# Patient Record
Sex: FEMALE | Race: WHITE | NOT HISPANIC OR LATINO | Employment: OTHER | URBAN - METROPOLITAN AREA
[De-identification: names, ages, dates, MRNs, and addresses within clinical notes are randomized per-mention and may not be internally consistent; named-entity substitution may affect disease eponyms.]

---

## 2017-01-03 ENCOUNTER — OFFICE VISIT (OUTPATIENT)
Dept: SURGERY | Facility: CLINIC | Age: 52
End: 2017-01-03
Payer: MEDICARE

## 2017-01-03 ENCOUNTER — TELEPHONE (OUTPATIENT)
Dept: SURGERY | Facility: CLINIC | Age: 52
End: 2017-01-03

## 2017-01-03 ENCOUNTER — HOSPITAL ENCOUNTER (OUTPATIENT)
Dept: RADIOLOGY | Facility: HOSPITAL | Age: 52
Discharge: HOME | End: 2017-01-03
Attending: COLON & RECTAL SURGERY
Payer: MEDICARE

## 2017-01-03 VITALS
HEART RATE: 91 BPM | SYSTOLIC BLOOD PRESSURE: 124 MMHG | BODY MASS INDEX: 22.05 KG/M2 | DIASTOLIC BLOOD PRESSURE: 77 MMHG | WEIGHT: 145.5 LBS | HEIGHT: 68 IN

## 2017-01-03 DIAGNOSIS — K50.918 CROHN'S DISEASE WITH OTHER COMPLICATION: ICD-10-CM

## 2017-01-03 DIAGNOSIS — K50.918 CROHN'S DISEASE WITH OTHER COMPLICATION: Primary | ICD-10-CM

## 2017-01-03 DIAGNOSIS — Z93.3 COLOSTOMY STATUS: ICD-10-CM

## 2017-01-03 PROCEDURE — 99214 OFFICE O/P EST MOD 30 MIN: CPT | Mod: S$PBB,,, | Performed by: COLON & RECTAL SURGERY

## 2017-01-03 PROCEDURE — 74177 CT ABD & PELVIS W/CONTRAST: CPT | Mod: TC

## 2017-01-03 PROCEDURE — 25500020 PHARM REV CODE 255: Performed by: COLON & RECTAL SURGERY

## 2017-01-03 PROCEDURE — 74177 CT ABD & PELVIS W/CONTRAST: CPT | Mod: 26,GC,, | Performed by: RADIOLOGY

## 2017-01-03 PROCEDURE — 99999 PR PBB SHADOW E&M-EST. PATIENT-LVL III: CPT | Mod: PBBFAC,,, | Performed by: COLON & RECTAL SURGERY

## 2017-01-03 RX ADMIN — IOHEXOL 75 ML: 350 INJECTION, SOLUTION INTRAVENOUS at 05:01

## 2017-01-03 RX ADMIN — IOHEXOL 15 ML: 350 INJECTION, SOLUTION INTRAVENOUS at 02:01

## 2017-01-03 RX ADMIN — IOHEXOL 15 ML: 350 INJECTION, SOLUTION INTRAVENOUS at 03:01

## 2017-01-03 NOTE — TELEPHONE ENCOUNTER
Pt having abdominal pain. This has been going on for 2 weeks but much worse now. Appt made for her to see Dr Uribe today.

## 2017-01-03 NOTE — TELEPHONE ENCOUNTER
----- Message from Karolina Harmon MA sent at 1/3/2017  9:26 AM CST -----  Contact: self  [phone]  States she is calling for a appointment today due to abdomen pain and cramping.  States she is a Crohn's patient and her gastro provider is not available.  States she is calling to see if he can see her today since he did her colostomy surgery.

## 2017-01-03 NOTE — PROGRESS NOTES
Subjective:       Patient ID: Carline Chang is a 51 y.o. female.    Chief Complaint: Abdominal Pain    HPI   51 F who presents to clinic with complaints of continued abdominal pain for the past 3 weeks. +nauseau, denies vomiting, fever, chills. She states the pain is worse at night and it feels similar to acute crohn's attacks she has had in the past. She has seen her GI MD for this and has been treated with bactrim, cipro and flagyl. She finished her course of bactrim but only 7/10days of the cipro and flagyl due to not feeling any better. He regular GI doc (on the [facility]) is out of town so she decided to come here.   Review of Systems   Constitutional: Positive for fatigue and unexpected weight change. Negative for chills and fever.   Respiratory: Negative for shortness of breath.    Cardiovascular: Negative for chest pain.   Gastrointestinal: Positive for abdominal pain and nausea. Negative for abdominal distention, blood in stool, rectal pain and vomiting.       Objective:      Physical Exam   Constitutional: She is oriented to person, place, and time. She appears well-developed and well-nourished.   Pulmonary/Chest: Effort normal. No respiratory distress.   Abdominal: Soft. She exhibits no distension. There is tenderness.   Musculoskeletal: Normal range of motion.   Neurological: She is alert and oriented to person, place, and time.   Skin: Skin is warm and dry.   Psychiatric: She has a normal mood and affect. Her behavior is normal.       Assessment:       1. Crohn's disease with other complication    2. Colostomy status        Plan:       CT Abdomen Pelvis with contrast today  F/U with GI   RTC PRN  Have seen and examined the patient with the fellow and agree with their plan.  ESPERANZA MADSEN

## 2017-01-15 ENCOUNTER — HOSPITAL ENCOUNTER (INPATIENT)
Facility: HOSPITAL | Age: 52
LOS: 5 days | Discharge: HOME | DRG: 386 | End: 2017-01-20
Attending: COLON & RECTAL SURGERY | Admitting: COLON & RECTAL SURGERY
Payer: MEDICARE

## 2017-01-15 DIAGNOSIS — Z12.11 SPECIAL SCREENING FOR MALIGNANT NEOPLASMS, COLON: ICD-10-CM

## 2017-01-15 DIAGNOSIS — K50.90 CROHN DISEASE: ICD-10-CM

## 2017-01-15 DIAGNOSIS — Z93.3 COLOSTOMY STATUS: Primary | ICD-10-CM

## 2017-01-15 DIAGNOSIS — K50.919 CROHN'S DISEASE WITH COMPLICATION, UNSPECIFIED GASTROINTESTINAL TRACT LOCATION: ICD-10-CM

## 2017-01-15 LAB
ALBUMIN SERPL BCP-MCNC: 3.3 G/DL
ALP SERPL-CCNC: 139 U/L
ALT SERPL W/O P-5'-P-CCNC: 10 U/L
ANION GAP SERPL CALC-SCNC: 7 MMOL/L
AST SERPL-CCNC: 16 U/L
BASOPHILS # BLD AUTO: 0.02 K/UL
BASOPHILS NFR BLD: 0.2 %
BILIRUB SERPL-MCNC: 0.2 MG/DL
BUN SERPL-MCNC: 9 MG/DL
CALCIUM SERPL-MCNC: 8.6 MG/DL
CHLORIDE SERPL-SCNC: 102 MMOL/L
CO2 SERPL-SCNC: 29 MMOL/L
CREAT SERPL-MCNC: 0.7 MG/DL
CRP SERPL-MCNC: 3.65 MG/L
DIFFERENTIAL METHOD: ABNORMAL
EOSINOPHIL # BLD AUTO: 0 K/UL
EOSINOPHIL NFR BLD: 0.1 %
ERYTHROCYTE [DISTWIDTH] IN BLOOD BY AUTOMATED COUNT: 13.4 %
EST. GFR  (AFRICAN AMERICAN): >60 ML/MIN/1.73 M^2
EST. GFR  (NON AFRICAN AMERICAN): >60 ML/MIN/1.73 M^2
GLUCOSE SERPL-MCNC: 79 MG/DL
HCT VFR BLD AUTO: 36.5 %
HGB BLD-MCNC: 12.1 G/DL
LYMPHOCYTES # BLD AUTO: 1.2 K/UL
LYMPHOCYTES NFR BLD: 12.1 %
MCH RBC QN AUTO: 28.6 PG
MCHC RBC AUTO-ENTMCNC: 33.2 %
MCV RBC AUTO: 86 FL
MONOCYTES # BLD AUTO: 0.5 K/UL
MONOCYTES NFR BLD: 5.1 %
NEUTROPHILS # BLD AUTO: 8.2 K/UL
NEUTROPHILS NFR BLD: 81 %
PLATELET # BLD AUTO: 223 K/UL
PMV BLD AUTO: 9.5 FL
POTASSIUM SERPL-SCNC: 4.2 MMOL/L
PREALB SERPL-MCNC: 23 MG/DL
PROT SERPL-MCNC: 7.3 G/DL
RBC # BLD AUTO: 4.23 M/UL
SODIUM SERPL-SCNC: 138 MMOL/L
WBC # BLD AUTO: 10.11 K/UL

## 2017-01-15 PROCEDURE — 84134 ASSAY OF PREALBUMIN: CPT

## 2017-01-15 PROCEDURE — 99223 1ST HOSP IP/OBS HIGH 75: CPT | Mod: AI,,, | Performed by: COLON & RECTAL SURGERY

## 2017-01-15 PROCEDURE — 85025 COMPLETE CBC W/AUTO DIFF WBC: CPT

## 2017-01-15 PROCEDURE — 80053 COMPREHEN METABOLIC PANEL: CPT

## 2017-01-15 PROCEDURE — 63600175 PHARM REV CODE 636 W HCPCS: Performed by: ANESTHESIOLOGY

## 2017-01-15 PROCEDURE — 86141 C-REACTIVE PROTEIN HS: CPT

## 2017-01-15 PROCEDURE — 20600001 HC STEP DOWN PRIVATE ROOM

## 2017-01-15 PROCEDURE — 25000003 PHARM REV CODE 250: Performed by: ANESTHESIOLOGY

## 2017-01-15 RX ORDER — DIPHENHYDRAMINE HYDROCHLORIDE 50 MG/ML
25 INJECTION INTRAMUSCULAR; INTRAVENOUS EVERY 4 HOURS PRN
Status: DISCONTINUED | OUTPATIENT
Start: 2017-01-15 | End: 2017-01-20 | Stop reason: HOSPADM

## 2017-01-15 RX ORDER — ACETAMINOPHEN 325 MG/1
650 TABLET ORAL EVERY 4 HOURS PRN
Status: DISCONTINUED | OUTPATIENT
Start: 2017-01-15 | End: 2017-01-20 | Stop reason: HOSPADM

## 2017-01-15 RX ORDER — SODIUM CHLORIDE 0.9 % (FLUSH) 0.9 %
3 SYRINGE (ML) INJECTION EVERY 8 HOURS
Status: DISCONTINUED | OUTPATIENT
Start: 2017-01-15 | End: 2017-01-20 | Stop reason: HOSPADM

## 2017-01-15 RX ORDER — ACETAMINOPHEN 325 MG/1
650 TABLET ORAL EVERY 8 HOURS PRN
Status: DISCONTINUED | OUTPATIENT
Start: 2017-01-15 | End: 2017-01-20 | Stop reason: HOSPADM

## 2017-01-15 RX ORDER — HYDROCODONE BITARTRATE AND ACETAMINOPHEN 10; 325 MG/1; MG/1
1 TABLET ORAL EVERY 4 HOURS PRN
Status: DISCONTINUED | OUTPATIENT
Start: 2017-01-15 | End: 2017-01-20 | Stop reason: HOSPADM

## 2017-01-15 RX ORDER — METRONIDAZOLE 500 MG/100ML
500 INJECTION, SOLUTION INTRAVENOUS
Status: DISCONTINUED | OUTPATIENT
Start: 2017-01-15 | End: 2017-01-16

## 2017-01-15 RX ORDER — ONDANSETRON 8 MG/1
8 TABLET, ORALLY DISINTEGRATING ORAL EVERY 8 HOURS PRN
Status: DISCONTINUED | OUTPATIENT
Start: 2017-01-15 | End: 2017-01-19

## 2017-01-15 RX ORDER — ENOXAPARIN SODIUM 100 MG/ML
60 INJECTION SUBCUTANEOUS
Status: DISCONTINUED | OUTPATIENT
Start: 2017-01-15 | End: 2017-01-20 | Stop reason: HOSPADM

## 2017-01-15 RX ORDER — RAMELTEON 8 MG/1
8 TABLET ORAL NIGHTLY PRN
Status: DISCONTINUED | OUTPATIENT
Start: 2017-01-15 | End: 2017-01-20 | Stop reason: HOSPADM

## 2017-01-15 RX ORDER — HYDROCODONE BITARTRATE AND ACETAMINOPHEN 5; 325 MG/1; MG/1
1 TABLET ORAL EVERY 4 HOURS PRN
Status: DISCONTINUED | OUTPATIENT
Start: 2017-01-15 | End: 2017-01-20 | Stop reason: HOSPADM

## 2017-01-15 RX ADMIN — METRONIDAZOLE 500 MG: 500 INJECTION, SOLUTION INTRAVENOUS at 10:01

## 2017-01-15 RX ADMIN — HYDROCODONE BITARTRATE AND ACETAMINOPHEN 1 TABLET: 10; 325 TABLET ORAL at 10:01

## 2017-01-15 RX ADMIN — RAMELTEON 8 MG: 8 TABLET, FILM COATED ORAL at 10:01

## 2017-01-15 RX ADMIN — METHYLPREDNISOLONE SODIUM SUCCINATE 40 MG: 40 INJECTION, POWDER, FOR SOLUTION INTRAMUSCULAR; INTRAVENOUS at 10:01

## 2017-01-15 NOTE — IP AVS SNAPSHOT
[facility]  [address]  [place] [address]  Phone: [phone]           Patient Discharge Instructions     Our goal is to set you up for success. This packet includes information on your condition, medications, and your home care. It will help you to care for yourself so you don't get sicker and need to go back to the hospital.     Please ask your nurse if you have any questions.        There are many details to remember when preparing to leave the hospital. Here is what you will need to do:    1. Take your medicine. If you are prescribed medications, review your Medication List in the following pages. You may have new medications to  at the pharmacy and others that you'll need to stop taking. Review the instructions for how and when to take your medications. Talk with your doctor or nurses if you are unsure of what to do.     2. Go to your follow-up appointments. Specific follow-up information is listed in the following pages. Your may be contacted by a transition nurse or clinical provider about future appointments. Be sure we have all of the phone numbers to reach you, if needed. Please contact your provider's office if you are unable to make an appointment.     3. Watch for warning signs. Your doctor or nurse will give you detailed warning signs to watch for and when to call for assistance. These instructions may also include educational information about your condition. If you experience any of warning signs to your health, call your doctor.               ** Verify the list of medication(s) below is accurate and up to date. Carry this with you in case of emergency. If your medications have changed, please notify your healthcare provider.             Medication List      START taking these medications        Additional Info                      ciprofloxacin HCl 500 MG tablet   Commonly known as:  CIPRO   Quantity:  10 tablet   Refills:  0   Dose:  500 mg    Indications:  UTI    Last time this was given:  500 mg on 1/20/2017  8:00 AM   Instructions:  Take 1 tablet (500 mg total) by mouth every 12 (twelve) hours.     Begin Date    AM    Noon    PM    Bedtime       lorazepam 0.5 MG tablet   Commonly known as:  ATIVAN   Quantity:  60 tablet   Refills:  0   Dose:  0.5 mg    Instructions:  Take 1 tablet (0.5 mg total) by mouth every 6 (six) hours as needed for Anxiety.     Begin Date    AM    Noon    PM    Bedtime       oxycodone-acetaminophen 5-325 mg per tablet   Commonly known as:  PERCOCET   Quantity:  20 tablet   Refills:  0   Dose:  1 tablet    Instructions:  Take 1 tablet by mouth every 4 (four) hours as needed for Pain.     Begin Date    AM    Noon    PM    Bedtime       predniSONE 10 MG tablet   Commonly known as:  DELTASONE   Quantity:  60 tablet   Refills:  0   Dose:  40 mg    Last time this was given:  40 mg on 1/20/2017  8:00 AM   Instructions:  Take 4 tablets (40 mg total) by mouth once daily. Take 4 pills daily for one week 40 mgm Take 3 pills daily for one week  30 mgm Take 2 pilss daily for one week  20 mgm Take one pill daily for one week  10 mgm  then stop     Begin Date    AM    Noon    PM    Bedtime         CHANGE how you take these medications        Additional Info                      gabapentin 300 MG capsule   Commonly known as:  NEURONTIN   Quantity:  30 capsule   Refills:  0   Dose:  300 mg   What changed:    - how much to take  - how to take this  - when to take this    Instructions:  Take 1 capsule (300 mg total) by mouth every evening.     Begin Date    AM    Noon    PM    Bedtime       hydrocodone-acetaminophen 10-325mg  mg Tab   Commonly known as:  NORCO   Quantity:  91 tablet   Refills:  0   Dose:  1 tablet   What changed:    - when to take this  - reasons to take this    Last time this was given:  1 tablet on 1/20/2017 12:30 PM   Instructions:  Take 1 tablet by mouth every 4 (four) hours as needed for Pain.     Begin Date    AM     Noon    PM    Bedtime         CONTINUE taking these medications        Additional Info                      adalimumab 10 mg/0.2 mL Sykt   Refills:  0    Instructions:  Inject into the skin.     Begin Date    AM    Noon    PM    Bedtime       calcium citrate-vitamin D3 200 mg calcium -250 unit Tab   Refills:  0   Dose:  1 tablet    Instructions:  Take 1 tablet by mouth.     Begin Date    AM    Noon    PM    Bedtime       escitalopram oxalate 10 MG tablet   Commonly known as:  LEXAPRO   Refills:  0   Dose:  10 mg    Instructions:  Take 10 mg by mouth once daily.     Begin Date    AM    Noon    PM    Bedtime       promethazine 12.5 MG Tab   Commonly known as:  PHENERGAN   Refills:  0   Dose:  12.5 mg    Instructions:  Take 12.5 mg by mouth 4 (four) times daily.     Begin Date    AM    Noon    PM    Bedtime       zolpidem 12.5 MG CR tablet   Commonly known as:  AMBIEN CR   Refills:  0   Dose:  12.5 mg    Instructions:  Take 12.5 mg by mouth nightly as needed for Insomnia.     Begin Date    AM    Noon    PM    Bedtime            Where to Get Your Medications      You can get these medications from any pharmacy     Bring a paper prescription for each of these medications     ciprofloxacin HCl 500 MG tablet    gabapentin 300 MG capsule    hydrocodone-acetaminophen 10-325mg  mg Tab    lorazepam 0.5 MG tablet    oxycodone-acetaminophen 5-325 mg per tablet    predniSONE 10 MG tablet                  Please bring to all follow up appointments:    1. A copy of your discharge instructions.  2. All medicines you are currently taking in their original bottles.  3. Identification and insurance card.    Please arrive 15 minutes ahead of scheduled appointment time.    Please call 24 hours in advance if you must reschedule your appointment and/or time.        Your Scheduled Appointments     Jan 24, 2017  9:00 AM [facility]   GASTROENTEROLOGY ESTABLISHED PATIENT with MD Girish Whitley - Gastroenterology ([address] )    [address]  [facility] [address]   [phone]            Feb 14, 2017 10:30 AM CST   New Patient with Brianda Cano MD   [facility] - Urology 4th Floor ([address] )    [address]  [facility] [address]   [phone]              Follow-up Information     Follow up with Dany Stock MD. Go on 1/24/2017.    Specialty:  Gastroenterology    Why:  GI follow-up  , 1/24/17 - Dr Stock    Contact information:    [address]  [facility] [address]  [phone]          Follow up with Dany Stock MD In 2 weeks.    Specialty:  Gastroenterology    Contact information:    [address]  [facility] [address]  [phone]          Follow up with Brianda Cano MD In 2 weeks.    Specialty:  Urology    Why:  right ureteral stone    Contact information:    [address]  [facility] [address]  [phone]          Discharge Instructions     Future Orders    Activity as tolerated     Call MD for:  persistent nausea and vomiting or diarrhea     Call MD for:  severe uncontrolled pain     Call MD for:  temperature >100.4     Diet general     Questions:    Total calories:      Fat restriction, if any:      Protein restriction, if any:      Na restriction, if any:      Fluid restriction:      Additional restrictions:      Diet general     Questions:    Total calories:      Fat restriction, if any:      Protein restriction, if any:      Na restriction, if any:      Fluid restriction:      Additional restrictions:          Discharge Instructions         Colonoscopy     A camera attached to a flexible tube with a viewing lens is used to take video pictures.     Colonoscopy is used to view the inside of your lower digestive tract (colon and rectum). It can help screen for colon cancer and can help find the source of abdominal pain, bleeding, and changes in bowel habits. The test is usually done in the hospital on an outpatient basis. During the exam, the doctor can remove a small tissue  sample ( a biopsy) for testing. Small growths, such as polyps, may also be removed during colonoscopy.  Getting ready   · Be sure to tell your doctor about any medications you take. Also tell your doctor about any health conditions you may have.  · Discuss the risks of the test with your doctor. These include bleeding and bowel puncture.  · Your rectum and colon must be empty for the test. So be sure to follow the diet and bowel prep instructions exactly. If you dont, the test may need to be rescheduled.  · Ask your doctor whether you need to have a friend or family member prepared to drive you home after the test.  During the test   · You are given sedating (relaxing) medication through an IV line. You may be drowsy or completely asleep.  · The procedure takes 30 minutes or longer.  · The doctor performs a digital rectal exam to check for anal and rectal problems. The rectum is lubricated and the scope inserted.  · If you are awake, you may have a feeling similar to needing to have a bowel movement. You may also feel pressure as air is pumped into the colon. Its OK to pass gas during the procedure.  After the test   ·      Colonoscopy provides an inside view of the entire colon.     You may discuss the results with your doctor right away or at a future visit.  · Try to pass all the gas right after the test to help prevent bloating and cramping.  · After the test, you can go back to your normal eating and other activities.  Risks and possible complications include:  · Bleeding · A puncture or tear in the colon · Risks of anesthesia       © 2312-6157 [employer]. [address] [place] [address], [place] [address]. All rights reserved. This information is not intended as a substitute for professional medical care. Always follow your healthcare professional's instructions.        Upper GI Endoscopy     During endoscopy, a long, flexible tube is used to view the inside of your upper GI tract.      Upper GI  endoscopy allows your healthcare provider to look directly into the beginning of your gastrointestinal (GI) tract. The esophagus, stomach, and duodenum (the first part of the small intestine) make up the upper GI tract.   Before the exam  Follow these and any other instructions you are given before your endoscopy. If you dont follow the healthcare providers instructions carefully, the test may need to be canceled or done over:  · Don't eat or drink anything after midnight the night before your exam. If your exam is in the afternoon, drink only clear liquids in the morning. Don't eat or drink anything for 8 hours before the exam. In some cases, you may be able to take medicines with sips of water until 2 hours before the procedure. Speak with your healthcare provider about this.   · Bring your X-rays and any other test results you have.  · Because you will be sedated, arrange for an adult to drive you home after the exam.  · Tell your healthcare provider before the exam if you are taking any medicines or have any medical problems.  The procedure  Here is what to expect:  · You will lie on the endoscopy table. Usually patients lie on the left side.  · You will be monitored and given oxygen.  · Your throat may be numbed with a spray or gargle. You are given medicine through an intravenous (IV) line that will help you relax and remain comfortable. You may be awake or asleep during the procedure.  · The healthcare provider will put the endoscope in your mouth and down your esophagus. It is thinner than most pieces of food that you swallow. It will not affect your breathing. The medicine helps keep you from gagging.  · Air is put into your GI tract to expand it. It can make you burp.  · During the procedure, the healthcare provider can take biopsies (tissue samples), remove abnormalities, such as polyps, or treat abnormalities through a variety of devices placed through the endoscope. You will not feel this.   · The  "endoscope carries images of your upper GI tract to a video screen. If you are awake, you may be able to look at the images.  · After the procedure is done, you will rest for a time. An adult must drive you home.  When to call your healthcare provider  Contact your healthcare provider if you have:  · Black or tarry stools, or blood in your stool  · Fever  · Pain in your belly that does not go away  · Nausea and vomiting, or vomiting blood   © 9721-9325 [employer]. [address] [place] [address]. All rights reserved. This information is not intended as a substitute for professional medical care. Always follow your healthcare professional's instructions.            Primary Diagnosis     Your primary diagnosis was:  Inflammatory Bowel Disease (Crohn's Disease)      Admission Information     Date & Time Provider Department CSN    1/15/2017  7:30 PM Andre Uribe MD [facility] [id number]      Care Providers     Provider Role Specialty Primary office phone    Andre Uribe MD Attending Provider Colon and Rectal Surgery [phone]    Marcus Scott MD Consulting Physician  Gastroenterology [phone]    Dany Stock MD Surgeon  Gastroenterology [phone]      Your Vitals Were     BP Pulse Temp Resp Height Weight    120/58 (BP Location: Left arm, Patient Position: Lying, BP Method: Automatic) 72 97 °F (36.1 °C) (Oral) 18 5' 8" (1.727 m) 63.5 kg (140 lb)    SpO2 BMI             93% 21.29 kg/m2         Recent Lab Values     No lab values to display.      Pending Labs     Order Current Status    Thiopurine Methyltrans, RBC In process    Urine culture In process      Allergies as of 1/20/2017        Reactions    Morphine     Nubain [Nalbuphine] Anxiety      [facility] Nurse Care Line - 24/7 Assistance  Unless otherwise directed by your provider, please contact [facility], our nurse care line that is available for 24/7 assistance. "     Registered nurses in the [facility] provide clinical advisement, health education, appointment booking, and other advisory services.  Call for this free service at [phone].        Advance Directives     An advance directive is a document which, in the event you are no longer able to make decisions for yourself, tells your healthcare team what kind of treatment you do or do not want to receive, or who you would like to make those decisions for you.  If you do not currently have an advance directive, [facility] encourages you to create one.  For more information call:  [phone]), [phone] ([phone]),  or log on to [email or web address].        Language Assistance Services     ATTENTION: Language assistance services are available, free of charge. Please call [phone].      ATENCIÓN: Si habla español, tiene a [place] disposición servicios gratuitos de asistencia lingüística. Llame al [phone].     CHÚ Ý: N?u b?n nói Ti?ng Vi?t, có các d?ch v? h? tr? ngôn ng? mi?n phí dành cho b?n. G?i s? [phone].         [facility] complies with applicable Federal civil rights laws and does not discriminate on the basis of race, color, national origin, age, disability, or sex.

## 2017-01-15 NOTE — IP AVS SNAPSHOT
[address]  [place] [address]  Phone: [phone]           I have received a copy of my After Visit Summary and discharge instructions from [facility].    INSTRUCTIONS RECEIVED AND UNDERSTOOD BY:                     Patient/Patient Representative: ________________________________________________________________     Date/Time: ________________________________________________________________                     Instructions Given By: ________________________________________________________________     Date/Time: ________________________________________________________________

## 2017-01-16 ENCOUNTER — ANESTHESIA EVENT (OUTPATIENT)
Dept: ENDOSCOPY | Facility: HOSPITAL | Age: 52
DRG: 386 | End: 2017-01-16
Payer: MEDICARE

## 2017-01-16 PROCEDURE — 36569 INSJ PICC 5 YR+ W/O IMAGING: CPT

## 2017-01-16 PROCEDURE — 76937 US GUIDE VASCULAR ACCESS: CPT

## 2017-01-16 PROCEDURE — 25000003 PHARM REV CODE 250: Performed by: ANESTHESIOLOGY

## 2017-01-16 PROCEDURE — 25000003 PHARM REV CODE 250: Performed by: COLON & RECTAL SURGERY

## 2017-01-16 PROCEDURE — 25000003 PHARM REV CODE 250: Performed by: INTERNAL MEDICINE

## 2017-01-16 PROCEDURE — C1751 CATH, INF, PER/CENT/MIDLINE: HCPCS

## 2017-01-16 PROCEDURE — 20600001 HC STEP DOWN PRIVATE ROOM

## 2017-01-16 PROCEDURE — 63600175 PHARM REV CODE 636 W HCPCS: Performed by: NURSE PRACTITIONER

## 2017-01-16 PROCEDURE — 0DBF8ZX EXCISION OF RIGHT LARGE INTESTINE, VIA NATURAL OR ARTIFICIAL OPENING ENDOSCOPIC, DIAGNOSTIC: ICD-10-PCS | Performed by: COLON & RECTAL SURGERY

## 2017-01-16 PROCEDURE — 63600175 PHARM REV CODE 636 W HCPCS: Performed by: ANESTHESIOLOGY

## 2017-01-16 RX ORDER — POLYETHYLENE GLYCOL 3350, SODIUM SULFATE ANHYDROUS, SODIUM BICARBONATE, SODIUM CHLORIDE, POTASSIUM CHLORIDE 236; 22.74; 6.74; 5.86; 2.97 G/4L; G/4L; G/4L; G/4L; G/4L
2000 POWDER, FOR SOLUTION ORAL 2 TIMES DAILY
Status: COMPLETED | OUTPATIENT
Start: 2017-01-16 | End: 2017-01-17

## 2017-01-16 RX ORDER — HYDROMORPHONE HYDROCHLORIDE 1 MG/ML
0.5 INJECTION, SOLUTION INTRAMUSCULAR; INTRAVENOUS; SUBCUTANEOUS
Status: DISCONTINUED | OUTPATIENT
Start: 2017-01-16 | End: 2017-01-18

## 2017-01-16 RX ORDER — LORAZEPAM 2 MG/ML
0.5 INJECTION INTRAMUSCULAR EVERY 6 HOURS PRN
Status: DISCONTINUED | OUTPATIENT
Start: 2017-01-16 | End: 2017-01-18

## 2017-01-16 RX ORDER — SODIUM CHLORIDE 0.9 % (FLUSH) 0.9 %
10 SYRINGE (ML) INJECTION
Status: DISCONTINUED | OUTPATIENT
Start: 2017-01-16 | End: 2017-01-19 | Stop reason: ALTCHOICE

## 2017-01-16 RX ORDER — SODIUM CHLORIDE 0.9 % (FLUSH) 0.9 %
10 SYRINGE (ML) INJECTION EVERY 6 HOURS
Status: DISCONTINUED | OUTPATIENT
Start: 2017-01-16 | End: 2017-01-19 | Stop reason: ALTCHOICE

## 2017-01-16 RX ADMIN — Medication 3 ML: at 06:01

## 2017-01-16 RX ADMIN — DIPHENHYDRAMINE HYDROCHLORIDE 25 MG: 50 INJECTION, SOLUTION INTRAMUSCULAR; INTRAVENOUS at 02:01

## 2017-01-16 RX ADMIN — Medication 10 ML: at 05:01

## 2017-01-16 RX ADMIN — HYDROCODONE BITARTRATE AND ACETAMINOPHEN 1 TABLET: 10; 325 TABLET ORAL at 06:01

## 2017-01-16 RX ADMIN — LORAZEPAM 0.5 MG: 2 INJECTION INTRAMUSCULAR; INTRAVENOUS at 02:01

## 2017-01-16 RX ADMIN — METHYLPREDNISOLONE SODIUM SUCCINATE 40 MG: 40 INJECTION, POWDER, FOR SOLUTION INTRAMUSCULAR; INTRAVENOUS at 01:01

## 2017-01-16 RX ADMIN — ENOXAPARIN SODIUM 60 MG: 100 INJECTION SUBCUTANEOUS at 09:01

## 2017-01-16 RX ADMIN — METHYLPREDNISOLONE SODIUM SUCCINATE 40 MG: 40 INJECTION, POWDER, FOR SOLUTION INTRAMUSCULAR; INTRAVENOUS at 06:01

## 2017-01-16 RX ADMIN — ONDANSETRON 8 MG: 8 TABLET, ORALLY DISINTEGRATING ORAL at 08:01

## 2017-01-16 RX ADMIN — Medication 3 ML: at 10:01

## 2017-01-16 RX ADMIN — METRONIDAZOLE 500 MG: 500 INJECTION, SOLUTION INTRAVENOUS at 01:01

## 2017-01-16 RX ADMIN — HYDROCODONE BITARTRATE AND ACETAMINOPHEN 1 TABLET: 10; 325 TABLET ORAL at 05:01

## 2017-01-16 RX ADMIN — PROMETHAZINE HYDROCHLORIDE 6.25 MG: 25 INJECTION INTRAMUSCULAR; INTRAVENOUS at 06:01

## 2017-01-16 RX ADMIN — POLYETHYLENE GLYCOL 3350, SODIUM SULFATE ANHYDROUS, SODIUM BICARBONATE, SODIUM CHLORIDE, POTASSIUM CHLORIDE 2000 ML: 236; 22.74; 6.74; 5.86; 2.97 POWDER, FOR SOLUTION ORAL at 05:01

## 2017-01-16 RX ADMIN — METRONIDAZOLE 500 MG: 500 INJECTION, SOLUTION INTRAVENOUS at 06:01

## 2017-01-16 RX ADMIN — Medication 3 ML: at 02:01

## 2017-01-16 RX ADMIN — HYDROCODONE BITARTRATE AND ACETAMINOPHEN 1 TABLET: 10; 325 TABLET ORAL at 02:01

## 2017-01-16 RX ADMIN — LORAZEPAM 0.5 MG: 2 INJECTION INTRAMUSCULAR; INTRAVENOUS at 09:01

## 2017-01-16 RX ADMIN — HYDROCODONE BITARTRATE AND ACETAMINOPHEN 1 TABLET: 10; 325 TABLET ORAL at 10:01

## 2017-01-16 RX ADMIN — PROMETHAZINE HYDROCHLORIDE 6.25 MG: 25 INJECTION INTRAMUSCULAR; INTRAVENOUS at 12:01

## 2017-01-16 NOTE — PLAN OF CARE
Sw following for d/c needs. Pt has family support and had ostomy prior to admit.     Paula Barros, [facility] [id number]

## 2017-01-16 NOTE — ANESTHESIA PREPROCEDURE EVALUATION
2017  Carline Chang is a 51 y.o., female with hx of fistulizing Crohn's disease diagnosed  (age 21) complicated by two SB resections (95 and 09), rectovaginal fistula , sigmoid loop colostomy  who presents as transfer from [place] to Dr. Uribe's service for suspected worsening Crohns disease. Plan now for scopes to assess disease activity.    Pre-operative evaluation for ESOPHAGOGASTRODUODENOSCOPY (EGD) (N/A), COLONOSCOPY (N/A)    LDA:  L brachial PICC    Previous Airway:  Placement Date: 06/18/15; Placement Time: 1242; Method of Intubation: Direct laryngoscopy; Inserted by: Other (SRNA); Airway Device: Endotracheal Tube; Mask Ventilation: Easy; Intubated: Postinduction; Blade: Little #2; Airway Device Size: 7.0; Style: Cuffed; Cuff Inflation: Minimal occlusive pressure; Inflation Amount: 7; Placement Verified By: Auscultation, Capnometry; Grade: Grade I; Complicating Factors: None; Intubation Findings: Positive EtCO2, Bilateral breath sounds, Atraumatic/Condition of teeth unchanged;  Depth of Insertion: 20; Securment: Lips; Complications: None; Breath Sounds: Equal Bilateral; Insertion Attempts: 1; Removal Date: 06/18/15;  Removal Time: 1502    Past Surgical History   Procedure Laterality Date    Bowel resection       x2    Cholecystectomy      Tubal ligation      Rectal abscess drain       section       x2    Colonoscopy N/A 2016     Procedure: COLONOSCOPY;  Surgeon: Andre Uribe MD;  Location: [address];  Service: Endoscopy;  Laterality: N/A;         Vital Signs Range (Last 24H):  Temp:  [36.6 °C (97.9 °F)-36.8 °C (98.3 °F)]   Pulse:  [60-74]   Resp:  [14-16]   BP: (120-130)/(60-82)   SpO2:  [95 %-97 %]       CBC:     Recent Labs  Lab 01/15/17  2059   WBC 10.11   RBC 4.23   HGB 12.1   HCT 36.5*      MCV 86   MCH 28.6   MCHC 33.2        CMP:   Recent Labs  Lab 01/15/17  2059      K 4.2      CO2 29   BUN 9   CREATININE 0.7   GLU 79   CALCIUM 8.6*   ALBUMIN 3.3*   PROT 7.3   ALKPHOS 139*   ALT 10   AST 16   BILITOT 0.2       INR:  No results for input(s): INR, PROTIME, APTT in the last 720 hours.    Invalid input(s): PT    [facility] Anesthesia Evaluation    I have reviewed the Patient Summary Reports.     I have reviewed the Medications.     Review of Systems  Anesthesia Hx:  No problems with previous Anesthesia  History of prior surgery of interest to airway management or planning: Denies Family Hx of Anesthesia complications.   Denies Personal Hx of Anesthesia complications.   EENT/Dental:EENT/Dental Normal   Cardiovascular:  Cardiovascular Normal     Pulmonary:  Pulmonary Normal    Renal/:  Renal/ Normal     Hepatic/GI:  Hepatic/GI Normal    Neurological:  Neurology Normal    Endocrine:  Endocrine Normal    Psych:  Psychiatric Normal           Physical Exam  General:  Well nourished    Airway/Jaw/Neck:  Airway Findings: Mouth Opening: Normal Tongue: Normal  Mallampati: II  Improves to I with phonation.  TM Distance: Normal, at least 6 cm  Jaw/Neck Findings:  Neck ROM: Normal ROM     Eyes/Ears/Nose:  EYES/EARS/NOSE FINDINGS: Normal   Dental:  Dental Findings: In tact   Chest/Lungs:  Chest/Lungs Findings: Clear to auscultation, Normal Respiratory Rate     Heart/Vascular:  Heart Findings: Rate: Normal  Rhythm: Regular Rhythm  Heart murmur: negative       Mental Status:  Mental Status Findings: Normal        Anesthesia Plan  Type of Anesthesia, risks & benefits discussed:  Anesthesia Type:  general, MAC  Patient's Preference:   Intra-op Monitoring Plan: standard ASA monitors  Intra-op Monitoring Plan Comments:   Post Op Pain Control Plan:   Post Op Pain Control Plan Comments:   Induction:   IV  Beta Blocker:  Patient is not currently on a Beta-Blocker (No further documentation required).       Informed Consent: Patient understands  risks and agrees with Anesthesia plan.  Questions answered. Anesthesia consent signed with patient.  ASA Score: 3     Day of Surgery Review of History & Physical:    H&P update referred to the surgeon.         Ready For Surgery From Anesthesia Perspective.

## 2017-01-16 NOTE — MEDICAL/APP STUDENT
51 year old female patient with Crohn's disease (diagnosed 30 years ago, two small bowel resections in 1995 and 2009, rectovaginal fistula in 2014, sigmoid loop colostomy in 2015, currently on humira) presenting with cramping abdominal pain, malaise and reflux. CT findings consistent with small bowel Crohn's flare. GI consulted for medical management of flare.     HPI  Patient began feeling unwell mid December with symptoms of malaise; nausea; appetite change; decreased PO intake; weight loss (16 pounds over past month); cramping bilateral, lower abdominal pain that radiated to the epigastrium and increased reflux symptoms. She also noticed mucous in her stoma bag but no blood.     According to patient report, when her symptoms started in mid December, she was suspected to have a UTI and was treated with bactrim and then ceftriaxone. As her symptoms progressively worsened, she saw Dr. Garg who ordered a CT and recommended GI follow up. CT abdo/pelvis (with IV and PO contrast) demonstrated small bowel inflammatory changes consistent with a Crohn's flare. The patient presented to [facility] where her regular gastroenterologist, Dr Medina is based. She comes to [facility] where she has been admitted for 5 days, directly after being released from a 6 day admission at the [facility]. Per the patient's report, the reason for transfer was to get a second GI opinion. As per patient report, she has been treated with prednisone, metronidazole and ciprofloxacin. C. Diff toxin assays have been negative at other hospitals. Her right midline catheter clotted at [facility]. As such, she is on enoxaparin and TEDS and currently has a central line in situ.     Medical history  Crohn's  - Diagnosed 1986 at age 21. First symptoms at age 17 (two consecutive nights of severe cramping abdominal pain). Diagnosed at age 21 by GI with symptoms of cramping abdominal pain, diarrhoea and weight loss.  "    - regular GI physician is Dr. Medina    - Surgeries: small bowel resection (1 surgery, 2 resected pieces) in 1995, small bowel resection (~15 inches per patient report) in 2009, sigmoid loop colostomy in June 2015. Patient reports colostomy is very high output (usually 4-5 empties per day but takes immodium).     - Disease progression: initially confined to small bowel but then progressed to rectum in 2011 with rectal stricturing, abscess and rectovaginal fistula (2014). Was on metronidazole but developed peripheral neuropathy in feet. Patient denies aphthous ulcers.    - Remission: symptom free only during first pregnancy in 1999, otherwise ongoing abdominal pain and multiple bowel motions per day    - Surveillance:   * last EGD 2000  * Colonoscopy 5/27/16 to review efficacy of humira:  Colonoscopy findings:       Inflammation characterized by congestion (edema), erythema and scarring was found. The anus and the rectum were spared. This was mild in severity, and when compared to previous examinations, the findings are improved. The colon (entire examined portion) appeared normal.    - Extraintestinal manifestations:   * Eyes: nil problems, has regular eye checks  * Joints: patient reports three previous episodes of severe "bone" pain in the lower extremities (?arthralgia)  * Skin: reports previous dry, red, targetoid rash (nil recent)    - Medication history   * Asacol   * Pentasa (ineffective)  * Azulfidine  * Azathioprine (18 years, on and off, no significant side effects)  * Remicade - 3 infusions 2009  * Currently on humira (1 year, fortnightly infusions, due for her next infusion now)  * Prednisone 40mg - on and off    ROS  Constitutional: no fevers or chills; weight loss of 16lb  Optho: no viual changes  Derm: no rash  MSK: no arthritis  : no pneumaturia  Neuro: peripheral neuropathy (long standing associated with metronidazole) - worst over the past month as she has been treated on and off with " metronidazole  Psych: night time anxiety symptoms    Other medical history   Anxiety  GORD    Other surgical history  Cholecystectomy  Tubal ligation  Caesarean x2    Current medications  Immodium  Phenergan   Hydrocodone BD   Humira  Lexapro  Zolpidem      Family history  Nil colorectal cancer    Social history  Non-smoker, non-drinker  Lives with two teenage sons (currently at their fathers)  Works as a  in mental health    Allergies  Morphine and nalbuphine - extreme abdominal pain     Examination  T 97.9, WI 60, RR 14, /60, SpO2 95% RA  General appearance: Patient appears comfortable and in no acute distress  Skin: Central line site is clean with no erythema, no rashes  Eyes: No signs of uveitis  ENT: Moist mucous membranes, no apthous ulcers  Heart: regular rate and rhythm, HSDNM  Abdomen: soft, non-tender, reduced bowel sounds, ostomy in LLQ with very little output (yellow-green liquid, no blood, no mucous)  Extremities: calves SNT, no peripheral oedema    Investigations  - CT abdo/pelvis with IV and PO contrast 1/3/17  - Bloods grossly WNL  - No C. Diff growth in cultures at previous hospitals by patient report    Assessment  51F with longstanding Crohn's disease with acute flare in small bowel poorly controlled on current medical regimen.    Problem list  - Medication optimisation  - Review benefits and harms of metronidazole given peripheral neuropathy  - PPI for reflux symptoms

## 2017-01-16 NOTE — PLAN OF CARE
Pt AA&Ox4, resting in bed,  obtained assessment information from patient.   Pt resides in [place], [place] with her two teenage children.  She has her mother for support/ assistance.      Pt came from [facility] and prior to that reports being at [facility] in [place].  Pt has an ostomy and reports some skin irritation, CM will consult WOCN.      Plan for PICC line placment, CRS has consulted GI team.    PCP - Pablo Medina MD     Pharmacy [id number] - [place], [place] - [address]  [address]  [place] [address]  Phone: [phone] Fax: [phone]      Payor - Payor: MEDICARE / Plan: MEDICARE PART A & B / Product Type: [facility] /         01/16/17 0731   Discharge Assessment   Assessment Type Discharge Planning Assessment   Confirmed/corrected address and phone number on facesheet? Yes   Assessment information obtained from? Patient;Medical Record   Prior to hospitilization cognitive status: Alert/Oriented   Prior to hospitalization functional status: Independent   Current cognitive status: Alert/Oriented   Current Functional Status: Independent   Arrived From acute care hospital  ([facility] )   Lives With child(thelma), dependent   Able to Return to Prior Arrangements yes   Is patient able to care for self after discharge? Yes   Who are your caregiver(s) and their phone number(s)? Mother,  Tanesha Chang   [phone]      Patient's perception of discharge disposition home or selfcare   Readmission Within The Last 30 Days other (see comments)  (Pt was tx from [facility], prior to that she was in [facility] )   Does the patient currently use HME? Yes   Equipment Currently Used at Home colostomy/ostomy supplies   Do you have any problems affording any of your prescribed medications? No   Is the patient taking medications as prescribed? yes   Do you have any financial concerns preventing you from receiving the healthcare you  need? No   Does the patient have transportation to healthcare appointments? Yes   Transportation Available family or friend will provide   On Dialysis? No   Does the patient receive services at the Coumadin Clinic? No   Discharge Plan A Home with family   Discharge Plan B Home Health   Patient/Family In Agreement With Plan yes

## 2017-01-16 NOTE — CONSULTS
Right MidLine sight Dc'd.  Patient now has RIJ venous access that is patent and working appropriately.  Please reconsult if needed.

## 2017-01-16 NOTE — H&P
History & Physical    SUBJECTIVE:     Chief Complaint: abdominal cramping    History of Present Illness:  Patient is a 51 y.o. female who presents with Crohn Disease, rectovaginal fistula, and GERD. Over the past month her Crohn Disease has not been well controled with Humira as it previously had been. She experiences cramping abdominal pain with food intake and the last time she was able to tolerate PO food normally was 1 month ago. She comes to [facility] from [facility] where she has been admitted for 5 days, directly after being released from a 6 day admission at the [facility]. Notably, her right midline clotted at [facility] and she has had one dose of lovenox (today at 5PM).     She experiences cramping abdominal pain with food intake, though she was able to tolerated mashed potatoes well earlier today. She rates her pain for earlier today as a 4 on average, but is currently not experiencing any pain. She also notes some baseline nausea, which she normally controls with Phenergan. She has no rectovaginal complaints at this time.    This patient is known to Dr. Uribe. He last saw her in early 2016. At that time, management was not surgical. In the past she has had surgical bowel resection as management of her Crohn Disease.     No fevers, chills, BPR, vomiting, dizziness, fatigue.    Review of patient's allergies indicates:   Allergen Reactions    Morphine     Nubain [nalbuphine] Anxiety       Past Medical History   Diagnosis Date    Chronic pain     Crohn's disease      Past Surgical History   Procedure Laterality Date    Bowel resection       x2    Cholecystectomy      Tubal ligation      Rectal abscess drain       section       x2    Colonoscopy N/A 2016     Procedure: COLONOSCOPY;  Surgeon: Andre Uribe MD;  Location: [address];  Service: Endoscopy;  Laterality: N/A;     History reviewed. No pertinent family history.  Social History   Substance  Use Topics    Smoking status: Never Smoker    Smokeless tobacco: None    Alcohol use No        Review of Systems: as per HPI    OBJECTIVE:     Vital Signs (Most Recent)  Temp 98.3  HR 74  /82  O2sats 96%  RR 15    Physical Exam:  General: NAD sitting in her hospital bed comfortably  Neuro: aaox4 maex4 perrl  Head and Neck: right IJ with erythema at site of insertion  Respiratory: resps even unlabored  Cardiac: RRR, dual heart sounds, no added  Abdomen: J pouch stoma present in LLQ, low midline pale, extremely well-healed scar from prior bowel resection, ND, NTTP, BS+  Extremities: Warm dry and intact, no edema  Anorectal: deferred    Laboratory    Diagnostic Results:      ASSESSMENT/PLAN:     Patient is a 51 y.o. female who presents with Crohn Disease, rectovaginal fistula, and GERD who is currently experiencing a Crohn Disease flare.    Plan:  - Home meds  - Prior midline clot: lovenox 60mg BID, PICC consult for placement of midline on left   - GI consult: would appreciate recs  - IV Flagyl  - IV Solu-medrol  - Pain and nausea control as needed  - Diet: CLD  - Prophylaxis: TEDs, SCDs    Will discuss with the CRS chief resident.    Abi Garcia MD  PGY-1  Surgery   Have seen and examined the patient with the fellow and agree with their plan.  ESPERANZA MADSEN

## 2017-01-16 NOTE — CONSULTS
[facility]           Inflammatory Bowel Disease Consultation Note    Reason for Consult:  The encounter diagnosis was Crohn disease.    Primary GI: Pablo Medina       Inflammatory bowel disease history:  This is a 51 y.o. female with hx of fistulizing Crohn's disease diagnosed 1986 (age 21) complicated by two SB resections (95 and 09), rectovaginal fistula 2014, sigmoid loop colostomy 2015 who presents as transfer from [place] to Dr. Uribe's service for suspected worsening Crohns disease.  Pt initially diagnosed with symptoms of nausea, wt loss, diarrhea and crampy abd pain. Was started on multiple oral medications including asacol, pentasa, azulfidine, prednisone, and imuran monotherapy. She did not achieve remission with any of these agents to her knowledge. The prednisone was intermittently the most effective. She did not have complication with the imuran (she belives 50mg). In 1995, she had complication and had small bowel resection, she believes 10 inches total. She felt better after surgery and was not on meds. She then developed abscess in 2009 which required drainage and subsequent small bowel resection, she believes only 'a few inches'. She was started on remicade after this in mid 2009 and after surgery with remicade her symptoms improved, but was only on remicade for 3 doses then lost insurance. She did well x 2 years(mostly off medications). She had bone pains with remicade. Then 6/2011, she had worsening sypmtoms that progressively worsened despite trials of steroids, pentasa, imuran and flagyl, and she developed rectovaginal fistula in 2014. Underwent loop sigmoid colostomy.   Was started on humira 12/2015 and was doing well until this past December, 2016. She admits that a few of her humira doses may not have completely been injected correctly, but she believes the majority were. She was taking humira 40mg q 2 weeks. Her last dose was 1/12/17.       Interval history:  Of  recent, pt symptoms began worsening in December with symptoms of malaise, poor appetite and decrease PO intake, wt lss (16 lb wt loss), b/l cramping abd pain, worsening reflux. Has noted mucus in stoma, no blood. She was intiially treated for UTI with bactrim and then ceftriaxone. She was admitted earlier this month to [facility] where CT abd pelvis with IV and PO contrast showed small bowel inflammatory changes c/w Crohns flare. She was treated with prednisone, metronidazole, and cipro. She was admitted to [facility] x 5 days. Started on steroids and seen by Dr. Medina and he felt a second opinoin was needed and thus she was transferred here to [facility].    Bowel movements/day - approx 5 bags / day  Abdominal pain -  Yes lower  Extraintestinal manifestations - no eye symptoms ; no current joint pains or rashes ; no oral ulcers  Dietary modifications/notes - avoids dairy / lactose      Pertinent GI studies:  * last EGD 2000  * Colonoscopy 5/27/16 ([facility]):  Colonoscopy findings:       Inflammation characterized by congestion (edema), erythema and scarring was found. The anus and the rectum were spared. This was mild in severity, and when compared to previous examinations, the findings are improved. The colon (entire examined portion) appeared normal.     Prior GI surgeries:   small bowel resection in 1995 (approx 10 inches)  small bowel resection (~15 inches per patient report) in 2009,   sigmoid loop colostomy - June 2015.        Current medications -   Humira 40mg q 2 weeks    Failed/Previous medications -  * Asacol - ineffective  * Pentasa (ineffective)  * Azulfidine - ineffective  * Azathioprine (18 years, on and off, no significant side effects)  * Remicade - 3 infusions 2009 - gave bone pains then lost insurance  * Currently on humira 12/2015 effective then lost effect - last dose 1/12/17  * Prednisone 40mg - on and off    Smoking - no  NSAIDs -  Rare mobic  Narcotics -  hydrocodone      ROS:  Constitutional: No fevers, chills ; + wt loss and fatigue  ENT: No allergies  CV: No chest pain  Pulm: No cough, No shortness of breath  Ophtho: No vision changes  GI: see HPI  Derm: No rash  Heme: No lymphadenopathy, No bruising  MSK: No arthritis  : No dysuria, No hematuria  Endo: No hot or cold intolerance  Neuro: No syncope, No seizure  Psych: No anxiety, No depression    Medical History:  has a past medical history of Chronic pain and Crohn's disease.    Surgical History:  has a past surgical history that includes Bowel resection; Cholecystectomy; Tubal ligation; rectal abscess drain;  section; and Colonoscopy (N/A, 2016).    Family History: family history is not on file..     Social History:  reports that she has never smoked. She does not have any smokeless tobacco history on file. She reports that she does not drink alcohol or use illicit drugs.    Review of patient's allergies indicates:   Allergen Reactions    Morphine     Nubain [nalbuphine] Anxiety       Current Facility-Administered Medications   Medication Dose Route Frequency Provider Last Rate Last Dose    acetaminophen tablet 650 mg  650 mg Oral Q8H PRN Abi Garcia MD        acetaminophen tablet 650 mg  650 mg Oral Q4H PRN Abi Garcia MD        diphenhydrAMINE injection 25 mg  25 mg Intravenous Q4H PRN Abi Garcia MD   25 mg at 17 0208    enoxaparin injection 60 mg  60 mg Subcutaneous Q12H Abi Garcia MD   60 mg at 17 0954    hydrocodone-acetaminophen 10-325mg per tablet 1 tablet  1 tablet Oral Q4H PRN Abi Garcia MD   1 tablet at 17 0648    hydrocodone-acetaminophen 5-325mg per tablet 1 tablet  1 tablet Oral Q4H PRN Abi Garcia MD        lorazepam injection 0.5 mg  0.5 mg Intravenous Q6H PRN Carolyn Funes NP        methylPREDNISolone sodium succinate injection 40 mg  40 mg Intravenous Q8H Abi Garcia MD   40  "mg at 01/16/17 0648    metronidazole IVPB 500 mg  500 mg Intravenous Q8H Abi Garcia  mL/hr at 01/16/17 0648 500 mg at 01/16/17 0648    ondansetron disintegrating tablet 8 mg  8 mg Oral Q8H PRN Abi Garcia MD        promethazine (PHENERGAN) 6.25 mg in dextrose 5 % 50 mL IVPB  6.25 mg Intravenous Q6H PRN Abi Garcia  mL/hr at 01/16/17 1206 6.25 mg at 01/16/17 1206    ramelteon tablet 8 mg  8 mg Oral Nightly PRN Abi Garcia MD   8 mg at 01/15/17 2235    sodium chloride 0.9% flush 3 mL  3 mL Intravenous Q8H Abi Garcia MD   3 mL at 01/16/17 0600         Objective Findings:    Vital Signs:  Visit Vitals    /62 (BP Location: Left arm, Patient Position: Lying, BP Method: Automatic)    Pulse 62    Temp 98.3 °F (36.8 °C) (Oral)    Resp 16    Ht 5' 8" (1.727 m)    Wt 63.5 kg (140 lb)    SpO2 96%    Breastfeeding No    BMI 21.29 kg/m2     Body mass index is 21.29 kg/(m^2).    Physical Exam:  General Appearance: Well appearing in no acute distress  Head:   Normocephalic, without obvious abnormality  Eyes:    No scleral icterus, EOMI  ENT: Neck supple, No aphthous ulceration seen  Lungs: CTA bilaterally in anterior and posterior fields, no wheezes, no crackles.  Heart:  Regular rate and rhythm, S1, S2 normal, no murmurs heard  Abdomen: soft with midline scarring ; there is ostomy with semisolid stool over LLQ ; mild TTP throughout LQs without rebound or guarding   Extremities: 2+ pulses, no clubbing, cyanosis or edema  Skin: No rash  Neurologic: CN II-XII intact      Labs:  Lab Results   Component Value Date    WBC 10.11 01/15/2017    HGB 12.1 01/15/2017    HCT 36.5 (L) 01/15/2017     01/15/2017    ALT 10 01/15/2017    AST 16 01/15/2017     01/15/2017    K 4.2 01/15/2017     01/15/2017    CREATININE 0.7 01/15/2017    BUN 9 01/15/2017    CO2 29 01/15/2017       PPD/TB: pending  Hep B sAg: pending  TPMT: pending      Imaging:  CT " 1/3:  1. In this patient with history of Crohn's disease, there is a several centimeter segment within the mid small bowel demonstrating inflammatory changes including wall thickening, surrounding fat stranding, and prominent vascularity with associated free fluid.  These findings are consistent with Crohn's flare, which appears worse than on prior CT 6/4/2016 without evidence of obstruction, perforation, or abscess.  2.  Rectal  fistulas .  3.  Other findings include cholecystectomy, descending colostomy, and a nonobstructing right renal calculus.      Assessment:    1. Crohn disease      This is a 50 y/o lady with complex, longstanding hx of fistulizing Crohns disease currently on humira previously with control of symptoms (with endoscopic evidence of improvement of disease on this therapy but without endoscopic remission) who presents to CRS service for 2nd opinion of worsening Crohns disease and Crohns flare in the setting of current biologic therapy.   Suspect antibody formation to current biologic therapy. Discussed alternative therapies and the potential risks of these therapies but also risks of having active Crohns disease.    Going forward, we would like to assess her disease activity endoscopically.  We are considering combination therapy with cimzia + imuran vs entyvio.       Recommendations:  1. Will plan EGD + colonoscopy tomorrow to assess disease activity   - place on clear liquids today   - keep NPO past midnight   - i will order full bowel prep   - please hold anticoagulation  2. Decrease steroids to solumedrol 40mg IV daily  3. Stop IV flagyl  4. Check CRP, ESR, quantiferon gold, TPMT, Hep B S Ag/ Ab and core Ab total  5. Advance diet slowly as tolerated, avoid lactose      Thank you for the consult. Seen and discussed with attending, with addendum to follow.   Please call with any additional concerns/ questions.    Davin Bueno MD  Gastroenterology Fellow (PGY-IV)  Pager: [phone]

## 2017-01-16 NOTE — PROCEDURES
"Carline Chang is a 51 y.o. female patient.    Temp: 98.3 °F (36.8 °C) (01/16/17 1130)  Pulse: 62 (01/16/17 1130)  Resp: 16 (01/16/17 1130)  BP: 130/62 (01/16/17 1130)  SpO2: 96 % (01/16/17 1130)  Weight: 63.5 kg (140 lb) (01/15/17 1936)  Height: 5' 8" (172.7 cm) (01/15/17 1936)    PICC  Date/Time: 1/16/2017 2:30 PM  Performed by: YADI CASAREZ  Consent Done: Yes  Time out: Immediately prior to procedure a time out was called to verify the correct patient, procedure, equipment, support staff and site/side marked as required  Indications: vascular access  Local anesthetic: lidocaine 1% without epinephrine    Preparation: skin prepped with ChloraPrep  Skin prep agent dried: skin prep agent completely dried prior to procedure  Sterile barriers: all five maximum sterile barriers used - cap, mask, sterile gown, sterile gloves, and large sterile sheet  Hand hygiene: hand hygiene performed prior to central venous catheter insertion  Location details: left brachial  Catheter type: double lumen  Catheter size: 5 Fr  Catheter Length: 32cm    Ultrasound guidance: yes  Vessel Caliber: medium and patent, compressibility normal  Needle advanced into vessel with real time Ultrasound guidance.  Guidewire confirmed in vessel.  Image recorded and saved.  Sterile sheath used.  Number of attempts: 1  Post-procedure: blood return through all ports, chlorhexidine patch and sterile dressing applied  Technical procedures used: ECG    Assessment: placement verified by x-ray  Complications: none        Orquidea Mathis  1/16/2017    "

## 2017-01-16 NOTE — CONSULTS
Double lumen PICC placed in Left Brachial vein,  32 cm in length, 0 cm exposed with an arm circumference of 30 cm.

## 2017-01-16 NOTE — PLAN OF CARE
Problem: Patient Care Overview  Goal: Plan of Care Review  Outcome: Ongoing (interventions implemented as appropriate)  Plan of care reviewed with patient, verbalizes understanding. AAOx4, VSS. Ostomy intact with adequate stool output, patient independent with ostomy care. Patient tolerating clear liquid diet, reports no N/V. Patient up ad-kenneth, remains free of any falls/trauma. Patient reports no pain during shift. Patient states no other needs at this time, call light in reach, WCTM.

## 2017-01-16 NOTE — PROGRESS NOTES
Patient seen for colostomy care. Patient self-care for ostomy at home. [facility] a [facility] dermat[facility] per Ana Rosa HUBER. Patient received new appliances but left at home. Patient currently has coloplast vero pouch on, pouch removed. Patient stoma beefy red, flush with skin, patient reports that stoma protrudes at times and become much larger. Dry flaky skin noted to appliance edges, appears that skin is healing. Peristomal skin cleansed with water, cavilon no sting barrier film applied to peristomal skin, coloplast sensura pouch applied with nakita ring. Patient tolerated well. Extra supplies provided to patient. Patient denied any further questions or needs at this time. Nursing to continue care.         01/16/17 1207       Colostomy LLQ   No Placement Date or Time found.   Present Prior to Hospital Arrival?: Yes  Location: LLQ   Stomal Appliance 1 piece;Changed   Stoma Appearance round;rosebud appearance;flush w/ skin   Stoma Size (in) (approx 30mm)   Peristomal Assessment Clean;Intact  (healing skin noted to appliance edges)   Accessories/Skin Care cleansed w/ water;skin sealant;skin barrier ring   Stoma Function mushy;brown   Treatment Bag change;Site care   Tolerance no signs/symptoms of discomfort

## 2017-01-17 ENCOUNTER — SURGERY (OUTPATIENT)
Age: 52
End: 2017-01-17

## 2017-01-17 ENCOUNTER — ANESTHESIA (OUTPATIENT)
Dept: ENDOSCOPY | Facility: HOSPITAL | Age: 52
DRG: 386 | End: 2017-01-17
Payer: MEDICARE

## 2017-01-17 DIAGNOSIS — K50.013 CROHN'S DISEASE OF SMALL INTESTINE WITH FISTULA: Primary | ICD-10-CM

## 2017-01-17 LAB
25(OH)D3+25(OH)D2 SERPL-MCNC: 17 NG/ML
ALBUMIN SERPL BCP-MCNC: 3.4 G/DL
ALP SERPL-CCNC: 165 U/L
ALT SERPL W/O P-5'-P-CCNC: 51 U/L
ANION GAP SERPL CALC-SCNC: 9 MMOL/L
AST SERPL-CCNC: 126 U/L
BACTERIA #/AREA URNS AUTO: ABNORMAL /HPF
BASOPHILS # BLD AUTO: 0.02 K/UL
BASOPHILS NFR BLD: 0.2 %
BILIRUB SERPL-MCNC: 0.6 MG/DL
BILIRUB UR QL STRIP: NEGATIVE
BUN SERPL-MCNC: 12 MG/DL
CALCIUM SERPL-MCNC: 8.8 MG/DL
CHLORIDE SERPL-SCNC: 101 MMOL/L
CLARITY UR REFRACT.AUTO: ABNORMAL
CO2 SERPL-SCNC: 33 MMOL/L
COLOR UR AUTO: ABNORMAL
CREAT SERPL-MCNC: 0.8 MG/DL
CRP SERPL-MCNC: 2.9 MG/L
DIFFERENTIAL METHOD: ABNORMAL
EOSINOPHIL # BLD AUTO: 0 K/UL
EOSINOPHIL NFR BLD: 0.3 %
ERYTHROCYTE [DISTWIDTH] IN BLOOD BY AUTOMATED COUNT: 13.2 %
ERYTHROCYTE [SEDIMENTATION RATE] IN BLOOD BY WESTERGREN METHOD: 55 MM/HR
EST. GFR  (AFRICAN AMERICAN): >60 ML/MIN/1.73 M^2
EST. GFR  (NON AFRICAN AMERICAN): >60 ML/MIN/1.73 M^2
FOLATE SERPL-MCNC: 7.6 NG/ML
GLUCOSE SERPL-MCNC: 79 MG/DL
GLUCOSE UR QL STRIP: NEGATIVE
HBV CORE AB SERPL QL IA: NEGATIVE
HBV SURFACE AB SER-ACNC: NEGATIVE M[IU]/ML
HBV SURFACE AG SERPL QL IA: NEGATIVE
HCT VFR BLD AUTO: 37.3 %
HGB BLD-MCNC: 12.6 G/DL
HGB UR QL STRIP: ABNORMAL
HYALINE CASTS UR QL AUTO: 10 /LPF
INR PPP: 1
KETONES UR QL STRIP: ABNORMAL
LEUKOCYTE ESTERASE UR QL STRIP: ABNORMAL
LYMPHOCYTES # BLD AUTO: 2.3 K/UL
LYMPHOCYTES NFR BLD: 19.8 %
MCH RBC QN AUTO: 29.1 PG
MCHC RBC AUTO-ENTMCNC: 33.8 %
MCV RBC AUTO: 86 FL
MICROSCOPIC COMMENT: ABNORMAL
MONOCYTES # BLD AUTO: 0.8 K/UL
MONOCYTES NFR BLD: 7 %
NEUTROPHILS # BLD AUTO: 8.5 K/UL
NEUTROPHILS NFR BLD: 72.2 %
NITRITE UR QL STRIP: NEGATIVE
PH UR STRIP: 6 [PH] (ref 5–8)
PLATELET # BLD AUTO: 264 K/UL
PMV BLD AUTO: 9.5 FL
POTASSIUM SERPL-SCNC: 3.3 MMOL/L
PROT SERPL-MCNC: 7.3 G/DL
PROT UR QL STRIP: ABNORMAL
PROTHROMBIN TIME: 10.9 SEC
RBC # BLD AUTO: 4.33 M/UL
RBC #/AREA URNS AUTO: >100 /HPF (ref 0–4)
SODIUM SERPL-SCNC: 143 MMOL/L
SP GR UR STRIP: 1.02 (ref 1–1.03)
SQUAMOUS #/AREA URNS AUTO: 6 /HPF
URN SPEC COLLECT METH UR: ABNORMAL
UROBILINOGEN UR STRIP-ACNC: NEGATIVE EU/DL
VIT B12 SERPL-MCNC: 535 PG/ML
WBC # BLD AUTO: 11.81 K/UL
WBC #/AREA URNS AUTO: 33 /HPF (ref 0–5)

## 2017-01-17 PROCEDURE — 63600175 PHARM REV CODE 636 W HCPCS: Performed by: ANESTHESIOLOGY

## 2017-01-17 PROCEDURE — 82306 VITAMIN D 25 HYDROXY: CPT

## 2017-01-17 PROCEDURE — 37000009 HC ANESTHESIA EA ADD 15 MINS: Performed by: INTERNAL MEDICINE

## 2017-01-17 PROCEDURE — 82746 ASSAY OF FOLIC ACID SERUM: CPT

## 2017-01-17 PROCEDURE — 43239 EGD BIOPSY SINGLE/MULTIPLE: CPT | Performed by: INTERNAL MEDICINE

## 2017-01-17 PROCEDURE — 25000003 PHARM REV CODE 250: Performed by: NURSE ANESTHETIST, CERTIFIED REGISTERED

## 2017-01-17 PROCEDURE — 25000003 PHARM REV CODE 250: Performed by: COLON & RECTAL SURGERY

## 2017-01-17 PROCEDURE — 86480 TB TEST CELL IMMUN MEASURE: CPT

## 2017-01-17 PROCEDURE — 25000003 PHARM REV CODE 250: Performed by: STUDENT IN AN ORGANIZED HEALTH CARE EDUCATION/TRAINING PROGRAM

## 2017-01-17 PROCEDURE — 63600175 PHARM REV CODE 636 W HCPCS: Performed by: NURSE PRACTITIONER

## 2017-01-17 PROCEDURE — 44389 COLONOSCOPY WITH BIOPSY: CPT | Mod: PT,,, | Performed by: INTERNAL MEDICINE

## 2017-01-17 PROCEDURE — 25000003 PHARM REV CODE 250: Performed by: NURSE PRACTITIONER

## 2017-01-17 PROCEDURE — D9220A PRA ANESTHESIA: Mod: PT,ANES,, | Performed by: ANESTHESIOLOGY

## 2017-01-17 PROCEDURE — D9220A PRA ANESTHESIA: Mod: PT,CRNA,, | Performed by: NURSE ANESTHETIST, CERTIFIED REGISTERED

## 2017-01-17 PROCEDURE — 88305 TISSUE EXAM BY PATHOLOGIST: CPT | Mod: 26,,, | Performed by: PATHOLOGY

## 2017-01-17 PROCEDURE — 44389 COLONOSCOPY WITH BIOPSY: CPT | Performed by: INTERNAL MEDICINE

## 2017-01-17 PROCEDURE — 27201012 HC FORCEPS, HOT/COLD, DISP: Performed by: INTERNAL MEDICINE

## 2017-01-17 PROCEDURE — 63600175 PHARM REV CODE 636 W HCPCS: Performed by: NURSE ANESTHETIST, CERTIFIED REGISTERED

## 2017-01-17 PROCEDURE — 20600001 HC STEP DOWN PRIVATE ROOM

## 2017-01-17 PROCEDURE — 86704 HEP B CORE ANTIBODY TOTAL: CPT

## 2017-01-17 PROCEDURE — 88305 TISSUE EXAM BY PATHOLOGIST: CPT | Performed by: PATHOLOGY

## 2017-01-17 PROCEDURE — 85651 RBC SED RATE NONAUTOMATED: CPT

## 2017-01-17 PROCEDURE — 43235 EGD DIAGNOSTIC BRUSH WASH: CPT | Mod: 51,,, | Performed by: INTERNAL MEDICINE

## 2017-01-17 PROCEDURE — 99233 SBSQ HOSP IP/OBS HIGH 50: CPT | Mod: ,,, | Performed by: COLON & RECTAL SURGERY

## 2017-01-17 PROCEDURE — 86140 C-REACTIVE PROTEIN: CPT

## 2017-01-17 PROCEDURE — 36415 COLL VENOUS BLD VENIPUNCTURE: CPT

## 2017-01-17 PROCEDURE — 86706 HEP B SURFACE ANTIBODY: CPT

## 2017-01-17 PROCEDURE — 87340 HEPATITIS B SURFACE AG IA: CPT

## 2017-01-17 PROCEDURE — 81001 URINALYSIS AUTO W/SCOPE: CPT

## 2017-01-17 PROCEDURE — 82657 ENZYME CELL ACTIVITY: CPT

## 2017-01-17 PROCEDURE — 85025 COMPLETE CBC W/AUTO DIFF WBC: CPT

## 2017-01-17 PROCEDURE — 25000003 PHARM REV CODE 250: Performed by: INTERNAL MEDICINE

## 2017-01-17 PROCEDURE — 82607 VITAMIN B-12: CPT

## 2017-01-17 PROCEDURE — 25000003 PHARM REV CODE 250: Performed by: ANESTHESIOLOGY

## 2017-01-17 PROCEDURE — 80053 COMPREHEN METABOLIC PANEL: CPT

## 2017-01-17 PROCEDURE — 85610 PROTHROMBIN TIME: CPT

## 2017-01-17 PROCEDURE — 37000008 HC ANESTHESIA 1ST 15 MINUTES: Performed by: INTERNAL MEDICINE

## 2017-01-17 PROCEDURE — 02HV33Z INSERTION OF INFUSION DEVICE INTO SUPERIOR VENA CAVA, PERCUTANEOUS APPROACH: ICD-10-PCS | Performed by: INTERNAL MEDICINE

## 2017-01-17 PROCEDURE — 0DJ08ZZ INSPECTION OF UPPER INTESTINAL TRACT, VIA NATURAL OR ARTIFICIAL OPENING ENDOSCOPIC: ICD-10-PCS | Performed by: INTERNAL MEDICINE

## 2017-01-17 PROCEDURE — 63600175 PHARM REV CODE 636 W HCPCS: Performed by: INTERNAL MEDICINE

## 2017-01-17 RX ORDER — LIDOCAINE HCL/PF 100 MG/5ML
SYRINGE (ML) INTRAVENOUS
Status: DISCONTINUED | OUTPATIENT
Start: 2017-01-17 | End: 2017-01-17

## 2017-01-17 RX ORDER — IPRATROPIUM BROMIDE AND ALBUTEROL SULFATE 2.5; .5 MG/3ML; MG/3ML
3 SOLUTION RESPIRATORY (INHALATION)
Status: CANCELLED | OUTPATIENT
Start: 2017-01-24

## 2017-01-17 RX ORDER — HEPARIN 100 UNIT/ML
500 SYRINGE INTRAVENOUS
Status: CANCELLED | OUTPATIENT
Start: 2017-01-24

## 2017-01-17 RX ORDER — SODIUM CHLORIDE 9 MG/ML
INJECTION, SOLUTION INTRAVENOUS CONTINUOUS PRN
Status: DISCONTINUED | OUTPATIENT
Start: 2017-01-17 | End: 2017-01-17

## 2017-01-17 RX ORDER — PROPOFOL 10 MG/ML
VIAL (ML) INTRAVENOUS
Status: DISCONTINUED | OUTPATIENT
Start: 2017-01-17 | End: 2017-01-17

## 2017-01-17 RX ORDER — ACETAMINOPHEN 325 MG/1
650 TABLET ORAL
Status: CANCELLED | OUTPATIENT
Start: 2017-01-24

## 2017-01-17 RX ORDER — EPINEPHRINE 1 MG/ML
0.3 INJECTION, SOLUTION, CONCENTRATE INTRAVENOUS
Status: CANCELLED | OUTPATIENT
Start: 2017-01-24

## 2017-01-17 RX ORDER — PROPOFOL 10 MG/ML
VIAL (ML) INTRAVENOUS CONTINUOUS PRN
Status: DISCONTINUED | OUTPATIENT
Start: 2017-01-17 | End: 2017-01-17

## 2017-01-17 RX ORDER — CIPROFLOXACIN 500 MG/1
500 TABLET ORAL EVERY 12 HOURS
Status: DISCONTINUED | OUTPATIENT
Start: 2017-01-17 | End: 2017-01-20 | Stop reason: HOSPADM

## 2017-01-17 RX ORDER — PREDNISONE 20 MG/1
40 TABLET ORAL DAILY
Status: DISCONTINUED | OUTPATIENT
Start: 2017-01-18 | End: 2017-01-20 | Stop reason: HOSPADM

## 2017-01-17 RX ORDER — SODIUM CHLORIDE 0.9 % (FLUSH) 0.9 %
10 SYRINGE (ML) INJECTION
Status: CANCELLED | OUTPATIENT
Start: 2017-01-24

## 2017-01-17 RX ORDER — DIPHENHYDRAMINE HYDROCHLORIDE 50 MG/ML
25 INJECTION INTRAMUSCULAR; INTRAVENOUS
Status: CANCELLED | OUTPATIENT
Start: 2017-01-24

## 2017-01-17 RX ADMIN — Medication 3 ML: at 03:01

## 2017-01-17 RX ADMIN — PROPOFOL 100 MG: 10 INJECTION, EMULSION INTRAVENOUS at 01:01

## 2017-01-17 RX ADMIN — Medication 3 ML: at 06:01

## 2017-01-17 RX ADMIN — Medication 10 ML: at 12:01

## 2017-01-17 RX ADMIN — HYDROMORPHONE HYDROCHLORIDE 0.5 MG: 1 INJECTION, SOLUTION INTRAMUSCULAR; INTRAVENOUS; SUBCUTANEOUS at 04:01

## 2017-01-17 RX ADMIN — SODIUM CHLORIDE: 0.9 INJECTION, SOLUTION INTRAVENOUS at 01:01

## 2017-01-17 RX ADMIN — HYDROMORPHONE HYDROCHLORIDE 0.5 MG: 1 INJECTION, SOLUTION INTRAMUSCULAR; INTRAVENOUS; SUBCUTANEOUS at 12:01

## 2017-01-17 RX ADMIN — HYDROMORPHONE HYDROCHLORIDE 0.5 MG: 1 INJECTION, SOLUTION INTRAMUSCULAR; INTRAVENOUS; SUBCUTANEOUS at 08:01

## 2017-01-17 RX ADMIN — ENOXAPARIN SODIUM 60 MG: 100 INJECTION SUBCUTANEOUS at 10:01

## 2017-01-17 RX ADMIN — Medication 3 ML: at 10:01

## 2017-01-17 RX ADMIN — RAMELTEON 8 MG: 8 TABLET, FILM COATED ORAL at 11:01

## 2017-01-17 RX ADMIN — Medication 10 ML: at 06:01

## 2017-01-17 RX ADMIN — LIDOCAINE HYDROCHLORIDE 100 MG: 20 INJECTION, SOLUTION INTRAVENOUS at 01:01

## 2017-01-17 RX ADMIN — POLYETHYLENE GLYCOL 3350, SODIUM SULFATE ANHYDROUS, SODIUM BICARBONATE, SODIUM CHLORIDE, POTASSIUM CHLORIDE 2000 ML: 236; 22.74; 6.74; 5.86; 2.97 POWDER, FOR SOLUTION ORAL at 04:01

## 2017-01-17 RX ADMIN — Medication 10 ML: at 11:01

## 2017-01-17 RX ADMIN — LORAZEPAM 0.5 MG: 2 INJECTION INTRAMUSCULAR; INTRAVENOUS at 10:01

## 2017-01-17 RX ADMIN — PROPOFOL 150 MCG/KG/MIN: 10 INJECTION, EMULSION INTRAVENOUS at 01:01

## 2017-01-17 RX ADMIN — METHYLPREDNISOLONE SODIUM SUCCINATE 40 MG: 40 INJECTION, POWDER, FOR SOLUTION INTRAMUSCULAR; INTRAVENOUS at 08:01

## 2017-01-17 RX ADMIN — HYDROMORPHONE HYDROCHLORIDE 0.5 MG: 1 INJECTION, SOLUTION INTRAMUSCULAR; INTRAVENOUS; SUBCUTANEOUS at 11:01

## 2017-01-17 RX ADMIN — PROMETHAZINE HYDROCHLORIDE 6.25 MG: 25 INJECTION INTRAMUSCULAR; INTRAVENOUS at 05:01

## 2017-01-17 RX ADMIN — CIPROFLOXACIN HYDROCHLORIDE 500 MG: 500 TABLET, FILM COATED ORAL at 08:01

## 2017-01-17 RX ADMIN — LORAZEPAM 0.5 MG: 2 INJECTION INTRAMUSCULAR; INTRAVENOUS at 08:01

## 2017-01-17 RX ADMIN — RAMELTEON 8 MG: 8 TABLET, FILM COATED ORAL at 12:01

## 2017-01-17 RX ADMIN — Medication 10 ML: at 05:01

## 2017-01-17 NOTE — ANESTHESIA RELEASE NOTE
"Anesthesia Release from PACU Note    Patient: Carline Chang    Procedure(s) Performed: Procedure(s) (LRB):  ESOPHAGOGASTRODUODENOSCOPY (EGD) (N/A)  COLONOSCOPY (N/A)    Anesthesia type: general    Post pain: Adequate analgesia    Post assessment: no apparent anesthetic complications, tolerated procedure well and no evidence of recall    Last Vitals:   Visit Vitals    BP (!) 91/52 (BP Location: Right leg, Patient Position: Lying, BP Method: Automatic)    Pulse 69    Temp 36.6 °C (97.9 °F) (Oral)    Resp 18    Ht 5' 8" (1.727 m)    Wt 63.5 kg (140 lb)    SpO2 95%    Breastfeeding No    BMI 21.29 kg/m2       Post vital signs: stable    Level of consciousness: awake, alert  and oriented    Nausea/Vomiting: no nausea/no vomiting    Complications: none    Airway Patency: patent    Respiratory: unassisted    Cardiovascular: stable and blood pressure at baseline    Hydration: euvolemic  "

## 2017-01-17 NOTE — PROGRESS NOTES
GI Follow-up Note    EGD / colon done today.    See full procedure notes for further details    Plan going forward:  Advance diet as tolerated  Transition to prednisone 40mg daily PO tomorrow AM   - would discharge with prednisone taper by 10mg every 7 days (prednisone 40mg x 7 days, 30mg x 7 days, 20mg x 7 days, 10mg x 7 days then off.  Will start enytvio infusion as outpatient and will set up GI follow up.  Pt mentioned hematuria, communicated this to primary team who will further investigate      Please call with any additional concerns /questions    Davin Bueno MD  Gastroenterology Fellow (PGY-IV)  Pager: [phone]

## 2017-01-17 NOTE — H&P (VIEW-ONLY)
[facility]           Inflammatory Bowel Disease Consultation Note    Reason for Consult:  The encounter diagnosis was Crohn disease.    Primary GI: Pablo Medina       Inflammatory bowel disease history:  This is a 51 y.o. female with hx of fistulizing Crohn's disease diagnosed 1986 (age 21) complicated by two SB resections (95 and 09), rectovaginal fistula 2014, sigmoid loop colostomy 2015 who presents as transfer from [place] to Dr. Uribe's service for suspected worsening Crohns disease.  Pt initially diagnosed with symptoms of nausea, wt loss, diarrhea and crampy abd pain. Was started on multiple oral medications including asacol, pentasa, azulfidine, prednisone, and imuran monotherapy. She did not achieve remission with any of these agents to her knowledge. The prednisone was intermittently the most effective. She did not have complication with the imuran (she belives 50mg). In 1995, she had complication and had small bowel resection, she believes 10 inches total. She felt better after surgery and was not on meds. She then developed abscess in 2009 which required drainage and subsequent small bowel resection, she believes only 'a few inches'. She was started on remicade after this in mid 2009 and after surgery with remicade her symptoms improved, but was only on remicade for 3 doses then lost insurance. She did well x 2 years(mostly off medications). She had bone pains with remicade. Then 6/2011, she had worsening sypmtoms that progressively worsened despite trials of steroids, pentasa, imuran and flagyl, and she developed rectovaginal fistula in 2014. Underwent loop sigmoid colostomy.   Was started on humira 12/2015 and was doing well until this past December, 2016. She admits that a few of her humira doses may not have completely been injected correctly, but she believes the majority were. She was taking humira 40mg q 2 weeks. Her last dose was 1/12/17.       Interval history:  Of  recent, pt symptoms began worsening in December with symptoms of malaise, poor appetite and decrease PO intake, wt lss (16 lb wt loss), b/l cramping abd pain, worsening reflux. Has noted mucus in stoma, no blood. She was intiially treated for UTI with bactrim and then ceftriaxone. She was admitted earlier this month to [facility] where CT abd pelvis with IV and PO contrast showed small bowel inflammatory changes c/w Crohns flare. She was treated with prednisone, metronidazole, and cipro. She was admitted to [facility] x 5 days. Started on steroids and seen by Dr. Medina and he felt a second opinoin was needed and thus she was transferred here to [facility].    Bowel movements/day - approx 5 bags / day  Abdominal pain -  Yes lower  Extraintestinal manifestations - no eye symptoms ; no current joint pains or rashes ; no oral ulcers  Dietary modifications/notes - avoids dairy / lactose      Pertinent GI studies:  * last EGD 2000  * Colonoscopy 5/27/16 (oksana):  Colonoscopy findings:       Inflammation characterized by congestion (edema), erythema and scarring was found. The anus and the rectum were spared. This was mild in severity, and when compared to previous examinations, the findings are improved. The colon (entire examined portion) appeared normal.     Prior GI surgeries:   small bowel resection in 1995 (approx 10 inches)  small bowel resection (~15 inches per patient report) in 2009,   sigmoid loop colostomy - June 2015.        Current medications -   Humira 40mg q 2 weeks    Failed/Previous medications -  * Asacol - ineffective  * Pentasa (ineffective)  * Azulfidine - ineffective  * Azathioprine (18 years, on and off, no significant side effects)  * Remicade - 3 infusions 2009 - gave bone pains then lost insurance  * Currently on humira 12/2015 effective then lost effect - last dose 1/12/17  * Prednisone 40mg - on and off    Smoking - no  NSAIDs -  Rare mobic  Narcotics -  hydrocodone      ROS:  Constitutional: No fevers, chills ; + wt loss and fatigue  ENT: No allergies  CV: No chest pain  Pulm: No cough, No shortness of breath  Ophtho: No vision changes  GI: see HPI  Derm: No rash  Heme: No lymphadenopathy, No bruising  MSK: No arthritis  : No dysuria, No hematuria  Endo: No hot or cold intolerance  Neuro: No syncope, No seizure  Psych: No anxiety, No depression    Medical History:  has a past medical history of Chronic pain and Crohn's disease.    Surgical History:  has a past surgical history that includes Bowel resection; Cholecystectomy; Tubal ligation; rectal abscess drain;  section; and Colonoscopy (N/A, 2016).    Family History: family history is not on file..     Social History:  reports that she has never smoked. She does not have any smokeless tobacco history on file. She reports that she does not drink alcohol or use illicit drugs.    Review of patient's allergies indicates:   Allergen Reactions    Morphine     Nubain [nalbuphine] Anxiety       Current Facility-Administered Medications   Medication Dose Route Frequency Provider Last Rate Last Dose    acetaminophen tablet 650 mg  650 mg Oral Q8H PRN Abi Garcia MD        acetaminophen tablet 650 mg  650 mg Oral Q4H PRN Abi Garcia MD        diphenhydrAMINE injection 25 mg  25 mg Intravenous Q4H PRN Abi Garcia MD   25 mg at 17 0208    enoxaparin injection 60 mg  60 mg Subcutaneous Q12H Abi Garcia MD   60 mg at 17 0954    hydrocodone-acetaminophen 10-325mg per tablet 1 tablet  1 tablet Oral Q4H PRN Abi Garcia MD   1 tablet at 17 0648    hydrocodone-acetaminophen 5-325mg per tablet 1 tablet  1 tablet Oral Q4H PRN Abi Garcia MD        lorazepam injection 0.5 mg  0.5 mg Intravenous Q6H PRN Carolyn Funes NP        methylPREDNISolone sodium succinate injection 40 mg  40 mg Intravenous Q8H Abi Garcia MD   40  "mg at 01/16/17 0648    metronidazole IVPB 500 mg  500 mg Intravenous Q8H Abi Garcia  mL/hr at 01/16/17 0648 500 mg at 01/16/17 0648    ondansetron disintegrating tablet 8 mg  8 mg Oral Q8H PRN Abi Garcia MD        promethazine (PHENERGAN) 6.25 mg in dextrose 5 % 50 mL IVPB  6.25 mg Intravenous Q6H PRN Abi Garcia  mL/hr at 01/16/17 1206 6.25 mg at 01/16/17 1206    ramelteon tablet 8 mg  8 mg Oral Nightly PRN Abi Garcia MD   8 mg at 01/15/17 2235    sodium chloride 0.9% flush 3 mL  3 mL Intravenous Q8H Abi Garcia MD   3 mL at 01/16/17 0600         Objective Findings:    Vital Signs:  Visit Vitals    /62 (BP Location: Left arm, Patient Position: Lying, BP Method: Automatic)    Pulse 62    Temp 98.3 °F (36.8 °C) (Oral)    Resp 16    Ht 5' 8" (1.727 m)    Wt 63.5 kg (140 lb)    SpO2 96%    Breastfeeding No    BMI 21.29 kg/m2     Body mass index is 21.29 kg/(m^2).    Physical Exam:  General Appearance: Well appearing in no acute distress  Head:   Normocephalic, without obvious abnormality  Eyes:    No scleral icterus, EOMI  ENT: Neck supple, No aphthous ulceration seen  Lungs: CTA bilaterally in anterior and posterior fields, no wheezes, no crackles.  Heart:  Regular rate and rhythm, S1, S2 normal, no murmurs heard  Abdomen: soft with midline scarring ; there is ostomy with semisolid stool over LLQ ; mild TTP throughout LQs without rebound or guarding   Extremities: 2+ pulses, no clubbing, cyanosis or edema  Skin: No rash  Neurologic: CN II-XII intact      Labs:  Lab Results   Component Value Date    WBC 10.11 01/15/2017    HGB 12.1 01/15/2017    HCT 36.5 (L) 01/15/2017     01/15/2017    ALT 10 01/15/2017    AST 16 01/15/2017     01/15/2017    K 4.2 01/15/2017     01/15/2017    CREATININE 0.7 01/15/2017    BUN 9 01/15/2017    CO2 29 01/15/2017       PPD/TB: pending  Hep B sAg: pending  TPMT: pending      Imaging:  CT " 1/3:  1. In this patient with history of Crohn's disease, there is a several centimeter segment within the mid small bowel demonstrating inflammatory changes including wall thickening, surrounding fat stranding, and prominent vascularity with associated free fluid.  These findings are consistent with Crohn's flare, which appears worse than on prior CT 6/4/2016 without evidence of obstruction, perforation, or abscess.  2.  Rectal  fistulas .  3.  Other findings include cholecystectomy, descending colostomy, and a nonobstructing right renal calculus.      Assessment:    1. Crohn disease      This is a 52 y/o lady with complex, longstanding hx of fistulizing Crohns disease currently on humira previously with control of symptoms (with endoscopic evidence of improvement of disease on this therapy but without endoscopic remission) who presents to CRS service for 2nd opinion of worsening Crohns disease and Crohns flare in the setting of current biologic therapy.   Suspect antibody formation to current biologic therapy. Discussed alternative therapies and the potential risks of these therapies but also risks of having active Crohns disease.    Going forward, we would like to assess her disease activity endoscopically.  We are considering combination therapy with cimzia + imuran vs entyvio.       Recommendations:  1. Will plan EGD + colonoscopy tomorrow to assess disease activity   - place on clear liquids today   - keep NPO past midnight   - i will order full bowel prep   - please hold anticoagulation  2. Decrease steroids to solumedrol 40mg IV daily  3. Stop IV flagyl  4. Check CRP, ESR, quantiferon gold, TPMT, Hep B S Ag/ Ab and core Ab total  5. Advance diet slowly as tolerated, avoid lactose      Thank you for the consult. Seen and discussed with attending, with addendum to follow.   Please call with any additional concerns/ questions.    Davin Bueno MD  Gastroenterology Fellow (PGY-IV)  Pager: [phone]

## 2017-01-17 NOTE — DISCHARGE INSTRUCTIONS
Colonoscopy     A camera attached to a flexible tube with a viewing lens is used to take video pictures.     Colonoscopy is used to view the inside of your lower digestive tract (colon and rectum). It can help screen for colon cancer and can help find the source of abdominal pain, bleeding, and changes in bowel habits. The test is usually done in the hospital on an outpatient basis. During the exam, the doctor can remove a small tissue sample ( a biopsy) for testing. Small growths, such as polyps, may also be removed during colonoscopy.  Getting ready   · Be sure to tell your doctor about any medications you take. Also tell your doctor about any health conditions you may have.  · Discuss the risks of the test with your doctor. These include bleeding and bowel puncture.  · Your rectum and colon must be empty for the test. So be sure to follow the diet and bowel prep instructions exactly. If you dont, the test may need to be rescheduled.  · Ask your doctor whether you need to have a friend or family member prepared to drive you home after the test.  During the test   · You are given sedating (relaxing) medication through an IV line. You may be drowsy or completely asleep.  · The procedure takes 30 minutes or longer.  · The doctor performs a digital rectal exam to check for anal and rectal problems. The rectum is lubricated and the scope inserted.  · If you are awake, you may have a feeling similar to needing to have a bowel movement. You may also feel pressure as air is pumped into the colon. Its OK to pass gas during the procedure.  After the test   ·      Colonoscopy provides an inside view of the entire colon.     You may discuss the results with your doctor right away or at a future visit.  · Try to pass all the gas right after the test to help prevent bloating and cramping.  · After the test, you can go back to your normal eating and other activities.  Risks and possible complications include:  · Bleeding · A  puncture or tear in the colon · Risks of anesthesia       © 6597-4232 [employer]. [address] [place] [address], [place] [address]. All rights reserved. This information is not intended as a substitute for professional medical care. Always follow your healthcare professional's instructions.        Upper GI Endoscopy     During endoscopy, a long, flexible tube is used to view the inside of your upper GI tract.      Upper GI endoscopy allows your healthcare provider to look directly into the beginning of your gastrointestinal (GI) tract. The esophagus, stomach, and duodenum (the first part of the small intestine) make up the upper GI tract.   Before the exam  Follow these and any other instructions you are given before your endoscopy. If you dont follow the healthcare providers instructions carefully, the test may need to be canceled or done over:  · Don't eat or drink anything after midnight the night before your exam. If your exam is in the afternoon, drink only clear liquids in the morning. Don't eat or drink anything for 8 hours before the exam. In some cases, you may be able to take medicines with sips of water until 2 hours before the procedure. Speak with your healthcare provider about this.   · Bring your X-rays and any other test results you have.  · Because you will be sedated, arrange for an adult to drive you home after the exam.  · Tell your healthcare provider before the exam if you are taking any medicines or have any medical problems.  The procedure  Here is what to expect:  · You will lie on the endoscopy table. Usually patients lie on the left side.  · You will be monitored and given oxygen.  · Your throat may be numbed with a spray or gargle. You are given medicine through an intravenous (IV) line that will help you relax and remain comfortable. You may be awake or asleep during the procedure.  · The healthcare provider will put the endoscope in your mouth and down your esophagus. It is  thinner than most pieces of food that you swallow. It will not affect your breathing. The medicine helps keep you from gagging.  · Air is put into your GI tract to expand it. It can make you burp.  · During the procedure, the healthcare provider can take biopsies (tissue samples), remove abnormalities, such as polyps, or treat abnormalities through a variety of devices placed through the endoscope. You will not feel this.   · The endoscope carries images of your upper GI tract to a video screen. If you are awake, you may be able to look at the images.  · After the procedure is done, you will rest for a time. An adult must drive you home.  When to call your healthcare provider  Contact your healthcare provider if you have:  · Black or tarry stools, or blood in your stool  · Fever  · Pain in your belly that does not go away  · Nausea and vomiting, or vomiting blood   © 3587-5796 [employer]. [address] [place] [address], [place] [address]. All rights reserved. This information is not intended as a substitute for professional medical care. Always follow your healthcare professional's instructions.

## 2017-01-17 NOTE — INTERVAL H&P NOTE
The patient has been examined and the H&P has been reviewed:    I concur with the findings and no changes have occurred since H&P was written.     History and Exam unchanged from visit.    Procedure - EGD/Colonoscopy  Neck - supple  Plan of anesthesia - General  ASA - per anesthesia  Mallampati - per anesthesia  Anesthesia problems - no  Family history of anesthesia problems - no      Anesthesia/Surgery risks, benefits and alternative options discussed and understood by patient/family.          Active Hospital Problems    Diagnosis  POA    *Crohn disease [K50.90]  Yes      Resolved Hospital Problems    Diagnosis Date Resolved POA   No resolved problems to display.

## 2017-01-17 NOTE — Clinical Note
One last thing, can we try to setup her infusions closer to her home, possibly in [place]. Thanks catie

## 2017-01-17 NOTE — TRANSFER OF CARE
"Anesthesia Transfer of Care Note    Patient: Carline Chang    Procedure(s) Performed: Procedure(s) (LRB):  ESOPHAGOGASTRODUODENOSCOPY (EGD) (N/A)  COLONOSCOPY (N/A)    Patient location: [facility]    Anesthesia Type: general    Transport from OR: Transported from OR on room air with adequate spontaneous ventilation    Post pain: adequate analgesia    Post assessment: no apparent anesthetic complications    Post vital signs: stable    Level of consciousness: awake    Nausea/Vomiting: no nausea/vomiting    Complications: none          Last vitals:   Visit Vitals    BP (!) 91/52 (BP Location: Right leg, Patient Position: Lying, BP Method: Automatic)    Pulse 69    Temp 36.6 °C (97.9 °F) (Oral)    Resp 18    Ht 5' 8" (1.727 m)    Wt 63.5 kg (140 lb)    SpO2 95%    Breastfeeding No    BMI 21.29 kg/m2     "

## 2017-01-17 NOTE — PLAN OF CARE
Sw following for d/c needs. Pt has family support and had ostomy prior to admit.      Paula Barros, [facility] [phone]

## 2017-01-17 NOTE — ANESTHESIA POSTPROCEDURE EVALUATION
"Anesthesia Post Evaluation    Patient: Carline Chang    Procedure(s) Performed: Procedure(s) (LRB):  ESOPHAGOGASTRODUODENOSCOPY (EGD) (N/A)  COLONOSCOPY (N/A)    Final Anesthesia Type: general  Patient location during evaluation: [facility]  Patient participation: Yes- Able to Participate  Level of consciousness: awake and alert  Post-procedure vital signs: reviewed and stable  Pain management: adequate  Airway patency: patent  PONV status at discharge: No PONV  Anesthetic complications: no      Cardiovascular status: blood pressure returned to baseline  Respiratory status: unassisted  Hydration status: euvolemic  Follow-up not needed.        Visit Vitals    BP (!) 91/52 (BP Location: Right leg, Patient Position: Lying, BP Method: Automatic)    Pulse 69    Temp 36.6 °C (97.9 °F) (Oral)    Resp 18    Ht 5' 8" (1.727 m)    Wt 63.5 kg (140 lb)    SpO2 95%    Breastfeeding No    BMI 21.29 kg/m2       Pain/Estiven Score: Pain Assessment Performed: Yes (1/17/2017  1:50 PM)  Presence of Pain: denies (1/17/2017  1:50 PM)  Pain Rating Prior to Med Admin: 4 (1/17/2017  4:19 AM)  Estiven Score: 9 (1/17/2017  1:50 PM)      "

## 2017-01-17 NOTE — NURSING TRANSFER
Nursing Transfer Note      1/17/2017     Transfer To: [address]    Transfer via stretcher    Transfer with iv pump    Transported by pct    Medicines sent: none    Chart send with patient: Yes    Notified: RN on floor.     Patient reassessed at: now and arrival to floor.     Upon arrival to floor: patient oriented to room, call bell in reach and bed in lowest position

## 2017-01-17 NOTE — PROGRESS NOTES
COLON RECTAL SURGERY  PROGRESS NOTE    LENGTH OF STAY: 2  POST OPERATIVE DAY: Day of Surgery for Procedure(s) (LRB):  ESOPHAGOGASTRODUODENOSCOPY (EGD) (N/A)  COLONOSCOPY (N/A)     Subjective     Daily HPI: No acute events overnight, pain controlled, GI with recs in place.      Objective     Vitals:    01/17/17 0755   BP: 125/65   Pulse: 65   Resp: 17   Temp: 97.4 °F (36.3 °C)         Intake/Output Summary (Last 24 hours) at 01/17/17 0808  Last data filed at 01/17/17 0625   Gross per 24 hour   Intake              880 ml   Output             4200 ml   Net            -3320 ml       General:  female in nad  Neuro: AAO x4 MAEx4 PERRL  Chest: resps even unlabored, cap refill <2 sec  Abd: soft, nondistended, tenderness mild in the entire abdomen, without guarding and without rebound  Wound (if present): n/a  Stoma (if present): pijnk and productive, 2.5L     Recent Results (from the past 24 hour(s))   C-reactive protein    Collection Time: 01/17/17  3:11 AM   Result Value Ref Range    CRP 2.9 0.0 - 8.2 mg/L   Sedimentation rate, manual    Collection Time: 01/17/17  3:11 AM   Result Value Ref Range    Sed Rate 55 (H) 0 - 20 mm/Hr   Comprehensive metabolic panel    Collection Time: 01/17/17  3:11 AM   Result Value Ref Range    Sodium 143 136 - 145 mmol/L    Potassium 3.3 (L) 3.5 - 5.1 mmol/L    Chloride 101 95 - 110 mmol/L    CO2 33 (H) 23 - 29 mmol/L    Glucose 79 70 - 110 mg/dL    BUN, Bld 12 6 - 20 mg/dL    Creatinine 0.8 0.5 - 1.4 mg/dL    Calcium 8.8 8.7 - 10.5 mg/dL    Total Protein 7.3 6.0 - 8.4 g/dL    Albumin 3.4 (L) 3.5 - 5.2 g/dL    Total Bilirubin 0.6 0.1 - 1.0 mg/dL    Alkaline Phosphatase 165 (H) 55 - 135 U/L     (H) 10 - 40 U/L    ALT 51 (H) 10 - 44 U/L    Anion Gap 9 8 - 16 mmol/L    eGFR if African American >60.0 >60 mL/min/1.73 m^2    eGFR if non African American >60.0 >60 mL/min/1.73 m^2   CBC auto differential    Collection Time: 01/17/17  3:11 AM   Result Value Ref Range    WBC 11.81 3.90  - 12.70 K/uL    RBC 4.33 4.00 - 5.40 M/uL    Hemoglobin 12.6 12.0 - 16.0 g/dL    Hematocrit 37.3 37.0 - 48.5 %    MCV 86 82 - 98 fL    MCH 29.1 27.0 - 31.0 pg    MCHC 33.8 32.0 - 36.0 %    RDW 13.2 11.5 - 14.5 %    Platelets 264 150 - 350 K/uL    MPV 9.5 9.2 - 12.9 fL    Gran # 8.5 (H) 1.8 - 7.7 K/uL    Lymph # 2.3 1.0 - 4.8 K/uL    Mono # 0.8 0.3 - 1.0 K/uL    Eos # 0.0 0.0 - 0.5 K/uL    Baso # 0.02 0.00 - 0.20 K/uL    Gran% 72.2 38.0 - 73.0 %    Lymph% 19.8 18.0 - 48.0 %    Mono% 7.0 4.0 - 15.0 %    Eosinophil% 0.3 0.0 - 8.0 %    Basophil% 0.2 0.0 - 1.9 %    Differential Method Automated    Protime-INR    Collection Time: 01/17/17  3:11 AM   Result Value Ref Range    Prothrombin Time 10.9 9.0 - 12.5 sec    INR 1.0 0.8 - 1.2   Vitamin D    Collection Time: 01/17/17  3:11 AM   Result Value Ref Range    Vit D, 25-Hydroxy 17 (L) 30 - 96 ng/mL   Vitamin B12    Collection Time: 01/17/17  3:11 AM   Result Value Ref Range    Vitamin B-12 535 210 - 950 pg/mL   Folate    Collection Time: 01/17/17  3:11 AM   Result Value Ref Range    Folate 7.6 4.0 - 24.0 ng/mL   Urinalysis    Collection Time: 01/17/17  5:02 AM   Result Value Ref Range    Specimen UA Urine, Clean Catch     Color, UA Brown (A) Yellow, Straw, [place]    Appearance, UA Cloudy (A) Clear    pH, UA 6.0 5.0 - 8.0    Specific Gravity, UA 1.020 1.005 - 1.030    Protein, UA 1+ (A) Negative    Glucose, UA Negative Negative    Ketones, UA Trace (A) Negative    Bilirubin (UA) Negative Negative    Occult Blood UA 3+ (A) Negative    Nitrite, UA Negative Negative    Urobilinogen, UA Negative <2.0 EU/dL    Leukocytes, UA 2+ (A) Negative   Urinalysis Microscopic    Collection Time: 01/17/17  5:02 AM   Result Value Ref Range    RBC, UA >100 (H) 0 - 4 /hpf    WBC, UA 33 (H) 0 - 5 /hpf    Bacteria, UA Moderate (A) None-Occ /hpf    Squam Epithel, UA 6 /hpf    Hyaline Casts, UA 10 (A) 0-1/lpf /lpf    Microscopic Comment SEE COMMENT        Assessment and Plan       51 y.o. female who  presents with Crohn Disease, rectovaginal fistula, and GERD who is currently experiencing a Crohn Disease flare.    Plan per GI at this time, see their note  recs implemented  No surgical intervention    Orlin Gutierrez MD  Colon and Rectal Surgery Fellow  [phone]  Have seen and examined the patient with the fellow and agree with their plan.  ESPERANZA MADSEN

## 2017-01-17 NOTE — PROGRESS NOTES
Entyvio infusion plan ordered.  Will set up first infusion in coming weeks.  Will try to get set up near her home in [place] if possible  Will schedule for follow up in GI in coming weeks.

## 2017-01-17 NOTE — PLAN OF CARE
Problem: Patient Care Overview  Goal: Plan of Care Review  Outcome: Ongoing (interventions implemented as appropriate)  POC reviewed with pt and pt's mother, both verbalized understanding.  Pt is AAOx4. VSS.  Pt advanced to a regular diet this shift.  Ostomy intact with good output this shift.  Pt up ad kenneth in room and hallway, remains free of falls/injury.  Will cont to monitor and support as needed.

## 2017-01-18 ENCOUNTER — TELEPHONE (OUTPATIENT)
Dept: GASTROENTEROLOGY | Facility: CLINIC | Age: 52
End: 2017-01-18

## 2017-01-18 LAB
ALBUMIN SERPL BCP-MCNC: 3.4 G/DL
ALP SERPL-CCNC: 145 U/L
ALT SERPL W/O P-5'-P-CCNC: 33 U/L
ANION GAP SERPL CALC-SCNC: 10 MMOL/L
AST SERPL-CCNC: 22 U/L
BASOPHILS # BLD AUTO: 0.01 K/UL
BASOPHILS NFR BLD: 0.1 %
BILIRUB SERPL-MCNC: 0.3 MG/DL
BUN SERPL-MCNC: 13 MG/DL
CALCIUM SERPL-MCNC: 8.5 MG/DL
CHLORIDE SERPL-SCNC: 105 MMOL/L
CO2 SERPL-SCNC: 25 MMOL/L
CREAT SERPL-MCNC: 0.9 MG/DL
CRP SERPL-MCNC: 4.2 MG/L
DIFFERENTIAL METHOD: ABNORMAL
EOSINOPHIL # BLD AUTO: 0 K/UL
EOSINOPHIL NFR BLD: 0 %
ERYTHROCYTE [DISTWIDTH] IN BLOOD BY AUTOMATED COUNT: 13.5 %
ERYTHROCYTE [SEDIMENTATION RATE] IN BLOOD BY WESTERGREN METHOD: 25 MM/HR
EST. GFR  (AFRICAN AMERICAN): >60 ML/MIN/1.73 M^2
EST. GFR  (NON AFRICAN AMERICAN): >60 ML/MIN/1.73 M^2
GLUCOSE SERPL-MCNC: 149 MG/DL
HCT VFR BLD AUTO: 37.8 %
HGB BLD-MCNC: 12.9 G/DL
LYMPHOCYTES # BLD AUTO: 0.6 K/UL
LYMPHOCYTES NFR BLD: 7.3 %
MCH RBC QN AUTO: 28.6 PG
MCHC RBC AUTO-ENTMCNC: 34.1 %
MCV RBC AUTO: 84 FL
MONOCYTES # BLD AUTO: 0.1 K/UL
MONOCYTES NFR BLD: 1.7 %
NEUTROPHILS # BLD AUTO: 7.7 K/UL
NEUTROPHILS NFR BLD: 90.2 %
PLATELET # BLD AUTO: 235 K/UL
PMV BLD AUTO: 9.6 FL
POTASSIUM SERPL-SCNC: 3.5 MMOL/L
PROT SERPL-MCNC: 7.2 G/DL
RBC # BLD AUTO: 4.51 M/UL
SODIUM SERPL-SCNC: 140 MMOL/L
WBC # BLD AUTO: 8.48 K/UL

## 2017-01-18 PROCEDURE — 36415 COLL VENOUS BLD VENIPUNCTURE: CPT

## 2017-01-18 PROCEDURE — 85025 COMPLETE CBC W/AUTO DIFF WBC: CPT

## 2017-01-18 PROCEDURE — 85651 RBC SED RATE NONAUTOMATED: CPT

## 2017-01-18 PROCEDURE — 99233 SBSQ HOSP IP/OBS HIGH 50: CPT | Mod: ,,, | Performed by: COLON & RECTAL SURGERY

## 2017-01-18 PROCEDURE — 25000003 PHARM REV CODE 250: Performed by: ANESTHESIOLOGY

## 2017-01-18 PROCEDURE — 20600001 HC STEP DOWN PRIVATE ROOM

## 2017-01-18 PROCEDURE — 25000003 PHARM REV CODE 250: Performed by: COLON & RECTAL SURGERY

## 2017-01-18 PROCEDURE — 86140 C-REACTIVE PROTEIN: CPT

## 2017-01-18 PROCEDURE — 80053 COMPREHEN METABOLIC PANEL: CPT

## 2017-01-18 PROCEDURE — 63600175 PHARM REV CODE 636 W HCPCS: Performed by: NURSE PRACTITIONER

## 2017-01-18 PROCEDURE — 63600175 PHARM REV CODE 636 W HCPCS: Performed by: ANESTHESIOLOGY

## 2017-01-18 PROCEDURE — 25000003 PHARM REV CODE 250: Performed by: STUDENT IN AN ORGANIZED HEALTH CARE EDUCATION/TRAINING PROGRAM

## 2017-01-18 PROCEDURE — 25000003 PHARM REV CODE 250: Performed by: NURSE PRACTITIONER

## 2017-01-18 RX ORDER — SODIUM CHLORIDE 9 MG/ML
INJECTION, SOLUTION INTRAVENOUS CONTINUOUS
Status: DISCONTINUED | OUTPATIENT
Start: 2017-01-18 | End: 2017-01-20 | Stop reason: HOSPADM

## 2017-01-18 RX ORDER — CIPROFLOXACIN 500 MG/1
500 TABLET ORAL EVERY 12 HOURS
Qty: 10 TABLET | Refills: 0 | Status: SHIPPED | OUTPATIENT
Start: 2017-01-18 | End: 2017-01-18

## 2017-01-18 RX ORDER — PREDNISONE 10 MG/1
40 TABLET ORAL DAILY
Qty: 60 TABLET | Refills: 0 | Status: SHIPPED | OUTPATIENT
Start: 2017-01-18 | End: 2017-01-18

## 2017-01-18 RX ORDER — GABAPENTIN 300 MG/1
300 CAPSULE ORAL NIGHTLY
Qty: 30 CAPSULE | Refills: 0 | Status: SHIPPED | OUTPATIENT
Start: 2017-01-18 | End: 2017-01-18

## 2017-01-18 RX ORDER — HYDROCODONE BITARTRATE AND ACETAMINOPHEN 10; 325 MG/1; MG/1
1 TABLET ORAL EVERY 4 HOURS PRN
Qty: 91 TABLET | Refills: 0 | Status: SHIPPED | OUTPATIENT
Start: 2017-01-18 | End: 2017-12-14 | Stop reason: SDUPTHER

## 2017-01-18 RX ORDER — HYDROMORPHONE HYDROCHLORIDE 1 MG/ML
0.5 INJECTION, SOLUTION INTRAMUSCULAR; INTRAVENOUS; SUBCUTANEOUS EVERY 4 HOURS PRN
Status: DISCONTINUED | OUTPATIENT
Start: 2017-01-18 | End: 2017-01-20

## 2017-01-18 RX ORDER — PREDNISONE 10 MG/1
40 TABLET ORAL DAILY
Qty: 60 TABLET | Refills: 0 | Status: SHIPPED | OUTPATIENT
Start: 2017-01-18 | End: 2017-01-28

## 2017-01-18 RX ORDER — LORAZEPAM 2 MG/ML
0.5 INJECTION INTRAMUSCULAR EVERY 6 HOURS PRN
Status: DISCONTINUED | OUTPATIENT
Start: 2017-01-18 | End: 2017-01-20 | Stop reason: HOSPADM

## 2017-01-18 RX ORDER — HYDROCODONE BITARTRATE AND ACETAMINOPHEN 10; 325 MG/1; MG/1
1 TABLET ORAL EVERY 4 HOURS PRN
Qty: 91 TABLET | Refills: 0 | Status: SHIPPED | OUTPATIENT
Start: 2017-01-18 | End: 2017-01-18

## 2017-01-18 RX ORDER — LORAZEPAM 2 MG/ML
1 INJECTION INTRAMUSCULAR ONCE
Status: COMPLETED | OUTPATIENT
Start: 2017-01-18 | End: 2017-01-18

## 2017-01-18 RX ORDER — CIPROFLOXACIN 500 MG/1
500 TABLET ORAL EVERY 12 HOURS
Qty: 10 TABLET | Refills: 0 | Status: ON HOLD | OUTPATIENT
Start: 2017-01-18 | End: 2017-01-24 | Stop reason: HOSPADM

## 2017-01-18 RX ORDER — LORAZEPAM 0.5 MG/1
0.5 TABLET ORAL EVERY 6 HOURS PRN
Qty: 60 TABLET | Refills: 0 | Status: SHIPPED | OUTPATIENT
Start: 2017-01-18 | End: 2017-03-07

## 2017-01-18 RX ORDER — GABAPENTIN 300 MG/1
300 CAPSULE ORAL NIGHTLY
Qty: 30 CAPSULE | Refills: 0 | Status: SHIPPED | OUTPATIENT
Start: 2017-01-18 | End: 2017-11-14

## 2017-01-18 RX ORDER — HYDROMORPHONE HYDROCHLORIDE 1 MG/ML
0.5 INJECTION, SOLUTION INTRAMUSCULAR; INTRAVENOUS; SUBCUTANEOUS ONCE
Status: COMPLETED | OUTPATIENT
Start: 2017-01-18 | End: 2017-01-18

## 2017-01-18 RX ORDER — LORAZEPAM 0.5 MG/1
0.5 TABLET ORAL EVERY 6 HOURS PRN
Qty: 60 TABLET | Refills: 0 | Status: SHIPPED | OUTPATIENT
Start: 2017-01-18 | End: 2017-01-18

## 2017-01-18 RX ADMIN — Medication 3 ML: at 06:01

## 2017-01-18 RX ADMIN — HYDROMORPHONE HYDROCHLORIDE 0.5 MG: 1 INJECTION, SOLUTION INTRAMUSCULAR; INTRAVENOUS; SUBCUTANEOUS at 05:01

## 2017-01-18 RX ADMIN — HYDROCODONE BITARTRATE AND ACETAMINOPHEN 1 TABLET: 10; 325 TABLET ORAL at 02:01

## 2017-01-18 RX ADMIN — CIPROFLOXACIN HYDROCHLORIDE 500 MG: 500 TABLET, FILM COATED ORAL at 08:01

## 2017-01-18 RX ADMIN — PROMETHAZINE HYDROCHLORIDE 6.25 MG: 25 INJECTION INTRAMUSCULAR; INTRAVENOUS at 05:01

## 2017-01-18 RX ADMIN — HYDROMORPHONE HYDROCHLORIDE 0.5 MG: 1 INJECTION, SOLUTION INTRAMUSCULAR; INTRAVENOUS; SUBCUTANEOUS at 10:01

## 2017-01-18 RX ADMIN — PREDNISONE 40 MG: 20 TABLET ORAL at 08:01

## 2017-01-18 RX ADMIN — HYDROMORPHONE HYDROCHLORIDE 0.5 MG: 1 INJECTION, SOLUTION INTRAMUSCULAR; INTRAVENOUS; SUBCUTANEOUS at 06:01

## 2017-01-18 RX ADMIN — HYDROMORPHONE HYDROCHLORIDE 0.5 MG: 1 INJECTION, SOLUTION INTRAMUSCULAR; INTRAVENOUS; SUBCUTANEOUS at 08:01

## 2017-01-18 RX ADMIN — Medication 10 ML: at 12:01

## 2017-01-18 RX ADMIN — HYDROMORPHONE HYDROCHLORIDE 0.5 MG: 1 INJECTION, SOLUTION INTRAMUSCULAR; INTRAVENOUS; SUBCUTANEOUS at 02:01

## 2017-01-18 RX ADMIN — HYDROCODONE BITARTRATE AND ACETAMINOPHEN 1 TABLET: 10; 325 TABLET ORAL at 12:01

## 2017-01-18 RX ADMIN — Medication 10 ML: at 06:01

## 2017-01-18 RX ADMIN — SODIUM CHLORIDE: 0.9 INJECTION, SOLUTION INTRAVENOUS at 04:01

## 2017-01-18 RX ADMIN — ONDANSETRON 8 MG: 8 TABLET, ORALLY DISINTEGRATING ORAL at 07:01

## 2017-01-18 RX ADMIN — LORAZEPAM 1 MG: 2 INJECTION INTRAMUSCULAR; INTRAVENOUS at 03:01

## 2017-01-18 RX ADMIN — ONDANSETRON 8 MG: 8 TABLET, ORALLY DISINTEGRATING ORAL at 06:01

## 2017-01-18 RX ADMIN — PROMETHAZINE HYDROCHLORIDE 6.25 MG: 25 INJECTION INTRAMUSCULAR; INTRAVENOUS at 11:01

## 2017-01-18 RX ADMIN — Medication 3 ML: at 10:01

## 2017-01-18 RX ADMIN — ENOXAPARIN SODIUM 60 MG: 100 INJECTION SUBCUTANEOUS at 10:01

## 2017-01-18 RX ADMIN — Medication 3 ML: at 02:01

## 2017-01-18 RX ADMIN — HYDROCODONE BITARTRATE AND ACETAMINOPHEN 1 TABLET: 10; 325 TABLET ORAL at 07:01

## 2017-01-18 RX ADMIN — LORAZEPAM 0.5 MG: 2 INJECTION INTRAMUSCULAR; INTRAVENOUS at 08:01

## 2017-01-18 RX ADMIN — HYDROMORPHONE HYDROCHLORIDE 0.5 MG: 1 INJECTION, SOLUTION INTRAMUSCULAR; INTRAVENOUS; SUBCUTANEOUS at 04:01

## 2017-01-18 NOTE — PROGRESS NOTES
"GI Follow-up Note    Pt seen / examined today  Primary team called stating patient was setup to go home and then had acute onset abd pain.  Team states this morning patient was doing well and in good spirits, and then had very acute onset abd pain with vomiting.  Spoke with nursing, and they informed me that patient was demanding IV dilaudid.    On interview, pt actively throwing up and stated it has been 2 hours and i cant get any pain meds.   I informed her that i dont believe IV pain medications are a good solution until we can figure out whats going on, and she stated "well i think they are"    Vitals:    01/18/17 1230   BP: 128/78   Pulse: 80   Resp: 16   Temp: 97 °F (36.1 °C)       Gen: writhing in bed ; actively having light brown emesis  Abd: soft without distention ; hypoactive BS ; stoma over LLQ with semi solid stool      Unclear the etiology of her acute onset abd pain. Only change today is transition to PO steroids from IV and she had a heavy lunch.  Discussed this with primary team CRS fellow.    Plan:  Will obtain KUB  Will obtain basic labs including CMP, CBC, ESR, CRP   - Would not switch back to IV steroids unless labs grossly changed from prior  Would keep NPO then start clear liquids  Would AVOID IV pain medications     Discussed with primary team and GI staff  Please call with any additional concerns /questions    Davin Bueno MD  Gastroenterology Fellow (PGY-IV)  Pager: [phone]      "

## 2017-01-18 NOTE — PLAN OF CARE
CM attempted to schedule GI follow-up for patient,  sending message to nurse with request, will call pt back with appt date/time.      1040am Addendum-   CM received call from JOSH Funes NP, stating she anticipates pt will need home Lovenox.  LUIS communicated with pt and informed her of above.  She stated her 18yo son could administer the injections to her. Pt does not have a PCP, she primarily sees her GI dr.   She approved having prescription sent downstairs to [facility] to check out of pocket cost for patient.  CM informed NP of above and asked SW to follow-up on cost with pharmacy.      LUIS called pt's nurse, Fadumo, and requested she start home Lovenox education.      1130am Addendum-  Per JOSH Funes, NP patient will NOT need home Lovenox and she will inform patient.  SW aware.          01/18/17 1016   Final Note   Assessment Type Final Discharge Note   Discharge Disposition Home   Discharge planning education complete? Yes   Hospital Follow Up  Appt(s) scheduled? Yes   Discharge plans and expectations educations in teach back method with documentation complete? Yes   [facility] n/a   Discharge/Hospital Encounter Summary to (non-[facility]) PCP n/a   Referral to Outpatient Case Management complete? n/a   Referral to / orders for Home Health Complete? n/a   30 day supply of medicines given at discharge, if documented non-compliance / non-adherence? n/a   Any social issues identified prior to discharge? No   Did you assess the readiness or willingness of the family or caregiver to support self management of care? Yes

## 2017-01-18 NOTE — PROGRESS NOTES
DC instructions given and reviewed with pt.  Pt verbalized understanding. PICC removed.  Pt received prescriptions from NPt.  Pt's mother will transfer her home.  Pt requested to ambulate off unit.

## 2017-01-18 NOTE — PROGRESS NOTES
COLON RECTAL SURGERY  PROGRESS NOTE    LENGTH OF STAY: 3  POST OPERATIVE DAY: 1 Day Post-Op for Procedure(s) (LRB):  ESOPHAGOGASTRODUODENOSCOPY (EGD) (N/A)  COLONOSCOPY (N/A)     Subjective     Daily HPI: Patient was to be discharged this afternoon, however shortly after seeing patient, she reported severe abdominal pain and began vomiting.  Her discharge was cancelled, KUB performed, and labs drawn.  No concerning signs on KUB.      Objective     Vitals:    01/18/17 1230   BP: 128/78   Pulse: 80   Resp: 16   Temp: 97 °F (36.1 °C)         Intake/Output Summary (Last 24 hours) at 01/18/17 1732  Last data filed at 01/18/17 1710   Gross per 24 hour   Intake              570 ml   Output             2150 ml   Net            -1580 ml       General:  female in nad  Neuro: AAO x4 MAEx4 PERRL  Chest: resps even unlabored, cap refill <2 sec  Abd: soft, nondistended, tenderness moderate in the RLQ, without guarding and without rebound  Wound (if present): n/a  Stoma (if present): pink and productive    No results found for this or any previous visit (from the past 24 hour(s)).    Assessment and Plan     51 y.o. female who presents with Crohn Disease, rectovaginal fistula, and GERD who is currently experiencing a Crohn Disease flare.     Will keep patient overnight, f/u labs and imaging studies  Pain control as needed  GI to see patient   No surgical intervention    Orlin Gutierrez MD  Colon and Rectal Surgery Fellow  [phone]  Have seen and examined the patient with the fellow and agree with their plan.  ESPERANZA MADSEN

## 2017-01-18 NOTE — PLAN OF CARE
Problem: Patient Care Overview  Goal: Plan of Care Review  Outcome: Ongoing (interventions implemented as appropriate)  Plan of care reviewed with pt. Pt verbalized understanding. AAOx4. VSS on room air. Ambulated to bathroom. Pain managed with prn pain medication. Denied n/v. Low urine output. Ostomy bag changed 2x. Remained free from falls or injuries. Call light within reach. Will call for assistance. No distress noted. Will continue to monitor.

## 2017-01-18 NOTE — CONSULTS
Patient seen for colostomy care. Patient stoma prolapsed. Stoma measured approx 45mm. Patient reports stoma at times gets larger than this. Previous pouch appeared to be too tight around stoma, pouch removed. Peristomal skin intact. Coloplast sensura pouch with barrier ring applied. Pouch cut to 55mm to allow room for stoma to expand. cavilon no sting barrier film applied to skin prior to pouch application. Patient verbalized understanding. Extra supplies left at bedside. Nursing to continue care.

## 2017-01-18 NOTE — TELEPHONE ENCOUNTER
----- Message from Lucrecia Chang sent at 1/18/2017 10:12 AM CST -----  Contact: Jeanette-Rafael Nofo- [phone]   Jeanette called to schedule a hospital discharge f/u appt for the pt- pt has chron's and ibd- please call pt back at [phone]

## 2017-01-18 NOTE — PROGRESS NOTES
Pt still waiting on ride.  Pt complaining of pain.  Norco not due until 1630.  Carolyn, NP authorized for an additional dose.

## 2017-01-18 NOTE — PLAN OF CARE
Sw informed by NP that Pt no longer is in need of Lovenox. No other Sw needs identified at this time.      Paula Barros, TRACY [phone]

## 2017-01-18 NOTE — PLAN OF CARE
Sw following for d/c needs. Pt has support of mother and children. No d/c needs identified at this time. Sw will continue to follow.      Paula Barros, TRACY [phone]

## 2017-01-18 NOTE — PROGRESS NOTES
"Pt refusing norco stating she may throw it up and she is in "some new different pain I've never felt before." Carolyn, NP notified.  "

## 2017-01-19 PROBLEM — N20.9 UROLITHIASIS: Status: ACTIVE | Noted: 2017-01-19

## 2017-01-19 LAB
BILIRUB UR QL STRIP: NEGATIVE
CLARITY UR REFRACT.AUTO: CLEAR
COLOR UR AUTO: YELLOW
GLUCOSE UR QL STRIP: NEGATIVE
HGB UR QL STRIP: ABNORMAL
KETONES UR QL STRIP: NEGATIVE
LEUKOCYTE ESTERASE UR QL STRIP: NEGATIVE
MICROSCOPIC COMMENT: ABNORMAL
NITRITE UR QL STRIP: NEGATIVE
PH UR STRIP: 7 [PH] (ref 5–8)
PROT UR QL STRIP: ABNORMAL
RBC #/AREA URNS AUTO: 41 /HPF (ref 0–4)
SP GR UR STRIP: 1.02 (ref 1–1.03)
SQUAMOUS #/AREA URNS AUTO: 1 /HPF
URN SPEC COLLECT METH UR: ABNORMAL
UROBILINOGEN UR STRIP-ACNC: NEGATIVE EU/DL
WBC #/AREA URNS AUTO: 6 /HPF (ref 0–5)

## 2017-01-19 PROCEDURE — 25000003 PHARM REV CODE 250: Performed by: STUDENT IN AN ORGANIZED HEALTH CARE EDUCATION/TRAINING PROGRAM

## 2017-01-19 PROCEDURE — 36415 COLL VENOUS BLD VENIPUNCTURE: CPT

## 2017-01-19 PROCEDURE — 99233 SBSQ HOSP IP/OBS HIGH 50: CPT | Mod: ,,, | Performed by: COLON & RECTAL SURGERY

## 2017-01-19 PROCEDURE — 81001 URINALYSIS AUTO W/SCOPE: CPT

## 2017-01-19 PROCEDURE — 63600175 PHARM REV CODE 636 W HCPCS: Performed by: NURSE PRACTITIONER

## 2017-01-19 PROCEDURE — 25000003 PHARM REV CODE 250: Performed by: NURSE PRACTITIONER

## 2017-01-19 PROCEDURE — 86480 TB TEST CELL IMMUN MEASURE: CPT

## 2017-01-19 PROCEDURE — 63600175 PHARM REV CODE 636 W HCPCS: Performed by: ANESTHESIOLOGY

## 2017-01-19 PROCEDURE — 20600001 HC STEP DOWN PRIVATE ROOM

## 2017-01-19 PROCEDURE — 25000003 PHARM REV CODE 250: Performed by: ANESTHESIOLOGY

## 2017-01-19 PROCEDURE — 25000003 PHARM REV CODE 250: Performed by: COLON & RECTAL SURGERY

## 2017-01-19 RX ORDER — CALCIUM CARBONATE 200(500)MG
500 TABLET,CHEWABLE ORAL DAILY PRN
Status: DISCONTINUED | OUTPATIENT
Start: 2017-01-19 | End: 2017-01-20 | Stop reason: HOSPADM

## 2017-01-19 RX ORDER — ONDANSETRON 2 MG/ML
4 INJECTION INTRAMUSCULAR; INTRAVENOUS EVERY 8 HOURS PRN
Status: DISCONTINUED | OUTPATIENT
Start: 2017-01-19 | End: 2017-01-20 | Stop reason: HOSPADM

## 2017-01-19 RX ORDER — PANTOPRAZOLE SODIUM 40 MG/1
40 TABLET, DELAYED RELEASE ORAL DAILY
Status: DISCONTINUED | OUTPATIENT
Start: 2017-01-19 | End: 2017-01-20 | Stop reason: HOSPADM

## 2017-01-19 RX ADMIN — CIPROFLOXACIN HYDROCHLORIDE 500 MG: 500 TABLET, FILM COATED ORAL at 08:01

## 2017-01-19 RX ADMIN — HYDROCODONE BITARTRATE AND ACETAMINOPHEN 1 TABLET: 10; 325 TABLET ORAL at 08:01

## 2017-01-19 RX ADMIN — ONDANSETRON 4 MG: 2 INJECTION INTRAMUSCULAR; INTRAVENOUS at 08:01

## 2017-01-19 RX ADMIN — HYDROMORPHONE HYDROCHLORIDE 0.5 MG: 1 INJECTION, SOLUTION INTRAMUSCULAR; INTRAVENOUS; SUBCUTANEOUS at 05:01

## 2017-01-19 RX ADMIN — HYDROCODONE BITARTRATE AND ACETAMINOPHEN 1 TABLET: 10; 325 TABLET ORAL at 04:01

## 2017-01-19 RX ADMIN — SODIUM CHLORIDE: 0.9 INJECTION, SOLUTION INTRAVENOUS at 08:01

## 2017-01-19 RX ADMIN — PREDNISONE 40 MG: 20 TABLET ORAL at 08:01

## 2017-01-19 RX ADMIN — LORAZEPAM 0.5 MG: 2 INJECTION INTRAMUSCULAR; INTRAVENOUS at 07:01

## 2017-01-19 RX ADMIN — PROMETHAZINE HYDROCHLORIDE 6.25 MG: 25 INJECTION INTRAMUSCULAR; INTRAVENOUS at 05:01

## 2017-01-19 RX ADMIN — Medication 3 ML: at 10:01

## 2017-01-19 RX ADMIN — PROMETHAZINE HYDROCHLORIDE 6.25 MG: 25 INJECTION INTRAMUSCULAR; INTRAVENOUS at 01:01

## 2017-01-19 RX ADMIN — HYDROCODONE BITARTRATE AND ACETAMINOPHEN 1 TABLET: 10; 325 TABLET ORAL at 12:01

## 2017-01-19 RX ADMIN — LORAZEPAM 0.5 MG: 2 INJECTION INTRAMUSCULAR; INTRAVENOUS at 02:01

## 2017-01-19 RX ADMIN — ENOXAPARIN SODIUM 60 MG: 100 INJECTION SUBCUTANEOUS at 10:01

## 2017-01-19 RX ADMIN — HYDROMORPHONE HYDROCHLORIDE 0.5 MG: 1 INJECTION, SOLUTION INTRAMUSCULAR; INTRAVENOUS; SUBCUTANEOUS at 02:01

## 2017-01-19 RX ADMIN — PANTOPRAZOLE SODIUM 40 MG: 40 TABLET, DELAYED RELEASE ORAL at 10:01

## 2017-01-19 RX ADMIN — SODIUM CHLORIDE: 0.9 INJECTION, SOLUTION INTRAVENOUS at 05:01

## 2017-01-19 RX ADMIN — CIPROFLOXACIN HYDROCHLORIDE 500 MG: 500 TABLET, FILM COATED ORAL at 10:01

## 2017-01-19 RX ADMIN — ONDANSETRON 4 MG: 2 INJECTION INTRAMUSCULAR; INTRAVENOUS at 10:01

## 2017-01-19 RX ADMIN — Medication 10 ML: at 12:01

## 2017-01-19 RX ADMIN — HYDROCODONE BITARTRATE AND ACETAMINOPHEN 1 TABLET: 5; 325 TABLET ORAL at 10:01

## 2017-01-19 RX ADMIN — CALCIUM CARBONATE (ANTACID) CHEW TAB 500 MG 500 MG: 500 CHEW TAB at 10:01

## 2017-01-19 RX ADMIN — LORAZEPAM 0.5 MG: 2 INJECTION INTRAMUSCULAR; INTRAVENOUS at 10:01

## 2017-01-19 RX ADMIN — HYDROMORPHONE HYDROCHLORIDE 0.5 MG: 1 INJECTION, SOLUTION INTRAMUSCULAR; INTRAVENOUS; SUBCUTANEOUS at 09:01

## 2017-01-19 RX ADMIN — HYDROMORPHONE HYDROCHLORIDE 0.5 MG: 1 INJECTION, SOLUTION INTRAMUSCULAR; INTRAVENOUS; SUBCUTANEOUS at 01:01

## 2017-01-19 NOTE — PROGRESS NOTES
COLON RECTAL SURGERY  PROGRESS NOTE    LENGTH OF STAY: 4  POST OPERATIVE DAY: 2 Days Post-Op for Procedure(s) (LRB):  ESOPHAGOGASTRODUODENOSCOPY (EGD) (N/A)  COLONOSCOPY (N/A)     Subjective     Daily HPI: RLQ flank pain overnight, nausea and vomiting as well, pain coming in waves per patient, no fevers.      Objective     Vitals:    01/19/17 0746   BP: 138/69   Pulse: 67   Resp: 18   Temp: 97.5 °F (36.4 °C)         Intake/Output Summary (Last 24 hours) at 01/19/17 1122  Last data filed at 01/19/17 0500   Gross per 24 hour   Intake          1538.75 ml   Output             1100 ml   Net           438.75 ml       General:  female in nad  Neuro: AAO x4 MAEx4 PERRL  Chest: resps even unlabored, cap refill <2 sec  Abd: soft, non-tender, without masses or organomegaly  Wound (if present): n/a  Stoma (if present): pink and productive    Recent Results (from the past 24 hour(s))   Sedimentation rate, manual    Collection Time: 01/18/17  5:27 PM   Result Value Ref Range    Sed Rate 25 (H) 0 - 20 mm/Hr   C-reactive protein    Collection Time: 01/18/17  5:27 PM   Result Value Ref Range    CRP 4.2 0.0 - 8.2 mg/L   Comprehensive metabolic panel    Collection Time: 01/18/17  5:27 PM   Result Value Ref Range    Sodium 140 136 - 145 mmol/L    Potassium 3.5 3.5 - 5.1 mmol/L    Chloride 105 95 - 110 mmol/L    CO2 25 23 - 29 mmol/L    Glucose 149 (H) 70 - 110 mg/dL    BUN, Bld 13 6 - 20 mg/dL    Creatinine 0.9 0.5 - 1.4 mg/dL    Calcium 8.5 (L) 8.7 - 10.5 mg/dL    Total Protein 7.2 6.0 - 8.4 g/dL    Albumin 3.4 (L) 3.5 - 5.2 g/dL    Total Bilirubin 0.3 0.1 - 1.0 mg/dL    Alkaline Phosphatase 145 (H) 55 - 135 U/L    AST 22 10 - 40 U/L    ALT 33 10 - 44 U/L    Anion Gap 10 8 - 16 mmol/L    eGFR if African American >60.0 >60 mL/min/1.73 m^2    eGFR if non African American >60.0 >60 mL/min/1.73 m^2   CBC auto differential    Collection Time: 01/18/17  5:27 PM   Result Value Ref Range    WBC 8.48 3.90 - 12.70 K/uL    RBC 4.51 4.00  - 5.40 M/uL    Hemoglobin 12.9 12.0 - 16.0 g/dL    Hematocrit 37.8 37.0 - 48.5 %    MCV 84 82 - 98 fL    MCH 28.6 27.0 - 31.0 pg    MCHC 34.1 32.0 - 36.0 %    RDW 13.5 11.5 - 14.5 %    Platelets 235 150 - 350 K/uL    MPV 9.6 9.2 - 12.9 fL    Gran # 7.7 1.8 - 7.7 K/uL    Lymph # 0.6 (L) 1.0 - 4.8 K/uL    Mono # 0.1 (L) 0.3 - 1.0 K/uL    Eos # 0.0 0.0 - 0.5 K/uL    Baso # 0.01 0.00 - 0.20 K/uL    Gran% 90.2 (H) 38.0 - 73.0 %    Lymph% 7.3 (L) 18.0 - 48.0 %    Mono% 1.7 (L) 4.0 - 15.0 %    Eosinophil% 0.0 0.0 - 8.0 %    Basophil% 0.1 0.0 - 1.9 %    Differential Method Automated        Assessment and Plan     51 y.o. female who presents with Crohn Disease, rectovaginal fistula, and GERD who is currently experiencing a Crohn Disease flare.      Possible kidney stone causing pain, UA pending  CT KUB thereafter  Pain control as needed  GI to see patient   No surgical intervention     Orlin Gutierrez MD  Colon and Rectal Surgery Fellow  [phone]  Have seen and examined the patient with the fellow and agree with their plan.  ESPERANZA MADSEN

## 2017-01-19 NOTE — PLAN OF CARE
Problem: Patient Care Overview  Goal: Plan of Care Review  Outcome: Ongoing (interventions implemented as appropriate)  Plan of care review with patient; patient verbalize understanding. Patient will ambulate three times today, report pain and verbalized results of interventions provided. Pt co pain mildly relieved with prescribed meds. Pt denies nausea during shift. Incision clean, dry, and edges approximated during shift. Pt tolerating NPO diet.  CT and UA completed today. Pt up in room independently, no distress noted. Pt remains free of falls, injuries, and trauma. No distress noted will continue to monitor.

## 2017-01-19 NOTE — PLAN OF CARE
Pt's discharged was cancelled yesterday, pt had onset of pain and nausea.  CM met with pt & her mother this am.  Pt NPO, requiring IVP Dilaudid for pain,  receiving IVF's.  MD has ordered UA and CT-renal stone study.  CM will continue to follow.           01/19/17 1035   Right Care Assessment   How often would a person be available to care for the patient? Often   Describe the patient's ability to walk at the present time. No restrictions   How does the patient rate their overall health at the present time? Fair   Number of comorbid conditions (as recorded on the chart) Other  (Crohn's)   During the past month, has the patient often been bothered by feeling down, depressed or hopeless? No   During the past month, has the patient often been bothered by little interest or pleasure in doing things? No

## 2017-01-19 NOTE — PLAN OF CARE
Problem: Patient Care Overview  Goal: Plan of Care Review  Outcome: Ongoing (interventions implemented as appropriate)  Plan of care reviewed with pt and mother. Pt and mother verbalized understanding. AAOx4. VSS on room air. Ambulated to bathroom. Pain managed with prn pain medication. N/v managed with prn antiemetic. Low urine output. Ostomy bag intact. Remained free from falls or injuries. Call light within reach. Will call for assistance. No distress noted. Will continue to monitor.

## 2017-01-19 NOTE — PLAN OF CARE
Sw following for d/c needs. Pt has support of mother and children. No d/c needs identified at this time. Sw will continue to follow.      Paula Barros, TRACY [id number]

## 2017-01-20 VITALS
TEMPERATURE: 99 F | OXYGEN SATURATION: 93 % | HEART RATE: 84 BPM | HEIGHT: 68 IN | BODY MASS INDEX: 21.22 KG/M2 | DIASTOLIC BLOOD PRESSURE: 60 MMHG | SYSTOLIC BLOOD PRESSURE: 107 MMHG | WEIGHT: 140 LBS | RESPIRATION RATE: 18 BRPM

## 2017-01-20 LAB
ANION GAP SERPL CALC-SCNC: 9 MMOL/L
BASOPHILS # BLD AUTO: 0.01 K/UL
BASOPHILS NFR BLD: 0.1 %
BUN SERPL-MCNC: 10 MG/DL
CALCIUM SERPL-MCNC: 7.8 MG/DL
CHLORIDE SERPL-SCNC: 105 MMOL/L
CO2 SERPL-SCNC: 23 MMOL/L
CREAT SERPL-MCNC: 1.1 MG/DL
DIFFERENTIAL METHOD: ABNORMAL
EOSINOPHIL # BLD AUTO: 0.1 K/UL
EOSINOPHIL NFR BLD: 0.6 %
ERYTHROCYTE [DISTWIDTH] IN BLOOD BY AUTOMATED COUNT: 13.4 %
EST. GFR  (AFRICAN AMERICAN): >60 ML/MIN/1.73 M^2
EST. GFR  (NON AFRICAN AMERICAN): 59.1 ML/MIN/1.73 M^2
GLUCOSE SERPL-MCNC: 79 MG/DL
HCT VFR BLD AUTO: 34.5 %
HGB BLD-MCNC: 11.4 G/DL
LYMPHOCYTES # BLD AUTO: 1.5 K/UL
LYMPHOCYTES NFR BLD: 11.7 %
MCH RBC QN AUTO: 28.7 PG
MCHC RBC AUTO-ENTMCNC: 33 %
MCV RBC AUTO: 87 FL
MITOGEN NIL: 1.38 IU/ML
MONOCYTES # BLD AUTO: 1 K/UL
MONOCYTES NFR BLD: 7.5 %
NEUTROPHILS # BLD AUTO: 10.2 K/UL
NEUTROPHILS NFR BLD: 79.7 %
NIL: 0.04 IU/ML
PLATELET # BLD AUTO: 184 K/UL
PMV BLD AUTO: 9.9 FL
POTASSIUM SERPL-SCNC: 3.2 MMOL/L
RBC # BLD AUTO: 3.97 M/UL
SODIUM SERPL-SCNC: 137 MMOL/L
TB ANTIGEN NIL: 0 IU/ML
TB ANTIGEN: 0.05 IU/ML
TB GOLD: NEGATIVE
WBC # BLD AUTO: 12.78 K/UL

## 2017-01-20 PROCEDURE — 87077 CULTURE AEROBIC IDENTIFY: CPT

## 2017-01-20 PROCEDURE — 80048 BASIC METABOLIC PNL TOTAL CA: CPT

## 2017-01-20 PROCEDURE — 63600175 PHARM REV CODE 636 W HCPCS: Performed by: ANESTHESIOLOGY

## 2017-01-20 PROCEDURE — 25000003 PHARM REV CODE 250: Performed by: STUDENT IN AN ORGANIZED HEALTH CARE EDUCATION/TRAINING PROGRAM

## 2017-01-20 PROCEDURE — 25000003 PHARM REV CODE 250: Performed by: NURSE PRACTITIONER

## 2017-01-20 PROCEDURE — 25000003 PHARM REV CODE 250: Performed by: ANESTHESIOLOGY

## 2017-01-20 PROCEDURE — 87088 URINE BACTERIA CULTURE: CPT

## 2017-01-20 PROCEDURE — 36415 COLL VENOUS BLD VENIPUNCTURE: CPT

## 2017-01-20 PROCEDURE — 87186 SC STD MICRODIL/AGAR DIL: CPT

## 2017-01-20 PROCEDURE — 99239 HOSP IP/OBS DSCHRG MGMT >30: CPT | Mod: GC,,, | Performed by: COLON & RECTAL SURGERY

## 2017-01-20 PROCEDURE — 63600175 PHARM REV CODE 636 W HCPCS: Performed by: SURGERY

## 2017-01-20 PROCEDURE — 85025 COMPLETE CBC W/AUTO DIFF WBC: CPT

## 2017-01-20 PROCEDURE — 63600175 PHARM REV CODE 636 W HCPCS: Performed by: NURSE PRACTITIONER

## 2017-01-20 PROCEDURE — 87086 URINE CULTURE/COLONY COUNT: CPT

## 2017-01-20 RX ORDER — OXYCODONE AND ACETAMINOPHEN 5; 325 MG/1; MG/1
1 TABLET ORAL EVERY 4 HOURS PRN
Qty: 20 TABLET | Refills: 0 | Status: SHIPPED | OUTPATIENT
Start: 2017-01-20 | End: 2017-02-06

## 2017-01-20 RX ORDER — HYDROMORPHONE HYDROCHLORIDE 1 MG/ML
1 INJECTION, SOLUTION INTRAMUSCULAR; INTRAVENOUS; SUBCUTANEOUS
Status: DISCONTINUED | OUTPATIENT
Start: 2017-01-20 | End: 2017-01-20

## 2017-01-20 RX ORDER — TAMSULOSIN HYDROCHLORIDE 0.4 MG/1
0.4 CAPSULE ORAL NIGHTLY
Status: DISCONTINUED | OUTPATIENT
Start: 2017-01-20 | End: 2017-01-20 | Stop reason: HOSPADM

## 2017-01-20 RX ORDER — HYDROMORPHONE HYDROCHLORIDE 2 MG/1
2 TABLET ORAL EVERY 4 HOURS PRN
Qty: 30 TABLET | Refills: 0 | Status: SHIPPED | OUTPATIENT
Start: 2017-01-20 | End: 2017-03-07

## 2017-01-20 RX ADMIN — PREDNISONE 40 MG: 20 TABLET ORAL at 08:01

## 2017-01-20 RX ADMIN — HYDROMORPHONE HYDROCHLORIDE 0.5 MG: 1 INJECTION, SOLUTION INTRAMUSCULAR; INTRAVENOUS; SUBCUTANEOUS at 05:01

## 2017-01-20 RX ADMIN — LORAZEPAM 0.5 MG: 2 INJECTION INTRAMUSCULAR; INTRAVENOUS at 06:01

## 2017-01-20 RX ADMIN — LORAZEPAM 0.5 MG: 2 INJECTION INTRAMUSCULAR; INTRAVENOUS at 12:01

## 2017-01-20 RX ADMIN — Medication 3 ML: at 05:01

## 2017-01-20 RX ADMIN — PANTOPRAZOLE SODIUM 40 MG: 40 TABLET, DELAYED RELEASE ORAL at 08:01

## 2017-01-20 RX ADMIN — SODIUM CHLORIDE: 0.9 INJECTION, SOLUTION INTRAVENOUS at 05:01

## 2017-01-20 RX ADMIN — HYDROCODONE BITARTRATE AND ACETAMINOPHEN 1 TABLET: 10; 325 TABLET ORAL at 12:01

## 2017-01-20 RX ADMIN — ENOXAPARIN SODIUM 60 MG: 100 INJECTION SUBCUTANEOUS at 10:01

## 2017-01-20 RX ADMIN — HYDROCODONE BITARTRATE AND ACETAMINOPHEN 1 TABLET: 5; 325 TABLET ORAL at 04:01

## 2017-01-20 RX ADMIN — HYDROMORPHONE HYDROCHLORIDE 1 MG: 1 INJECTION, SOLUTION INTRAMUSCULAR; INTRAVENOUS; SUBCUTANEOUS at 08:01

## 2017-01-20 RX ADMIN — HYDROCODONE BITARTRATE AND ACETAMINOPHEN 1 TABLET: 10; 325 TABLET ORAL at 08:01

## 2017-01-20 RX ADMIN — TAMSULOSIN HYDROCHLORIDE 0.4 MG: 0.4 CAPSULE ORAL at 10:01

## 2017-01-20 RX ADMIN — CIPROFLOXACIN HYDROCHLORIDE 500 MG: 500 TABLET, FILM COATED ORAL at 08:01

## 2017-01-20 NOTE — SUBJECTIVE & OBJECTIVE
Interval History:   - patient set to discharge home today  - complaining of right flank pain and RLQ not well controlled on current regimen  - denies fever, chills  - mild nausea, no emesis since one episode yesterday, controlled with phenergan    Review of Systems  Objective:     Temp:  [97.5 °F (36.4 °C)-98.5 °F (36.9 °C)] 97.5 °F (36.4 °C)  Pulse:  [47-77] 66  Resp:  [12-18] 18  SpO2:  [93 %-99 %] 99 %  BP: ()/(51-68) 142/68     Body mass index is 21.29 kg/(m^2).      Date 01/20/17 0700 - 01/21/17 0659   Shift 2976-5935 9235-6145 4845-9083 24 Hour Total   I  N  T  A  K  E   Shift Total  (mL/kg)       O  U  T  P  U  T   Stool 350   350    Shift Total  (mL/kg) 350  (5.5)   350  (5.5)   Weight (kg) 63.5 63.5 63.5 63.5          Drains     Drain                 Colostomy LLQ -- days                Physical Exam   Constitutional: She is oriented to person, place, and time. She appears well-developed and well-nourished. No distress.   HENT:   Head: Normocephalic and atraumatic.   Eyes: No scleral icterus.   Neck: No JVD present.   Cardiovascular: Normal rate and regular rhythm.    Pulmonary/Chest: Effort normal. No respiratory distress.   Abdominal: Soft. There is tenderness in the right lower quadrant. There is CVA tenderness (right). There is no rigidity and no guarding.   LLQ colostomy present, stoma p/p.   Neurological: She is alert and oriented to person, place, and time.   Skin: She is not diaphoretic. No pallor.     Psychiatric: She has a normal mood and affect. Her behavior is normal. Thought content normal.       Significant Labs:    BMP:    Recent Labs  Lab 01/15/17  2059 01/17/17  0311 01/18/17  1727    143 140   K 4.2 3.3* 3.5    101 105   CO2 29 33* 25   BUN 9 12 13   CREATININE 0.7 0.8 0.9   CALCIUM 8.6* 8.8 8.5*       CBC:     Recent Labs  Lab 01/15/17  2059 01/17/17  0311 01/18/17  1727   WBC 10.11 11.81 8.48   HGB 12.1 12.6 12.9   HCT 36.5* 37.3 37.8    264 235       Urine  Culture: No results for input(s): LABURIN in the last 168 hours.  Urine Studies:   Recent Labs  Lab 01/17/17  0502 01/19/17  1127   COLORU Brown* Yellow   APPEARANCEUA Cloudy* Clear   PHUR 6.0 7.0   SPECGRAV 1.020 1.020   PROTEINUA 1+* Trace*   GLUCUA Negative Negative   KETONESU Trace* Negative   BILIRUBINUA Negative Negative   OCCULTUA 3+* 2+*   NITRITE Negative Negative   UROBILINOGEN Negative Negative   LEUKOCYTESUR 2+* Negative   RBCUA >100* 41*   WBCUA 33* 6*   BACTERIA Moderate*  --    SQUAMEPITHEL 6 1   HYALINECASTS 10*  --        Significant Imaging:  All pertinent imaging results/findings from the past 24 hours have been reviewed.

## 2017-01-20 NOTE — PLAN OF CARE
Problem: Patient Care Overview  Goal: Plan of Care Review  Outcome: Ongoing (interventions implemented as appropriate)  Plan of care reviewed with patient who verbalized understanding. Pt describes pain as well controlled with infrequent use of pain medications. Pt ambulates independently, frequently and with out incident. Pt uses the incentive spirometer.

## 2017-01-20 NOTE — PROGRESS NOTES
Pt's complaints if heartburn reviewed with Dr. Way. Orders obtained for daily Protonix, first dose now, and PRN Tums. Will continue to monitor.

## 2017-01-20 NOTE — ASSESSMENT & PLAN NOTE
51 YOF with crohn disease admitted for flare, resolved, with right mid ureteral stone     - follow up labs from today; if normal, then okay for discharge with outpatient follow up with urology   - recommend flomax for renal colic   - Recommend aggressive hydration  - Strain all urine  - recommend PO dilaudid for pain control, phenergan and possibly scopolamine patch for nausea control   - patient to report to ED or call for fever >101 F, intractable nausea and vomiting, inability to tolerate any PO intake   - f/u urine culture

## 2017-01-20 NOTE — PROGRESS NOTES
[facility]  Urology  Progress Note    Patient Name: Carline Chang  MRN: [id number]  Admission Date: 1/15/2017  Hospital Length of Stay: 5 days  Code Status: Full Code   Attending Provider: Vicente Young MD   Primary Care Physician: Pablo Medina MD    Subjective:     HPI:  Carline Chang is a 51 y.o. female admitted on 1/15 for Crohn's flare up. Hx of rectovaginal fistula and GERD.  Yesterday was set for discharge however had acute onset of RLQ and flank pain, nausea and vomiting. CT scan ordered showed 5 mm right mid ureteral stone with mild hydro. Had episode of gross hematuria on Tuesday, however this is resolving. Also experiencing some hesitancy and frequency. Denies dysuria.   No prior history of kidney stones.   Is on antibiotic therapy currently.       Interval History:   - patient set to discharge home today  - complaining of right flank pain and RLQ not well controlled on current regimen  - denies fever, chills  - mild nausea, no emesis since one episode yesterday, controlled with phenergan    Review of Systems  Objective:     Temp:  [97.5 °F (36.4 °C)-98.5 °F (36.9 °C)] 97.5 °F (36.4 °C)  Pulse:  [47-77] 66  Resp:  [12-18] 18  SpO2:  [93 %-99 %] 99 %  BP: ()/(51-68) 142/68     Body mass index is 21.29 kg/(m^2).      Date 01/20/17 0700 - 01/21/17 0659   Shift 6175-4097 9461-5998 7442-5461 24 Hour Total   I  N  T  A  K  E   Shift Total  (mL/kg)       O  U  T  P  U  T   Stool 350   350    Shift Total  (mL/kg) 350  (5.5)   350  (5.5)   Weight (kg) 63.5 63.5 63.5 63.5          Drains     Drain                 Colostomy LLQ -- days                Physical Exam   Constitutional: She is oriented to person, place, and time. She appears well-developed and well-nourished. No distress.   HENT:   Head: Normocephalic and atraumatic.   Eyes: No scleral icterus.   Neck: No JVD present.   Cardiovascular: Normal rate and regular rhythm.    Pulmonary/Chest: Effort normal. No  respiratory distress.   Abdominal: Soft. There is tenderness in the right lower quadrant. There is CVA tenderness (right). There is no rigidity and no guarding.   LLQ colostomy present, stoma p/p.   Neurological: She is alert and oriented to person, place, and time.   Skin: She is not diaphoretic. No pallor.     Psychiatric: She has a normal mood and affect. Her behavior is normal. Thought content normal.       Significant Labs:    BMP:    Recent Labs  Lab 01/15/17  2059 01/17/17  0311 01/18/17  1727    143 140   K 4.2 3.3* 3.5    101 105   CO2 29 33* 25   BUN 9 12 13   CREATININE 0.7 0.8 0.9   CALCIUM 8.6* 8.8 8.5*       CBC:     Recent Labs  Lab 01/15/17  2059 01/17/17  0311 01/18/17  1727   WBC 10.11 11.81 8.48   HGB 12.1 12.6 12.9   HCT 36.5* 37.3 37.8    264 235       Urine Culture: No results for input(s): LABURIN in the last 168 hours.  Urine Studies:   Recent Labs  Lab 01/17/17  0502 01/19/17  1127   COLORU Brown* Yellow   APPEARANCEUA Cloudy* Clear   PHUR 6.0 7.0   SPECGRAV 1.020 1.020   PROTEINUA 1+* Trace*   GLUCUA Negative Negative   KETONESU Trace* Negative   BILIRUBINUA Negative Negative   OCCULTUA 3+* 2+*   NITRITE Negative Negative   UROBILINOGEN Negative Negative   LEUKOCYTESUR 2+* Negative   RBCUA >100* 41*   WBCUA 33* 6*   BACTERIA Moderate*  --    SQUAMEPITHEL 6 1   HYALINECASTS 10*  --        Significant Imaging:  All pertinent imaging results/findings from the past 24 hours have been reviewed.                  Assessment/Plan:     Urolithiasis  51 YOF with crohn disease admitted for flare, resolved, with right mid ureteral stone     - follow up labs from today; if normal, then okay for discharge with outpatient follow up with urology   - recommend flomax for renal colic   - Recommend aggressive hydration  - Strain all urine  - recommend PO dilaudid for pain control, phenergan and possibly scopolamine patch for nausea control   - patient to report to ED or call for fever >101  F, intractable nausea and vomiting, inability to tolerate any PO intake   - f/u urine culture       VTE Risk Mitigation         Ordered     Place SPENCER hose  Until discontinued      01/15/17 2205     Medium Risk of VTE  Once      01/15/17 2205     Place sequential compression device  Until discontinued      01/15/17 2205          Esteban Marie MD  Urology  [facility]

## 2017-01-20 NOTE — CONSULTS
[facility]  Urology  Consult Note    Patient Name: Carline Chang  MRN: [id number]  Admission Date: 1/15/2017  Hospital Length of Stay: 4   Code Status: Full Code   Attending Provider: Vicente Young MD  Consulting Provider: Magdalena Correa MD  Primary Care Physician: Pablo Medina MD  Principal Problem:Crohn disease    Inpatient consult to Urology  Consult performed by: MAGDALENA CORREA  Consult ordered by: ANDIE MOELLER        Subjective:     HPI:  Carline Chang is a 51 y.o. female admitted on 1/15 for Chron's flare up. Hx of rectovaginal fistula and GERD.  Yesterday was set for discharge however had acute onset of RLQ and flank pain, nausea and vomiting. CT scan ordered shows 5 mm right mid ureteral stone with mild hydro. Had episode of gross hematuria on Tuesday, however this is resolving. Also experiencing some hesitancy and frequency. Denies dysuria. Pain and nausea have resolved today.   No prior history of kidney stones.   Is on antibiotic therapy currently.       Past Medical History   Diagnosis Date    Chronic pain     Crohn's disease        Past Surgical History   Procedure Laterality Date    Bowel resection       x2    Cholecystectomy      Tubal ligation      Rectal abscess drain       section       x2    Colonoscopy N/A 2016     Procedure: COLONOSCOPY;  Surgeon: Andre Uribe MD;  Location: [facility] (4TH FLR);  Service: Endoscopy;  Laterality: N/A;    Colonoscopy N/A 2017     Procedure: COLONOSCOPY;  Surgeon: Dany Stock MD;  Location: [facility] ENDO (2ND FLR);  Service: Endoscopy;  Laterality: N/A;       Review of patient's allergies indicates:   Allergen Reactions    Morphine     Nubain [nalbuphine] Anxiety       Family History     None          Social History Main Topics    Smoking status: Never Smoker    Smokeless tobacco: Not on file    Alcohol use No    Drug use: No    Sexual activity: Not on file       Review of  Systems   Constitutional: Negative for chills and fever.   HENT: Negative for trouble swallowing.    Respiratory: Negative for cough and shortness of breath.    Cardiovascular: Negative for chest pain and palpitations.   Gastrointestinal: Positive for abdominal pain, nausea and vomiting.   Genitourinary: Positive for flank pain, frequency and hematuria. Negative for dysuria, nocturia and urgency.   Neurological: Negative for light-headedness and headaches.       Objective:     Temp:  [97.5 °F (36.4 °C)-98 °F (36.7 °C)] 97.9 °F (36.6 °C)  Pulse:  [56-77] 56  Resp:  [16-18] 18  SpO2:  [93 %-99 %] 93 %  BP: (109-187)/(64-81) 109/64     Body mass index is 21.29 kg/(m^2).      Date 01/19/17 0700 - 01/20/17 0659   Shift 8799-2004 2584-6996 5774-2316 24 Hour Total   I  N  T  A  K  E   I.V.  (mL/kg) 601.3  (9.5)   601.3  (9.5)    IV Piggyback 50   50    Shift Total  (mL/kg) 651.3  (10.3)   651.3  (10.3)   O  U  T  P  U  T   Urine  (mL/kg/hr) 200  (0.4)   200    Stool 150   150    Shift Total  (mL/kg) 350  (5.5)   350  (5.5)   Weight (kg) 63.5 63.5 63.5 63.5          Drains     Drain                 Colostomy LLQ -- days                Physical Exam   Constitutional: She is oriented to person, place, and time. She appears well-developed and well-nourished. No distress.   HENT:   Head: Normocephalic and atraumatic.   Eyes: No scleral icterus.   Neck: No JVD present.   Cardiovascular: Normal rate and regular rhythm.    Pulmonary/Chest: Effort normal. No respiratory distress.   Abdominal: Soft. There is no tenderness. There is no rigidity, no guarding and no CVA tenderness.   LLQ colostomy present, stoma p/p.   Neurological: She is alert and oriented to person, place, and time.   Skin: She is not diaphoretic. No pallor.     Psychiatric: She has a normal mood and affect. Her behavior is normal. Thought content normal.       Significant Labs:    BMP:    Recent Labs  Lab 01/15/17  2059 01/17/17  0311 01/18/17  1727    143  140   K 4.2 3.3* 3.5    101 105   CO2 29 33* 25   BUN 9 12 13   CREATININE 0.7 0.8 0.9   CALCIUM 8.6* 8.8 8.5*       CBC:    Recent Labs  Lab 01/15/17  2059 01/17/17  0311 01/18/17  1727   WBC 10.11 11.81 8.48   HGB 12.1 12.6 12.9   HCT 36.5* 37.3 37.8    264 235       Urine Studies:   Recent Labs  Lab 01/17/17  0502 01/19/17  1127   COLORU Brown* Yellow   APPEARANCEUA Cloudy* Clear   PHUR 6.0 7.0   SPECGRAV 1.020 1.020   PROTEINUA 1+* Trace*   GLUCUA Negative Negative   KETONESU Trace* Negative   BILIRUBINUA Negative Negative   OCCULTUA 3+* 2+*   NITRITE Negative Negative   UROBILINOGEN Negative Negative   LEUKOCYTESUR 2+* Negative   RBCUA >100* 41*   WBCUA 33* 6*   BACTERIA Moderate*  --    SQUAMEPITHEL 6 1   HYALINECASTS 10*  --        Significant Imaging:  CTRSS 1/19/17: 5 mm mid right ureteral stone with mild hydro    KUB 1/18/17: right ureteral stone visible just distal to sacral bone      Assessment and Plan:     Urolithiasis  - No need for acute intervention at this time as patient's vitals are stable, Cr and WBC WNL  - Will attempt trial of passage  - Recommend aggressive hydration  - Strain all urine  - Pain and nausea control  - If patient becomes febrile or pain and nausea return and can not be managed with medication will consider stent at that time  - Will have her follow up with Dr. Cano in 2 weeks, bring stone if has passed or will evaluate for other treatment options  - Urine culture placed      VTE Risk Mitigation         Ordered     Place SPENCER hose  Until discontinued      01/15/17 2205     Medium Risk of VTE  Once      01/15/17 2205     Place sequential compression device  Until discontinued      01/15/17 2205          Thank you for your consult. I will sign off. Please contact us if you have any additional questions.    Jana Correa MD  Urology  [facility]

## 2017-01-20 NOTE — ASSESSMENT & PLAN NOTE
- No need for acute intervention at this time as patient's vitals are stable, Cr and WBC WNL  - Will attempt trial of passage  - Recommend aggressive hydration  - Strain all urine  - Pain and nausea control  - If patient becomes febrile or pain and nausea return and can not be managed with medication will consider stent at that time  - Will have her follow up with Dr. Cano in 2 weeks, bring stone if has passed or will evaluate for other treatment options  - Urine culture placed

## 2017-01-20 NOTE — MEDICAL/APP STUDENT
Progress note    Patient seen and examined this morning.    Ms. Chang is a 51 year old female with Crohn's disease, rectovaginal fistula and a loop sigmoid colostomy. She recently suffered a Crohn's flare in her small bowel, confirmed on CT. An EGD and colonoscopy was performed and neither revealed signs of active Crohn's disease. Her Crohn's symptoms have been settling throughout her admission while on steroids and her ESR has been trending downward, however no bloods since 1/18.    Unfortunately, Ms. Chang has developed a kidney stone (5mm in left ureter) yesterday. When I saw her today she was uncomfortable with left lower quadrant pain that is colicky and radiates to the flank. She had gross haematuria yesterday and was in severe pain and vomited. She has not had any problems with urine flow nor haematuria today. She has been receiving dilaudid but she reports that this hasn't been effective in fully resolving her pain. Urology plans to discharge her on oxycontin and will review her next week in outpatients.    Ms. Chang thought she may have felt some 'Crohn's discomfort' yesterday but had no complaints in this regard today. She experienced some heartburn last night and we discussed the EGD findings and ways to reduce symptoms.     On examination  T 97.5 HR 66 RR 18 /68 SpO2 99%  Patient comfortable in bed  Stoma contains semi-liquid light brown to green stool  Abdominal tenderness in LLQ

## 2017-01-20 NOTE — PLAN OF CARE
Sw following for d/c needs. Pt has support of mother and children. No d/c needs identified at this time. Sw will continue to follow.       Paula Barros, TRACY [id number]

## 2017-01-20 NOTE — SUBJECTIVE & OBJECTIVE
Past Medical History   Diagnosis Date    Chronic pain     Crohn's disease        Past Surgical History   Procedure Laterality Date    Bowel resection       x2    Cholecystectomy      Tubal ligation      Rectal abscess drain       section       x2    Colonoscopy N/A 2016     Procedure: COLONOSCOPY;  Surgeon: Andre Uribe MD;  Location: [facility] (4TH FLR);  Service: Endoscopy;  Laterality: N/A;    Colonoscopy N/A 2017     Procedure: COLONOSCOPY;  Surgeon: Dany Stock MD;  Location: [facility] ENDO (2ND FLR);  Service: Endoscopy;  Laterality: N/A;       Review of patient's allergies indicates:   Allergen Reactions    Morphine     Nubain [nalbuphine] Anxiety       Family History     None          Social History Main Topics    Smoking status: Never Smoker    Smokeless tobacco: Not on file    Alcohol use No    Drug use: No    Sexual activity: Not on file       Review of Systems   Constitutional: Negative for chills and fever.   HENT: Negative for trouble swallowing.    Respiratory: Negative for cough and shortness of breath.    Cardiovascular: Negative for chest pain and palpitations.   Gastrointestinal: Positive for abdominal pain, nausea and vomiting.   Genitourinary: Positive for flank pain, frequency and hematuria. Negative for dysuria, nocturia and urgency.   Neurological: Negative for light-headedness and headaches.       Objective:     Temp:  [97.5 °F (36.4 °C)-98 °F (36.7 °C)] 97.9 °F (36.6 °C)  Pulse:  [56-77] 56  Resp:  [16-18] 18  SpO2:  [93 %-99 %] 93 %  BP: (109-187)/(64-81) 109/64     Body mass index is 21.29 kg/(m^2).      Date 17 07 - 17 0659   Shift 6210-8464 0038-2781 7824-0909 24 Hour Total   I  N  T  A  K  E   I.V.  (mL/kg) 601.3  (9.5)   601.3  (9.5)    IV Piggyback 50   50    Shift Total  (mL/kg) 651.3  (10.3)   651.3  (10.3)   O  U  T  P  U  T   Urine  (mL/kg/hr) 200  (0.4)   200    Stool 150   150    Shift Total  (mL/kg) 350  (5.5)   350  (5.5)    Weight (kg) 63.5 63.5 63.5 63.5          Drains     Drain                 Colostomy LLQ -- days                Physical Exam   Constitutional: She is oriented to person, place, and time. She appears well-developed and well-nourished. No distress.   HENT:   Head: Normocephalic and atraumatic.   Eyes: No scleral icterus.   Neck: No JVD present.   Cardiovascular: Normal rate and regular rhythm.    Pulmonary/Chest: Effort normal. No respiratory distress.   Abdominal: Soft. There is no tenderness. There is no rigidity, no guarding and no CVA tenderness.   LLQ colostomy present, stoma p/p.   Neurological: She is alert and oriented to person, place, and time.   Skin: She is not diaphoretic. No pallor.     Psychiatric: She has a normal mood and affect. Her behavior is normal. Thought content normal.       Significant Labs:    BMP:    Recent Labs  Lab 01/15/17  2059 01/17/17  0311 01/18/17  1727    143 140   K 4.2 3.3* 3.5    101 105   CO2 29 33* 25   BUN 9 12 13   CREATININE 0.7 0.8 0.9   CALCIUM 8.6* 8.8 8.5*       CBC:    Recent Labs  Lab 01/15/17  2059 01/17/17  0311 01/18/17  1727   WBC 10.11 11.81 8.48   HGB 12.1 12.6 12.9   HCT 36.5* 37.3 37.8    264 235       Urine Studies:   Recent Labs  Lab 01/17/17  0502 01/19/17  1127   COLORU Brown* Yellow   APPEARANCEUA Cloudy* Clear   PHUR 6.0 7.0   SPECGRAV 1.020 1.020   PROTEINUA 1+* Trace*   GLUCUA Negative Negative   KETONESU Trace* Negative   BILIRUBINUA Negative Negative   OCCULTUA 3+* 2+*   NITRITE Negative Negative   UROBILINOGEN Negative Negative   LEUKOCYTESUR 2+* Negative   RBCUA >100* 41*   WBCUA 33* 6*   BACTERIA Moderate*  --    SQUAMEPITHEL 6 1   HYALINECASTS 10*  --        Significant Imaging:  CTRSS 1/19/17: 5 mm mid right ureteral stone with mild hydro    KUB 1/18/17: right ureteral stone visible just distal to sacral bone

## 2017-01-20 NOTE — PROGRESS NOTES
GI Follow-up Note    Pt seen / examined today  abd pain much improved.  Labs and CT done, showing moderate hydroureteronephrosis.        Vitals:    01/19/17 1558   BP: 109/64   Pulse: (!) 56   Resp: 18   Temp: 97.9 °F (36.6 °C)       Gen: pleasant and well appearing  Abd: soft without distention ; hypoactive BS ; stoma over LLQ with semi solid stool      Plan:  Advance diet as tolerated.  Inflammatory markers downtrending and pt tolerating oral steroids  Appreciate urology assistance  OK for discharge from GI / IBD perspective, once kidney issues resolving.      Please call with any additional concerns /questions    Davin Bueno MD  Gastroenterology Fellow ([id number])  Pager: [phone]

## 2017-01-23 ENCOUNTER — ANESTHESIA (OUTPATIENT)
Dept: SURGERY | Facility: HOSPITAL | Age: 52
End: 2017-01-23
Payer: MEDICARE

## 2017-01-23 ENCOUNTER — TELEPHONE (OUTPATIENT)
Dept: UROLOGY | Facility: CLINIC | Age: 52
End: 2017-01-23

## 2017-01-23 ENCOUNTER — HOSPITAL ENCOUNTER (OUTPATIENT)
Facility: HOSPITAL | Age: 52
Discharge: HOME | End: 2017-01-24
Attending: EMERGENCY MEDICINE | Admitting: UROLOGY
Payer: MEDICARE

## 2017-01-23 ENCOUNTER — TELEPHONE (OUTPATIENT)
Dept: UROLOGY | Facility: HOSPITAL | Age: 52
End: 2017-01-23

## 2017-01-23 ENCOUNTER — ANESTHESIA EVENT (OUTPATIENT)
Dept: SURGERY | Facility: HOSPITAL | Age: 52
End: 2017-01-23
Payer: MEDICARE

## 2017-01-23 DIAGNOSIS — N20.0 KIDNEY STONES: Primary | ICD-10-CM

## 2017-01-23 DIAGNOSIS — N13.30 HYDRONEPHROSIS, UNSPECIFIED HYDRONEPHROSIS TYPE: ICD-10-CM

## 2017-01-23 DIAGNOSIS — N20.1 CALCULUS OF URETER: Primary | ICD-10-CM

## 2017-01-23 DIAGNOSIS — K50.90 CROHN DISEASE: ICD-10-CM

## 2017-01-23 PROBLEM — N20.9 UROLITHIASIS: Status: ACTIVE | Noted: 2017-01-23

## 2017-01-23 LAB
ALBUMIN SERPL BCP-MCNC: 3.8 G/DL
ALP SERPL-CCNC: 150 U/L
ALT SERPL W/O P-5'-P-CCNC: 30 U/L
ANION GAP SERPL CALC-SCNC: 15 MMOL/L
AST SERPL-CCNC: 21 U/L
BACTERIA #/AREA URNS AUTO: ABNORMAL /HPF
BACTERIA UR CULT: NORMAL
BASOPHILS # BLD AUTO: 0.01 K/UL
BASOPHILS NFR BLD: 0.1 %
BILIRUB SERPL-MCNC: 0.4 MG/DL
BILIRUB UR QL STRIP: NEGATIVE
BUN SERPL-MCNC: 11 MG/DL
CALCIUM SERPL-MCNC: 9.5 MG/DL
CHLORIDE SERPL-SCNC: 105 MMOL/L
CLARITY UR REFRACT.AUTO: ABNORMAL
CO2 SERPL-SCNC: 19 MMOL/L
COLOR UR AUTO: YELLOW
CREAT SERPL-MCNC: 1.1 MG/DL
DIFFERENTIAL METHOD: ABNORMAL
EOSINOPHIL # BLD AUTO: 0 K/UL
EOSINOPHIL NFR BLD: 0 %
ERYTHROCYTE [DISTWIDTH] IN BLOOD BY AUTOMATED COUNT: 14.1 %
EST. GFR  (AFRICAN AMERICAN): >60 ML/MIN/1.73 M^2
EST. GFR  (NON AFRICAN AMERICAN): 59.1 ML/MIN/1.73 M^2
GLUCOSE SERPL-MCNC: 110 MG/DL
GLUCOSE UR QL STRIP: NEGATIVE
HCT VFR BLD AUTO: 40.1 %
HGB BLD-MCNC: 14.1 G/DL
HGB UR QL STRIP: ABNORMAL
HYALINE CASTS UR QL AUTO: 5 /LPF
KETONES UR QL STRIP: NEGATIVE
LEUKOCYTE ESTERASE UR QL STRIP: ABNORMAL
LYMPHOCYTES # BLD AUTO: 1.1 K/UL
LYMPHOCYTES NFR BLD: 5.7 %
MCH RBC QN AUTO: 29.1 PG
MCHC RBC AUTO-ENTMCNC: 35.2 %
MCV RBC AUTO: 83 FL
MICROSCOPIC COMMENT: ABNORMAL
MONOCYTES # BLD AUTO: 0.3 K/UL
MONOCYTES NFR BLD: 1.4 %
NEUTROPHILS # BLD AUTO: 17.9 K/UL
NEUTROPHILS NFR BLD: 92.3 %
NITRITE UR QL STRIP: NEGATIVE
PH UR STRIP: 6 [PH] (ref 5–8)
PLATELET # BLD AUTO: 237 K/UL
PMV BLD AUTO: 9.3 FL
POTASSIUM SERPL-SCNC: 3.2 MMOL/L
PROT SERPL-MCNC: 8.8 G/DL
PROT UR QL STRIP: ABNORMAL
RBC # BLD AUTO: 4.85 M/UL
RBC #/AREA URNS AUTO: 22 /HPF (ref 0–4)
SODIUM SERPL-SCNC: 139 MMOL/L
SP GR UR STRIP: 1.01 (ref 1–1.03)
SQUAMOUS #/AREA URNS AUTO: 4 /HPF
URN SPEC COLLECT METH UR: ABNORMAL
UROBILINOGEN UR STRIP-ACNC: NEGATIVE EU/DL
WBC # BLD AUTO: 19.34 K/UL
WBC #/AREA URNS AUTO: 13 /HPF (ref 0–5)

## 2017-01-23 PROCEDURE — 99285 EMERGENCY DEPT VISIT HI MDM: CPT | Mod: ,,, | Performed by: EMERGENCY MEDICINE

## 2017-01-23 PROCEDURE — 63600175 PHARM REV CODE 636 W HCPCS: Performed by: UROLOGY

## 2017-01-23 PROCEDURE — 52332 CYSTOSCOPY AND TREATMENT: CPT | Mod: RT,GC,, | Performed by: UROLOGY

## 2017-01-23 PROCEDURE — D9220A PRA ANESTHESIA: Mod: ANES,,, | Performed by: ANESTHESIOLOGY

## 2017-01-23 PROCEDURE — 27200993: Performed by: UROLOGY

## 2017-01-23 PROCEDURE — G0378 HOSPITAL OBSERVATION PER HR: HCPCS

## 2017-01-23 PROCEDURE — 88305 TISSUE EXAM BY PATHOLOGIST: CPT | Performed by: PATHOLOGY

## 2017-01-23 PROCEDURE — 81001 URINALYSIS AUTO W/SCOPE: CPT

## 2017-01-23 PROCEDURE — 87077 CULTURE AEROBIC IDENTIFY: CPT

## 2017-01-23 PROCEDURE — 36000707: Performed by: UROLOGY

## 2017-01-23 PROCEDURE — 99285 EMERGENCY DEPT VISIT HI MDM: CPT | Mod: 25

## 2017-01-23 PROCEDURE — 36000706: Performed by: UROLOGY

## 2017-01-23 PROCEDURE — 80053 COMPREHEN METABOLIC PANEL: CPT

## 2017-01-23 PROCEDURE — C2617 STENT, NON-COR, TEM W/O DEL: HCPCS | Performed by: UROLOGY

## 2017-01-23 PROCEDURE — 25000003 PHARM REV CODE 250: Performed by: UROLOGY

## 2017-01-23 PROCEDURE — 37000008 HC ANESTHESIA 1ST 15 MINUTES: Performed by: UROLOGY

## 2017-01-23 PROCEDURE — 25000003 PHARM REV CODE 250: Performed by: NURSE ANESTHETIST, CERTIFIED REGISTERED

## 2017-01-23 PROCEDURE — 63600175 PHARM REV CODE 636 W HCPCS: Performed by: NURSE ANESTHETIST, CERTIFIED REGISTERED

## 2017-01-23 PROCEDURE — 27201466: Performed by: UROLOGY

## 2017-01-23 PROCEDURE — D9220A PRA ANESTHESIA: Mod: CRNA,,, | Performed by: NURSE ANESTHETIST, CERTIFIED REGISTERED

## 2017-01-23 PROCEDURE — 71000015 HC POSTOP RECOV 1ST HR: Performed by: UROLOGY

## 2017-01-23 PROCEDURE — 85025 COMPLETE CBC W/AUTO DIFF WBC: CPT

## 2017-01-23 PROCEDURE — 88305 TISSUE EXAM BY PATHOLOGIST: CPT | Mod: 26,,, | Performed by: PATHOLOGY

## 2017-01-23 PROCEDURE — C1769 GUIDE WIRE: HCPCS | Performed by: UROLOGY

## 2017-01-23 PROCEDURE — 87088 URINE BACTERIA CULTURE: CPT

## 2017-01-23 PROCEDURE — 37000009 HC ANESTHESIA EA ADD 15 MINS: Performed by: UROLOGY

## 2017-01-23 PROCEDURE — 52204 CYSTOSCOPY W/BIOPSY(S): CPT | Mod: 59,GC,, | Performed by: UROLOGY

## 2017-01-23 PROCEDURE — 51701 INSERT BLADDER CATHETER: CPT

## 2017-01-23 PROCEDURE — 87086 URINE CULTURE/COLONY COUNT: CPT

## 2017-01-23 PROCEDURE — 27200921 HC BAG, CYSTO DRAINAGE: Performed by: UROLOGY

## 2017-01-23 PROCEDURE — 63600175 PHARM REV CODE 636 W HCPCS: Performed by: STUDENT IN AN ORGANIZED HEALTH CARE EDUCATION/TRAINING PROGRAM

## 2017-01-23 PROCEDURE — 99203 OFFICE O/P NEW LOW 30 MIN: CPT | Mod: 25,GC,, | Performed by: UROLOGY

## 2017-01-23 PROCEDURE — 71000044 HC DOSC ROUTINE RECOVERY FIRST HOUR: Performed by: UROLOGY

## 2017-01-23 PROCEDURE — 87186 SC STD MICRODIL/AGAR DIL: CPT

## 2017-01-23 PROCEDURE — 27200971 HC CYSTO SUPPLY II (SCOPE PRCDR.): Performed by: UROLOGY

## 2017-01-23 PROCEDURE — 76000 FLUOROSCOPY <1 HR PHYS/QHP: CPT | Mod: 26,59,, | Performed by: UROLOGY

## 2017-01-23 RX ORDER — KETAMINE HCL IN 0.9 % NACL 50 MG/5 ML
SYRINGE (ML) INTRAVENOUS
Status: DISCONTINUED | OUTPATIENT
Start: 2017-01-23 | End: 2017-01-23

## 2017-01-23 RX ORDER — ONDANSETRON HYDROCHLORIDE 2 MG/ML
INJECTION, SOLUTION INTRAMUSCULAR; INTRAVENOUS
Status: DISCONTINUED | OUTPATIENT
Start: 2017-01-23 | End: 2017-01-23

## 2017-01-23 RX ORDER — TAMSULOSIN HYDROCHLORIDE 0.4 MG/1
0.4 CAPSULE ORAL NIGHTLY
Status: DISCONTINUED | OUTPATIENT
Start: 2017-01-23 | End: 2017-01-24 | Stop reason: HOSPADM

## 2017-01-23 RX ORDER — OXYBUTYNIN CHLORIDE 5 MG/5ML
5 SYRUP ORAL 3 TIMES DAILY
Status: DISCONTINUED | OUTPATIENT
Start: 2017-01-23 | End: 2017-01-24 | Stop reason: HOSPADM

## 2017-01-23 RX ORDER — CIPROFLOXACIN 2 MG/ML
400 INJECTION, SOLUTION INTRAVENOUS
Status: DISCONTINUED | OUTPATIENT
Start: 2017-01-23 | End: 2017-01-24 | Stop reason: HOSPADM

## 2017-01-23 RX ORDER — GABAPENTIN 300 MG/1
300 CAPSULE ORAL NIGHTLY
Status: DISCONTINUED | OUTPATIENT
Start: 2017-01-23 | End: 2017-01-24 | Stop reason: HOSPADM

## 2017-01-23 RX ORDER — SODIUM CHLORIDE 9 MG/ML
INJECTION, SOLUTION INTRAVENOUS CONTINUOUS
Status: DISCONTINUED | OUTPATIENT
Start: 2017-01-23 | End: 2017-01-24

## 2017-01-23 RX ORDER — SODIUM CHLORIDE 9 MG/ML
INJECTION, SOLUTION INTRAVENOUS CONTINUOUS PRN
Status: DISCONTINUED | OUTPATIENT
Start: 2017-01-23 | End: 2017-01-23

## 2017-01-23 RX ORDER — ACETAMINOPHEN 10 MG/ML
1000 INJECTION, SOLUTION INTRAVENOUS EVERY 8 HOURS
Status: DISCONTINUED | OUTPATIENT
Start: 2017-01-23 | End: 2017-01-24 | Stop reason: HOSPADM

## 2017-01-23 RX ORDER — SODIUM CHLORIDE 0.9 % (FLUSH) 0.9 %
3 SYRINGE (ML) INJECTION EVERY 8 HOURS
Status: DISCONTINUED | OUTPATIENT
Start: 2017-01-23 | End: 2017-01-24 | Stop reason: HOSPADM

## 2017-01-23 RX ORDER — ESCITALOPRAM OXALATE 10 MG/1
10 TABLET ORAL DAILY
Status: DISCONTINUED | OUTPATIENT
Start: 2017-01-24 | End: 2017-01-24 | Stop reason: HOSPADM

## 2017-01-23 RX ORDER — OXYCODONE HYDROCHLORIDE 5 MG/1
5 TABLET ORAL EVERY 4 HOURS PRN
Status: DISCONTINUED | OUTPATIENT
Start: 2017-01-23 | End: 2017-01-24 | Stop reason: HOSPADM

## 2017-01-23 RX ORDER — PROPOFOL 10 MG/ML
VIAL (ML) INTRAVENOUS
Status: DISCONTINUED | OUTPATIENT
Start: 2017-01-23 | End: 2017-01-23

## 2017-01-23 RX ORDER — LIDOCAINE HCL/PF 100 MG/5ML
SYRINGE (ML) INTRAVENOUS
Status: DISCONTINUED | OUTPATIENT
Start: 2017-01-23 | End: 2017-01-23

## 2017-01-23 RX ORDER — ONDANSETRON 2 MG/ML
4 INJECTION INTRAMUSCULAR; INTRAVENOUS EVERY 8 HOURS PRN
Status: DISCONTINUED | OUTPATIENT
Start: 2017-01-23 | End: 2017-01-24 | Stop reason: HOSPADM

## 2017-01-23 RX ORDER — PHENAZOPYRIDINE HYDROCHLORIDE 200 MG/1
200 TABLET, FILM COATED ORAL 3 TIMES DAILY PRN
Status: DISCONTINUED | OUTPATIENT
Start: 2017-01-23 | End: 2017-01-24 | Stop reason: HOSPADM

## 2017-01-23 RX ORDER — PROMETHAZINE HYDROCHLORIDE 12.5 MG/1
12.5 TABLET ORAL 4 TIMES DAILY
Status: DISCONTINUED | OUTPATIENT
Start: 2017-01-24 | End: 2017-01-24 | Stop reason: HOSPADM

## 2017-01-23 RX ORDER — FENTANYL CITRATE 50 UG/ML
INJECTION, SOLUTION INTRAMUSCULAR; INTRAVENOUS
Status: DISCONTINUED | OUTPATIENT
Start: 2017-01-23 | End: 2017-01-23

## 2017-01-23 RX ORDER — HYDROMORPHONE HYDROCHLORIDE 1 MG/ML
1 INJECTION, SOLUTION INTRAMUSCULAR; INTRAVENOUS; SUBCUTANEOUS
Status: DISCONTINUED | OUTPATIENT
Start: 2017-01-23 | End: 2017-01-24 | Stop reason: HOSPADM

## 2017-01-23 RX ORDER — LORAZEPAM 0.5 MG/1
0.5 TABLET ORAL EVERY 6 HOURS PRN
Status: DISCONTINUED | OUTPATIENT
Start: 2017-01-23 | End: 2017-01-24 | Stop reason: HOSPADM

## 2017-01-23 RX ORDER — DEXAMETHASONE SODIUM PHOSPHATE 4 MG/ML
INJECTION, SOLUTION INTRA-ARTICULAR; INTRALESIONAL; INTRAMUSCULAR; INTRAVENOUS; SOFT TISSUE
Status: DISCONTINUED | OUTPATIENT
Start: 2017-01-23 | End: 2017-01-23

## 2017-01-23 RX ORDER — MIDAZOLAM HYDROCHLORIDE 1 MG/ML
INJECTION INTRAMUSCULAR; INTRAVENOUS
Status: DISCONTINUED | OUTPATIENT
Start: 2017-01-23 | End: 2017-01-23

## 2017-01-23 RX ORDER — ONDANSETRON 2 MG/ML
4 INJECTION INTRAMUSCULAR; INTRAVENOUS
Status: DISCONTINUED | OUTPATIENT
Start: 2017-01-23 | End: 2017-01-23

## 2017-01-23 RX ORDER — ZOLPIDEM TARTRATE 10 MG/1
10 TABLET ORAL NIGHTLY PRN
Status: DISCONTINUED | OUTPATIENT
Start: 2017-01-23 | End: 2017-01-24 | Stop reason: HOSPADM

## 2017-01-23 RX ORDER — CIPROFLOXACIN 2 MG/ML
400 INJECTION, SOLUTION INTRAVENOUS
Status: DISCONTINUED | OUTPATIENT
Start: 2017-01-24 | End: 2017-01-24

## 2017-01-23 RX ORDER — OXYCODONE HYDROCHLORIDE 5 MG/1
10 TABLET ORAL EVERY 4 HOURS PRN
Status: DISCONTINUED | OUTPATIENT
Start: 2017-01-23 | End: 2017-01-24 | Stop reason: HOSPADM

## 2017-01-23 RX ORDER — PREDNISONE 20 MG/1
40 TABLET ORAL DAILY
Status: DISCONTINUED | OUTPATIENT
Start: 2017-01-24 | End: 2017-01-24 | Stop reason: HOSPADM

## 2017-01-23 RX ADMIN — FENTANYL CITRATE 50 MCG: 50 INJECTION, SOLUTION INTRAMUSCULAR; INTRAVENOUS at 05:01

## 2017-01-23 RX ADMIN — OXYBUTYNIN CHLORIDE 5 MG: 5 SOLUTION ORAL at 10:01

## 2017-01-23 RX ADMIN — PROPOFOL 50 MG: 10 INJECTION, EMULSION INTRAVENOUS at 05:01

## 2017-01-23 RX ADMIN — LIDOCAINE HYDROCHLORIDE 50 MG: 20 INJECTION, SOLUTION INTRAVENOUS at 05:01

## 2017-01-23 RX ADMIN — SODIUM CHLORIDE: 0.9 INJECTION, SOLUTION INTRAVENOUS at 10:01

## 2017-01-23 RX ADMIN — PROPOFOL 50 MG: 10 INJECTION, EMULSION INTRAVENOUS at 06:01

## 2017-01-23 RX ADMIN — CIPROFLOXACIN 400 MG: 2 INJECTION, SOLUTION INTRAVENOUS at 05:01

## 2017-01-23 RX ADMIN — FENTANYL CITRATE 50 MCG: 50 INJECTION, SOLUTION INTRAMUSCULAR; INTRAVENOUS at 06:01

## 2017-01-23 RX ADMIN — MIDAZOLAM HYDROCHLORIDE 2 MG: 1 INJECTION, SOLUTION INTRAMUSCULAR; INTRAVENOUS at 05:01

## 2017-01-23 RX ADMIN — GABAPENTIN 300 MG: 300 CAPSULE ORAL at 10:01

## 2017-01-23 RX ADMIN — Medication 3 ML: at 10:01

## 2017-01-23 RX ADMIN — ONDANSETRON 4 MG: 2 INJECTION, SOLUTION INTRAMUSCULAR; INTRAVENOUS at 06:01

## 2017-01-23 RX ADMIN — DEXAMETHASONE SODIUM PHOSPHATE 4 MG: 4 INJECTION, SOLUTION INTRAMUSCULAR; INTRAVENOUS at 06:01

## 2017-01-23 RX ADMIN — TAMSULOSIN HYDROCHLORIDE 0.4 MG: 0.4 CAPSULE ORAL at 10:01

## 2017-01-23 RX ADMIN — Medication 25 MG: at 06:01

## 2017-01-23 RX ADMIN — ACETAMINOPHEN 1000 MG: 10 INJECTION, SOLUTION INTRAVENOUS at 10:01

## 2017-01-23 RX ADMIN — SODIUM CHLORIDE: 0.9 INJECTION, SOLUTION INTRAVENOUS at 05:01

## 2017-01-23 RX ADMIN — OXYCODONE HYDROCHLORIDE 10 MG: 5 TABLET ORAL at 10:01

## 2017-01-23 NOTE — H&P
[facility]  Urology  History & Physical    Patient Name: Carline hCang  MRN: [id number]  Admission Date: 2017  Code Status: Prior   Attending Provider: Vicente Young MD  Primary Care Physician: Pablo Medina MD  Principal Problem:<principal problem not specified>    Subjective:     HPI:  Carline Chang is a 51 y.o. female with hx of Chron's disease who was recently admitted with a flare. On CT she was found to have a mid right ureteral stone. She was discharged home with trial of passage. Today her urine culture resulted positive for Enterococcus and she was asked to return to the ED.   She states that her pain has improved. She has been afebrile. Denies nausea, vomiting.   She has been straining her urine and has not noticed that she passed any stone.       Past Medical History   Diagnosis Date    Chronic pain     Crohn's disease        Past Surgical History   Procedure Laterality Date    Bowel resection       x2    Cholecystectomy      Tubal ligation      Rectal abscess drain       section       x2    Colonoscopy N/A 2016     Procedure: COLONOSCOPY;  Surgeon: Andre Uribe MD;  Location: [facility] (4TH FLR);  Service: Endoscopy;  Laterality: N/A;    Colonoscopy N/A 2017     Procedure: COLONOSCOPY;  Surgeon: Dany Stock MD;  Location: [facility] (2ND FLR);  Service: Endoscopy;  Laterality: N/A;       Review of patient's allergies indicates:   Allergen Reactions    Morphine     Nubain [nalbuphine] Anxiety       Family History     None          Social History Main Topics    Smoking status: Never Smoker    Smokeless tobacco: Not on file    Alcohol use No    Drug use: No    Sexual activity: Not on file       Review of Systems   Constitutional: Negative for chills and fever.   HENT: Negative for trouble swallowing.    Respiratory: Negative for cough and shortness of breath.    Cardiovascular: Negative for chest pain and palpitations.    Gastrointestinal: Negative for abdominal pain, nausea and vomiting.   Genitourinary: Positive for difficulty urinating, frequency and urgency. Negative for dysuria.   Neurological: Negative for headaches.       Objective:     Temp:  [97.8 °F (36.6 °C)] 97.8 °F (36.6 °C)  Pulse:  [83] 83  Resp:  [18] 18  SpO2:  [98 %] 98 %  BP: (116)/(78) 116/78     Body mass index is 20.53 kg/(m^2).            Drains     Drain                 Colostomy LLQ -- days                Physical Exam   Constitutional: She is oriented to person, place, and time. She appears well-developed and well-nourished. No distress.   HENT:   Head: Normocephalic and atraumatic.   Eyes: No scleral icterus.   Neck: No JVD present.   Pulmonary/Chest: No respiratory distress.   Abdominal: Soft. She exhibits no distension. There is no tenderness. There is CVA tenderness (right).   Neurological: She is alert and oriented to person, place, and time.   Skin: She is not diaphoretic.     Psychiatric: She has a normal mood and affect. Her behavior is normal.       Significant Labs:    BMP:    Recent Labs  Lab 01/17/17  0311 01/18/17  1727 01/20/17  0937    140 137   K 3.3* 3.5 3.2*    105 105   CO2 33* 25 23   BUN 12 13 10   CREATININE 0.8 0.9 1.1   CALCIUM 8.8 8.5* 7.8*       CBC:    Recent Labs  Lab 01/17/17 0311 01/18/17  1727 01/20/17  0936   WBC 11.81 8.48 12.78*   HGB 12.6 12.9 11.4*   HCT 37.3 37.8 34.5*    235 184       All pertinent labs results from the past 24 hours have been reviewed.    Significant Imaging:  All pertinent imaging results/findings from the past 24 hours have been reviewed.      Assessment and Plan:     Urolithiasis  - NPO  - Place in outpatient  - Cipro on call to OR  - Will take for right cystoscopy and RGP  - Probable DC following procedure        VTE Risk Mitigation         Ordered     Place SPENCER hose  Until discontinued      01/23/17 1331     Place sequential compression device  Until discontinued      01/23/17  6401          Jana Correa MD  Urology  [facility]

## 2017-01-23 NOTE — ED NOTES
Talked to Dr Valerio, charge nurse Britney, nurse in OR and anesthesia resident.  All are aware that patient has no IV access and that the PICC team is delayed.  Awaiting further instructions

## 2017-01-23 NOTE — SUBJECTIVE & OBJECTIVE
Past Medical History   Diagnosis Date    Chronic pain     Crohn's disease        Past Surgical History   Procedure Laterality Date    Bowel resection       x2    Cholecystectomy      Tubal ligation      Rectal abscess drain       section       x2    Colonoscopy N/A 2016     Procedure: COLONOSCOPY;  Surgeon: Andre Uribe MD;  Location: [facility] (4TH FLR);  Service: Endoscopy;  Laterality: N/A;    Colonoscopy N/A 2017     Procedure: COLONOSCOPY;  Surgeon: Dany Stock MD;  Location: [facility] ENDO (2ND FLR);  Service: Endoscopy;  Laterality: N/A;       Review of patient's allergies indicates:   Allergen Reactions    Morphine     Nubain [nalbuphine] Anxiety       Family History     None          Social History Main Topics    Smoking status: Never Smoker    Smokeless tobacco: Not on file    Alcohol use No    Drug use: No    Sexual activity: Not on file       Review of Systems   Constitutional: Negative for chills and fever.   HENT: Negative for trouble swallowing.    Respiratory: Negative for cough and shortness of breath.    Cardiovascular: Negative for chest pain and palpitations.   Gastrointestinal: Negative for abdominal pain, nausea and vomiting.   Genitourinary: Positive for difficulty urinating, frequency and urgency. Negative for dysuria.   Neurological: Negative for headaches.       Objective:     Temp:  [97.8 °F (36.6 °C)] 97.8 °F (36.6 °C)  Pulse:  [83] 83  Resp:  [18] 18  SpO2:  [98 %] 98 %  BP: (116)/(78) 116/78     Body mass index is 20.53 kg/(m^2).            Drains     Drain                 Colostomy LLQ -- days                Physical Exam   Constitutional: She is oriented to person, place, and time. She appears well-developed and well-nourished. No distress.   HENT:   Head: Normocephalic and atraumatic.   Eyes: No scleral icterus.   Neck: No JVD present.   Pulmonary/Chest: No respiratory distress.   Abdominal: Soft. She exhibits no distension. There is no tenderness.  There is CVA tenderness (right).   Neurological: She is alert and oriented to person, place, and time.   Skin: She is not diaphoretic.     Psychiatric: She has a normal mood and affect. Her behavior is normal.       Significant Labs:    BMP:    Recent Labs  Lab 01/17/17 0311 01/18/17 1727 01/20/17  0937    140 137   K 3.3* 3.5 3.2*    105 105   CO2 33* 25 23   BUN 12 13 10   CREATININE 0.8 0.9 1.1   CALCIUM 8.8 8.5* 7.8*       CBC:    Recent Labs  Lab 01/17/17 0311 01/18/17 1727 01/20/17  0936   WBC 11.81 8.48 12.78*   HGB 12.6 12.9 11.4*   HCT 37.3 37.8 34.5*    235 184       All pertinent labs results from the past 24 hours have been reviewed.    Significant Imaging:  All pertinent imaging results/findings from the past 24 hours have been reviewed.

## 2017-01-23 NOTE — ED NOTES
Attempted to call report to post op.  Was told that they are not aware of this patient.  Called and left message for PICC team.

## 2017-01-23 NOTE — ED NOTES
Patient lying on stretcher in no acute distress, respirations  Even and unlabored, AA&Ox3, awaiting PICC team

## 2017-01-23 NOTE — DISCHARGE SUMMARY
[facility]  Discharge Summary  Colorectal Surgery      Admit Date: 1/15/2017    Discharge Date and Time: 1/20/2017  5:07 PM     Attending Physician: Dr. Uribe    Discharge Provider: Rosina Mcdonough    Reason for Admission: Crohn's Disease     Procedures Performed: Procedure(s) (LRB):  ESOPHAGOGASTRODUODENOSCOPY (EGD) (N/A)  COLONOSCOPY (N/A)    Hospital Course (synopsis of major diagnoses, care, treatment, and services provided during the course of the hospital stay): Pt was admitted 1/15 for an acute Crohn's flare. An EGD and colonoscopy were performed while inpatient which were both WNL. The patient was started on a steroid taper per GI recs and was initially scheduled to be discharged 1/18 when she developed acute right flank pain. CT showed a 5mm right mid ureteral stone. Urology was consulted at that time and requested outpatient follow up. By discharge day, pain was controlled with oral pain medication, patient was tolerating a regular diet, VSS, afebrile, and physical activity at baseline.     Consults: GI, Urology      Final Diagnoses:    Principal Problem: Crohn disease  Discharged Condition: good    Disposition: Home or Self Care    Follow Up/Patient Instructions: Follow up with GI and Urology     Medications:  Reconciled Home Medications:   Discharge Medication List as of 1/20/2017  3:45 PM      START taking these medications    Details   oxycodone-acetaminophen (PERCOCET) 5-325 mg per tablet Take 1 tablet by mouth every 4 (four) hours as needed for Pain., Starting 1/20/2017, Until Discontinued, Print         CONTINUE these medications which have CHANGED    Details   ciprofloxacin HCl (CIPRO) 500 MG tablet Take 1 tablet (500 mg total) by mouth every 12 (twelve) hours., Starting 1/18/2017, Until Mon 1/23/17, Print      gabapentin (NEURONTIN) 300 MG capsule Take 1 capsule (300 mg total) by mouth every evening., Starting 1/18/2017, Until Thu 1/18/18, Print       hydrocodone-acetaminophen 10-325mg (NORCO)  mg Tab Take 1 tablet by mouth every 4 (four) hours as needed for Pain., Starting 1/18/2017, Until Discontinued, Print      lorazepam (ATIVAN) 0.5 MG tablet Take 1 tablet (0.5 mg total) by mouth every 6 (six) hours as needed for Anxiety., Starting 1/18/2017, Until Discontinued, Print      predniSONE (DELTASONE) 10 MG tablet Take 4 tablets (40 mg total) by mouth once daily. Take 4 pills daily for one week 40 mgm  Take 3 pills daily for one week  30 mgm  Take 2 pilss daily for one week  20 mgm  Take one pill daily for one week  10 mgm  then stop, Starting 1/18/2017, Until Sat  1/28/17, Print         CONTINUE these medications which have NOT CHANGED    Details   adalimumab 10 mg/0.2 mL SyKt Inject into the skin., Until Discontinued, Historical Med      calcium citrate-vitamin D3 200 mg calcium -250 unit Tab Take 1 tablet by mouth., Until Discontinued, Historical Med      escitalopram oxalate (LEXAPRO) 10 MG tablet Take 10 mg by mouth once daily., Until Discontinued, Historical Med      promethazine (PHENERGAN) 12.5 MG Tab Take 12.5 mg by mouth 4 (four) times daily., Until Discontinued, Historical Med      zolpidem (AMBIEN CR) 12.5 MG CR tablet Take 12.5 mg by mouth nightly as needed for Insomnia., Until Discontinued, Historical Med             Discharge Procedure Orders  Diet general     Diet general     Activity as tolerated     Call MD for:  temperature >100.4     Call MD for:  persistent nausea and vomiting or diarrhea     Call MD for:  severe uncontrolled pain       Follow-up Information     Follow up with Dany Stock MD. Go on 1/24/2017.    Specialty:  Gastroenterology    Why:  GI follow-up  , 1/24/17 - Dr Stock    Contact information:    [address]  [facility] [id number] [phone]          Follow up with Dany Stock MD In 2 weeks.    Specialty:  Gastroenterology    Contact information:    [address]  [facility] [address] [phone]           Follow up with Brianda Cano MD In 2 weeks.    Specialty:  Urology    Why:  right ureteral stone    Contact information:    [address] [place] [id number]  [phone]

## 2017-01-23 NOTE — ED NOTES
Call received from cysto/urology for report on patient to come to them for procedure.  Informed them that patient has no IV access, awaiting PICC team.

## 2017-01-23 NOTE — ED NOTES
"Called PICC, talked to Margarette, she stated that they will not be able to get to it.  "We have 18 on the board"  "

## 2017-01-23 NOTE — ED NOTES
Patient here for evaluation and treatment of positive urine culture.  Pt states that urologist called her and told her to come to ER because, during her recent hospital stay, she produced the positive culture.  She is in no acute distress, respirations even and unlabored, AA&Ox3.  Pt denies pain at this time.

## 2017-01-23 NOTE — ED PROVIDER NOTES
Encounter Date: 2017    SCRIBE #1 NOTE: I, Fadumo Russo, am scribing for, and in the presence of,  Dr. Valerio. I have scribed the entire note.       History     Chief Complaint   Patient presents with    Abnormal Lab     dr chandni daniel + blood cultures, infected urine with stone     Review of patient's allergies indicates:   Allergen Reactions    Morphine     Nubain [nalbuphine] Anxiety     HPI Comments: Time seen by provider: 12:24 PM    This is a 51 y.o. female with history of Crohn's disease and chronic pain who presents with complaint of acute abnormal urine analysis that grew enterococcus bacteria and CT abd/pelvis that showed a ureteral stone on the right. Pt states she is currently on ciprofloxacin. Pt denies any current pain but reports frequency urinating with decreased urine. Pt also notes sediment in her urine. Pt endorses nausea. Pt denies vomiting.     The history is provided by the patient and medical records.     Past Medical History   Diagnosis Date    Chronic pain     Crohn's disease      No past medical history pertinent negatives.  Past Surgical History   Procedure Laterality Date    Bowel resection       x2    Cholecystectomy      Tubal ligation      Rectal abscess drain       section       x2    Colonoscopy N/A 2016     Procedure: COLONOSCOPY;  Surgeon: Andre Uribe MD;  Location: [facility] (4TH FLR);  Service: Endoscopy;  Laterality: N/A;    Colonoscopy N/A 2017     Procedure: COLONOSCOPY;  Surgeon: Dany Stock MD;  Location: [facility] (2ND FLR);  Service: Endoscopy;  Laterality: N/A;     History reviewed. No pertinent family history.  Social History   Substance Use Topics    Smoking status: Never Smoker    Smokeless tobacco: None    Alcohol use No     Review of Systems   Constitutional: Negative for fever.   HENT: Negative for nosebleeds.    Eyes: Negative for visual disturbance.   Respiratory: Negative for shortness of breath.    Cardiovascular:  Negative for chest pain.   Gastrointestinal: Positive for nausea. Negative for abdominal pain and vomiting.   Genitourinary: Positive for decreased urine volume and frequency.   Musculoskeletal: Negative for back pain.   Skin: Negative for rash.   Neurological: Negative for syncope.       Physical Exam   Initial Vitals   BP Pulse Resp Temp SpO2   01/23/17 1143 01/23/17 1143 01/23/17 1143 01/23/17 1143 01/23/17 1143   116/78 83 18 97.8 °F (36.6 °C) 98 %     Physical Exam    Nursing note and vitals reviewed.  Constitutional: She appears well-developed and well-nourished. She is not diaphoretic. No distress.   HENT:   Head: Normocephalic and atraumatic.   Eyes: EOM are normal.   Neck: Normal range of motion. Neck supple.   Cardiovascular: Normal rate, regular rhythm, normal heart sounds and intact distal pulses.   Pulmonary/Chest: Breath sounds normal. No stridor. No respiratory distress. She has no wheezes. She has no rhonchi. She has no rales.   Abdominal: Soft. There is no tenderness. There is no rebound and no guarding.   No CVA tenderness.    Musculoskeletal: Normal range of motion.   Neurological: She is alert and oriented to person, place, and time.   Skin: Skin is warm and dry.   Psychiatric: She has a normal mood and affect. Her behavior is normal.         ED Course   Procedures  Labs Reviewed   CBC W/ AUTO DIFFERENTIAL - Abnormal; Notable for the following:        Result Value    WBC 19.34 (*)     Gran # 17.9 (*)     Gran% 92.3 (*)     Lymph% 5.7 (*)     Mono% 1.4 (*)     All other components within normal limits   COMPREHENSIVE METABOLIC PANEL - Abnormal; Notable for the following:     Potassium 3.2 (*)     CO2 19 (*)     Total Protein 8.8 (*)     Alkaline Phosphatase 150 (*)     eGFR if non  59.1 (*)     All other components within normal limits   URINALYSIS - Abnormal; Notable for the following:     Appearance, UA Hazy (*)     Protein, UA 1+ (*)     Occult Blood UA 2+ (*)     Leukocytes, UA  2+ (*)     All other components within normal limits    Narrative:     1 cup of urine   URINALYSIS MICROSCOPIC - Abnormal; Notable for the following:     RBC, UA 22 (*)     WBC, UA 13 (*)     Hyaline Casts, UA 5 (*)     All other components within normal limits    Narrative:     1 cup of urine             Medical Decision Making:   History:   Old Medical Records: I decided to obtain old medical records.  Old Records Summarized: other records.       <> Summary of Records: Was discharged on the 20th from the hospital after admission for acute Crohn's exacerbation. Was admitted to colo-rectal surgery service. During this admission she had a CT abd/pelvis that showed a ureteral stone on the right with moderate hyper-nephrosis. She was called back as her urine culture grew enterococcus.   Initial Assessment:   51 y.o. female presents for reevaluation after urine culture returned positive in the setting of known ureteral stone. Clinically I suspect her stone has likely passed as she has no flank pain. She denies fever. It appears that she is on appropriate antibiotic therapy however she requires repeat evaluation with labs and repeat UA and culture. I discussed with urology who does not feel she requires repeat CT at this time. Will reassess.   Clinical Tests:   Lab Tests: Reviewed and Ordered  Radiological Study: Reviewed and Ordered  Other:   I have discussed this case with another health care provider.       <> Summary of the Discussion: Urology            Scribe Attestation:   Scribe #1: I performed the above scribed service and the documentation accurately describes the services I performed. I attest to the accuracy of the note.    Attending Attestation:           Physician Attestation for Scribe:  Physician Attestation Statement for Scribe #1: I, Dr. Valerio, reviewed documentation, as scribed by Fadumo Russo in my presence, and it is both accurate and complete.         Attending ED Notes:   2:55 PM  Pt's UA shows  no obvious change but this may represent persistent infection. Her white blood cell count is markedly elevated than that of time of discharge. Given these findings urology will admit and I suspect she will undergo cystoscopy with stent placement.           ED Course     Clinical Impression:   The primary encounter diagnosis was Calculus of ureter. A diagnosis of Hydronephrosis, unspecified hydronephrosis type was also pertinent to this visit.    Disposition:   Disposition: Admitted  Urology       Mike Valerio MD  01/25/17 6049

## 2017-01-23 NOTE — IP AVS SNAPSHOT
[facility]  [address]  [facility] [address]  Phone: [phone]           Patient Discharge Instructions     Our goal is to set you up for success. This packet includes information on your condition, medications, and your home care. It will help you to care for yourself so you don't get sicker and need to go back to the hospital.     Please ask your nurse if you have any questions.        There are many details to remember when preparing to leave the hospital. Here is what you will need to do:    1. Take your medicine. If you are prescribed medications, review your Medication List in the following pages. You may have new medications to  at the pharmacy and others that you'll need to stop taking. Review the instructions for how and when to take your medications. Talk with your doctor or nurses if you are unsure of what to do.     2. Go to your follow-up appointments. Specific follow-up information is listed in the following pages. Your may be contacted by a transition nurse or clinical provider about future appointments. Be sure we have all of the phone numbers to reach you, if needed. Please contact your provider's office if you are unable to make an appointment.     3. Watch for warning signs. Your doctor or nurse will give you detailed warning signs to watch for and when to call for assistance. These instructions may also include educational information about your condition. If you experience any of warning signs to your health, call your doctor.               [facility]  Unless otherwise directed by your provider, please contact [facility], our nurse care line that is available for 24/7 assistance.     [phone] (toll-free)    Registered nurses in the [facility] provide clinical advisement, health education, appointment booking, and other advisory services.                    ** Verify the list of medication(s) below is accurate and up  to date. Carry this with you in case of emergency. If your medications have changed, please notify your healthcare provider.             Medication List      START taking these medications        Additional Info                      tamsulosin 0.4 mg Cp24   Commonly known as:  FLOMAX   Quantity:  30 capsule   Refills:  0   Dose:  0.4 mg    Last time this was given:  0.4 mg on 1/23/2017 10:19 PM   Instructions:  Take 1 capsule (0.4 mg total) by mouth once daily.     Begin Date    AM    Noon    PM    Bedtime         CHANGE how you take these medications        Additional Info                      * oxycodone-acetaminophen 5-325 mg per tablet   Commonly known as:  PERCOCET   Quantity:  20 tablet   Refills:  0   Dose:  1 tablet   What changed:  Another medication with the same name was added. Make sure you understand how and when to take each.    Instructions:  Take 1 tablet by mouth every 4 (four) hours as needed for Pain.     Begin Date    AM    Noon    PM    Bedtime       * oxycodone-acetaminophen 5-325 mg per tablet   Commonly known as:  PERCOCET   Quantity:  15 tablet   Refills:  0   Dose:  1 tablet   What changed:  You were already taking a medication with the same name, and this prescription was added. Make sure you understand how and when to take each.    Instructions:  Take 1 tablet by mouth every 4 (four) hours as needed for Pain.     Begin Date    AM    Noon    PM    Bedtime       * Notice:  This list has 2 medication(s) that are the same as other medications prescribed for you. Read the directions carefully, and ask your doctor or other care provider to review them with you.      CONTINUE taking these medications        Additional Info                      adalimumab 10 mg/0.2 mL Sykt   Refills:  0    Instructions:  Inject into the skin.     Begin Date    AM    Noon    PM    Bedtime       calcium citrate-vitamin D3 200 mg calcium -250 unit Tab   Refills:  0   Dose:  1 tablet    Instructions:  Take 1 tablet  by mouth.     Begin Date    AM    Noon    PM    Bedtime       ciprofloxacin HCl 500 MG tablet   Commonly known as:  CIPRO   Quantity:  6 tablet   Refills:  0   Dose:  500 mg    Instructions:  Take 1 tablet (500 mg total) by mouth every 12 (twelve) hours.     Begin Date    AM    Noon    PM    Bedtime       escitalopram oxalate 10 MG tablet   Commonly known as:  LEXAPRO   Refills:  0   Dose:  10 mg    Last time this was given:  10 mg on 1/24/2017  8:58 AM   Instructions:  Take 10 mg by mouth once daily.     Begin Date    AM    Noon    PM    Bedtime       gabapentin 300 MG capsule   Commonly known as:  NEURONTIN   Quantity:  30 capsule   Refills:  0   Dose:  300 mg    Last time this was given:  300 mg on 1/23/2017 10:20 PM   Instructions:  Take 1 capsule (300 mg total) by mouth every evening.     Begin Date    AM    Noon    PM    Bedtime       hydrocodone-acetaminophen 10-325mg  mg Tab   Commonly known as:  NORCO   Quantity:  91 tablet   Refills:  0   Dose:  1 tablet    Instructions:  Take 1 tablet by mouth every 4 (four) hours as needed for Pain.     Begin Date    AM    Noon    PM    Bedtime       HYDROmorphone 2 MG tablet   Commonly known as:  DILAUDID   Quantity:  30 tablet   Refills:  0   Dose:  2 mg    Instructions:  Take 1 tablet (2 mg total) by mouth every 4 (four) hours as needed for Pain.     Begin Date    AM    Noon    PM    Bedtime       lorazepam 0.5 MG tablet   Commonly known as:  ATIVAN   Quantity:  60 tablet   Refills:  0   Dose:  0.5 mg    Last time this was given:  0.5 mg on 1/24/2017  9:04 AM   Instructions:  Take 1 tablet (0.5 mg total) by mouth every 6 (six) hours as needed for Anxiety.     Begin Date    AM    Noon    PM    Bedtime       predniSONE 10 MG tablet   Commonly known as:  DELTASONE   Quantity:  60 tablet   Refills:  0   Dose:  40 mg    Last time this was given:  40 mg on 1/24/2017  8:58 AM   Instructions:  Take 4 tablets (40 mg total) by mouth once daily. Take 4 pills daily for one  week 40 mgm Take 3 pills daily for one week  30 mgm Take 2 pilss daily for one week  20 mgm Take one pill daily for one week  10 mgm  then stop     Begin Date    AM    Noon    PM    Bedtime       promethazine 12.5 MG Tab   Commonly known as:  PHENERGAN   Refills:  0   Dose:  12.5 mg    Last time this was given:  12.5 mg on 1/24/2017  5:32 AM   Instructions:  Take 12.5 mg by mouth 4 (four) times daily.     Begin Date    AM    Noon    PM    Bedtime       zolpidem 12.5 MG CR tablet   Commonly known as:  AMBIEN CR   Refills:  0   Dose:  12.5 mg    Instructions:  Take 12.5 mg by mouth nightly as needed for Insomnia.     Begin Date    AM    Noon    PM    Bedtime            Where to Get Your Medications      You can get these medications from any pharmacy     Bring a paper prescription for each of these medications     ciprofloxacin HCl 500 MG tablet    oxycodone-acetaminophen 5-325 mg per tablet    tamsulosin 0.4 mg Cp24                  Please bring to all follow up appointments:    1. A copy of your discharge instructions.  2. All medicines you are currently taking in their original bottles.  3. Identification and insurance card.    Please arrive 15 minutes ahead of scheduled appointment time.    Please call 24 hours in advance if you must reschedule your appointment and/or time.        Your Scheduled Appointments     Feb 14, 2017 10:00 AM CST   Diagnostic Xray with [facility] [id number] LB LIMIT   [facility] ([facility] )    [address] [facility] [address]   [phone]            Feb 14, 2017 10:30 AM CST   New Patient with MD Girish Irwin jonnathan - Urology 4th Floor ([facility] )    [address]  [place] [address]   [phone]              Follow-up Information     Follow up with Brianda Cano MD On 2/14/2017.    Specialty:  Urology    Why:  stone management, stent in place    Contact information:    [address] [facility]  "[id number]  [phone]          Discharge Instructions     Future Orders    Activity as tolerated     Call MD for:  difficulty breathing or increased cough     Call MD for:  increased confusion or weakness     Call MD for:  persistent dizziness, light-headedness, or visual disturbances     Call MD for:  persistent nausea and vomiting or diarrhea     Call MD for:  redness, tenderness, or signs of infection (pain, swelling, redness, odor or green/yellow discharge around incision site)     Call MD for:  severe persistent headache     Call MD for:  severe uncontrolled pain     Call MD for:  temperature >100.4     Call MD for:  worsening rash     Diet general     Questions:    Total calories:      Fat restriction, if any:      Protein restriction, if any:      Na restriction, if any:      Fluid restriction:      Additional restrictions:      No dressing needed         Primary Diagnosis     Your primary diagnosis was:  Urinary Tract Stone      Admission Information     Date & Time Provider Department CSN    1/23/2017 12:07 PM Vicente Young MD [facility] [id number]      Care Providers     Provider Role Specialty Primary office phone    Vicente Young MD Attending Provider Urology [phone]    Vicente Yonug MD Surgeon  Urology [phone]      Your Vitals Were     BP Pulse Temp Resp Height Weight    107/56 (BP Location: Right arm, Patient Position: Lying, BP Method: Automatic) 58 97.5 °F (36.4 °C) (Oral) 16 5' 8" (1.727 m) 61.2 kg (135 lb)    SpO2 BMI             97% 20.53 kg/m2         Recent Lab Values     No lab values to display.      Pending Labs     Order Current Status    Specimen to Pathology - Surgery Collected (01/23/17 0767)    Urine culture In process      Allergies as of 1/24/2017        Reactions    Morphine     Nubain [Nalbuphine] Anxiety      Advance Directives     An advance directive is a document which, in the event you are no longer able to make decisions for yourself, tells " your healthcare team what kind of treatment you do or do not want to receive, or who you would like to make those decisions for you.  If you do not currently have an advance directive, [facility] encourages you to create one.  For more information call:  [phone]), [phone] ([phone]),  or log on to [email or web address].        Language Assistance Services     ATTENTION: Language assistance services are available, free of charge. Please call [phone].      ATENCIÓN: Si habla español, tiene a [place] disposición servicios gratuitos de asistencia lingüística. Llame al [phone].     CHÚ Ý: N?u b?n nói Ti?ng Vi?t, có các d?ch v? h? tr? ngôn ng? mi?n phí dành cho b?n. G?i s? [phone].         [facility] complies with applicable Federal civil rights laws and does not discriminate on the basis of race, color, national origin, age, disability, or sex.

## 2017-01-23 NOTE — TELEPHONE ENCOUNTER
----- Message from Jana Correa MD sent at 1/19/2017  6:27 PM CST -----  Charleen Hewitt,  Please have this patient follow up with Dr. Cano in the coming weeks.   She is currently admitted to the hospital with a Chron's flare and was found to have a 5 mm mid right ureteral stone. She is going home with a trial of passage.     Jana       Follow up with karl

## 2017-01-23 NOTE — ANESTHESIA PREPROCEDURE EVALUATION
01/23/2017  Pre-operative evaluation for Procedure(s) (LRB):  CYSTOSCOPY WITH STENT PLACEMENT (Right)    Carline Chang is a 51 y.o. female with PMHx of Chron's disease who was recently admitted with a flare (s/p colostomy). On CT she was found to have a mid right ureteral stone. She was discharged home with trial of passage. Today her urine culture resulted positive for Enterococcus and she was asked to return to the ED.   She states that her pain has improved. She has been afebrile. Denies nausea, vomiting.   She is scheduled for above procedure.    LDA:        Colostomy LLQ   Colostomy Properties No Placement Date or Time found.   Present Prior to Hospital Arrival?: Yes  Location: LLQ         Rash 01/15/17 2047 Right lower abdomen   Rash - Properties Group Date of First Assessment/Time of First Assessment: 01/15/17 2047   Present Prior to Hospital Arrival?: Yes  Side: Right  Orientation: lower  Location: abdomen  Additional Comments: Dry skin from stomal appliance          Prev airway: Placement Date: 06/18/15; Placement Time: 1242; Method of Intubation: Direct laryngoscopy; Inserted by: Other (SRNA); Airway Device: Endotracheal Tube; Mask Ventilation: Easy; Intubated: Postinduction; Blade: Little #2; Airway Device Size: 7.0; Style: Cuffed; Cuff Inflation: Minimal occlusive pressure; Inflation Amount: 7; Placement Verified By: Auscultation, Capnometry; Grade: Grade I; Complicating Factors: None; Intubation Findings: Positive EtCO2, Bilateral breath sounds, Atraumatic/Condition of teeth unchanged;  Depth of Insertion: 20; Securment: Lips; Complications: None; Breath Sounds: Equal Bilateral; Insertion Attempts: 1; Removal Date: 06/18/15;  Removal Time: 1502    Patient Active Problem List   Diagnosis    Crohn's disease    Colostomy status    Special screening for malignant neoplasms, colon    Crohn  disease    Urolithiasis    Calculus of ureter       Review of patient's allergies indicates:   Allergen Reactions    Morphine     Nubain [nalbuphine] Anxiety        No current facility-administered medications on file prior to encounter.      Current Outpatient Prescriptions on File Prior to Encounter   Medication Sig Dispense Refill    ciprofloxacin HCl (CIPRO) 500 MG tablet Take 1 tablet (500 mg total) by mouth every 12 (twelve) hours. 10 tablet 0    escitalopram oxalate (LEXAPRO) 10 MG tablet Take 10 mg by mouth once daily.      gabapentin (NEURONTIN) 300 MG capsule Take 1 capsule (300 mg total) by mouth every evening. 30 capsule 0    hydrocodone-acetaminophen 10-325mg (NORCO)  mg Tab Take 1 tablet by mouth every 4 (four) hours as needed for Pain. 91 tablet 0    HYDROmorphone (DILAUDID) 2 MG tablet Take 1 tablet (2 mg total) by mouth every 4 (four) hours as needed for Pain. 30 tablet 0    lorazepam (ATIVAN) 0.5 MG tablet Take 1 tablet (0.5 mg total) by mouth every 6 (six) hours as needed for Anxiety. 60 tablet 0    predniSONE (DELTASONE) 10 MG tablet Take 4 tablets (40 mg total) by mouth once daily. Take 4 pills daily for one week 40 mgm  Take 3 pills daily for one week  30 mgm  Take 2 pilss daily for one week  20 mgm  Take one pill daily for one week  10 mgm  then stop 60 tablet 0    promethazine (PHENERGAN) 12.5 MG Tab Take 12.5 mg by mouth 4 (four) times daily.      zolpidem (AMBIEN CR) 12.5 MG CR tablet Take 12.5 mg by mouth nightly as needed for Insomnia.      adalimumab 10 mg/0.2 mL SyKt Inject into the skin.      calcium citrate-vitamin D3 200 mg calcium -250 unit Tab Take 1 tablet by mouth.      oxycodone-acetaminophen (PERCOCET) 5-325 mg per tablet Take 1 tablet by mouth every 4 (four) hours as needed for Pain. 20 tablet 0       Past Surgical History   Procedure Laterality Date    Bowel resection       x2    Cholecystectomy      Tubal ligation      Rectal abscess drain        section       x2    Colonoscopy N/A 2016     Procedure: COLONOSCOPY;  Surgeon: Andre Uribe MD;  Location: [facility] ENDO (4TH FLR);  Service: Endoscopy;  Laterality: N/A;    Colonoscopy N/A 2017     Procedure: COLONOSCOPY;  Surgeon: Dany Stock MD;  Location: [facility] ENDO (2ND FLR);  Service: Endoscopy;  Laterality: N/A;       Social History     Social History    Marital status:      Spouse name: N/A    Number of children: N/A    Years of education: N/A     Occupational History    Not on file.     Social History Main Topics    Smoking status: Never Smoker    Smokeless tobacco: Not on file    Alcohol use No    Drug use: No    Sexual activity: Not on file     Other Topics Concern    Not on file     Social History Narrative         Vital Signs Range (Last 24H):  Temp:  [36.6 °C (97.8 °F)]   Pulse:  [83]   Resp:  [18]   BP: (116)/(78)   SpO2:  [98 %]       CBC:   Recent Labs      17   1313   WBC  19.34*   RBC  4.85   HGB  14.1   HCT  40.1   PLT  237   MCV  83   MCH  29.1   MCHC  35.2       CMP: No results for input(s): NA, K, CL, CO2, BUN, CREATININE, GLU, MG, PHOS, CALCIUM, ALBUMIN, PROT, ALKPHOS, ALT, AST, BILITOT in the last 72 hours.    INR  No results for input(s): INR, PROTIME, APTT in the last 72 hours.    Invalid input(s): PT        Diagnostic Studies:  CT Renal Stone (17)  Interval development of RIGHT sided hydroureteronephrosis, moderate in degree, secondary to approximately 3 mm calcification/calculus at the level of the distal RIGHT ureter.        [facility] Anesthesia Evaluation    I have reviewed the Patient Summary Reports.     I have reviewed the Medications.     Review of Systems  Anesthesia Hx:  No problems with previous Anesthesia Denies Hx of Anesthetic complications  History of prior surgery of interest to airway management or planning: Denies Family Hx of Anesthesia complications.   Denies Personal Hx of Anesthesia complications.   Social:  Non-Smoker, No  Alcohol Use    Hematology/Oncology:  Hematology Normal   Oncology Normal     EENT/Dental:EENT/Dental Normal   Cardiovascular:  Cardiovascular Normal  Denies Hypertension.  Denies MI.  Denies CAD.       Pulmonary:  Pulmonary Normal    Renal/:   renal calculi Urolithiasis   Hepatic/GI:   H/o Crohn's dx   Neurological:  Neurology Normal    Endocrine:  Endocrine Normal    Psych:  Psychiatric Normal           Physical Exam  General:  Well nourished    Airway/Jaw/Neck:  Airway Findings: Mouth Opening: Normal Tongue: Normal  General Airway Assessment: Adult  Mallampati: II  Improves to I with phonation.  TM Distance: Normal, at least 6 cm     Eyes/Ears/Nose:  EYES/EARS/NOSE FINDINGS: Normal   Dental:  Dental Findings: In tact   Chest/Lungs:  Chest/Lungs Findings: Clear to auscultation, Normal Respiratory Rate     Heart/Vascular:  Heart Findings: Rate: Normal  Rhythm: Regular Rhythm        Mental Status:  Mental Status Findings:  Cooperative, Alert and Oriented         Anesthesia Plan  Type of Anesthesia, risks & benefits discussed:  Anesthesia Type:  general, MAC  Patient's Preference:   Intra-op Monitoring Plan:   Intra-op Monitoring Plan Comments:   Post Op Pain Control Plan:   Post Op Pain Control Plan Comments:   Induction:   IV  Beta Blocker:  Patient is not currently on a Beta-Blocker (No further documentation required).       Informed Consent: Patient understands risks and agrees with Anesthesia plan.  Questions answered. Anesthesia consent signed with patient.  ASA Score: 3     Day of Surgery Review of History & Physical: I have interviewed and examined the patient. I have reviewed the patient's H&P dated:    H&P update referred to the surgeon.         Ready For Surgery From Anesthesia Perspective.

## 2017-01-23 NOTE — ED NOTES
Unable to gain peripheral IV access after 3 attempts.  Pt states that she usually gets EJ access or PICC.  Dr Valerio notified

## 2017-01-23 NOTE — TELEPHONE ENCOUNTER
Called patient in regard to positive urine culture.  States that her pain has improved since Saturday and she has not had fever or chills.   In light of ureteral stone and immunosuppression advised her to come to the ED today to be evaluated for stent placement.   She expressed understanding.      Jana Correa MD  Urology, PGY-2  Pager# [phone]

## 2017-01-23 NOTE — ASSESSMENT & PLAN NOTE
- NPO  - Place in outpatient  - Cipro on call to OR  - Will take for right cystoscopy and RGP  - Probable DC following procedure

## 2017-01-24 VITALS
WEIGHT: 135 LBS | BODY MASS INDEX: 20.46 KG/M2 | TEMPERATURE: 98 F | HEIGHT: 68 IN | HEART RATE: 59 BPM | RESPIRATION RATE: 16 BRPM | SYSTOLIC BLOOD PRESSURE: 107 MMHG | DIASTOLIC BLOOD PRESSURE: 56 MMHG | OXYGEN SATURATION: 97 %

## 2017-01-24 LAB
ANION GAP SERPL CALC-SCNC: 9 MMOL/L
BASOPHILS # BLD AUTO: 0.01 K/UL
BASOPHILS NFR BLD: 0.1 %
BUN SERPL-MCNC: 12 MG/DL
CALCIUM SERPL-MCNC: 8 MG/DL
CHLORIDE SERPL-SCNC: 107 MMOL/L
CO2 SERPL-SCNC: 24 MMOL/L
CREAT SERPL-MCNC: 0.9 MG/DL
DIFFERENTIAL METHOD: ABNORMAL
EOSINOPHIL # BLD AUTO: 0 K/UL
EOSINOPHIL NFR BLD: 0.2 %
ERYTHROCYTE [DISTWIDTH] IN BLOOD BY AUTOMATED COUNT: 13.7 %
EST. GFR  (AFRICAN AMERICAN): >60 ML/MIN/1.73 M^2
EST. GFR  (NON AFRICAN AMERICAN): >60 ML/MIN/1.73 M^2
GLUCOSE SERPL-MCNC: 92 MG/DL
HCT VFR BLD AUTO: 34.2 %
HGB BLD-MCNC: 11.6 G/DL
LYMPHOCYTES # BLD AUTO: 2 K/UL
LYMPHOCYTES NFR BLD: 15.7 %
MCH RBC QN AUTO: 28.9 PG
MCHC RBC AUTO-ENTMCNC: 33.9 %
MCV RBC AUTO: 85 FL
MONOCYTES # BLD AUTO: 0.7 K/UL
MONOCYTES NFR BLD: 5.4 %
NEUTROPHILS # BLD AUTO: 10 K/UL
NEUTROPHILS NFR BLD: 78.1 %
PLATELET # BLD AUTO: 221 K/UL
PMV BLD AUTO: 9.4 FL
POTASSIUM SERPL-SCNC: 2.8 MMOL/L
RBC # BLD AUTO: 4.01 M/UL
SODIUM SERPL-SCNC: 140 MMOL/L
TPMT BLD-CCNC: 19.9 U/ML (ref 24–44)
WBC # BLD AUTO: 12.84 K/UL

## 2017-01-24 PROCEDURE — 80048 BASIC METABOLIC PNL TOTAL CA: CPT

## 2017-01-24 PROCEDURE — 36415 COLL VENOUS BLD VENIPUNCTURE: CPT

## 2017-01-24 PROCEDURE — 25000003 PHARM REV CODE 250: Performed by: STUDENT IN AN ORGANIZED HEALTH CARE EDUCATION/TRAINING PROGRAM

## 2017-01-24 PROCEDURE — 99024 POSTOP FOLLOW-UP VISIT: CPT | Mod: GC,,, | Performed by: UROLOGY

## 2017-01-24 PROCEDURE — 25000003 PHARM REV CODE 250: Performed by: UROLOGY

## 2017-01-24 PROCEDURE — 85025 COMPLETE CBC W/AUTO DIFF WBC: CPT

## 2017-01-24 PROCEDURE — 63600175 PHARM REV CODE 636 W HCPCS: Performed by: UROLOGY

## 2017-01-24 PROCEDURE — 63600175 PHARM REV CODE 636 W HCPCS: Performed by: STUDENT IN AN ORGANIZED HEALTH CARE EDUCATION/TRAINING PROGRAM

## 2017-01-24 RX ORDER — CIPROFLOXACIN 500 MG/1
500 TABLET ORAL EVERY 12 HOURS
Status: DISCONTINUED | OUTPATIENT
Start: 2017-01-24 | End: 2017-01-24 | Stop reason: HOSPADM

## 2017-01-24 RX ORDER — OXYCODONE AND ACETAMINOPHEN 5; 325 MG/1; MG/1
1 TABLET ORAL EVERY 4 HOURS PRN
Qty: 15 TABLET | Refills: 0 | Status: SHIPPED | OUTPATIENT
Start: 2017-01-24 | End: 2017-11-14

## 2017-01-24 RX ORDER — POTASSIUM CHLORIDE 750 MG/1
80 CAPSULE, EXTENDED RELEASE ORAL ONCE
Status: COMPLETED | OUTPATIENT
Start: 2017-01-24 | End: 2017-01-24

## 2017-01-24 RX ORDER — TAMSULOSIN HYDROCHLORIDE 0.4 MG/1
0.4 CAPSULE ORAL DAILY
Qty: 30 CAPSULE | Refills: 0 | Status: SHIPPED | OUTPATIENT
Start: 2017-01-24 | End: 2017-03-07

## 2017-01-24 RX ORDER — CIPROFLOXACIN 500 MG/1
500 TABLET ORAL EVERY 12 HOURS
Qty: 6 TABLET | Refills: 0 | Status: SHIPPED | OUTPATIENT
Start: 2017-01-24 | End: 2017-01-31

## 2017-01-24 RX ORDER — LANOLIN ALCOHOL/MO/W.PET/CERES
400 CREAM (GRAM) TOPICAL ONCE
Status: COMPLETED | OUTPATIENT
Start: 2017-01-24 | End: 2017-01-24

## 2017-01-24 RX ADMIN — ACETAMINOPHEN 1000 MG: 10 INJECTION, SOLUTION INTRAVENOUS at 05:01

## 2017-01-24 RX ADMIN — MAGNESIUM OXIDE TAB 400 MG (241.3 MG ELEMENTAL MG) 400 MG: 400 (241.3 MG) TAB at 10:01

## 2017-01-24 RX ADMIN — PROMETHAZINE HYDROCHLORIDE 12.5 MG: 12.5 TABLET ORAL at 05:01

## 2017-01-24 RX ADMIN — OXYBUTYNIN CHLORIDE 5 MG: 5 SOLUTION ORAL at 05:01

## 2017-01-24 RX ADMIN — Medication 3 ML: at 05:01

## 2017-01-24 RX ADMIN — HYDROMORPHONE HYDROCHLORIDE 1 MG: 1 INJECTION, SOLUTION INTRAMUSCULAR; INTRAVENOUS; SUBCUTANEOUS at 12:01

## 2017-01-24 RX ADMIN — PROMETHAZINE HYDROCHLORIDE 12.5 MG: 12.5 TABLET ORAL at 12:01

## 2017-01-24 RX ADMIN — OXYCODONE HYDROCHLORIDE 10 MG: 5 TABLET ORAL at 07:01

## 2017-01-24 RX ADMIN — LORAZEPAM 0.5 MG: 0.5 TABLET ORAL at 12:01

## 2017-01-24 RX ADMIN — ESCITALOPRAM OXALATE 10 MG: 10 TABLET ORAL at 08:01

## 2017-01-24 RX ADMIN — CIPROFLOXACIN 400 MG: 2 INJECTION, SOLUTION INTRAVENOUS at 05:01

## 2017-01-24 RX ADMIN — POTASSIUM CHLORIDE 80 MEQ: 750 CAPSULE, EXTENDED RELEASE ORAL at 09:01

## 2017-01-24 RX ADMIN — PREDNISONE 40 MG: 20 TABLET ORAL at 08:01

## 2017-01-24 RX ADMIN — LORAZEPAM 0.5 MG: 0.5 TABLET ORAL at 09:01

## 2017-01-24 RX ADMIN — ZOLPIDEM TARTRATE 10 MG: 10 TABLET, FILM COATED ORAL at 12:01

## 2017-01-24 NOTE — PROGRESS NOTES
Received patient back to [facility] because patient refused room once she arrived onto floor because room was not private.  Will place call to bed board to find out what the plan is.

## 2017-01-24 NOTE — PROGRESS NOTES
[facility]  Urology  Progress Note    Patient Name: Carline Chang  MRN: [id number]  Admission Date: 1/23/2017  Hospital Length of Stay: 0 days  Code Status: Full Code   Attending Provider: Vicente Young MD   Primary Care Physician: Pablo Medina MD    Subjective:     HPI:  Carline Chang is a 51 y.o. female with hx of Chron's disease who was recently admitted with a flare. On CT she was found to have a mid right ureteral stone. She was discharged home with trial of passage. Today her urine culture resulted positive for Enterococcus and she was asked to return to the ED.   She states that her pain has improved. She has been afebrile. Denies nausea, vomiting.       Interval History:     - SPENCER  - AF  - pain controlled  - tolerating diet  - ambulating    Review of Systems  Objective:     Temp:  [97.1 °F (36.2 °C)-98.6 °F (37 °C)] 97.1 °F (36.2 °C)  Pulse:  [55-83] 56  Resp:  [16-18] 16  SpO2:  [98 %-100 %] 99 %  BP: (109-121)/(59-78) 109/59     Body mass index is 20.53 kg/(m^2).            Drains     Drain                 Colostomy LLQ -- days         Ureteral Drain/Stent 01/23/17 1801 Right ureter 6.24 Fr. less than 1 day         Urethral Catheter 01/23/17 1830 less than 1 day                Physical Exam   Constitutional: She is oriented to person, place, and time. She appears well-developed and well-nourished. No distress.   HENT:   Head: Normocephalic and atraumatic.   Eyes: No scleral icterus.   Neck: No JVD present.   Pulmonary/Chest: No respiratory distress.   Abdominal: Soft. She exhibits no distension. There is no tenderness. There is no CVA tenderness.   Genitourinary:   Genitourinary Comments: Odonnell draining clear yellow   Neurological: She is alert and oriented to person, place, and time.   Skin: She is not diaphoretic.     Psychiatric: She has a normal mood and affect. Her behavior is normal.       Significant Labs:    BMP:    Recent Labs  Lab 01/18/17  2899  01/20/17  0937 01/23/17  1313    137 139   K 3.5 3.2* 3.2*    105 105   CO2 25 23 19*   BUN 13 10 11   CREATININE 0.9 1.1 1.1   CALCIUM 8.5* 7.8* 9.5       CBC:     Recent Labs  Lab 01/20/17  0936 01/23/17  1313 01/24/17  0529   WBC 12.78* 19.34* 12.84*   HGB 11.4* 14.1 11.6*   HCT 34.5* 40.1 34.2*    237 221       Urine Culture:   Recent Labs  Lab 01/20/17  0356   LABURIN ENTEROCOCCUS CHDCRNIP02,000 - 49,999 cfu/ml     Urine Studies:   Recent Labs  Lab 01/19/17  1127 01/23/17  1333   COLORU Yellow Yellow   APPEARANCEUA Clear Hazy*   PHUR 7.0 6.0   SPECGRAV 1.020 1.015   PROTEINUA Trace* 1+*   GLUCUA Negative Negative   KETONESU Negative Negative   BILIRUBINUA Negative Negative   OCCULTUA 2+* 2+*   NITRITE Negative Negative   UROBILINOGEN Negative Negative   LEUKOCYTESUR Negative 2+*   RBCUA 41* 22*   WBCUA 6* 13*   BACTERIA  --  Occasional   SQUAMEPITHEL 1 4   HYALINECASTS  --  5*       Significant Imaging:  All pertinent imaging results/findings from the past 24 hours have been reviewed.                  Assessment/Plan:     Urolithiasis  POD 1 cysto R stent     - regular diet   - ambulate  - dc hudson  - voiding trial   - pain control  - TEDs/SCDs      Dispo: home today on PO cipro        VTE Risk Mitigation         Ordered     Medium Risk of VTE  Once      01/23/17 2130     Place sequential compression device  Until discontinued      01/23/17 2130     Place SPENCER hose  Until discontinued      01/23/17 2130     Place SPENCER hose  Until discontinued      01/23/17 1331     Place sequential compression device  Until discontinued      01/23/17 1331          Esteban Marie MD  Urology  [facility]

## 2017-01-24 NOTE — NURSING TRANSFER
Nursing Transfer Note      1/23/2017     Transfer To: [facility] from [facility]    Transfer via stretcher    Transported by patient escort    Chart send with patient: Yes    Notified: mother at bedside; report called to Anna

## 2017-01-24 NOTE — ANESTHESIA POSTPROCEDURE EVALUATION
"Anesthesia Post Evaluation    Patient: Carline Chang    Procedure(s) Performed: Procedure(s) (LRB):  CYSTOSCOPY WITH STENT PLACEMENT (Right)  BIOPSY-BLADDER (N/A)  FULGURATION-BLADDER (N/A)    Final Anesthesia Type: general  Patient location during evaluation: PACU  Patient participation: Yes- Able to Participate  Level of consciousness: awake and alert  Post-procedure vital signs: reviewed and stable  Pain management: adequate  Airway patency: patent  PONV status at discharge: No PONV  Anesthetic complications: no      Cardiovascular status: blood pressure returned to baseline  Respiratory status: unassisted  Hydration status: euvolemic  Follow-up not needed.        Visit Vitals    /63 (BP Location: Right arm, Patient Position: Lying, BP Method: Automatic)    Pulse 62    Temp 36.6 °C (97.8 °F) (Oral)    Resp 16    Ht 5' 8" (1.727 m)    Wt 61.2 kg (135 lb)    LMP     SpO2 100%    Breastfeeding No    BMI 20.53 kg/m2       Pain/Estiven Score: Pain Assessment Performed: Yes (1/23/2017  6:30 PM)  Presence of Pain: complains of pain/discomfort ("tolerable") (1/23/2017  6:30 PM)  Estiven Score: 10 (1/23/2017  6:30 PM)      "

## 2017-01-24 NOTE — PLAN OF CARE
Problem: Infection, Risk/Actual (Adult)  Goal: Identify Related Risk Factors and Signs and Symptoms  Related risk factors and signs and symptoms are identified upon initiation of Human Response Clinical Practice Guideline (CPG)   Outcome: Ongoing (interventions implemented as appropriate)  Patient verbalized understanding of POC.  Pt. VSS, pain has been controlled with current pain regimen.  Pt. Is anxious about what to expect from this point on.  Will continue to monitor

## 2017-01-24 NOTE — ASSESSMENT & PLAN NOTE
- regular diet  - hudson removed, voiding trial  - PO cipro  - DC home today after voiding trial  - Follow up with Dr. Cano for stone management

## 2017-01-24 NOTE — PROGRESS NOTES
[facility]  Urology  Progress Note    Patient Name: Carline Chang  MRN: [id number]  Admission Date: 1/23/2017  Hospital Length of Stay: 0 days  Code Status: Full Code   Attending Provider: Vicente Young MD   Primary Care Physician: Pablo Medina MD    Subjective:     HPI:  Carline Chang is a 51 y.o. female with hx of Chron's disease who was recently admitted with a flare. On CT she was found to have a mid right ureteral stone. She was discharged home with trial of passage. Today her urine culture resulted positive for Enterococcus and she was asked to return to the ED.   She states that her pain has improved. She has been afebrile. Denies nausea, vomiting.       Interval History:   No acute events, afebrile  Pain controlled  No N/V    Review of Systems  Objective:     Temp:  [97.1 °F (36.2 °C)-98.6 °F (37 °C)] 97.1 °F (36.2 °C)  Pulse:  [55-83] 56  Resp:  [16-18] 16  SpO2:  [98 %-100 %] 99 %  BP: (109-121)/(59-78) 109/59     Body mass index is 20.53 kg/(m^2).            Drains     Drain                 Colostomy LLQ -- days         Ureteral Drain/Stent 01/23/17 1801 Right ureter 6.24 Fr. less than 1 day         Urethral Catheter 01/23/17 1830 less than 1 day                Physical Exam   Constitutional: She is oriented to person, place, and time. She appears well-developed and well-nourished. No distress.   HENT:   Head: Normocephalic and atraumatic.   Eyes: No scleral icterus.   Neck: No JVD present.   Pulmonary/Chest: No respiratory distress.   Abdominal: Soft. She exhibits no distension. There is no tenderness. There is no CVA tenderness.   Genitourinary:   Genitourinary Comments: Odonnell draining clear yellow   Neurological: She is alert and oriented to person, place, and time.   Skin: She is not diaphoretic.     Psychiatric: She has a normal mood and affect. Her behavior is normal.       Significant Labs:    BMP:    Recent Labs  Lab 01/18/17  1727 01/20/17  0937  01/23/17  1313    137 139   K 3.5 3.2* 3.2*    105 105   CO2 25 23 19*   BUN 13 10 11   CREATININE 0.9 1.1 1.1   CALCIUM 8.5* 7.8* 9.5       CBC:     Recent Labs  Lab 01/20/17  0936 01/23/17  1313 01/24/17  0529   WBC 12.78* 19.34* 12.84*   HGB 11.4* 14.1 11.6*   HCT 34.5* 40.1 34.2*    237 221       All pertinent labs results from the past 24 hours have been reviewed.    Significant Imaging:  All pertinent imaging results/findings from the past 24 hours have been reviewed.      Assessment/Plan:     * Calculus of ureter  - regular diet  - hudson removed, voiding trial  - PO cipro  - DC home today after voiding trial  - Follow up with Dr. Cano for stone management       VTE Risk Mitigation         Ordered     Medium Risk of VTE  Once      01/23/17 2130     Place sequential compression device  Until discontinued      01/23/17 2130     Place SPENCER hose  Until discontinued      01/23/17 2130     Place SPENCER hose  Until discontinued      01/23/17 1331     Place sequential compression device  Until discontinued      01/23/17 1331          Jana Correa MD  Urology  [facility]

## 2017-01-24 NOTE — ANESTHESIA RELEASE NOTE
"Anesthesia Release from PACU Note    Patient: Carline Chang    Procedure(s) Performed: Procedure(s) (LRB):  CYSTOSCOPY WITH STENT PLACEMENT (Right)  BIOPSY-BLADDER (N/A)  FULGURATION-BLADDER (N/A)    Anesthesia type: general    Post pain: Adequate analgesia    Post assessment: no apparent anesthetic complications    Last Vitals:   Visit Vitals    /63 (BP Location: Right arm, Patient Position: Lying, BP Method: Automatic)    Pulse 62    Temp 36.6 °C (97.8 °F) (Oral)    Resp 16    Ht 5' 8" (1.727 m)    Wt 61.2 kg (135 lb)    LMP     SpO2 100%    Breastfeeding No    BMI 20.53 kg/m2       Post vital signs: stable    Level of consciousness: awake, alert  and oriented    Nausea/Vomiting: no nausea/no vomiting    Complications: none    Airway Patency: patent    Respiratory: unassisted    Cardiovascular: stable and blood pressure at baseline    Hydration: euvolemic  "

## 2017-01-24 NOTE — SUBJECTIVE & OBJECTIVE
Interval History:     - SPENCER  - AF  - pain controlled  - tolerating diet  - ambulating    Review of Systems  Objective:     Temp:  [97.1 °F (36.2 °C)-98.6 °F (37 °C)] 97.1 °F (36.2 °C)  Pulse:  [55-83] 56  Resp:  [16-18] 16  SpO2:  [98 %-100 %] 99 %  BP: (109-121)/(59-78) 109/59     Body mass index is 20.53 kg/(m^2).            Drains     Drain                 Colostomy LLQ -- days         Ureteral Drain/Stent 01/23/17 1801 Right ureter 6.24 Fr. less than 1 day         Urethral Catheter 01/23/17 1830 less than 1 day                Physical Exam   Constitutional: She is oriented to person, place, and time. She appears well-developed and well-nourished. No distress.   HENT:   Head: Normocephalic and atraumatic.   Eyes: No scleral icterus.   Neck: No JVD present.   Pulmonary/Chest: No respiratory distress.   Abdominal: Soft. She exhibits no distension. There is no tenderness. There is no CVA tenderness.   Genitourinary:   Genitourinary Comments: Odonnell draining clear yellow   Neurological: She is alert and oriented to person, place, and time.   Skin: She is not diaphoretic.     Psychiatric: She has a normal mood and affect. Her behavior is normal.       Significant Labs:    BMP:    Recent Labs  Lab 01/18/17  1727 01/20/17  0937 01/23/17  1313    137 139   K 3.5 3.2* 3.2*    105 105   CO2 25 23 19*   BUN 13 10 11   CREATININE 0.9 1.1 1.1   CALCIUM 8.5* 7.8* 9.5       CBC:     Recent Labs  Lab 01/20/17  0936 01/23/17  1313 01/24/17  0529   WBC 12.78* 19.34* 12.84*   HGB 11.4* 14.1 11.6*   HCT 34.5* 40.1 34.2*    237 221       Urine Culture:   Recent Labs  Lab 01/20/17  0356   LABURIN ENTEROCOCCUS ONZGRMZA35,000 - 49,999 cfu/ml     Urine Studies:   Recent Labs  Lab 01/19/17  1127 01/23/17  1333   COLORU Yellow Yellow   APPEARANCEUA Clear Hazy*   PHUR 7.0 6.0   SPECGRAV 1.020 1.015   PROTEINUA Trace* 1+*   GLUCUA Negative Negative   KETONESU Negative Negative   BILIRUBINUA Negative Negative   OCCULTUA 2+*  2+*   NITRITE Negative Negative   UROBILINOGEN Negative Negative   LEUKOCYTESUR Negative 2+*   RBCUA 41* 22*   WBCUA 6* 13*   BACTERIA  --  Occasional   SQUAMEPITHEL 1 4   HYALINECASTS  --  5*       Significant Imaging:  All pertinent imaging results/findings from the past 24 hours have been reviewed.

## 2017-01-24 NOTE — PROGRESS NOTES
Patient voided 200 mls of clear, pink urine. Dr. Marie notified. Ok to d/c patient.     Also, informed Dr. Marie of patient's Potassium 2.8. PO potassium ordered. Pt now requesting Mag being ordered. Will paged Urology.

## 2017-01-24 NOTE — DISCHARGE SUMMARY
[facility]  Urology  Discharge Summary      Patient Name: Carline Chang  MRN: [id number]  Admission Date: 1/23/2017  Hospital Length of Stay: 0 days  Discharge Date and Time: 1/24/2017 6:58 AM  Attending Physician: Vicente Young MD   Discharging Provider: Esteban Marie MD  Primary Care Physician: Pablo Medina MD    HPI: 51 YOF with Crohn disease recently admitted for flair and found to have a right ureteral stone during that admission.  She was discharged home without intervention/ trial of passage for the ureteral stone, as there was no urgent indication. She was discharged on PO cipro.  However, even though her UA drawn during initial admission was not very concerning for infection, urine culture drawn while inpatient resulted positive for Enterococcus, so patient was asked to return to the hospital for stent placement.      Procedure(s) (LRB):  CYSTOSCOPY WITH STENT PLACEMENT (Right)  BIOPSY-BLADDER (N/A)  FULGURATION-BLADDER (N/A)     Indwelling Lines/Drains at time of discharge:   Lines/Drains/Airways     Drain                 Colostomy LLQ -- days         Ureteral Drain/Stent 01/23/17 1801 Right ureter 6.24 Fr. less than 1 day         Urethral Catheter 01/23/17 1830 less than 1 day                Hospital Course (synopsis of major diagnoses, care, treatment, and services provided during the course of the hospital stay): Patient was admitted 1/23 and taken to the OR for stent placement that day.  She was observed in the hospital overnight and had no complication.  She was discharged home with pain well controlled POD 1 in good condition after tolerating regular diet, ambulating, and voiding spontaneously.      Consults:     Significant Diagnostic Studies:     Pending Diagnostic Studies:     Procedure Component Value Units Date/Time    Basic metabolic panel [[id number]] Collected:  01/24/17 0529    Order Status:  Sent Lab Status:  In process Updated:  01/24/17 0691     Specimen:  Blood from Blood     X-Ray Chest 1 View [[id number]]     Order Status:  Sent Lab Status:  No result           Final Active Diagnoses:    Diagnosis Date Noted POA    PRINCIPAL PROBLEM:  Calculus of ureter [N20.1] 01/23/2017 Yes    Crohn disease [K50.90] 01/15/2017 Yes      Problems Resolved During this Admission:    Diagnosis Date Noted Date Resolved POA       Discharged Condition: good    Disposition: Home or Self Care    Follow Up:  Follow-up Information     Follow up with Brianda Cano MD On 2/14/2017.    Specialty:  Urology    Why:  stone management, stent in place    Contact information:    [address]  [facility] [address]  [phone]          Patient Instructions:     Diet general     Activity as tolerated     Call MD for:  temperature >100.4     Call MD for:  persistent nausea and vomiting or diarrhea     Call MD for:  severe uncontrolled pain     Call MD for:  redness, tenderness, or signs of infection (pain, swelling, redness, odor or green/yellow discharge around incision site)     Call MD for:  difficulty breathing or increased cough     Call MD for:  severe persistent headache     Call MD for:  worsening rash     Call MD for:  persistent dizziness, light-headedness, or visual disturbances     Call MD for:  increased confusion or weakness     No dressing needed       Medications:  Reconciled Home Medications:   Current Discharge Medication List      START taking these medications    Details   !! oxycodone-acetaminophen (PERCOCET) 5-325 mg per tablet Take 1 tablet by mouth every 4 (four) hours as needed for Pain.  Qty: 15 tablet, Refills: 0      tamsulosin (FLOMAX) 0.4 mg Cp24 Take 1 capsule (0.4 mg total) by mouth once daily.  Qty: 30 capsule, Refills: 0       !! - Potential duplicate medications found. Please discuss with provider.      CONTINUE these medications which have CHANGED    Details   ciprofloxacin HCl (CIPRO) 500 MG tablet Take 1 tablet (500 mg total) by mouth  every 12 (twelve) hours.  Qty: 6 tablet, Refills: 0         CONTINUE these medications which have NOT CHANGED    Details   escitalopram oxalate (LEXAPRO) 10 MG tablet Take 10 mg by mouth once daily.      gabapentin (NEURONTIN) 300 MG capsule Take 1 capsule (300 mg total) by mouth every evening.  Qty: 30 capsule, Refills: 0      hydrocodone-acetaminophen 10-325mg (NORCO)  mg Tab Take 1 tablet by mouth every 4 (four) hours as needed for Pain.  Qty: 91 tablet, Refills: 0      HYDROmorphone (DILAUDID) 2 MG tablet Take 1 tablet (2 mg total) by mouth every 4 (four) hours as needed for Pain.  Qty: 30 tablet, Refills: 0      lorazepam (ATIVAN) 0.5 MG tablet Take 1 tablet (0.5 mg total) by mouth every 6 (six) hours as needed for Anxiety.  Qty: 60 tablet, Refills: 0      predniSONE (DELTASONE) 10 MG tablet Take 4 tablets (40 mg total) by mouth once daily. Take 4 pills daily for one week 40 mgm  Take 3 pills daily for one week  30 mgm  Take 2 pilss daily for one week  20 mgm  Take one pill daily for one week  10 mgm  then stop  Qty: 60 tablet, Refills: 0      promethazine (PHENERGAN) 12.5 MG Tab Take 12.5 mg by mouth 4 (four) times daily.      zolpidem (AMBIEN CR) 12.5 MG CR tablet Take 12.5 mg by mouth nightly as needed for Insomnia.      adalimumab 10 mg/0.2 mL SyKt Inject into the skin.      calcium citrate-vitamin D3 200 mg calcium -250 unit Tab Take 1 tablet by mouth.      !! oxycodone-acetaminophen (PERCOCET) 5-325 mg per tablet Take 1 tablet by mouth every 4 (four) hours as needed for Pain.  Qty: 20 tablet, Refills: 0       !! - Potential duplicate medications found. Please discuss with provider.          Time spent on the discharge of patient: 30 minutes    Esteban Marie MD  Urology  [facility]

## 2017-01-24 NOTE — SUBJECTIVE & OBJECTIVE
Interval History:   No acute events, afebrile  Pain controlled  No N/V    Review of Systems  Objective:     Temp:  [97.1 °F (36.2 °C)-98.6 °F (37 °C)] 97.1 °F (36.2 °C)  Pulse:  [55-83] 56  Resp:  [16-18] 16  SpO2:  [98 %-100 %] 99 %  BP: (109-121)/(59-78) 109/59     Body mass index is 20.53 kg/(m^2).            Drains     Drain                 Colostomy LLQ -- days         Ureteral Drain/Stent 01/23/17 1801 Right ureter 6.24 Fr. less than 1 day         Urethral Catheter 01/23/17 1830 less than 1 day                Physical Exam   Constitutional: She is oriented to person, place, and time. She appears well-developed and well-nourished. No distress.   HENT:   Head: Normocephalic and atraumatic.   Eyes: No scleral icterus.   Neck: No JVD present.   Pulmonary/Chest: No respiratory distress.   Abdominal: Soft. She exhibits no distension. There is no tenderness. There is no CVA tenderness.   Genitourinary:   Genitourinary Comments: Odonnell draining clear yellow   Neurological: She is alert and oriented to person, place, and time.   Skin: She is not diaphoretic.     Psychiatric: She has a normal mood and affect. Her behavior is normal.       Significant Labs:    BMP:    Recent Labs  Lab 01/18/17  1727 01/20/17  0937 01/23/17  1313    137 139   K 3.5 3.2* 3.2*    105 105   CO2 25 23 19*   BUN 13 10 11   CREATININE 0.9 1.1 1.1   CALCIUM 8.5* 7.8* 9.5       CBC:     Recent Labs  Lab 01/20/17  0936 01/23/17  1313 01/24/17  0529   WBC 12.78* 19.34* 12.84*   HGB 11.4* 14.1 11.6*   HCT 34.5* 40.1 34.2*    237 221       All pertinent labs results from the past 24 hours have been reviewed.    Significant Imaging:  All pertinent imaging results/findings from the past 24 hours have been reviewed.

## 2017-01-24 NOTE — TRANSFER OF CARE
"Anesthesia Transfer of Care Note    Patient: Carline Chang    Procedure(s) Performed: Procedure(s) (LRB):  CYSTOSCOPY WITH STENT PLACEMENT (Right)  BIOPSY-BLADDER (N/A)  FULGURATION-BLADDER (N/A)    Patient location: [facility]    Anesthesia Type: general    Transport from OR: Transported from OR on room air with adequate spontaneous ventilation    Post pain: adequate analgesia    Post assessment: no apparent anesthetic complications and tolerated procedure well    Post vital signs: stable    Level of consciousness: awake, alert and oriented    Nausea/Vomiting: no nausea/vomiting    Complications: none          Last vitals:   Visit Vitals    /68 (BP Location: Right arm, Patient Position: Lying, BP Method: Automatic)    Pulse 80    Temp 36.6 °C (97.8 °F) (Oral)    Resp 16    Ht 5' 8" (1.727 m)    Wt 61.2 kg (135 lb)    LMP     SpO2 98%    Breastfeeding No    BMI 20.53 kg/m2     "

## 2017-01-24 NOTE — NURSING
Pt discharged in stable condition, discharge instructions and prescriptions given, pt verbalized understanding. Surgical site intact.pt waiting on medication administration. Tish Singleton RN

## 2017-01-24 NOTE — OP NOTE
[facility] Note    Date: 01/23/2017    Pre-Op Diagnosis:   1. Right ureteral stone  2. Urinary tract infection due to an Enterococcus organism  3. Right hydronephrosis  4. Crohn's disease    Post-Op Diagnosis:   1. Same  2. Right pyonephrosis  3. Bladder lesion    Procedure(s) Performed:    1. Cystoscopy with right ureteral JJ stent placement  2. Cystoscopy with bladder biopsy and fulguration  3. Fluoroscopy < 1 h    Specimen(s):   1. Bladder biopsy - trigone    Staff Surgeon: Vicente Young MD    Assistant Surgeon: Naseem Wang MD, Jana Correa MD    Anesthesia: Monitored Local Anesthesia with Sedation    Indications: Carline Chang is a 51 y.o. female with right nephrolithasis and positive urine culture, she presents today for stent placement.    Findings:    1. Normal urethra, no strictures or masses  2. Abnormal mucosa at the tirgone, this area was biopsied and fulgurated, no other mucosal abnormalities, no trabeculations, diverticula or bladder stones  3. Radio-opaque stone in the distal right ureter.  A 24cm x 6Fr JJ ureteral stent was placed, the stone remained in the distal ureter.  Upon placing the wire past the stone there was efflux of purulent urine.  Clear efflux visualized on the left.   4. 18Fr hudson catheter placed    Estimated Blood Loss: min    Drains:  24cm x 6Fr right JJ ureteral stent without strings    Procedure in Detail:  After risks, benefits and possible complications of the procedure were explained, the patient elected to undergo the procedure and informed consent was obtained. All questions were answered in the luis-operative area. The patient was transferred to the cystoscopy suite and placed on the fluoroscopy table in the supine position.  SCDs were applied and working. Time out was performed, luis-procedural antibiotics were given. Anesthesia was administered.  After adequate anesthesia the patient was placed in dorsal lithotomy position and prepped and  draped in the usual sterile fashion.     A rigid cystoscope in a 22 Fr sheath was introduced into the patients bladder per urethra. This passed easily.  The entire urethra was visualized and revealed no strictures or masses.  Cystoscopy was performed which showed the right and left ureteral orifices in the normal anatomic position.  There were no bladder tumors, no  trabeculations, and no stones.  There was a mucosal abnormality at the trigone medial to the left ureteral orifice      Our attention was turned to the patient's right ureteral orifice.  A guide wire was advanced up the right ureteral orifice to the level of the expected renal pelvis.  This was confirmed using fluoroscopy.  The stone was visualized in the distal ureter and the wire was past the stone.    We then passed a 6 Fr x 24 cm JJ ureteral stent without strings over the wire to the level of the renal pelvis under direct vision as well as flouroscopy. The guide wire was removed.  A 180 degree coil was observed in the renal pelvis as well as the bladder using fluoroscopy.  A 180 degree coil was also seen using direct visualization in the bladder.     The cold cup biopsy forceps were then used to biopsy the bladder lesion at the trigone.  The biopsy site was fulgurated with the Bugbee electrocautery device and hemostasis was achieved.  The scope was removed.  An 18Fr urethral catheter was placed with 10cc in the balloon.     The patient tolerated the procedure well and was transferred to the recovery room in stable condition.      Disposition: The patient will be admitted to urology for overnight monitoring.      Naseem Wang MD    As the attending surgeon, Dr. Young was present for the entire procedure and performed all key aspects of the operation.

## 2017-01-24 NOTE — ASSESSMENT & PLAN NOTE
POD 1 cysto R stent     - regular diet   - ambulate  - dc hudson  - voiding trial   - pain control  - TEDs/SCDs      Dispo: home today on PO cipro

## 2017-01-24 NOTE — NURSING TRANSFER
Nursing Transfer Note      1/23/2017     Transfer To: 510 from [facility]    Transfer via stretcher    Transported by patient escort    Chart send with patient: Yes    Notified: family at bedside

## 2017-01-24 NOTE — PROGRESS NOTES
Spoke to bed board. Was told patient will be going to 510, which is a private room. Was told a stat clean has been put on this room.

## 2017-01-25 LAB — BACTERIA UR CULT: NORMAL

## 2017-01-27 ENCOUNTER — TELEPHONE (OUTPATIENT)
Dept: UROLOGY | Facility: CLINIC | Age: 52
End: 2017-01-27

## 2017-01-27 DIAGNOSIS — N32.89 BLADDER SPASMS: Primary | ICD-10-CM

## 2017-01-27 RX ORDER — OXYBUTYNIN CHLORIDE 5 MG/1
5 TABLET ORAL 3 TIMES DAILY PRN
Qty: 30 TABLET | Refills: 0 | Status: SHIPPED | OUTPATIENT
Start: 2017-01-27 | End: 2017-03-07 | Stop reason: ALTCHOICE

## 2017-01-27 NOTE — TELEPHONE ENCOUNTER
----- Message from Marcelina Cardoso LPN sent at 1/27/2017  4:51 PM CST -----  Contact: self  [phone]      ----- Message -----     From: Karolina Harmon MA     Sent: 1/27/2017   4:36 PM       To: Jerome BALDWIN Staff    States she had a stent placed on Monday, 1-23 and discharged Tuesday, 1-24 and states is with spasms and gross hematuria.

## 2017-01-27 NOTE — TELEPHONE ENCOUNTER
Nurse explained expectations with JJ in place.  oxybutynin 5mg PRN spasms with stent.  Benefits,risks, se discussed.

## 2017-01-31 ENCOUNTER — TELEPHONE (OUTPATIENT)
Dept: GASTROENTEROLOGY | Facility: CLINIC | Age: 52
End: 2017-01-31

## 2017-01-31 ENCOUNTER — TELEPHONE (OUTPATIENT)
Dept: SURGERY | Facility: CLINIC | Age: 52
End: 2017-01-31

## 2017-01-31 NOTE — TELEPHONE ENCOUNTER
----- Message from Iliana Gaston sent at 1/31/2017  8:37 AM CST -----  Contact: pt#[phone]  Pt wants to speak with you about the referral for Dr Stock. Please call

## 2017-01-31 NOTE — TELEPHONE ENCOUNTER
----- Message from Lucrecia Chang sent at 1/31/2017  2:29 PM CST -----  Contact: Self- [phone]  Sherita pt is returning a missed call to Agnes regarding her appt- please call pt back at [phone]

## 2017-01-31 NOTE — TELEPHONE ENCOUNTER
----- Message from Lucrecia Chang sent at 1/27/2017 11:47 AM CST -----  Contact: Self- [phone]  Montrell- pt called to reschedule her appt with Dr. Stock she had to originally cancel on 1/24/17 due to her being back in the hospital- pt doesn't want her appt with Dr. Bueno in March says he isn't her doctor and that appt is too far out- please call pt back at [phone]

## 2017-02-03 ENCOUNTER — TELEPHONE (OUTPATIENT)
Dept: UROLOGY | Facility: CLINIC | Age: 52
End: 2017-02-03

## 2017-02-03 NOTE — TELEPHONE ENCOUNTER
----- Message from Matilde Chisholm sent at 2/3/2017 11:46 AM CST -----  Dr Medina is calling regarding a appt today for patient that has kidney stones please call [phone]

## 2017-02-03 NOTE — TELEPHONE ENCOUNTER
Spoke with Dr Roa office states patient is in clinic and very very ill and sick and needs to be seen today. Informed that we currently did not have any providers in clinic but if this is an urgent matter patient can report to the ER.

## 2017-02-03 NOTE — TELEPHONE ENCOUNTER
----- Message from Ninfa SAMSON Coronado sent at 2/3/2017 12:48 PM CST -----  Contact: Dr Medina office at [phone]  Dr Medina office at [phone] cell, calling for Dr Mcelroy, Patient is in office now. Call to POD. Please advise. Thanks.

## 2017-02-06 DIAGNOSIS — N20.1 URETERAL STONE: Primary | ICD-10-CM

## 2017-02-06 NOTE — PROGRESS NOTES
Pt was seen at [facility] this weekend. She would like to have her ureteroscopy done at [facility] or at [facility] this week if she is still in the hospital. i have contacted dr. Cano to let her know.

## 2017-02-13 ENCOUNTER — TELEPHONE (OUTPATIENT)
Dept: UROLOGY | Facility: CLINIC | Age: 52
End: 2017-02-13

## 2017-02-13 NOTE — TELEPHONE ENCOUNTER
----- Message from Tamanna Guaman sent at 2/13/2017 10:27 AM CST -----  Contact: pATIENT  Patient states she was supposed to remove the stint on string. Patient is afraid to remove the stint because since she came home from the hospital, there has been more and more blood every time she urinates. Please call patient [phone] to advise.

## 2017-02-13 NOTE — TELEPHONE ENCOUNTER
Spoke with patient explained to her how to remove stent. Pt verbally voiced understanding and will give the office a call back if she needs to

## 2017-03-07 ENCOUNTER — OFFICE VISIT (OUTPATIENT)
Dept: UROLOGY | Facility: CLINIC | Age: 52
End: 2017-03-07
Payer: MEDICARE

## 2017-03-07 VITALS — BODY MASS INDEX: 22.32 KG/M2 | WEIGHT: 147.25 LBS | TEMPERATURE: 99 F | HEIGHT: 68 IN

## 2017-03-07 DIAGNOSIS — R82.998 OTHER CELLS AND CASTS IN URINE: ICD-10-CM

## 2017-03-07 DIAGNOSIS — N20.0 NEPHROLITHIASIS: Primary | ICD-10-CM

## 2017-03-07 LAB
BILIRUB SERPL-MCNC: ABNORMAL MG/DL
BLOOD URINE, POC: 250
COLOR, POC UA: ABNORMAL
GLUCOSE UR QL STRIP: ABNORMAL
KETONES UR QL STRIP: ABNORMAL
LEUKOCYTE ESTERASE URINE, POC: ABNORMAL
NITRITE, POC UA: ABNORMAL
PH, POC UA: 5
PROTEIN, POC: ABNORMAL
SPECIFIC GRAVITY, POC UA: 1.02
UROBILINOGEN, POC UA: ABNORMAL

## 2017-03-07 PROCEDURE — 87086 URINE CULTURE/COLONY COUNT: CPT

## 2017-03-07 PROCEDURE — 99212 OFFICE O/P EST SF 10 MIN: CPT | Mod: PBBFAC,PO | Performed by: UROLOGY

## 2017-03-07 PROCEDURE — 99999 PR PBB SHADOW E&M-EST. PATIENT-LVL II: CPT | Mod: PBBFAC,,, | Performed by: UROLOGY

## 2017-03-07 PROCEDURE — 99214 OFFICE O/P EST MOD 30 MIN: CPT | Mod: S$PBB,,, | Performed by: UROLOGY

## 2017-03-07 PROCEDURE — 81002 URINALYSIS NONAUTO W/O SCOPE: CPT | Mod: PBBFAC,PO | Performed by: UROLOGY

## 2017-03-07 RX ORDER — CIPROFLOXACIN 250 MG/1
TABLET, FILM COATED ORAL
COMMUNITY
Start: 2017-01-09 | End: 2017-03-07 | Stop reason: ALTCHOICE

## 2017-03-07 RX ORDER — ZOLPIDEM TARTRATE 10 MG/1
TABLET ORAL
COMMUNITY
Start: 2017-01-09 | End: 2017-12-14

## 2017-03-07 RX ORDER — POTASSIUM CHLORIDE 1500 MG/1
TABLET, EXTENDED RELEASE ORAL
COMMUNITY
Start: 2017-02-13 | End: 2017-12-14

## 2017-03-07 RX ORDER — PREDNISONE 10 MG/1
TABLET ORAL
COMMUNITY
Start: 2017-02-11 | End: 2017-11-14

## 2017-03-07 RX ORDER — PROMETHAZINE HYDROCHLORIDE 25 MG/1
TABLET ORAL
COMMUNITY
Start: 2017-02-13 | End: 2018-02-19

## 2017-03-07 RX ORDER — METRONIDAZOLE 250 MG/1
TABLET ORAL
COMMUNITY
Start: 2016-12-20 | End: 2017-11-14

## 2017-03-07 RX ORDER — METRONIDAZOLE 500 MG/1
TABLET ORAL
COMMUNITY
Start: 2017-01-09 | End: 2017-11-14

## 2017-03-07 NOTE — PROGRESS NOTES
[facility] - [place]  Staff: MD Yovany    Referring provider and please cc: [facility]  [facility]    Chief Complaint: right ureteral stone    Subjective:        HPI: Carline Chang is a 51 y.o. female presents with     Pt presented initially in January for abdominal pain. Had been at [facility] then trasferred to [facility] then beba transferred to [facility] to see , her Chron's MD. During that stay she underwent a ct which showed a mid right ureteral stone. She was discharged but found to have a + Enetrococcus UTI and was called back for stent. She had a stent place don right 17 by . Also had a biopsy of a bladder lesion which was b9. She then presented to [facility] 2-3 weeks later with gross hematuria. I then saw her in consult and she again was found to have UTI and chron's flare. i discussed her case with  and we decided to proceed with right ureteroscopy at [facility] which was 3 weeks ago. I placed a stent and she removed the string.     Since then she's just been having chronic cramping. She was unsure if this related to her chron's or a uti. She tried cipro and ditropan and neither helped her sx. She's going to see  today. She saw  yesterday and was concerned she still may have stones.     All her pain is lower pelvic. Sometimes she's having some urgency. occ vaginal irritation but no dysuria. Overall does not feel well. Possible low grade fever Saturday.     ECOG Status: 0      G2, P 2,   History of UTI: yes, but not recurrent  Sexually active?: No    REVIEW OF SYSTEMS:  Just got off humira and had w/d sx- knee pain  General ROS: no fevers, no chills  Psychological ROS: no depression  Endocrine ROS: no heat or cold  Respiratory ROS: no SOB  Cardiovascular ROS: no CP  Gastrointestinal ROS: no abdominal pain, +colostomy  Musculoskeletal ROS: no muscle pain  Neurological ROS: no  headaches  Dermatological ROS: no rashes  HEENT: reading glasses, no sinus   ROS: per HPI     PMHx:  Past Medical History:   Diagnosis Date    Chronic pain     Crohn's disease      Kidney stones: Yes     PSHx:  Past Surgical History:   Procedure Laterality Date    anal fistula      BOWEL RESECTION      x2     SECTION      x2    CHOLECYSTECTOMY      COLONOSCOPY N/A 2016    Procedure: COLONOSCOPY;  Surgeon: Andre Uribe MD;  Location: [facility] (4TH FLR);  Service: Endoscopy;  Laterality: N/A;    COLONOSCOPY N/A 2017    Procedure: COLONOSCOPY;  Surgeon: Dany Stock MD;  Location: [facility] ENDO (2ND FLR);  Service: Endoscopy;  Laterality: N/A;    COLOSTOMY      rectal abscess drain      TUBAL LIGATION     right ureteroscopy  Urologic or Gynecologic Surgery: as above    Stents/Valves/Foreign Bodies: No  Cardiac Evaluation: No    Screening Studies  Colonoscopy: multiple    Fam Hx:   malignancies: No , gyn malignancies: No .   kidney stones: Yes      Soc Hx:  No tobacco.    No alcohol  Lives in [place]  :  Children: 2  Occupation:disabled, retired SW    Allergies:  Morphine and Nubain [nalbuphine]    Medications: reviewed   Anticoagulation: No    Objective:     Vitals:    17 1121   Temp: 99 °F (37.2 °C)       General:WDWN in NAD  Eyes: PERRLA, normal conjunctiva  Respiratory: no increased work on breathing, clear to auscultation  Cardiovascular: regular rate and rhythm. No obvious extremity edema.  GI: no palpation of masses. No tenderness. No hepatosplenomegaly to palpation.  Musculoskeletal: normal range of motion of bilateral upper extremities. Normal muscle strength and tone.  Skin: no obvious rashes or lesions. No tightening of skin noted.  Neurologic: CN grossly normal. Normal sensation.   Psychiatric: awake, alert and oriented x 3. Mood and affect normal. Cooperative.    Pelvic exam  deferred    LABS REVIEW:  UA today: 1.020+leukocyte1+protein/250 blood  - send urine for culture  UCx:   2/4/17 multiple organisms including >100k GPC  1/23/17 E.faecalis  1/20/17 E.faecalis  1/11/17 multiple organisms including >100k GPC  11/29/16 GBS and C.freundii    Cr:   Lab Results   Component Value Date    CREATININE 0.9 01/24/2017     Ca 9.5 1/23/17    PATHOLOGY REVIEW:  FINAL PATHOLOGIC DIAGNOSIS  Bladder, biopsy:  - Benign urothelium with squamous metaplasia and acute inflammation.  - Negative for malignancy.    RADIOGRAPHIC REVIEW:  ctrss 11/29/16  Right hydro to mid ureteral 6mm stone   No stones in kidney    ctap 1/15/17 [facility]  No other stones      Assessment:       1. Nephrolithiasis    2. Other cells and casts in urine          Plan:     Send urine for culture and will treat is pt is sx  If pt still having overall feeling of unwellness and cramping return in 1 week for urine sample unless started on abx by dr. block bc today's urine culture may be falsely negative or low bc of recent cipro she took at home    She has no other stones in her kidneys based on ct from 1/2017  This was her first stone  Increased risk bc of chron's   Will plan to do a 24 hour urine to see if this can help us  Stone analysis from [facility]  pth and uric acid    i gave her uribel to see if this helps with her cramping atlhough it may not be a uti. Coupon given    F/u after 24 hour urine    Paula Pedroza MD

## 2017-03-07 NOTE — MR AVS SNAPSHOT
[facility]  [address]  [place] [address]  Phone: [phone]                  Carline Chang   3/7/2017 11:00 AM   Office Visit    Description:  Female : 1965   Provider:  Paula Pedroza MD   Department:  [facility]           Reason for Visit     Follow-up           Diagnoses this Visit        Comments    Nephrolithiasis    -  Primary     Other cells and casts in urine                To Do List           Future Appointments        Provider Department Dept Phone    3/14/2017 10:00 AM UROLOGY, NURSE [facility] [phone]    3/14/2017 10:10 AM LAB, MOB [address] [facility] [phone]    3/23/2017 4:00 PM Davin Bueno MD [facility] Gastroenterology [phone]      Goals (5 Years of Data)     None      [facility]     [facility] -  Assistance  Registered nurses in the [facility] provide clinical advisement, health education, appointment booking, and other advisory services.  Call for this free service at [phone].             Medications           Message regarding Medications     Verify the changes and/or additions to your medication regime listed below are the same as discussed with your clinician today.  If any of these changes or additions are incorrect, please notify your healthcare provider.        STOP taking these medications     tamsulosin (FLOMAX) 0.4 mg Cp24 Take 1 capsule (0.4 mg total) by mouth once daily.    lorazepam (ATIVAN) 0.5 MG tablet Take 1 tablet (0.5 mg total) by mouth every 6 (six) hours as needed for Anxiety.    HYDROmorphone (DILAUDID) 2 MG tablet Take 1 tablet (2 mg total) by mouth every 4 (four) hours as needed for Pain.    zolpidem (AMBIEN CR) 12.5 MG CR tablet Take 12.5 mg by mouth nightly as needed for Insomnia.           Verify that the below list of medications is an accurate representation of the  "medications you are currently taking.  If none reported, the list may be blank. If incorrect, please contact your healthcare provider. Carry this list with you in case of emergency.           Current Medications     adalimumab 10 mg/0.2 mL SyKt Inject into the skin.    calcium citrate-vitamin D3 200 mg calcium -250 unit Tab Take 1 tablet by mouth.    ciprofloxacin HCl (CIPRO) 250 MG tablet     escitalopram oxalate (LEXAPRO) 10 MG tablet Take 10 mg by mouth once daily.    gabapentin (NEURONTIN) 300 MG capsule Take 1 capsule (300 mg total) by mouth every evening.    hydrocodone-acetaminophen 10-325mg (NORCO)  mg Tab Take 1 tablet by mouth every 4 (four) hours as needed for Pain.    KLOR-CON M20 20 mEq tablet     metronidazole (FLAGYL) 250 MG tablet     metronidazole (FLAGYL) 500 MG tablet     oxybutynin (DITROPAN) 5 MG Tab Take 1 tablet (5 mg total) by mouth 3 (three) times daily as needed.    oxycodone-acetaminophen (PERCOCET) 5-325 mg per tablet Take 1 tablet by mouth every 4 (four) hours as needed for Pain.    predniSONE (DELTASONE) 10 MG tablet     promethazine (PHENERGAN) 12.5 MG Tab Take 12.5 mg by mouth 4 (four) times daily.    promethazine (PHENERGAN) 25 MG tablet     zolpidem (AMBIEN) 10 mg Tab            Clinical Reference Information           Your Vitals Were     Temp Height Weight BMI       99 °F (37.2 °C) 5' 8" (1.727 m) 66.8 kg (147 lb 4.3 oz) 22.39 kg/m2       Allergies as of 3/7/2017     Morphine    Nubain [Nalbuphine]      Immunizations Administered on Date of Encounter - 3/7/2017     None      Orders Placed During Today's Visit      Normal Orders This Visit    POCT URINE DIPSTICK WITHOUT MICROSCOPE     Future Labs/Procedures Expected by Expires    PTH, intact  3/7/2017 3/7/2018    Uric acid  3/7/2017 3/7/2018      Language Assistance Services     ATTENTION: Language assistance services are available, free of charge. Please call [phone].      ATENCIÓN: velma Mayo " disposición servicios gratuitos de asistencia lingüística. Toño al [phone].     EUSEBIO Ý: N?u b?n nói Ti?ng Vi?t, có các d?ch v? h? tr? ngôn ng? mi?n phí dành cho b?n. G?i s? [phone].         [facility] complies with applicable Federal civil rights laws and does not discriminate on the basis of race, color, national origin, age, disability, or sex.

## 2017-03-09 LAB — BACTERIA UR CULT: NORMAL

## 2017-03-30 NOTE — PLAN OF CARE
Problem: Patient Care Overview  Goal: Plan of Care Review  Outcome: Ongoing (interventions implemented as appropriate)  Plan of care review with patient; patient verbalize understanding. Patient will ambulate three times today, report pain and verbalized results of interventions provided. Pt co pain mildly relieved with prescribed meds. Explained to patient the use of IV pain management only when oral medications do not work or when pain increases to an unmanageable number. Pt denies nausea during shift. Incision clean, dry, and edges approximated during shift. Pt tolerating clear liquid diet. Pt up in room independently, no distress noted. Pt remains free of falls, injuries, and trauma. No distress noted will continue to monitor.                     children/daughter

## 2017-11-14 ENCOUNTER — TELEPHONE (OUTPATIENT)
Dept: ENDOSCOPY | Facility: HOSPITAL | Age: 52
End: 2017-11-14

## 2017-11-14 ENCOUNTER — OFFICE VISIT (OUTPATIENT)
Dept: SURGERY | Facility: CLINIC | Age: 52
End: 2017-11-14
Payer: MEDICARE

## 2017-11-14 VITALS
BODY MASS INDEX: 22.38 KG/M2 | DIASTOLIC BLOOD PRESSURE: 57 MMHG | WEIGHT: 147.69 LBS | HEIGHT: 68 IN | HEART RATE: 93 BPM | SYSTOLIC BLOOD PRESSURE: 114 MMHG

## 2017-11-14 DIAGNOSIS — K50.113 CROHN'S DISEASE OF COLON WITH FISTULA: Primary | ICD-10-CM

## 2017-11-14 DIAGNOSIS — Z12.11 SPECIAL SCREENING FOR MALIGNANT NEOPLASMS, COLON: Primary | ICD-10-CM

## 2017-11-14 PROCEDURE — 99213 OFFICE O/P EST LOW 20 MIN: CPT | Mod: PBBFAC | Performed by: COLON & RECTAL SURGERY

## 2017-11-14 PROCEDURE — 99214 OFFICE O/P EST MOD 30 MIN: CPT | Mod: S$PBB,,, | Performed by: COLON & RECTAL SURGERY

## 2017-11-14 PROCEDURE — 99999 PR PBB SHADOW E&M-EST. PATIENT-LVL III: CPT | Mod: PBBFAC,,, | Performed by: COLON & RECTAL SURGERY

## 2017-11-14 RX ORDER — VENLAFAXINE 37.5 MG/1
TABLET ORAL
COMMUNITY
Start: 2017-10-12 | End: 2018-01-30

## 2017-11-14 RX ORDER — POLYETHYLENE GLYCOL 3350, SODIUM SULFATE ANHYDROUS, SODIUM BICARBONATE, SODIUM CHLORIDE, POTASSIUM CHLORIDE 236; 22.74; 6.74; 5.86; 2.97 G/4L; G/4L; G/4L; G/4L; G/4L
4 POWDER, FOR SOLUTION ORAL ONCE
Qty: 4000 ML | Refills: 0 | Status: SHIPPED | OUTPATIENT
Start: 2017-11-14 | End: 2017-11-14

## 2017-11-14 NOTE — LETTER
November 14, 2017      Pablo Medina MD  [address]  [place] [address]           [facility]  [address]  [facility] [address]  Phone: [phone]          Patient: Carline Chang   MR Number: [id number]   YOB: 1965   Date of Visit: 11/14/2017       Dear Dr. Pablo Medina:    Thank you for referring Carline Chang to me for evaluation. Attached you will find relevant portions of my assessment and plan of care.    If you have questions, please do not hesitate to call me. I look forward to following Carline Chang along with you.    Sincerely,    Andre Uribe MD    Enclosure  CC:  No Recipients    If you would like to receive this communication electronically, please contact [email or web address] or [phone] to request more information on [employer] access.    For providers and/or their staff who would like to refer a patient to [facility], please contact us through our one-stop-shop provider referral line, [facility], at [phone].    If you feel you have received this communication in error or would no longer like to receive these types of communications, please e-mail [email or web address]

## 2017-11-22 ENCOUNTER — HOSPITAL ENCOUNTER (OUTPATIENT)
Dept: RADIOLOGY | Facility: HOSPITAL | Age: 52
Discharge: HOME | End: 2017-11-22
Attending: COLON & RECTAL SURGERY
Payer: MEDICARE

## 2017-11-22 DIAGNOSIS — K50.113 CROHN'S DISEASE OF COLON WITH FISTULA: ICD-10-CM

## 2017-11-22 PROCEDURE — 72197 MRI PELVIS W/O & W/DYE: CPT | Mod: 26,,, | Performed by: RADIOLOGY

## 2017-11-22 PROCEDURE — 25500020 PHARM REV CODE 255: Performed by: COLON & RECTAL SURGERY

## 2017-11-22 PROCEDURE — A9585 GADOBUTROL INJECTION: HCPCS | Performed by: COLON & RECTAL SURGERY

## 2017-11-22 PROCEDURE — 72197 MRI PELVIS W/O & W/DYE: CPT | Mod: TC

## 2017-11-22 RX ORDER — GADOBUTROL 604.72 MG/ML
7 INJECTION INTRAVENOUS
Status: COMPLETED | OUTPATIENT
Start: 2017-11-22 | End: 2017-11-22

## 2017-11-22 RX ADMIN — GADOBUTROL 7 ML: 604.72 INJECTION INTRAVENOUS at 06:11

## 2017-12-04 ENCOUNTER — ANESTHESIA (OUTPATIENT)
Dept: ENDOSCOPY | Facility: HOSPITAL | Age: 52
End: 2017-12-04
Payer: MEDICARE

## 2017-12-04 ENCOUNTER — ANESTHESIA EVENT (OUTPATIENT)
Dept: ENDOSCOPY | Facility: HOSPITAL | Age: 52
End: 2017-12-04
Payer: MEDICARE

## 2017-12-04 ENCOUNTER — HOSPITAL ENCOUNTER (OUTPATIENT)
Facility: HOSPITAL | Age: 52
Discharge: HOME | End: 2017-12-04
Attending: COLON & RECTAL SURGERY | Admitting: COLON & RECTAL SURGERY
Payer: MEDICARE

## 2017-12-04 ENCOUNTER — SURGERY (OUTPATIENT)
Age: 52
End: 2017-12-04

## 2017-12-04 VITALS
DIASTOLIC BLOOD PRESSURE: 50 MMHG | TEMPERATURE: 98 F | WEIGHT: 145 LBS | RESPIRATION RATE: 18 BRPM | HEART RATE: 87 BPM | BODY MASS INDEX: 21.98 KG/M2 | SYSTOLIC BLOOD PRESSURE: 110 MMHG | OXYGEN SATURATION: 98 % | HEIGHT: 68 IN

## 2017-12-04 DIAGNOSIS — Z12.11 SPECIAL SCREENING FOR MALIGNANT NEOPLASMS, COLON: ICD-10-CM

## 2017-12-04 PROCEDURE — 63600175 PHARM REV CODE 636 W HCPCS: Performed by: NURSE ANESTHETIST, CERTIFIED REGISTERED

## 2017-12-04 PROCEDURE — 45330 DIAGNOSTIC SIGMOIDOSCOPY: CPT | Mod: ,,, | Performed by: COLON & RECTAL SURGERY

## 2017-12-04 PROCEDURE — S5010 5% DEXTROSE AND 0.45% SALINE: HCPCS | Performed by: COLON & RECTAL SURGERY

## 2017-12-04 PROCEDURE — 37000009 HC ANESTHESIA EA ADD 15 MINS: Performed by: COLON & RECTAL SURGERY

## 2017-12-04 PROCEDURE — D9220A PRA ANESTHESIA: Mod: CRNA,,, | Performed by: NURSE ANESTHETIST, CERTIFIED REGISTERED

## 2017-12-04 PROCEDURE — 37000008 HC ANESTHESIA 1ST 15 MINUTES: Performed by: COLON & RECTAL SURGERY

## 2017-12-04 PROCEDURE — D9220A PRA ANESTHESIA: Mod: ANES,,, | Performed by: ANESTHESIOLOGY

## 2017-12-04 PROCEDURE — 45330 DIAGNOSTIC SIGMOIDOSCOPY: CPT | Performed by: COLON & RECTAL SURGERY

## 2017-12-04 PROCEDURE — 25000003 PHARM REV CODE 250: Performed by: COLON & RECTAL SURGERY

## 2017-12-04 RX ORDER — PROPOFOL 10 MG/ML
VIAL (ML) INTRAVENOUS CONTINUOUS PRN
Status: DISCONTINUED | OUTPATIENT
Start: 2017-12-04 | End: 2017-12-04

## 2017-12-04 RX ORDER — PROPOFOL 10 MG/ML
VIAL (ML) INTRAVENOUS
Status: DISCONTINUED | OUTPATIENT
Start: 2017-12-04 | End: 2017-12-04

## 2017-12-04 RX ORDER — AZATHIOPRINE 50 MG/1
50 TABLET ORAL DAILY
COMMUNITY
End: 2018-04-26 | Stop reason: SDUPTHER

## 2017-12-04 RX ORDER — LIDOCAINE HCL/PF 100 MG/5ML
SYRINGE (ML) INTRAVENOUS
Status: DISCONTINUED | OUTPATIENT
Start: 2017-12-04 | End: 2017-12-04

## 2017-12-04 RX ORDER — DEXTROSE MONOHYDRATE AND SODIUM CHLORIDE 5; .45 G/100ML; G/100ML
INJECTION, SOLUTION INTRAVENOUS CONTINUOUS
Status: DISCONTINUED | OUTPATIENT
Start: 2017-12-04 | End: 2017-12-04 | Stop reason: HOSPADM

## 2017-12-04 RX ADMIN — LIDOCAINE HYDROCHLORIDE 40 MG: 20 INJECTION, SOLUTION INTRAVENOUS at 12:12

## 2017-12-04 RX ADMIN — PROPOFOL 150 MCG/KG/MIN: 10 INJECTION, EMULSION INTRAVENOUS at 12:12

## 2017-12-04 RX ADMIN — PROPOFOL 80 MG: 10 INJECTION, EMULSION INTRAVENOUS at 12:12

## 2017-12-04 RX ADMIN — DEXTROSE MONOHYDRATE AND SODIUM CHLORIDE: 5; .45 INJECTION, SOLUTION INTRAVENOUS at 12:12

## 2017-12-04 NOTE — H&P
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 Crohns appears in remission, here for evaluation for potential reversal.      Past Medical History:   Diagnosis Date    Chronic pain     Crohn's disease              Review of Systems -ROS:  GENERAL: No fever, chills, fatigability or weight loss.  CHEST: Denies CASTORENA, cyanosis, wheezing, cough and sputum production.  CARDIOVASCULAR: Denies chest pain, PND, orthopnea or reduced exercise tolerance.   Musculoskeletal ROS: negative for - gait disturbance or joint pain  Neurological ROS: negative for - confusion or memory loss        Physical Exam:  General: well developed, well nourished, no distress  Head: normocephalic  Neck: supple, symmetrical, trachea midline  Lungs:  clear to auscultation bilaterally and normal respiratory effort  Heart: regular rate and rhythm, S1, S2 normal, no murmur, rub or gallop and regular rate and rhythm  Abdomen: soft, non-tender non-distented; bowel sounds normal; no masses,  no organomegaly  Extremities: no cyanosis or edema, or clubbing       Moderate Sedation (choice): Mallampati Score 1    ASA : II    IMP: As above, history of crohns disease    Plan: Colonoscopy with Moderate sedation.  I have explained the procedure including indications, alternatives, expected outcomes and potential complications. The patient appears to understand and gives informed consent. The patient is medically ready for surgery.    Have seen and examined the patient with the fellow and agree with their plan.  ESPERANZA MADSEN

## 2017-12-04 NOTE — TRANSFER OF CARE
"Anesthesia Transfer of Care Note    Patient: Carline Chang    Procedure(s) Performed: Procedure(s) (LRB):  COLONOSCOPY (N/A)    Patient location: PACU    Anesthesia Type: general    Transport from OR: Transported from OR on room air with adequate spontaneous ventilation    Post pain: adequate analgesia    Post assessment: no apparent anesthetic complications    Post vital signs: stable    Level of consciousness: awake    Nausea/Vomiting: no nausea/vomiting    Complications: none    Transfer of care protocol was followed      Last vitals:   Visit Vitals  BP (!) 122/56 (BP Location: Left arm, Patient Position: Sitting)   Pulse 85   Temp 36.6 °C (97.9 °F) (Temporal)   Resp 16   Ht 5' 8" (1.727 m)   Wt 65.8 kg (145 lb)   LMP 05/30/2009 (Approximate)   SpO2 96%   Breastfeeding? No   BMI 22.05 kg/m²     "

## 2017-12-04 NOTE — DISCHARGE INSTRUCTIONS
Colonoscopy     A camera attached to a flexible tube with a viewing lens is used to take video pictures.     Colonoscopy is a test to view the inside of your lower digestive tract (colon and rectum). Sometimes it can show the last part of the small intestine (ileum). During the test, small pieces of tissue may be removed for testing. This is called a biopsy. Small growths, such as polyps, may also be removed.   Why is colonoscopy done?  The test is done to help look for colon cancer. And it can help find the source of abdominal pain, bleeding, and changes in bowel habits. It may be needed once a year, depending on factors such as your:  · Age  · Health history  · Family health history  · Symptoms  · Results from any prior colonoscopy  Risks and possible complications  These include:  · Bleeding               · A puncture or tear in the colon   · Risks of anesthesia  · A cancer lesion not being seen  Getting ready   To prepare for the test:  · Talk with your healthcare provider about the risks of the test (see below). Also ask your healthcare provider about alternatives to the test.  · Tell your healthcare provider about any medicines you take. Also tell him or her about any health conditions you may have.  · Make sure your rectum and colon are empty for the test. Follow the diet and bowel prep instructions exactly. If you dont, the test may need to be rescheduled.  · Plan for a friend or family member to drive you home after the test.     Colonoscopy provides an inside view of the entire colon.     You may discuss the results with your doctor right away or at a future visit.  During the test   The test is usually done in the hospital on an outpatient basis. This means you go home the same day. The procedure takes about 30 minutes. During that time:  · You are given relaxing (sedating) medicine through an IV line. You may be drowsy, or fully asleep.  · The healthcare provider will first give you a physical exam to  check for anal and rectal problems.  · Then the anus is lubricated and the scope inserted.  · If you are awake, you may have a feeling similar to needing to have a bowel movement. You may also feel pressure as air is pumped into the colon. Its OK to pass gas during the procedure.  · Biopsy, polyp removal, or other treatments may be done during the test.  After the test   You may have gas right after the test. It can help to try to pass it to help prevent later bloating. Your healthcare provider may discuss the results with you right away. Or you may need to schedule a follow-up visit to talk about the results. After the test, you can go back to your normal eating and other activities. You may be tired from the sedation and need to rest for a few hours.  Date Last Reviewed: 11/1/2016 © 2000-2017 [employer], "[employer]. [address] [place] [address], [place] [address]. All rights reserved. This information is not intended as a substitute for professional medical care. Always follow your healthcare professional's instructions.

## 2017-12-04 NOTE — PATIENT INSTRUCTIONS
Discharge Summary/Instructions after an Endoscopic Procedure  Patient Name: Carline Chang  Patient MRN: [id number]  Patient YOB: 1965 Monday, December 04, 2017  Andre Uribe MD  RESTRICTIONS:  During your procedure today, you received medications for sedation.  These   medications may affect your judgment, balance and coordination.  Therefore,   for 24 hours, you have the following restrictions:   - DO NOT drive a car, operate machinery, make legal/financial decisions,   sign important papers or drink alcohol.    ACTIVITY:  The following day: return to full activity including work, except no heavy   lifting, straining or running for 3 days if polyps were removed.  DIET:  Eat and drink normally unless instructed otherwise.     TREATMENT FOR COMMON SIDE EFFECTS:  - Mild abdominal pain, belching, bloating or excessive gas: rest, eat   lightly and use a heating pad.  - Sore Throat: treat with throat lozenges and/or gargle with warm salt   water.  SYMPTOMS TO WATCH FOR AND REPORT TO YOUR PHYSICIAN:  1. Abdominal pain or bloating, other than gas cramps.  2. Chest pain.  3. Back pain.  4. Chills or fever occurring within 24 hours after the procedure.  5. Rectal bleeding, which would show as bright red, maroon, or black stools.   (A tablespoon of blood from the rectum is not serious, especially if   hemorrhoids are present.)  6. Vomiting.  7. Weakness or dizziness.  8. Because air was used during the procedure, expelling large amounts of air   from your rectum or belching is normal.  9. If a bowel prep was taken, you may not have a bowel movement for 1-3   days.  This is normal.  GO DIRECTLY TO THE NEAREST EMERGENCY ROOM IF YOU HAVE ANY OF THE FOLLOWING:      Difficulty breathing  Chills and/or fever over 101 F   Persistent vomiting and/or vomiting blood   Severe abdominal pain   Severe chest pain   Black, tarry stools   Bleeding- more than one tablespoon   Any other symptom or condition that you may  feel needs urgent attention  Your doctor recommends these additional instructions:  If any biopsies were taken, your doctor s clinic will contact you in 1 to 2   weeks with any results.  You are being discharged to home.   Return to my office in one week.  For questions, problems or results please call your physician - Andre Uribe MD at Work:  [phone].  [facility], EMERGENCY ROOM PHONE NUMBER: [phone]  IF A COMPLICATION OR EMERGENCY SITUATION ARISES AND YOU ARE UNABLE TO REACH   YOUR PHYSICIAN - GO DIRECTLY TO THE EMERGENCY ROOM.  Andre Uribe MD  12/4/2017 12:51:24 PM  This report has been verified and signed electronically.

## 2017-12-04 NOTE — ANESTHESIA PREPROCEDURE EVALUATION
12/04/2017      Carline Chang is a 51 y.o. female with PMHx of Chron's disease   L        Prev airway: Placement Date: 06/18/15; Placement Time: 1242; Method of Intubation: Direct laryngoscopy; Inserted by: Other (CRISTOFER); Airway Device: Endotracheal Tube; Mask Ventilation: Easy; Intubated: Postinduction; Blade: Little #2; Airway Device Size: 7.0; Style: Cuffed; Cuff Inflation: Minimal occlusive pressure; Inflation Amount: 7; Placement Verified By: Auscultation, Capnometry; Grade: Grade I; Complicating Factors: None; Intubation Findings: Positive EtCO2, Bilateral breath sounds, Atraumatic/Condition of teeth unchanged;  Depth of Insertion: 20; Securment: Lips; Complications: None; Breath Sounds: Equal Bilateral; Insertion Attempts: 1; Removal Date: 06/18/15;  Removal Time: 1502    Patient Active Problem List   Diagnosis    Crohn's disease    Colostomy status    Special screening for malignant neoplasms, colon    Crohn disease    Urolithiasis    Calculus of ureter       Review of patient's allergies indicates:   Allergen Reactions    Morphine     Nubain [nalbuphine] Anxiety        Current Facility-Administered Medications on File Prior to Visit   Medication Dose Route Frequency Provider Last Rate Last Dose    dextrose 5 % and 0.45 % NaCl infusion   Intravenous Continuous Andre Uribe MD        dextrose 5 % and 0.45 % NaCl infusion   Intravenous Continuous Andre Uribe MD         Current Outpatient Prescriptions on File Prior to Visit   Medication Sig Dispense Refill    azaTHIOprine (IMURAN) 50 mg Tab Take 50 mg by mouth once daily.      hydrocodone-acetaminophen 10-325mg (NORCO)  mg Tab Take 1 tablet by mouth every 4 (four) hours as needed for Pain. 91 tablet 0    KLOR-CON M20 20 mEq tablet       methen-m.blue-s.phos-phsal-hyo (URIBEL) 118-10-40.8-36 mg Cap Take 1  capsule by mouth 4 (four) times daily as needed. 20 capsule prn    promethazine (PHENERGAN) 25 MG tablet       venlafaxine (EFFEXOR) 37.5 MG Tab       zolpidem (AMBIEN) 10 mg Tab          Past Surgical History:   Procedure Laterality Date    anal fistula      BOWEL RESECTION      x2     SECTION      x2    CHOLECYSTECTOMY      COLONOSCOPY N/A 2016    Procedure: COLONOSCOPY;  Surgeon: Andre Uribe MD;  Location: [facility] ENDO (4TH FLR);  Service: Endoscopy;  Laterality: N/A;    COLONOSCOPY N/A 2017    Procedure: COLONOSCOPY;  Surgeon: Dany Stock MD;  Location: [facility] ENDO (2ND FLR);  Service: Endoscopy;  Laterality: N/A;    COLOSTOMY      rectal abscess drain      TUBAL LIGATION         Social History     Social History    Marital status:      Spouse name: N/A    Number of children: N/A    Years of education: N/A     Occupational History    Not on file.     Social History Main Topics    Smoking status: Never Smoker    Smokeless tobacco: Never Used    Alcohol use No    Drug use: No    Sexual activity: Not on file     Other Topics Concern    Not on file     Social History Narrative    No narrative on file         Vital Signs Range (Last 24H):  Temp:  [36.6 °C (97.9 °F)]   Pulse:  [85]   Resp:  [16]   BP: (122)/(56)   SpO2:  [96 %]       CBC:   No results for input(s): WBC, RBC, HGB, HCT, PLT, MCV, MCH, MCHC in the last 72 hours.    CMP: No results for input(s): NA, K, CL, CO2, BUN, CREATININE, GLU, MG, PHOS, CALCIUM, ALBUMIN, PROT, ALKPHOS, ALT, AST, BILITOT in the last 72 hours.    INR  No results for input(s): INR, PROTIME, APTT in the last 72 hours.    Invalid input(s): PT        Pre-op Assessment    I have reviewed the Patient Summary Reports.      I have reviewed the Medications.     Review of Systems  Anesthesia Hx:  No problems with previous Anesthesia Denies Hx of Anesthetic complications  History of prior surgery of interest to airway management or planning:  Denies Family Hx of Anesthesia complications.   Denies Personal Hx of Anesthesia complications.   Social:  Non-Smoker, No Alcohol Use    Hematology/Oncology:  Hematology Normal   Oncology Normal     EENT/Dental:EENT/Dental Normal   Cardiovascular:  Cardiovascular Normal  Denies Hypertension.  Denies MI.  Denies CAD.       Pulmonary:  Pulmonary Normal    Renal/:   renal calculi Urolithiasis   Hepatic/GI:   H/o Crohn's dx   Neurological:  Neurology Normal    Endocrine:  Endocrine Normal    Psych:  Psychiatric Normal           Physical Exam  General:  Well nourished    Airway/Jaw/Neck:  Airway Findings: Mouth Opening: Normal Tongue: Normal  General Airway Assessment: Adult  Mallampati: II  Improves to I with phonation.  TM Distance: Normal, at least 6 cm     Eyes/Ears/Nose:  EYES/EARS/NOSE FINDINGS: Normal   Dental:  Dental Findings: In tact   Chest/Lungs:  Chest/Lungs Findings: Clear to auscultation, Normal Respiratory Rate     Heart/Vascular:  Heart Findings: Rate: Normal  Rhythm: Regular Rhythm        Mental Status:  Mental Status Findings:  Cooperative, Alert and Oriented         Anesthesia Plan  Type of Anesthesia, risks & benefits discussed:  Anesthesia Type:  general  Patient's Preference:   Intra-op Monitoring Plan:   Intra-op Monitoring Plan Comments:   Post Op Pain Control Plan:   Post Op Pain Control Plan Comments:   Induction:   IV  Beta Blocker:  Patient is not currently on a Beta-Blocker (No further documentation required).       Informed Consent: Patient understands risks and agrees with Anesthesia plan.  Questions answered. Anesthesia consent signed with patient.  ASA Score: 3     Day of Surgery Review of History & Physical: I have interviewed and examined the patient. I have reviewed the patient's H&P dated:    H&P update referred to the surgeon.         Ready For Surgery From Anesthesia Perspective.

## 2017-12-05 NOTE — ANESTHESIA POSTPROCEDURE EVALUATION
"Anesthesia Post Evaluation    Patient: Carline Chang    Procedure(s) Performed: Procedure(s) (LRB):  COLONOSCOPY (N/A)    Final Anesthesia Type: general  Patient location during evaluation: PACU  Patient participation: Yes- Able to Participate  Level of consciousness: awake and alert  Post-procedure vital signs: reviewed and stable  Pain management: adequate  Airway patency: patent  PONV status at discharge: No PONV  Anesthetic complications: no      Cardiovascular status: blood pressure returned to baseline and hemodynamically stable  Respiratory status: unassisted, spontaneous ventilation and room air  Hydration status: euvolemic  Follow-up not needed.        Visit Vitals  BP (!) 110/50   Pulse 87   Temp 36.7 °C (98.1 °F) (Oral)   Resp 18   Ht 5' 8" (1.727 m)   Wt 65.8 kg (145 lb)   LMP 05/30/2009 (Approximate)   SpO2 98%   Breastfeeding? No   BMI 22.05 kg/m²       Pain/Estiven Score: Pain Assessment Performed: Yes (12/4/2017  1:19 PM)  Presence of Pain: denies (12/4/2017  1:19 PM)  Estiven Score: 9 (12/4/2017 12:58 PM)      "

## 2017-12-08 ENCOUNTER — TELEPHONE (OUTPATIENT)
Dept: SURGERY | Facility: CLINIC | Age: 52
End: 2017-12-08

## 2017-12-08 NOTE — TELEPHONE ENCOUNTER
----- Message from Noemí Powell MA sent at 12/8/2017  1:14 PM CST -----  Contact: [phone]  Pt states that someone was suppose to be calling  her back with an appt for next week with Dr Uribe as a follow up from her surgery on 12/4  [phone]

## 2017-12-11 ENCOUNTER — TELEPHONE (OUTPATIENT)
Dept: ENDOSCOPY | Facility: HOSPITAL | Age: 52
End: 2017-12-11

## 2017-12-14 ENCOUNTER — OFFICE VISIT (OUTPATIENT)
Dept: SURGERY | Facility: CLINIC | Age: 52
End: 2017-12-14
Payer: MEDICARE

## 2017-12-14 VITALS
BODY MASS INDEX: 22.22 KG/M2 | DIASTOLIC BLOOD PRESSURE: 65 MMHG | HEIGHT: 68 IN | WEIGHT: 146.63 LBS | HEART RATE: 88 BPM | SYSTOLIC BLOOD PRESSURE: 123 MMHG

## 2017-12-14 DIAGNOSIS — K50.113 CROHN'S DISEASE OF COLON WITH FISTULA: Primary | ICD-10-CM

## 2017-12-14 PROCEDURE — 99214 OFFICE O/P EST MOD 30 MIN: CPT | Mod: S$PBB,,, | Performed by: COLON & RECTAL SURGERY

## 2017-12-14 PROCEDURE — 99999 PR PBB SHADOW E&M-EST. PATIENT-LVL IV: CPT | Mod: PBBFAC,,, | Performed by: COLON & RECTAL SURGERY

## 2017-12-14 PROCEDURE — 99214 OFFICE O/P EST MOD 30 MIN: CPT | Mod: PBBFAC | Performed by: COLON & RECTAL SURGERY

## 2017-12-14 RX ORDER — HYDROCODONE BITARTRATE AND ACETAMINOPHEN 10; 325 MG/1; MG/1
1 TABLET ORAL EVERY 4 HOURS PRN
Qty: 91 TABLET | Refills: 0 | Status: ON HOLD | OUTPATIENT
Start: 2017-12-14 | End: 2018-01-12 | Stop reason: HOSPADM

## 2017-12-14 RX ORDER — NEOMYCIN SULFATE 500 MG/1
500 TABLET ORAL SEE ADMIN INSTRUCTIONS
Qty: 6 TABLET | Refills: 0 | Status: ON HOLD | OUTPATIENT
Start: 2017-12-14 | End: 2018-01-12 | Stop reason: HOSPADM

## 2017-12-14 RX ORDER — METRONIDAZOLE 500 MG/1
500 TABLET ORAL ONCE
Qty: 3 TABLET | Refills: 0 | Status: SHIPPED | OUTPATIENT
Start: 2017-12-14 | End: 2017-12-14

## 2017-12-14 RX ORDER — POLYETHYLENE GLYCOL-3350 AND ELECTROLYTES 236; 6.74; 5.86; 2.97; 22.74 G/274.31G; G/274.31G; G/274.31G; G/274.31G; G/274.31G
POWDER, FOR SOLUTION ORAL
COMMUNITY
Start: 2017-11-15 | End: 2017-12-14 | Stop reason: ALTCHOICE

## 2017-12-14 NOTE — PRE ADMISSION SCREENING
"Anesthesia Assessment: Preoperative EQUATION    Planned Procedure: Procedure(s) (LRB):  REPAIR-FISTULA-RECTOVAGINAL (N/A)  Requested Anesthesia Type:General  Surgeon: Andre Uribe MD  Service: Colon and Rectal  Known or anticipated Date of Surgery:1/3/2018    Surgeon notes: reviewed and Crohn's disease of colon with fistula    Previous anesthesia records:12/4/17-Colonoscopy-General-no apparent anesthetic complications  Anesthesia Hx:  No problems with previous Anesthesia Denies Hx of Anesthetic complications  History of prior surgery of interest to airway management or planning: Denies Family Hx of Anesthesia complications.   Denies Personal Hx of Anesthesia complications.     Last PCP note: No PCP at present-Only [facility] M.D.--FRANCOIS Cancino  Subspecialty notes: Urology/Wound Care-Enterostomal     Tests already available:  CXR-1 view-1/16/17      Plan:    Testing:  Hematology Profile, CMP and T&S     Patient  has previously scheduled Medical Appointment:none    Navigation: Tests Scheduled. Lab-Hem Prof/CMP/T&S  on 12/22/17 @ 9:30a             Consults scheduled.POC on 12/22/17 @ 10a & Appt. on 12/19/17 @ [facility]/PCP Dr. Medina for "Clearance"-PENDING             Results will be tracked by Preop Clinic.                 Mona Solo RN  12/15/17  "

## 2017-12-14 NOTE — PROGRESS NOTES
Subjective:       Patient ID: Carline Chang is a 51 y.o. female.    Chief Complaint: Crohn's colitis with rectal stricture and rectovaginal fistula HPI   51 F who presents to clinic with with a history of colostomy for rectal Crohn's disease as well as a rectovaginal fistula.  She is actually doing very well.  Per her gastroenterologist shE no longer has active Crohn's disease and has taken her off biologic therapy and she is on Imuran only.  She still has a fistula recent colonoscopy showed a stricture but no evidence of active disease  Objective:      Physical Exam   Constitutional: She is oriented to person, place, and time. She appears well-developed and well-nourished.   Pulmonary/Chest: Effort normal. No respiratory distress.   Abdominal: Soft. She exhibits no distension. There is tenderness.  she has prolapse of her colostomy   Musculoskeletal: Normal range of motion.   Neurological: She is alert and oriented to person, place, and time.   Skin: Skin is warm and dry.   Psychiatric: She has a normal mood and affect. Her behavior is normal.       Assessment:       1. Crohn's disease with other complication    2. Colostomy status        Plan:         Rectal Crohn's disease we'll schedule for repair of rectovaginal fistula while she is diverted as well as repair of stomal prolapse  I have explained the procedure including indications, alternatives, expected outcomes and potential complications. The patient appears to understand and gives informed consent. The patient will be evaluated by the preop center

## 2017-12-15 ENCOUNTER — ANESTHESIA EVENT (OUTPATIENT)
Dept: SURGERY | Facility: HOSPITAL | Age: 52
DRG: 330 | End: 2017-12-15
Payer: MEDICARE

## 2017-12-15 DIAGNOSIS — Z01.818 PREOP TESTING: Primary | ICD-10-CM

## 2017-12-15 DIAGNOSIS — K50.113: ICD-10-CM

## 2017-12-15 NOTE — HPI
51 F who presents to clinic with with a history of colostomy for rectal Crohn's disease as well as a rectovaginal fistula.  She is actually doing very well.  Per her gastroenterologist shE no longer has active Crohn's disease and has taken her off biologic therapy and she is on Imuran only.  She still has a fistula recent colonoscopy showed a stricture but no evidence of active disease, she is here to discuss surgical intervention, also complaining that her colostomy is prolapsing with out obstruction

## 2017-12-15 NOTE — ANESTHESIA PREPROCEDURE EVALUATION
" Anesthesia Assessment: Preoperative EQUATION    Planned Procedure: Procedure(s) (LRB):  REPAIR-FISTULA-RECTOVAGINAL (N/A)  Requested Anesthesia Type:General  Surgeon: Andre Uribe MD  Service: Colon and Rectal  Known or anticipated Date of Surgery:1/3/2018    Surgeon notes: reviewed and Crohn's disease of colon with fistula    Previous anesthesia records:12/4/17-Colonoscopy-General-no apparent anesthetic complications  Anesthesia Hx:  No problems with previous Anesthesia Denies Hx of Anesthetic complications  History of prior surgery of interest to airway management or planning: Denies Family Hx of Anesthesia complications.   Denies Personal Hx of Anesthesia complications.     Last PCP note: No PCP at present-Only [facility] M[facility].--FRANCOIS Cancino-patient stated her Gastro doctor follows her as her PCP  Subspecialty notes: Urology/Wound Care-Enterostomal     Tests already available:  CXR-1 view-1/16/17      Plan:    Testing:  Hematology Profile, CMP and T&S     Patient  has previously scheduled Medical Appointment:none    Navigation: Tests Scheduled. Lab-Hem Prof/CMP/T&S  on 12/22/17 @ 9:30a             Consults scheduled.POC on 12/22/17 @ 10a & Appt. on 12/19/17 @ [facility]/PCP Dr. Medina for "Clearance"-PENDING-Plan reviewed with Dr. Guzman.             Results will be tracked by Preop Clinic.                 Mona Solo RN  12/15/17                                                                                                              12/15/2017  Carline Chang is a 52 y.o., female.    Anesthesia Evaluation      I have reviewed the Medications.   Steroids Taken In Past Year: Prednisone    Review of Systems  Anesthesia Hx:  No problems with previous Anesthesia Pt. is an "extremely hardstick" and requires anesthesia for IV. Pt. states she usually gets a central line when hospitalized. History of prior surgery of interest to airway management or planning: Previous anesthesia: " General 12/4/17: Colonoscpy with general anesthesia. Procedure performed at an [facility]. Denies Family Hx of Anesthesia complications.   Denies Personal Hx of Anesthesia complications.   Social:  Denies Tobacco Use. Denies Alcohol Use.   Hematology/Oncology:         -- Anemia:   EENT/Dental:EENT/Dental Normal   Cardiovascular:  Cardiovascular Normal   Functional Capacity can climb two flight of stairs: Denies CP/SOB    Pulmonary:  Pulmonary Normal    Renal/:   renal calculi    Hepatic/GI:   Rectovaginal fistula Hepatic/GI Symptoms: (TUMS prn) heartburn.  Bowel Conditions:  Inflammatory Bowel Disease, Crohns    Musculoskeletal:  Musculoskeletal Normal    Neurological:   Peripheral Neuropathy    Endocrine:  Endocrine Normal        Physical Exam  General:  Well nourished    Airway/Jaw/Neck:  Airway Findings: Mouth Opening: Normal Tongue: Normal  General Airway Assessment: Adult  Mallampati: II  TM Distance: Normal, at least 6 cm  Jaw/Neck Findings:     Neck ROM: Normal ROM      Dental:  Dental Findings: In tact   Chest/Lungs:  Chest/Lungs Findings: Clear to auscultation, Normal Respiratory Rate     Heart/Vascular:  Heart Findings: Rate: Normal  Rhythm: Regular Rhythm  Sounds: Normal        Mental Status:  Mental Status Findings:  Cooperative, Alert and Oriented         Anesthesia Plan  Type of Anesthesia, risks & benefits discussed:  Anesthesia Type:  general  Patient's Preference:   Intra-op Monitoring Plan: standard ASA monitors  Intra-op Monitoring Plan Comments:   Post Op Pain Control Plan: per primary service following discharge from PACU, IV/PO Opioids PRN and multimodal analgesia  Post Op Pain Control Plan Comments:   Induction:   IV  Beta Blocker:  Patient is not currently on a Beta-Blocker (No further documentation required).       Informed Consent: Patient understands risks and agrees with Anesthesia plan.  Questions answered. Anesthesia consent signed with patient.  ASA Score: 2     Day of Surgery  Review of History & Physical:            Ready For Surgery From Anesthesia Perspective.   Cleared by Dr. Medina 1/9/18-- scanned in media 1/9 @ 1448    EKG scanned in media 12 /22/17        Tamara Crews RN

## 2017-12-15 NOTE — H&P
[facility]  Colorectal Surgery  History & Physical    Patient Name: Carline Chang  MRN: [id number]  Admission Date: (Not on file)  Attending Physician: Andre Uribe MD   Primary Care Provider: Pablo Medina MD    Subjective:     Chief Complaint/Reason for Admission: surgery    History of Present Illness:  51 F who presents to clinic with with a history of colostomy for rectal Crohn's disease as well as a rectovaginal fistula.  She is actually doing very well.  Per her gastroenterologist shE no longer has active Crohn's disease and has taken her off biologic therapy and she is on Imuran only.  She still has a fistula recent colonoscopy showed a stricture but no evidence of active disease, she is here to discuss surgical intervention, also complaining that her colostomy is prolapsing with out obstruction    No prescriptions prior to admission.       Review of patient's allergies indicates:   Allergen Reactions    Morphine     Nubain [nalbuphine] Anxiety       Past Medical History:   Diagnosis Date    Chronic pain     Crohn's disease      Past Surgical History:   Procedure Laterality Date    anal fistula      BOWEL RESECTION      x2     SECTION      x2    CHOLECYSTECTOMY      COLONOSCOPY N/A 2016    Procedure: COLONOSCOPY;  Surgeon: Andre Uribe MD;  Location: [facility] ENDO (4TH FLR);  Service: Endoscopy;  Laterality: N/A;    COLONOSCOPY N/A 2017    Procedure: COLONOSCOPY;  Surgeon: Dany Stock MD;  Location: [facility] ENDO (2ND FLR);  Service: Endoscopy;  Laterality: N/A;    COLONOSCOPY N/A 2017    Procedure: COLONOSCOPY;  Surgeon: Andre Uribe MD;  Location: [facility] ENDO (4TH FLR);  Service: Endoscopy;  Laterality: N/A;    COLOSTOMY      rectal abscess drain      TUBAL LIGATION       Family History     Problem Relation (Age of Onset)    Cancer Maternal Aunt (66)        Social History Main Topics    Smoking status: Never Smoker    Smokeless  tobacco: Never Used    Alcohol use No    Drug use: No    Sexual activity: Not on file     Review of Systems   Constitutional: Negative for appetite change, chills, fatigue, fever and unexpected weight change.   Respiratory: Negative for cough, chest tightness, shortness of breath and wheezing.    Cardiovascular: Negative for chest pain and leg swelling.   Gastrointestinal: Negative for abdominal distention, abdominal pain, anal bleeding, blood in stool, constipation, diarrhea, nausea, rectal pain and vomiting.   Genitourinary: Negative for difficulty urinating, dysuria, flank pain, frequency and hematuria.   Musculoskeletal: Negative for back pain, gait problem and joint swelling.   Neurological: Negative for syncope, weakness and numbness.   Hematological: Does not bruise/bleed easily.   Psychiatric/Behavioral: Negative for agitation, confusion, decreased concentration and hallucinations. The patient is not nervous/anxious and is not hyperactive.      Objective:     Vital Signs (Most Recent):    Vital Signs (24h Range):        Weight: 66.5 kg (146 lb 9 oz)  Body mass index is 22.28 kg/m².    Physical Exam   Constitutional: She is oriented to person, place, and time. She appears well-developed and well-nourished.   HENT:   Head: Normocephalic.   Eyes: Pupils are equal, round, and reactive to light.   Cardiovascular: Normal rate, regular rhythm and normal heart sounds.    Pulmonary/Chest: Effort normal and breath sounds normal. No respiratory distress. She has no wheezes. She has no rales.   Abdominal: Soft. She exhibits no mass. There is no rebound and no guarding.   Functional colostomy with prolapse   Neurological: She is alert and oriented to person, place, and time.   Skin: Skin is warm and dry.   Psychiatric: She has a normal mood and affect. Her behavior is normal. Judgment and thought content normal.       BMP (Last 3 Results): No results for input(s): GLU, NA, K, CL, CO2, BUN, CREATININE, CALCIUM, MG in  the last 168 hours.  CBC (Last 3 Results): No results for input(s): WBC, RBC, HGB, HCT, PLT, MCV, MCH, MCHC in the last 168 hours.    Significant Diagnostics:  None    Assessment/Plan:     Pt with Crohn's rectal dx with colostomy, here to discuss repair of rectal vaginal fistula'    Admission paperwork was accomplished including operative consent and consent for blood and blood product administration.    The procedure was explained to the patient including indications, risks and alternatives. The patient verbalized understanding and has given informed consent.    Plan:  Proceed with operative procedure as planned.    Preop teaching done to include:    Mechanical bowel prep instruction sheet provided which includes instructions for prep and pre-op abx schedule, NPO after MN, and Hibiclens baths prior to surgery.     Details of planned surgery reviewed:  · Review of procedure  · Expected LOS.   · Preop process- application of SPENCER hose and SCD's, IV and IVF  · Different methods of post op pain control (IV and oral)   · Use of and importance of IS and other prophylaxis  · Expected early ambulation  · Slow advancement of diet  · Ostomy and ostomy care if indicated   · Pathology report generally takes 5 or more working days    Goals that need to be met before discharge:  · No fever  · Functional status at baseline   · Tolerating low fiber diet   · Positive bowel function  · Adequate pain control with oral meds  · Vital signs and labs stable    Home instructions begun during this preop visit:   · May shower (no tub bath),   · No heavy lifting, nothing greater then 5 pounds,  · Small frequent meals, low residue,   · Ostomy care if needed,   · Call for fever above 101, nausea/vomiting, no bm and any increase in pain     All questions answered to pt and family satisfaction.     Follow Up:  After hospitalization.       Carolyn Funes NP  Colorectal Surgery  [facility]

## 2017-12-15 NOTE — SUBJECTIVE & OBJECTIVE
No prescriptions prior to admission.       Review of patient's allergies indicates:   Allergen Reactions    Morphine     Nubain [nalbuphine] Anxiety       Past Medical History:   Diagnosis Date    Chronic pain     Crohn's disease      Past Surgical History:   Procedure Laterality Date    anal fistula      BOWEL RESECTION      x2     SECTION      x2    CHOLECYSTECTOMY      COLONOSCOPY N/A 2016    Procedure: COLONOSCOPY;  Surgeon: Andre Uribe MD;  Location: [facility] ENDO (4TH FLR);  Service: Endoscopy;  Laterality: N/A;    COLONOSCOPY N/A 2017    Procedure: COLONOSCOPY;  Surgeon: Dany Stock MD;  Location: [facility] ENDO (2ND FLR);  Service: Endoscopy;  Laterality: N/A;    COLONOSCOPY N/A 2017    Procedure: COLONOSCOPY;  Surgeon: Andre Uribe MD;  Location: [facility] ENDO (4TH FLR);  Service: Endoscopy;  Laterality: N/A;    COLOSTOMY      rectal abscess drain      TUBAL LIGATION       Family History     Problem Relation (Age of Onset)    Cancer Maternal Aunt (66)        Social History Main Topics    Smoking status: Never Smoker    Smokeless tobacco: Never Used    Alcohol use No    Drug use: No    Sexual activity: Not on file     Review of Systems   Constitutional: Negative for appetite change, chills, fatigue, fever and unexpected weight change.   Respiratory: Negative for cough, chest tightness, shortness of breath and wheezing.    Cardiovascular: Negative for chest pain and leg swelling.   Gastrointestinal: Negative for abdominal distention, abdominal pain, anal bleeding, blood in stool, constipation, diarrhea, nausea, rectal pain and vomiting.   Genitourinary: Negative for difficulty urinating, dysuria, flank pain, frequency and hematuria.   Musculoskeletal: Negative for back pain, gait problem and joint swelling.   Neurological: Negative for syncope, weakness and numbness.   Hematological: Does not bruise/bleed easily.   Psychiatric/Behavioral: Negative for agitation,  confusion, decreased concentration and hallucinations. The patient is not nervous/anxious and is not hyperactive.      Objective:     Vital Signs (Most Recent):    Vital Signs (24h Range):        Weight: 66.5 kg (146 lb 9 oz)  Body mass index is 22.28 kg/m².    Physical Exam   Constitutional: She is oriented to person, place, and time. She appears well-developed and well-nourished.   HENT:   Head: Normocephalic.   Eyes: Pupils are equal, round, and reactive to light.   Cardiovascular: Normal rate, regular rhythm and normal heart sounds.    Pulmonary/Chest: Effort normal and breath sounds normal. No respiratory distress. She has no wheezes. She has no rales.   Abdominal: Soft. She exhibits no mass. There is no rebound and no guarding.   Functional colostomy with prolapse   Neurological: She is alert and oriented to person, place, and time.   Skin: Skin is warm and dry.   Psychiatric: She has a normal mood and affect. Her behavior is normal. Judgment and thought content normal.       BMP (Last 3 Results): No results for input(s): GLU, NA, K, CL, CO2, BUN, CREATININE, CALCIUM, MG in the last 168 hours.  CBC (Last 3 Results): No results for input(s): WBC, RBC, HGB, HCT, PLT, MCV, MCH, MCHC in the last 168 hours.    Significant Diagnostics:  None

## 2017-12-19 ENCOUNTER — OFFICE VISIT (OUTPATIENT)
Dept: GASTROENTEROLOGY | Facility: CLINIC | Age: 52
End: 2017-12-19
Payer: MEDICARE

## 2017-12-19 VITALS
BODY MASS INDEX: 22.4 KG/M2 | DIASTOLIC BLOOD PRESSURE: 82 MMHG | TEMPERATURE: 97 F | HEIGHT: 68 IN | SYSTOLIC BLOOD PRESSURE: 118 MMHG | HEART RATE: 98 BPM | WEIGHT: 147.81 LBS

## 2017-12-19 DIAGNOSIS — K50.113 CROHN'S DISEASE OF LARGE INTESTINE WITH FISTULA: ICD-10-CM

## 2017-12-19 DIAGNOSIS — Z01.818 PRE-OPERATIVE CLEARANCE: Primary | ICD-10-CM

## 2017-12-19 DIAGNOSIS — K94.09: ICD-10-CM

## 2017-12-19 DIAGNOSIS — Z12.31 SCREENING MAMMOGRAM, ENCOUNTER FOR: ICD-10-CM

## 2017-12-19 PROCEDURE — 99215 OFFICE O/P EST HI 40 MIN: CPT | Mod: ,,, | Performed by: INTERNAL MEDICINE

## 2017-12-19 RX ORDER — POTASSIUM CHLORIDE 20 MEQ/1
20 TABLET, EXTENDED RELEASE ORAL DAILY
Qty: 30 TABLET | Refills: 0 | Status: SHIPPED | OUTPATIENT
Start: 2017-12-19 | End: 2018-04-09 | Stop reason: ALTCHOICE

## 2017-12-19 NOTE — PROGRESS NOTES
[facility] Established Patient Visit    Subjective:       patient will be having surgery with Dr. Uribe for fistula repair and prolapsed stoma scheduled for . Would like Dr. Medina to clear her for surgery.  Will be having General Anesthesia.    PCP: Pablo Medina    Referring Provider: No ref. provider found    Chief Complaint: GI Problem (follow up for [facility]. [facility] needs clearance for surgery with [facility] for fistula repair and prolapsed stoma.)       HPI:  Carline Chang is a 52 y.o. female here for   HPI      ROS: ***  Constitutional: No fevers, chills, weight loss  ENT: No allergies, sore throat, rhinorrhea  CV: No chest pain, palpitations, edema  Pulm: No cough, shortness of breath, wheezing  Ophtho: No vision changes  GI: No blood in stools, change in bowel habits, nausea/vomiting  Denies alternating diarrhea/constipation. ***  Derm: No rash  Heme: No easy bruising or lymphadenopathy  MSK: No arthralgias or myalgias  : No dysuria, hematuria, frequency, polyuria, or flank tenderness  Endo: No hot or cold intolerance  Neuro: No syncope or seizure, or dizziness  Psych: No hallucination, depression or suicidal ideation      Medical History:  has a past medical history of Chronic pain and Crohn's disease.      Surgical History:  has a past surgical history that includes Bowel resection; Cholecystectomy; Tubal ligation; rectal abscess drain;  section; Colonoscopy (N/A, 2016); Colonoscopy (N/A, 2017); Colostomy; anal fistula; and Colonoscopy (N/A, 2017).    Family History:   Family History   Problem Relation Age of Onset    Cancer Maternal Aunt 66     colon ca    Anesthesia problems Neg Hx        Social History:   Social History   Substance Use Topics    Smoking status: Never Smoker    Smokeless tobacco: Never Used    Alcohol use No       The patient's social and family histories were reviewed by me and updated in the appropriate  "section of the electronic medical record.    Allergies:   Review of patient's allergies indicates:   Allergen Reactions    Morphine     Nubain [nalbuphine] Anxiety         Medications:   Current Outpatient Prescriptions   Medication Sig Dispense Refill    azaTHIOprine (IMURAN) 50 mg Tab Take 50 mg by mouth once daily.      hydrocodone-acetaminophen 10-325mg (NORCO)  mg Tab Take 1 tablet by mouth every 4 (four) hours as needed for Pain. 91 tablet 0    neomycin (MYCIFRADIN) 500 mg Tab Take 1 tablet (500 mg total) by mouth As instructed. Take 2 pills at 1pm, 2 pills at 2pm, and 2 pills at 11pm 6 tablet 0    promethazine (PHENERGAN) 25 MG tablet       venlafaxine (EFFEXOR) 37.5 MG Tab        No current facility-administered medications for this visit.      All medications and past history have been reviewed.        Objective:        Vital Signs:  Blood pressure 118/82, pulse 98, temperature 97.3 °F (36.3 °C), temperature source Skin, height 5' 8" (1.727 m), weight 67 kg (147 lb 12.8 oz), last menstrual period 05/30/2009. Body mass index is 22.47 kg/m².    Physical Exam: ***  General Appearance: Well appearing in no acute distress, well developed, well                 nourished  Head: Normocephalic, without obvious abnormality  Eyes:  Pupils equal, round and reactive to light  Throat: Lips, mucosa, and tongue normal; teeth and gums normal  Lungs: CTA bilaterally in anterior and posterior fields, no wheezes, no crackles  Heart:  Regular rate and rhythm, no murmurs heard  Abdomen: Soft, non tender, non distended with positive bowel sounds in all four quadrants. No hepatosplenomegaly, ascites, or mass. Negative for succusion splash  : female ***  Extremities: No cyanosis, edema or deformity  Skin: No rash  Neurologic: Normal exam    Labs:  Lab Results   Component Value Date    WBC 12.84 (H) 01/24/2017    HGB 11.6 (L) 01/24/2017    HCT 34.2 (L) 01/24/2017     01/24/2017    ALT 30 01/23/2017    AST 21 " 01/23/2017     01/24/2017    K 2.8 (L) 01/24/2017     01/24/2017    CREATININE 0.9 01/24/2017    BUN 12 01/24/2017    CO2 24 01/24/2017    INR 1.0 01/17/2017       Imaging: ***    All lab results and imaging results have been reviewed and discussed with the patie    Endoscopy: ***    Assessment/Plan:    Assessment:       No diagnosis found.    Plan:       There are no diagnoses linked to this encounter.  ***    No Follow-up on file.    The plan was discussed with the patient and all questions/concerns have been answered to the patient's satisfaction.        Electronically signed by Madison Charles MD    This note was dictated using voice recognition software and may contain grammatical errors.

## 2017-12-19 NOTE — PROGRESS NOTES
[facility] Established Patient Visit    Subjective:           PCP: Pablo Medina    Referring Provider: No ref. provider found    Chief Complaint: GI Problem (follow up for [facility]. [facility] needs clearance for surgery with [facility] for fistula repair and prolapsed stoma.)       HPI:  Carline Chang is a 52 y.o. female here for   GI Problem   The primary symptoms include weight loss, fatigue, abdominal pain, nausea and diarrhea. Primary symptoms do not include vomiting, melena, hematemesis, jaundice, hematochezia, dysuria, myalgias, arthralgias or rash. Episode onset: Patient has Crohn's disease for more than 15 years. The onset was gradual. The problem has been gradually improving.   Significant associated medical issues include inflammatory bowel disease.         ROS:   Constitutional: No fevers, chills, weight loss  ENT: No allergies, sore throat, rhinorrhea  CV: No chest pain, palpitations, edema  Pulm: No cough, shortness of breath, wheezing  Ophtho: No vision changes  GI: No blood in stools, change in bowel habits, nausea/vomitingHas Crohn's disease with fistula  Denies alternating diarrhea/constipation.   Derm: No rash  Heme: No easy bruising or lymphadenopathy  MSK: No arthralgias or myalgias  : No dysuria, hematuria, frequency, polyuria, or flank tenderness  Endo: No hot or cold intolerance  Neuro: No syncope or seizure, or dizziness  Psych: No hallucination, depression or suicidal ideation      Medical History:  has a past medical history of Chronic pain and Crohn's disease.      Surgical History:  has a past surgical history that includes Bowel resection; Cholecystectomy; Tubal ligation; rectal abscess drain;  section; Colonoscopy (N/A, 2016); Colonoscopy (N/A, 2017); Colostomy; anal fistula; and Colonoscopy (N/A, 2017).    Family History:   Family History   Problem Relation Age of Onset    Cancer Maternal Aunt 66     colon ca    Anesthesia problems  "Neg Hx        Social History:   Social History   Substance Use Topics    Smoking status: Never Smoker    Smokeless tobacco: Never Used    Alcohol use No       The patient's social and family histories were reviewed by me and updated in the appropriate section of the electronic medical record.    Allergies:   Review of patient's allergies indicates:   Allergen Reactions    Morphine     Nubain [nalbuphine] Anxiety         Medications:   Current Outpatient Prescriptions   Medication Sig Dispense Refill    azaTHIOprine (IMURAN) 50 mg Tab Take 50 mg by mouth once daily.      hydrocodone-acetaminophen 10-325mg (NORCO)  mg Tab Take 1 tablet by mouth every 4 (four) hours as needed for Pain. 91 tablet 0    neomycin (MYCIFRADIN) 500 mg Tab Take 1 tablet (500 mg total) by mouth As instructed. Take 2 pills at 1pm, 2 pills at 2pm, and 2 pills at 11pm 6 tablet 0    promethazine (PHENERGAN) 25 MG tablet       venlafaxine (EFFEXOR) 37.5 MG Tab       potassium chloride SA (K-DUR,KLOR-CON) 20 MEQ tablet Take 1 tablet (20 mEq total) by mouth once daily. 30 tablet 0     No current facility-administered medications for this visit.      All medications and past history have been reviewed.        Objective:        Vital Signs:  Blood pressure 118/82, pulse 98, temperature 97.3 °F (36.3 °C), temperature source Skin, height 5' 8" (1.727 m), weight 67 kg (147 lb 12.8 oz), last menstrual period 05/30/2009. Body mass index is 22.47 kg/m².    Physical Exam:   General Appearance: Well appearing in no acute distress, well developed, well                 nourished  Head: Normocephalic, without obvious abnormality  Eyes:  Pupils equal, round and reactive to light  Throat: Lips, mucosa, and tongue normal; teeth and gums normal  Lungs: CTA bilaterally in anterior and posterior fields, no wheezes, no crackles  Heart:  Regular rate and rhythm, no murmurs heard  Abdomen: Soft, non tender, non distended with positive bowel sounds in all " four quadrants. No hepatosplenomegaly, ascites, or mass. 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  Extremities: No cyanosis, edema or deformity  Skin: No rash  Neurologic: Normal exam    Labs:  Lab Results   Component Value Date    WBC 12.84 (H) 01/24/2017    HGB 11.6 (L) 01/24/2017    HCT 34.2 (L) 01/24/2017     01/24/2017    ALT 30 01/23/2017    AST 21 01/23/2017     01/24/2017    K 2.8 (L) 01/24/2017     01/24/2017    CREATININE 0.9 01/24/2017    BUN 12 01/24/2017    CO2 24 01/24/2017    INR 1.0 01/17/2017       Imaging: Old imaging reviewed    All lab results and imaging results have been reviewed and discussed with the patie    Endoscopy: Endoscopy done at [facility] reviewed no biopsies done:  inflammatory bowel disease in remission    Assessment/Plan:    Assessment:       1. Pre-operative clearance    2. Fistula of colostomy    3. Screening mammogram, encounter for    4. Crohn's disease of large intestine with fistula        Plan:       Pre-operative clearance  -     EKG 12-lead; Future; Expected date: 12/19/2017    Fistula of colostomy    Screening mammogram, encounter for  -     Mammo Digital Screening Bilat with CAD    Crohn's disease of large intestine with fistula    Other orders  -     potassium chloride SA (K-DUR,KLOR-CON) 20 MEQ tablet; Take 1 tablet (20 mEq total) by mouth once daily.  Dispense: 30 tablet; Refill: 0      Patient came for preop clearance had EKG done with cardiologist. EKG is normal. She is going to [facility] for further workup. Patient to have surgery for the fistula and parastomal hernia    Return for after surgery.    The plan was discussed with the patient and all questions/concerns have been answered to the patient's satisfaction.        Electronically signed by Pablo Medina MD    This note was dictated using voice recognition software and may contain grammatical errors.

## 2017-12-20 ENCOUNTER — TELEPHONE (OUTPATIENT)
Dept: INTERNAL MEDICINE | Facility: CLINIC | Age: 52
End: 2017-12-20

## 2017-12-20 NOTE — TELEPHONE ENCOUNTER
"----- Message from Andreea Escudero MA sent at 12/20/2017  9:40 AM CST -----  Regarding: Surgery Clearance   Patient was seen yesterday for pre-op clearance from Dr Medina. Anesthesia is requesting a "clearance statement" to be added to the note if pt is cleared. Thank you. Sincerely, Andreea Escudero MA Pre-op/Anesthesia  Ext [phone]  "

## 2017-12-20 NOTE — TELEPHONE ENCOUNTER
"----- Message from Andreea Escudero MA sent at 12/20/2017  9:40 AM CST -----  Regarding: Surgery Clearance   Patient was seen yesterday for pre-op clearance from Dr Medina. Anesthesia is requesting a "clearance statement" to be added to the note if pt is cleared. Thank you. Sincerely, Andreea Escudero MA Pre-op/Anesthesia  Ext [phone]  "

## 2017-12-21 DIAGNOSIS — K50.113 CROHN'S DISEASE OF COLON, WITH FISTULA: Primary | ICD-10-CM

## 2017-12-21 RX ORDER — MAGNESIUM 250 MG
TABLET ORAL ONCE
COMMUNITY
End: 2018-01-30

## 2017-12-21 NOTE — PRE-PROCEDURE INSTRUCTIONS
PreOp anesthetic phone evaluation done.  Instructions given:  - Medications ( What to take and what to hold)  - NPO directions  - Use an antibacterial soap ( Dial or Hibiclens- as instructed by surgeon) the night before surgery and AM of surgery  - Wear comfortable clothes  - No perfume/cologne/ powder or lotions  - Do not wear jewelry or bring any valuables  - Directions given on where to report on AM of surgery; time to be given the day before surgery by the surgeon's office    Pt. verbalized understanding.

## 2017-12-22 ENCOUNTER — HOSPITAL ENCOUNTER (OUTPATIENT)
Dept: PREADMISSION TESTING | Facility: HOSPITAL | Age: 52
Discharge: HOME | End: 2017-12-22

## 2017-12-29 ENCOUNTER — LAB VISIT (OUTPATIENT)
Dept: LAB | Facility: HOSPITAL | Age: 52
End: 2017-12-29
Attending: ANESTHESIOLOGY
Payer: MEDICARE

## 2017-12-29 DIAGNOSIS — K50.113: ICD-10-CM

## 2017-12-29 LAB
ALBUMIN SERPL BCP-MCNC: 3.1 G/DL
ALP SERPL-CCNC: 184 U/L
ALT SERPL W/O P-5'-P-CCNC: 10 U/L
ANION GAP SERPL CALC-SCNC: 13 MMOL/L
AST SERPL-CCNC: 13 U/L
BASOPHILS # BLD AUTO: 0 K/UL
BASOPHILS NFR BLD: 0.2 %
BILIRUB SERPL-MCNC: 0.5 MG/DL
BUN SERPL-MCNC: 7 MG/DL
CALCIUM SERPL-MCNC: 8.5 MG/DL
CHLORIDE SERPL-SCNC: 102 MMOL/L
CO2 SERPL-SCNC: 24 MMOL/L
CREAT SERPL-MCNC: 0.9 MG/DL
DIFFERENTIAL METHOD: ABNORMAL
EOSINOPHIL # BLD AUTO: 0.1 K/UL
EOSINOPHIL NFR BLD: 0.7 %
ERYTHROCYTE [DISTWIDTH] IN BLOOD BY AUTOMATED COUNT: 15 %
EST. GFR  (AFRICAN AMERICAN): >60 ML/MIN/1.73 M^2
EST. GFR  (NON AFRICAN AMERICAN): >60 ML/MIN/1.73 M^2
GLUCOSE SERPL-MCNC: 94 MG/DL
HCT VFR BLD AUTO: 42.4 %
HGB BLD-MCNC: 14.2 G/DL
LYMPHOCYTES # BLD AUTO: 0.7 K/UL
LYMPHOCYTES NFR BLD: 6 %
MCH RBC QN AUTO: 28.3 PG
MCHC RBC AUTO-ENTMCNC: 33.5 G/DL
MCV RBC AUTO: 84 FL
MONOCYTES # BLD AUTO: 0.8 K/UL
MONOCYTES NFR BLD: 7.5 %
NEUTROPHILS # BLD AUTO: 9.6 K/UL
NEUTROPHILS NFR BLD: 85.6 %
PLATELET # BLD AUTO: 420 K/UL
PMV BLD AUTO: 6.4 FL
POTASSIUM SERPL-SCNC: 2.6 MMOL/L
PROT SERPL-MCNC: 7.7 G/DL
RBC # BLD AUTO: 5.02 M/UL
SODIUM SERPL-SCNC: 139 MMOL/L
WBC # BLD AUTO: 11.2 K/UL

## 2017-12-29 PROCEDURE — 80053 COMPREHEN METABOLIC PANEL: CPT

## 2017-12-29 PROCEDURE — 85025 COMPLETE CBC W/AUTO DIFF WBC: CPT

## 2017-12-29 PROCEDURE — 36415 COLL VENOUS BLD VENIPUNCTURE: CPT

## 2018-01-02 ENCOUNTER — LAB VISIT (OUTPATIENT)
Dept: LAB | Facility: HOSPITAL | Age: 53
End: 2018-01-02
Attending: COLON & RECTAL SURGERY
Payer: MEDICARE

## 2018-01-02 DIAGNOSIS — E87.6 HYPOKALEMIA: ICD-10-CM

## 2018-01-02 DIAGNOSIS — N82.3 RECTOVAGINAL FISTULA: ICD-10-CM

## 2018-01-02 DIAGNOSIS — K50.113 CC (CROHN'S COLITIS), WITH FISTULA: Primary | ICD-10-CM

## 2018-01-02 DIAGNOSIS — N82.3 RECTOVAGINAL FISTULA: Primary | ICD-10-CM

## 2018-01-02 DIAGNOSIS — K50.113 CROHN'S DISEASE OF LARGE INTESTINE WITH FISTULA: Primary | ICD-10-CM

## 2018-01-02 DIAGNOSIS — E87.6 HYPOKALEMIA: Primary | ICD-10-CM

## 2018-01-02 LAB
ALBUMIN SERPL BCP-MCNC: 3.1 G/DL
ALP SERPL-CCNC: 200 U/L
ALT SERPL W/O P-5'-P-CCNC: 8 U/L
ANION GAP SERPL CALC-SCNC: 10 MMOL/L
AST SERPL-CCNC: 14 U/L
BILIRUB SERPL-MCNC: 0.5 MG/DL
BUN SERPL-MCNC: 6 MG/DL
CALCIUM SERPL-MCNC: 9.1 MG/DL
CHLORIDE SERPL-SCNC: 102 MMOL/L
CO2 SERPL-SCNC: 24 MMOL/L
CREAT SERPL-MCNC: 0.8 MG/DL
EST. GFR  (AFRICAN AMERICAN): >60 ML/MIN/1.73 M^2
EST. GFR  (NON AFRICAN AMERICAN): >60 ML/MIN/1.73 M^2
GLUCOSE SERPL-MCNC: 87 MG/DL
POTASSIUM SERPL-SCNC: 3.5 MMOL/L
PROT SERPL-MCNC: 7.9 G/DL
SODIUM SERPL-SCNC: 136 MMOL/L

## 2018-01-02 PROCEDURE — 80053 COMPREHEN METABOLIC PANEL: CPT | Mod: 91

## 2018-01-03 ENCOUNTER — ANESTHESIA (OUTPATIENT)
Dept: SURGERY | Facility: HOSPITAL | Age: 53
DRG: 330 | End: 2018-01-03
Payer: MEDICARE

## 2018-01-03 DIAGNOSIS — K50.113 CROHN'S DISEASE OF LARGE INTESTINE WITH FISTULA: Primary | ICD-10-CM

## 2018-01-03 DIAGNOSIS — E83.42 LOW SERUM MAGNESIUM LEVEL: ICD-10-CM

## 2018-01-03 RX ORDER — HYDROCODONE BITARTRATE AND ACETAMINOPHEN 10; 325 MG/1; MG/1
1 TABLET ORAL EVERY 4 HOURS PRN
Qty: 30 TABLET | Refills: 0 | Status: ON HOLD | OUTPATIENT
Start: 2018-01-03 | End: 2018-01-12 | Stop reason: HOSPADM

## 2018-01-03 RX ORDER — MAGNESIUM SULFATE IN 0.9% NACL 2 G/50 ML
2 INTRAVENOUS SOLUTION, PIGGYBACK (ML) INTRAVENOUS ONCE
Qty: 200 ML | Refills: 0 | Status: SHIPPED | OUTPATIENT
Start: 2018-01-03 | End: 2018-01-03

## 2018-01-09 ENCOUNTER — TELEPHONE (OUTPATIENT)
Dept: SURGERY | Facility: CLINIC | Age: 53
End: 2018-01-09

## 2018-01-10 ENCOUNTER — SURGERY (OUTPATIENT)
Age: 53
End: 2018-01-10

## 2018-01-10 ENCOUNTER — HOSPITAL ENCOUNTER (INPATIENT)
Facility: HOSPITAL | Age: 53
LOS: 2 days | Discharge: HOME | DRG: 330 | End: 2018-01-12
Attending: COLON & RECTAL SURGERY | Admitting: COLON & RECTAL SURGERY
Payer: MEDICARE

## 2018-01-10 DIAGNOSIS — K50.113 CROHN'S DISEASE OF COLON, WITH FISTULA: ICD-10-CM

## 2018-01-10 DIAGNOSIS — K50.113 CROHN'S COLITIS, WITH FISTULA: ICD-10-CM

## 2018-01-10 LAB
ABO + RH BLD: NORMAL
BLD GP AB SCN CELLS X3 SERPL QL: NORMAL

## 2018-01-10 PROCEDURE — 94799 UNLISTED PULMONARY SVC/PX: CPT

## 2018-01-10 PROCEDURE — 27000221 HC OXYGEN, UP TO 24 HOURS

## 2018-01-10 PROCEDURE — 25000003 PHARM REV CODE 250: Performed by: NURSE ANESTHETIST, CERTIFIED REGISTERED

## 2018-01-10 PROCEDURE — 0UQG7ZZ REPAIR VAGINA, VIA NATURAL OR ARTIFICIAL OPENING: ICD-10-PCS | Performed by: COLON & RECTAL SURGERY

## 2018-01-10 PROCEDURE — 37000008 HC ANESTHESIA 1ST 15 MINUTES: Performed by: COLON & RECTAL SURGERY

## 2018-01-10 PROCEDURE — 25000003 PHARM REV CODE 250: Performed by: NURSE PRACTITIONER

## 2018-01-10 PROCEDURE — D9220A PRA ANESTHESIA: Mod: CRNA,,, | Performed by: NURSE ANESTHETIST, CERTIFIED REGISTERED

## 2018-01-10 PROCEDURE — 37000009 HC ANESTHESIA EA ADD 15 MINS: Performed by: COLON & RECTAL SURGERY

## 2018-01-10 PROCEDURE — 44143 PARTIAL REMOVAL OF COLON: CPT | Mod: GC,,, | Performed by: COLON & RECTAL SURGERY

## 2018-01-10 PROCEDURE — 63600175 PHARM REV CODE 636 W HCPCS: Performed by: COLON & RECTAL SURGERY

## 2018-01-10 PROCEDURE — 88307 TISSUE EXAM BY PATHOLOGIST: CPT | Performed by: PATHOLOGY

## 2018-01-10 PROCEDURE — 57300 REPAIR RECTUM-VAGINA FISTULA: CPT | Mod: 51,GC,, | Performed by: COLON & RECTAL SURGERY

## 2018-01-10 PROCEDURE — S0030 INJECTION, METRONIDAZOLE: HCPCS | Performed by: NURSE PRACTITIONER

## 2018-01-10 PROCEDURE — 25000003 PHARM REV CODE 250: Performed by: COLON & RECTAL SURGERY

## 2018-01-10 PROCEDURE — 63600175 PHARM REV CODE 636 W HCPCS: Performed by: NURSE ANESTHETIST, CERTIFIED REGISTERED

## 2018-01-10 PROCEDURE — V2790 AMNIOTIC MEMBRANE: HCPCS | Performed by: COLON & RECTAL SURGERY

## 2018-01-10 PROCEDURE — 36000708 HC OR TIME LEV III 1ST 15 MIN: Performed by: COLON & RECTAL SURGERY

## 2018-01-10 PROCEDURE — 63600175 PHARM REV CODE 636 W HCPCS: Performed by: ANESTHESIOLOGY

## 2018-01-10 PROCEDURE — 25000003 PHARM REV CODE 250: Performed by: SURGERY

## 2018-01-10 PROCEDURE — 94761 N-INVAS EAR/PLS OXIMETRY MLT: CPT

## 2018-01-10 PROCEDURE — 63600175 PHARM REV CODE 636 W HCPCS: Performed by: SURGERY

## 2018-01-10 PROCEDURE — 27201423 OPTIME MED/SURG SUP & DEVICES STERILE SUPPLY: Performed by: COLON & RECTAL SURGERY

## 2018-01-10 PROCEDURE — 71000033 HC RECOVERY, INTIAL HOUR: Performed by: COLON & RECTAL SURGERY

## 2018-01-10 PROCEDURE — D9220A PRA ANESTHESIA: Mod: ANES,,, | Performed by: ANESTHESIOLOGY

## 2018-01-10 PROCEDURE — C9290 INJ, BUPIVACAINE LIPOSOME: HCPCS | Performed by: COLON & RECTAL SURGERY

## 2018-01-10 PROCEDURE — 36000709 HC OR TIME LEV III EA ADD 15 MIN: Performed by: COLON & RECTAL SURGERY

## 2018-01-10 PROCEDURE — 88307 TISSUE EXAM BY PATHOLOGIST: CPT | Mod: 26,,, | Performed by: PATHOLOGY

## 2018-01-10 PROCEDURE — S0028 INJECTION, FAMOTIDINE, 20 MG: HCPCS | Performed by: NURSE ANESTHETIST, CERTIFIED REGISTERED

## 2018-01-10 PROCEDURE — 63600175 PHARM REV CODE 636 W HCPCS: Performed by: STUDENT IN AN ORGANIZED HEALTH CARE EDUCATION/TRAINING PROGRAM

## 2018-01-10 PROCEDURE — 86850 RBC ANTIBODY SCREEN: CPT

## 2018-01-10 PROCEDURE — 71000039 HC RECOVERY, EACH ADD'L HOUR: Performed by: COLON & RECTAL SURGERY

## 2018-01-10 PROCEDURE — 0D1M0Z4 BYPASS DESCENDING COLON TO CUTANEOUS, OPEN APPROACH: ICD-10-PCS | Performed by: COLON & RECTAL SURGERY

## 2018-01-10 PROCEDURE — 63600175 PHARM REV CODE 636 W HCPCS

## 2018-01-10 PROCEDURE — 20600001 HC STEP DOWN PRIVATE ROOM

## 2018-01-10 PROCEDURE — 63600175 PHARM REV CODE 636 W HCPCS: Performed by: NURSE PRACTITIONER

## 2018-01-10 DEVICE — MEMBRANE AMNIOFIX 2X12CM: Type: IMPLANTABLE DEVICE | Site: VAGINA | Status: FUNCTIONAL

## 2018-01-10 RX ORDER — METRONIDAZOLE 500 MG/100ML
500 INJECTION, SOLUTION INTRAVENOUS
Status: COMPLETED | OUTPATIENT
Start: 2018-01-10 | End: 2018-01-10

## 2018-01-10 RX ORDER — AZATHIOPRINE 50 MG/1
50 TABLET ORAL DAILY
Status: DISCONTINUED | OUTPATIENT
Start: 2018-01-10 | End: 2018-01-12 | Stop reason: HOSPADM

## 2018-01-10 RX ORDER — METOCLOPRAMIDE HYDROCHLORIDE 5 MG/ML
5 INJECTION INTRAMUSCULAR; INTRAVENOUS EVERY 6 HOURS PRN
Status: DISCONTINUED | OUTPATIENT
Start: 2018-01-10 | End: 2018-01-12 | Stop reason: HOSPADM

## 2018-01-10 RX ORDER — ONDANSETRON 2 MG/ML
4 INJECTION INTRAMUSCULAR; INTRAVENOUS EVERY 6 HOURS PRN
Status: DISCONTINUED | OUTPATIENT
Start: 2018-01-10 | End: 2018-01-12 | Stop reason: HOSPADM

## 2018-01-10 RX ORDER — SODIUM CHLORIDE, SODIUM LACTATE, POTASSIUM CHLORIDE, CALCIUM CHLORIDE 600; 310; 30; 20 MG/100ML; MG/100ML; MG/100ML; MG/100ML
INJECTION, SOLUTION INTRAVENOUS CONTINUOUS
Status: DISCONTINUED | OUTPATIENT
Start: 2018-01-10 | End: 2018-01-11

## 2018-01-10 RX ORDER — LIDOCAINE HYDROCHLORIDE 10 MG/ML
1 INJECTION, SOLUTION EPIDURAL; INFILTRATION; INTRACAUDAL; PERINEURAL ONCE
Status: DISCONTINUED | OUTPATIENT
Start: 2018-01-10 | End: 2018-01-12 | Stop reason: HOSPADM

## 2018-01-10 RX ORDER — FAMOTIDINE 10 MG/ML
INJECTION INTRAVENOUS
Status: DISCONTINUED | OUTPATIENT
Start: 2018-01-10 | End: 2018-01-10

## 2018-01-10 RX ORDER — ONDANSETRON 2 MG/ML
4 INJECTION INTRAMUSCULAR; INTRAVENOUS EVERY 12 HOURS PRN
Status: DISCONTINUED | OUTPATIENT
Start: 2018-01-10 | End: 2018-01-10

## 2018-01-10 RX ORDER — LIDOCAINE HYDROCHLORIDE AND EPINEPHRINE 10; 10 MG/ML; UG/ML
INJECTION, SOLUTION INFILTRATION; PERINEURAL
Status: DISCONTINUED | OUTPATIENT
Start: 2018-01-10 | End: 2018-01-10 | Stop reason: HOSPADM

## 2018-01-10 RX ORDER — HEPARIN SODIUM 5000 [USP'U]/ML
5000 INJECTION, SOLUTION INTRAVENOUS; SUBCUTANEOUS
Status: DISCONTINUED | OUTPATIENT
Start: 2018-01-10 | End: 2018-01-10

## 2018-01-10 RX ORDER — CALCIUM CARBONATE 200(500)MG
1000 TABLET,CHEWABLE ORAL EVERY 8 HOURS PRN
Status: DISCONTINUED | OUTPATIENT
Start: 2018-01-10 | End: 2018-01-12 | Stop reason: HOSPADM

## 2018-01-10 RX ORDER — SODIUM CHLORIDE 0.9 % (FLUSH) 0.9 %
3 SYRINGE (ML) INJECTION
Status: DISCONTINUED | OUTPATIENT
Start: 2018-01-10 | End: 2018-01-12 | Stop reason: HOSPADM

## 2018-01-10 RX ORDER — MUPIROCIN 20 MG/G
OINTMENT TOPICAL
Status: DISCONTINUED | OUTPATIENT
Start: 2018-01-10 | End: 2018-01-10

## 2018-01-10 RX ORDER — ACETAMINOPHEN 10 MG/ML
1000 INJECTION, SOLUTION INTRAVENOUS EVERY 8 HOURS
Status: COMPLETED | OUTPATIENT
Start: 2018-01-10 | End: 2018-01-11

## 2018-01-10 RX ORDER — SODIUM CHLORIDE 9 MG/ML
INJECTION, SOLUTION INTRAVENOUS CONTINUOUS
Status: DISCONTINUED | OUTPATIENT
Start: 2018-01-10 | End: 2018-01-10

## 2018-01-10 RX ORDER — FENTANYL CITRATE 50 UG/ML
25 INJECTION, SOLUTION INTRAMUSCULAR; INTRAVENOUS EVERY 5 MIN PRN
Status: COMPLETED | OUTPATIENT
Start: 2018-01-10 | End: 2018-01-10

## 2018-01-10 RX ORDER — FENTANYL CITRATE 50 UG/ML
INJECTION, SOLUTION INTRAMUSCULAR; INTRAVENOUS
Status: DISCONTINUED | OUTPATIENT
Start: 2018-01-10 | End: 2018-01-10

## 2018-01-10 RX ORDER — PROPOFOL 10 MG/ML
VIAL (ML) INTRAVENOUS
Status: DISCONTINUED | OUTPATIENT
Start: 2018-01-10 | End: 2018-01-10

## 2018-01-10 RX ORDER — MUPIROCIN 20 MG/G
1 OINTMENT TOPICAL 2 TIMES DAILY
Status: DISCONTINUED | OUTPATIENT
Start: 2018-01-10 | End: 2018-01-12 | Stop reason: HOSPADM

## 2018-01-10 RX ORDER — ENOXAPARIN SODIUM 100 MG/ML
40 INJECTION SUBCUTANEOUS
Status: DISCONTINUED | OUTPATIENT
Start: 2018-01-11 | End: 2018-01-12 | Stop reason: HOSPADM

## 2018-01-10 RX ORDER — DIPHENHYDRAMINE HYDROCHLORIDE 50 MG/ML
12.5 INJECTION INTRAMUSCULAR; INTRAVENOUS EVERY 4 HOURS PRN
Status: DISCONTINUED | OUTPATIENT
Start: 2018-01-10 | End: 2018-01-12 | Stop reason: HOSPADM

## 2018-01-10 RX ORDER — ROCURONIUM BROMIDE 10 MG/ML
INJECTION, SOLUTION INTRAVENOUS
Status: DISCONTINUED | OUTPATIENT
Start: 2018-01-10 | End: 2018-01-10

## 2018-01-10 RX ORDER — HYDROMORPHONE HYDROCHLORIDE 2 MG/ML
INJECTION, SOLUTION INTRAMUSCULAR; INTRAVENOUS; SUBCUTANEOUS
Status: DISCONTINUED | OUTPATIENT
Start: 2018-01-10 | End: 2018-01-10

## 2018-01-10 RX ORDER — GLYCOPYRROLATE 0.2 MG/ML
INJECTION INTRAMUSCULAR; INTRAVENOUS
Status: DISCONTINUED | OUTPATIENT
Start: 2018-01-10 | End: 2018-01-10

## 2018-01-10 RX ORDER — DEXAMETHASONE SODIUM PHOSPHATE 4 MG/ML
INJECTION, SOLUTION INTRA-ARTICULAR; INTRALESIONAL; INTRAMUSCULAR; INTRAVENOUS; SOFT TISSUE
Status: DISCONTINUED | OUTPATIENT
Start: 2018-01-10 | End: 2018-01-10

## 2018-01-10 RX ORDER — OXYCODONE HYDROCHLORIDE 5 MG/1
5 TABLET ORAL EVERY 4 HOURS PRN
Status: DISCONTINUED | OUTPATIENT
Start: 2018-01-10 | End: 2018-01-12

## 2018-01-10 RX ORDER — ONDANSETRON 2 MG/ML
INJECTION INTRAMUSCULAR; INTRAVENOUS
Status: DISCONTINUED | OUTPATIENT
Start: 2018-01-10 | End: 2018-01-10

## 2018-01-10 RX ORDER — NALOXONE HCL 0.4 MG/ML
0.02 VIAL (ML) INJECTION
Status: DISCONTINUED | OUTPATIENT
Start: 2018-01-10 | End: 2018-01-12 | Stop reason: HOSPADM

## 2018-01-10 RX ORDER — HYDROMORPHONE HYDROCHLORIDE 2 MG/ML
0.2 INJECTION, SOLUTION INTRAMUSCULAR; INTRAVENOUS; SUBCUTANEOUS EVERY 5 MIN PRN
Status: COMPLETED | OUTPATIENT
Start: 2018-01-10 | End: 2018-01-10

## 2018-01-10 RX ORDER — ONDANSETRON 2 MG/ML
4 INJECTION INTRAMUSCULAR; INTRAVENOUS ONCE
Status: DISCONTINUED | OUTPATIENT
Start: 2018-01-10 | End: 2018-01-12 | Stop reason: HOSPADM

## 2018-01-10 RX ORDER — HYDROMORPHONE HYDROCHLORIDE 2 MG/ML
1 INJECTION, SOLUTION INTRAMUSCULAR; INTRAVENOUS; SUBCUTANEOUS EVERY 4 HOURS PRN
Status: DISCONTINUED | OUTPATIENT
Start: 2018-01-10 | End: 2018-01-12 | Stop reason: HOSPADM

## 2018-01-10 RX ORDER — POTASSIUM CHLORIDE 20 MEQ/1
20 TABLET, EXTENDED RELEASE ORAL DAILY
Status: DISCONTINUED | OUTPATIENT
Start: 2018-01-10 | End: 2018-01-12 | Stop reason: HOSPADM

## 2018-01-10 RX ORDER — FENTANYL CITRATE 50 UG/ML
INJECTION, SOLUTION INTRAMUSCULAR; INTRAVENOUS
Status: COMPLETED
Start: 2018-01-10 | End: 2018-01-10

## 2018-01-10 RX ORDER — MIDAZOLAM HYDROCHLORIDE 1 MG/ML
INJECTION, SOLUTION INTRAMUSCULAR; INTRAVENOUS
Status: DISCONTINUED | OUTPATIENT
Start: 2018-01-10 | End: 2018-01-10

## 2018-01-10 RX ORDER — RAMELTEON 8 MG/1
8 TABLET ORAL NIGHTLY PRN
Status: DISCONTINUED | OUTPATIENT
Start: 2018-01-10 | End: 2018-01-12 | Stop reason: HOSPADM

## 2018-01-10 RX ORDER — ACETAMINOPHEN 10 MG/ML
1000 INJECTION, SOLUTION INTRAVENOUS
Status: COMPLETED | OUTPATIENT
Start: 2018-01-10 | End: 2018-01-10

## 2018-01-10 RX ORDER — NEOSTIGMINE METHYLSULFATE 1 MG/ML
INJECTION, SOLUTION INTRAVENOUS
Status: DISCONTINUED | OUTPATIENT
Start: 2018-01-10 | End: 2018-01-10

## 2018-01-10 RX ORDER — LIDOCAINE HYDROCHLORIDE 10 MG/ML
1 INJECTION, SOLUTION EPIDURAL; INFILTRATION; INTRACAUDAL; PERINEURAL ONCE
Status: DISCONTINUED | OUTPATIENT
Start: 2018-01-10 | End: 2018-01-10

## 2018-01-10 RX ORDER — VENLAFAXINE 37.5 MG/1
37.5 TABLET ORAL DAILY
Status: DISCONTINUED | OUTPATIENT
Start: 2018-01-10 | End: 2018-01-12 | Stop reason: HOSPADM

## 2018-01-10 RX ADMIN — HYDROMORPHONE HYDROCHLORIDE 0.2 MG: 2 INJECTION INTRAMUSCULAR; INTRAVENOUS; SUBCUTANEOUS at 12:01

## 2018-01-10 RX ADMIN — METOCLOPRAMIDE 5 MG: 5 INJECTION, SOLUTION INTRAMUSCULAR; INTRAVENOUS at 06:01

## 2018-01-10 RX ADMIN — DEXAMETHASONE SODIUM PHOSPHATE 4 MG: 4 INJECTION, SOLUTION INTRAMUSCULAR; INTRAVENOUS at 08:01

## 2018-01-10 RX ADMIN — FAMOTIDINE 20 MG: 10 INJECTION, SOLUTION INTRAVENOUS at 08:01

## 2018-01-10 RX ADMIN — IBUPROFEN 400 MG: 800 INJECTION INTRAVENOUS at 11:01

## 2018-01-10 RX ADMIN — HYDROMORPHONE HYDROCHLORIDE 0.5 MG: 2 INJECTION INTRAMUSCULAR; INTRAVENOUS; SUBCUTANEOUS at 10:01

## 2018-01-10 RX ADMIN — FENTANYL CITRATE 25 MCG: 50 INJECTION, SOLUTION INTRAMUSCULAR; INTRAVENOUS at 02:01

## 2018-01-10 RX ADMIN — VENLAFAXINE 37.5 MG: 37.5 TABLET ORAL at 12:01

## 2018-01-10 RX ADMIN — OXYCODONE HYDROCHLORIDE 5 MG: 5 TABLET ORAL at 11:01

## 2018-01-10 RX ADMIN — HYDROMORPHONE HYDROCHLORIDE 1 MG: 2 INJECTION INTRAMUSCULAR; INTRAVENOUS; SUBCUTANEOUS at 06:01

## 2018-01-10 RX ADMIN — GLYCOPYRROLATE 0.4 MG: 0.2 INJECTION, SOLUTION INTRAMUSCULAR; INTRAVENOUS at 10:01

## 2018-01-10 RX ADMIN — ONDANSETRON 4 MG: 2 INJECTION INTRAMUSCULAR; INTRAVENOUS at 08:01

## 2018-01-10 RX ADMIN — POTASSIUM CHLORIDE 20 MEQ: 1500 TABLET, EXTENDED RELEASE ORAL at 11:01

## 2018-01-10 RX ADMIN — FENTANYL CITRATE 100 MCG: 50 INJECTION, SOLUTION INTRAMUSCULAR; INTRAVENOUS at 08:01

## 2018-01-10 RX ADMIN — SODIUM CHLORIDE, SODIUM LACTATE, POTASSIUM CHLORIDE, AND CALCIUM CHLORIDE: 600; 310; 30; 20 INJECTION, SOLUTION INTRAVENOUS at 11:01

## 2018-01-10 RX ADMIN — MUPIROCIN 1 G: 20 OINTMENT TOPICAL at 09:01

## 2018-01-10 RX ADMIN — CEFTRIAXONE 2 G: 2 INJECTION, SOLUTION INTRAVENOUS at 08:01

## 2018-01-10 RX ADMIN — ONDANSETRON 4 MG: 2 INJECTION INTRAMUSCULAR; INTRAVENOUS at 10:01

## 2018-01-10 RX ADMIN — FENTANYL CITRATE 25 MCG: 50 INJECTION, SOLUTION INTRAMUSCULAR; INTRAVENOUS at 01:01

## 2018-01-10 RX ADMIN — METRONIDAZOLE 500 MG: 500 SOLUTION INTRAVENOUS at 08:01

## 2018-01-10 RX ADMIN — Medication 800 MG: at 08:01

## 2018-01-10 RX ADMIN — SODIUM CHLORIDE, SODIUM GLUCONATE, SODIUM ACETATE, POTASSIUM CHLORIDE, MAGNESIUM CHLORIDE, SODIUM PHOSPHATE, DIBASIC, AND POTASSIUM PHOSPHATE: .53; .5; .37; .037; .03; .012; .00082 INJECTION, SOLUTION INTRAVENOUS at 09:01

## 2018-01-10 RX ADMIN — AZATHIOPRINE 50 MG: 50 TABLET ORAL at 12:01

## 2018-01-10 RX ADMIN — MIDAZOLAM HYDROCHLORIDE 2 MG: 1 INJECTION, SOLUTION INTRAMUSCULAR; INTRAVENOUS at 08:01

## 2018-01-10 RX ADMIN — PROPOFOL 150 MG: 10 INJECTION, EMULSION INTRAVENOUS at 08:01

## 2018-01-10 RX ADMIN — MUPIROCIN: 20 OINTMENT TOPICAL at 08:01

## 2018-01-10 RX ADMIN — SODIUM CHLORIDE: 0.9 INJECTION, SOLUTION INTRAVENOUS at 08:01

## 2018-01-10 RX ADMIN — ROCURONIUM BROMIDE 40 MG: 10 INJECTION, SOLUTION INTRAVENOUS at 08:01

## 2018-01-10 RX ADMIN — NEOSTIGMINE METHYLSULFATE 4 MG: 1 INJECTION INTRAVENOUS at 10:01

## 2018-01-10 RX ADMIN — LIDOCAINE HYDROCHLORIDE AND EPINEPHRINE 7 ML: 10; 10 INJECTION, SOLUTION INFILTRATION; PERINEURAL at 10:01

## 2018-01-10 RX ADMIN — HYDROMORPHONE HYDROCHLORIDE 0.2 MG: 2 INJECTION INTRAMUSCULAR; INTRAVENOUS; SUBCUTANEOUS at 01:01

## 2018-01-10 RX ADMIN — METOCLOPRAMIDE 5 MG: 5 INJECTION, SOLUTION INTRAMUSCULAR; INTRAVENOUS at 02:01

## 2018-01-10 RX ADMIN — HYDROMORPHONE HYDROCHLORIDE 1 MG: 2 INJECTION INTRAMUSCULAR; INTRAVENOUS; SUBCUTANEOUS at 10:01

## 2018-01-10 RX ADMIN — OXYCODONE HYDROCHLORIDE 5 MG: 5 TABLET ORAL at 03:01

## 2018-01-10 RX ADMIN — HYDROMORPHONE HYDROCHLORIDE 0.5 MG: 2 INJECTION INTRAMUSCULAR; INTRAVENOUS; SUBCUTANEOUS at 09:01

## 2018-01-10 RX ADMIN — BUPIVACAINE 70 ML: 13.3 INJECTION, SUSPENSION, LIPOSOMAL INFILTRATION at 10:01

## 2018-01-10 RX ADMIN — ACETAMINOPHEN 1000 MG: 10 INJECTION, SOLUTION INTRAVENOUS at 08:01

## 2018-01-10 RX ADMIN — OXYCODONE HYDROCHLORIDE 5 MG: 5 TABLET ORAL at 07:01

## 2018-01-10 RX ADMIN — ACETAMINOPHEN 1000 MG: 10 INJECTION, SOLUTION INTRAVENOUS at 04:01

## 2018-01-10 RX ADMIN — ONDANSETRON 4 MG: 2 INJECTION INTRAMUSCULAR; INTRAVENOUS at 03:01

## 2018-01-10 RX ADMIN — PROPOFOL 50 MG: 10 INJECTION, EMULSION INTRAVENOUS at 08:01

## 2018-01-10 NOTE — PLAN OF CARE
Pt to OR now. Unable to obtain IV access. Plan is to get line in OR. Dr Adam made 2 attempts and BRAD Marroquin LPN made 2 attempts.

## 2018-01-10 NOTE — TRANSFER OF CARE
"Anesthesia Transfer of Care Note    Patient: Carline Chang    Procedure(s) Performed: Procedure(s) (LRB):  REPAIR-FISTULA-RECTOVAGINAL (N/A)    Patient location: PACU    Anesthesia Type: general    Transport from OR: Transported from OR on 6-10 L/min O2 by face mask with adequate spontaneous ventilation    Post pain: adequate analgesia    Post assessment: no apparent anesthetic complications    Post vital signs: stable    Level of consciousness: responds to stimulation and sedated    Nausea/Vomiting: no nausea/vomiting    Complications: none    Transfer of care protocol was followed      Last vitals:   Visit Vitals  BP (!) 107/58   Pulse 82   Temp 36.1 °C (97 °F) (Axillary)   Resp 18   Ht 5' 8" (1.727 m)   Wt 65.8 kg (145 lb)   LMP 05/30/2009 (Approximate)   SpO2 99%   Breastfeeding? No   BMI 22.05 kg/m²     "

## 2018-01-10 NOTE — BRIEF OP NOTE
[facility]  Colon and Rectal Surgery  Operative Note    SUMMARY     Date of Procedure: 1/10/2018     Procedure: Procedure(s) (LRB):  REPAIR-FISTULA-RECTOVAGINAL (N/A)   Repair stoma proplase    Surgeon(s) and Role:     * Andre Uribe MD - Primary    Assisting Surgeon: None    Pre-Operative Diagnosis: Crohn's disease of colon with fistula [K50.113]    Post-Operative Diagnosis: Post-Op Diagnosis Codes:     * Crohn's disease of colon with fistula [K50.113]    Anesthesia: General    Technical Procedures Used: trans vaginal repair of RV fistula  repair of stomal prolapse with resection            Estimated Blood Loss (EBL):50         Implants:   Implant Name Type Inv. Item Serial No.  Lot No. LRB No. Used   MEMBRANE AMNIOFIX 2X12CM - [id number]   MEMBRANE AMNIOFIX 2X12CM   [employer] [id number] N/A 1       Specimens:   Specimen (12h ago through future)    None           Condition: Stable    Disposition: PACU - hemodynamically stable.    Attestation: I was present and scrubbed for the entire procedure.

## 2018-01-10 NOTE — OP NOTE
DATE OF PROCEDURE:  01/10/2018.    PREOPERATIVE DIAGNOSES:  Rectovaginal fistula, stoma prolapse.  Crohn's disease.    POSTOPERATIVE DIAGNOSES:  Rectovaginal fistula, stoma prolapse.  Crohn's   disease.    PROCEDURES PERFORMED:  1.  Transvaginal repair of rectovaginal fistula with amnion mesh.  2.  Repair of stomal prolapse with conversion of loop colostomy into end   colostomy with colon resection.    SURGEON:  Andre Uribe M.D.    RESIDENT:  Maya Calixto M.D.    ANESTHESIA:  General endotracheal.    INDICATIONS FOR PROCEDURE:  Ms. Chang is a 52-year-old female with   Crohn's disease and known rectovaginal fistula and a loop colostomy, which   prolapses.  Based on this, surgery was indicated.    PROCEDURE IN DETAIL:  The patient was taken to the Operating Room, placed on the   operating table in a modified lithotomy position, candy cane stirrups were   used.  The perineum was prepped and draped in a sterile manner.  Transvaginally,   the fistula was easily identified.  A probe was passed and communicated easily   with the rectum.  Lidocaine with epinephrine was instilled in the vaginal mucosa   distal to the fistula opening extending up above.  A broad-based mucosal flap   was fashioned from the vagina using Bovie electrocautery.  It was scored and   then the vaginal mucosa and submucosa were elevated up approximately 3 cm above   the fistula.  Once this was done, the fistula edges were trimmed.  The fistula   defect was then closed with 3-0 Vicryl figure-of-eight sutures.  These were then   covered with a series of 3-0 Vicryl Lembert sutures.  Amniotic membrane was   then placed and the flap was mobilized and the corners, distal, were sutured.    Once this was done, hemostasis was assured.  The flap was then secured   circumferentially with a series of 3-0 Vicryl sutures.  Once the lateral aspects   were taken care of, Rene was placed between the graft and the flap and the   distal end of the flap  was secured.  The vagina was irrigated.  Xeroform was   placed in the vagina.  Sponge and needle count was correct.  The patient was   taken out of lithotomy.  At this point, the patient was then placed supine.    Ostomy appliance was removed.  The abdomen was prepped and draped in a sterile   manner.  The colostomy, loop colostomy in the left lower quadrant was prolapsed   out.  The proximal limb prolapsed approximately 8 or 9 cm.  At this point, the   mucocutaneous junction of the stoma was incised and dissection was then carried   down to the level of the fascia.  The stoma was then able to be delivered into   the wound.  At this point, the mesentery of the loop was mobilized and the   distal end of the stoma was divided with a fire of the CHEO stapler.  The   mesentery and hernia sac were then dissected up off the proximal limb.  The   colon was then mobilized adequately.  At this point, hemostasis was assured.    The colon was transected.  A stoma was refashioned as an end colostomy.  The   colon was then fashioned in a Kiesha fashion circumferentially.  Exparel was   placed.  Of note, at this point, that the fascial defect was actually extremely   large.  This defect was closed with a series of 0-Vicryl figure-of-eight   sutures.  Hemostasis was assured.  The stoma was then matured circumferentially   in a Kiesha fashion.  Sponge and needle count was correct.  The patient   tolerated the procedure and was transported to the Recovery Room in stable   condition.      DAM/HN  dd: 01/10/2018 10:50:04 (CST)  td: 01/10/2018 11:53:20 (CST)  Doc ID   #[id number]  Job ID #[id number]    CC:

## 2018-01-10 NOTE — NURSING TRANSFER
Nursing Transfer Note      1/10/2018     Transfer To: 640    Transfer via stretcher    Transfer with 2L NC to O2    Transported by pct     Medicines sent: none    Chart send with patient: Yes    Notified: mother    Patient reassessed at: 1/10/17 1437

## 2018-01-10 NOTE — INTERVAL H&P NOTE
No change in H+P I have again explained the procedure including indications, alternatives, expected outcomes and potential complications. All questions were answered

## 2018-01-11 LAB
ANION GAP SERPL CALC-SCNC: 8 MMOL/L
BASOPHILS # BLD AUTO: 0.03 K/UL
BASOPHILS NFR BLD: 0.3 %
BUN SERPL-MCNC: 5 MG/DL
CALCIUM SERPL-MCNC: 8.3 MG/DL
CHLORIDE SERPL-SCNC: 106 MMOL/L
CO2 SERPL-SCNC: 24 MMOL/L
CREAT SERPL-MCNC: 0.9 MG/DL
DIFFERENTIAL METHOD: ABNORMAL
EOSINOPHIL # BLD AUTO: 0 K/UL
EOSINOPHIL NFR BLD: 0.3 %
ERYTHROCYTE [DISTWIDTH] IN BLOOD BY AUTOMATED COUNT: 13.6 %
EST. GFR  (AFRICAN AMERICAN): >60 ML/MIN/1.73 M^2
EST. GFR  (NON AFRICAN AMERICAN): >60 ML/MIN/1.73 M^2
GLUCOSE SERPL-MCNC: 92 MG/DL
HCT VFR BLD AUTO: 33.2 %
HGB BLD-MCNC: 10.8 G/DL
IMM GRANULOCYTES # BLD AUTO: 0.06 K/UL
IMM GRANULOCYTES NFR BLD AUTO: 0.5 %
LYMPHOCYTES # BLD AUTO: 0.7 K/UL
LYMPHOCYTES NFR BLD: 5.9 %
MAGNESIUM SERPL-MCNC: 1.1 MG/DL
MCH RBC QN AUTO: 27.5 PG
MCHC RBC AUTO-ENTMCNC: 32.5 G/DL
MCV RBC AUTO: 85 FL
MONOCYTES # BLD AUTO: 0.5 K/UL
MONOCYTES NFR BLD: 4.5 %
NEUTROPHILS # BLD AUTO: 10.4 K/UL
NEUTROPHILS NFR BLD: 88.5 %
NRBC BLD-RTO: 0 /100 WBC
PHOSPHATE SERPL-MCNC: 3.5 MG/DL
PLATELET # BLD AUTO: 293 K/UL
PMV BLD AUTO: 8.6 FL
POTASSIUM SERPL-SCNC: 4.2 MMOL/L
RBC # BLD AUTO: 3.93 M/UL
SODIUM SERPL-SCNC: 138 MMOL/L
WBC # BLD AUTO: 11.76 K/UL

## 2018-01-11 PROCEDURE — 85025 COMPLETE CBC W/AUTO DIFF WBC: CPT

## 2018-01-11 PROCEDURE — 63600175 PHARM REV CODE 636 W HCPCS: Performed by: STUDENT IN AN ORGANIZED HEALTH CARE EDUCATION/TRAINING PROGRAM

## 2018-01-11 PROCEDURE — 63600175 PHARM REV CODE 636 W HCPCS: Performed by: SURGERY

## 2018-01-11 PROCEDURE — 20600001 HC STEP DOWN PRIVATE ROOM

## 2018-01-11 PROCEDURE — 80048 BASIC METABOLIC PNL TOTAL CA: CPT

## 2018-01-11 PROCEDURE — 25000003 PHARM REV CODE 250: Performed by: STUDENT IN AN ORGANIZED HEALTH CARE EDUCATION/TRAINING PROGRAM

## 2018-01-11 PROCEDURE — 25000003 PHARM REV CODE 250: Performed by: SURGERY

## 2018-01-11 PROCEDURE — 84100 ASSAY OF PHOSPHORUS: CPT

## 2018-01-11 PROCEDURE — 83735 ASSAY OF MAGNESIUM: CPT

## 2018-01-11 PROCEDURE — 36415 COLL VENOUS BLD VENIPUNCTURE: CPT

## 2018-01-11 RX ORDER — OXYCODONE HYDROCHLORIDE 5 MG/1
10 TABLET ORAL ONCE
Status: COMPLETED | OUTPATIENT
Start: 2018-01-11 | End: 2018-01-11

## 2018-01-11 RX ADMIN — ACETAMINOPHEN 1000 MG: 10 INJECTION, SOLUTION INTRAVENOUS at 10:01

## 2018-01-11 RX ADMIN — ONDANSETRON 4 MG: 2 INJECTION INTRAMUSCULAR; INTRAVENOUS at 11:01

## 2018-01-11 RX ADMIN — IBUPROFEN 400 MG: 800 INJECTION INTRAVENOUS at 05:01

## 2018-01-11 RX ADMIN — VENLAFAXINE 37.5 MG: 37.5 TABLET ORAL at 10:01

## 2018-01-11 RX ADMIN — RAMELTEON 8 MG: 8 TABLET, FILM COATED ORAL at 12:01

## 2018-01-11 RX ADMIN — OXYCODONE HYDROCHLORIDE 5 MG: 5 TABLET ORAL at 07:01

## 2018-01-11 RX ADMIN — POTASSIUM CHLORIDE 20 MEQ: 1500 TABLET, EXTENDED RELEASE ORAL at 10:01

## 2018-01-11 RX ADMIN — IBUPROFEN 400 MG: 800 INJECTION INTRAVENOUS at 11:01

## 2018-01-11 RX ADMIN — METOCLOPRAMIDE 5 MG: 5 INJECTION, SOLUTION INTRAMUSCULAR; INTRAVENOUS at 12:01

## 2018-01-11 RX ADMIN — ACETAMINOPHEN 1000 MG: 10 INJECTION, SOLUTION INTRAVENOUS at 12:01

## 2018-01-11 RX ADMIN — HYDROMORPHONE HYDROCHLORIDE 1 MG: 2 INJECTION INTRAMUSCULAR; INTRAVENOUS; SUBCUTANEOUS at 12:01

## 2018-01-11 RX ADMIN — OXYCODONE HYDROCHLORIDE 5 MG: 5 TABLET ORAL at 03:01

## 2018-01-11 RX ADMIN — IBUPROFEN 400 MG: 800 INJECTION INTRAVENOUS at 12:01

## 2018-01-11 RX ADMIN — IBUPROFEN 400 MG: 800 INJECTION INTRAVENOUS at 06:01

## 2018-01-11 RX ADMIN — RAMELTEON 8 MG: 8 TABLET, FILM COATED ORAL at 11:01

## 2018-01-11 RX ADMIN — HYDROMORPHONE HYDROCHLORIDE 1 MG: 2 INJECTION INTRAMUSCULAR; INTRAVENOUS; SUBCUTANEOUS at 07:01

## 2018-01-11 RX ADMIN — HYDROMORPHONE HYDROCHLORIDE 1 MG: 2 INJECTION INTRAMUSCULAR; INTRAVENOUS; SUBCUTANEOUS at 03:01

## 2018-01-11 RX ADMIN — OXYCODONE HYDROCHLORIDE 5 MG: 5 TABLET ORAL at 10:01

## 2018-01-11 RX ADMIN — HYDROMORPHONE HYDROCHLORIDE 1 MG: 2 INJECTION INTRAMUSCULAR; INTRAVENOUS; SUBCUTANEOUS at 05:01

## 2018-01-11 RX ADMIN — DIPHENHYDRAMINE HYDROCHLORIDE 12.5 MG: 50 INJECTION, SOLUTION INTRAMUSCULAR; INTRAVENOUS at 05:01

## 2018-01-11 RX ADMIN — OXYCODONE HYDROCHLORIDE 10 MG: 5 TABLET ORAL at 12:01

## 2018-01-11 RX ADMIN — AZATHIOPRINE 50 MG: 50 TABLET ORAL at 10:01

## 2018-01-11 RX ADMIN — ONDANSETRON 4 MG: 2 INJECTION INTRAMUSCULAR; INTRAVENOUS at 07:01

## 2018-01-11 NOTE — PROGRESS NOTES
Pt complaining of vaginal pain and abdominal pain. Called on call for surgery. Orders to administer Oxy 10 to see if it will help with pt pain. Blood pressure has been low. Pt HR within normal limits. Will continue to monitor pt pain.

## 2018-01-11 NOTE — PROGRESS NOTES
[facility]  Colorectal Surgery  Progress Note    Patient Name: Carline Chang  MRN: [id number]  Admission Date: 1/10/2018  Hospital Length of Stay: 1 days  Attending Physician: Andre Uribe MD    Subjective:     Interval History: Some pain overnight. Both around ostomy site and in vaginal region. Pain meds increased and pain improved. Packing removed this morning. Tolerated clears.     Post-Op Info:  Procedure(s) (LRB):  REPAIR-FISTULA-RECTOVAGINAL (N/A)   1 Day Post-Op      Medications:  Continuous Infusions:  Scheduled Meds:   acetaminophen  1,000 mg Intravenous Q8H    azaTHIOprine  50 mg Oral Daily    enoxparin  40 mg Subcutaneous Q24H    ibuprofen  400 mg Intravenous Q6H    lidocaine (PF) 10 mg/ml (1%)  1 mL Intradermal Once    mupirocin  1 g Nasal BID    ondansetron  4 mg Intravenous Once    potassium chloride SA  20 mEq Oral Daily    venlafaxine  37.5 mg Oral Daily     PRN Meds:   calcium carbonate    diphenhydrAMINE    HYDROmorphone    metoclopramide HCl    naloxone    ondansetron    oxyCODONE    ramelteon    sodium chloride 0.9%        Objective:     Vital Signs (Most Recent):  Temp: 96.8 °F (36 °C) (01/11/18 0831)  Pulse: 88 (01/11/18 0831)  Resp: 18 (01/11/18 0831)  BP: (!) 101/55 (01/11/18 0831)  SpO2: 98 % (01/11/18 0831) Vital Signs (24h Range):  Temp:  [96.8 °F (36 °C)-98.5 °F (36.9 °C)] 96.8 °F (36 °C)  Pulse:  [67-95] 88  Resp:  [11-20] 18  SpO2:  [91 %-100 %] 98 %  BP: ()/(50-62) 101/55     Intake/Output - Last 3 Shifts       01/09 0700 - 01/10 0659 01/10 0700 - 01/11 0659 01/11 0700 - 01/12 0659    I.V. (mL/kg)  1798.3 (27.3)     Total Intake(mL/kg)  1798.3 (27.3)     Urine (mL/kg/hr)  1325 (0.8)     Stool  0 (0)     Total Output   1325      Net   +473.3                   Physical Exam   Constitutional: She is oriented to person, place, and time. She appears well-developed and well-nourished.   HENT:   Head: Normocephalic and atraumatic.    Eyes: EOM are normal.   Cardiovascular: Normal rate.    Pulmonary/Chest: Effort normal and breath sounds normal.   Abdominal: Soft. She exhibits no distension and no mass. There is tenderness (luis-stomal). There is no rebound and no guarding. No hernia.   Ostomy pink - no output yet in bag    Genitourinary:   Genitourinary Comments: Packing in vagina removed. Minimal bleeding   Musculoskeletal: Normal range of motion.   Neurological: She is alert and oriented to person, place, and time.   Skin: Skin is warm. Capillary refill takes less than 2 seconds.   Psychiatric: She has a normal mood and affect. Her behavior is normal.   Nursing note and vitals reviewed.    Significant Labs:  BMP (Last 3 Results):   Recent Labs  Lab 01/11/18  0627   GLU 92      K 4.2      CO2 24   BUN 5*   CREATININE 0.9   CALCIUM 8.3*   MG 1.1*     CBC (Last 3 Results):   Recent Labs  Lab 01/11/18  0627   WBC 11.76   RBC 3.93*   HGB 10.8*   HCT 33.2*      MCV 85   MCH 27.5   MCHC 32.5       Significant Diagnostics:  None    Assessment/Plan:     * Crohn's colitis, with fistula    1 Day Post-Op s/p transvaginal rectovaginal fistula repair with amniofix mesh and repair of stomal prolapse    - Low residue diet  - Continue PO pain meds  - Ambulate TID    Dispo: possible d/c home later today if pain controlled              Maya Calixto MD  Colorectal Surgery  [facility]    Have seen and examined the patient with the fellow and agree with their plan.  ESPERANZA MADSEN

## 2018-01-11 NOTE — ASSESSMENT & PLAN NOTE
1 Day Post-Op s/p transvaginal rectovaginal fistula repair with amniofix mesh and repair of stomal prolapse    - Low residue diet  - Continue PO pain meds  - Ambulate TID    Dispo: possible d/c home later today if pain controlled

## 2018-01-11 NOTE — ANESTHESIA POSTPROCEDURE EVALUATION
"Anesthesia Post Evaluation    Patient: Carline Chang    Procedure(s) Performed: Procedure(s) (LRB):  REPAIR-FISTULA-RECTOVAGINAL (N/A)    Final Anesthesia Type: general  Patient location during evaluation: PACU  Patient participation: Yes- Able to Participate  Level of consciousness: awake and alert  Post-procedure vital signs: reviewed and stable  Pain management: adequate  Airway patency: patent  PONV status at discharge: No PONV  Anesthetic complications: no      Cardiovascular status: hemodynamically stable and blood pressure returned to baseline  Respiratory status: unassisted and spontaneous ventilation  Hydration status: euvolemic  Follow-up not needed.        Visit Vitals  BP (!) 91/50 (BP Location: Right arm, Patient Position: Lying)   Pulse 67   Temp 36.5 °C (97.7 °F) (Oral)   Resp 18   Ht 5' 8" (1.727 m)   Wt 65.8 kg (145 lb)   LMP 05/30/2009 (Approximate)   SpO2 98%   Breastfeeding? No   BMI 22.05 kg/m²       Pain/Estiven Score: Pain Assessment Performed: Yes (1/11/2018  6:14 AM)  Presence of Pain: denies (1/11/2018  6:14 AM)  Pain Rating Prior to Med Admin: 0 (1/11/2018  6:14 AM)  Pain Rating Post Med Admin: 3 (1/11/2018  6:15 AM)  Estiven Score: 10 (1/10/2018  2:30 PM)      "

## 2018-01-11 NOTE — PLAN OF CARE
PCP- [facility]    PT HAS A RIDE HOME AND FAMILY SUPPORT WITH MOTHER    PHARMACY-  [address], [place] [address]       01/11/18 1001   Discharge Assessment   Assessment Type Discharge Planning Assessment   Confirmed/corrected address and phone number on facesheet? Yes   Assessment information obtained from? Patient   Expected Length of Stay (days) 5   Communicated expected length of stay with patient/caregiver yes   Prior to hospitilization cognitive status: Alert/Oriented   Prior to hospitalization functional status: Independent   Current cognitive status: Alert/Oriented   Current Functional Status: Independent   Lives With parent(s)   Able to Return to Prior Arrangements yes   Is patient able to care for self after discharge? Yes   Patient's perception of discharge disposition home or selfcare   Readmission Within The Last 30 Days no previous admission in last 30 days   Patient currently being followed by outpatient case management? No   Patient currently receives any other outside agency services? No   Equipment Currently Used at Home colostomy/ostomy supplies   Do you have any problems affording any of your prescribed medications? No   Is the patient taking medications as prescribed? yes   Does the patient have transportation home? Yes   Transportation Available car;family or friend will provide   Does the patient receive services at the [facility]? No   Discharge Plan A Home with family   Discharge Plan B Home Health;Home with family   Patient/Family In Agreement With Plan yes

## 2018-01-11 NOTE — PROGRESS NOTES
Pt awake, alert, oriented X4. Blood pressure 88/56 then 90/50. HR 75. Pt not complaining of any pain. Pt has been getting pain medications around the clock bc of her surgery she had. Contacted MD on call about pt low BP. No new orders at this time, will continue to monitor pt.

## 2018-01-11 NOTE — SUBJECTIVE & OBJECTIVE
Subjective:     Interval History: Some pain overnight. Both around ostomy site and in vaginal region. Pain meds increased and pain improved. Packing removed this morning. Tolerated clears.     Post-Op Info:  Procedure(s) (LRB):  REPAIR-FISTULA-RECTOVAGINAL (N/A)   1 Day Post-Op      Medications:  Continuous Infusions:  Scheduled Meds:   acetaminophen  1,000 mg Intravenous Q8H    azaTHIOprine  50 mg Oral Daily    enoxparin  40 mg Subcutaneous Q24H    ibuprofen  400 mg Intravenous Q6H    lidocaine (PF) 10 mg/ml (1%)  1 mL Intradermal Once    mupirocin  1 g Nasal BID    ondansetron  4 mg Intravenous Once    potassium chloride SA  20 mEq Oral Daily    venlafaxine  37.5 mg Oral Daily     PRN Meds:   calcium carbonate    diphenhydrAMINE    HYDROmorphone    metoclopramide HCl    naloxone    ondansetron    oxyCODONE    ramelteon    sodium chloride 0.9%        Objective:     Vital Signs (Most Recent):  Temp: 96.8 °F (36 °C) (01/11/18 0831)  Pulse: 88 (01/11/18 0831)  Resp: 18 (01/11/18 0831)  BP: (!) 101/55 (01/11/18 0831)  SpO2: 98 % (01/11/18 0831) Vital Signs (24h Range):  Temp:  [96.8 °F (36 °C)-98.5 °F (36.9 °C)] 96.8 °F (36 °C)  Pulse:  [67-95] 88  Resp:  [11-20] 18  SpO2:  [91 %-100 %] 98 %  BP: ()/(50-62) 101/55     Intake/Output - Last 3 Shifts       01/09 0700 - 01/10 0659 01/10 0700 - 01/11 0659 01/11 0700 - 01/12 0659    I.V. (mL/kg)  1798.3 (27.3)     Total Intake(mL/kg)  1798.3 (27.3)     Urine (mL/kg/hr)  1325 (0.8)     Stool  0 (0)     Total Output   1325      Net   +473.3                   Physical Exam   Constitutional: She is oriented to person, place, and time. She appears well-developed and well-nourished.   HENT:   Head: Normocephalic and atraumatic.   Eyes: EOM are normal.   Cardiovascular: Normal rate.    Pulmonary/Chest: Effort normal and breath sounds normal.   Abdominal: Soft. She exhibits no distension and no mass. There is tenderness (luis-stomal). There is no rebound  and no guarding. No hernia.   Ostomy pink - no output yet in bag    Genitourinary:   Genitourinary Comments: Packing in vagina removed. Minimal bleeding   Musculoskeletal: Normal range of motion.   Neurological: She is alert and oriented to person, place, and time.   Skin: Skin is warm. Capillary refill takes less than 2 seconds.   Psychiatric: She has a normal mood and affect. Her behavior is normal.   Nursing note and vitals reviewed.    Significant Labs:  BMP (Last 3 Results):   Recent Labs  Lab 01/11/18  0627   GLU 92      K 4.2      CO2 24   BUN 5*   CREATININE 0.9   CALCIUM 8.3*   MG 1.1*     CBC (Last 3 Results):   Recent Labs  Lab 01/11/18  0627   WBC 11.76   RBC 3.93*   HGB 10.8*   HCT 33.2*      MCV 85   MCH 27.5   MCHC 32.5       Significant Diagnostics:  None

## 2018-01-12 VITALS
TEMPERATURE: 99 F | OXYGEN SATURATION: 94 % | WEIGHT: 145 LBS | BODY MASS INDEX: 21.98 KG/M2 | DIASTOLIC BLOOD PRESSURE: 71 MMHG | SYSTOLIC BLOOD PRESSURE: 113 MMHG | HEIGHT: 68 IN | HEART RATE: 92 BPM | RESPIRATION RATE: 18 BRPM

## 2018-01-12 LAB
ANION GAP SERPL CALC-SCNC: 7 MMOL/L
BASOPHILS # BLD AUTO: 0.02 K/UL
BASOPHILS NFR BLD: 0.2 %
BUN SERPL-MCNC: 6 MG/DL
CALCIUM SERPL-MCNC: 8.2 MG/DL
CHLORIDE SERPL-SCNC: 106 MMOL/L
CO2 SERPL-SCNC: 25 MMOL/L
CREAT SERPL-MCNC: 0.8 MG/DL
DIFFERENTIAL METHOD: ABNORMAL
EOSINOPHIL # BLD AUTO: 0.2 K/UL
EOSINOPHIL NFR BLD: 1.9 %
ERYTHROCYTE [DISTWIDTH] IN BLOOD BY AUTOMATED COUNT: 13.6 %
EST. GFR  (AFRICAN AMERICAN): >60 ML/MIN/1.73 M^2
EST. GFR  (NON AFRICAN AMERICAN): >60 ML/MIN/1.73 M^2
GLUCOSE SERPL-MCNC: 85 MG/DL
HCT VFR BLD AUTO: 32.3 %
HGB BLD-MCNC: 10.8 G/DL
IMM GRANULOCYTES # BLD AUTO: 0.05 K/UL
IMM GRANULOCYTES NFR BLD AUTO: 0.6 %
LYMPHOCYTES # BLD AUTO: 0.5 K/UL
LYMPHOCYTES NFR BLD: 5.7 %
MAGNESIUM SERPL-MCNC: 1.1 MG/DL
MCH RBC QN AUTO: 28.5 PG
MCHC RBC AUTO-ENTMCNC: 33.4 G/DL
MCV RBC AUTO: 85 FL
MONOCYTES # BLD AUTO: 0.5 K/UL
MONOCYTES NFR BLD: 5.7 %
NEUTROPHILS # BLD AUTO: 7.6 K/UL
NEUTROPHILS NFR BLD: 85.9 %
NRBC BLD-RTO: 0 /100 WBC
PHOSPHATE SERPL-MCNC: 3 MG/DL
PLATELET # BLD AUTO: 262 K/UL
PMV BLD AUTO: 8.6 FL
POTASSIUM SERPL-SCNC: 3.4 MMOL/L
RBC # BLD AUTO: 3.79 M/UL
SODIUM SERPL-SCNC: 138 MMOL/L
WBC # BLD AUTO: 8.82 K/UL

## 2018-01-12 PROCEDURE — 84100 ASSAY OF PHOSPHORUS: CPT

## 2018-01-12 PROCEDURE — 25000003 PHARM REV CODE 250: Performed by: STUDENT IN AN ORGANIZED HEALTH CARE EDUCATION/TRAINING PROGRAM

## 2018-01-12 PROCEDURE — 25000003 PHARM REV CODE 250: Performed by: SURGERY

## 2018-01-12 PROCEDURE — 63600175 PHARM REV CODE 636 W HCPCS: Performed by: STUDENT IN AN ORGANIZED HEALTH CARE EDUCATION/TRAINING PROGRAM

## 2018-01-12 PROCEDURE — 83735 ASSAY OF MAGNESIUM: CPT

## 2018-01-12 PROCEDURE — 85025 COMPLETE CBC W/AUTO DIFF WBC: CPT

## 2018-01-12 PROCEDURE — 80048 BASIC METABOLIC PNL TOTAL CA: CPT

## 2018-01-12 PROCEDURE — 36415 COLL VENOUS BLD VENIPUNCTURE: CPT

## 2018-01-12 PROCEDURE — 63600175 PHARM REV CODE 636 W HCPCS: Performed by: SURGERY

## 2018-01-12 PROCEDURE — 25000003 PHARM REV CODE 250: Performed by: NURSE PRACTITIONER

## 2018-01-12 RX ORDER — POTASSIUM CHLORIDE 20 MEQ/1
40 TABLET, EXTENDED RELEASE ORAL ONCE
Status: COMPLETED | OUTPATIENT
Start: 2018-01-12 | End: 2018-01-12

## 2018-01-12 RX ORDER — PROMETHAZINE HYDROCHLORIDE 12.5 MG/1
12.5 TABLET ORAL ONCE
Status: COMPLETED | OUTPATIENT
Start: 2018-01-12 | End: 2018-01-12

## 2018-01-12 RX ORDER — LANOLIN ALCOHOL/MO/W.PET/CERES
400 CREAM (GRAM) TOPICAL ONCE
Status: COMPLETED | OUTPATIENT
Start: 2018-01-12 | End: 2018-01-12

## 2018-01-12 RX ORDER — OXYCODONE HYDROCHLORIDE 5 MG/1
10 TABLET ORAL EVERY 4 HOURS PRN
Status: DISCONTINUED | OUTPATIENT
Start: 2018-01-12 | End: 2018-01-12 | Stop reason: HOSPADM

## 2018-01-12 RX ORDER — OXYCODONE AND ACETAMINOPHEN 5; 325 MG/1; MG/1
1 TABLET ORAL EVERY 4 HOURS PRN
Qty: 30 TABLET | Refills: 0 | Status: SHIPPED | OUTPATIENT
Start: 2018-01-12 | End: 2018-01-30

## 2018-01-12 RX ORDER — OXYCODONE HYDROCHLORIDE 5 MG/1
10 TABLET ORAL EVERY 6 HOURS PRN
Status: DISCONTINUED | OUTPATIENT
Start: 2018-01-12 | End: 2018-01-12

## 2018-01-12 RX ADMIN — OXYCODONE HYDROCHLORIDE 10 MG: 5 TABLET ORAL at 02:01

## 2018-01-12 RX ADMIN — PROMETHAZINE HYDROCHLORIDE 12.5 MG: 12.5 TABLET ORAL at 10:01

## 2018-01-12 RX ADMIN — VENLAFAXINE 37.5 MG: 37.5 TABLET ORAL at 08:01

## 2018-01-12 RX ADMIN — IBUPROFEN 400 MG: 800 INJECTION INTRAVENOUS at 05:01

## 2018-01-12 RX ADMIN — ONDANSETRON 4 MG: 2 INJECTION INTRAMUSCULAR; INTRAVENOUS at 07:01

## 2018-01-12 RX ADMIN — OXYCODONE HYDROCHLORIDE 10 MG: 5 TABLET ORAL at 08:01

## 2018-01-12 RX ADMIN — MAGNESIUM OXIDE TAB 400 MG (241.3 MG ELEMENTAL MG) 400 MG: 400 (241.3 MG) TAB at 08:01

## 2018-01-12 RX ADMIN — METOCLOPRAMIDE 5 MG: 5 INJECTION, SOLUTION INTRAMUSCULAR; INTRAVENOUS at 04:01

## 2018-01-12 RX ADMIN — AZATHIOPRINE 50 MG: 50 TABLET ORAL at 08:01

## 2018-01-12 RX ADMIN — POTASSIUM CHLORIDE 20 MEQ: 1500 TABLET, EXTENDED RELEASE ORAL at 08:01

## 2018-01-12 RX ADMIN — POTASSIUM CHLORIDE 40 MEQ: 1500 TABLET, EXTENDED RELEASE ORAL at 08:01

## 2018-01-12 RX ADMIN — IBUPROFEN 400 MG: 800 INJECTION INTRAVENOUS at 01:01

## 2018-01-12 RX ADMIN — OXYCODONE HYDROCHLORIDE 10 MG: 5 TABLET ORAL at 01:01

## 2018-01-12 NOTE — PROGRESS NOTES
Plan of care reviewed with patient; patient verbalized an understanding of the plan of care.  Patient is AAOx4; VSS.  Patient on RA.  Patient IV's saline locked.  Patient spontaneously voiding; patient's ostomy output marked.  Patient's complaint of nausea managed with PRN medications.  Patient's complaints of pain not well managed.  Patient consistently complained of left lower quadrant pain and vaginal pain. Pain medication increased overnight, patient also receiving IV Ibuprofen.  Patient restless all night. Patient ambulating and transferring with minimal assistance.  Pt remains free of falls/injury. [facility].

## 2018-01-12 NOTE — PLAN OF CARE
Problem: Patient Care Overview  Goal: Plan of Care Review  Outcome: Ongoing (interventions implemented as appropriate)  Patient assessed, VSS, AAOx4. Patient in bed upright,mother at bedside.  POC reviewed with patient who verbalized understanding. Patient ambulating and transferring with assist. Patient advanced to low fiber low residue diet today, tolerating well, minimal nausea reported without vomiting, resolved with medication. Ostomy site, clean and in tact, output to bag. Pt reports pain controlled with current regimen. Pt remains free of falls/injury. WCTM.

## 2018-01-12 NOTE — DISCHARGE SUMMARY
[facility]  Colorectal Surgery  Discharge Summary      Patient Name: Carline Chang  MRN: [id number]  Admission Date: 1/10/2018  Hospital Length of Stay: 2 days  Discharge Date and Time: 1/12/18  Attending Physician: Andre Uribe MD   Discharging Provider: Rosina Mcdonough NP  Primary Care Provider: Pablo Medina MD     HPI:  51 F who presents to clinic with with a history of colostomy for rectal Crohn's disease as well as a rectovaginal fistula.  She is actually doing very well.  Per her gastroenterologist shE no longer has active Crohn's disease and has taken her off biologic therapy and she is on Imuran only.  She still has a fistula recent colonoscopy showed a stricture but no evidence of active disease, she is here to discuss surgical intervention, also complaining that her colostomy is prolapsing with out obstruction     Procedure(s) (LRB):  REPAIR-FISTULA-RECTOVAGINAL (N/A)     Hospital Course:  No notes on file  The patient underwent the above operation. See op note for further details. She had an expected post operative course. By discharge day, voiding, +bowel function, ambulatory, pain controlled with oral pain medication, VSS/afebrile. She will follow up with Dr Uribe in two weeks.         Significant Diagnostic Studies: Labs:   BMP:   Recent Labs  Lab 01/11/18 0627 01/12/18  0408   GLU 92 85    138   K 4.2 3.4*    106   CO2 24 25   BUN 5* 6   CREATININE 0.9 0.8   CALCIUM 8.3* 8.2*   MG 1.1* 1.1*   , CMP   Recent Labs  Lab 01/11/18 0627 01/12/18  0408    138   K 4.2 3.4*    106   CO2 24 25   GLU 92 85   BUN 5* 6   CREATININE 0.9 0.8   CALCIUM 8.3* 8.2*   ANIONGAP 8 7*   ESTGFRAFRICA >60.0 >60.0   EGFRNONAA >60.0 >60.0    and CBC   Recent Labs  Lab 01/11/18 0627 01/12/18  0408   WBC 11.76 8.82   HGB 10.8* 10.8*   HCT 33.2* 32.3*    262       Pending Diagnostic Studies:     None        Final Active Diagnoses:    Diagnosis Date  Noted POA    PRINCIPAL PROBLEM:  Crohn's colitis, with fistula [K50.113] 01/10/2018 Yes      Problems Resolved During this Admission:    Diagnosis Date Noted Date Resolved POA      Discharged Condition: good    Disposition: Home or Self Care    Follow Up:  Follow-up Information     Andre Uribe MD In 2 weeks.    Specialty:  Colon and Rectal Surgery  Contact information:  [address]  [facility] [address]  [phone]                 Patient Instructions:     Diet Adult Regular   Order Comments: Low fiber     Lifting restrictions   Order Comments: No lifting over 10lbs     Notify your health care provider if you experience any of the following:  temperature >100.4     Notify your health care provider if you experience any of the following:  persistent nausea and vomiting or diarrhea     Notify your health care provider if you experience any of the following:  severe uncontrolled pain     Notify your health care provider if you experience any of the following:  redness, tenderness, or signs of infection (pain, swelling, redness, odor or green/yellow discharge around incision site)     Notify your health care provider if you experience any of the following:  difficulty breathing or increased cough       Medications:  Reconciled Home Medications:   Current Discharge Medication List      START taking these medications    Details   oxyCODONE-acetaminophen (PERCOCET) 5-325 mg per tablet Take 1 tablet by mouth every 4 (four) hours as needed for Pain.  Qty: 30 tablet, Refills: 0         CONTINUE these medications which have NOT CHANGED    Details   azaTHIOprine (IMURAN) 50 mg Tab Take 50 mg by mouth once daily.      magnesium 250 mg Tab Take by mouth once.      potassium chloride SA (K-DUR,KLOR-CON) 20 MEQ tablet Take 1 tablet (20 mEq total) by mouth once daily.  Qty: 30 tablet, Refills: 0      promethazine (PHENERGAN) 25 MG tablet       venlafaxine (EFFEXOR) 37.5 MG Tab          STOP taking these medications        hydrocodone-acetaminophen 10-325mg (NORCO)  mg Tab Comments:   Reason for Stopping:         neomycin (MYCIFRADIN) 500 mg Tab Comments:   Reason for Stopping:         hydrocodone-acetaminophen 10-325mg (NORCO)  mg Tab Comments:   Reason for Stopping:               Rosina Mcdonough NP  Colorectal Surgery  [facility]  Have seen and examined the patient with the fellow and agree with their plan.  ESPERANZA MADSEN

## 2018-01-12 NOTE — PLAN OF CARE
Scheduled an appt on 1/30/2018 @ 330p       01/12/18 1353   Final Note   Assessment Type Final Discharge Note   Discharge Disposition Home

## 2018-01-12 NOTE — NURSING
Patient d/c to home today accompanied by brother. D/C teaching performed with patient and wife. Follow up appointments confirmed with patient  IV's d/c prior to discharge, no bleeding, redness, swelling noted, site dressings CDI. Medication teaching performed, med list confirmed with and hard copy Rx to patient. Instructed on activity restrictions in d/c plan such as no lifting.  Instructed on s/s which warrant calling MD such as temp greater than 100.4, headache, new onset and/or severe pain, swelling/ redness at incision sites. [facility] on[facility] number highlighted in d/c instructions. Family questions answered. Patient verbalized understanding.

## 2018-01-16 DIAGNOSIS — K50.113 CROHN'S COLITIS, WITH FISTULA: Primary | ICD-10-CM

## 2018-01-16 RX ORDER — OXYCODONE HYDROCHLORIDE 10 MG/1
10 TABLET ORAL EVERY 6 HOURS PRN
Qty: 60 TABLET | Refills: 0 | Status: SHIPPED | OUTPATIENT
Start: 2018-01-16 | End: 2018-01-30

## 2018-01-16 RX ORDER — PROMETHAZINE HYDROCHLORIDE 25 MG/1
25 TABLET ORAL EVERY 12 HOURS PRN
Qty: 60 TABLET | Refills: 0 | Status: SHIPPED | OUTPATIENT
Start: 2018-01-16 | End: 2018-03-05

## 2018-01-30 ENCOUNTER — TELEPHONE (OUTPATIENT)
Dept: GASTROENTEROLOGY | Facility: CLINIC | Age: 53
End: 2018-01-30

## 2018-01-30 ENCOUNTER — OFFICE VISIT (OUTPATIENT)
Dept: SURGERY | Facility: CLINIC | Age: 53
End: 2018-01-30
Payer: MEDICARE

## 2018-01-30 VITALS
BODY MASS INDEX: 20.68 KG/M2 | SYSTOLIC BLOOD PRESSURE: 129 MMHG | HEART RATE: 101 BPM | DIASTOLIC BLOOD PRESSURE: 77 MMHG | WEIGHT: 136.44 LBS | HEIGHT: 68 IN

## 2018-01-30 DIAGNOSIS — N82.3 RVF (RECTOVAGINAL FISTULA): ICD-10-CM

## 2018-01-30 DIAGNOSIS — Z98.890 POSTOPERATIVE STATE: Primary | ICD-10-CM

## 2018-01-30 DIAGNOSIS — K50.113 CROHN'S COLITIS, WITH FISTULA: Primary | ICD-10-CM

## 2018-01-30 PROCEDURE — 99024 POSTOP FOLLOW-UP VISIT: CPT | Mod: POP,,, | Performed by: COLON & RECTAL SURGERY

## 2018-01-30 PROCEDURE — 99999 PR PBB SHADOW E&M-EST. PATIENT-LVL III: CPT | Mod: PBBFAC,,, | Performed by: COLON & RECTAL SURGERY

## 2018-01-30 PROCEDURE — 99213 OFFICE O/P EST LOW 20 MIN: CPT | Mod: PBBFAC | Performed by: COLON & RECTAL SURGERY

## 2018-01-30 RX ORDER — HYDROCODONE BITARTRATE AND ACETAMINOPHEN 10; 325 MG/1; MG/1
1 TABLET ORAL EVERY 4 HOURS PRN
Qty: 90 TABLET | Refills: 0 | Status: SHIPPED | OUTPATIENT
Start: 2018-01-30 | End: 2018-02-26 | Stop reason: SDUPTHER

## 2018-01-30 RX ORDER — HYDROCODONE BITARTRATE AND ACETAMINOPHEN 10; 325 MG/1; MG/1
TABLET ORAL
COMMUNITY
End: 2018-01-30

## 2018-01-30 RX ORDER — VENLAFAXINE 37.5 MG/1
37.5 TABLET ORAL 2 TIMES DAILY
Qty: 60 TABLET | Refills: 1 | Status: SHIPPED | OUTPATIENT
Start: 2018-01-30 | End: 2018-04-26 | Stop reason: SDUPTHER

## 2018-01-30 RX ORDER — HYDROCODONE BITARTRATE AND ACETAMINOPHEN 10; 325 MG/1; MG/1
1 TABLET ORAL EVERY 4 HOURS PRN
Qty: 120 TABLET | Refills: 0 | Status: SHIPPED | OUTPATIENT
Start: 2018-01-30 | End: 2018-01-30 | Stop reason: SDUPTHER

## 2018-01-30 NOTE — TELEPHONE ENCOUNTER
----- Message from Deidre Mcclure sent at 1/29/2018  2:47 PM CST -----  Contact: self  Pt was due for refill of hydrocodone last Thursday.

## 2018-01-30 NOTE — PROGRESS NOTES
Subjective:       Patient ID: Carline Chang is a 51 y.o. female.    Chief Complaint: Crohn's colitis with rectal stricture and rectovaginal fistula HPI   51 F who presents to clinic with with a history of colostomy for rectal Crohn's disease as well as a rectovaginal fistula.  He is status post repair of stomal prolapse and her rectovaginal fistula she is doing very well  Objective:      Physical Exam   Constitutional: She is oriented to person, place, and time. She appears well-developed and well-nourished.   Pulmonary/Chest: Effort normal. No respiratory distress.   Abdominal: Soft. She exhibits no distension. There is no tenderness.  she has no prolapse of her colostomy   Musculoskeletal: Normal range of motion.   Neurological: She is alert and oriented to person, place, and time.   Skin: Skin is warm and dry.   Psychiatric: She has a normal mood and affect. Her behavior is normal.       Assessment:       1. Crohn's disease with other complication    2. Colostomy status        Plan:       RTC 6 weeks at which time we'll schedule for a Gastrografin adenomatous assess rectovaginal fistula prior to possible colostomy closure

## 2018-02-03 ENCOUNTER — HOSPITAL ENCOUNTER (INPATIENT)
Facility: HOSPITAL | Age: 53
LOS: 6 days | Discharge: HOME | DRG: 387 | End: 2018-02-09
Attending: EMERGENCY MEDICINE | Admitting: EMERGENCY MEDICINE
Payer: MEDICARE

## 2018-02-03 DIAGNOSIS — Z93.3 COLOSTOMY STATUS: ICD-10-CM

## 2018-02-03 DIAGNOSIS — K50.012 CROHN'S DISEASE OF SMALL INTESTINE WITH INTESTINAL OBSTRUCTION: Primary | ICD-10-CM

## 2018-02-03 DIAGNOSIS — K91.89 POSTOPERATIVE SURGICAL COMPLICATION INVOLVING DIGESTIVE SYSTEM ASSOCIATED WITH DIGESTIVE SYSTEM PROCEDURE, UNSPECIFIED COMPLICATION: ICD-10-CM

## 2018-02-03 DIAGNOSIS — K50.013 CROHN'S DISEASE OF SMALL INTESTINE WITH FISTULA: ICD-10-CM

## 2018-02-03 LAB
ABO + RH BLD: NORMAL
ALBUMIN SERPL BCP-MCNC: 3.1 G/DL
ALP SERPL-CCNC: 277 U/L
ALT SERPL W/O P-5'-P-CCNC: 16 U/L
ANION GAP SERPL CALC-SCNC: 16 MMOL/L
AST SERPL-CCNC: 23 U/L
BASOPHILS # BLD AUTO: 0.04 K/UL
BASOPHILS NFR BLD: 0.4 %
BILIRUB SERPL-MCNC: 1 MG/DL
BLD GP AB SCN CELLS X3 SERPL QL: NORMAL
BUN SERPL-MCNC: 12 MG/DL
CALCIUM SERPL-MCNC: 9.1 MG/DL
CHLORIDE SERPL-SCNC: 94 MMOL/L
CO2 SERPL-SCNC: 25 MMOL/L
CREAT SERPL-MCNC: 1 MG/DL
CRP SERPL-MCNC: 256.5 MG/L
DIFFERENTIAL METHOD: ABNORMAL
EOSINOPHIL # BLD AUTO: 0.1 K/UL
EOSINOPHIL NFR BLD: 1.3 %
ERYTHROCYTE [DISTWIDTH] IN BLOOD BY AUTOMATED COUNT: 13.9 %
ERYTHROCYTE [SEDIMENTATION RATE] IN BLOOD BY WESTERGREN METHOD: 52 MM/HR
EST. GFR  (AFRICAN AMERICAN): >60 ML/MIN/1.73 M^2
EST. GFR  (NON AFRICAN AMERICAN): >60 ML/MIN/1.73 M^2
GLUCOSE SERPL-MCNC: 94 MG/DL
HCT VFR BLD AUTO: 42.1 %
HGB BLD-MCNC: 14.3 G/DL
IMM GRANULOCYTES # BLD AUTO: 0.04 K/UL
IMM GRANULOCYTES NFR BLD AUTO: 0.4 %
INR PPP: 1.2
LIPASE SERPL-CCNC: 43 U/L
LYMPHOCYTES # BLD AUTO: 0.9 K/UL
LYMPHOCYTES NFR BLD: 9.3 %
MAGNESIUM SERPL-MCNC: 1.4 MG/DL
MCH RBC QN AUTO: 28.3 PG
MCHC RBC AUTO-ENTMCNC: 34 G/DL
MCV RBC AUTO: 83 FL
MONOCYTES # BLD AUTO: 1 K/UL
MONOCYTES NFR BLD: 10.5 %
NEUTROPHILS # BLD AUTO: 7.4 K/UL
NEUTROPHILS NFR BLD: 78.1 %
NRBC BLD-RTO: 0 /100 WBC
PLATELET # BLD AUTO: 436 K/UL
PMV BLD AUTO: 8.8 FL
POTASSIUM SERPL-SCNC: 4.2 MMOL/L
PROT SERPL-MCNC: 8.3 G/DL
PROTHROMBIN TIME: 11.9 SEC
RBC # BLD AUTO: 5.06 M/UL
SODIUM SERPL-SCNC: 135 MMOL/L
WBC # BLD AUTO: 9.52 K/UL

## 2018-02-03 PROCEDURE — 25000003 PHARM REV CODE 250: Performed by: STUDENT IN AN ORGANIZED HEALTH CARE EDUCATION/TRAINING PROGRAM

## 2018-02-03 PROCEDURE — 85651 RBC SED RATE NONAUTOMATED: CPT

## 2018-02-03 PROCEDURE — 20600001 HC STEP DOWN PRIVATE ROOM

## 2018-02-03 PROCEDURE — 80053 COMPREHEN METABOLIC PANEL: CPT

## 2018-02-03 PROCEDURE — 25000003 PHARM REV CODE 250: Performed by: PHYSICIAN ASSISTANT

## 2018-02-03 PROCEDURE — 85610 PROTHROMBIN TIME: CPT

## 2018-02-03 PROCEDURE — 63600175 PHARM REV CODE 636 W HCPCS

## 2018-02-03 PROCEDURE — 99024 POSTOP FOLLOW-UP VISIT: CPT | Mod: ,,, | Performed by: COLON & RECTAL SURGERY

## 2018-02-03 PROCEDURE — 99285 EMERGENCY DEPT VISIT HI MDM: CPT | Mod: 25

## 2018-02-03 PROCEDURE — A4216 STERILE WATER/SALINE, 10 ML: HCPCS | Performed by: STUDENT IN AN ORGANIZED HEALTH CARE EDUCATION/TRAINING PROGRAM

## 2018-02-03 PROCEDURE — 99285 EMERGENCY DEPT VISIT HI MDM: CPT | Mod: ,,, | Performed by: EMERGENCY MEDICINE

## 2018-02-03 PROCEDURE — 96375 TX/PRO/DX INJ NEW DRUG ADDON: CPT

## 2018-02-03 PROCEDURE — 25500020 PHARM REV CODE 255: Performed by: EMERGENCY MEDICINE

## 2018-02-03 PROCEDURE — 83735 ASSAY OF MAGNESIUM: CPT

## 2018-02-03 PROCEDURE — 85025 COMPLETE CBC W/AUTO DIFF WBC: CPT

## 2018-02-03 PROCEDURE — 96361 HYDRATE IV INFUSION ADD-ON: CPT

## 2018-02-03 PROCEDURE — 96374 THER/PROPH/DIAG INJ IV PUSH: CPT

## 2018-02-03 PROCEDURE — 83690 ASSAY OF LIPASE: CPT

## 2018-02-03 PROCEDURE — 86850 RBC ANTIBODY SCREEN: CPT

## 2018-02-03 PROCEDURE — 63600175 PHARM REV CODE 636 W HCPCS: Performed by: PHYSICIAN ASSISTANT

## 2018-02-03 PROCEDURE — 86140 C-REACTIVE PROTEIN: CPT

## 2018-02-03 PROCEDURE — 25000003 PHARM REV CODE 250

## 2018-02-03 PROCEDURE — 63600175 PHARM REV CODE 636 W HCPCS: Performed by: STUDENT IN AN ORGANIZED HEALTH CARE EDUCATION/TRAINING PROGRAM

## 2018-02-03 RX ORDER — FENTANYL CITRATE 50 UG/ML
25 INJECTION, SOLUTION INTRAMUSCULAR; INTRAVENOUS
Status: COMPLETED | OUTPATIENT
Start: 2018-02-03 | End: 2018-02-03

## 2018-02-03 RX ORDER — ENOXAPARIN SODIUM 100 MG/ML
40 INJECTION SUBCUTANEOUS
Status: DISCONTINUED | OUTPATIENT
Start: 2018-02-04 | End: 2018-02-09 | Stop reason: HOSPADM

## 2018-02-03 RX ORDER — SCOLOPAMINE TRANSDERMAL SYSTEM 1 MG/1
1 PATCH, EXTENDED RELEASE TRANSDERMAL
Status: DISCONTINUED | OUTPATIENT
Start: 2018-02-03 | End: 2018-02-09 | Stop reason: HOSPADM

## 2018-02-03 RX ORDER — ONDANSETRON 2 MG/ML
8 INJECTION INTRAMUSCULAR; INTRAVENOUS EVERY 6 HOURS PRN
Status: DISCONTINUED | OUTPATIENT
Start: 2018-02-03 | End: 2018-02-09 | Stop reason: HOSPADM

## 2018-02-03 RX ORDER — OXYCODONE HYDROCHLORIDE 5 MG/1
5 TABLET ORAL EVERY 4 HOURS PRN
Status: DISCONTINUED | OUTPATIENT
Start: 2018-02-03 | End: 2018-02-09 | Stop reason: HOSPADM

## 2018-02-03 RX ORDER — VENLAFAXINE 37.5 MG/1
37.5 TABLET ORAL 2 TIMES DAILY
Status: DISCONTINUED | OUTPATIENT
Start: 2018-02-03 | End: 2018-02-09 | Stop reason: HOSPADM

## 2018-02-03 RX ORDER — ONDANSETRON 2 MG/ML
4 INJECTION INTRAMUSCULAR; INTRAVENOUS
Status: COMPLETED | OUTPATIENT
Start: 2018-02-03 | End: 2018-02-03

## 2018-02-03 RX ORDER — NALOXONE HCL 0.4 MG/ML
0.02 VIAL (ML) INJECTION
Status: DISCONTINUED | OUTPATIENT
Start: 2018-02-03 | End: 2018-02-09 | Stop reason: HOSPADM

## 2018-02-03 RX ORDER — ONDANSETRON 2 MG/ML
4 INJECTION INTRAMUSCULAR; INTRAVENOUS EVERY 6 HOURS PRN
Status: DISCONTINUED | OUTPATIENT
Start: 2018-02-03 | End: 2018-02-03

## 2018-02-03 RX ORDER — AZATHIOPRINE 50 MG/1
50 TABLET ORAL DAILY
Status: DISCONTINUED | OUTPATIENT
Start: 2018-02-04 | End: 2018-02-09 | Stop reason: HOSPADM

## 2018-02-03 RX ORDER — ACETAMINOPHEN 325 MG/1
650 TABLET ORAL EVERY 4 HOURS PRN
Status: DISCONTINUED | OUTPATIENT
Start: 2018-02-03 | End: 2018-02-09 | Stop reason: HOSPADM

## 2018-02-03 RX ORDER — SODIUM CHLORIDE 0.9 % (FLUSH) 0.9 %
3 SYRINGE (ML) INJECTION EVERY 8 HOURS
Status: DISCONTINUED | OUTPATIENT
Start: 2018-02-03 | End: 2018-02-09 | Stop reason: HOSPADM

## 2018-02-03 RX ORDER — HYDROCODONE BITARTRATE AND ACETAMINOPHEN 10; 325 MG/1; MG/1
1 TABLET ORAL EVERY 6 HOURS PRN
Status: DISCONTINUED | OUTPATIENT
Start: 2018-02-03 | End: 2018-02-03

## 2018-02-03 RX ORDER — OXYCODONE HYDROCHLORIDE 5 MG/1
10 TABLET ORAL EVERY 4 HOURS PRN
Status: DISCONTINUED | OUTPATIENT
Start: 2018-02-03 | End: 2018-02-09 | Stop reason: HOSPADM

## 2018-02-03 RX ORDER — DEXTROSE MONOHYDRATE AND SODIUM CHLORIDE 5; .9 G/100ML; G/100ML
INJECTION, SOLUTION INTRAVENOUS CONTINUOUS
Status: DISCONTINUED | OUTPATIENT
Start: 2018-02-03 | End: 2018-02-04

## 2018-02-03 RX ORDER — PROMETHAZINE HYDROCHLORIDE 12.5 MG/1
25 TABLET ORAL EVERY 6 HOURS PRN
Status: DISCONTINUED | OUTPATIENT
Start: 2018-02-03 | End: 2018-02-09 | Stop reason: HOSPADM

## 2018-02-03 RX ADMIN — ONDANSETRON 4 MG: 2 INJECTION INTRAMUSCULAR; INTRAVENOUS at 09:02

## 2018-02-03 RX ADMIN — DEXTROSE AND SODIUM CHLORIDE: 5; .9 INJECTION, SOLUTION INTRAVENOUS at 09:02

## 2018-02-03 RX ADMIN — SODIUM CHLORIDE 1000 ML: 0.9 INJECTION, SOLUTION INTRAVENOUS at 03:02

## 2018-02-03 RX ADMIN — Medication 3 ML: at 10:02

## 2018-02-03 RX ADMIN — VENLAFAXINE 37.5 MG: 37.5 TABLET ORAL at 09:02

## 2018-02-03 RX ADMIN — OXYCODONE HYDROCHLORIDE 10 MG: 5 TABLET ORAL at 07:02

## 2018-02-03 RX ADMIN — PROMETHAZINE HYDROCHLORIDE 25 MG: 12.5 TABLET ORAL at 07:02

## 2018-02-03 RX ADMIN — IOHEXOL 75 ML: 350 INJECTION, SOLUTION INTRAVENOUS at 04:02

## 2018-02-03 RX ADMIN — ONDANSETRON 4 MG: 2 INJECTION INTRAMUSCULAR; INTRAVENOUS at 03:02

## 2018-02-03 RX ADMIN — HYDROCODONE BITARTRATE AND ACETAMINOPHEN 1 TABLET: 10; 325 TABLET ORAL at 04:02

## 2018-02-03 RX ADMIN — SCOLOPAMINE TRANSDERMAL SYSTEM 1 PATCH: 1 PATCH, EXTENDED RELEASE TRANSDERMAL at 11:02

## 2018-02-03 RX ADMIN — FENTANYL CITRATE 25 MCG: 50 INJECTION, SOLUTION INTRAMUSCULAR; INTRAVENOUS at 05:02

## 2018-02-03 NOTE — PROVIDER PROGRESS NOTES - EMERGENCY DEPT.
Encounter Date: 2/3/2018    ED Physician Progress Notes             Pt is a 52 year old female with hx of Crohn's and colostmy and recent fistula (bt vagina and rectum) repair on 1/10/18 who presents to the ED c/o  Nausea, vomiting, abdominal cramping, decreased appetite worse since Thursday, 2 days ago. Pt reports taking phenergan with no relief. Pt reports decreased colostomy output.    On exam, afebril. Tachy at 109. NAD and nontoxic appearing. She has TTP in RLQ. Brown, liquidly stool in ostomy bag.     Patient with recent surgery now with abd, n/v, and dec ostomy output. She is only able to tolerate fluids at times. Given recent surgical history, may require extensive work up. Will order blood work, give IVF and zofran, and defer imaging studies with possible consult for main ED side.           I initially evaluated this patient and ordered workup while in intake.  The patient will receive a full evaluation in an ED pod when space is available.  All results from tests ordered in intake will not be followed by the intake team, including myself. All results will be followed by the ED Pod team.

## 2018-02-03 NOTE — ED NOTES
Pt placed on telebox [id number]. War room made aware. Spoke with war room regarding pulse ox, none available at this time. Dr. Suarez made aware.

## 2018-02-03 NOTE — ED PROVIDER NOTES
"Encounter Date: 2/3/2018       History     Chief Complaint   Patient presents with    Emesis     fistula repair 1/10; pt states thinks she has an obstruction; hasn't eaten in two days and having bad cramping      Ms. Chang is a 51 yo woman with PMHx of Crohn Disease who presents to the ED with the CC of emesis. She states that for the past 2-3 weeks, she has had intermittent, painful abdominal cramping. 2 days before presentation, patient states that her cramping increased in intensity, and she had an episode of emesis when she "threw up everything I ate." Associated symptoms include 2 days of decreased stoma output, heartburn, decreased appetite and RLQ abdominal pain. Denies abdominal distension. Patient is 3 weeks s/p a rectovaginal fistula repair, stoma revision, and rectal stricture dilation with Dr. Uribe.           Review of patient's allergies indicates:   Allergen Reactions    Morphine Other (See Comments)     Abdominal pain    Nubain [nalbuphine] Anxiety     Past Medical History:   Diagnosis Date    Chronic pain     Crohn's disease      Past Surgical History:   Procedure Laterality Date    anal fistula      BOWEL RESECTION      x2     SECTION      x2    CHOLECYSTECTOMY      COLONOSCOPY N/A 2016    Procedure: COLONOSCOPY;  Surgeon: Andre Uribe MD;  Location: [facility] ENDO (4TH FLR);  Service: Endoscopy;  Laterality: N/A;    COLONOSCOPY N/A 2017    Procedure: COLONOSCOPY;  Surgeon: Dany Stock MD;  Location: [facility] ENDO (2ND FLR);  Service: Endoscopy;  Laterality: N/A;    COLONOSCOPY N/A 2017    Procedure: COLONOSCOPY;  Surgeon: Andre Uribe MD;  Location: [facility] ENDO (4TH FLR);  Service: Endoscopy;  Laterality: N/A;    COLOSTOMY      rectal abscess drain      TUBAL LIGATION       Family History   Problem Relation Age of Onset    Cancer Maternal Aunt 66     colon ca    Anesthesia problems Neg Hx      Social History   Substance Use Topics    Smoking " status: Never Smoker    Smokeless tobacco: Never Used    Alcohol use No     Review of Systems   Constitutional: Positive for appetite change. Negative for chills and fever.   HENT: Negative for sore throat and trouble swallowing.    Respiratory: Negative for cough and shortness of breath.    Cardiovascular: Negative for chest pain.   Gastrointestinal: Positive for abdominal pain, constipation, nausea and vomiting. Negative for abdominal distention, blood in stool and diarrhea.   Genitourinary: Negative for dysuria.   Musculoskeletal: Negative for back pain.   Skin: Negative for rash.   Neurological: Negative for weakness.   Hematological: Does not bruise/bleed easily.       Physical Exam     Initial Vitals [02/03/18 1339]   BP Pulse Resp Temp SpO2   (!) 140/72 108 18 98.4 °F (36.9 °C) 100 %      MAP       94.67         Physical Exam    Nursing note and vitals reviewed.  Constitutional: She appears well-developed and well-nourished. No distress.   HENT:   Head: Normocephalic and atraumatic.   Eyes: Conjunctivae and EOM are normal. Pupils are equal, round, and reactive to light. No scleral icterus.   Neck: Normal range of motion. Neck supple.   Cardiovascular: Normal rate, regular rhythm, normal heart sounds and intact distal pulses.   Pulmonary/Chest: Breath sounds normal. No respiratory distress.   Abdominal: Soft. Bowel sounds are normal. She exhibits no distension. There is tenderness (TTP RLQ). There is guarding. There is no rebound.   Ostomy with small amount of brown stool   Neurological: She is alert and oriented to person, place, and time.   Skin: Skin is warm and dry.   Psychiatric: She has a normal mood and affect. Thought content normal.         ED Course   Procedures  Labs Reviewed   CBC W/ AUTO DIFFERENTIAL - Abnormal; Notable for the following:        Result Value    Platelets 436 (*)     MPV 8.8 (*)     Lymph # 0.9 (*)     Gran% 78.1 (*)     Lymph% 9.3 (*)     All other components within normal  "limits   COMPREHENSIVE METABOLIC PANEL - Abnormal; Notable for the following:     Sodium 135 (*)     Chloride 94 (*)     Albumin 3.1 (*)     Alkaline Phosphatase 277 (*)     All other components within normal limits   SEDIMENTATION RATE, MANUAL - Abnormal; Notable for the following:     Sed Rate 52 (*)     All other components within normal limits    Narrative:     ADD ON MG [id number] PER DR. SERNA 02/03/2018  15:18    C-REACTIVE PROTEIN - Abnormal; Notable for the following:     .5 (*)     All other components within normal limits    Narrative:     ADD ON PT [id number] PER DR. SERNA 02/03/2018  15:10    MAGNESIUM - Abnormal; Notable for the following:     Magnesium 1.4 (*)     All other components within normal limits    Narrative:     ADD ON MG [id number] PER DR. SERNA 02/03/2018  15:18    LIPASE   PROTIME-INR   MAGNESIUM   PROTIME-INR    Narrative:     ADD ON MG [id number] PER DR. SERNA 02/03/2018  15:18    TYPE & SCREEN   ISTAT CHEM8             Medical Decision Making:   ED Management:  CBC WNL. CMP remarkable for elevated alk phos, mildly hypomagnesemic. CRP elevated, consistent with patient's chronic inflammatory condition.    Patient given Fentanyl 25 mcg and ondansetron 4 mg for control of pain and nausea, on re-eval patient states both are well controlled.    CT abd/pelvis with contrast showed "Interval development of free air adjacent to the proximal colonic anastomosis within the right lower quadrant. Findings concerning for potential anastomotic leak."    CT Surgery consulted, patient admitted to their service for further evaluation of possible anastomotic leak.              Attending Attestation:   Physician Attestation Statement for Resident:  As the supervising MD   Physician Attestation Statement: I have personally seen and examined this patient.   I agree with the above history. -: 51 yo f, h/o crohn's disease, recent surgical repair of rectovaginal fistula by CRS, now here with " decreased ostomy output (though w full bag this am), n/v and abd cramping.  Well-appearing, mild diffuse abd tenderness.  On my exam, ostomy bag empty.  Will get labs, CT abd/pelvis.  Likely involvement of CRS pending CT results   As the supervising MD I agree with the above PE.    As the supervising MD I agree with the above treatment, course, plan, and disposition.  I have reviewed and agree with the residents interpretation of the following: lab data and CT scans.  I have reviewed the following: old records at this facility.                    ED Course      Clinical Impression:   The encounter diagnosis was Postoperative surgical complication involving digestive system associated with digestive system procedure, unspecified complication.                           Abi Winters MD  02/04/18 [id number]

## 2018-02-03 NOTE — ED TRIAGE NOTES
Pt presents to the ED c/o emesis since Thursday with abdominal cramping. Hx crohn's. +decreased appetite. Surgical repair of fistula b/w rectum/vagina, dilated a stricture, and fixed a prolapsed stoma for pt's colostomy x 3 weeks. Pt reports taking phenergan with no relief. Pt reports decreased colostomy output.

## 2018-02-04 LAB
ANION GAP SERPL CALC-SCNC: 12 MMOL/L
BACTERIA #/AREA URNS AUTO: ABNORMAL /HPF
BASOPHILS # BLD AUTO: 0.04 K/UL
BASOPHILS NFR BLD: 0.9 %
BILIRUB UR QL STRIP: NEGATIVE
BUN SERPL-MCNC: 10 MG/DL
C DIFF GDH STL QL: NEGATIVE
C DIFF TOX A+B STL QL IA: NEGATIVE
CALCIUM SERPL-MCNC: 8 MG/DL
CHLORIDE SERPL-SCNC: 102 MMOL/L
CLARITY UR REFRACT.AUTO: CLEAR
CO2 SERPL-SCNC: 24 MMOL/L
COLOR UR AUTO: YELLOW
CREAT SERPL-MCNC: 0.8 MG/DL
DIFFERENTIAL METHOD: ABNORMAL
EOSINOPHIL # BLD AUTO: 0.5 K/UL
EOSINOPHIL NFR BLD: 9.9 %
ERYTHROCYTE [DISTWIDTH] IN BLOOD BY AUTOMATED COUNT: 13.6 %
EST. GFR  (AFRICAN AMERICAN): >60 ML/MIN/1.73 M^2
EST. GFR  (NON AFRICAN AMERICAN): >60 ML/MIN/1.73 M^2
GLUCOSE SERPL-MCNC: 76 MG/DL
GLUCOSE UR QL STRIP: NEGATIVE
HCT VFR BLD AUTO: 32.6 %
HGB BLD-MCNC: 10.9 G/DL
HGB UR QL STRIP: ABNORMAL
IMM GRANULOCYTES # BLD AUTO: 0.01 K/UL
IMM GRANULOCYTES NFR BLD AUTO: 0.2 %
KETONES UR QL STRIP: NEGATIVE
LEUKOCYTE ESTERASE UR QL STRIP: ABNORMAL
LYMPHOCYTES # BLD AUTO: 0.9 K/UL
LYMPHOCYTES NFR BLD: 18.7 %
MCH RBC QN AUTO: 28.2 PG
MCHC RBC AUTO-ENTMCNC: 33.4 G/DL
MCV RBC AUTO: 84 FL
MICROSCOPIC COMMENT: ABNORMAL
MONOCYTES # BLD AUTO: 0.8 K/UL
MONOCYTES NFR BLD: 16.1 %
NEUTROPHILS # BLD AUTO: 2.5 K/UL
NEUTROPHILS NFR BLD: 54.2 %
NITRITE UR QL STRIP: NEGATIVE
NRBC BLD-RTO: 0 /100 WBC
PH UR STRIP: 6 [PH] (ref 5–8)
PLATELET # BLD AUTO: 288 K/UL
PMV BLD AUTO: 8.9 FL
POTASSIUM SERPL-SCNC: 3.1 MMOL/L
PROT UR QL STRIP: NEGATIVE
RBC # BLD AUTO: 3.87 M/UL
RBC #/AREA URNS AUTO: 6 /HPF (ref 0–4)
SODIUM SERPL-SCNC: 138 MMOL/L
SP GR UR STRIP: 1.01 (ref 1–1.03)
SQUAMOUS #/AREA URNS AUTO: 1 /HPF
URN SPEC COLLECT METH UR: ABNORMAL
UROBILINOGEN UR STRIP-ACNC: NEGATIVE EU/DL
WBC # BLD AUTO: 4.65 K/UL
WBC #/AREA URNS AUTO: 8 /HPF (ref 0–5)

## 2018-02-04 PROCEDURE — 81001 URINALYSIS AUTO W/SCOPE: CPT

## 2018-02-04 PROCEDURE — A9585 GADOBUTROL INJECTION: HCPCS | Performed by: COLON & RECTAL SURGERY

## 2018-02-04 PROCEDURE — 25000003 PHARM REV CODE 250: Performed by: STUDENT IN AN ORGANIZED HEALTH CARE EDUCATION/TRAINING PROGRAM

## 2018-02-04 PROCEDURE — 87046 STOOL CULTR AEROBIC BACT EA: CPT | Mod: 59

## 2018-02-04 PROCEDURE — 87045 FECES CULTURE AEROBIC BACT: CPT

## 2018-02-04 PROCEDURE — 63600175 PHARM REV CODE 636 W HCPCS: Performed by: STUDENT IN AN ORGANIZED HEALTH CARE EDUCATION/TRAINING PROGRAM

## 2018-02-04 PROCEDURE — A4216 STERILE WATER/SALINE, 10 ML: HCPCS | Performed by: STUDENT IN AN ORGANIZED HEALTH CARE EDUCATION/TRAINING PROGRAM

## 2018-02-04 PROCEDURE — 85025 COMPLETE CBC W/AUTO DIFF WBC: CPT

## 2018-02-04 PROCEDURE — 87427 SHIGA-LIKE TOXIN AG IA: CPT

## 2018-02-04 PROCEDURE — 87449 NOS EACH ORGANISM AG IA: CPT

## 2018-02-04 PROCEDURE — 80048 BASIC METABOLIC PNL TOTAL CA: CPT

## 2018-02-04 PROCEDURE — 25000003 PHARM REV CODE 250: Performed by: COLON & RECTAL SURGERY

## 2018-02-04 PROCEDURE — 25500020 PHARM REV CODE 255: Performed by: COLON & RECTAL SURGERY

## 2018-02-04 PROCEDURE — 87040 BLOOD CULTURE FOR BACTERIA: CPT

## 2018-02-04 PROCEDURE — 36415 COLL VENOUS BLD VENIPUNCTURE: CPT

## 2018-02-04 PROCEDURE — 20600001 HC STEP DOWN PRIVATE ROOM

## 2018-02-04 RX ORDER — POTASSIUM CHLORIDE 20 MEQ/1
40 TABLET, EXTENDED RELEASE ORAL ONCE
Status: COMPLETED | OUTPATIENT
Start: 2018-02-04 | End: 2018-02-04

## 2018-02-04 RX ORDER — FENTANYL CITRATE 50 UG/ML
25 INJECTION, SOLUTION INTRAMUSCULAR; INTRAVENOUS EVERY 4 HOURS PRN
Status: DISCONTINUED | OUTPATIENT
Start: 2018-02-04 | End: 2018-02-05

## 2018-02-04 RX ORDER — CALCIUM CARBONATE 200(500)MG
500 TABLET,CHEWABLE ORAL DAILY PRN
Status: DISCONTINUED | OUTPATIENT
Start: 2018-02-04 | End: 2018-02-09 | Stop reason: HOSPADM

## 2018-02-04 RX ORDER — DEXTROSE MONOHYDRATE, SODIUM CHLORIDE, AND POTASSIUM CHLORIDE 50; 1.49; 4.5 G/1000ML; G/1000ML; G/1000ML
INJECTION, SOLUTION INTRAVENOUS CONTINUOUS
Status: DISCONTINUED | OUTPATIENT
Start: 2018-02-04 | End: 2018-02-09 | Stop reason: HOSPADM

## 2018-02-04 RX ORDER — GADOBUTROL 604.72 MG/ML
10 INJECTION INTRAVENOUS
Status: COMPLETED | OUTPATIENT
Start: 2018-02-04 | End: 2018-02-04

## 2018-02-04 RX ORDER — CALCIUM CARBONATE 500(1250)
500 TABLET ORAL ONCE
Status: DISCONTINUED | OUTPATIENT
Start: 2018-02-04 | End: 2018-02-04

## 2018-02-04 RX ADMIN — AZATHIOPRINE 50 MG: 50 TABLET ORAL at 08:02

## 2018-02-04 RX ADMIN — PROMETHAZINE HYDROCHLORIDE 25 MG: 12.5 TABLET ORAL at 08:02

## 2018-02-04 RX ADMIN — VENLAFAXINE 37.5 MG: 37.5 TABLET ORAL at 09:02

## 2018-02-04 RX ADMIN — DEXTROSE MONOHYDRATE, SODIUM CHLORIDE, AND POTASSIUM CHLORIDE: 50; 4.5; 1.49 INJECTION, SOLUTION INTRAVENOUS at 09:02

## 2018-02-04 RX ADMIN — GADOBUTROL 10 ML: 604.72 INJECTION INTRAVENOUS at 05:02

## 2018-02-04 RX ADMIN — POTASSIUM CHLORIDE 40 MEQ: 1500 TABLET, EXTENDED RELEASE ORAL at 12:02

## 2018-02-04 RX ADMIN — Medication 3 ML: at 10:02

## 2018-02-04 RX ADMIN — FENTANYL CITRATE 25 MCG: 50 INJECTION, SOLUTION INTRAMUSCULAR; INTRAVENOUS at 11:02

## 2018-02-04 RX ADMIN — ONDANSETRON 8 MG: 2 INJECTION INTRAMUSCULAR; INTRAVENOUS at 12:02

## 2018-02-04 RX ADMIN — FENTANYL CITRATE 25 MCG: 50 INJECTION, SOLUTION INTRAMUSCULAR; INTRAVENOUS at 06:02

## 2018-02-04 RX ADMIN — OXYCODONE HYDROCHLORIDE 10 MG: 5 TABLET ORAL at 08:02

## 2018-02-04 RX ADMIN — PROMETHAZINE HYDROCHLORIDE 25 MG: 12.5 TABLET ORAL at 05:02

## 2018-02-04 RX ADMIN — Medication 3 ML: at 02:02

## 2018-02-04 RX ADMIN — DEXTROSE MONOHYDRATE, SODIUM CHLORIDE, AND POTASSIUM CHLORIDE: 50; 4.5; 1.49 INJECTION, SOLUTION INTRAVENOUS at 12:02

## 2018-02-04 RX ADMIN — OXYCODONE HYDROCHLORIDE 10 MG: 5 TABLET ORAL at 09:02

## 2018-02-04 RX ADMIN — CALCIUM CARBONATE (ANTACID) CHEW TAB 500 MG 500 MG: 500 CHEW TAB at 11:02

## 2018-02-04 RX ADMIN — OXYCODONE HYDROCHLORIDE 10 MG: 5 TABLET ORAL at 04:02

## 2018-02-04 RX ADMIN — OXYCODONE HYDROCHLORIDE 10 MG: 5 TABLET ORAL at 12:02

## 2018-02-04 RX ADMIN — ONDANSETRON 8 MG: 2 INJECTION INTRAMUSCULAR; INTRAVENOUS at 10:02

## 2018-02-04 RX ADMIN — VENLAFAXINE 37.5 MG: 37.5 TABLET ORAL at 08:02

## 2018-02-04 NOTE — PLAN OF CARE
Problem: Patient Care Overview  Goal: Plan of Care Review  Outcome: Ongoing (interventions implemented as appropriate)  During shift patient A and O x 4, appropriate and cooperative with care.  PRN pain med x 1 during shift with good effect. Patient with c/o intermittent nausea. PRNs and Scopolamine patch administered with good effect, will continue to monitor. Ostomy appliance c/d/i throughout shift, continue with no output. Free from fall/injury during shift.

## 2018-02-04 NOTE — HPI
"Carline Chang is a 52 y.o. female with long standing crohns history now s/p RV fistula repair (transvaginal approach) and colostomy revision. She presents with 2-3 days of crescendo decrescendo abdominal cramping, worsening nausea, decreased stoma output, and several episodes of vomiting. Last emesis was 7pm Friday and "light brown with chunks." She continues to have liquid stool and gas from her stoma bag but this has decreased in quantity. Current crohns treatment is imuran only.  "

## 2018-02-04 NOTE — TREATMENT PLAN
Treatment Plan  02/04/2018  12:55 PM    Consult acknowledge and case reviewed with attending.    In light of significantly elevated CRP would recommend the following:  -Low threshold hold for antibiotics if signs of SIRS/Sepsis  -Blood cultures  -CXR (AP and lateral)  -UA with reflex to culture if positive  -C diff and stool culture  -MRE abdomen and pelvis  -Obtain: TPMT RBC, Quantiferon TB gold, VZV IgG, Hep A IgG, Hep B surface antigen, hep B surface antibody, hep B core antibody total  -Full consult note to follow    Kimo Chun M.D.  Gastroenterology Fellow, PGY-IV  Pager: [phone]  [facility]

## 2018-02-04 NOTE — H&P
"[facility]  Colorectal Surgery  History & Physical    Patient Name: Carline Chang  MRN: [id number]  Admission Date: 2/3/2018  Attending Physician: Abi Winters MD   Primary Care Provider: Pablo Medina MD    Subjective:     Chief Complaint/Reason for Admission: abdominal pain, nausea/vomiting    History of Present Illness:  Carline Chang is a 52 y.o. female with long standing crohns history now s/p RV fistula repair (transvaginal approach) and colostomy revision. She presents with 2-3 days of crescendo decrescendo abdominal cramping, worsening nausea, decreased stoma output, and several episodes of vomiting. Last emesis was 7pm Friday and "light brown with chunks." She continues to have liquid stool and gas from her stoma bag but this has decreased in quantity. Current crohns treatment is imuran only.      (Not in a hospital admission)    Review of patient's allergies indicates:   Allergen Reactions    Morphine Other (See Comments)     Abdominal pain    Nubain [nalbuphine] Anxiety       Past Medical History:   Diagnosis Date    Chronic pain     Crohn's disease      Past Surgical History:   Procedure Laterality Date    anal fistula      BOWEL RESECTION      x2     SECTION      x2    CHOLECYSTECTOMY      COLONOSCOPY N/A 2016    Procedure: COLONOSCOPY;  Surgeon: Andre Uribe MD;  Location: [facility] ENDO (4TH FLR);  Service: Endoscopy;  Laterality: N/A;    COLONOSCOPY N/A 2017    Procedure: COLONOSCOPY;  Surgeon: Dany Stock MD;  Location: [facility] ENDO (2ND FLR);  Service: Endoscopy;  Laterality: N/A;    COLONOSCOPY N/A 2017    Procedure: COLONOSCOPY;  Surgeon: Andre Uribe MD;  Location: [facility] ENDO (4TH FLR);  Service: Endoscopy;  Laterality: N/A;    COLOSTOMY      rectal abscess drain      TUBAL LIGATION       Family History     Problem Relation (Age of Onset)    Cancer Maternal Aunt (66)        Social History Main Topics    " Smoking status: Never Smoker    Smokeless tobacco: Never Used    Alcohol use No    Drug use: No    Sexual activity: Not on file     Review of Systems   Constitutional: Positive for appetite change. Negative for activity change, chills and fever.   HENT: Negative.    Eyes: Negative.    Respiratory: Negative.    Cardiovascular: Negative.    Gastrointestinal: Positive for abdominal pain, constipation (decreased stoma output), nausea and vomiting. Negative for abdominal distention, anal bleeding, blood in stool, diarrhea and rectal pain.   Endocrine: Negative.    Genitourinary: Negative.    Neurological: Negative.    Psychiatric/Behavioral: Negative.    All other systems reviewed and are negative.    Objective:     Vital Signs (Most Recent):  Temp: 98.2 °F (36.8 °C) (02/03/18 1758)  Pulse: 85 (02/03/18 1758)  Resp: 16 (02/03/18 1832)  BP: (!) 120/59 (02/03/18 1758)  SpO2: 97 % (02/03/18 1758) Vital Signs (24h Range):  Temp:  [98.2 °F (36.8 °C)-98.7 °F (37.1 °C)] 98.2 °F (36.8 °C)  Pulse:  [] 85  Resp:  [16-18] 16  SpO2:  [97 %-100 %] 97 %  BP: ()/(52-72) 120/59     Weight: 61.2 kg (135 lb)  Body mass index is 20.53 kg/m².    Physical Exam   Constitutional: She appears well-developed and well-nourished.   HENT:   Head: Normocephalic and atraumatic.   Eyes: EOM are normal. Pupils are equal, round, and reactive to light.   Neck: Normal range of motion.   Cardiovascular: Normal rate and regular rhythm.    Pulmonary/Chest: Effort normal. No respiratory distress.   Abdominal: Soft. She exhibits no distension and no mass. There is tenderness (mild, mostly RLQ). There is no rebound and no guarding.   Skin: Skin is warm and dry.   Psychiatric: She has a normal mood and affect. Her behavior is normal.     Significant Labs:  BMP (Last 3 Results):   Recent Labs  Lab 02/03/18  1410   GLU 94   *   K 4.2   CL 94*   CO2 25   BUN 12   CREATININE 1.0   CALCIUM 9.1   MG 1.4*     CBC (Last 3 Results):   Recent  Labs  Lab 02/03/18  1410   WBC 9.52   RBC 5.06   HGB 14.3   HCT 42.1   *   MCV 83   MCH 28.3   MCHC 34.0     CRP (Last 3 Results):   Recent Labs  Lab 02/03/18  1410   .5*       Significant Diagnostics:  CT: I have reviewed all pertinent results/findings within the past 24 hours:  Mild distention of the distal ileum with luis-ileal inflammation and edema of the adjacent mesentery.      Assessment/Plan:     Crohn's disease    Carline Chang is a 52 y.o. female s/p transvaginal RV fistula and colostomy revision with abdominal cramping, nausea/vomiting, decreased stoma output - suspect colonic ileus vs crohns flare. CT with contained stripe of pneumoperitoneum without fluid around anastomosis from 2009 or earlier. Patient does not not examine like a patient with anastomotic leak and labs do not suggest this. Furthermore there is no free fluid to suggest a leak. Suspect this is retained air from last surgery; reviewed with radiology      Admit to CRS  Pain control and nausea control  Home meds  NPO/IVF  Follow labs, CRP elevated but otherwise unimpressive on initial labs'              Royal Silva MD  Colorectal Surgery  [facility]  Have seen and examined the patient with the fellow and agree with their plan.  ESPERANZA MADSEN

## 2018-02-04 NOTE — SUBJECTIVE & OBJECTIVE
Subjective:     Interval History:     NAEON. Still with episodic abdominal pain but improved. Some nausea, no vomiting.    Post-Op Info:  * No surgery found *          Medications:  Continuous Infusions:   dextrose 5 % and 0.45 % NaCl with KCl 20 mEq       Scheduled Meds:   azaTHIOprine  50 mg Oral Daily    enoxparin  40 mg Subcutaneous Q24H    potassium chloride  40 mEq Oral Once    scopolamine  1 patch Transdermal Q3 Days    sodium chloride 0.9%  3 mL Intravenous Q8H    venlafaxine  37.5 mg Oral BID     PRN Meds:   acetaminophen    calcium carbonate    fentaNYL    naloxone    ondansetron    oxyCODONE    oxyCODONE    promethazine        Objective:     Vital Signs (Most Recent):  Temp: 97.5 °F (36.4 °C) (02/04/18 0736)  Pulse: 80 (02/04/18 1100)  Resp: 16 (02/04/18 0736)  BP: (!) 100/59 (02/04/18 0736)  SpO2: 98 % (02/04/18 0736) Vital Signs (24h Range):  Temp:  [97.5 °F (36.4 °C)-98.7 °F (37.1 °C)] 97.5 °F (36.4 °C)  Pulse:  [] 80  Resp:  [16-20] 16  SpO2:  [96 %-100 %] 98 %  BP: ()/(52-72) 100/59     Intake/Output - Last 3 Shifts       02/02 0700 - 02/03 0659 02/03 0700 - 02/04 0659 02/04 0700 - 02/05 0659    I.V. (mL/kg)  1780 (29.1)     Total Intake(mL/kg)  1780 (29.1)     Urine (mL/kg/hr)   200 (0.7)    Stool  0 100 (0.3)    Total Output   0 300    Net   +1780 -300                 Physical Exam   Constitutional: She appears well-developed and well-nourished.   HENT:   Head: Normocephalic and atraumatic.   Eyes: EOM are normal. Pupils are equal, round, and reactive to light.   Neck: Normal range of motion.   Cardiovascular: Normal rate and regular rhythm.    Pulmonary/Chest: Effort normal. No respiratory distress.   Abdominal: Soft. She exhibits no distension and no mass. There is tenderness (mild, mostly RLQ). There is no rebound and no guarding.   Skin: Skin is warm and dry.   Psychiatric: She has a normal mood and affect. Her behavior is normal.     Significant Labs:  BMP (Last 3  Results):   Recent Labs  Lab 02/03/18  1410 02/04/18  0416   GLU 94 76   * 138   K 4.2 3.1*   CL 94* 102   CO2 25 24   BUN 12 10   CREATININE 1.0 0.8   CALCIUM 9.1 8.0*   MG 1.4*  --      CBC (Last 3 Results):   Recent Labs  Lab 02/03/18  1410 02/04/18  0416   WBC 9.52 4.65   RBC 5.06 3.87*   HGB 14.3 10.9*   HCT 42.1 32.6*   * 288   MCV 83 84   MCH 28.3 28.2   MCHC 34.0 33.4     CRP (Last 3 Results):   Recent Labs  Lab 02/03/18  1410   .5*       Significant Diagnostics:    CT abd/pelvis 2/3    1.  Postoperative changes from ileocolonic resection and descending colostomy for repair of a rectovaginal fistula.      2.  Interval development of free air adjacent to the proximal colonic anastomosis within the right lower quadrant. Findings concerning for potential anastomotic leak.      3.  Mild distention of the distal ileum with luis-ileal inflammation and edema of the adjacent mesentery.

## 2018-02-04 NOTE — ASSESSMENT & PLAN NOTE
Carline Karissa Charlene is a 52 y.o. 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  -Pain/nausea meds as needed.  -MIVF. Replete K.  -Ambulate.

## 2018-02-04 NOTE — ASSESSMENT & PLAN NOTE
Carline Nguyene Charlene is a 52 y.o. female s/p transvaginal RV fistula and colostomy revision with abdominal cramping, nausea/vomiting, decreased stoma output - suspect colonic ileus vs crohns flare    Admit to CRS  Pain control and nausea control  Home meds  NPO/IVF  Follow labs, CRP elevated but otherwise unimpressive on initial labs

## 2018-02-04 NOTE — SUBJECTIVE & OBJECTIVE
(Not in a hospital admission)    Review of patient's allergies indicates:   Allergen Reactions    Morphine Other (See Comments)     Abdominal pain    Nubain [nalbuphine] Anxiety       Past Medical History:   Diagnosis Date    Chronic pain     Crohn's disease      Past Surgical History:   Procedure Laterality Date    anal fistula      BOWEL RESECTION      x2     SECTION      x2    CHOLECYSTECTOMY      COLONOSCOPY N/A 2016    Procedure: COLONOSCOPY;  Surgeon: Andre Uribe MD;  Location: [facility] ENDO (4TH FLR);  Service: Endoscopy;  Laterality: N/A;    COLONOSCOPY N/A 2017    Procedure: COLONOSCOPY;  Surgeon: Dany Stock MD;  Location: [facility] ENDO (2ND FLR);  Service: Endoscopy;  Laterality: N/A;    COLONOSCOPY N/A 2017    Procedure: COLONOSCOPY;  Surgeon: Andre Uribe MD;  Location: [facility] ENDO (4TH FLR);  Service: Endoscopy;  Laterality: N/A;    COLOSTOMY      rectal abscess drain      TUBAL LIGATION       Family History     Problem Relation (Age of Onset)    Cancer Maternal Aunt (66)        Social History Main Topics    Smoking status: Never Smoker    Smokeless tobacco: Never Used    Alcohol use No    Drug use: No    Sexual activity: Not on file     Review of Systems   Constitutional: Positive for appetite change. Negative for activity change, chills and fever.   HENT: Negative.    Eyes: Negative.    Respiratory: Negative.    Cardiovascular: Negative.    Gastrointestinal: Positive for abdominal pain, constipation (decreased stoma output), nausea and vomiting. Negative for abdominal distention, anal bleeding, blood in stool, diarrhea and rectal pain.   Endocrine: Negative.    Genitourinary: Negative.    Neurological: Negative.    Psychiatric/Behavioral: Negative.    All other systems reviewed and are negative.    Objective:     Vital Signs (Most Recent):  Temp: 98.2 °F (36.8 °C) (18)  Pulse: 85 (18)  Resp: 16 (18 183)  BP: (!) 120/59  (02/03/18 1758)  SpO2: 97 % (02/03/18 1758) Vital Signs (24h Range):  Temp:  [98.2 °F (36.8 °C)-98.7 °F (37.1 °C)] 98.2 °F (36.8 °C)  Pulse:  [] 85  Resp:  [16-18] 16  SpO2:  [97 %-100 %] 97 %  BP: ()/(52-72) 120/59     Weight: 61.2 kg (135 lb)  Body mass index is 20.53 kg/m².    Physical Exam   Constitutional: She appears well-developed and well-nourished.   HENT:   Head: Normocephalic and atraumatic.   Eyes: EOM are normal. Pupils are equal, round, and reactive to light.   Neck: Normal range of motion.   Cardiovascular: Normal rate and regular rhythm.    Pulmonary/Chest: Effort normal. No respiratory distress.   Abdominal: Soft. She exhibits no distension and no mass. There is tenderness (mild, mostly RLQ). There is no rebound and no guarding.   Skin: Skin is warm and dry.   Psychiatric: She has a normal mood and affect. Her behavior is normal.     Significant Labs:  BMP (Last 3 Results):   Recent Labs  Lab 02/03/18  1410   GLU 94   *   K 4.2   CL 94*   CO2 25   BUN 12   CREATININE 1.0   CALCIUM 9.1   MG 1.4*     CBC (Last 3 Results):   Recent Labs  Lab 02/03/18  1410   WBC 9.52   RBC 5.06   HGB 14.3   HCT 42.1   *   MCV 83   MCH 28.3   MCHC 34.0     CRP (Last 3 Results):   Recent Labs  Lab 02/03/18  1410   .5*       Significant Diagnostics:  CT: I have reviewed all pertinent results/findings within the past 24 hours:  Mild distention of the distal ileum with luis-ileal inflammation and edema of the adjacent mesentery.

## 2018-02-04 NOTE — PROGRESS NOTES
[facility]  Colorectal Surgery  Progress Note    Patient Name: Carline Chang  MRN: [id number]  Admission Date: 2/3/2018  Hospital Length of Stay: 1 days  Attending Physician: Andre Uribe MD    Subjective:     Interval History:     NAEON. Still with episodic abdominal pain but improved. Some nausea, no vomiting.    Post-Op Info:  * No surgery found *          Medications:  Continuous Infusions:   dextrose 5 % and 0.45 % NaCl with KCl 20 mEq       Scheduled Meds:   azaTHIOprine  50 mg Oral Daily    enoxparin  40 mg Subcutaneous Q24H    potassium chloride  40 mEq Oral Once    scopolamine  1 patch Transdermal Q3 Days    sodium chloride 0.9%  3 mL Intravenous Q8H    venlafaxine  37.5 mg Oral BID     PRN Meds:   acetaminophen    calcium carbonate    fentaNYL    naloxone    ondansetron    oxyCODONE    oxyCODONE    promethazine        Objective:     Vital Signs (Most Recent):  Temp: 97.5 °F (36.4 °C) (02/04/18 0736)  Pulse: 80 (02/04/18 1100)  Resp: 16 (02/04/18 0736)  BP: (!) 100/59 (02/04/18 0736)  SpO2: 98 % (02/04/18 0736) Vital Signs (24h Range):  Temp:  [97.5 °F (36.4 °C)-98.7 °F (37.1 °C)] 97.5 °F (36.4 °C)  Pulse:  [] 80  Resp:  [16-20] 16  SpO2:  [96 %-100 %] 98 %  BP: ()/(52-72) 100/59     Intake/Output - Last 3 Shifts       02/02 0700 - 02/03 0659 02/03 0700 - 02/04 0659 02/04 0700 - 02/05 0659    I.V. (mL/kg)  1780 (29.1)     Total Intake(mL/kg)  1780 (29.1)     Urine (mL/kg/hr)   200 (0.7)    Stool  0 100 (0.3)    Total Output   0 300    Net   +1780 -300                 Physical Exam   Constitutional: She appears well-developed and well-nourished.   HENT:   Head: Normocephalic and atraumatic.   Eyes: EOM are normal. Pupils are equal, round, and reactive to light.   Neck: Normal range of motion.   Cardiovascular: Normal rate and regular rhythm.    Pulmonary/Chest: Effort normal. No respiratory distress.   Abdominal: Soft. She exhibits no distension  and no mass. There is tenderness (mild, mostly RLQ). There is no rebound and no guarding.   Skin: Skin is warm and dry.   Psychiatric: She has a normal mood and affect. Her behavior is normal.     Significant Labs:  BMP (Last 3 Results):   Recent Labs  Lab 02/03/18  1410 02/04/18  0416   GLU 94 76   * 138   K 4.2 3.1*   CL 94* 102   CO2 25 24   BUN 12 10   CREATININE 1.0 0.8   CALCIUM 9.1 8.0*   MG 1.4*  --      CBC (Last 3 Results):   Recent Labs  Lab 02/03/18  1410 02/04/18  0416   WBC 9.52 4.65   RBC 5.06 3.87*   HGB 14.3 10.9*   HCT 42.1 32.6*   * 288   MCV 83 84   MCH 28.3 28.2   MCHC 34.0 33.4     CRP (Last 3 Results):   Recent Labs  Lab 02/03/18  1410   .5*       Significant Diagnostics:    CT abd/pelvis 2/3    1.  Postoperative changes from ileocolonic resection and descending colostomy for repair of a rectovaginal fistula.      2.  Interval development of free air adjacent to the proximal colonic anastomosis within the right lower quadrant. Findings concerning for potential anastomotic leak.      3.  Mild distention of the distal ileum with luis-ileal inflammation and edema of the adjacent mesentery.      Assessment/Plan:     Crohn's disease    Carline Chang is a 52 y.o. 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  -Pain/nausea meds as needed.  -MIVF. Replete K.  -Ambulate.              Kevin Young MD  Colorectal Surgery  [facility] [address]    Have seen and examined the patient with the fellow and agree with their plan.  ESPERANZA MADSEN

## 2018-02-05 LAB
ANION GAP SERPL CALC-SCNC: 10 MMOL/L
ANISOCYTOSIS BLD QL SMEAR: SLIGHT
BASOPHILS # BLD AUTO: 0.04 K/UL
BASOPHILS NFR BLD: 0.8 %
BUN SERPL-MCNC: 15 MG/DL (ref 6–30)
BUN SERPL-MCNC: 3 MG/DL
CALCIUM SERPL-MCNC: 8.3 MG/DL
CHLORIDE SERPL-SCNC: 108 MMOL/L
CHLORIDE SERPL-SCNC: ABNORMAL MMOL/L
CO2 SERPL-SCNC: 18 MMOL/L
CREAT SERPL-MCNC: 0.7 MG/DL
CREAT SERPL-MCNC: 0.9 MG/DL (ref 0.5–1.4)
CRP SERPL-MCNC: 67 MG/L
DIFFERENTIAL METHOD: ABNORMAL
E COLI SXT1 STL QL IA: NEGATIVE
E COLI SXT2 STL QL IA: NEGATIVE
EOSINOPHIL # BLD AUTO: 0.3 K/UL
EOSINOPHIL NFR BLD: 6.6 %
ERYTHROCYTE [DISTWIDTH] IN BLOOD BY AUTOMATED COUNT: 13.8 %
EST. GFR  (AFRICAN AMERICAN): >60 ML/MIN/1.73 M^2
EST. GFR  (NON AFRICAN AMERICAN): >60 ML/MIN/1.73 M^2
GLUCOSE SERPL-MCNC: 68 MG/DL
GLUCOSE SERPL-MCNC: 97 MG/DL (ref 70–110)
HBV CORE AB SERPL QL IA: NEGATIVE
HBV SURFACE AB SER-ACNC: NEGATIVE M[IU]/ML
HBV SURFACE AG SERPL QL IA: NEGATIVE
HCT VFR BLD AUTO: 31.8 %
HCT VFR BLD CALC: 45 %PCV (ref 36–54)
HCV AB SERPL QL IA: NEGATIVE
HEPATITIS A ANTIBODY, IGG: NEGATIVE
HGB BLD-MCNC: 10.1 G/DL
IMM GRANULOCYTES # BLD AUTO: 0.03 K/UL
IMM GRANULOCYTES NFR BLD AUTO: 0.6 %
LYMPHOCYTES # BLD AUTO: 0.5 K/UL
LYMPHOCYTES NFR BLD: 10.1 %
MCH RBC QN AUTO: 28.1 PG
MCHC RBC AUTO-ENTMCNC: 31.8 G/DL
MCV RBC AUTO: 88 FL
MONOCYTES # BLD AUTO: 0.4 K/UL
MONOCYTES NFR BLD: 8.7 %
NEUTROPHILS # BLD AUTO: 3.6 K/UL
NEUTROPHILS NFR BLD: 73.2 %
NRBC BLD-RTO: 0 /100 WBC
PLATELET # BLD AUTO: 205 K/UL
PLATELET BLD QL SMEAR: ABNORMAL
PMV BLD AUTO: 9.9 FL
POC IONIZED CALCIUM: 0.9 MMOL/L (ref 1.06–1.42)
POC TCO2 (MEASURED): 29 MMOL/L (ref 23–29)
POTASSIUM BLD-SCNC: 3.8 MMOL/L (ref 3.5–5.1)
POTASSIUM SERPL-SCNC: 4.2 MMOL/L
RBC # BLD AUTO: 3.6 M/UL
SAMPLE: ABNORMAL
SODIUM BLD-SCNC: 133 MMOL/L (ref 136–145)
SODIUM SERPL-SCNC: 136 MMOL/L
WBC # BLD AUTO: 4.85 K/UL

## 2018-02-05 PROCEDURE — 80048 BASIC METABOLIC PNL TOTAL CA: CPT

## 2018-02-05 PROCEDURE — 63600175 PHARM REV CODE 636 W HCPCS: Performed by: STUDENT IN AN ORGANIZED HEALTH CARE EDUCATION/TRAINING PROGRAM

## 2018-02-05 PROCEDURE — 87340 HEPATITIS B SURFACE AG IA: CPT

## 2018-02-05 PROCEDURE — 85025 COMPLETE CBC W/AUTO DIFF WBC: CPT

## 2018-02-05 PROCEDURE — 86790 VIRUS ANTIBODY NOS: CPT

## 2018-02-05 PROCEDURE — 86787 VARICELLA-ZOSTER ANTIBODY: CPT

## 2018-02-05 PROCEDURE — 86140 C-REACTIVE PROTEIN: CPT

## 2018-02-05 PROCEDURE — C1751 CATH, INF, PER/CENT/MIDLINE: HCPCS

## 2018-02-05 PROCEDURE — 76937 US GUIDE VASCULAR ACCESS: CPT

## 2018-02-05 PROCEDURE — 86704 HEP B CORE ANTIBODY TOTAL: CPT

## 2018-02-05 PROCEDURE — 86706 HEP B SURFACE ANTIBODY: CPT

## 2018-02-05 PROCEDURE — 82657 ENZYME CELL ACTIVITY: CPT

## 2018-02-05 PROCEDURE — 25000003 PHARM REV CODE 250: Performed by: STUDENT IN AN ORGANIZED HEALTH CARE EDUCATION/TRAINING PROGRAM

## 2018-02-05 PROCEDURE — 86803 HEPATITIS C AB TEST: CPT

## 2018-02-05 PROCEDURE — A4216 STERILE WATER/SALINE, 10 ML: HCPCS | Performed by: STUDENT IN AN ORGANIZED HEALTH CARE EDUCATION/TRAINING PROGRAM

## 2018-02-05 PROCEDURE — 86480 TB TEST CELL IMMUN MEASURE: CPT

## 2018-02-05 PROCEDURE — 36569 INSJ PICC 5 YR+ W/O IMAGING: CPT

## 2018-02-05 PROCEDURE — 99223 1ST HOSP IP/OBS HIGH 75: CPT | Mod: GC,,, | Performed by: INTERNAL MEDICINE

## 2018-02-05 PROCEDURE — 20600001 HC STEP DOWN PRIVATE ROOM

## 2018-02-05 RX ORDER — MEPERIDINE HYDROCHLORIDE 50 MG/ML
50 INJECTION INTRAMUSCULAR; INTRAVENOUS; SUBCUTANEOUS EVERY 4 HOURS PRN
Status: DISCONTINUED | OUTPATIENT
Start: 2018-02-05 | End: 2018-02-09 | Stop reason: HOSPADM

## 2018-02-05 RX ADMIN — OXYCODONE HYDROCHLORIDE 10 MG: 5 TABLET ORAL at 09:02

## 2018-02-05 RX ADMIN — ENOXAPARIN SODIUM 40 MG: 100 INJECTION SUBCUTANEOUS at 05:02

## 2018-02-05 RX ADMIN — VENLAFAXINE 37.5 MG: 37.5 TABLET ORAL at 09:02

## 2018-02-05 RX ADMIN — DEXTROSE MONOHYDRATE, SODIUM CHLORIDE, AND POTASSIUM CHLORIDE: 50; 4.5; 1.49 INJECTION, SOLUTION INTRAVENOUS at 11:02

## 2018-02-05 RX ADMIN — ONDANSETRON 8 MG: 2 INJECTION INTRAMUSCULAR; INTRAVENOUS at 11:02

## 2018-02-05 RX ADMIN — OXYCODONE HYDROCHLORIDE 10 MG: 5 TABLET ORAL at 04:02

## 2018-02-05 RX ADMIN — VENLAFAXINE 37.5 MG: 37.5 TABLET ORAL at 08:02

## 2018-02-05 RX ADMIN — OXYCODONE HYDROCHLORIDE 10 MG: 5 TABLET ORAL at 07:02

## 2018-02-05 RX ADMIN — FENTANYL CITRATE 25 MCG: 50 INJECTION, SOLUTION INTRAMUSCULAR; INTRAVENOUS at 08:02

## 2018-02-05 RX ADMIN — FENTANYL CITRATE 25 MCG: 50 INJECTION, SOLUTION INTRAMUSCULAR; INTRAVENOUS at 02:02

## 2018-02-05 RX ADMIN — Medication 3 ML: at 10:02

## 2018-02-05 RX ADMIN — AZATHIOPRINE 50 MG: 50 TABLET ORAL at 08:02

## 2018-02-05 RX ADMIN — Medication 3 ML: at 01:02

## 2018-02-05 RX ADMIN — MEPERIDINE HYDROCHLORIDE 50 MG: 50 INJECTION INTRAMUSCULAR; INTRAVENOUS; SUBCUTANEOUS at 11:02

## 2018-02-05 RX ADMIN — OXYCODONE HYDROCHLORIDE 10 MG: 5 TABLET ORAL at 12:02

## 2018-02-05 RX ADMIN — ONDANSETRON 8 MG: 2 INJECTION INTRAMUSCULAR; INTRAVENOUS at 06:02

## 2018-02-05 RX ADMIN — FENTANYL CITRATE 25 MCG: 50 INJECTION, SOLUTION INTRAMUSCULAR; INTRAVENOUS at 06:02

## 2018-02-05 RX ADMIN — DEXTROSE MONOHYDRATE, SODIUM CHLORIDE, AND POTASSIUM CHLORIDE: 50; 4.5; 1.49 INJECTION, SOLUTION INTRAVENOUS at 06:02

## 2018-02-05 RX ADMIN — ONDANSETRON 8 MG: 2 INJECTION INTRAMUSCULAR; INTRAVENOUS at 07:02

## 2018-02-05 RX ADMIN — MEPERIDINE HYDROCHLORIDE 50 MG: 50 INJECTION INTRAMUSCULAR; INTRAVENOUS; SUBCUTANEOUS at 07:02

## 2018-02-05 RX ADMIN — Medication 3 ML: at 06:02

## 2018-02-05 NOTE — PROGRESS NOTES
[facility]  Colorectal Surgery  Progress Note    Patient Name: Carline Chang  MRN: [id number]  Admission Date: 2/3/2018  Hospital Length of Stay: 2 days  Attending Physician: Andre Uribe MD    Subjective:     Interval History:     NAEON. GI following, MRE yesterday. Still with episodic abdominal pain but improved. Some nausea, no vomiting.    Post-Op Info:  * No surgery found *          Medications:  Continuous Infusions:   dextrose 5 % and 0.45 % NaCl with KCl 20 mEq 100 mL/hr at 02/05/18 0655     Scheduled Meds:   azaTHIOprine  50 mg Oral Daily    enoxparin  40 mg Subcutaneous Q24H    scopolamine  1 patch Transdermal Q3 Days    sodium chloride 0.9%  3 mL Intravenous Q8H    venlafaxine  37.5 mg Oral BID     PRN Meds:   acetaminophen    calcium carbonate    fentaNYL    naloxone    ondansetron    oxyCODONE    oxyCODONE    promethazine        Objective:     Vital Signs (Most Recent):  Temp: 96.8 °F (36 °C) (02/05/18 0423)  Pulse: 81 (02/05/18 0423)  Resp: 16 (02/05/18 0423)  BP: (!) 104/53 (02/05/18 0423)  SpO2: 98 % (02/05/18 0423) Vital Signs (24h Range):  Temp:  [96.2 °F (35.7 °C)-98.2 °F (36.8 °C)] 96.8 °F (36 °C)  Pulse:  [65-89] 81  Resp:  [16-20] 16  SpO2:  [97 %-99 %] 98 %  BP: ()/(52-61) 104/53     Intake/Output - Last 3 Shifts       02/03 0700 - 02/04 0659 02/04 0700 - 02/05 0659 02/05 0700 - 02/06 0659    P.O.  650     I.V. (mL/kg) 1780 (29.1) 2260 (36.9)     Total Intake(mL/kg) 1780 (29.1) 2910 (47.5)     Urine (mL/kg/hr)  1450 (1)     Emesis/NG output  0 (0)     Other  0 (0)     Stool 0 600 (0.4)     Total Output 0 2050      Net +1780 +860             Emesis Occurrence  0 x           Physical Exam   Constitutional: She appears well-developed and well-nourished.   HENT:   Head: Normocephalic and atraumatic.   Eyes: EOM are normal. Pupils are equal, round, and reactive to light.   Neck: Normal range of motion.   Cardiovascular: Normal rate and regular  rhythm.    Pulmonary/Chest: Effort normal. No respiratory distress.   Abdominal: Soft. She exhibits no distension and no mass. There is tenderness (mild, mostly RLQ). There is no rebound and no guarding.   Skin: Skin is warm and dry.   Psychiatric: She has a normal mood and affect. Her behavior is normal.     Significant Labs:  BMP (Last 3 Results):     Recent Labs  Lab 02/03/18  1410 02/04/18  0416   GLU 94 76   * 138   K 4.2 3.1*   CL 94* 102   CO2 25 24   BUN 12 10   CREATININE 1.0 0.8   CALCIUM 9.1 8.0*   MG 1.4*  --      CBC (Last 3 Results):     Recent Labs  Lab 02/03/18  1410 02/04/18  0416   WBC 9.52 4.65   RBC 5.06 3.87*   HGB 14.3 10.9*   HCT 42.1 32.6*   * 288   MCV 83 84   MCH 28.3 28.2   MCHC 34.0 33.4     CRP (Last 3 Results):     Recent Labs  Lab 02/03/18  1410   .5*       Significant Diagnostics:    CT abd/pelvis 2/3    1.  Postoperative changes from ileocolonic resection and descending colostomy for repair of a rectovaginal fistula.      2.  Interval development of free air adjacent to the proximal colonic anastomosis within the right lower quadrant. Findings concerning for potential anastomotic leak.      3.  Mild distention of the distal ileum with luis-ileal inflammation and edema of the adjacent mesentery.      Assessment/Plan:     Crohn's disease    Carline Chang is a 52 y.o. female s/p transvaginal RV fistula and colostomy revision with abdominal cramping, nausea/vomiting, decreased stoma output - suspect colonic ileus vs crohns flare    -GI consulted to eval for possible crohns flare, following  -MRI enterography obtained yesterday plan pending MRI read, will coordinate w/ GI  -Pain/nausea meds as needed.  -MIVF. Replmichael KBilly  -Ambulate.              Mal Palacios MD  Colorectal Surgery  [facility]  Have seen and examined the patient with the fellow and agree with their plan.  ESPERANZA MADSEN

## 2018-02-05 NOTE — SUBJECTIVE & OBJECTIVE
Past Medical History:   Diagnosis Date    Chronic pain     Crohn's disease        Past Surgical History:   Procedure Laterality Date    anal fistula      BOWEL RESECTION      x2     SECTION      x2    CHOLECYSTECTOMY      COLONOSCOPY N/A 2016    Procedure: COLONOSCOPY;  Surgeon: Andre Uribe MD;  Location: [facility] ENDO (4TH FLR);  Service: Endoscopy;  Laterality: N/A;    COLONOSCOPY N/A 2017    Procedure: COLONOSCOPY;  Surgeon: Dany Stock MD;  Location: [facility] ENDO (2ND FLR);  Service: Endoscopy;  Laterality: N/A;    COLONOSCOPY N/A 2017    Procedure: COLONOSCOPY;  Surgeon: Andre Uribe MD;  Location: [facility] ENDO (4TH FLR);  Service: Endoscopy;  Laterality: N/A;    COLOSTOMY      rectal abscess drain      TUBAL LIGATION         Review of patient's allergies indicates:   Allergen Reactions    Morphine Other (See Comments)     Abdominal pain    Nubain [nalbuphine] Anxiety     Family History     Problem Relation (Age of Onset)    Cancer Maternal Aunt (66)        Social History Main Topics    Smoking status: Never Smoker    Smokeless tobacco: Never Used    Alcohol use No    Drug use: No    Sexual activity: Not on file     Review of Systems   Constitutional: Positive for appetite change. Negative for activity change, chills and fever.   HENT: Negative.    Eyes: Negative.    Respiratory: Negative.    Cardiovascular: Negative.    Gastrointestinal: Positive for abdominal pain, constipation (decreased stoma output), nausea and vomiting. Negative for abdominal distention, anal bleeding, blood in stool, diarrhea and rectal pain.   Endocrine: Negative.    Genitourinary: Negative.    Neurological: Negative.    Psychiatric/Behavioral: Negative.    All other systems reviewed and are negative.    Objective:     Vital Signs (Most Recent):  Temp: 98.2 °F (36.8 °C) (18)  Pulse: 72 (18)  Resp: 17 (18)  BP: (!) 103/55 (18)  SpO2: 95 % (18  0744) Vital Signs (24h Range):  Temp:  [96.2 °F (35.7 °C)-98.2 °F (36.8 °C)] 98.2 °F (36.8 °C)  Pulse:  [65-89] 72  Resp:  [16-20] 17  SpO2:  [95 %-99 %] 95 %  BP: ()/(52-61) 103/55     Weight: 61.2 kg (135 lb) (02/03/18 2118)  Body mass index is 20.53 kg/m².      Intake/Output Summary (Last 24 hours) at 02/05/18 0834  Last data filed at 02/05/18 0553   Gross per 24 hour   Intake             2910 ml   Output             2050 ml   Net              860 ml       Lines/Drains/Airways     Drain                 Colostomy 01/15/18 0000 LLQ 21 days          Peripheral Intravenous Line                 Peripheral IV - Single Lumen 02/03/18 1411 Left Antecubital 1 day                Physical Exam   Constitutional: She appears well-developed and well-nourished.   HENT:   Head: Normocephalic and atraumatic.   Eyes: EOM are normal. Pupils are equal, round, and reactive to light.   Neck: Normal range of motion.   Cardiovascular: Normal rate and regular rhythm.    Pulmonary/Chest: Effort normal. No respiratory distress.   Abdominal: Soft. She exhibits no distension and no mass. There is tenderness (mild, mostly RLQ). There is no rebound and no guarding.   Skin: Skin is warm and dry.   Psychiatric: She has a normal mood and affect. Her behavior is normal.       Significant Labs:  CBC:   Recent Labs  Lab 02/03/18  1410 02/04/18  0416   WBC 9.52 4.65   HGB 14.3 10.9*   HCT 42.1 32.6*   * 288     CRP:   Recent Labs  Lab 02/03/18  1410 02/05/18  0600   .5* 67.0*       Significant Imaging:  Imaging results within the past 24 hours have been reviewed.

## 2018-02-05 NOTE — PLAN OF CARE
PCP- [facility]    PT HAS A RIDE HOME AND FAMILY SUPPORT WITH MOTHER    PHARMACY- [facility] [address], [place] [address]       02/05/18 1512   Discharge Assessment   Assessment Type Discharge Planning Assessment   Confirmed/corrected address and phone number on facesheet? Yes   Assessment information obtained from? Patient   Expected Length of Stay (days) 3   Communicated expected length of stay with patient/caregiver yes   Prior to hospitilization cognitive status: Alert/Oriented   Prior to hospitalization functional status: Independent   Current cognitive status: Alert/Oriented   Current Functional Status: Independent   Lives With child(thelma), dependent;parent(s)   Able to Return to Prior Arrangements yes   Is patient able to care for self after discharge? Yes   Patient's perception of discharge disposition home health   Readmission Within The Last 30 Days no previous admission in last 30 days   Patient currently being followed by outpatient case management? No   Patient currently receives any other outside agency services? No   Equipment Currently Used at Home colostomy/ostomy supplies   Do you have any problems affording any of your prescribed medications? No   Is the patient taking medications as prescribed? yes   Does the patient have transportation home? Yes   Transportation Available car;family or friend will provide   Does the patient receive services at the [facility]? No   Discharge Plan A Home with family   Discharge Plan B Home with family;Home Health   Patient/Family In Agreement With Plan yes

## 2018-02-05 NOTE — ASSESSMENT & PLAN NOTE
Carline Chang is a 52 year old female with past medical history of Crohn's disease who presents with abdominal pain/cramping and nausea/vomiting for the past week. Diagnosed >15 years ago. Currently on Imuran only. Gastroenterology consulted for evaluation of Crohn's flare.    -- Low threshold hold for antibiotics if signs of SIRS/Sepsis  -- Blood cultures NGTD  -- UA with reflex to culture if positive  -- C diff negative and stool culture pending.  -- MRE abdomen and pelvis Circumferential wall thickening involving the distal small bowel, not significantly changed from prior. Study suboptimal in defining free air.  -- Obtain: TPMT RBC, Quantiferon TB gold, VZV IgG, Hep A IgG, Hep B surface antigen, hep B surface antibody, hep B core antibody total   -- Will need to review CT abdomen with CRS to evaluate the free air seen on imaging.

## 2018-02-05 NOTE — SUBJECTIVE & OBJECTIVE
Subjective:     Interval History:     NAEON. GI following, MRE yesterday. Still with episodic abdominal pain but improved. Some nausea, no vomiting.    Post-Op Info:  * No surgery found *          Medications:  Continuous Infusions:   dextrose 5 % and 0.45 % NaCl with KCl 20 mEq 100 mL/hr at 02/05/18 0655     Scheduled Meds:   azaTHIOprine  50 mg Oral Daily    enoxparin  40 mg Subcutaneous Q24H    scopolamine  1 patch Transdermal Q3 Days    sodium chloride 0.9%  3 mL Intravenous Q8H    venlafaxine  37.5 mg Oral BID     PRN Meds:   acetaminophen    calcium carbonate    fentaNYL    naloxone    ondansetron    oxyCODONE    oxyCODONE    promethazine        Objective:     Vital Signs (Most Recent):  Temp: 96.8 °F (36 °C) (02/05/18 0423)  Pulse: 81 (02/05/18 0423)  Resp: 16 (02/05/18 0423)  BP: (!) 104/53 (02/05/18 0423)  SpO2: 98 % (02/05/18 0423) Vital Signs (24h Range):  Temp:  [96.2 °F (35.7 °C)-98.2 °F (36.8 °C)] 96.8 °F (36 °C)  Pulse:  [65-89] 81  Resp:  [16-20] 16  SpO2:  [97 %-99 %] 98 %  BP: ()/(52-61) 104/53     Intake/Output - Last 3 Shifts       02/03 0700 - 02/04 0659 02/04 0700 - 02/05 0659 02/05 0700 - 02/06 0659    P.O.  650     I.V. (mL/kg) 1780 (29.1) 2260 (36.9)     Total Intake(mL/kg) 1780 (29.1) 2910 (47.5)     Urine (mL/kg/hr)  1450 (1)     Emesis/NG output  0 (0)     Other  0 (0)     Stool 0 600 (0.4)     Total Output 0 2050      Net +1780 +860             Emesis Occurrence  0 x           Physical Exam   Constitutional: She appears well-developed and well-nourished.   HENT:   Head: Normocephalic and atraumatic.   Eyes: EOM are normal. Pupils are equal, round, and reactive to light.   Neck: Normal range of motion.   Cardiovascular: Normal rate and regular rhythm.    Pulmonary/Chest: Effort normal. No respiratory distress.   Abdominal: Soft. She exhibits no distension and no mass. There is tenderness (mild, mostly RLQ). There is no rebound and no guarding.   Skin: Skin is warm  and dry.   Psychiatric: She has a normal mood and affect. Her behavior is normal.     Significant Labs:  BMP (Last 3 Results):     Recent Labs  Lab 02/03/18  1410 02/04/18  0416   GLU 94 76   * 138   K 4.2 3.1*   CL 94* 102   CO2 25 24   BUN 12 10   CREATININE 1.0 0.8   CALCIUM 9.1 8.0*   MG 1.4*  --      CBC (Last 3 Results):     Recent Labs  Lab 02/03/18  1410 02/04/18  0416   WBC 9.52 4.65   RBC 5.06 3.87*   HGB 14.3 10.9*   HCT 42.1 32.6*   * 288   MCV 83 84   MCH 28.3 28.2   MCHC 34.0 33.4     CRP (Last 3 Results):     Recent Labs  Lab 02/03/18  1410   .5*       Significant Diagnostics:    CT abd/pelvis 2/3    1.  Postoperative changes from ileocolonic resection and descending colostomy for repair of a rectovaginal fistula.      2.  Interval development of free air adjacent to the proximal colonic anastomosis within the right lower quadrant. Findings concerning for potential anastomotic leak.      3.  Mild distention of the distal ileum with luis-ileal inflammation and edema of the adjacent mesentery.

## 2018-02-05 NOTE — CONSULTS
Single lumen 18G x 10 CM  midline placed right brachial vein. Max dwell date 3/6/18, Lot#[id number] .  Needle advanced into the vessel under real time ultrasound guidance.  Image recorded and saved.

## 2018-02-05 NOTE — NURSING
Pt questioning rationale for MRI. Dr.T. Neves on unit and notified of pt request for explanation of MRI.  spoke with pt regarding explanation.

## 2018-02-05 NOTE — HPI
Inflammatory Bowel Disease History per Dr. Bueno consult note 1/16/2017:  This is a 51 y.o. female with hx of fistulizing Crohn's disease diagnosed 1986 (age 21) complicated by two SB resections (95 and 09), rectovaginal fistula 2014, sigmoid loop colostomy 2015, RV repair 2018 who presents to Dr. Uribe's service with N/V, abdominal cramping.  Pt initially diagnosed with symptoms of nausea, wt loss, diarrhea and crampy abd pain in 1986. To patient's knowledge there was no tissue diagnosis. Was started on multiple oral medications including asacol, pentasa, azulfidine, prednisone, and imuran monotherapy. She did not achieve remission with any of these agents to her knowledge. The prednisone was intermittently the most effective. She did not have complication with the imuran (she belives 50mg). In 1995, she had complication and had small bowel resection, she believes 10 inches total. She felt better after surgery and was not on meds. She then developed abscess in 2009 which required drainage and subsequent small bowel resection, she believes only 'a few inches'. She was started on remicade after this in mid 2009 and after surgery with remicade her symptoms improved, but was only on remicade for 3 doses then lost insurance. She did well x 2 years (mostly off medications). Then 6/2011, she had worsening sypmtoms that progressively worsened despite trials of steroids, pentasa, imuran and flagyl, and she developed rectovaginal fistula in 2014. Underwent loop sigmoid colostomy.   Was started on humira 12/2015 and was doing well until flare in 1/2017. She admits that a few of her humira doses may not have completely been injected correctly, but she believes the majority were. She was taking humira 40mg q 2 weeks. Her last dose was 1/12/17.     Interval History:  Carline Chang is a 52 year old female with past medical history of Crohn's disease (now s/p rectovaginal fistula repair 3 weeks ago). She presents with a  week of abdominal cramping, worsening nausea, decreased stoma output, and several episodes of vomiting. Patient states she was diagnosed with Chron's disease in 1986. She has 1 flare per year and states last flare was in January of last year. States these symptoms are similar to her usual flare except the abdominal cramping at this time come in waves whereas before they're usually constant. Current treatment is Imuran only. Had been on Humira until her last flare and was switched to Imuran by her GI doctor.  On admission to the hospital, patient with significantly elevated CRP of 256. CT abdomen with significant inflammation however patient has been afebrile with WBC wnl. Gastroenterology consulted for evaluation of Crohn's flare.    Pertinent GI studies:  * Colonoscopy 5/27/16 (oksana):  Colonoscopy findings:       Inflammation characterized by congestion (edema), erythema and scarring was found. The anus and the rectum were spared. This was mild in severity, and when compared to previous examinations, the findings are improved. The colon (entire examined portion) appeared normal.   *EGD 1/17/2017: The esophagus was normal. The stomach was normal. The cardia and gastric fundus were normal on retroflexion. The examined duodenum was normal. Normal esophagus. Normal stomach. Normal examined duodenum.   * Colonoscopy 1/17/2017: .A small fistula opening was found in the rectum. The rectal remnant appeared normal. No stricture noted. The entire examined colon is normal. Colonic fistula in rectum, appears closed. Biopsies were taken with a cold forceps from the entire colon for evaluation of microscopic colitis.      Prior GI surgeries:   small bowel resection in 1995 (approx 10 inches)  small bowel resection (~15 inches per patient report) in 2009,   sigmoid loop colostomy - June 2015.   Rectovaginal repair - 12/14/2017     Current medications -   Azathioprine 50 mg daily.     Failed/Previous medications -  * Asacol -  ineffective  * Pentasa (ineffective)  * Azulfidine - ineffective  * Azathioprine (18 years, on and off, no significant side effects)  * Remicade - 3 infusions 2009 - gave bone pains then lost insurance  * Currently on humira 12/2015 effective then lost effect - last dose 1/12/17  * Prednisone 40mg - on and off   * Humira 40 mg q 2 weeks

## 2018-02-05 NOTE — PLAN OF CARE
Problem: Patient Care Overview  Goal: Plan of Care Review  Outcome: Ongoing (interventions implemented as appropriate)  Pt awake, alert, oriented X4. Left lower abdominal colostomy- good output during the night. IV fluids continued. Pt up to toilet independently. Tolerating diet. Denies nausea/ vomiting. Vitals stable on RA. Tele- NSR. Pt complained of abdominal pain, treated with PRN medications. Pt remained free of falls. No acute events. Plan of care reviewed with pt, who verbalizes understanding. No distress noted at this time, will continue to monitor pt.

## 2018-02-05 NOTE — PLAN OF CARE
Problem: Patient Care Overview  Goal: Plan of Care Review  Outcome: Ongoing (interventions implemented as appropriate)  Plan of care reviewed with patient, verbalizes understanding. AAOx4, VSS. Colostomy with adequate liquid stool as output. Patient tolerating clear liquid diet, reports minor nausea with no emesis. Patient up ad-kenneth, ambulated in hallway X1, remains free of any falls/trauma. Patient reports pain as well controlled with PRN meds. Patient states no other needs at this time, call light in reach, WCTM.

## 2018-02-05 NOTE — ASSESSMENT & PLAN NOTE
Carline Karissa Donaldsarhaineslakisha is a 52 y.o. female s/p transvaginal RV fistula and colostomy revision with abdominal cramping, nausea/vomiting, decreased stoma output - suspect colonic ileus vs crohns flare    -GI consulted to eval for possible crohns flare, following  -MRI enterography obtained yesterday plan pending MRI read, will coordinate w/ GI  -Pain/nausea meds as needed.  -MIVF. Replete K.  -Ambulate.

## 2018-02-05 NOTE — CONSULTS
[facility]  Gastroenterology  Consult Note    Patient Name: Carline Chang  MRN: [id number]  Admission Date: 2/3/2018  Hospital Length of Stay: 2 days  Code Status: Full Code   Attending Provider: Andre Uribe MD   Consulting Provider: Narayan Blandon MD  Primary Care Physician: Pablo Medina MD  Principal Problem:<principal problem not specified>    Inpatient consult to Gastroenterology  Consult performed by: NARAYAN BLANDON  Consult ordered by: CHARLIE SMITH        Subjective:     HPI:  Inflammatory Bowel Disease History per Dr. Bueno consult note 1/16/2017 w updates:  This is a 51 y.o. female with hx of fistulizing Crohn's disease diagnosed 1986 (age 21) complicated by two SB resections (95 and 09), rectovaginal fistula 2014, sigmoid loop colostomy 2015, RV repair 2018 who presents to Dr. Uribe's service with N/V, abdominal cramping.  Pt initially diagnosed with symptoms of nausea, wt loss, diarrhea and crampy abd pain in 1986. To patient's knowledge there was no tissue diagnosis. Was started on multiple oral medications including asacol, pentasa, azulfidine, prednisone, and imuran monotherapy. She did not achieve remission with any of these agents to her knowledge. The prednisone was intermittently the most effective. She did not have complication with the imuran (she belives 50mg). In 1995, she had complication and had small bowel resection, she believes 10 inches total. She felt better after surgery and was not on meds. She then developed abscess in 2009 which required drainage and subsequent small bowel resection, she believes only 'a few inches'. She was started on remicade after this in mid 2009 and after surgery with remicade her symptoms improved, but was only on remicade for 3 doses then lost insurance. She did well x 2 years (mostly off medications). Then 6/2011, she had worsening sypmtoms that progressively worsened despite trials of steroids, pentasa,  imuran and flagyl, and she developed rectovaginal fistula in 2014. Underwent loop sigmoid colostomy.   Was started on humira 12/2015 and was doing well until flare in 1/2017. She admits that a few of her humira doses may not have completely been injected correctly, but she believes the majority were. She was taking humira 40mg q 2 weeks. Her last dose was 1/12/17.     Interval History:  Carline Chang is a 52 year old female with past medical history of Crohn's disease (now s/p rectovaginal fistula repair 3 weeks ago). She presents with a week of abdominal cramping, worsening nausea, decreased stoma output, and several episodes of vomiting. Patient states she was diagnosed with Chron's disease in 1986. She has 1 flare per year and states last flare was in January of last year. States these symptoms are similar to her usual flare except the abdominal cramping at this time come in waves whereas before they're usually constant. Current treatment is Imuran only. Had been on Humira until her last flare and was switched to Imuran by her GI doctor.  On admission to the hospital, patient with significantly elevated CRP of 256. CT abdomen with significant inflammation however patient has been afebrile with WBC wnl. Gastroenterology consulted for evaluation of Crohn's flare.    Pertinent GI studies:  * Colonoscopy 5/27/16 (oksana):  Colonoscopy findings:       Inflammation characterized by congestion (edema), erythema and scarring was found. The anus and the rectum were spared. This was mild in severity, and when compared to previous examinations, the findings are improved. The colon (entire examined portion) appeared normal.   *EGD 1/17/2017: The esophagus was normal. The stomach was normal. The cardia and gastric fundus were normal on retroflexion. The examined duodenum was normal. Normal esophagus. Normal stomach. Normal examined duodenum.   * Colonoscopy 1/17/2017: .A small fistula opening was found in the rectum.  The rectal remnant appeared normal. No stricture noted. The entire examined colon is normal. Colonic fistula in rectum, appears closed. Biopsies were taken with a cold forceps from the entire colon for evaluation of microscopic colitis.      Prior GI surgeries:   small bowel resection in  (approx 10 inches)  small bowel resection (~15 inches per patient report) in ,   sigmoid loop colostomy - 2015.   Rectovaginal repair - 2017     Current medications -    Azathioprine 50 mg daily.     Failed/Previous medications -  * Asacol - ineffective  * Pentasa (ineffective)  * Azulfidine - ineffective  * Azathioprine (18 years, on and off, no significant side effects)  * Remicade - 3 infusions  - gave bone pains then lost insurance  * Currently on humira 2015 effective then lost effect - last dose 17  * Prednisone 40mg - on and off   * Humira 40 mg q 2 weeks    Past Medical History:   Diagnosis Date    Chronic pain     Crohn's disease        Past Surgical History:   Procedure Laterality Date    anal fistula      BOWEL RESECTION      x2     SECTION      x2    CHOLECYSTECTOMY      COLONOSCOPY N/A 2016    Procedure: COLONOSCOPY;  Surgeon: Andre Uribe MD;  Location: [facility] ENDO (4TH FLR);  Service: Endoscopy;  Laterality: N/A;    COLONOSCOPY N/A 2017    Procedure: COLONOSCOPY;  Surgeon: Dany Stock MD;  Location: [facility] ENDO (2ND FLR);  Service: Endoscopy;  Laterality: N/A;    COLONOSCOPY N/A 2017    Procedure: COLONOSCOPY;  Surgeon: Andre Uribe MD;  Location: [facility] ENDO (4TH FLR);  Service: Endoscopy;  Laterality: N/A;    COLOSTOMY      rectal abscess drain      TUBAL LIGATION         Review of patient's allergies indicates:   Allergen Reactions    Morphine Other (See Comments)     Abdominal pain    Nubain [nalbuphine] Anxiety     Family History     Problem Relation (Age of Onset)    Cancer Maternal Aunt (66)        Social History Main Topics    Smoking  status: Never Smoker    Smokeless tobacco: Never Used    Alcohol use No    Drug use: No    Sexual activity: Not on file     Review of Systems   Constitutional: Positive for appetite change. Negative for activity change, chills and fever.   HENT: Negative.    Eyes: Negative.    Respiratory: Negative.    Cardiovascular: Negative.    Gastrointestinal: Positive for abdominal pain, constipation (decreased stoma output), nausea and vomiting. Negative for abdominal distention, anal bleeding, blood in stool, diarrhea and rectal pain.   Endocrine: Negative.    Genitourinary: Negative.    Neurological: Negative.    Psychiatric/Behavioral: Negative.    All other systems reviewed and are negative.    Objective:     Vital Signs (Most Recent):  Temp: 98.2 °F (36.8 °C) (02/05/18 0744)  Pulse: 72 (02/05/18 0744)  Resp: 17 (02/05/18 0744)  BP: (!) 103/55 (02/05/18 0744)  SpO2: 95 % (02/05/18 0744) Vital Signs (24h Range):  Temp:  [96.2 °F (35.7 °C)-98.2 °F (36.8 °C)] 98.2 °F (36.8 °C)  Pulse:  [65-89] 72  Resp:  [16-20] 17  SpO2:  [95 %-99 %] 95 %  BP: ()/(52-61) 103/55     Weight: 61.2 kg (135 lb) (02/03/18 2118)  Body mass index is 20.53 kg/m².      Intake/Output Summary (Last 24 hours) at 02/05/18 0834  Last data filed at 02/05/18 0553   Gross per 24 hour   Intake             2910 ml   Output             2050 ml   Net              860 ml       Lines/Drains/Airways     Drain                 Colostomy 01/15/18 0000 LLQ 21 days          Peripheral Intravenous Line                 Peripheral IV - Single Lumen 02/03/18 1411 Left Antecubital 1 day                Physical Exam   Constitutional: She appears well-developed and well-nourished.   HENT:   Head: Normocephalic and atraumatic.   Eyes: EOM are normal. Pupils are equal, round, and reactive to light.   Neck: Normal range of motion.   Cardiovascular: Normal rate and regular rhythm.    Pulmonary/Chest: Effort normal. No respiratory distress.   Abdominal: Soft. She exhibits  no distension and no mass. There is tenderness (mild, mostly RLQ). There is no rebound and no guarding.   Skin: Skin is warm and dry.   Psychiatric: She has a normal mood and affect. Her behavior is normal.       Significant Labs:  CBC:   Recent Labs  Lab 02/03/18  1410 02/04/18  0416   WBC 9.52 4.65   HGB 14.3 10.9*   HCT 42.1 32.6*   * 288     CRP:   Recent Labs  Lab 02/03/18  1410 02/05/18  0600   .5* 67.0*       Significant Imaging:  Imaging results within the past 24 hours have been reviewed.      1.  Postoperative changes from ileocolonic resection and descending colostomy for repair of a rectovaginal fistula.      2.  Interval development of free air adjacent to the proximal colonic anastomosis within the right lower quadrant. Findings concerning for potential anastomotic leak.      3.  Mild distention of the distal ileum with luis-ileal inflammation and edema of the adjacent mesentery.        Report discussed with Dr. Eagle by Dr. Foto at 16:46:32 on Saturday, February 03, 2018.    Assessment/Plan:     Crohn's disease    Carline Chang is a 52 year old female with past medical history of Crohn's disease who presents with abdominal pain/cramping and nausea/vomiting for the past week. Diagnosed >15 years ago. Currently on Imuran only. Gastroenterology consulted for evaluation of Crohn's flare.    -- Low threshold hold for antibiotics if signs of SIRS/Sepsis  -- Blood cultures NGTD  -- UA with reflex to culture if positive  -- C diff negative and stool culture pending.  -- MRE abdomen and pelvis Circumferential wall thickening involving the distal small bowel, not significantly changed from prior. Study suboptimal in defining free air.  -- Obtain: TPMT RBC, Quantiferon TB gold, VZV IgG, Hep A IgG, Hep B surface antigen, hep B surface antibody, hep B core antibody total   -- Will need to review CT abdomen with CRS to evaluate the free air seen on imaging.  Feel she would benefit from  consultation with our IBD staff.            Thank you for your consult. I will follow-up with patient. Please contact us if you have any additional questions.    Narayan Blandon MD  Gastroenterology  [facility]    I was present with the resident and Dr. Davin Bueno GI fellow during the above evaluation, including history and exam.  I discussed the case with the fellow and agree with the findings and plan as documented in the resident's note.

## 2018-02-06 LAB
6-METHYLMERCAPTOPURINE RIBOSIDE: NORMAL
6-METHYLMERCAPTOPURINE: NORMAL
6-METHYLTHIOGUANINE RIBOSIDE: NORMAL
ANION GAP SERPL CALC-SCNC: 8 MMOL/L
BASOPHILS # BLD AUTO: 0.03 K/UL
BASOPHILS NFR BLD: 0.7 %
BUN SERPL-MCNC: 2 MG/DL
CALCIUM SERPL-MCNC: 8.1 MG/DL
CHLORIDE SERPL-SCNC: 107 MMOL/L
CO2 SERPL-SCNC: 26 MMOL/L
CREAT SERPL-MCNC: 0.8 MG/DL
CRP SERPL-MCNC: 46.4 MG/L
DIFFERENTIAL METHOD: ABNORMAL
EOSINOPHIL # BLD AUTO: 0.3 K/UL
EOSINOPHIL NFR BLD: 6.4 %
ERYTHROCYTE [DISTWIDTH] IN BLOOD BY AUTOMATED COUNT: 13.7 %
EST. GFR  (AFRICAN AMERICAN): >60 ML/MIN/1.73 M^2
EST. GFR  (NON AFRICAN AMERICAN): >60 ML/MIN/1.73 M^2
GLUCOSE SERPL-MCNC: 81 MG/DL
HCT VFR BLD AUTO: 32.7 %
HGB BLD-MCNC: 10.5 G/DL
IMM GRANULOCYTES # BLD AUTO: 0.01 K/UL
IMM GRANULOCYTES NFR BLD AUTO: 0.2 %
LYMPHOCYTES # BLD AUTO: 0.4 K/UL
LYMPHOCYTES NFR BLD: 10.6 %
MAGNESIUM SERPL-MCNC: 1 MG/DL
MCH RBC QN AUTO: 27.7 PG
MCHC RBC AUTO-ENTMCNC: 32.1 G/DL
MCV RBC AUTO: 86 FL
MONOCYTES # BLD AUTO: 0.4 K/UL
MONOCYTES NFR BLD: 9.9 %
NEUTROPHILS # BLD AUTO: 2.9 K/UL
NEUTROPHILS NFR BLD: 72.2 %
NRBC BLD-RTO: 0 /100 WBC
PHOSPHATE SERPL-MCNC: 3.6 MG/DL
PLATELET # BLD AUTO: 264 K/UL
PMV BLD AUTO: 8.9 FL
POTASSIUM SERPL-SCNC: 3.9 MMOL/L
RBC # BLD AUTO: 3.79 M/UL
SODIUM SERPL-SCNC: 141 MMOL/L
TPMT INTERPRETATION: NORMAL
TPMT REVIEWED BY: NORMAL
WBC # BLD AUTO: 4.04 K/UL

## 2018-02-06 PROCEDURE — 25000003 PHARM REV CODE 250: Performed by: STUDENT IN AN ORGANIZED HEALTH CARE EDUCATION/TRAINING PROGRAM

## 2018-02-06 PROCEDURE — 86140 C-REACTIVE PROTEIN: CPT

## 2018-02-06 PROCEDURE — 20600001 HC STEP DOWN PRIVATE ROOM

## 2018-02-06 PROCEDURE — 63600175 PHARM REV CODE 636 W HCPCS: Performed by: STUDENT IN AN ORGANIZED HEALTH CARE EDUCATION/TRAINING PROGRAM

## 2018-02-06 PROCEDURE — 83735 ASSAY OF MAGNESIUM: CPT

## 2018-02-06 PROCEDURE — 80048 BASIC METABOLIC PNL TOTAL CA: CPT

## 2018-02-06 PROCEDURE — A4216 STERILE WATER/SALINE, 10 ML: HCPCS | Performed by: STUDENT IN AN ORGANIZED HEALTH CARE EDUCATION/TRAINING PROGRAM

## 2018-02-06 PROCEDURE — 85025 COMPLETE CBC W/AUTO DIFF WBC: CPT

## 2018-02-06 PROCEDURE — 36415 COLL VENOUS BLD VENIPUNCTURE: CPT

## 2018-02-06 PROCEDURE — S0030 INJECTION, METRONIDAZOLE: HCPCS | Performed by: STUDENT IN AN ORGANIZED HEALTH CARE EDUCATION/TRAINING PROGRAM

## 2018-02-06 PROCEDURE — 25000003 PHARM REV CODE 250: Performed by: COLON & RECTAL SURGERY

## 2018-02-06 PROCEDURE — 84100 ASSAY OF PHOSPHORUS: CPT

## 2018-02-06 RX ORDER — METRONIDAZOLE 500 MG/100ML
500 INJECTION, SOLUTION INTRAVENOUS
Status: DISCONTINUED | OUTPATIENT
Start: 2018-02-06 | End: 2018-02-09 | Stop reason: HOSPADM

## 2018-02-06 RX ORDER — CIPROFLOXACIN 2 MG/ML
400 INJECTION, SOLUTION INTRAVENOUS
Status: DISCONTINUED | OUTPATIENT
Start: 2018-02-06 | End: 2018-02-09 | Stop reason: HOSPADM

## 2018-02-06 RX ORDER — POTASSIUM CHLORIDE 20 MEQ/1
40 TABLET, EXTENDED RELEASE ORAL ONCE
Status: COMPLETED | OUTPATIENT
Start: 2018-02-06 | End: 2018-02-06

## 2018-02-06 RX ADMIN — AZATHIOPRINE 50 MG: 50 TABLET ORAL at 09:02

## 2018-02-06 RX ADMIN — OXYCODONE HYDROCHLORIDE 10 MG: 5 TABLET ORAL at 09:02

## 2018-02-06 RX ADMIN — SCOLOPAMINE TRANSDERMAL SYSTEM 1 PATCH: 1 PATCH, EXTENDED RELEASE TRANSDERMAL at 11:02

## 2018-02-06 RX ADMIN — OXYCODONE HYDROCHLORIDE 10 MG: 5 TABLET ORAL at 10:02

## 2018-02-06 RX ADMIN — MEPERIDINE HYDROCHLORIDE 50 MG: 50 INJECTION INTRAMUSCULAR; INTRAVENOUS; SUBCUTANEOUS at 03:02

## 2018-02-06 RX ADMIN — MEPERIDINE HYDROCHLORIDE 50 MG: 50 INJECTION INTRAMUSCULAR; INTRAVENOUS; SUBCUTANEOUS at 10:02

## 2018-02-06 RX ADMIN — VENLAFAXINE 37.5 MG: 37.5 TABLET ORAL at 08:02

## 2018-02-06 RX ADMIN — Medication 3 ML: at 06:02

## 2018-02-06 RX ADMIN — ONDANSETRON 8 MG: 2 INJECTION INTRAMUSCULAR; INTRAVENOUS at 08:02

## 2018-02-06 RX ADMIN — MAGNESIUM SULFATE HEPTAHYDRATE 3 G: 500 INJECTION, SOLUTION INTRAMUSCULAR; INTRAVENOUS at 10:02

## 2018-02-06 RX ADMIN — CIPROFLOXACIN 400 MG: 2 INJECTION, SOLUTION INTRAVENOUS at 05:02

## 2018-02-06 RX ADMIN — VENLAFAXINE 37.5 MG: 37.5 TABLET ORAL at 09:02

## 2018-02-06 RX ADMIN — MEPERIDINE HYDROCHLORIDE 50 MG: 50 INJECTION INTRAMUSCULAR; INTRAVENOUS; SUBCUTANEOUS at 11:02

## 2018-02-06 RX ADMIN — DEXTROSE MONOHYDRATE, SODIUM CHLORIDE, AND POTASSIUM CHLORIDE: 50; 4.5; 1.49 INJECTION, SOLUTION INTRAVENOUS at 09:02

## 2018-02-06 RX ADMIN — MEPERIDINE HYDROCHLORIDE 50 MG: 50 INJECTION INTRAMUSCULAR; INTRAVENOUS; SUBCUTANEOUS at 07:02

## 2018-02-06 RX ADMIN — OXYCODONE HYDROCHLORIDE 10 MG: 5 TABLET ORAL at 01:02

## 2018-02-06 RX ADMIN — ONDANSETRON 8 MG: 2 INJECTION INTRAMUSCULAR; INTRAVENOUS at 10:02

## 2018-02-06 RX ADMIN — ENOXAPARIN SODIUM 40 MG: 100 INJECTION SUBCUTANEOUS at 06:02

## 2018-02-06 RX ADMIN — METRONIDAZOLE 500 MG: 500 INJECTION, SOLUTION INTRAVENOUS at 05:02

## 2018-02-06 RX ADMIN — OXYCODONE HYDROCHLORIDE 10 MG: 5 TABLET ORAL at 05:02

## 2018-02-06 RX ADMIN — CALCIUM CARBONATE (ANTACID) CHEW TAB 500 MG 500 MG: 500 CHEW TAB at 10:02

## 2018-02-06 RX ADMIN — PROMETHAZINE HYDROCHLORIDE 25 MG: 12.5 TABLET ORAL at 03:02

## 2018-02-06 RX ADMIN — Medication 3 ML: at 10:02

## 2018-02-06 RX ADMIN — Medication 3 ML: at 02:02

## 2018-02-06 RX ADMIN — POTASSIUM CHLORIDE 40 MEQ: 1500 TABLET, EXTENDED RELEASE ORAL at 09:02

## 2018-02-06 RX ADMIN — MEPERIDINE HYDROCHLORIDE 50 MG: 50 INJECTION INTRAMUSCULAR; INTRAVENOUS; SUBCUTANEOUS at 06:02

## 2018-02-06 RX ADMIN — OXYCODONE HYDROCHLORIDE 10 MG: 5 TABLET ORAL at 03:02

## 2018-02-06 NOTE — SUBJECTIVE & OBJECTIVE
Subjective:     Interval History: NAEO. States she is feeling better. Abdominal pain/cramps improving. Will continue to monitor.    Review of Systems   Constitutional: Positive for appetite change. Negative for activity change, chills and fever.   HENT: Negative.    Eyes: Negative.    Respiratory: Negative.    Cardiovascular: Negative.    Gastrointestinal: Positive for abdominal pain, constipation (decreased stoma output), nausea and vomiting. Negative for abdominal distention, anal bleeding, blood in stool, diarrhea and rectal pain.   Endocrine: Negative.    Genitourinary: Negative.    Neurological: Negative.    Psychiatric/Behavioral: Negative.    All other systems reviewed and are negative.    Objective:     Vital Signs (Most Recent):  Temp: 97.8 °F (36.6 °C) (02/06/18 1244)  Pulse: 75 (02/06/18 1244)  Resp: 18 (02/06/18 1244)  BP: 109/63 (02/06/18 1244)  SpO2: 97 % (02/06/18 1244) Vital Signs (24h Range):  Temp:  [97 °F (36.1 °C)-98.2 °F (36.8 °C)] 97.8 °F (36.6 °C)  Pulse:  [] 75  Resp:  [16-19] 18  SpO2:  [95 %-98 %] 97 %  BP: ()/(51-72) 109/63     Weight: 61.2 kg (135 lb) (02/03/18 2118)  Body mass index is 20.53 kg/m².      Intake/Output Summary (Last 24 hours) at 02/06/18 1350  Last data filed at 02/06/18 1200   Gross per 24 hour   Intake          2388.33 ml   Output             1330 ml   Net          1058.33 ml       Lines/Drains/Airways     Drain                 Colostomy 01/15/18 0000 LLQ 22 days          Peripheral Intravenous Line                 Midline Catheter Insertion/Assessment  - Single Lumen 02/05/18 1233 Right brachial vein 18g x 10cm 1 day                Physical Exam   Constitutional: She appears well-developed and well-nourished.   HENT:   Head: Normocephalic and atraumatic.   Eyes: EOM are normal. Pupils are equal, round, and reactive to light.   Neck: Normal range of motion.   Cardiovascular: Normal rate and regular rhythm.    Pulmonary/Chest: Effort normal. No respiratory  distress.   Abdominal: Soft. She exhibits no distension and no mass. There is tenderness (mild, mostly RLQ). There is no rebound and no guarding.   Skin: Skin is warm and dry.   Psychiatric: She has a normal mood and affect. Her behavior is normal.       Significant Labs:  CBC:   Recent Labs  Lab 02/05/18  0559 02/06/18  0614   WBC 4.85 4.04   HGB 10.1* 10.5*   HCT 31.8* 32.7*    264         Significant Imaging:  Imaging results within the past 24 hours have been reviewed.

## 2018-02-06 NOTE — ASSESSMENT & PLAN NOTE
Carline Chang is a 52 year old female with past medical history of Crohn's disease who presents with abdominal pain/cramping and nausea/vomiting for the past week. Diagnosed >15 years ago. Currently on Imuran only. Gastroenterology consulted for evaluation of Crohn's flare.    -- Low threshold hold for antibiotics if signs of SIRS/Sepsis  -- Blood cultures NGTD  -- UA with reflex to culture if positive  -- C diff negative and stool culture pending.  -- MRE abdomen and pelvis Circumferential wall thickening involving the distal small bowel, not significantly changed from prior. Study suboptimal in defining free air.  -- Obtain: TPMT RBC, Quantiferon TB gold, VZV IgG, Hep A IgG, Hep B surface antigen, hep B surface antibody, hep B core antibody total all pending.  -- Spoke with CRS. Dr. Uribe to communicate with Dr. Serrato over IBD treatment and outpatient visit.

## 2018-02-06 NOTE — PROGRESS NOTES
[facility]  Colorectal Surgery  Progress Note    Patient Name: Carline Chang  MRN: [id number]  Admission Date: 2/3/2018  Hospital Length of Stay: 3 days  Attending Physician: Andre Uribe MD    Subjective:     Interval History:     No acute events overnight, patient having some bloating and abdominal pain with feeds. Otherwise has some ostomy output but less than usual. No nausea or emesis.     Post-Op Info:  * No surgery found *          Medications:  Continuous Infusions:   dextrose 5 % and 0.45 % NaCl with KCl 20 mEq 100 mL/hr at 02/05/18 2340     Scheduled Meds:   azaTHIOprine  50 mg Oral Daily    enoxparin  40 mg Subcutaneous Q24H    scopolamine  1 patch Transdermal Q3 Days    sodium chloride 0.9%  3 mL Intravenous Q8H    venlafaxine  37.5 mg Oral BID     PRN Meds:   acetaminophen    calcium carbonate    meperidine    naloxone    ondansetron    oxyCODONE    oxyCODONE    promethazine        Objective:     Vital Signs (Most Recent):  Temp: 98.1 °F (36.7 °C) (02/06/18 0750)  Pulse: 70 (02/06/18 0750)  Resp: 17 (02/06/18 0750)  BP: (!) 102/57 (02/06/18 0750)  SpO2: 95 % (02/06/18 0750) Vital Signs (24h Range):  Temp:  [97 °F (36.1 °C)-98.2 °F (36.8 °C)] 98.1 °F (36.7 °C)  Pulse:  [] 70  Resp:  [16-19] 17  SpO2:  [95 %-98 %] 95 %  BP: ()/(51-72) 102/57     Intake/Output - Last 3 Shifts       02/04 0700 - 02/05 0659 02/05 0700 - 02/06 0659 02/06 0700 - 02/07 0659    P.O. 650 120     I.V. (mL/kg) 2260 (36.9) 1778.3 (29.1)     Total Intake(mL/kg) 2910 (47.5) 1898.3 (31)     Urine (mL/kg/hr) 1450 (1) 380 (0.3)     Emesis/NG output 0 (0)      Other 0 (0)      Stool 600 (0.4) 350 (0.2)     Total Output 2050 730      Net +860 +1168.3             Emesis Occurrence 0 x            Physical Exam    General: In no acute distress, well appearance.   CV: RRR, S1, S2 no murmur or gallops   Pulm: non labored breathing, b/l clear breath sounds.   Abd: soft, mildly  distended,RLQ tenderness. NO rebound or guarding. Ostomy with some liquid stool.   : No hudson in place.   Ext: wwp      Significant Labs:  BMP (Last 3 Results):   Recent Labs  Lab 02/03/18  1410 02/04/18  0416 02/05/18  0600 02/06/18  0614   GLU 94 76 68* 81   * 138 136 141   K 4.2 3.1* 4.2 3.9   CL 94* 102 108 107   CO2 25 24 18* 26   BUN 12 10 3* 2*   CREATININE 1.0 0.8 0.7 0.8   CALCIUM 9.1 8.0* 8.3* 8.1*   MG 1.4*  --   --  1.0*     CBC (Last 3 Results):   Recent Labs  Lab 02/04/18  0416 02/05/18  0559 02/06/18  0614   WBC 4.65 4.85 4.04   RBC 3.87* 3.60* 3.79*   HGB 10.9* 10.1* 10.5*   HCT 32.6* 31.8* 32.7*    205 264   MCV 84 88 86   MCH 28.2 28.1 27.7   MCHC 33.4 31.8* 32.1           Assessment/Plan:     Crohn's disease    Carline Chang is a 52 y.o. female s/p transvaginal RV fistula and colostomy revision with abdominal cramping, nausea/vomiting, decreased stoma output - suspect colonic ileus vs crohns flare    - Appreciate GI recs, waiting for IBD staff for further recs.   - Continue with Clear liquid diet for now.  -Pain/nausea meds as needed.  -MIVF. Replete K.  -Ambulate.    Gisela Nazario MD  General Surgery PGY IV  Beeper: [phone]                  Have seen and examined the patient with the fellow and agree with their plan.  ESPERANZA MADSEN

## 2018-02-06 NOTE — PLAN OF CARE
Problem: Patient Care Overview  Goal: Plan of Care Review  Outcome: Ongoing (interventions implemented as appropriate)  Plan of care reviewed with patient, verbalizes understanding. AAOx4, VSS. Colostomy with adequate liquid stool output recorded. Patient tolerating clear liquid diet, reports no N/V. Patient up ad-kenneth, remains free of any falls/trauma. Patient states pain as well controlled with PRN meds. Patient states no other needs at this time, call light in reach,WCTM.

## 2018-02-06 NOTE — PLAN OF CARE
Problem: Patient Care Overview  Goal: Plan of Care Review  Outcome: Ongoing (interventions implemented as appropriate)  Plan of care reviewed, patient verbalizes understanding. AAOx4. VSS at this time. Pain managed with PRN meds throughout shift. Patient complained of nausea but no vomiting, PRN meds controlled nausea. Colostomy intact. Patient on telemetry NSR. Patient up ad kenneth and remains free of falls/injuries. No acute distress at this time. Will continue to monitor.

## 2018-02-06 NOTE — PROGRESS NOTES
[facility]  Gastroenterology  Progress Note    Patient Name: Carline Chang  MRN: [id number]  Admission Date: 2/3/2018  Hospital Length of Stay: 3 days  Code Status: Full Code   Attending Provider: Andre Uribe MD  Consulting Provider: Narayan Blandon MD  Primary Care Physician: Pablo Medina MD  Principal Problem: <principal problem not specified>      Subjective:     Interval History: NAEO. States she is feeling better. Abdominal pain/cramps improving. Will continue to monitor.    Review of Systems   Constitutional: Positive for appetite change. Negative for activity change, chills and fever.   HENT: Negative.    Eyes: Negative.    Respiratory: Negative.    Cardiovascular: Negative.    Gastrointestinal: Positive for abdominal pain, constipation (decreased stoma output), nausea and vomiting. Negative for abdominal distention, anal bleeding, blood in stool, diarrhea and rectal pain.   Endocrine: Negative.    Genitourinary: Negative.    Neurological: Negative.    Psychiatric/Behavioral: Negative.    All other systems reviewed and are negative.    Objective:     Vital Signs (Most Recent):  Temp: 97.8 °F (36.6 °C) (02/06/18 1244)  Pulse: 75 (02/06/18 1244)  Resp: 18 (02/06/18 1244)  BP: 109/63 (02/06/18 1244)  SpO2: 97 % (02/06/18 1244) Vital Signs (24h Range):  Temp:  [97 °F (36.1 °C)-98.2 °F (36.8 °C)] 97.8 °F (36.6 °C)  Pulse:  [] 75  Resp:  [16-19] 18  SpO2:  [95 %-98 %] 97 %  BP: ()/(51-72) 109/63     Weight: 61.2 kg (135 lb) (02/03/18 2118)  Body mass index is 20.53 kg/m².      Intake/Output Summary (Last 24 hours) at 02/06/18 1350  Last data filed at 02/06/18 1200   Gross per 24 hour   Intake          2388.33 ml   Output             1330 ml   Net          1058.33 ml       Lines/Drains/Airways     Drain                 Colostomy 01/15/18 0000 LLQ 22 days          Peripheral Intravenous Line                 Midline Catheter Insertion/Assessment  - Single Lumen  02/05/18 1233 Right brachial vein 18g x 10cm 1 day                Physical Exam   Constitutional: She appears well-developed and well-nourished.   HENT:   Head: Normocephalic and atraumatic.   Eyes: EOM are normal. Pupils are equal, round, and reactive to light.   Neck: Normal range of motion.   Cardiovascular: Normal rate and regular rhythm.    Pulmonary/Chest: Effort normal. No respiratory distress.   Abdominal: Soft. She exhibits no distension and no mass. There is tenderness (mild, mostly RLQ). There is no rebound and no guarding.   Skin: Skin is warm and dry.   Psychiatric: She has a normal mood and affect. Her behavior is normal.       Significant Labs:  CBC:   Recent Labs  Lab 02/05/18  0559 02/06/18  0614   WBC 4.85 4.04   HGB 10.1* 10.5*   HCT 31.8* 32.7*    264         Significant Imaging:  Imaging results within the past 24 hours have been reviewed.    Assessment/Plan:     Crohn's disease    Carline Chang is a 52 year old female with past medical history of Crohn's disease who presents with abdominal pain/cramping and nausea/vomiting for the past week. Diagnosed >15 years ago. Currently on Imuran only. Gastroenterology consulted for evaluation of Crohn's flare.    -- Low threshold hold for antibiotics if signs of SIRS/Sepsis  -- Blood cultures NGTD  -- UA with reflex to culture if positive  -- C diff negative and stool culture pending.  -- MRE abdomen and pelvis Circumferential wall thickening involving the distal small bowel, not significantly changed from prior. Study suboptimal in defining free air.  -- Obtain: TPMT RBC, Quantiferon TB gold, VZV IgG, Hep A IgG, Hep B surface antigen, hep B surface antibody, hep B core antibody total all pending.  -- Spoke with CRS. Dr. Uribe to communicate with Dr. Serrato over IBD treatment and outpatient visit.            Thank you for your consult. We will sign off at this time. Please contact us with any questions or concerns.    Narayan Blandon,  MD  Gastroenterology  [facility]

## 2018-02-06 NOTE — SUBJECTIVE & OBJECTIVE
Subjective:     Interval History:     No acute events overnight, patient having some bloating and abdominal pain with feeds. Otherwise has some ostomy output but less than usual. No nausea or emesis.     Post-Op Info:  * No surgery found *          Medications:  Continuous Infusions:   dextrose 5 % and 0.45 % NaCl with KCl 20 mEq 100 mL/hr at 02/05/18 2340     Scheduled Meds:   azaTHIOprine  50 mg Oral Daily    enoxparin  40 mg Subcutaneous Q24H    scopolamine  1 patch Transdermal Q3 Days    sodium chloride 0.9%  3 mL Intravenous Q8H    venlafaxine  37.5 mg Oral BID     PRN Meds:   acetaminophen    calcium carbonate    meperidine    naloxone    ondansetron    oxyCODONE    oxyCODONE    promethazine        Objective:     Vital Signs (Most Recent):  Temp: 98.1 °F (36.7 °C) (02/06/18 0750)  Pulse: 70 (02/06/18 0750)  Resp: 17 (02/06/18 0750)  BP: (!) 102/57 (02/06/18 0750)  SpO2: 95 % (02/06/18 0750) Vital Signs (24h Range):  Temp:  [97 °F (36.1 °C)-98.2 °F (36.8 °C)] 98.1 °F (36.7 °C)  Pulse:  [] 70  Resp:  [16-19] 17  SpO2:  [95 %-98 %] 95 %  BP: ()/(51-72) 102/57     Intake/Output - Last 3 Shifts       02/04 0700 - 02/05 0659 02/05 0700 - 02/06 0659 02/06 0700 - 02/07 0659    P.O. 650 120     I.V. (mL/kg) 2260 (36.9) 1778.3 (29.1)     Total Intake(mL/kg) 2910 (47.5) 1898.3 (31)     Urine (mL/kg/hr) 1450 (1) 380 (0.3)     Emesis/NG output 0 (0)      Other 0 (0)      Stool 600 (0.4) 350 (0.2)     Total Output 2050 730      Net +860 +1168.3             Emesis Occurrence 0 x            Physical Exam    General: In no acute distress, well appearance.   CV: RRR, S1, S2 no murmur or gallops   Pulm: non labored breathing, b/l clear breath sounds.   Abd: soft, mildly distended,RLQ tenderness. NO rebound or guarding. Ostomy with some liquid stool.   : No hudson in place.   Ext: wwp      Significant Labs:  BMP (Last 3 Results):   Recent Labs  Lab 02/03/18  1410 02/04/18  0416 02/05/18  0600  02/06/18  0614   GLU 94 76 68* 81   * 138 136 141   K 4.2 3.1* 4.2 3.9   CL 94* 102 108 107   CO2 25 24 18* 26   BUN 12 10 3* 2*   CREATININE 1.0 0.8 0.7 0.8   CALCIUM 9.1 8.0* 8.3* 8.1*   MG 1.4*  --   --  1.0*     CBC (Last 3 Results):   Recent Labs  Lab 02/04/18  0416 02/05/18  0559 02/06/18  0614   WBC 4.65 4.85 4.04   RBC 3.87* 3.60* 3.79*   HGB 10.9* 10.1* 10.5*   HCT 32.6* 31.8* 32.7*    205 264   MCV 84 88 86   MCH 28.2 28.1 27.7   MCHC 33.4 31.8* 32.1

## 2018-02-06 NOTE — ASSESSMENT & PLAN NOTE
Carline Chang is a 52 y.o. female s/p transvaginal RV fistula and colostomy revision with abdominal cramping, nausea/vomiting, decreased stoma output - suspect colonic ileus vs crohns flare    - Appreciate GI recs, waiting for IBD staff for further recs.   - Continue with Clear liquid diet for now.  -Pain/nausea meds as needed.  -MIVF. Replete K.  -Ambulate.    Gisela Nazario MD  General Surgery PGY IV  Beeper: [phone]

## 2018-02-07 LAB
ANION GAP SERPL CALC-SCNC: 6 MMOL/L
BACTERIA STL CULT: NORMAL
BASOPHILS # BLD AUTO: 0.03 K/UL
BASOPHILS NFR BLD: 0.6 %
BUN SERPL-MCNC: 2 MG/DL
CALCIUM SERPL-MCNC: 8.6 MG/DL
CHLORIDE SERPL-SCNC: 104 MMOL/L
CO2 SERPL-SCNC: 28 MMOL/L
CREAT SERPL-MCNC: 0.8 MG/DL
CRP SERPL-MCNC: 42 MG/L
DIFFERENTIAL METHOD: ABNORMAL
EOSINOPHIL # BLD AUTO: 0.3 K/UL
EOSINOPHIL NFR BLD: 5.7 %
ERYTHROCYTE [DISTWIDTH] IN BLOOD BY AUTOMATED COUNT: 13.8 %
EST. GFR  (AFRICAN AMERICAN): >60 ML/MIN/1.73 M^2
EST. GFR  (NON AFRICAN AMERICAN): >60 ML/MIN/1.73 M^2
GLUCOSE SERPL-MCNC: 77 MG/DL
HCT VFR BLD AUTO: 33.4 %
HGB BLD-MCNC: 10.8 G/DL
IMM GRANULOCYTES # BLD AUTO: 0.02 K/UL
IMM GRANULOCYTES NFR BLD AUTO: 0.4 %
LYMPHOCYTES # BLD AUTO: 0.3 K/UL
LYMPHOCYTES NFR BLD: 6.1 %
MAGNESIUM SERPL-MCNC: 1.8 MG/DL
MCH RBC QN AUTO: 28.1 PG
MCHC RBC AUTO-ENTMCNC: 32.3 G/DL
MCV RBC AUTO: 87 FL
MITOGEN NIL: 2.7 IU/ML
MONOCYTES # BLD AUTO: 0.5 K/UL
MONOCYTES NFR BLD: 8.6 %
NEUTROPHILS # BLD AUTO: 4.1 K/UL
NEUTROPHILS NFR BLD: 78.6 %
NIL: 0.08 IU/ML
NRBC BLD-RTO: 0 /100 WBC
PHOSPHATE SERPL-MCNC: 3.6 MG/DL
PLATELET # BLD AUTO: 251 K/UL
PMV BLD AUTO: 8.8 FL
POTASSIUM SERPL-SCNC: 4.4 MMOL/L
RBC # BLD AUTO: 3.84 M/UL
SODIUM SERPL-SCNC: 138 MMOL/L
TB ANTIGEN NIL: -0 IU/ML
TB ANTIGEN: 0.08 IU/ML
TB GOLD: NEGATIVE
VARICELLA INTERPRETATION: POSITIVE
VARICELLA ZOSTER IGG: 2.5 ISR
WBC # BLD AUTO: 5.24 K/UL

## 2018-02-07 PROCEDURE — 36415 COLL VENOUS BLD VENIPUNCTURE: CPT

## 2018-02-07 PROCEDURE — A4216 STERILE WATER/SALINE, 10 ML: HCPCS | Performed by: STUDENT IN AN ORGANIZED HEALTH CARE EDUCATION/TRAINING PROGRAM

## 2018-02-07 PROCEDURE — 63600175 PHARM REV CODE 636 W HCPCS: Performed by: STUDENT IN AN ORGANIZED HEALTH CARE EDUCATION/TRAINING PROGRAM

## 2018-02-07 PROCEDURE — 25000003 PHARM REV CODE 250: Performed by: STUDENT IN AN ORGANIZED HEALTH CARE EDUCATION/TRAINING PROGRAM

## 2018-02-07 PROCEDURE — 86140 C-REACTIVE PROTEIN: CPT

## 2018-02-07 PROCEDURE — S0030 INJECTION, METRONIDAZOLE: HCPCS | Performed by: STUDENT IN AN ORGANIZED HEALTH CARE EDUCATION/TRAINING PROGRAM

## 2018-02-07 PROCEDURE — 83735 ASSAY OF MAGNESIUM: CPT

## 2018-02-07 PROCEDURE — 80048 BASIC METABOLIC PNL TOTAL CA: CPT

## 2018-02-07 PROCEDURE — 85025 COMPLETE CBC W/AUTO DIFF WBC: CPT

## 2018-02-07 PROCEDURE — 84100 ASSAY OF PHOSPHORUS: CPT

## 2018-02-07 PROCEDURE — 20600001 HC STEP DOWN PRIVATE ROOM

## 2018-02-07 RX ADMIN — METRONIDAZOLE 500 MG: 500 INJECTION, SOLUTION INTRAVENOUS at 09:02

## 2018-02-07 RX ADMIN — VENLAFAXINE 37.5 MG: 37.5 TABLET ORAL at 08:02

## 2018-02-07 RX ADMIN — ONDANSETRON 8 MG: 2 INJECTION INTRAMUSCULAR; INTRAVENOUS at 12:02

## 2018-02-07 RX ADMIN — DEXTROSE MONOHYDRATE, SODIUM CHLORIDE, AND POTASSIUM CHLORIDE: 50; 4.5; 1.49 INJECTION, SOLUTION INTRAVENOUS at 06:02

## 2018-02-07 RX ADMIN — CIPROFLOXACIN 400 MG: 2 INJECTION, SOLUTION INTRAVENOUS at 05:02

## 2018-02-07 RX ADMIN — ONDANSETRON 8 MG: 2 INJECTION INTRAMUSCULAR; INTRAVENOUS at 03:02

## 2018-02-07 RX ADMIN — VENLAFAXINE 37.5 MG: 37.5 TABLET ORAL at 09:02

## 2018-02-07 RX ADMIN — Medication 3 ML: at 02:02

## 2018-02-07 RX ADMIN — MEPERIDINE HYDROCHLORIDE 50 MG: 50 INJECTION INTRAMUSCULAR; INTRAVENOUS; SUBCUTANEOUS at 09:02

## 2018-02-07 RX ADMIN — OXYCODONE HYDROCHLORIDE 10 MG: 5 TABLET ORAL at 03:02

## 2018-02-07 RX ADMIN — OXYCODONE HYDROCHLORIDE 10 MG: 5 TABLET ORAL at 08:02

## 2018-02-07 RX ADMIN — PROMETHAZINE HYDROCHLORIDE 25 MG: 12.5 TABLET ORAL at 08:02

## 2018-02-07 RX ADMIN — MEPERIDINE HYDROCHLORIDE 50 MG: 50 INJECTION INTRAMUSCULAR; INTRAVENOUS; SUBCUTANEOUS at 12:02

## 2018-02-07 RX ADMIN — AZATHIOPRINE 50 MG: 50 TABLET ORAL at 08:02

## 2018-02-07 RX ADMIN — ONDANSETRON 8 MG: 2 INJECTION INTRAMUSCULAR; INTRAVENOUS at 09:02

## 2018-02-07 RX ADMIN — Medication 3 ML: at 05:02

## 2018-02-07 RX ADMIN — METRONIDAZOLE 500 MG: 500 INJECTION, SOLUTION INTRAVENOUS at 06:02

## 2018-02-07 RX ADMIN — CIPROFLOXACIN 400 MG: 2 INJECTION, SOLUTION INTRAVENOUS at 06:02

## 2018-02-07 RX ADMIN — DEXTROSE MONOHYDRATE, SODIUM CHLORIDE, AND POTASSIUM CHLORIDE: 50; 4.5; 1.49 INJECTION, SOLUTION INTRAVENOUS at 02:02

## 2018-02-07 RX ADMIN — ENOXAPARIN SODIUM 40 MG: 100 INJECTION SUBCUTANEOUS at 05:02

## 2018-02-07 RX ADMIN — METRONIDAZOLE 500 MG: 500 INJECTION, SOLUTION INTRAVENOUS at 03:02

## 2018-02-07 RX ADMIN — MEPERIDINE HYDROCHLORIDE 50 MG: 50 INJECTION INTRAMUSCULAR; INTRAVENOUS; SUBCUTANEOUS at 05:02

## 2018-02-07 NOTE — ASSESSMENT & PLAN NOTE
Carline Karissa Charlene is a 52 y.o. 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    - Appreciate GI recs, IBD staff to see as outpatient no recs at this time  - Continue with Clear liquid diet for now, advance pending bowel function   -Pain/nausea meds as needed.  - MIVF. Replete electrolytes PRN  -Ambulate.

## 2018-02-07 NOTE — SUBJECTIVE & OBJECTIVE
Subjective:     Interval History:     No acute events overnight, patient having some bloating and abdominal pain with clears, improved from yesterday. Some increased ostomy output. No nausea or emesis.     Post-Op Info:  * No surgery found *          Medications:  Continuous Infusions:   dextrose 5 % and 0.45 % NaCl with KCl 20 mEq 100 mL/hr at 02/07/18 0601     Scheduled Meds:   azaTHIOprine  50 mg Oral Daily    ciprofloxacin  400 mg Intravenous Q12H    enoxparin  40 mg Subcutaneous Q24H    metronidazole  500 mg Intravenous Q8H    scopolamine  1 patch Transdermal Q3 Days    sodium chloride 0.9%  3 mL Intravenous Q8H    venlafaxine  37.5 mg Oral BID     PRN Meds:   acetaminophen    calcium carbonate    meperidine    naloxone    ondansetron    oxyCODONE    oxyCODONE    promethazine        Objective:     Vital Signs (Most Recent):  Temp: 98.4 °F (36.9 °C) (02/07/18 0702)  Pulse: 80 (02/07/18 0702)  Resp: 18 (02/07/18 0702)  BP: (!) 103/54 (02/07/18 0800)  SpO2: 97 % (02/07/18 0702) Vital Signs (24h Range):  Temp:  [97.5 °F (36.4 °C)-98.4 °F (36.9 °C)] 98.4 °F (36.9 °C)  Pulse:  [68-82] 80  Resp:  [16-18] 18  SpO2:  [93 %-97 %] 97 %  BP: ()/(54-63) 103/54     Intake/Output - Last 3 Shifts       02/05 0700 - 02/06 0659 02/06 0700 - 02/07 0659 02/07 0700 - 02/08 0659    P.O. 120 800     I.V. (mL/kg) 1778.3 (29.1) 998.3 (16.3)     IV Piggyback  400     Total Intake(mL/kg) 1898.3 (31) 2198.3 (35.9)     Urine (mL/kg/hr) 380 (0.3) 1800 (1.2) 600 (4.1)    Emesis/NG output       Other       Stool 350 (0.2) 1250 (0.9)     Total Output 730 3050 600    Net +1168.3 -851.7 -600                 Physical Exam      General: In no acute distress, well appearance.   CV: RRR, S1, S2 no murmur or gallops   Pulm: non labored breathing, b/l clear breath sounds.   Abd: soft, mildly distended,RLQ tenderness. NO rebound or guarding. Ostomy with some liquid stool and gas in bag  : No hudson in place.   Ext:  wwp      Significant Labs:  BMP (Last 3 Results):   Recent Labs  Lab 02/03/18  1410  02/05/18  0600 02/06/18  0614 02/07/18  0542   GLU 94  < > 68* 81 77   *  < > 136 141 138   K 4.2  < > 4.2 3.9 4.4   CL 94*  < > 108 107 104   CO2 25  < > 18* 26 28   BUN 12  < > 3* 2* 2*   CREATININE 1.0  < > 0.7 0.8 0.8   CALCIUM 9.1  < > 8.3* 8.1* 8.6*   MG 1.4*  --   --  1.0* 1.8   < > = values in this interval not displayed.  CBC (Last 3 Results):     Recent Labs  Lab 02/05/18  0559 02/06/18  0614 02/07/18  0542   WBC 4.85 4.04 5.24   RBC 3.60* 3.79* 3.84*   HGB 10.1* 10.5* 10.8*   HCT 31.8* 32.7* 33.4*    264 251   MCV 88 86 87   MCH 28.1 27.7 28.1   MCHC 31.8* 32.1 32.3

## 2018-02-07 NOTE — PROGRESS NOTES
[facility]  Colorectal Surgery  Progress Note    Patient Name: Carline Chang  MRN: [id number]  Admission Date: 2/3/2018  Hospital Length of Stay: 4 days  Attending Physician: Andre Uribe MD    Subjective:     Interval History:     No acute events overnight, patient having some bloating and abdominal pain with clears, improved from yesterday. Some increased ostomy output. No nausea or emesis.     Post-Op Info:  * No surgery found *          Medications:  Continuous Infusions:   dextrose 5 % and 0.45 % NaCl with KCl 20 mEq 100 mL/hr at 02/07/18 0601     Scheduled Meds:   azaTHIOprine  50 mg Oral Daily    ciprofloxacin  400 mg Intravenous Q12H    enoxparin  40 mg Subcutaneous Q24H    metronidazole  500 mg Intravenous Q8H    scopolamine  1 patch Transdermal Q3 Days    sodium chloride 0.9%  3 mL Intravenous Q8H    venlafaxine  37.5 mg Oral BID     PRN Meds:   acetaminophen    calcium carbonate    meperidine    naloxone    ondansetron    oxyCODONE    oxyCODONE    promethazine        Objective:     Vital Signs (Most Recent):  Temp: 98.4 °F (36.9 °C) (02/07/18 0702)  Pulse: 80 (02/07/18 0702)  Resp: 18 (02/07/18 0702)  BP: (!) 103/54 (02/07/18 0800)  SpO2: 97 % (02/07/18 0702) Vital Signs (24h Range):  Temp:  [97.5 °F (36.4 °C)-98.4 °F (36.9 °C)] 98.4 °F (36.9 °C)  Pulse:  [68-82] 80  Resp:  [16-18] 18  SpO2:  [93 %-97 %] 97 %  BP: ()/(54-63) 103/54     Intake/Output - Last 3 Shifts       02/05 0700 - 02/06 0659 02/06 0700 - 02/07 0659 02/07 0700 - 02/08 0659    P.O. 120 800     I.V. (mL/kg) 1778.3 (29.1) 998.3 (16.3)     IV Piggyback  400     Total Intake(mL/kg) 1898.3 (31) 2198.3 (35.9)     Urine (mL/kg/hr) 380 (0.3) 1800 (1.2) 600 (4.1)    Emesis/NG output       Other       Stool 350 (0.2) 1250 (0.9)     Total Output 730 3050 600    Net +1168.3 -851.7 -600                 Physical Exam      General: In no acute distress, well appearance.   CV: RRR, S1, S2 no  murmur or gallops   Pulm: non labored breathing, b/l clear breath sounds.   Abd: soft, mildly distended,RLQ tenderness. NO rebound or guarding. Ostomy with some liquid stool and gas in bag  : No hudson in place.   Ext: wwp      Significant Labs:  BMP (Last 3 Results):   Recent Labs  Lab 02/03/18  1410  02/05/18  0600 02/06/18  0614 02/07/18  0542   GLU 94  < > 68* 81 77   *  < > 136 141 138   K 4.2  < > 4.2 3.9 4.4   CL 94*  < > 108 107 104   CO2 25  < > 18* 26 28   BUN 12  < > 3* 2* 2*   CREATININE 1.0  < > 0.7 0.8 0.8   CALCIUM 9.1  < > 8.3* 8.1* 8.6*   MG 1.4*  --   --  1.0* 1.8   < > = values in this interval not displayed.  CBC (Last 3 Results):     Recent Labs  Lab 02/05/18  0559 02/06/18  0614 02/07/18  0542   WBC 4.85 4.04 5.24   RBC 3.60* 3.79* 3.84*   HGB 10.1* 10.5* 10.8*   HCT 31.8* 32.7* 33.4*    264 251   MCV 88 86 87   MCH 28.1 27.7 28.1   MCHC 31.8* 32.1 32.3           Assessment/Plan:     Crohn's disease    Carline Chang is a 52 y.o. 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    - Appreciate GI recs, IBD staff to see as outpatient no recs at this time  - Continue with Clear liquid diet for now, advance pending bowel function   -Pain/nausea meds as needed.  - MIVF. Replete electrolytes PRN  -Ambulate.                      Mal Palacios MD  Colorectal Surgery  [facility]  Have seen and examined the patient with the fellow and agree with their plan.  ESPERANZA MADSEN

## 2018-02-07 NOTE — PHYSICIAN QUERY
PT Name: Carline Chang  MR #: [id number]     Physician Query Form - Documentation Clarification      CDS/: Amanda Patton               Contact information: [email or web address]    This form is a permanent document in the medical record.     Query Date: February 7, 2018    By submitting this query, we are merely seeking further clarification of documentation. Please utilize your independent clinical judgment when addressing the question(s) below.    The Medical record reflects the following:    Supporting Clinical Findings Location in Medical Record   Potassium= 3.1     Potassium chloride SA CR 40 meq given on 2/4 and 2/6      Replete K.   Labs 02/04    MAR      CRS PN 02/04   Magnesium= 1.0    Magnesium sulfate 3 gm IVPB given    Labs 02/06    MAR 02/06                                                                            Doctor, Please specify diagnosis or diagnoses associated with above clinical findings.    Provider Use Only      [x] Hypokalemia    [x] Hypomagnesemia    [  ] Other diagnosis/diagnoses, please specify:  __________________                                                                                                             [  ] Clinically undetermined

## 2018-02-07 NOTE — PLAN OF CARE
Problem: Patient Care Overview  Goal: Plan of Care Review  Outcome: Ongoing (interventions implemented as appropriate)  Patient A/Ox4. Care plan reviewed with patient-verbalizes understanding. VSS. Tolerating clear liquid diet well. Pain well managed with medication, reported nausea . Ostomy intact and patent-passing mostly flatus and bowel sweat.  Patient ambulates independently- up to bathroom throughout shift. Patient remains free of falls. Patient states no other needs at this time. WCTM.

## 2018-02-08 ENCOUNTER — ANESTHESIA EVENT (OUTPATIENT)
Dept: ENDOSCOPY | Facility: HOSPITAL | Age: 53
DRG: 387 | End: 2018-02-08
Payer: MEDICARE

## 2018-02-08 ENCOUNTER — ANESTHESIA (OUTPATIENT)
Dept: ENDOSCOPY | Facility: HOSPITAL | Age: 53
DRG: 387 | End: 2018-02-08
Payer: MEDICARE

## 2018-02-08 LAB
ANION GAP SERPL CALC-SCNC: 6 MMOL/L
BASOPHILS # BLD AUTO: 0.04 K/UL
BASOPHILS NFR BLD: 0.5 %
BUN SERPL-MCNC: 3 MG/DL
CALCIUM SERPL-MCNC: 9.3 MG/DL
CHLORIDE SERPL-SCNC: 102 MMOL/L
CO2 SERPL-SCNC: 30 MMOL/L
CREAT SERPL-MCNC: 0.9 MG/DL
CRP SERPL-MCNC: 38.9 MG/L
DIFFERENTIAL METHOD: ABNORMAL
EOSINOPHIL # BLD AUTO: 0.4 K/UL
EOSINOPHIL NFR BLD: 5.8 %
ERYTHROCYTE [DISTWIDTH] IN BLOOD BY AUTOMATED COUNT: 13.5 %
EST. GFR  (AFRICAN AMERICAN): >60 ML/MIN/1.73 M^2
EST. GFR  (NON AFRICAN AMERICAN): >60 ML/MIN/1.73 M^2
GLUCOSE SERPL-MCNC: 95 MG/DL
HCT VFR BLD AUTO: 36.4 %
HGB BLD-MCNC: 11.7 G/DL
IMM GRANULOCYTES # BLD AUTO: 0.04 K/UL
IMM GRANULOCYTES NFR BLD AUTO: 0.5 %
LYMPHOCYTES # BLD AUTO: 0.6 K/UL
LYMPHOCYTES NFR BLD: 8.3 %
MAGNESIUM SERPL-MCNC: 1.4 MG/DL
MCH RBC QN AUTO: 28.1 PG
MCHC RBC AUTO-ENTMCNC: 32.1 G/DL
MCV RBC AUTO: 88 FL
MONOCYTES # BLD AUTO: 0.5 K/UL
MONOCYTES NFR BLD: 7.1 %
NEUTROPHILS # BLD AUTO: 5.9 K/UL
NEUTROPHILS NFR BLD: 77.8 %
NRBC BLD-RTO: 0 /100 WBC
PHOSPHATE SERPL-MCNC: 4.1 MG/DL
PLATELET # BLD AUTO: 291 K/UL
PMV BLD AUTO: 8.8 FL
POTASSIUM SERPL-SCNC: 4.4 MMOL/L
RBC # BLD AUTO: 4.16 M/UL
SODIUM SERPL-SCNC: 138 MMOL/L
WBC # BLD AUTO: 7.59 K/UL

## 2018-02-08 PROCEDURE — 0DBB8ZX EXCISION OF ILEUM, VIA NATURAL OR ARTIFICIAL OPENING ENDOSCOPIC, DIAGNOSTIC: ICD-10-PCS | Performed by: COLON & RECTAL SURGERY

## 2018-02-08 PROCEDURE — 63600175 PHARM REV CODE 636 W HCPCS: Performed by: STUDENT IN AN ORGANIZED HEALTH CARE EDUCATION/TRAINING PROGRAM

## 2018-02-08 PROCEDURE — 25000003 PHARM REV CODE 250: Performed by: NURSE ANESTHETIST, CERTIFIED REGISTERED

## 2018-02-08 PROCEDURE — 25000003 PHARM REV CODE 250: Performed by: STUDENT IN AN ORGANIZED HEALTH CARE EDUCATION/TRAINING PROGRAM

## 2018-02-08 PROCEDURE — 37000008 HC ANESTHESIA 1ST 15 MINUTES: Performed by: COLON & RECTAL SURGERY

## 2018-02-08 PROCEDURE — 37000009 HC ANESTHESIA EA ADD 15 MINS: Performed by: COLON & RECTAL SURGERY

## 2018-02-08 PROCEDURE — 80048 BASIC METABOLIC PNL TOTAL CA: CPT

## 2018-02-08 PROCEDURE — 88305 TISSUE EXAM BY PATHOLOGIST: CPT | Performed by: PATHOLOGY

## 2018-02-08 PROCEDURE — 83735 ASSAY OF MAGNESIUM: CPT

## 2018-02-08 PROCEDURE — 25000003 PHARM REV CODE 250: Performed by: COLON & RECTAL SURGERY

## 2018-02-08 PROCEDURE — 20600001 HC STEP DOWN PRIVATE ROOM

## 2018-02-08 PROCEDURE — 88305 TISSUE EXAM BY PATHOLOGIST: CPT | Mod: 26,,, | Performed by: PATHOLOGY

## 2018-02-08 PROCEDURE — 85025 COMPLETE CBC W/AUTO DIFF WBC: CPT

## 2018-02-08 PROCEDURE — 36415 COLL VENOUS BLD VENIPUNCTURE: CPT

## 2018-02-08 PROCEDURE — D9220A PRA ANESTHESIA: Mod: ANES,,, | Performed by: ANESTHESIOLOGY

## 2018-02-08 PROCEDURE — S0030 INJECTION, METRONIDAZOLE: HCPCS | Performed by: STUDENT IN AN ORGANIZED HEALTH CARE EDUCATION/TRAINING PROGRAM

## 2018-02-08 PROCEDURE — 84100 ASSAY OF PHOSPHORUS: CPT

## 2018-02-08 PROCEDURE — D9220A PRA ANESTHESIA: Mod: CRNA,,, | Performed by: NURSE ANESTHETIST, CERTIFIED REGISTERED

## 2018-02-08 PROCEDURE — 44389 COLONOSCOPY WITH BIOPSY: CPT | Mod: ,,, | Performed by: COLON & RECTAL SURGERY

## 2018-02-08 PROCEDURE — 44389 COLONOSCOPY WITH BIOPSY: CPT | Performed by: COLON & RECTAL SURGERY

## 2018-02-08 PROCEDURE — 86140 C-REACTIVE PROTEIN: CPT

## 2018-02-08 PROCEDURE — 27201012 HC FORCEPS, HOT/COLD, DISP: Performed by: COLON & RECTAL SURGERY

## 2018-02-08 PROCEDURE — 63600175 PHARM REV CODE 636 W HCPCS: Performed by: NURSE ANESTHETIST, CERTIFIED REGISTERED

## 2018-02-08 PROCEDURE — A4216 STERILE WATER/SALINE, 10 ML: HCPCS | Performed by: STUDENT IN AN ORGANIZED HEALTH CARE EDUCATION/TRAINING PROGRAM

## 2018-02-08 RX ORDER — LIDOCAINE HCL/PF 100 MG/5ML
SYRINGE (ML) INTRAVENOUS
Status: DISCONTINUED | OUTPATIENT
Start: 2018-02-08 | End: 2018-02-08

## 2018-02-08 RX ORDER — PROPOFOL 10 MG/ML
INJECTION, EMULSION INTRAVENOUS CONTINUOUS PRN
Status: DISCONTINUED | OUTPATIENT
Start: 2018-02-08 | End: 2018-02-08

## 2018-02-08 RX ORDER — MAGNESIUM SULFATE HEPTAHYDRATE 40 MG/ML
2 INJECTION, SOLUTION INTRAVENOUS ONCE
Status: COMPLETED | OUTPATIENT
Start: 2018-02-08 | End: 2018-02-08

## 2018-02-08 RX ORDER — SODIUM CHLORIDE 9 MG/ML
INJECTION, SOLUTION INTRAVENOUS CONTINUOUS PRN
Status: DISCONTINUED | OUTPATIENT
Start: 2018-02-08 | End: 2018-02-08

## 2018-02-08 RX ORDER — SIMETHICONE 80 MG
1 TABLET,CHEWABLE ORAL 3 TIMES DAILY PRN
Status: DISCONTINUED | OUTPATIENT
Start: 2018-02-08 | End: 2018-02-09 | Stop reason: HOSPADM

## 2018-02-08 RX ORDER — PROPOFOL 10 MG/ML
INJECTION, EMULSION INTRAVENOUS
Status: DISCONTINUED | OUTPATIENT
Start: 2018-02-08 | End: 2018-02-08

## 2018-02-08 RX ADMIN — MEPERIDINE HYDROCHLORIDE 50 MG: 50 INJECTION INTRAMUSCULAR; INTRAVENOUS; SUBCUTANEOUS at 06:02

## 2018-02-08 RX ADMIN — AZATHIOPRINE 50 MG: 50 TABLET ORAL at 08:02

## 2018-02-08 RX ADMIN — ONDANSETRON 8 MG: 2 INJECTION INTRAMUSCULAR; INTRAVENOUS at 06:02

## 2018-02-08 RX ADMIN — METRONIDAZOLE 500 MG: 500 INJECTION, SOLUTION INTRAVENOUS at 02:02

## 2018-02-08 RX ADMIN — METRONIDAZOLE 500 MG: 500 INJECTION, SOLUTION INTRAVENOUS at 10:02

## 2018-02-08 RX ADMIN — OXYCODONE HYDROCHLORIDE 10 MG: 5 TABLET ORAL at 12:02

## 2018-02-08 RX ADMIN — LIDOCAINE HYDROCHLORIDE 50 MG: 20 INJECTION, SOLUTION INTRAVENOUS at 12:02

## 2018-02-08 RX ADMIN — PROMETHAZINE HYDROCHLORIDE 25 MG: 12.5 TABLET ORAL at 10:02

## 2018-02-08 RX ADMIN — CALCIUM CARBONATE (ANTACID) CHEW TAB 500 MG 500 MG: 500 CHEW TAB at 06:02

## 2018-02-08 RX ADMIN — ONDANSETRON 8 MG: 2 INJECTION INTRAMUSCULAR; INTRAVENOUS at 08:02

## 2018-02-08 RX ADMIN — ENOXAPARIN SODIUM 40 MG: 100 INJECTION SUBCUTANEOUS at 06:02

## 2018-02-08 RX ADMIN — CIPROFLOXACIN 400 MG: 2 INJECTION, SOLUTION INTRAVENOUS at 05:02

## 2018-02-08 RX ADMIN — METRONIDAZOLE 500 MG: 500 INJECTION, SOLUTION INTRAVENOUS at 05:02

## 2018-02-08 RX ADMIN — VENLAFAXINE 37.5 MG: 37.5 TABLET ORAL at 08:02

## 2018-02-08 RX ADMIN — CALCIUM CARBONATE (ANTACID) CHEW TAB 500 MG 500 MG: 500 CHEW TAB at 12:02

## 2018-02-08 RX ADMIN — CIPROFLOXACIN 400 MG: 2 INJECTION, SOLUTION INTRAVENOUS at 06:02

## 2018-02-08 RX ADMIN — OXYCODONE HYDROCHLORIDE 10 MG: 5 TABLET ORAL at 10:02

## 2018-02-08 RX ADMIN — SIMETHICONE CHEW TAB 80 MG 80 MG: 80 TABLET ORAL at 02:02

## 2018-02-08 RX ADMIN — MEPERIDINE HYDROCHLORIDE 50 MG: 50 INJECTION INTRAMUSCULAR; INTRAVENOUS; SUBCUTANEOUS at 08:02

## 2018-02-08 RX ADMIN — Medication 3 ML: at 02:02

## 2018-02-08 RX ADMIN — PROMETHAZINE HYDROCHLORIDE 25 MG: 12.5 TABLET ORAL at 03:02

## 2018-02-08 RX ADMIN — Medication 3 ML: at 06:02

## 2018-02-08 RX ADMIN — PROPOFOL 50 MG: 10 INJECTION, EMULSION INTRAVENOUS at 12:02

## 2018-02-08 RX ADMIN — OXYCODONE HYDROCHLORIDE 10 MG: 5 TABLET ORAL at 03:02

## 2018-02-08 RX ADMIN — PROPOFOL 200 MCG/KG/MIN: 10 INJECTION, EMULSION INTRAVENOUS at 12:02

## 2018-02-08 RX ADMIN — MAGNESIUM SULFATE IN WATER 2 G: 40 INJECTION, SOLUTION INTRAVENOUS at 03:02

## 2018-02-08 RX ADMIN — DEXTROSE MONOHYDRATE, SODIUM CHLORIDE, AND POTASSIUM CHLORIDE: 50; 4.5; 1.49 INJECTION, SOLUTION INTRAVENOUS at 10:02

## 2018-02-08 RX ADMIN — OXYCODONE HYDROCHLORIDE 10 MG: 5 TABLET ORAL at 06:02

## 2018-02-08 RX ADMIN — SODIUM CHLORIDE: 0.9 INJECTION, SOLUTION INTRAVENOUS at 12:02

## 2018-02-08 RX ADMIN — DEXTROSE MONOHYDRATE, SODIUM CHLORIDE, AND POTASSIUM CHLORIDE: 50; 4.5; 1.49 INJECTION, SOLUTION INTRAVENOUS at 03:02

## 2018-02-08 RX ADMIN — MEPERIDINE HYDROCHLORIDE 50 MG: 50 INJECTION INTRAMUSCULAR; INTRAVENOUS; SUBCUTANEOUS at 02:02

## 2018-02-08 RX ADMIN — VENLAFAXINE 37.5 MG: 37.5 TABLET ORAL at 10:02

## 2018-02-08 NOTE — PLAN OF CARE
"Problem: Patient Care Overview  Goal: Plan of Care Review  Outcome: Ongoing (interventions implemented as appropriate)  Some issues with pain control and bloating overnight. Pain management plan reviewed with patient, she is in agreement. PRN tums and simethicone given for gas/bloating with relief obtained. Encouraged ambulating to help with gas discomfort as well. Intermittent nausea controlled with PRN antiemetics. Somewhat tolerating diet, states she needs to "take it easy" with solid foods. Colostomy +gas +stool. Urinating adequate amounts in toilet, up ad kenneth to toilet. Vitals signs stable and within baseline since admission. IVF and IV abx infusing. Instructed to call for assistance as needed, call light in reach. Frequent rounds made for safety. See flowsheets for detailed assessment. Continuing to monitor.       "

## 2018-02-08 NOTE — NURSING TRANSFER
Nursing Transfer Note      2/8/2018     Transfer To: Endo    Transfer via wheelchair    Transfer with Endo Nurse    Transported by Endo Nurse    Medicines sent: none    Chart send with patient: Yes    Notified: none    Patient reassessed at: 02/08/18 1100    Upon arrival to floor:

## 2018-02-08 NOTE — ASSESSMENT & PLAN NOTE
Carline Chang is a 52 y.o. female s/p transvaginal RV fistula and colostomy revision with abdominal cramping, nausea/vomiting, decreased stoma output - suspect colonic ileus vs crohns flare, MRE consistent w/ crohns flare, still not tolerating a diet.      - Appreciate GI recs, IBD staff to see as outpatient  - Will scope today, keep patient NPO for now.   - Consent for scope needs to be done.   - If stricture found patient will need resection.   - Pain/nausea meds as needed.  - MIVF. Replete electrolytes PRN  - Ambulate.    Gisela Nazario MD  General Surgery PGY IV  Beeper: [phone]

## 2018-02-08 NOTE — ANESTHESIA PREPROCEDURE EVALUATION
"                                                                                                              02/08/2018  Carline Chang is a 52 y.o., female.    Pre-op Assessment      I have reviewed the Medications.   Steroids Taken In Past Year: Prednisone    Review of Systems  Anesthesia Hx:  No problems with previous Anesthesia Pt. is an "extremely hardstick" and requires anesthesia for IV. Pt. states she usually gets a central line when hospitalized. History of prior surgery of interest to airway management or planning: Previous anesthesia: General 12/4/17: Colonoscpy with general anesthesia.  Procedure performed at an [facility]. Denies Family Hx of Anesthesia complications.   Denies Personal Hx of Anesthesia complications.   Social:  Denies Tobacco Use. Denies Alcohol Use.   Hematology/Oncology:         -- Anemia:   EENT/Dental:EENT/Dental Normal   Cardiovascular:  Cardiovascular Normal   Functional Capacity can climb two flight of stairs: Denies CP/SOB    Pulmonary:  Pulmonary Normal    Renal/:   renal calculi    Hepatic/GI:   Rectovaginal fistula Hepatic/GI Symptoms: (TUMS prn) heartburn.  Bowel Conditions:  Inflammatory Bowel Disease, Crohns    Musculoskeletal:  Musculoskeletal Normal    Neurological:   Peripheral Neuropathy    Endocrine:  Endocrine Normal        Physical Exam  General:  Well nourished    Airway/Jaw/Neck:  Airway Findings: Mouth Opening: Normal Tongue: Normal  General Airway Assessment: Adult  Mallampati: II  TM Distance: Normal, at least 6 cm  Jaw/Neck Findings:     Neck ROM: Normal ROM      Dental:  Dental Findings: In tact   Chest/Lungs:  Chest/Lungs Findings: Clear to auscultation, Normal Respiratory Rate     Heart/Vascular:  Heart Findings: Rate: Normal  Rhythm: Regular Rhythm  Sounds: Normal        Mental Status:  Mental Status Findings:  Cooperative, Alert and Oriented         Anesthesia Plan  Type of Anesthesia, risks & benefits discussed:  Anesthesia Type:  " general, MAC  Patient's Preference:   Intra-op Monitoring Plan: standard ASA monitors  Intra-op Monitoring Plan Comments:   Post Op Pain Control Plan:   Post Op Pain Control Plan Comments:   Induction:   IV  Beta Blocker:  Patient is not currently on a Beta-Blocker (No further documentation required).       Informed Consent: Patient understands risks and agrees with Anesthesia plan.  Questions answered. Anesthesia consent signed with patient.  ASA Score: 2     Day of Surgery Review of History & Physical:            Ready For Surgery From Anesthesia Perspective.   Cleared by Dr. Medina 1/9/18-- scanned in media 1/9 @ 1447    EKG scanned in media 12 /22/17        Mike Ferrari MD

## 2018-02-08 NOTE — PLAN OF CARE
Problem: Patient Care Overview  Goal: Plan of Care Review  Outcome: Ongoing (interventions implemented as appropriate)  Pt A & O x 4, adequate urine output via commode, vital signs stable on room air, pt independent. Nausea and pain controlled with prn meds. Pt went to endo for scope through stoma, no blockages or strictures found, crones exasperation. Pt has no complaints or requests at this time.

## 2018-02-08 NOTE — PROVATION PATIENT INSTRUCTIONS
Discharge Summary/Instructions after an Endoscopic Procedure  Patient Name: Carline Chang  Patient MRN: [id number]  Patient YOB: 1965 Thursday, February 08, 2018  Andre Uribe MD  RESTRICTIONS:  During your procedure today, you received medications for sedation.  These   medications may affect your judgment, balance and coordination.  Therefore,   for 24 hours, you have the following restrictions:   - DO NOT drive a car, operate machinery, make legal/financial decisions,   sign important papers or drink alcohol.    ACTIVITY:  The following day: return to full activity including work, except no heavy   lifting, straining or running for 3 days if polyps were removed.  DIET:  Eat and drink normally unless instructed otherwise.     TREATMENT FOR COMMON SIDE EFFECTS:  - Mild abdominal pain, belching, bloating or excessive gas: rest, eat   lightly and use a heating pad.  - Sore Throat: treat with throat lozenges and/or gargle with warm salt   water.  SYMPTOMS TO WATCH FOR AND REPORT TO YOUR PHYSICIAN:  1. Abdominal pain or bloating, other than gas cramps.  2. Chest pain.  3. Back pain.  4. Chills or fever occurring within 24 hours after the procedure.  5. Rectal bleeding, which would show as bright red, maroon, or black stools.   (A tablespoon of blood from the rectum is not serious, especially if   hemorrhoids are present.)  6. Vomiting.  7. Weakness or dizziness.  8. Because air was used during the procedure, expelling large amounts of air   from your rectum or belching is normal.  9. If a bowel prep was taken, you may not have a bowel movement for 1-3   days.  This is normal.  GO DIRECTLY TO THE NEAREST EMERGENCY ROOM IF YOU HAVE ANY OF THE FOLLOWING:      Difficulty breathing  Chills and/or fever over 101 F   Persistent vomiting and/or vomiting blood   Severe abdominal pain   Severe chest pain   Black, tarry stools   Bleeding- more than one tablespoon   Any other symptom or condition that you may  feel needs urgent attention  Your doctor recommends these additional instructions:  If any biopsies were taken, your doctor s clinic will contact you in 1 to 2   weeks with any results.  Your physician has recommended a repeat colonoscopy (date not yet   determined) to assess disease activity.  For questions, problems or results please call your physician - Andre Uribe MD at Work:  [phone].  [facility], EMERGENCY ROOM PHONE NUMBER: [phone]  IF A COMPLICATION OR EMERGENCY SITUATION ARISES AND YOU ARE UNABLE TO REACH   YOUR PHYSICIAN - GO DIRECTLY TO THE EMERGENCY ROOM.  Andre Uribe MD  2/8/2018 12:40:27 PM  This report has been verified and signed electronically.

## 2018-02-08 NOTE — DISCHARGE INSTRUCTIONS
Colonoscopy     A camera attached to a flexible tube with a viewing lens is used to take video pictures.     Colonoscopy is a test to view the inside of your lower digestive tract (colon and rectum). Sometimes it can show the last part of the small intestine (ileum). During the test, small pieces of tissue may be removed for testing. This is called a biopsy. Small growths, such as polyps, may also be removed.   Why is colonoscopy done?  The test is done to help look for colon cancer. And it can help find the source of abdominal pain, bleeding, and changes in bowel habits. It may be needed once a year, depending on factors such as your:  · Age  · Health history  · Family health history  · Symptoms  · Results from any prior colonoscopy  Risks and possible complications  These include:  · Bleeding               · A puncture or tear in the colon   · Risks of anesthesia  · A cancer lesion not being seen  Getting ready   To prepare for the test:  · Talk with your healthcare provider about the risks of the test (see below). Also ask your healthcare provider about alternatives to the test.  · Tell your healthcare provider about any medicines you take. Also tell him or her about any health conditions you may have.  · Make sure your rectum and colon are empty for the test. Follow the diet and bowel prep instructions exactly. If you dont, the test may need to be rescheduled.  · Plan for a friend or family member to drive you home after the test.     Colonoscopy provides an inside view of the entire colon.     You may discuss the results with your doctor right away or at a future visit.  During the test   The test is usually done in the hospital on an outpatient basis. This means you go home the same day. The procedure takes about 30 minutes. During that time:  · You are given relaxing (sedating) medicine through an IV line. You may be drowsy, or fully asleep.  · The healthcare provider will first give you a physical exam to  check for anal and rectal problems.  · Then the anus is lubricated and the scope inserted.  · If you are awake, you may have a feeling similar to needing to have a bowel movement. You may also feel pressure as air is pumped into the colon. Its OK to pass gas during the procedure.  · Biopsy, polyp removal, or other treatments may be done during the test.  After the test   You may have gas right after the test. It can help to try to pass it to help prevent later bloating. Your healthcare provider may discuss the results with you right away. Or you may need to schedule a follow-up visit to talk about the results. After the test, you can go back to your normal eating and other activities. You may be tired from the sedation and need to rest for a few hours.  Date Last Reviewed: 11/1/2016 © 2000-2017 [employer]. [address] [place] [address], [place] [address]. All rights reserved. This information is not intended as a substitute for professional medical care. Always follow your healthcare professional's instructions.

## 2018-02-08 NOTE — PROGRESS NOTES
[facility]  Colorectal Surgery  Progress Note    Patient Name: Carline Chang  MRN: [id number]  Admission Date: 2/3/2018  Hospital Length of Stay: 5 days  Attending Physician: Andre Uribe MD    Subjective:     Interval History:     No acute events overnight, patient started on a low residue diet and did not tolerate. Had severe abdominal pain. Still having some output from the ostomy.     Post-Op Info:  Procedure(s) (LRB):  COLONOSCOPY (N/A)          Medications:  Continuous Infusions:   dextrose 5 % and 0.45 % NaCl with KCl 20 mEq 100 mL/hr at 02/08/18 0341     Scheduled Meds:   azaTHIOprine  50 mg Oral Daily    ciprofloxacin  400 mg Intravenous Q12H    enoxparin  40 mg Subcutaneous Q24H    metronidazole  500 mg Intravenous Q8H    scopolamine  1 patch Transdermal Q3 Days    sodium chloride 0.9%  3 mL Intravenous Q8H    venlafaxine  37.5 mg Oral BID     PRN Meds:   acetaminophen    calcium carbonate    meperidine    naloxone    ondansetron    oxyCODONE    oxyCODONE    promethazine    simethicone        Objective:     Vital Signs (Most Recent):  Temp: 97 °F (36.1 °C) (02/08/18 0806)  Pulse: 72 (02/08/18 0806)  Resp: 18 (02/08/18 0806)  BP: (!) 109/59 (02/08/18 0806)  SpO2: 98 % (02/08/18 0806) Vital Signs (24h Range):  Temp:  [97 °F (36.1 °C)-98.7 °F (37.1 °C)] 97 °F (36.1 °C)  Pulse:  [72-86] 72  Resp:  [17-18] 18  SpO2:  [96 %-98 %] 98 %  BP: ()/(53-68) 109/59     Intake/Output - Last 3 Shifts       02/06 0700 - 02/07 0659 02/07 0700 - 02/08 0659 02/08 0700 - 02/09 0659    P.O. 800 650     I.V. (mL/kg) 998.3 (16.3) 2320 (37.9)     IV Piggyback 400 900     Total Intake(mL/kg) 2198.3 (35.9) 3870 (63.2)     Urine (mL/kg/hr) 1800 (1.2) 3400 (2.3)     Stool 1250 (0.9) 1000 (0.7)     Total Output 3050 4400      Net -851.7 -530                   Physical Exam    General: In no acute distress, well appearance.   CV: RRR, S1, S2 no murmur or gallops   Pulm: non  labored breathing, b/l clear breath sounds.   Abd: soft, mildly distended, RLQ tenderness. OStomy with some liquid stool.   : No hudson in place.   Ext: wwp      Significant Labs:  BMP (Last 3 Results):   Recent Labs  Lab 02/06/18 0614 02/07/18  0542 02/08/18  0620   GLU 81 77 95    138 138   K 3.9 4.4 4.4    104 102   CO2 26 28 30*   BUN 2* 2* 3*   CREATININE 0.8 0.8 0.9   CALCIUM 8.1* 8.6* 9.3   MG 1.0* 1.8 1.4*     CBC (Last 3 Results):   Recent Labs  Lab 02/06/18 0614 02/07/18  0542 02/08/18  0620   WBC 4.04 5.24 7.59   RBC 3.79* 3.84* 4.16   HGB 10.5* 10.8* 11.7*   HCT 32.7* 33.4* 36.4*    251 291   MCV 86 87 88   MCH 27.7 28.1 28.1   MCHC 32.1 32.3 32.1         Assessment/Plan:     Crohn's disease    Carline Chang is a 52 y.o. female s/p transvaginal RV fistula and colostomy revision with abdominal cramping, nausea/vomiting, decreased stoma output - suspect colonic ileus vs crohns flare, MRE consistent w/ crohns flare, still not tolerating a diet.      - Appreciate GI recs, IBD staff to see as outpatient  - Will scope today, keep patient NPO for now.   - Consent for scope needs to be done.   - If stricture found patient will need resection.   - Pain/nausea meds as needed.  - MIVF. Replete electrolytes PRN  - Ambulate.    Gisela Nazario MD  General Surgery PGY IV  Beeper: [phone]

## 2018-02-08 NOTE — SUBJECTIVE & OBJECTIVE
Subjective:     Interval History:     No acute events overnight, patient started on a low residue diet and did not tolerate. Had severe abdominal pain. Still having some output from the ostomy.     Post-Op Info:  Procedure(s) (LRB):  COLONOSCOPY (N/A)          Medications:  Continuous Infusions:   dextrose 5 % and 0.45 % NaCl with KCl 20 mEq 100 mL/hr at 02/08/18 0341     Scheduled Meds:   azaTHIOprine  50 mg Oral Daily    ciprofloxacin  400 mg Intravenous Q12H    enoxparin  40 mg Subcutaneous Q24H    metronidazole  500 mg Intravenous Q8H    scopolamine  1 patch Transdermal Q3 Days    sodium chloride 0.9%  3 mL Intravenous Q8H    venlafaxine  37.5 mg Oral BID     PRN Meds:   acetaminophen    calcium carbonate    meperidine    naloxone    ondansetron    oxyCODONE    oxyCODONE    promethazine    simethicone        Objective:     Vital Signs (Most Recent):  Temp: 97 °F (36.1 °C) (02/08/18 0806)  Pulse: 72 (02/08/18 0806)  Resp: 18 (02/08/18 0806)  BP: (!) 109/59 (02/08/18 0806)  SpO2: 98 % (02/08/18 0806) Vital Signs (24h Range):  Temp:  [97 °F (36.1 °C)-98.7 °F (37.1 °C)] 97 °F (36.1 °C)  Pulse:  [72-86] 72  Resp:  [17-18] 18  SpO2:  [96 %-98 %] 98 %  BP: ()/(53-68) 109/59     Intake/Output - Last 3 Shifts       02/06 0700 - 02/07 0659 02/07 0700 - 02/08 0659 02/08 0700 - 02/09 0659    P.O. 800 650     I.V. (mL/kg) 998.3 (16.3) 2320 (37.9)     IV Piggyback 400 900     Total Intake(mL/kg) 2198.3 (35.9) 3870 (63.2)     Urine (mL/kg/hr) 1800 (1.2) 3400 (2.3)     Stool 1250 (0.9) 1000 (0.7)     Total Output 3050 4400      Net -851.7 -530                   Physical Exam    General: In no acute distress, well appearance.   CV: RRR, S1, S2 no murmur or gallops   Pulm: non labored breathing, b/l clear breath sounds.   Abd: soft, mildly distended, RLQ tenderness. OStomy with some liquid stool.   : No hudson in place.   Ext: wwp      Significant Labs:  BMP (Last 3 Results):   Recent Labs  Lab  02/06/18  0614 02/07/18  0542 02/08/18  0620   GLU 81 77 95    138 138   K 3.9 4.4 4.4    104 102   CO2 26 28 30*   BUN 2* 2* 3*   CREATININE 0.8 0.8 0.9   CALCIUM 8.1* 8.6* 9.3   MG 1.0* 1.8 1.4*     CBC (Last 3 Results):   Recent Labs  Lab 02/06/18  0614 02/07/18  0542 02/08/18  0620   WBC 4.04 5.24 7.59   RBC 3.79* 3.84* 4.16   HGB 10.5* 10.8* 11.7*   HCT 32.7* 33.4* 36.4*    251 291   MCV 86 87 88   MCH 27.7 28.1 28.1   MCHC 32.1 32.3 32.1

## 2018-02-08 NOTE — TRANSFER OF CARE
"Anesthesia Transfer of Care Note    Patient: Carline Chang    Procedure(s) Performed: Procedure(s) (LRB):  COLONOSCOPY (N/A)    Patient location: PACU    Anesthesia Type: general    Transport from OR: Transported from OR on room air with adequate spontaneous ventilation    Post pain: adequate analgesia    Post assessment: no apparent anesthetic complications and tolerated procedure well    Post vital signs: stable    Level of consciousness: awake    Nausea/Vomiting: no nausea/vomiting    Complications: none    Transfer of care protocol was followed      Last vitals:   Visit Vitals  BP (!) 116/56 (BP Location: Left arm, Patient Position: Lying)   Pulse 72   Temp 36.5 °C (97.7 °F) (Temporal)   Resp 17   Ht 5' 8" (1.727 m)   Wt 61.2 kg (135 lb)   LMP 05/30/2009 (Approximate)   SpO2 97%   Breastfeeding? No   BMI 20.53 kg/m²     "

## 2018-02-09 VITALS
RESPIRATION RATE: 20 BRPM | BODY MASS INDEX: 20.46 KG/M2 | DIASTOLIC BLOOD PRESSURE: 58 MMHG | TEMPERATURE: 98 F | HEART RATE: 80 BPM | WEIGHT: 135 LBS | OXYGEN SATURATION: 93 % | SYSTOLIC BLOOD PRESSURE: 108 MMHG | HEIGHT: 68 IN

## 2018-02-09 LAB
ANION GAP SERPL CALC-SCNC: 7 MMOL/L
BACTERIA BLD CULT: NORMAL
BASOPHILS # BLD AUTO: 0.03 K/UL
BASOPHILS NFR BLD: 0.5 %
BUN SERPL-MCNC: 4 MG/DL
CALCIUM SERPL-MCNC: 8.7 MG/DL
CHLORIDE SERPL-SCNC: 104 MMOL/L
CO2 SERPL-SCNC: 26 MMOL/L
CREAT SERPL-MCNC: 0.9 MG/DL
CRP SERPL-MCNC: 21.8 MG/L
DIFFERENTIAL METHOD: ABNORMAL
EOSINOPHIL # BLD AUTO: 0.3 K/UL
EOSINOPHIL NFR BLD: 5.1 %
ERYTHROCYTE [DISTWIDTH] IN BLOOD BY AUTOMATED COUNT: 13.7 %
EST. GFR  (AFRICAN AMERICAN): >60 ML/MIN/1.73 M^2
EST. GFR  (NON AFRICAN AMERICAN): >60 ML/MIN/1.73 M^2
GLUCOSE SERPL-MCNC: 107 MG/DL
HCT VFR BLD AUTO: 35.7 %
HGB BLD-MCNC: 11.4 G/DL
IMM GRANULOCYTES # BLD AUTO: 0.03 K/UL
IMM GRANULOCYTES NFR BLD AUTO: 0.5 %
LYMPHOCYTES # BLD AUTO: 0.5 K/UL
LYMPHOCYTES NFR BLD: 7.9 %
MAGNESIUM SERPL-MCNC: 1.6 MG/DL
MCH RBC QN AUTO: 28 PG
MCHC RBC AUTO-ENTMCNC: 31.9 G/DL
MCV RBC AUTO: 88 FL
MONOCYTES # BLD AUTO: 0.4 K/UL
MONOCYTES NFR BLD: 6.6 %
NEUTROPHILS # BLD AUTO: 5.1 K/UL
NEUTROPHILS NFR BLD: 79.4 %
NRBC BLD-RTO: 0 /100 WBC
PHOSPHATE SERPL-MCNC: 3.5 MG/DL
PLATELET # BLD AUTO: 261 K/UL
PMV BLD AUTO: 8.6 FL
POTASSIUM SERPL-SCNC: 4.5 MMOL/L
RBC # BLD AUTO: 4.07 M/UL
SODIUM SERPL-SCNC: 137 MMOL/L
WBC # BLD AUTO: 6.47 K/UL

## 2018-02-09 PROCEDURE — 63600175 PHARM REV CODE 636 W HCPCS: Performed by: STUDENT IN AN ORGANIZED HEALTH CARE EDUCATION/TRAINING PROGRAM

## 2018-02-09 PROCEDURE — 25000003 PHARM REV CODE 250: Performed by: STUDENT IN AN ORGANIZED HEALTH CARE EDUCATION/TRAINING PROGRAM

## 2018-02-09 PROCEDURE — 86140 C-REACTIVE PROTEIN: CPT

## 2018-02-09 PROCEDURE — 85025 COMPLETE CBC W/AUTO DIFF WBC: CPT

## 2018-02-09 PROCEDURE — 80048 BASIC METABOLIC PNL TOTAL CA: CPT

## 2018-02-09 PROCEDURE — 84100 ASSAY OF PHOSPHORUS: CPT

## 2018-02-09 PROCEDURE — 83735 ASSAY OF MAGNESIUM: CPT

## 2018-02-09 PROCEDURE — S0030 INJECTION, METRONIDAZOLE: HCPCS | Performed by: STUDENT IN AN ORGANIZED HEALTH CARE EDUCATION/TRAINING PROGRAM

## 2018-02-09 PROCEDURE — 36415 COLL VENOUS BLD VENIPUNCTURE: CPT

## 2018-02-09 RX ORDER — OXYCODONE HYDROCHLORIDE 10 MG/1
10 TABLET ORAL EVERY 4 HOURS PRN
Qty: 33 TABLET | Refills: 0 | Status: SHIPPED | OUTPATIENT
Start: 2018-02-09 | End: 2018-03-05

## 2018-02-09 RX ADMIN — MEPERIDINE HYDROCHLORIDE 50 MG: 50 INJECTION INTRAMUSCULAR; INTRAVENOUS; SUBCUTANEOUS at 01:02

## 2018-02-09 RX ADMIN — ONDANSETRON 8 MG: 2 INJECTION INTRAMUSCULAR; INTRAVENOUS at 01:02

## 2018-02-09 RX ADMIN — PROMETHAZINE HYDROCHLORIDE 25 MG: 12.5 TABLET ORAL at 11:02

## 2018-02-09 RX ADMIN — DEXTROSE MONOHYDRATE, SODIUM CHLORIDE, AND POTASSIUM CHLORIDE: 50; 4.5; 1.49 INJECTION, SOLUTION INTRAVENOUS at 08:02

## 2018-02-09 RX ADMIN — OXYCODONE HYDROCHLORIDE 10 MG: 5 TABLET ORAL at 02:02

## 2018-02-09 RX ADMIN — ENOXAPARIN SODIUM 40 MG: 100 INJECTION SUBCUTANEOUS at 06:02

## 2018-02-09 RX ADMIN — OXYCODONE HYDROCHLORIDE 10 MG: 5 TABLET ORAL at 04:02

## 2018-02-09 RX ADMIN — METRONIDAZOLE 500 MG: 500 INJECTION, SOLUTION INTRAVENOUS at 03:02

## 2018-02-09 RX ADMIN — MEPERIDINE HYDROCHLORIDE 50 MG: 50 INJECTION INTRAMUSCULAR; INTRAVENOUS; SUBCUTANEOUS at 06:02

## 2018-02-09 RX ADMIN — AZATHIOPRINE 50 MG: 50 TABLET ORAL at 08:02

## 2018-02-09 RX ADMIN — CIPROFLOXACIN 400 MG: 2 INJECTION, SOLUTION INTRAVENOUS at 06:02

## 2018-02-09 RX ADMIN — MEPERIDINE HYDROCHLORIDE 50 MG: 50 INJECTION INTRAMUSCULAR; INTRAVENOUS; SUBCUTANEOUS at 11:02

## 2018-02-09 RX ADMIN — OXYCODONE HYDROCHLORIDE 10 MG: 5 TABLET ORAL at 08:02

## 2018-02-09 RX ADMIN — VENLAFAXINE 37.5 MG: 37.5 TABLET ORAL at 08:02

## 2018-02-09 RX ADMIN — ONDANSETRON 8 MG: 2 INJECTION INTRAMUSCULAR; INTRAVENOUS at 06:02

## 2018-02-09 RX ADMIN — METRONIDAZOLE 500 MG: 500 INJECTION, SOLUTION INTRAVENOUS at 10:02

## 2018-02-09 NOTE — NURSING
Education given to pt. Patient verbalized understanding. All questions answered. Peripheral IV removed with catheter intact. RX given to pt. Awaiting transport for discharge.  Will continue to monitor. Rosina Hutchison RN

## 2018-02-09 NOTE — PLAN OF CARE
"Problem: Patient Care Overview  Goal: Plan of Care Review  Outcome: Ongoing (interventions implemented as appropriate)  Better night compared to last night, slept well. Pain and nausea well controlled with PRN meds. Encouraged ambulating to help with gas discomfort/cramping. Somewhat tolerating diet, states she needs to "take it easy" with solid foods. Colostomy +gas +stool. Urinating adequate amounts in toilet, up ad kenneth to toilet. Vitals signs stable and within baseline since admission. IVF and IV abx infusing. Instructed to call for assistance as needed, call light in reach. Frequent rounds made for safety. See flowsheets for detailed assessment. Continuing to monitor.       "

## 2018-02-10 NOTE — DISCHARGE SUMMARY
"[facility]  Colorectal Surgery  Discharge Summary      Patient Name: Carline Chang  MRN: [id number]  Admission Date: 2/3/2018  Hospital Length of Stay: 6 days  Discharge Date and Time: 2/9/2018  2:16 PM  Attending Physician: No att. providers found   Discharging Provider: Mal Palacios MD  Primary Care Provider: Pablo Medina MD     HPI: Carline Chang is a 52 y.o. female with long standing crohns history now s/p RV fistula repair (transvaginal approach) and colostomy revision. She presents with 2-3 days of crescendo decrescendo abdominal cramping, worsening nausea, decreased stoma output, and several episodes of vomiting. Last emesis was 7pm Friday and "light brown with chunks." She continues to have liquid stool and gas from her stoma bag but this has decreased in quantity. Current crohns treatment is imuran only.       Procedure(s) (LRB):  COLONOSCOPY (N/A)     Hospital Course:   2/4/18: admitted to hospital, episodic abdominal pain, GI consulted, MRE performed   2/5/18: Less ostomy output than usual, some bloating no emesis   2/6/18: Tolerating clears, GI service does not think she has active crohns flare   2/7/18: difficulty tolerating low res diet,   2/8/18: Underwent ileoscopy, which showed no stricture, biopsies taken  2/9/18: Patient stable for discharge     Consults         Status Ordering Provider     Inpatient consult to Gastroenterology  Once     Provider:  (Not yet assigned)    Completed CHARLIE SMITH     Inpatient consult to Midline team  Once     Provider:  (Not yet assigned)    Completed ESPERANZA MADSEN          Significant Diagnostic Studies: Labs:   BMP:   Recent Labs  Lab 02/09/18  0932         K 4.5      CO2 26   BUN 4*   CREATININE 0.9   CALCIUM 8.7   MG 1.6    and CBC   Recent Labs  Lab 02/09/18  0932   WBC 6.47   HGB 11.4*   HCT 35.7*        Imaging Results          X-Ray Chest PA And Lateral (Final result)  Result " time 02/04/18 19:24:48    Final result by Joselin Logan MD (02/04/18 19:24:48)                 Impression:        No detrimental change or radiographic acute intrathoracic process seen. Specifically, no focal consolidation.      Electronically signed by: JOSELIN LOGAN MD, MD  Date:     02/04/18  Time:    19:24              Narrative:    COMPARISON: Chest radiograph 1/16/17    FINDINGS: PA and lateral views of the chest. Monitoring leads overlie the chest. Previous right IJ CVC and left sided PICC line has been removed. No detrimental change.   Pulmonary vasculature and hilar regions are within normal limits.  The bilateral lungs are well expanded and clear.  No pleural effusion or pneumothorax.  The heart and mediastinal contours are within normal limits for age.  Included osseous structures appear intact. Cholecystectomy clips noted.                             MRI ENTEROGRAPHY (Final result)  Result time 02/05/18 15:15:25    Final result by Cyndy Cody MD (02/05/18 15:15:25)                 Impression:        Active inflammatory CD with stricture as detailed above.    Trace amount of ascites.    Please note that MRI is suboptimal for evaluation of intraperitoneal free air noted on most recent CT abdomen and pelvis at 02/03/2018.        ______________________________________     Electronically signed by resident: RON BENITEZ MD  Date:     02/05/18  Time:    12:10            As the supervising and teaching physician, I personally reviewed the images and resident's interpretation and I agree with the findings.          Electronically signed by: CYNDY CODY MD  Date:     02/05/18  Time:    15:15              Narrative:    MR ENTEROGRAPHY     Indication: Crohn's disease    Technique: DWI, pre- and post contrast T1 and T2 axial and coronal images of the abdomen and pelvis were acquired with the use of oral contrast and 10 cc Gadavist IV contrast material using MR enterography protocol.    Comparison:  02/03/2018    Findings:    Postoperative changes compatible with ileocolonic resection and descending colostomy. Overall small bowel length is markedly shorted.    BOWEL WALL:    Bowel wall thickening location: Distal ilium involving the nina terminal ileum.    Wall signal characteristics: There is associated abnormal wall enhancement with restricted diffusion.  Bowel wall thickness: 1.2 cm  Length of bowel involved: Approximately 13 cm  stricture: distal ileumluminal width measures approximately 4 mmwith proximal small bowel dilatation.    MESENTERIC:  Engorged vaso recta (comb sign): positive   Fibrofatty proliferation (creeping fat): positive  Perienteric edema: mild and trace amounts of ascites.  Adenopathy: mild mesenteric adenopathy.  Inflammatory mass or abscess: none  Mesenteric vein thrombus or occlusion: none.  Ulcer: none  Sinus tract or fistula: Negative    ANCILLARY:  No perianal fistula, sacroiliitis or hip AVN.  Left lower quadrant colostomy noted.    Please note that MRI is suboptimal for evaluation of intraperitoneal free air noted on most recent CT abdomen and pelvis at 02/03/2018.                             CT Abdomen Pelvis With Contrast (Final result)  Result time 02/03/18 17:01:56    Final result by Alyce Mitchell MD (02/03/18 17:01:56)                 Impression:         1.  Postoperative changes from ileocolonic resection and descending colostomy for repair of a rectovaginal fistula.      2.  Interval development of free air adjacent to the proximal colonic anastomosis within the right lower quadrant. Findings concerning for potential anastomotic leak.      3.  Mild distention of the distal ileum with luis-ileal inflammation and edema of the adjacent mesentery.        Report discussed with Dr. Eagle by Dr. Foto at 16:46:32 on Saturday, February 03, 2018.        ______________________________________     Electronically signed by resident: DEMARCO COTTON  Date:      02/03/18  Time:    16:59            As the supervising and teaching physician, I personally reviewed the images and resident's interpretation and I agree with the findings.          Electronically signed by: JERRI COHEN MD  Date:     02/03/18  Time:    17:01              Narrative:    CT ABDOMEN, and, PELVIS  with IV contrast    Protocol:  Axial CT images of the abdomen and pelvis were acquired before and after the use of 75 cc Hyju439 IV contrast.  Coronal and sagittal reconstructions were acquired.    HISTORY:  52 year old F with Bowel obstruction, low-grade     COMPARISON: 01/19/2017     FINDINGS:  Heart: Normal in size. No pericardial effusion.     Lung Bases: Well aerated, without consolidation or pleural fluid.     Liver: Normal in size and attenuation, with no focal hepatic lesions.       Gallbladder: Status post cholecystectomy     Bile Ducts: No evidence of dilated ducts.     Pancreas: No mass or peripancreatic fat stranding.      Spleen: Unremarkable.     Adrenals: Unremarkable.     Kidneys/ Ureters: Normal in size and location. Normal concentration and excretion of contrast. No hydronephrosis or nephrolithiasis. No ureteral dilatation.     Bladder: No evidence of wall thickening.     Reproductive organs: Unremarkable.     GI Tract/Mesentery: Postoperative changes from ileocolonic resection and descending colostomy for repair of a rectovaginal fistula.  Interval development of free air adjacent to the proximal colonic anastomosis within the right lower quadrant.  There is no free fluid within this area.  Mild distention of the distal ileum with luis-ileal inflammation and edema of the adjacent mesentery.  No pneumatosis or free fluid.      Peritoneal Space: No ascites.      Retroperitoneum:  No significant adenopathy.      Abdominal wall:  Descending loop colostomy within the left lower quadrant.     Vasculature: No significant atherosclerosis or aneurysm.     Bones: No acute fracture.  Age-appropriate degenerative changes.                                Pending Diagnostic Studies:     Procedure Component Value Units Date/Time    Basic Metabolic Panel [[id number]] Collected:  02/09/18 0411    Order Status:  Sent Lab Status:  In process Updated:  02/09/18 0411    Specimen:  Blood from Blood     C-reactive protein [[id number]] Collected:  02/09/18 0411    Order Status:  Sent Lab Status:  In process Updated:  02/09/18 0411    Specimen:  Blood from Blood     CBC with Auto Differential [[id number]] Collected:  02/09/18 0411    Order Status:  Sent Lab Status:  In process Updated:  02/09/18 0411    Specimen:  Blood from Blood     Magnesium [[id number]] Collected:  02/09/18 0411    Order Status:  Sent Lab Status:  In process Updated:  02/09/18 0411    Specimen:  Blood from Blood     Phosphorus [[id number]] Collected:  02/09/18 0411    Order Status:  Sent Lab Status:  In process Updated:  02/09/18 0411    Specimen:  Blood from Blood     Prealbumin [[id number]] Collected:  02/09/18 0411    Order Status:  Sent Lab Status:  In process Updated:  02/09/18 0411    Specimen:  Blood from Blood         Final Active Diagnoses:    Diagnosis Date Noted POA    PRINCIPAL PROBLEM:  Crohn's disease [K50.90] 06/18/2015 Yes    Postoperative surgical complication involving digestive system associated with digestive system procedure [K91.89] 02/03/2018 Yes      Problems Resolved During this Admission:    Diagnosis Date Noted Date Resolved POA      Discharged Condition: good    Disposition: Home or Self Care    Follow Up:  Follow-up Information     Andre Uribe MD In 2 weeks.    Specialty:  Colon and Rectal Surgery  Contact information:  [address] [place] [address] [id number] [phone]                 Patient Instructions:     Diet Adult Regular     Activity as tolerated     Notify your health care provider if you experience any of the following:  temperature >100.4     Notify your health care provider if you experience  any of the following:  persistent nausea and vomiting or diarrhea     Notify your health care provider if you experience any of the following:  severe uncontrolled pain     Notify your health care provider if you experience any of the following:  redness, tenderness, or signs of infection (pain, swelling, redness, odor or green/yellow discharge around incision site)     Notify your health care provider if you experience any of the following:  difficulty breathing or increased cough     Notify your health care provider if you experience any of the following:  severe persistent headache     Notify your health care provider if you experience any of the following:  worsening rash     Notify your health care provider if you experience any of the following:  persistent dizziness, light-headedness, or visual disturbances     Notify your health care provider if you experience any of the following:   Order Comments: Ostomy output greater than 1L in a 24 hour period  please call in to the office     Notify your health care provider if you experience any of the following:  increased confusion or weakness       Medications:  Reconciled Home Medications:   Discharge Medication List as of 2/9/2018  1:50 PM      START taking these medications    Details   oxyCODONE (ROXICODONE) 10 mg Tab immediate release tablet Take 1 tablet (10 mg total) by mouth every 4 (four) hours as needed for Pain., Starting Fri 2/9/2018, Print         CONTINUE these medications which have NOT CHANGED    Details   azaTHIOprine (IMURAN) 50 mg Tab Take 50 mg by mouth once daily., Historical Med      hydrocodone-acetaminophen 10-325mg (NORCO)  mg Tab Take 1 tablet by mouth every 4 (four) hours as needed for Pain., Starting Tue 1/30/2018, Until Wed 1/30/2019, Print      potassium chloride SA (K-DUR,KLOR-CON) 20 MEQ tablet Take 1 tablet (20 mEq total) by mouth once daily., Starting Tue 12/19/2017, Normal      !! promethazine (PHENERGAN) 25 MG tablet Take 1  tablet (25 mg total) by mouth every 12 (twelve) hours as needed for Nausea., Starting Tue 1/16/2018, Print      venlafaxine (EFFEXOR) 37.5 MG Tab Take 1 tablet (37.5 mg total) by mouth 2 (two) times daily., Starting Tue 1/30/2018, Until Wed 1/30/2019, Print      !! promethazine (PHENERGAN) 25 MG tablet Starting 2/13/2017, Until Discontinued, Historical Med       !! - Potential duplicate medications found. Please discuss with provider.          Mal Palacios MD  Colorectal Surgery  [facility]

## 2018-02-14 NOTE — PHYSICIAN QUERY
PT Name: Carline Chang  MR #: [id number]     Physician Query Form - Etiology of Condition Clarification      CDS/: Amanda Patton               Contact information: [email or web address]  This form is a permanent document in the medical record.     Query Date: February 14, 2018    By submitting this query, we are merely seeking further clarification of documentation.  Please utilize your independent clinical judgment when addressing the question(s) below.     The medical record contains the following:    Findings Supporting Clinical Information Location in Medical Record   S/p transvaginal RV fistula and colostomy revision with abdominal cramping, nausea/vomiting, decreased stoma output - suspect colonic ileus vs crohns flare                   MRE consistent w/ crohns flare      2/6/18: GI service does not think she has active crohns flare   Final Active Diagnoses:  Principle Problem: Crohn's disease      TERMINAL ILEUM (BIOPSY):  -Negative for active inflammation  -One biopsy piece with pyloric gland metaplasia, possible area of prior mucosal injury, nothing active currently  ANASTOMOSIS (BIOPSY):  -Small intestine and colonic mucosa with mild reactive changes  -Negative for active inflammation  -No features of active Crohn's disease      The colonic anastomosis is normal. Biopsied.  Friable (with contact bleeding) and granular mucosa in the distal ileum. Biopsied. CRS PN 02/04                  CRS PN 02/08      Discharge Summary          Pathology Report                      Colonoscopy Report     Please document your best medical opinion regarding the etiology of __Abdominal cramping, Nausea/Vomiting, Decreased stoma output__ for which the primary focus of treatment is/was directed.           [  ] Colonic Ileus    [x Crohn's Flare    [  ] Other diagnosis, please specify:  ________________              [    ]  Clinically Undetermined    Please document in your progress notes daily for the  duration of treatment, until resolved, and include in your discharge summary.

## 2018-02-14 NOTE — ANESTHESIA POSTPROCEDURE EVALUATION
"Anesthesia Post Evaluation    Patient: Carline Chang    Procedure(s) Performed: Procedure(s) (LRB):  COLONOSCOPY (N/A)    Final Anesthesia Type: general  Patient location during evaluation: PACU  Patient participation: Yes- Able to Participate  Level of consciousness: awake and alert and oriented  Post-procedure vital signs: reviewed and stable  Pain management: adequate  Airway patency: patent  PONV status at discharge: No PONV  Anesthetic complications: no      Cardiovascular status: stable  Respiratory status: unassisted  Hydration status: euvolemic  Follow-up not needed.        Visit Vitals  BP (!) 108/58 (BP Location: Left arm, Patient Position: Lying)   Pulse 80   Temp 36.4 °C (97.5 °F) (Oral)   Resp 20   Ht 5' 8" (1.727 m)   Wt 61.2 kg (135 lb)   LMP 05/30/2009 (Approximate)   SpO2 (!) 93%   Breastfeeding? No   BMI 20.53 kg/m²       Pain/Estiven Score: No Data Recorded      "

## 2018-02-15 ENCOUNTER — OFFICE VISIT (OUTPATIENT)
Dept: WOUND CARE | Facility: CLINIC | Age: 53
End: 2018-02-15
Payer: MEDICARE

## 2018-02-15 DIAGNOSIS — Z93.3 COLOSTOMY STATUS: Primary | ICD-10-CM

## 2018-02-15 DIAGNOSIS — R11.2 NAUSEA AND VOMITING, INTRACTABILITY OF VOMITING NOT SPECIFIED, UNSPECIFIED VOMITING TYPE: Primary | ICD-10-CM

## 2018-02-15 PROCEDURE — 99024 POSTOP FOLLOW-UP VISIT: CPT | Mod: POP,,, | Performed by: CLINICAL NURSE SPECIALIST

## 2018-02-15 PROCEDURE — 99999 PR PBB SHADOW E&M-EST. PATIENT-LVL I: CPT | Mod: PBBFAC,,, | Performed by: CLINICAL NURSE SPECIALIST

## 2018-02-15 PROCEDURE — 99211 OFF/OP EST MAY X REQ PHY/QHP: CPT | Mod: PBBFAC,PO | Performed by: CLINICAL NURSE SPECIALIST

## 2018-02-15 RX ORDER — ONDANSETRON HYDROCHLORIDE 8 MG/1
8 TABLET, FILM COATED ORAL EVERY 8 HOURS PRN
Qty: 50 TABLET | Refills: 1 | Status: SHIPPED | OUTPATIENT
Start: 2018-02-15 | End: 2018-04-09 | Stop reason: ALTCHOICE

## 2018-02-15 NOTE — PROGRESS NOTES
"Subjective:       Patient ID: Carline Chang is a 52 y.o. female.    Chief Complaint: Colostomy    Mariluz is well known to me and has had colostomy for 2 year now. She had a revision  2 months back and so here for eval of pouching system and her skin       Review of Systems   Constitutional: Negative for activity change, appetite change and fever.   HENT: Negative for congestion and rhinorrhea.    Respiratory: Negative for cough and shortness of breath.    Cardiovascular: Negative for chest pain and leg swelling.   Gastrointestinal: Positive for diarrhea.   Genitourinary: Negative.    Musculoskeletal: Negative.    Skin: Positive for rash.   Neurological: Negative.    Psychiatric/Behavioral: Negative.        Objective:      Physical Exam   Constitutional: She is oriented to person, place, and time. She appears well-developed and well-nourished.   Pulmonary/Chest: Effort normal. No respiratory distress.   Abdominal: Soft. Bowel sounds are normal. She exhibits no distension.   Low budded newly revised colostomy , wears convatec 2pc  Moldable flat wafer   Musculoskeletal: Normal range of motion. She exhibits no edema.   Neurological: She is alert and oriented to person, place, and time.   Skin: Skin is warm and dry. Rash noted.   Psychiatric: She has a normal mood and affect.      New stoma is 11/4"   Skin is denuded at base as her wafer is presized and now too large, added thin ring and flat new image  Gave her Holister to try and see if this feels more comfortable. Uses [facility] for supplier .    Assessment:       1. Colostomy status        Plan:         Will have Quincy samples of other pouches sent to her   Return as needed  I have reviewed the plan of care with the patient and she express understanding, although she is unwilling to take suggestions.  I spent over 50% of this 30 minute visit in face to face counseling.       "

## 2018-02-19 ENCOUNTER — OFFICE VISIT (OUTPATIENT)
Dept: GASTROENTEROLOGY | Facility: CLINIC | Age: 53
End: 2018-02-19
Payer: MEDICARE

## 2018-02-19 VITALS
HEART RATE: 96 BPM | SYSTOLIC BLOOD PRESSURE: 124 MMHG | RESPIRATION RATE: 16 BRPM | DIASTOLIC BLOOD PRESSURE: 66 MMHG | TEMPERATURE: 98 F | WEIGHT: 132 LBS | HEIGHT: 68 IN | BODY MASS INDEX: 20 KG/M2

## 2018-02-19 DIAGNOSIS — R10.9 ABDOMINAL PAIN, UNSPECIFIED ABDOMINAL LOCATION: ICD-10-CM

## 2018-02-19 DIAGNOSIS — K21.9 GASTROESOPHAGEAL REFLUX DISEASE, ESOPHAGITIS PRESENCE NOT SPECIFIED: ICD-10-CM

## 2018-02-19 DIAGNOSIS — E87.6 HYPOKALEMIA: ICD-10-CM

## 2018-02-19 DIAGNOSIS — E83.42 LOW SERUM MAGNESIUM LEVEL: ICD-10-CM

## 2018-02-19 DIAGNOSIS — E86.0 DEHYDRATION: ICD-10-CM

## 2018-02-19 DIAGNOSIS — K50.813 CROHN'S DISEASE OF BOTH SMALL AND LARGE INTESTINE WITH FISTULA: Primary | ICD-10-CM

## 2018-02-19 DIAGNOSIS — K94.09 COLOSTOMY HERNIA: ICD-10-CM

## 2018-02-19 PROCEDURE — 1111F DSCHRG MED/CURRENT MED MERGE: CPT | Mod: ,,, | Performed by: INTERNAL MEDICINE

## 2018-02-19 PROCEDURE — 99215 OFFICE O/P EST HI 40 MIN: CPT | Mod: ,,, | Performed by: INTERNAL MEDICINE

## 2018-02-20 NOTE — PROGRESS NOTES
[facility] Established Patient Visit    Subjective:           PCP: Pablo Medina    Referring Provider: No ref. provider found    Chief Complaint: Hospital Follow Up       HPI:  HPI  Carline Chang is a 52 y.o. female here for   Follow-up following a recent hospitalization at [facility]. She was seen by Dr. Andre Urieb and was in the hospital for 6 to 7 days. Patient underwent Maran and CT scans no definite recurrence for crit of Crohn's was found she had some a MRI and it is felt that he had been sealed off. She did not have any clinical evidence of perforation by way of high white count or any other features 8 patient was also seen by gastroenterology service. Patient presently complains of nausea and vomiting and has ADHD long discussion with Dr. Andre Uribe and would consider doing an upper GI workup to see if a cause can be found. Patient is quite weak at this time and is not having a regular diet and so given her some outpatient IV fluids and see if she can make her feel better blood work will also be repeated. Orders have been given for blood work and IV fluids    ROS:   Constitutional: No fevers, chills, weight loss  ENT: No allergies, sore throat, rhinorrhea  CV: No chest pain, palpitations, edema  Pulm: No cough, shortness of breath, wheezing  Ophtho: No vision changes  GI: No blood in stools, change in bowel habits, nausea/vomiting  Denies alternating diarrhea/constipation.   Derm: No rash  Heme: No easy bruising or lymphadenopathy  MSK: No arthralgias or myalgias  : No dysuria, hematuria, frequency, polyuria, or flank tenderness  Endo: No hot or cold intolerance  Neuro: No syncope or seizure, or dizziness  Psych: No hallucination, depression or suicidal ideation      Medical History:  has a past medical history of Chronic pain and Crohn's disease.      Surgical History:  has a past surgical history that includes Bowel resection; Cholecystectomy; Tubal  ligation; rectal abscess drain;  section; Colonoscopy (N/A, 2016); Colonoscopy (N/A, 2017); Colostomy; anal fistula; Colonoscopy (N/A, 2017); and Colonoscopy (N/A, 2018).    Family History:   Family History   Problem Relation Age of Onset    Cancer Maternal Aunt 66     colon ca    Anesthesia problems Neg Hx        Social History:   Social History   Substance Use Topics    Smoking status: Never Smoker    Smokeless tobacco: Never Used    Alcohol use No       The patient's social and family histories were reviewed by me and updated in the appropriate section of the electronic medical record.    Allergies:   Review of patient's allergies indicates:   Allergen Reactions    Morphine Other (See Comments)     Abdominal pain    Nubain [nalbuphine] Anxiety         Medications:   Current Outpatient Prescriptions   Medication Sig Dispense Refill    azaTHIOprine (IMURAN) 50 mg Tab Take 50 mg by mouth once daily.      hydrocodone-acetaminophen 10-325mg (NORCO)  mg Tab Take 1 tablet by mouth every 4 (four) hours as needed for Pain. 90 tablet 0    MAGNESIUM CHLORIDE (SLOW-MAG ORAL) Take by mouth once daily.      ondansetron (ZOFRAN) 8 MG tablet Take 1 tablet (8 mg total) by mouth every 8 (eight) hours as needed for Nausea. 50 tablet 1    potassium chloride SA (K-DUR,KLOR-CON) 20 MEQ tablet Take 1 tablet (20 mEq total) by mouth once daily. 30 tablet 0    promethazine (PHENERGAN) 25 MG tablet Take 1 tablet (25 mg total) by mouth every 12 (twelve) hours as needed for Nausea. 60 tablet 0    venlafaxine (EFFEXOR) 37.5 MG Tab Take 1 tablet (37.5 mg total) by mouth 2 (two) times daily. 60 tablet 1    oxyCODONE (ROXICODONE) 10 mg Tab immediate release tablet Take 1 tablet (10 mg total) by mouth every 4 (four) hours as needed for Pain. 33 tablet 0     No current facility-administered medications for this visit.      All medications and past history have been reviewed.        Objective:     "    Vital Signs:  Blood pressure 124/66, pulse 96, temperature 97.7 °F (36.5 °C), resp. rate 16, height 5' 8" (1.727 m), weight 59.9 kg (132 lb), last menstrual period 05/30/2009. Body mass index is 20.07 kg/m².    Physical Exam:   General Appearance: Well appearing in no acute distress, well developed, well                 nourished  Head: Normocephalic, without obvious abnormality  Eyes:  Pupils equal, round and reactive to light  Throat: Lips, mucosa, and tongue normal; teeth and gums normal  Lungs: CTA bilaterally in anterior and posterior fields, no wheezes, no crackles  Heart:  Regular rate and rhythm, no murmurs heard  Abdomen: Soft, non tender, non distended with positive bowel sounds in all four quadrants. No hepatosplenomegaly, ascites, or mass. Negative for succusion splash  : female   Extremities: No cyanosis, edema or deformity  Skin: No rash  Neurologic: Normal exam    Labs:  Lab Results   Component Value Date    WBC 6.47 02/09/2018    HGB 11.4 (L) 02/09/2018    HCT 35.7 (L) 02/09/2018     02/09/2018    ALT 16 02/03/2018    AST 23 02/03/2018     02/09/2018    K 4.5 02/09/2018     02/09/2018    CREATININE 0.9 02/09/2018    BUN 4 (L) 02/09/2018    CO2 26 02/09/2018    INR 1.2 02/03/2018       Imaging: All imaging have been reviewed    All lab results and imaging results have been reviewed and discussed with the patient    Endoscopy: All endoscopy done have been reviewed    Assessment/Plan:    Assessment:       No diagnosis found.    Plan:       There are no diagnoses linked to this encounter.      No Follow-up on file.    The plan was discussed with the patient and all questions/concerns have been answered to the patient's satisfaction.        Electronically signed by Pablo Medina MD    This note was dictated using voice recognition software and may contain grammatical errors.      "

## 2018-02-21 LAB
ALBUMIN SERPL-MCNC: 3.5 G/DL (ref 3.1–4.7)
ALP SERPL-CCNC: 315 IU/L (ref 40–104)
ALT (SGPT): 26 IU/L (ref 3–33)
AST SERPL-CCNC: 29 IU/L (ref 10–40)
BASOPHILS NFR BLD: 0.1 K/UL (ref 0–0.2)
BASOPHILS NFR BLD: 1.2 %
BILIRUB SERPL-MCNC: 0.4 MG/DL (ref 0.3–1)
BUN SERPL-MCNC: 6 MG/DL (ref 8–20)
CALCIUM SERPL-MCNC: 8.6 MG/DL (ref 7.7–10.4)
CHLORIDE: 99 MMOL/L (ref 98–110)
CO2 SERPL-SCNC: 28.4 MMOL/L (ref 22.8–31.6)
CREATININE: 0.74 MG/DL (ref 0.6–1.4)
CRP SERPL-MCNC: 5.46 MG/DL (ref 0–1.4)
EOSINOPHIL NFR BLD: 0.3 K/UL (ref 0–0.7)
EOSINOPHIL NFR BLD: 3.6 %
ERYTHROCYTE [DISTWIDTH] IN BLOOD BY AUTOMATED COUNT: 13.7 % (ref 11.7–14.9)
FERRITIN SERPL-MCNC: 74 NG/ML (ref 24–162)
GLUCOSE: 95 MG/DL (ref 70–99)
GRAN #: 5.4 K/UL (ref 1.4–6.5)
GRAN%: 77.1 %
HCT VFR BLD AUTO: 38 % (ref 36–48)
HGB BLD-MCNC: 12.5 G/DL (ref 12–15)
IMMATURE GRANS (ABS): 0 K/UL (ref 0–1)
IMMATURE GRANULOCYTES: 0.3 %
LYMPH #: 0.6 K/UL (ref 1.2–3.4)
LYMPH%: 8.6 %
MAGNESIUM SERPL-MCNC: 1.1 MG/DL (ref 1.5–2.6)
MCH RBC QN AUTO: 28.2 PG (ref 25–35)
MCHC RBC AUTO-ENTMCNC: 32.9 G/DL (ref 31–36)
MCV RBC AUTO: 85.6 FL (ref 79–98)
MONO #: 0.6 K/UL (ref 0.1–0.6)
MONO%: 9.2 %
NUCLEATED RBCS: 0 %
PHOSPHATE FLD-MCNC: 3.4 MG/DL (ref 2.5–4.9)
PLATELET # BLD AUTO: 321 K/UL (ref 140–440)
PMV BLD AUTO: 8.8 FL (ref 8.8–12.7)
POTASSIUM SERPL-SCNC: 3.6 MMOL/L (ref 3.5–5)
PROT SERPL-MCNC: 7.2 G/DL (ref 6–8.2)
RBC # BLD AUTO: 4.44 M/UL (ref 3.5–5.5)
SODIUM: 137 MMOL/L (ref 134–144)
WBC # BLD AUTO: 6.9 K/UL (ref 5–10)

## 2018-02-26 DIAGNOSIS — K50.113 CROHN'S COLITIS, WITH FISTULA: ICD-10-CM

## 2018-02-26 LAB
BUN SERPL-MCNC: 11 MG/DL (ref 8–20)
CALCIUM SERPL-MCNC: 8.7 MG/DL (ref 7.7–10.4)
CHLORIDE: 95 MMOL/L (ref 98–110)
CO2 SERPL-SCNC: 31 MMOL/L (ref 22.8–31.6)
CREATININE: 0.87 MG/DL (ref 0.6–1.4)
GLUCOSE: 82 MG/DL (ref 70–99)
HCT VFR BLD AUTO: 36.3 % (ref 36–48)
HGB BLD-MCNC: 12 G/DL (ref 12–15)
MAGNESIUM SERPL-MCNC: 1.2 MG/DL (ref 1.5–2.6)
MCH RBC QN AUTO: 28.1 PG (ref 25–35)
MCHC RBC AUTO-ENTMCNC: 33.1 G/DL (ref 31–36)
MCV RBC AUTO: 85 FL (ref 79–98)
NUCLEATED RBCS: 0 %
PLATELET # BLD AUTO: 284 K/UL (ref 140–440)
POTASSIUM SERPL-SCNC: 3.1 MMOL/L (ref 3.5–5)
RBC # BLD AUTO: 4.27 M/UL (ref 3.5–5.5)
SODIUM: 139 MMOL/L (ref 134–144)
WBC # BLD AUTO: 8.9 K/UL (ref 5–10)

## 2018-02-26 RX ORDER — HYDROCODONE BITARTRATE AND ACETAMINOPHEN 10; 325 MG/1; MG/1
1 TABLET ORAL EVERY 4 HOURS PRN
Qty: 90 TABLET | Refills: 0 | Status: ON HOLD | OUTPATIENT
Start: 2018-02-26 | End: 2018-03-21 | Stop reason: HOSPADM

## 2018-03-05 ENCOUNTER — OFFICE VISIT (OUTPATIENT)
Dept: GASTROENTEROLOGY | Facility: CLINIC | Age: 53
End: 2018-03-05
Payer: MEDICARE

## 2018-03-05 VITALS
DIASTOLIC BLOOD PRESSURE: 62 MMHG | HEIGHT: 68 IN | RESPIRATION RATE: 16 BRPM | TEMPERATURE: 97 F | SYSTOLIC BLOOD PRESSURE: 110 MMHG | BODY MASS INDEX: 19.43 KG/M2 | WEIGHT: 128.19 LBS | HEART RATE: 112 BPM

## 2018-03-05 DIAGNOSIS — E83.42 HYPOMAGNESEMIA: ICD-10-CM

## 2018-03-05 DIAGNOSIS — K94.03 COLOSTOMY AND ENTEROSTOMY MALFUNCTION: ICD-10-CM

## 2018-03-05 DIAGNOSIS — K21.9 GASTROESOPHAGEAL REFLUX DISEASE, ESOPHAGITIS PRESENCE NOT SPECIFIED: ICD-10-CM

## 2018-03-05 DIAGNOSIS — K50.813 CROHN'S DISEASE OF BOTH SMALL AND LARGE INTESTINE WITH FISTULA: ICD-10-CM

## 2018-03-05 DIAGNOSIS — R63.4 WEIGHT LOSS: ICD-10-CM

## 2018-03-05 DIAGNOSIS — K94.13 COLOSTOMY AND ENTEROSTOMY MALFUNCTION: ICD-10-CM

## 2018-03-05 DIAGNOSIS — R19.7 DIARRHEA, UNSPECIFIED TYPE: Primary | ICD-10-CM

## 2018-03-05 DIAGNOSIS — E87.6 HYPOKALEMIA: ICD-10-CM

## 2018-03-05 PROCEDURE — 99215 OFFICE O/P EST HI 40 MIN: CPT | Mod: ,,, | Performed by: INTERNAL MEDICINE

## 2018-03-05 RX ORDER — LOPERAMIDE HCL 1MG/7.5ML
0.1 LIQUID (ML) ORAL EVERY 12 HOURS PRN
COMMUNITY
End: 2021-10-05

## 2018-03-06 ENCOUNTER — HOSPITAL ENCOUNTER (OUTPATIENT)
Dept: RADIOLOGY | Facility: HOSPITAL | Age: 53
Discharge: HOME | End: 2018-03-06
Attending: COLON & RECTAL SURGERY
Payer: MEDICARE

## 2018-03-06 ENCOUNTER — TELEPHONE (OUTPATIENT)
Dept: SURGERY | Facility: CLINIC | Age: 53
End: 2018-03-06

## 2018-03-06 ENCOUNTER — OFFICE VISIT (OUTPATIENT)
Dept: SURGERY | Facility: CLINIC | Age: 53
End: 2018-03-06
Payer: MEDICARE

## 2018-03-06 VITALS
BODY MASS INDEX: 19.48 KG/M2 | HEIGHT: 68 IN | DIASTOLIC BLOOD PRESSURE: 75 MMHG | SYSTOLIC BLOOD PRESSURE: 116 MMHG | WEIGHT: 128.5 LBS | HEART RATE: 98 BPM

## 2018-03-06 DIAGNOSIS — K50.119 CROHN'S DISEASE OF COLON WITH COMPLICATION: Primary | ICD-10-CM

## 2018-03-06 DIAGNOSIS — K50.113 CROHN'S DISEASE OF LARGE INTESTINE WITH FISTULA: Primary | ICD-10-CM

## 2018-03-06 DIAGNOSIS — K50.113 CROHN'S DISEASE OF LARGE INTESTINE WITH FISTULA: ICD-10-CM

## 2018-03-06 PROCEDURE — 72197 MRI PELVIS W/O & W/DYE: CPT | Mod: 26,GC,, | Performed by: RADIOLOGY

## 2018-03-06 PROCEDURE — 99213 OFFICE O/P EST LOW 20 MIN: CPT | Mod: PBBFAC,25 | Performed by: COLON & RECTAL SURGERY

## 2018-03-06 PROCEDURE — 74183 MRI ABD W/O CNTR FLWD CNTR: CPT | Mod: 26,GC,, | Performed by: RADIOLOGY

## 2018-03-06 PROCEDURE — 99024 POSTOP FOLLOW-UP VISIT: CPT | Mod: S$PBB,,, | Performed by: COLON & RECTAL SURGERY

## 2018-03-06 PROCEDURE — 99999 PR PBB SHADOW E&M-EST. PATIENT-LVL III: CPT | Mod: PBBFAC,,, | Performed by: COLON & RECTAL SURGERY

## 2018-03-06 PROCEDURE — 72197 MRI PELVIS W/O & W/DYE: CPT | Mod: TC

## 2018-03-06 RX ORDER — METRONIDAZOLE 500 MG/1
TABLET ORAL
Status: ON HOLD | COMMUNITY
Start: 2018-01-08 | End: 2018-03-21 | Stop reason: HOSPADM

## 2018-03-06 NOTE — PROGRESS NOTES
[facility] Established Patient Visit    Subjective:           PCP: Pablo Medina    Chief Complaint: Follow-up (labs and x ray) and Abdominal Cramping       HPI:  Carline Chang is a 52 y.o. female here for cramping abd pain and diarrhea via the colostomy. There is no blood or mucus. Patient had recent surgery done on fistula in rectum. There was free air in the right paracolic gutter of uncertain clinical significance. Patient has lost weight since surgery and has persistent nausea. Patient was given IV fluids due to dehydration. I spoke with Dr. Andre Uribe, who is going to reevaluate the patient for possible recurrence of idiopathic inflammatory bowel disease. Reviewed X rays with  radiologist and reviewed labs. Spent more than one hour with the patient.       ROS:   Constitutional: No fevers, chills, weight loss  ENT: No allergies, sore throat, rhinorrhea  CV: No chest pain, palpitations, edema  Pulm: No cough, shortness of breath, wheezing  Ophtho: No vision changes  GI: No blood in stools, change in bowel habits, nausea/vomiting  Denies alternating diarrhea/constipation.   Derm: No rash  Heme: No easy bruising or lymphadenopathy  MSK: No arthralgias or myalgias  : No dysuria, hematuria, frequency, polyuria, or flank tenderness  Endo: No hot or cold intolerance  Neuro: No syncope or seizure, or dizziness  Psych: No hallucination, depression or suicidal ideation      Medical History:  has a past medical history of Chronic pain and Crohn's disease.      Surgical History:  has a past surgical history that includes Bowel resection; Cholecystectomy; Tubal ligation; rectal abscess drain;  section; Colonoscopy (N/A, 2016); Colonoscopy (N/A, 2017); Colostomy; anal fistula; Colonoscopy (N/A, 2017); and Colonoscopy (N/A, 2018).    Family History:   Family History   Problem Relation Age of Onset    Cancer Maternal Aunt 66     colon ca    Anesthesia problems  "Neg Hx        Social History:   Social History   Substance Use Topics    Smoking status: Never Smoker    Smokeless tobacco: Never Used    Alcohol use No       The patient's social and family histories were reviewed by me and updated in the appropriate section of the electronic medical record.    Allergies:   Review of patient's allergies indicates:   Allergen Reactions    Morphine Other (See Comments)     Abdominal pain    Nubain [nalbuphine] Anxiety         Medications:   Current Outpatient Prescriptions   Medication Sig Dispense Refill    azaTHIOprine (IMURAN) 50 mg Tab Take 50 mg by mouth once daily.      hydrocodone-acetaminophen 10-325mg (NORCO)  mg Tab Take 1 tablet by mouth every 4 (four) hours as needed for Pain. 90 tablet 0    loperamide (IMODIUM A-D) 1 mg/7.5 mL Liqd Take 0.1 mg/kg by mouth every 12 (twelve) hours.      MAGNESIUM CHLORIDE (SLOW-MAG ORAL) Take by mouth once daily.      ondansetron (ZOFRAN) 8 MG tablet Take 1 tablet (8 mg total) by mouth every 8 (eight) hours as needed for Nausea. 50 tablet 1    potassium chloride SA (K-DUR,KLOR-CON) 20 MEQ tablet Take 1 tablet (20 mEq total) by mouth once daily. 30 tablet 0    venlafaxine (EFFEXOR) 37.5 MG Tab Take 1 tablet (37.5 mg total) by mouth 2 (two) times daily. 60 tablet 1     No current facility-administered medications for this visit.      All medications and past history have been reviewed.        Objective:        Vital Signs:  Blood pressure 110/62, pulse (!) 112, temperature 97.3 °F (36.3 °C), resp. rate 16, height 5' 8" (1.727 m), weight 58.2 kg (128 lb 3.2 oz), last menstrual period 05/30/2009. Body mass index is 19.49 kg/m².    Physical Exam:   General Appearance: Well appearing in no acute distress, well developed, well                nourished  Head: Normocephalic, without obvious abnormality  Eyes:  Pupils equal, round and reactive to light  Throat: Lips, mucosa, and tongue normal; teeth and gums normal  Lungs: CTA " bilaterally in anterior and posterior fields, no wheezes, no crackles  Heart:  Regular rate and rhythm, no murmurs heard  Abdomen: Soft, non tender, non distended with positive bowel sounds in all four quadrants. No hepatosplenomegaly, ascites, or mass. Negative for succusion splash  : female   Extremities: No cyanosis, edema or deformity  Skin: No rash  Neurologic: Normal exam    Labs:  Lab Results   Component Value Date    WBC 8.9 02/26/2018    HGB 12.0 02/26/2018    HCT 36.3 02/26/2018     02/26/2018    ALT 16 02/03/2018    AST 29 02/21/2018     02/26/2018    K 3.1 (L) 02/26/2018    CL 95 (L) 02/26/2018    CREATININE 0.87 02/26/2018    BUN 11 02/26/2018    CO2 31.0 02/26/2018    INR 1.2 02/03/2018       Imaging:     All lab results and imaging results have been reviewed and discussed with the patient      Assessment:       No diagnosis found.    Plan:       There are no diagnoses linked to this encounter.    See HPI    No Follow-up on file.    The plan was discussed with the patient and all questions/concerns have been answered to the patient's satisfaction.        Electronically signed by Pbalo Medina MD    This note was dictated using voice recognition software and may contain grammatical errors.

## 2018-03-06 NOTE — TELEPHONE ENCOUNTER
----- Message from Esme Patel sent at 3/6/2018  3:53 PM CST -----  Contact: self - [phone]  oksana - had enterography - is in mri building for another - does she really need another one - please call patient at

## 2018-03-07 ENCOUNTER — TELEPHONE (OUTPATIENT)
Dept: SURGERY | Facility: CLINIC | Age: 53
End: 2018-03-07

## 2018-03-12 ENCOUNTER — TELEPHONE (OUTPATIENT)
Dept: SURGERY | Facility: HOSPITAL | Age: 53
End: 2018-03-12

## 2018-03-12 ENCOUNTER — TELEPHONE (OUTPATIENT)
Dept: SURGERY | Facility: CLINIC | Age: 53
End: 2018-03-12

## 2018-03-12 ENCOUNTER — TELEPHONE (OUTPATIENT)
Dept: RADIOLOGY | Facility: HOSPITAL | Age: 53
End: 2018-03-12

## 2018-03-12 NOTE — TELEPHONE ENCOUNTER
----- Message from Js Bansal sent at 3/12/2018  1:55 PM CDT -----  Contact: Pt   Pt would like the nurse to give her a call back in regards to if she need to have test done that is schedule for tomorrow .. Contact number [phone]..

## 2018-03-13 ENCOUNTER — HOSPITAL ENCOUNTER (OUTPATIENT)
Dept: RADIOLOGY | Facility: HOSPITAL | Age: 53
Discharge: HOME | End: 2018-03-13
Attending: COLON & RECTAL SURGERY
Payer: MEDICARE

## 2018-03-13 DIAGNOSIS — N82.3 RVF (RECTOVAGINAL FISTULA): ICD-10-CM

## 2018-03-13 PROCEDURE — 74270 X-RAY XM COLON 1CNTRST STD: CPT | Mod: 26,,, | Performed by: RADIOLOGY

## 2018-03-13 PROCEDURE — 25500020 PHARM REV CODE 255: Performed by: COLON & RECTAL SURGERY

## 2018-03-13 PROCEDURE — 25000003 PHARM REV CODE 250: Performed by: COLON & RECTAL SURGERY

## 2018-03-13 PROCEDURE — 74270 X-RAY XM COLON 1CNTRST STD: CPT | Mod: TC

## 2018-03-13 RX ORDER — PROMETHAZINE HYDROCHLORIDE 25 MG/1
25 TABLET ORAL EVERY 12 HOURS
Qty: 60 TABLET | Refills: 0 | Status: SHIPPED | OUTPATIENT
Start: 2018-03-13 | End: 2018-04-09 | Stop reason: ALTCHOICE

## 2018-03-13 RX ORDER — LIDOCAINE HYDROCHLORIDE 20 MG/ML
JELLY TOPICAL ONCE
Status: COMPLETED | OUTPATIENT
Start: 2018-03-13 | End: 2018-03-13

## 2018-03-13 RX ADMIN — LIDOCAINE HYDROCHLORIDE: 20 JELLY TOPICAL at 11:03

## 2018-03-13 RX ADMIN — DIATRIZOATE MEGLUMINE AND DIATRIZOATE SODIUM 240 ML: 600; 100 SOLUTION ORAL; RECTAL at 11:03

## 2018-03-13 NOTE — PROGRESS NOTES
Subjective:       Patient ID: Carline Chang is a 51 y.o. female.    Chief Complaint: Crohn's colitis with rectal stricture and rectovaginal fistula HPI   51 F who presents to clinic with with a history of colostomy for rectal Crohn's disease as well as a rectovaginal fistula.  He has recently been discharged from the hospital with abdominal pain.  She did undergo colonoscopy at that point which showed 8 and anastomoses some small aphthous ulcerations but no other evidence of active Crohn's disease in her previous ileocolic resections.  Of note there was some free air in the lateral aspect but she did not have peritonitis at that time.  Subsequently she's been doing better she is having abdominal soreness of notice she's been having worsening diarrhea.  Her stool output in her colostomy has been increasing is mostly liquid and she is having some difficulty keeping up with hydration status  Review of Systems      Constitutional: No fever, chills, or change in activity or appetite.      HENT: No hearing loss, swelling, neck pain or stiffness.       Eyes: No  Itching, discharge, and visual disturbance.      Respiratory: No cough, choking or shortness of breath.       Cardiovascular: No leg swelling or chest pain      Gastrointestinal: No abdominal distention or change in bowel habbits     Genitourinary: No dysuria, frequency or flank pain.      Musculoskeletal: No trouble walking or arthralgias.      Neurological: No weakness, dizziness, or seizures .      Hematological: No adenopathy.      Psychiatric/Behavioral: No hallucinations or changes in behavior.  Remainder ROS  Negative except per HPI    Objective:      Physical Exam   Constitutional: She is oriented to person, place, and time. She appears well-developed and well-nourished.   Pulmonary/Chest: Effort normal. No respiratory distress.   Abdominal: Soft. She exhibits no distension. There is tenderness.  she has no prolapse of her colostomy    Musculoskeletal: Normal range of motion.   Neurological: She is alert and oriented to person, place, and time.   Skin: Skin is warm and dry.   Psychiatric: She has a normal mood and affect. Her behavior is normal.       Assessment:       1. Crohn's disease with other complication    2. Colostomy status        Plan:       Will schedule her for a CT enterography, after discussion with Dr. Sprague who is concern for activation of her Crohn's disease  45 min was spent with the patient of which greater than 50% was spent in counciling

## 2018-03-17 ENCOUNTER — NURSE TRIAGE (OUTPATIENT)
Dept: ADMINISTRATIVE | Facility: CLINIC | Age: 53
End: 2018-03-17

## 2018-03-17 ENCOUNTER — HOSPITAL ENCOUNTER (INPATIENT)
Facility: HOSPITAL | Age: 53
LOS: 7 days | Discharge: HOME | DRG: 387 | End: 2018-03-24
Attending: COLON & RECTAL SURGERY | Admitting: INTERNAL MEDICINE
Payer: MEDICARE

## 2018-03-17 DIAGNOSIS — K50.90 CROHN'S DISEASE: Primary | ICD-10-CM

## 2018-03-17 PROCEDURE — 63600175 PHARM REV CODE 636 W HCPCS: Performed by: STUDENT IN AN ORGANIZED HEALTH CARE EDUCATION/TRAINING PROGRAM

## 2018-03-17 PROCEDURE — 25000003 PHARM REV CODE 250: Performed by: STUDENT IN AN ORGANIZED HEALTH CARE EDUCATION/TRAINING PROGRAM

## 2018-03-17 PROCEDURE — 20600001 HC STEP DOWN PRIVATE ROOM

## 2018-03-17 RX ORDER — MEPERIDINE HYDROCHLORIDE 50 MG/ML
12.5 INJECTION INTRAMUSCULAR; INTRAVENOUS; SUBCUTANEOUS EVERY 4 HOURS PRN
Status: DISCONTINUED | OUTPATIENT
Start: 2018-03-17 | End: 2018-03-18

## 2018-03-17 RX ORDER — ACETAMINOPHEN 325 MG/1
650 TABLET ORAL EVERY 4 HOURS PRN
Status: DISCONTINUED | OUTPATIENT
Start: 2018-03-17 | End: 2018-03-24 | Stop reason: HOSPADM

## 2018-03-17 RX ORDER — DIPHENHYDRAMINE HYDROCHLORIDE 50 MG/ML
12.5 INJECTION INTRAMUSCULAR; INTRAVENOUS EVERY 4 HOURS PRN
Status: DISCONTINUED | OUTPATIENT
Start: 2018-03-17 | End: 2018-03-24 | Stop reason: HOSPADM

## 2018-03-17 RX ORDER — HYDROCODONE BITARTRATE AND ACETAMINOPHEN 10; 325 MG/1; MG/1
1 TABLET ORAL EVERY 4 HOURS PRN
Status: DISCONTINUED | OUTPATIENT
Start: 2018-03-17 | End: 2018-03-24 | Stop reason: HOSPADM

## 2018-03-17 RX ORDER — CIPROFLOXACIN 2 MG/ML
400 INJECTION, SOLUTION INTRAVENOUS
Status: DISCONTINUED | OUTPATIENT
Start: 2018-03-17 | End: 2018-03-21

## 2018-03-17 RX ORDER — METRONIDAZOLE 500 MG/100ML
500 INJECTION, SOLUTION INTRAVENOUS
Status: DISCONTINUED | OUTPATIENT
Start: 2018-03-17 | End: 2018-03-21

## 2018-03-17 RX ORDER — ONDANSETRON 2 MG/ML
4 INJECTION INTRAMUSCULAR; INTRAVENOUS EVERY 8 HOURS PRN
Status: DISCONTINUED | OUTPATIENT
Start: 2018-03-17 | End: 2018-03-18

## 2018-03-17 RX ORDER — VENLAFAXINE 37.5 MG/1
37.5 TABLET ORAL 2 TIMES DAILY
Status: DISCONTINUED | OUTPATIENT
Start: 2018-03-17 | End: 2018-03-24 | Stop reason: HOSPADM

## 2018-03-17 RX ORDER — PANTOPRAZOLE SODIUM 40 MG/10ML
40 INJECTION, POWDER, LYOPHILIZED, FOR SOLUTION INTRAVENOUS DAILY
Status: DISCONTINUED | OUTPATIENT
Start: 2018-03-18 | End: 2018-03-18 | Stop reason: ALTCHOICE

## 2018-03-17 RX ORDER — AZATHIOPRINE 50 MG/1
50 TABLET ORAL DAILY
Status: DISCONTINUED | OUTPATIENT
Start: 2018-03-18 | End: 2018-03-24 | Stop reason: HOSPADM

## 2018-03-17 RX ORDER — ENOXAPARIN SODIUM 100 MG/ML
40 INJECTION SUBCUTANEOUS EVERY 24 HOURS
Status: DISCONTINUED | OUTPATIENT
Start: 2018-03-18 | End: 2018-03-24 | Stop reason: HOSPADM

## 2018-03-17 RX ORDER — SODIUM CHLORIDE, SODIUM LACTATE, POTASSIUM CHLORIDE, CALCIUM CHLORIDE 600; 310; 30; 20 MG/100ML; MG/100ML; MG/100ML; MG/100ML
INJECTION, SOLUTION INTRAVENOUS CONTINUOUS
Status: DISCONTINUED | OUTPATIENT
Start: 2018-03-17 | End: 2018-03-19

## 2018-03-17 RX ORDER — NALOXONE HCL 0.4 MG/ML
0.02 VIAL (ML) INJECTION
Status: DISCONTINUED | OUTPATIENT
Start: 2018-03-17 | End: 2018-03-24 | Stop reason: HOSPADM

## 2018-03-17 RX ORDER — SODIUM CHLORIDE 0.9 % (FLUSH) 0.9 %
3 SYRINGE (ML) INJECTION EVERY 8 HOURS
Status: DISCONTINUED | OUTPATIENT
Start: 2018-03-18 | End: 2018-03-24 | Stop reason: HOSPADM

## 2018-03-17 RX ADMIN — SODIUM CHLORIDE, POTASSIUM CHLORIDE, SODIUM LACTATE AND CALCIUM CHLORIDE: 600; 310; 30; 20 INJECTION, SOLUTION INTRAVENOUS at 11:03

## 2018-03-17 RX ADMIN — VENLAFAXINE 37.5 MG: 37.5 TABLET ORAL at 11:03

## 2018-03-17 RX ADMIN — MEPERIDINE HYDROCHLORIDE 12.5 MG: 50 INJECTION, SOLUTION INTRAMUSCULAR; INTRAVENOUS; SUBCUTANEOUS at 11:03

## 2018-03-17 RX ADMIN — CIPROFLOXACIN 400 MG: 2 INJECTION, SOLUTION INTRAVENOUS at 11:03

## 2018-03-17 RX ADMIN — ONDANSETRON HYDROCHLORIDE 4 MG: 2 INJECTION, SOLUTION INTRAMUSCULAR; INTRAVENOUS at 11:03

## 2018-03-17 NOTE — TELEPHONE ENCOUNTER
"    Reason for Disposition   [1] SEVERE pain (e.g., excruciating) AND [2] present > 1 hour    Additional Information   Commented on: Followed an abdomen (stomach) injury     Patient states that pain is due to having Crohn's disease    Protocols used:  ABDOMINAL PAIN - FEMALE-A-AH    Patient states that she is having a lot of pain, and has been constantly vomiting and nauseated. Patient states   that pain is due to Crohn's disease.  States that she is vomiting "A lot" and is vomiting water. Difficulty in asking questions due to patient's concern for her condition at present.    "

## 2018-03-18 LAB
ALBUMIN SERPL BCP-MCNC: 2.4 G/DL
ALP SERPL-CCNC: 194 U/L
ALT SERPL W/O P-5'-P-CCNC: 9 U/L
ANION GAP SERPL CALC-SCNC: 11 MMOL/L
ANION GAP SERPL CALC-SCNC: 11 MMOL/L
APTT BLDCRRT: 27.7 SEC
AST SERPL-CCNC: 14 U/L
BASOPHILS # BLD AUTO: 0.03 K/UL
BASOPHILS # BLD AUTO: 0.04 K/UL
BASOPHILS NFR BLD: 0.5 %
BASOPHILS NFR BLD: 0.7 %
BILIRUB SERPL-MCNC: 0.6 MG/DL
BUN SERPL-MCNC: 5 MG/DL
BUN SERPL-MCNC: 6 MG/DL
CALCIUM SERPL-MCNC: 7.9 MG/DL
CALCIUM SERPL-MCNC: 8.5 MG/DL
CHLORIDE SERPL-SCNC: 98 MMOL/L
CHLORIDE SERPL-SCNC: 98 MMOL/L
CO2 SERPL-SCNC: 25 MMOL/L
CO2 SERPL-SCNC: 25 MMOL/L
CREAT SERPL-MCNC: 0.7 MG/DL
CREAT SERPL-MCNC: 0.7 MG/DL
CRP SERPL-MCNC: 179.36 MG/L
CRP SERPL-MCNC: 191.76 MG/L
DIFFERENTIAL METHOD: ABNORMAL
DIFFERENTIAL METHOD: ABNORMAL
EOSINOPHIL # BLD AUTO: 0.1 K/UL
EOSINOPHIL # BLD AUTO: 0.1 K/UL
EOSINOPHIL NFR BLD: 0.9 %
EOSINOPHIL NFR BLD: 1.2 %
ERYTHROCYTE [DISTWIDTH] IN BLOOD BY AUTOMATED COUNT: 13.9 %
ERYTHROCYTE [DISTWIDTH] IN BLOOD BY AUTOMATED COUNT: 14 %
EST. GFR  (AFRICAN AMERICAN): >60 ML/MIN/1.73 M^2
EST. GFR  (AFRICAN AMERICAN): >60 ML/MIN/1.73 M^2
EST. GFR  (NON AFRICAN AMERICAN): >60 ML/MIN/1.73 M^2
EST. GFR  (NON AFRICAN AMERICAN): >60 ML/MIN/1.73 M^2
GLUCOSE SERPL-MCNC: 105 MG/DL
GLUCOSE SERPL-MCNC: 93 MG/DL
HCT VFR BLD AUTO: 31.8 %
HCT VFR BLD AUTO: 35.3 %
HGB BLD-MCNC: 10.9 G/DL
HGB BLD-MCNC: 12 G/DL
IMM GRANULOCYTES # BLD AUTO: 0.02 K/UL
IMM GRANULOCYTES # BLD AUTO: 0.02 K/UL
IMM GRANULOCYTES NFR BLD AUTO: 0.3 %
IMM GRANULOCYTES NFR BLD AUTO: 0.3 %
INR PPP: 1.2
IRON SERPL-MCNC: 46 UG/DL
LYMPHOCYTES # BLD AUTO: 0.4 K/UL
LYMPHOCYTES # BLD AUTO: 0.6 K/UL
LYMPHOCYTES NFR BLD: 6.2 %
LYMPHOCYTES NFR BLD: 8.3 %
MCH RBC QN AUTO: 28 PG
MCH RBC QN AUTO: 28 PG
MCHC RBC AUTO-ENTMCNC: 34 G/DL
MCHC RBC AUTO-ENTMCNC: 34.3 G/DL
MCV RBC AUTO: 82 FL
MCV RBC AUTO: 83 FL
MONOCYTES # BLD AUTO: 0.9 K/UL
MONOCYTES # BLD AUTO: 0.9 K/UL
MONOCYTES NFR BLD: 13 %
MONOCYTES NFR BLD: 14.7 %
NEUTROPHILS # BLD AUTO: 4.6 K/UL
NEUTROPHILS # BLD AUTO: 5.1 K/UL
NEUTROPHILS NFR BLD: 76.9 %
NEUTROPHILS NFR BLD: 77 %
NRBC BLD-RTO: 0 /100 WBC
NRBC BLD-RTO: 0 /100 WBC
PLATELET # BLD AUTO: 270 K/UL
PLATELET # BLD AUTO: 275 K/UL
PMV BLD AUTO: 8.4 FL
PMV BLD AUTO: 8.5 FL
POTASSIUM SERPL-SCNC: 3.1 MMOL/L
POTASSIUM SERPL-SCNC: 3.2 MMOL/L
PREALB SERPL-MCNC: 11 MG/DL
PROT SERPL-MCNC: 6.7 G/DL
PROTHROMBIN TIME: 12.2 SEC
RBC # BLD AUTO: 3.89 M/UL
RBC # BLD AUTO: 4.28 M/UL
SATURATED IRON: 21 %
SODIUM SERPL-SCNC: 134 MMOL/L
SODIUM SERPL-SCNC: 134 MMOL/L
TOTAL IRON BINDING CAPACITY: 223 UG/DL
TRANSFERRIN SERPL-MCNC: 151 MG/DL
WBC # BLD AUTO: 5.99 K/UL
WBC # BLD AUTO: 6.64 K/UL

## 2018-03-18 PROCEDURE — 25000003 PHARM REV CODE 250: Performed by: STUDENT IN AN ORGANIZED HEALTH CARE EDUCATION/TRAINING PROGRAM

## 2018-03-18 PROCEDURE — 80053 COMPREHEN METABOLIC PANEL: CPT

## 2018-03-18 PROCEDURE — 85025 COMPLETE CBC W/AUTO DIFF WBC: CPT

## 2018-03-18 PROCEDURE — 63600175 PHARM REV CODE 636 W HCPCS: Performed by: COLON & RECTAL SURGERY

## 2018-03-18 PROCEDURE — 87209 SMEAR COMPLEX STAIN: CPT

## 2018-03-18 PROCEDURE — 63600175 PHARM REV CODE 636 W HCPCS: Performed by: STUDENT IN AN ORGANIZED HEALTH CARE EDUCATION/TRAINING PROGRAM

## 2018-03-18 PROCEDURE — 87046 STOOL CULTR AEROBIC BACT EA: CPT

## 2018-03-18 PROCEDURE — 83540 ASSAY OF IRON: CPT

## 2018-03-18 PROCEDURE — A4216 STERILE WATER/SALINE, 10 ML: HCPCS | Performed by: STUDENT IN AN ORGANIZED HEALTH CARE EDUCATION/TRAINING PROGRAM

## 2018-03-18 PROCEDURE — 86141 C-REACTIVE PROTEIN HS: CPT

## 2018-03-18 PROCEDURE — 87427 SHIGA-LIKE TOXIN AG IA: CPT

## 2018-03-18 PROCEDURE — 25000003 PHARM REV CODE 250: Performed by: COLON & RECTAL SURGERY

## 2018-03-18 PROCEDURE — C9113 INJ PANTOPRAZOLE SODIUM, VIA: HCPCS | Performed by: COLON & RECTAL SURGERY

## 2018-03-18 PROCEDURE — 85610 PROTHROMBIN TIME: CPT

## 2018-03-18 PROCEDURE — S0030 INJECTION, METRONIDAZOLE: HCPCS | Performed by: STUDENT IN AN ORGANIZED HEALTH CARE EDUCATION/TRAINING PROGRAM

## 2018-03-18 PROCEDURE — 84134 ASSAY OF PREALBUMIN: CPT

## 2018-03-18 PROCEDURE — 80048 BASIC METABOLIC PNL TOTAL CA: CPT

## 2018-03-18 PROCEDURE — 94761 N-INVAS EAR/PLS OXIMETRY MLT: CPT

## 2018-03-18 PROCEDURE — 86141 C-REACTIVE PROTEIN HS: CPT | Mod: 91

## 2018-03-18 PROCEDURE — 87045 FECES CULTURE AEROBIC BACT: CPT

## 2018-03-18 PROCEDURE — 82657 ENZYME CELL ACTIVITY: CPT

## 2018-03-18 PROCEDURE — 36415 COLL VENOUS BLD VENIPUNCTURE: CPT

## 2018-03-18 PROCEDURE — 20600001 HC STEP DOWN PRIVATE ROOM

## 2018-03-18 PROCEDURE — 85730 THROMBOPLASTIN TIME PARTIAL: CPT

## 2018-03-18 RX ORDER — POTASSIUM CHLORIDE 20 MEQ/1
60 TABLET, EXTENDED RELEASE ORAL ONCE
Status: COMPLETED | OUTPATIENT
Start: 2018-03-18 | End: 2018-03-18

## 2018-03-18 RX ORDER — MEPERIDINE HYDROCHLORIDE 50 MG/ML
25 INJECTION INTRAMUSCULAR; INTRAVENOUS; SUBCUTANEOUS EVERY 4 HOURS PRN
Status: DISCONTINUED | OUTPATIENT
Start: 2018-03-18 | End: 2018-03-23

## 2018-03-18 RX ORDER — ONDANSETRON 2 MG/ML
4 INJECTION INTRAMUSCULAR; INTRAVENOUS EVERY 6 HOURS PRN
Status: DISCONTINUED | OUTPATIENT
Start: 2018-03-18 | End: 2018-03-22

## 2018-03-18 RX ADMIN — MEPERIDINE HYDROCHLORIDE 25 MG: 50 INJECTION INTRAMUSCULAR; INTRAVENOUS; SUBCUTANEOUS at 02:03

## 2018-03-18 RX ADMIN — ONDANSETRON HYDROCHLORIDE 4 MG: 2 INJECTION, SOLUTION INTRAMUSCULAR; INTRAVENOUS at 10:03

## 2018-03-18 RX ADMIN — METRONIDAZOLE 500 MG: 500 INJECTION, SOLUTION INTRAVENOUS at 12:03

## 2018-03-18 RX ADMIN — HYDROCODONE BITARTRATE AND ACETAMINOPHEN 1 TABLET: 10; 325 TABLET ORAL at 11:03

## 2018-03-18 RX ADMIN — VENLAFAXINE 37.5 MG: 37.5 TABLET ORAL at 08:03

## 2018-03-18 RX ADMIN — Medication 3 ML: at 06:03

## 2018-03-18 RX ADMIN — METRONIDAZOLE 500 MG: 500 INJECTION, SOLUTION INTRAVENOUS at 05:03

## 2018-03-18 RX ADMIN — HYDROCODONE BITARTRATE AND ACETAMINOPHEN 1 TABLET: 10; 325 TABLET ORAL at 04:03

## 2018-03-18 RX ADMIN — AZATHIOPRINE 50 MG: 50 TABLET ORAL at 08:03

## 2018-03-18 RX ADMIN — POTASSIUM CHLORIDE 60 MEQ: 1500 TABLET, EXTENDED RELEASE ORAL at 08:03

## 2018-03-18 RX ADMIN — METRONIDAZOLE 500 MG: 500 INJECTION, SOLUTION INTRAVENOUS at 08:03

## 2018-03-18 RX ADMIN — PROMETHAZINE HYDROCHLORIDE 6.25 MG: 25 INJECTION INTRAMUSCULAR; INTRAVENOUS at 12:03

## 2018-03-18 RX ADMIN — PANTOPRAZOLE SODIUM 40 MG: 40 INJECTION, POWDER, FOR SOLUTION INTRAVENOUS at 11:03

## 2018-03-18 RX ADMIN — SODIUM CHLORIDE, POTASSIUM CHLORIDE, SODIUM LACTATE AND CALCIUM CHLORIDE: 600; 310; 30; 20 INJECTION, SOLUTION INTRAVENOUS at 11:03

## 2018-03-18 RX ADMIN — PROMETHAZINE HYDROCHLORIDE 6.25 MG: 25 INJECTION INTRAMUSCULAR; INTRAVENOUS at 08:03

## 2018-03-18 RX ADMIN — METRONIDAZOLE 500 MG: 500 INJECTION, SOLUTION INTRAVENOUS at 11:03

## 2018-03-18 RX ADMIN — MEPERIDINE HYDROCHLORIDE 12.5 MG: 50 INJECTION, SOLUTION INTRAMUSCULAR; INTRAVENOUS; SUBCUTANEOUS at 08:03

## 2018-03-18 RX ADMIN — Medication 3 ML: at 11:03

## 2018-03-18 RX ADMIN — MEPERIDINE HYDROCHLORIDE 25 MG: 50 INJECTION INTRAMUSCULAR; INTRAVENOUS; SUBCUTANEOUS at 10:03

## 2018-03-18 RX ADMIN — ONDANSETRON HYDROCHLORIDE 4 MG: 2 INJECTION, SOLUTION INTRAMUSCULAR; INTRAVENOUS at 02:03

## 2018-03-18 RX ADMIN — CIPROFLOXACIN 400 MG: 2 INJECTION, SOLUTION INTRAVENOUS at 11:03

## 2018-03-18 RX ADMIN — Medication 3 ML: at 02:03

## 2018-03-18 NOTE — SUBJECTIVE & OBJECTIVE
PTA Medications   Medication Sig    azaTHIOprine (IMURAN) 50 mg Tab Take 50 mg by mouth once daily.    hydrocodone-acetaminophen 10-325mg (NORCO)  mg Tab Take 1 tablet by mouth every 4 (four) hours as needed for Pain.    loperamide (IMODIUM A-D) 1 mg/7.5 mL Liqd Take 0.1 mg/kg by mouth every 12 (twelve) hours.    MAGNESIUM CHLORIDE (SLOW-MAG ORAL) Take by mouth once daily.    metroNIDAZOLE (FLAGYL) 500 MG tablet     ondansetron (ZOFRAN) 8 MG tablet Take 1 tablet (8 mg total) by mouth every 8 (eight) hours as needed for Nausea.    potassium chloride SA (K-DUR,KLOR-CON) 20 MEQ tablet Take 1 tablet (20 mEq total) by mouth once daily.    promethazine (PHENERGAN) 25 MG tablet Take 1 tablet (25 mg total) by mouth every 12 (twelve) hours.    venlafaxine (EFFEXOR) 37.5 MG Tab Take 1 tablet (37.5 mg total) by mouth 2 (two) times daily.       Review of patient's allergies indicates:   Allergen Reactions    Morphine Other (See Comments)     Abdominal pain    Nubain [nalbuphine] Anxiety       Past Medical History:   Diagnosis Date    Chronic pain     Crohn's disease      Past Surgical History:   Procedure Laterality Date    anal fistula      BOWEL RESECTION      x2     SECTION      x2    CHOLECYSTECTOMY      COLONOSCOPY N/A 2016    Procedure: COLONOSCOPY;  Surgeon: Andre Uribe MD;  Location: [facility] (4TH FLR);  Service: Endoscopy;  Laterality: N/A;    COLONOSCOPY N/A 2017    Procedure: COLONOSCOPY;  Surgeon: Dany Stock MD;  Location: [facility] (2ND FLR);  Service: Endoscopy;  Laterality: N/A;    COLONOSCOPY N/A 2017    Procedure: COLONOSCOPY;  Surgeon: Andre Uribe MD;  Location: [facility] (4TH FLR);  Service: Endoscopy;  Laterality: N/A;    COLONOSCOPY N/A 2018    Procedure: COLONOSCOPY;  Surgeon: Andre Uribe MD;  Location: [facility] (4TH FLR);  Service: Endoscopy;  Laterality: N/A;    COLOSTOMY      rectal abscess drain      TUBAL LIGATION        Family History     Problem Relation (Age of Onset)    Cancer Maternal Aunt (66)        Social History Main Topics    Smoking status: Never Smoker    Smokeless tobacco: Never Used    Alcohol use No    Drug use: No    Sexual activity: Not on file     Review of Systems   Constitutional: Positive for activity change and fatigue. Negative for fever.   HENT: Negative for ear pain and sore throat.    Respiratory: Negative for chest tightness and shortness of breath.    Cardiovascular: Negative for chest pain, palpitations and leg swelling.   Gastrointestinal: Positive for abdominal distention, abdominal pain, constipation, nausea and vomiting. Negative for blood in stool and rectal pain.   Genitourinary: Negative for difficulty urinating and dysuria.   Musculoskeletal: Negative for arthralgias.   Neurological: Negative for dizziness and headaches.   Psychiatric/Behavioral: Negative for agitation and behavioral problems.     Objective:     Vital Signs (Most Recent):  Temp: 100.2 °F (37.9 °C) (03/17/18 2125)  Pulse: 106 (03/17/18 2125)  Resp: 18 (03/17/18 2125)  BP: (!) 95/53 (03/17/18 2125)  SpO2: 97 % (03/17/18 2125) Vital Signs (24h Range):  Temp:  [100.2 °F (37.9 °C)] 100.2 °F (37.9 °C)  Pulse:  [106] 106  Resp:  [18] 18  SpO2:  [97 %] 97 %  BP: (95)/(53) 95/53        There is no height or weight on file to calculate BMI.    Physical Exam   Constitutional: She is oriented to person, place, and time. She appears well-developed and well-nourished. No distress.   HENT:   Head: Normocephalic and atraumatic.   Eyes: Conjunctivae are normal. Right eye exhibits no discharge. Left eye exhibits no discharge.   Neck: Normal range of motion. Neck supple. No tracheal deviation present. No thyromegaly present.   Cardiovascular: Normal rate and intact distal pulses.    Pulmonary/Chest: Effort normal. No respiratory distress.   Abdominal: She exhibits distension. She exhibits no mass. There is tenderness. There is no rebound  and no guarding.   Colostomy pink,liquid stool and gas in bag   Point tenderness just lateral to ostomy   Musculoskeletal: Normal range of motion. She exhibits no edema or deformity.   Neurological: She is alert and oriented to person, place, and time. No cranial nerve deficit.   Skin: Skin is warm and dry. Capillary refill takes less than 2 seconds. She is not diaphoretic.   Psychiatric: She has a normal mood and affect. Her behavior is normal. Judgment and thought content normal.   Nursing note and vitals reviewed.        Significant Labs:  BMP (Last 3 Results): No results for input(s): GLU, NA, K, CL, CO2, BUN, CREATININE, CALCIUM, MG in the last 168 hours.  CBC (Last 3 Results): No results for input(s): WBC, RBC, HGB, HCT, PLT, MCV, MCH, MCHC in the last 168 hours.    Significant Diagnostics:  I have reviewed all pertinent imaging results/findings within the past 24 hours.

## 2018-03-18 NOTE — SUBJECTIVE & OBJECTIVE
Subjective:     Interval History: NAEON.  Pain and nausea controlled.  Some gas in bag but no stool.  VSS AF    Post-Op Info:  * No surgery found *          Medications:  Continuous Infusions:   lactated ringers 125 mL/hr at 03/17/18 2322     Scheduled Meds:   azaTHIOprine  50 mg Oral Daily    ciprofloxacin (CIPRO)400mg/200ml D5W IVPB  400 mg Intravenous Q12H    enoxaparin  40 mg Subcutaneous Daily    metronidazole  500 mg Intravenous Q8H    pantoprozole (PROTONIX) 40 mg/100 mL D5W IVPB  40 mg Intravenous Daily    sodium chloride 0.9%  3 mL Intravenous Q8H    venlafaxine  37.5 mg Oral BID     PRN Meds:   acetaminophen    diphenhydrAMINE    hydrocodone-acetaminophen 10-325mg    meperidine    naloxone    ondansetron    promethazine (PHENERGAN) IVPB        Objective:     Vital Signs (Most Recent):  Temp: 99.1 °F (37.3 °C) (03/18/18 0802)  Pulse: 85 (03/18/18 0802)  Resp: 16 (03/18/18 0802)  BP: 118/65 (03/18/18 0802)  SpO2: 97 % (03/18/18 0802) Vital Signs (24h Range):  Temp:  [99.1 °F (37.3 °C)-100.2 °F (37.9 °C)] 99.1 °F (37.3 °C)  Pulse:  [] 85  Resp:  [16-18] 16  SpO2:  [96 %-97 %] 97 %  BP: ()/(53-65) 118/65     Intake/Output - Last 3 Shifts       03/16 0700 - 03/17 0659 03/17 0700 - 03/18 0659 03/18 0700 - 03/19 0659           Stool Occurrence  0 x           Physical Exam   Constitutional: She is oriented to person, place, and time. She appears well-developed and well-nourished.   Cardiovascular: Normal rate and intact distal pulses.    Pulmonary/Chest: Effort normal. No respiratory distress.   Abdominal: Soft. She exhibits no distension. There is no tenderness.   LLQ ostomy with air in bag   Neurological: She is alert and oriented to person, place, and time.       Significant Labs:  CBC (Last 3 Results):   Recent Labs  Lab 03/18/18  0033 03/18/18  0505   WBC 6.64 5.99   RBC 4.28 3.89*   HGB 12.0 10.9*   HCT 35.3* 31.8*    270   MCV 83 82   MCH 28.0 28.0   MCHC 34.0 34.3      CMP (Last 3 Results):   Recent Labs  Lab 03/18/18  0033 03/18/18  0505    93   CALCIUM 8.5* 7.9*   ALBUMIN 2.4*  --    PROT 6.7  --    * 134*   K 3.2* 3.1*   CO2 25 25   CL 98 98   BUN 6 5*   CREATININE 0.7 0.7   ALKPHOS 194*  --    ALT 9*  --    AST 14  --    BILITOT 0.6  --        Significant Diagnostics:  Reviewed

## 2018-03-18 NOTE — CONSULTS
[facility]  Gastroenterology  Consult Note    Patient Name: Carline Chang  MRN: [id number]  Admission Date: 3/17/2018  Hospital Length of Stay: 1 days  Code Status: Full Code   Attending Provider: LLUVIA Post MD   Consulting Provider: Anjana Cardoso MD  Primary Care Physician: Pablo Medina MD  Principal Problem:<principal problem not specified>    Inpatient consult to Gastroenterology  Consult performed by: ANJANA CARDOSO  Consult ordered by: JHOANA ALICIA  Reason for consult: CD    Inpatient consult to Gastroenterology  Consult performed by: ANJANA CARDOSO  Consult ordered by: COY LOPEZ  Reason for consult: Cd        Subjective:     HPI:  Carline Chang is a 52 y.o. female with This is a 51 y.o. female with hx of fistulizing Crohn's disease diagnosed 1986 (age 21) complicated by two SB resections (95 and 09), rectovaginal fistula 2014, sigmoid loop colostomy 2015, RV repair 2018 who presented to Dr. Uribe's service with N/V, abdominal cramping on 3/17/2018. She had 2 similar admissions over the last 3-months.   Pt initially diagnosed with symptoms of nausea, wt loss, diarrhea and crampy abd pain in 1986. To patient's knowledge there was no tissue diagnosis. Was started on multiple oral medications including asacol, pentasa, azulfidine, prednisone, and imuran monotherapy. She did not achieve remission with any of these agents to her knowledge. The prednisone was intermittently the most effective. She did not have complication with the imuran (she belives 50mg). In 1995, she had complication and had small bowel resection, she believes 10 inches total. She felt better after surgery and was not on meds. She then developed abscess in 2009 which required drainage and subsequent small bowel resection, she believes only 'a few inches'. She was started on remicade after this in mid 2009 and after surgery with remicade her symptoms  improved, but was only on remicade for 3 doses then lost insurance. She did well x 2 years (mostly off medications). Then 6/2011, she had worsening sypmtoms that progressively worsened despite trials of steroids, pentasa, imuran and flagyl, and she developed rectovaginal fistula in 2014. Underwent loop sigmoid colostomy.   Was started on humira 12/2015 and was doing well until flare in 1/2017. She admits that a few of her humira doses may not have completely been injected correctly, but she believes the majority were. She was taking humira 40mg q 2 weeks. Her last dose was 1/12/17 and told that it is not working for her. She had rectovaginal fistula repair Jan 2018 by Dr. Uribe. Since then, she continued to have sx and underwent colonoscopy 2/2018: minimal inflammation. Biopsies normal.   MRE x 2/4/2018: Small bowel and colonic disease. MRE x 3/6/2018: Small bowel inflammation and upstream dilation, some small bowel stenosis.     Now, she returned to hospital with similar complaints. Nausea, vomiting and crampy abdominal pain. Output from stoma is same. No diarrhea. Of note, CT at outside ED showed pneumatosis in ascending colon, bowel wall thickening and mesenteric fat stranding over portion of nonterminal ileum, she was transferred here for higher level of care. (record is not available)    Pertinent GI studies:  Colonoscopy 5/27/16 (oksana): Colonoscopy findings:Inflammation characterized by congestion (edema), erythema and scarring was found. The anus and the rectum were spared. This was mild in severity, and when compared to previous examinations, the findings are improved. The colon (entire examined portion) appeared normal.   EGD 1/17/2017: The esophagus was normal. The stomach was normal. The cardia and gastric fundus were normal on retroflexion. The examined duodenum was normal. Normal esophagus. Normal stomach. Normal examined duodenum.   Colonoscopy 1/17/2017: .A small fistula opening was found in  the rectum. The rectal remnant appeared normal. No stricture noted. The entire examined colon is normal. Colonic fistula in rectum, appears closed. Biopsies were taken with a cold forceps from the entire colon for evaluation of microscopic colitis.   Colonoscopy 2018: minimal inflammation. Biopsies normal.   MRE x 2018: Small bowel and colonic disease  MRE x 3/6/2018: Small bowel inflammation and upstream dilation, some small bowel stenosis.     Prior GI surgeries:   small bowel resection in  (approx 10 inches)  small bowel resection (~15 inches per patient report) in ,   sigmoid loop colostomy - 2015.   Rectovaginal repair - 2017  RV fistula repair 2018     Current medications -    Azathioprine 50 mg daily.     Failed/Previous medications -  * Asacol - ineffective  * Pentasa (ineffective)  * Azulfidine - ineffective  * Azathioprine (18 years, on and off, no significant side effects)  * Remicade - 3 infusions  - gave bone pains then lost insurance  * Currently on humira 2015 effective then lost effect - last dose 17  * Prednisone 40mg - on and off   * Humira 40 mg q 2 weeks    Past Medical History:   Diagnosis Date    Chronic pain     Crohn's disease        Past Surgical History:   Procedure Laterality Date    ABDOMINAL SURGERY      anal fistula      BOWEL RESECTION      x2     SECTION      x2    CHOLECYSTECTOMY      COLON SURGERY      COLONOSCOPY N/A 2016    Procedure: COLONOSCOPY;  Surgeon: Andre Uribe MD;  [facility]  Service: Endoscopy;  Laterality: N/A;    COLONOSCOPY N/A 2017    Procedure: COLONOSCOPY;  Surgeon: Dany Stock MD;  Location: [facility] ENDO (2ND FLR);  Service: Endoscopy;  Laterality: N/A;    COLONOSCOPY N/A 2017    Procedure: COLONOSCOPY;  Surgeon: Andre Uribe MD;  Location: [facility] ENDO (4TH FLR);  Service: Endoscopy;  Laterality: N/A;    COLONOSCOPY N/A 2018    Procedure: COLONOSCOPY;   Surgeon: Andre Uribe MD;  Location: [facility] ([address] [place] [address]);  Service: Endoscopy;  Laterality: N/A;    COLOSTOMY      rectal abscess drain      TUBAL LIGATION         Review of patient's allergies indicates:   Allergen Reactions    Morphine Other (See Comments)     Abdominal pain    Nubain [nalbuphine] Anxiety     Family History     Problem Relation (Age of Onset)    Cancer Maternal Aunt (66)    Heart disease Father        Social History Main Topics    Smoking status: Never Smoker    Smokeless tobacco: Never Used    Alcohol use No    Drug use: No    Sexual activity: Not on file     Review of Systems   Constitutional: Positive for activity change and fatigue. Negative for fever.   HENT: Negative for ear pain and sore throat.    Respiratory: Negative for chest tightness and shortness of breath.    Cardiovascular: Negative for chest pain, palpitations and leg swelling.   Gastrointestinal: Positive for abdominal distention, abdominal pain, constipation, nausea and vomiting. Negative for blood in stool and rectal pain.   Genitourinary: Negative for difficulty urinating and dysuria.   Musculoskeletal: Negative for arthralgias.   Neurological: Negative for dizziness and headaches.   Psychiatric/Behavioral: Negative for agitation and behavioral problems.     Objective:     Vital Signs (Most Recent):  Temp: 99.1 °F (37.3 °C) (03/18/18 0802)  Pulse: 85 (03/18/18 0802)  Resp: 16 (03/18/18 0802)  BP: 118/65 (03/18/18 0802)  SpO2: 97 % (03/18/18 0802) Vital Signs (24h Range):  Temp:  [99.1 °F (37.3 °C)-100.2 °F (37.9 °C)] 99.1 °F (37.3 °C)  Pulse:  [] 85  Resp:  [16-18] 16  SpO2:  [96 %-97 %] 97 %  BP: ()/(53-65) 118/65     Weight: 58.4 kg (128 lb 12 oz) (03/17/18 2125)  Body mass index is 19.58 kg/m².      Intake/Output Summary (Last 24 hours) at 03/18/18 1140  Last data filed at 03/18/18 0884   Gross per 24 hour   Intake                0 ml   Output                1 ml   Net               -1 ml        Lines/Drains/Airways     Drain                 Colostomy 01/15/18 0000 LLQ 62 days          Peripheral Intravenous Line                 Peripheral IV - Single Lumen 03/17/18 1300 Lateral;Left Forearm less than 1 day                Physical Exam   Constitutional: She is oriented to person, place, and time. She appears well-developed and well-nourished. No distress.   HENT:   Head: Normocephalic and atraumatic.   Eyes: Conjunctivae are normal. Right eye exhibits no discharge. Left eye exhibits no discharge.   Neck: Normal range of motion. Neck supple. No tracheal deviation present. No thyromegaly present.   Cardiovascular: Normal rate and intact distal pulses.    Pulmonary/Chest: Effort normal. No respiratory distress.   Abdominal: She exhibits distension. She exhibits no mass. There is tenderness. There is no rebound and no guarding.   Colostomy pink,liquid stool and gas in bag   Point tenderness just lateral to ostomy   Musculoskeletal: Normal range of motion. She exhibits no edema or deformity.   Neurological: She is alert and oriented to person, place, and time. No cranial nerve deficit.   Skin: Skin is warm and dry. Capillary refill takes less than 2 seconds. She is not diaphoretic.   Psychiatric: She has a normal mood and affect. Her behavior is normal.   Nursing note and vitals reviewed.      Lab Results   Component Value Date    WBC 5.99 03/18/2018    HGB 10.9 (L) 03/18/2018    HCT 31.8 (L) 03/18/2018    MCV 82 03/18/2018     03/18/2018     Lab Results   Component Value Date    INR 1.2 03/18/2018     Lab Results   Component Value Date     (L) 03/18/2018    K 3.1 (L) 03/18/2018    CREATININE 0.7 03/18/2018     Lab Results   Component Value Date    ALBUMIN 2.4 (L) 03/18/2018    ALT 9 (L) 03/18/2018    AST 14 03/18/2018    ALKPHOS 194 (H) 03/18/2018    BILITOT 0.6 03/18/2018     No results found for: AFP  Lab Results   Component Value Date    LIPASE 43 02/03/2018     No results found for:  TACROLIMUS    Imaging:  Reviewed and as noted in HPI.         Assessment/Plan:     Crohn's disease    Carline Chang is a 52 y.o. female with longstanding fistulizing Crohn's disease, who presents with abdominal pain/cramping and nausea/vomiting worsening over the couple days but indeed ongoing sx over the last 3-months.     Recommend:  Obtain stool studies  Obtain CRP, iron studies, albumin, prealbumin.  Obtain TPMT metabolites - which not done during last admission  Continue home meds - Imuran 50mg  Continue abx now.   We will discuss the case with our IBD specialist for outpatient treatment options.              Thank you for your consult. I will follow-up with patient. Please contact us if you have any additional questions.    Hollie Fontana MD  Gastroenterology  [facility]

## 2018-03-18 NOTE — PLAN OF CARE
Problem: Patient Care Overview  Goal: Plan of Care Review  Pt is AOx4 and was injury free during night shift. Pt ambulated x2 in hallway.  Pain controlled with PRN PO and IV meds. Demerol was increased from 12.5 to 25mg.   Breathing is regular equal and unlabored bilaterally on room air. IV maint. Fluids infusing.  The pt does have a colostomy and pt manages it. Colostomy has liquid stool and stool was sent to lab. Nausea controlled with phenergan and zofran.

## 2018-03-18 NOTE — PROGRESS NOTES
[facility]  Colorectal Surgery  Progress Note    Patient Name: Carline Chang  MRN: [id number]  Admission Date: 3/17/2018  Hospital Length of Stay: 1 days  Attending Physician: LLUVIA Post MD    Subjective:     Interval History: NAEON.  Pain and nausea controlled.  Some gas in bag but no stool.  VSS AF    Post-Op Info:  * No surgery found *          Medications:  Continuous Infusions:   lactated ringers 125 mL/hr at 03/17/18 2322     Scheduled Meds:   azaTHIOprine  50 mg Oral Daily    ciprofloxacin (CIPRO)400mg/200ml D5W IVPB  400 mg Intravenous Q12H    enoxaparin  40 mg Subcutaneous Daily    metronidazole  500 mg Intravenous Q8H    pantoprozole (PROTONIX) 40 mg/100 mL D5W IVPB  40 mg Intravenous Daily    sodium chloride 0.9%  3 mL Intravenous Q8H    venlafaxine  37.5 mg Oral BID     PRN Meds:   acetaminophen    diphenhydrAMINE    hydrocodone-acetaminophen 10-325mg    meperidine    naloxone    ondansetron    promethazine (PHENERGAN) IVPB        Objective:     Vital Signs (Most Recent):  Temp: 99.1 °F (37.3 °C) (03/18/18 0802)  Pulse: 85 (03/18/18 0802)  Resp: 16 (03/18/18 0802)  BP: 118/65 (03/18/18 0802)  SpO2: 97 % (03/18/18 0802) Vital Signs (24h Range):  Temp:  [99.1 °F (37.3 °C)-100.2 °F (37.9 °C)] 99.1 °F (37.3 °C)  Pulse:  [] 85  Resp:  [16-18] 16  SpO2:  [96 %-97 %] 97 %  BP: ()/(53-65) 118/65     Intake/Output - Last 3 Shifts       03/16 0700 - 03/17 0659 03/17 0700 - 03/18 0659 03/18 0700 - 03/19 0659           Stool Occurrence  0 x           Physical Exam   Constitutional: She is oriented to person, place, and time. She appears well-developed and well-nourished.   Cardiovascular: Normal rate and intact distal pulses.    Pulmonary/Chest: Effort normal. No respiratory distress.   Abdominal: Soft. She exhibits no distension. There is no tenderness.   LLQ ostomy with air in bag   Neurological: She is alert and oriented to person, place, and time.        Significant Labs:  CBC (Last 3 Results):   Recent Labs  Lab 03/18/18  0033 03/18/18  0505   WBC 6.64 5.99   RBC 4.28 3.89*   HGB 12.0 10.9*   HCT 35.3* 31.8*    270   MCV 83 82   MCH 28.0 28.0   MCHC 34.0 34.3     CMP (Last 3 Results):   Recent Labs  Lab 03/18/18  0033 03/18/18  0505    93   CALCIUM 8.5* 7.9*   ALBUMIN 2.4*  --    PROT 6.7  --    * 134*   K 3.2* 3.1*   CO2 25 25   CL 98 98   BUN 6 5*   CREATININE 0.7 0.7   ALKPHOS 194*  --    ALT 9*  --    AST 14  --    BILITOT 0.6  --        Significant Diagnostics:  Reviewed    Assessment/Plan:     Crohn's disease    51 yo F hx of crohns multiple abdominal surgeries admitted with possible crohns flare.    -IVF   -NPO  -Cipro/Flagy  -GI consult  -Home meds  -Pain/ nausea PRN  -GI/DVT ppx              Geovany Lui MD  Colorectal Surgery  [facility]

## 2018-03-18 NOTE — ASSESSMENT & PLAN NOTE
Carline Chang is a 52 y.o. female with longstanding fistulizing Crohn's disease, who presents with abdominal pain/cramping and nausea/vomiting worsening over the couple days but indeed ongoing sx over the last 3-months.     Recommend:  Obtain stool studies  Obtain CRP, iron studies, albumin, prealbumin.  Obtain TPMT metabolites - which not done during last admission  Continue home meds - Imuran 50mg  Continue abx now.   We will discuss the case with our IBD specialist for outpatient treatment options.

## 2018-03-18 NOTE — HPI
52 y.o. 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    Patient initially diagnosed with symptoms of nausea, wt loss, diarrhea and crampy abd pain in 1986 by Dr. Berrios. To patient's knowledge there was no tissue diagnosis. Was started on multiple oral medications including asacol, pentasa, azulfidine, prednisone, and imuran monotherapy. She did not achieve remission with any of these agents to her knowledge. The prednisone was intermittently the most effective. She did not have complication with the imuran (she belives 50mg). In 1995, she had complication and had small bowel resection, she believes 10 inches total at [facility]. She felt better after surgery and was not on meds. During this time she reports a normal colonoscopy in 2013.    She then developed abscess in 2009 which required drainage and subsequent small bowel resection, she believes only 'a few inches' at [facility]. She was started on remicade after this but was only on remicade for 3 doses then lost insurance. She did well for 2 years of medications she thinks.     Then 6/2011, she had worsening sypmtoms that progressively worsened despite trials of steroids, pentasa, imuran and flagyl, and she developed rectovaginal fistula in 2014. Underwent loop sigmoid colostomy in 2015.     She was started on humira 12/2015 with follow up colonoscopy in 05/2016 which showed inflammation (unsure as to what area). She did well on Humira until flare in 1/2017. She admits that a few of her humira doses may not have completely been injected correctly, but she believes the majority were. She was taking humira 40mg q 2 weeks. Her last dose was 1/12/17 and told that it is not working for her (not sure why, possibly due to continued  flares as she was admitted on 1/15/17).     Admitted to [facility] from 1/15/17 for an acute Crohn's flare. An EGD (1/17/17) and colonoscopy (1/17/17) were performed while inpatient which were both WNL. The patient was started on a steroid taper per GI recs (start Entyvio as outpatient) and was initially scheduled to be discharged 1/18 when she developed acute right flank pain. CT showed a 5mm right mid ureteral stone. Urology was consulted at that time and requested outpatient follow up. By discharge day, pain was controlled with oral pain medication, patient was tolerating a regular diet, VSS, afebrile, and physical activity at baseline.    After this admission was never started on Entyvio but was kept on daily Imuran.    She had rectovaginal fistula repair 01/2018. Since then, she continued to have symptoms (consisting of nausea, vomiting,abdominal pain) which led to an admission from 2/3-2/9 and current admission. During the 2/3-2/9 admission she had a colonoscopy and MRE which showed small bowel and colonic disease. During this admission GI didn't feel she had active CD.    During this admission she presented on 3/17 and has improved with bowel rest/antibiotics. She did not require NGT.    Pertinent GI Procedures/Imaging:  Colonoscopy 5/27/16: Colonoscopy findings:Inflammation characterized by congestion (edema), erythema and scarring was found. The anus and the rectum were spared. This was mild in severity, and when compared to previous examinations, the findings are improved. The colon (entire examined portion) appeared normal.   EGD 1/17/2017: The esophagus was normal. The stomach was normal. The cardia and gastric fundus were normal on retroflexion. The examined duodenum was normal. Normal esophagus. Normal stomach. Normal examined duodenum.   Colonoscopy 1/17/2017: .A small fistula opening was found in the rectum. The rectal remnant appeared normal. No stricture noted. The entire examined colon is normal. Colonic  fistula in rectum, appears closed. Biopsies were taken with a cold forceps from the entire colon for evaluation of microscopic colitis.   Colonoscopy 2/2018: minimal inflammation. Biopsies normal.   MRE x 2/4/2018: Small bowel and colonic disease  MRE x 3/6/2018: Small bowel inflammation and upstream dilation, some small bowel stenosis.     Prior GI surgeries:  Small bowel resection in 1995 (approx 10 inches) at [facility]  Small bowel resection (~15 inches per patient report) in 2009 at [facility]  Sigmoid loop [place]ostomy in June [place] [facility]  RV fistula repair January 2018     Current Medications:   Azathioprine 50 mg daily.     Failed/Previous Medications:  -Asacol - ineffective  -Pentasa- ineffective  -Azulfidine - ineffective  -Azathioprine-on and off, no significant side effects  - Remicade - 3 infusions 2009 hen lost insurance  - Humira 40 mg q 2 weeks in [id number]- ineffective

## 2018-03-18 NOTE — H&P
[facility]  Colorectal Surgery  History & Physical    Patient Name: Carline Chang  MRN: [id number]  Admission Date: 3/17/2018  Attending Physician: LLUVIA Post MD   Primary Care Provider: Pablo Medina MD    Subjective:     Chief Complaint/Reason for Admission: abdominal pain    History of Present Illness:  Carline Chang is a 52 y.o. female with long standing crohns history now s/p RV fistula repair (transvaginal approach) and colostomy revision from loop to end colostomy on 1/11/18. She was last admitted 2/4/18 with several days of abdominal cramping, nausea/emesis, and decreased stoma output, at that hospitalization she underwent ileoscopy 2/8/18 w/ some small aphthous ulcerations and terminal ileum biopsies showing no signs of active crohns and was discharged with improving symptoms on 2/9/18 . She was recently seen in clinic 3/13/18 by Dr. Uribe and underwent MRE 3/6/18 demonstrating some wall thickening and luminal narrowing. A CT enterography was planned and she was being followed by Dr. Medina who had concern for return of crohns (on imuran).  but she has had similar symptoms to previous admission including emesis/ increased nausea for 3 days, decreased liquid ostomy output and worsened abdominal cramping. She denies fevers or other symptoms. CT at outside ED showed pneumatosis in ascending colon, bowel wall thickening and mesenteric fat stranding over portion of nonterminal ileum, she was transferred here for higher level of care    PTA Medications   Medication Sig    azaTHIOprine (IMURAN) 50 mg Tab Take 50 mg by mouth once daily.    hydrocodone-acetaminophen 10-325mg (NORCO)  mg Tab Take 1 tablet by mouth every 4 (four) hours as needed for Pain.    loperamide (IMODIUM A-D) 1 mg/7.5 mL Liqd Take 0.1 mg/kg by mouth every 12 (twelve) hours.    MAGNESIUM CHLORIDE (SLOW-MAG ORAL) Take by mouth once daily.    metroNIDAZOLE (FLAGYL) 500 MG tablet      ondansetron (ZOFRAN) 8 MG tablet Take 1 tablet (8 mg total) by mouth every 8 (eight) hours as needed for Nausea.    potassium chloride SA (K-DUR,KLOR-CON) 20 MEQ tablet Take 1 tablet (20 mEq total) by mouth once daily.    promethazine (PHENERGAN) 25 MG tablet Take 1 tablet (25 mg total) by mouth every 12 (twelve) hours.    venlafaxine (EFFEXOR) 37.5 MG Tab Take 1 tablet (37.5 mg total) by mouth 2 (two) times daily.       Review of patient's allergies indicates:   Allergen Reactions    Morphine Other (See Comments)     Abdominal pain    Nubain [nalbuphine] Anxiety       Past Medical History:   Diagnosis Date    Chronic pain     Crohn's disease      Past Surgical History:   Procedure Laterality Date    anal fistula      BOWEL RESECTION      x2     SECTION      x2    CHOLECYSTECTOMY      COLONOSCOPY N/A 2016    Procedure: COLONOSCOPY;  Surgeon: Andre Uribe MD;  Location: [facility] ENDO (4TH FLR);  Service: Endoscopy;  Laterality: N/A;    COLONOSCOPY N/A 2017    Procedure: COLONOSCOPY;  Surgeon: Dany Stock MD;  Location: [facility] ENDO (2ND FLR);  Service: Endoscopy;  Laterality: N/A;    COLONOSCOPY N/A 2017    Procedure: COLONOSCOPY;  Surgeon: Andre Uribe MD;  Location: [facility] ENDO (4TH FLR);  Service: Endoscopy;  Laterality: N/A;    COLONOSCOPY N/A 2018    Procedure: COLONOSCOPY;  Surgeon: Andre Uribe MD;  Location: [facility] ENDO (4TH FLR);  Service: Endoscopy;  Laterality: N/A;    COLOSTOMY      rectal abscess drain      TUBAL LIGATION       Family History     Problem Relation (Age of Onset)    Cancer Maternal Aunt (66)        Social History Main Topics    Smoking status: Never Smoker    Smokeless tobacco: Never Used    Alcohol use No    Drug use: No    Sexual activity: Not on file     Review of Systems   Constitutional: Positive for activity change and fatigue. Negative for fever.   HENT: Negative for ear pain and sore throat.    Respiratory: Negative for  chest tightness and shortness of breath.    Cardiovascular: Negative for chest pain, palpitations and leg swelling.   Gastrointestinal: Positive for abdominal distention, abdominal pain, constipation, nausea and vomiting. Negative for blood in stool and rectal pain.   Genitourinary: Negative for difficulty urinating and dysuria.   Musculoskeletal: Negative for arthralgias.   Neurological: Negative for dizziness and headaches.   Psychiatric/Behavioral: Negative for agitation and behavioral problems.     Objective:     Vital Signs (Most Recent):  Temp: 100.2 °F (37.9 °C) (03/17/18 2125)  Pulse: 106 (03/17/18 2125)  Resp: 18 (03/17/18 2125)  BP: (!) 95/53 (03/17/18 2125)  SpO2: 97 % (03/17/18 2125) Vital Signs (24h Range):  Temp:  [100.2 °F (37.9 °C)] 100.2 °F (37.9 °C)  Pulse:  [106] 106  Resp:  [18] 18  SpO2:  [97 %] 97 %  BP: (95)/(53) 95/53        There is no height or weight on file to calculate BMI.    Physical Exam   Constitutional: She is oriented to person, place, and time. She appears well-developed and well-nourished. No distress.   HENT:   Head: Normocephalic and atraumatic.   Eyes: Conjunctivae are normal. Right eye exhibits no discharge. Left eye exhibits no discharge.   Neck: Normal range of motion. Neck supple. No tracheal deviation present. No thyromegaly present.   Cardiovascular: Normal rate and intact distal pulses.    Pulmonary/Chest: Effort normal. No respiratory distress.   Abdominal: She exhibits distension. She exhibits no mass. There is tenderness. There is no rebound and no guarding.   Colostomy pink,liquid stool and gas in bag   Point tenderness just lateral to ostomy   Musculoskeletal: Normal range of motion. She exhibits no edema or deformity.   Neurological: She is alert and oriented to person, place, and time. No cranial nerve deficit.   Skin: Skin is warm and dry. Capillary refill takes less than 2 seconds. She is not diaphoretic.   Psychiatric: She has a normal mood and affect. Her  behavior is normal. Judgment and thought content normal.   Nursing note and vitals reviewed.        Significant Labs:  BMP (Last 3 Results): No results for input(s): GLU, NA, K, CL, CO2, BUN, CREATININE, CALCIUM, MG in the last 168 hours.  CBC (Last 3 Results): No results for input(s): WBC, RBC, HGB, HCT, PLT, MCV, MCH, MCHC in the last 168 hours.    Significant Diagnostics:  I have reviewed all pertinent imaging results/findings within the past 24 hours.    Assessment/Plan:     Crohn's disease    51 yo F hx of crohns multiple abdominal surgeries admitted with possible crohns flare.    -IVF   -NPO  -Cipro/Flagy  -GI consult  -Home meds  -Pain/ nausea PRN  -GI/DVT ppx              Mal Palacios MD  Colorectal Surgery  [facility]

## 2018-03-18 NOTE — HPI
Carline Chang is a 52 y.o. female with long standing crohns history now s/p RV fistula repair (transvaginal approach) and colostomy revision from loop to end colostomy on 1/11/18. She was last admitted 2/4/18 with several days of abdominal cramping, nausea/emesis, and decreased stoma output, at that hospitalization she underwent ileoscopy 2/8/18 w/ some small aphthous ulcerations and terminal ileum biopsies showing no signs of active crohns and was discharged with improving symptoms on 2/9/18 . She was recently seen in clinic 3/13/18 by Dr. Uribe and underwent MRE 3/6/18 demonstrating some wall thickening and luminal narrowing. A CT enterography was planned and she was being followed by Dr. Medina who had concern for return of crohns (on imuran).  but she has had similar symptoms to previous admission including emesis/ increased nausea for 3 days, decreased liquid ostomy output and worsened abdominal cramping. She denies fevers or other symptoms. CT at outside ED showed pneumatosis in ascending colon, bowel wall thickening and mesenteric fat stranding over portion of nonterminal ileum, she was transferred here for higher level of care

## 2018-03-18 NOTE — SUBJECTIVE & OBJECTIVE
Past Medical History:   Diagnosis Date    Chronic pain     Crohn's disease        Past Surgical History:   Procedure Laterality Date    ABDOMINAL SURGERY      anal fistula      BOWEL RESECTION      x2     SECTION      x2    CHOLECYSTECTOMY      COLON SURGERY      COLONOSCOPY N/A 2016    Procedure: COLONOSCOPY;  Surgeon: Andre Uribe MD;  Location: [facility] ENDO (4TH FLR);  Service: Endoscopy;  Laterality: N/A;    COLONOSCOPY N/A 2017    Procedure: COLONOSCOPY;  Surgeon: Dany Stock MD;  Location: [facility] ENDO (2ND FLR);  Service: Endoscopy;  Laterality: N/A;    COLONOSCOPY N/A 2017    Procedure: COLONOSCOPY;  Surgeon: Andre Uribe MD;  Location: [facility] ENDO (4TH FLR);  Service: Endoscopy;  Laterality: N/A;    COLONOSCOPY N/A 2018    Procedure: COLONOSCOPY;  Surgeon: Andre Uribe MD;  Location: [facility] ENDO (4TH FLR);  Service: Endoscopy;  Laterality: N/A;    COLOSTOMY      rectal abscess drain      TUBAL LIGATION         Review of patient's allergies indicates:   Allergen Reactions    Morphine Other (See Comments)     Abdominal pain    Nubain [nalbuphine] Anxiety     Family History     Problem Relation (Age of Onset)    Cancer Maternal Aunt (66)    Heart disease Father        Social History Main Topics    Smoking status: Never Smoker    Smokeless tobacco: Never Used    Alcohol use No    Drug use: No    Sexual activity: Not on file     Review of Systems   Constitutional: Positive for activity change and fatigue. Negative for fever.   HENT: Negative for ear pain and sore throat.    Respiratory: Negative for chest tightness and shortness of breath.    Cardiovascular: Negative for chest pain, palpitations and leg swelling.   Gastrointestinal: Positive for abdominal distention, abdominal pain, constipation, nausea and vomiting. Negative for blood in stool and rectal pain.   Genitourinary: Negative for difficulty urinating and dysuria.   Musculoskeletal: Negative for  arthralgias.   Neurological: Negative for dizziness and headaches.   Psychiatric/Behavioral: Negative for agitation and behavioral problems.     Objective:     Vital Signs (Most Recent):  Temp: 99.1 °F (37.3 °C) (03/18/18 0802)  Pulse: 85 (03/18/18 0802)  Resp: 16 (03/18/18 0802)  BP: 118/65 (03/18/18 0802)  SpO2: 97 % (03/18/18 0802) Vital Signs (24h Range):  Temp:  [99.1 °F (37.3 °C)-100.2 °F (37.9 °C)] 99.1 °F (37.3 °C)  Pulse:  [] 85  Resp:  [16-18] 16  SpO2:  [96 %-97 %] 97 %  BP: ()/(53-65) 118/65     Weight: 58.4 kg (128 lb 12 oz) (03/17/18 2125)  Body mass index is 19.58 kg/m².      Intake/Output Summary (Last 24 hours) at 03/18/18 1140  Last data filed at 03/18/18 0827   Gross per 24 hour   Intake                0 ml   Output                1 ml   Net               -1 ml       Lines/Drains/Airways     Drain                 Colostomy 01/15/18 0000 LLQ 62 days          Peripheral Intravenous Line                 Peripheral IV - Single Lumen 03/17/18 1300 Lateral;Left Forearm less than 1 day                Physical Exam   Constitutional: She is oriented to person, place, and time. She appears well-developed and well-nourished. No distress.   HENT:   Head: Normocephalic and atraumatic.   Eyes: Conjunctivae are normal. Right eye exhibits no discharge. Left eye exhibits no discharge.   Neck: Normal range of motion. Neck supple. No tracheal deviation present. No thyromegaly present.   Cardiovascular: Normal rate and intact distal pulses.    Pulmonary/Chest: Effort normal. No respiratory distress.   Abdominal: She exhibits distension. She exhibits no mass. There is tenderness. There is no rebound and no guarding.   Colostomy pink,liquid stool and gas in bag   Point tenderness just lateral to ostomy   Musculoskeletal: Normal range of motion. She exhibits no edema or deformity.   Neurological: She is alert and oriented to person, place, and time. No cranial nerve deficit.   Skin: Skin is warm and dry.  Capillary refill takes less than 2 seconds. She is not diaphoretic.   Psychiatric: She has a normal mood and affect. Her behavior is normal.   Nursing note and vitals reviewed.      Lab Results   Component Value Date    WBC 5.99 03/18/2018    HGB 10.9 (L) 03/18/2018    HCT 31.8 (L) 03/18/2018    MCV 82 03/18/2018     03/18/2018     Lab Results   Component Value Date    INR 1.2 03/18/2018     Lab Results   Component Value Date     (L) 03/18/2018    K 3.1 (L) 03/18/2018    CREATININE 0.7 03/18/2018     Lab Results   Component Value Date    ALBUMIN 2.4 (L) 03/18/2018    ALT 9 (L) 03/18/2018    AST 14 03/18/2018    ALKPHOS 194 (H) 03/18/2018    BILITOT 0.6 03/18/2018     No results found for: AFP  Lab Results   Component Value Date    LIPASE 43 02/03/2018     No results found for: TACROLIMUS    Imaging:  Reviewed and as noted in HPI.

## 2018-03-18 NOTE — ASSESSMENT & PLAN NOTE
53 yo F hx of crohns multiple abdominal surgeries admitted with possible crohns flare.    -IVF   -NPO  -Cipro/Flagy  -GI consult  -Home meds  -Pain/ nausea PRN  -GI/DVT ppx

## 2018-03-18 NOTE — PLAN OF CARE
Problem: Patient Care Overview  Goal: Plan of Care Review  Outcome: Ongoing (interventions implemented as appropriate)  Pt is AOx4 and was injury free during night shift. Pt arrived from [facility] via ambulance at 2115 on 3/17/18.  The pt pain was covered and nausea was addressed when the pt arrive at the hospital.  Breathing is regular equal and unlabored bilaterally on room air. IV maint. Fluids infusing.  The pt does have a colostomy and it was intact and functioning when the pt arrived on the 6 th floor [facility].

## 2018-03-19 ENCOUNTER — TELEPHONE (OUTPATIENT)
Dept: GASTROENTEROLOGY | Facility: CLINIC | Age: 53
End: 2018-03-19

## 2018-03-19 LAB
ANION GAP SERPL CALC-SCNC: 11 MMOL/L
BASOPHILS # BLD AUTO: 0.04 K/UL
BASOPHILS NFR BLD: 1 %
BUN SERPL-MCNC: 3 MG/DL
CALCIUM SERPL-MCNC: 8.2 MG/DL
CHLORIDE SERPL-SCNC: 101 MMOL/L
CO2 SERPL-SCNC: 23 MMOL/L
CREAT SERPL-MCNC: 0.7 MG/DL
DIFFERENTIAL METHOD: ABNORMAL
EOSINOPHIL # BLD AUTO: 0.5 K/UL
EOSINOPHIL NFR BLD: 11.6 %
ERYTHROCYTE [DISTWIDTH] IN BLOOD BY AUTOMATED COUNT: 13.6 %
EST. GFR  (AFRICAN AMERICAN): >60 ML/MIN/1.73 M^2
EST. GFR  (NON AFRICAN AMERICAN): >60 ML/MIN/1.73 M^2
GLUCOSE SERPL-MCNC: 67 MG/DL
HCT VFR BLD AUTO: 30.8 %
HGB BLD-MCNC: 10.1 G/DL
IMM GRANULOCYTES # BLD AUTO: 0.02 K/UL
IMM GRANULOCYTES NFR BLD AUTO: 0.5 %
LYMPHOCYTES # BLD AUTO: 0.4 K/UL
LYMPHOCYTES NFR BLD: 8.7 %
MCH RBC QN AUTO: 28 PG
MCHC RBC AUTO-ENTMCNC: 32.8 G/DL
MCV RBC AUTO: 85 FL
MONOCYTES # BLD AUTO: 0.5 K/UL
MONOCYTES NFR BLD: 11.6 %
NEUTROPHILS # BLD AUTO: 2.8 K/UL
NEUTROPHILS NFR BLD: 66.6 %
NRBC BLD-RTO: 0 /100 WBC
O+P STL TRI STN: NORMAL
PLATELET # BLD AUTO: 256 K/UL
PMV BLD AUTO: 8.6 FL
POTASSIUM SERPL-SCNC: 3.8 MMOL/L
RBC # BLD AUTO: 3.61 M/UL
SODIUM SERPL-SCNC: 135 MMOL/L
WBC # BLD AUTO: 4.14 K/UL

## 2018-03-19 PROCEDURE — 63600175 PHARM REV CODE 636 W HCPCS: Performed by: STUDENT IN AN ORGANIZED HEALTH CARE EDUCATION/TRAINING PROGRAM

## 2018-03-19 PROCEDURE — 80048 BASIC METABOLIC PNL TOTAL CA: CPT

## 2018-03-19 PROCEDURE — A4216 STERILE WATER/SALINE, 10 ML: HCPCS | Performed by: STUDENT IN AN ORGANIZED HEALTH CARE EDUCATION/TRAINING PROGRAM

## 2018-03-19 PROCEDURE — 85025 COMPLETE CBC W/AUTO DIFF WBC: CPT

## 2018-03-19 PROCEDURE — C9113 INJ PANTOPRAZOLE SODIUM, VIA: HCPCS | Performed by: COLON & RECTAL SURGERY

## 2018-03-19 PROCEDURE — 25000003 PHARM REV CODE 250: Performed by: STUDENT IN AN ORGANIZED HEALTH CARE EDUCATION/TRAINING PROGRAM

## 2018-03-19 PROCEDURE — S0030 INJECTION, METRONIDAZOLE: HCPCS | Performed by: STUDENT IN AN ORGANIZED HEALTH CARE EDUCATION/TRAINING PROGRAM

## 2018-03-19 PROCEDURE — 36415 COLL VENOUS BLD VENIPUNCTURE: CPT

## 2018-03-19 PROCEDURE — 25000003 PHARM REV CODE 250: Performed by: COLON & RECTAL SURGERY

## 2018-03-19 PROCEDURE — 63600175 PHARM REV CODE 636 W HCPCS: Performed by: COLON & RECTAL SURGERY

## 2018-03-19 PROCEDURE — 20600001 HC STEP DOWN PRIVATE ROOM

## 2018-03-19 RX ORDER — DEXTROSE MONOHYDRATE, SODIUM CHLORIDE, AND POTASSIUM CHLORIDE 50; 1.49; 4.5 G/1000ML; G/1000ML; G/1000ML
INJECTION, SOLUTION INTRAVENOUS CONTINUOUS
Status: DISCONTINUED | OUTPATIENT
Start: 2018-03-19 | End: 2018-03-21

## 2018-03-19 RX ADMIN — CIPROFLOXACIN 400 MG: 2 INJECTION, SOLUTION INTRAVENOUS at 12:03

## 2018-03-19 RX ADMIN — ONDANSETRON HYDROCHLORIDE 4 MG: 2 INJECTION, SOLUTION INTRAMUSCULAR; INTRAVENOUS at 05:03

## 2018-03-19 RX ADMIN — MEPERIDINE HYDROCHLORIDE 25 MG: 50 INJECTION INTRAMUSCULAR; INTRAVENOUS; SUBCUTANEOUS at 10:03

## 2018-03-19 RX ADMIN — ONDANSETRON HYDROCHLORIDE 4 MG: 2 INJECTION, SOLUTION INTRAMUSCULAR; INTRAVENOUS at 04:03

## 2018-03-19 RX ADMIN — METRONIDAZOLE 500 MG: 500 INJECTION, SOLUTION INTRAVENOUS at 08:03

## 2018-03-19 RX ADMIN — MEPERIDINE HYDROCHLORIDE 25 MG: 50 INJECTION INTRAMUSCULAR; INTRAVENOUS; SUBCUTANEOUS at 05:03

## 2018-03-19 RX ADMIN — MEPERIDINE HYDROCHLORIDE 25 MG: 50 INJECTION INTRAMUSCULAR; INTRAVENOUS; SUBCUTANEOUS at 09:03

## 2018-03-19 RX ADMIN — PROMETHAZINE HYDROCHLORIDE 6.25 MG: 25 INJECTION INTRAMUSCULAR; INTRAVENOUS at 09:03

## 2018-03-19 RX ADMIN — Medication 3 ML: at 10:03

## 2018-03-19 RX ADMIN — VENLAFAXINE 37.5 MG: 37.5 TABLET ORAL at 09:03

## 2018-03-19 RX ADMIN — PROMETHAZINE HYDROCHLORIDE 6.25 MG: 25 INJECTION INTRAMUSCULAR; INTRAVENOUS at 11:03

## 2018-03-19 RX ADMIN — METRONIDAZOLE 500 MG: 500 INJECTION, SOLUTION INTRAVENOUS at 05:03

## 2018-03-19 RX ADMIN — AZATHIOPRINE 50 MG: 50 TABLET ORAL at 08:03

## 2018-03-19 RX ADMIN — HYDROCODONE BITARTRATE AND ACETAMINOPHEN 1 TABLET: 10; 325 TABLET ORAL at 04:03

## 2018-03-19 RX ADMIN — DEXTROSE MONOHYDRATE, SODIUM CHLORIDE, AND POTASSIUM CHLORIDE: 50; 4.5; 1.49 INJECTION, SOLUTION INTRAVENOUS at 05:03

## 2018-03-19 RX ADMIN — VENLAFAXINE 37.5 MG: 37.5 TABLET ORAL at 08:03

## 2018-03-19 NOTE — PROGRESS NOTES
[facility]  Colorectal Surgery  Progress Note    Patient Name: Carline Chang  MRN: [id number]  Admission Date: 3/17/2018  Hospital Length of Stay: 2 days  Attending Physician: LLUVIA Post MD    Subjective:     Interval History: NAEON.  Pain and nausea controlled.  Some gas in bag but no stool.  VSS AF.  Feels better.    Post-Op Info:  * No surgery found *          Medications:  Continuous Infusions:   lactated ringers 125 mL/hr at 03/18/18 2302     Scheduled Meds:   azaTHIOprine  50 mg Oral Daily    ciprofloxacin (CIPRO)400mg/200ml D5W IVPB  400 mg Intravenous Q12H    enoxaparin  40 mg Subcutaneous Daily    metronidazole  500 mg Intravenous Q8H    pantoprozole (PROTONIX) 40 mg/100 mL D5W IVPB  40 mg Intravenous Daily    sodium chloride 0.9%  3 mL Intravenous Q8H    venlafaxine  37.5 mg Oral BID     PRN Meds:   acetaminophen    diphenhydrAMINE    hydrocodone-acetaminophen 10-325mg    meperidine    naloxone    ondansetron    promethazine (PHENERGAN) IVPB        Objective:     Vital Signs (Most Recent):  Temp: 98 °F (36.7 °C) (03/19/18 0737)  Pulse: 82 (03/19/18 0737)  Resp: 16 (03/19/18 0737)  BP: (!) 101/59 (03/19/18 0737)  SpO2: 98 % (03/19/18 0737) Vital Signs (24h Range):  Temp:  [96.8 °F (36 °C)-98 °F (36.7 °C)] 98 °F (36.7 °C)  Pulse:  [82-89] 82  Resp:  [16-18] 16  SpO2:  [96 %-100 %] 98 %  BP: ()/(50-60) 101/59     Intake/Output - Last 3 Shifts       03/17 0700 - 03/18 0659 03/18 0700 - 03/19 0659 03/19 0700 - 03/20 0659    P.O.   240    I.V. (mL/kg)  3760.4 (64.4) 477.1 (8.2)    IV Piggyback 200 900 100    Total Intake(mL/kg) 200 (3.4) 4660.4 (79.8) 817.1 (14)    Urine (mL/kg/hr)  1 (0)     Stool  475 (0.3) 350 (2.7)    Total Output   476 350    Net +200 +4184.4 +467.1           Stool Occurrence 0 x 1 x           Physical Exam   Constitutional: She is oriented to person, place, and time. She appears well-developed and well-nourished.   Cardiovascular:  Normal rate and intact distal pulses.    Pulmonary/Chest: Effort normal. No respiratory distress.   Abdominal: Soft. She exhibits no distension. There is no tenderness.   LLQ ostomy with air in bag   Neurological: She is alert and oriented to person, place, and time.       Significant Labs:  CBC (Last 3 Results):     Recent Labs  Lab 03/18/18 0033 03/18/18  0505 03/19/18  0427   WBC 6.64 5.99 4.14   RBC 4.28 3.89* 3.61*   HGB 12.0 10.9* 10.1*   HCT 35.3* 31.8* 30.8*    270 256   MCV 83 82 85   MCH 28.0 28.0 28.0   MCHC 34.0 34.3 32.8     CMP (Last 3 Results):     Recent Labs  Lab 03/18/18 0033 03/18/18  0505 03/19/18  0427    93 67*   CALCIUM 8.5* 7.9* 8.2*   ALBUMIN 2.4*  --   --    PROT 6.7  --   --    * 134* 135*   K 3.2* 3.1* 3.8   CO2 25 25 23   CL 98 98 101   BUN 6 5* 3*   CREATININE 0.7 0.7 0.7   ALKPHOS 194*  --   --    ALT 9*  --   --    AST 14  --   --    BILITOT 0.6  --   --        Significant Diagnostics:  Reviewed    Assessment/Plan:     Crohn's disease    53 yo F hx of crohns multiple abdominal surgeries admitted with possible crohns flare.    -IVF   -NPO  -Cipro/Flagy  -GI consult, appreviate recs  -F/u GI labs  -Home meds  -Pain/ nausea PRN  -GI/DVT ppx              Cristiano Lopez MD  Colorectal Surgery  [facility]

## 2018-03-19 NOTE — PLAN OF CARE
Problem: Patient Care Overview  Goal: Plan of Care Review  Outcome: Ongoing (interventions implemented as appropriate)  Pt is AOx4 and was injury free during night shift.  The pt is ab kenneth to the bathroom and is able to empty the colostomy bag her self.  The pt controlled her pain and nausea with medication twice during night shift 6 hrs apart.  Breathing was regular equal and unlabored bilaterally on room air.

## 2018-03-19 NOTE — TELEPHONE ENCOUNTER
----- Message from Deidre Mcclure sent at 3/19/2018 12:04 PM CDT -----  Contact: self  Left message on Saturday, 3/17/18, that she is having a chrohns flare up.  Was unable to keep anything down, severe nausea.

## 2018-03-19 NOTE — SUBJECTIVE & OBJECTIVE
Subjective:     Interval History: NAEON.  Pain and nausea controlled.  Some gas in bag but no stool.  VSS AF.  Feels better.    Post-Op Info:  * No surgery found *          Medications:  Continuous Infusions:   lactated ringers 125 mL/hr at 03/18/18 2302     Scheduled Meds:   azaTHIOprine  50 mg Oral Daily    ciprofloxacin (CIPRO)400mg/200ml D5W IVPB  400 mg Intravenous Q12H    enoxaparin  40 mg Subcutaneous Daily    metronidazole  500 mg Intravenous Q8H    pantoprozole (PROTONIX) 40 mg/100 mL D5W IVPB  40 mg Intravenous Daily    sodium chloride 0.9%  3 mL Intravenous Q8H    venlafaxine  37.5 mg Oral BID     PRN Meds:   acetaminophen    diphenhydrAMINE    hydrocodone-acetaminophen 10-325mg    meperidine    naloxone    ondansetron    promethazine (PHENERGAN) IVPB        Objective:     Vital Signs (Most Recent):  Temp: 98 °F (36.7 °C) (03/19/18 0737)  Pulse: 82 (03/19/18 0737)  Resp: 16 (03/19/18 0737)  BP: (!) 101/59 (03/19/18 0737)  SpO2: 98 % (03/19/18 0737) Vital Signs (24h Range):  Temp:  [96.8 °F (36 °C)-98 °F (36.7 °C)] 98 °F (36.7 °C)  Pulse:  [82-89] 82  Resp:  [16-18] 16  SpO2:  [96 %-100 %] 98 %  BP: ()/(50-60) 101/59     Intake/Output - Last 3 Shifts       03/17 0700 - 03/18 0659 03/18 0700 - 03/19 0659 03/19 0700 - 03/20 0659    P.O.   240    I.V. (mL/kg)  3760.4 (64.4) 477.1 (8.2)    IV Piggyback 200 900 100    Total Intake(mL/kg) 200 (3.4) 4660.4 (79.8) 817.1 (14)    Urine (mL/kg/hr)  1 (0)     Stool  475 (0.3) 350 (2.7)    Total Output   476 350    Net +200 +4184.4 +467.1           Stool Occurrence 0 x 1 x           Physical Exam   Constitutional: She is oriented to person, place, and time. She appears well-developed and well-nourished.   Cardiovascular: Normal rate and intact distal pulses.    Pulmonary/Chest: Effort normal. No respiratory distress.   Abdominal: Soft. She exhibits no distension. There is no tenderness.   LLQ ostomy with air in bag   Neurological: She is alert  and oriented to person, place, and time.       Significant Labs:  CBC (Last 3 Results):     Recent Labs  Lab 03/18/18  0033 03/18/18  0505 03/19/18 0427   WBC 6.64 5.99 4.14   RBC 4.28 3.89* 3.61*   HGB 12.0 10.9* 10.1*   HCT 35.3* 31.8* 30.8*    270 256   MCV 83 82 85   MCH 28.0 28.0 28.0   MCHC 34.0 34.3 32.8     CMP (Last 3 Results):     Recent Labs  Lab 03/18/18  0033 03/18/18  0505 03/19/18 0427    93 67*   CALCIUM 8.5* 7.9* 8.2*   ALBUMIN 2.4*  --   --    PROT 6.7  --   --    * 134* 135*   K 3.2* 3.1* 3.8   CO2 25 25 23   CL 98 98 101   BUN 6 5* 3*   CREATININE 0.7 0.7 0.7   ALKPHOS 194*  --   --    ALT 9*  --   --    AST 14  --   --    BILITOT 0.6  --   --        Significant Diagnostics:  Reviewed

## 2018-03-19 NOTE — PLAN OF CARE
CM completed discharge assessment and planning with patient. Patient verbalized understanding. All questions and concerns addressed. Pt currently lives with her mother Tanesha ( [phone]). Pt has good family support. Pt reports her mother will provide transportation home. Pt receives ostomy supplies from [facility] and has received services from [employer]. CM and SW will continue to follow for any additional needs.    PCP: Pablo Medina MD    Pharmacy:   [facility] [address] [place] [address], [place] - [address]  [address]  [place] [address]  Phone: [phone] Fax: [phone]      Payor: MEDICARE / Plan: MEDICARE PART A & B / Product Type: [facility] /      03/19/18 0845   Discharge Assessment   Assessment Type Discharge Planning Assessment   Confirmed/corrected address and phone number on facesheet? Yes   Assessment information obtained from? Patient   Prior to hospitilization cognitive status: Alert/Oriented;No Deficits   Prior to hospitalization functional status: Independent   Current cognitive status: Alert/Oriented;No Deficits   Lives With child(thelma), dependent;parent(s)   Able to Return to Prior Arrangements yes   Is patient able to care for self after discharge? Yes   Patient's perception of discharge disposition home or selfcare   Readmission Within The Last 30 Days no previous admission in last 30 days   Patient currently being followed by outpatient case management? No   Patient currently receives any other outside agency services? No   Equipment Currently Used at Home colostomy/ostomy supplies  ([facility] for [facility] supplies)   Do you have any problems affording any of your prescribed medications? TBD   Is the patient taking medications as prescribed? yes   Does the patient have transportation home? Yes   Transportation Available family or friend will provide  (mother, Tanesha Chang [phone])   Dialysis Name and Scheduled days N/A   Does the patient receive services at the  [facility]? No   Discharge Plan A Home;Home with family   Discharge Plan B Home;Home Health;Home with family   Patient/Family In Agreement With Plan yes

## 2018-03-19 NOTE — PLAN OF CARE
Problem: Patient Care Overview  Goal: Plan of Care Review  Outcome: Ongoing (interventions implemented as appropriate)  POC reviewed with patient who verbalized understanding. AAOX4. Pt O2 sats stable on RA. Left colostomy intact and patent. Right FA IV intact and patent. Pt has adequate U/O. Pt tolerating diet well. Pain being controlled with PRN pain medication. Pt remained free from falls throughout the shift. VSS. Will continue to monitor.

## 2018-03-19 NOTE — ASSESSMENT & PLAN NOTE
51 yo F hx of crohns multiple abdominal surgeries admitted with possible crohns flare.    -IVF   -NPO  -Cipro/Flagy  -GI consult, appreviate recs  -F/u GI labs  -Home meds  -Pain/ nausea PRN  -GI/DVT ppx

## 2018-03-20 LAB
ANION GAP SERPL CALC-SCNC: 10 MMOL/L
BASOPHILS # BLD AUTO: 0.03 K/UL
BASOPHILS NFR BLD: 0.5 %
BUN SERPL-MCNC: <2 MG/DL
CALCIUM SERPL-MCNC: 8.2 MG/DL
CHLORIDE SERPL-SCNC: 105 MMOL/L
CO2 SERPL-SCNC: 22 MMOL/L
CREAT SERPL-MCNC: 0.7 MG/DL
DIFFERENTIAL METHOD: ABNORMAL
E COLI SXT1 STL QL IA: NEGATIVE
E COLI SXT2 STL QL IA: NEGATIVE
EOSINOPHIL # BLD AUTO: 0.4 K/UL
EOSINOPHIL NFR BLD: 5.8 %
ERYTHROCYTE [DISTWIDTH] IN BLOOD BY AUTOMATED COUNT: 13.8 %
EST. GFR  (AFRICAN AMERICAN): >60 ML/MIN/1.73 M^2
EST. GFR  (NON AFRICAN AMERICAN): >60 ML/MIN/1.73 M^2
GLUCOSE SERPL-MCNC: 94 MG/DL
HCT VFR BLD AUTO: 32.5 %
HGB BLD-MCNC: 10.6 G/DL
IMM GRANULOCYTES # BLD AUTO: 0.03 K/UL
IMM GRANULOCYTES NFR BLD AUTO: 0.5 %
LYMPHOCYTES # BLD AUTO: 0.4 K/UL
LYMPHOCYTES NFR BLD: 6.6 %
MCH RBC QN AUTO: 27.5 PG
MCHC RBC AUTO-ENTMCNC: 32.6 G/DL
MCV RBC AUTO: 84 FL
MONOCYTES # BLD AUTO: 0.5 K/UL
MONOCYTES NFR BLD: 8 %
NEUTROPHILS # BLD AUTO: 5 K/UL
NEUTROPHILS NFR BLD: 78.6 %
NRBC BLD-RTO: 0 /100 WBC
PLATELET # BLD AUTO: 261 K/UL
PMV BLD AUTO: 8.8 FL
POTASSIUM SERPL-SCNC: 4 MMOL/L
RBC # BLD AUTO: 3.85 M/UL
SODIUM SERPL-SCNC: 137 MMOL/L
WBC # BLD AUTO: 6.37 K/UL

## 2018-03-20 PROCEDURE — 63600175 PHARM REV CODE 636 W HCPCS: Performed by: STUDENT IN AN ORGANIZED HEALTH CARE EDUCATION/TRAINING PROGRAM

## 2018-03-20 PROCEDURE — 25000003 PHARM REV CODE 250: Performed by: COLON & RECTAL SURGERY

## 2018-03-20 PROCEDURE — 63600175 PHARM REV CODE 636 W HCPCS: Performed by: COLON & RECTAL SURGERY

## 2018-03-20 PROCEDURE — 99024 POSTOP FOLLOW-UP VISIT: CPT | Mod: ,,, | Performed by: COLON & RECTAL SURGERY

## 2018-03-20 PROCEDURE — 80048 BASIC METABOLIC PNL TOTAL CA: CPT

## 2018-03-20 PROCEDURE — A4216 STERILE WATER/SALINE, 10 ML: HCPCS | Performed by: STUDENT IN AN ORGANIZED HEALTH CARE EDUCATION/TRAINING PROGRAM

## 2018-03-20 PROCEDURE — 20600001 HC STEP DOWN PRIVATE ROOM

## 2018-03-20 PROCEDURE — S0030 INJECTION, METRONIDAZOLE: HCPCS | Performed by: STUDENT IN AN ORGANIZED HEALTH CARE EDUCATION/TRAINING PROGRAM

## 2018-03-20 PROCEDURE — 25000003 PHARM REV CODE 250: Performed by: STUDENT IN AN ORGANIZED HEALTH CARE EDUCATION/TRAINING PROGRAM

## 2018-03-20 PROCEDURE — C9113 INJ PANTOPRAZOLE SODIUM, VIA: HCPCS | Performed by: COLON & RECTAL SURGERY

## 2018-03-20 PROCEDURE — 85025 COMPLETE CBC W/AUTO DIFF WBC: CPT

## 2018-03-20 PROCEDURE — 36415 COLL VENOUS BLD VENIPUNCTURE: CPT

## 2018-03-20 RX ADMIN — CIPROFLOXACIN 400 MG: 2 INJECTION, SOLUTION INTRAVENOUS at 11:03

## 2018-03-20 RX ADMIN — METRONIDAZOLE 500 MG: 500 INJECTION, SOLUTION INTRAVENOUS at 12:03

## 2018-03-20 RX ADMIN — ONDANSETRON HYDROCHLORIDE 4 MG: 2 INJECTION, SOLUTION INTRAMUSCULAR; INTRAVENOUS at 05:03

## 2018-03-20 RX ADMIN — AZATHIOPRINE 50 MG: 50 TABLET ORAL at 08:03

## 2018-03-20 RX ADMIN — MEPERIDINE HYDROCHLORIDE 25 MG: 50 INJECTION INTRAMUSCULAR; INTRAVENOUS; SUBCUTANEOUS at 10:03

## 2018-03-20 RX ADMIN — CIPROFLOXACIN 400 MG: 2 INJECTION, SOLUTION INTRAVENOUS at 12:03

## 2018-03-20 RX ADMIN — METRONIDAZOLE 500 MG: 500 INJECTION, SOLUTION INTRAVENOUS at 07:03

## 2018-03-20 RX ADMIN — MEPERIDINE HYDROCHLORIDE 25 MG: 50 INJECTION INTRAMUSCULAR; INTRAVENOUS; SUBCUTANEOUS at 03:03

## 2018-03-20 RX ADMIN — Medication 3 ML: at 10:03

## 2018-03-20 RX ADMIN — Medication 3 ML: at 06:03

## 2018-03-20 RX ADMIN — VENLAFAXINE 37.5 MG: 37.5 TABLET ORAL at 09:03

## 2018-03-20 RX ADMIN — PANTOPRAZOLE SODIUM 40 MG: 40 INJECTION, POWDER, FOR SOLUTION INTRAVENOUS at 08:03

## 2018-03-20 RX ADMIN — VENLAFAXINE 37.5 MG: 37.5 TABLET ORAL at 08:03

## 2018-03-20 RX ADMIN — ONDANSETRON HYDROCHLORIDE 4 MG: 2 INJECTION, SOLUTION INTRAMUSCULAR; INTRAVENOUS at 08:03

## 2018-03-20 RX ADMIN — MEPERIDINE HYDROCHLORIDE 25 MG: 50 INJECTION INTRAMUSCULAR; INTRAVENOUS; SUBCUTANEOUS at 06:03

## 2018-03-20 RX ADMIN — DEXTROSE MONOHYDRATE, SODIUM CHLORIDE, AND POTASSIUM CHLORIDE: 50; 4.5; 1.49 INJECTION, SOLUTION INTRAVENOUS at 05:03

## 2018-03-20 RX ADMIN — MEPERIDINE HYDROCHLORIDE 25 MG: 50 INJECTION INTRAMUSCULAR; INTRAVENOUS; SUBCUTANEOUS at 11:03

## 2018-03-20 RX ADMIN — METRONIDAZOLE 500 MG: 500 INJECTION, SOLUTION INTRAVENOUS at 03:03

## 2018-03-20 RX ADMIN — PROMETHAZINE HYDROCHLORIDE 6.25 MG: 25 INJECTION INTRAMUSCULAR; INTRAVENOUS at 10:03

## 2018-03-20 RX ADMIN — METRONIDAZOLE 500 MG: 500 INJECTION, SOLUTION INTRAVENOUS at 11:03

## 2018-03-20 RX ADMIN — Medication 3 ML: at 02:03

## 2018-03-20 NOTE — ASSESSMENT & PLAN NOTE
51 yo F hx of crohns multiple abdominal surgeries admitted with possible crohns flare.    -Allow FLD. Continue MIVF, replete lytes prn.  -Cipro/Flagy  -F/U GI recs. Stool studies normal thus far.  -Home meds  -Pain/ nausea PRN  -GI/DVT ppx

## 2018-03-20 NOTE — PROGRESS NOTES
"Asked to see for ostomy site assessment  Patient relates she has been seeing Ana Rosa Ramirez RN, CNS, WOCN in the [facility] clinic for a peristomal blister .  Questioned if blister occurred from aggressive pouch removal . Patient is unsure , but states she just pulls it off. Utilized [employer] adhesive remover to easily remove appliance without injury to the skin. Has resolved the blister and peristomal skin is clear. Used barrier ring as she states her output is thin and liquid.   Pt liked [employer] adhesive remover and will see if her DME can obtain some for her. Left additional supplies in her room as she has used what she brought to the hospital . Prefers a 2pc appliance but is happy with the one pc samples.     03/20/18 1200   Pain/Comfort Assessments   Pain Assessment Performed Yes       Number Scale   Presence of Pain complains of pain/discomfort   Pain Rating: Rest 6       Colostomy 01/15/18 0000 LLQ   Placement Date/Time: 01/15/18 0000   Present Prior to Hospital Arrival?: Yes  Location: LLQ   Stomal Appliance 1 piece;Changed;No Leakage   Stoma Appearance flush w/ skin;red;oval   Stoma Size (in) 1 1/4 "   Site Assessment Clean;Intact;Red;Moist;Protruding above skin level   Peristomal Assessment Clean;Intact  (blister has resolved)   Accessories/Skin Care cleansed w/ sterile normal saline;skin barrier ring   Stoma Function no flatus;no stool   Treatment Bag change   Tolerance did not assist with appliance change   Safety Interventions   Patient Rounds bed in low position;bed wheels locked;call light in reach;ID band on;toileting offered;visualized patient   Safety Promotion/Fall Prevention assistive device/personal item within reach;Fall Risk reviewed with patient/family;lighting adjusted;medications reviewed   Safety Precautions emergency equipment at bedside   Daily Care   Activity Type activity clustered for rest period;activity adjusted per tolerance   Activity Assistance Provided independent   Positioning   Body " Position positioned/repositioned independently   Head of Bed (HOB) HOB at 30-45 degrees     Danika Brown RN CWON  [id number]

## 2018-03-20 NOTE — SUBJECTIVE & OBJECTIVE
Subjective:     Interval History:     NAEON. Tolerated clears. Denies nausea or vomiting. No issues with stoma. Abdominal pain controlled. Ambulating, voiding.    Post-Op Info:  * No surgery found *          Medications:  Continuous Infusions:   dextrose 5 % and 0.45 % NaCl with KCl 20 mEq 75 mL/hr at 03/20/18 0513     Scheduled Meds:   azaTHIOprine  50 mg Oral Daily    ciprofloxacin (CIPRO)400mg/200ml D5W IVPB  400 mg Intravenous Q12H    enoxaparin  40 mg Subcutaneous Daily    metronidazole  500 mg Intravenous Q8H    pantoprozole (PROTONIX) 40 mg/100 mL D5W IVPB  40 mg Intravenous Daily    sodium chloride 0.9%  3 mL Intravenous Q8H    venlafaxine  37.5 mg Oral BID     PRN Meds:   acetaminophen    diphenhydrAMINE    hydrocodone-acetaminophen 10-325mg    meperidine    naloxone    ondansetron    promethazine (PHENERGAN) IVPB        Objective:     Vital Signs (Most Recent):  Temp: 98.1 °F (36.7 °C) (03/20/18 0720)  Pulse: 89 (03/20/18 0720)  Resp: 16 (03/20/18 0720)  BP: (!) 104/51 (03/20/18 0720)  SpO2: 98 % (03/20/18 0720) Vital Signs (24h Range):  Temp:  [97 °F (36.1 °C)-98.4 °F (36.9 °C)] 98.1 °F (36.7 °C)  Pulse:  [84-92] 89  Resp:  [16-18] 16  SpO2:  [95 %-100 %] 98 %  BP: ()/(51-59) 104/51     Intake/Output - Last 3 Shifts       03/18 0700 - 03/19 0659 03/19 0700 - 03/20 0659 03/20 0700 - 03/21 0659    P.O.  840     I.V. (mL/kg) 3760.4 (64.4) 1558.8 (26.7)     IV Piggyback 900 600     Total Intake(mL/kg) 4660.4 (79.8) 2998.8 (51.3)     Urine (mL/kg/hr) 1 (0) 0 (0)     Stool 475 (0.3) 1400 (1)     Total Output 476 1400      Net +4184.4 +1598.8             Urine Occurrence  7 x     Stool Occurrence 1 x            Physical Exam   Constitutional: She is oriented to person, place, and time. She appears well-developed and well-nourished.   HENT:   Head: Normocephalic and atraumatic.   Eyes: EOM are normal.   Neck: Normal range of motion.   Cardiovascular: Normal rate and intact distal pulses.     Pulmonary/Chest: Effort normal. No respiratory distress.   Abdominal: Soft. She exhibits no distension. There is no tenderness.   LLQ ostomy with air in bag   Musculoskeletal: Normal range of motion.   Neurological: She is alert and oriented to person, place, and time.   Psychiatric: She has a normal mood and affect.     Significant Labs:  BMP (Last 3 Results):   Recent Labs  Lab 03/18/18  0505 03/19/18  0427 03/20/18  0501   GLU 93 67* 94   * 135* 137   K 3.1* 3.8 4.0   CL 98 101 105   CO2 25 23 22*   BUN 5* 3* <2*   CREATININE 0.7 0.7 0.7   CALCIUM 7.9* 8.2* 8.2*     CBC (Last 3 Results):   Recent Labs  Lab 03/18/18  0505 03/19/18  0427 03/20/18  0501   WBC 5.99 4.14 6.37   RBC 3.89* 3.61* 3.85*   HGB 10.9* 10.1* 10.6*   HCT 31.8* 30.8* 32.5*    256 261   MCV 82 85 84   MCH 28.0 28.0 27.5   MCHC 34.3 32.8 32.6

## 2018-03-20 NOTE — PLAN OF CARE
Problem: Patient Care Overview  Goal: Plan of Care Review  Outcome: Ongoing (interventions implemented as appropriate)  Plan of care reviewed, patient verbalizes understanding. AAOX4. VSS at this time. Pain managed with PRN meds throughout shift. On clear liquid diet, nausea managed with PRN meds. Patient is up ad kenneth and remains free of falls/injuries. No acute distress at this time. Will continue to monitor.

## 2018-03-20 NOTE — PROGRESS NOTES
[facility]  Colorectal Surgery  Progress Note    Patient Name: Carline Chang  MRN: [id number]  Admission Date: 3/17/2018  Hospital Length of Stay: 3 days  Attending Physician: Andre Uribe MD    Subjective:     Interval History:     NAEON. Tolerated clears. Denies nausea or vomiting. No issues with stoma. Abdominal pain controlled. Ambulating, voiding.    Post-Op Info:  * No surgery found *          Medications:  Continuous Infusions:   dextrose 5 % and 0.45 % NaCl with KCl 20 mEq 75 mL/hr at 03/20/18 0513     Scheduled Meds:   azaTHIOprine  50 mg Oral Daily    ciprofloxacin (CIPRO)400mg/200ml D5W IVPB  400 mg Intravenous Q12H    enoxaparin  40 mg Subcutaneous Daily    metronidazole  500 mg Intravenous Q8H    pantoprozole (PROTONIX) 40 mg/100 mL D5W IVPB  40 mg Intravenous Daily    sodium chloride 0.9%  3 mL Intravenous Q8H    venlafaxine  37.5 mg Oral BID     PRN Meds:   acetaminophen    diphenhydrAMINE    hydrocodone-acetaminophen 10-325mg    meperidine    naloxone    ondansetron    promethazine (PHENERGAN) IVPB        Objective:     Vital Signs (Most Recent):  Temp: 98.1 °F (36.7 °C) (03/20/18 0720)  Pulse: 89 (03/20/18 0720)  Resp: 16 (03/20/18 0720)  BP: (!) 104/51 (03/20/18 0720)  SpO2: 98 % (03/20/18 0720) Vital Signs (24h Range):  Temp:  [97 °F (36.1 °C)-98.4 °F (36.9 °C)] 98.1 °F (36.7 °C)  Pulse:  [84-92] 89  Resp:  [16-18] 16  SpO2:  [95 %-100 %] 98 %  BP: ()/(51-59) 104/51     Intake/Output - Last 3 Shifts       03/18 0700 - 03/19 0659 03/19 0700 - 03/20 0659 03/20 0700 - 03/21 0659    P.O.  840     I.V. (mL/kg) 3760.4 (64.4) 1558.8 (26.7)     IV Piggyback 900 600     Total Intake(mL/kg) 4660.4 (79.8) 2998.8 (51.3)     Urine (mL/kg/hr) 1 (0) 0 (0)     Stool 475 (0.3) 1400 (1)     Total Output 476 1400      Net +4184.4 +1598.8             Urine Occurrence  7 x     Stool Occurrence 1 x            Physical Exam   Constitutional: She is oriented to  person, place, and time. She appears well-developed and well-nourished.   HENT:   Head: Normocephalic and atraumatic.   Eyes: EOM are normal.   Neck: Normal range of motion.   Cardiovascular: Normal rate and intact distal pulses.    Pulmonary/Chest: Effort normal. No respiratory distress.   Abdominal: Soft. She exhibits no distension. There is no tenderness.   LLQ ostomy with air in bag   Musculoskeletal: Normal range of motion.   Neurological: She is alert and oriented to person, place, and time.   Psychiatric: She has a normal mood and affect.     Significant Labs:  BMP (Last 3 Results):   Recent Labs  Lab 03/18/18  0505 03/19/18  0427 03/20/18  0501   GLU 93 67* 94   * 135* 137   K 3.1* 3.8 4.0   CL 98 101 105   CO2 25 23 22*   BUN 5* 3* <2*   CREATININE 0.7 0.7 0.7   CALCIUM 7.9* 8.2* 8.2*     CBC (Last 3 Results):   Recent Labs  Lab 03/18/18  0505 03/19/18  0427 03/20/18  0501   WBC 5.99 4.14 6.37   RBC 3.89* 3.61* 3.85*   HGB 10.9* 10.1* 10.6*   HCT 31.8* 30.8* 32.5*    256 261   MCV 82 85 84   MCH 28.0 28.0 27.5   MCHC 34.3 32.8 32.6     Assessment/Plan:     * Crohn's disease    53 yo F hx of crohns multiple abdominal surgeries admitted with possible crohns flare.    -Allow FLD. Continue MIVF, replete lytes prn.  -Cipro/Flagy  -F/U GI recs. Stool studies normal thus far.  -Home meds  -Pain/ nausea PRN  -GI/DVT ppx              Kevin Young MD  Colorectal Surgery  [facility]    Have seen and examined the patient with the fellow and agree with their plan.  ESPERANZA MADSEN

## 2018-03-21 LAB
ANION GAP SERPL CALC-SCNC: 11 MMOL/L
BACTERIA STL CULT: NORMAL
BASOPHILS # BLD AUTO: 0.04 K/UL
BASOPHILS NFR BLD: 0.6 %
BUN SERPL-MCNC: <2 MG/DL
CALCIUM SERPL-MCNC: 8.5 MG/DL
CHLORIDE SERPL-SCNC: 107 MMOL/L
CO2 SERPL-SCNC: 21 MMOL/L
CREAT SERPL-MCNC: 0.8 MG/DL
CRP SERPL-MCNC: 31.6 MG/L
DIFFERENTIAL METHOD: ABNORMAL
EOSINOPHIL # BLD AUTO: 0.2 K/UL
EOSINOPHIL NFR BLD: 3 %
ERYTHROCYTE [DISTWIDTH] IN BLOOD BY AUTOMATED COUNT: 14.1 %
EST. GFR  (AFRICAN AMERICAN): >60 ML/MIN/1.73 M^2
EST. GFR  (NON AFRICAN AMERICAN): >60 ML/MIN/1.73 M^2
GLUCOSE SERPL-MCNC: 96 MG/DL
HCT VFR BLD AUTO: 35.9 %
HGB BLD-MCNC: 11.5 G/DL
IMM GRANULOCYTES # BLD AUTO: 0.06 K/UL
IMM GRANULOCYTES NFR BLD AUTO: 0.9 %
LYMPHOCYTES # BLD AUTO: 0.6 K/UL
LYMPHOCYTES NFR BLD: 8.1 %
MCH RBC QN AUTO: 28 PG
MCHC RBC AUTO-ENTMCNC: 32 G/DL
MCV RBC AUTO: 88 FL
MONOCYTES # BLD AUTO: 0.5 K/UL
MONOCYTES NFR BLD: 7 %
NEUTROPHILS # BLD AUTO: 5.4 K/UL
NEUTROPHILS NFR BLD: 80.4 %
NRBC BLD-RTO: 0 /100 WBC
PLATELET # BLD AUTO: 341 K/UL
PMV BLD AUTO: 8.9 FL
POTASSIUM SERPL-SCNC: 3.6 MMOL/L
RBC # BLD AUTO: 4.1 M/UL
SODIUM SERPL-SCNC: 139 MMOL/L
WBC # BLD AUTO: 6.76 K/UL

## 2018-03-21 PROCEDURE — 63600175 PHARM REV CODE 636 W HCPCS: Performed by: COLON & RECTAL SURGERY

## 2018-03-21 PROCEDURE — 63600175 PHARM REV CODE 636 W HCPCS: Performed by: STUDENT IN AN ORGANIZED HEALTH CARE EDUCATION/TRAINING PROGRAM

## 2018-03-21 PROCEDURE — C9113 INJ PANTOPRAZOLE SODIUM, VIA: HCPCS | Performed by: COLON & RECTAL SURGERY

## 2018-03-21 PROCEDURE — 25000003 PHARM REV CODE 250: Performed by: STUDENT IN AN ORGANIZED HEALTH CARE EDUCATION/TRAINING PROGRAM

## 2018-03-21 PROCEDURE — 99024 POSTOP FOLLOW-UP VISIT: CPT | Mod: ,,, | Performed by: COLON & RECTAL SURGERY

## 2018-03-21 PROCEDURE — 25000003 PHARM REV CODE 250: Performed by: COLON & RECTAL SURGERY

## 2018-03-21 PROCEDURE — 85025 COMPLETE CBC W/AUTO DIFF WBC: CPT

## 2018-03-21 PROCEDURE — 20600001 HC STEP DOWN PRIVATE ROOM

## 2018-03-21 PROCEDURE — 36415 COLL VENOUS BLD VENIPUNCTURE: CPT

## 2018-03-21 PROCEDURE — 86140 C-REACTIVE PROTEIN: CPT

## 2018-03-21 PROCEDURE — A4216 STERILE WATER/SALINE, 10 ML: HCPCS | Performed by: STUDENT IN AN ORGANIZED HEALTH CARE EDUCATION/TRAINING PROGRAM

## 2018-03-21 PROCEDURE — S0030 INJECTION, METRONIDAZOLE: HCPCS | Performed by: STUDENT IN AN ORGANIZED HEALTH CARE EDUCATION/TRAINING PROGRAM

## 2018-03-21 PROCEDURE — 94761 N-INVAS EAR/PLS OXIMETRY MLT: CPT

## 2018-03-21 PROCEDURE — 80048 BASIC METABOLIC PNL TOTAL CA: CPT

## 2018-03-21 RX ORDER — HYDROCODONE BITARTRATE AND ACETAMINOPHEN 10; 325 MG/1; MG/1
1 TABLET ORAL EVERY 4 HOURS PRN
Qty: 30 TABLET | Refills: 0 | Status: SHIPPED | OUTPATIENT
Start: 2018-03-21 | End: 2018-03-29 | Stop reason: SDUPTHER

## 2018-03-21 RX ORDER — METRONIDAZOLE 500 MG/1
500 TABLET ORAL EVERY 8 HOURS
Status: DISCONTINUED | OUTPATIENT
Start: 2018-03-21 | End: 2018-03-22

## 2018-03-21 RX ORDER — CIPROFLOXACIN 500 MG/1
500 TABLET ORAL EVERY 12 HOURS
Status: DISCONTINUED | OUTPATIENT
Start: 2018-03-21 | End: 2018-03-22

## 2018-03-21 RX ADMIN — MEPERIDINE HYDROCHLORIDE 25 MG: 50 INJECTION INTRAMUSCULAR; INTRAVENOUS; SUBCUTANEOUS at 06:03

## 2018-03-21 RX ADMIN — ONDANSETRON HYDROCHLORIDE 4 MG: 2 INJECTION, SOLUTION INTRAMUSCULAR; INTRAVENOUS at 03:03

## 2018-03-21 RX ADMIN — CIPROFLOXACIN HYDROCHLORIDE 500 MG: 500 TABLET, FILM COATED ORAL at 11:03

## 2018-03-21 RX ADMIN — PROMETHAZINE HYDROCHLORIDE 6.25 MG: 25 INJECTION INTRAMUSCULAR; INTRAVENOUS at 06:03

## 2018-03-21 RX ADMIN — ONDANSETRON HYDROCHLORIDE 4 MG: 2 INJECTION, SOLUTION INTRAMUSCULAR; INTRAVENOUS at 02:03

## 2018-03-21 RX ADMIN — PANTOPRAZOLE SODIUM 40 MG: 40 INJECTION, POWDER, FOR SOLUTION INTRAVENOUS at 11:03

## 2018-03-21 RX ADMIN — VENLAFAXINE 37.5 MG: 37.5 TABLET ORAL at 09:03

## 2018-03-21 RX ADMIN — METRONIDAZOLE 500 MG: 500 TABLET ORAL at 11:03

## 2018-03-21 RX ADMIN — METRONIDAZOLE 500 MG: 500 INJECTION, SOLUTION INTRAVENOUS at 09:03

## 2018-03-21 RX ADMIN — HYDROCODONE BITARTRATE AND ACETAMINOPHEN 1 TABLET: 10; 325 TABLET ORAL at 03:03

## 2018-03-21 RX ADMIN — CIPROFLOXACIN 400 MG: 2 INJECTION, SOLUTION INTRAVENOUS at 11:03

## 2018-03-21 RX ADMIN — HYDROCODONE BITARTRATE AND ACETAMINOPHEN 1 TABLET: 10; 325 TABLET ORAL at 09:03

## 2018-03-21 RX ADMIN — DEXTROSE MONOHYDRATE, SODIUM CHLORIDE, AND POTASSIUM CHLORIDE: 50; 4.5; 1.49 INJECTION, SOLUTION INTRAVENOUS at 03:03

## 2018-03-21 RX ADMIN — Medication 3 ML: at 01:03

## 2018-03-21 RX ADMIN — HYDROCODONE BITARTRATE AND ACETAMINOPHEN 1 TABLET: 10; 325 TABLET ORAL at 01:03

## 2018-03-21 RX ADMIN — AZATHIOPRINE 50 MG: 50 TABLET ORAL at 09:03

## 2018-03-21 RX ADMIN — Medication 3 ML: at 10:03

## 2018-03-21 RX ADMIN — MEPERIDINE HYDROCHLORIDE 25 MG: 50 INJECTION INTRAMUSCULAR; INTRAVENOUS; SUBCUTANEOUS at 03:03

## 2018-03-21 RX ADMIN — Medication 3 ML: at 06:03

## 2018-03-21 NOTE — PROGRESS NOTES
[facility]  Colorectal Surgery  Progress Note    Patient Name: Carlien Chang  MRN: [id number]  Admission Date: 3/17/2018  Hospital Length of Stay: 4 days  Attending Physician: Andre Uribe MD    Subjective:     Interval History:     NAEON. Tolerating FLD. Denies nausea or vomiting. No issues with stoma. Ambulating and voiding.    Post-Op Info:  * No surgery found *          Medications:  Continuous Infusions:   dextrose 5 % and 0.45 % NaCl with KCl 20 mEq 75 mL/hr at 03/21/18 0347     Scheduled Meds:   azaTHIOprine  50 mg Oral Daily    ciprofloxacin (CIPRO)400mg/200ml D5W IVPB  400 mg Intravenous Q12H    enoxaparin  40 mg Subcutaneous Daily    metronidazole  500 mg Intravenous Q8H    pantoprozole (PROTONIX) 40 mg/100 mL D5W IVPB  40 mg Intravenous Daily    sodium chloride 0.9%  3 mL Intravenous Q8H    venlafaxine  37.5 mg Oral BID     PRN Meds:   acetaminophen    diphenhydrAMINE    hydrocodone-acetaminophen 10-325mg    meperidine    naloxone    ondansetron    promethazine (PHENERGAN) IVPB        Objective:     Vital Signs (Most Recent):  Temp: 97.1 °F (36.2 °C) (03/21/18 0407)  Pulse: 81 (03/21/18 0407)  Resp: 18 (03/21/18 0407)  BP: 116/67 (03/21/18 0407)  SpO2: 98 % (03/21/18 0407) Vital Signs (24h Range):  Temp:  [96.4 °F (35.8 °C)-98.7 °F (37.1 °C)] 97.1 °F (36.2 °C)  Pulse:  [78-90] 81  Resp:  [16-18] 18  SpO2:  [93 %-98 %] 98 %  BP: (104-119)/(51-70) 116/67     Intake/Output - Last 3 Shifts       03/19 0700 - 03/20 0659 03/20 0700 - 03/21 0659    P.O. 840 1440    I.V. (mL/kg) 1558.8 (26.7) 9488.3 (162.5)    IV Piggyback 600 700    Total Intake(mL/kg) 2998.8 (51.3) 84482.3 (199.1)    Urine (mL/kg/hr) 0 (0) 0 (0)    Stool 1400 (1) 1100 (0.8)    Total Output 1400 1100    Net +1598.8 +94060.3          Urine Occurrence 7 x 8 x          Physical Exam   Constitutional: She is oriented to person, place, and time. She appears well-developed and well-nourished.   HENT:    Head: Normocephalic and atraumatic.   Eyes: EOM are normal.   Neck: Normal range of motion.   Cardiovascular: Normal rate and intact distal pulses.    Pulmonary/Chest: Effort normal. No respiratory distress.   Abdominal: Soft. She exhibits no distension. There is no tenderness.   LLQ ostomy with air and stool in bag   Musculoskeletal: Normal range of motion.   Neurological: She is alert and oriented to person, place, and time.   Psychiatric: She has a normal mood and affect.     Significant Labs:  BMP (Last 3 Results):     Recent Labs  Lab 03/18/18  0505 03/19/18  0427 03/20/18  0501   GLU 93 67* 94   * 135* 137   K 3.1* 3.8 4.0   CL 98 101 105   CO2 25 23 22*   BUN 5* 3* <2*   CREATININE 0.7 0.7 0.7   CALCIUM 7.9* 8.2* 8.2*     CBC (Last 3 Results):     Recent Labs  Lab 03/18/18  0505 03/19/18  0427 03/20/18  0501   WBC 5.99 4.14 6.37   RBC 3.89* 3.61* 3.85*   HGB 10.9* 10.1* 10.6*   HCT 31.8* 30.8* 32.5*    256 261   MCV 82 85 84   MCH 28.0 28.0 27.5   MCHC 34.3 32.8 32.6     Assessment/Plan:     * Crohn's disease    53 yo F hx of crohns multiple abdominal surgeries admitted with possible crohns flare.    -Allow low residue diet  -Cipro/Flagyl  -F/U GI final recs. Stool studies normal thus far.  -Home meds  -Pain/ nausea PRN  -Anticipate d/c later today              Randy Ortega, DO  Colorectal Surgery  [facility]    Have seen and examined the patient with the fellow and agree with their plan.  ESPERANZA MADSEN

## 2018-03-21 NOTE — PLAN OF CARE
Problem: Patient Care Overview  Goal: Plan of Care Review  Outcome: Ongoing (interventions implemented as appropriate)  Plan of care reviewed, patient verbalizes understanding. AAOX4. VSS at this time. Pain managed with PRN meds throughout shift. nausea managed with PRN meds. Patient is up ad kenneth and remains free of falls/injuries. No acute distress at this time. Will continue to monitor

## 2018-03-21 NOTE — ASSESSMENT & PLAN NOTE
51 yo F hx of crohns multiple abdominal surgeries admitted with possible crohns flare.    -Allow low residue diet  -Cipro/Flagyl  -F/U GI final recs. Stool studies normal thus far.  -Home meds  -Pain/ nausea PRN  -Anticipate d/c later today

## 2018-03-21 NOTE — SUBJECTIVE & OBJECTIVE
Subjective:     Interval History:     NAEON. Tolerating FLD. Denies nausea or vomiting. No issues with stoma. Ambulating and voiding.    Post-Op Info:  * No surgery found *          Medications:  Continuous Infusions:   dextrose 5 % and 0.45 % NaCl with KCl 20 mEq 75 mL/hr at 03/21/18 0347     Scheduled Meds:   azaTHIOprine  50 mg Oral Daily    ciprofloxacin (CIPRO)400mg/200ml D5W IVPB  400 mg Intravenous Q12H    enoxaparin  40 mg Subcutaneous Daily    metronidazole  500 mg Intravenous Q8H    pantoprozole (PROTONIX) 40 mg/100 mL D5W IVPB  40 mg Intravenous Daily    sodium chloride 0.9%  3 mL Intravenous Q8H    venlafaxine  37.5 mg Oral BID     PRN Meds:   acetaminophen    diphenhydrAMINE    hydrocodone-acetaminophen 10-325mg    meperidine    naloxone    ondansetron    promethazine (PHENERGAN) IVPB        Objective:     Vital Signs (Most Recent):  Temp: 97.1 °F (36.2 °C) (03/21/18 0407)  Pulse: 81 (03/21/18 0407)  Resp: 18 (03/21/18 0407)  BP: 116/67 (03/21/18 0407)  SpO2: 98 % (03/21/18 0407) Vital Signs (24h Range):  Temp:  [96.4 °F (35.8 °C)-98.7 °F (37.1 °C)] 97.1 °F (36.2 °C)  Pulse:  [78-90] 81  Resp:  [16-18] 18  SpO2:  [93 %-98 %] 98 %  BP: (104-119)/(51-70) 116/67     Intake/Output - Last 3 Shifts       03/19 0700 - 03/20 0659 03/20 0700 - 03/21 0659    P.O. 840 1440    I.V. (mL/kg) 1558.8 (26.7) 9488.3 (162.5)    IV Piggyback 600 700    Total Intake(mL/kg) 2998.8 (51.3) 64145.3 (199.1)    Urine (mL/kg/hr) 0 (0) 0 (0)    Stool 1400 (1) 1100 (0.8)    Total Output 1400 1100    Net +1598.8 +05525.3          Urine Occurrence 7 x 8 x          Physical Exam   Constitutional: She is oriented to person, place, and time. She appears well-developed and well-nourished.   HENT:   Head: Normocephalic and atraumatic.   Eyes: EOM are normal.   Neck: Normal range of motion.   Cardiovascular: Normal rate and intact distal pulses.    Pulmonary/Chest: Effort normal. No respiratory distress.   Abdominal: Soft.  She exhibits no distension. There is no tenderness.   LLQ ostomy with air and stool in bag   Musculoskeletal: Normal range of motion.   Neurological: She is alert and oriented to person, place, and time.   Psychiatric: She has a normal mood and affect.     Significant Labs:  BMP (Last 3 Results):     Recent Labs  Lab 03/18/18  0505 03/19/18  0427 03/20/18  0501   GLU 93 67* 94   * 135* 137   K 3.1* 3.8 4.0   CL 98 101 105   CO2 25 23 22*   BUN 5* 3* <2*   CREATININE 0.7 0.7 0.7   CALCIUM 7.9* 8.2* 8.2*     CBC (Last 3 Results):     Recent Labs  Lab 03/18/18  0505 03/19/18  0427 03/20/18  0501   WBC 5.99 4.14 6.37   RBC 3.89* 3.61* 3.85*   HGB 10.9* 10.1* 10.6*   HCT 31.8* 30.8* 32.5*    256 261   MCV 82 85 84   MCH 28.0 28.0 27.5   MCHC 34.3 32.8 32.6

## 2018-03-22 PROBLEM — D64.9 NORMOCYTIC ANEMIA: Status: ACTIVE | Noted: 2018-03-22

## 2018-03-22 LAB
6-METHYLMERCAPTOPURINE RIBOSIDE: 4.28 NMOL/ML/H (ref 5.04–9.57)
6-METHYLMERCAPTOPURINE: 2.35 NMOL/ML/H (ref 3–6.66)
6-METHYLTHIOGUANINE RIBOSIDE: 4.37 NMOL/ML/H (ref 2.7–5.84)
ALBUMIN SERPL BCP-MCNC: 2.5 G/DL
ALP SERPL-CCNC: 153 U/L
ALT SERPL W/O P-5'-P-CCNC: 5 U/L
ANION GAP SERPL CALC-SCNC: 11 MMOL/L
AST SERPL-CCNC: 9 U/L
BASOPHILS # BLD AUTO: 0.05 K/UL
BASOPHILS NFR BLD: 0.4 %
BILIRUB SERPL-MCNC: 0.2 MG/DL
BUN SERPL-MCNC: 2 MG/DL
CALCIUM SERPL-MCNC: 7.9 MG/DL
CHLORIDE SERPL-SCNC: 105 MMOL/L
CO2 SERPL-SCNC: 20 MMOL/L
CREAT SERPL-MCNC: 0.8 MG/DL
DIFFERENTIAL METHOD: ABNORMAL
EOSINOPHIL # BLD AUTO: 0.2 K/UL
EOSINOPHIL NFR BLD: 1.5 %
ERYTHROCYTE [DISTWIDTH] IN BLOOD BY AUTOMATED COUNT: 13.9 %
EST. GFR  (AFRICAN AMERICAN): >60 ML/MIN/1.73 M^2
EST. GFR  (NON AFRICAN AMERICAN): >60 ML/MIN/1.73 M^2
GLUCOSE SERPL-MCNC: 84 MG/DL
HCT VFR BLD AUTO: 34 %
HGB BLD-MCNC: 11.2 G/DL
IMM GRANULOCYTES # BLD AUTO: 0.06 K/UL
IMM GRANULOCYTES NFR BLD AUTO: 0.5 %
LYMPHOCYTES # BLD AUTO: 0.6 K/UL
LYMPHOCYTES NFR BLD: 5 %
MCH RBC QN AUTO: 28.3 PG
MCHC RBC AUTO-ENTMCNC: 32.9 G/DL
MCV RBC AUTO: 86 FL
MONOCYTES # BLD AUTO: 0.9 K/UL
MONOCYTES NFR BLD: 7.5 %
NEUTROPHILS # BLD AUTO: 10.2 K/UL
NEUTROPHILS NFR BLD: 85.1 %
NRBC BLD-RTO: 0 /100 WBC
PLATELET # BLD AUTO: 338 K/UL
PMV BLD AUTO: 8.5 FL
POTASSIUM SERPL-SCNC: 3.2 MMOL/L
PREALB SERPL-MCNC: 12 MG/DL
PROT SERPL-MCNC: 6.1 G/DL
RBC # BLD AUTO: 3.96 M/UL
SODIUM SERPL-SCNC: 136 MMOL/L
TPMT INTERPRETATION: ABNORMAL
TPMT REVIEWED BY: ABNORMAL
WBC # BLD AUTO: 11.94 K/UL

## 2018-03-22 PROCEDURE — 25000003 PHARM REV CODE 250: Performed by: STUDENT IN AN ORGANIZED HEALTH CARE EDUCATION/TRAINING PROGRAM

## 2018-03-22 PROCEDURE — 25000003 PHARM REV CODE 250: Performed by: COLON & RECTAL SURGERY

## 2018-03-22 PROCEDURE — 36569 INSJ PICC 5 YR+ W/O IMAGING: CPT

## 2018-03-22 PROCEDURE — 84134 ASSAY OF PREALBUMIN: CPT

## 2018-03-22 PROCEDURE — 20600001 HC STEP DOWN PRIVATE ROOM

## 2018-03-22 PROCEDURE — 63600175 PHARM REV CODE 636 W HCPCS: Performed by: STUDENT IN AN ORGANIZED HEALTH CARE EDUCATION/TRAINING PROGRAM

## 2018-03-22 PROCEDURE — A4216 STERILE WATER/SALINE, 10 ML: HCPCS | Performed by: STUDENT IN AN ORGANIZED HEALTH CARE EDUCATION/TRAINING PROGRAM

## 2018-03-22 PROCEDURE — C1751 CATH, INF, PER/CENT/MIDLINE: HCPCS

## 2018-03-22 PROCEDURE — 63600175 PHARM REV CODE 636 W HCPCS: Performed by: NURSE PRACTITIONER

## 2018-03-22 PROCEDURE — A9585 GADOBUTROL INJECTION: HCPCS | Performed by: COLON & RECTAL SURGERY

## 2018-03-22 PROCEDURE — 80053 COMPREHEN METABOLIC PANEL: CPT

## 2018-03-22 PROCEDURE — 99233 SBSQ HOSP IP/OBS HIGH 50: CPT | Mod: ,,, | Performed by: INTERNAL MEDICINE

## 2018-03-22 PROCEDURE — C9113 INJ PANTOPRAZOLE SODIUM, VIA: HCPCS | Performed by: COLON & RECTAL SURGERY

## 2018-03-22 PROCEDURE — 76937 US GUIDE VASCULAR ACCESS: CPT

## 2018-03-22 PROCEDURE — 63600175 PHARM REV CODE 636 W HCPCS: Performed by: COLON & RECTAL SURGERY

## 2018-03-22 PROCEDURE — 99024 POSTOP FOLLOW-UP VISIT: CPT | Mod: ,,, | Performed by: COLON & RECTAL SURGERY

## 2018-03-22 PROCEDURE — 25500020 PHARM REV CODE 255: Performed by: COLON & RECTAL SURGERY

## 2018-03-22 PROCEDURE — 36415 COLL VENOUS BLD VENIPUNCTURE: CPT

## 2018-03-22 PROCEDURE — 85025 COMPLETE CBC W/AUTO DIFF WBC: CPT

## 2018-03-22 RX ORDER — ONDANSETRON 2 MG/ML
8 INJECTION INTRAMUSCULAR; INTRAVENOUS EVERY 6 HOURS PRN
Status: DISCONTINUED | OUTPATIENT
Start: 2018-03-22 | End: 2018-03-24 | Stop reason: HOSPADM

## 2018-03-22 RX ORDER — PROMETHAZINE HYDROCHLORIDE 12.5 MG/1
12.5 TABLET ORAL EVERY 6 HOURS PRN
Status: DISCONTINUED | OUTPATIENT
Start: 2018-03-22 | End: 2018-03-22

## 2018-03-22 RX ORDER — GADOBUTROL 604.72 MG/ML
10 INJECTION INTRAVENOUS
Status: COMPLETED | OUTPATIENT
Start: 2018-03-22 | End: 2018-03-22

## 2018-03-22 RX ORDER — POTASSIUM CHLORIDE 20 MEQ/1
40 TABLET, EXTENDED RELEASE ORAL ONCE
Status: COMPLETED | OUTPATIENT
Start: 2018-03-22 | End: 2018-03-22

## 2018-03-22 RX ADMIN — VENLAFAXINE 37.5 MG: 37.5 TABLET ORAL at 08:03

## 2018-03-22 RX ADMIN — MEPERIDINE HYDROCHLORIDE 25 MG: 50 INJECTION INTRAMUSCULAR; INTRAVENOUS; SUBCUTANEOUS at 03:03

## 2018-03-22 RX ADMIN — HYDROCODONE BITARTRATE AND ACETAMINOPHEN 1 TABLET: 10; 325 TABLET ORAL at 01:03

## 2018-03-22 RX ADMIN — HYDROCODONE BITARTRATE AND ACETAMINOPHEN 1 TABLET: 10; 325 TABLET ORAL at 11:03

## 2018-03-22 RX ADMIN — HYDROCODONE BITARTRATE AND ACETAMINOPHEN 1 TABLET: 10; 325 TABLET ORAL at 05:03

## 2018-03-22 RX ADMIN — GADOBUTROL 10 ML: 604.72 INJECTION INTRAVENOUS at 06:03

## 2018-03-22 RX ADMIN — HYDROCODONE BITARTRATE AND ACETAMINOPHEN 1 TABLET: 10; 325 TABLET ORAL at 12:03

## 2018-03-22 RX ADMIN — PROMETHAZINE HYDROCHLORIDE 6.25 MG: 25 INJECTION INTRAMUSCULAR; INTRAVENOUS at 08:03

## 2018-03-22 RX ADMIN — MEPERIDINE HYDROCHLORIDE 25 MG: 50 INJECTION INTRAMUSCULAR; INTRAVENOUS; SUBCUTANEOUS at 08:03

## 2018-03-22 RX ADMIN — Medication 3 ML: at 01:03

## 2018-03-22 RX ADMIN — Medication 3 ML: at 10:03

## 2018-03-22 RX ADMIN — METRONIDAZOLE 500 MG: 500 TABLET ORAL at 05:03

## 2018-03-22 RX ADMIN — ONDANSETRON 8 MG: 2 INJECTION, SOLUTION INTRAMUSCULAR; INTRAVENOUS at 10:03

## 2018-03-22 RX ADMIN — PANTOPRAZOLE SODIUM 40 MG: 40 INJECTION, POWDER, FOR SOLUTION INTRAVENOUS at 10:03

## 2018-03-22 RX ADMIN — AZATHIOPRINE 50 MG: 50 TABLET ORAL at 08:03

## 2018-03-22 RX ADMIN — POTASSIUM CHLORIDE 40 MEQ: 1500 TABLET, EXTENDED RELEASE ORAL at 08:03

## 2018-03-22 RX ADMIN — CIPROFLOXACIN HYDROCHLORIDE 500 MG: 500 TABLET, FILM COATED ORAL at 08:03

## 2018-03-22 RX ADMIN — MEPERIDINE HYDROCHLORIDE 25 MG: 50 INJECTION INTRAMUSCULAR; INTRAVENOUS; SUBCUTANEOUS at 09:03

## 2018-03-22 RX ADMIN — PROMETHAZINE HYDROCHLORIDE 12.5 MG: 12.5 TABLET ORAL at 06:03

## 2018-03-22 NOTE — PLAN OF CARE
03/22/18 1403   Discharge Reassessment   Assessment Type Discharge Planning Reassessment   Provided patient/caregiver education on the expected discharge date and the discharge plan Yes   Do you have any problems affording any of your prescribed medications? TBD   Discharge Plan A Home with family   Discharge Plan B Home Health;Home with family

## 2018-03-22 NOTE — ASSESSMENT & PLAN NOTE
51 yo F hx of crohns multiple abdominal surgeries admitted with possible crohns flare.    -Cont low residue diet  -Off abx  -F/u MRI enterography  -Home meds  -Pain/nausea PRN

## 2018-03-22 NOTE — SIGNIFICANT EVENT
Patient lost IV access. Nurse, charge nurse, and line team unable to place access. Now patient refusing further sticks.   She has regular diet ordered and tolerating PO pills. Will switch IV abx  to PO to ensure patient does not miss dose.  MARILYN Miramontes MD   General Surgery- PGY1   [phone]

## 2018-03-22 NOTE — PROGRESS NOTES
[facility]  Colorectal Surgery  Progress Note    Patient Name: Carline Chang  MRN: [id number]  Admission Date: 3/17/2018  Hospital Length of Stay: 5 days  Attending Physician: Andre Uribe MD    Subjective:     Interval History:   NAEON. Tolerating regular diet some. No N/V. No issues with stoma. Ambulating and voiding.    Post-Op Info:  * No surgery found *          Medications:  Continuous Infusions:    Scheduled Meds:   azaTHIOprine  50 mg Oral Daily    enoxaparin  40 mg Subcutaneous Daily    pantoprozole (PROTONIX) 40 mg/100 mL D5W IVPB  40 mg Intravenous Daily    sodium chloride 0.9%  3 mL Intravenous Q8H    venlafaxine  37.5 mg Oral BID     PRN Meds:   acetaminophen    diphenhydrAMINE    hydrocodone-acetaminophen 10-325mg    meperidine    naloxone    ondansetron    promethazine (PHENERGAN) IVPB        Objective:     Vital Signs (Most Recent):  Temp: 98.3 °F (36.8 °C) (03/22/18 0846)  Pulse: 97 (03/22/18 0846)  Resp: 18 (03/22/18 0846)  BP: 125/70 (03/22/18 0846)  SpO2: 96 % (03/22/18 0846) Vital Signs (24h Range):  Temp:  [97.8 °F (36.6 °C)-98.9 °F (37.2 °C)] 98.3 °F (36.8 °C)  Pulse:  [86-97] 97  Resp:  [16-18] 18  SpO2:  [93 %-97 %] 96 %  BP: (104-125)/(56-79) 125/70     Intake/Output - Last 3 Shifts       03/20 0700 - 03/21 0659 03/21 0700 - 03/22 0659 03/22 0700 - 03/23 0659    P.O. 1440      I.V. (mL/kg) 9488.3 (162.5)      IV Piggyback 700      Total Intake(mL/kg) 86035.3 (199.1)      Urine (mL/kg/hr) 0 (0)      Stool 1100 (0.8) 100 (0.1)     Total Output 1100 100      Net +89261.3 -100             Urine Occurrence 8 x            Physical Exam   Constitutional: She is oriented to person, place, and time. She appears well-developed and well-nourished.   HENT:   Head: Normocephalic and atraumatic.   Eyes: EOM are normal.   Neck: Normal range of motion.   Cardiovascular: Normal rate and intact distal pulses.    Pulmonary/Chest: Effort normal. No respiratory  distress.   Abdominal: Soft. She exhibits no distension. There is no tenderness.   LLQ ostomy with liquid stool in bag   Musculoskeletal: Normal range of motion.   Neurological: She is alert and oriented to person, place, and time.   Psychiatric: She has a normal mood and affect.     Significant Labs:  BMP (Last 3 Results):     Recent Labs  Lab 03/20/18  0501 03/21/18  0557 03/22/18  0431   GLU 94 96 84    139 136   K 4.0 3.6 3.2*    107 105   CO2 22* 21* 20*   BUN <2* <2* 2*   CREATININE 0.7 0.8 0.8   CALCIUM 8.2* 8.5* 7.9*     CBC (Last 3 Results):     Recent Labs  Lab 03/20/18  0501 03/21/18  0557 03/22/18  0431   WBC 6.37 6.76 11.94   RBC 3.85* 4.10 3.96*   HGB 10.6* 11.5* 11.2*   HCT 32.5* 35.9* 34.0*    341 338   MCV 84 88 86   MCH 27.5 28.0 28.3   MCHC 32.6 32.0 32.9     Assessment/Plan:     * Crohn's disease    53 yo F hx of crohns multiple abdominal surgeries admitted with possible crohns flare.    -Cont low residue diet  -Off abx  -F/u MRI enterography  -Home meds  -Pain/nausea PRN              Randy Ortega, DO  Colorectal Surgery  [facility]    NPO for MRE, resume diet afterwards.    Kevin Young MD  Have seen and examined the patient with the fellow and agree with their plan.  ESPERANZA MADSEN

## 2018-03-22 NOTE — TREATMENT PLAN
Treatment Plan  03/22/2018  8:18 AM    Patient seen this morning and reporting some slight worsening over night in regards to nausea and abdominal pain.    Recommendations:  --Low residue diet instead of a regular diet, patient advised to chew food well  --Minimize narcotics, would recommend Tramadol for abdominal pain   --D/C antibiotics  --Continue Imuran  --Continue Lovenox  --MRE as inpatient  --Holding off on Remicade/Entocort/Colonoscopy at this time  --Please call with any additional questions    Kimo Chun M.D.  Gastroenterology Fellow, PGY-IV  Pager: [phone]  [facility]

## 2018-03-22 NOTE — SUBJECTIVE & OBJECTIVE
"  Subjective:     Interval History:   Patient feels that she is improving as she has noticed improvement in her nausea/emesis. Currently only reports some "quiziness". Also reports improvement in her abdominal pain as it is more of cramping at this point.    Review of Systems   Constitutional: Negative for chills, diaphoresis, fatigue and fever.   HENT: Negative for trouble swallowing.    Respiratory: Negative.    Cardiovascular: Negative.    Gastrointestinal: Positive for abdominal pain. Negative for nausea and vomiting.   Genitourinary: Negative.    Neurological: Negative.      Objective:     Vital Signs (Most Recent):  Temp: 98.9 °F (37.2 °C) (03/21/18 2343)  Pulse: 88 (03/21/18 2343)  Resp: 18 (03/21/18 2343)  BP: 119/79 (03/21/18 2343)  SpO2: 97 % (03/21/18 2343) Vital Signs (24h Range):  Temp:  [97 °F (36.1 °C)-98.9 °F (37.2 °C)] 98.9 °F (37.2 °C)  Pulse:  [75-95] 88  Resp:  [16-18] 18  SpO2:  [93 %-98 %] 97 %  BP: (104-125)/(56-79) 119/79     Weight: 58.4 kg (128 lb 12 oz) (03/17/18 2125)  Body mass index is 19.58 kg/m².      Intake/Output Summary (Last 24 hours) at 03/22/18 0035  Last data filed at 03/21/18 1046   Gross per 24 hour   Intake          1828.75 ml   Output              100 ml   Net          1728.75 ml       Lines/Drains/Airways     Drain                 Colostomy 01/15/18 0000 LLQ 65 days          Peripheral Intravenous Line                 Peripheral IV - Single Lumen 03/19/18 1606 Right Forearm 2 days                Physical Exam   Constitutional: She is oriented to person, place, and time. No distress.   HENT:   Head: Normocephalic and atraumatic.   Eyes: No scleral icterus.   Neck: Normal range of motion.   Cardiovascular: Normal rate and regular rhythm.    Pulmonary/Chest: Effort normal and breath sounds normal.   Abdominal: Soft. Bowel sounds are normal. She exhibits no distension. There is no tenderness.   Left sided ostomy bag   Musculoskeletal: She exhibits no edema.   Neurological: She " is alert and oriented to person, place, and time.   Skin: She is not diaphoretic.       Significant Labs:  CBC:   Recent Labs  Lab 03/20/18  0501 03/21/18  0557   WBC 6.37 6.76   HGB 10.6* 11.5*   HCT 32.5* 35.9*    341     CMP:   Recent Labs  Lab 03/21/18  0557   GLU 96   CALCIUM 8.5*      K 3.6   CO2 21*      BUN <2*   CREATININE 0.8     Coagulation: No results for input(s): PT, INR, APTT in the last 48 hours.      Significant Imaging:  Imaging results within the past 24 hours have been reviewed.

## 2018-03-22 NOTE — SUBJECTIVE & OBJECTIVE
Subjective:     Interval History:   NAEON. Tolerating regular diet some. No N/V. No issues with stoma. Ambulating and voiding.    Post-Op Info:  * No surgery found *          Medications:  Continuous Infusions:    Scheduled Meds:   azaTHIOprine  50 mg Oral Daily    enoxaparin  40 mg Subcutaneous Daily    pantoprozole (PROTONIX) 40 mg/100 mL D5W IVPB  40 mg Intravenous Daily    sodium chloride 0.9%  3 mL Intravenous Q8H    venlafaxine  37.5 mg Oral BID     PRN Meds:   acetaminophen    diphenhydrAMINE    hydrocodone-acetaminophen 10-325mg    meperidine    naloxone    ondansetron    promethazine (PHENERGAN) IVPB        Objective:     Vital Signs (Most Recent):  Temp: 98.3 °F (36.8 °C) (03/22/18 0846)  Pulse: 97 (03/22/18 0846)  Resp: 18 (03/22/18 0846)  BP: 125/70 (03/22/18 0846)  SpO2: 96 % (03/22/18 0846) Vital Signs (24h Range):  Temp:  [97.8 °F (36.6 °C)-98.9 °F (37.2 °C)] 98.3 °F (36.8 °C)  Pulse:  [86-97] 97  Resp:  [16-18] 18  SpO2:  [93 %-97 %] 96 %  BP: (104-125)/(56-79) 125/70     Intake/Output - Last 3 Shifts       03/20 0700 - 03/21 0659 03/21 0700 - 03/22 0659 03/22 0700 - 03/23 0659    P.O. 1440      I.V. (mL/kg) 9488.3 (162.5)      IV Piggyback 700      Total Intake(mL/kg) 51040.3 (199.1)      Urine (mL/kg/hr) 0 (0)      Stool 1100 (0.8) 100 (0.1)     Total Output 1100 100      Net +33910.3 -100             Urine Occurrence 8 x            Physical Exam   Constitutional: She is oriented to person, place, and time. She appears well-developed and well-nourished.   HENT:   Head: Normocephalic and atraumatic.   Eyes: EOM are normal.   Neck: Normal range of motion.   Cardiovascular: Normal rate and intact distal pulses.    Pulmonary/Chest: Effort normal. No respiratory distress.   Abdominal: Soft. She exhibits no distension. There is no tenderness.   LLQ ostomy with liquid stool in bag   Musculoskeletal: Normal range of motion.   Neurological: She is alert and oriented to person, place, and time.    Psychiatric: She has a normal mood and affect.     Significant Labs:  BMP (Last 3 Results):     Recent Labs  Lab 03/20/18  0501 03/21/18  0557 03/22/18  0431   GLU 94 96 84    139 136   K 4.0 3.6 3.2*    107 105   CO2 22* 21* 20*   BUN <2* <2* 2*   CREATININE 0.7 0.8 0.8   CALCIUM 8.2* 8.5* 7.9*     CBC (Last 3 Results):     Recent Labs  Lab 03/20/18  0501 03/21/18  0557 03/22/18  0431   WBC 6.37 6.76 11.94   RBC 3.85* 4.10 3.96*   HGB 10.6* 11.5* 11.2*   HCT 32.5* 35.9* 34.0*    341 338   MCV 84 88 86   MCH 27.5 28.0 28.3   MCHC 32.6 32.0 32.9

## 2018-03-22 NOTE — ASSESSMENT & PLAN NOTE
Normocytic anemia in the setting of CD with no bloody BM therefore could be secondary to AOCD    Recommendations:  --Monitor daily CBC

## 2018-03-22 NOTE — PLAN OF CARE
Problem: Patient Care Overview  Goal: Plan of Care Review  Outcome: Ongoing (interventions implemented as appropriate)  POC reviewed with patient who verbalized understanding. AAOX4. Pt O2 sats stable on RA. Left colostomy intact and patent. Pt has adequate U/O. Pt tolerating diet well. Pain being controlled with PRN pain medication. Pt remained free from falls throughout the shift. VSS. Will continue to monitor.

## 2018-03-22 NOTE — CONSULTS
Single lumen 18g x 8 cm midline placed left basilic vein. Max dwell date 4/20/18, Lot#[id number] .  Needle advanced into the vessel under real time ultrasound guidance.  Image recorded and saved.

## 2018-03-22 NOTE — ASSESSMENT & PLAN NOTE
52 y.o. 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    Patient has a long-standing history of Cd. Most recently after repair of her rectovaginal fistula she has had recurrent nausea, emesis, and abdominal pain which is unclear if from anastomosis or active disease. Symptoms have led to 2 admission including the current one. At this point she is improving but would recommend a repeat MRE. AS her last MRE was before she became symptomatic again. Then moving forward would most likely benefit from a repeat scope as well as a biologic +/- Entocort. We have added labs to the morning labs therefore plan to review those as well as see patient in the morning to give a definitive plan on colonoscopy and medication regimen.    Recommendations:  --Obtain daily CMP and pre-albumin X 1 (orders placed)  --F/U TPMT which is in process  --Repeat MRE  --?D/C antibiotics in AM  --Continue DVT prophyalxis  --Continue Imuran for now but are consider a ?Biologic in conjunction with Entecort for example  --?Repeat scope with IBD attending as outpatient

## 2018-03-22 NOTE — PROGRESS NOTES
"[facility]  Gastroenterology  Progress Note    Patient Name: Carline Chang  MRN: [id number]  Admission Date: 3/17/2018  Hospital Length of Stay: 5 days  Code Status: Full Code   Attending Provider: Andre Uribe MD  Consulting Provider: Kimo Chun MD  Primary Care Physician: Pablo Medina MD  Principal Problem: Crohn's disease      Subjective:   HPI:  52 y.o. 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    Patient recalls having symptoms of partial SBO attributed to a virus in 1982.  In 1985 she had ongoing symptoms and was told she had a "nervous stomach"  Then in 1986 around the age of 20 yo she had UGI showing multiple stomach ulcers and given zantac and also had nausea/vomitindg and diarrhea along with abdominal pain. She had UGI with SBFT which showed Crohn's disease. To patient's knowledge there was no tissue diagnosis. She tried various medical therapies including asacol, pentasa, azulfidine, imuran and prednisone all of which were ineffective.  She did not achieve remission with any of these agents to her knowledge. The prednisone was intermittently the most effective. Per patient in 1995 she had obstructive symptoms and had strictures as well as SB fistula requiring surgery at which time she recalls about 10 inches of intestine being resected. She was under the care of Dr. Hurd from 1995 to 2006 and recalls getting a colonoscopy in 2003 which was normal.  She then was seeing Dr. Boland and recalls having CT/SBFT showing some Crohn's disease. Again she was on no medications from 1995 to 2009.  She then developed abscess in 2009 which required drainage and subsequent small bowel resection, she believes only 'a few inches' at [facility]. She was started on remicade after this but was only on remicade for 3 doses then lost insurance. She did well for 2 years of medications she thinks.     Then 6/2011, she had worsening sypmtoms that progressively worsened despite trials of steroids, pentasa, imuran and flagyl, and she developed rectovaginal fistula in 2014. Underwent loop sigmoid colostomy in 2015.     She was started on humira 12/2015 with follow up colonoscopy in 05/2016 which showed inflammation (unsure as to what area). She did well on Humira until flare in 1/2017. She admits that a few of her humira doses may not have completely been injected correctly, but she believes the majority were. She was taking humira 40mg q 2 weeks. Her last dose was 1/12/17 and told that it is not working for her (not sure why, possibly due to continued flares as she was admitted on 1/15/17). Humira levels/abs were done at this time to see why she was failing the Humira.     [facility] from 1/15/17 for an acute Crohn's flare. An EGD (1/17/17) and colonoscopy (1/17/17) were performed while inpatient which were both WNL though the neoterminal ileum was not intubated. The patient was started on a steroid taper per GI recs (plans to start Entyvio as outpatient but never did) and was initially scheduled to be discharged 1/18 when she developed acute right flank pain. CT showed a 5mm right mid ureteral stone. Urology was consulted at that time and requested outpatient follow up. By discharge day, pain was controlled with oral pain medication, patient was tolerating a regular diet, VSS, afebrile, and physical activity at baseline.    After this admission was never started on Entyvio but was kept on daily Imuran. She had rectovaginal fistula repair 01/2018. Since then, she continued to have symptoms (consisting of nausea, vomiting,abdominal pain) which led to an admission from 2/3-2/9 and current admission. During the 2/3-2/9 admission she had a  colonoscopy and MRE which showed small bowel and colonic disease. During this admission GI didn't feel she had active CD. During this admission she presented on 3/17 and has improved with bowel rest/antibiotics. She did not require NGT.    Pertinent surgeries:  Small bowel resection in 1995 (approx 10 inches) at [facility]  Small bowel resection (~15 inches per patient report) in 2009 at [facility]  Sigmoid loop colostomy in June 2015 at [facility]  RV fistula repair January 2018    Pertinent GI Procedures/Imaging:  Scope prior to 2016 not available  Colonoscopy 5/27/16: Colonoscopy findings:Inflammation characterized by congestion (edema), erythema and scarring was found. The anus and the rectum were spared. This was mild in severity, and when compared to previous examinations, the findings are improved. The colon (entire examined portion) appeared normal.   EGD 1/17/2017: The esophagus was normal. The stomach was normal. The cardia and gastric fundus were normal on retroflexion. The examined duodenum was normal. Normal esophagus. Normal stomach. Normal examined duodenum.   Colonoscopy 1/17/2017: .A small fistula opening was found in the rectum. The rectal remnant appeared normal. No stricture noted. The entire examined colon is normal. Colonic fistula in rectum, appears closed. Biopsies were taken with a cold forceps from the entire colon for evaluation of microscopic colitis.   Colonoscopy 2/2018: minimal inflammation. Biopsies normal.   MRE x 2/4/2018: Small bowel and colonic disease  MRE x 3/6/2018: Small bowel inflammation and upstream dilation, some small bowel stenosis.     Current Medications:   Azathioprine 50 mg daily (started 1/2017)     Failed/Previous Medications:  -Asacol - ineffective  -Pentasa- ineffective  -Azulfidine - ineffective  -Azathioprine-on and off, no significant side effects  - Remicade - 3 infusions 2009 then lost insurance  - Humira 40 mg q 2 weeks in [id number]- ineffective    Interval History:  "  Patient feels that she is improving as she has noticed improvement in her nausea/emesis. Currently only reports some "quiziness". Also reports improvement in her abdominal pain as it is more of cramping at this point.    Review of Systems   Constitutional: Negative for chills, diaphoresis, fatigue and fever.   HENT: Negative for trouble swallowing.    Respiratory: Negative.    Cardiovascular: Negative.    Gastrointestinal: Positive for abdominal pain. Negative for nausea and vomiting.   Genitourinary: Negative.    Neurological: Negative.      Objective:     Vital Signs (Most Recent):  Temp: 98.9 °F (37.2 °C) (03/21/18 2343)  Pulse: 88 (03/21/18 2343)  Resp: 18 (03/21/18 2343)  BP: 119/79 (03/21/18 2343)  SpO2: 97 % (03/21/18 2343) Vital Signs (24h Range):  Temp:  [97 °F (36.1 °C)-98.9 °F (37.2 °C)] 98.9 °F (37.2 °C)  Pulse:  [75-95] 88  Resp:  [16-18] 18  SpO2:  [93 %-98 %] 97 %  BP: (104-125)/(56-79) 119/79     Weight: 58.4 kg (128 lb 12 oz) (03/17/18 2125)  Body mass index is 19.58 kg/m².      Intake/Output Summary (Last 24 hours) at 03/22/18 0035  Last data filed at 03/21/18 1046   Gross per 24 hour   Intake          1828.75 ml   Output              100 ml   Net          1728.75 ml       Lines/Drains/Airways     Drain                 Colostomy 01/15/18 0000 LLQ 65 days          Peripheral Intravenous Line                 Peripheral IV - Single Lumen 03/19/18 1606 Right Forearm 2 days                Physical Exam   Constitutional: She is oriented to person, place, and time. No distress.   HENT:   Head: Normocephalic and atraumatic.   Eyes: No scleral icterus.   Neck: Normal range of motion.   Cardiovascular: Normal rate and regular rhythm.    Pulmonary/Chest: Effort normal and breath sounds normal.   Abdominal: Soft. Bowel sounds are normal. She exhibits no distension. There is no tenderness.   Left sided ostomy bag   Musculoskeletal: She exhibits no edema.   Neurological: She is alert and oriented to person, " place, and time.   Skin: She is not diaphoretic.       Significant Labs:  CBC:   Recent Labs  Lab 03/20/18  0501 03/21/18  0557   WBC 6.37 6.76   HGB 10.6* 11.5*   HCT 32.5* 35.9*    341     CMP:   Recent Labs  Lab 03/21/18  0557   GLU 96   CALCIUM 8.5*      K 3.6   CO2 21*      BUN <2*   CREATININE 0.8     Coagulation: No results for input(s): PT, INR, APTT in the last 48 hours.      Significant Imaging:  Imaging results within the past 24 hours have been reviewed.    Assessment/Plan:     * Crohn's disease    52 y.o. 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    Patient has a long-standing history of Crohns. Most recently after repair of her rectovaginal fistula she has had recurrent nausea, emesis, and abdominal pain which is unclear if from anastomosis or active disease. Symptoms have led to 2 admission including the current one. At this point she is improving but would recommend a repeat MRE. AS her last MRE was before she became symptomatic again. Then moving forward would most likely benefit from a repeat scope as well as a biologic +/- Entocort. We have added labs to the morning labs therefore plan to review those as well as see patient in the morning to give a definitive plan on colonoscopy and medication regimen.    The last colonoscopy was normal but based on MRE it is possible there is a proximal SB segment that was not reached by scope that has disease activity. CRP is elevated and will be monitored but not sure if it is also higher due to stress for acute bowel obstruction    Recommendations:  --Obtain daily CMP and pre-albumin X 1 (orders placed)  --F/U TPMT which is in process  --Repeat MRE inpatient  - Outpatient colonoscopy by Dr. Ama Macedo  residue diet though okay to advance diet after MRE  - will hold off on treatment change until colonoscopy done so it does not influence colonoscopy results- pt needs to be on a biologic likely stelara will be our plan  --D/C antibiotics   --Continue DVT prophyalxis  --Continue Imuran for now           Normocytic anemia    Normocytic anemia in the setting of CD with no bloody BM therefore could be secondary to AOCD    Recommendations:  --Monitor daily CBC            Thank you for your consult. I will follow-up with patient. Please contact us if you have any additional questions.    Kimo Chun M.D.  Gastroenterology Fellow, PGY-IV  Pager: [phone]  [facility]    I have reviewed and concur with the fellow's history, physical, assessment, and plan.  I have personally interviewed and examined the patient at bedside.    Elmer Serrato MD   Gastroenterology attending   Department of Gastroenterology

## 2018-03-23 ENCOUNTER — ANESTHESIA EVENT (OUTPATIENT)
Dept: ENDOSCOPY | Facility: HOSPITAL | Age: 53
DRG: 387 | End: 2018-03-23
Payer: MEDICARE

## 2018-03-23 DIAGNOSIS — K50.812 CROHN'S DISEASE OF BOTH SMALL AND LARGE INTESTINE WITH INTESTINAL OBSTRUCTION: Primary | ICD-10-CM

## 2018-03-23 PROBLEM — K50.113 CROHN'S COLITIS, WITH FISTULA: Status: RESOLVED | Noted: 2018-01-10 | Resolved: 2018-03-23

## 2018-03-23 LAB
ALBUMIN SERPL BCP-MCNC: 2.3 G/DL
ALP SERPL-CCNC: 147 U/L
ALT SERPL W/O P-5'-P-CCNC: <5 U/L
ANION GAP SERPL CALC-SCNC: 9 MMOL/L
AST SERPL-CCNC: 11 U/L
BASOPHILS # BLD AUTO: 0.07 K/UL
BASOPHILS NFR BLD: 0.8 %
BILIRUB SERPL-MCNC: 0.2 MG/DL
BUN SERPL-MCNC: 4 MG/DL
CALCIUM SERPL-MCNC: 8.1 MG/DL
CHLORIDE SERPL-SCNC: 109 MMOL/L
CO2 SERPL-SCNC: 18 MMOL/L
CREAT SERPL-MCNC: 0.7 MG/DL
DIFFERENTIAL METHOD: ABNORMAL
EOSINOPHIL # BLD AUTO: 0.3 K/UL
EOSINOPHIL NFR BLD: 3.1 %
ERYTHROCYTE [DISTWIDTH] IN BLOOD BY AUTOMATED COUNT: 14.6 %
EST. GFR  (AFRICAN AMERICAN): >60 ML/MIN/1.73 M^2
EST. GFR  (NON AFRICAN AMERICAN): >60 ML/MIN/1.73 M^2
GLUCOSE SERPL-MCNC: 77 MG/DL
HCT VFR BLD AUTO: 40.5 %
HGB BLD-MCNC: 12 G/DL
IMM GRANULOCYTES # BLD AUTO: 0.08 K/UL
IMM GRANULOCYTES NFR BLD AUTO: 0.9 %
LYMPHOCYTES # BLD AUTO: 0.8 K/UL
LYMPHOCYTES NFR BLD: 9.9 %
MCH RBC QN AUTO: 27.8 PG
MCHC RBC AUTO-ENTMCNC: 29.6 G/DL
MCV RBC AUTO: 94 FL
MONOCYTES # BLD AUTO: 1 K/UL
MONOCYTES NFR BLD: 11.2 %
NEUTROPHILS # BLD AUTO: 6.3 K/UL
NEUTROPHILS NFR BLD: 74.1 %
NRBC BLD-RTO: 0 /100 WBC
PLATELET # BLD AUTO: 355 K/UL
PMV BLD AUTO: 8.6 FL
POTASSIUM SERPL-SCNC: 4.2 MMOL/L
PROT SERPL-MCNC: 6 G/DL
RBC # BLD AUTO: 4.32 M/UL
SODIUM SERPL-SCNC: 136 MMOL/L
WBC # BLD AUTO: 8.45 K/UL

## 2018-03-23 PROCEDURE — 63600175 PHARM REV CODE 636 W HCPCS: Performed by: STUDENT IN AN ORGANIZED HEALTH CARE EDUCATION/TRAINING PROGRAM

## 2018-03-23 PROCEDURE — 99024 POSTOP FOLLOW-UP VISIT: CPT | Mod: ,,, | Performed by: COLON & RECTAL SURGERY

## 2018-03-23 PROCEDURE — 25000003 PHARM REV CODE 250: Performed by: STUDENT IN AN ORGANIZED HEALTH CARE EDUCATION/TRAINING PROGRAM

## 2018-03-23 PROCEDURE — 80053 COMPREHEN METABOLIC PANEL: CPT

## 2018-03-23 PROCEDURE — A4216 STERILE WATER/SALINE, 10 ML: HCPCS | Performed by: STUDENT IN AN ORGANIZED HEALTH CARE EDUCATION/TRAINING PROGRAM

## 2018-03-23 PROCEDURE — 63600175 PHARM REV CODE 636 W HCPCS: Performed by: COLON & RECTAL SURGERY

## 2018-03-23 PROCEDURE — 85025 COMPLETE CBC W/AUTO DIFF WBC: CPT

## 2018-03-23 PROCEDURE — 94761 N-INVAS EAR/PLS OXIMETRY MLT: CPT

## 2018-03-23 PROCEDURE — 63600175 PHARM REV CODE 636 W HCPCS: Performed by: NURSE PRACTITIONER

## 2018-03-23 PROCEDURE — 36415 COLL VENOUS BLD VENIPUNCTURE: CPT

## 2018-03-23 PROCEDURE — 20600001 HC STEP DOWN PRIVATE ROOM

## 2018-03-23 RX ORDER — POLYETHYLENE GLYCOL 3350, SODIUM SULFATE ANHYDROUS, SODIUM BICARBONATE, SODIUM CHLORIDE, POTASSIUM CHLORIDE 236; 22.74; 6.74; 5.86; 2.97 G/4L; G/4L; G/4L; G/4L; G/4L
4000 POWDER, FOR SOLUTION ORAL ONCE
Status: COMPLETED | OUTPATIENT
Start: 2018-03-23 | End: 2018-03-23

## 2018-03-23 RX ORDER — MEPERIDINE HYDROCHLORIDE 50 MG/ML
50 INJECTION INTRAMUSCULAR; INTRAVENOUS; SUBCUTANEOUS EVERY 4 HOURS PRN
Status: DISCONTINUED | OUTPATIENT
Start: 2018-03-23 | End: 2018-03-24 | Stop reason: HOSPADM

## 2018-03-23 RX ORDER — BUDESONIDE 3 MG/1
9 CAPSULE, COATED PELLETS ORAL DAILY
Qty: 90 CAPSULE | Refills: 2 | Status: SHIPPED | OUTPATIENT
Start: 2018-03-23 | End: 2018-03-24 | Stop reason: HOSPADM

## 2018-03-23 RX ADMIN — MEPERIDINE HYDROCHLORIDE 25 MG: 50 INJECTION INTRAMUSCULAR; INTRAVENOUS; SUBCUTANEOUS at 11:03

## 2018-03-23 RX ADMIN — Medication 3 ML: at 06:03

## 2018-03-23 RX ADMIN — ONDANSETRON 8 MG: 2 INJECTION, SOLUTION INTRAMUSCULAR; INTRAVENOUS at 04:03

## 2018-03-23 RX ADMIN — MEPERIDINE HYDROCHLORIDE 25 MG: 50 INJECTION INTRAMUSCULAR; INTRAVENOUS; SUBCUTANEOUS at 06:03

## 2018-03-23 RX ADMIN — POLYETHYLENE GLYCOL 3350, SODIUM SULFATE ANHYDROUS, SODIUM BICARBONATE, SODIUM CHLORIDE, POTASSIUM CHLORIDE 4000 ML: 236; 22.74; 6.74; 5.86; 2.97 POWDER, FOR SOLUTION ORAL at 05:03

## 2018-03-23 RX ADMIN — ONDANSETRON 8 MG: 2 INJECTION, SOLUTION INTRAMUSCULAR; INTRAVENOUS at 01:03

## 2018-03-23 RX ADMIN — PROMETHAZINE HYDROCHLORIDE 6.25 MG: 25 INJECTION INTRAMUSCULAR; INTRAVENOUS at 09:03

## 2018-03-23 RX ADMIN — HYDROCODONE BITARTRATE AND ACETAMINOPHEN 1 TABLET: 10; 325 TABLET ORAL at 08:03

## 2018-03-23 RX ADMIN — VENLAFAXINE 37.5 MG: 37.5 TABLET ORAL at 09:03

## 2018-03-23 RX ADMIN — MEPERIDINE HYDROCHLORIDE 50 MG: 50 INJECTION INTRAMUSCULAR; INTRAVENOUS; SUBCUTANEOUS at 04:03

## 2018-03-23 RX ADMIN — HYDROCODONE BITARTRATE AND ACETAMINOPHEN 1 TABLET: 10; 325 TABLET ORAL at 07:03

## 2018-03-23 RX ADMIN — Medication 3 ML: at 09:03

## 2018-03-23 RX ADMIN — MEPERIDINE HYDROCHLORIDE 50 MG: 50 INJECTION INTRAMUSCULAR; INTRAVENOUS; SUBCUTANEOUS at 10:03

## 2018-03-23 RX ADMIN — MEPERIDINE HYDROCHLORIDE 25 MG: 50 INJECTION INTRAMUSCULAR; INTRAVENOUS; SUBCUTANEOUS at 01:03

## 2018-03-23 RX ADMIN — AZATHIOPRINE 50 MG: 50 TABLET ORAL at 09:03

## 2018-03-23 NOTE — ANESTHESIA PREPROCEDURE EVALUATION
2018  Carline Chang is a 52 y.o., female with crohn's disease s/p many abdominal surgeries, admitted with emesis/ increased nausea, decreased liquid ostomy output and worsened abdominal cramping. Patient is now scheduled for the procedure below.    Pre-operative evaluation for COLONOSCOPY (N/A)      LDA:  Midline L arm    Previous Airway:   Present Prior to Hospital Arrival?: No; Placement Date: 01/10/18; Placement Time: 0832; Method of Intubation: Direct laryngoscopy; Inserted by: Anesthesia MD; Airway Device: Endotracheal Tube; Mask Ventilation: Easy; Intubated: Postinduction; Blade: Little #2; Airway Device Size: 7.5; Style: Cuffed; Cuff Inflation: Minimal occlusive pressure; Inflation Amount: 6; Placement Verified By: Auscultation, Capnometry; Grade: Grade I; Complicating Factors: None; Intubation Findings: Positive EtCO2, Bilateral breath sounds, Atraumatic/Condition of teeth unchanged;  Depth of Insertion: 22; Securment: Lips; Complications: None; Breath Sounds: Equal Bilateral; Insertion Attempts: 1; Removal Date: 01/10/18;  Removal Time: 1044    Drips:      Past Surgical History:   Procedure Laterality Date    ABDOMINAL SURGERY      anal fistula      BOWEL RESECTION      x2     SECTION      x2    CHOLECYSTECTOMY      COLON SURGERY      COLONOSCOPY N/A 2016    Procedure: COLONOSCOPY;  Surgeon: Andre Uribe MD;  Location: [address]);  Service: Endoscopy;  Laterality: N/A;    COLONOSCOPY N/A 2017    Procedure: COLONOSCOPY;  Surgeon: Dany Stock MD;  Location: [address]);  Service: Endoscopy;  Laterality: N/A;    COLONOSCOPY N/A 2017    Procedure: COLONOSCOPY;  Surgeon: Andre Uribe MD;  Location: [address]);  Service: Endoscopy;  Laterality: N/A;    COLONOSCOPY N/A 2018    Procedure: COLONOSCOPY;  Surgeon: Andre  ARCADIO Uribe MD;  [facility]);  Service: Endoscopy;  Laterality: N/A;    COLOSTOMY      rectal abscess drain      TUBAL LIGATION           Vital Signs Range (Last 24H):  Temp:  [36.2 °C (97.1 °F)-37.1 °C (98.7 °F)]   Pulse:  [83-98]   Resp:  [16-18]   BP: ()/(53-64)   SpO2:  [97 %-99 %]       CBC:     Recent Labs  Lab 02/26/18  0630 03/18/18  0033 03/18/18  0505 03/19/18  0427 03/20/18  0501 03/21/18  0557 03/22/18  0431 03/23/18  0554   WBC 8.9 6.64 5.99 4.14 6.37 6.76 11.94 8.45   RBC 4.27 4.28 3.89* 3.61* 3.85* 4.10 3.96* 4.32   HGB 12.0 12.0 10.9* 10.1* 10.6* 11.5* 11.2* 12.0   HCT 36.3 35.3* 31.8* 30.8* 32.5* 35.9* 34.0* 40.5    275 270 256 261 341 338 355*   MCV 85.0 83 82 85 84 88 86 94   MCH 28.1 28.0 28.0 28.0 27.5 28.0 28.3 27.8   MCHC 33.1 34.0 34.3 32.8 32.6 32.0 32.9 29.6*       CMP:   Recent Labs  Lab 02/26/18  0630 03/06/18  1518 03/18/18  0033 03/18/18  0505 03/19/18  0427 03/20/18  0501 03/21/18  0557 03/22/18  0431 03/23/18  0554    137 134* 134* 135* 137 139 136 136   K 3.1* 3.6 3.2* 3.1* 3.8 4.0 3.6 3.2* 4.2   CL 95* 100 98 98 101 105 107 105 109   CO2 31.0 21* 25 25 23 22* 21* 20* 18*   BUN 11 13 6 5* 3* <2* <2* 2* 4*   CREATININE 0.87 0.9 0.7 0.7 0.7 0.7 0.8 0.8 0.7   GLU 82 76 105 93 67* 94 96 84 77   MG 1.2* 1.0*  --   --   --   --   --   --   --    CALCIUM 8.7 9.1 8.5* 7.9* 8.2* 8.2* 8.5* 7.9* 8.1*   ALBUMIN  --   --  2.4*  --   --   --   --  2.5* 2.3*   PROT  --   --  6.7  --   --   --   --  6.1 6.0   ALKPHOS  --   --  194*  --   --   --   --  153* 147*   ALT  --   --  9*  --   --   --   --  5* <5*   AST  --   --  14  --   --   --   --  9* 11   BILITOT  --   --  0.6  --   --   --   --  0.2 0.2       INR:    Recent Labs  Lab 03/18/18  0033   INR 1.2   APTT 27.7         Diagnostic Studies:      EKG: none on file      2D Echo: none on file        Anesthesia Evaluation      I have reviewed the Medications.   Steroids Taken In Past Year:  "Prednisone    Review of Systems  Anesthesia Hx:  No problems with previous Anesthesia Pt. is an "extremely hardstick" and requires anesthesia for IV. Pt. states she usually gets a central line when hospitalized. History of prior surgery of interest to airway management or planning: Previous anesthesia: General 12/4/17: Colonoscpy with general anesthesia. Procedure performed at an [facility]. Denies Family Hx of Anesthesia complications.   Denies Personal Hx of Anesthesia complications.   Social:  Denies Tobacco Use. Denies Alcohol Use.   Hematology/Oncology:         -- Anemia:   EENT/Dental:EENT/Dental Normal   Cardiovascular:  Cardiovascular Normal   Functional Capacity can climb two flight of stairs: Denies CP/SOB    Pulmonary:  Pulmonary Normal    Renal/:   renal calculi    Hepatic/GI:   Rectovaginal fistula Hepatic/GI Symptoms: (TUMS prn) heartburn.  Bowel Conditions:  Inflammatory Bowel Disease, Crohns    Musculoskeletal:  Musculoskeletal Normal    Neurological:   Peripheral Neuropathy    Endocrine:  Endocrine Normal        Physical Exam  General:  Well nourished    Airway/Jaw/Neck:  Airway Findings: Mouth Opening: Normal Tongue: Normal  General Airway Assessment: Adult  Mallampati: II  TM Distance: Normal, at least 6 cm  Jaw/Neck Findings:     Neck ROM: Normal ROM      Dental:  Dental Findings: In tact   Chest/Lungs:  Chest/Lungs Findings: Clear to auscultation, Normal Respiratory Rate     Heart/Vascular:  Heart Findings: Rate: Normal  Rhythm: Regular Rhythm  Sounds: Normal        Mental Status:  Mental Status Findings:  Cooperative, Alert and Oriented         Anesthesia Plan  Type of Anesthesia, risks & benefits discussed:  Anesthesia Type:  MAC, general  Patient's Preference:   Intra-op Monitoring Plan: standard ASA monitors  Intra-op Monitoring Plan Comments:   Post Op Pain Control Plan: per primary service following discharge from PACU  Post Op Pain Control Plan Comments:   Induction:   IV  Beta " Blocker:         Informed Consent: Patient understands risks and agrees with Anesthesia plan.  Questions answered. Anesthesia consent signed with patient.  ASA Score: 3     Day of Surgery Review of History & Physical:    H&P update referred to the provider.         Ready For Surgery From Anesthesia Perspective.

## 2018-03-23 NOTE — SUBJECTIVE & OBJECTIVE
Subjective:     Interval History:     NAEON. States oxycodone not working well for her crampy abdominal pain, wants to try hydrocodone. No issues with stoma. Tolerating diet without nausea or vomiting. Ambulating, voiding.    Post-Op Info:  * No surgery found *          Medications:  Continuous Infusions:  Scheduled Meds:   azaTHIOprine  50 mg Oral Daily    enoxaparin  40 mg Subcutaneous Daily    sodium chloride 0.9%  3 mL Intravenous Q8H    venlafaxine  37.5 mg Oral BID     PRN Meds:   acetaminophen    barium    diphenhydrAMINE    hydrocodone-acetaminophen 10-325mg    meperidine    naloxone    ondansetron    promethazine (PHENERGAN) IVPB        Objective:     Vital Signs (Most Recent):  Temp: 97.9 °F (36.6 °C) (03/23/18 1144)  Pulse: 89 (03/23/18 1144)  Resp: 18 (03/23/18 1144)  BP: (!) 99/53 (03/23/18 1144)  SpO2: 98 % (03/23/18 1144) Vital Signs (24h Range):  Temp:  [97.2 °F (36.2 °C)-98.7 °F (37.1 °C)] 97.9 °F (36.6 °C)  Pulse:  [] 89  Resp:  [16-18] 18  SpO2:  [97 %-99 %] 98 %  BP: ()/(53-69) 99/53     Intake/Output - Last 3 Shifts       03/21 0700 - 03/22 0659 03/22 0700 - 03/23 0659 03/23 0700 - 03/24 0659    P.O.       I.V. (mL/kg)       IV Piggyback  50     Total Intake(mL/kg)  50 (0.9)     Urine (mL/kg/hr)  0 (0)     Stool 100 (0.1) 200 (0.1)     Total Output 100 200      Net -100 -150             Urine Occurrence  1 x           Physical Exam   Constitutional: She is oriented to person, place, and time. She appears well-developed and well-nourished.   HENT:   Head: Normocephalic and atraumatic.   Eyes: EOM are normal.   Neck: Normal range of motion.   Cardiovascular: Normal rate and intact distal pulses.    Pulmonary/Chest: Effort normal. No respiratory distress.   Abdominal: Soft. She exhibits no distension. There is no tenderness.   LLQ ostomy with liquid stool in bag   Musculoskeletal: Normal range of motion.   Neurological: She is alert and oriented to person, place, and time.    Psychiatric: She has a normal mood and affect.     Significant Labs:  BMP (Last 3 Results):   Recent Labs  Lab 03/21/18  0557 03/22/18  0431 03/23/18  0554   GLU 96 84 77    136 136   K 3.6 3.2* 4.2    105 109   CO2 21* 20* 18*   BUN <2* 2* 4*   CREATININE 0.8 0.8 0.7   CALCIUM 8.5* 7.9* 8.1*     CBC (Last 3 Results):   Recent Labs  Lab 03/21/18  0557 03/22/18  0431 03/23/18  0554   WBC 6.76 11.94 8.45   RBC 4.10 3.96* 4.32   HGB 11.5* 11.2* 12.0   HCT 35.9* 34.0* 40.5    338 355*   MCV 88 86 94   MCH 28.0 28.3 27.8   MCHC 32.0 32.9 29.6*

## 2018-03-23 NOTE — PLAN OF CARE
Problem: Patient Care Overview  Goal: Plan of Care Review  Outcome: Ongoing (interventions implemented as appropriate)  Pt in bed resting. C/o pain relieved with PRN pain med. In no acute distress. Activity promoted. Up ad kenneth. Ambulated in childress. No falls noted. Will continue to monitor. Call bell intact and in reach.

## 2018-03-23 NOTE — PROGRESS NOTES
[facility]  Colorectal Surgery  Progress Note    Patient Name: Carline Chang  MRN: [id number]  Admission Date: 3/17/2018  Hospital Length of Stay: 6 days  Attending Physician: Andre Uribe MD    Subjective:     Interval History:     NAEON. States oxycodone not working well for her crampy abdominal pain, wants to try hydrocodone. No issues with stoma. Tolerating diet without nausea or vomiting. Ambulating, voiding.    Post-Op Info:  * No surgery found *          Medications:  Continuous Infusions:  Scheduled Meds:   azaTHIOprine  50 mg Oral Daily    enoxaparin  40 mg Subcutaneous Daily    sodium chloride 0.9%  3 mL Intravenous Q8H    venlafaxine  37.5 mg Oral BID     PRN Meds:   acetaminophen    barium    diphenhydrAMINE    hydrocodone-acetaminophen 10-325mg    meperidine    naloxone    ondansetron    promethazine (PHENERGAN) IVPB        Objective:     Vital Signs (Most Recent):  Temp: 97.9 °F (36.6 °C) (03/23/18 1144)  Pulse: 89 (03/23/18 1144)  Resp: 18 (03/23/18 1144)  BP: (!) 99/53 (03/23/18 1144)  SpO2: 98 % (03/23/18 1144) Vital Signs (24h Range):  Temp:  [97.2 °F (36.2 °C)-98.7 °F (37.1 °C)] 97.9 °F (36.6 °C)  Pulse:  [] 89  Resp:  [16-18] 18  SpO2:  [97 %-99 %] 98 %  BP: ()/(53-69) 99/53     Intake/Output - Last 3 Shifts       03/21 0700 - 03/22 0659 03/22 0700 - 03/23 0659 03/23 0700 - 03/24 0659    P.O.       I.V. (mL/kg)       IV Piggyback  50     Total Intake(mL/kg)  50 (0.9)     Urine (mL/kg/hr)  0 (0)     Stool 100 (0.1) 200 (0.1)     Total Output 100 200      Net -100 -150             Urine Occurrence  1 x           Physical Exam   Constitutional: She is oriented to person, place, and time. She appears well-developed and well-nourished.   HENT:   Head: Normocephalic and atraumatic.   Eyes: EOM are normal.   Neck: Normal range of motion.   Cardiovascular: Normal rate and intact distal pulses.    Pulmonary/Chest: Effort normal. No respiratory  distress.   Abdominal: Soft. She exhibits no distension. There is no tenderness.   LLQ ostomy with liquid stool in bag   Musculoskeletal: Normal range of motion.   Neurological: She is alert and oriented to person, place, and time.   Psychiatric: She has a normal mood and affect.     Significant Labs:  BMP (Last 3 Results):   Recent Labs  Lab 03/21/18  0557 03/22/18  0431 03/23/18  0554   GLU 96 84 77    136 136   K 3.6 3.2* 4.2    105 109   CO2 21* 20* 18*   BUN <2* 2* 4*   CREATININE 0.8 0.8 0.7   CALCIUM 8.5* 7.9* 8.1*     CBC (Last 3 Results):   Recent Labs  Lab 03/21/18  0557 03/22/18  0431 03/23/18  0554   WBC 6.76 11.94 8.45   RBC 4.10 3.96* 4.32   HGB 11.5* 11.2* 12.0   HCT 35.9* 34.0* 40.5    338 355*   MCV 88 86 94   MCH 28.0 28.3 27.8   MCHC 32.0 32.9 29.6*     Assessment/Plan:     * Crohn's disease    53 yo F hx of crohns multiple abdominal surgeries admitted with possible crohns flare.    -MRE done yesterday with active disease at terminal ileum with upstream bowel dilation.  -Spoke with GI fellow, plan for scope tomorrow.  -Continue medications per GI recommendations.              Kevin Young MD  Colorectal Surgery  [facility]  Have seen and examined the patient with the fellow and agree with their plan.  ESPERANZA MADSEN

## 2018-03-23 NOTE — ASSESSMENT & PLAN NOTE
53 yo F hx of crohns multiple abdominal surgeries admitted with possible crohns flare.    -MRE done yesterday with active disease at terminal ileum with upstream bowel dilation.  -Spoke with GI fellow, plan for scope tomorrow.  -Continue medications per GI recommendations.

## 2018-03-24 ENCOUNTER — ANESTHESIA (OUTPATIENT)
Dept: ENDOSCOPY | Facility: HOSPITAL | Age: 53
DRG: 387 | End: 2018-03-24
Payer: MEDICARE

## 2018-03-24 ENCOUNTER — SURGERY (OUTPATIENT)
Age: 53
End: 2018-03-24

## 2018-03-24 VITALS
DIASTOLIC BLOOD PRESSURE: 65 MMHG | RESPIRATION RATE: 14 BRPM | BODY MASS INDEX: 19.51 KG/M2 | WEIGHT: 128.75 LBS | OXYGEN SATURATION: 96 % | SYSTOLIC BLOOD PRESSURE: 104 MMHG | TEMPERATURE: 98 F | HEIGHT: 68 IN | HEART RATE: 72 BPM

## 2018-03-24 LAB
ALBUMIN SERPL BCP-MCNC: 2.4 G/DL
ALP SERPL-CCNC: 140 U/L
ALT SERPL W/O P-5'-P-CCNC: <5 U/L
ANION GAP SERPL CALC-SCNC: 8 MMOL/L
AST SERPL-CCNC: 7 U/L
BASOPHILS # BLD AUTO: 0.04 K/UL
BASOPHILS NFR BLD: 0.4 %
BILIRUB SERPL-MCNC: 0.2 MG/DL
BUN SERPL-MCNC: 7 MG/DL
CALCIUM SERPL-MCNC: 8.1 MG/DL
CHLORIDE SERPL-SCNC: 105 MMOL/L
CO2 SERPL-SCNC: 25 MMOL/L
CREAT SERPL-MCNC: 0.8 MG/DL
CRP SERPL-MCNC: 31.7 MG/L
DIFFERENTIAL METHOD: ABNORMAL
EOSINOPHIL # BLD AUTO: 0.2 K/UL
EOSINOPHIL NFR BLD: 2.5 %
ERYTHROCYTE [DISTWIDTH] IN BLOOD BY AUTOMATED COUNT: 14.3 %
EST. GFR  (AFRICAN AMERICAN): >60 ML/MIN/1.73 M^2
EST. GFR  (NON AFRICAN AMERICAN): >60 ML/MIN/1.73 M^2
GLUCOSE SERPL-MCNC: 96 MG/DL
HCT VFR BLD AUTO: 33.5 %
HGB BLD-MCNC: 11.1 G/DL
IMM GRANULOCYTES # BLD AUTO: 0.06 K/UL
IMM GRANULOCYTES NFR BLD AUTO: 0.6 %
LYMPHOCYTES # BLD AUTO: 0.8 K/UL
LYMPHOCYTES NFR BLD: 8.2 %
MCH RBC QN AUTO: 28 PG
MCHC RBC AUTO-ENTMCNC: 33.1 G/DL
MCV RBC AUTO: 84 FL
MONOCYTES # BLD AUTO: 1 K/UL
MONOCYTES NFR BLD: 10.5 %
NEUTROPHILS # BLD AUTO: 7.5 K/UL
NEUTROPHILS NFR BLD: 77.8 %
NRBC BLD-RTO: 0 /100 WBC
PLATELET # BLD AUTO: 337 K/UL
PMV BLD AUTO: 8.1 FL
POTASSIUM SERPL-SCNC: 3.7 MMOL/L
PROT SERPL-MCNC: 5.9 G/DL
RBC # BLD AUTO: 3.97 M/UL
SODIUM SERPL-SCNC: 138 MMOL/L
WBC # BLD AUTO: 9.61 K/UL

## 2018-03-24 PROCEDURE — 63600175 PHARM REV CODE 636 W HCPCS: Performed by: STUDENT IN AN ORGANIZED HEALTH CARE EDUCATION/TRAINING PROGRAM

## 2018-03-24 PROCEDURE — 63600175 PHARM REV CODE 636 W HCPCS: Performed by: COLON & RECTAL SURGERY

## 2018-03-24 PROCEDURE — 63600175 PHARM REV CODE 636 W HCPCS: Performed by: NURSE PRACTITIONER

## 2018-03-24 PROCEDURE — 63600175 PHARM REV CODE 636 W HCPCS: Performed by: NURSE ANESTHETIST, CERTIFIED REGISTERED

## 2018-03-24 PROCEDURE — D9220A PRA ANESTHESIA: Mod: ANES,,, | Performed by: ANESTHESIOLOGY

## 2018-03-24 PROCEDURE — D9220A PRA ANESTHESIA: Mod: CRNA,,, | Performed by: NURSE ANESTHETIST, CERTIFIED REGISTERED

## 2018-03-24 PROCEDURE — 85025 COMPLETE CBC W/AUTO DIFF WBC: CPT

## 2018-03-24 PROCEDURE — 37000009 HC ANESTHESIA EA ADD 15 MINS: Performed by: INTERNAL MEDICINE

## 2018-03-24 PROCEDURE — 37000008 HC ANESTHESIA 1ST 15 MINUTES: Performed by: INTERNAL MEDICINE

## 2018-03-24 PROCEDURE — 86140 C-REACTIVE PROTEIN: CPT

## 2018-03-24 PROCEDURE — 88305 TISSUE EXAM BY PATHOLOGIST: CPT | Performed by: PATHOLOGY

## 2018-03-24 PROCEDURE — 36415 COLL VENOUS BLD VENIPUNCTURE: CPT

## 2018-03-24 PROCEDURE — 99239 HOSP IP/OBS DSCHRG MGMT >30: CPT | Mod: ,,, | Performed by: COLON & RECTAL SURGERY

## 2018-03-24 PROCEDURE — 80053 COMPREHEN METABOLIC PANEL: CPT

## 2018-03-24 PROCEDURE — 27201012 HC FORCEPS, HOT/COLD, DISP: Performed by: INTERNAL MEDICINE

## 2018-03-24 PROCEDURE — A4216 STERILE WATER/SALINE, 10 ML: HCPCS | Performed by: STUDENT IN AN ORGANIZED HEALTH CARE EDUCATION/TRAINING PROGRAM

## 2018-03-24 PROCEDURE — 44389 COLONOSCOPY WITH BIOPSY: CPT | Mod: ,,, | Performed by: INTERNAL MEDICINE

## 2018-03-24 PROCEDURE — 25000003 PHARM REV CODE 250: Performed by: NURSE ANESTHETIST, CERTIFIED REGISTERED

## 2018-03-24 PROCEDURE — 0DBB8ZX EXCISION OF ILEUM, VIA NATURAL OR ARTIFICIAL OPENING ENDOSCOPIC, DIAGNOSTIC: ICD-10-PCS | Performed by: INTERNAL MEDICINE

## 2018-03-24 PROCEDURE — 44389 COLONOSCOPY WITH BIOPSY: CPT | Performed by: INTERNAL MEDICINE

## 2018-03-24 PROCEDURE — 25000003 PHARM REV CODE 250: Performed by: STUDENT IN AN ORGANIZED HEALTH CARE EDUCATION/TRAINING PROGRAM

## 2018-03-24 RX ORDER — PROPOFOL 10 MG/ML
VIAL (ML) INTRAVENOUS CONTINUOUS PRN
Status: DISCONTINUED | OUTPATIENT
Start: 2018-03-24 | End: 2018-03-24

## 2018-03-24 RX ORDER — SODIUM CHLORIDE 9 MG/ML
INJECTION, SOLUTION INTRAVENOUS CONTINUOUS PRN
Status: DISCONTINUED | OUTPATIENT
Start: 2018-03-24 | End: 2018-03-24

## 2018-03-24 RX ORDER — LIDOCAINE HCL/PF 100 MG/5ML
SYRINGE (ML) INTRAVENOUS
Status: DISCONTINUED | OUTPATIENT
Start: 2018-03-24 | End: 2018-03-24

## 2018-03-24 RX ORDER — PROPOFOL 10 MG/ML
VIAL (ML) INTRAVENOUS
Status: DISCONTINUED | OUTPATIENT
Start: 2018-03-24 | End: 2018-03-24

## 2018-03-24 RX ORDER — PREDNISONE 10 MG/1
40 TABLET ORAL DAILY
Qty: 120 TABLET | Refills: 0 | Status: SHIPPED | OUTPATIENT
Start: 2018-03-24 | End: 2018-04-23

## 2018-03-24 RX ADMIN — AZATHIOPRINE 50 MG: 50 TABLET ORAL at 08:03

## 2018-03-24 RX ADMIN — PROPOFOL 60 MG: 10 INJECTION, EMULSION INTRAVENOUS at 12:03

## 2018-03-24 RX ADMIN — PROPOFOL 200 MCG/KG/MIN: 10 INJECTION, EMULSION INTRAVENOUS at 12:03

## 2018-03-24 RX ADMIN — VENLAFAXINE 37.5 MG: 37.5 TABLET ORAL at 08:03

## 2018-03-24 RX ADMIN — PROMETHAZINE HYDROCHLORIDE 6.25 MG: 25 INJECTION INTRAMUSCULAR; INTRAVENOUS at 10:03

## 2018-03-24 RX ADMIN — ONDANSETRON 8 MG: 2 INJECTION, SOLUTION INTRAMUSCULAR; INTRAVENOUS at 08:03

## 2018-03-24 RX ADMIN — MEPERIDINE HYDROCHLORIDE 50 MG: 50 INJECTION INTRAMUSCULAR; INTRAVENOUS; SUBCUTANEOUS at 10:03

## 2018-03-24 RX ADMIN — Medication 3 ML: at 06:03

## 2018-03-24 RX ADMIN — SODIUM CHLORIDE: 0.9 INJECTION, SOLUTION INTRAVENOUS at 12:03

## 2018-03-24 RX ADMIN — MEPERIDINE HYDROCHLORIDE 50 MG: 50 INJECTION INTRAMUSCULAR; INTRAVENOUS; SUBCUTANEOUS at 03:03

## 2018-03-24 RX ADMIN — LIDOCAINE HYDROCHLORIDE 40 MG: 20 INJECTION, SOLUTION INTRAVENOUS at 12:03

## 2018-03-24 RX ADMIN — ONDANSETRON 8 MG: 2 INJECTION, SOLUTION INTRAMUSCULAR; INTRAVENOUS at 01:03

## 2018-03-24 RX ADMIN — PROMETHAZINE HYDROCHLORIDE 6.25 MG: 25 INJECTION INTRAMUSCULAR; INTRAVENOUS at 03:03

## 2018-03-24 RX ADMIN — HYDROCODONE BITARTRATE AND ACETAMINOPHEN 1 TABLET: 10; 325 TABLET ORAL at 08:03

## 2018-03-24 RX ADMIN — HYDROCODONE BITARTRATE AND ACETAMINOPHEN 1 TABLET: 10; 325 TABLET ORAL at 02:03

## 2018-03-24 NOTE — ASSESSMENT & PLAN NOTE
51 yo F hx of crohns multiple abdominal surgeries admitted with possible crohns flare.    -MRE done yesterday with active disease at terminal ileum with upstream bowel dilation.  -Continue medications per GI recommendations.  -For colonoscopy with GI today, further plans to follow procedure

## 2018-03-24 NOTE — TRANSFER OF CARE
"Anesthesia Transfer of Care Note    Patient: Carline Chang    Procedure(s) Performed: Procedure(s) (LRB):  COLONOSCOPY (N/A)    Patient location: PACU    Anesthesia Type: general    Transport from OR: Transported from OR on room air with adequate spontaneous ventilation    Post pain: adequate analgesia    Post assessment: no apparent anesthetic complications    Post vital signs: stable    Level of consciousness: awake, alert and oriented    Nausea/Vomiting: no nausea/vomiting    Complications: none    Transfer of care protocol was followed      Last vitals:   Visit Vitals  /67   Pulse 81   Temp 36.3 °C (97.3 °F) (Oral)   Resp 16   Ht 5' 8" (1.727 m)   Wt 58.4 kg (128 lb 12 oz)   LMP 05/30/2009   SpO2 98%   Breastfeeding? No   BMI 19.58 kg/m²     "

## 2018-03-24 NOTE — SUBJECTIVE & OBJECTIVE
Subjective:     Interval History: no acute events. Prepping for colonoscopy with GI today.    Post-Op Info:  Procedure(s) (LRB):  COLONOSCOPY (N/A)   Day of Surgery      Medications:  Continuous Infusions:  Scheduled Meds:   azaTHIOprine  50 mg Oral Daily    enoxaparin  40 mg Subcutaneous Daily    sodium chloride 0.9%  3 mL Intravenous Q8H    venlafaxine  37.5 mg Oral BID     PRN Meds:   acetaminophen    barium    diphenhydrAMINE    hydrocodone-acetaminophen 10-325mg    meperidine    naloxone    ondansetron    promethazine (PHENERGAN) IVPB        Objective:     Vital Signs (Most Recent):  Temp: 98.5 °F (36.9 °C) (03/24/18 0811)  Pulse: 79 (03/24/18 0811)  Resp: 16 (03/24/18 0811)  BP: (!) 104/59 (03/24/18 0811)  SpO2: 98 % (03/24/18 0811) Vital Signs (24h Range):  Temp:  [96.8 °F (36 °C)-98.8 °F (37.1 °C)] 98.5 °F (36.9 °C)  Pulse:  [79-98] 79  Resp:  [12-20] 16  SpO2:  [95 %-100 %] 98 %  BP: ()/(53-78) 104/59     Intake/Output - Last 3 Shifts       03/22 0700 - 03/23 0659 03/23 0700 - 03/24 0659 03/24 0700 - 03/25 0659    P.O.  1002     IV Piggyback 50 100     Total Intake(mL/kg) 50 (0.9) 1102 (18.9)     Urine (mL/kg/hr) 0 (0) 450 (0.3) 75 (0.3)    Stool 200 (0.1) 2300 (1.6) 1000 (3.7)    Total Output 200 2750 1075    Net -150 -1648 -1075           Urine Occurrence 1 x 2 x           Physical Exam   Constitutional: She is oriented to person, place, and time. She appears well-developed and well-nourished.   HENT:   Head: Normocephalic and atraumatic.   Eyes: EOM are normal.   Neck: Normal range of motion.   Cardiovascular: Normal rate and intact distal pulses.    Pulmonary/Chest: Effort normal. No respiratory distress.   Abdominal: Soft. She exhibits no distension. There is no tenderness.   LLQ ostomy with liquid stool in bag   Musculoskeletal: Normal range of motion.   Neurological: She is alert and oriented to person, place, and time.   Psychiatric: She has a normal mood and affect.      Significant Labs:  BMP (Last 3 Results):   Recent Labs  Lab 03/22/18  0431 03/23/18  0554 03/24/18  0428   GLU 84 77 96    136 138   K 3.2* 4.2 3.7    109 105   CO2 20* 18* 25   BUN 2* 4* 7   CREATININE 0.8 0.7 0.8   CALCIUM 7.9* 8.1* 8.1*     CBC (Last 3 Results):   Recent Labs  Lab 03/22/18  0431 03/23/18  0554 03/24/18  0428   WBC 11.94 8.45 9.61   RBC 3.96* 4.32 3.97*   HGB 11.2* 12.0 11.1*   HCT 34.0* 40.5 33.5*    355* 337   MCV 86 94 84   MCH 28.3 27.8 28.0   MCHC 32.9 29.6* 33.1       Significant Diagnostics:  None

## 2018-03-24 NOTE — TREATMENT PLAN
Treatment Plan  03/24/2018  2:51 PM    Colonoscopy completed today with impression and recs below.    Impression:             - Preparation of the colon was poor.  - Stool in the entire examined colon.  - Scattered ulcers in the neoterminal ileum. Biopsied.       Recommendation:         - Discharge patient to home.  - Patient has a contact number available for emergencies. The signs and symptoms of potential delayed complications were discussed with the patient. Return to normal activities tomorrow. Written discharge instructions were provided to the patient.  - Resume previous diet.  - Await pathology results.  - Use prednisone 40 mg PO once a day until visit  with Dr. Serrato in 2 weeks and then will plan further tapering.    Kimo Chun M.D.  Gastroenterology Fellow, PGY-IV  Pager: [phone]  [facility]

## 2018-03-24 NOTE — TREATMENT PLAN
"Treatment Plan  03/23/2018  8:54 PM    Patient doing "OK" although she reports some queasiness and abdominal cramping.    Focused Physical Exam:  BP (!) 113/58 (BP Location: Right arm, Patient Position: Lying)   Pulse 92   Temp 98.1 °F (36.7 °C) (Oral)   Resp 16   Ht 5' 8" (1.727 m)   Wt 58.4 kg (128 lb 12 oz)   LMP 05/30/2009   SpO2 100%   Breastfeeding? No   BMI 19.58 kg/m²   Abdomen: Abdomen is soft and not tender with left sided ostomy bag intact    Labs:   3/23/2018 05:54   Sodium 136   Potassium 4.2   Chloride 109   CO2 18 (L)   Anion Gap 9   BUN, Bld 4 (L)   Creatinine 0.7   eGFR if non African American >60.0   eGFR if African American >60.0   Glucose 77   Calcium 8.1 (L)   Alkaline Phosphatase 147 (H)   Total Protein 6.0   Albumin 2.3 (L)   Total Bilirubin 0.2   AST 11   ALT <5 (L)      3/23/2018 05:54   WBC 8.45   RBC 4.32   Hemoglobin 12.0   Hematocrit 40.5   MCV 94   MCH 27.8   MCHC 29.6 (L)   RDW 14.6 (H)   Platelets 355 (H)     Assessment and Recommendations:  52 y.o. female with hx of fistulizing ileocolonic Crohn's disease diagnosed 1986 (age 21) complicated by two SB resections ('95 and '09), abscess '09, rectovaginal fistula '14, sigmoid loop colostomy '15, and rectal vaginal fistula repair '18. Although patient seen to be doing Ok repeat MRE still showing an area of stricture with upstream dilation and will therefore like to proceed with a colonoscopy to further assess area.  --NPO for colonoscopy tomorrow  --Continue Imuran  --Continue Lovenox  --Additional recs after scope      Kimo Chun M.D.  Gastroenterology Fellow, PGY-IV  Pager: [phone]  [facility]    "

## 2018-03-24 NOTE — NURSING
Discharge instructions reviewed with patient.  Pt. Verbalizes understanding. IV discontinued. Pt.tolorated well.  Activated transport.

## 2018-03-24 NOTE — PROGRESS NOTES
[facility]  Colorectal Surgery  Progress Note    Patient Name: Carline Chang  MRN: [id number]  Admission Date: 3/17/2018  Hospital Length of Stay: 7 days  Attending Physician: Andre Uribe MD    Subjective:     Interval History: no acute events. Prepping for colonoscopy with GI today.    Post-Op Info:  Procedure(s) (LRB):  COLONOSCOPY (N/A)   Day of Surgery      Medications:  Continuous Infusions:  Scheduled Meds:   azaTHIOprine  50 mg Oral Daily    enoxaparin  40 mg Subcutaneous Daily    sodium chloride 0.9%  3 mL Intravenous Q8H    venlafaxine  37.5 mg Oral BID     PRN Meds:   acetaminophen    barium    diphenhydrAMINE    hydrocodone-acetaminophen 10-325mg    meperidine    naloxone    ondansetron    promethazine (PHENERGAN) IVPB        Objective:     Vital Signs (Most Recent):  Temp: 98.5 °F (36.9 °C) (03/24/18 0811)  Pulse: 79 (03/24/18 0811)  Resp: 16 (03/24/18 0811)  BP: (!) 104/59 (03/24/18 0811)  SpO2: 98 % (03/24/18 0811) Vital Signs (24h Range):  Temp:  [96.8 °F (36 °C)-98.8 °F (37.1 °C)] 98.5 °F (36.9 °C)  Pulse:  [79-98] 79  Resp:  [12-20] 16  SpO2:  [95 %-100 %] 98 %  BP: ()/(53-78) 104/59     Intake/Output - Last 3 Shifts       03/22 0700 - 03/23 0659 03/23 0700 - 03/24 0659 03/24 0700 - 03/25 0659    P.O.  1002     IV Piggyback 50 100     Total Intake(mL/kg) 50 (0.9) 1102 (18.9)     Urine (mL/kg/hr) 0 (0) 450 (0.3) 75 (0.3)    Stool 200 (0.1) 2300 (1.6) 1000 (3.7)    Total Output 200 2750 1075    Net -150 -1648 -1075           Urine Occurrence 1 x 2 x           Physical Exam   Constitutional: She is oriented to person, place, and time. She appears well-developed and well-nourished.   HENT:   Head: Normocephalic and atraumatic.   Eyes: EOM are normal.   Neck: Normal range of motion.   Cardiovascular: Normal rate and intact distal pulses.    Pulmonary/Chest: Effort normal. No respiratory distress.   Abdominal: Soft. She exhibits no distension. There is  no tenderness.   LLQ ostomy with liquid stool in bag   Musculoskeletal: Normal range of motion.   Neurological: She is alert and oriented to person, place, and time.   Psychiatric: She has a normal mood and affect.     Significant Labs:  BMP (Last 3 Results):   Recent Labs  Lab 03/22/18  0431 03/23/18  0554 03/24/18  0428   GLU 84 77 96    136 138   K 3.2* 4.2 3.7    109 105   CO2 20* 18* 25   BUN 2* 4* 7   CREATININE 0.8 0.7 0.8   CALCIUM 7.9* 8.1* 8.1*     CBC (Last 3 Results):   Recent Labs  Lab 03/22/18  0431 03/23/18  0554 03/24/18  0428   WBC 11.94 8.45 9.61   RBC 3.96* 4.32 3.97*   HGB 11.2* 12.0 11.1*   HCT 34.0* 40.5 33.5*    355* 337   MCV 86 94 84   MCH 28.3 27.8 28.0   MCHC 32.9 29.6* 33.1       Significant Diagnostics:  None    Assessment/Plan:     * Crohn's disease    51 yo F hx of crohns multiple abdominal surgeries admitted with possible crohns flare.    -MRE done yesterday with active disease at terminal ileum with upstream bowel dilation.  -Continue medications per GI recommendations.  -For colonoscopy with GI today, further plans to follow procedure              Royal Silva MD  Colorectal Surgery  [facility]

## 2018-03-24 NOTE — H&P
Endoscopy History and Physical    PCP - Pablo Medina MD    Procedure - Colonoscopy  ASA - per anesthesia  Mallampati - per anesthesia  History of Anesthesia problems - no  Family history Anesthesia problems - no   Plan of anesthesia - General    HPI:  52 y.o. female with hx of fistulizing ileocolonic Crohn's disease diagnosed  (age 21) complicated by two SB resections ( and ), abscess , rectovaginal fistula , sigmoid loop colostomy '15, and rectal vaginal fistula repair  who we are scoping today to assess disease and make treatment recommendations.    ROS:  Constitutional: No fevers, chills, No weight loss  CV: No chest pain  Pulm: No cough, No shortness of breath  GI: see HPI  Derm: No rash    Medical History:  has a past medical history of Chronic pain and Crohn's disease.    Surgical History:  has a past surgical history that includes Bowel resection; Cholecystectomy; Tubal ligation; rectal abscess drain;  section; Colonoscopy (N/A, 2016); Colonoscopy (N/A, 2017); Colostomy; anal fistula; Colonoscopy (N/A, 2017); Colonoscopy (N/A, 2018); Abdominal surgery; and Colon surgery.    Family History: family history includes Cancer (age of onset: 66) in her maternal aunt; Heart disease in her father.. Otherwise no colon cancer, inflammatory bowel disease, or GI malignancies.    Social History:  reports that she has never smoked. She has never used smokeless tobacco. She reports that she does not drink alcohol or use drugs.    Review of patient's allergies indicates:   Allergen Reactions    Morphine Other (See Comments)     Abdominal pain    Nubain [nalbuphine] Anxiety       Medications:   Prescriptions Prior to Admission   Medication Sig Dispense Refill Last Dose    azaTHIOprine (IMURAN) 50 mg Tab Take 50 mg by mouth once daily.   3/16/2018 at Unknown time    loperamide (IMODIUM A-D) 1 mg/7.5 mL Liqd Take 0.1 mg/kg by mouth every 12 (twelve) hours.   Past Week at  Unknown time    MAGNESIUM CHLORIDE (SLOW-MAG ORAL) Take by mouth once daily.   Past Week at Unknown time    ondansetron (ZOFRAN) 8 MG tablet Take 1 tablet (8 mg total) by mouth every 8 (eight) hours as needed for Nausea. 50 tablet 1 Past Month at Unknown time    promethazine (PHENERGAN) 25 MG tablet Take 1 tablet (25 mg total) by mouth every 12 (twelve) hours. 60 tablet 0 3/16/2018 at Unknown time    venlafaxine (EFFEXOR) 37.5 MG Tab Take 1 tablet (37.5 mg total) by mouth 2 (two) times daily. 60 tablet 1 3/17/2018 at Unknown time    [DISCONTINUED] hydrocodone-acetaminophen 10-325mg (NORCO)  mg Tab Take 1 tablet by mouth every 4 (four) hours as needed for Pain. 90 tablet 0 3/16/2018 at Unknown time    [DISCONTINUED] metroNIDAZOLE (FLAGYL) 500 MG tablet    3/17/2018 at Unknown time    potassium chloride SA (K-DUR,KLOR-CON) 20 MEQ tablet Take 1 tablet (20 mEq total) by mouth once daily. 30 tablet 0 Unknown at Unknown time         Physical Exam:    Vital Signs:   Vitals:    03/24/18 0811   BP: (!) 104/59   Pulse: 79   Resp: 16   Temp: 98.5 °F (36.9 °C)       General Appearance: Well appearing in no acute distress  Eyes:    No scleral icterus  ENT: Neck supple, Lips, mucosa, and tongue normal; teeth and gums normal  Lungs: CTA bilaterally  Heart:  S1, S2 normal, no murmurs heard  Abdomen: Soft, non tender, non distended with left sided ostomy bag in place  Extremities: 2+ pulses, no clubbing, cyanosis or edema  Skin: No rash      Labs:  Lab Results   Component Value Date    WBC 9.61 03/24/2018    HGB 11.1 (L) 03/24/2018    HCT 33.5 (L) 03/24/2018     03/24/2018    ALT <5 (L) 03/24/2018    AST 7 (L) 03/24/2018     03/24/2018    K 3.7 03/24/2018     03/24/2018    CREATININE 0.8 03/24/2018    BUN 7 03/24/2018    CO2 25 03/24/2018    INR 1.2 03/18/2018       I have explained the risks and benefits of endoscopy procedures to the patient including but not limited to bleeding, perforation,  infection, and death.    Kimo Chun M.D.  Gastroenterology Fellow, PGY-IV  Pager: [phone]  [facility]

## 2018-03-24 NOTE — PROVATION PATIENT INSTRUCTIONS
Discharge Summary/Instructions after an Endoscopic Procedure  Patient Name: Carline Chang  Patient MRN: [id number]  Patient YOB: 1965 Saturday, March 24, 2018  Elmer Serrato MD  RESTRICTIONS:  During your procedure today, you received medications for sedation.  These   medications may affect your judgment, balance and coordination.  Therefore,   for 24 hours, you have the following restrictions:   - DO NOT drive a car, operate machinery, make legal/financial decisions,   sign important papers or drink alcohol.    ACTIVITY:  The following day: return to full activity including work, except no heavy   lifting, straining or running for 3 days if polyps were removed.  DIET:  Eat and drink normally unless instructed otherwise.     TREATMENT FOR COMMON SIDE EFFECTS:  - Mild abdominal pain, nausea, belching, bloating or excessive gas:  rest,   eat lightly and use a heating pad.  - Sore Throat: treat with throat lozenges and/or gargle with warm salt   water.  - Because air was used during the procedure, expelling large amounts of air   from your rectum or belching is normal.  - If a bowel prep was taken, you may not have a bowel movement for 1-3 days.    This is normal.  SYMPTOMS TO WATCH FOR AND REPORT TO YOUR PHYSICIAN:  1. Abdominal pain or bloating, other than gas cramps.  2. Chest pain.  3. Back pain.  4. Signs of infection such as: chills or fever occurring within 24 hours   after the procedure.  5. Rectal bleeding, which would show as bright red, maroon, or black stools.   (A tablespoon of blood from the rectum is not serious, especially if   hemorrhoids are present.)  6. Vomiting.  7. Weakness or dizziness.  GO DIRECTLY TO THE NEAREST EMERGENCY ROOM IF YOU HAVE ANY OF THE FOLLOWING:      Difficulty breathing              Chills and/or fever over 101 F   Persistent vomiting and/or vomiting blood   Severe abdominal pain   Severe chest pain   Black, tarry stools   Bleeding- more than one  tablespoon   Any other symptom or condition that you feel may need urgent attention  Your doctor recommends these additional instructions:  If any biopsies were taken, your doctors clinic will contact you in 1 to 2   weeks with any results.  None  For questions, problems or results please call your physician - Elmer Serrato MD at Work:  [phone].  [facility], EMERGENCY ROOM PHONE NUMBER: [phone]  IF A COMPLICATION OR EMERGENCY SITUATION ARISES AND YOU ARE UNABLE TO REACH   YOUR PHYSICIAN - GO DIRECTLY TO THE EMERGENCY ROOM.  Elmer Serrato MD  3/24/2018 1:35:10 PM  This report has been verified and signed electronically.

## 2018-03-25 NOTE — DISCHARGE SUMMARY
[facility]  Colorectal Surgery  Discharge Summary      Patient Name: Carline Chang  MRN: [id number]  Admission Date: 3/17/2018  Hospital Length of Stay: 7 days  Discharge Date and Time: 3/24/2018  4:48 PM  Attending Physician: No att. providers found   Discharging Provider: Mal Palacios MD  Primary Care Provider: Pablo Medina MD     HPI: Carline Chang is a 52 y.o. female with long standing crohns history now s/p RV fistula repair (transvaginal approach) and colostomy revision from loop to end colostomy on 1/11/18. She was last admitted 2/4/18 with several days of abdominal cramping, nausea/emesis, and decreased stoma output, at that hospitalization she underwent ileoscopy 2/8/18 w/ some small aphthous ulcerations and terminal ileum biopsies showing no signs of active crohns and was discharged with improving symptoms on 2/9/18 . She was recently seen in clinic 3/13/18 by Dr. Uribe and underwent MRE 3/6/18 demonstrating some wall thickening and luminal narrowing. A CT enterography was planned and she was being followed by Dr. Medina who had concern for return of crohns (on imuran).  but she has had similar symptoms to previous admission including emesis/ increased nausea for 3 days, decreased liquid ostomy output and worsened abdominal cramping. She denies fevers or other symptoms. CT at outside ED showed pneumatosis in ascending colon, bowel wall thickening and mesenteric fat stranding over portion of nonterminal ileum, she was transferred here for higher level of care    Procedure(s) (LRB):  COLONOSCOPY (N/A)     Hospital Course:     3/17/18: admitted with possible crohns flare, on cipro/ flagyl  3/18/18: pain and nausea controlled, ostomy output  3/19/18: tolerating clears  3/20/18: tolerating full liquids  3/21/18: tolerating low res diet/ GI consult  3/22/18: MRI enterography showing Active inflammatory Crohn's disease with stricture and upstream dilatation    3/23/18: Bowel prep for colonoscopy started,  3/24/18: Underwent colonoscopy by GI, Scattered ulcers in the neoterminal ileum. Biopsied. Stable for D/C with plan for follow up w/ Dr. Serrato in 2 weeks, home on prednisone 40mg daily and imuran    Consults         Status Ordering Provider     Inpatient consult to Gastroenterology  Once     Provider:  (Not yet assigned)    Completed COY LOPEZ     Inpatient consult to Gastroenterology  Once     Provider:  (Not yet assigned)    Completed JHOANA ALICIA     Inpatient consult to Midline team  Once     Provider:  (Not yet assigned)    Completed LLUVIA STINSON     Inpatient consult to Midline team  Once     Provider:  (Not yet assigned)    Completed ESPERANZA MADSEN          Significant Diagnostic Studies: Labs:   BMP:   Recent Labs  Lab 03/24/18  0428   GLU 96      K 3.7      CO2 25   BUN 7   CREATININE 0.8   CALCIUM 8.1*    and CMP   Recent Labs  Lab 03/24/18  0428      K 3.7      CO2 25   GLU 96   BUN 7   CREATININE 0.8   CALCIUM 8.1*   PROT 5.9*   ALBUMIN 2.4*   BILITOT 0.2   ALKPHOS 140*   AST 7*   ALT <5*   ANIONGAP 8   ESTGFRAFRICA >60.0   EGFRNONAA >60.0       Pending Diagnostic Studies:     None        Final Active Diagnoses:    Diagnosis Date Noted POA    PRINCIPAL PROBLEM:  Crohn's disease [K50.90] 06/18/2015 Yes    Normocytic anemia [D64.9] 03/22/2018 Unknown    Colostomy status [Z93.3] 08/04/2015 Not Applicable      Problems Resolved During this Admission:    Diagnosis Date Noted Date Resolved POA      Discharged Condition: fair    Disposition: Home or Self Care    Follow Up:  Follow-up Information     Esperanza Madsen MD.    Specialty:  Colon and Rectal Surgery  Why:  As needed, no follow up required  Contact information:  [address]  [facility] [address]  [phone]             YORDY Hernandez In 2 weeks.    Specialties:  Colon and Rectal Surgery, Wound Care  Contact information:  [address]  [facility] [address]  [phone]                 Patient Instructions:     Diet Adult Regular     Activity as tolerated     Shower on day dressing removed (No bath)   Order Comments: Ok to shower.     Notify your health care provider if you experience any of the following:  temperature >100.4     Notify your health care provider if you experience any of the following:  persistent nausea and vomiting or diarrhea     Notify your health care provider if you experience any of the following:  severe uncontrolled pain     No dressing needed   Order Comments: Routine stoma care       Medications:  Reconciled Home Medications:   Discharge Medication List as of 3/24/2018  2:51 PM      START taking these medications    Details   predniSONE (DELTASONE) 10 MG tablet Take 4 tablets (40 mg total) by mouth once daily., Starting Sat 3/24/2018, Until Mon 4/23/2018, Print         CONTINUE these medications which have CHANGED    Details   hydrocodone-acetaminophen 10-325mg (NORCO)  mg Tab Take 1 tablet by mouth every 4 (four) hours as needed., Starting Wed 3/21/2018, Print         CONTINUE these medications which have NOT CHANGED    Details   azaTHIOprine (IMURAN) 50 mg Tab Take 50 mg by mouth once daily., Historical Med      loperamide (IMODIUM A-D) 1 mg/7.5 mL Liqd Take 0.1 mg/kg by mouth every 12 (twelve) hours., Historical Med      MAGNESIUM CHLORIDE (SLOW-MAG ORAL) Take by mouth once daily., Historical Med      ondansetron (ZOFRAN) 8 MG tablet Take 1 tablet (8 mg total) by mouth every 8 (eight) hours as needed for Nausea., Starting Thu 2/15/2018, Until Fri 2/15/2019, Normal      promethazine (PHENERGAN) 25 MG tablet Take 1 tablet (25 mg total) by mouth every 12 (twelve) hours., Starting Tue 3/13/2018, Until Thu 4/12/2018, Print      venlafaxine (EFFEXOR) 37.5 MG Tab Take 1 tablet (37.5 mg total) by mouth 2 (two) times daily., Starting Tue 1/30/2018, Until Wed 1/30/2019, Print      potassium chloride SA (K-DUR,KLOR-CON) 20 MEQ  tablet Take 1 tablet (20 mEq total) by mouth once daily., Starting Tue 12/19/2017, Normal         STOP taking these medications       metroNIDAZOLE (FLAGYL) 500 MG tablet Comments:   Reason for Stopping:         budesonide (ENTOCORT EC) 3 mg capsule Comments:   Reason for Stopping:               Mal Palacios MD  Colorectal Surgery  [facility]  Have seen and examined the patient with the fellow and agree with their plan.  ESPERANZA MADSEN

## 2018-03-25 NOTE — ANESTHESIA POSTPROCEDURE EVALUATION
"Anesthesia Post Evaluation    Patient: Carline Chang    Procedure(s) Performed: Procedure(s) (LRB):  COLONOSCOPY (N/A)    Final Anesthesia Type: general  Patient location during evaluation: PACU  Patient participation: Yes- Able to Participate  Level of consciousness: awake and alert and oriented  Post-procedure vital signs: reviewed and stable  Pain management: adequate  Airway patency: patent  PONV status at discharge: No PONV  Anesthetic complications: no      Cardiovascular status: blood pressure returned to baseline and hemodynamically stable  Respiratory status: unassisted, spontaneous ventilation and room air  Hydration status: euvolemic  Follow-up not needed.        Visit Vitals  /65   Pulse 72   Temp 36.4 °C (97.5 °F) (Oral)   Resp 14   Ht 5' 8" (1.727 m)   Wt 58.4 kg (128 lb 12 oz)   LMP 05/30/2009   SpO2 96%   Breastfeeding? No   BMI 19.58 kg/m²       Pain/Estiven Score: Pain Assessment Performed: Yes (3/24/2018  2:15 PM)  Presence of Pain: denies (3/24/2018  2:15 PM)  Pain Rating Prior to Med Admin: 4 (3/24/2018 10:07 AM)  Pain Rating Post Med Admin: 2 (3/24/2018  6:41 AM)  Estiven Score: 10 (3/24/2018  2:15 PM)      "

## 2018-03-26 ENCOUNTER — PATIENT MESSAGE (OUTPATIENT)
Dept: SURGERY | Facility: CLINIC | Age: 53
End: 2018-03-26

## 2018-03-26 DIAGNOSIS — G89.18 POST-OP PAIN: Primary | ICD-10-CM

## 2018-03-26 NOTE — TELEPHONE ENCOUNTER
----- Message from Deidre Mcclure sent at 3/26/2018  1:17 PM CDT -----  Contact: self  Pt is due for refill of hydrocodone.  Wants dr block to know she is out of the hospital and when does he want to see her?  Also, her bag is definitely going to be reversed at some point.

## 2018-03-26 NOTE — TELEPHONE ENCOUNTER
I called [facility] and they stated the patient picked up a Rx for hydrocodone on yesterday

## 2018-03-28 PROBLEM — D64.9 NORMOCYTIC ANEMIA: Status: ACTIVE | Noted: 2018-03-28

## 2018-03-29 ENCOUNTER — TELEPHONE (OUTPATIENT)
Dept: GASTROENTEROLOGY | Facility: CLINIC | Age: 53
End: 2018-03-29

## 2018-03-29 ENCOUNTER — TELEPHONE (OUTPATIENT)
Dept: SURGERY | Facility: CLINIC | Age: 53
End: 2018-03-29

## 2018-03-29 RX ORDER — HYDROCODONE BITARTRATE AND ACETAMINOPHEN 10; 325 MG/1; MG/1
1 TABLET ORAL EVERY 4 HOURS PRN
Qty: 90 TABLET | Refills: 0 | Status: SHIPPED | OUTPATIENT
Start: 2018-03-29 | End: 2018-04-17 | Stop reason: SDUPTHER

## 2018-03-29 NOTE — TELEPHONE ENCOUNTER
Spoke with patient regarding her appointment with Dr. Uribe.  I informed her that Dr. Uribe doesn't need to see her on 4/5.  I rescheduled Ana Rosa to see her on the same day(4/9) as Dr. Serrato.  She was grateful for the same day appointments due to the distance that she travels.

## 2018-03-29 NOTE — TELEPHONE ENCOUNTER
Pt is scheduled for a clinic appointment with Dr. BASHIR Serrato on 4/9/2018. E-mail sent to pt with appointment reminder, campus map as well as a new patient welcome letter.

## 2018-04-08 NOTE — PROGRESS NOTES
"                    [facility]          Inflammatory Bowel Disease New Patient Consultation Note         TODAY'S VISIT DATE:  4/8/2018    Reason for Consult:    Chief Complaint   Patient presents with    Crohn's Disease     PCP: Pablo Medina      Referring MD:   Dr. Andre Uribe    History of Present Illness:    Dear. Dr. Andre Uribe    Thank you for requesting a consultation on your patient Carline Chang who is a 52 y.o. female seen today at the [facility] on 04/08/2018 for fistulizing (rectovaginal fistula repaired) ileocolonic Crohn's disease diagnosed 1986 (age 21) with history of multiple surgeries including SB resections (1995), ileocolonic resection (2/2009-partial colectomy-3 cm, SB resection-11 cm), sigmoid loop colostomy (6/2015), and rectal vaginal fistula repair (1/2018).  Pertinent past medical history includes cholecystectomy, history of RUE right brachial vein thrombus (RUE US 1/2017), kidney stones (CT 1/2017).     As you know, Carline Chang was doing well until 1982 when she developed symptoms of partial SBO which was attributed to a virus.  In 1985 she had ongoing symptoms and was told she had a "nervous stomach"  Then in 1986 around the age of 20 y/o she had UGI showing multiple stomach ulcers and given zantac and also had nausea/vomiting and diarrhea along with abdominal pain. She had UGI with SBFT which showed Crohn's disease. To patient's knowledge there was no tissue diagnosis. She tried various medical therapies including asacol, pentasa, azulfidine, imuran and prednisone all of which were ineffective.  She did not achieve remission with any of these agents to her knowledge. The prednisone was intermittently the most effective. Per patient in 1995 she had obstructive symptoms and had strictures as well as SB fistula requiring surgery at which time she recalls about 10 inches of intestine being resected. She " was under the care of Dr. Hurd from 1995 to 2006 and recalls getting a colonoscopy in 2003 which was normal.  She then was seeing Dr. Boland and recalls having CT/SBFT showing some Crohn's disease. Again she was on no medications from 1995 to 2009.  In 2/2009 she had surgery which included drainage of abdominal wall abscess, partial colectomy (3 cm in length) with small bowel resection (11 cm in length), lysis of adhesions at [facility]. Surgical pathology showed ileocecal mucosal ulcer, adjacent moderate ileocecal inflammation, serosal inflammatory adhesions, incidental lipomatous hypertrophy of ileocecal valve.  She was started on remicade after this but was only on remicade for 3 doses then lost insurance. She did well for 2 years of medications she thinks.  Then 6/2011, she had worsening sypmtoms that progressively worsened despite trials of steroids, pentasa, imuran and flagyl, and she developed rectovaginal fistula in 2014.  In 9/2014 patient had KUB showing SBO and CT in 9/2014 showing diffuse distal ileal inflammation. 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  There was a CT 11/2014 that showed perianal fistula that had increased in size with several small abscesses in the right gluteal fat adjacent to the gluteal crease. In 2/2015 colonoscopy showed no recurrent Crohn's disease at the anastomotic site though some inflammation on biopsies and a hyperplastic polyp in left colon with ongoing proctitis and evidence of intestinal fistula formation.  MRI pelvis showed perianal fistula with possible RV fistula in 4/2015 with mild SBO on KUB again a week after the MRI of the pelvis. On 6/18/15 she had a sigmoid loop colostomy done.  After surgery she had significant high ostomy output and worked with ostomy RN with plan to eventually have colostomy takedown.  She had some peristomal skin irritation for which she was placed on topical antifungal and calamine  along with flagyl.  Eventually flagyl was discontinued due to possible neuropathy. In 6/2015 patient had CT showing again ileal inflammation and rectal fistula.  She had colonoscopy 5/2016 showing ongoing inflammation and congestion and scarring though mild in severity and findings improved with sparing of the anus/rectum but failure of intubation of the ileum.     She was started on humira 12/2015 with follow up colonoscopy in 05/2016 which showed inflammation (unsure as to what area). She did well on Humira until flare in 1/2017. She admits that a few of her humira doses may not have completely been injected correctly, but she believes the majority were. She was taking humira 40 mg q 2 weeks. Her last dose was 1/12/17 and told that it is not working for her (not sure why, possibly due to continued flares as she was admitted on 1/15/17). Humira levels/abs were done at this time to see why she was failing the Humira. In June 2016 she reported increased flatulence and diarrhea so probiotics along with norco and zantac were given.  She had continued active inflammation with plan to continue Humira for a little longer in hopes to close the RV fistula and help Crohn's disease though if failed plans were for surgery.  She continued with peristomal skin irritation and was prescribed topical steroids.  There was a peristomal ulcer which eventually improved. She continued with abdominal cramping with increased ostomy output through all of 2016. Patient continued with abdominal pain and nausea and was hospitalized in 1/2017. She had several tests in Jan 2017. CT abd/pelvis during this time showed mid SB inflammation, rectal fistulas, kidney stones.  There was RUE US showing brachial vein thrombus.Urology was consulted with plan for outpatient follow up.  Her pain was controlled with oral pain meds. Though entyvio was considered she was never placed on this but rather placed on imuran 50 mg daily.   EGD was normal though no  path and colonoscopy showed small fistula opening was found in the rectum, normal colon and rectal remnant with previous fistula to the rectum closed and biopsies taken through the colon though ileum not intubated. She continued to have scattered air fluid levels consistent with SBO in Jan-Feb 2017.  In March 2017 she reported that her abdominal pain was not relieved with narcotics and she has ongoing diarrhea leading to dehydration.  In May 2017 she presented to ER for minor peristomal bleeding and was placed on prednisone. She continued on imuran 50 mg daily. She continued to get phenergan for nausea, narcotics and was also prescribed creon.  The last note from her GI doctor in 11/2017 mentioned that she was doing well on monotherapy with imuran and has no active disease.  On 11/22/17 patient had MRI of the pelvis which showed fistulous tract connecting the right posterolateral aspect of the vagina and right lateral wall of the anorectal junction extending to the supra sphincteric area.  Compared to MRI in 4/2015 the fistulous track was suggestion fibrosis. There was circumferential wall thickening of the distal SB. In 12/2017 flex sig showed anal stricture with normal appearing rectum.  In Jan 2018 patient underwent rectovaginal fistula repair.  Since then, she continued to have symptoms (consisting of nausea, vomiting,abdominal pain) which led to an hospitalization from 2/3-2/9/18 and current admission. During the 2/3-2/9/18 hospitalization she had a colonoscopy and MRE.   On 2/3/18 CT abd/pelvis showed free air adjacent to the proximal colonic anastomosis in the RLQ and mild distension of the distal ileum with luis-ileal inflammation and edema of the adjacent mesentery. MRE 2/4/18 showed SB and colonic disease.  Free air on CT resolved so a colonoscopy was done which showed minimal inflammation of the anastomosis and nina-terminal ileum.  Her symptoms improved with bowel rest and antibiotics.  KUB 2/26/18 showed  stable linear collection of air along the lateral margin of the cecum suspicious for extraluminal air.      Patient was hospitalized at [facility] from 3/17-3/24/18 during which time I initially met her.  During this time a MRE on 3/71/8 showed SB inflammation with upstream dilation, gastrograffin enemas showed no evidence of rectovaginal fistula and repeat MRE on 3/23/18 showed ongoing active inflammation of the SB with stricture and upstream dilation.  She had high CRP with ongoing abdominal pain.  Of note I was unaware during her admit of her chronic narcotic use and pain contract. She had a colonoscopy through stoma by me which showed poor bowel prep with significant stool interfering with visualization. I was able to advance scope into the neoterminal ileum but due to poor air insufflation and bowel prep had difficulty seeing well.  Overall I did see ulcers in the neoterminal ileum and my plan was to start IV steroids due to high CRP and likely proximal SB disease that I was not getting to but apparent inflammatory stricture with upstream dilation on MRE 3/23/18. After discharge on 3/29/18 she had abdominal pain not better with pain medications, nausea with no vomiting.  She was able to eat that day and did not have abdominal distension. Since starting prednisone her pain medicines are working better and she is able to eat better. She has gained about 3 pounds which is encouraging. She has no abdominal pain since taking her pain med this morning.  She has 6-8 loose brown bags of stool/day.  She takes imodium 2-3 tsp daily.  She takes magnesium 1-2 times/week but this likely contributes to her increased output. Patient has no other gastrointestinal/constitutional complaints including no fevers or chills, weight loss, nausea/vomiting (including no hematemesis), dysphagia. Also patient does not have any extraintestinal manifestations of their inflammatory bowel disease including no eye pain/redness, skin lesions/rashes,  joint problems, oral ulcers.    Prior Pertinent Surgeries:   : Small bowel resection (approx 10 inches) at [facility]  2009: Drainage of abdominal wall abscess, partial colectomy (3 cm in length) with Small bowel resection (11 cm in length), Lysis of adhesions at [facility] (path: ileocecal mucosal ulcer, adjacent moderate ileocecal inflammation, serosal inflammatory adhesions, incidental lipomatous hypertrophy of ileocecal valve, no malignancy)  2015: Sigmoid loop colostomy and mobilization of splenic flexure at [facility]  1/10/2018: RV fistula repair (path: reactive changes and focal granulation tissue formation, associated transmural defect and serosal adhesions, background colonic mucosa with no significant histopathologic changes)    Pertinent Endoscopy/Imagin2014 KUB: mild gaseous distention of the small bowel loops within the left mid abdomen with a couple of scattered small bowel air-fluid levels  2014 CT abd/pelvis: diffuse wall thickening of the distal ileum with engorgement of the vasculature and stranding within the adjacent fat; no obstruction or abscess; colon is fluid filled without wall thickening; prior cholecystectomy  10/22/2014 flex sig: fistula in the anal canal; 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 (path: focal surface denudation, focal tubular loss and superficial recent mucosal hemorrhage)  2014 CT abd/pelvis: perianal fistula significantly increased in size with several small abscesses in the right gluteal fat adjacent to the gluteal crease; small amount of air in the vagina near the region of the above fistula along the right lateral lateral margin of the anorectal verge, with abnormal soft tissue density obscuring the normal fat plane between the rectum and the vagina; pathologic bowel wall thickening involving the distal ileum with surrounding edema  2015 colonoscopy: obvious evidence of Crohns in the small bowel; Crohns recurrence at the anastomotic site  (path: moderate active chronic enterocolitis with villous/crypt architectural distortion); hyperplastic polyp in the left colon (path: focal mucosal crypt hyperplasia); proctitis with evidence of intestinal fistula formation (path: normal)  4/21/2015 MRI Pelvis: perianal fistula originating at the right lateral spect of the anorectal junction, extending through the right levator ani and terminating in the right gluteal cleft; possible rectovaginal fistula  4/28/2015 KUB: mildly dilated loops of small bowel with probable irregularity of the wall of a loop of small in the upper mid pelvic region  4/28/2015 CXR: normal  6/12/2015 CT abd/pelvis: acute on chronic inflammatory changes of the distal ileum without evidence for focal obstruction or perforation; rectal fistula  5/27/2016 Colonoscopy: Inflammation characterized by congestion (edema), erythema and scarring was found. The anus and the rectum were spared. This was mild in severity, and when compared to previous examinations, the findings are improved. The colon (entire examined portion) appeared normal (no path)  1/4/2017 CT abd/pelvis: several centimeter segment within the mid small bowel demonstrating inflammatory changes including wall thickening, surrounding fat stranding, and prominent vascularity with associated free fluid without evidence of obstruction, perforation, or abscess; rectal fistulas; cholecystectomy; descending colostomy; nonobstructing right renal calculus  1/15/2017 RUE US: noncompressibel occlusive thrombus throughout the right brachial vein  1/15/2017 CXR: optimal placement of the central line with no pneumothorax  1/15/2017 CT abd/pelvis: thickened loop of terminal ileum with prominence of the mesenteric blood vessels; no perforation, abscess formation, obstruction; no fistula identified  1/16/2017 CXR: interval placement of a left PICC with catheter tip in SVC  1/17/2017 EGD: The esophagus was normal. The stomach was normal. The cardia  and gastric fundus were normal on retroflexion. The examined duodenum was normal; Normal esophagus; Normal stomach; Normal examined duodenum (no path)   1/17/2017 Colonoscopy: A small fistula opening was found in the rectum. The rectal remnant appeared normal. No stricture noted. The entire examined colon is normal. Colonic fistula in rectum appears closed. Biopsies were taken with a cold forceps from the entire colon for evaluation of microscopic colitis   1/18/2017 KUB: nonspecific prominence of the intestinal bowel loops; post-op changes in the RUQ and RLQ  2/5/2017 KUB: Nonspecific scattered air-fluid levels without dilated bowel loops  2/5/2017 Pelvic US: normal appearance of the uterus and right ovary  4/12/2017 CXR: normal  4/12/2017 KUB: unchanged SBO with no evidence of free air  4/14/2017KUB: no evidence of dilated loops of bowel; slight increase in number of air fluid levels may suggest ileus  11/22/2017 MRI pelvis: Fistulous tract connecting the right posterolateral aspect of the vagina and right lateral wall of the anorectal junction (9:00) that extends through the levator ani muscle just above the level of the puborectalis muscle/internal sphincter and courses through the medial aspect of the right ischiorectal/ischioanal fossa to terminate at the medial gluteal fold (suspected supra-sphincteric fistula).  When compared to the MRI dated 04/20/2015, fistulous track appears mostly hypointense suggesting fibrosis without definite imaging findings to indicate patency. No focal drainable fluid collections. Circumferential wall thickening involving the distal small bowel, not significantly changed when compared to CT dated 01/19/2017 and likely reflecting sequela of the patient's known inflammatory bowel disease.  12/4/2017 Flex sig: anal stricture, normal mucosa in the rectum (no path)  2/2018 Colonoscopy: minimal inflammation. Biopsies normal   2/3/2018 CT abd/pelvis: Postoperative changes from ileocolonic  resection and descending colostomy for repair of a rectovaginal fistula; Interval development of free air adjacent to the proximal colonic anastomosis within the right lower quadrant. Findings concerning for potential anastomotic leak; mild distention of the distal ileum with luis-ileal inflammation and edema of the adjacent mesentery    2/4/2018 CXR: no focal consolidation  2/4/2018 MRE: Small bowel and colonic disease  2/21/2018 KUB: Linear collection of gas along the lateral margin of the cecum/ascending colon  suspicious for small collection of extraluminal air. This has apparently been  demonstrated on outside imaging which is not available for direct comparison.  Clinical/imaging follow-up are recommended. Retained gas and fecal material within the colon with scattered colonic air-fluid levels; postoperative changes  2/26/2018 KUB: stable linear collection of air along the lateral margin of the cecum suspicious extraluminal air; unremarkable bowel gas pattern  3/7/2018 MRE: Small bowel inflammation and upstream dilation, some small bowel stenosis  3/13/2018 Gastrograffin Enema: no evidence of rectovaginal fistula  3/23/2018 MRE: Active inflammatory Crohn's disease with stricture and upstream dilatation    Pertinent Labs:  Lab Results   Component Value Date    SEDRATE 52 (H) 02/03/2018    CRP 31.7 (H) 03/24/2018     No results found for: TTGIGA, IGA  No results found for: TSH, FREET4  Lab Results   Component Value Date    LAFLUUBF43HW 17 (L) 01/17/2017    BWFZQLIE94 535 01/17/2017     Lab Results   Component Value Date    HEPBSAG Negative 02/05/2018    HEPBCAB Negative 02/05/2018    HEPCAB Negative 02/05/2018     No results found for: FEW37SGTN  Lab Results   Component Value Date    NIL 0.079 02/05/2018    TBAG 0.077 02/05/2018    TBAGNIL -0.002 02/05/2018    MITOGENNIL 2.704 02/05/2018    TBGOLD Negative 02/05/2018     Lab Results   Component Value Date    TPTMINTERP HETERTZYGOTE=INTERMEDIATE ACTIVITY  03/18/2018    TPMTRESULT 19.9 (L) 01/17/2017     Lab Results   Component Value Date    STOOLCULTURE  03/18/2018     No Salmonella,Shigella,Vibrio,Campylobacter,Yersinia isolated.    KJKWKBSFNB0N Negative 03/18/2018    TCCSBTVIUV3D Negative 03/18/2018    CDIFFICILEAN Negative 02/04/2018    CDIFFTOX Negative 02/04/2018     No results found for: CALPROTECTIN    Therapeutic Drug Monitoring Labs:  NONE    Prior IBD Therapies:  Asacol - ineffective  Pentasa- ineffective  Azulfidine - ineffective  Azathioprine-on and off, no significant side effects  Remicade - 3 infusions 2009 then lost insurance  Humira 40 mg q 2 weeks in [id number]- ineffective    Current IBD Therapies:  Imuran 50 mg PO daily--currently   Prednisone 40 mg PO daily    Vaccinations:  Influenza (inactive):  2017  Pneumococcal PCV 13: never had  Pneumococcal PCV 23: never had  Tetanus (TdaP): more than 10 years, due now  HPV (males and females ages 19-27 yo):      NA  Meningococcal (risk factors- complement component deficiency, spleen damage or splenectomy, HIV, traveling to endemic areas, college student residing in residence childress,  recruits):  NA  Hepatitis B:  Recommended   Lab Results   Component Value Date    HEPBSAB Negative 02/05/2018     Hepatitis A (risk factors- traveling to high endemic areas, chronic liver disease, clotting factor disorders, MSM, illicit drug users):   Recommended   Lab Results   Component Value Date    HEPAIGG Negative 02/05/2018   MMR (live vaccine):    NA  Chickenpox status/Varicella (live vaccine): immune  Lab Results   Component Value Date    VARICELLAZOS 2.50 (H) 02/05/2018    VARICELLAINT Positive (A) 02/05/2018   Zoster (age >51 yo, live vaccine):  NA    NSAID use/indication:  No    Narcotic use:  Yes, hydrocodone 10/325 mg (Dr. Medina)    Alternative/Complementary Meds for IBD:  No    Review of Systems   Constitutional: Positive for weight loss. Negative for chills and fever.   Eyes: Negative for blurred  vision, pain and redness.   Respiratory: Negative for cough and shortness of breath.    Cardiovascular: Negative for chest pain.   Gastrointestinal: Positive for abdominal pain, heartburn and nausea. Negative for vomiting.   Genitourinary: Negative for dysuria and hematuria.   Musculoskeletal: Negative for back pain.   Skin: Negative for rash.   Psychiatric/Behavioral: Negative for depression. The patient is nervous/anxious.      Medical/Surgical History:    has a past medical history of Acute deep vein thrombosis (DVT) of brachial vein of right upper extremity (2017); Chronic pain; Crohn's disease of both small and large intestine with intestinal obstruction (); GERD (gastroesophageal reflux disease) (); Inflammatory bowel disease (); and Nephrolithiasis.      has a past surgical history that includes Tubal ligation;  section; Abscess drainage (); Anal fistulotomy (); Small intestine surgery (); Small intestine surgery (2009); Cholecystectomy (); Colon surgery (2015); and rectovaginal fistula repair (2018).    Family History:   family history includes Cancer (age of onset: 66) in her maternal aunt; Heart disease in her father.    Social History:    reports that she has never smoked. She has never used smokeless tobacco. She reports that she does not drink alcohol or use drugs.    Review of patient's allergies indicates:   Allergen Reactions    Flagyl [metronidazole] Other (See Comments)     Neuropathy    Morphine Other (See Comments)     Abdominal pain    Nubain [nalbuphine] Anxiety     Current Medications:   Outpatient Prescriptions Marked as Taking for the 18 encounter (Office Visit) with Elmer Serrato MD   Medication Sig Dispense Refill    azaTHIOprine (IMURAN) 50 mg Tab Take 50 mg by mouth once daily.      hydrocodone-acetaminophen 10-325mg (NORCO)  mg Tab Take 1 tablet by mouth every 4 (four) hours as needed. 90 tablet 0    loperamide (IMODIUM  "A-D) 1 mg/7.5 mL Liqd Take 0.1 mg/kg by mouth every 12 (twelve) hours.      MAGNESIUM CHLORIDE (SLOW-MAG ORAL) Take by mouth once daily.      predniSONE (DELTASONE) 10 MG tablet Take 4 tablets (40 mg total) by mouth once daily. 120 tablet 0    venlafaxine (EFFEXOR) 37.5 MG Tab Take 1 tablet (37.5 mg total) by mouth 2 (two) times daily. 60 tablet 1     Vital Signs:  BP (!) 141/89 (BP Location: Left arm, Patient Position: Sitting)   Pulse 77 Comment: O2:98%  Ht 5' 8" (1.727 m)   Wt 59.5 kg (131 lb 2.8 oz)   LMP 05/30/2009   BMI 19.94 kg/m²     Physical Exam   Constitutional: She is oriented to person, place, and time. She appears well-developed.   HENT:   Mouth/Throat: Oropharynx is clear and moist. No oral lesions.   No oral ulcers or thrush   Eyes: Conjunctivae are normal. Pupils are equal, round, and reactive to light.   Cardiovascular: Normal rate and regular rhythm.    Pulmonary/Chest: Effort normal and breath sounds normal.   Abdominal: Soft. She exhibits no distension. There is tenderness (RLQ). There is guarding. There is no rebound.   Ostomy bag in lower abdomen, brown stool, multiple abdominal scars   Musculoskeletal:        Right lower leg: She exhibits no swelling.        Left lower leg: She exhibits no swelling.   Neurological: She is alert and oriented to person, place, and time.   Skin: No rash noted.   Psychiatric: She has a normal mood and affect.   Nursing note and vitals reviewed.    Labs: reviewed and pertinent noted above    Assessment/Plan:  Carline Chang is a 52 y.o. female with fistulizing (rectovaginal fistula repaired) ileocolonic Crohn's disease with SB stricture:  diagnosed 1986 (age 21) with history of multiple surgeries including SB resections (1995), ileocolonic resection (2/2009-partial colectomy-3 cm, SB resection-11 cm), sigmoid loop colostomy (6/2015), and rectal vaginal fistula repair (1/2018).  She has a past medical history of cholecystectomy, history of RUE " right brachial vein thrombus (RUE US 1/2017), and kidney stones (CT 1/2017).  She began with symptoms of a partial SBO that was attributed to a virus in 1982.  She continued with symptoms and in 1986 she had an UGI with SBFT with showed Crohn's disease.  She tried various medical therapies asacol, pentasa, azulfidine, imuran and prednisone all of which were ineffective and did not achieve clinic remission though prednisone helped her symptoms the most.  In 1995 she had obstructive symptoms with stricture and fistula requiring surgery at which time she recalls 10 inched of intestine being resected.  She was under the care of Dr. Hurd from 3318-5834 and recall a normal colonoscopy in 2003.  She was on no medications from 4678-2623 and began seeing Dr. Boland in 2006.  In 2/2009 she had another surgery which included drainage of abdominal wall abscess, partial colectomy (3 cm in length) with small bowel resection (11 cm in length), and lysis of adhesions with surgical path showing mucosal ulcer, adjacent moderate ileocecal inflammation, serosal inflammatory adhesions, incidental lipomatous hypertrophy of ileocecal valve.  She started remicade but only took three doses but then discontinued due to loss of insurance.  She then did well for two years on no medications.  In 6/2011 she had worsening symptoms that did not improve with steroids, pentasa, imuran and flagyl.  She developed a rectovaginal fistula in 2014 that increased in size by 11/2014 per CT scan.  On 6/18/2015 she had a sigmoid loop colostomy though post-op developed high ostomy output with plans to eventually have a colostomy takedown.  She also had some peristomal skin irritation that was treated with topical antifungal, calamine, and flagyl.  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  Due to ongoing active inflammation and rectal fistula per colonoscopy and CT scan, she started Humira in  12/2015 and did well on humira until 1/2017.  She discontinued humira on 1/12/2017 and was told it was not working.  Entyvio was considered but never started, rather she was started on Imuran 50 mg PO daily.  She continued with symptoms and multiple examinations showing active disease, active fistula, and free air adjacent to the proximal colonic anastomosis in the RLQ.  In 1/2018 she underwent a rectovaginal fistula repair.  She had a hospitalization at [facility] from 3/17/2018-3/24/2018 at which time she initially met Dr. Serrato.  She had an MRE on 3/7/2018 which showed SB inflammation with upstream dilation, gastrograffin enemas showed no evidence of rectovaginal fistula and repeat MRE on 3/23/18 showed ongoing active inflammation of the SB with stricture and upstream dilation.  She had a colonoscopy through stoma by me which showed poor bowel prep with significant stool interfering with visualization. I was able to advance scope into the neoterminal ileum but due to poor air insufflation and bowel prep had difficulty seeing well.  She was started on IVF and IV steroids and eventually transitioned to oral steroids by discharged with improvement in her labs as well.  Currently she is on Imuran 50 mg PO daily and prednisone 40 mg PO daily.  She is having 6-8 loose brown bags of stool/day with abdominal pain that is relieved with hydrocodone 10/325 mg PO 6 times daily.       Carline has a complicated long standing history of Crohn's disease.  She has had several surgeries though a total based on rough calculations only about 33 cm resected of small bowel and small amount of large bowel. Her most recent 2 surgeries were sigmoid loop colostomy (6/2015) and rectovaginal fistula repair (1/2018).  She has been on imuran 50 mg daily for some time though prior scopes I don't think reached the disease that has been present on MRE.  What concerns me is the degree of inflammation with upstream dilation and recent free air seen on CT  in 2/2018.  She is on prednisone 40 mg daily for past 2 weeks and had elevated CRP and recent scope and MRE showing what seems to be an inflammatory stricture.  We will repeat CRP today to see if improved after 2 weeks or prednisone.  The other concerning issue is the ongoing requirement for narcotics.  Today we discussed surgical and medical treatment options.  Given her pain patient prefers surgical resection. We discussed medical treatment options including restarting remicade given she had not been on it for longer than 6 weeks due to insurance issues in the past. Humira was ineffective and I don't think cimzia is likely to work.  Entyvio is an option though stelara will not be approved by medicare.  Patient is going to think about her options though if we are considering medical treatment I am leaning towards restarting remicade.  I am concerned that due to the pain and if she has continued elevated CRP after 2 weeks of prednisone then surgery may be warranted. Will coordinate care and speak to Dr. Uribe (surgeon) and Dr. Medina (primary GI).  I do think if she eventually gets colostomy takedown this will be if there is no SB disease and she is already on biologic prior to takedown to prevent recurrent RV fistula which is inactive at this time.     # Diarrhea with SB stricture, active CD and history of cholecystectomy:   - different causes for this including active Crohn's disease, SIBO from SB stricture, IBS, post-cholecystectomy diarrhea  - cipro/flagyl has helped in past though flagyl has caused neuropathy and should not be used in my opinion  - eventual colostomy takedown should help with this  - stool studies negative for infection during inpatient stay- 3/2018  - on immodium, Mg prn  - CMP with Mg today    # Fistulizing (rectovaginal fistula repaired) ileocolonic Crohn's disease with SB stricture:  diagnosed 1986 (age 21) with history of multiple surgeries including SB resections (1995), ileocolonic  resection (2/2009-partial colectomy-3 cm, SB resection-11 cm), sigmoid loop colostomy (6/2015), and rectal vaginal fistula repair (1/2018)  - during her hospitalization during her inpatient stay I had a long discussion with patient regarding epidemiology, potential etiologies, associations and triggers (avoiding NSAIDS, using antibiotics with caution,stress and smoking effects on disease state), diagnosis, management goals and treatment options   - continue prednisone 40 mg daily with plans to taper based on medical vs surgical treatment decided within the next week  - discussed MOA, risks benefits of both imuran as well as restarting remicade safely; entyvio briefly discussed but unlikely to be helpful with advanced disease and past RV fistulas, cimzia unlikely to be helpful given she was a primary nonresponder to Humira and stelara won't be covered by insurance due to medicare  - if remicade is restarted to minimize risk of allergic reaction:   - basic labs: CBC, CMP, CRP, vitamin B12, vitamin D   - drug monitoring labs: TPMT (intermediate activity/heterozygote- 3/2018), TB quantiferon (negative 2/2018), Hep B testing (negative HBsAg, HBtotalcoreAb 3/2018)    # History of RUE right brachial vein thrombus  - on US RUE 1/2017  - not on any blood thinners  - no active symptoms and likely related to IV/central line  - monitor and recommend inpateint DVT prophylaxis at all times given high risk with IBD and venous thrombosis    # Nephrolithiasis  - 1/2017 CT with kidney stones  - recommend urology f/u if stones return  - hydration and control of diarrhea are important  istory of RUE right brachial vein thrombus (RUE US 1/2017), kidney stones (CT 1/2017)    # IBD specific health maintenance:  Colorectal cancer risk:    Risks factors: none-average risk  - Distribution of colonic disease:  primarily SB disease- ileum  - Year of symptom onset: 1982  - colonoscopy:  screening per age    Pap smear recommended yearly    Opthamologic exam recommended yearly   Dermatologic exam recommended yearly    Bone health:  Calcium 6736-8449 mg daily and vitamin D 2000 IU daily  Risk of osteopenia/osteoporosis:  Risks factors: none  Vitamin D deficiency  - needs high dose vit D- will discuss at next visit- did not have time to discuss today   Lab Results   Component Value Date    [id number] 17 (L) 01/17/2017     Vaccine counseling:  - No LIVE VACCINES --once a biologic is started  - ID referral placed to get UTD with vaccines    Follow up: 2 months with Dr. Serrato, 1 week with Dr Medina    Thank you again for sending Carline Karissarhonda Chang to see me today at the [facility]. Please don't hesitate to contact me if there are any questions regarding this evaluation, or if you have any other patients with inflammatory bowel disease for whom you would like a consultation. You can reach Dr. Serrato at [phone] or by email at [email or web address]    Elmer Serrato MD  Department of Gastroenterology  Medical Director, Inflammatory Bowel Disease    LOLITA Chapman   Department of Gastroenterology  Inflammatory Bowel Disease    Addendum: Spoke to patient and Dr Uribe and will also speak to Dr. Medina. After careful review of all history and seeing patient we feel that surgery would the best option with plans for remicade and then possible colostomy takedown after remicade optimized so RV fistula does not return.     BASHIR Serrato

## 2018-04-09 ENCOUNTER — OFFICE VISIT (OUTPATIENT)
Dept: GASTROENTEROLOGY | Facility: CLINIC | Age: 53
End: 2018-04-09
Payer: MEDICARE

## 2018-04-09 ENCOUNTER — OFFICE VISIT (OUTPATIENT)
Dept: WOUND CARE | Facility: CLINIC | Age: 53
End: 2018-04-09
Payer: MEDICARE

## 2018-04-09 ENCOUNTER — TELEPHONE (OUTPATIENT)
Dept: GASTROENTEROLOGY | Facility: CLINIC | Age: 53
End: 2018-04-09

## 2018-04-09 ENCOUNTER — LAB VISIT (OUTPATIENT)
Dept: LAB | Facility: HOSPITAL | Age: 53
End: 2018-04-09
Attending: INTERNAL MEDICINE
Payer: MEDICARE

## 2018-04-09 VITALS
HEIGHT: 68 IN | BODY MASS INDEX: 19.88 KG/M2 | WEIGHT: 131.19 LBS | HEART RATE: 77 BPM | DIASTOLIC BLOOD PRESSURE: 89 MMHG | SYSTOLIC BLOOD PRESSURE: 141 MMHG

## 2018-04-09 DIAGNOSIS — I82.621: ICD-10-CM

## 2018-04-09 DIAGNOSIS — F11.90 CHRONIC NARCOTIC USE: ICD-10-CM

## 2018-04-09 DIAGNOSIS — K50.812 CROHN'S DISEASE OF BOTH SMALL AND LARGE INTESTINE WITH INTESTINAL OBSTRUCTION: Primary | ICD-10-CM

## 2018-04-09 DIAGNOSIS — R10.9 CHRONIC ABDOMINAL PAIN: ICD-10-CM

## 2018-04-09 DIAGNOSIS — G89.29 CHRONIC ABDOMINAL PAIN: ICD-10-CM

## 2018-04-09 DIAGNOSIS — Z93.3 COLOSTOMY STATUS: Primary | ICD-10-CM

## 2018-04-09 DIAGNOSIS — R19.7 DIARRHEA, UNSPECIFIED TYPE: ICD-10-CM

## 2018-04-09 DIAGNOSIS — K50.812 CROHN'S DISEASE OF BOTH SMALL AND LARGE INTESTINE WITH INTESTINAL OBSTRUCTION: ICD-10-CM

## 2018-04-09 DIAGNOSIS — Z79.60 LONG-TERM USE OF IMMUNOSUPPRESSANT MEDICATION: ICD-10-CM

## 2018-04-09 DIAGNOSIS — E55.9 VITAMIN D DEFICIENCY: ICD-10-CM

## 2018-04-09 DIAGNOSIS — E87.6 HYPOKALEMIA: ICD-10-CM

## 2018-04-09 DIAGNOSIS — N82.3 RECTOVAGINAL FISTULA: ICD-10-CM

## 2018-04-09 DIAGNOSIS — N21.9 CALCULUS OF LOWER URINARY TRACT: ICD-10-CM

## 2018-04-09 LAB
25(OH)D3+25(OH)D2 SERPL-MCNC: 10 NG/ML
ALBUMIN SERPL BCP-MCNC: 2.9 G/DL
ALP SERPL-CCNC: 146 U/L
ALT SERPL W/O P-5'-P-CCNC: 14 U/L
ANION GAP SERPL CALC-SCNC: 13 MMOL/L
AST SERPL-CCNC: 12 U/L
BASOPHILS # BLD AUTO: 0.02 K/UL
BASOPHILS NFR BLD: 0.2 %
BILIRUB SERPL-MCNC: 0.3 MG/DL
BUN SERPL-MCNC: 6 MG/DL
CALCIUM SERPL-MCNC: 7.4 MG/DL
CHLORIDE SERPL-SCNC: 100 MMOL/L
CO2 SERPL-SCNC: 26 MMOL/L
CREAT SERPL-MCNC: 0.6 MG/DL
CRP SERPL-MCNC: 17.2 MG/L
DIFFERENTIAL METHOD: ABNORMAL
EOSINOPHIL # BLD AUTO: 0 K/UL
EOSINOPHIL NFR BLD: 0 %
ERYTHROCYTE [DISTWIDTH] IN BLOOD BY AUTOMATED COUNT: 15.8 %
EST. GFR  (AFRICAN AMERICAN): >60 ML/MIN/1.73 M^2
EST. GFR  (NON AFRICAN AMERICAN): >60 ML/MIN/1.73 M^2
GLUCOSE SERPL-MCNC: 95 MG/DL
HCT VFR BLD AUTO: 36.2 %
HGB BLD-MCNC: 11.5 G/DL
IMM GRANULOCYTES # BLD AUTO: 0.23 K/UL
IMM GRANULOCYTES NFR BLD AUTO: 2.2 %
LYMPHOCYTES # BLD AUTO: 0.4 K/UL
LYMPHOCYTES NFR BLD: 3.5 %
MCH RBC QN AUTO: 28 PG
MCHC RBC AUTO-ENTMCNC: 31.8 G/DL
MCV RBC AUTO: 88 FL
MONOCYTES # BLD AUTO: 0.2 K/UL
MONOCYTES NFR BLD: 2 %
NEUTROPHILS # BLD AUTO: 9.4 K/UL
NEUTROPHILS NFR BLD: 92.1 %
NRBC BLD-RTO: 0 /100 WBC
PLATELET # BLD AUTO: 274 K/UL
PMV BLD AUTO: 8.2 FL
POTASSIUM SERPL-SCNC: 3.3 MMOL/L
PROT SERPL-MCNC: 6.7 G/DL
RBC # BLD AUTO: 4.1 M/UL
SODIUM SERPL-SCNC: 139 MMOL/L
VIT B12 SERPL-MCNC: 289 PG/ML
WBC # BLD AUTO: 10.25 K/UL

## 2018-04-09 PROCEDURE — 80053 COMPREHEN METABOLIC PANEL: CPT

## 2018-04-09 PROCEDURE — 99999 PR PBB SHADOW E&M-EST. PATIENT-LVL IV: CPT | Mod: PBBFAC,,, | Performed by: INTERNAL MEDICINE

## 2018-04-09 PROCEDURE — 99215 OFFICE O/P EST HI 40 MIN: CPT | Mod: S$PBB,,, | Performed by: INTERNAL MEDICINE

## 2018-04-09 PROCEDURE — 99024 POSTOP FOLLOW-UP VISIT: CPT | Mod: POP,,, | Performed by: CLINICAL NURSE SPECIALIST

## 2018-04-09 PROCEDURE — 36415 COLL VENOUS BLD VENIPUNCTURE: CPT

## 2018-04-09 PROCEDURE — 99214 OFFICE O/P EST MOD 30 MIN: CPT | Mod: PBBFAC,27 | Performed by: INTERNAL MEDICINE

## 2018-04-09 PROCEDURE — 99999 PR PBB SHADOW E&M-EST. PATIENT-LVL I: CPT | Mod: PBBFAC,,, | Performed by: CLINICAL NURSE SPECIALIST

## 2018-04-09 PROCEDURE — 82607 VITAMIN B-12: CPT

## 2018-04-09 PROCEDURE — 99211 OFF/OP EST MAY X REQ PHY/QHP: CPT | Mod: PBBFAC | Performed by: CLINICAL NURSE SPECIALIST

## 2018-04-09 PROCEDURE — 86140 C-REACTIVE PROTEIN: CPT

## 2018-04-09 PROCEDURE — 85025 COMPLETE CBC W/AUTO DIFF WBC: CPT

## 2018-04-09 PROCEDURE — 82306 VITAMIN D 25 HYDROXY: CPT

## 2018-04-09 RX ORDER — POTASSIUM CHLORIDE 750 MG/1
10 TABLET, EXTENDED RELEASE ORAL 2 TIMES DAILY
Qty: 12 TABLET | Refills: 0 | Status: SHIPPED | OUTPATIENT
Start: 2018-04-09 | End: 2018-04-15

## 2018-04-09 RX ORDER — ERGOCALCIFEROL 1.25 MG/1
50000 CAPSULE ORAL
Qty: 12 CAPSULE | Refills: 0 | Status: SHIPPED | OUTPATIENT
Start: 2018-04-09 | End: 2018-06-26

## 2018-04-09 NOTE — TELEPHONE ENCOUNTER
Spoke to patient re results.  Low vitamin D will send in an RX for high dose vitamin D weekly for 12 weeks then will instruct patient to take vitamin D3 2000 IU daily.  Low potassium, need supplementation 10 mEq BID for 6 days and will repeat potassium level in 1 week.  Will send both RX to [facility].  CRP still abnormal but improved.  Patient verbalizes understanding and agrees with the treatment plan.  Questions answered.    KG

## 2018-04-09 NOTE — PROGRESS NOTES
"Subjective:       Patient ID: Carline Chang is a 52 y.o. female.    Chief Complaint: Colostomy    Mariluz is well known to me and has had colostomy for 2 year now. She had a revision  2 January 2018 and so here for eval of stoma again today and had consult with Dr Serrato, she has been in and out of hospital and having a lot of abd pain causing her to not eat, she was started on steroid and has noticed a slight improvement in pain      Review of Systems   Constitutional: Negative for activity change, appetite change and fever.   HENT: Negative for congestion and rhinorrhea.    Respiratory: Negative for cough and shortness of breath.    Cardiovascular: Negative for chest pain and leg swelling.   Gastrointestinal: Positive for diarrhea.        When she puts out it comes in "dump" pattern and she can barely get the pouch emptied in time, therefore she has pouch failures and frequent changes of late.    Genitourinary: Negative.    Musculoskeletal: Negative.    Skin: Positive for rash.   Neurological: Negative.    Psychiatric/Behavioral: Negative.        Objective:      Physical Exam   Constitutional: She is oriented to person, place, and time. She appears well-developed and well-nourished.   Pulmonary/Chest: Effort normal. No respiratory distress.   Abdominal: Soft. Bowel sounds are normal. She exhibits no distension.   Low budded newly revised colostomy , wears convatec 2pc  Moldable flat wafer   Musculoskeletal: Normal range of motion. She exhibits no edema.   Neurological: She is alert and oriented to person, place, and time.   Skin: Skin is warm and dry. Rash noted.   Psychiatric: She has a normal mood and affect.      colo stoma is 11/4"   Uses [employer] for supplier and I called them to discuss [facility] supplies, to change sizes to 13/4 flange, moldable [id number] and [id number]    Assessment:       1. Colostomy status        Plan:       Wants to return to [employer] moldable, spoke with [employer] about supplies   Return as " needed  I have reviewed the plan of care with the patient and she express understanding,   I spent over 50% of this 30 minute visit in face to face counseling.

## 2018-04-09 NOTE — LETTER
April 11, 2018      Andre Uribe MD  [address]  [facility] [address]           [facility] - Gastroenterology  [address]  [facility] [address]  Phone: [phone]  Fax: [phone]          Patient: Carline Chang   MR Number: [id number]   YOB: 1965   Date of Visit: 4/9/2018       Dear Dr. Andre Uribe:    Thank you for referring Carline Chang to me for evaluation. Attached you will find relevant portions of my assessment and plan of care.    If you have questions, please do not hesitate to call me. I look forward to following Carline Chang along with you.    Sincerely,    Elmer Serrato MD    Enclosure  CC:  No Recipients    If you would like to receive this communication electronically, please contact [email or web address] or [phone] to request more information on [employer] Link access.    For providers and/or their staff who would like to refer a patient to [facility], please contact us through our one-stop-shop provider referral line, [facility], at [phone].    If you feel you have received this communication in error or would no longer like to receive these types of communications, please e-mail [email or web address]

## 2018-04-09 NOTE — TELEPHONE ENCOUNTER
----- Message from Elmer Serrato MD sent at 4/9/2018  2:47 PM CDT -----  Review preliminary results with patient  CRP improved but normal  Low potassium needs to be treated  Low vit D level- needs high dose vit D    SS  ----- Message -----  From: [facility] [employer] [facility]  Sent: 4/9/2018  10:59 AM  To: Elmer Serrato MD

## 2018-04-09 NOTE — PATIENT INSTRUCTIONS
Instructions:  - labs today  - continue imuran 50 mg PO daily  - plan to start remicade with Dr. Medina at week 0, 4, then every 8 weeks with premeds 30 mins prior to infusion of hydrocortisone, tylenol, and benadryl--Dr. Serrato will call him to discuss this afternoon  - we will call/email you with what to do with you prednisone dose once we get your lab results  - follow a low fiber/low residue diet  - add boost or ensure to your diet, do not replace meals  - take Calcium 2071-2465 mg daily and vitamin D 2000 IU daily while on prednisone  - Avoid all NSAIDs (Advil, Ibuprofen, Motrin, Aspirin, Naprosyn, Aleve)  - Yearly Pap Smears recommended   - Yearly Eye exam  - Yearly Skin exam--wear sun block and hats  - Use antibiotics with caution  - Vaccines--BERNADETTE honeycutt MA to schedule  - Follow up with Dr. Medina in 1 week  - Follow up with Dr. Serrato in 2 months        Infliximab (Remicade): Biologics - Anti-TNF Agent    - Mechanism of action:  Remicade blocks TNF alpha which plays a role in the inflammatory process for inflammatory bowel disease  - Labs     - Repeat labs should drawn with every infusion or every 3 months to monitor for side effects (since you are on imuran)    - TB test (Quantiferon Gold) will be checked yearly or sooner if needed    Remicade Dosage:  - Loading Dose: 5 mg/kg IV week 0, 2, 6 (infused over 2 hours)  - Maintenance Dose: 5 mg/kg mg IV every 8 weeks  - 2-3 hour infusion    Risks of Remicade:  Stop therapy due to adverse event=10% (10/100)    Please call us immediately if you develop any of the below problems    Allergic reactions  - <2% (2/100) develop infusion site reactions  - RARE- hives (rash), difficulty breathing, chest pain/tightness, high or low blood pressure, swelling of the face and hands, fever or chills    Serious Infections=3% (3/100)  fever, tiredness, flu, open sores, warm red painful skin    Blood Disorders  fever that doesn't go away, bruising, bleeding, severe  paleness    Non-Hodgkins Lymphoma=0.06% (6/10,000)    Drug related lupus-like reaction=1% (1/100)  chest discomfort or pain that does not go away, shortness of breath, joint pain, rash on the cheeks or arms that gets worse in the sun    Psoriasis  new or worsening red scaly patches or raised bumps on the skin that are filled with pus     Case Reports Only: Multiple Sclerosis and Guillain [place] Syndrome  Numbness/weakness/tingling of your hands and feet, changes in your vision or seizures    Case Reports Only: New or worsening Congestive Heart Failure  shortness of breath, swelling in your ankles or feet, sudden weight gain    Case Reports Only: Serious Liver Injury  jaundice (yellow skin or eyes), dark brown urine, right-sided abdominal pain, fever, severe itchiness    Vaccine Counseling  See above    - All Immunizations ideally should be up to date prior to starting therapy--NO LIVE vaccines during therapy or 8 weeks prior to therapy  - Live Vaccines Include:   --Intranasal Influenza A/B  --Measles, Mumps, Rubella (MMR)  --Rotavirus  --Typhoid (oral)  --Vaccina (Smallpox)  --Varicella (Chicken Pox)  --Yellow Fever  --Zoster    Azathioprine (Imuran) is an immunomodulator. This medication can weaken or modulate the activity of the immune system which decreases the gut inflammation.    - Labs should be done every 3 months  - If you develop any of the below side effects stop the medication and call our office immediately    Risks of azathioprine:  Stop therapy due to adverse event = 11% (11/100)  Allergic reaction (drug fever, arthritis, rash) = 2% (2/100)  Pancreatitis (typical symptoms- abdominal pain, nausea/vomiting) = 3% (3/100)  Nausea= 2% (2/100)  Hepatitis- liver inflammation with abnormal liver tests = 2% (2/100)  Serious infections (primary infections caused by viruses) = 5% (5/100)  Bone Marrow Suppression  Non-Hodgkins Lymphoma= 0.04% (4/10,000)  Non-melanoma skin cancer (basal cell and squamous cell  cancer) = 0.16% (1.6/100) wear sunblock, hats, and get your skin checked once a year

## 2018-04-09 NOTE — TELEPHONE ENCOUNTER
Called and spoke with pt  I explained she needs to repeat her potassium in a wk.  I told her I will mail out the orders for her to go closer to home along with a paper telling the lab to fax results to us with our fax line on it.  I told her we rather [facility] but any lab can draw it.      She expressed understanding.     Orders Mailed

## 2018-04-11 PROBLEM — N82.3 RECTOVAGINAL FISTULA: Status: ACTIVE | Noted: 2018-04-11

## 2018-04-11 PROBLEM — R19.7 DIARRHEA: Status: ACTIVE | Noted: 2018-04-11

## 2018-04-11 PROBLEM — G89.29 CHRONIC ABDOMINAL PAIN: Status: ACTIVE | Noted: 2018-04-11

## 2018-04-11 PROBLEM — K50.812 CROHN'S DISEASE OF BOTH SMALL AND LARGE INTESTINE WITH INTESTINAL OBSTRUCTION: Status: ACTIVE | Noted: 2018-04-11

## 2018-04-11 PROBLEM — Z79.60 LONG-TERM USE OF IMMUNOSUPPRESSANT MEDICATION: Status: ACTIVE | Noted: 2018-04-11

## 2018-04-11 PROBLEM — F11.90 CHRONIC NARCOTIC USE: Status: ACTIVE | Noted: 2018-04-11

## 2018-04-11 PROBLEM — R10.9 CHRONIC ABDOMINAL PAIN: Status: ACTIVE | Noted: 2018-04-11

## 2018-04-11 PROBLEM — I82.621: Status: ACTIVE | Noted: 2018-04-11

## 2018-04-12 ENCOUNTER — PATIENT MESSAGE (OUTPATIENT)
Dept: SURGERY | Facility: CLINIC | Age: 53
End: 2018-04-12

## 2018-04-13 ENCOUNTER — TELEPHONE (OUTPATIENT)
Dept: GASTROENTEROLOGY | Facility: CLINIC | Age: 53
End: 2018-04-13

## 2018-04-13 NOTE — TELEPHONE ENCOUNTER
----- Message from Elmer Serrato MD sent at 4/13/2018  8:00 AM CDT -----  Call patient and decrease prednisone to 30 mg daily and let her know in 1 week to decrease to 20 mg daily and then 1 week later to 10 mg daily and then stop in preparation for her upcoming surgery to ensure better outcomes for wound healing and infection.       Copy Dr. Uribe on phone note    SS

## 2018-04-13 NOTE — TELEPHONE ENCOUNTER
Pt returned Dori's call  She had already taken Prednisone 40 mgs today so I told her tomorrow she will start 30 mgs and on 04/21 to start 20 mgs and on 04/28 to start 10 mgs and then she will be done    She expressed understanding and appreciated the call

## 2018-04-13 NOTE — TELEPHONE ENCOUNTER
Attempted to contact pt to discuss prednisone taper. [phone] call back number provided.      Detail of taper as follows:  4/13/2018- 30mg/day  4/20/2018- 20mg/day  4/27/2018- 10mg/day

## 2018-04-17 ENCOUNTER — LAB VISIT (OUTPATIENT)
Dept: LAB | Facility: HOSPITAL | Age: 53
End: 2018-04-17
Payer: MEDICARE

## 2018-04-17 ENCOUNTER — OFFICE VISIT (OUTPATIENT)
Dept: INFECTIOUS DISEASES | Facility: CLINIC | Age: 53
End: 2018-04-17
Payer: MEDICARE

## 2018-04-17 ENCOUNTER — OFFICE VISIT (OUTPATIENT)
Dept: SURGERY | Facility: CLINIC | Age: 53
End: 2018-04-17
Payer: MEDICARE

## 2018-04-17 ENCOUNTER — CLINICAL SUPPORT (OUTPATIENT)
Dept: INFECTIOUS DISEASES | Facility: CLINIC | Age: 53
End: 2018-04-17
Payer: MEDICARE

## 2018-04-17 VITALS
BODY MASS INDEX: 20.25 KG/M2 | WEIGHT: 133.63 LBS | HEIGHT: 68 IN | SYSTOLIC BLOOD PRESSURE: 149 MMHG | DIASTOLIC BLOOD PRESSURE: 89 MMHG | HEART RATE: 84 BPM

## 2018-04-17 VITALS
SYSTOLIC BLOOD PRESSURE: 134 MMHG | WEIGHT: 133.63 LBS | DIASTOLIC BLOOD PRESSURE: 85 MMHG | BODY MASS INDEX: 20.25 KG/M2 | TEMPERATURE: 98 F | HEIGHT: 68 IN | HEART RATE: 90 BPM

## 2018-04-17 DIAGNOSIS — G89.18 POST-OP PAIN: ICD-10-CM

## 2018-04-17 DIAGNOSIS — K50.812 CROHN'S DISEASE OF BOTH SMALL AND LARGE INTESTINE WITH INTESTINAL OBSTRUCTION: ICD-10-CM

## 2018-04-17 DIAGNOSIS — Z23 NEED FOR SHINGLES VACCINE: ICD-10-CM

## 2018-04-17 DIAGNOSIS — Z23 NEED FOR PNEUMOCOCCAL VACCINATION: ICD-10-CM

## 2018-04-17 DIAGNOSIS — Z23 NEED FOR HEPATITIS A AND B VACCINATION: ICD-10-CM

## 2018-04-17 DIAGNOSIS — Z79.60 LONG-TERM USE OF IMMUNOSUPPRESSANT MEDICATION: ICD-10-CM

## 2018-04-17 DIAGNOSIS — E87.6 HYPOKALEMIA: ICD-10-CM

## 2018-04-17 DIAGNOSIS — Z71.85 VACCINE COUNSELING: Primary | ICD-10-CM

## 2018-04-17 DIAGNOSIS — K50.80 CROHN'S DISEASE OF BOTH SMALL AND LARGE INTESTINE WITHOUT COMPLICATION: Primary | ICD-10-CM

## 2018-04-17 DIAGNOSIS — Z23 NEED FOR DIPHTHERIA-TETANUS-PERTUSSIS (TDAP) VACCINE: ICD-10-CM

## 2018-04-17 LAB — POTASSIUM SERPL-SCNC: 3.3 MMOL/L

## 2018-04-17 PROCEDURE — 36415 COLL VENOUS BLD VENIPUNCTURE: CPT

## 2018-04-17 PROCEDURE — 99204 OFFICE O/P NEW MOD 45 MIN: CPT | Mod: S$PBB,,, | Performed by: INTERNAL MEDICINE

## 2018-04-17 PROCEDURE — 90715 TDAP VACCINE 7 YRS/> IM: CPT | Mod: PBBFAC

## 2018-04-17 PROCEDURE — 99214 OFFICE O/P EST MOD 30 MIN: CPT | Mod: 57,S$PBB,, | Performed by: COLON & RECTAL SURGERY

## 2018-04-17 PROCEDURE — 90471 IMMUNIZATION ADMIN: CPT | Mod: PBBFAC

## 2018-04-17 PROCEDURE — G0009 ADMIN PNEUMOCOCCAL VACCINE: HCPCS | Mod: PBBFAC

## 2018-04-17 PROCEDURE — 99999 PR PBB SHADOW E&M-EST. PATIENT-LVL IV: CPT | Mod: PBBFAC,,, | Performed by: COLON & RECTAL SURGERY

## 2018-04-17 PROCEDURE — 90472 IMMUNIZATION ADMIN EACH ADD: CPT | Mod: PBBFAC

## 2018-04-17 PROCEDURE — 99214 OFFICE O/P EST MOD 30 MIN: CPT | Mod: PBBFAC,27 | Performed by: COLON & RECTAL SURGERY

## 2018-04-17 PROCEDURE — 84132 ASSAY OF SERUM POTASSIUM: CPT

## 2018-04-17 PROCEDURE — 99213 OFFICE O/P EST LOW 20 MIN: CPT | Mod: PBBFAC | Performed by: INTERNAL MEDICINE

## 2018-04-17 PROCEDURE — 99999 PR PBB SHADOW E&M-EST. PATIENT-LVL III: CPT | Mod: PBBFAC,,, | Performed by: INTERNAL MEDICINE

## 2018-04-17 RX ORDER — HYDROCODONE BITARTRATE AND ACETAMINOPHEN 10; 325 MG/1; MG/1
1 TABLET ORAL EVERY 4 HOURS PRN
Qty: 60 TABLET | Refills: 0 | Status: ON HOLD | OUTPATIENT
Start: 2018-04-17 | End: 2018-05-05 | Stop reason: HOSPADM

## 2018-04-17 RX ORDER — METRONIDAZOLE 500 MG/1
500 TABLET ORAL 3 TIMES DAILY
Qty: 3 TABLET | Refills: 0 | Status: ON HOLD | OUTPATIENT
Start: 2018-04-17 | End: 2018-05-05 | Stop reason: HOSPADM

## 2018-04-17 RX ORDER — NEOMYCIN SULFATE 500 MG/1
1000 TABLET ORAL 3 TIMES DAILY
Qty: 6 TABLET | Refills: 0 | Status: ON HOLD | OUTPATIENT
Start: 2018-04-17 | End: 2018-05-05 | Stop reason: HOSPADM

## 2018-04-17 NOTE — LETTER
April 17, 2018      Elmer Serrato MD  [address]  [facility] [address]           [facility]  [address]  [facility] [address]  Phone: [phone]  Fax: [phone]          Patient: Carline Chang   MR Number: [id number]   YOB: 1965   Date of Visit: 4/17/2018       Dear Dr. Elmer Serrato:    Thank you for referring Carline Chang to me for evaluation. Attached you will find relevant portions of my assessment and plan of care.    If you have questions, please do not hesitate to call me. I look forward to following Carline Chang along with you.    Sincerely,    Tania Nugent MD    Enclosure  CC:  No Recipients    If you would like to receive this communication electronically, please contact [email or web address] or [phone] to request more information on [employer] access.    For providers and/or their staff who would like to refer a patient to [facility], please contact us through our one-stop-shop provider referral line, [facility], at [phone].    If you feel you have received this communication in error or would no longer like to receive these types of communications, please e-mail [email or web address]

## 2018-04-17 NOTE — PATIENT INSTRUCTIONS
Today:  - Prevnar  - TDaP  - Twinrix (hep A/B)    1 month  - Twinrix #2    2 months  - Pneumovax 2 months    6 months  - Twinrix #3    Pharmacy  - Shingrix 0, 2 months

## 2018-04-17 NOTE — PROGRESS NOTES
Subjective:      Patient ID: Carline Chang is a 52 y.o. female.    Chief Complaint:Immunizations      History of Present Illness  52 y.o. female with advanced Crohns Disease presents for vaccine counseling. Patient with history of fistulizing Crohn's disease - currently on prednisone and azathioprine. 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    Patient denies any recent fever, chills, or infective infective illnesses.      1) Do you have a history of:    YES NO   Diabetes                 []       [x]   Diabetic Foot Infection/Bone Infection  []  [x]   Surgical Removal of Spleen    []  [x]       2) Have you had recurrent infections involving:             YES NO  Sinus infections  []  [x]  Sore Throat   []  [x]                             Prostate Infections  []  [x]  .                         Bladder Infections  []  [x]                                 Kidney Infections  []  [x]        Intestinal Infections  [x]  []  Recurrent abscesses  Skin Infections   []  [x]                   Reproductive Infections               Periodontal Disease                   3)Have you ever had: YES     NO UNKNOWN      Chicken Pox   [x]  []  []                 Shingles   []  [x]  []                Orolabial Herpes             []  [x]  []       Genital Herpes  []  [x]  []           Cytomegalovirus  []  [x]  []               Kimberly-Barr Virus  []  [x]  []              Genital Warts   []  [x]  []                Hepatitis A   []  [x]  []             Hepatitis B   []  [x]  []                Hepatitis C   []  [x]  []                 Syphilis   []  [x]  []                Gonorrhea   []  [x]  []                 Pelvic Inflammatory  []  [x]  []   Disease   []  [x]  []                    Chlamydia Infection  []  [x]  []                Intestinal parasites        or worms   []  [x]  []                 Fungal Infections  []  [x]  []                Blood  Infections  []  [x]  []     Comment:       4) Have you ever been exposed   YES NO  to someone with tuberculosis?  []  [x]   If yes, what treatment did you receive:     5) What states have you lived in?  [place], [place]     6) What countries have you visited for more than 2 weeks?                          YES NO  7) Did you have any associated infections?     []  [x]     8) Are you planning to travel outside the           [place] after starting BRMs?    [x]   []           Household                  YES NO  9 Do you have pets living in your house?   [x]   [x]             If yes, describe:  Cats    Do you spend time or live on a farm or                 have livestock or other farm animals?   []  [x]   If yes, which ones:    Do you have a fish tank?     []  [x]                       Do you have a litter box?     []  [x]                        Do you fish or hunt?      []  [x]                       Do you clean or skin fish or animals?   []  [x]                      Do you consume raw or undercooked                meat, fish, or shellfish?     []  [x]         10) What occupations have you had?                              11)Do you garden or otherwise  YES NO   work in the soil?    []  [x]     12)Do you hike, camp, or spend   time in wooded areas?   []  [x]                           14) The patient's immunization history was reviewed.   Have you ever received:  YES NO UNKNOWN DATES  Routine Childhood vaccines  [x]  []  []                Influenza vaccine   [x]  []  []         Pneumonia    []  [x]  []      Tetanus-diptheria   []  [x]  []   Hepatitis A vaccine series       []  [x]  []       Hepatitis B vaccine series       []  [x]  []        Meningitis vaccine   []  [x]  []     Varicella vaccine   []  [x]  []                        Review of Systems   Constitution: Negative for chills, decreased appetite, fever, weakness, malaise/fatigue, night sweats, weight gain and weight loss.   HENT: Negative for  congestion, ear pain, hearing loss, hoarse voice, sore throat and tinnitus.    Eyes: Negative for blurred vision, redness and visual disturbance.   Cardiovascular: Negative for chest pain, leg swelling and palpitations.   Respiratory: Negative for cough, hemoptysis, shortness of breath and sputum production.    Hematologic/Lymphatic: Negative for adenopathy. Does not bruise/bleed easily.   Skin: Negative for dry skin, itching, rash and suspicious lesions.   Musculoskeletal: Negative for back pain, joint pain, myalgias and neck pain.   Gastrointestinal: Positive for abdominal pain and diarrhea. Negative for constipation, heartburn, nausea and vomiting.   Genitourinary: Negative for dysuria, flank pain, frequency, hematuria, hesitancy and urgency.   Neurological: Negative for dizziness, headaches, numbness and paresthesias.   Psychiatric/Behavioral: Negative for depression and memory loss. The patient does not have insomnia and is not nervous/anxious.      Objective:   Physical Exam   Constitutional: She is oriented to person, place, and time. She appears well-developed and well-nourished. No distress.   HENT:   Head: Normocephalic and atraumatic.   Eyes: Conjunctivae and EOM are normal.   Neck: Normal range of motion. Neck supple.   Cardiovascular: Normal rate.    Pulmonary/Chest: Effort normal. No respiratory distress.   Abdominal: Soft. She exhibits no distension.   colostomy   Musculoskeletal: Normal range of motion. She exhibits no edema.   Neurological: She is alert and oriented to person, place, and time.   Skin: Skin is warm and dry. No rash noted. She is not diaphoretic. No erythema.   Psychiatric: She has a normal mood and affect. Her behavior is normal.   Vitals reviewed.      Significant labs reviewed:  2/2018    HepA Ab neg  HepBs Ab neg  HepBs Ag neg  HepBc Ab neg  HepC Ab neg    Quant gold neg      Assessment:    Biologic Response Modifier Candidacy:   Based on available information, there are no  identified significant barriers to BRMs from an infectious disease standpoint.    Counseling:  - I discussed with Ross the risk for increased susceptibility to infections following BRM therapy including increased risk for infection.    - Specific guidance has been provided to the patient regarding the patients occupation, hobbies and activities to avoid future infectious complications including but not limited to avoiding undercooked meats and seafood, proper hygiene, and contact with animals.  - The patients has been counseled on the importance of vaccinations including but not limited to a yearly flu vaccine.    Immunizations:  Based on the patients immunization history and serologies, immunizations were ordered:  - Prevnar  - Pneumovax 2 months  - TDaP  - Twinrix 0, 1, 6 months  - Shingrix 0, 2 months (at pharmacy)             The patient was encouraged to contact us about any problems that may develop after immunization and possible side effects were reviewed.     Tania Nugent MD MPH  Infectious Diseases [facility]

## 2018-04-17 NOTE — PROGRESS NOTES
CRS Office - HISTORY AND PHYSICAL    SUBJECTIVE:     Chief Complaint: Crohn's disease, fistulizing    History of Present Illness:  Patient is a 52 y.o. female patient well known to this practice with history of fistulizing Crohn's disease diagnosed in 1986 (age 21) with history of multiple surgeries including SB resections (1995), ileocolonic resection (2/2009-partial colectomy-3 cm, SB resection-11 cm), sigmoid loop colostomy (6/2015), and most recently colostomy revision & rectal vaginal fistula repair (1/2018).     She was most recently hospitalized for Crohn's disease in Feb of this year for a flare. At that time, she underwent a MRE which showed some active crohn's inflammation and a stricture of the SB with upstream dilation and a scope via her colostomy by Dr. Serrato - there were some ulcerations of the neoterminal ileum but visualization was poor due to stool burden.     She saw Dr. Serrato in clinic ~2 weeks ago and she was started on Prednisone 40mg daily. This has been tapered to 30mg daily. She had improvement in symptoms with the steroids. She is still having high colostomy output - reports emptying the bag 15 times a day. She is taking imodium. No nausea or vomiting. She continues to have abdominal pain - though this is better with the steroids.     They had a discussion re: possible surgical intervention vs restarting biologics. Given her persistent symptoms, surgery was decided on after further discussion with Dr. Uribe.     Most recent imaging/scopes --> (from Dr. Serrato's clinic note)  12/4/2017 Flex sig: anal stricture, normal mucosa in the rectum (no path)  2/2018 Colonoscopy: minimal inflammation. Biopsies normal   2/3/2018 CT abd/pelvis: Postoperative changes from ileocolonic resection and descending colostomy for repair of a rectovaginal fistula; Interval development of free air adjacent to the proximal colonic anastomosis within the right lower quadrant. Findings concerning for potential  anastomotic leak; mild distention of the distal ileum with luis-ileal inflammation and edema of the adjacent mesentery    2018 CXR: no focal consolidation  2018 MRE: Small bowel and colonic disease  2018 KUB: Linear collection of gas along the lateral margin of the cecum/ascending colon  suspicious for small collection of extraluminal air. This has apparently been  demonstrated on outside imaging which is not available for direct comparison.  Clinical/imaging follow-up are recommended. Retained gas and fecal material within the colon with scattered colonic air-fluid levels; postoperative changes  2018 KUB: stable linear collection of air along the lateral margin of the cecum suspicious extraluminal air; unremarkable bowel gas pattern  3/7/2018 MRE: Small bowel inflammation and upstream dilation, some small bowel stenosis  3/13/2018 Gastrograffin Enema: no evidence of rectovaginal fistula  3/23/2018 MRE: Active inflammatory Crohn's disease with stricture and upstream dilatation      Pertinent past medical history includes cholecystectomy, history of RUE right brachial vein thrombus (RUE US 2017), kidney stones (CT 2017).     Review of patient's allergies indicates:   Allergen Reactions    Morphine Other (See Comments)     Abdominal pain    Flagyl [metronidazole] Other (See Comments)     Neuropathy    Nubain [nalbuphine] Anxiety       Past Medical History:   Diagnosis Date    Acute deep vein thrombosis (DVT) of brachial vein of right upper extremity 2017    on RUE ultrasound    Chronic pain     Crohn's disease of both small and large intestine with intestinal obstruction     GERD (gastroesophageal reflux disease)     Inflammatory bowel disease     Nephrolithiasis      Past Surgical History:   Procedure Laterality Date    ABSCESS DRAINAGE      ANAL FISTULOTOMY  2018     SECTION      x2    CHOLECYSTECTOMY  2000    COLON SURGERY  2015    sigmoid loop  colostomy    COLONOSCOPY N/A 5/27/2016    Procedure: COLONOSCOPY;  Surgeon: Andre Uribe MD;  Location: [facility] (4TH FLR);  Service: Endoscopy;  Laterality: N/A;    COLONOSCOPY N/A 1/17/2017    Procedure: COLONOSCOPY;  Surgeon: Dany Stock MD;  Location: [facility] (2ND FLR);  Service: Endoscopy;  Laterality: N/A;    COLONOSCOPY N/A 12/4/2017    Procedure: COLONOSCOPY;  Surgeon: Andre Uribe MD;  Location: [facility] (4TH FLR);  Service: Endoscopy;  Laterality: N/A;    COLONOSCOPY N/A 2/8/2018    Procedure: COLONOSCOPY;  Surgeon: Andre Uribe MD;  Location: [facility] (4TH FLR);  Service: Endoscopy;  Laterality: N/A;    COLONOSCOPY N/A 3/24/2018    Procedure: COLONOSCOPY;  Surgeon: Elmer Serrato MD;  [facility] (2ND FLR);  Service: Endoscopy;  Laterality: N/A;    COLONOSCOPY  3/24/2018    rectovaginal fistula repair  01/2018    SMALL INTESTINE SURGERY  1995    per patient 10 inches removed    SMALL INTESTINE SURGERY  02/2009    abdominal abscess drained, 3 cm colon removed, 11 cm SB removed    TUBAL LIGATION      UPPER GASTROINTESTINAL ENDOSCOPY  2000     Family History   Problem Relation Age of Onset    Cancer Maternal Aunt 66     colon ca    Heart disease Father     Anesthesia problems Neg Hx     Celiac disease Neg Hx     Cirrhosis Neg Hx     Colon cancer Neg Hx     Colon polyps Neg Hx     Crohn's disease Neg Hx     Cystic fibrosis Neg Hx     Esophageal cancer Neg Hx     Hemochromatosis Neg Hx     Inflammatory bowel disease Neg Hx     Irritable bowel syndrome Neg Hx     Liver cancer Neg Hx     Liver disease Neg Hx     Rectal cancer Neg Hx     Stomach cancer Neg Hx     Ulcerative colitis Neg Hx     Mars's disease Neg Hx     Lymphoma Neg Hx     Tuberculosis Neg Hx     Scleroderma Neg Hx     Rheum arthritis Neg Hx     Multiple sclerosis Neg Hx     Melanoma Neg Hx     Lupus Neg Hx     Psoriasis Neg Hx     Skin cancer Neg Hx      Social History    Substance Use Topics    Smoking status: Never Smoker    Smokeless tobacco: Never Used    Alcohol use No        Review of Systems:  Constitutional: no fever or chills  Eyes: no visual changes  ENT: no nasal congestion or sore throat  Respiratory: no cough or shortness of breath  Cardiovascular: no chest pain or palpitations  Gastrointestinal: positive for abdominal pain and diarrhea  Genitourinary: no hematuria or dysuria  Integument/Breast: no rash or pruritis  Hematologic/Lymphatic: no easy bruising or lymphadenopathy  Musculoskeletal: no arthralgias or myalgias    OBJECTIVE:     Vital Signs (Most Recent)  Pulse: 84 (04/17/18 1313)  BP: (!) 149/89 (04/17/18 1313)    Physical Exam:  General: White female in NAD sitting in chair in clinic  Neuro: aaox4 maex4 perrl  Respiratory: resps even unlabored  Cardiac: cap refill <2 sec  Abdomen: previous midline incision well healed. Stoma pink with stool and gas in bag. No hernia appreciated  Extremities: Warm dry and intact  Anorectal: deferred    ASSESSMENT/PLAN:     Carline was seen today for crohn's disease.    Diagnoses and all orders for this visit:    Crohn's disease of both small and large intestine without complication  -     Case Request Operating Room: EXPLORATORY-LAPAROTOMY, small bowel resection, possible ostomy    Post-op pain  -     hydrocodone-acetaminophen 10-325mg (NORCO)  mg Tab; Take 1 tablet by mouth every 4 (four) hours as needed.    Crohn's disease of both small and large intestine with intestinal obstruction    Other orders  -     neomycin (MYCIFRADIN) 500 mg Tab; Take 2 tablets (1,000 mg total) by mouth 3 (three) times daily. Take 2 tablets at 1pm, 2 tablets at 2pm and 2 tablets at 11pm  -     metroNIDAZOLE (FLAGYL) 500 MG tablet; Take 1 tablet (500 mg total) by mouth 3 (three) times daily. Take 1 tablet at 1pm, 1 tablet at 2pm, and 1 tablet at 11pm the day bf surgery      - Plan for OR on 4/30/18 for ex lap, SBR  - Bowel prep with PO  abx  - PARQ held, consent obtained    Melida Calixto MD  Colon and Rectal Surgery Fellow    Have seen and examined the patient with the fellow and agree with their plan.  ESPERANZA MADSEN

## 2018-04-17 NOTE — LETTER
April 18, 2018      Elmer Serrato MD  [address]  [facility] [address]           [facility]  [address]  [facility] [address]  Phone: [phone]          Patient: Carline Chang   MR Number: [id number]   YOB: 1965   Date of Visit: 4/17/2018       Dear Dr. Elmer Serrato:    Thank you for referring Carline Chang to me for evaluation. Attached you will find relevant portions of my assessment and plan of care.    If you have questions, please do not hesitate to call me. I look forward to following Carline Chang along with you.    Sincerely,    Andre Uribe MD    Enclosure  CC:  No Recipients    If you would like to receive this communication electronically, please contact [email or web address] or [phone] to request more information on [employer] access.    For providers and/or their staff who would like to refer a patient to [facility], please contact us through our one-stop-shop provider referral line, [facility], at [phone].    If you feel you have received this communication in error or would no longer like to receive these types of communications, please e-mail [email or web address]

## 2018-04-18 ENCOUNTER — ANESTHESIA EVENT (OUTPATIENT)
Dept: SURGERY | Facility: HOSPITAL | Age: 53
DRG: 330 | End: 2018-04-18
Payer: MEDICARE

## 2018-04-18 DIAGNOSIS — Z01.818 PREOP TESTING: Primary | ICD-10-CM

## 2018-04-18 NOTE — ANESTHESIA PREPROCEDURE EVALUATION
"  Anesthesia Assessment: Preoperative EQUATION    Planned Procedure: Procedure(s) (LRB):  EXPLORATORY-LAPAROTOMY, small bowel resection, possible ostomy (N/A)  Requested Anesthesia Type:Choice  Surgeon: Andre Uribe MD  Service: Colon and Rectal  Known or anticipated Date of Surgery:4/30/2018    Surgeon notes: reviewed and Crohn's disease of both small and large intestine without complication     Previous anesthesia records:3/24/18-Colonoscopy-General/MAC-No PONV-no apparent anesthetic complications    Anesthesia Hx:  No problems with previous Anesthesia Pt. is an "extremely hardstick" and requires anesthesia for IV. Pt. states she usually gets a central line when hospitalized. History of prior surgery of interest to airway management or planning: Previous anesthesia: General 12/4/17: Colonoscpy with general anesthesia. Procedure performed at an [facility]. Denies Family Hx of Anesthesia complications.   Denies Personal Hx of Anesthesia complications.     Last PCP note: within [facility] ,   Subspecialty notes: Gastroenterology, Infectious Disease/Urology/Wound Care-Enterostomal     Tests already available:  Results have been reviewed.Labs-4/17/18/Potassium/4/9/18-Vitamin B12/Vitamin D/C-reactive protein/CMP/CBC/  2/4/18-CXR      Plan:    Testing:  T&S        Consultation:Patient's PCP for a statement of optimization  per review by Dr. Leopold-Sent in-basket message on 4/18/18 requesting"Is patient "optimized" for  surgery?"-PENDING     Patient  has previously scheduled Medical Appointment:None    Navigation: Tests Scheduled.T&S-(Sign & Held)-ordered               Consults scheduled.Patient's PCP for a statement of optimization  per review by Dr. Leopold-Sent in-basket message on 4/18/18 requesting"Is patient "optimized" for  Surgery?"-PENDING               Results will be tracked by Preop Clinic.     **Patient is a very difficult IV stick & requires anesthesia for IV.**              Mona " "JESSICA Solo  4/18/18     Addendum: Patient is "Cleared" for surgery per Dr. Lerma on 4/23/18.  Mona Solo RN  4/23/18 04/18/2018  Carline Chang is a 52 y.o., female.    Anesthesia Evaluation    I have reviewed the Patient Summary Reports.    I have reviewed the Nursing Notes.      Review of Systems  Anesthesia Hx:  No problems with previous Anesthesia  Denies Family Hx of Anesthesia complications.   Denies Personal Hx of Anesthesia complications.   Social:  No Alcohol Use, Non-Smoker    Hematology/Oncology:         -- Anemia:   EENT/Dental:   Wears glasses   Cardiovascular:   Exercise tolerance: good Denies CAD.     Denies Angina.        Pulmonary:   Denies COPD.  Denies Asthma.  Denies Shortness of breath.  Denies Sleep Apnea.    Renal/:   Denies Chronic Renal Disease. renal calculi     Hepatic/GI:   GERD, well controlled Denies Liver Disease. Crohn's disease of both small and large intestine without complication/ hx rectovaginal fistula   Neurological:   Denies CVA. Denies Seizures.    Endocrine:   Denies Diabetes. Denies Hypothyroidism.    Psych:   depression Takes EFFEXOR     Mona Solo RN  4/18/18    Physical Exam  General:  Well nourished    Airway/Jaw/Neck:  Airway Findings: Mallampati: II Improves to I with phonation.  TM Distance: Normal, at least 6 cm  Jaw/Neck Findings:  Neck ROM: Normal ROM       Chest/Lungs:  Chest/Lungs Findings: Normal Respiratory Rate     Heart/Vascular:  Heart Findings: Rate: Normal        Mental Status:  Mental Status Findings:  Cooperative, Alert and Oriented         Anesthesia Plan  Type of Anesthesia, risks & benefits discussed:  Anesthesia Type:  general  Patient's Preference: GA  Intra-op Monitoring Plan: standard ASA monitors  Intra-op Monitoring Plan Comments:   Post Op Pain Control Plan: multimodal analgesia, IV/PO Opiods PRN and per primary service " following discharge from PACU  Post Op Pain Control Plan Comments:   Induction:   IV  Beta Blocker:  Patient is not currently on a Beta-Blocker (No further documentation required).       Informed Consent: Patient understands risks and agrees with Anesthesia plan.  Questions answered. Anesthesia consent signed with patient.  ASA Score: 3     Day of Surgery Review of History & Physical:    H&P update referred to the surgeon.     Anesthesia Plan Notes: The patient's PMH was reviewed and PE was performed  Plan for GETA with RSI  Stress dose steroids        Ready For Surgery From Anesthesia Perspective.

## 2018-04-18 NOTE — PRE ADMISSION SCREENING
"Anesthesia Assessment: Preoperative EQUATION    Planned Procedure: Procedure(s) (LRB):  EXPLORATORY-LAPAROTOMY, small bowel resection, possible ostomy (N/A)  Requested Anesthesia Type:Choice  Surgeon: Andre Uribe MD  Service: Colon and Rectal  Known or anticipated Date of Surgery:4/30/2018    Surgeon notes: reviewed and Crohn's disease of both small and large intestine without complication     Previous anesthesia records:3/24/18-Colonoscopy-General/MAC-No PONV-no apparent anesthetic complications    Anesthesia Hx:  No problems with previous Anesthesia Pt. is an "extremely hardstick" and requires anesthesia for IV. Pt. states she usually gets a central line when hospitalized. History of prior surgery of interest to airway management or planning: Previous anesthesia: General 12/4/17: Colonoscpy with general anesthesia. Procedure performed at an [facility]. Denies Family Hx of Anesthesia complications.   Denies Personal Hx of Anesthesia complications.     Last PCP note: within [facility] ,   Subspecialty notes: Gastroenterology, Infectious Disease/Urology/Wound Care-Enterostomal     Tests already available:  Results have been reviewed.Labs-4/17/18/Potassium/4/9/18-Vitamin B12/Vitamin D/C-reactive protein/CMP/CBC/  2/4/18-CXR      Plan:    Testing:  T&S        Consultation:Patient's PCP for a statement of optimization  per review by Dr. Leopold-Sent in-basket message on 4/18/18 requesting"Is patient "optimized" for  surgery?"-PENDING     Patient  has previously scheduled Medical Appointment:None    Navigation: Tests Scheduled.T&S-(Sign & Held)-ordered               Consults scheduled.Patient's PCP for a statement of optimization  per review by Dr. Leopold-Sent in-basket message on 4/18/18 requesting"Is patient "optimized" for  Surgery?"               Results will be tracked by Preop Clinic.                 Mona Solo RN  4/18/18  "

## 2018-04-19 ENCOUNTER — TELEPHONE (OUTPATIENT)
Dept: GASTROENTEROLOGY | Facility: CLINIC | Age: 53
End: 2018-04-19

## 2018-04-19 DIAGNOSIS — K50.90 CROHN DISEASE: ICD-10-CM

## 2018-04-19 RX ORDER — ENOXAPARIN SODIUM 100 MG/ML
40 INJECTION SUBCUTANEOUS EVERY 24 HOURS
Status: CANCELLED | OUTPATIENT
Start: 2018-04-19

## 2018-04-19 RX ORDER — SODIUM CHLORIDE 9 MG/ML
INJECTION, SOLUTION INTRAVENOUS
Status: CANCELLED | OUTPATIENT
Start: 2018-04-19

## 2018-04-19 RX ORDER — ACETAMINOPHEN 10 MG/ML
1000 INJECTION, SOLUTION INTRAVENOUS EVERY 8 HOURS
Status: CANCELLED | OUTPATIENT
Start: 2018-04-19 | End: 2018-04-20

## 2018-04-19 RX ORDER — OXYCODONE HYDROCHLORIDE 5 MG/1
5 TABLET ORAL EVERY 4 HOURS PRN
Status: CANCELLED | OUTPATIENT
Start: 2018-04-19

## 2018-04-19 RX ORDER — OXYCODONE HYDROCHLORIDE 5 MG/1
10 TABLET ORAL EVERY 4 HOURS PRN
Status: CANCELLED | OUTPATIENT
Start: 2018-04-19

## 2018-04-19 RX ORDER — HYDROMORPHONE HYDROCHLORIDE 2 MG/ML
0.5 INJECTION, SOLUTION INTRAMUSCULAR; INTRAVENOUS; SUBCUTANEOUS
Status: CANCELLED | OUTPATIENT
Start: 2018-04-19 | End: 2018-04-20

## 2018-04-19 RX ORDER — ONDANSETRON 2 MG/ML
4 INJECTION INTRAMUSCULAR; INTRAVENOUS EVERY 6 HOURS PRN
Status: CANCELLED | OUTPATIENT
Start: 2018-04-19

## 2018-04-19 RX ORDER — ACETAMINOPHEN 10 MG/ML
1000 INJECTION, SOLUTION INTRAVENOUS
Status: CANCELLED | OUTPATIENT
Start: 2018-04-19 | End: 2018-04-19

## 2018-04-19 RX ORDER — HEPARIN SODIUM 5000 [USP'U]/ML
5000 INJECTION, SOLUTION INTRAVENOUS; SUBCUTANEOUS EVERY 8 HOURS
Status: CANCELLED | OUTPATIENT
Start: 2018-04-19 | End: 2018-04-19

## 2018-04-19 RX ORDER — GLUCAGON 1 MG
1 KIT INJECTION CONTINUOUS PRN
Status: CANCELLED | OUTPATIENT
Start: 2018-04-19

## 2018-04-19 RX ORDER — IBUPROFEN 200 MG
16 TABLET ORAL
Status: CANCELLED | OUTPATIENT
Start: 2018-04-19

## 2018-04-19 RX ORDER — HYDROMORPHONE HYDROCHLORIDE 2 MG/ML
1 INJECTION, SOLUTION INTRAMUSCULAR; INTRAVENOUS; SUBCUTANEOUS EVERY 4 HOURS PRN
Status: CANCELLED | OUTPATIENT
Start: 2018-04-19 | End: 2018-04-20

## 2018-04-19 RX ORDER — LIDOCAINE HYDROCHLORIDE 10 MG/ML
1 INJECTION, SOLUTION EPIDURAL; INFILTRATION; INTRACAUDAL; PERINEURAL
Status: CANCELLED | OUTPATIENT
Start: 2018-04-19

## 2018-04-19 RX ORDER — DEXTROSE MONOHYDRATE, SODIUM CHLORIDE, AND POTASSIUM CHLORIDE 50; 1.49; 4.5 G/1000ML; G/1000ML; G/1000ML
INJECTION, SOLUTION INTRAVENOUS CONTINUOUS
Status: CANCELLED | OUTPATIENT
Start: 2018-04-19

## 2018-04-19 RX ORDER — NALOXONE HCL 0.4 MG/ML
0.02 VIAL (ML) INJECTION
Status: CANCELLED | OUTPATIENT
Start: 2018-04-19

## 2018-04-19 RX ORDER — IBUPROFEN 200 MG
24 TABLET ORAL
Status: CANCELLED | OUTPATIENT
Start: 2018-04-19

## 2018-04-19 RX ORDER — SODIUM CHLORIDE, SODIUM LACTATE, POTASSIUM CHLORIDE, CALCIUM CHLORIDE 600; 310; 30; 20 MG/100ML; MG/100ML; MG/100ML; MG/100ML
INJECTION, SOLUTION INTRAVENOUS CONTINUOUS
Status: CANCELLED | OUTPATIENT
Start: 2018-04-19 | End: 2018-04-20

## 2018-04-19 RX ORDER — SODIUM CHLORIDE 9 MG/ML
INJECTION, SOLUTION INTRAVENOUS CONTINUOUS
Status: CANCELLED | OUTPATIENT
Start: 2018-04-19

## 2018-04-19 RX ORDER — HYDROMORPHONE HYDROCHLORIDE 2 MG/ML
1 INJECTION, SOLUTION INTRAMUSCULAR; INTRAVENOUS; SUBCUTANEOUS EVERY 4 HOURS PRN
Status: CANCELLED | OUTPATIENT
Start: 2018-04-19

## 2018-04-19 NOTE — TELEPHONE ENCOUNTER
----- Message from Deidre Mcclure sent at 4/19/2018  3:02 PM CDT -----  Contact: SELF  PT IS HAVING SURGERY ON 4/30/18.  THE DOCTOR WOULD LIKE DR PARK TO GIVE CLEARANCE AGAIN FOR HER TO HAVE THE SURGERY.  PLEASE CALL THE PT.

## 2018-04-23 ENCOUNTER — TELEPHONE (OUTPATIENT)
Dept: INTERNAL MEDICINE | Facility: CLINIC | Age: 53
End: 2018-04-23

## 2018-04-23 NOTE — TELEPHONE ENCOUNTER
"----- Message from Pablo Medina MD sent at 4/20/2018  7:03 PM CDT -----  Mrs. Chang is cleared for surgery, needs bloodwork prior to surgery--CBC, CMP, Magnesium, phosphorus, amylase, lipase  ----- Message -----  From: Rachel Dunn MA  Sent: 4/18/2018   5:33 PM  To: Pablo Medina MD        ----- Message -----  From: Mona Solo RN  Sent: 4/18/2018   1:52 PM  To: Jeanna Willams RN, Mona Solo, RN, #    Patient is having Exploratory-Laparotomy,small bowel resection, possible ostomy with  on 4/30/18.  Is patient "Optimized" for surgery?  Await your reply. Thank You. Sincerely, Mona Solo RN  -[facility] -ext. [phone]    "

## 2018-04-23 NOTE — TELEPHONE ENCOUNTER
"----- Message from Pablo Medina MD sent at 4/20/2018  7:03 PM CDT -----  Mrs. Chang is cleared for surgery, needs bloodwork prior to surgery--CBC, CMP, Magnesium, phosphorus, amylase, lipase  ----- Message -----  From: Rachel Dunn MA  Sent: 4/18/2018   5:33 PM  To: Pablo Medina MD        ----- Message -----  From: Mona Solo RN  Sent: 4/18/2018   1:52 PM  To: Jeanna Willams RN, Mona Solo, RN, #    Patient is having Exploratory-Laparotomy,small bowel resection, possible ostomy with  on 4/30/18.  Is patient "Optimized" for surgery?  Await your reply. Thank You. Sincerely, Mona Solo RN  -[facility]iop Ctr. -ext. [phone]    "

## 2018-04-26 ENCOUNTER — TELEPHONE (OUTPATIENT)
Dept: GASTROENTEROLOGY | Facility: CLINIC | Age: 53
End: 2018-04-26

## 2018-04-26 DIAGNOSIS — K50.113 CROHN'S COLITIS, WITH FISTULA: ICD-10-CM

## 2018-04-26 DIAGNOSIS — K50.119 CROHN'S DISEASE OF LARGE INTESTINE WITH COMPLICATION: Primary | ICD-10-CM

## 2018-04-26 LAB
ALBUMIN SERPL-MCNC: 3.3 G/DL (ref 3.1–4.7)
ALP SERPL-CCNC: 116 IU/L (ref 40–104)
ALT (SGPT): 24 IU/L (ref 3–33)
AMYLASE SERPL-CCNC: 71 U/L (ref 28–100)
AST SERPL-CCNC: 18 IU/L (ref 10–40)
BASOPHILS NFR BLD: 0.1 K/UL (ref 0–0.2)
BASOPHILS NFR BLD: 0.5 %
BILIRUB SERPL-MCNC: 0.3 MG/DL (ref 0.3–1)
BUN SERPL-MCNC: 11 MG/DL (ref 8–20)
CALCIUM SERPL-MCNC: 8.4 MG/DL (ref 7.7–10.4)
CHLORIDE: 100 MMOL/L (ref 98–110)
CO2 SERPL-SCNC: 28.3 MMOL/L (ref 22.8–31.6)
CREATININE: 0.72 MG/DL (ref 0.6–1.4)
CRP SERPL-MCNC: 1.52 MG/DL (ref 0–1.4)
EOSINOPHIL NFR BLD: 0.2 K/UL (ref 0–0.7)
EOSINOPHIL NFR BLD: 1.6 %
ERYTHROCYTE [DISTWIDTH] IN BLOOD BY AUTOMATED COUNT: 17.2 % (ref 11.7–14.9)
GLUCOSE: 89 MG/DL (ref 70–99)
GRAN #: 8 K/UL (ref 1.4–6.5)
GRAN%: 81.9 %
HCT VFR BLD AUTO: 41.5 % (ref 36–48)
HGB BLD-MCNC: 13.4 G/DL (ref 12–15)
IMMATURE GRANS (ABS): 0.1 K/UL (ref 0–1)
IMMATURE GRANULOCYTES: 0.7 %
LIPASE SERPL-CCNC: 52 U/L (ref 0–38)
LYMPH #: 0.8 K/UL (ref 1.2–3.4)
LYMPH%: 7.9 %
MAGNESIUM SERPL-MCNC: 1.1 MG/DL (ref 1.5–2.6)
MCH RBC QN AUTO: 29.5 PG (ref 25–35)
MCHC RBC AUTO-ENTMCNC: 32.3 G/DL (ref 31–36)
MCV RBC AUTO: 91.4 FL (ref 79–98)
MONO #: 0.7 K/UL (ref 0.1–0.6)
MONO%: 7.4 %
NUCLEATED RBCS: 0 %
PHOSPHATE FLD-MCNC: 3.7 MG/DL (ref 2.5–4.9)
PLATELET # BLD AUTO: 245 K/UL (ref 140–440)
PMV BLD AUTO: 9.4 FL (ref 8.8–12.7)
POTASSIUM SERPL-SCNC: 3.5 MMOL/L (ref 3.5–5)
PROT SERPL-MCNC: 7.3 G/DL (ref 6–8.2)
RBC # BLD AUTO: 4.54 M/UL (ref 3.5–5.5)
SODIUM: 139 MMOL/L (ref 134–144)
WBC # BLD AUTO: 9.8 K/UL (ref 5–10)

## 2018-04-26 RX ORDER — VENLAFAXINE 37.5 MG/1
37.5 TABLET ORAL 2 TIMES DAILY
Qty: 180 TABLET | Refills: 0 | Status: SHIPPED | OUTPATIENT
Start: 2018-04-26 | End: 2018-11-20 | Stop reason: SDUPTHER

## 2018-04-26 RX ORDER — AZATHIOPRINE 50 MG/1
50 TABLET ORAL DAILY
Qty: 90 TABLET | Refills: 0 | Status: SHIPPED | OUTPATIENT
Start: 2018-04-26 | End: 2018-08-20 | Stop reason: SDUPTHER

## 2018-04-26 NOTE — TELEPHONE ENCOUNTER
----- Message from Andreea Escudero MA sent at 4/26/2018  9:17 AM CDT -----  Regarding: Surgery Clearnace   Is Patient cleared to proceed with upcoming procedure on 4/30/2018? Thank You, Andreea Escudero MA Pre-Op Anesthesia

## 2018-04-27 ENCOUNTER — TELEPHONE (OUTPATIENT)
Dept: SURGERY | Facility: CLINIC | Age: 53
End: 2018-04-27

## 2018-04-30 ENCOUNTER — ANESTHESIA (OUTPATIENT)
Dept: SURGERY | Facility: HOSPITAL | Age: 53
DRG: 330 | End: 2018-04-30
Payer: MEDICARE

## 2018-04-30 ENCOUNTER — TELEPHONE (OUTPATIENT)
Dept: PREADMISSION TESTING | Facility: HOSPITAL | Age: 53
End: 2018-04-30

## 2018-04-30 ENCOUNTER — HOSPITAL ENCOUNTER (INPATIENT)
Facility: HOSPITAL | Age: 53
LOS: 5 days | Discharge: HOME | DRG: 330 | End: 2018-05-05
Attending: COLON & RECTAL SURGERY | Admitting: COLON & RECTAL SURGERY
Payer: MEDICARE

## 2018-04-30 DIAGNOSIS — K50.90 CROHN DISEASE: ICD-10-CM

## 2018-04-30 LAB
ABO + RH BLD: NORMAL
ALBUMIN SERPL BCP-MCNC: 3.8 G/DL
ALP SERPL-CCNC: 174 U/L
ALT SERPL W/O P-5'-P-CCNC: 27 U/L
ANION GAP SERPL CALC-SCNC: 14 MMOL/L
AST SERPL-CCNC: 25 U/L
BILIRUB SERPL-MCNC: 1.1 MG/DL
BLD GP AB SCN CELLS X3 SERPL QL: NORMAL
BUN SERPL-MCNC: 17 MG/DL
CALCIUM SERPL-MCNC: 10.2 MG/DL
CHLORIDE SERPL-SCNC: 105 MMOL/L
CO2 SERPL-SCNC: 18 MMOL/L
CREAT SERPL-MCNC: 1.3 MG/DL
EST. GFR  (AFRICAN AMERICAN): 55.3 ML/MIN/1.73 M^2
EST. GFR  (NON AFRICAN AMERICAN): 48 ML/MIN/1.73 M^2
GLUCOSE SERPL-MCNC: 87 MG/DL
MAGNESIUM SERPL-MCNC: 1.5 MG/DL
PHOSPHATE SERPL-MCNC: 4.8 MG/DL
POTASSIUM SERPL-SCNC: 4.4 MMOL/L
PROT SERPL-MCNC: 8.2 G/DL
SODIUM SERPL-SCNC: 137 MMOL/L

## 2018-04-30 PROCEDURE — 63600175 PHARM REV CODE 636 W HCPCS: Performed by: STUDENT IN AN ORGANIZED HEALTH CARE EDUCATION/TRAINING PROGRAM

## 2018-04-30 PROCEDURE — 63600175 PHARM REV CODE 636 W HCPCS: Performed by: ANESTHESIOLOGY

## 2018-04-30 PROCEDURE — 88342 IMHCHEM/IMCYTCHM 1ST ANTB: CPT | Performed by: PATHOLOGY

## 2018-04-30 PROCEDURE — 20600001 HC STEP DOWN PRIVATE ROOM

## 2018-04-30 PROCEDURE — 0DBL0ZZ EXCISION OF TRANSVERSE COLON, OPEN APPROACH: ICD-10-PCS | Performed by: COLON & RECTAL SURGERY

## 2018-04-30 PROCEDURE — 63600175 PHARM REV CODE 636 W HCPCS: Performed by: NURSE ANESTHETIST, CERTIFIED REGISTERED

## 2018-04-30 PROCEDURE — 83735 ASSAY OF MAGNESIUM: CPT

## 2018-04-30 PROCEDURE — 37000008 HC ANESTHESIA 1ST 15 MINUTES: Performed by: COLON & RECTAL SURGERY

## 2018-04-30 PROCEDURE — 0DNV0ZZ RELEASE MESENTERY, OPEN APPROACH: ICD-10-PCS | Performed by: COLON & RECTAL SURGERY

## 2018-04-30 PROCEDURE — 37000009 HC ANESTHESIA EA ADD 15 MINS: Performed by: COLON & RECTAL SURGERY

## 2018-04-30 PROCEDURE — D9220A PRA ANESTHESIA: Mod: CRNA,,, | Performed by: NURSE ANESTHETIST, CERTIFIED REGISTERED

## 2018-04-30 PROCEDURE — 25000003 PHARM REV CODE 250: Performed by: NURSE ANESTHETIST, CERTIFIED REGISTERED

## 2018-04-30 PROCEDURE — 0D1B0Z4 BYPASS ILEUM TO CUTANEOUS, OPEN APPROACH: ICD-10-PCS | Performed by: COLON & RECTAL SURGERY

## 2018-04-30 PROCEDURE — 80053 COMPREHEN METABOLIC PANEL: CPT

## 2018-04-30 PROCEDURE — 63600175 PHARM REV CODE 636 W HCPCS: Performed by: NURSE PRACTITIONER

## 2018-04-30 PROCEDURE — 27201423 OPTIME MED/SURG SUP & DEVICES STERILE SUPPLY: Performed by: COLON & RECTAL SURGERY

## 2018-04-30 PROCEDURE — 63600175 PHARM REV CODE 636 W HCPCS: Performed by: COLON & RECTAL SURGERY

## 2018-04-30 PROCEDURE — 71000039 HC RECOVERY, EACH ADD'L HOUR: Performed by: COLON & RECTAL SURGERY

## 2018-04-30 PROCEDURE — 71000033 HC RECOVERY, INTIAL HOUR: Performed by: COLON & RECTAL SURGERY

## 2018-04-30 PROCEDURE — 88307 TISSUE EXAM BY PATHOLOGIST: CPT | Mod: 26,,, | Performed by: PATHOLOGY

## 2018-04-30 PROCEDURE — 84100 ASSAY OF PHOSPHORUS: CPT

## 2018-04-30 PROCEDURE — S0028 INJECTION, FAMOTIDINE, 20 MG: HCPCS | Performed by: NURSE ANESTHETIST, CERTIFIED REGISTERED

## 2018-04-30 PROCEDURE — 44160 REMOVAL OF COLON: CPT | Mod: GC,,, | Performed by: COLON & RECTAL SURGERY

## 2018-04-30 PROCEDURE — 36000709 HC OR TIME LEV III EA ADD 15 MIN: Performed by: COLON & RECTAL SURGERY

## 2018-04-30 PROCEDURE — 25000003 PHARM REV CODE 250: Performed by: NURSE PRACTITIONER

## 2018-04-30 PROCEDURE — 0DN80ZZ RELEASE SMALL INTESTINE, OPEN APPROACH: ICD-10-PCS | Performed by: COLON & RECTAL SURGERY

## 2018-04-30 PROCEDURE — 36000708 HC OR TIME LEV III 1ST 15 MIN: Performed by: COLON & RECTAL SURGERY

## 2018-04-30 PROCEDURE — D9220A PRA ANESTHESIA: Mod: ANES,,, | Performed by: ANESTHESIOLOGY

## 2018-04-30 PROCEDURE — 0DB80ZZ EXCISION OF SMALL INTESTINE, OPEN APPROACH: ICD-10-PCS | Performed by: COLON & RECTAL SURGERY

## 2018-04-30 PROCEDURE — 25000003 PHARM REV CODE 250: Performed by: STUDENT IN AN ORGANIZED HEALTH CARE EDUCATION/TRAINING PROGRAM

## 2018-04-30 PROCEDURE — 63600175 PHARM REV CODE 636 W HCPCS: Performed by: SURGERY

## 2018-04-30 PROCEDURE — 86850 RBC ANTIBODY SCREEN: CPT

## 2018-04-30 PROCEDURE — C9290 INJ, BUPIVACAINE LIPOSOME: HCPCS | Performed by: COLON & RECTAL SURGERY

## 2018-04-30 RX ORDER — ONDANSETRON 4 MG/1
4 TABLET, ORALLY DISINTEGRATING ORAL EVERY 6 HOURS PRN
Status: DISCONTINUED | OUTPATIENT
Start: 2018-05-01 | End: 2018-05-05 | Stop reason: HOSPADM

## 2018-04-30 RX ORDER — HYDROMORPHONE HCL IN 0.9% NACL 6 MG/30 ML
PATIENT CONTROLLED ANALGESIA SYRINGE INTRAVENOUS CONTINUOUS
Status: DISCONTINUED | OUTPATIENT
Start: 2018-04-30 | End: 2018-05-02

## 2018-04-30 RX ORDER — GLYCOPYRROLATE 0.2 MG/ML
INJECTION INTRAMUSCULAR; INTRAVENOUS
Status: DISCONTINUED | OUTPATIENT
Start: 2018-04-30 | End: 2018-04-30

## 2018-04-30 RX ORDER — ACETAMINOPHEN 10 MG/ML
1000 INJECTION, SOLUTION INTRAVENOUS EVERY 8 HOURS
Status: DISCONTINUED | OUTPATIENT
Start: 2018-04-30 | End: 2018-04-30

## 2018-04-30 RX ORDER — ACETAMINOPHEN 10 MG/ML
1000 INJECTION, SOLUTION INTRAVENOUS
Status: COMPLETED | OUTPATIENT
Start: 2018-04-30 | End: 2018-04-30

## 2018-04-30 RX ORDER — SODIUM CHLORIDE, SODIUM LACTATE, POTASSIUM CHLORIDE, CALCIUM CHLORIDE 600; 310; 30; 20 MG/100ML; MG/100ML; MG/100ML; MG/100ML
INJECTION, SOLUTION INTRAVENOUS CONTINUOUS
Status: ACTIVE | OUTPATIENT
Start: 2018-04-30 | End: 2018-05-02

## 2018-04-30 RX ORDER — MIDAZOLAM HYDROCHLORIDE 1 MG/ML
INJECTION, SOLUTION INTRAMUSCULAR; INTRAVENOUS
Status: DISCONTINUED | OUTPATIENT
Start: 2018-04-30 | End: 2018-04-30

## 2018-04-30 RX ORDER — FENTANYL CITRATE 50 UG/ML
INJECTION, SOLUTION INTRAMUSCULAR; INTRAVENOUS
Status: DISCONTINUED | OUTPATIENT
Start: 2018-04-30 | End: 2018-04-30

## 2018-04-30 RX ORDER — LIDOCAINE HCL/PF 100 MG/5ML
SYRINGE (ML) INTRAVENOUS
Status: DISCONTINUED | OUTPATIENT
Start: 2018-04-30 | End: 2018-04-30

## 2018-04-30 RX ORDER — PROPOFOL 10 MG/ML
VIAL (ML) INTRAVENOUS
Status: DISCONTINUED | OUTPATIENT
Start: 2018-04-30 | End: 2018-04-30

## 2018-04-30 RX ORDER — DEXAMETHASONE SODIUM PHOSPHATE 4 MG/ML
INJECTION, SOLUTION INTRA-ARTICULAR; INTRALESIONAL; INTRAMUSCULAR; INTRAVENOUS; SOFT TISSUE
Status: DISCONTINUED | OUTPATIENT
Start: 2018-04-30 | End: 2018-04-30

## 2018-04-30 RX ORDER — GLUCAGON 1 MG
1 KIT INJECTION CONTINUOUS PRN
Status: DISCONTINUED | OUTPATIENT
Start: 2018-04-30 | End: 2018-05-05 | Stop reason: HOSPADM

## 2018-04-30 RX ORDER — HYDROMORPHONE HYDROCHLORIDE 1 MG/ML
INJECTION, SOLUTION INTRAMUSCULAR; INTRAVENOUS; SUBCUTANEOUS
Status: COMPLETED
Start: 2018-04-30 | End: 2018-04-30

## 2018-04-30 RX ORDER — HEPARIN SODIUM 5000 [USP'U]/ML
5000 INJECTION, SOLUTION INTRAVENOUS; SUBCUTANEOUS EVERY 8 HOURS
Status: COMPLETED | OUTPATIENT
Start: 2018-04-30 | End: 2018-04-30

## 2018-04-30 RX ORDER — SODIUM CHLORIDE 9 MG/ML
INJECTION, SOLUTION INTRAVENOUS CONTINUOUS PRN
Status: DISCONTINUED | OUTPATIENT
Start: 2018-04-30 | End: 2018-04-30

## 2018-04-30 RX ORDER — ACETAMINOPHEN 10 MG/ML
1000 INJECTION, SOLUTION INTRAVENOUS EVERY 8 HOURS
Status: COMPLETED | OUTPATIENT
Start: 2018-04-30 | End: 2018-05-01

## 2018-04-30 RX ORDER — NEOSTIGMINE METHYLSULFATE 1 MG/ML
INJECTION, SOLUTION INTRAVENOUS
Status: DISCONTINUED | OUTPATIENT
Start: 2018-04-30 | End: 2018-04-30

## 2018-04-30 RX ORDER — ONDANSETRON 2 MG/ML
INJECTION INTRAMUSCULAR; INTRAVENOUS
Status: DISPENSED
Start: 2018-04-30 | End: 2018-05-01

## 2018-04-30 RX ORDER — ROCURONIUM BROMIDE 10 MG/ML
INJECTION, SOLUTION INTRAVENOUS
Status: DISCONTINUED | OUTPATIENT
Start: 2018-04-30 | End: 2018-04-30

## 2018-04-30 RX ORDER — HYDROMORPHONE HYDROCHLORIDE 1 MG/ML
1 INJECTION, SOLUTION INTRAMUSCULAR; INTRAVENOUS; SUBCUTANEOUS EVERY 4 HOURS PRN
Status: DISCONTINUED | OUTPATIENT
Start: 2018-04-30 | End: 2018-05-01

## 2018-04-30 RX ORDER — NALOXONE HCL 0.4 MG/ML
0.02 VIAL (ML) INJECTION
Status: DISCONTINUED | OUTPATIENT
Start: 2018-04-30 | End: 2018-05-02

## 2018-04-30 RX ORDER — SUCCINYLCHOLINE CHLORIDE 20 MG/ML
INJECTION INTRAMUSCULAR; INTRAVENOUS
Status: DISCONTINUED | OUTPATIENT
Start: 2018-04-30 | End: 2018-04-30

## 2018-04-30 RX ORDER — FAMOTIDINE 10 MG/ML
INJECTION INTRAVENOUS
Status: DISCONTINUED | OUTPATIENT
Start: 2018-04-30 | End: 2018-04-30

## 2018-04-30 RX ORDER — KETAMINE HCL IN 0.9 % NACL 50 MG/5 ML
SYRINGE (ML) INTRAVENOUS
Status: DISCONTINUED | OUTPATIENT
Start: 2018-04-30 | End: 2018-04-30

## 2018-04-30 RX ORDER — ONDANSETRON 2 MG/ML
4 INJECTION INTRAMUSCULAR; INTRAVENOUS ONCE
Status: COMPLETED | OUTPATIENT
Start: 2018-04-30 | End: 2018-04-30

## 2018-04-30 RX ORDER — IBUPROFEN 200 MG
24 TABLET ORAL
Status: DISCONTINUED | OUTPATIENT
Start: 2018-04-30 | End: 2018-05-05 | Stop reason: HOSPADM

## 2018-04-30 RX ORDER — HYDROMORPHONE HYDROCHLORIDE 2 MG/ML
INJECTION, SOLUTION INTRAMUSCULAR; INTRAVENOUS; SUBCUTANEOUS
Status: DISCONTINUED | OUTPATIENT
Start: 2018-04-30 | End: 2018-04-30

## 2018-04-30 RX ORDER — IBUPROFEN 200 MG
16 TABLET ORAL
Status: DISCONTINUED | OUTPATIENT
Start: 2018-04-30 | End: 2018-05-05 | Stop reason: HOSPADM

## 2018-04-30 RX ORDER — HYDROMORPHONE HYDROCHLORIDE 1 MG/ML
0.2 INJECTION, SOLUTION INTRAMUSCULAR; INTRAVENOUS; SUBCUTANEOUS EVERY 5 MIN PRN
Status: DISCONTINUED | OUTPATIENT
Start: 2018-04-30 | End: 2018-04-30 | Stop reason: HOSPADM

## 2018-04-30 RX ORDER — POTASSIUM CHLORIDE 1.5 G/1.58G
20 POWDER, FOR SOLUTION ORAL 4 TIMES DAILY
COMMUNITY
End: 2018-06-04 | Stop reason: SDUPTHER

## 2018-04-30 RX ORDER — LIDOCAINE HYDROCHLORIDE 10 MG/ML
1 INJECTION, SOLUTION EPIDURAL; INFILTRATION; INTRACAUDAL; PERINEURAL
Status: COMPLETED | OUTPATIENT
Start: 2018-04-30 | End: 2018-04-30

## 2018-04-30 RX ORDER — SODIUM CHLORIDE 9 MG/ML
INJECTION, SOLUTION INTRAVENOUS CONTINUOUS
Status: DISCONTINUED | OUTPATIENT
Start: 2018-04-30 | End: 2018-05-01

## 2018-04-30 RX ORDER — ONDANSETRON 2 MG/ML
4 INJECTION INTRAMUSCULAR; INTRAVENOUS DAILY PRN
Status: COMPLETED | OUTPATIENT
Start: 2018-04-30 | End: 2018-04-30

## 2018-04-30 RX ORDER — HYDROMORPHONE HYDROCHLORIDE 1 MG/ML
0.5 INJECTION, SOLUTION INTRAMUSCULAR; INTRAVENOUS; SUBCUTANEOUS
Status: ACTIVE | OUTPATIENT
Start: 2018-04-30 | End: 2018-05-01

## 2018-04-30 RX ORDER — SODIUM CHLORIDE 9 MG/ML
INJECTION, SOLUTION INTRAVENOUS
Status: COMPLETED | OUTPATIENT
Start: 2018-04-30 | End: 2018-04-30

## 2018-04-30 RX ORDER — PHENYLEPHRINE HYDROCHLORIDE 10 MG/ML
INJECTION INTRAVENOUS
Status: DISCONTINUED | OUTPATIENT
Start: 2018-04-30 | End: 2018-04-30

## 2018-04-30 RX ORDER — NALOXONE HCL 0.4 MG/ML
0.02 VIAL (ML) INJECTION
Status: DISCONTINUED | OUTPATIENT
Start: 2018-04-30 | End: 2018-05-05 | Stop reason: HOSPADM

## 2018-04-30 RX ORDER — SODIUM CHLORIDE 0.9 % (FLUSH) 0.9 %
3 SYRINGE (ML) INJECTION
Status: DISCONTINUED | OUTPATIENT
Start: 2018-04-30 | End: 2018-04-30 | Stop reason: HOSPADM

## 2018-04-30 RX ORDER — ONDANSETRON 2 MG/ML
4 INJECTION INTRAMUSCULAR; INTRAVENOUS EVERY 6 HOURS PRN
Status: DISCONTINUED | OUTPATIENT
Start: 2018-04-30 | End: 2018-04-30

## 2018-04-30 RX ORDER — PREDNISONE 20 MG/1
20 TABLET ORAL DAILY
COMMUNITY
End: 2018-05-08

## 2018-04-30 RX ADMIN — GLYCOPYRROLATE 0.4 MG: 0.2 INJECTION, SOLUTION INTRAMUSCULAR; INTRAVENOUS at 06:04

## 2018-04-30 RX ADMIN — SODIUM CHLORIDE: 0.9 INJECTION, SOLUTION INTRAVENOUS at 01:04

## 2018-04-30 RX ADMIN — PROMETHAZINE HYDROCHLORIDE 6.25 MG: 25 INJECTION INTRAMUSCULAR; INTRAVENOUS at 08:04

## 2018-04-30 RX ADMIN — PHENYLEPHRINE HYDROCHLORIDE 200 MCG: 10 INJECTION INTRAVENOUS at 04:04

## 2018-04-30 RX ADMIN — SODIUM CHLORIDE: 0.9 INJECTION, SOLUTION INTRAVENOUS at 03:04

## 2018-04-30 RX ADMIN — Medication 30 MG: at 03:04

## 2018-04-30 RX ADMIN — ONDANSETRON 4 MG: 2 INJECTION INTRAMUSCULAR; INTRAVENOUS at 02:04

## 2018-04-30 RX ADMIN — SODIUM CHLORIDE, SODIUM GLUCONATE, SODIUM ACETATE, POTASSIUM CHLORIDE, MAGNESIUM CHLORIDE, SODIUM PHOSPHATE, DIBASIC, AND POTASSIUM PHOSPHATE: .53; .5; .37; .037; .03; .012; .00082 INJECTION, SOLUTION INTRAVENOUS at 04:04

## 2018-04-30 RX ADMIN — HYDROCORTISONE SODIUM SUCCINATE 100 MG: 100 INJECTION, POWDER, FOR SOLUTION INTRAMUSCULAR; INTRAVENOUS at 04:04

## 2018-04-30 RX ADMIN — SODIUM CHLORIDE: 0.9 INJECTION, SOLUTION INTRAVENOUS at 07:04

## 2018-04-30 RX ADMIN — NEOSTIGMINE METHYLSULFATE 4 MG: 1 INJECTION INTRAVENOUS at 06:04

## 2018-04-30 RX ADMIN — SUCCINYLCHOLINE CHLORIDE 120 MG: 20 INJECTION, SOLUTION INTRAMUSCULAR; INTRAVENOUS at 03:04

## 2018-04-30 RX ADMIN — FENTANYL CITRATE 50 MCG: 50 INJECTION, SOLUTION INTRAMUSCULAR; INTRAVENOUS at 05:04

## 2018-04-30 RX ADMIN — FENTANYL CITRATE 50 MCG: 50 INJECTION, SOLUTION INTRAMUSCULAR; INTRAVENOUS at 03:04

## 2018-04-30 RX ADMIN — Medication 0.2 MG: at 07:04

## 2018-04-30 RX ADMIN — ROCURONIUM BROMIDE 10 MG: 10 INJECTION, SOLUTION INTRAVENOUS at 03:04

## 2018-04-30 RX ADMIN — ACETAMINOPHEN 1000 MG: 10 INJECTION, SOLUTION INTRAVENOUS at 01:04

## 2018-04-30 RX ADMIN — IBUPROFEN 800 MG: 800 INJECTION INTRAVENOUS at 02:04

## 2018-04-30 RX ADMIN — ROCURONIUM BROMIDE 10 MG: 10 INJECTION, SOLUTION INTRAVENOUS at 05:04

## 2018-04-30 RX ADMIN — SODIUM CHLORIDE, SODIUM GLUCONATE, SODIUM ACETATE, POTASSIUM CHLORIDE, MAGNESIUM CHLORIDE, SODIUM PHOSPHATE, DIBASIC, AND POTASSIUM PHOSPHATE: .53; .5; .37; .037; .03; .012; .00082 INJECTION, SOLUTION INTRAVENOUS at 05:04

## 2018-04-30 RX ADMIN — PROPOFOL 150 MG: 10 INJECTION, EMULSION INTRAVENOUS at 03:04

## 2018-04-30 RX ADMIN — DEXAMETHASONE SODIUM PHOSPHATE 12 MG: 4 INJECTION, SOLUTION INTRAMUSCULAR; INTRAVENOUS at 04:04

## 2018-04-30 RX ADMIN — Medication 0.2 MG: at 08:04

## 2018-04-30 RX ADMIN — ONDANSETRON HYDROCHLORIDE 4 MG: 2 INJECTION, SOLUTION INTRAMUSCULAR; INTRAVENOUS at 07:04

## 2018-04-30 RX ADMIN — ACETAMINOPHEN 1000 MG: 10 INJECTION, SOLUTION INTRAVENOUS at 10:04

## 2018-04-30 RX ADMIN — LIDOCAINE HYDROCHLORIDE 0.1 MG: 10 INJECTION, SOLUTION EPIDURAL; INFILTRATION; INTRACAUDAL; PERINEURAL at 02:04

## 2018-04-30 RX ADMIN — FAMOTIDINE 20 MG: 10 INJECTION, SOLUTION INTRAVENOUS at 04:04

## 2018-04-30 RX ADMIN — HYDROMORPHONE HYDROCHLORIDE 1 MG: 2 INJECTION, SOLUTION INTRAMUSCULAR; INTRAVENOUS; SUBCUTANEOUS at 06:04

## 2018-04-30 RX ADMIN — ROCURONIUM BROMIDE 40 MG: 10 INJECTION, SOLUTION INTRAVENOUS at 04:04

## 2018-04-30 RX ADMIN — PHENYLEPHRINE HYDROCHLORIDE 200 MCG: 10 INJECTION INTRAVENOUS at 06:04

## 2018-04-30 RX ADMIN — HEPARIN SODIUM 5000 UNITS: 5000 INJECTION, SOLUTION INTRAVENOUS; SUBCUTANEOUS at 01:04

## 2018-04-30 RX ADMIN — LIDOCAINE HYDROCHLORIDE 80 MG: 20 INJECTION, SOLUTION INTRAVENOUS at 03:04

## 2018-04-30 RX ADMIN — FENTANYL CITRATE 100 MCG: 50 INJECTION, SOLUTION INTRAMUSCULAR; INTRAVENOUS at 03:04

## 2018-04-30 RX ADMIN — MIDAZOLAM HYDROCHLORIDE 2 MG: 1 INJECTION, SOLUTION INTRAMUSCULAR; INTRAVENOUS at 03:04

## 2018-04-30 RX ADMIN — Medication: at 07:04

## 2018-04-30 RX ADMIN — ONDANSETRON HYDROCHLORIDE 4 MG: 2 INJECTION, SOLUTION INTRAMUSCULAR; INTRAVENOUS at 08:04

## 2018-04-30 NOTE — TELEPHONE ENCOUNTER
----- Message from Rachel Dunn MA sent at 4/30/2018  9:04 AM CDT -----  Regarding: FW: Surgery Clearnace       ----- Message -----  From: Pablo Medina MD  Sent: 4/27/2018   5:23 PM  To: Rachel Dunn MA  Subject: RE: Surgery Clearnace                            Yes  ----- Message -----  From: Rachel Dunn MA  Sent: 4/26/2018  10:46 AM  To: Pablo Medina MD  Subject: FW: Surgery Clearnace                                ----- Message -----  From: Andreea Escudero MA  Sent: 4/26/2018   9:17 AM  To: Mona Solo RN, Adam Garcia Staff  Subject: Surgery Clearnace                                Is Patient cleared to proceed with upcoming procedure on 4/30/2018? Thank You, Andreea Escudero MA Pre-Op Anesthesia

## 2018-04-30 NOTE — INTERVAL H&P NOTE
The patient has been examined and the H&P has been reviewed:    I concur with the findings and no changes have occurred since H&P was written.    Anesthesia/Surgery risks, benefits and alternative options discussed and understood by patient/family.          Active Hospital Problems    Diagnosis  POA    Crohn disease [K50.90]  Yes      Resolved Hospital Problems    Diagnosis Date Resolved POA   No resolved problems to display.

## 2018-05-01 LAB
ANION GAP SERPL CALC-SCNC: 8 MMOL/L
BASOPHILS # BLD AUTO: 0.04 K/UL
BASOPHILS NFR BLD: 0.3 %
BUN SERPL-MCNC: 13 MG/DL
CALCIUM SERPL-MCNC: 8.4 MG/DL
CHLORIDE SERPL-SCNC: 103 MMOL/L
CO2 SERPL-SCNC: 20 MMOL/L
CREAT SERPL-MCNC: 0.8 MG/DL
DIFFERENTIAL METHOD: ABNORMAL
EOSINOPHIL # BLD AUTO: 0.1 K/UL
EOSINOPHIL NFR BLD: 1 %
ERYTHROCYTE [DISTWIDTH] IN BLOOD BY AUTOMATED COUNT: 16.8 %
EST. GFR  (AFRICAN AMERICAN): >60 ML/MIN/1.73 M^2
EST. GFR  (NON AFRICAN AMERICAN): >60 ML/MIN/1.73 M^2
GLUCOSE SERPL-MCNC: 83 MG/DL
HCT VFR BLD AUTO: 33.4 %
HGB BLD-MCNC: 11.1 G/DL
IMM GRANULOCYTES # BLD AUTO: 0.09 K/UL
IMM GRANULOCYTES NFR BLD AUTO: 0.7 %
LYMPHOCYTES # BLD AUTO: 1.2 K/UL
LYMPHOCYTES NFR BLD: 10 %
MAGNESIUM SERPL-MCNC: 1.4 MG/DL
MCH RBC QN AUTO: 29.9 PG
MCHC RBC AUTO-ENTMCNC: 33.2 G/DL
MCV RBC AUTO: 90 FL
MONOCYTES # BLD AUTO: 0.8 K/UL
MONOCYTES NFR BLD: 6.2 %
NEUTROPHILS # BLD AUTO: 9.9 K/UL
NEUTROPHILS NFR BLD: 81.8 %
NRBC BLD-RTO: 0 /100 WBC
PHOSPHATE SERPL-MCNC: 3.4 MG/DL
PLATELET # BLD AUTO: 275 K/UL
PMV BLD AUTO: 8.4 FL
POTASSIUM SERPL-SCNC: 3.8 MMOL/L
RBC # BLD AUTO: 3.71 M/UL
SODIUM SERPL-SCNC: 131 MMOL/L
WBC # BLD AUTO: 12.06 K/UL

## 2018-05-01 PROCEDURE — 63600175 PHARM REV CODE 636 W HCPCS: Performed by: NURSE PRACTITIONER

## 2018-05-01 PROCEDURE — 63600175 PHARM REV CODE 636 W HCPCS: Performed by: SURGERY

## 2018-05-01 PROCEDURE — 85025 COMPLETE CBC W/AUTO DIFF WBC: CPT

## 2018-05-01 PROCEDURE — 36415 COLL VENOUS BLD VENIPUNCTURE: CPT

## 2018-05-01 PROCEDURE — 25000003 PHARM REV CODE 250: Performed by: COLON & RECTAL SURGERY

## 2018-05-01 PROCEDURE — 83735 ASSAY OF MAGNESIUM: CPT

## 2018-05-01 PROCEDURE — 63600175 PHARM REV CODE 636 W HCPCS: Performed by: COLON & RECTAL SURGERY

## 2018-05-01 PROCEDURE — 25000003 PHARM REV CODE 250: Performed by: NURSE PRACTITIONER

## 2018-05-01 PROCEDURE — 20600001 HC STEP DOWN PRIVATE ROOM

## 2018-05-01 PROCEDURE — 80048 BASIC METABOLIC PNL TOTAL CA: CPT

## 2018-05-01 PROCEDURE — 84100 ASSAY OF PHOSPHORUS: CPT

## 2018-05-01 RX ORDER — HYDROMORPHONE HYDROCHLORIDE 1 MG/ML
1 INJECTION, SOLUTION INTRAMUSCULAR; INTRAVENOUS; SUBCUTANEOUS EVERY 4 HOURS PRN
Status: DISPENSED | OUTPATIENT
Start: 2018-05-01 | End: 2018-05-02

## 2018-05-01 RX ADMIN — HYDROMORPHONE HYDROCHLORIDE 1 MG: 1 INJECTION, SOLUTION INTRAMUSCULAR; INTRAVENOUS; SUBCUTANEOUS at 07:05

## 2018-05-01 RX ADMIN — PROMETHAZINE HYDROCHLORIDE 6.25 MG: 25 INJECTION INTRAMUSCULAR; INTRAVENOUS at 10:05

## 2018-05-01 RX ADMIN — HYDROMORPHONE HYDROCHLORIDE 1 MG: 1 INJECTION, SOLUTION INTRAMUSCULAR; INTRAVENOUS; SUBCUTANEOUS at 09:05

## 2018-05-01 RX ADMIN — Medication: at 08:05

## 2018-05-01 RX ADMIN — IBUPROFEN 800 MG: 800 INJECTION INTRAVENOUS at 05:05

## 2018-05-01 RX ADMIN — IBUPROFEN 800 MG: 800 INJECTION INTRAVENOUS at 09:05

## 2018-05-01 RX ADMIN — HYDROMORPHONE HYDROCHLORIDE 1 MG: 1 INJECTION, SOLUTION INTRAMUSCULAR; INTRAVENOUS; SUBCUTANEOUS at 12:05

## 2018-05-01 RX ADMIN — IBUPROFEN 800 MG: 800 INJECTION INTRAVENOUS at 02:05

## 2018-05-01 RX ADMIN — Medication: at 09:05

## 2018-05-01 RX ADMIN — SODIUM CHLORIDE, SODIUM LACTATE, POTASSIUM CHLORIDE, AND CALCIUM CHLORIDE: .6; .31; .03; .02 INJECTION, SOLUTION INTRAVENOUS at 12:05

## 2018-05-01 RX ADMIN — ACETAMINOPHEN 1000 MG: 10 INJECTION, SOLUTION INTRAVENOUS at 02:05

## 2018-05-01 RX ADMIN — Medication: at 04:05

## 2018-05-01 RX ADMIN — PROMETHAZINE HYDROCHLORIDE 6.25 MG: 25 INJECTION INTRAMUSCULAR; INTRAVENOUS at 08:05

## 2018-05-01 RX ADMIN — IBUPROFEN 800 MG: 800 INJECTION INTRAVENOUS at 12:05

## 2018-05-01 RX ADMIN — ACETAMINOPHEN 1000 MG: 10 INJECTION, SOLUTION INTRAVENOUS at 05:05

## 2018-05-01 RX ADMIN — HYDROMORPHONE HYDROCHLORIDE 1 MG: 1 INJECTION, SOLUTION INTRAMUSCULAR; INTRAVENOUS; SUBCUTANEOUS at 02:05

## 2018-05-01 NOTE — PROGRESS NOTES
[facility]  Colorectal Surgery  Progress Note    Patient Name: Carline Chang  MRN: [id number]  Admission Date: 4/30/2018  Hospital Length of Stay: 1 days  Attending Physician: Andre Uribe MD    Subjective:     Interval History: s/p ex lap with small bowel resection on 4/30/2018   No acute events overnight. PCA with moderate control of pain. No nausea or vomiting. Adequate UOP.   Small amount of blood tinged bowel sweat in ostomy bag.    Post-Op Info:  Procedure(s) (LRB):  EXPLORATORY-LAPAROTOMY, small bowel resection, possible ostomy (N/A)  RESECTION-SMALL BOWEL (N/A)  ILEOSTOMY (Left)   1 Day Post-Op      Medications:  Continuous Infusions:   glucagon (human recombinant)      hydromorphone in 0.9 % NaCl 6 mg/30 ml      lactated ringers 75 mL/hr at 05/01/18 0300     Scheduled Meds:   acetaminophen  1,000 mg Intravenous Q8H    ibuprofen  800 mg Intravenous Q8H     PRN Meds:   dextrose 50%    dextrose 50%    dextrose 50%    glucagon (human recombinant)    glucose    glucose    glucose    HYDROmorphone    HYDROmorphone    naloxone    naloxone    ondansetron    promethazine (PHENERGAN) IVPB        Objective:     Vital Signs (Most Recent):  Temp: 98 °F (36.7 °C) (05/01/18 0945)  Pulse: 99 (05/01/18 1100)  Resp: 16 (05/01/18 0945)  BP: (!) 141/64 (05/01/18 0945)  SpO2: 97 % (05/01/18 0945) Vital Signs (24h Range):  Temp:  [97.5 °F (36.4 °C)-98.2 °F (36.8 °C)] 98 °F (36.7 °C)  Pulse:  [] 99  Resp:  [11-27] 16  SpO2:  [96 %-100 %] 97 %  BP: (107-141)/(57-77) 141/64     Intake/Output - Last 3 Shifts       04/29 0700 - 04/30 0659 04/30 0700 - 05/01 0659 05/01 0700 - 05/02 0659    I.V. (mL/kg)  4731 (77.3)     IV Piggyback  700     Total Intake(mL/kg)  5431 (88.7)     Urine (mL/kg/hr)  1540     Blood  150     Total Output   1690      Net   +3741                   Physical Exam   Constitutional: She is oriented to person, place, and time. She appears well-developed. No  distress.   HENT:   Head: Normocephalic and atraumatic.   Neck: Normal range of motion. Neck supple.   Cardiovascular: Normal rate and regular rhythm.    Pulmonary/Chest: Effort normal. No respiratory distress.   Abdominal: Soft. She exhibits no distension.   Midline abdominal incision c/d/i. Ostomy pink and patent with minimal amount of blood tinged bowel sweat in bag   Musculoskeletal: Normal range of motion.   Neurological: She is alert and oriented to person, place, and time.   Skin: Skin is warm and dry. She is not diaphoretic.   Nursing note and vitals reviewed.      Significant Labs:  BMP:   Recent Labs  Lab 05/01/18  0821   GLU 83   *   K 3.8      CO2 20*   BUN 13   CREATININE 0.8   CALCIUM 8.4*   MG 1.4*     CBC:   Recent Labs  Lab 05/01/18  0821   WBC 12.06   RBC 3.71*   HGB 11.1*   HCT 33.4*      MCV 90   MCH 29.9   MCHC 33.2     CMP:   Recent Labs  Lab 04/30/18  1254 05/01/18  0821   GLU 87 83   CALCIUM 10.2 8.4*   ALBUMIN 3.8  --    PROT 8.2  --     131*   K 4.4 3.8   CO2 18* 20*    103   BUN 17 13   CREATININE 1.3 0.8   ALKPHOS 174*  --    ALT 27  --    AST 25  --    BILITOT 1.1*  --      CRP:   Recent Labs  Lab 04/26/18  1048   CRP 1.52*     LFTs:   Recent Labs  Lab 04/30/18  1254   ALT 27   AST 25   ALKPHOS 174*   BILITOT 1.1*   PROT 8.2   ALBUMIN 3.8         Assessment/Plan:     Crohn disease    S/p Exploratory laparotomy, lysis of adhesions, a previous ileocolic resection anastomosis and distal small bowel resection with a primary anastomosis.    -Advance to CLD  -mIVF   -PCA for pain control and acetaminophen & ibuprofen  -Daily labs   -Encourage ambulation and OOBTC               Niyah Miramontes MD  Colorectal Surgery  [facility]    Have seen and examined the patient with the fellow and agree with their plan.  ESPERANZA MADSEN

## 2018-05-01 NOTE — PLAN OF CARE
Problem: Patient Care Overview  Goal: Plan of Care Review  Outcome: Ongoing (interventions implemented as appropriate)  Received patient overnight from PACU s/p small bowel resect with ostomy with history of crohns.. Midline abd incision looks pink and slightly bruised with dermabond.  Patient's pain controlled with PCA, one prn dose of dilaudid and scheduled tylenol and IBP.  No nausea or vomitting overnight. LR running at 75 ml/hr. Patient tolerating ice chips. All question and concerns addressed.  Will continue to monitor. No other complaints overnight or acute distress.  Will continue to monitor.

## 2018-05-01 NOTE — PLAN OF CARE
CM completed discharge assessment and planning with patient. Patient verbalized understanding. Patient currently lives with motherTanesha ([phone]). Patient request assistance with receiving more than 20 bags per month of ostomy supplies . Patient reports prior to hospitalization she needed to change ostomy bags frequently r/t ostomy bag leaking. CM assured patient ostomy nurse will come to bedside to provide education for ostomy care and resources for supplies. Patient reports she has good family support and transportation home. CM and SW will continue to follow for any additional needs.    PCP: Pablo Medina MD    Pharmacy:   [id number] - [place], [place] - [address]  [address]  [place] [address]  Phone: [phone] Fax: [phone]      Payor: MEDICARE / Plan: MEDICARE PART A & B / Product Type: [facility] /      05/01/18 1519   Discharge Assessment   Assessment Type Discharge Planning Assessment   Confirmed/corrected address and phone number on facesheet? Yes   Assessment information obtained from? Patient   Prior to hospitilization cognitive status: Alert/Oriented   Prior to hospitalization functional status: Independent   Current cognitive status: Alert/Oriented   Current Functional Status: Independent   Lives With child(thelma), dependent;parent(s)  (motherTanesha ( [phone]))   Able to Return to Prior Arrangements yes   Is patient able to care for self after discharge? Yes   Who are your caregiver(s) and their phone number(s)? self care   Patient's perception of discharge disposition home or selfcare;home health   Patient currently being followed by outpatient case management? No   Patient currently receives any other outside agency services? No   Equipment Currently Used at Home colostomy/ostomy supplies   Do you have any problems affording any of your prescribed medications? TBD   Is the patient taking medications as prescribed? yes   Does the patient have transportation  home? Yes   Transportation Available family or friend will provide   Dialysis Name and Scheduled days N/A   Does the patient receive services at the [facility]? No   Discharge Plan A Home;Home with family   Discharge Plan B Home;Home with family;Home Health   Patient/Family In Agreement With Plan yes

## 2018-05-01 NOTE — NURSING TRANSFER
Nursing Transfer Notification    Transfer from PACU to 609 4/30/18 @5610    Transfer via stretcher    Transfer with cardiac monitoring    Transported by PACU RN    Medicines sent: no    Chart send with patient: yes    Notified:     Patient reassessed at: 2330    Upon arrival to floor:patient has IVF and PCA infusing.  Anxious about the move to the bed but NAD noted. Mother at bedside and all questions answered. VS stable.

## 2018-05-01 NOTE — ASSESSMENT & PLAN NOTE
S/p Exploratory laparotomy, lysis of adhesions, a previous ileocolic resection anastomosis and distal small bowel resection with a primary anastomosis.    -Advance to CLD  -mIVF   -PCA for pain control and acetaminophen & ibuprofen  -Daily labs   -Encourage ambulation and OOBTC

## 2018-05-01 NOTE — NURSING TRANSFER
Nursing Transfer Note      4/30/2018     Transfer To: 609 from PACU    Transfer via stretcher    Transfer with cardiac monitoring    Transported by RN    Medicines sent: NS infusing/Dilaudid PCA    Chart send with patient: Yes    Notified: Mother    Patient reassessed at: 4/30/18 23:00

## 2018-05-01 NOTE — SUBJECTIVE & OBJECTIVE
Subjective:     Interval History: s/p ex lap with small bowel resection on 4/30/2018   No acute events overnight. PCA with moderate control of pain. No nausea or vomiting. Adequate UOP.   Small amount of blood tinged bowel sweat in ostomy bag.    Post-Op Info:  Procedure(s) (LRB):  EXPLORATORY-LAPAROTOMY, small bowel resection, possible ostomy (N/A)  RESECTION-SMALL BOWEL (N/A)  ILEOSTOMY (Left)   1 Day Post-Op      Medications:  Continuous Infusions:   glucagon (human recombinant)      hydromorphone in 0.9 % NaCl 6 mg/30 ml      lactated ringers 75 mL/hr at 05/01/18 0300     Scheduled Meds:   acetaminophen  1,000 mg Intravenous Q8H    ibuprofen  800 mg Intravenous Q8H     PRN Meds:   dextrose 50%    dextrose 50%    dextrose 50%    glucagon (human recombinant)    glucose    glucose    glucose    HYDROmorphone    HYDROmorphone    naloxone    naloxone    ondansetron    promethazine (PHENERGAN) IVPB        Objective:     Vital Signs (Most Recent):  Temp: 98 °F (36.7 °C) (05/01/18 0945)  Pulse: 99 (05/01/18 1100)  Resp: 16 (05/01/18 0945)  BP: (!) 141/64 (05/01/18 0945)  SpO2: 97 % (05/01/18 0945) Vital Signs (24h Range):  Temp:  [97.5 °F (36.4 °C)-98.2 °F (36.8 °C)] 98 °F (36.7 °C)  Pulse:  [] 99  Resp:  [11-27] 16  SpO2:  [96 %-100 %] 97 %  BP: (107-141)/(57-77) 141/64     Intake/Output - Last 3 Shifts       04/29 0700 - 04/30 0659 04/30 0700 - 05/01 0659 05/01 0700 - 05/02 0659    I.V. (mL/kg)  4731 (77.3)     IV Piggyback  700     Total Intake(mL/kg)  5431 (88.7)     Urine (mL/kg/hr)  1540     Blood  150     Total Output   1690      Net   +3741                   Physical Exam   Constitutional: She is oriented to person, place, and time. She appears well-developed. No distress.   HENT:   Head: Normocephalic and atraumatic.   Neck: Normal range of motion. Neck supple.   Cardiovascular: Normal rate and regular rhythm.    Pulmonary/Chest: Effort normal. No respiratory distress.   Abdominal:  Soft. She exhibits no distension.   Midline abdominal incision c/d/i. Ostomy pink and patent with minimal amount of blood tinged bowel sweat in bag   Musculoskeletal: Normal range of motion.   Neurological: She is alert and oriented to person, place, and time.   Skin: Skin is warm and dry. She is not diaphoretic.   Nursing note and vitals reviewed.      Significant Labs:  BMP:   Recent Labs  Lab 05/01/18  0821   GLU 83   *   K 3.8      CO2 20*   BUN 13   CREATININE 0.8   CALCIUM 8.4*   MG 1.4*     CBC:   Recent Labs  Lab 05/01/18  0821   WBC 12.06   RBC 3.71*   HGB 11.1*   HCT 33.4*      MCV 90   MCH 29.9   MCHC 33.2     CMP:   Recent Labs  Lab 04/30/18  1254 05/01/18  0821   GLU 87 83   CALCIUM 10.2 8.4*   ALBUMIN 3.8  --    PROT 8.2  --     131*   K 4.4 3.8   CO2 18* 20*    103   BUN 17 13   CREATININE 1.3 0.8   ALKPHOS 174*  --    ALT 27  --    AST 25  --    BILITOT 1.1*  --      CRP:   Recent Labs  Lab 04/26/18  1048   CRP 1.52*     LFTs:   Recent Labs  Lab 04/30/18  1254   ALT 27   AST 25   ALKPHOS 174*   BILITOT 1.1*   PROT 8.2   ALBUMIN 3.8

## 2018-05-01 NOTE — PROGRESS NOTES
Patient's mother at bedside, Patient AAOx3, VSS, constant complaints of pain and nausea, admin prn medications. Wctm

## 2018-05-01 NOTE — ANESTHESIA POSTPROCEDURE EVALUATION
"Anesthesia Post Evaluation    Patient: Carline Chang    Procedure(s) Performed: Procedure(s) (LRB):  EXPLORATORY-LAPAROTOMY, small bowel resection, possible ostomy (N/A)  RESECTION-SMALL BOWEL (N/A)  ILEOSTOMY (Left)    Final Anesthesia Type: general  Patient location during evaluation: PACU  Patient participation: Yes- Able to Participate  Level of consciousness: awake and alert  Post-procedure vital signs: reviewed and stable  Pain management: adequate  Airway patency: patent  PONV status at discharge: No PONV  Anesthetic complications: no      Cardiovascular status: hemodynamically stable  Respiratory status: room air, spontaneous ventilation and unassisted  Hydration status: euvolemic  Follow-up not needed.        Visit Vitals  /71   Pulse 89   Temp 36.5 °C (97.7 °F) (Temporal)   Resp 12   Ht 5' 8" (1.727 m)   Wt 59.9 kg (132 lb)   LMP 05/30/2009   SpO2 98%   Breastfeeding? No   BMI 20.07 kg/m²       Pain/Estiven Score: Pain Assessment Performed: Yes (4/30/2018 11:00 PM)  Presence of Pain: complains of pain/discomfort (4/30/2018 11:00 PM)  Pain Rating Prior to Med Admin: 7 (4/30/2018 10:37 PM)  Estiven Score: 9 (4/30/2018  9:29 PM)      "

## 2018-05-01 NOTE — OP NOTE
[facility]  Brief Operative Note    SUMMARY     Surgery Date: 4/30/2018     Surgeon(s) and Role:     * Andre Uribe MD - Primary    Assisting Surgeon: None    Pre-Operative Care:  Pre-operative bowel prep Mechanical and PO antibiotics  IV antibiotics given within 1 hour of incision? Yes  Preoperative multimodal pain control? Orfirmev, Calador and TAP    Pre-op Diagnosis:  Crohn's disease of both small and large intestine without complication [K50.80]    Operative Care:  Post-op Diagnosis: Post-Op Diagnosis Codes:     * Crohn's disease of both small and large intestine without complication [K50.80]    Procedure(s) (LRB):  EXPLORATORY-LAPAROTOMY, small bowel resection,  RESECTION-SMALL BOWEL (N/A)    Anesthesia: Choice  Total volume administered 3500   Total UOP: 125       Technical Procedures Used:    Use of closing instrument setup? Yes  Gown/Glove Change @ time of closing? Yes  Suction tip change at time of closing? Yes  Wound Protector used if open case? Yes    Description of the findings of the procedure:    Wound Class (Contaminated    Complications: No     Estimated Blood Loss: 150 mL            Specimens:   Specimen (12h ago through future)    Start     Ordered    04/30/18 1820  Specimen to Pathology - Surgery  Once     Comments:  1. Ileum and small bowel- permanent      04/30/18 1821    04/30/18 1814  Specimen to Pathology - Surgery  Once     Comments:  1. Small Bowel- permanent      04/30/18 1814          Implants:   Implant Name Type Inv. Item Serial No.  Lot No. LRB No. Used                                  Post-Operative Care:         Disposition: PACU - hemodynamically stable.           Condition: Stable  PT Temp >36 upon leaving the OR? Yes

## 2018-05-01 NOTE — TRANSFER OF CARE
"Anesthesia Transfer of Care Note    Patient: Carline Chang    Procedure(s) Performed: Procedure(s) (LRB):  EXPLORATORY-LAPAROTOMY, small bowel resection, possible ostomy (N/A)  RESECTION-SMALL BOWEL (N/A)  ILEOSTOMY (Left)    Patient location: PACU    Anesthesia Type: general    Transport from OR: Transported from OR on 6-10 L/min O2 by face mask with adequate spontaneous ventilation    Post pain: adequate analgesia    Post assessment: no apparent anesthetic complications    Post vital signs: stable    Level of consciousness: awake    Nausea/Vomiting: no nausea/vomiting    Complications: none    Transfer of care protocol was followed      Last vitals:   Visit Vitals  /67 (BP Location: Left arm, Patient Position: Lying)   Pulse 88   Temp 36.4 °C (97.5 °F) (Axillary)   Resp 12   Ht 5' 8" (1.727 m)   Wt 59.9 kg (132 lb)   LMP 05/30/2009   SpO2 100%   Breastfeeding? No   BMI 20.07 kg/m²     "

## 2018-05-01 NOTE — OP NOTE
DATE OF PROCEDURE:  04/30/2018    PREOPERATIVE DIAGNOSES:  Intermittent small bowel obstruction.  Questions   Crohn's disease, questions adhesions.    POSTOPERATIVE DIAGNOSES:  Intermittent small bowel obstruction.  Questions   Crohn's disease, questions adhesions.    PROCEDURES:  Exploratory laparotomy, lysis of adhesions, a previous ileocolic   resection anastomosis and distal small bowel resection with a primary   anastomosis.    SURGEON:  Andre Uribe M.D.    RESIDENT:  Dr. Calixto.    ANESTHESIA:  General endotracheal.    INDICATION:  Ms. Chang is a 52-year-old female with known Crohn's disease   who has an area on MR enterography that was not reachable by endoscopy, which   is consistent with either active Crohn's disease or adhesion disease resulting   in abdominal pain, bloating, nausea or vomiting.  Based on this, surgery was   indicated.    PROCEDURE IN DETAIL:  The patient was taken to the Operating Room, placed on the   table in a lithotomy position.  All pressure points padded and was prepped in a   sterile manner.  Using a 10 blade, a midline incision was made from the pubic   symphysis to approximately 5 cm above the umbilicus.  There were some adhesions   that were taken down to the anterior abdominal wall.  A wound protector was   placed.  Bookwalter retractor was placed.  Once this was done, dissection was   started.  There was a loop of small bowel that was looped on itself and stuck to   mesentery.  The mesentery was thickened, but not inordinately so that loop of   small bowel was then taken down.  Then, dissection was carried from the   neoterminal ileum laterally up and around the right gutter to the previous   anastomosis.  At this point, the transverse colon was dissected around to the   anastomosis.  There was a large area of phlegmonous tissue proximal to the   anastomosis.  This was dissected free and then resected; however, this left a   4-cm piece of small bowel proximal to  the anastomosis to this ensure viability   that was resected as well.  Once this was done, hemostasis was assured.  Sponge   and needle counts were correct and a side-to-side ileocolic anastomosis was   fashioned.  All suture lines were oversewn with 3-0 Vicryl suture.  The common   enterotomy was closed with a TA stapler and oversewn with a series of Lembert   sutures.  The anastomosis was not twisted.  Hemostasis was assured.  At this   point, sponge and needle counts were correct.  Closing set was used.  Exparel   was placed.  Seprafilm was placed in the abdomen.  Fascia was closed with #1   looped PDS.  Skin was closed with 4-0 Monocryl and Dermabond.  Of note, there   was no evidence of active disease in any other portion except for this one loop   of small bowel.  Sponge and needle counts were correct.  The patient tolerated   the procedure and transferred to the Recovery Room in stable condition.      WAYNE/HN  dd: 04/30/2018 19:23:33 (CDT)  td: 04/30/2018 21:16:59 (CDT)  Doc ID   #[id number]  Job ID #[id number]    CC:

## 2018-05-02 LAB
ANION GAP SERPL CALC-SCNC: 7 MMOL/L
BASOPHILS # BLD AUTO: 0.03 K/UL
BASOPHILS NFR BLD: 0.4 %
BUN SERPL-MCNC: 7 MG/DL
CALCIUM SERPL-MCNC: 8.6 MG/DL
CHLORIDE SERPL-SCNC: 104 MMOL/L
CO2 SERPL-SCNC: 22 MMOL/L
CREAT SERPL-MCNC: 0.7 MG/DL
DIFFERENTIAL METHOD: ABNORMAL
EOSINOPHIL # BLD AUTO: 0.3 K/UL
EOSINOPHIL NFR BLD: 3.1 %
ERYTHROCYTE [DISTWIDTH] IN BLOOD BY AUTOMATED COUNT: 16.6 %
EST. GFR  (AFRICAN AMERICAN): >60 ML/MIN/1.73 M^2
EST. GFR  (NON AFRICAN AMERICAN): >60 ML/MIN/1.73 M^2
GLUCOSE SERPL-MCNC: 82 MG/DL
HCT VFR BLD AUTO: 29.6 %
HGB BLD-MCNC: 9.7 G/DL
IMM GRANULOCYTES # BLD AUTO: 0.04 K/UL
IMM GRANULOCYTES NFR BLD AUTO: 0.5 %
LYMPHOCYTES # BLD AUTO: 0.4 K/UL
LYMPHOCYTES NFR BLD: 5.1 %
MAGNESIUM SERPL-MCNC: 1.4 MG/DL
MCH RBC QN AUTO: 29.8 PG
MCHC RBC AUTO-ENTMCNC: 32.8 G/DL
MCV RBC AUTO: 91 FL
MONOCYTES # BLD AUTO: 0.6 K/UL
MONOCYTES NFR BLD: 6.6 %
NEUTROPHILS # BLD AUTO: 7.1 K/UL
NEUTROPHILS NFR BLD: 84.3 %
NRBC BLD-RTO: 0 /100 WBC
PHOSPHATE SERPL-MCNC: 2.6 MG/DL
PLATELET # BLD AUTO: 177 K/UL
PMV BLD AUTO: 8.3 FL
POTASSIUM SERPL-SCNC: 3.9 MMOL/L
RBC # BLD AUTO: 3.25 M/UL
SODIUM SERPL-SCNC: 133 MMOL/L
WBC # BLD AUTO: 8.44 K/UL

## 2018-05-02 PROCEDURE — 25000003 PHARM REV CODE 250: Performed by: STUDENT IN AN ORGANIZED HEALTH CARE EDUCATION/TRAINING PROGRAM

## 2018-05-02 PROCEDURE — 63600175 PHARM REV CODE 636 W HCPCS: Performed by: STUDENT IN AN ORGANIZED HEALTH CARE EDUCATION/TRAINING PROGRAM

## 2018-05-02 PROCEDURE — 25000003 PHARM REV CODE 250: Performed by: COLON & RECTAL SURGERY

## 2018-05-02 PROCEDURE — 85025 COMPLETE CBC W/AUTO DIFF WBC: CPT

## 2018-05-02 PROCEDURE — 25000003 PHARM REV CODE 250: Performed by: NURSE PRACTITIONER

## 2018-05-02 PROCEDURE — 20600001 HC STEP DOWN PRIVATE ROOM

## 2018-05-02 PROCEDURE — 36415 COLL VENOUS BLD VENIPUNCTURE: CPT

## 2018-05-02 PROCEDURE — 63600175 PHARM REV CODE 636 W HCPCS: Performed by: SURGERY

## 2018-05-02 PROCEDURE — 84100 ASSAY OF PHOSPHORUS: CPT

## 2018-05-02 PROCEDURE — 63600175 PHARM REV CODE 636 W HCPCS: Performed by: NURSE PRACTITIONER

## 2018-05-02 PROCEDURE — 83735 ASSAY OF MAGNESIUM: CPT

## 2018-05-02 PROCEDURE — 63600175 PHARM REV CODE 636 W HCPCS: Performed by: COLON & RECTAL SURGERY

## 2018-05-02 PROCEDURE — 25000003 PHARM REV CODE 250: Performed by: SURGERY

## 2018-05-02 PROCEDURE — 80048 BASIC METABOLIC PNL TOTAL CA: CPT

## 2018-05-02 RX ORDER — OXYCODONE HYDROCHLORIDE 5 MG/1
10 TABLET ORAL EVERY 4 HOURS PRN
Status: DISCONTINUED | OUTPATIENT
Start: 2018-05-02 | End: 2018-05-03

## 2018-05-02 RX ORDER — OXYCODONE HYDROCHLORIDE 5 MG/1
15 TABLET ORAL EVERY 4 HOURS PRN
Status: DISCONTINUED | OUTPATIENT
Start: 2018-05-02 | End: 2018-05-03

## 2018-05-02 RX ORDER — HYDROMORPHONE HYDROCHLORIDE 1 MG/ML
1 INJECTION, SOLUTION INTRAMUSCULAR; INTRAVENOUS; SUBCUTANEOUS EVERY 4 HOURS PRN
Status: DISCONTINUED | OUTPATIENT
Start: 2018-05-02 | End: 2018-05-03

## 2018-05-02 RX ORDER — ENOXAPARIN SODIUM 100 MG/ML
40 INJECTION SUBCUTANEOUS EVERY 24 HOURS
Status: DISCONTINUED | OUTPATIENT
Start: 2018-05-02 | End: 2018-05-05 | Stop reason: HOSPADM

## 2018-05-02 RX ORDER — LANOLIN ALCOHOL/MO/W.PET/CERES
400 CREAM (GRAM) TOPICAL 2 TIMES DAILY
Status: COMPLETED | OUTPATIENT
Start: 2018-05-02 | End: 2018-05-02

## 2018-05-02 RX ORDER — HYDROMORPHONE HYDROCHLORIDE 1 MG/ML
0.5 INJECTION, SOLUTION INTRAMUSCULAR; INTRAVENOUS; SUBCUTANEOUS EVERY 4 HOURS PRN
Status: DISCONTINUED | OUTPATIENT
Start: 2018-05-02 | End: 2018-05-02

## 2018-05-02 RX ORDER — DEXTROMETHORPHAN/PSEUDOEPHED 2.5-7.5/.8
40 DROPS ORAL 4 TIMES DAILY PRN
Status: DISCONTINUED | OUTPATIENT
Start: 2018-05-02 | End: 2018-05-05 | Stop reason: HOSPADM

## 2018-05-02 RX ADMIN — HYDROMORPHONE HYDROCHLORIDE 0.5 MG: 1 INJECTION, SOLUTION INTRAMUSCULAR; INTRAVENOUS; SUBCUTANEOUS at 04:05

## 2018-05-02 RX ADMIN — HYDROMORPHONE HYDROCHLORIDE 0.5 MG: 1 INJECTION, SOLUTION INTRAMUSCULAR; INTRAVENOUS; SUBCUTANEOUS at 05:05

## 2018-05-02 RX ADMIN — OXYCODONE HYDROCHLORIDE 15 MG: 5 TABLET ORAL at 03:05

## 2018-05-02 RX ADMIN — SODIUM CHLORIDE 1000 ML: 0.9 INJECTION, SOLUTION INTRAVENOUS at 07:05

## 2018-05-02 RX ADMIN — OXYCODONE HYDROCHLORIDE 15 MG: 5 TABLET ORAL at 10:05

## 2018-05-02 RX ADMIN — HYDROMORPHONE HYDROCHLORIDE 0.5 MG: 1 INJECTION, SOLUTION INTRAMUSCULAR; INTRAVENOUS; SUBCUTANEOUS at 01:05

## 2018-05-02 RX ADMIN — ENOXAPARIN SODIUM 40 MG: 100 INJECTION SUBCUTANEOUS at 05:05

## 2018-05-02 RX ADMIN — ONDANSETRON 4 MG: 4 TABLET, ORALLY DISINTEGRATING ORAL at 08:05

## 2018-05-02 RX ADMIN — Medication: at 03:05

## 2018-05-02 RX ADMIN — Medication: at 07:05

## 2018-05-02 RX ADMIN — OXYCODONE HYDROCHLORIDE 10 MG: 5 TABLET ORAL at 11:05

## 2018-05-02 RX ADMIN — HYDROMORPHONE HYDROCHLORIDE 1 MG: 1 INJECTION, SOLUTION INTRAMUSCULAR; INTRAVENOUS; SUBCUTANEOUS at 08:05

## 2018-05-02 RX ADMIN — MAGNESIUM OXIDE TAB 400 MG (241.3 MG ELEMENTAL MG) 400 MG: 400 (241.3 MG) TAB at 08:05

## 2018-05-02 RX ADMIN — HYDROMORPHONE HYDROCHLORIDE 1 MG: 1 INJECTION, SOLUTION INTRAMUSCULAR; INTRAVENOUS; SUBCUTANEOUS at 12:05

## 2018-05-02 RX ADMIN — MAGNESIUM OXIDE TAB 400 MG (241.3 MG ELEMENTAL MG) 400 MG: 400 (241.3 MG) TAB at 11:05

## 2018-05-02 RX ADMIN — IBUPROFEN 800 MG: 800 INJECTION INTRAVENOUS at 05:05

## 2018-05-02 RX ADMIN — IBUPROFEN 800 MG: 800 INJECTION INTRAVENOUS at 10:05

## 2018-05-02 RX ADMIN — IBUPROFEN 800 MG: 800 INJECTION INTRAVENOUS at 02:05

## 2018-05-02 NOTE — ASSESSMENT & PLAN NOTE
S/p Exploratory laparotomy, lysis of adhesions, a previous ileocolic resection anastomosis and distal small bowel resection with a primary anastomosis.    -Low residue diet  -d/c MIVF  -Oxy IR with IV ibuprofen  -Daily labs   -Encourage ambulation and OOBTC  -Lovenox

## 2018-05-02 NOTE — NURSING
Pt became anxious, crying when RN attempted to D/C pain pump per MD orders. Pt requested MD evaluate pain management plan in person, bedside with patient. Spoke with Dr. Miramontes who will assess patient in room. Leave pain pump in place until MD visits room per Dr. Miramontes. [facility].

## 2018-05-02 NOTE — PLAN OF CARE
Problem: Patient Care Overview  Goal: Plan of Care Review  Outcome: Ongoing (interventions implemented as appropriate)  POC reviewed with pt. VerbaL  Understanding received. PT AOX4, VSS, RA. ML with DB PRAFUL, pink with bruising distal portion. Colostomy producing reddish green thin liquid. Passing flatulence with active bowel sounds. Odonnell d/c'd at 6 am, 1400 output prior, clear yellow. No reports of nausea, bowel movement. Pain controlled with PCA and breakthrough PRN medication administered as per MAR. EJ extremely positional. Consult to midline team ordered. RN will continue to monitor

## 2018-05-02 NOTE — SUBJECTIVE & OBJECTIVE
Subjective:     Interval History: s/p ex lap with small bowel resection on 4/30/2018   No acute events overnight. PCA with moderate control of pain. No nausea or vomiting. Adequate UOP. Mild tachycardia.  Bilious ostomy output.    Post-Op Info:  Procedure(s) (LRB):  EXPLORATORY-LAPAROTOMY, small bowel resection, possible ostomy (N/A)  RESECTION-SMALL BOWEL (N/A)  ILEOSTOMY (Left)   2 Days Post-Op      Medications:  Continuous Infusions:   glucagon (human recombinant)       Scheduled Meds:   enoxaparin  40 mg Subcutaneous Daily    ibuprofen  800 mg Intravenous Q8H    magnesium oxide  400 mg Oral BID     PRN Meds:   dextrose 50%    dextrose 50%    dextrose 50%    glucagon (human recombinant)    glucose    glucose    glucose    HYDROmorphone    naloxone    ondansetron    oxyCODONE    oxyCODONE    promethazine (PHENERGAN) IVPB        Objective:     Vital Signs (Most Recent):  Temp: 98.4 °F (36.9 °C) (05/02/18 0730)  Pulse: 107 (05/02/18 0734)  Resp: 16 (05/02/18 0730)  BP: 105/60 (05/02/18 0730)  SpO2: 96 % (05/02/18 0730) Vital Signs (24h Range):  Temp:  [97.3 °F (36.3 °C)-98.6 °F (37 °C)] 98.4 °F (36.9 °C)  Pulse:  [] 107  Resp:  [10-19] 16  SpO2:  [91 %-98 %] 96 %  BP: (102-141)/(57-69) 105/60     Intake/Output - Last 3 Shifts       04/30 0700 - 05/01 0659 05/01 0700 - 05/02 0659 05/02 0700 - 05/03 0659    I.V. (mL/kg) 4731 (77.3)      IV Piggyback 700      Total Intake(mL/kg) 5431 (88.7)      Urine (mL/kg/hr) 1540 825 (0.6)     Stool  15 (0)     Blood 150      Total Output 1690 840      Net +3741 -840                   Physical Exam   Constitutional: She is oriented to person, place, and time. She appears well-developed. No distress.   HENT:   Head: Normocephalic and atraumatic.   Neck: Normal range of motion. Neck supple.   Cardiovascular: Normal rate and regular rhythm.    Pulmonary/Chest: Effort normal. No respiratory distress.   Abdominal: Soft. She exhibits no distension.   Midline  abdominal incision c/d/i. Ostomy pink and patent with bilious ostomy output   Musculoskeletal: Normal range of motion.   Neurological: She is alert and oriented to person, place, and time.   Skin: Skin is warm and dry. She is not diaphoretic.   Nursing note and vitals reviewed.      Significant Labs:  BMP:     Recent Labs  Lab 05/02/18  0439   GLU 82   *   K 3.9      CO2 22*   BUN 7   CREATININE 0.7   CALCIUM 8.6*   MG 1.4*     CBC:     Recent Labs  Lab 05/02/18  0439   WBC 8.44   RBC 3.25*   HGB 9.7*   HCT 29.6*      MCV 91   MCH 29.8   MCHC 32.8     CMP:   Recent Labs  Lab 04/30/18  1254  05/02/18  0439   GLU 87  < > 82   CALCIUM 10.2  < > 8.6*   ALBUMIN 3.8  --   --    PROT 8.2  --   --      < > 133*   K 4.4  < > 3.9   CO2 18*  < > 22*     < > 104   BUN 17  < > 7   CREATININE 1.3  < > 0.7   ALKPHOS 174*  --   --    ALT 27  --   --    AST 25  --   --    BILITOT 1.1*  --   --    < > = values in this interval not displayed.  CRP:     Recent Labs  Lab 04/26/18  1048   CRP 1.52*     LFTs:     Recent Labs  Lab 04/30/18  1254   ALT 27   AST 25   ALKPHOS 174*   BILITOT 1.1*   PROT 8.2   ALBUMIN 3.8

## 2018-05-02 NOTE — PROGRESS NOTES
53 yo female with hx of Crohn's disease underwent an exploratory lap   lysis of adhesions, a previous ileocolic resection anastomosis and distal small bowel resection with a primary anastomosis with ileostomy.  Pt tearful during entire time of visit due related to her complaints of unrelieved pain.    Was requested to see patient for requests of additional ostomy supplies. Discussed her current routine and problems she has been having maintaining her pouches. Related having to change up to 4 times a day and leaking each night.   Pouch is intact , no function at this time.   Brought samples of the new [employer] high output pouch for her to see and trial.   Discussed evaluating her stoma tomorrow when her pain is more in control as she is unable to tolerate any touch at this time.   Discussed letter for medical necessity regarding ostomy supplies with outpatient ostomy nurse Ana Rosa Ramirez RN,CNS, WOCN. Communicated c/o unrelieved pain to [facility] resident Dr Miramontes.  Danika Brown RN CWON  [id number]

## 2018-05-02 NOTE — PROGRESS NOTES
[facility]  Colorectal Surgery  Progress Note    Patient Name: Carline Chang  MRN: [id number]  Admission Date: 4/30/2018  Hospital Length of Stay: 2 days  Attending Physician: Andre Uribe MD    Subjective:     Interval History: s/p ex lap with small bowel resection on 4/30/2018   No acute events overnight. PCA with moderate control of pain. No nausea or vomiting. Adequate UOP. Mild tachycardia.  Bilious ostomy output.    Post-Op Info:  Procedure(s) (LRB):  EXPLORATORY-LAPAROTOMY, small bowel resection, possible ostomy (N/A)  RESECTION-SMALL BOWEL (N/A)  ILEOSTOMY (Left)   2 Days Post-Op      Medications:  Continuous Infusions:   glucagon (human recombinant)       Scheduled Meds:   enoxaparin  40 mg Subcutaneous Daily    ibuprofen  800 mg Intravenous Q8H    magnesium oxide  400 mg Oral BID     PRN Meds:   dextrose 50%    dextrose 50%    dextrose 50%    glucagon (human recombinant)    glucose    glucose    glucose    HYDROmorphone    naloxone    ondansetron    oxyCODONE    oxyCODONE    promethazine (PHENERGAN) IVPB        Objective:     Vital Signs (Most Recent):  Temp: 98.4 °F (36.9 °C) (05/02/18 0730)  Pulse: 107 (05/02/18 0734)  Resp: 16 (05/02/18 0730)  BP: 105/60 (05/02/18 0730)  SpO2: 96 % (05/02/18 0730) Vital Signs (24h Range):  Temp:  [97.3 °F (36.3 °C)-98.6 °F (37 °C)] 98.4 °F (36.9 °C)  Pulse:  [] 107  Resp:  [10-19] 16  SpO2:  [91 %-98 %] 96 %  BP: (102-141)/(57-69) 105/60     Intake/Output - Last 3 Shifts       04/30 0700 - 05/01 0659 05/01 0700 - 05/02 0659 05/02 0700 - 05/03 0659    I.V. (mL/kg) 4731 (77.3)      IV Piggyback 700      Total Intake(mL/kg) 5431 (88.7)      Urine (mL/kg/hr) 1540 825 (0.6)     Stool  15 (0)     Blood 150      Total Output 1690 840      Net +3741 -840                   Physical Exam   Constitutional: She is oriented to person, place, and time. She appears well-developed. No distress.   HENT:   Head: Normocephalic and  atraumatic.   Neck: Normal range of motion. Neck supple.   Cardiovascular: Normal rate and regular rhythm.    Pulmonary/Chest: Effort normal. No respiratory distress.   Abdominal: Soft. She exhibits no distension.   Midline abdominal incision c/d/i. Ostomy pink and patent with bilious ostomy output   Musculoskeletal: Normal range of motion.   Neurological: She is alert and oriented to person, place, and time.   Skin: Skin is warm and dry. She is not diaphoretic.   Nursing note and vitals reviewed.      Significant Labs:  BMP:     Recent Labs  Lab 05/02/18  0439   GLU 82   *   K 3.9      CO2 22*   BUN 7   CREATININE 0.7   CALCIUM 8.6*   MG 1.4*     CBC:     Recent Labs  Lab 05/02/18  0439   WBC 8.44   RBC 3.25*   HGB 9.7*   HCT 29.6*      MCV 91   MCH 29.8   MCHC 32.8     CMP:   Recent Labs  Lab 04/30/18  1254  05/02/18  0439   GLU 87  < > 82   CALCIUM 10.2  < > 8.6*   ALBUMIN 3.8  --   --    PROT 8.2  --   --      < > 133*   K 4.4  < > 3.9   CO2 18*  < > 22*     < > 104   BUN 17  < > 7   CREATININE 1.3  < > 0.7   ALKPHOS 174*  --   --    ALT 27  --   --    AST 25  --   --    BILITOT 1.1*  --   --    < > = values in this interval not displayed.  CRP:     Recent Labs  Lab 04/26/18  1048   CRP 1.52*     LFTs:     Recent Labs  Lab 04/30/18  1254   ALT 27   AST 25   ALKPHOS 174*   BILITOT 1.1*   PROT 8.2   ALBUMIN 3.8         Assessment/Plan:     Crohn disease    S/p Exploratory laparotomy, lysis of adhesions, a previous ileocolic resection anastomosis and distal small bowel resection with a primary anastomosis.    -Low residue diet  -d/c MIVF  -Oxy IR with IV ibuprofen  -Daily labs   -Encourage ambulation and OOBTC  -Lovenox              Cristiano Lopez MD  Colorectal Surgery  [facility]    Have seen and examined the patient with the fellow and agree with their plan.  ESPERANZA MADSEN

## 2018-05-02 NOTE — PLAN OF CARE
Problem: Patient Care Overview  Goal: Plan of Care Review  Outcome: Ongoing (interventions implemented as appropriate)  Patient assessed, VSS, AAOx4. Patient in bed upright.  POC reviewed with patient who verbalized understanding. Patient ambulating and transferring with standby assist. Patient advanced to low fiber low residue diet, tolerating well, reported nausea without vomiting x1, resolved with medication. Up to toilet. Ambulating in room/ childress. Otomy site, clean and in tact, liquid low output to bag. Pt reports  pain controlled with current regimen, warm packs for comfort. Pt remains free of falls/injury and adherent with safety protocols.  WCTM.

## 2018-05-02 NOTE — NURSING
Pt reported to nurse that pain regimen is no longer controlling her pain. Requested to see MD to discuss pain management plan. Pt tearful, anxious. Spoke with Dr. Holland who will see pt and assess. WCTM.

## 2018-05-03 LAB
ANION GAP SERPL CALC-SCNC: 8 MMOL/L
BASOPHILS # BLD AUTO: 0.01 K/UL
BASOPHILS NFR BLD: 0.1 %
BUN SERPL-MCNC: 5 MG/DL
CALCIUM SERPL-MCNC: 8.3 MG/DL
CHLORIDE SERPL-SCNC: 104 MMOL/L
CO2 SERPL-SCNC: 26 MMOL/L
CREAT SERPL-MCNC: 0.6 MG/DL
DIFFERENTIAL METHOD: ABNORMAL
EOSINOPHIL # BLD AUTO: 0.3 K/UL
EOSINOPHIL NFR BLD: 2.9 %
ERYTHROCYTE [DISTWIDTH] IN BLOOD BY AUTOMATED COUNT: 16.2 %
EST. GFR  (AFRICAN AMERICAN): >60 ML/MIN/1.73 M^2
EST. GFR  (NON AFRICAN AMERICAN): >60 ML/MIN/1.73 M^2
GLUCOSE SERPL-MCNC: 96 MG/DL
HCT VFR BLD AUTO: 26.9 %
HGB BLD-MCNC: 8.5 G/DL
IMM GRANULOCYTES # BLD AUTO: 0.05 K/UL
IMM GRANULOCYTES NFR BLD AUTO: 0.6 %
LYMPHOCYTES # BLD AUTO: 0.4 K/UL
LYMPHOCYTES NFR BLD: 4.3 %
MAGNESIUM SERPL-MCNC: 1.1 MG/DL
MCH RBC QN AUTO: 29.4 PG
MCHC RBC AUTO-ENTMCNC: 31.6 G/DL
MCV RBC AUTO: 93 FL
MONOCYTES # BLD AUTO: 0.5 K/UL
MONOCYTES NFR BLD: 5.6 %
NEUTROPHILS # BLD AUTO: 7.4 K/UL
NEUTROPHILS NFR BLD: 86.5 %
NRBC BLD-RTO: 0 /100 WBC
PHOSPHATE SERPL-MCNC: 2.5 MG/DL
PLATELET # BLD AUTO: 160 K/UL
PMV BLD AUTO: 8.2 FL
POTASSIUM SERPL-SCNC: 3 MMOL/L
RBC # BLD AUTO: 2.89 M/UL
SODIUM SERPL-SCNC: 138 MMOL/L
WBC # BLD AUTO: 8.55 K/UL

## 2018-05-03 PROCEDURE — 84100 ASSAY OF PHOSPHORUS: CPT

## 2018-05-03 PROCEDURE — 94761 N-INVAS EAR/PLS OXIMETRY MLT: CPT

## 2018-05-03 PROCEDURE — 25000003 PHARM REV CODE 250: Performed by: STUDENT IN AN ORGANIZED HEALTH CARE EDUCATION/TRAINING PROGRAM

## 2018-05-03 PROCEDURE — 36415 COLL VENOUS BLD VENIPUNCTURE: CPT

## 2018-05-03 PROCEDURE — 85025 COMPLETE CBC W/AUTO DIFF WBC: CPT

## 2018-05-03 PROCEDURE — 25000003 PHARM REV CODE 250: Performed by: NURSE PRACTITIONER

## 2018-05-03 PROCEDURE — 20600001 HC STEP DOWN PRIVATE ROOM

## 2018-05-03 PROCEDURE — 63600175 PHARM REV CODE 636 W HCPCS: Performed by: NURSE PRACTITIONER

## 2018-05-03 PROCEDURE — 63600175 PHARM REV CODE 636 W HCPCS: Performed by: STUDENT IN AN ORGANIZED HEALTH CARE EDUCATION/TRAINING PROGRAM

## 2018-05-03 PROCEDURE — 83735 ASSAY OF MAGNESIUM: CPT

## 2018-05-03 PROCEDURE — 63600175 PHARM REV CODE 636 W HCPCS: Performed by: SURGERY

## 2018-05-03 PROCEDURE — 80048 BASIC METABOLIC PNL TOTAL CA: CPT

## 2018-05-03 RX ORDER — LANOLIN ALCOHOL/MO/W.PET/CERES
400 CREAM (GRAM) TOPICAL 2 TIMES DAILY
Status: COMPLETED | OUTPATIENT
Start: 2018-05-03 | End: 2018-05-03

## 2018-05-03 RX ORDER — SODIUM,POTASSIUM PHOSPHATES 280-250MG
2 POWDER IN PACKET (EA) ORAL ONCE
Status: COMPLETED | OUTPATIENT
Start: 2018-05-03 | End: 2018-05-03

## 2018-05-03 RX ORDER — OXYCODONE HYDROCHLORIDE 5 MG/1
10 TABLET ORAL
Status: DISCONTINUED | OUTPATIENT
Start: 2018-05-03 | End: 2018-05-05 | Stop reason: HOSPADM

## 2018-05-03 RX ORDER — HYDROMORPHONE HYDROCHLORIDE 1 MG/ML
1 INJECTION, SOLUTION INTRAMUSCULAR; INTRAVENOUS; SUBCUTANEOUS EVERY 6 HOURS PRN
Status: DISCONTINUED | OUTPATIENT
Start: 2018-05-03 | End: 2018-05-03

## 2018-05-03 RX ORDER — OXYCODONE HYDROCHLORIDE 5 MG/1
15 TABLET ORAL
Status: DISCONTINUED | OUTPATIENT
Start: 2018-05-03 | End: 2018-05-05 | Stop reason: HOSPADM

## 2018-05-03 RX ORDER — HYDROMORPHONE HYDROCHLORIDE 1 MG/ML
2 INJECTION, SOLUTION INTRAMUSCULAR; INTRAVENOUS; SUBCUTANEOUS EVERY 6 HOURS PRN
Status: DISCONTINUED | OUTPATIENT
Start: 2018-05-03 | End: 2018-05-05

## 2018-05-03 RX ADMIN — POTASSIUM PHOSPHATE, MONOBASIC AND POTASSIUM PHOSPHATE, DIBASIC 30 MMOL: 224; 236 INJECTION, SOLUTION, CONCENTRATE INTRAVENOUS at 09:05

## 2018-05-03 RX ADMIN — PROMETHAZINE HYDROCHLORIDE 6.25 MG: 25 INJECTION INTRAMUSCULAR; INTRAVENOUS at 06:05

## 2018-05-03 RX ADMIN — POTASSIUM & SODIUM PHOSPHATES POWDER PACK 280-160-250 MG 2 PACKET: 280-160-250 PACK at 01:05

## 2018-05-03 RX ADMIN — SIMETHICONE 40 MG: 20 SUSPENSION/ DROPS ORAL at 09:05

## 2018-05-03 RX ADMIN — SIMETHICONE 40 MG: 20 SUSPENSION/ DROPS ORAL at 03:05

## 2018-05-03 RX ADMIN — MAGNESIUM OXIDE TAB 400 MG (241.3 MG ELEMENTAL MG) 400 MG: 400 (241.3 MG) TAB at 09:05

## 2018-05-03 RX ADMIN — HYDROMORPHONE HYDROCHLORIDE 1 MG: 1 INJECTION, SOLUTION INTRAMUSCULAR; INTRAVENOUS; SUBCUTANEOUS at 09:05

## 2018-05-03 RX ADMIN — HYDROMORPHONE HYDROCHLORIDE 1 MG: 1 INJECTION, SOLUTION INTRAMUSCULAR; INTRAVENOUS; SUBCUTANEOUS at 04:05

## 2018-05-03 RX ADMIN — HYDROMORPHONE HYDROCHLORIDE 1 MG: 1 INJECTION, SOLUTION INTRAMUSCULAR; INTRAVENOUS; SUBCUTANEOUS at 12:05

## 2018-05-03 RX ADMIN — IBUPROFEN 800 MG: 800 INJECTION INTRAVENOUS at 10:05

## 2018-05-03 RX ADMIN — Medication 2 MG: at 11:05

## 2018-05-03 RX ADMIN — Medication 2 MG: at 05:05

## 2018-05-03 RX ADMIN — OXYCODONE HYDROCHLORIDE 15 MG: 5 TABLET ORAL at 03:05

## 2018-05-03 RX ADMIN — SIMETHICONE 40 MG: 20 SUSPENSION/ DROPS ORAL at 12:05

## 2018-05-03 RX ADMIN — OXYCODONE HYDROCHLORIDE 15 MG: 5 TABLET ORAL at 10:05

## 2018-05-03 RX ADMIN — OXYCODONE HYDROCHLORIDE 15 MG: 5 TABLET ORAL at 07:05

## 2018-05-03 RX ADMIN — IBUPROFEN 800 MG: 800 INJECTION INTRAVENOUS at 05:05

## 2018-05-03 RX ADMIN — IBUPROFEN 800 MG: 800 INJECTION INTRAVENOUS at 02:05

## 2018-05-03 RX ADMIN — OXYCODONE HYDROCHLORIDE 15 MG: 5 TABLET ORAL at 12:05

## 2018-05-03 RX ADMIN — MAGNESIUM OXIDE TAB 400 MG (241.3 MG ELEMENTAL MG) 400 MG: 400 (241.3 MG) TAB at 10:05

## 2018-05-03 RX ADMIN — ENOXAPARIN SODIUM 40 MG: 100 INJECTION SUBCUTANEOUS at 05:05

## 2018-05-03 NOTE — PROGRESS NOTES
[facility]  Colorectal Surgery  Progress Note    Patient Name: Carline Chang  MRN: [id number]  Admission Date: 4/30/2018  Hospital Length of Stay: 3 days  Attending Physician: Andre Uribe MD    Subjective:     Interval History: s/p ex lap with small bowel resection on 4/30/2018   No acute events overnight. PCA with discontinued yesterday and she complained of uncontrol pain during the day. Pain is now more manageable with increase in IV break through medication. Low residue diet tolerated with no nausea or vomiting. Adequate UOP. Mild tachycardia.  Dark, thin bilious output from ostomy (555 cc/24 hours)    Post-Op Info:  Procedure(s) (LRB):  EXPLORATORY-LAPAROTOMY, small bowel resection, possible ostomy (N/A)  RESECTION-SMALL BOWEL (N/A)  ILEOSTOMY (Left)   3 Days Post-Op      Medications:  Continuous Infusions:   glucagon (human recombinant)       Scheduled Meds:   enoxaparin  40 mg Subcutaneous Daily    ibuprofen  800 mg Intravenous Q8H     PRN Meds:   dextrose 50%    dextrose 50%    dextrose 50%    glucagon (human recombinant)    glucose    glucose    glucose    naloxone    ondansetron    oxyCODONE    oxyCODONE    promethazine (PHENERGAN) IVPB    simethicone        Objective:     Vital Signs (Most Recent):  Temp: 98.8 °F (37.1 °C) (05/03/18 0340)  Pulse: 105 (05/03/18 0340)  Resp: 18 (05/03/18 0340)  BP: (!) 117/58 (05/03/18 0340)  SpO2: 97 % (05/03/18 0340) Vital Signs (24h Range):  Temp:  [98.4 °F (36.9 °C)-99.6 °F (37.6 °C)] 98.8 °F (37.1 °C)  Pulse:  [] 105  Resp:  [16-32] 18  SpO2:  [94 %-97 %] 97 %  BP: (105-131)/(58-73) 117/58     Intake/Output - Last 3 Shifts       05/01 0700 - 05/02 0659 05/02 0700 - 05/03 0659 05/03 0700 - 05/04 0659    I.V. (mL/kg)  1000 (16.3)     IV Piggyback 750      Total Intake(mL/kg) 750 (12.3) 1000 (16.3)     Urine (mL/kg/hr) 825 (0.6) 1820 (1.2)     Stool 15 (0) 555 (0.4)     Total Output 843 3999      Net -90 -8787              Urine Occurrence  2 x           Physical Exam   Constitutional: She is oriented to person, place, and time. She appears well-developed. No distress.   HENT:   Head: Normocephalic and atraumatic.   Neck: Normal range of motion. Neck supple.   Cardiovascular: Normal rate and regular rhythm.    Pulmonary/Chest: Effort normal. No respiratory distress.   Abdominal: Soft. She exhibits no distension. There is tenderness (mild).   Midline abdominal incision c/d/i. Ostomy pink and patent with bilious ostomy output   Musculoskeletal: Normal range of motion.   Neurological: She is alert and oriented to person, place, and time.   Skin: Skin is warm and dry. She is not diaphoretic.   Nursing note and vitals reviewed.      Significant Labs:  BMP:     Recent Labs  Lab 05/02/18  0439   GLU 82   *   K 3.9      CO2 22*   BUN 7   CREATININE 0.7   CALCIUM 8.6*   MG 1.4*     CBC:     Recent Labs  Lab 05/02/18  0439   WBC 8.44   RBC 3.25*   HGB 9.7*   HCT 29.6*      MCV 91   MCH 29.8   MCHC 32.8     CMP:   Recent Labs  Lab 04/30/18  1254  05/02/18  0439   GLU 87  < > 82   CALCIUM 10.2  < > 8.6*   ALBUMIN 3.8  --   --    PROT 8.2  --   --      < > 133*   K 4.4  < > 3.9   CO2 18*  < > 22*     < > 104   BUN 17  < > 7   CREATININE 1.3  < > 0.7   ALKPHOS 174*  --   --    ALT 27  --   --    AST 25  --   --    BILITOT 1.1*  --   --    < > = values in this interval not displayed.  CRP:     Recent Labs  Lab 04/26/18  1048   CRP 1.52*     LFTs:     Recent Labs  Lab 04/30/18  1254   ALT 27   AST 25   ALKPHOS 174*   BILITOT 1.1*   PROT 8.2   ALBUMIN 3.8         Assessment/Plan:     Crohn disease    S/p Exploratory laparotomy, lysis of adhesions, a previous ileocolic resection anastomosis and distal small bowel resection with a primary anastomosis.    -Low residue diet  -Oxy IR with IV ibuprofen and breakthrough IV dilaudid. Discussed with patient that in order to be discharge will need to be solely on PO pain  medications.   -Daily labs -currently pending  -Encourage ambulation and OOBTC  -Lovenox              Niyah Miramontes MD  Colorectal Surgery  [facility]    Have seen and examined the patient with the fellow and agree with their plan.  ESPERANZA MADSEN

## 2018-05-03 NOTE — PROGRESS NOTES
Assisted patient with pouch emptying .   Feels much better today and her pain is in much better control. Assisted her to ambulate in the hallway for 10 minutes and we discussed thickening foods to slow down her high output. States she never knew thickening foods could help decrease her output . Will bring her a list of foods tomorrow. Refusing to change pouch today as all is intact and holding well. Reminded her that her issues with pouch adherence may change now that the strictures have been removed and her stoma has been revised.  Ostomy supplies have been ordered for her use.     05/03/18 1030       Colostomy 04/30/18 1930 LUQ   Placement Date/Time: 04/30/18 1930   Present Prior to Hospital Arrival?: No  Inserted by: MD  Location: LUQ  Removal Indication and Assessment: not present upon hospital arrival   Stomal Appliance 1 piece;Clean;Intact;No Leakage   Stoma Appearance rosebud appearance;red;protruding above skin level   Site Assessment KIMANI   Peristomal Assessment KIMANI   Stoma Function flatus;stool;green;thin liquid   Output (mL) 150 mL     Danika Brown RN ON  [id number]

## 2018-05-03 NOTE — PHYSICIAN QUERY
PT Name: Carline Chang  MR #: [id number]     Physician Query Form - Documentation Clarification      CDS/: Brandy E Capley               Contact information:  Spectralink:  [phone]    This form is a permanent document in the medical record.     Query Date: May 3, 2018    By submitting this query, we are merely seeking further clarification of documentation. Please utilize your independent clinical judgment when addressing the question(s) below.    The Medical record reflects the following:    Supporting Clinical Findings Location in Medical Record     Phosphorus= 2.5     Labs 5/3     potassium phosphate 30 mmol in dextrose 5 % 500 mL infusion  :  Dose 30 mmol  :  83.3 mL/hr  :  Intravenous  :  Once       MAR 5/3                                                                            Doctor, Please specify diagnosis or diagnoses associated with above clinical findings.    Provider Use Only       ( x )  Hypophosphatemia     (  )  Other (please specify):  ___________________________                                                                                                               [  ] Clinically undetermined

## 2018-05-03 NOTE — ASSESSMENT & PLAN NOTE
S/p Exploratory laparotomy, lysis of adhesions, a previous ileocolic resection anastomosis and distal small bowel resection with a primary anastomosis.    -Low residue diet  -Oxy IR with IV ibuprofen and breakthrough IV dilaudid. Discussed with patient that in order to be discharge will need to be solely on PO pain medications.   -Daily labs -currently pending  -Encourage ambulation and OOBTC  -Lovenox

## 2018-05-03 NOTE — PLAN OF CARE
Problem: Patient Care Overview  Goal: Plan of Care Review  Outcome: Ongoing (interventions implemented as appropriate)  Safety:  call light in reach, patient oriented to room & instructed how to notify nurse if assistance is needed, questions & concerns addressed, bed in lowest position with wheels locked & side rails up X 2, fall precautions followed, patient free from fall & injury thus far this shift;  VTE/bleeding precautions maintained.  Activity:  patient up with standby assistance, weight shifted at least every other hour.  Neurological:  patient A&O X 4, follows commands, equal  strength & dorsi/plantarflexion, neuro checks performed as ordered & WDL.  Respiratory:  patient tolerates room air without distress, denies SOB.  Cardiac:  Denies chest pain, BP stable, HR stable, NSR to ST on telemetry.  Afebrile this shift.  GI:  Patient tolerates PO intake well, denies nausea, LBM 5/3/2018 via L side colostomy draining thin green fluid, flatus present, pt demonstrates understanding of care and maintenance of colostomy.  :  patient voids clear yellow urine without foul odor spontaneously & without difficulty, adequate output for shift.  Skin:  ML abdominal incision with derma-bond CDI open to air.  Devices:  PIV to LFA infiltrated and RN DCd this shift, attempted placing new PIV with no success, pt has LEJ which flushes well and remains free of s/sx of infection/infiltration.  Pain:  patient received prn pain medication as ordered and expressed relief.  Will continue to monitor patient.

## 2018-05-03 NOTE — PHYSICIAN QUERY
PT Name: Carline Chang  MR #: [id number]     Physician Query Form - Documentation Clarification      CDS/: Brandy E Capley               Contact information:  Spectralink:  [phone]    This form is a permanent document in the medical record.     Query Date: May 3, 2018    By submitting this query, we are merely seeking further clarification of documentation. Please utilize your independent clinical judgment when addressing the question(s) below.    The Medical record reflects the following:    Supporting Clinical Findings Location in Medical Record     Potassium= 3     Labs 5/3     potassium phosphate 30 mmol in dextrose 5 % 500 mL infusion  :  Dose 30 mmol  :  83.3 mL/hr  :  Intravenous  :  Once       MAR 5/3                                                                            Doctor, Please specify diagnosis or diagnoses associated with above clinical findings.    Provider Use Only       ( x )  Hypokalemia     (  )  Other (please specify):  _____________________                                                                                                             [  ] Clinically undetermined

## 2018-05-03 NOTE — PLAN OF CARE
Problem: Patient Care Overview  Goal: Plan of Care Review  Outcome: Ongoing (interventions implemented as appropriate)  POC reviewed with pt. VerbaL  Understanding received. PT AOX4, VSS, RA. ML with DB PRAFUL, pink with bruising distal portion. Colostomy producing green thin liquid. Passing flatulence . Ambulating to toilet. Intermittent reports of nausea, Pain well controlled with oxy IR 10-15mg PRN and breakthrough IV pain med PRN, administered as per MAR. . RN will continue to monitor

## 2018-05-04 LAB
ANION GAP SERPL CALC-SCNC: 10 MMOL/L
ANION GAP SERPL CALC-SCNC: 7 MMOL/L
BASOPHILS # BLD AUTO: 0.02 K/UL
BASOPHILS NFR BLD: 0.3 %
BUN SERPL-MCNC: 3 MG/DL
BUN SERPL-MCNC: 3 MG/DL
CALCIUM SERPL-MCNC: 8.4 MG/DL
CALCIUM SERPL-MCNC: 8.5 MG/DL
CHLORIDE SERPL-SCNC: 96 MMOL/L
CHLORIDE SERPL-SCNC: 97 MMOL/L
CO2 SERPL-SCNC: 29 MMOL/L
CO2 SERPL-SCNC: 31 MMOL/L
CREAT SERPL-MCNC: 0.7 MG/DL
CREAT SERPL-MCNC: 0.7 MG/DL
DIFFERENTIAL METHOD: ABNORMAL
EOSINOPHIL # BLD AUTO: 0.4 K/UL
EOSINOPHIL NFR BLD: 4.9 %
ERYTHROCYTE [DISTWIDTH] IN BLOOD BY AUTOMATED COUNT: 16 %
EST. GFR  (AFRICAN AMERICAN): >60 ML/MIN/1.73 M^2
EST. GFR  (AFRICAN AMERICAN): >60 ML/MIN/1.73 M^2
EST. GFR  (NON AFRICAN AMERICAN): >60 ML/MIN/1.73 M^2
EST. GFR  (NON AFRICAN AMERICAN): >60 ML/MIN/1.73 M^2
GLUCOSE SERPL-MCNC: 130 MG/DL
GLUCOSE SERPL-MCNC: 84 MG/DL
HCT VFR BLD AUTO: 25.4 %
HGB BLD-MCNC: 8.4 G/DL
IMM GRANULOCYTES # BLD AUTO: 0.04 K/UL
IMM GRANULOCYTES NFR BLD AUTO: 0.5 %
LYMPHOCYTES # BLD AUTO: 0.4 K/UL
LYMPHOCYTES NFR BLD: 5.2 %
MAGNESIUM SERPL-MCNC: 1.1 MG/DL
MAGNESIUM SERPL-MCNC: 1.3 MG/DL
MCH RBC QN AUTO: 30.2 PG
MCHC RBC AUTO-ENTMCNC: 33.1 G/DL
MCV RBC AUTO: 91 FL
MONOCYTES # BLD AUTO: 0.6 K/UL
MONOCYTES NFR BLD: 8.1 %
NEUTROPHILS # BLD AUTO: 6.2 K/UL
NEUTROPHILS NFR BLD: 81 %
NRBC BLD-RTO: 0 /100 WBC
PHOSPHATE SERPL-MCNC: 3.2 MG/DL
PLATELET # BLD AUTO: 172 K/UL
PMV BLD AUTO: 8.4 FL
POTASSIUM SERPL-SCNC: 2.9 MMOL/L
POTASSIUM SERPL-SCNC: 2.9 MMOL/L
RBC # BLD AUTO: 2.78 M/UL
SODIUM SERPL-SCNC: 135 MMOL/L
SODIUM SERPL-SCNC: 135 MMOL/L
WBC # BLD AUTO: 7.69 K/UL

## 2018-05-04 PROCEDURE — 85025 COMPLETE CBC W/AUTO DIFF WBC: CPT

## 2018-05-04 PROCEDURE — 25000003 PHARM REV CODE 250: Performed by: NURSE PRACTITIONER

## 2018-05-04 PROCEDURE — 80048 BASIC METABOLIC PNL TOTAL CA: CPT

## 2018-05-04 PROCEDURE — 63600175 PHARM REV CODE 636 W HCPCS: Performed by: NURSE PRACTITIONER

## 2018-05-04 PROCEDURE — 20600001 HC STEP DOWN PRIVATE ROOM

## 2018-05-04 PROCEDURE — 84100 ASSAY OF PHOSPHORUS: CPT

## 2018-05-04 PROCEDURE — 63600175 PHARM REV CODE 636 W HCPCS: Performed by: SURGERY

## 2018-05-04 PROCEDURE — 36415 COLL VENOUS BLD VENIPUNCTURE: CPT

## 2018-05-04 PROCEDURE — 83735 ASSAY OF MAGNESIUM: CPT

## 2018-05-04 PROCEDURE — 63600175 PHARM REV CODE 636 W HCPCS: Performed by: STUDENT IN AN ORGANIZED HEALTH CARE EDUCATION/TRAINING PROGRAM

## 2018-05-04 PROCEDURE — 25000003 PHARM REV CODE 250: Performed by: STUDENT IN AN ORGANIZED HEALTH CARE EDUCATION/TRAINING PROGRAM

## 2018-05-04 PROCEDURE — 80048 BASIC METABOLIC PNL TOTAL CA: CPT | Mod: 91

## 2018-05-04 PROCEDURE — 83735 ASSAY OF MAGNESIUM: CPT | Mod: 91

## 2018-05-04 PROCEDURE — 25000003 PHARM REV CODE 250: Performed by: COLON & RECTAL SURGERY

## 2018-05-04 RX ORDER — SODIUM,POTASSIUM PHOSPHATES 280-250MG
1 POWDER IN PACKET (EA) ORAL ONCE
Status: COMPLETED | OUTPATIENT
Start: 2018-05-04 | End: 2018-05-04

## 2018-05-04 RX ORDER — POTASSIUM CHLORIDE 7.45 MG/ML
10 INJECTION INTRAVENOUS
Status: DISPENSED | OUTPATIENT
Start: 2018-05-04 | End: 2018-05-04

## 2018-05-04 RX ORDER — POTASSIUM CHLORIDE 750 MG/1
30 CAPSULE, EXTENDED RELEASE ORAL ONCE
Status: COMPLETED | OUTPATIENT
Start: 2018-05-04 | End: 2018-05-04

## 2018-05-04 RX ORDER — POTASSIUM CHLORIDE 7.45 MG/ML
10 INJECTION INTRAVENOUS
Status: COMPLETED | OUTPATIENT
Start: 2018-05-04 | End: 2018-05-04

## 2018-05-04 RX ADMIN — PROMETHAZINE HYDROCHLORIDE 6.25 MG: 25 INJECTION INTRAMUSCULAR; INTRAVENOUS at 01:05

## 2018-05-04 RX ADMIN — IBUPROFEN 800 MG: 800 INJECTION INTRAVENOUS at 09:05

## 2018-05-04 RX ADMIN — Medication 2 MG: at 03:05

## 2018-05-04 RX ADMIN — POTASSIUM CHLORIDE 10 MEQ: 7.46 INJECTION, SOLUTION INTRAVENOUS at 10:05

## 2018-05-04 RX ADMIN — OXYCODONE HYDROCHLORIDE 15 MG: 5 TABLET ORAL at 07:05

## 2018-05-04 RX ADMIN — ONDANSETRON 4 MG: 4 TABLET, ORALLY DISINTEGRATING ORAL at 12:05

## 2018-05-04 RX ADMIN — POTASSIUM CHLORIDE 10 MEQ: 7.46 INJECTION, SOLUTION INTRAVENOUS at 06:05

## 2018-05-04 RX ADMIN — POTASSIUM CHLORIDE 10 MEQ: 7.46 INJECTION, SOLUTION INTRAVENOUS at 08:05

## 2018-05-04 RX ADMIN — OXYCODONE HYDROCHLORIDE 15 MG: 5 TABLET ORAL at 01:05

## 2018-05-04 RX ADMIN — IBUPROFEN 800 MG: 800 INJECTION INTRAVENOUS at 02:05

## 2018-05-04 RX ADMIN — OXYCODONE HYDROCHLORIDE 15 MG: 5 TABLET ORAL at 06:05

## 2018-05-04 RX ADMIN — OXYCODONE HYDROCHLORIDE 15 MG: 5 TABLET ORAL at 03:05

## 2018-05-04 RX ADMIN — IBUPROFEN 800 MG: 800 INJECTION INTRAVENOUS at 06:05

## 2018-05-04 RX ADMIN — Medication 2 MG: at 10:05

## 2018-05-04 RX ADMIN — POTASSIUM CHLORIDE 10 MEQ: 7.46 INJECTION, SOLUTION INTRAVENOUS at 12:05

## 2018-05-04 RX ADMIN — PROMETHAZINE HYDROCHLORIDE 6.25 MG: 25 INJECTION INTRAMUSCULAR; INTRAVENOUS at 04:05

## 2018-05-04 RX ADMIN — POTASSIUM & SODIUM PHOSPHATES POWDER PACK 280-160-250 MG 1 PACKET: 280-160-250 PACK at 10:05

## 2018-05-04 RX ADMIN — Medication 2 MG: at 08:05

## 2018-05-04 RX ADMIN — MAGNESIUM SULFATE HEPTAHYDRATE 1 G: 500 INJECTION, SOLUTION INTRAMUSCULAR; INTRAVENOUS at 10:05

## 2018-05-04 RX ADMIN — POTASSIUM CHLORIDE 30 MEQ: 750 CAPSULE, EXTENDED RELEASE ORAL at 06:05

## 2018-05-04 RX ADMIN — OXYCODONE HYDROCHLORIDE 15 MG: 5 TABLET ORAL at 10:05

## 2018-05-04 RX ADMIN — ENOXAPARIN SODIUM 40 MG: 100 INJECTION SUBCUTANEOUS at 04:05

## 2018-05-04 NOTE — PROGRESS NOTES
Pot 2.9 and mag 1.1 spoke to Dr. Miramontes order a new IV and will place order for meds. Order placed for midline.

## 2018-05-04 NOTE — SUBJECTIVE & OBJECTIVE
Subjective:     Interval History: s/p ex lap with small bowel resection on 4/30/2018   No acute events overnight. Continued mild tachycardia. Pain is now controlled with PO medication and IV break through. Low residue diet tolerated with no nausea or vomiting. Adequate UOP. Ambulating.  Formed stool in ostomy (275cc/24 hours)    Post-Op Info:  Procedure(s) (LRB):  EXPLORATORY-LAPAROTOMY, small bowel resection, possible ostomy (N/A)  RESECTION-SMALL BOWEL (N/A)  ILEOSTOMY (Left)   4 Days Post-Op      Medications:  Continuous Infusions:   glucagon (human recombinant)       Scheduled Meds:   enoxaparin  40 mg Subcutaneous Daily    ibuprofen  800 mg Intravenous Q8H     PRN Meds:   dextrose 50%    dextrose 50%    dextrose 50%    glucagon (human recombinant)    glucose    glucose    glucose    HYDROmorphone    naloxone    ondansetron    oxyCODONE    oxyCODONE    promethazine (PHENERGAN) IVPB    simethicone        Objective:     Vital Signs (Most Recent):  Temp: 99.7 °F (37.6 °C) (05/04/18 0819)  Pulse: 107 (05/04/18 0819)  Resp: 17 (05/04/18 0819)  BP: 135/72 (05/04/18 0819)  SpO2: 99 % (05/04/18 0819) Vital Signs (24h Range):  Temp:  [98.7 °F (37.1 °C)-99.8 °F (37.7 °C)] 99.7 °F (37.6 °C)  Pulse:  [] 107  Resp:  [9-67] 17  SpO2:  [85 %-100 %] 99 %  BP: (100-135)/(30-89) 135/72     Intake/Output - Last 3 Shifts       05/02 0700 - 05/03 0659 05/03 0700 - 05/04 0659 05/04 0700 - 05/05 0659    P.O.  1700     I.V. (mL/kg) 1000 (16.3)      IV Piggyback  800     Total Intake(mL/kg) 1000 (16.3) 2500 (40.8)     Urine (mL/kg/hr) 1820 (1.2) 900 (0.6)     Emesis/NG output  0 (0)     Other  0 (0)     Stool 555 (0.4) 275 (0.2)     Blood  0 (0)     Total Output 2375 1175      Net -1375 +1325             Urine Occurrence 2 x 4 x     Emesis Occurrence  0 x           Physical Exam   Constitutional: She is oriented to person, place, and time. She appears well-developed. No distress.   HENT:   Head: Normocephalic  and atraumatic.   Neck: Normal range of motion. Neck supple.   Cardiovascular: Normal rate and regular rhythm.    Pulmonary/Chest: Effort normal. No respiratory distress.   Abdominal: Soft. She exhibits no distension. There is tenderness (decreased from prior days).   Midline abdominal incision c/d/i. Ostomy pink and patent with formed stool ostomy output   Musculoskeletal: Normal range of motion.   Neurological: She is alert and oriented to person, place, and time.   Skin: Skin is warm and dry. She is not diaphoretic.   Nursing note and vitals reviewed.      Significant Labs:  BMP:     Recent Labs  Lab 05/04/18  0445   GLU 84   *   K 2.9*   CL 97   CO2 31*   BUN 3*   CREATININE 0.7   CALCIUM 8.5*   MG 1.1*     CBC:     Recent Labs  Lab 05/04/18  0445   WBC 7.69   RBC 2.78*   HGB 8.4*   HCT 25.4*      MCV 91   MCH 30.2   MCHC 33.1     CMP:   Recent Labs  Lab 04/30/18  1254  05/04/18  0445   GLU 87  < > 84   CALCIUM 10.2  < > 8.5*   ALBUMIN 3.8  --   --    PROT 8.2  --   --      < > 135*   K 4.4  < > 2.9*   CO2 18*  < > 31*     < > 97   BUN 17  < > 3*   CREATININE 1.3  < > 0.7   ALKPHOS 174*  --   --    ALT 27  --   --    AST 25  --   --    BILITOT 1.1*  --   --    < > = values in this interval not displayed.  CRP:   No results for input(s): CRP in the last 168 hours.  LFTs:     Recent Labs  Lab 04/30/18  1254   ALT 27   AST 25   ALKPHOS 174*   BILITOT 1.1*   PROT 8.2   ALBUMIN 3.8

## 2018-05-04 NOTE — PROGRESS NOTES
Feeling much better today .  Has ambulated and emptied her pouch.  Has supplies at the bedside for her ostomy care. Noted output is much thicker today. Offered to change her appliance , but pt refused as all is intact.   Denies any other needs at this time.   Danika Brown RN CWON  [id number]

## 2018-05-04 NOTE — PROGRESS NOTES
[facility]  Colorectal Surgery  Progress Note    Patient Name: Carline Chang  MRN: [id number]  Admission Date: 4/30/2018  Hospital Length of Stay: 4 days  Attending Physician: Andre Uribe MD    Subjective:     Interval History: s/p ex lap with small bowel resection on 4/30/2018   No acute events overnight. Continued mild tachycardia. Pain is now controlled with PO medication and IV break through. Low residue diet tolerated with no nausea or vomiting. Adequate UOP. Ambulating.  Formed stool in ostomy (275cc/24 hours)    Post-Op Info:  Procedure(s) (LRB):  EXPLORATORY-LAPAROTOMY, small bowel resection, possible ostomy (N/A)  RESECTION-SMALL BOWEL (N/A)  ILEOSTOMY (Left)   4 Days Post-Op      Medications:  Continuous Infusions:   glucagon (human recombinant)       Scheduled Meds:   enoxaparin  40 mg Subcutaneous Daily    ibuprofen  800 mg Intravenous Q8H     PRN Meds:   dextrose 50%    dextrose 50%    dextrose 50%    glucagon (human recombinant)    glucose    glucose    glucose    HYDROmorphone    naloxone    ondansetron    oxyCODONE    oxyCODONE    promethazine (PHENERGAN) IVPB    simethicone        Objective:     Vital Signs (Most Recent):  Temp: 99.7 °F (37.6 °C) (05/04/18 0819)  Pulse: 107 (05/04/18 0819)  Resp: 17 (05/04/18 0819)  BP: 135/72 (05/04/18 0819)  SpO2: 99 % (05/04/18 0819) Vital Signs (24h Range):  Temp:  [98.7 °F (37.1 °C)-99.8 °F (37.7 °C)] 99.7 °F (37.6 °C)  Pulse:  [] 107  Resp:  [9-67] 17  SpO2:  [85 %-100 %] 99 %  BP: (100-135)/(30-89) 135/72     Intake/Output - Last 3 Shifts       05/02 0700 - 05/03 0659 05/03 0700 - 05/04 0659 05/04 0700 - 05/05 0659    P.O.  1700     I.V. (mL/kg) 1000 (16.3)      IV Piggyback  800     Total Intake(mL/kg) 1000 (16.3) 2500 (40.8)     Urine (mL/kg/hr) 1820 (1.2) 900 (0.6)     Emesis/NG output  0 (0)     Other  0 (0)     Stool 555 (0.4) 275 (0.2)     Blood  0 (0)     Total Output 2375 1175      Net -1375  +1325             Urine Occurrence 2 x 4 x     Emesis Occurrence  0 x           Physical Exam   Constitutional: She is oriented to person, place, and time. She appears well-developed. No distress.   HENT:   Head: Normocephalic and atraumatic.   Neck: Normal range of motion. Neck supple.   Cardiovascular: Normal rate and regular rhythm.    Pulmonary/Chest: Effort normal. No respiratory distress.   Abdominal: Soft. She exhibits no distension. There is tenderness (decreased from prior days).   Midline abdominal incision c/d/i. Ostomy pink and patent with formed stool ostomy output   Musculoskeletal: Normal range of motion.   Neurological: She is alert and oriented to person, place, and time.   Skin: Skin is warm and dry. She is not diaphoretic.   Nursing note and vitals reviewed.      Significant Labs:  BMP:     Recent Labs  Lab 05/04/18  0445   GLU 84   *   K 2.9*   CL 97   CO2 31*   BUN 3*   CREATININE 0.7   CALCIUM 8.5*   MG 1.1*     CBC:     Recent Labs  Lab 05/04/18  0445   WBC 7.69   RBC 2.78*   HGB 8.4*   HCT 25.4*      MCV 91   MCH 30.2   MCHC 33.1     CMP:   Recent Labs  Lab 04/30/18  1254  05/04/18  0445   GLU 87  < > 84   CALCIUM 10.2  < > 8.5*   ALBUMIN 3.8  --   --    PROT 8.2  --   --      < > 135*   K 4.4  < > 2.9*   CO2 18*  < > 31*     < > 97   BUN 17  < > 3*   CREATININE 1.3  < > 0.7   ALKPHOS 174*  --   --    ALT 27  --   --    AST 25  --   --    BILITOT 1.1*  --   --    < > = values in this interval not displayed.  CRP:   No results for input(s): CRP in the last 168 hours.  LFTs:     Recent Labs  Lab 04/30/18  1254   ALT 27   AST 25   ALKPHOS 174*   BILITOT 1.1*   PROT 8.2   ALBUMIN 3.8         Assessment/Plan:     Crohn disease    S/p Exploratory laparotomy, lysis of adhesions, a previous ileocolic resection anastomosis and distal small bowel resection with a primary anastomosis.    -Low residue diet  -Oxy IR with IV ibuprofen and breakthrough IV dilaudid.   -Daily labs  -CBC WNL, BMP pending  -Encourage ambulation and OOBTC  -Lovenox ppx  -Ostomy teaching going well. Will continue     Dispo: If she continues to improve and is able to stop taking IV pain medications, possible discharge over the weekend.               Niyah Miramontes MD  Colorectal Surgery  [facility]    Have seen and examined the patient with the fellow and agree with their plan.  ESPERANZA MADSEN

## 2018-05-04 NOTE — ASSESSMENT & PLAN NOTE
S/p Exploratory laparotomy, lysis of adhesions, a previous ileocolic resection anastomosis and distal small bowel resection with a primary anastomosis.    -Low residue diet  -Oxy IR with IV ibuprofen and breakthrough IV dilaudid.   -Daily labs -CBC WNL, BMP pending  -Encourage ambulation and OOBTC  -Lovenox ppx  -Ostomy teaching going well. Will continue     Dispo: If she continues to improve and is able to stop taking IV pain medications, possible discharge over the weekend.

## 2018-05-04 NOTE — PLAN OF CARE
Problem: Patient Care Overview  Goal: Plan of Care Review  Outcome: Ongoing (interventions implemented as appropriate)  Pt AAOx4. Pt tolerating low fiber/residue diet with no complaints of discomfort or nausea. Pain managed with PRN oxy IR Q3 & Dilaudid Q6 . Pt on tele, low ST, all other VSS on RA. Pt ambulates independently to the bathroom. Pt slpet between care. Call light in reach and bed in lowest position. Pt remains free of falls and injury. No acute events this shift. Will continue to monitor.

## 2018-05-05 VITALS
WEIGHT: 135 LBS | OXYGEN SATURATION: 96 % | SYSTOLIC BLOOD PRESSURE: 117 MMHG | HEART RATE: 88 BPM | BODY MASS INDEX: 20.46 KG/M2 | RESPIRATION RATE: 16 BRPM | TEMPERATURE: 96 F | HEIGHT: 68 IN | DIASTOLIC BLOOD PRESSURE: 70 MMHG

## 2018-05-05 LAB
ANION GAP SERPL CALC-SCNC: 6 MMOL/L
BASOPHILS # BLD AUTO: 0.02 K/UL
BASOPHILS NFR BLD: 0.3 %
BUN SERPL-MCNC: 5 MG/DL
CALCIUM SERPL-MCNC: 8.2 MG/DL
CHLORIDE SERPL-SCNC: 100 MMOL/L
CO2 SERPL-SCNC: 31 MMOL/L
CREAT SERPL-MCNC: 0.7 MG/DL
DIFFERENTIAL METHOD: ABNORMAL
EOSINOPHIL # BLD AUTO: 0.3 K/UL
EOSINOPHIL NFR BLD: 5.2 %
ERYTHROCYTE [DISTWIDTH] IN BLOOD BY AUTOMATED COUNT: 15.9 %
EST. GFR  (AFRICAN AMERICAN): >60 ML/MIN/1.73 M^2
EST. GFR  (NON AFRICAN AMERICAN): >60 ML/MIN/1.73 M^2
GLUCOSE SERPL-MCNC: 93 MG/DL
HCT VFR BLD AUTO: 22.6 %
HGB BLD-MCNC: 7.4 G/DL
IMM GRANULOCYTES # BLD AUTO: 0.03 K/UL
IMM GRANULOCYTES NFR BLD AUTO: 0.5 %
LYMPHOCYTES # BLD AUTO: 0.5 K/UL
LYMPHOCYTES NFR BLD: 7.9 %
MAGNESIUM SERPL-MCNC: 1.2 MG/DL
MCH RBC QN AUTO: 30.1 PG
MCHC RBC AUTO-ENTMCNC: 32.7 G/DL
MCV RBC AUTO: 92 FL
MONOCYTES # BLD AUTO: 0.6 K/UL
MONOCYTES NFR BLD: 9.2 %
NEUTROPHILS # BLD AUTO: 4.8 K/UL
NEUTROPHILS NFR BLD: 76.9 %
NRBC BLD-RTO: 0 /100 WBC
PHOSPHATE SERPL-MCNC: 3.6 MG/DL
PLATELET # BLD AUTO: 208 K/UL
PMV BLD AUTO: 9 FL
POTASSIUM SERPL-SCNC: 3.5 MMOL/L
RBC # BLD AUTO: 2.46 M/UL
SODIUM SERPL-SCNC: 137 MMOL/L
WBC # BLD AUTO: 6.21 K/UL

## 2018-05-05 PROCEDURE — 84100 ASSAY OF PHOSPHORUS: CPT

## 2018-05-05 PROCEDURE — 83735 ASSAY OF MAGNESIUM: CPT

## 2018-05-05 PROCEDURE — 36415 COLL VENOUS BLD VENIPUNCTURE: CPT

## 2018-05-05 PROCEDURE — 85025 COMPLETE CBC W/AUTO DIFF WBC: CPT

## 2018-05-05 PROCEDURE — 25000003 PHARM REV CODE 250: Performed by: NURSE PRACTITIONER

## 2018-05-05 PROCEDURE — 80048 BASIC METABOLIC PNL TOTAL CA: CPT

## 2018-05-05 PROCEDURE — 63600175 PHARM REV CODE 636 W HCPCS: Performed by: NURSE PRACTITIONER

## 2018-05-05 RX ORDER — HYDROMORPHONE HYDROCHLORIDE 1 MG/ML
1 INJECTION, SOLUTION INTRAMUSCULAR; INTRAVENOUS; SUBCUTANEOUS EVERY 6 HOURS PRN
Status: DISCONTINUED | OUTPATIENT
Start: 2018-05-05 | End: 2018-05-05 | Stop reason: HOSPADM

## 2018-05-05 RX ORDER — OXYCODONE HYDROCHLORIDE 15 MG/1
15 TABLET ORAL EVERY 6 HOURS PRN
Qty: 40 TABLET | Refills: 0 | Status: SHIPPED | OUTPATIENT
Start: 2018-05-05 | End: 2018-05-31

## 2018-05-05 RX ORDER — ONDANSETRON 4 MG/1
4 TABLET, ORALLY DISINTEGRATING ORAL EVERY 6 HOURS PRN
Qty: 30 TABLET | Refills: 0 | Status: SHIPPED | OUTPATIENT
Start: 2018-05-05 | End: 2018-05-31

## 2018-05-05 RX ORDER — PROMETHAZINE HYDROCHLORIDE 12.5 MG/1
12.5 TABLET ORAL 4 TIMES DAILY
Qty: 30 TABLET | Refills: 0 | Status: SHIPPED | OUTPATIENT
Start: 2018-05-05 | End: 2018-05-24 | Stop reason: SDUPTHER

## 2018-05-05 RX ORDER — MAGNESIUM SULFATE HEPTAHYDRATE 40 MG/ML
2 INJECTION, SOLUTION INTRAVENOUS ONCE
Status: DISCONTINUED | OUTPATIENT
Start: 2018-05-05 | End: 2018-05-05 | Stop reason: HOSPADM

## 2018-05-05 RX ADMIN — OXYCODONE HYDROCHLORIDE 15 MG: 5 TABLET ORAL at 11:05

## 2018-05-05 RX ADMIN — IBUPROFEN 800 MG: 800 INJECTION INTRAVENOUS at 06:05

## 2018-05-05 RX ADMIN — OXYCODONE HYDROCHLORIDE 15 MG: 5 TABLET ORAL at 08:05

## 2018-05-05 RX ADMIN — OXYCODONE HYDROCHLORIDE 15 MG: 5 TABLET ORAL at 03:05

## 2018-05-05 RX ADMIN — Medication 2 MG: at 06:05

## 2018-05-05 RX ADMIN — OXYCODONE HYDROCHLORIDE 15 MG: 5 TABLET ORAL at 12:05

## 2018-05-05 NOTE — SUBJECTIVE & OBJECTIVE
Subjective:     Interval History: s/p ex lap with small bowel resection on 4/30/2018   No acute events overnight. Continued mild tachycardia. Pain is now controlled with PO medication and IV break through. Low residue diet tolerated with no nausea or vomiting. Adequate UOP. Ambulating.  Formed stool in ostomy (400cc/24 hours)    Post-Op Info:  Procedure(s) (LRB):  EXPLORATORY-LAPAROTOMY, small bowel resection, possible ostomy (N/A)  RESECTION-SMALL BOWEL (N/A)  ILEOSTOMY (Left)   5 Days Post-Op      Medications:  Continuous Infusions:   glucagon (human recombinant)       Scheduled Meds:   enoxaparin  40 mg Subcutaneous Daily    ibuprofen  800 mg Intravenous Q8H    magnesium sulfate IVPB  2 g Intravenous Once     PRN Meds:   dextrose 50%    dextrose 50%    dextrose 50%    glucagon (human recombinant)    glucose    glucose    glucose    HYDROmorphone    naloxone    ondansetron    oxyCODONE    oxyCODONE    promethazine (PHENERGAN) IVPB    simethicone        Objective:     Vital Signs (Most Recent):  Temp: 97 °F (36.1 °C) (05/05/18 0408)  Pulse: 92 (05/05/18 0408)  Resp: 18 (05/05/18 0408)  BP: (!) 104/51 (05/05/18 0408)  SpO2: 96 % (05/05/18 0408) Vital Signs (24h Range):  Temp:  [97 °F (36.1 °C)-99.7 °F (37.6 °C)] 97 °F (36.1 °C)  Pulse:  [] 92  Resp:  [13-28] 18  SpO2:  [85 %-100 %] 96 %  BP: (104-136)/(51-72) 104/51     Intake/Output - Last 3 Shifts       05/03 0700 - 05/04 0659 05/04 0700 - 05/05 0659 05/05 0700 - 05/06 0659    P.O. 1700      IV Piggyback 800      Total Intake(mL/kg) 2500 (40.8)      Urine (mL/kg/hr) 900 (0.6) 1200 (0.8)     Emesis/NG output 0 (0)      Other 0 (0)      Stool 275 (0.2) 400 (0.3)     Blood 0 (0)      Total Output 1175 1600      Net +1325 -1600             Urine Occurrence 4 x      Emesis Occurrence 0 x            Physical Exam   Constitutional: She is oriented to person, place, and time. She appears well-developed. No distress.   HENT:   Head: Normocephalic  and atraumatic.   Neck: Normal range of motion. Neck supple.   Cardiovascular: Normal rate and regular rhythm.    Pulmonary/Chest: Effort normal. No respiratory distress.   Abdominal: Soft. She exhibits no distension. There is tenderness (decreased from prior days).   Midline abdominal incision c/d/i. Ostomy pink and patent with formed stool ostomy output   Musculoskeletal: Normal range of motion.   Neurological: She is alert and oriented to person, place, and time.   Skin: Skin is warm and dry. She is not diaphoretic.   Nursing note and vitals reviewed.      Significant Labs:  BMP:     Recent Labs  Lab 05/05/18  0406   GLU 93      K 3.5      CO2 31*   BUN 5*   CREATININE 0.7   CALCIUM 8.2*   MG 1.2*     CBC:     Recent Labs  Lab 05/05/18  0406   WBC 6.21   RBC 2.46*   HGB 7.4*   HCT 22.6*      MCV 92   MCH 30.1   MCHC 32.7     CMP:   Recent Labs  Lab 04/30/18  1254  05/05/18  0406   GLU 87  < > 93   CALCIUM 10.2  < > 8.2*   ALBUMIN 3.8  --   --    PROT 8.2  --   --      < > 137   K 4.4  < > 3.5   CO2 18*  < > 31*     < > 100   BUN 17  < > 5*   CREATININE 1.3  < > 0.7   ALKPHOS 174*  --   --    ALT 27  --   --    AST 25  --   --    BILITOT 1.1*  --   --    < > = values in this interval not displayed.  CRP:   No results for input(s): CRP in the last 168 hours.  LFTs:     Recent Labs  Lab 04/30/18  1254   ALT 27   AST 25   ALKPHOS 174*   BILITOT 1.1*   PROT 8.2   ALBUMIN 3.8

## 2018-05-05 NOTE — PROGRESS NOTES
[facility]  Colorectal Surgery  Progress Note    Patient Name: Carline Chang  MRN: [id number]  Admission Date: 4/30/2018  Hospital Length of Stay: 5 days  Attending Physician: Andre Uribe MD    Subjective:     Interval History: s/p ex lap with small bowel resection on 4/30/2018   No acute events overnight. Continued mild tachycardia. Pain is now controlled with PO medication and IV break through. Low residue diet tolerated with no nausea or vomiting. Adequate UOP. Ambulating.  Formed stool in ostomy (400cc/24 hours)    Post-Op Info:  Procedure(s) (LRB):  EXPLORATORY-LAPAROTOMY, small bowel resection, possible ostomy (N/A)  RESECTION-SMALL BOWEL (N/A)  ILEOSTOMY (Left)   5 Days Post-Op      Medications:  Continuous Infusions:   glucagon (human recombinant)       Scheduled Meds:   enoxaparin  40 mg Subcutaneous Daily    ibuprofen  800 mg Intravenous Q8H    magnesium sulfate IVPB  2 g Intravenous Once     PRN Meds:   dextrose 50%    dextrose 50%    dextrose 50%    glucagon (human recombinant)    glucose    glucose    glucose    HYDROmorphone    naloxone    ondansetron    oxyCODONE    oxyCODONE    promethazine (PHENERGAN) IVPB    simethicone        Objective:     Vital Signs (Most Recent):  Temp: 97 °F (36.1 °C) (05/05/18 0408)  Pulse: 92 (05/05/18 0408)  Resp: 18 (05/05/18 0408)  BP: (!) 104/51 (05/05/18 0408)  SpO2: 96 % (05/05/18 0408) Vital Signs (24h Range):  Temp:  [97 °F (36.1 °C)-99.7 °F (37.6 °C)] 97 °F (36.1 °C)  Pulse:  [] 92  Resp:  [13-28] 18  SpO2:  [85 %-100 %] 96 %  BP: (104-136)/(51-72) 104/51     Intake/Output - Last 3 Shifts       05/03 0700 - 05/04 0659 05/04 0700 - 05/05 0659 05/05 0700 - 05/06 0659    P.O. 1700      IV Piggyback 800      Total Intake(mL/kg) 2500 (40.8)      Urine (mL/kg/hr) 900 (0.6) 1200 (0.8)     Emesis/NG output 0 (0)      Other 0 (0)      Stool 275 (0.2) 400 (0.3)     Blood 0 (0)      Total Output 1175 1600      Net  +1325 -1600             Urine Occurrence 4 x      Emesis Occurrence 0 x            Physical Exam   Constitutional: She is oriented to person, place, and time. She appears well-developed. No distress.   HENT:   Head: Normocephalic and atraumatic.   Neck: Normal range of motion. Neck supple.   Cardiovascular: Normal rate and regular rhythm.    Pulmonary/Chest: Effort normal. No respiratory distress.   Abdominal: Soft. She exhibits no distension. There is tenderness (decreased from prior days).   Midline abdominal incision c/d/i. Ostomy pink and patent with formed stool ostomy output   Musculoskeletal: Normal range of motion.   Neurological: She is alert and oriented to person, place, and time.   Skin: Skin is warm and dry. She is not diaphoretic.   Nursing note and vitals reviewed.      Significant Labs:  BMP:     Recent Labs  Lab 05/05/18  0406   GLU 93      K 3.5      CO2 31*   BUN 5*   CREATININE 0.7   CALCIUM 8.2*   MG 1.2*     CBC:     Recent Labs  Lab 05/05/18  0406   WBC 6.21   RBC 2.46*   HGB 7.4*   HCT 22.6*      MCV 92   MCH 30.1   MCHC 32.7     CMP:   Recent Labs  Lab 04/30/18  1254  05/05/18  0406   GLU 87  < > 93   CALCIUM 10.2  < > 8.2*   ALBUMIN 3.8  --   --    PROT 8.2  --   --      < > 137   K 4.4  < > 3.5   CO2 18*  < > 31*     < > 100   BUN 17  < > 5*   CREATININE 1.3  < > 0.7   ALKPHOS 174*  --   --    ALT 27  --   --    AST 25  --   --    BILITOT 1.1*  --   --    < > = values in this interval not displayed.  CRP:   No results for input(s): CRP in the last 168 hours.  LFTs:     Recent Labs  Lab 04/30/18  1254   ALT 27   AST 25   ALKPHOS 174*   BILITOT 1.1*   PROT 8.2   ALBUMIN 3.8         Assessment/Plan:     Crohn disease    S/p Exploratory laparotomy, lysis of adhesions, a previous ileocolic resection anastomosis and distal small bowel resection with a primary anastomosis.    -Low residue diet  -Oxy IR with IV ibuprofen and breakthrough IV dilaudid- will attempt to  decrease   -Daily labs stable. Potassium WNL, Mag low will replete  -Encourage ambulation and OOBTC  -Lovenox ppx  -Ostomy teaching going well. Will continue     Dispo: If she continues to improve and is able to stop taking IV pain medications, possible discharge over the weekend.               Niyah Miramontes MD  Colorectal Surgery  [facility]

## 2018-05-05 NOTE — PLAN OF CARE
Problem: Patient Care Overview  Goal: Plan of Care Review  Outcome: Revised  Care plan reviewed and understood by patient. Resp rate even and unlabored. VSS. Colostomy intact and patent. Patient tolerating diet with reports of nausea, pheneran IVPB given. Spoke  to on call MD regarding potassium 2.9 on redraw. Orders to follow. Patient remain free of falls. No acute pain or distress noted. Will continue to monitor.

## 2018-05-05 NOTE — PLAN OF CARE
Problem: Patient Care Overview  Goal: Plan of Care Review  Outcome: Ongoing (interventions implemented as appropriate)  Plan of care reviewed, patient verbalized understanding. VSS, AAOx4. PRN Dilaudid and Oxycodone were given for pain. Tolerating low fiber-residue diet. LLQ colostomy is intact with adequate output. Abdominal midline incision is C/D/I and is PRAFUL with Dermabond. Free from falls/injuries, no acute distress noted. Will continue to monitor.

## 2018-05-05 NOTE — PROGRESS NOTES
Patient disharged to home. Discharge instructions given verbally and hard copy provided to patient. Hard copy prescription given to patient. Patient educated to not drive while on pain medication. Pt remains free of falls. No acute pain or distress noted.  .

## 2018-05-05 NOTE — ASSESSMENT & PLAN NOTE
S/p Exploratory laparotomy, lysis of adhesions, a previous ileocolic resection anastomosis and distal small bowel resection with a primary anastomosis.    -Low residue diet  -Oxy IR with IV ibuprofen and breakthrough IV dilaudid- will attempt to decrease   -Daily labs stable. Potassium WNL, Mag low will replete  -Encourage ambulation and OOBTC  -Lovenox ppx  -Ostomy teaching going well. Will continue     Dispo: If she continues to improve and is able to stop taking IV pain medications, possible discharge over the weekend.

## 2018-05-06 NOTE — HPI
Patient is a 52 y.o. female patient well known to this practice with history of fistulizing Crohn's disease diagnosed in 1986 (age 21) with history of multiple surgeries including SB resections (1995), ileocolonic resection (2/2009-partial colectomy-3 cm, SB resection-11 cm), sigmoid loop colostomy (6/2015), and most recently colostomy revision & rectal vaginal fistula repair (1/2018).      She was most recently hospitalized for Crohn's disease in Feb of this year for a flare. At that time, she underwent a MRE which showed some active crohn's inflammation and a stricture of the SB with upstream dilation and a scope via her colostomy by Dr. Serrato - there were some ulcerations of the neoterminal ileum but visualization was poor due to stool burden.      She saw Dr. Serrato in clinic ~2 weeks ago and she was started on Prednisone 40mg daily. This has been tapered to 30mg daily. She had improvement in symptoms with the steroids. She is still having high colostomy output - reports emptying the bag 15 times a day. She is taking imodium. No nausea or vomiting. She continues to have abdominal pain - though this is better with the steroids.      They had a discussion re: possible surgical intervention vs restarting biologics. Given her persistent symptoms, surgery was decided on after further discussion with Dr. Uribe.      Most recent imaging/scopes --> (from Dr. Serrato's clinic note)  12/4/2017 Flex sig: anal stricture, normal mucosa in the rectum (no path)  2/2018 Colonoscopy: minimal inflammation. Biopsies normal   2/3/2018 CT abd/pelvis: Postoperative changes from ileocolonic resection and descending colostomy for repair of a rectovaginal fistula; Interval development of free air adjacent to the proximal colonic anastomosis within the right lower quadrant. Findings concerning for potential anastomotic leak; mild distention of the distal ileum with luis-ileal inflammation and edema of the adjacent mesentery    2/4/2018 CXR:  no focal consolidation  2/4/2018 MRE: Small bowel and colonic disease  2/21/2018 KUB: Linear collection of gas along the lateral margin of the cecum/ascending colon  suspicious for small collection of extraluminal air. This has apparently been  demonstrated on outside imaging which is not available for direct comparison.  Clinical/imaging follow-up are recommended. 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

## 2018-05-06 NOTE — HOSPITAL COURSE
Patient admitted for exploratory laparotomy with small bowel resection and ileostomy creation on 4/30/2018. Patient tolerate the procedure well. See operative note for more details. Patient's post operative coarse was uneventful. Patient was transitioned from PCA pain medication to PO pain medication on post operative day 2. Her diet was advanced as tolerated and she left on low residue diet. Ostomy function achieved on POD#2. Prior to discharge patient was educated on ilesomy home care and all questions and concerns were addressed. She was discharge home on POD#5 in good condition.

## 2018-05-06 NOTE — DISCHARGE SUMMARY
[facility]  Colorectal Surgery  Discharge Summary      Patient Name: Carline Chang  MRN: [id number]  Admission Date: 4/30/2018  Hospital Length of Stay: 5 days  Discharge Date and Time:  05/05/2018 11:00  Attending Physician: Andre Uribe MD   Discharging Provider: Niyah Miramontes MD  Primary Care Provider: Pablo Medina MD     HPI:  Patient is a 52 y.o. female patient well known to this practice with history of fistulizing Crohn's disease diagnosed in 1986 (age 21) with history of multiple surgeries including SB resections (1995), ileocolonic resection (2/2009-partial colectomy-3 cm, SB resection-11 cm), sigmoid loop colostomy (6/2015), and most recently colostomy revision & rectal vaginal fistula repair (1/2018).      She was most recently hospitalized for Crohn's disease in Feb of this year for a flare. At that time, she underwent a MRE which showed some active crohn's inflammation and a stricture of the SB with upstream dilation and a scope via her colostomy by Dr. Serrato - there were some ulcerations of the neoterminal ileum but visualization was poor due to stool burden.      She saw Dr. Serrato in clinic ~2 weeks ago and she was started on Prednisone 40mg daily. This has been tapered to 30mg daily. She had improvement in symptoms with the steroids. She is still having high colostomy output - reports emptying the bag 15 times a day. She is taking imodium. No nausea or vomiting. She continues to have abdominal pain - though this is better with the steroids.      They had a discussion re: possible surgical intervention vs restarting biologics. Given her persistent symptoms, surgery was decided on after further discussion with Dr. Uribe.      Most recent imaging/scopes --> (from Dr. Serrato's clinic note)  12/4/2017 Flex sig: anal stricture, normal mucosa in the rectum (no path)  2/2018 Colonoscopy: minimal inflammation. Biopsies normal   2/3/2018 CT abd/pelvis: Postoperative  changes from ileocolonic resection and descending colostomy for repair of a rectovaginal fistula; Interval development of free air adjacent to the proximal colonic anastomosis within the right lower quadrant. Findings concerning for potential anastomotic leak; mild distention of the distal ileum with luis-ileal inflammation and edema of the adjacent mesentery    2/4/2018 CXR: no focal consolidation  2/4/2018 MRE: Small bowel and colonic disease  2/21/2018 KUB: Linear collection of gas along the lateral margin of the cecum/ascending colon  suspicious for small collection of extraluminal air. This has apparently been  demonstrated on outside imaging which is not available for direct comparison.  Clinical/imaging follow-up are recommended. 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     Procedure(s) (LRB):  EXPLORATORY-LAPAROTOMY, small bowel resection, possible ostomy (N/A)  RESECTION-SMALL BOWEL (N/A)  ILEOSTOMY (Left)     Hospital Course:  Patient admitted for exploratory laparotomy with small bowel resection and ileostomy creation on 4/30/2018. Patient tolerate the procedure well. See operative note for more details. Patient's post operative coarse was uneventful. Patient was transitioned from PCA pain medication to PO pain medication on post operative day 2. Her diet was advanced as tolerated and she left on low  residue diet. Ostomy function achieved on POD#2. Prior to discharge patient was educated on ilesomy home care and all questions and concerns were addressed. She was discharge home on POD#5 in good condition.    Consults         Status Ordering Provider     Inpatient consult to Midline team  Once     Provider:  (Not yet assigned)    Completed ANDRE URIBE     Inpatient consult to Midline team  Once     Provider:  (Not yet assigned)    Completed PREET VALDEZ          Significant Diagnostic Studies: Labs:   BMP:   Recent Labs  Lab 05/04/18  1553 05/05/18  0406   * 93   * 137   K 2.9* 3.5   CL 96 100   CO2 29 31*   BUN 3* 5*   CREATININE 0.7 0.7   CALCIUM 8.4* 8.2*   MG 1.3* 1.2*   , CMP   Recent Labs  Lab 05/04/18  1553 05/05/18  0406   * 137   K 2.9* 3.5   CL 96 100   CO2 29 31*   * 93   BUN 3* 5*   CREATININE 0.7 0.7   CALCIUM 8.4* 8.2*   ANIONGAP 10 6*   ESTGFRAFRICA >60.0 >60.0   EGFRNONAA >60.0 >60.0   , CBC   Recent Labs  Lab 05/05/18  0406   WBC 6.21   HGB 7.4*   HCT 22.6*       and INR   Lab Results   Component Value Date    INR 1.2 03/18/2018    INR 1.2 02/03/2018    INR 1.0 01/17/2017       Pending Diagnostic Studies:     None        Final Active Diagnoses:    Diagnosis Date Noted POA    PRINCIPAL PROBLEM:  Crohn disease [K50.90] 04/30/2018 Yes      Problems Resolved During this Admission:    Diagnosis Date Noted Date Resolved POA      Discharged Condition: good    Disposition: Home or Self Care    Follow Up:  Follow-up Information     Andre Uribe MD In 2 weeks.    Specialty:  Colon and Rectal Surgery  Contact information:  [address] [place] [id number] [phone]                 Patient Instructions:     Activity as tolerated     Lifting restrictions   Order Comments: No heavy lifting (>10lbs) for 6 weeks after surgery.     Other restrictions (specify):   Order Comments: Okay to take a shower. Do not soak/submerge incision (aka no bathing,  swimming)until cleared in clinic.    Continue ostomy care at home. Empty bag and be conscious of output amount. If you notice that output is high, make sure you are staying hydrate by drinking lots of water/fluids. If you continue to have high output call the clinic because you may need to be seen or given IV hydration for dehydration     Notify your health care provider if you experience any of the following:  increased confusion or weakness     Notify your health care provider if you experience any of the following:  persistent dizziness, light-headedness, or visual disturbances     Notify your health care provider if you experience any of the following:  worsening rash     Notify your health care provider if you experience any of the following:  severe persistent headache     Notify your health care provider if you experience any of the following:  difficulty breathing or increased cough     Notify your health care provider if you experience any of the following:  redness, tenderness, or signs of infection (pain, swelling, redness, odor or green/yellow discharge around incision site)     Notify your health care provider if you experience any of the following:  severe uncontrolled pain     Notify your health care provider if you experience any of the following:  persistent nausea and vomiting or diarrhea     Notify your health care provider if you experience any of the following:  temperature >100.4     No dressing needed       Medications:  Reconciled Home Medications:      Medication List      START taking these medications    ondansetron 4 MG Tbdl  Commonly known as:  ZOFRAN-ODT  Take 1 tablet (4 mg total) by mouth every 6 (six) hours as needed.     oxyCODONE 15 MG Tab  Commonly known as:  ROXICODONE  Take 1 tablet (15 mg total) by mouth every 6 (six) hours as needed.     promethazine 12.5 MG Tab  Commonly known as:  PHENERGAN  Take 1 tablet (12.5 mg total) by mouth 4 (four) times daily.        CONTINUE taking  these medications    azaTHIOprine 50 mg Tab  Commonly known as:  IMURAN  Take 1 tablet (50 mg total) by mouth once daily.     ergocalciferol 50,000 unit Cap  Commonly known as:  ERGOCALCIFEROL  Take 1 capsule (50,000 Units total) by mouth every 7 days.     IMODIUM A-D 1 mg/7.5 mL Liqd  Generic drug:  loperamide  Take 0.1 mg/kg by mouth every 12 (twelve) hours.     potassium chloride 20 mEq Pack  Commonly known as:  KLOR-CON  Take 20 mEq by mouth 4 (four) times daily.     predniSONE 20 MG tablet  Commonly known as:  DELTASONE  Take 20 mg by mouth once daily.     SLOW-MAG ORAL  Take by mouth once daily.     venlafaxine 37.5 MG Tab  Commonly known as:  EFFEXOR  Take 1 tablet (37.5 mg total) by mouth 2 (two) times daily.        STOP taking these medications    hydrocodone-acetaminophen 10-325mg  mg Tab  Commonly known as:  NORCO     metroNIDAZOLE 500 MG tablet  Commonly known as:  FLAGYL     neomycin 500 mg Tab  Commonly known as:  MYCIFRADIN            Niyah Miramontes MD  Colorectal Surgery  [facility]  Have seen and examined the patient with the fellow and agree with their plan.  ESPERANZA MADSEN

## 2018-05-07 ENCOUNTER — PATIENT MESSAGE (OUTPATIENT)
Dept: SURGERY | Facility: CLINIC | Age: 53
End: 2018-05-07

## 2018-05-07 NOTE — PHYSICIAN QUERY
PT Name: Carline Chang  MR #: [id number]     Physician Query Form - Documentation Clarification      CDS/: Brandy E Capley               Contact information:  [email or web address]/ Spectralink:  [phone]   Disregard query (answered 5/3)   This form is a permanent document in the medical record.     Query Date: May 7, 2018    By submitting this query, we are merely seeking further clarification of documentation. Please utilize your independent clinical judgment when addressing the question(s) below.    The Medical record reflects the following:    Supporting Clinical Findings Location in Medical Record     Phosphorus= 2.5     Labs 5/3     potassium phosphate 30 mmol in dextrose 5 % 500 mL infusion  :  Dose 30 mmol  :  83.3 mL/hr  :  Intravenous  :  Once     potassium, sodium phosphates 280-160-250 mg packet 2 packet  :  Dose 2 packet  :  Oral  :  Once       MAR 5/3    MAR 5/3                                                                            Doctor, Please specify diagnosis or diagnoses associated with above clinical findings.    Provider Use Only       (  )  Hypophosphatemia     (  )  Other (please specify):  __________________________                                                                                                               [  ] Clinically undetermined

## 2018-05-07 NOTE — PHYSICIAN QUERY
PT Name: Carline Chang  MR #: [id number]     Physician Query Form - Documentation Clarification      CDS/: Brandy E Capley               Contact information: [email or web address]:  [phone]    This form is a permanent document in the medical record.     Query Date: May 7, 2018    By submitting this query, we are merely seeking further clarification of documentation. Please utilize your independent clinical judgment when addressing the question(s) below.    The Medical record reflects the following:    Supporting Clinical Findings Location in Medical Record     Magnesium= 1.4 --> 1.1     Labs 5/2 --> 5/4     magnesium oxide tablet 400 mg  :  Dose 400 mg  :  Oral  :  2 times daily    magnesium sulfate 1 g in dextrose 5 % 50 mL IVPB  :  Dose 1 g  :  50 mL/hr  :  Intravenous  :  Once     MAR 5/2    MAR 5/4                                                                            Doctor, Please specify diagnosis or diagnoses associated with above clinical findings.    Provider Use Only       (x  Hypomagnesemia     (  )  Other (please specify):  ___________________________________                                                                                                             [  ] Clinically undetermined

## 2018-05-07 NOTE — PHYSICIAN QUERY
PT Name: Carline Chang  MR #: [id number]     Physician Query Form - Documentation Clarification      CDS/: Brandy E Capley               Contact information:  [email or web address]/ [email or web address]:  [phone]   Disregard query (answered 5/3)    This form is a permanent document in the medical record.     Query Date: May 7, 2018    By submitting this query, we are merely seeking further clarification of documentation. Please utilize your independent clinical judgment when addressing the question(s) below.    The Medical record reflects the following:    Supporting Clinical Findings Location in Medical Record     Postassium= 3 --> 2.9     Labs 5/3 67202 --> 5/4 0445     potassium chloride 10 mEq in 100 mL IVPB  :  Dose 10 mEq  :  100 mL/hr  :  Intravenous  :  Every hour  X 2     potassium chloride CR capsule 30 mEq  :  Dose 30 mEq  :  Oral  :  Once     potassium phosphate 30 mmol in dextrose 5 % 500 mL infusion  :  Dose 30 mmol  :  83.3 mL/hr  :  Intravenous  :  Once      potassium, sodium phosphates 280-160-250 mg packet 2 packet  :  Dose 2 packet  :  Oral  :  Once       MAR 5/4    MAR 5/4    MAR 5/3     MAR 5/3                                                                            Doctor, Please specify diagnosis or diagnoses associated with above clinical findings.    Provider Use Only       (  )  Hypokalemia     (  )  Other (please specify):  _______________________________                                                                                                             [  ] Clinically undetermined

## 2018-05-08 ENCOUNTER — LAB VISIT (OUTPATIENT)
Dept: LAB | Facility: HOSPITAL | Age: 53
End: 2018-05-08
Payer: MEDICARE

## 2018-05-08 ENCOUNTER — OFFICE VISIT (OUTPATIENT)
Dept: SURGERY | Facility: CLINIC | Age: 53
End: 2018-05-08
Payer: MEDICARE

## 2018-05-08 VITALS
DIASTOLIC BLOOD PRESSURE: 69 MMHG | SYSTOLIC BLOOD PRESSURE: 134 MMHG | BODY MASS INDEX: 19.75 KG/M2 | HEIGHT: 68 IN | WEIGHT: 130.31 LBS | HEART RATE: 97 BPM

## 2018-05-08 DIAGNOSIS — D62 ACUTE BLOOD LOSS ANEMIA: Primary | ICD-10-CM

## 2018-05-08 DIAGNOSIS — Z98.890 POSTOPERATIVE STATE: ICD-10-CM

## 2018-05-08 DIAGNOSIS — D62 ACUTE BLOOD LOSS ANEMIA: ICD-10-CM

## 2018-05-08 LAB
BASOPHILS # BLD AUTO: 0.02 K/UL
BASOPHILS NFR BLD: 0.3 %
DIFFERENTIAL METHOD: ABNORMAL
EOSINOPHIL # BLD AUTO: 0.5 K/UL
EOSINOPHIL NFR BLD: 6.6 %
ERYTHROCYTE [DISTWIDTH] IN BLOOD BY AUTOMATED COUNT: 15.1 %
HCT VFR BLD AUTO: 26.3 %
HGB BLD-MCNC: 8.7 G/DL
IMM GRANULOCYTES # BLD AUTO: 0.05 K/UL
IMM GRANULOCYTES NFR BLD AUTO: 0.7 %
LYMPHOCYTES # BLD AUTO: 0.4 K/UL
LYMPHOCYTES NFR BLD: 6 %
MCH RBC QN AUTO: 29.9 PG
MCHC RBC AUTO-ENTMCNC: 33.1 G/DL
MCV RBC AUTO: 90 FL
MONOCYTES # BLD AUTO: 0.5 K/UL
MONOCYTES NFR BLD: 6.4 %
NEUTROPHILS # BLD AUTO: 5.8 K/UL
NEUTROPHILS NFR BLD: 80 %
NRBC BLD-RTO: 0 /100 WBC
PLATELET # BLD AUTO: 302 K/UL
PMV BLD AUTO: 8.5 FL
RBC # BLD AUTO: 2.91 M/UL
WBC # BLD AUTO: 7.29 K/UL

## 2018-05-08 PROCEDURE — 99213 OFFICE O/P EST LOW 20 MIN: CPT | Mod: PBBFAC | Performed by: COLON & RECTAL SURGERY

## 2018-05-08 PROCEDURE — 99024 POSTOP FOLLOW-UP VISIT: CPT | Mod: POP,,, | Performed by: COLON & RECTAL SURGERY

## 2018-05-08 PROCEDURE — 85025 COMPLETE CBC W/AUTO DIFF WBC: CPT

## 2018-05-08 PROCEDURE — 36415 COLL VENOUS BLD VENIPUNCTURE: CPT

## 2018-05-08 PROCEDURE — 99999 PR PBB SHADOW E&M-EST. PATIENT-LVL III: CPT | Mod: PBBFAC,,, | Performed by: COLON & RECTAL SURGERY

## 2018-05-08 NOTE — PROGRESS NOTES
CRS Post-operative visit    Visit Info:     DATE OF PROCEDURE:  04/30/2018     PREOPERATIVE DIAGNOSES:  Intermittent small bowel obstruction.  Questions   Crohn's disease, questions adhesions.     POSTOPERATIVE DIAGNOSES:  Intermittent small bowel obstruction.  Questions   Crohn's disease, questions adhesions.     PROCEDURES:  Exploratory laparotomy, lysis of adhesions, a previous ileocolic   resection anastomosis and distal small bowel resection with a primary   anastomosis.        INDICATION:  Ms. Chang is a 52-year-old female with known Crohn's disease   who has an area on MR enterography that was not reachable by endoscopy, which   is consistent with either active Crohn's disease or adhesion disease resulting   in abdominal pain, bloating, nausea or vomiting.  FINAL PATHOLOGIC DIAGNOSIS  ILEUM AND SMALL BOWEL, RESECTION:  Severe chronic active enteritis with extensive ulceration, fistula tract formation, granulomas, consistent with patient's  clinical history of Crohn's disease  No evidence of dysplasia or malignancy  Current Status: Doing well postoperatively.  She is having some incisional pain but the pain that was present before surgery is gone   Physical Exam:  General: White female in NAD sitting in chair in clinic  Neuro: aaox4 maex4 perrl  Respiratory: resps even unlabored  Cardiac: cap refill <2 sec  Abdomen: Normal, benign. Incisions:clean, dry, intact        Assessment and Plan:  Diagnoses and all orders for this visit:    Acute blood loss anemia  -     CBC W/ AUTO DIFFERENTIAL; Future    Postoperative state    RTC 1 month

## 2018-05-10 ENCOUNTER — PATIENT MESSAGE (OUTPATIENT)
Dept: SURGERY | Facility: CLINIC | Age: 53
End: 2018-05-10

## 2018-05-11 RX ORDER — OXYCODONE HYDROCHLORIDE 10 MG/1
10 TABLET ORAL EVERY 6 HOURS PRN
Qty: 30 TABLET | Refills: 0 | Status: SHIPPED | OUTPATIENT
Start: 2018-05-11 | End: 2018-05-14 | Stop reason: SDUPTHER

## 2018-05-13 ENCOUNTER — PATIENT MESSAGE (OUTPATIENT)
Dept: SURGERY | Facility: CLINIC | Age: 53
End: 2018-05-13

## 2018-05-14 ENCOUNTER — PATIENT MESSAGE (OUTPATIENT)
Dept: SURGERY | Facility: CLINIC | Age: 53
End: 2018-05-14

## 2018-05-14 RX ORDER — OXYCODONE HYDROCHLORIDE 10 MG/1
10 TABLET ORAL EVERY 6 HOURS PRN
Qty: 30 TABLET | Refills: 0 | Status: SHIPPED | OUTPATIENT
Start: 2018-05-14 | End: 2018-05-31

## 2018-05-16 ENCOUNTER — PATIENT MESSAGE (OUTPATIENT)
Dept: SURGERY | Facility: CLINIC | Age: 53
End: 2018-05-16

## 2018-05-16 ENCOUNTER — TELEPHONE (OUTPATIENT)
Dept: SURGERY | Facility: CLINIC | Age: 53
End: 2018-05-16

## 2018-05-16 NOTE — TELEPHONE ENCOUNTER
----- Message from Iliana Gaston sent at 5/16/2018  4:47 PM CDT -----  Contact: pt#[phone]  Pt is calling to see her other options for getting the oxycodone Rx. Please call

## 2018-05-17 ENCOUNTER — PATIENT MESSAGE (OUTPATIENT)
Dept: SURGERY | Facility: CLINIC | Age: 53
End: 2018-05-17

## 2018-05-24 RX ORDER — PROMETHAZINE HYDROCHLORIDE 12.5 MG/1
12.5 TABLET ORAL 4 TIMES DAILY
Qty: 30 TABLET | Refills: 0 | Status: SHIPPED | OUTPATIENT
Start: 2018-05-24 | End: 2018-07-11

## 2018-05-24 NOTE — TELEPHONE ENCOUNTER
----- Message from Deidre Mcclure sent at 5/24/2018  1:13 PM CDT -----  Contact: SELF  NEEDS RX FOR PHENERGAN SENT TO HER PHARMACY.  THE SURGERY ON 4/30 WAS BRUTAL BECAUSE OF ALL THE STRICTURES AND SCAR TISSUE.   SHE HAD A SPOT OF CROHNS, EVERYTHING WAS SENT OFF TO [facility].  SHE IS STILL RECUPERATING.  SHE IS ON THE LIST FOR AN APPT.  WANTED YOU TO KNOW WHATS HAPPENING.  SHE IS GOING TO SEE DR FUENTES ON 6/12/18.    Last refill was 5/5/2018

## 2018-05-31 ENCOUNTER — PATIENT MESSAGE (OUTPATIENT)
Dept: SURGERY | Facility: CLINIC | Age: 53
End: 2018-05-31

## 2018-05-31 ENCOUNTER — OFFICE VISIT (OUTPATIENT)
Dept: SURGERY | Facility: CLINIC | Age: 53
End: 2018-05-31
Payer: MEDICARE

## 2018-05-31 ENCOUNTER — OFFICE VISIT (OUTPATIENT)
Dept: WOUND CARE | Facility: CLINIC | Age: 53
End: 2018-05-31
Payer: MEDICARE

## 2018-05-31 VITALS
SYSTOLIC BLOOD PRESSURE: 115 MMHG | DIASTOLIC BLOOD PRESSURE: 74 MMHG | WEIGHT: 123.69 LBS | HEART RATE: 91 BPM | BODY MASS INDEX: 18.81 KG/M2

## 2018-05-31 DIAGNOSIS — Z93.3 COLOSTOMY STATUS: Primary | ICD-10-CM

## 2018-05-31 DIAGNOSIS — K50.018 CROHN'S DISEASE OF SMALL INTESTINE WITH OTHER COMPLICATION: ICD-10-CM

## 2018-05-31 DIAGNOSIS — Z98.890 POSTOPERATIVE STATE: Primary | ICD-10-CM

## 2018-05-31 PROCEDURE — 99999 PR PBB SHADOW E&M-EST. PATIENT-LVL I: CPT | Mod: PBBFAC,,, | Performed by: CLINICAL NURSE SPECIALIST

## 2018-05-31 PROCEDURE — 99213 OFFICE O/P EST LOW 20 MIN: CPT | Mod: PBBFAC,27 | Performed by: COLON & RECTAL SURGERY

## 2018-05-31 PROCEDURE — 99024 POSTOP FOLLOW-UP VISIT: CPT | Mod: POP,,, | Performed by: CLINICAL NURSE SPECIALIST

## 2018-05-31 PROCEDURE — 99999 PR PBB SHADOW E&M-EST. PATIENT-LVL III: CPT | Mod: PBBFAC,,, | Performed by: COLON & RECTAL SURGERY

## 2018-05-31 PROCEDURE — 99024 POSTOP FOLLOW-UP VISIT: CPT | Mod: POP,,, | Performed by: COLON & RECTAL SURGERY

## 2018-05-31 PROCEDURE — 99211 OFF/OP EST MAY X REQ PHY/QHP: CPT | Mod: PBBFAC | Performed by: CLINICAL NURSE SPECIALIST

## 2018-05-31 RX ORDER — DIPHENOXYLATE HCL/ATROPINE 2.5-.025/5
10 LIQUID (ML) ORAL 4 TIMES DAILY
Qty: 1200 ML | Refills: 5 | Status: SHIPPED | OUTPATIENT
Start: 2018-05-31 | End: 2018-06-12

## 2018-05-31 NOTE — PROGRESS NOTES
CRS Post-operative visit    Visit Info:     DATE OF PROCEDURE:  04/30/2018     PREOPERATIVE DIAGNOSES:  Intermittent small bowel obstruction.  Questions   Crohn's disease, questions adhesions.     POSTOPERATIVE DIAGNOSES:  Intermittent small bowel obstruction.  Questions   Crohn's disease, questions adhesions.     PROCEDURES:  Exploratory laparotomy, lysis of adhesions, a previous ileocolic   resection anastomosis and distal small bowel resection with a primary   anastomosis.        INDICATION:  Ms. Chang is a 52-year-old female with known Crohn's disease   who has an area on MR enterography that was not reachable by endoscopy, which   is consistent with either active Crohn's disease or adhesion disease resulting   in abdominal pain, bloating, nausea or vomiting.  FINAL PATHOLOGIC DIAGNOSIS  ILEUM AND SMALL BOWEL, RESECTION:  Severe chronic active enteritis with extensive ulceration, fistula tract formation, granulomas, consistent with patient's  clinical history of Crohn's disease  No evidence of dysplasia or malignancy  Current Status: Doing well postoperatively.  She is having some incisional pain but the pain that was present before surgery is gone   Physical Exam:  General: White female in NAD sitting in chair in clinic  Neuro: aaox4 maex4 perrl  Respiratory: resps even unlabored  Cardiac: cap refill <2 sec  Abdomen: Normal, benign. Incisions:clean, dry, intact        Assessment and Plan:  Carline was seen today for post-op evaluation.    Diagnoses and all orders for this visit:    Postoperative state    Crohn's disease of small intestine with other complication    Other orders  -     diphenoxylate-atropine 2.5-0.025 mg/5 ml (LOMOTIL) 2.5-0.025 mg/5 mL liquid; Take 10 mLs by mouth 4 (four) times daily.    RTC after her meeting with Dr Serrato  To discsuss timing of stoma closure

## 2018-05-31 NOTE — PROGRESS NOTES
"Subjective:       Patient ID: Carline Chang is a 52 y.o. female.    Chief Complaint: Colostomy    Mariluz is well known to me and has had colostomy for 2 year now. She had a revision  2 January 2018 and so here for eval of stoma again  she has been in and out of hospital but today stable and feeling  Ok, just continues to have problems with pouching, she is wearing flat system and this is not my rec . Will make a change.      Review of Systems   Constitutional: Negative for activity change, appetite change and fever.   HENT: Negative for congestion and rhinorrhea.    Respiratory: Negative for cough and shortness of breath.    Cardiovascular: Negative for chest pain and leg swelling.   Gastrointestinal: Positive for diarrhea.        When she puts out it comes in "dump" pattern and she can barely get the pouch emptied in time, therefore she has pouch failures and frequent changes of late.    Genitourinary: Negative.    Musculoskeletal: Negative.    Skin: Positive for rash.   Neurological: Negative.    Psychiatric/Behavioral: Negative.        Objective:      Physical Exam   Constitutional: She is oriented to person, place, and time. She appears well-developed and well-nourished.   Pulmonary/Chest: Effort normal. No respiratory distress.   Abdominal: Soft. Bowel sounds are normal. She exhibits no distension.   Low budded newly revised colostomy , wears convatec 2pc  Moldable flat wafer   Musculoskeletal: Normal range of motion. She exhibits no edema.   Neurological: She is alert and oriented to person, place, and time.   Skin: Skin is warm and dry. Rash noted.   Psychiatric: She has a normal mood and affect.        colo stoma is 1"   Uses [employer] for supplier and I placed convex 2 piece system maribel das today , this should work better and have told her she must wear the belt,   She is running out of supplies too soon but I think because she is wearing flat and needs convex    Assessment:       1. " Colostomy status        Plan:       Needs convex samples, sent request to [employer]   Return as needed  I have reviewed the plan of care with the patient and she express understanding,   I spent over 50% of this 20 minute visit in face to face counseling.

## 2018-06-04 ENCOUNTER — PATIENT MESSAGE (OUTPATIENT)
Dept: WOUND CARE | Facility: CLINIC | Age: 53
End: 2018-06-04

## 2018-06-04 DIAGNOSIS — R19.7 DIARRHEA, UNSPECIFIED TYPE: ICD-10-CM

## 2018-06-04 DIAGNOSIS — E87.5 HYPERKALEMIA: ICD-10-CM

## 2018-06-04 DIAGNOSIS — K50.819 CROHN'S DISEASE OF SMALL AND LARGE INTESTINES WITH COMPLICATION: ICD-10-CM

## 2018-06-04 DIAGNOSIS — R11.0 NAUSEA: ICD-10-CM

## 2018-06-04 RX ORDER — POTASSIUM CHLORIDE 1.5 G/1.58G
40 POWDER, FOR SOLUTION ORAL 2 TIMES DAILY
Qty: 60 PACKET | Refills: 0 | Status: SHIPPED | OUTPATIENT
Start: 2018-06-04 | End: 2018-06-12 | Stop reason: CLARIF

## 2018-06-04 RX ORDER — HYDROCODONE BITARTRATE AND ACETAMINOPHEN 10; 325 MG/1; MG/1
1 TABLET ORAL EVERY 8 HOURS PRN
Qty: 60 TABLET | Refills: 0 | Status: SHIPPED | OUTPATIENT
Start: 2018-06-04 | End: 2018-06-28 | Stop reason: SDUPTHER

## 2018-06-04 RX ORDER — PROMETHAZINE HYDROCHLORIDE 12.5 MG/1
12.5 TABLET ORAL 4 TIMES DAILY
Qty: 30 TABLET | Refills: 0 | Status: CANCELLED | OUTPATIENT
Start: 2018-06-04

## 2018-06-04 RX ORDER — PROMETHAZINE HYDROCHLORIDE 25 MG/1
25 TABLET ORAL EVERY 8 HOURS PRN
Qty: 90 TABLET | Refills: 1 | Status: SHIPPED | OUTPATIENT
Start: 2018-06-04 | End: 2018-08-20 | Stop reason: SDUPTHER

## 2018-06-04 NOTE — TELEPHONE ENCOUNTER
----- Message from Deidre Mcclure sent at 6/4/2018  4:48 PM CDT -----  Contact: self  Pt would like a refill of phenergan and potassium.  Is there any way she could also get a small supply of norco for cramping?  Dr Uribe prescribed lomotil for her but it costs $750.  She left a message for dr swann office.  Is there something comparable that dr block could suggest for her?

## 2018-06-12 ENCOUNTER — OFFICE VISIT (OUTPATIENT)
Dept: GASTROENTEROLOGY | Facility: CLINIC | Age: 53
End: 2018-06-12
Payer: MEDICARE

## 2018-06-12 ENCOUNTER — LAB VISIT (OUTPATIENT)
Dept: LAB | Facility: HOSPITAL | Age: 53
End: 2018-06-12
Attending: INTERNAL MEDICINE
Payer: MEDICARE

## 2018-06-12 ENCOUNTER — CLINICAL SUPPORT (OUTPATIENT)
Dept: INFECTIOUS DISEASES | Facility: CLINIC | Age: 53
End: 2018-06-12
Payer: MEDICARE

## 2018-06-12 VITALS
TEMPERATURE: 98 F | WEIGHT: 124.75 LBS | SYSTOLIC BLOOD PRESSURE: 115 MMHG | BODY MASS INDEX: 18.91 KG/M2 | DIASTOLIC BLOOD PRESSURE: 78 MMHG | HEART RATE: 98 BPM | HEIGHT: 68 IN

## 2018-06-12 DIAGNOSIS — K50.813 CROHN'S DISEASE OF BOTH SMALL AND LARGE INTESTINE WITH FISTULA: Primary | ICD-10-CM

## 2018-06-12 DIAGNOSIS — R10.9 CHRONIC ABDOMINAL PAIN: ICD-10-CM

## 2018-06-12 DIAGNOSIS — R19.7 DIARRHEA, UNSPECIFIED TYPE: ICD-10-CM

## 2018-06-12 DIAGNOSIS — Z23 NEED FOR PNEUMOCOCCAL VACCINATION: ICD-10-CM

## 2018-06-12 DIAGNOSIS — N21.9 CALCULUS OF LOWER URINARY TRACT: ICD-10-CM

## 2018-06-12 DIAGNOSIS — Z79.60 LONG-TERM USE OF IMMUNOSUPPRESSANT MEDICATION: ICD-10-CM

## 2018-06-12 DIAGNOSIS — F11.90 CHRONIC NARCOTIC USE: ICD-10-CM

## 2018-06-12 DIAGNOSIS — Z71.85 VACCINE COUNSELING: ICD-10-CM

## 2018-06-12 DIAGNOSIS — K50.812 CROHN'S DISEASE OF BOTH SMALL AND LARGE INTESTINE WITH INTESTINAL OBSTRUCTION: Primary | ICD-10-CM

## 2018-06-12 DIAGNOSIS — Z23 NEED FOR HEPATITIS A AND B VACCINATION: ICD-10-CM

## 2018-06-12 DIAGNOSIS — G89.29 CHRONIC ABDOMINAL PAIN: ICD-10-CM

## 2018-06-12 LAB
C DIFF GDH STL QL: NEGATIVE
C DIFF TOX A+B STL QL IA: NEGATIVE

## 2018-06-12 PROCEDURE — 99214 OFFICE O/P EST MOD 30 MIN: CPT | Mod: PBBFAC,27,25 | Performed by: INTERNAL MEDICINE

## 2018-06-12 PROCEDURE — G0009 ADMIN PNEUMOCOCCAL VACCINE: HCPCS | Mod: PBBFAC

## 2018-06-12 PROCEDURE — 87449 NOS EACH ORGANISM AG IA: CPT

## 2018-06-12 PROCEDURE — 90471 IMMUNIZATION ADMIN: CPT | Mod: PBBFAC

## 2018-06-12 PROCEDURE — 99211 OFF/OP EST MAY X REQ PHY/QHP: CPT | Mod: PBBFAC,25

## 2018-06-12 PROCEDURE — 99215 OFFICE O/P EST HI 40 MIN: CPT | Mod: S$PBB,,, | Performed by: INTERNAL MEDICINE

## 2018-06-12 PROCEDURE — 99999 PR PBB SHADOW E&M-EST. PATIENT-LVL I: CPT | Mod: PBBFAC,,,

## 2018-06-12 PROCEDURE — 99999 PR PBB SHADOW E&M-EST. PATIENT-LVL IV: CPT | Mod: PBBFAC,,, | Performed by: INTERNAL MEDICINE

## 2018-06-12 RX ORDER — POTASSIUM CHLORIDE 20 MEQ/1
40 TABLET, EXTENDED RELEASE ORAL 2 TIMES DAILY
Qty: 60 TABLET | Refills: 0 | Status: SHIPPED | OUTPATIENT
Start: 2018-06-12 | End: 2018-07-11

## 2018-06-12 NOTE — PROGRESS NOTES
[facility]             Inflammatory Bowel Disease Follow-up  Note              TODAY'S VISIT DATE:  6/12/2018    Chief Complaint:   Chief Complaint   Patient presents with    Crohn's Disease       PCP: Pablo Medina    Previous History:  Carline Chang is a 52 y.o. female with fistulizing (rectovaginal fistula repaired) ileocolonic Crohn's disease with SB stricture:  diagnosed 1986 (age 21) with history of multiple surgeries including SB resections (1995), ileocolonic resection (2/2009-partial colectomy-3 cm, SB resection-11 cm), sigmoid loop colostomy (6/2015), and rectal vaginal fistula repair (1/2018).  She has a past medical history of cholecystectomy, history of RUE right brachial vein thrombus (RUE US 1/2017), and kidney stones (CT 1/2017).  She began with symptoms of a partial SBO that was attributed to a virus in 1982.  She continued with symptoms and in 1986 she had an UGI with SBFT with showed Crohn's disease.  She tried various medical therapies asacol, pentasa, azulfidine, imuran and prednisone all of which were ineffective and did not achieve clinic remission though prednisone helped her symptoms the most.  In 1995 she had obstructive symptoms with stricture and fistula requiring surgery at which time she recalls 10 inched of intestine being resected.  She was under the care of Dr. Hurd from 8434-8884 and recall a normal colonoscopy in 2003.  She was on no medications from 5931-4074 and began seeing Dr. Boland in 2006.  In 2/2009 she had another surgery which included drainage of abdominal wall abscess, partial colectomy (3 cm in length) with small bowel resection (11 cm in length), and lysis of adhesions with surgical path showing mucosal ulcer, adjacent moderate ileocecal inflammation, serosal inflammatory adhesions, incidental lipomatous hypertrophy of ileocecal valve.  She started remicade but only took three doses but then discontinued due to loss of  insurance.  She then did well for two years on no medications.  In 6/2011 she had worsening symptoms that did not improve with steroids, pentasa, imuran and flagyl.  She developed a rectovaginal fistula in 2014 that increased in size by 11/2014 per CT scan.  On 6/18/2015 she had a sigmoid loop colostomy though post-op developed high ostomy output with plans to eventually have a colostomy takedown.  She also had some peristomal skin irritation that was treated with topical antifungal, calamine, and flagyl.  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  Due to ongoing active inflammation and rectal fistula per colonoscopy and CT scan, she started Humira in 12/2015 and did well on humira until 1/2017.  She discontinued humira on 1/12/2017 and was told it was not working.  Entyvio was considered but never started, rather she was started on Imuran 50 mg PO daily.  She continued with symptoms and multiple examinations showing active disease, active fistula, and free air adjacent to the proximal colonic anastomosis in the RLQ.  In 1/2018 she underwent a rectovaginal fistula repair.  She had a hospitalization at [facility] from 3/17/2018-3/24/2018 at which time she initially met Dr. Serrato.  She had an MRE on 3/7/2018 which showed SB inflammation with upstream dilation, gastrograffin enemas showed no evidence of rectovaginal fistula and repeat MRE on 3/23/18 showed ongoing active inflammation of the SB with stricture and upstream dilation.  She had a colonoscopy through stoma by me which showed poor bowel prep with significant stool interfering with visualization. I was able to advance scope into the neoterminal ileum but due to poor air insufflation and bowel prep had difficulty seeing well.  She was started on IVF and IV steroids and eventually transitioned to oral steroids by discharged with improvement in her labs as well though due to continued pain, diarrhea and  inflammation not improving we recommended surgery.  She continued imuran 50 mg po daily.        Interval History:  - current IBD meds: imuran 50 mg po daily  - ileostomy bag- 9 full bags of ileostomy output daily with no blood   - RLQ crampy abdominal pain  - nausea  - heartburn better with tums  - continues on hydrocodone 10/325 mg   - 18 Exploratory laparotomy, lysis of adhesions, a previous ileocolic resection anastomosis and distal small bowel resection with a primary anastomosis; surgical pathology (severe chronic active enteritis with extensive ulceration, fistula tract formation, granulomas)  - constitutional/GI symptoms: no fevers/chills, weight loss, dysphagia, GERD, abdominal pain  - extraintestinal manifestations: no eye pain/redness, skin lesions/rashes, liver problems, joint pain/swelling/stiffness    Prior Pertinent Surgeries:   : Small bowel resection (approx 10 inches) at [facility]  2009: Drainage of abdominal wall abscess, partial colectomy (3 cm in length) with Small bowel resection (11 cm in length), Lysis of adhesions at [facility] (path: ileocecal mucosal ulcer, adjacent moderate ileocecal inflammation, serosal inflammatory adhesions, incidental lipomatous hypertrophy of ileocecal valve, no malignancy)  2015: Sigmoid loop colostomy and mobilization of splenic flexure at [facility]  1/10/2018: RV fistula repair (path: reactive changes and focal granulation tissue formation, associated transmural defect and serosal adhesions, background colonic mucosa with no significant histopathologic changes)  18 Exploratory laparotomy, lysis of adhesions, a previous ileocolic resection anastomosis and distal small bowel resection with a primary anastomosis; surgical pathology (severe chronic active enteritis with extensive ulceration, fistula tract formation, granulomas), per Dr. Uribe about 290 cm left of SB and about 1 foot removed    Pertinent Endoscopy/Imagin2014 KUB: mild gaseous  distention of the small bowel loops within the left mid abdomen with a couple of scattered small bowel air-fluid levels  9/19/2014 CT abd/pelvis: diffuse wall thickening of the distal ileum with engorgement of the vasculature and stranding within the adjacent fat; no obstruction or abscess; colon is fluid filled without wall thickening; prior cholecystectomy  10/22/2014 flex sig: fistula in the anal canal; 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 (path: focal surface denudation, focal tubular loss and superficial recent mucosal hemorrhage)  11/17/2014 CT abd/pelvis: perianal fistula significantly increased in size with several small abscesses in the right gluteal fat adjacent to the gluteal crease; small amount of air in the vagina near the region of the above fistula along the right lateral lateral margin of the anorectal verge, with abnormal soft tissue density obscuring the normal fat plane between the rectum and the vagina; pathologic bowel wall thickening involving the distal ileum with surrounding edema  2/27/2015 colonoscopy: obvious evidence of Crohns in the small bowel; Crohns recurrence at the anastomotic site (path: moderate active chronic enterocolitis with villous/crypt architectural distortion); hyperplastic polyp in the left colon (path: focal mucosal crypt hyperplasia); proctitis with evidence of intestinal fistula formation (path: normal)  4/21/2015 MRI Pelvis: perianal fistula originating at the right lateral spect of the anorectal junction, extending through the right levator ani and terminating in the right gluteal cleft; possible rectovaginal fistula  4/28/2015 KUB: mildly dilated loops of small bowel with probable irregularity of the wall of a loop of small in the upper mid pelvic region  4/28/2015 CXR: normal  6/12/2015 CT abd/pelvis: acute on chronic inflammatory changes of the distal ileum without evidence for focal obstruction or perforation; rectal fistula  5/27/2016 Colonoscopy:  Inflammation characterized by congestion (edema), erythema and scarring was found. The anus and the rectum were spared. This was mild in severity, and when compared to previous examinations, the findings are improved. The colon (entire examined portion) appeared normal (no path)  1/4/2017 CT abd/pelvis: several centimeter segment within the mid small bowel demonstrating inflammatory changes including wall thickening, surrounding fat stranding, and prominent vascularity with associated free fluid without evidence of obstruction, perforation, or abscess; rectal fistulas; cholecystectomy; descending colostomy; nonobstructing right renal calculus  1/15/2017 RUE US: noncompressibel occlusive thrombus throughout the right brachial vein  1/15/2017 CXR: optimal placement of the central line with no pneumothorax  1/15/2017 CT abd/pelvis: thickened loop of terminal ileum with prominence of the mesenteric blood vessels; no perforation, abscess formation, obstruction; no fistula identified  1/16/2017 CXR: interval placement of a left PICC with catheter tip in SVC  1/17/2017 EGD: The esophagus was normal. The stomach was normal. The cardia and gastric fundus were normal on retroflexion. The examined duodenum was normal; Normal esophagus; Normal stomach; Normal examined duodenum (no path)   1/17/2017 Colonoscopy: A small fistula opening was found in the rectum. The rectal remnant appeared normal. No stricture noted. The entire examined colon is normal. Colonic fistula in rectum appears closed. Biopsies were taken with a cold forceps from the entire colon for evaluation of microscopic colitis   1/18/2017 KUB: nonspecific prominence of the intestinal bowel loops; post-op changes in the RUQ and RLQ  2/5/2017 KUB: Nonspecific scattered air-fluid levels without dilated bowel loops  2/5/2017 Pelvic US: normal appearance of the uterus and right ovary  4/12/2017 CXR: normal  4/12/2017 KUB: unchanged SBO with no evidence of free  air  4/14/2017KUB: no evidence of dilated loops of bowel; slight increase in number of air fluid levels may suggest ileus  11/22/2017 MRI pelvis: Fistulous tract connecting the right posterolateral aspect of the vagina and right lateral wall of the anorectal junction (9:00) that extends through the levator ani muscle just above the level of the puborectalis muscle/internal sphincter and courses through the medial aspect of the right ischiorectal/ischioanal fossa to terminate at the medial gluteal fold (suspected supra-sphincteric fistula).  When compared to the MRI dated 04/20/2015, fistulous track appears mostly hypointense suggesting fibrosis without definite imaging findings to indicate patency. No focal drainable fluid collections. Circumferential wall thickening involving the distal small bowel, not significantly changed when compared to CT dated 01/19/2017 and likely reflecting sequela of the patient's known inflammatory bowel disease.  12/4/2017 Flex sig: anal stricture, normal mucosa in the rectum (no path)  2/2018 Colonoscopy: minimal inflammation. Biopsies normal   2/3/2018 CT abd/pelvis: Postoperative changes from ileocolonic resection and descending colostomy for repair of a rectovaginal fistula; Interval development of free air adjacent to the proximal colonic anastomosis within the right lower quadrant. Findings concerning for potential anastomotic leak; mild distention of the distal ileum with luis-ileal inflammation and edema of the adjacent mesentery    2/4/2018 CXR: no focal consolidation  2/4/2018 MRE: Small bowel and colonic disease  2/21/2018 KUB: Linear collection of gas along the lateral margin of the cecum/ascending colon  suspicious for small collection of extraluminal air. This has apparently been  demonstrated on outside imaging which is not available for direct comparison.  Clinical/imaging follow-up are recommended. Retained gas and fecal material within the colon with scattered colonic  air-fluid levels; postoperative changes  2/26/2018 KUB: stable linear collection of air along the lateral margin of the cecum suspicious extraluminal air; unremarkable bowel gas pattern  3/7/2018 MRE: Small bowel inflammation and upstream dilation, some small bowel stenosis  3/13/2018 Gastrograffin Enema: no evidence of rectovaginal fistula  3/23/2018 MRE: Active inflammatory Crohn's disease with stricture and upstream dilatation    Pertinent Labs:  Lab Results   Component Value Date    SEDRATE 52 (H) 02/03/2018    CRP 1.52 (H) 04/26/2018     No results found for: TTGIGA, IGA  No results found for: TSH, FREET4  Lab Results   Component Value Date    BGPHRUWH31BI 10 (L) 04/09/2018    MRNOMLOR13 289 04/09/2018     Lab Results   Component Value Date    HEPBSAG Negative 02/05/2018    HEPBCAB Negative 02/05/2018    HEPCAB Negative 02/05/2018     No results found for: WTC50FLHJ  Lab Results   Component Value Date    NIL 0.079 02/05/2018    TBAG 0.077 02/05/2018    TBAGNIL -0.002 02/05/2018    MITOGENNIL 2.704 02/05/2018    TBGOLD Negative 02/05/2018     Lab Results   Component Value Date    TPTMINTERP HETEROZYGOTE 03/18/2018    TPMTRESULT 19.9 (L) 01/17/2017     Lab Results   Component Value Date    STOOLCULTURE  03/18/2018     No Salmonella,Shigella,Vibrio,Campylobacter,Yersinia isolated.    IHQWKYFAWF9R Negative 03/18/2018    KFOQJQDHCE4Q Negative 03/18/2018    CDIFFICILEAN Negative 02/04/2018    CDIFFTOX Negative 02/04/2018     Therapeutic Drug Monitoring Labs:  NA    Prior IBD Therapies:  Asacol - ineffective  Pentasa- ineffective  Azulfidine - ineffective  Azathioprine-on and off, no significant side effects  Remicade - 3 infusions 2009 then lost insurance  Humira 40 mg q 2 weeks in 3585-7921- ineffective    Current IBD Therapies:  Imuran 50 mg PO daily  Prednisone 40 mg PO daily    Vaccinations:  Influenza (inactive):  2017  Pneumococcal PCV 13: 4/17/18  Pneumococcal PCV 23: recommended   Tetanus (TdaP): 4/17/18  HPV  (males and females ages 19-27 yo):      NA  Meningococcal (risk factors- complement component deficiency, spleen damage or splenectomy, HIV, traveling to endemic areas, college student residing in residence childress,  recruits):  NA   Hepatitis B:  4/17/18  Lab Results   Component Value Date    HEPBSAB Negative 02/05/2018     Hepatitis A (risk factors- traveling to high endemic areas, chronic liver disease, clotting factor disorders, MSM, illicit drug users):   4/17/18  Lab Results   Component Value Date    HEPAIGG Negative 02/05/2018     MMR (live vaccine):        Chickenpox status/Varicella (live vaccine): immune  Lab Results   Component Value Date    VARICELLAZOS 2.50 (H) 02/05/2018    VARICELLAINT Positive (A) 02/05/2018     Zoster (age >49 yo, live vaccine):  shingrix- current shortage    NSAID use/indication:      Narcotic use:      Alternative/Complementary Meds for IBD:      Review of Systems   Constitutional: Positive for weight loss. Negative for chills and fever.   HENT:        No oral ulcers, dysphagia, oral thrush   Eyes: Negative for blurred vision, pain and redness.   Respiratory: Negative for cough and shortness of breath.    Cardiovascular: Negative for chest pain.   Gastrointestinal: Positive for abdominal pain, heartburn and nausea. Negative for vomiting.   Genitourinary: Negative for dysuria and hematuria.   Musculoskeletal: Negative for back pain and joint pain.   Skin: Negative for rash.   Psychiatric/Behavioral: Negative for depression. The patient is not nervous/anxious and does not have insomnia.      All Medical History/Surgical History/Family History/Social History/Allergies have been reviewed and updated in EMR    Review of patient's allergies indicates:   Allergen Reactions    Morphine Other (See Comments)     Abdominal pain    Flagyl [metronidazole] Other (See Comments)     Neuropathy    Nubain [nalbuphine] Anxiety     Outpatient Prescriptions Marked as Taking for the 6/12/18  "encounter (Office Visit) with Elmer Serrato MD   Medication Sig Dispense Refill    azaTHIOprine (IMURAN) 50 mg Tab Take 1 tablet (50 mg total) by mouth once daily. 90 tablet 0    HYDROcodone-acetaminophen (NORCO)  mg per tablet Take 1 tablet by mouth every 8 (eight) hours as needed for Pain. 60 tablet 0    loperamide (IMODIUM A-D) 1 mg/7.5 mL Liqd Take 0.1 mg/kg by mouth every 12 (twelve) hours.      promethazine (PHENERGAN) 12.5 MG Tab Take 1 tablet (12.5 mg total) by mouth 4 (four) times daily. (Patient taking differently: Take 12.5 mg by mouth 4 (four) times daily as needed. ) 30 tablet 0    venlafaxine (EFFEXOR) 37.5 MG Tab Take 1 tablet (37.5 mg total) by mouth 2 (two) times daily. 180 tablet 0     Vital Signs:  /78 (BP Location: Left arm, Patient Position: Sitting)   Pulse 98 Comment: O2: 98%  Temp 98.2 °F (36.8 °C)   Ht 5' 8" (1.727 m)   Wt 56.6 kg (124 lb 12.5 oz)   LMP 05/30/2009   BMI 18.97 kg/m²     Physical Exam   Constitutional: She is oriented to person, place, and time. She appears well-developed.   HENT:   Mouth/Throat: Oropharynx is clear and moist. No oral lesions.   No oral ulcers or thrush   Eyes: Conjunctivae are normal. Pupils are equal, round, and reactive to light.   Cardiovascular: Normal rate and regular rhythm.    Pulmonary/Chest: Effort normal and breath sounds normal.   Abdominal: Soft. There is no tenderness.   Mild bloating, scars, ostomy bag   Musculoskeletal:        Right lower leg: She exhibits no swelling.        Left lower leg: She exhibits no swelling.   Neurological: She is alert and oriented to person, place, and time.   Skin: No rash noted.   Psychiatric: She has a normal mood and affect.   Nursing note and vitals reviewed.    Labs:   Lab Results   Component Value Date    WBC 7.29 05/08/2018    HGB 8.7 (L) 05/08/2018    HCT 26.3 (L) 05/08/2018    MCV 90 05/08/2018     05/08/2018     Lab Results   Component Value Date    CREATININE 0.7 " 05/05/2018    ALBUMIN 3.8 04/30/2018    BILITOT 1.1 (H) 04/30/2018    ALKPHOS 174 (H) 04/30/2018    AST 25 04/30/2018    ALT 27 04/30/2018     Lab Results   Component Value Date    NIL 0.079 02/05/2018    TBAG 0.077 02/05/2018    TBAGNIL -0.002 02/05/2018    MITOGENNIL 2.704 02/05/2018    TBGOLD Negative 02/05/2018       Assessment/Plan:  Carline Chang is a 52 y.o. female with  fistulizing (rectovaginal fistula repaired) ileocolonic Crohn's disease with SB stricture:  diagnosed 1986 (age 21) with history of multiple surgeries including SB resections (1995), ileocolonic resection (2/2009-partial colectomy-3 cm, SB resection-11 cm), sigmoid loop colostomy (6/2015), and rectal vaginal fistula repair (1/2018), ileocolonic resection with colostomy (4/30/18, about 20 cm removed, per Dr. Uribe 290 cm of SB remaining).  She has a past medical history of cholecystectomy, history of RUE right brachial vein thrombus (RUE US 1/2017), and kidney stones (CT 1/2017). Patient had recent surgery with ileocolonic resection and colostomy.  Patient has diarrhea after surgery so we will check for infection and Dr Uribe will do colonoscopy through stoma to evaluate for any recurrent Crohn's (unlikely) prior to colostomy takedown. Takedown should help with diarrhea. She has plenty of SB left and per Dr Uribe 290 cm of SB remaining.  After takedown we discussed starting treatment within 2 weeks if no obvious infection with remicade. She will continue imuran 50 mg po daily now and continue while on remicade to prevent immunogenicity especially due to prior exposure to remicade and risk of ab formation.      # Diarrhea:  - stool C diff today  - immodium not helpful  - lomotil liquid too expensive  - if C diff negative will plan on lomotil tabs  - Dr. Uribe plans on exam from below and colonoscopy through stoma prior to takedown  - colostomy takedown should help if not C diff or recurrent CD  - if persistent diarrhea  after surgery: SIBO, IBS, post-cholecystectomy diarrhea are possibilities and should be treated appropriately    # Fistulizing (rectovaginal fistula repaired) ileocolonic Crohn's disease with SB stricture:  diagnosed 1984 (age 21) with history of multiple surgeries including SB resections (1995), ileocolonic resection (2/2009-partial colectomy-3 cm, SB resection-11 cm), sigmoid loop colostomy (6/2015), and rectal vaginal fistula repair (1/2018),  ileocolonic resection with colostomy (4/30/18, about 20 cm removed, per Dr. Uribe 290 cm of SB remaining)  - continue imuran 50 mg po daily, TPMT intermediate so cannot go up on dose based on this and WBC count  - discussed options for treatment: remicade (prior exposure though only had few doses and discontinued due to insurance issues), cimzia (less likely to work but is an option), entyvio (option though will try remicade first due to previous RV fistula), stelara (won't be covered by medicare)  - discussed risks of remicade and imuran at last visit and pt has no questions  - given prior exposure to remicade would make some measures to prevent abs and monitor closely due to risk of abs and reaction  - remicade details (Dr. Medina will start, have spoken to Dr. Medina):  Start 2 weeks after surgery if cleared by surgeon and no signs of infection, remicade week 0, 4 and then every 8 weeks, premeds with hydrocortisone 200 mg IV/benadryl 25 mg IV, tylenol 650 mg po 30 min prior to each infusion, continue imuran 50 mg po daily, proactive IFX levels/Abs to be drawn week 10 (2 weeks before first 8 week dose of remicade) to check for early levels/abs and adjust dose based on this  - drug monitoring labs: CBC/LFTs every 3 months (will check today), TPMT (intermediate activity/heterozygote- 3/2018), TB quantiferon (negative 2/2018, repeat yearly or sooner if TB risk factors), Hep B testing (negative HBsAg, HBtotalcoreAb 3/2018)    # Chronic pain and narcotic use  - initial  pain due to active Crohn's disease which is now in remission and now on post-op pain meds  - needs to be weaned off of narcotics and if continued functional pain then can consider alternate non-narcotic medications including meds like low dose TCAs, neurontin or lyrica- discussed this with Dr. Medina  - narcotics increase mortality in CD patients- this was discussed with the patient  - avoid NSAIDS due to Crohn's disease    # History of RUE right brachial vein thrombus  - on US RUE 1/2017  - not on any blood thinners  - no active symptoms and likely related to IV/central line  - monitor and recommend inpateint DVT prophylaxis at all times given high risk with IBD and venous thrombosis    # Nephrolithiasis  - 1/2017 CT with kidney stones  - recommend urology f/u if stones return  - hydration and control of diarrhea are important    # IBD specific health maintenance:  Colorectal cancer risk:    Risks factors: none-average risk  - Distribution of colonic disease:  primarily SB disease- ileum  - Year of symptom onset: 1982  - colonoscopy:  screening per age    Pap smear recommended yearly   Opthamologic exam recommended yearly   Dermatologic exam recommended yearly    Bone health:  Calcium 6830-3556 mg daily and vitamin D 2000 IU daily  Risk of osteopenia/osteoporosis:  Risks factors: none  Vitamin D deficiency  - needs high dose vit D- defer to Dr. Medina or PCP  Lab Results   Component Value Date    [id number] 17 (L) 01/17/2017     Vaccine counseling:  - No LIVE VACCINES --once a biologic is started  - saw ID to get UTD with vaccines and continuing vaccine protocol, needs pneumovax 23, finish hep A/B vaccines, shingrix when available    Follow up: with Dr. Medina prior to surgery, Dr. Serrato 1 month after surgery    Elmer Serrato MD  Department of Gastroenterology  Medical Director, Inflammatory Bowel Disease    Addendum:   Discussed case and recommendations with Dr. Medina and copy of letter sent to him  electronically.     BASHIR Serrato

## 2018-06-12 NOTE — PATIENT INSTRUCTIONS
Instructions:  - stool C diff  - labs today  - if C diff negative then will give you lomotil prescription  - make sure Dr. Uribe gets you scheduled for scope and evaluation of your rectum as well before the takedown  - let me know when next surgery is scheduled by emailing me  - see Dr. Medina  - continue imuran 50 mg po daily  - plans to start remicade after next surgery- within 2 weeks is ideal   remicade- recommend started week 0, 4 and then every 8 weeks   - continuing imuran should also help in restarting remicade safely        - premeds prior to each infusion of remicade should be hydrocortisone 200 mg IV, benadryl 25 mg IV and tylenol 650 mg po 30 min prior to infusions    I will see you along with Dr. Medina- a month after surgery  - proceed to get your immunizations today

## 2018-06-13 ENCOUNTER — PATIENT MESSAGE (OUTPATIENT)
Dept: SURGERY | Facility: CLINIC | Age: 53
End: 2018-06-13

## 2018-06-13 ENCOUNTER — TELEPHONE (OUTPATIENT)
Dept: GASTROENTEROLOGY | Facility: CLINIC | Age: 53
End: 2018-06-13

## 2018-06-13 ENCOUNTER — TELEPHONE (OUTPATIENT)
Dept: ENDOSCOPY | Facility: HOSPITAL | Age: 53
End: 2018-06-13

## 2018-06-13 ENCOUNTER — PATIENT MESSAGE (OUTPATIENT)
Dept: GASTROENTEROLOGY | Facility: CLINIC | Age: 53
End: 2018-06-13

## 2018-06-13 PROBLEM — I82.621: Status: RESOLVED | Noted: 2018-04-11 | Resolved: 2018-06-13

## 2018-06-13 PROBLEM — N82.3 RECTOVAGINAL FISTULA: Status: RESOLVED | Noted: 2018-04-11 | Resolved: 2018-06-13

## 2018-06-13 RX ORDER — DIPHENOXYLATE HYDROCHLORIDE AND ATROPINE SULFATE 2.5; .025 MG/1; MG/1
1 TABLET ORAL 4 TIMES DAILY PRN
Qty: 240 TABLET | Refills: 0 | Status: SHIPPED | OUTPATIENT
Start: 2018-06-13 | End: 2018-06-23

## 2018-06-13 NOTE — TELEPHONE ENCOUNTER
Called and spoke with pt  I explained message below and confirmed we were calling in the Lomotil to [facility].    She expressed understanding       Called [facility] and spoke with Diego Taylor called in

## 2018-06-13 NOTE — TELEPHONE ENCOUNTER
----- Message from Elmer Serrato MD sent at 6/13/2018  7:44 AM CDT -----  Please call patient and let her know that her C diff is negative and call in lomotil to her pharmacy. You will see prescription in epic for me but this is one we have to call in. Let me know if for some reason they need a printed prescription instead    SS

## 2018-06-13 NOTE — TELEPHONE ENCOUNTER
Called patient to schedule colonoscopy. No answer. Left voicemail with direct number to call back.

## 2018-06-14 DIAGNOSIS — Z12.11 SPECIAL SCREENING FOR MALIGNANT NEOPLASMS, COLON: Primary | ICD-10-CM

## 2018-06-14 RX ORDER — POLYETHYLENE GLYCOL 3350, SODIUM SULFATE ANHYDROUS, SODIUM BICARBONATE, SODIUM CHLORIDE, POTASSIUM CHLORIDE 236; 22.74; 6.74; 5.86; 2.97 G/4L; G/4L; G/4L; G/4L; G/4L
4 POWDER, FOR SOLUTION ORAL ONCE
Qty: 4000 ML | Refills: 0 | Status: SHIPPED | OUTPATIENT
Start: 2018-06-14 | End: 2018-06-14

## 2018-06-18 ENCOUNTER — TELEPHONE (OUTPATIENT)
Dept: GASTROENTEROLOGY | Facility: CLINIC | Age: 53
End: 2018-06-18

## 2018-06-18 NOTE — TELEPHONE ENCOUNTER
----- Message from Deidre Mcclure sent at 6/14/2018  4:18 PM CDT -----  Contact: SELF  FYI-PT WANTS DR PARK TO KNOW THAT DR MAYFIELD WANTS THE REVERSAL DONE FIRST, THEN THE REMICADE.  WANTS DR PARK TO COORDINATE THE  REMICADE.  SHE WILL HAVE ONE MORE SCOPE ON 7/6/18 AND SHE CAN HAVE THE REVERSAL DONE 2 WEEKS AFTER THAT.  SHE HAS AN APPT WITH DR PARK ON 7/11/18.  SHE WILL DISCUSS ALL OF THIS WITH HIM AT THAT TIME.

## 2018-06-18 NOTE — TELEPHONE ENCOUNTER
MARU-PT WANTS DR PARK TO KNOW THAT DR MAYFIELD WANTS THE REVERSAL DONE FIRST, THEN THE REMICADE.  WANTS DR PARK TO COORDINATE THE  REMICADE.  SHE WILL HAVE ONE MORE SCOPE ON 7/6/18 AND SHE CAN HAVE THE REVERSAL DONE 2 WEEKS AFTER THAT.  SHE HAS AN APPT WITH DR PARK ON 7/11/18.  SHE WILL DISCUSS ALL OF THIS WITH HIM AT THAT TIME.

## 2018-06-25 ENCOUNTER — PATIENT MESSAGE (OUTPATIENT)
Dept: RESEARCH | Facility: HOSPITAL | Age: 53
End: 2018-06-25

## 2018-06-27 ENCOUNTER — TELEPHONE (OUTPATIENT)
Dept: GASTROENTEROLOGY | Facility: CLINIC | Age: 53
End: 2018-06-27

## 2018-06-27 DIAGNOSIS — K50.819 CROHN'S DISEASE OF SMALL AND LARGE INTESTINES WITH COMPLICATION: ICD-10-CM

## 2018-06-27 NOTE — TELEPHONE ENCOUNTER
----- Message from Deidre Mcclure sent at 6/27/2018  1:14 PM CDT -----  Contact: self  Pt is due for refill of her pain medication.  Also, is there any way she can get some ostomy bags?  She gets 20 a month but she has been going through them and its not time for them to be sent to her.  Would need to  the rx on Thursday.

## 2018-06-28 RX ORDER — HYDROCODONE BITARTRATE AND ACETAMINOPHEN 10; 325 MG/1; MG/1
1 TABLET ORAL EVERY 8 HOURS PRN
Qty: 60 TABLET | Refills: 0 | Status: SHIPPED | OUTPATIENT
Start: 2018-07-05 | End: 2018-07-26 | Stop reason: SDUPTHER

## 2018-06-28 NOTE — TELEPHONE ENCOUNTER
Mariluz will come to get medications tomorrow. She went to [facility] to get ostomy bags.

## 2018-07-06 ENCOUNTER — ANESTHESIA EVENT (OUTPATIENT)
Dept: ENDOSCOPY | Facility: HOSPITAL | Age: 53
End: 2018-07-06
Payer: MEDICARE

## 2018-07-06 ENCOUNTER — SURGERY (OUTPATIENT)
Age: 53
End: 2018-07-06

## 2018-07-06 ENCOUNTER — HOSPITAL ENCOUNTER (OUTPATIENT)
Facility: HOSPITAL | Age: 53
Discharge: HOME | End: 2018-07-06
Attending: COLON & RECTAL SURGERY | Admitting: COLON & RECTAL SURGERY
Payer: MEDICARE

## 2018-07-06 ENCOUNTER — ANESTHESIA (OUTPATIENT)
Dept: ENDOSCOPY | Facility: HOSPITAL | Age: 53
End: 2018-07-06
Payer: MEDICARE

## 2018-07-06 VITALS
BODY MASS INDEX: 18.19 KG/M2 | DIASTOLIC BLOOD PRESSURE: 65 MMHG | TEMPERATURE: 98 F | SYSTOLIC BLOOD PRESSURE: 101 MMHG | RESPIRATION RATE: 17 BRPM | HEART RATE: 77 BPM | OXYGEN SATURATION: 98 % | HEIGHT: 68 IN | WEIGHT: 120 LBS

## 2018-07-06 DIAGNOSIS — Z12.11 SPECIAL SCREENING FOR MALIGNANT NEOPLASMS, COLON: ICD-10-CM

## 2018-07-06 PROCEDURE — S5010 5% DEXTROSE AND 0.45% SALINE: HCPCS | Performed by: COLON & RECTAL SURGERY

## 2018-07-06 PROCEDURE — 25000003 PHARM REV CODE 250: Performed by: COLON & RECTAL SURGERY

## 2018-07-06 PROCEDURE — 25000003 PHARM REV CODE 250: Performed by: NURSE ANESTHETIST, CERTIFIED REGISTERED

## 2018-07-06 PROCEDURE — 45378 DIAGNOSTIC COLONOSCOPY: CPT | Mod: ,,, | Performed by: COLON & RECTAL SURGERY

## 2018-07-06 PROCEDURE — E9220 PRA ENDO ANESTHESIA: HCPCS | Mod: ,,, | Performed by: NURSE ANESTHETIST, CERTIFIED REGISTERED

## 2018-07-06 PROCEDURE — 37000009 HC ANESTHESIA EA ADD 15 MINS: Performed by: COLON & RECTAL SURGERY

## 2018-07-06 PROCEDURE — 63600175 PHARM REV CODE 636 W HCPCS: Performed by: NURSE ANESTHETIST, CERTIFIED REGISTERED

## 2018-07-06 PROCEDURE — 45378 DIAGNOSTIC COLONOSCOPY: CPT | Performed by: COLON & RECTAL SURGERY

## 2018-07-06 PROCEDURE — 37000008 HC ANESTHESIA 1ST 15 MINUTES: Performed by: COLON & RECTAL SURGERY

## 2018-07-06 RX ORDER — PROPOFOL 10 MG/ML
VIAL (ML) INTRAVENOUS
Status: DISCONTINUED | OUTPATIENT
Start: 2018-07-06 | End: 2018-07-06

## 2018-07-06 RX ORDER — PROPOFOL 10 MG/ML
VIAL (ML) INTRAVENOUS CONTINUOUS PRN
Status: DISCONTINUED | OUTPATIENT
Start: 2018-07-06 | End: 2018-07-06

## 2018-07-06 RX ORDER — DEXTROSE MONOHYDRATE AND SODIUM CHLORIDE 5; .45 G/100ML; G/100ML
INJECTION, SOLUTION INTRAVENOUS CONTINUOUS
Status: DISCONTINUED | OUTPATIENT
Start: 2018-07-06 | End: 2018-07-06 | Stop reason: HOSPADM

## 2018-07-06 RX ORDER — LIDOCAINE HYDROCHLORIDE 10 MG/ML
INJECTION, SOLUTION INTRAVENOUS
Status: DISCONTINUED | OUTPATIENT
Start: 2018-07-06 | End: 2018-07-06

## 2018-07-06 RX ADMIN — DEXTROSE AND SODIUM CHLORIDE: 5; .45 INJECTION, SOLUTION INTRAVENOUS at 01:07

## 2018-07-06 RX ADMIN — LIDOCAINE HYDROCHLORIDE 50 MG: 10 INJECTION, SOLUTION INTRAVENOUS at 01:07

## 2018-07-06 RX ADMIN — DEXTROSE MONOHYDRATE AND SODIUM CHLORIDE: 5; .45 INJECTION, SOLUTION INTRAVENOUS at 01:07

## 2018-07-06 RX ADMIN — PROPOFOL 50 MG: 10 INJECTION, EMULSION INTRAVENOUS at 01:07

## 2018-07-06 RX ADMIN — PROPOFOL 200 MCG/KG/MIN: 10 INJECTION, EMULSION INTRAVENOUS at 01:07

## 2018-07-06 RX ADMIN — PROPOFOL 100 MG: 10 INJECTION, EMULSION INTRAVENOUS at 01:07

## 2018-07-06 NOTE — TRANSFER OF CARE
"Anesthesia Transfer of Care Note    Patient: Carline Chang    Procedure(s) Performed: Procedure(s) (LRB):  COLONOSCOPY (N/A)    Patient location: PACU    Anesthesia Type: general    Transport from OR: Transported from OR on room air with adequate spontaneous ventilation    Post pain: adequate analgesia    Post assessment: no apparent anesthetic complications    Post vital signs: stable    Level of consciousness: awake, alert and oriented    Nausea/Vomiting: no nausea/vomiting    Complications: none    Transfer of care protocol was followed      Last vitals:   Visit Vitals  BP (!) 93/51 (BP Location: Left arm, Patient Position: Lying)   Pulse 72   Temp 36.5 °C (97.7 °F) (Temporal)   Resp 14   Ht 5' 8" (1.727 m)   Wt 54.4 kg (120 lb)   LMP 05/30/2009   SpO2 100%   Breastfeeding? No   BMI 18.25 kg/m²     "

## 2018-07-06 NOTE — H&P
Endoscopy H&P    Procedure : Colonoscopy      History of Crohn's, here for exam for rectovaginal fistula      Past Medical History:   Diagnosis Date    Acute deep vein thrombosis (DVT) of brachial vein of right upper extremity 01/2017    on RUE ultrasound    Chronic pain     Crohn's disease of both small and large intestine with intestinal obstruction 1989    GERD (gastroesophageal reflux disease) 2014    Inflammatory bowel disease 1989    Nephrolithiasis      Sedation Problems: NO  Family History   Problem Relation Age of Onset    Cancer Maternal Aunt 66        colon ca    Heart disease Father     Anesthesia problems Neg Hx     Celiac disease Neg Hx     Cirrhosis Neg Hx     Colon cancer Neg Hx     Colon polyps Neg Hx     Crohn's disease Neg Hx     Cystic fibrosis Neg Hx     Esophageal cancer Neg Hx     Hemochromatosis Neg Hx     Inflammatory bowel disease Neg Hx     Irritable bowel syndrome Neg Hx     Liver cancer Neg Hx     Liver disease Neg Hx     Rectal cancer Neg Hx     Stomach cancer Neg Hx     Ulcerative colitis Neg Hx     Mars's disease Neg Hx     Lymphoma Neg Hx     Tuberculosis Neg Hx     Scleroderma Neg Hx     Rheum arthritis Neg Hx     Multiple sclerosis Neg Hx     Melanoma Neg Hx     Lupus Neg Hx     Psoriasis Neg Hx     Skin cancer Neg Hx      Fam Hx of Sedation Problems: NO  Social History     Social History    Marital status:      Spouse name: N/A    Number of children: N/A    Years of education: N/A     Occupational History    Not on file.     Social History Main Topics    Smoking status: Never Smoker    Smokeless tobacco: Never Used    Alcohol use No    Drug use: No    Sexual activity: Not on file     Other Topics Concern    Not on file     Social History Narrative    No narrative on file       Review of Systems - Negative     Respiratory ROS: no cough, shortness of breath,  or wheezing  Cardiovascular ROS: no chest pain or dyspnea on exertion  Gastrointestinal ROS: no abdominal pain, change in bowel habits, or black or bloody stools  Musculoskeletal ROS: negative  Neurological ROS: no TIA or stroke symptoms        Physical Exam:  General: well developed, appears stated age, no distress  Head: normocephalic, atraumatic  Airway:  normal oropharynx, airway normal  Neck: supple, symmetrical, trachea midline  Lungs:  clear to auscultation bilaterally and normal respiratory effort  Heart: regular rate and rhythm, S1, S2 normal, no murmur, rub or gallop  Abdomen: soft, non-tender non-distented; bowel sounds normal; no masses,  no organomegaly  Extremities: warm, well perfused       Deep Sedation: Mallampati Score per anesthesia     SedationPlan :Choice     ASA : II  Have seen and examined the patient with the fellow and agree with their plan.  ESPERANZA MADSEN

## 2018-07-06 NOTE — ANESTHESIA PREPROCEDURE EVALUATION
Past Medical History:   Diagnosis Date    Acute deep vein thrombosis (DVT) of brachial vein of right upper extremity 2017    on RUE ultrasound    Chronic pain     Crohn's disease of both small and large intestine with intestinal obstruction     GERD (gastroesophageal reflux disease)     Inflammatory bowel disease     Nephrolithiasis      Past Surgical History:   Procedure Laterality Date    ABSCESS DRAINAGE      ANAL FISTULOTOMY       SECTION      x2    CHOLECYSTECTOMY  2000    COLON SURGERY  2015    sigmoid loop colostomy    COLONOSCOPY N/A 2016    Procedure: COLONOSCOPY;  Surgeon: Andre Uribe MD;  Location: [facility] (4TH FLR);  Service: Endoscopy;  Laterality: N/A;    COLONOSCOPY N/A 2017    Procedure: COLONOSCOPY;  Surgeon: Dany Stock MD;  Location: [facility] (2ND FLR);  Service: Endoscopy;  Laterality: N/A;    COLONOSCOPY N/A 2017    Procedure: COLONOSCOPY;  Surgeon: Andre Uribe MD;  Location: [facility] (4TH FLR);  Service: Endoscopy;  Laterality: N/A;    COLONOSCOPY N/A 2018    Procedure: COLONOSCOPY;  Surgeon: Andre Uribe MD;  [facility]: [facility] (4TH FLR);  Service: Endoscopy;  Laterality: N/A;    COLONOSCOPY N/A 3/24/2018    Procedure: COLONOSCOPY;  Surgeon: Elmer Serrato MD;  Location: [facility] (2ND FLR);  Service: Endoscopy;  Laterality: N/A;    COLONOSCOPY  3/24/2018    rectovaginal fistula repair  2018    SMALL INTESTINE SURGERY      per patient 10 inches removed    SMALL INTESTINE SURGERY  2009    abdominal abscess drained, 3 cm colon removed, 11 cm SB removed    TUBAL LIGATION      UPPER GASTROINTESTINAL ENDOSCOPY       Patient Active Problem List   Diagnosis    Urolithiasis    Diarrhea    Crohn's disease of both small and large intestine with intestinal obstruction    Long-term use of immunosuppressant medication    Chronic narcotic use    Chronic abdominal pain     2D Echo:  No  results found for this or any previous visit.    Please See ROS/PMH and Active Problem List above                                                                                                               07/06/2018  Carline Chang is a 52 y.o., female.    Anesthesia Evaluation    I have reviewed the Patient Summary Reports.    I have reviewed the Nursing Notes.      Review of Systems  Anesthesia Hx:  No problems with previous Anesthesia  Denies Family Hx of Anesthesia complications.    Social:  Non-Smoker    Renal/:   Chronic Renal Disease    Hepatic/GI:   GERD           Anesthesia Plan  Type of Anesthesia, risks & benefits discussed:  Anesthesia Type:  general  Patient's Preference:   Intra-op Monitoring Plan: standard ASA monitors  Intra-op Monitoring Plan Comments:   Post Op Pain Control Plan:   Post Op Pain Control Plan Comments:   Induction:   IV  Beta Blocker:  Patient is not currently on a Beta-Blocker (No further documentation required).       Informed Consent: Patient understands risks and agrees with Anesthesia plan.  Questions answered.   ASA Score: 2     Day of Surgery Review of History & Physical:    H&P update referred to the surgeon.         Ready For Surgery From Anesthesia Perspective.

## 2018-07-06 NOTE — ANESTHESIA POSTPROCEDURE EVALUATION
"Anesthesia Post Evaluation    Patient: Carline Chang    Procedure(s) Performed: Procedure(s) (LRB):  COLONOSCOPY (N/A)    Final Anesthesia Type: general  Patient location during evaluation: PACU  Patient participation: Yes- Able to Participate  Level of consciousness: awake and alert  Post-procedure vital signs: reviewed and stable  Pain management: adequate  Airway patency: patent  PONV status at discharge: No PONV  Anesthetic complications: no      Cardiovascular status: blood pressure returned to baseline  Respiratory status: unassisted  Hydration status: euvolemic  Follow-up not needed.        Visit Vitals  /65 (BP Location: Left arm, Patient Position: Lying)   Pulse 77   Temp 36.7 °C (98 °F) (Temporal)   Resp 17   Ht 5' 8" (1.727 m)   Wt 54.4 kg (120 lb)   LMP 05/30/2009   SpO2 98%   Breastfeeding? No   BMI 18.25 kg/m²       Pain/Estiven Score: Pain Assessment Performed: Yes (7/6/2018  2:34 PM)  Presence of Pain: denies (7/6/2018  2:34 PM)  Pain Rating Prior to Med Admin: 0 (7/6/2018  2:34 PM)  Pain Rating Post Med Admin: 0 (7/6/2018  2:24 PM)  Estiven Score: 10 (7/6/2018  2:34 PM)      "

## 2018-07-06 NOTE — [employer] PATIENT INSTRUCTIONS
Discharge Summary/Instructions after an Endoscopic Procedure  Patient Name: Carline Chang  Patient MRN: [id number]  Patient YOB: 1965 Friday, July 06, 2018  Andre Uribe MD  RESTRICTIONS:  During your procedure today, you received medications for sedation.  These   medications may affect your judgment, balance and coordination.  Therefore,   for 24 hours, you have the following restrictions:   - DO NOT drive a car, operate machinery, make legal/financial decisions,   sign important papers or drink alcohol.    ACTIVITY:  Today: no heavy lifting, straining or running due to procedural   sedation/anesthesia.  The following day: return to full activity including work.  DIET:  Eat and drink normally unless instructed otherwise.     TREATMENT FOR COMMON SIDE EFFECTS:  - Mild abdominal pain, nausea, belching, bloating or excessive gas:  rest,   eat lightly and use a heating pad.  - Sore Throat: treat with throat lozenges and/or gargle with warm salt   water.  - Because air was used during the procedure, expelling large amounts of air   from your rectum or belching is normal.  - If a bowel prep was taken, you may not have a bowel movement for 1-3 days.    This is normal.  SYMPTOMS TO WATCH FOR AND REPORT TO YOUR PHYSICIAN:  1. Abdominal pain or bloating, other than gas cramps.  2. Chest pain.  3. Back pain.  4. Signs of infection such as: chills or fever occurring within 24 hours   after the procedure.  5. Rectal bleeding, which would show as bright red, maroon, or black stools.   (A tablespoon of blood from the rectum is not serious, especially if   hemorrhoids are present.)  6. Vomiting.  7. Weakness or dizziness.  GO DIRECTLY TO THE NEAREST EMERGENCY ROOM IF YOU HAVE ANY OF THE FOLLOWING:      Difficulty breathing              Chills and/or fever over 101 F   Persistent vomiting and/or vomiting blood   Severe abdominal pain   Severe chest pain   Black, tarry stools   Bleeding- more than one  tablespoon   Any other symptom or condition that you feel may need urgent attention  Your doctor recommends these additional instructions:  If any biopsies were taken, your doctors clinic will contact you in 1 to 2   weeks with any results.  - Discharge patient to home.   - Return to my office in 4 weeks.   - Repeat colonoscopy (date not yet determined) to assess disease activity.  For questions, problems or results please call your physician - Andre Uribe MD at Work:  [phone].  [facility], EMERGENCY ROOM PHONE NUMBER: [phone]  IF A COMPLICATION OR EMERGENCY SITUATION ARISES AND YOU ARE UNABLE TO REACH   YOUR PHYSICIAN - GO DIRECTLY TO THE EMERGENCY ROOM.  Andre Uribe MD  7/6/2018 2:06:44 PM  This report has been verified and signed electronically.  [employer]

## 2018-07-11 ENCOUNTER — OFFICE VISIT (OUTPATIENT)
Dept: GASTROENTEROLOGY | Facility: CLINIC | Age: 53
End: 2018-07-11
Payer: MEDICARE

## 2018-07-11 ENCOUNTER — PATIENT MESSAGE (OUTPATIENT)
Dept: SURGERY | Facility: CLINIC | Age: 53
End: 2018-07-11

## 2018-07-11 VITALS
RESPIRATION RATE: 18 BRPM | BODY MASS INDEX: 18.89 KG/M2 | DIASTOLIC BLOOD PRESSURE: 82 MMHG | WEIGHT: 124.63 LBS | HEIGHT: 68 IN | TEMPERATURE: 97 F | HEART RATE: 101 BPM | SYSTOLIC BLOOD PRESSURE: 114 MMHG

## 2018-07-11 DIAGNOSIS — K21.9 GASTROESOPHAGEAL REFLUX DISEASE, ESOPHAGITIS PRESENCE NOT SPECIFIED: ICD-10-CM

## 2018-07-11 DIAGNOSIS — R10.84 GENERALIZED ABDOMINAL PAIN: ICD-10-CM

## 2018-07-11 DIAGNOSIS — K50.819 CROHN'S DISEASE OF SMALL AND LARGE INTESTINES WITH COMPLICATION: Primary | ICD-10-CM

## 2018-07-11 DIAGNOSIS — N82.3 RECTOVAGINAL FISTULA: ICD-10-CM

## 2018-07-11 DIAGNOSIS — R19.7 DIARRHEA, UNSPECIFIED TYPE: ICD-10-CM

## 2018-07-11 PROCEDURE — 99215 OFFICE O/P EST HI 40 MIN: CPT | Mod: ,,, | Performed by: INTERNAL MEDICINE

## 2018-07-11 NOTE — PROGRESS NOTES
[facility] Established Patient Visit    Subjective:           PCP: Pablo Medina    Chief Complaint: Crohn's Disease       HPI:  Carline Chang is a 52 y.o. female with idioathic IBD. Patient has had a recent colonoscopy by Dr. Uribe and is going to have surgery in the next few weeks. Patient probably will need Remicade post surgery under the direction of Dr. Serrato. A detailed examination was done. An operative report of the colonoscopy was reviewed in detail. Will follow patient closely.       ROS:   Constitutional: No fevers, chills, weight loss  ENT: No allergies, sore throat, rhinorrhea  CV: No chest pain, palpitations, edema  Pulm: No cough, shortness of breath, wheezing  Ophtho: No vision changes  GI: No blood in stools, change in bowel habits, nausea/vomiting  Denies alternating diarrhea/constipation.   Derm: No rash  Heme: No easy bruising or lymphadenopathy  MSK: No arthralgias or myalgias  : No dysuria, hematuria, frequency, polyuria, or flank tenderness  Endo: No hot or cold intolerance  Neuro: No syncope or seizure, or dizziness  Psych: No hallucination, depression or suicidal ideation      Medical History:  has a past medical history of Acute deep vein thrombosis (DVT) of brachial vein of right upper extremity (2017); Chronic pain; Crohn's disease of both small and large intestine with intestinal obstruction (); GERD (gastroesophageal reflux disease) (); Inflammatory bowel disease (); and Nephrolithiasis.      Surgical History:  has a past surgical history that includes Tubal ligation;  section; Colonoscopy (N/A, 2016); Colonoscopy (N/A, 2017); Colonoscopy (N/A, 2017); Colonoscopy (N/A, 2018); Colonoscopy (N/A, 3/24/2018); Abscess drainage (); Anal fistulotomy (); Colonoscopy (3/24/2018); Upper gastrointestinal endoscopy (); Small intestine surgery (); Small intestine surgery (2009); Cholecystectomy ();  Colon surgery (06/2015); rectovaginal fistula repair (01/2018); and Colonoscopy (N/A, 7/6/2018).    Family History:   Family History   Problem Relation Age of Onset    Cancer Maternal Aunt 66        colon ca    Heart disease Father     Anesthesia problems Neg Hx     Celiac disease Neg Hx     Cirrhosis Neg Hx     Colon cancer Neg Hx     Colon polyps Neg Hx     Crohn's disease Neg Hx     Cystic fibrosis Neg Hx     Esophageal cancer Neg Hx     Hemochromatosis Neg Hx     Inflammatory bowel disease Neg Hx     Irritable bowel syndrome Neg Hx     Liver cancer Neg Hx     Liver disease Neg Hx     Rectal cancer Neg Hx     Stomach cancer Neg Hx     Ulcerative colitis Neg Hx     Mars's disease Neg Hx     Lymphoma Neg Hx     Tuberculosis Neg Hx     Scleroderma Neg Hx     Rheum arthritis Neg Hx     Multiple sclerosis Neg Hx     Melanoma Neg Hx     Lupus Neg Hx     Psoriasis Neg Hx     Skin cancer Neg Hx        Social History:   Social History   Substance Use Topics    Smoking status: Never Smoker    Smokeless tobacco: Never Used    Alcohol use No       The patient's social and family histories were reviewed by me and updated in the appropriate section of the electronic medical record.    Allergies:   Review of patient's allergies indicates:   Allergen Reactions    Morphine Other (See Comments)     Abdominal pain    Flagyl [metronidazole] Other (See Comments)     Neuropathy    Nubain [nalbuphine] Anxiety         Medications:   Current Outpatient Prescriptions   Medication Sig Dispense Refill    azaTHIOprine (IMURAN) 50 mg Tab Take 1 tablet (50 mg total) by mouth once daily. 90 tablet 0    HYDROcodone-acetaminophen (NORCO)  mg per tablet Take 1 tablet by mouth every 8 (eight) hours as needed for Pain. 60 tablet 0    loperamide (IMODIUM A-D) 1 mg/7.5 mL Liqd Take 0.1 mg/kg by mouth every 12 (twelve) hours.      promethazine (PHENERGAN) 25 MG tablet Take 1 tablet (25 mg total) by mouth  "every 8 (eight) hours as needed for Nausea. 90 tablet 1    venlafaxine (EFFEXOR) 37.5 MG Tab Take 1 tablet (37.5 mg total) by mouth 2 (two) times daily. 180 tablet 0     No current facility-administered medications for this visit.      All medications and past history have been reviewed.        Objective:        Vital Signs:  Blood pressure 114/82, pulse 101, temperature 97.3 °F (36.3 °C), resp. rate 18, height 5' 8" (1.727 m), weight 56.5 kg (124 lb 9.6 oz), last menstrual period 05/30/2009. Body mass index is 18.95 kg/m².    Physical Exam:   General Appearance: Well appearing in no acute distress, well developed, well                nourished  Head: Normocephalic, without obvious abnormality  Eyes:  Pupils equal, round and reactive to light  Throat: Lips, mucosa, and tongue normal; teeth and gums normal  Lungs: CTA bilaterally in anterior and posterior fields, no wheezes, no crackles  Heart:  Regular rate and rhythm, no murmurs heard  Abdomen: Soft, non tender, non distended with positive bowel sounds in all four quadrants. No hepatosplenomegaly, ascites, or mass. Negative for succusion splash  : female   Extremities: No cyanosis, edema or deformity  Skin: No rash  Neurologic: Normal exam    Labs:  Lab Results   Component Value Date    WBC 8.62 06/12/2018    HGB 13.0 06/12/2018    HCT 39.9 06/12/2018     (H) 06/12/2018    ALT 8 (L) 06/12/2018    AST 18 06/12/2018     06/12/2018    K 4.5 06/12/2018     06/12/2018    CREATININE 0.9 06/12/2018    BUN 6 06/12/2018    CO2 26 06/12/2018    INR 1.2 03/18/2018       Imaging:     All lab results and imaging results have been reviewed and discussed with the patient      Assessment:       No diagnosis found.    Plan:       There are no diagnoses linked to this encounter.    See HPI    No Follow-up on file.    The plan was discussed with the patient and all questions/concerns have been answered to the patient's satisfaction.        Electronically signed " by Pablo Medina MD    This note was dictated using voice recognition software and may contain grammatical errors.

## 2018-07-13 ENCOUNTER — TELEPHONE (OUTPATIENT)
Dept: ENDOSCOPY | Facility: HOSPITAL | Age: 53
End: 2018-07-13

## 2018-07-26 DIAGNOSIS — K50.819 CROHN'S DISEASE OF SMALL AND LARGE INTESTINES WITH COMPLICATION: ICD-10-CM

## 2018-07-26 NOTE — TELEPHONE ENCOUNTER
----- Message from Deidre Mcclure sent at 7/26/2018  1:22 PM CDT -----  Contact: self  Pt is due for refill of hydrocodone on Monday or Tuesday.   Please call when done.

## 2018-07-27 RX ORDER — HYDROCODONE BITARTRATE AND ACETAMINOPHEN 10; 325 MG/1; MG/1
1 TABLET ORAL EVERY 8 HOURS PRN
Qty: 60 TABLET | Refills: 0 | Status: SHIPPED | OUTPATIENT
Start: 2018-08-05 | End: 2018-07-31 | Stop reason: SDUPTHER

## 2018-07-31 RX ORDER — HYDROCODONE BITARTRATE AND ACETAMINOPHEN 10; 325 MG/1; MG/1
1 TABLET ORAL EVERY 8 HOURS PRN
Qty: 60 TABLET | Refills: 0 | Status: SHIPPED | OUTPATIENT
Start: 2018-08-03 | End: 2018-08-14

## 2018-07-31 NOTE — TELEPHONE ENCOUNTER
Patient states 30 days is aug 3rd nor aug 5th. Patient last rx for pain meds was written July 5th.

## 2018-08-14 ENCOUNTER — OFFICE VISIT (OUTPATIENT)
Dept: SURGERY | Facility: CLINIC | Age: 53
End: 2018-08-14
Payer: MEDICARE

## 2018-08-14 VITALS
SYSTOLIC BLOOD PRESSURE: 126 MMHG | HEIGHT: 68 IN | WEIGHT: 127.63 LBS | BODY MASS INDEX: 19.34 KG/M2 | DIASTOLIC BLOOD PRESSURE: 68 MMHG | HEART RATE: 86 BPM

## 2018-08-14 DIAGNOSIS — K50.013 CROHN'S DISEASE OF SMALL INTESTINE WITH FISTULA: Primary | ICD-10-CM

## 2018-08-14 PROCEDURE — 99999 PR PBB SHADOW E&M-EST. PATIENT-LVL III: CPT | Mod: PBBFAC,,, | Performed by: COLON & RECTAL SURGERY

## 2018-08-14 PROCEDURE — 99213 OFFICE O/P EST LOW 20 MIN: CPT | Mod: PBBFAC | Performed by: COLON & RECTAL SURGERY

## 2018-08-14 PROCEDURE — 99214 OFFICE O/P EST MOD 30 MIN: CPT | Mod: S$PBB,,, | Performed by: COLON & RECTAL SURGERY

## 2018-08-14 RX ORDER — PRENATAL VIT CALC,IRON,FOLIC
1 TABLET ORAL
COMMUNITY
End: 2018-08-15 | Stop reason: CLARIF

## 2018-08-14 RX ORDER — HYDROCODONE BITARTRATE AND ACETAMINOPHEN 10; 325 MG/1; MG/1
1 TABLET ORAL EVERY 12 HOURS PRN
COMMUNITY
End: 2018-09-11 | Stop reason: SDUPTHER

## 2018-08-15 ENCOUNTER — ANESTHESIA EVENT (OUTPATIENT)
Dept: SURGERY | Facility: HOSPITAL | Age: 53
DRG: 330 | End: 2018-08-15
Payer: MEDICARE

## 2018-08-15 NOTE — ANESTHESIA PREPROCEDURE EVALUATION
Anesthesia Assessment: Preoperative EQUATION    Planned Procedure: Procedure(s) (LRB):  CLOSURE, COLOSTOMY, LAPAROSCOPIC (N/A)  Requested Anesthesia Type:General  Surgeon: Andre Uribe MD  Service: Colon and Rectal  Known or anticipated Date of Surgery:8/29/2018    Surgeon notes: reviewed and Crohn's disease of small intestine with fistula    Previous anesthesia records:7/6/18-Colonoscopy-General  -no apparent anesthetic complications    Anesthesia Hx:  No problems with previous Anesthesia  Denies Family Hx of Anesthesia complications.        Last PCP note: within 3 months , within [facility] , focused visit , Gastro MPANCHITO as PCP per patient  Subspecialty notes: Gastroenterology, Infectious Disease/Urology/Wound Care-Enterostomal  Tests already available:  Results have been reviewed.Labs-6/12/18-CMP/CBC/ CXR-2/4/18      Plan:    Testing:  None needed at this time      Patient  has previously scheduled Medical Appointment:None    Navigation:  Phone Completed              Consults scheduled.-Perioperative IM Consult(per surgeons' request) on 8/22/18 @ 10a.               Results will be tracked by Preop Clinic.                **Patient is a very difficult IV stick & requires anesthesia for IV.**                          Mona Solo RN  8/15/18  [facility]  Anesthesia Pre-Operative Evaluation         Patient Name: Carline Chang  YOB: 1965  MRN: [id number]    SUBJECTIVE:     Pre-operative evaluation for Procedure(s) (LRB):  CLOSURE, COLOSTOMY, LAPAROSCOPIC (N/A)     08/28/2018    Carline Chang is a 52 y.o. female w/ a significant PMHx of Crohn's c/b recurrent SBO now s/p resection with colostomy on 4/2018.    Patient now presents for the above procedure(s).      LDA: None documented.    Prev airway:   Easy mask  Grade I  Little #2    Drips: None documented.    Patient Active Problem List   Diagnosis    Calculus of ureter    Diarrhea    Rectovaginal  fistula    Crohn's disease of both small and large intestine with intestinal obstruction    Long-term use of immunosuppressant medication    Chronic, continuous use of opioids    Chronic abdominal pain    Special screening for malignant neoplasms, colon    Difficult intravenous access    History of DVT (deep vein thrombosis)- seems to be related to IV catheters    Anxiety    Acid reflux    Alkaline phosphatase elevation       Review of patient's allergies indicates:   Allergen Reactions    Morphine Other (See Comments)     Abdominal pain    Flagyl [metronidazole] Other (See Comments)     Neuropathy    Nubain [nalbuphine] Anxiety     Upper abdominal burning        Current Outpatient Medications:  No current facility-administered medications for this encounter.     Current Outpatient Medications:     azaTHIOprine (IMURAN) 50 mg Tab, Take 1 tablet (50 mg total) by mouth once daily., Disp: 90 tablet, Rfl: 0    calcium carbonate (TUMS ORAL), Take by mouth as needed (acid reflux)., Disp: , Rfl:     HYDROcodone-acetaminophen (NORCO)  mg per tablet, Take 1 tablet by mouth every 12 (twelve) hours as needed. , Disp: , Rfl:     loperamide (IMODIUM A-D) 1 mg/7.5 mL Liqd, Take 0.1 mg/kg by mouth every 12 (twelve) hours as needed. , Disp: , Rfl:     neomycin (MYCIFRADIN) 500 mg Tab, On the day before your surgery, take 2 tablets (1,000 mg) by mouth at 1pm, 2pm and 11pm., Disp: 6 tablet, Rfl: 0    promethazine (PHENERGAN) 25 MG tablet, Take 1 tablet (25 mg total) by mouth every 8 (eight) hours as needed for Nausea., Disp: 90 tablet, Rfl: 1    venlafaxine (EFFEXOR) 37.5 MG Tab, Take 1 tablet (37.5 mg total) by mouth 2 (two) times daily., Disp: 180 tablet, Rfl: 0    Past Surgical History:   Procedure Laterality Date    ABSCESS DRAINAGE      ANAL FISTULOTOMY  2018     SECTION      x2    CHOLECYSTECTOMY  2000    COLON SURGERY  2015    sigmoid loop colostomy    COLONOSCOPY  3/24/2018     rectovaginal fistula repair  01/2018    SMALL INTESTINE SURGERY  1995    per patient 10 inches removed    SMALL INTESTINE SURGERY  02/2009    abdominal abscess drained, 3 cm colon removed, 11 cm SB removed    TUBAL LIGATION      UPPER GASTROINTESTINAL ENDOSCOPY  2000       Social History     Socioeconomic History    Marital status:      Spouse name: Not on file    Number of children: Not on file    Years of education: Not on file    Highest education level: Not on file   Social Needs    Financial resource strain: Not on file    Food insecurity - worry: Not on file    Food insecurity - inability: Not on file    Transportation needs - medical: Not on file    Transportation needs - non-medical: Not on file   Occupational History    Not on file   Tobacco Use    Smoking status: Never Smoker    Smokeless tobacco: Never Used   Substance and Sexual Activity    Alcohol use: No     Alcohol/week: 0.0 oz    Drug use: No    Sexual activity: Not on file   Other Topics Concern    Not on file   Social History Narrative    Not on file       OBJECTIVE:     Vital Signs Range (Last 24H):         Significant Labs:  Lab Results   Component Value Date    WBC 8.62 06/12/2018    HGB 13.0 06/12/2018    HCT 39.9 06/12/2018     (H) 06/12/2018    ALT 8 (L) 06/12/2018    AST 18 06/12/2018     06/12/2018    K 4.5 06/12/2018     06/12/2018    CREATININE 0.9 06/12/2018    BUN 6 06/12/2018    CO2 26 06/12/2018    INR 1.2 03/18/2018       Diagnostic Studies: No relevant studies.    EKG: No recent studies available.    2D ECHO:  No results found for this or any previous visit.      ASSESSMENT/PLAN:         Anesthesia Evaluation         Review of Systems  Anesthesia Hx:  No problems with previous Anesthesia Denies Family Hx of Anesthesia complications.   Denies Personal Hx of Anesthesia complications.   Social:  No Alcohol Use, Non-Smoker    Hematology/Oncology:         -- Anemia:   EENT/Dental:   Wears  glasses   Cardiovascular:   No cardiac co-morbi ties noted per patient.   Pulmonary:   No Pulmonary co-morbities per review with patient   Renal/:   Chronic Renal Disease renal calculi    Hepatic/GI:   GERD    Neurological:   In feet due to [place]  Peripheral Neuropathy        Physical Exam  General:  Well nourished    Airway/Jaw/Neck:  Airway Findings: Mouth Opening: Normal Tongue: Normal  General Airway Assessment: Adult  Mallampati: II  TM Distance: Normal, at least 6 cm  Jaw/Neck Findings:     Neck ROM: Normal ROM      Dental:  Dental Findings: In tact   Chest/Lungs:  Chest/Lungs Findings: Clear to auscultation, Normal Respiratory Rate     Heart/Vascular:  Heart Findings: Rate: Normal  Rhythm: Regular Rhythm  Sounds: Normal        Mental Status:  Mental Status Findings:  Cooperative, Alert and Oriented     Mona Solo RN  8/15/18    Anesthesia Plan  Type of Anesthesia, risks & benefits discussed:  Anesthesia Type:  general  Patient's Preference:   Intra-op Monitoring Plan: standard ASA monitors  Intra-op Monitoring Plan Comments:   Post Op Pain Control Plan: multimodal analgesia and IV/PO Opioids PRN  Post Op Pain Control Plan Comments:   Induction:   IV  Beta Blocker:  Patient is not currently on a Beta-Blocker (No further documentation required).       Informed Consent: Patient understands risks and agrees with Anesthesia plan.  Questions answered. Anesthesia consent signed with patient.  ASA Score: 3     Day of Surgery Review of History & Physical:    H&P update referred to the surgeon.     Anesthesia Plan Notes: Unable to obtain I V access pre-operatively, despite multiple attempts from a wide array of providers.  Will plan on placement of central line under ultrasound guidance in the OR, as patient will require IV access for duration of hospital stay, in addition to access for today's procedure.  Once central line is placed, will proceed with induction.        Ready For Surgery From Anesthesia  Perspective.

## 2018-08-15 NOTE — PRE ADMISSION SCREENING
Anesthesia Assessment: Preoperative EQUATION    Planned Procedure: Procedure(s) (LRB):  CLOSURE, COLOSTOMY, LAPAROSCOPIC (N/A)  Requested Anesthesia Type:General  Surgeon: Andre Uribe MD  Service: Colon and Rectal  Known or anticipated Date of Surgery:8/29/2018    Surgeon notes: reviewed and Crohn's disease of small intestine with fistula    Previous anesthesia records:7/6/18-Colonoscopy-General  -no apparent anesthetic complications    Anesthesia Hx:  No problems with previous Anesthesia  Denies Family Hx of Anesthesia complications.        Last PCP note: within 3 months , within [facility] , focused visit , Gastro M.ROSEANNE as PCP per patient  Subspecialty notes: Gastroenterology, Infectious Disease/Urology/Wound Care-Enterostomal  Tests already available:  Results have been reviewed.Labs-6/12/18-CMP/CBC/ CXR-2/4/18      Plan:    Testing:  None needed at this time      Patient  has previously scheduled Medical Appointment:None    Navigation:  Phone Completed              Consults scheduled.-Perioperative IM Consult(per surgeons' request) on 8/22/18 @ 10a.               Results will be tracked by Preop Clinic.                **Patient is a very difficult IV stick & requires anesthesia for IV.**                          Mona Solo, JESSICA  8/15/18

## 2018-08-20 DIAGNOSIS — K50.113 CROHN'S COLITIS, WITH FISTULA: ICD-10-CM

## 2018-08-20 DIAGNOSIS — R11.0 NAUSEA: ICD-10-CM

## 2018-08-20 DIAGNOSIS — K50.119 CROHN'S DISEASE OF LARGE INTESTINE WITH COMPLICATION: ICD-10-CM

## 2018-08-21 RX ORDER — PROMETHAZINE HYDROCHLORIDE 25 MG/1
25 TABLET ORAL EVERY 8 HOURS PRN
Qty: 90 TABLET | Refills: 1 | Status: SHIPPED | OUTPATIENT
Start: 2018-08-21 | End: 2018-10-18 | Stop reason: SDUPTHER

## 2018-08-21 RX ORDER — AZATHIOPRINE 50 MG/1
50 TABLET ORAL DAILY
Qty: 90 TABLET | Refills: 0 | Status: SHIPPED | OUTPATIENT
Start: 2018-08-21 | End: 2018-11-20 | Stop reason: SDUPTHER

## 2018-08-22 ENCOUNTER — INITIAL CONSULT (OUTPATIENT)
Dept: INTERNAL MEDICINE | Facility: CLINIC | Age: 53
End: 2018-08-22
Payer: MEDICARE

## 2018-08-22 VITALS
SYSTOLIC BLOOD PRESSURE: 107 MMHG | BODY MASS INDEX: 19.71 KG/M2 | TEMPERATURE: 98 F | HEART RATE: 78 BPM | WEIGHT: 129.63 LBS | RESPIRATION RATE: 18 BRPM | OXYGEN SATURATION: 98 % | DIASTOLIC BLOOD PRESSURE: 72 MMHG

## 2018-08-22 DIAGNOSIS — K21.9 GASTROESOPHAGEAL REFLUX DISEASE, ESOPHAGITIS PRESENCE NOT SPECIFIED: ICD-10-CM

## 2018-08-22 DIAGNOSIS — Z79.60 LONG-TERM USE OF IMMUNOSUPPRESSANT MEDICATION: ICD-10-CM

## 2018-08-22 DIAGNOSIS — G89.29 CHRONIC ABDOMINAL PAIN: ICD-10-CM

## 2018-08-22 DIAGNOSIS — F11.90 CHRONIC, CONTINUOUS USE OF OPIOIDS: ICD-10-CM

## 2018-08-22 DIAGNOSIS — F41.9 ANXIETY: ICD-10-CM

## 2018-08-22 DIAGNOSIS — R19.7 DIARRHEA, UNSPECIFIED TYPE: ICD-10-CM

## 2018-08-22 DIAGNOSIS — Z86.718 HISTORY OF DVT (DEEP VEIN THROMBOSIS): ICD-10-CM

## 2018-08-22 DIAGNOSIS — Z78.9 DIFFICULT INTRAVENOUS ACCESS: ICD-10-CM

## 2018-08-22 DIAGNOSIS — R74.8 ALKALINE PHOSPHATASE ELEVATION: ICD-10-CM

## 2018-08-22 DIAGNOSIS — N82.3 RECTOVAGINAL FISTULA: ICD-10-CM

## 2018-08-22 DIAGNOSIS — R10.9 CHRONIC ABDOMINAL PAIN: ICD-10-CM

## 2018-08-22 DIAGNOSIS — Z01.818 PREOP EXAMINATION: Primary | ICD-10-CM

## 2018-08-22 DIAGNOSIS — K50.812 CROHN'S DISEASE OF BOTH SMALL AND LARGE INTESTINE WITH INTESTINAL OBSTRUCTION: ICD-10-CM

## 2018-08-22 DIAGNOSIS — N20.1 CALCULUS OF URETER: ICD-10-CM

## 2018-08-22 PROCEDURE — 99204 OFFICE O/P NEW MOD 45 MIN: CPT | Mod: S$PBB,,, | Performed by: HOSPITALIST

## 2018-08-22 PROCEDURE — 99213 OFFICE O/P EST LOW 20 MIN: CPT | Mod: PBBFAC | Performed by: HOSPITALIST

## 2018-08-22 PROCEDURE — 99999 PR PBB SHADOW E&M-EST. PATIENT-LVL III: CPT | Mod: PBBFAC,,, | Performed by: HOSPITALIST

## 2018-08-22 NOTE — ASSESSMENT & PLAN NOTE
I suggest that the perioperative team be aware of this so that appropriate preventive care can be taken

## 2018-08-22 NOTE — PROGRESS NOTES
[facility] - Pre Op Consult  Progress Note    Patient Name: Carline Chang  MRN: [id number]  Date of Evaluation- 08/22/2018  PCP- Pablo Medina MD    Future cases for Carline Chang [[id number]]     Case ID Status Date Time [place] Procedure Provider Location    [id number] [facility] 8/29/2018 10:00  CLOSURE, COLOSTOMY, LAPAROSCOPIC Andre Uribe MD [[id number]] [facility] OR 2ND FLR          HPI:  History of present illness- I had the pleasure of meeting this pleasant 52 y.o. lady in the pre op clinic prior to her elective Abdominal surgery. The patient is new to me .     I have obtained the history by speaking to the patient and by reviewing the electronic health records.    Events leading up to surgery / History of presenting illness -    Crohn's disease of small intestine with fistula  Colostomy closure   No pain from this .No aggravating factors. No relieving factors     Relevant health conditions of significance for the perioperative period/ History of presenting illness -    Patient Active Problem List    Diagnosis Date Noted    Difficult intravenous access 08/22/2018    History of DVT (deep vein thrombosis)- seems to be related to IV catheters 08/22/2018     Acute deep vein thrombosis (DVT) of brachial vein of right upper extremity  RUE right brachial vein thrombus (RUE US 1/2017               Diarrhea 04/11/2018              Long-term use of immunosuppressant medication 04/11/2018    Chronic, continuous use of opioids 04/11/2018    Chronic abdominal pain 04/11/2018          Not known to have heart disease , Diabetes Mellitus, Lung disease , HTN      Subjective/ Objective:          Chief complaint-Preoperative evaluation, Perioperative Medical management, complication reduction plan     Active cardiac conditions- none    Revised cardiac risk index predictors- none    Functional capacity -Examples of physical activity  house work, can take 1 flight of stairs and cutting the grass with a  mower----- She can undertake all the above activities without  chest pain,chest tightness, Shortness of breath ,dizziness,lightheadedness making her exercise tolerance more  than 4 Mets.      Review of Systems   Constitutional: Negative for chills and fever.        No unusual weight changes     HENT:        STOPBANG score         Age over 50      Eyes:        No new visual changes   Respiratory:        No cough , phlegm    No Hemoptysis   Cardiovascular:        As noted   Gastrointestinal:        No overt GI/ blood losses  Bowel movements-colostomy   LMP - age 42   Endocrine:        Prednisone use > 20 mg daily for 3 weeks  Last Prednisone use 2018    Genitourinary: Negative for dysuria.        No urinary hesitancy    Musculoskeletal:          No unusual, muscle, joint pains   Skin: Negative for rash.   Neurological: Negative for syncope.        No unilateral weakness   Hematological:        Current use of Anticoagulants  Current use of Antiplatelet agents  None   Psychiatric/Behavioral:         Anxiety - Controlled        No vascular stenting   No past medical history pertinent negatives.    Past Surgical History:   Procedure Laterality Date    ABSCESS DRAINAGE      ANAL FISTULOTOMY       SECTION      x2    CHOLECYSTECTOMY  2000    COLON SURGERY  2015    sigmoid loop colostomy    COLONOSCOPY  3/24/2018    rectovaginal fistula repair  2018    SMALL INTESTINE SURGERY  1995    per patient 10 inches removed    SMALL INTESTINE SURGERY  2009    abdominal abscess drained, 3 cm colon removed, 11 cm SB removed    TUBAL LIGATION      UPPER GASTROINTESTINAL ENDOSCOPY         No anesthesia, bleeding, cardiac problems , PONV with previous surgeries/procedures.  Medications and Allergies reviewed in epic.   FH- No anesthesia,bleeding  ,  in family   Lives with mother- friend can help post op     Physical Exam  Blood pressure 107/72, pulse 78, temperature 98.4 °F (36.9 °C),  resp. rate 18, weight 58.8 kg (129 lb 9.6 oz), last menstrual period 05/30/2009, SpO2 98 %.      Investigations  Lab and Imaging have been reviewed in [facility].    Review of Medicine tests      Review of clinical lab tests:  Lab Results   Component Value Date    CREATININE 0.9 06/12/2018    HGB 13.0 06/12/2018     (H) 06/12/2018 Feb 2018     Liver: Normal in size and attenuation, with no focal hepatic lesions.       Gallbladder: Status post cholecystectomy     Bile Ducts: No evidence of dilated ducts.       Review of old records- Was done and information gathered regards to events leading to surgery and health conditions of significance in the perioperative period.        Preoperative cardiac risk assessment-  The patient does not have any active cardiac conditions . Revised cardiac risk index predictors-0 ---.Functional capacity is more than 4 Mets. She will be undergoing a Abdominal procedure that carries a intermediate risk     The estimated risk of the rate of adverse cardiac outcomes  0.4%    No further cardiac work up is indicated prior to proceeding with the surgery          American Society of Anesthesiologists Physical status classification ( ASA ) class: 3     Postoperative pulmonary complication risk assessment:      ARISCAT ( Canet) risk index- risk class -  Low      Victor Hugozull Respiratory failure index- percentage risk of respiratory failure:0.5 %     Assessment/Plan:     Difficult intravenous access  I suggest that the perioperative team be aware of this so that appropriate preventive care can be taken     Rectovaginal fistula  Operated     Calculus of ureter  Was operated   No longer has problem     Long-term use of immunosuppressant medication  Azathioprine  Usually continues Azathioprine luis op        Diarrhea  Suggested staying hydrated   Gets Hypokalemia, Hypomagnesemia  Using Imodium    Lately diarrhea not as troublesome       Crohn's disease of both small and large intestine with  intestinal obstruction  No longer having obstruction     Chronic abdominal pain  On Hydrocodone since 2012   Lately , not as much a problem     History of DVT (deep vein thrombosis)- seems to be related to IV catheters  History of DVT  No PE  1 Episode  In the setting Mid line ( ? Infiltration)  Associated with no reduced mobility, no long journeys,no cancer,no  prior surgery, Hospital stay, No HRT  Was not Anticoagulated after the hospital stay   Sounds like she received prophylactic LMWH   IVC filter- No     Increased risk of thrombosis in the luis operative period     Chronic, continuous use of opioids  Chronic continuous opioid use- In view of the opioid use, the patient may have opioid tolerance . I suggest continuation of the maintenance scheduled opioid during the perioperative period. I suggest considering the possibility of opioid tolerance  in planning post operative pain control     Anxiety  I suggest monitoring the sodium as SIADH from Effexor  use and hypersecretion of ADH associated with surgery can reduce sodium in the perioperative period    Acid reflux  Controlled     Alkaline phosphatase elevation  Suggested follow up   Likely colonic source         Preventive perioperative care    Thromboembolic prophylaxis:  Her risk factors for thrombosis include crohn's disease  surgical procedure, previous history of thrombosis and age.I suggest  thromboembolic prophylaxis ( mechanical/pharmacological, weighing the risk benefits of pharmacological agent use considering luis procedural bleeding )  during the perioperative period.I suggested being active in the post operative period.      Postoperative pulmonary complication prophylaxis-Risk factors for post operative pulmonary complications include ASA class >2 and proximity of the surgical site to the lungs- I suggest incentive spirometry use, early ambulation, end tidal carbon dioxide monitoring and pain control so as to avoid diaphragmatic splinting  , oral  care , head end of bed elevation     Renal complication prophylaxis I suggest keeping her well hydrated .I suggested drinking 2 litre's of water a day ( plus more , based on the colostomy out put )      Surgical site Infection Prophylaxis-I  suggest appropriate antibiotic for Prophylaxis against Surgical site infections     Delirium prophylaxis-Risk factors - opioid use - I suggest avoidance / minimizing the use of  Benzodiazepines ( unless the patient has been taking it on a regular basis ),Anticholinergic medication,Antihistamines ( like  Benadryl).I suggest minimizing the use of opioid medication and use of IV tylenol,if it is appropriate. I suggest using the lowest possible dose of opioids for the shortest duration possible in the perioperative period. I suggest to Keep shades/blinds open during the day, lights off and shades closed at night to encourage normal sleep/wake cycle.I encourage the presence of the family member with the patient at all times, if at all possible as mental status changes can be picked up early by the family members and they help with reorientation. I encouraged the presence of family to help with orientation in the perioperative period. Benadryl avoidance suggested      This visit was focused on Preoperative evaluation, Perioperative Medical management, complication reduction plans. I suggest that the patient follows up with primary care or relevant sub specialists for ongoing health care.    I appreciate the opportunity to be involved in this patients care. Please feel free to contact me if there were any questions about this consultation.    Patient is optimized     Patient was instructed to call and update me about any changes to health,  medication, office visits ,testing out side of the l[facility] operative care center , hospitalizations between now and surgery     Marielos Sharp MD  Perioperative Medicine  [facility]   Pager [phone]  ---  8/27- 19 16     Called  to follow up ,Unable to speak ,Left a message to call, if needed   ----  8/28- 17 57     Records reviewed     Telemetry - 4/30/2018 - Sinus rhythm  March 2018 - fatty liver    Jan 2017 - Factor 5 Leiden mutation Negative     EKG from 12/19/2017 reviewed -sinus rhythm - no acute changes     UItra sound- 1/15/2017 - non compressible occlusive thrombus throughout the Rt brachial vein      Called to follow up ,Unable to speak ,Left a message to call, if needed

## 2018-08-22 NOTE — H&P (VIEW-ONLY)
[facility] - Pre Op Consult  Progress Note    Patient Name: Carline Chang  MRN: [id number]  Date of Evaluation- 08/22/2018  PCP- Pablo Medina MD    Future cases for Carline Chang [[id number]]     Case ID Status Date Time [place] Procedure Provider Location    [id number] [facility] 8/29/2018 10:00  CLOSURE, COLOSTOMY, LAPAROSCOPIC Andre Uribe MD [[id number]] [facility] OR 2ND FLR          HPI:  History of present illness- I had the pleasure of meeting this pleasant 52 y.o. lady in the pre op clinic prior to her elective Abdominal surgery. The patient is new to me .     I have obtained the history by speaking to the patient and by reviewing the electronic health records.    Events leading up to surgery / History of presenting illness -    Crohn's disease of small intestine with fistula  Colostomy closure   No pain from this .No aggravating factors. No relieving factors     Relevant health conditions of significance for the perioperative period/ History of presenting illness -    Patient Active Problem List    Diagnosis Date Noted    Difficult intravenous access 08/22/2018    History of DVT (deep vein thrombosis)- seems to be related to IV catheters 08/22/2018     Acute deep vein thrombosis (DVT) of brachial vein of right upper extremity  RUE right brachial vein thrombus (RUE US 1/2017               Diarrhea 04/11/2018              Long-term use of immunosuppressant medication 04/11/2018    Chronic, continuous use of opioids 04/11/2018    Chronic abdominal pain 04/11/2018          Not known to have heart disease , Diabetes Mellitus, Lung disease , HTN      Subjective/ Objective:          Chief complaint-Preoperative evaluation, Perioperative Medical management, complication reduction plan     Active cardiac conditions- none    Revised cardiac risk index predictors- none    Functional capacity -Examples of physical activity  house work, can take 1 flight of stairs and cutting the grass with a  mower----- She can undertake all the above activities without  chest pain,chest tightness, Shortness of breath ,dizziness,lightheadedness making her exercise tolerance more  than 4 Mets.      Review of Systems   Constitutional: Negative for chills and fever.        No unusual weight changes     HENT:        STOPBANG score         Age over 50      Eyes:        No new visual changes   Respiratory:        No cough , phlegm    No Hemoptysis   Cardiovascular:        As noted   Gastrointestinal:        No overt GI/ blood losses  Bowel movements-colostomy   LMP - age 42   Endocrine:        Prednisone use > 20 mg daily for 3 weeks  Last Prednisone use 2018    Genitourinary: Negative for dysuria.        No urinary hesitancy    Musculoskeletal:          No unusual, muscle, joint pains   Skin: Negative for rash.   Neurological: Negative for syncope.        No unilateral weakness   Hematological:        Current use of Anticoagulants  Current use of Antiplatelet agents  None   Psychiatric/Behavioral:         Anxiety - Controlled        No vascular stenting   No past medical history pertinent negatives.    Past Surgical History:   Procedure Laterality Date    ABSCESS DRAINAGE      ANAL FISTULOTOMY       SECTION      x2    CHOLECYSTECTOMY  2000    COLON SURGERY  2015    sigmoid loop colostomy    COLONOSCOPY  3/24/2018    rectovaginal fistula repair  2018    SMALL INTESTINE SURGERY  1995    per patient 10 inches removed    SMALL INTESTINE SURGERY  2009    abdominal abscess drained, 3 cm colon removed, 11 cm SB removed    TUBAL LIGATION      UPPER GASTROINTESTINAL ENDOSCOPY         No anesthesia, bleeding, cardiac problems , PONV with previous surgeries/procedures.  Medications and Allergies reviewed in epic.   FH- No anesthesia,bleeding  ,  in family   Lives with mother- friend can help post op     Physical Exam  Blood pressure 107/72, pulse 78, temperature 98.4 °F (36.9 °C),  resp. rate 18, weight 58.8 kg (129 lb 9.6 oz), last menstrual period 05/30/2009, SpO2 98 %.      Investigations  Lab and Imaging have been reviewed in [facility].    Review of Medicine tests      Review of clinical lab tests:  Lab Results   Component Value Date    CREATININE 0.9 06/12/2018    HGB 13.0 06/12/2018     (H) 06/12/2018 Feb 2018     Liver: Normal in size and attenuation, with no focal hepatic lesions.       Gallbladder: Status post cholecystectomy     Bile Ducts: No evidence of dilated ducts.       Review of old records- Was done and information gathered regards to events leading to surgery and health conditions of significance in the perioperative period.        Preoperative cardiac risk assessment-  The patient does not have any active cardiac conditions . Revised cardiac risk index predictors-0 ---.Functional capacity is more than 4 Mets. She will be undergoing a Abdominal procedure that carries a intermediate risk     The estimated risk of the rate of adverse cardiac outcomes  0.4%    No further cardiac work up is indicated prior to proceeding with the surgery          American Society of Anesthesiologists Physical status classification ( ASA ) class: 3     Postoperative pulmonary complication risk assessment:      ARISCAT ( Canet) risk index- risk class -  Low      Victor Hugozull Respiratory failure index- percentage risk of respiratory failure:0.5 %     Assessment/Plan:     Difficult intravenous access  I suggest that the perioperative team be aware of this so that appropriate preventive care can be taken     Rectovaginal fistula  Operated     Calculus of ureter  Was operated   No longer has problem     Long-term use of immunosuppressant medication  Azathioprine  Usually continues Azathioprine luis op        Diarrhea  Suggested staying hydrated   Gets Hypokalemia, Hypomagnesemia  Using Imodium    Lately diarrhea not as troublesome       Crohn's disease of both small and large intestine with  intestinal obstruction  No longer having obstruction     Chronic abdominal pain  On Hydrocodone since 2012   Lately , not as much a problem     History of DVT (deep vein thrombosis)- seems to be related to IV catheters  History of DVT  No PE  1 Episode  In the setting Mid line ( ? Infiltration)  Associated with no reduced mobility, no long journeys,no cancer,no  prior surgery, Hospital stay, No HRT  Was not Anticoagulated after the hospital stay   Sounds like she received prophylactic LMWH   IVC filter- No     Increased risk of thrombosis in the luis operative period     Chronic, continuous use of opioids  Chronic continuous opioid use- In view of the opioid use, the patient may have opioid tolerance . I suggest continuation of the maintenance scheduled opioid during the perioperative period. I suggest considering the possibility of opioid tolerance  in planning post operative pain control     Anxiety  I suggest monitoring the sodium as SIADH from Effexor  use and hypersecretion of ADH associated with surgery can reduce sodium in the perioperative period    Acid reflux  Controlled     Alkaline phosphatase elevation  Suggested follow up   Likely colonic source         Preventive perioperative care    Thromboembolic prophylaxis:  Her risk factors for thrombosis include crohn's disease  surgical procedure, previous history of thrombosis and age.I suggest  thromboembolic prophylaxis ( mechanical/pharmacological, weighing the risk benefits of pharmacological agent use considering luis procedural bleeding )  during the perioperative period.I suggested being active in the post operative period.      Postoperative pulmonary complication prophylaxis-Risk factors for post operative pulmonary complications include ASA class >2 and proximity of the surgical site to the lungs- I suggest incentive spirometry use, early ambulation, end tidal carbon dioxide monitoring and pain control so as to avoid diaphragmatic splinting  , oral  care , head end of bed elevation     Renal complication prophylaxis I suggest keeping her well hydrated .I suggested drinking 2 litre's of water a day ( plus more , based on the colostomy out put )      Surgical site Infection Prophylaxis-I  suggest appropriate antibiotic for Prophylaxis against Surgical site infections     Delirium prophylaxis-Risk factors - opioid use - I suggest avoidance / minimizing the use of  Benzodiazepines ( unless the patient has been taking it on a regular basis ),Anticholinergic medication,Antihistamines ( like  Benadryl).I suggest minimizing the use of opioid medication and use of IV tylenol,if it is appropriate. I suggest using the lowest possible dose of opioids for the shortest duration possible in the perioperative period. I suggest to Keep shades/blinds open during the day, lights off and shades closed at night to encourage normal sleep/wake cycle.I encourage the presence of the family member with the patient at all times, if at all possible as mental status changes can be picked up early by the family members and they help with reorientation. I encouraged the presence of family to help with orientation in the perioperative period. Benadryl avoidance suggested      This visit was focused on Preoperative evaluation, Perioperative Medical management, complication reduction plans. I suggest that the patient follows up with primary care or relevant sub specialists for ongoing health care.    I appreciate the opportunity to be involved in this patients care. Please feel free to contact me if there were any questions about this consultation.    Patient is optimized     Patient was instructed to call and update me about any changes to health,  medication, office visits ,testing out side of the luis operative care center , hospitalizations between now and surgery     Marielos Sharp MD  Perioperative Medicine  [facility]   Pager [phone]  ---  8/27- 19 16     Called  to follow up ,Unable to speak ,Left a message to call, if needed   ----  8/28- 17 57     Records reviewed     Telemetry - 4/30/2018 - Sinus rhythm  March 2018 - fatty liver    Jan 2017 - Factor 5 Leiden mutation Negative     EKG from 12/19/2017 reviewed -sinus rhythm - no acute changes     UItra sound- 1/15/2017 - non compressible occlusive thrombus throughout the Rt brachial vein      Called to follow up ,Unable to speak ,Left a message to call, if needed

## 2018-08-22 NOTE — ASSESSMENT & PLAN NOTE
I suggest monitoring the sodium as SIADH from Effexor  use and hypersecretion of ADH associated with surgery can reduce sodium in the perioperative period

## 2018-08-22 NOTE — HPI
History of present illness- I had the pleasure of meeting this pleasant 52 y.o. lady in the pre op clinic prior to her elective Abdominal surgery. The patient is new to me .     I have obtained the history by speaking to the patient and by reviewing the electronic health records.    Events leading up to surgery / History of presenting illness -    Crohn's disease of small intestine with fistula  Colostomy closure   No pain from this .No aggravating factors. No relieving factors     Relevant health conditions of significance for the perioperative period/ History of presenting illness -    Patient Active Problem List    Diagnosis Date Noted    Difficult intravenous access 08/22/2018    History of DVT (deep vein thrombosis)- seems to be related to IV catheters 08/22/2018     Acute deep vein thrombosis (DVT) of brachial vein of right upper extremity  RUE right brachial vein thrombus (RUE US 1/2017               Diarrhea 04/11/2018              Long-term use of immunosuppressant medication 04/11/2018    Chronic, continuous use of opioids 04/11/2018    Chronic abdominal pain 04/11/2018          Not known to have heart disease , Diabetes Mellitus, Lung disease , HTN

## 2018-08-22 NOTE — LETTER
August 22, 2018      Brianda Garcia MD  [address] [facility] [address] [address]           [facility]  [address] [facility] [facility] [address]  Phone: [phone]          Patient: Carline Chang   MR Number: [id number]   YOB: 1965   Date of Visit: 8/22/2018       Dear Dr. Brianda Garcia:    Thank you for referring Carline Chang to me for evaluation. Attached you will find relevant portions of my assessment and plan of care.    If you have questions, please do not hesitate to call me. I look forward to following Carline Chang along with you.    Sincerely,    Marielos Sharp MD    Enclosure  CC:  No Recipients    If you would like to receive this communication electronically, please contact [email or web address] or [phone] to request more information on [employer] Link access.    For providers and/or their staff who would like to refer a patient to [facility], please contact us through our one-stop-shop provider referral line, [facility], at [phone].    If you feel you have received this communication in error or would no longer like to receive these types of communications, please e-mail [email or web address]

## 2018-08-22 NOTE — OUTPATIENT SUBJECTIVE & OBJECTIVE
Outpatient Subjective & Objective     Chief complaint-Preoperative evaluation, Perioperative Medical management, complication reduction plan     Active cardiac conditions- none    Revised cardiac risk index predictors- none    Functional capacity -Examples of physical activity  house work, can take 1 flight of stairs and cutting the grass with a mower----- She can undertake all the above activities without  chest pain,chest tightness, Shortness of breath ,dizziness,lightheadedness making her exercise tolerance more  than 4 Mets.      Review of Systems   Constitutional: Negative for chills and fever.        No unusual weight changes     HENT:        STOPBANG score         Age over 50      Eyes:        No new visual changes   Respiratory:        No cough , phlegm    No Hemoptysis   Cardiovascular:        As noted   Gastrointestinal:        No overt GI/ blood losses  Bowel movements-colostomy   LMP - age 42   Endocrine:        Prednisone use > 20 mg daily for 3 weeks  Last Prednisone use 2018    Genitourinary: Negative for dysuria.        No urinary hesitancy    Musculoskeletal:          No unusual, muscle, joint pains   Skin: Negative for rash.   Neurological: Negative for syncope.        No unilateral weakness   Hematological:        Current use of Anticoagulants  Current use of Antiplatelet agents  None   Psychiatric/Behavioral:         Anxiety - Controlled        No vascular stenting   No past medical history pertinent negatives.    Past Surgical History:   Procedure Laterality Date    ABSCESS DRAINAGE      ANAL FISTULOTOMY  2018     SECTION      x2    CHOLECYSTECTOMY  2000    COLON SURGERY  2015    sigmoid loop colostomy    COLONOSCOPY  3/24/2018    rectovaginal fistula repair  2018    SMALL INTESTINE SURGERY      per patient 10 inches removed    SMALL INTESTINE SURGERY  2009    abdominal abscess drained, 3 cm colon removed, 11 cm SB removed    TUBAL LIGATION       UPPER GASTROINTESTINAL ENDOSCOPY  2000       No anesthesia, bleeding, cardiac problems , PONV with previous surgeries/procedures.  Medications and Allergies reviewed in epic.   FH- No anesthesia,bleeding  ,  in family   Lives with mother- friend can help post op     Physical Exam  Blood pressure 107/72, pulse 78, temperature 98.4 °F (36.9 °C), resp. rate 18, weight 58.8 kg (129 lb 9.6 oz), last menstrual period 05/30/2009, SpO2 98 %.    Physical Exam  Constitutional- Vitals - Body mass index is 19.71 kg/m².,   Vitals:    08/22/18 1052   BP: 107/72   Pulse: 78   Resp: 18   Temp: 98.4 °F (36.9 °C)     General appearance-Conscious,Coherent  Eyes- No conjunctival icterus,pupils  round  and reactive to light   ENT-Oral cavity- moist  , Hearing grossly normal   Neck- No thyromegaly ,Trachea -central, No jugular venous distension,   No Carotid Bruit   Cardiovascular -Heart Sounds- Normal  and  no murmur   , No gallop rhythm   Respiratory - Normal Respiratory Effort, Normal breath sounds,  no wheeze  and  no forced expiratory wheeze    Peripheral pitting pedal edema-- none , no calf pain   Gastrointestinal -Soft abdomen, No palpable masses, Non Tender,Liver,Spleen not palpable. No-- free fluid and shifting dullness  Musculoskeletal- No finger Clubbing. Strength grossly normal   Lymphatic-No Palpable cervical, axillary,Inguinal lymphadenopathy   Psychiatric - normal effect,Orientation  Rt Dorsalis pedis pulses-palpable    Lt Dorsalis pedis pulses- palpable   Rt Posterior tibial pulses -palpable   Left posterior tibial pulses -palpable   Miscellaneous - colostomy LLQ and  no renal bruit    Investigations  Lab and Imaging have been reviewed in [facility].    Review of Medicine tests    EKG- I had independently reviewed the EKG from--  It was reported to be showing     Review of clinical lab tests:  Lab Results   Component Value Date    CREATININE 0.9 06/12/2018    HGB 13.0 06/12/2018     (H) 06/12/2018 Feb 2018     Liver:  Normal in size and attenuation, with no focal hepatic lesions.       Gallbladder: Status post cholecystectomy     Bile Ducts: No evidence of dilated ducts.       Review of old records- Was done and information gathered regards to events leading to surgery and health conditions of significance in the perioperative period.    Outpatient Subjective & Objective

## 2018-08-22 NOTE — ASSESSMENT & PLAN NOTE
Suggested staying hydrated   Gets Hypokalemia, Hypomagnesemia  Using Imodium    Lately diarrhea not as troublesome

## 2018-08-27 ENCOUNTER — TELEPHONE (OUTPATIENT)
Dept: SURGERY | Facility: CLINIC | Age: 53
End: 2018-08-27

## 2018-08-27 RX ORDER — NEOMYCIN SULFATE 500 MG/1
TABLET ORAL
Qty: 6 TABLET | Refills: 0 | Status: SHIPPED | OUTPATIENT
Start: 2018-08-27 | End: 2018-09-11

## 2018-08-27 RX ORDER — CLINDAMYCIN HYDROCHLORIDE 300 MG/1
300 CAPSULE ORAL ONCE
Qty: 3 CAPSULE | Refills: 0 | Status: SHIPPED | OUTPATIENT
Start: 2018-08-27 | End: 2018-08-27

## 2018-08-27 NOTE — TELEPHONE ENCOUNTER
Spoke with patient and informed her that [place] is sending her pre op prescriptions in to [facility].

## 2018-08-27 NOTE — TELEPHONE ENCOUNTER
----- Message from Giovanny Benton sent at 8/27/2018 12:49 PM CDT -----  Contact: Pt:[phone]  .Needs Advice    Reason for call: Pt called and states she is having surgery on 08-29-18 and the pt states her Prep Rx is not at the pharmacy. Pt states she would like to speak with the nurse in regards to getting her Rx seen to her pharmacy.      Communication Preference:Pt:[phone]  Additional Information:

## 2018-08-27 NOTE — TELEPHONE ENCOUNTER
----- Message from Esme Patel sent at 8/27/2018  1:36 PM CDT -----  Contact: self - [phone]  oksana - is asking if anyone called in meds for her surgery on Wednesday - please call patient at

## 2018-08-29 ENCOUNTER — HOSPITAL ENCOUNTER (INPATIENT)
Facility: HOSPITAL | Age: 53
LOS: 4 days | Discharge: HOME | DRG: 330 | End: 2018-09-02
Attending: COLON & RECTAL SURGERY | Admitting: COLON & RECTAL SURGERY
Payer: MEDICARE

## 2018-08-29 ENCOUNTER — ANESTHESIA (OUTPATIENT)
Dept: SURGERY | Facility: HOSPITAL | Age: 53
DRG: 330 | End: 2018-08-29
Payer: MEDICARE

## 2018-08-29 DIAGNOSIS — K50.812 CROHN'S DISEASE OF BOTH SMALL AND LARGE INTESTINE WITH INTESTINAL OBSTRUCTION: Primary | ICD-10-CM

## 2018-08-29 DIAGNOSIS — Z93.3 COLOSTOMY IN PLACE: ICD-10-CM

## 2018-08-29 LAB
ABO + RH BLD: NORMAL
BLD GP AB SCN CELLS X3 SERPL QL: NORMAL

## 2018-08-29 PROCEDURE — 12000002 HC ACUTE/MED SURGE SEMI-PRIVATE ROOM

## 2018-08-29 PROCEDURE — 25000003 PHARM REV CODE 250: Performed by: NURSE ANESTHETIST, CERTIFIED REGISTERED

## 2018-08-29 PROCEDURE — D9220A PRA ANESTHESIA: Mod: CRNA,,, | Performed by: NURSE ANESTHETIST, CERTIFIED REGISTERED

## 2018-08-29 PROCEDURE — 25000003 PHARM REV CODE 250: Performed by: ANESTHESIOLOGY

## 2018-08-29 PROCEDURE — 36000710: Performed by: COLON & RECTAL SURGERY

## 2018-08-29 PROCEDURE — 25000003 PHARM REV CODE 250: Performed by: NURSE PRACTITIONER

## 2018-08-29 PROCEDURE — C9290 INJ, BUPIVACAINE LIPOSOME: HCPCS | Performed by: COLON & RECTAL SURGERY

## 2018-08-29 PROCEDURE — D9220A PRA ANESTHESIA: Mod: ANES,,, | Performed by: ANESTHESIOLOGY

## 2018-08-29 PROCEDURE — 88307 TISSUE EXAM BY PATHOLOGIST: CPT | Mod: 26,,, | Performed by: PATHOLOGY

## 2018-08-29 PROCEDURE — 88307 TISSUE EXAM BY PATHOLOGIST: CPT | Performed by: PATHOLOGY

## 2018-08-29 PROCEDURE — 88304 TISSUE EXAM BY PATHOLOGIST: CPT | Mod: 26,,, | Performed by: PATHOLOGY

## 2018-08-29 PROCEDURE — 71000033 HC RECOVERY, INTIAL HOUR: Performed by: COLON & RECTAL SURGERY

## 2018-08-29 PROCEDURE — 27201423 OPTIME MED/SURG SUP & DEVICES STERILE SUPPLY: Performed by: COLON & RECTAL SURGERY

## 2018-08-29 PROCEDURE — 0DBB0ZZ EXCISION OF ILEUM, OPEN APPROACH: ICD-10-PCS | Performed by: COLON & RECTAL SURGERY

## 2018-08-29 PROCEDURE — 63600175 PHARM REV CODE 636 W HCPCS

## 2018-08-29 PROCEDURE — 63600175 PHARM REV CODE 636 W HCPCS: Performed by: NURSE ANESTHETIST, CERTIFIED REGISTERED

## 2018-08-29 PROCEDURE — 63600175 PHARM REV CODE 636 W HCPCS: Performed by: NURSE PRACTITIONER

## 2018-08-29 PROCEDURE — 36000711: Performed by: COLON & RECTAL SURGERY

## 2018-08-29 PROCEDURE — C9285 PATCH, LIDOCAINE/TETRACAINE: HCPCS | Performed by: ANESTHESIOLOGY

## 2018-08-29 PROCEDURE — 63600175 PHARM REV CODE 636 W HCPCS: Performed by: ANESTHESIOLOGY

## 2018-08-29 PROCEDURE — 0DBN0ZZ EXCISION OF SIGMOID COLON, OPEN APPROACH: ICD-10-PCS | Performed by: COLON & RECTAL SURGERY

## 2018-08-29 PROCEDURE — 25000003 PHARM REV CODE 250: Performed by: COLON & RECTAL SURGERY

## 2018-08-29 PROCEDURE — 37000009 HC ANESTHESIA EA ADD 15 MINS: Performed by: COLON & RECTAL SURGERY

## 2018-08-29 PROCEDURE — 86850 RBC ANTIBODY SCREEN: CPT

## 2018-08-29 PROCEDURE — 44625 REPAIR BOWEL OPENING: CPT | Mod: GC,,, | Performed by: COLON & RECTAL SURGERY

## 2018-08-29 PROCEDURE — S0077 INJECTION, CLINDAMYCIN PHOSP: HCPCS | Performed by: NURSE ANESTHETIST, CERTIFIED REGISTERED

## 2018-08-29 PROCEDURE — 37000008 HC ANESTHESIA 1ST 15 MINUTES: Performed by: COLON & RECTAL SURGERY

## 2018-08-29 PROCEDURE — 63600175 PHARM REV CODE 636 W HCPCS: Performed by: COLON & RECTAL SURGERY

## 2018-08-29 PROCEDURE — 36556 INSERT NON-TUNNEL CV CATH: CPT | Mod: 59,,, | Performed by: ANESTHESIOLOGY

## 2018-08-29 PROCEDURE — 71000039 HC RECOVERY, EACH ADD'L HOUR: Performed by: COLON & RECTAL SURGERY

## 2018-08-29 PROCEDURE — 25000003 PHARM REV CODE 250: Performed by: STUDENT IN AN ORGANIZED HEALTH CARE EDUCATION/TRAINING PROGRAM

## 2018-08-29 PROCEDURE — C1765 ADHESION BARRIER: HCPCS | Performed by: COLON & RECTAL SURGERY

## 2018-08-29 DEVICE — MEMBRANE SEPRAFILM 5 X 6: Type: IMPLANTABLE DEVICE | Site: ABDOMEN | Status: FUNCTIONAL

## 2018-08-29 RX ORDER — DEXAMETHASONE SODIUM PHOSPHATE 4 MG/ML
INJECTION, SOLUTION INTRA-ARTICULAR; INTRALESIONAL; INTRAMUSCULAR; INTRAVENOUS; SOFT TISSUE
Status: DISCONTINUED | OUTPATIENT
Start: 2018-08-29 | End: 2018-08-29

## 2018-08-29 RX ORDER — AZATHIOPRINE 50 MG/1
50 TABLET ORAL DAILY
Status: DISCONTINUED | OUTPATIENT
Start: 2018-08-30 | End: 2018-09-02 | Stop reason: HOSPADM

## 2018-08-29 RX ORDER — KETAMINE HCL IN 0.9 % NACL 50 MG/5 ML
SYRINGE (ML) INTRAVENOUS
Status: DISCONTINUED | OUTPATIENT
Start: 2018-08-29 | End: 2018-08-29

## 2018-08-29 RX ORDER — CLINDAMYCIN PHOSPHATE 900 MG/50ML
INJECTION, SOLUTION INTRAVENOUS
Status: DISCONTINUED | OUTPATIENT
Start: 2018-08-29 | End: 2018-08-29

## 2018-08-29 RX ORDER — NALOXONE HCL 0.4 MG/ML
0.02 VIAL (ML) INJECTION
Status: DISCONTINUED | OUTPATIENT
Start: 2018-08-29 | End: 2018-09-02 | Stop reason: HOSPADM

## 2018-08-29 RX ORDER — HEPARIN SODIUM 5000 [USP'U]/ML
5000 INJECTION, SOLUTION INTRAVENOUS; SUBCUTANEOUS
Status: COMPLETED | OUTPATIENT
Start: 2018-08-29 | End: 2018-08-29

## 2018-08-29 RX ORDER — ROCURONIUM BROMIDE 10 MG/ML
INJECTION, SOLUTION INTRAVENOUS
Status: DISCONTINUED | OUTPATIENT
Start: 2018-08-29 | End: 2018-08-29

## 2018-08-29 RX ORDER — SODIUM CHLORIDE 9 MG/ML
INJECTION, SOLUTION INTRAVENOUS CONTINUOUS
Status: DISCONTINUED | OUTPATIENT
Start: 2018-08-29 | End: 2018-08-30

## 2018-08-29 RX ORDER — HYDROMORPHONE HCL IN 0.9% NACL 6 MG/30 ML
PATIENT CONTROLLED ANALGESIA SYRINGE INTRAVENOUS CONTINUOUS
Status: DISCONTINUED | OUTPATIENT
Start: 2018-08-29 | End: 2018-09-01

## 2018-08-29 RX ORDER — MUPIROCIN 20 MG/G
OINTMENT TOPICAL
Status: DISCONTINUED | OUTPATIENT
Start: 2018-08-29 | End: 2018-08-31

## 2018-08-29 RX ORDER — MIDAZOLAM HYDROCHLORIDE 2 MG/ML
20 SYRUP ORAL ONCE
Status: COMPLETED | OUTPATIENT
Start: 2018-08-29 | End: 2018-08-29

## 2018-08-29 RX ORDER — FENTANYL CITRATE 50 UG/ML
INJECTION, SOLUTION INTRAMUSCULAR; INTRAVENOUS
Status: DISCONTINUED | OUTPATIENT
Start: 2018-08-29 | End: 2018-08-29

## 2018-08-29 RX ORDER — SODIUM CHLORIDE 0.9 % (FLUSH) 0.9 %
3 SYRINGE (ML) INJECTION
Status: DISCONTINUED | OUTPATIENT
Start: 2018-08-29 | End: 2018-08-30 | Stop reason: HOSPADM

## 2018-08-29 RX ORDER — PROPOFOL 10 MG/ML
VIAL (ML) INTRAVENOUS
Status: DISCONTINUED | OUTPATIENT
Start: 2018-08-29 | End: 2018-08-29

## 2018-08-29 RX ORDER — ENOXAPARIN SODIUM 100 MG/ML
40 INJECTION SUBCUTANEOUS EVERY 24 HOURS
Status: DISCONTINUED | OUTPATIENT
Start: 2018-08-29 | End: 2018-09-02 | Stop reason: HOSPADM

## 2018-08-29 RX ORDER — NALOXONE HCL 0.4 MG/ML
0.02 VIAL (ML) INJECTION
Status: DISCONTINUED | OUTPATIENT
Start: 2018-08-29 | End: 2018-08-29 | Stop reason: SDUPTHER

## 2018-08-29 RX ORDER — ONDANSETRON 2 MG/ML
4 INJECTION INTRAMUSCULAR; INTRAVENOUS EVERY 6 HOURS PRN
Status: DISCONTINUED | OUTPATIENT
Start: 2018-08-29 | End: 2018-09-02 | Stop reason: HOSPADM

## 2018-08-29 RX ORDER — OXYMETAZOLINE HCL 0.05 %
SPRAY, NON-AEROSOL (ML) NASAL
Status: DISCONTINUED | OUTPATIENT
Start: 2018-08-29 | End: 2018-08-29

## 2018-08-29 RX ORDER — SODIUM CHLORIDE, SODIUM LACTATE, POTASSIUM CHLORIDE, CALCIUM CHLORIDE 600; 310; 30; 20 MG/100ML; MG/100ML; MG/100ML; MG/100ML
INJECTION, SOLUTION INTRAVENOUS CONTINUOUS
Status: DISCONTINUED | OUTPATIENT
Start: 2018-08-29 | End: 2018-09-01

## 2018-08-29 RX ORDER — BUPIVACAINE HYDROCHLORIDE 2.5 MG/ML
INJECTION, SOLUTION EPIDURAL; INFILTRATION; INTRACAUDAL
Status: DISCONTINUED | OUTPATIENT
Start: 2018-08-29 | End: 2018-08-29 | Stop reason: HOSPADM

## 2018-08-29 RX ORDER — VENLAFAXINE 37.5 MG/1
37.5 TABLET ORAL 2 TIMES DAILY
Status: DISCONTINUED | OUTPATIENT
Start: 2018-08-29 | End: 2018-09-02 | Stop reason: HOSPADM

## 2018-08-29 RX ORDER — ONDANSETRON 2 MG/ML
INJECTION INTRAMUSCULAR; INTRAVENOUS
Status: DISCONTINUED | OUTPATIENT
Start: 2018-08-29 | End: 2018-08-29

## 2018-08-29 RX ORDER — GLYCOPYRROLATE 0.2 MG/ML
INJECTION INTRAMUSCULAR; INTRAVENOUS
Status: DISCONTINUED | OUTPATIENT
Start: 2018-08-29 | End: 2018-08-29

## 2018-08-29 RX ORDER — ONDANSETRON 2 MG/ML
4 INJECTION INTRAMUSCULAR; INTRAVENOUS ONCE AS NEEDED
Status: COMPLETED | OUTPATIENT
Start: 2018-08-29 | End: 2018-08-29

## 2018-08-29 RX ORDER — PHENYLEPHRINE HYDROCHLORIDE 10 MG/ML
INJECTION INTRAVENOUS
Status: DISCONTINUED | OUTPATIENT
Start: 2018-08-29 | End: 2018-08-29

## 2018-08-29 RX ORDER — NEOSTIGMINE METHYLSULFATE 1 MG/ML
INJECTION, SOLUTION INTRAVENOUS
Status: DISCONTINUED | OUTPATIENT
Start: 2018-08-29 | End: 2018-08-29

## 2018-08-29 RX ORDER — ACETAMINOPHEN 10 MG/ML
1000 INJECTION, SOLUTION INTRAVENOUS
Status: COMPLETED | OUTPATIENT
Start: 2018-08-29 | End: 2018-08-29

## 2018-08-29 RX ORDER — HYDROMORPHONE HCL IN 0.9% NACL 6 MG/30 ML
PATIENT CONTROLLED ANALGESIA SYRINGE INTRAVENOUS
Status: COMPLETED
Start: 2018-08-29 | End: 2018-08-29

## 2018-08-29 RX ADMIN — LIDOCAINE AND TETRACAINE 1 EACH: 70; 70 PATCH CUTANEOUS at 02:08

## 2018-08-29 RX ADMIN — ONDANSETRON HYDROCHLORIDE 4 MG: 2 INJECTION, SOLUTION INTRAMUSCULAR; INTRAVENOUS at 10:08

## 2018-08-29 RX ADMIN — FENTANYL CITRATE 25 MCG: 50 INJECTION, SOLUTION INTRAMUSCULAR; INTRAVENOUS at 09:08

## 2018-08-29 RX ADMIN — HEPARIN SODIUM 5000 UNITS: 5000 INJECTION, SOLUTION INTRAVENOUS; SUBCUTANEOUS at 02:08

## 2018-08-29 RX ADMIN — ROCURONIUM BROMIDE 40 MG: 10 INJECTION, SOLUTION INTRAVENOUS at 04:08

## 2018-08-29 RX ADMIN — MUPIROCIN: 20 OINTMENT TOPICAL at 01:08

## 2018-08-29 RX ADMIN — OXYMETAZOLINE HYDROCHLORIDE 2 SPRAY: 5 SPRAY NASAL at 08:08

## 2018-08-29 RX ADMIN — ROCURONIUM BROMIDE 20 MG: 10 INJECTION, SOLUTION INTRAVENOUS at 06:08

## 2018-08-29 RX ADMIN — CLINDAMYCIN PHOSPHATE 900 MG: 18 INJECTION, SOLUTION INTRAVENOUS at 04:08

## 2018-08-29 RX ADMIN — SODIUM CHLORIDE: 0.9 INJECTION, SOLUTION INTRAVENOUS at 08:08

## 2018-08-29 RX ADMIN — PHENYLEPHRINE HYDROCHLORIDE 100 MCG: 10 INJECTION INTRAVENOUS at 08:08

## 2018-08-29 RX ADMIN — PROPOFOL 100 MG: 10 INJECTION, EMULSION INTRAVENOUS at 04:08

## 2018-08-29 RX ADMIN — ACETAMINOPHEN 1000 MG: 10 INJECTION, SOLUTION INTRAVENOUS at 04:08

## 2018-08-29 RX ADMIN — DEXAMETHASONE SODIUM PHOSPHATE 4 MG: 4 INJECTION, SOLUTION INTRAMUSCULAR; INTRAVENOUS at 04:08

## 2018-08-29 RX ADMIN — NEOSTIGMINE METHYLSULFATE 3.5 MG: 1 INJECTION INTRAVENOUS at 09:08

## 2018-08-29 RX ADMIN — GLYCOPYRROLATE 0.6 MG: 0.2 INJECTION, SOLUTION INTRAMUSCULAR; INTRAVENOUS at 09:08

## 2018-08-29 RX ADMIN — IBUPROFEN 400 MG: 800 INJECTION INTRAVENOUS at 04:08

## 2018-08-29 RX ADMIN — Medication: at 10:08

## 2018-08-29 RX ADMIN — ONDANSETRON 4 MG: 2 INJECTION INTRAMUSCULAR; INTRAVENOUS at 08:08

## 2018-08-29 RX ADMIN — PHENYLEPHRINE HYDROCHLORIDE 200 MCG: 10 INJECTION INTRAVENOUS at 05:08

## 2018-08-29 RX ADMIN — Medication 20 MG: at 04:08

## 2018-08-29 RX ADMIN — CEFTRIAXONE 2 G: 1 INJECTION, SOLUTION INTRAVENOUS at 04:08

## 2018-08-29 RX ADMIN — MIDAZOLAM HYDROCHLORIDE 20 MG: 2 SYRUP ORAL at 02:08

## 2018-08-29 RX ADMIN — SODIUM CHLORIDE: 0.9 INJECTION, SOLUTION INTRAVENOUS at 05:08

## 2018-08-29 RX ADMIN — SODIUM CHLORIDE, SODIUM GLUCONATE, SODIUM ACETATE, POTASSIUM CHLORIDE, MAGNESIUM CHLORIDE, SODIUM PHOSPHATE, DIBASIC, AND POTASSIUM PHOSPHATE: .53; .5; .37; .037; .03; .012; .00082 INJECTION, SOLUTION INTRAVENOUS at 04:08

## 2018-08-29 RX ADMIN — ROCURONIUM BROMIDE 10 MG: 10 INJECTION, SOLUTION INTRAVENOUS at 08:08

## 2018-08-29 RX ADMIN — SODIUM CHLORIDE, SODIUM LACTATE, POTASSIUM CHLORIDE, AND CALCIUM CHLORIDE: .6; .31; .03; .02 INJECTION, SOLUTION INTRAVENOUS at 10:08

## 2018-08-29 RX ADMIN — FENTANYL CITRATE 100 MCG: 50 INJECTION, SOLUTION INTRAMUSCULAR; INTRAVENOUS at 04:08

## 2018-08-29 NOTE — PROGRESS NOTES
Unable to get IV per Dr. Cameron with ultrasound to left AC or 2 attempts to left breast with accuvein.  MD orders versed po due to pt anxiety over IV, Dr. Cameron places synera patch over veins seen to right breast with accuvein.

## 2018-08-29 NOTE — PROGRESS NOTES
Pt relaxed and comfortable, able to fall asleep, easily aroused, Dr. Cameron unable to get IV access with ultrasound after versed and synera patch.  Dr. Cameron states pt to go to OR and place central line there.

## 2018-08-29 NOTE — INTERVAL H&P NOTE
No change in H+P I have again explained the procedure including indications, alternatives, expected outcomes and potential complications. All questions were answered          Active Hospital Problems    Diagnosis  POA    Colostomy in place [Z93.3]  Not Applicable      Resolved Hospital Problems   No resolved problems to display.

## 2018-08-29 NOTE — ANESTHESIA PROCEDURE NOTES
Central Line    Diagnosis: Chron's disease  Patient location during procedure: done in OR  Procedure start time: 8/29/2018 3:59 PM  Timeout: 8/29/2018 3:59 PM  Procedure end time: 8/29/2018 4:15 PM  Staffing  Anesthesiologist: Dale Cameron MD  Performed: anesthesiologist   Anesthesiologist was present at the time of the procedure.  Preanesthetic Checklist  Completed: patient identified, surgical consent, pre-op evaluation, timeout performed, IV checked, risks and benefits discussed, monitors and equipment checked and anesthesia consent given  Indication  Indication: vascular access, med administration     Anesthesia   local infiltration    Central Line  Skin Prep: skin prepped with ChloraPrep, skin prep agent completely dried prior to procedure  maximum sterile barriers used during central venous catheter insertion  hand hygiene performed prior to central venous catheter insertion  Location: right internal jugular,   Catheter type: double lumen  Catheter Size: 8 Fr  Ultrasound: vascular probe with ultrasound  Vessel Caliber: large, patent, compressibility normal  Needle advanced into vessel with real time Ultrasound guidance.  Guidewire confirmed in vessel.  Sterile sheath used.   Manometry: Venous cannualation confirmed by visual estimation of blood vessel pressure using manometry.  Insertion Attempts: 1   Securement:line sutured, chlorhexidine patch, sterile dressing applied and blood return through all ports     Post-Procedure

## 2018-08-29 NOTE — PROGRESS NOTES
Dr. Cameron notified do not see any usable spots for IV access except for small vein over left breast seen with accuvein light.  MD states wants to evaluate pt using ultrasound to find vein, pt notified and verbalizes understanding.

## 2018-08-30 LAB
ANION GAP SERPL CALC-SCNC: 13 MMOL/L
BASOPHILS # BLD AUTO: 0.03 K/UL
BASOPHILS NFR BLD: 0.2 %
BUN SERPL-MCNC: 8 MG/DL
CALCIUM SERPL-MCNC: 7.2 MG/DL
CHLORIDE SERPL-SCNC: 105 MMOL/L
CO2 SERPL-SCNC: 21 MMOL/L
CREAT SERPL-MCNC: 0.8 MG/DL
DIFFERENTIAL METHOD: ABNORMAL
EOSINOPHIL # BLD AUTO: 0 K/UL
EOSINOPHIL NFR BLD: 0 %
ERYTHROCYTE [DISTWIDTH] IN BLOOD BY AUTOMATED COUNT: 15.5 %
EST. GFR  (AFRICAN AMERICAN): >60 ML/MIN/1.73 M^2
EST. GFR  (NON AFRICAN AMERICAN): >60 ML/MIN/1.73 M^2
GLUCOSE SERPL-MCNC: 114 MG/DL
HCT VFR BLD AUTO: 32.8 %
HGB BLD-MCNC: 11.2 G/DL
IMM GRANULOCYTES # BLD AUTO: 0.07 K/UL
IMM GRANULOCYTES NFR BLD AUTO: 0.4 %
LYMPHOCYTES # BLD AUTO: 0.3 K/UL
LYMPHOCYTES NFR BLD: 1.7 %
MAGNESIUM SERPL-MCNC: 1 MG/DL
MCH RBC QN AUTO: 29.1 PG
MCHC RBC AUTO-ENTMCNC: 34.1 G/DL
MCV RBC AUTO: 85 FL
MONOCYTES # BLD AUTO: 0.8 K/UL
MONOCYTES NFR BLD: 5 %
NEUTROPHILS # BLD AUTO: 15.5 K/UL
NEUTROPHILS NFR BLD: 92.7 %
NRBC BLD-RTO: 0 /100 WBC
PHOSPHATE SERPL-MCNC: 5.2 MG/DL
PLATELET # BLD AUTO: 284 K/UL
PMV BLD AUTO: 9.4 FL
POTASSIUM SERPL-SCNC: 2.9 MMOL/L
RBC # BLD AUTO: 3.85 M/UL
SODIUM SERPL-SCNC: 139 MMOL/L
WBC # BLD AUTO: 16.74 K/UL

## 2018-08-30 PROCEDURE — 25000003 PHARM REV CODE 250: Performed by: STUDENT IN AN ORGANIZED HEALTH CARE EDUCATION/TRAINING PROGRAM

## 2018-08-30 PROCEDURE — 80048 BASIC METABOLIC PNL TOTAL CA: CPT

## 2018-08-30 PROCEDURE — 83735 ASSAY OF MAGNESIUM: CPT

## 2018-08-30 PROCEDURE — C9113 INJ PANTOPRAZOLE SODIUM, VIA: HCPCS | Performed by: STUDENT IN AN ORGANIZED HEALTH CARE EDUCATION/TRAINING PROGRAM

## 2018-08-30 PROCEDURE — 63600175 PHARM REV CODE 636 W HCPCS: Performed by: STUDENT IN AN ORGANIZED HEALTH CARE EDUCATION/TRAINING PROGRAM

## 2018-08-30 PROCEDURE — 85025 COMPLETE CBC W/AUTO DIFF WBC: CPT

## 2018-08-30 PROCEDURE — 84100 ASSAY OF PHOSPHORUS: CPT

## 2018-08-30 PROCEDURE — 63600175 PHARM REV CODE 636 W HCPCS: Performed by: COLON & RECTAL SURGERY

## 2018-08-30 PROCEDURE — 20600001 HC STEP DOWN PRIVATE ROOM

## 2018-08-30 RX ORDER — HYDROMORPHONE HYDROCHLORIDE 1 MG/ML
0.5 INJECTION, SOLUTION INTRAMUSCULAR; INTRAVENOUS; SUBCUTANEOUS EVERY 4 HOURS PRN
Status: DISCONTINUED | OUTPATIENT
Start: 2018-08-30 | End: 2018-09-02 | Stop reason: HOSPADM

## 2018-08-30 RX ORDER — POTASSIUM CHLORIDE 7.46 G/1000ML
10 INJECTION, SOLUTION INTRAVENOUS
Status: DISCONTINUED | OUTPATIENT
Start: 2018-08-30 | End: 2018-08-30

## 2018-08-30 RX ORDER — HYDROMORPHONE HYDROCHLORIDE 1 MG/ML
0.5 INJECTION, SOLUTION INTRAMUSCULAR; INTRAVENOUS; SUBCUTANEOUS ONCE AS NEEDED
Status: COMPLETED | OUTPATIENT
Start: 2018-08-30 | End: 2018-08-30

## 2018-08-30 RX ORDER — ACETAMINOPHEN 10 MG/ML
1000 INJECTION, SOLUTION INTRAVENOUS EVERY 8 HOURS
Status: COMPLETED | OUTPATIENT
Start: 2018-08-30 | End: 2018-08-31

## 2018-08-30 RX ORDER — POTASSIUM CHLORIDE 14.9 MG/ML
20 INJECTION INTRAVENOUS
Status: DISCONTINUED | OUTPATIENT
Start: 2018-08-30 | End: 2018-08-30

## 2018-08-30 RX ORDER — POTASSIUM CHLORIDE 7.45 MG/ML
10 INJECTION INTRAVENOUS
Status: COMPLETED | OUTPATIENT
Start: 2018-08-30 | End: 2018-08-30

## 2018-08-30 RX ADMIN — Medication: at 03:08

## 2018-08-30 RX ADMIN — VENLAFAXINE 37.5 MG: 37.5 TABLET ORAL at 09:08

## 2018-08-30 RX ADMIN — ACETAMINOPHEN 1000 MG: 10 INJECTION, SOLUTION INTRAVENOUS at 03:08

## 2018-08-30 RX ADMIN — VENLAFAXINE 37.5 MG: 37.5 TABLET ORAL at 10:08

## 2018-08-30 RX ADMIN — PROMETHAZINE HYDROCHLORIDE 6.25 MG: 25 INJECTION, SOLUTION INTRAMUSCULAR; INTRAVENOUS at 06:08

## 2018-08-30 RX ADMIN — ENOXAPARIN SODIUM 40 MG: 40 INJECTION, SOLUTION INTRAVENOUS; SUBCUTANEOUS at 06:08

## 2018-08-30 RX ADMIN — Medication: at 12:08

## 2018-08-30 RX ADMIN — POTASSIUM CHLORIDE 10 MEQ: 10 INJECTION, SOLUTION INTRAVENOUS at 02:08

## 2018-08-30 RX ADMIN — MAGNESIUM SULFATE HEPTAHYDRATE 3 G: 500 INJECTION, SOLUTION INTRAMUSCULAR; INTRAVENOUS at 09:08

## 2018-08-30 RX ADMIN — SODIUM CHLORIDE, SODIUM LACTATE, POTASSIUM CHLORIDE, AND CALCIUM CHLORIDE: .6; .31; .03; .02 INJECTION, SOLUTION INTRAVENOUS at 02:08

## 2018-08-30 RX ADMIN — DEXTROSE 40 MG: 50 INJECTION, SOLUTION INTRAVENOUS at 05:08

## 2018-08-30 RX ADMIN — POTASSIUM CHLORIDE 10 MEQ: 10 INJECTION, SOLUTION INTRAVENOUS at 04:08

## 2018-08-30 RX ADMIN — HYDROMORPHONE HYDROCHLORIDE 0.5 MG: 1 INJECTION, SOLUTION INTRAMUSCULAR; INTRAVENOUS; SUBCUTANEOUS at 02:08

## 2018-08-30 RX ADMIN — POTASSIUM CHLORIDE 10 MEQ: 10 INJECTION, SOLUTION INTRAVENOUS at 06:08

## 2018-08-30 RX ADMIN — SODIUM CHLORIDE, SODIUM LACTATE, POTASSIUM CHLORIDE, AND CALCIUM CHLORIDE 1000 ML: .6; .31; .03; .02 INJECTION, SOLUTION INTRAVENOUS at 10:08

## 2018-08-30 RX ADMIN — ONDANSETRON 4 MG: 2 INJECTION INTRAMUSCULAR; INTRAVENOUS at 11:08

## 2018-08-30 RX ADMIN — POTASSIUM CHLORIDE 10 MEQ: 10 INJECTION, SOLUTION INTRAVENOUS at 05:08

## 2018-08-30 RX ADMIN — IBUPROFEN 800 MG: 800 INJECTION INTRAVENOUS at 12:08

## 2018-08-30 RX ADMIN — ONDANSETRON 4 MG: 2 INJECTION INTRAMUSCULAR; INTRAVENOUS at 10:08

## 2018-08-30 RX ADMIN — Medication: at 08:08

## 2018-08-30 RX ADMIN — ACETAMINOPHEN 1000 MG: 10 INJECTION, SOLUTION INTRAVENOUS at 09:08

## 2018-08-30 RX ADMIN — POTASSIUM CHLORIDE 10 MEQ: 10 INJECTION, SOLUTION INTRAVENOUS at 03:08

## 2018-08-30 RX ADMIN — IBUPROFEN 800 MG: 800 INJECTION INTRAVENOUS at 06:08

## 2018-08-30 RX ADMIN — IBUPROFEN 800 MG: 800 INJECTION INTRAVENOUS at 11:08

## 2018-08-30 RX ADMIN — Medication: at 07:08

## 2018-08-30 RX ADMIN — AZATHIOPRINE 50 MG: 50 TABLET ORAL at 10:08

## 2018-08-30 RX ADMIN — TOPICAL ANESTHETIC: 200 SPRAY DENTAL; PERIODONTAL at 10:08

## 2018-08-30 RX ADMIN — POTASSIUM CHLORIDE 10 MEQ: 10 INJECTION, SOLUTION INTRAVENOUS at 07:08

## 2018-08-30 NOTE — BRIEF OP NOTE
[facility]  Colon and Rectal Surgery  Operative Note    SUMMARY     Date of Procedure: 8/29/2018     Procedure: Procedure(s) (LRB):  CLOSURE, COLOSTOMY, LAPAROSCOPIC Converted to OPEN  Mobilization of splenic flexure  Lysis of adhesion (N/A)     Surgeon(s) and Role:     * Andre Uribe MD - Primary     * Khushboo Odonnell MD - Resident - Assisting     * Andre Ramirez MD - Resident - Assisting        Pre-Operative Diagnosis: Crohn's disease of small intestine with fistula [K50.013]    Post-Operative Diagnosis: Post-Op Diagnosis Codes:     * Crohn's disease of small intestine with fistula [K50.013]    Anesthesia: General    Technical Procedures Used: Lap converted to open due to adhesions               Estimated Blood Loss (EBL): 150 mL              Specimens:   Specimen (12h ago, onward)    None           Condition: Stable    Disposition: PACU - hemodynamically stable.    Attestation: I was present and scrubbed for the entire procedure.

## 2018-08-30 NOTE — NURSING
Called placed to on call surgery internist at this time pt demanding to speak with an MD in person states she cannot go through the night pushing her own pain button.  Pt states she has to stay woke all night so her pain is controlled.

## 2018-08-30 NOTE — NURSING
Pt just arrived from PACU moments ago very agitated about PCA pump stating she wants it discontinued because its not helping her. She states she has been here several times for surgery and her pain regimen has always been the same accept for this time. Pt very agitated at this time.  I explained to patient that I will call the on call MD as soon as I assess her and get her vital signs. Pt always expressed concerns about her right nare NG tube stating she wants it out.

## 2018-08-30 NOTE — OP NOTE
DATE OF PROCEDURE:  08/29/2018    PREOPERATIVE DIAGNOSIS:  Colostomy with Crohn disease.    POSTOPERATIVE DIAGNOSIS:  Colostomy with Crohn disease.    PROCEDURE PERFORMED:  Colostomy lap converted to open colostomy closure.    SURGEON:  Andre Uribe M.D.    RESIDENT:  ROSEANNE Odonnell.    ANESTHESIA:  General endotracheal.    INDICATION:  Ms. Chang is a 52-year-old female, known to Colorectal   Surgery with Crohn disease who had a previous colostomy to repair a colovaginal   fistula with stricture.  The fistula has repaired the stricture with treatment   with biologics has resolved.  She is here for colostomy closure.  She has an end   colostomy.    PROCEDURE IN DETAIL:  The patient was taken to the Operating Room and placed in   a modified lithotomy position.  All pressure points were padded.  Abdomen was   prepped and draped in a sterile manner.  Using a Veress needle in the left upper   quadrant, pneumoperitoneum was established.  Using the Optiview technique in   the right mid abdomen, an 8 mm AirSeal trocar was placed.  A 5 mm trocar was   placed in the right lower quadrant and a 10 mm balloon tip trocar was placed in   the umbilicus.  The colostomy was identified and it was mobilized up to the   abdominal wall.  Then, the patient was placed in Trendelenburg and rotated to   her left.  The rectal remnant was identified.  With careful and tedious   dissection, it was completely mobilized.  At this point, the colostomy was then   taken down at the skin level and the anvil for a 29 circular stapler was placed.    The colostomy was then placed in the abdominal cavity.  The defect was closed.    Unfortunately, the colostomy did not reach at this point period and the   splenic flexure needed to be mobilized.  However, there was extremely dense   adhesions and twerking and twisting of the colon that made this unsafe   laparoscopically.  A midline incision was made and pneumoperitoneum was   released.  The splenic  flexure was then completely mobilized assuring that the   colon reached the rectal remnant with no tension.  Prior to stapling the   anastomosis, the proximal portion of the rectosigmoid junction was resected back   to fresh tissue with a fire of the TA stapler.  An end-to-end anastomosis was   fashioned.  Colonoscopic evaluation at this point showed no evidence of an air   leak.  There was no bleeding.  Hemostasis was assured.  Exparel was placed as a   TAPS block on instillation of pneumoperitoneum initially.  A piece of Seprafilm   was placed in the abdomen.  Fascia was closed with #1 looped PDS.  Skin was   closed with 4-0 Monocryl.  The stoma site was closed with a PDS for fascia and   skin staples were applied.  The trocar sites were closed with 4-0 Monocryl.    Hemostasis was assured.  Sponge and needle counts were correct.  The patient was   transported to the Recovery Room in stable condition.      DAM/HN  dd: 08/30/2018 12:24:47 (CDT)  td: 08/30/2018 15:48:38 (CDT)  Doc ID   #[id number]  Job ID #[id number]    CC:

## 2018-08-30 NOTE — ASSESSMENT & PLAN NOTE
Carline Chang is a 52 y.o. female s/p CLOSURE, COLOSTOMY, LAPAROSCOPIC (N/A) converted to open on 8/29/2018 recovering in stable condition on the floor continuing to work on pain control.     - prn pain and nausea control - PCA, IV anti-emetics   - maintain room air as tolerated  - HDS    - NPO while NGT in place, okay for ice chips and meds by mouth, MIVF, restarting home meds, hurricaine spray   - hudson to remain in place   - Hct 32.8 from 39.9  - will likely need long term access, continuing CVL today    - PT consult for recs requiring dispo/DME if required   - OOB, ICS 10x/H, SCDs  Dispo: pending SHON HOPKINS pain control

## 2018-08-30 NOTE — TRANSFER OF CARE
"Anesthesia Transfer of Care Note    Patient: Carline Chang    Procedure(s) Performed: Procedure(s) (LRB):  CLOSURE, COLOSTOMY, LAPAROSCOPIC (N/A)    Patient location: PACU    Anesthesia Type: general    Transport from OR: Transported from OR on 6-10 L/min O2 by face mask with adequate spontaneous ventilation    Post pain: adequate analgesia    Post assessment: no apparent anesthetic complications    Post vital signs: stable    Level of consciousness: awake, oriented and alert    Nausea/Vomiting: no nausea/vomiting    Complications: none    Transfer of care protocol was followed      Last vitals:   Visit Vitals  BP (!) 106/55   Pulse 73   Temp 36.4 °C (97.5 °F) (Temporal)   Resp 18   Ht 5' 8" (1.727 m)   Wt 58.5 kg (129 lb)   LMP 05/30/2009   SpO2 100%   Breastfeeding? No   BMI 19.61 kg/m²     "

## 2018-08-30 NOTE — PLAN OF CARE
Pt has some questions about financial needs,emailed Zohra العلي will speak w/ pt    Pablo Medina MD    [facility] [place] - [place] - [address]  [address] [facility] [place] [address]  Phone: [phone] Fax: [phone]    Future Appointments   Date Time Provider Department Center   10/17/2018 11:00 AM INJECTION, INFECTIOUS DISEASES [facility] ID [facility] [address]        08/30/18 1103   Discharge Assessment   Assessment Type Discharge Planning Assessment   Confirmed/corrected address and phone number on facesheet? Yes   Assessment information obtained from? Patient   Expected Length of Stay (days) 4   Communicated expected length of stay with patient/caregiver yes   Prior to hospitilization cognitive status: Alert/Oriented   Prior to hospitalization functional status: Needs Assistance   Current cognitive status: Alert/Oriented   Current Functional Status: Needs Assistance   Lives With child(thelma), dependent;parent(s)   Able to Return to Prior Arrangements yes   Is patient able to care for self after discharge? Yes   Who are your caregiver(s) and their phone number(s)? Tanesha Chang   mother  [phone]   Patient's perception of discharge disposition home or selfcare   Readmission Within The Last 30 Days no previous admission in last 30 days   Patient currently being followed by outpatient case management? No   Patient currently receives any other outside agency services? No   Equipment Currently Used at Home colostomy/ostomy supplies   Do you have any problems affording any of your prescribed medications? No   Is the patient taking medications as prescribed? yes   Does the patient have transportation home? Yes   Transportation Available family or friend will provide   Does the patient receive services at the [facility]? No   Discharge Plan A Home with family   Discharge Plan B Home Health   Patient/Family In Agreement With Plan yes

## 2018-08-30 NOTE — NURSING
On call surgery resident paged again via .  Pt becoming very angry and emotional at this time.  Instructed to patient to calm down and relax. She is constantly demanding for a MD to come up and speak with her regarding her pain medication.

## 2018-08-30 NOTE — NURSING
Spoke with Dr. Arizmendi whom stated he would order her a bolus dose of hydromorphone at this time but this is only a one time dose as patient will have to press her pain pump as needed. Dr. Arizmendi stated he was not going to come down because the primary care team is managing her pain and she needs to stay one the PCA pump throughout the night and let the primary team address her requested pain regimen later on today once the team rounds.  I explained this to patient and she was very upset stating she should be able to speak with and MD.

## 2018-08-30 NOTE — NURSING TRANSFER
Nursing Transfer Note      8/30/2018     Transfer To: 608    Transfer via bed    Transfer with IV pump with IV fluids, PCA pump, hudson    Transported by transportx2    Medicines sent: PCA dilaudid    Chart send with patient: Yes    Notified: friend    Patient reassessed at: 8/29/2018 (date, time)

## 2018-08-30 NOTE — PROGRESS NOTES
[facility]  Colorectal Surgery  Progress Note    Patient Name: Carline Chang  MRN: [id number]  Admission Date: 8/29/2018  Hospital Length of Stay: 1 days  Attending Physician: Andre Uribe MD    Subjective:     Interval History: No acute events overnight, tolerating her ice chips, has NGT in place and notes throat pain associated with presence of the tube, pain only controlled while awake and actively pressing PCA, assured that it will remain in place today for continued pain control as patient was concerned over its removal.     Post-Op Info:  Procedure(s) (LRB):  CLOSURE, COLOSTOMY, LAPAROSCOPIC (N/A)   1 Day Post-Op      Medications:  Continuous Infusions:   sodium chloride 0.9%      hydromorphone in 0.9 % NaCl 6 mg/30 ml      lactated ringers 100 mL/hr at 08/29/18 2232     Scheduled Meds:   azaTHIOprine  50 mg Oral Daily    enoxaparin  40 mg Subcutaneous Daily    magnesium sulfate IVPB  3 g Intravenous Once    pantoprazole 40 mg in dextrose 5 % 100 mL IVPB (over 15 minutes) (ready to mix system)  40 mg Intravenous Before breakfast    potassium chloride in water  40 mEq Intravenous Once    venlafaxine  37.5 mg Oral BID     PRN Meds:   benzocaine    mupirocin    naloxone    ondansetron    promethazine (PHENERGAN) IVPB        Objective:     Vital Signs (Most Recent):  Temp: 98.9 °F (37.2 °C) (08/30/18 0432)  Pulse: 99 (08/30/18 0432)  Resp: 12 (08/30/18 0432)  BP: 108/68 (08/30/18 0432)  SpO2: (!) 94 % (08/30/18 0432) Vital Signs (24h Range):  Temp:  [97.5 °F (36.4 °C)-98.9 °F (37.2 °C)] 98.9 °F (37.2 °C)  Pulse:  [72-99] 99  Resp:  [12-20] 12  SpO2:  [94 %-100 %] 94 %  BP: ()/(50-80) 108/68     Intake/Output - Last 3 Shifts       08/28 0700 - 08/29 0659 08/29 0700 - 08/30 0659 08/30 0700 - 08/31 0659    I.V. (mL/kg)  3620 (61.9)     IV Piggyback  50     Total Intake(mL/kg)  3670 (62.7)     Urine (mL/kg/hr)  700     Drains  200     Blood  150     Total Output   1050     Net  +2620                  Physical Exam   Constitutional: She is oriented to person, place, and time. She appears well-developed and well-nourished.   HENT:   Head: Normocephalic and atraumatic.   NGT in place, minimal thin bilious output, R CVL   Eyes: EOM are normal.   Cardiovascular: Normal rate.   Pulmonary/Chest: Effort normal.   On room air with EtCO2 monitor for PCA    Abdominal: Soft. She exhibits no distension.   Midline incision c/d/i and sealed with dermabond, appropriately TTP, dry gauze dressing on the left side    Neurological: She is alert and oriented to person, place, and time.   Skin: Skin is warm and dry.   Nursing note and vitals reviewed.        Significant Labs:  BMP (Last 3 Results):   Recent Labs   Lab  08/30/18   0455   GLU  114*   NA  139   K  2.9*   CL  105   CO2  21*   BUN  8   CREATININE  0.8   CALCIUM  7.2*   MG  1.0*     CBC (Last 3 Results):   Recent Labs   Lab  08/30/18   0549   WBC  16.74*   RBC  3.85*   HGB  11.2*   HCT  32.8*   PLT  284   MCV  85   MCH  29.1   MCHC  34.1       Significant Diagnostics:  CXR reviewed for CVL     Assessment/Plan:     * Colostomy in place    Carline Chang is a 52 y.o. female s/p CLOSURE, COLOSTOMY, LAPAROSCOPIC (N/A) converted to open on 8/29/2018 recovering in stable condition on the floor continuing to work on pain control.     - prn pain and nausea control - PCA, IV anti-emetics   - maintain room air as tolerated  - HDS    - NPO while NGT in place, okay for ice chips and meds by mouth, MIVF, restarting home meds, hurricaine spray   - hudson to remain in place   - Hct 32.8 from 39.9  - will likely need long term access, continuing CVL today    - PT consult for recs requiring dispo/DME if required   - OOB, ICS 10x/H, SCDs  Dispo: pending ROBF, PO pain control                 Nicolasa Pat MD  Colorectal Surgery  [facility]    Have seen and examined the patient with the fellow and agree with their  venkat.  ESPERANZA MADSEN

## 2018-08-30 NOTE — SUBJECTIVE & OBJECTIVE
Subjective:     Interval History: No acute events overnight, tolerating her ice chips, has NGT in place and notes throat pain associated with presence of the tube, pain only controlled while awake and actively pressing PCA, assured that it will remain in place today for continued pain control as patient was concerned over its removal.     Post-Op Info:  Procedure(s) (LRB):  CLOSURE, COLOSTOMY, LAPAROSCOPIC (N/A)   1 Day Post-Op      Medications:  Continuous Infusions:   sodium chloride 0.9%      hydromorphone in 0.9 % NaCl 6 mg/30 ml      lactated ringers 100 mL/hr at 08/29/18 2232     Scheduled Meds:   azaTHIOprine  50 mg Oral Daily    enoxaparin  40 mg Subcutaneous Daily    magnesium sulfate IVPB  3 g Intravenous Once    pantoprazole 40 mg in dextrose 5 % 100 mL IVPB (over 15 minutes) (ready to mix system)  40 mg Intravenous Before breakfast    potassium chloride in water  40 mEq Intravenous Once    venlafaxine  37.5 mg Oral BID     PRN Meds:   benzocaine    mupirocin    naloxone    ondansetron    promethazine (PHENERGAN) IVPB        Objective:     Vital Signs (Most Recent):  Temp: 98.9 °F (37.2 °C) (08/30/18 0432)  Pulse: 99 (08/30/18 0432)  Resp: 12 (08/30/18 0432)  BP: 108/68 (08/30/18 0432)  SpO2: (!) 94 % (08/30/18 0432) Vital Signs (24h Range):  Temp:  [97.5 °F (36.4 °C)-98.9 °F (37.2 °C)] 98.9 °F (37.2 °C)  Pulse:  [72-99] 99  Resp:  [12-20] 12  SpO2:  [94 %-100 %] 94 %  BP: ()/(50-80) 108/68     Intake/Output - Last 3 Shifts       08/28 0700 - 08/29 0659 08/29 0700 - 08/30 0659 08/30 0700 - 08/31 0659    I.V. (mL/kg)  3620 (61.9)     IV Piggyback  50     Total Intake(mL/kg)  3670 (62.7)     Urine (mL/kg/hr)  700     Drains  200     Blood  150     Total Output  1050     Net  +2620                  Physical Exam   Constitutional: She is oriented to person, place, and time. She appears well-developed and well-nourished.   HENT:   Head: Normocephalic and atraumatic.   NGT in place,  minimal thin bilious output, R CVL   Eyes: EOM are normal.   Cardiovascular: Normal rate.   Pulmonary/Chest: Effort normal.   On room air with EtCO2 monitor for PCA    Abdominal: Soft. She exhibits no distension.   Midline incision c/d/i and sealed with dermabond, appropriately TTP, dry gauze dressing on the left side    Neurological: She is alert and oriented to person, place, and time.   Skin: Skin is warm and dry.   Nursing note and vitals reviewed.        Significant Labs:  BMP (Last 3 Results):   Recent Labs   Lab  08/30/18   0455   GLU  114*   NA  139   K  2.9*   CL  105   CO2  21*   BUN  8   CREATININE  0.8   CALCIUM  7.2*   MG  1.0*     CBC (Last 3 Results):   Recent Labs   Lab  08/30/18   0549   WBC  16.74*   RBC  3.85*   HGB  11.2*   HCT  32.8*   PLT  284   MCV  85   MCH  29.1   MCHC  34.1       Significant Diagnostics:  CXR reviewed for CVL

## 2018-08-30 NOTE — NURSING
Call placed to on call MD pt has an order for lovenox but does not have a current  Serum creatinine. Pharmacy will not verify order unless we have a current serum creatinine.

## 2018-08-30 NOTE — HPI
Ms. Chang is a 52-year-old female with known Crohn's disease   who has an area on MR enterography that was not reachable by endoscopy, which   is consistent with either active Crohn's disease or adhesion disease resulting   in abdominal pain, bloating, nausea or vomiting.  FINAL PATHOLOGIC DIAGNOSIS  ILEUM AND SMALL BOWEL, RESECTION:  Severe chronic active enteritis with extensive ulceration, fistula tract formation, granulomas, consistent with patient's  clinical history of Crohn's disease  No evidence of dysplasia or malignancy  Current Status: Doing well postoperatively.  She is having some incisional pain but the pain that was present before surgery is gone

## 2018-08-31 LAB
ANION GAP SERPL CALC-SCNC: 7 MMOL/L
BASOPHILS # BLD AUTO: 0.07 K/UL
BASOPHILS NFR BLD: 0.5 %
BUN SERPL-MCNC: 9 MG/DL
CALCIUM SERPL-MCNC: 8 MG/DL
CHLORIDE SERPL-SCNC: 102 MMOL/L
CO2 SERPL-SCNC: 23 MMOL/L
CREAT SERPL-MCNC: 0.9 MG/DL
DIFFERENTIAL METHOD: ABNORMAL
EOSINOPHIL # BLD AUTO: 0.4 K/UL
EOSINOPHIL NFR BLD: 2.9 %
ERYTHROCYTE [DISTWIDTH] IN BLOOD BY AUTOMATED COUNT: 15.6 %
EST. GFR  (AFRICAN AMERICAN): >60 ML/MIN/1.73 M^2
EST. GFR  (NON AFRICAN AMERICAN): >60 ML/MIN/1.73 M^2
GLUCOSE SERPL-MCNC: 97 MG/DL
HCT VFR BLD AUTO: 28.6 %
HGB BLD-MCNC: 9.8 G/DL
IMM GRANULOCYTES # BLD AUTO: 0.06 K/UL
IMM GRANULOCYTES NFR BLD AUTO: 0.4 %
LYMPHOCYTES # BLD AUTO: 0.3 K/UL
LYMPHOCYTES NFR BLD: 2.2 %
MAGNESIUM SERPL-MCNC: 1.8 MG/DL
MCH RBC QN AUTO: 29.8 PG
MCHC RBC AUTO-ENTMCNC: 34.3 G/DL
MCV RBC AUTO: 87 FL
MONOCYTES # BLD AUTO: 0.8 K/UL
MONOCYTES NFR BLD: 5.6 %
NEUTROPHILS # BLD AUTO: 11.9 K/UL
NEUTROPHILS NFR BLD: 88.4 %
NRBC BLD-RTO: 0 /100 WBC
PHOSPHATE SERPL-MCNC: 3.4 MG/DL
PLATELET # BLD AUTO: 183 K/UL
PMV BLD AUTO: 9.2 FL
POTASSIUM SERPL-SCNC: 3.5 MMOL/L
RBC # BLD AUTO: 3.29 M/UL
SODIUM SERPL-SCNC: 132 MMOL/L
WBC # BLD AUTO: 13.42 K/UL

## 2018-08-31 PROCEDURE — G8978 MOBILITY CURRENT STATUS: HCPCS | Mod: CI

## 2018-08-31 PROCEDURE — 25000003 PHARM REV CODE 250: Performed by: STUDENT IN AN ORGANIZED HEALTH CARE EDUCATION/TRAINING PROGRAM

## 2018-08-31 PROCEDURE — G8979 MOBILITY GOAL STATUS: HCPCS | Mod: CI

## 2018-08-31 PROCEDURE — 20600001 HC STEP DOWN PRIVATE ROOM

## 2018-08-31 PROCEDURE — 63600175 PHARM REV CODE 636 W HCPCS: Performed by: STUDENT IN AN ORGANIZED HEALTH CARE EDUCATION/TRAINING PROGRAM

## 2018-08-31 PROCEDURE — 85025 COMPLETE CBC W/AUTO DIFF WBC: CPT

## 2018-08-31 PROCEDURE — 83735 ASSAY OF MAGNESIUM: CPT

## 2018-08-31 PROCEDURE — G8980 MOBILITY D/C STATUS: HCPCS | Mod: CI

## 2018-08-31 PROCEDURE — 97161 PT EVAL LOW COMPLEX 20 MIN: CPT

## 2018-08-31 PROCEDURE — C9113 INJ PANTOPRAZOLE SODIUM, VIA: HCPCS | Performed by: STUDENT IN AN ORGANIZED HEALTH CARE EDUCATION/TRAINING PROGRAM

## 2018-08-31 PROCEDURE — 80048 BASIC METABOLIC PNL TOTAL CA: CPT

## 2018-08-31 PROCEDURE — 84100 ASSAY OF PHOSPHORUS: CPT

## 2018-08-31 RX ORDER — LANOLIN ALCOHOL/MO/W.PET/CERES
800 CREAM (GRAM) TOPICAL ONCE
Status: COMPLETED | OUTPATIENT
Start: 2018-08-31 | End: 2018-08-31

## 2018-08-31 RX ORDER — POTASSIUM CHLORIDE 7.45 MG/ML
10 INJECTION INTRAVENOUS
Status: COMPLETED | OUTPATIENT
Start: 2018-08-31 | End: 2018-08-31

## 2018-08-31 RX ORDER — DIPHENHYDRAMINE HCL 25 MG
25 CAPSULE ORAL EVERY 6 HOURS PRN
Status: DISCONTINUED | OUTPATIENT
Start: 2018-08-31 | End: 2018-09-02 | Stop reason: HOSPADM

## 2018-08-31 RX ADMIN — ONDANSETRON 4 MG: 2 INJECTION INTRAMUSCULAR; INTRAVENOUS at 01:08

## 2018-08-31 RX ADMIN — SODIUM CHLORIDE, SODIUM LACTATE, POTASSIUM CHLORIDE, AND CALCIUM CHLORIDE 1000 ML: .6; .31; .03; .02 INJECTION, SOLUTION INTRAVENOUS at 05:08

## 2018-08-31 RX ADMIN — Medication: at 06:08

## 2018-08-31 RX ADMIN — POTASSIUM CHLORIDE 10 MEQ: 10 INJECTION, SOLUTION INTRAVENOUS at 11:08

## 2018-08-31 RX ADMIN — IBUPROFEN 800 MG: 800 INJECTION INTRAVENOUS at 05:08

## 2018-08-31 RX ADMIN — TOPICAL ANESTHETIC: 200 SPRAY DENTAL; PERIODONTAL at 08:08

## 2018-08-31 RX ADMIN — POTASSIUM CHLORIDE 10 MEQ: 10 INJECTION, SOLUTION INTRAVENOUS at 10:08

## 2018-08-31 RX ADMIN — Medication: at 03:08

## 2018-08-31 RX ADMIN — VENLAFAXINE 37.5 MG: 37.5 TABLET ORAL at 09:08

## 2018-08-31 RX ADMIN — HYDROMORPHONE HYDROCHLORIDE 0.5 MG: 1 INJECTION, SOLUTION INTRAMUSCULAR; INTRAVENOUS; SUBCUTANEOUS at 01:08

## 2018-08-31 RX ADMIN — SODIUM CHLORIDE, SODIUM LACTATE, POTASSIUM CHLORIDE, AND CALCIUM CHLORIDE: .6; .31; .03; .02 INJECTION, SOLUTION INTRAVENOUS at 08:08

## 2018-08-31 RX ADMIN — POTASSIUM CHLORIDE 10 MEQ: 10 INJECTION, SOLUTION INTRAVENOUS at 09:08

## 2018-08-31 RX ADMIN — ONDANSETRON 4 MG: 2 INJECTION INTRAMUSCULAR; INTRAVENOUS at 05:08

## 2018-08-31 RX ADMIN — AZATHIOPRINE 50 MG: 50 TABLET ORAL at 09:08

## 2018-08-31 RX ADMIN — DEXTROSE 40 MG: 50 INJECTION, SOLUTION INTRAVENOUS at 05:08

## 2018-08-31 RX ADMIN — POTASSIUM CHLORIDE 10 MEQ: 10 INJECTION, SOLUTION INTRAVENOUS at 01:08

## 2018-08-31 RX ADMIN — Medication: at 09:08

## 2018-08-31 RX ADMIN — PROMETHAZINE HYDROCHLORIDE 6.25 MG: 25 INJECTION, SOLUTION INTRAMUSCULAR; INTRAVENOUS at 09:08

## 2018-08-31 RX ADMIN — PROMETHAZINE HYDROCHLORIDE 6.25 MG: 25 INJECTION, SOLUTION INTRAMUSCULAR; INTRAVENOUS at 03:08

## 2018-08-31 RX ADMIN — IBUPROFEN 800 MG: 800 INJECTION INTRAVENOUS at 01:08

## 2018-08-31 RX ADMIN — MAGNESIUM OXIDE TAB 400 MG (241.3 MG ELEMENTAL MG) 800 MG: 400 (241.3 MG) TAB at 06:08

## 2018-08-31 RX ADMIN — ENOXAPARIN SODIUM 40 MG: 40 INJECTION, SOLUTION INTRAVENOUS; SUBCUTANEOUS at 05:08

## 2018-08-31 RX ADMIN — DIPHENHYDRAMINE HYDROCHLORIDE 25 MG: 25 CAPSULE ORAL at 05:08

## 2018-08-31 RX ADMIN — ACETAMINOPHEN 1000 MG: 10 INJECTION, SOLUTION INTRAVENOUS at 05:08

## 2018-08-31 RX ADMIN — MAGNESIUM OXIDE TAB 400 MG (241.3 MG ELEMENTAL MG) 800 MG: 400 (241.3 MG) TAB at 09:08

## 2018-08-31 NOTE — PLAN OF CARE
Problem: Physical Therapy Goal  Goal: Physical Therapy Goal  Outcome: Outcome(s) achieved Date Met: 08/31/18  No goals set

## 2018-08-31 NOTE — PLAN OF CARE
Problem: Patient Care Overview  Goal: Plan of Care Review  Outcome: Ongoing (interventions implemented as appropriate)  Pt is A&Ox4 and was injury free during night shift.  The pt is a very needy person and the nurse is in the pt's room frequently.  Pain was controlled with PCA. Breathing is regular equal and unlabored bilaterally on room air. Family member spent the night. SCD on bilaterally during night shift. Odonnell to gravity.

## 2018-08-31 NOTE — ASSESSMENT & PLAN NOTE
Carline Chang is a 52 y.o. female s/p CLOSURE, COLOSTOMY, LAPAROSCOPIC (N/A) converted to open on 8/29/2018 recovering in stable condition on the floor continuing to work on pain control.     - prn pain and nausea control - PCA, IV tylenol/ibuprofen, IV anti-emetics   - maintain room air as tolerated  - low rate tachycardia, increased w/ pain   - NPO, okay for ice chips and meds by mouth, MIVF, on home meds  - d/c hudson today   - Hct 28 from 32.8 from 39.9  - will likely need long term access, continuing CVL today    - PT consult for recs requiring dispo/DME if required   - OOB, ICS 10x/H, SCDs  Dispo: pending SANDEEP, PO pain control

## 2018-08-31 NOTE — PT/OT/SLP EVAL
Physical Therapy Evaluation and Discharge Note    Patient Name:  Carline Chang   MRN:  [id number]    Recommendations:     Discharge Recommendations:  home   Discharge Equipment Recommendations: none   Barriers to discharge: None    Assessment:     Carline Chang is a 52 y.o. female admitted with a medical diagnosis of Colostomy in place. .  At this time, patient is functioning at their prior level of function and does not require further acute PT services.     Recent Surgery: Procedure(s) (LRB):  CLOSURE, COLOSTOMY, LAPAROSCOPIC (N/A) 2 Days Post-Op    Plan:     During this hospitalization, patient does not require further acute PT services.  Please re-consult if situation changes.      Subjective     Chief Complaint: slight weakness  Patient/Family Comments/goals: to go home  Pain/Comfort:  · Pain Rating 1: 0/10  · Pain Rating Post-Intervention 1: 0/10    Patients cultural, spiritual, Zoroastrianism conflicts given the current situation: no    Living Environment:  Pt. currently living with her mother in 2-story home with threshold to enter. Pt.'s bedroom is on second floor with flight of stairs and (B) handrails to get to it, but has a place to sleep downstairs, if needed.  Prior to admission, patients level of function was indep.  Equipment used at home: colostomy/ostomy supplies.  Upon discharge, patient will have assistance from mother.    Objective:     Communicated with nursing prior to session.  Patient found seated in bedside chair upon PT entry to room found with: peripheral IV, PCA     General Precautions: Standard, fall   Orthopedic Precautions:N/A   Braces:       Exams:  · RUE ROM: WFL  · RUE Strength: WFL  · LUE ROM: WFL  · LUE Strength: WFL  · RLE ROM: WFL  · RLE Strength: WFL  · LLE ROM: WFL  · LLE Strength: WFL    Functional Mobility:  · Transfers:     · Sit to Stand:  modified independence with no AD  · Gait: 100' with Supervision-Mod Indep. without LOB or AD  · Balance:  good    AM-PAC 6 CLICK MOBILITY  Total Score:23       Therapeutic Activities and Exercises:   Discussed goals and POC.    AM-PAC 6 CLICK MOBILITY  Total Score:23     Patient left up in chair with all lines intact and call button in reach.    GOALS:   Multidisciplinary Problems     Physical Therapy Goals     Not on file          Multidisciplinary Problems (Resolved)        Problem: Physical Therapy Goal    Goal Priority Disciplines Outcome Goal Variances Interventions   Physical Therapy Goal   (Resolved)     PT, PT/OT Outcome(s) achieved                     History:     Past Medical History:   Diagnosis Date    Acute deep vein thrombosis (DVT) of brachial vein of right upper extremity 2017    on RUE ultrasound    Bowel perforation     Chronic pain     Crohn's disease of both small and large intestine with intestinal obstruction     Difficult intravenous access     GERD (gastroesophageal reflux disease)     Inflammatory bowel disease     Nephrolithiasis     Stricture of bowel        Past Surgical History:   Procedure Laterality Date    ABSCESS DRAINAGE      ANAL FISTULOTOMY      BOWEL RESECTION      small bowel resection per Dr. Uribe 2018     SECTION      x2    CHOLECYSTECTOMY  2000    COLON SURGERY  2015    sigmoid loop colostomy    COLONOSCOPY  3/24/2018    rectovaginal fistula repair  2018    SMALL INTESTINE SURGERY  1995    per patient 10 inches removed    SMALL INTESTINE SURGERY  2009    abdominal abscess drained, 3 cm colon removed, 11 cm SB removed    TUBAL LIGATION      UPPER GASTROINTESTINAL ENDOSCOPY         Clinical Decision Making:     History  Co-morbidities and personal factors that may impact the plan of care Examination  Body Structures and Functions, activity limitations and participation restrictions that may impact the plan of care Clinical Presentation   Decision Making/ Complexity Score   Co-morbidities:   [] Time since onset  of injury / illness / exacerbation  [] Status of current condition  []Patient's cognitive status and safety concerns    [] Multiple Medical Problems (see med hx)  Personal Factors:   [] Patient's age  [] Prior Level of function   [] Patient's home situation (environment and family support)  [] Patient's level of motivation  [] Expected progression of patient      HISTORY:(criteria)    [] 33539 - no personal factors/history    [] 60883 - has 1-2 personal factor/comorbidity     [] 33893 - has >3 personal factor/comorbidity     Body Regions:  [] Objective examination findings  [] Head     []  Neck  [] Trunk   [] Upper Extremity  [] Lower Extremity    Body Systems:  [] For communication ability, affect, cognition, language, and learning style: the assessment of the ability to make needs known, consciousness, orientation (person, place, and time), expected emotional /behavioral responses, and learning preferences (eg, learning barriers, education  needs)  [] For the neuromuscular system: a general assessment of gross coordinated movement (eg, balance, gait, locomotion, transfers, and transitions) and motor function  (motor control and motor learning)  [] For the musculoskeletal system: the assessment of gross symmetry, gross range of motion, gross strength, height, and weight  [] For the integumentary system: the assessment of pliability(texture), presence of scar formation, skin color, and skin integrity  [] For cardiovascular/pulmonary system: the assessment of heart rate, respiratory rate, blood pressure, and edema     Activity limitations:    [] Patient's cognitive status and saf ety concerns          [] Status of current condition      [] Weight bearing restriction  [] Cardiopulmunary Restriction    Participation Restrictions:   [] Goals and goal agreement with the patient     [] Rehab potential (prognosis) and probable outcome      Examination of Body System: (criteria)    [] 54042 - addressing 1-2 elements    []  13936 - addressing a total of 3 or more elements     [] 93999 -  Addressing a total of 4 or more elements         Clinical Presentation: (criteria)  Choose one     On examination of body system using standardized tests and measures patient presents with (CHOOSE ONE) elements from any of the following: body structures and functions, activity limitations, and/or participation restrictions.  Leading to a clinical presentation that is considered (CHOOSE ONE)                              Clinical Decision Making  (Eval Complexity):  Choose One     Time Tracking:     PT Received On: 08/31/18  PT Start Time: 1553     PT Stop Time: 1610  PT Total Time (min): 17 min     Billable Minutes: Evaluation 17      Andre Jolly, PT  08/31/2018

## 2018-08-31 NOTE — PROGRESS NOTES
[facility]  Colorectal Surgery  Progress Note    Patient Name: Carline Chang  MRN: [id number]  Admission Date: 8/29/2018  Hospital Length of Stay: 2 days  Attending Physician: Andre Uribe MD    Subjective:     Interval History: No acute events, NGT removed this morning, reports that pain is better controlled this morning, able to sit out of bed in chair yesterday, has not passed flatus today.     Post-Op Info:  Procedure(s) (LRB):  CLOSURE, COLOSTOMY, LAPAROSCOPIC (N/A)   2 Days Post-Op      Medications:  Continuous Infusions:   hydromorphone in 0.9 % NaCl 6 mg/30 ml      lactated ringers 100 mL/hr at 08/31/18 0800     Scheduled Meds:   azaTHIOprine  50 mg Oral Daily    enoxaparin  40 mg Subcutaneous Daily    ibuprofen  800 mg Intravenous Q6H    magnesium oxide  800 mg Oral Once    pantoprazole 40 mg in dextrose 5 % 100 mL IVPB (over 15 minutes) (ready to mix system)  40 mg Intravenous Before breakfast    potassium chloride in water  10 mEq Intravenous Q1H    venlafaxine  37.5 mg Oral BID     PRN Meds:   benzocaine    HYDROmorphone    naloxone    ondansetron    promethazine (PHENERGAN) IVPB        Objective:     Vital Signs (Most Recent):  Temp: 98.7 °F (37.1 °C) (08/31/18 0808)  Pulse: 102 (08/31/18 0808)  Resp: 10 (08/31/18 0808)  BP: (!) 101/51 (08/31/18 0808)  SpO2: 97 % (08/31/18 0808) Vital Signs (24h Range):  Temp:  [96.2 °F (35.7 °C)-99.2 °F (37.3 °C)] 98.7 °F (37.1 °C)  Pulse:  [102-120] 102  Resp:  [10-20] 10  SpO2:  [90 %-97 %] 97 %  BP: (101-126)/(51-70) 101/51     Intake/Output - Last 3 Shifts       08/29 0700 - 08/30 0659 08/30 0700 - 08/31 0659 08/31 0700 - 09/01 0659    I.V. (mL/kg) 3620 (61.9) 3323.3 (56.8)     IV Piggyback 50 950     Total Intake(mL/kg) 3670 (62.7) 4273.3 (73)     Urine (mL/kg/hr) 700 1140 (0.8)     Drains 200 325     Blood 150      Total Output 1050 1465     Net +2620 +2808.3                  Physical Exam   Constitutional: She is  oriented to person, place, and time. She appears well-developed and well-nourished.   HENT:   Head: Normocephalic and atraumatic.   NGT removed, had bilious output 325 recorded, R CVL   Eyes: EOM are normal.   Cardiovascular: Normal rate.   Pulmonary/Chest: Effort normal.   On room air with EtCO2 monitor for PCA    Abdominal: Soft. She exhibits no distension.   Midline incision c/d/i and sealed with dermabond, L sided dry gauze dressing c/d/i over former ostomy site    Neurological: She is alert and oriented to person, place, and time.   Skin: Skin is warm and dry.   Nursing note and vitals reviewed.        Significant Labs:  BMP (Last 3 Results):   Recent Labs   Lab  08/30/18   0455  08/31/18   0415   GLU  114*  97   NA  139  132*   K  2.9*  3.5   CL  105  102   CO2  21*  23   BUN  8  9   CREATININE  0.8  0.9   CALCIUM  7.2*  8.0*   MG  1.0*  1.8     CBC (Last 3 Results):   Recent Labs   Lab  08/30/18   0549  08/31/18   0415   WBC  16.74*  13.42*   RBC  3.85*  3.29*   HGB  11.2*  9.8*   HCT  32.8*  28.6*   PLT  284  183   MCV  85  87   MCH  29.1  29.8   MCHC  34.1  34.3       Significant Diagnostics:  CXR reviewed for CVL     Assessment/Plan:     * Colostomy in place    Carline Chang is a 52 y.o. female s/p CLOSURE, COLOSTOMY, LAPAROSCOPIC (N/A) converted to open on 8/29/2018 recovering in stable condition on the floor continuing to work on pain control.     - prn pain and nausea control - PCA, IV tylenol/ibuprofen, IV anti-emetics   - maintain room air as tolerated  - low rate tachycardia, increased w/ pain   - NPO, okay for ice chips and meds by mouth, MIVF, on home meds  - d/c [place] today   - Hct 28 from 32.8 from 39.9  - will likely need long term access, continuing CVL today    - PT consult for recs requiring dispo/DME if required   - OOB, ICS 10x/H, SCDs  Dispo: pending ROBF, PO pain control                 Nicolasa Pat MD  Colorectal Surgery  [facility]    Have seen and  examined the patient with the fellow and agree with their plan.  ESPERANZA MADSEN

## 2018-08-31 NOTE — SUBJECTIVE & OBJECTIVE
Subjective:     Interval History: No acute events, NGT removed this morning, reports that pain is better controlled this morning, able to sit out of bed in chair yesterday, has not passed flatus today.     Post-Op Info:  Procedure(s) (LRB):  CLOSURE, COLOSTOMY, LAPAROSCOPIC (N/A)   2 Days Post-Op      Medications:  Continuous Infusions:   hydromorphone in 0.9 % NaCl 6 mg/30 ml      lactated ringers 100 mL/hr at 08/31/18 0800     Scheduled Meds:   azaTHIOprine  50 mg Oral Daily    enoxaparin  40 mg Subcutaneous Daily    ibuprofen  800 mg Intravenous Q6H    magnesium oxide  800 mg Oral Once    pantoprazole 40 mg in dextrose 5 % 100 mL IVPB (over 15 minutes) (ready to mix system)  40 mg Intravenous Before breakfast    potassium chloride in water  10 mEq Intravenous Q1H    venlafaxine  37.5 mg Oral BID     PRN Meds:   benzocaine    HYDROmorphone    naloxone    ondansetron    promethazine (PHENERGAN) IVPB        Objective:     Vital Signs (Most Recent):  Temp: 98.7 °F (37.1 °C) (08/31/18 0808)  Pulse: 102 (08/31/18 0808)  Resp: 10 (08/31/18 0808)  BP: (!) 101/51 (08/31/18 0808)  SpO2: 97 % (08/31/18 0808) Vital Signs (24h Range):  Temp:  [96.2 °F (35.7 °C)-99.2 °F (37.3 °C)] 98.7 °F (37.1 °C)  Pulse:  [102-120] 102  Resp:  [10-20] 10  SpO2:  [90 %-97 %] 97 %  BP: (101-126)/(51-70) 101/51     Intake/Output - Last 3 Shifts       08/29 0700 - 08/30 0659 08/30 0700 - 08/31 0659 08/31 0700 - 09/01 0659    I.V. (mL/kg) 3620 (61.9) 3323.3 (56.8)     IV Piggyback 50 950     Total Intake(mL/kg) 3670 (62.7) 4273.3 (73)     Urine (mL/kg/hr) 700 1140 (0.8)     Drains 200 325     Blood 150      Total Output 1050 1465     Net +2620 +2808.3                  Physical Exam   Constitutional: She is oriented to person, place, and time. She appears well-developed and well-nourished.   HENT:   Head: Normocephalic and atraumatic.   NGT removed, had bilious output 325 recorded, R CVL   Eyes: EOM are normal.   Cardiovascular:  Normal rate.   Pulmonary/Chest: Effort normal.   On room air with EtCO2 monitor for PCA    Abdominal: Soft. She exhibits no distension.   Midline incision c/d/i and sealed with dermabond, L sided dry gauze dressing c/d/i over former ostomy site    Neurological: She is alert and oriented to person, place, and time.   Skin: Skin is warm and dry.   Nursing note and vitals reviewed.        Significant Labs:  BMP (Last 3 Results):   Recent Labs   Lab  08/30/18   0455  08/31/18   0415   GLU  114*  97   NA  139  132*   K  2.9*  3.5   CL  105  102   CO2  21*  23   BUN  8  9   CREATININE  0.8  0.9   CALCIUM  7.2*  8.0*   MG  1.0*  1.8     CBC (Last 3 Results):   Recent Labs   Lab  08/30/18   0549  08/31/18   0415   WBC  16.74*  13.42*   RBC  3.85*  3.29*   HGB  11.2*  9.8*   HCT  32.8*  28.6*   PLT  284  183   MCV  85  87   MCH  29.1  29.8   MCHC  34.1  34.3       Significant Diagnostics:  CXR reviewed for CVL

## 2018-08-31 NOTE — PROGRESS NOTES
Spoke with Dr. Pat about pt's UOP for the shift.  HR in low 100s.  All other VSS.  MD verbalized understanding, new order noted for 1 L bolus.

## 2018-08-31 NOTE — PLAN OF CARE
Problem: Patient Care Overview  Goal: Plan of Care Review  Outcome: Ongoing (interventions implemented as appropriate)  Plan of care reviewed with patient and family member who verbalized understanding. Patient OOB in chair for several hours. Pain managed with PCA pump. IS encouraged. NG tube to LIWS. NPO.  Frequent rounds made for patient safety.   Pt remains free of any falls/injury at this time. Bed locked and in lowest position.  Call bell in reach.  RUBEN, HOANG.

## 2018-09-01 LAB
ANION GAP SERPL CALC-SCNC: 6 MMOL/L
BASOPHILS # BLD AUTO: 0.04 K/UL
BASOPHILS NFR BLD: 0.3 %
BUN SERPL-MCNC: 6 MG/DL
CALCIUM SERPL-MCNC: 8.3 MG/DL
CHLORIDE SERPL-SCNC: 103 MMOL/L
CO2 SERPL-SCNC: 25 MMOL/L
CREAT SERPL-MCNC: 0.8 MG/DL
DIFFERENTIAL METHOD: ABNORMAL
EOSINOPHIL # BLD AUTO: 0.4 K/UL
EOSINOPHIL NFR BLD: 3 %
ERYTHROCYTE [DISTWIDTH] IN BLOOD BY AUTOMATED COUNT: 15.9 %
EST. GFR  (AFRICAN AMERICAN): >60 ML/MIN/1.73 M^2
EST. GFR  (NON AFRICAN AMERICAN): >60 ML/MIN/1.73 M^2
GLUCOSE SERPL-MCNC: 80 MG/DL
HCT VFR BLD AUTO: 25.4 %
HGB BLD-MCNC: 8.2 G/DL
IMM GRANULOCYTES # BLD AUTO: 0.05 K/UL
IMM GRANULOCYTES NFR BLD AUTO: 0.4 %
LYMPHOCYTES # BLD AUTO: 0.2 K/UL
LYMPHOCYTES NFR BLD: 1.8 %
MAGNESIUM SERPL-MCNC: 1.9 MG/DL
MCH RBC QN AUTO: 28.6 PG
MCHC RBC AUTO-ENTMCNC: 32.3 G/DL
MCV RBC AUTO: 89 FL
MONOCYTES # BLD AUTO: 0.6 K/UL
MONOCYTES NFR BLD: 5.2 %
NEUTROPHILS # BLD AUTO: 10.7 K/UL
NEUTROPHILS NFR BLD: 89.3 %
NRBC BLD-RTO: 0 /100 WBC
PHOSPHATE SERPL-MCNC: 2.3 MG/DL
PLATELET # BLD AUTO: 159 K/UL
PMV BLD AUTO: 9.4 FL
POTASSIUM SERPL-SCNC: 3.8 MMOL/L
RBC # BLD AUTO: 2.87 M/UL
SODIUM SERPL-SCNC: 134 MMOL/L
WBC # BLD AUTO: 11.97 K/UL

## 2018-09-01 PROCEDURE — C9113 INJ PANTOPRAZOLE SODIUM, VIA: HCPCS | Performed by: STUDENT IN AN ORGANIZED HEALTH CARE EDUCATION/TRAINING PROGRAM

## 2018-09-01 PROCEDURE — 25000003 PHARM REV CODE 250: Performed by: STUDENT IN AN ORGANIZED HEALTH CARE EDUCATION/TRAINING PROGRAM

## 2018-09-01 PROCEDURE — 63600175 PHARM REV CODE 636 W HCPCS: Performed by: STUDENT IN AN ORGANIZED HEALTH CARE EDUCATION/TRAINING PROGRAM

## 2018-09-01 PROCEDURE — 83735 ASSAY OF MAGNESIUM: CPT

## 2018-09-01 PROCEDURE — 84100 ASSAY OF PHOSPHORUS: CPT

## 2018-09-01 PROCEDURE — 20600001 HC STEP DOWN PRIVATE ROOM

## 2018-09-01 PROCEDURE — 85025 COMPLETE CBC W/AUTO DIFF WBC: CPT

## 2018-09-01 PROCEDURE — 80048 BASIC METABOLIC PNL TOTAL CA: CPT

## 2018-09-01 RX ORDER — OXYCODONE AND ACETAMINOPHEN 7.5; 325 MG/1; MG/1
1 TABLET ORAL EVERY 4 HOURS PRN
Status: DISCONTINUED | OUTPATIENT
Start: 2018-09-01 | End: 2018-09-02 | Stop reason: HOSPADM

## 2018-09-01 RX ORDER — HYDROMORPHONE HCL IN 0.9% NACL 6 MG/30 ML
PATIENT CONTROLLED ANALGESIA SYRINGE INTRAVENOUS CONTINUOUS
Status: DISCONTINUED | OUTPATIENT
Start: 2018-09-01 | End: 2018-09-01

## 2018-09-01 RX ORDER — LANOLIN ALCOHOL/MO/W.PET/CERES
800 CREAM (GRAM) TOPICAL ONCE
Status: COMPLETED | OUTPATIENT
Start: 2018-09-01 | End: 2018-09-01

## 2018-09-01 RX ORDER — OXYCODONE AND ACETAMINOPHEN 10; 325 MG/1; MG/1
1 TABLET ORAL EVERY 4 HOURS PRN
Status: DISCONTINUED | OUTPATIENT
Start: 2018-09-01 | End: 2018-09-02 | Stop reason: HOSPADM

## 2018-09-01 RX ORDER — HYDROMORPHONE HYDROCHLORIDE 1 MG/ML
1 INJECTION, SOLUTION INTRAMUSCULAR; INTRAVENOUS; SUBCUTANEOUS
Status: DISCONTINUED | OUTPATIENT
Start: 2018-09-01 | End: 2018-09-02 | Stop reason: HOSPADM

## 2018-09-01 RX ORDER — SODIUM CHLORIDE, SODIUM LACTATE, POTASSIUM CHLORIDE, CALCIUM CHLORIDE 600; 310; 30; 20 MG/100ML; MG/100ML; MG/100ML; MG/100ML
INJECTION, SOLUTION INTRAVENOUS CONTINUOUS
Status: DISCONTINUED | OUTPATIENT
Start: 2018-09-01 | End: 2018-09-02

## 2018-09-01 RX ADMIN — VENLAFAXINE 37.5 MG: 37.5 TABLET ORAL at 09:09

## 2018-09-01 RX ADMIN — MAGNESIUM OXIDE TAB 400 MG (241.3 MG ELEMENTAL MG) 800 MG: 400 (241.3 MG) TAB at 08:09

## 2018-09-01 RX ADMIN — HYDROMORPHONE HYDROCHLORIDE 0.5 MG: 1 INJECTION, SOLUTION INTRAMUSCULAR; INTRAVENOUS; SUBCUTANEOUS at 08:09

## 2018-09-01 RX ADMIN — IBUPROFEN 800 MG: 800 INJECTION INTRAVENOUS at 12:09

## 2018-09-01 RX ADMIN — VENLAFAXINE 37.5 MG: 37.5 TABLET ORAL at 08:09

## 2018-09-01 RX ADMIN — DIPHENHYDRAMINE HYDROCHLORIDE 25 MG: 25 CAPSULE ORAL at 11:09

## 2018-09-01 RX ADMIN — OXYCODONE HYDROCHLORIDE AND ACETAMINOPHEN 1 TABLET: 10; 325 TABLET ORAL at 10:09

## 2018-09-01 RX ADMIN — ONDANSETRON 4 MG: 2 INJECTION INTRAMUSCULAR; INTRAVENOUS at 10:09

## 2018-09-01 RX ADMIN — PROMETHAZINE HYDROCHLORIDE 6.25 MG: 25 INJECTION, SOLUTION INTRAMUSCULAR; INTRAVENOUS at 01:09

## 2018-09-01 RX ADMIN — SODIUM CHLORIDE, SODIUM LACTATE, POTASSIUM CHLORIDE, AND CALCIUM CHLORIDE: .6; .31; .03; .02 INJECTION, SOLUTION INTRAVENOUS at 05:09

## 2018-09-01 RX ADMIN — ONDANSETRON 4 MG: 2 INJECTION INTRAMUSCULAR; INTRAVENOUS at 08:09

## 2018-09-01 RX ADMIN — POTASSIUM PHOSPHATE, MONOBASIC AND POTASSIUM PHOSPHATE, DIBASIC 30 MMOL: 224; 236 INJECTION, SOLUTION, CONCENTRATE INTRAVENOUS at 06:09

## 2018-09-01 RX ADMIN — OXYCODONE HYDROCHLORIDE AND ACETAMINOPHEN 1 TABLET: 10; 325 TABLET ORAL at 04:09

## 2018-09-01 RX ADMIN — AZATHIOPRINE 50 MG: 50 TABLET ORAL at 08:09

## 2018-09-01 RX ADMIN — IBUPROFEN 800 MG: 800 INJECTION INTRAVENOUS at 05:09

## 2018-09-01 RX ADMIN — ENOXAPARIN SODIUM 40 MG: 40 INJECTION, SOLUTION INTRAVENOUS; SUBCUTANEOUS at 04:09

## 2018-09-01 RX ADMIN — Medication: at 09:09

## 2018-09-01 RX ADMIN — ONDANSETRON 4 MG: 2 INJECTION INTRAMUSCULAR; INTRAVENOUS at 01:09

## 2018-09-01 RX ADMIN — DEXTROSE 40 MG: 50 INJECTION, SOLUTION INTRAVENOUS at 05:09

## 2018-09-01 RX ADMIN — SODIUM CHLORIDE, SODIUM LACTATE, POTASSIUM CHLORIDE, AND CALCIUM CHLORIDE: .6; .31; .03; .02 INJECTION, SOLUTION INTRAVENOUS at 06:09

## 2018-09-01 NOTE — ASSESSMENT & PLAN NOTE
Carline Chang is a 52 y.o. female s/p CLOSURE, COLOSTOMY, LAPAROSCOPIC (N/A) converted to open on 8/29/2018 recovering in stable condition on the floor continuing to work on pain control.     - prn pain and nausea control - PCA, IV tylenol/ibuprofen, IV anti-emetics, may transition to PO with ROBF  - wean to room air as tolerated    - low rate tachycardia, increased w/ pain   - CLD, MIVF, on home meds  - d/c [place] today   - Hct 25.4l 28 from 32.8 from 39.9   - will likely need long term access, continuing CVL until d/c due to poor vasculature  - PT recs home without needs   - OOB, ICS 10x/H, SCDs  Dispo: pending ROBF, PO pain control

## 2018-09-01 NOTE — PROGRESS NOTES
[facility]  Colorectal Surgery  Progress Note    Patient Name: Carline Chang  MRN: [id number]  Admission Date: 8/29/2018  Hospital Length of Stay: 3 days  Attending Physician: Andre Uribe MD    Subjective:     Interval History: Pruritus improved, on NC 3.5 L breathing comfortably with pain controlled while on dPCA. Tolerating her advancement to clears, no BM as of yet.     Post-Op Info:  Procedure(s) (LRB):  CLOSURE, COLOSTOMY, LAPAROSCOPIC (N/A)   3 Days Post-Op      Medications:  Continuous Infusions:   hydromorphone in 0.9 % NaCl 6 mg/30 ml      lactated ringers 100 mL/hr at 09/01/18 0528     Scheduled Meds:   azaTHIOprine  50 mg Oral Daily    enoxaparin  40 mg Subcutaneous Daily    magnesium oxide  800 mg Oral Once    pantoprazole 40 mg in dextrose 5 % 100 mL IVPB (over 15 minutes) (ready to mix system)  40 mg Intravenous Before breakfast    potassium phosphate IVPB  30 mmol Intravenous Once    venlafaxine  37.5 mg Oral BID     PRN Meds:   benzocaine    diphenhydrAMINE    HYDROmorphone    naloxone    ondansetron    promethazine (PHENERGAN) IVPB        Objective:     Vital Signs (Most Recent):  Temp: 97 °F (36.1 °C) (09/01/18 0346)  Pulse: 110 (09/01/18 0346)  Resp: 18 (09/01/18 0346)  BP: (!) 95/54 (09/01/18 0346)  SpO2: 97 % (09/01/18 0346) Vital Signs (24h Range):  Temp:  [97 °F (36.1 °C)-98.7 °F (37.1 °C)] 97 °F (36.1 °C)  Pulse:  [102-112] 110  Resp:  [10-20] 18  SpO2:  [91 %-98 %] 97 %  BP: ()/(49-54) 95/54     Intake/Output - Last 3 Shifts       08/30 0700 - 08/31 0659 08/31 0700 - 09/01 0659 09/01 0700 - 09/02 0659    P.O.  200     I.V. (mL/kg) 3323.3 (56.8)      IV Piggyback 950 2050     Total Intake(mL/kg) 4273.3 (73) 2250 (38.5)     Urine (mL/kg/hr) 1140 (0.8) 1675 (1.2)     Drains 325      Blood       Total Output 1465 1675     Net +2808.3 +575                  Physical Exam   Constitutional: She is oriented to person, place, and time. She  appears well-developed and well-nourished.   HENT:   Head: Normocephalic and atraumatic.   Eyes: EOM are normal.   Cardiovascular: Normal rate.   Pulmonary/Chest: Effort normal.   On 3L NC    Abdominal: Soft. She exhibits no distension.   Midline incision c/d/i and sealed with dermabond, L sided dry gauze dressing c/d/i over former ostomy site    Neurological: She is alert and oriented to person, place, and time.   Skin: Skin is warm and dry.   Nursing note and vitals reviewed.        Significant Labs:  BMP (Last 3 Results):   Recent Labs   Lab  08/30/18   0455  08/31/18   0415  09/01/18   0350   GLU  114*  97  80   NA  139  132*  134*   K  2.9*  3.5  3.8   CL  105  102  103   CO2  21*  23  25   BUN  8  9  6   CREATININE  0.8  0.9  0.8   CALCIUM  7.2*  8.0*  8.3*   MG  1.0*  1.8  1.9     CBC (Last 3 Results):   Recent Labs   Lab  08/30/18   0549  08/31/18   0415  09/01/18   0350   WBC  16.74*  13.42*  11.97   RBC  3.85*  3.29*  2.87*   HGB  11.2*  9.8*  8.2*   HCT  32.8*  28.6*  25.4*   PLT  284  183  159   MCV  85  87  89   MCH  29.1  29.8  28.6   MCHC  34.1  34.3  32.3       Significant Diagnostics:  CXR reviewed for CVL     Assessment/Plan:     * Colostomy in place    Carline Chang is a 52 y.o. female s/p CLOSURE, COLOSTOMY, LAPAROSCOPIC (N/A) converted to open on 8/29/2018 recovering in stable condition on the floor continuing to work on pain control.     - prn pain and nausea control - PCA, IV tylenol/ibuprofen, IV anti-emetics, may transition to PO with ROBF  - wean to room air as tolerated    - low rate tachycardia, increased w/ pain   - CLD, MIVF, on home meds  - d/c [place] today   - Hct 25.4l 28 from 32.8 from 39.9   - will likely need long term access, continuing CVL until d/c due to poor vasculature  - PT recs home without needs   - OOB, ICS 10x/H, SCDs  Dispo: pending [facility] pain control                 Nicolasa Pat MD  Colorectal Surgery  [facility]  Have seen and  examined the patient with the fellow and agree with their plan.  ESPERANZA MADSEN

## 2018-09-01 NOTE — ANESTHESIA POSTPROCEDURE EVALUATION
"Anesthesia Post Evaluation    Patient: Carline Chang    Procedure(s) Performed: Procedure(s) (LRB):  CLOSURE, COLOSTOMY, LAPAROSCOPIC (N/A)    Final Anesthesia Type: general  Patient location during evaluation: floor  Patient participation: Yes- Able to Participate  Level of consciousness: awake  Post-procedure vital signs: reviewed and stable  Pain management: adequate  Airway patency: patent  PONV status at discharge: No PONV  Anesthetic complications: no      Cardiovascular status: stable  Respiratory status: unassisted  Hydration status: euvolemic  Follow-up not needed.        Visit Vitals  BP (!) 99/54 (BP Location: Left arm, Patient Position: Lying)   Pulse 106   Temp 36.4 °C (97.6 °F) (Oral)   Resp 20   Ht 5' 8" (1.727 m)   Wt 58.5 kg (128 lb 15.5 oz)   LMP 05/30/2009   SpO2 97%   Breastfeeding? No   BMI 19.61 kg/m²       Pain/Estiven Score: Pain Assessment Performed: Yes (8/31/2018  6:00 PM)  Presence of Pain: denies (8/31/2018  6:00 PM)  Pain Rating Prior to Med Admin: 3 (8/31/2018  9:29 PM)  Pain Rating Post Med Admin: 3 (8/30/2018 12:20 PM)        "

## 2018-09-01 NOTE — PLAN OF CARE
Problem: Patient Care Overview  Goal: Plan of Care Review  Outcome: Ongoing (interventions implemented as appropriate)  Pt is A&Ox4 and was injury free during night shift.  Pain was controlled with PCA. Breathing is regular equal and unlabored bilaterally on room air.  SCD on bilaterally during night shift.  Pt was up to bathroom to urinate with the help of the family friend who spent the night.

## 2018-09-01 NOTE — SUBJECTIVE & OBJECTIVE
Subjective:     Interval History: Pruritus improved, on NC 3.5 L breathing comfortably with pain controlled while on dPCA. Tolerating her advancement to clears, no BM as of yet.     Post-Op Info:  Procedure(s) (LRB):  CLOSURE, COLOSTOMY, LAPAROSCOPIC (N/A)   3 Days Post-Op      Medications:  Continuous Infusions:   hydromorphone in 0.9 % NaCl 6 mg/30 ml      lactated ringers 100 mL/hr at 09/01/18 0528     Scheduled Meds:   azaTHIOprine  50 mg Oral Daily    enoxaparin  40 mg Subcutaneous Daily    magnesium oxide  800 mg Oral Once    pantoprazole 40 mg in dextrose 5 % 100 mL IVPB (over 15 minutes) (ready to mix system)  40 mg Intravenous Before breakfast    potassium phosphate IVPB  30 mmol Intravenous Once    venlafaxine  37.5 mg Oral BID     PRN Meds:   benzocaine    diphenhydrAMINE    HYDROmorphone    naloxone    ondansetron    promethazine (PHENERGAN) IVPB        Objective:     Vital Signs (Most Recent):  Temp: 97 °F (36.1 °C) (09/01/18 0346)  Pulse: 110 (09/01/18 0346)  Resp: 18 (09/01/18 0346)  BP: (!) 95/54 (09/01/18 0346)  SpO2: 97 % (09/01/18 0346) Vital Signs (24h Range):  Temp:  [97 °F (36.1 °C)-98.7 °F (37.1 °C)] 97 °F (36.1 °C)  Pulse:  [102-112] 110  Resp:  [10-20] 18  SpO2:  [91 %-98 %] 97 %  BP: ()/(49-54) 95/54     Intake/Output - Last 3 Shifts       08/30 0700 - 08/31 0659 08/31 0700 - 09/01 0659 09/01 0700 - 09/02 0659    P.O.  200     I.V. (mL/kg) 3323.3 (56.8)      IV Piggyback 950 2050     Total Intake(mL/kg) 4273.3 (73) 2250 (38.5)     Urine (mL/kg/hr) 1140 (0.8) 1675 (1.2)     Drains 325      Blood       Total Output 1465 1675     Net +2808.3 +575                  Physical Exam   Constitutional: She is oriented to person, place, and time. She appears well-developed and well-nourished.   HENT:   Head: Normocephalic and atraumatic.   Eyes: EOM are normal.   Cardiovascular: Normal rate.   Pulmonary/Chest: Effort normal.   On 3L NC    Abdominal: Soft. She exhibits no  distension.   Midline incision c/d/i and sealed with dermabond, L sided dry gauze dressing c/d/i over former ostomy site    Neurological: She is alert and oriented to person, place, and time.   Skin: Skin is warm and dry.   Nursing note and vitals reviewed.        Significant Labs:  BMP (Last 3 Results):   Recent Labs   Lab  08/30/18   0455  08/31/18   0415  09/01/18   0350   GLU  114*  97  80   NA  139  132*  134*   K  2.9*  3.5  3.8   CL  105  102  103   CO2  21*  23  25   BUN  8  9  6   CREATININE  0.8  0.9  0.8   CALCIUM  7.2*  8.0*  8.3*   MG  1.0*  1.8  1.9     CBC (Last 3 Results):   Recent Labs   Lab  08/30/18   0549  08/31/18 0415  09/01/18   0350   WBC  16.74*  13.42*  11.97   RBC  3.85*  3.29*  2.87*   HGB  11.2*  9.8*  8.2*   HCT  32.8*  28.6*  25.4*   PLT  284  183  159   MCV  85  87  89   MCH  29.1  29.8  28.6   MCHC  34.1  34.3  32.3       Significant Diagnostics:  CXR reviewed for CVL

## 2018-09-02 VITALS
RESPIRATION RATE: 16 BRPM | DIASTOLIC BLOOD PRESSURE: 79 MMHG | TEMPERATURE: 98 F | WEIGHT: 129 LBS | OXYGEN SATURATION: 92 % | HEART RATE: 93 BPM | BODY MASS INDEX: 19.55 KG/M2 | SYSTOLIC BLOOD PRESSURE: 125 MMHG | HEIGHT: 68 IN

## 2018-09-02 LAB
ANION GAP SERPL CALC-SCNC: 9 MMOL/L
BASOPHILS # BLD AUTO: 0.02 K/UL
BASOPHILS NFR BLD: 0.2 %
BUN SERPL-MCNC: 6 MG/DL
CALCIUM SERPL-MCNC: 8.4 MG/DL
CHLORIDE SERPL-SCNC: 103 MMOL/L
CO2 SERPL-SCNC: 25 MMOL/L
CREAT SERPL-MCNC: 0.8 MG/DL
DIFFERENTIAL METHOD: ABNORMAL
EOSINOPHIL # BLD AUTO: 0.3 K/UL
EOSINOPHIL NFR BLD: 3.6 %
ERYTHROCYTE [DISTWIDTH] IN BLOOD BY AUTOMATED COUNT: 15.9 %
EST. GFR  (AFRICAN AMERICAN): >60 ML/MIN/1.73 M^2
EST. GFR  (NON AFRICAN AMERICAN): >60 ML/MIN/1.73 M^2
GLUCOSE SERPL-MCNC: 96 MG/DL
HCT VFR BLD AUTO: 23.5 %
HGB BLD-MCNC: 7.9 G/DL
IMM GRANULOCYTES # BLD AUTO: 0.05 K/UL
IMM GRANULOCYTES NFR BLD AUTO: 0.5 %
LYMPHOCYTES # BLD AUTO: 0.2 K/UL
LYMPHOCYTES NFR BLD: 1.7 %
MAGNESIUM SERPL-MCNC: 1.6 MG/DL
MCH RBC QN AUTO: 29.6 PG
MCHC RBC AUTO-ENTMCNC: 33.6 G/DL
MCV RBC AUTO: 88 FL
MONOCYTES # BLD AUTO: 0.5 K/UL
MONOCYTES NFR BLD: 5.5 %
NEUTROPHILS # BLD AUTO: 8.2 K/UL
NEUTROPHILS NFR BLD: 88.5 %
NRBC BLD-RTO: 0 /100 WBC
PHOSPHATE SERPL-MCNC: 3.2 MG/DL
PLATELET # BLD AUTO: 156 K/UL
PMV BLD AUTO: 9.2 FL
POTASSIUM SERPL-SCNC: 3.4 MMOL/L
RBC # BLD AUTO: 2.67 M/UL
SODIUM SERPL-SCNC: 137 MMOL/L
WBC # BLD AUTO: 9.27 K/UL

## 2018-09-02 PROCEDURE — 80048 BASIC METABOLIC PNL TOTAL CA: CPT

## 2018-09-02 PROCEDURE — 85025 COMPLETE CBC W/AUTO DIFF WBC: CPT

## 2018-09-02 PROCEDURE — 25000003 PHARM REV CODE 250: Performed by: STUDENT IN AN ORGANIZED HEALTH CARE EDUCATION/TRAINING PROGRAM

## 2018-09-02 PROCEDURE — 63600175 PHARM REV CODE 636 W HCPCS: Performed by: STUDENT IN AN ORGANIZED HEALTH CARE EDUCATION/TRAINING PROGRAM

## 2018-09-02 PROCEDURE — 83735 ASSAY OF MAGNESIUM: CPT

## 2018-09-02 PROCEDURE — C9113 INJ PANTOPRAZOLE SODIUM, VIA: HCPCS | Performed by: STUDENT IN AN ORGANIZED HEALTH CARE EDUCATION/TRAINING PROGRAM

## 2018-09-02 PROCEDURE — 84100 ASSAY OF PHOSPHORUS: CPT

## 2018-09-02 RX ORDER — DIAZEPAM 5 MG/1
5 TABLET ORAL EVERY 6 HOURS PRN
Qty: 20 TABLET | Refills: 0 | Status: SHIPPED | OUTPATIENT
Start: 2018-09-02 | End: 2018-09-26

## 2018-09-02 RX ORDER — OXYCODONE AND ACETAMINOPHEN 7.5; 325 MG/1; MG/1
1 TABLET ORAL EVERY 4 HOURS PRN
Qty: 50 TABLET | Refills: 0 | Status: SHIPPED | OUTPATIENT
Start: 2018-09-02 | End: 2018-09-11

## 2018-09-02 RX ORDER — DIAZEPAM 5 MG/1
5 TABLET ORAL EVERY 6 HOURS PRN
Status: DISCONTINUED | OUTPATIENT
Start: 2018-09-02 | End: 2018-09-02 | Stop reason: HOSPADM

## 2018-09-02 RX ORDER — LANOLIN ALCOHOL/MO/W.PET/CERES
800 CREAM (GRAM) TOPICAL ONCE
Status: COMPLETED | OUTPATIENT
Start: 2018-09-02 | End: 2018-09-02

## 2018-09-02 RX ORDER — POTASSIUM CHLORIDE 20 MEQ/1
60 TABLET, EXTENDED RELEASE ORAL ONCE
Status: COMPLETED | OUTPATIENT
Start: 2018-09-02 | End: 2018-09-02

## 2018-09-02 RX ORDER — ONDANSETRON 4 MG/1
4 TABLET, ORALLY DISINTEGRATING ORAL EVERY 6 HOURS PRN
Qty: 30 TABLET | Refills: 0 | Status: SHIPPED | OUTPATIENT
Start: 2018-09-02 | End: 2018-09-11

## 2018-09-02 RX ORDER — CYCLOBENZAPRINE HCL 5 MG
5 TABLET ORAL 3 TIMES DAILY PRN
Status: DISCONTINUED | OUTPATIENT
Start: 2018-09-02 | End: 2018-09-02

## 2018-09-02 RX ADMIN — IBUPROFEN 800 MG: 800 INJECTION INTRAVENOUS at 10:09

## 2018-09-02 RX ADMIN — HYDROMORPHONE HYDROCHLORIDE 0.5 MG: 1 INJECTION, SOLUTION INTRAMUSCULAR; INTRAVENOUS; SUBCUTANEOUS at 11:09

## 2018-09-02 RX ADMIN — OXYCODONE HYDROCHLORIDE AND ACETAMINOPHEN 1 TABLET: 10; 325 TABLET ORAL at 08:09

## 2018-09-02 RX ADMIN — DEXTROSE 40 MG: 50 INJECTION, SOLUTION INTRAVENOUS at 06:09

## 2018-09-02 RX ADMIN — POTASSIUM CHLORIDE 60 MEQ: 1500 TABLET, EXTENDED RELEASE ORAL at 10:09

## 2018-09-02 RX ADMIN — AZATHIOPRINE 50 MG: 50 TABLET ORAL at 10:09

## 2018-09-02 RX ADMIN — OXYCODONE HYDROCHLORIDE AND ACETAMINOPHEN 1 TABLET: 10; 325 TABLET ORAL at 02:09

## 2018-09-02 RX ADMIN — HYDROMORPHONE HYDROCHLORIDE 1 MG: 1 INJECTION, SOLUTION INTRAMUSCULAR; INTRAVENOUS; SUBCUTANEOUS at 04:09

## 2018-09-02 RX ADMIN — SODIUM CHLORIDE, SODIUM LACTATE, POTASSIUM CHLORIDE, AND CALCIUM CHLORIDE: .6; .31; .03; .02 INJECTION, SOLUTION INTRAVENOUS at 01:09

## 2018-09-02 RX ADMIN — ONDANSETRON 4 MG: 2 INJECTION INTRAMUSCULAR; INTRAVENOUS at 08:09

## 2018-09-02 RX ADMIN — VENLAFAXINE 37.5 MG: 37.5 TABLET ORAL at 10:09

## 2018-09-02 RX ADMIN — MAGNESIUM OXIDE TAB 400 MG (241.3 MG ELEMENTAL MG) 800 MG: 400 (241.3 MG) TAB at 10:09

## 2018-09-02 NOTE — PROGRESS NOTES
[facility]  Colorectal Surgery  Progress Note    Patient Name: Carline Chang  MRN: [id number]  Admission Date: 8/29/2018  Hospital Length of Stay: 4 days  Attending Physician: Andre Uribe MD    Subjective:     Interval History: No acute events overnight, reporting increased pain after transition off PCA, describing muscle and gas pains, ambulating the halls, has BM with urination.     Post-Op Info:  Procedure(s) (LRB):  CLOSURE, COLOSTOMY, LAPAROSCOPIC (N/A)   4 Days Post-Op      Medications:  Continuous Infusions:   lactated ringers 50 mL/hr at 09/02/18 0109     Scheduled Meds:   azaTHIOprine  50 mg Oral Daily    enoxaparin  40 mg Subcutaneous Daily    magnesium oxide  800 mg Oral Once    pantoprazole 40 mg in dextrose 5 % 100 mL IVPB (over 15 minutes) (ready to mix system)  40 mg Intravenous Before breakfast    potassium chloride  60 mEq Oral Once    venlafaxine  37.5 mg Oral BID     PRN Meds:   benzocaine    cyclobenzaprine    diphenhydrAMINE    HYDROmorphone    HYDROmorphone    naloxone    ondansetron    oxyCODONE-acetaminophen    oxyCODONE-acetaminophen    promethazine (PHENERGAN) IVPB        Objective:     Vital Signs (Most Recent):  Temp: 98.9 °F (37.2 °C) (09/02/18 0450)  Pulse: 94 (09/02/18 0450)  Resp: 18 (09/02/18 0450)  BP: 105/63 (09/02/18 0450)  SpO2: (!) 91 % (09/02/18 0450) Vital Signs (24h Range):  Temp:  [96.5 °F (35.8 °C)-98.9 °F (37.2 °C)] 98.9 °F (37.2 °C)  Pulse:  [] 94  Resp:  [18-20] 18  SpO2:  [91 %-94 %] 91 %  BP: ()/(52-68) 105/63     Intake/Output - Last 3 Shifts       08/31 0700 - 09/01 0659 09/01 0700 - 09/02 0659 09/02 0700 - 09/03 0659    P.O. 200 805     I.V. (mL/kg)  2203.3 (37.7)     IV Piggyback 2050 50     Total Intake(mL/kg) 2250 (38.5) 3058.3 (52.3)     Urine (mL/kg/hr) 1675 (1.2) 1500 (1.1)     Drains       Total Output 1675 1500     Net +575 +1558.3                  Physical Exam   Constitutional: She is  oriented to person, place, and time. She appears well-developed and well-nourished.   HENT:   Head: Normocephalic and atraumatic.   Eyes: EOM are normal.   Cardiovascular: Normal rate.   Pulmonary/Chest: Effort normal.   No accessory muscle use    Abdominal: Soft. She exhibits no distension.   Remains appropriately TTP, with midline incision c/d/i and sealed with dermabond, L sided dry gauze dressing c/d/i over former ostomy site    Neurological: She is alert and oriented to person, place, and time.   Skin: Skin is warm and dry.   Nursing note and vitals reviewed.        Significant Labs:  BMP (Last 3 Results):   Recent Labs   Lab  08/31/18 0415 09/01/18 0350 09/02/18   0435   GLU  97  80  96   NA  132*  134*  137   K  3.5  3.8  3.4*   CL  102  103  103   CO2  23  25  25   BUN  9  6  6   CREATININE  0.9  0.8  0.8   CALCIUM  8.0*  8.3*  8.4*   MG  1.8  1.9  1.6     CBC (Last 3 Results):   Recent Labs   Lab  08/31/18 0415 09/01/18 0350 09/02/18   0435   WBC  13.42*  11.97  9.27   RBC  3.29*  2.87*  2.67*   HGB  9.8*  8.2*  7.9*   HCT  28.6*  25.4*  23.5*   PLT  183  159  156   MCV  87  89  88   MCH  29.8  28.6  29.6   MCHC  34.3  32.3  33.6       Significant Diagnostics:  CXR reviewed for CVL     Assessment/Plan:     * Colostomy in place    Carline Chang is a 52 y.o. female s/p CLOSURE, COLOSTOMY, LAPAROSCOPIC (N/A) converted to open on 8/29/2018 recovering in stable condition on the floor continuing to work on pain control.     - prn pain and nausea control - PO meds + IVBT, IV anti-emetic  - wean to room air as tolerated    - low rate tachycardia, increased w/ pain   - LFLR, HLIV, on home meds  - continuing CVL until d/c due to poor vasculature  - PT recs home without needs   - OOB, ICS 10x/H, SCDs  Dispo: pending PO pain control, possible d/c today                 Nicolasa Pat MD  Colorectal Surgery  [facility]  Have seen and examined the patient with the fellow and  agree with their plan.  ESPERANZA MADSEN

## 2018-09-02 NOTE — PROGRESS NOTES
Discharge instructions reviewed with pt.  Prescriptions reviewed and given to pt.  Pt verbalized understanding.  All questions answered.  Central line d'c'd with cath tip intact and discarded.  Pt tolerated well after lying flat for 30 minutes, no bleeding.  Pt currently awaiting transport.

## 2018-09-02 NOTE — PLAN OF CARE
Problem: Patient Care Overview  Goal: Plan of Care Review  Outcome: Ongoing (interventions implemented as appropriate)  Pt is A&Ox4 and was injury free during night shift.  The pt ambulated twice during night shift with stand by assistance from her friend and the use of a walker.  Night nurse had a long talk with the pt about using IV pain med's because she will not be going home with it, the nurse did explain that she must be very proactive in controlling her pain with oral pain medication.  The time of when the pt could get her pain medication was kept on the white board but she did not ask for the medication when the time for med's was due.  Pt pain was not well controlled and pt received IV pain med's twice during night shift.  Breathing is regular equal, shallow and unlabored bilaterally on room air. The pt had two very small b/m's loose brown during night shift.

## 2018-09-02 NOTE — HOSPITAL COURSE
Carline Chang is a 52 y.o. female s/p CLOSURE, COLOSTOMY, LAPAROSCOPIC (N/A) converted to an open procedure on 8/29/2018 who tolerated the procedure well and was downgraded to the floor post-operatively. Vitals remained stable and she was advanced to low fiber low residue diet without issue, was ambulating with assistance, with pain controlled with oral medications after conversion from a PCA, and nausea controlled with oral medications. She was discharged home in stable condition with follow-up in colorectal outpatient clinic in 2 weeks with Dr. Uribe

## 2018-09-02 NOTE — DISCHARGE SUMMARY
[facility]  Colorectal Surgery  Discharge Summary      Patient Name: Carline Chang  MRN: [id number]  Admission Date: 8/29/2018  Hospital Length of Stay: 4 days  Discharge Date and Time:  09/02/2018 8:24 AM  Attending Physician: Andre Uribe MD   Discharging Provider: Nicolasa Pat MD  Primary Care Provider: Pablo Medina MD     HPI:  Ms. Chang is a 52-year-old female with known Crohn's disease   who has an area on MR enterography that was not reachable by endoscopy, which   is consistent with either active Crohn's disease or adhesion disease resulting   in abdominal pain, bloating, nausea or vomiting.  FINAL PATHOLOGIC DIAGNOSIS  ILEUM AND SMALL BOWEL, RESECTION:  Severe chronic active enteritis with extensive ulceration, fistula tract formation, granulomas, consistent with patient's  clinical history of Crohn's disease  No evidence of dysplasia or malignancy  Current Status: Doing well postoperatively.  She is having some incisional pain but the pain that was present before surgery is gone         Procedure(s) (LRB):  CLOSURE, COLOSTOMY, LAPAROSCOPIC (N/A)     Hospital Course:  Carline Chang is a 52 y.o. female s/p CLOSURE, COLOSTOMY, LAPAROSCOPIC (N/A) converted to an open procedure on 8/29/2018 who tolerated the procedure well and was downgraded to the floor post-operatively. Vitals remained stable and she was advanced to low fiber low residue diet without issue, was ambulating with assistance, with pain controlled with oral medications after conversion from a PCA, and nausea controlled with oral medications. She was discharged home in stable condition with follow-up in colorectal outpatient clinic in 2 weeks with Dr. Uribe                Significant Diagnostic Studies: Labs:   BMP:   Recent Labs   Lab  09/01/18   0350  09/02/18   0435   GLU  80  96   NA  134*  137   K  3.8  3.4*   CL  103  103   CO2  25  25   BUN  6  6   CREATININE  0.8  0.8    CALCIUM  8.3*  8.4*   MG  1.9  1.6    and CBC   Recent Labs   Lab  09/01/18   0350  09/02/18   0435   WBC  11.97  9.27   HGB  8.2*  7.9*   HCT  25.4*  23.5*   PLT  159  156       Pending Diagnostic Studies:     None        Final Active Diagnoses:    Diagnosis Date Noted POA    PRINCIPAL PROBLEM:  Colostomy in place [Z93.3] 08/29/2018 Not Applicable      Problems Resolved During this Admission:      Discharged Condition: good    Disposition: Home or Self Care    Follow Up: 2 weeks with Dr. Uribe    Patient Instructions:      Diet Adult Regular   Order Comments: Low fiber low residue diet     Notify your health care provider if you experience any of the following:  temperature >100.4     Notify your health care provider if you experience any of the following:  persistent nausea and vomiting or diarrhea     Notify your health care provider if you experience any of the following:  severe uncontrolled pain     Notify your health care provider if you experience any of the following:  redness, tenderness, or signs of infection (pain, swelling, redness, odor or green/yellow discharge around incision site)     Notify your health care provider if you experience any of the following:  difficulty breathing or increased cough     Notify your health care provider if you experience any of the following:  worsening rash     No dressing needed     Weight bearing restrictions (specify):   Order Comments: Please no heavy lifting greater than 10 pounds for 4-6 weeks post-operatively     Medications:  Reconciled Home Medications:      Medication List      START taking these medications    diazePAM 5 MG tablet  Commonly known as:  VALIUM  Take 1 tablet (5 mg total) by mouth every 6 (six) hours as needed (muscle spasms).     ondansetron 4 MG Tbdl  Commonly known as:  ZOFRAN-ODT  Take 1 tablet (4 mg total) by mouth every 6 (six) hours as needed.     oxyCODONE-acetaminophen 7.5-325 mg per tablet  Commonly known as:  PERCOCET  Take 1  tablet by mouth every 4 (four) hours as needed.        CONTINUE taking these medications    azaTHIOprine 50 mg Tab  Commonly known as:  IMURAN  Take 1 tablet (50 mg total) by mouth once daily.     HYDROcodone-acetaminophen  mg per tablet  Commonly known as:  NORCO  Take 1 tablet by mouth every 12 (twelve) hours as needed.     IMODIUM A-D 1 mg/7.5 mL Liqd  Generic drug:  loperamide  Take 0.1 mg/kg by mouth every 12 (twelve) hours as needed.     neomycin 500 mg Tab  Commonly known as:  MYCIFRADIN  On the day before your surgery, take 2 tablets (1,000 mg) by mouth at 1pm, 2pm and 11pm.     promethazine 25 MG tablet  Commonly known as:  PHENERGAN  Take 1 tablet (25 mg total) by mouth every 8 (eight) hours as needed for Nausea.     TUMS ORAL  Take by mouth as needed (acid reflux).     venlafaxine 37.5 MG Tab  Commonly known as:  EFFEXOR  Take 1 tablet (37.5 mg total) by mouth 2 (two) times daily.            Nicolasa Pat MD  Colorectal Surgery  [facility]  Have seen and examined the patient with the fellow and agree with their plan.  ESPERANZA MADSEN

## 2018-09-02 NOTE — PLAN OF CARE
Problem: Patient Care Overview  Goal: Plan of Care Review  POC reviewed with patient and pt. Family. AAOX4. ML ABD dermabond intact. 3 lap sites intact. L ABD incision, staples intact. Fluids infusing to RIJ. PCA pump discontinued per order at 1600. Intermittent nausea occurring, managed with prn meds. Diet advanced to low fiber, tolerating well, pt consumes 75% of meals. Up independent in room, ambulated in halls X3. Call light within reach, will monitor.

## 2018-09-02 NOTE — SUBJECTIVE & OBJECTIVE
Subjective:     Interval History: No acute events overnight, reporting increased pain after transition off PCA, describing muscle and gas pains, ambulating the halls, has BM with urination.     Post-Op Info:  Procedure(s) (LRB):  CLOSURE, COLOSTOMY, LAPAROSCOPIC (N/A)   4 Days Post-Op      Medications:  Continuous Infusions:   lactated ringers 50 mL/hr at 09/02/18 0109     Scheduled Meds:   azaTHIOprine  50 mg Oral Daily    enoxaparin  40 mg Subcutaneous Daily    magnesium oxide  800 mg Oral Once    pantoprazole 40 mg in dextrose 5 % 100 mL IVPB (over 15 minutes) (ready to mix system)  40 mg Intravenous Before breakfast    potassium chloride  60 mEq Oral Once    venlafaxine  37.5 mg Oral BID     PRN Meds:   benzocaine    cyclobenzaprine    diphenhydrAMINE    HYDROmorphone    HYDROmorphone    naloxone    ondansetron    oxyCODONE-acetaminophen    oxyCODONE-acetaminophen    promethazine (PHENERGAN) IVPB        Objective:     Vital Signs (Most Recent):  Temp: 98.9 °F (37.2 °C) (09/02/18 0450)  Pulse: 94 (09/02/18 0450)  Resp: 18 (09/02/18 0450)  BP: 105/63 (09/02/18 0450)  SpO2: (!) 91 % (09/02/18 0450) Vital Signs (24h Range):  Temp:  [96.5 °F (35.8 °C)-98.9 °F (37.2 °C)] 98.9 °F (37.2 °C)  Pulse:  [] 94  Resp:  [18-20] 18  SpO2:  [91 %-94 %] 91 %  BP: ()/(52-68) 105/63     Intake/Output - Last 3 Shifts       08/31 0700 - 09/01 0659 09/01 0700 - 09/02 0659 09/02 0700 - 09/03 0659    P.O. 200 805     I.V. (mL/kg)  2203.3 (37.7)     IV Piggyback 2050 50     Total Intake(mL/kg) 2250 (38.5) 3058.3 (52.3)     Urine (mL/kg/hr) 1675 (1.2) 1500 (1.1)     Drains       Total Output 1675 1500     Net +575 +1558.3                  Physical Exam   Constitutional: She is oriented to person, place, and time. She appears well-developed and well-nourished.   HENT:   Head: Normocephalic and atraumatic.   Eyes: EOM are normal.   Cardiovascular: Normal rate.   Pulmonary/Chest: Effort normal.   No accessory  muscle use    Abdominal: Soft. She exhibits no distension.   Remains appropriately TTP, with midline incision c/d/i and sealed with dermabond, L sided dry gauze dressing c/d/i over former ostomy site    Neurological: She is alert and oriented to person, place, and time.   Skin: Skin is warm and dry.   Nursing note and vitals reviewed.        Significant Labs:  BMP (Last 3 Results):   Recent Labs   Lab  08/31/18 0415 09/01/18 0350 09/02/18   0435   GLU  97  80  96   NA  132*  134*  137   K  3.5  3.8  3.4*   CL  102  103  103   CO2  23  25  25   BUN  9  6  6   CREATININE  0.9  0.8  0.8   CALCIUM  8.0*  8.3*  8.4*   MG  1.8  1.9  1.6     CBC (Last 3 Results):   Recent Labs   Lab  08/31/18 0415 09/01/18 0350 09/02/18   0435   WBC  13.42*  11.97  9.27   RBC  3.29*  2.87*  2.67*   HGB  9.8*  8.2*  7.9*   HCT  28.6*  25.4*  23.5*   PLT  183  159  156   MCV  87  89  88   MCH  29.8  28.6  29.6   MCHC  34.3  32.3  33.6       Significant Diagnostics:  CXR reviewed for CVL

## 2018-09-04 ENCOUNTER — PATIENT MESSAGE (OUTPATIENT)
Dept: SURGERY | Facility: CLINIC | Age: 53
End: 2018-09-04

## 2018-09-05 NOTE — PHYSICIAN QUERY
PT Name: Carline Chang  MR #: [id number]     Physician Query Form - Documentation Clarification      CDS/: Esme Clark RN, CCDS               Contact information:  [email or web address]    This form is a permanent document in the medical record.     Query Date: September 5, 2018    By submitting this query, we are merely seeking further clarification of documentation. Please utilize your independent clinical judgment when addressing the question(s) below.    The Medical record reflects the following:    Supporting Clinical Findings Location in Medical Record   Potassium  2.9  3.5-->3.8  3.4    Potassium chloride IVPB  Potassium chloride oral Lab  08/30 08/31--> 09/01 09/02    [facility]   Magnesium  1.0  1.6 - 1.9 [range]    Magnesium oxide oral  Magnesium sulfate IVPB Lab  08/30 08/31 - 09/02    [facility]                                                                            Doctor, Please specify diagnosis or diagnoses associated with above clinical findings.    Provider Use Only    [x] Hypokalemia    [x ] Hypomagnesemia    [   ] Other (specify) ________________                                                                                                                   [  ] Clinically undetermined

## 2018-09-05 NOTE — PHYSICIAN QUERY
"PT Name: Carline Chang  MR #: [id number]    Physician Query Form - Hematology Clarification      CDS/: Esme Clark RN, CCDS               Contact information: [email or web address]    This form is a permanent document in the medical record.      Query Date: September 5, 2018    By submitting this query, we are merely seeking further clarification of documentation. Please utilize your independent clinical judgment when addressing the question(s) below.    The Medical record contains the following:   Indicators  Supporting Clinical Findings Location in Medical Record    "Anemia" documented     X H & H = H/H  11.2/32.8  9.8/28.6  8.2/25.4  7.9/23.5    - Hct 28 from 32.8 from 39.9 Lab  08/30 08/31 09/01 09/02    CRS progress note 08/31    BP =                     HR=      "GI bleeding" documented      Acute bleeding (Non GI site)      Transfusion(s)      Treatment:     X Other:  Date of procedure:  08/29/2018  Colostomy with Crohn disease.  Colostomy lap converted to open colostomy closure.    Estimated blood loss (EBL): 150 mL   Op note        Brief op note       Provider, please specify diagnosis or diagnoses associated with above clinical findings.    x Acute blood loss anemia expected post-operatively  [  ] Acute blood loss anemia  [  ] Other Hematological Diagnosis (please specify): _________________________________    [  ] Clinically Undetermined       Please document in your progress notes daily for the duration of treatment, until resolved, and include in your discharge summary.                                                                                                      "

## 2018-09-05 NOTE — PHYSICIAN QUERY
PT Name: Carline Chang  MR #: [id number]     Physician Query Form - Documentation Clarification      CDS/: Esme Clark RN, CCDS               Contact information:  [email or web address]    This form is a permanent document in the medical record.     Query Date: September 5, 2018    By submitting this query, we are merely seeking further clarification of documentation. Please utilize your independent clinical judgment when addressing the question(s) below.    The Medical record reflects the following:    Supporting Clinical Findings Location in Medical Record   Colostomy with Crohn disease.  Colostomy lap converted to open colostomy closure.    known to Colorectal Surgery with Crohn disease who had a previous colostomy to repair a colovaginal fistula with stricture.  The fistula has repaired the stricture with treatment with biologics has resolved.      Crohn's disease of small intestine with fistula   Op note       Op note        Brief op note                                                                                      Doctor, Please specify diagnosis or diagnoses associated with above clinical findings.    Provider Use Only      x ] Fistula resolved prior to admission    [   ] Fistula was present on admission    [   ] Other (specify) _________________                                                                                                                   [  ] Clinically undetermined

## 2018-09-11 ENCOUNTER — OFFICE VISIT (OUTPATIENT)
Dept: GASTROENTEROLOGY | Facility: CLINIC | Age: 53
End: 2018-09-11
Payer: MEDICARE

## 2018-09-11 ENCOUNTER — OFFICE VISIT (OUTPATIENT)
Dept: SURGERY | Facility: CLINIC | Age: 53
End: 2018-09-11
Payer: MEDICARE

## 2018-09-11 VITALS
SYSTOLIC BLOOD PRESSURE: 102 MMHG | BODY MASS INDEX: 19.1 KG/M2 | RESPIRATION RATE: 18 BRPM | WEIGHT: 126 LBS | TEMPERATURE: 99 F | DIASTOLIC BLOOD PRESSURE: 64 MMHG | HEART RATE: 99 BPM | HEIGHT: 68 IN

## 2018-09-11 VITALS
HEIGHT: 68 IN | DIASTOLIC BLOOD PRESSURE: 70 MMHG | HEART RATE: 87 BPM | SYSTOLIC BLOOD PRESSURE: 130 MMHG | WEIGHT: 125.44 LBS | BODY MASS INDEX: 19.01 KG/M2

## 2018-09-11 DIAGNOSIS — Z93.3 COLOSTOMY IN PLACE: ICD-10-CM

## 2018-09-11 DIAGNOSIS — Z78.9 DIFFICULT INTRAVENOUS ACCESS: ICD-10-CM

## 2018-09-11 DIAGNOSIS — K50.813 CROHN'S DISEASE OF BOTH SMALL AND LARGE INTESTINE WITH FISTULA: ICD-10-CM

## 2018-09-11 DIAGNOSIS — Z98.890 POSTOPERATIVE STATE: Primary | ICD-10-CM

## 2018-09-11 DIAGNOSIS — K21.9 GASTROESOPHAGEAL REFLUX DISEASE, ESOPHAGITIS PRESENCE NOT SPECIFIED: ICD-10-CM

## 2018-09-11 DIAGNOSIS — N82.3 RECTOVAGINAL FISTULA: ICD-10-CM

## 2018-09-11 DIAGNOSIS — Z79.60 LONG-TERM USE OF IMMUNOSUPPRESSANT MEDICATION: ICD-10-CM

## 2018-09-11 DIAGNOSIS — Z86.718 HISTORY OF DVT (DEEP VEIN THROMBOSIS): ICD-10-CM

## 2018-09-11 DIAGNOSIS — K50.119 CROHN'S DISEASE OF LARGE INTESTINE WITH COMPLICATION: Primary | ICD-10-CM

## 2018-09-11 DIAGNOSIS — K50.113 CROHN'S COLITIS, WITH FISTULA: ICD-10-CM

## 2018-09-11 PROCEDURE — 99999 PR PBB SHADOW E&M-EST. PATIENT-LVL III: CPT | Mod: PBBFAC,,, | Performed by: COLON & RECTAL SURGERY

## 2018-09-11 PROCEDURE — 99215 OFFICE O/P EST HI 40 MIN: CPT | Mod: ,,, | Performed by: INTERNAL MEDICINE

## 2018-09-11 PROCEDURE — 99213 OFFICE O/P EST LOW 20 MIN: CPT | Mod: PBBFAC | Performed by: COLON & RECTAL SURGERY

## 2018-09-11 PROCEDURE — 99024 POSTOP FOLLOW-UP VISIT: CPT | Mod: POP,,, | Performed by: COLON & RECTAL SURGERY

## 2018-09-11 RX ORDER — HYDROCODONE BITARTRATE AND ACETAMINOPHEN 10; 325 MG/1; MG/1
1 TABLET ORAL EVERY 12 HOURS PRN
Qty: 60 TABLET | Refills: 0 | Status: SHIPPED | OUTPATIENT
Start: 2018-09-11 | End: 2018-09-26 | Stop reason: SDUPTHER

## 2018-09-11 NOTE — PROGRESS NOTES
[facility] Established Patient Visit    Subjective:           PCP: Pablo Medina    Chief Complaint: Other (starting Remicaide ) and Follow-up (surgery )       HPI:  Carline Chang is a 52 y.o. female here for follow-up of her Crohn's disease. Fistulous was as she had a flareup of her Crohn's disease while she was on Humira seemed to have close the fistula though the rectum which was controlled and partially chewed by way of a dilating colostomy he had his colostomy for some time patient had colostomy done June 2015 after 3 years and 2 months this has been pulled back after a stepwise series of procedures she had removal of stricture seizureg fistula surgery on her vagina January of this referral fistula repair this year have bowel resection 30th of April of this year presently the colon has been reattached to the distal the due to the rectum and she is having bowel movements through the natural passage. These could be started on a biological. The patient is going to be given Remicade as humira did not work that well last time. Will get Dr. Serrato's recommendations too patient is presently having bowel movements about nearly 40-45 BMs and is having a lot of gas with it.      ROS:   Constitutional: No fevers, chills, weight loss  ENT: No allergies, sore throat, rhinorrhea  CV: No chest pain, palpitations, edema  Pulm: No cough, shortness of breath, wheezing  Ophtho: No vision changes  GI: No blood in stools, change in bowel habits, nausea/vomiting  Denies alternating diarrhea/constipation.   Derm: No rash  Heme: No easy bruising or lymphadenopathy  MSK: No arthralgias or myalgias  : No dysuria, hematuria, frequency, polyuria, or flank tenderness  Endo: No hot or cold intolerance  Neuro: No syncope or seizure, or dizziness  Psych: No hallucination, depression or suicidal ideation      Medical History:  has a past medical history of Acute deep vein thrombosis (DVT) of brachial vein of right  upper extremity (2017), Bowel perforation, Chronic pain, Crohn's disease of both small and large intestine with intestinal obstruction (), Difficult intravenous access, GERD (gastroesophageal reflux disease) (), Inflammatory bowel disease (), Nephrolithiasis, and Stricture of bowel.      Surgical History:  has a past surgical history that includes Tubal ligation;  section; Abscess drainage (); Anal fistulotomy (); Colonoscopy (3/24/2018); Upper gastrointestinal endoscopy (); Small intestine surgery (); Small intestine surgery (2009); Cholecystectomy (); Colon surgery (2015); rectovaginal fistula repair (2018); Bowel resection; CLOSURE, COLOSTOMY, LAPAROSCOPIC (N/A, 2018); COLONOSCOPY (N/A, 2018); EXPLORATORY-LAPAROTOMY, small bowel resection, possible ostomy (N/A, 2018); RESECTION-SMALL BOWEL (N/A, 2018); ILEOSTOMY (Left, 2018); COLONOSCOPY (N/A, 3/24/2018); COLONOSCOPY (N/A, 2018); REPAIR-FISTULA-RECTOVAGINAL (N/A, 1/10/2018); COLONOSCOPY (N/A, 2017); CYSTOSCOPY WITH STENT PLACEMENT (Right, 2017); BIOPSY-BLADDER (N/A, 2017); FULGURATION-BLADDER (N/A, 2017); ESOPHAGOGASTRODUODENOSCOPY (EGD) (N/A, 2017); COLONOSCOPY (N/A, 2017); COLONOSCOPY (N/A, 2016); and COLOSTOMY (N/A, 2015).    Family History:   Family History   Problem Relation Age of Onset    Cancer Maternal Aunt 66        colon ca    Heart attack Father 69    Anesthesia problems Neg Hx     Celiac disease Neg Hx     Cirrhosis Neg Hx     Colon cancer Neg Hx     Colon polyps Neg Hx     Crohn's disease Neg Hx     Cystic fibrosis Neg Hx     Esophageal cancer Neg Hx     Hemochromatosis Neg Hx     Inflammatory bowel disease Neg Hx     Irritable bowel syndrome Neg Hx     Liver cancer Neg Hx     Liver disease Neg Hx     Rectal cancer Neg Hx     Stomach cancer Neg Hx     Ulcerative colitis Neg Hx     Mars's disease Neg Hx      "Lymphoma Neg Hx     Tuberculosis Neg Hx     Scleroderma Neg Hx     Rheum arthritis Neg Hx     Multiple sclerosis Neg Hx     Melanoma Neg Hx     Lupus Neg Hx     Psoriasis Neg Hx     Skin cancer Neg Hx        Social History:   Social History     Tobacco Use    Smoking status: Never Smoker    Smokeless tobacco: Never Used   Substance Use Topics    Alcohol use: No     Alcohol/week: 0.0 oz    Drug use: No       The patient's social and family histories were reviewed by me and updated in the appropriate section of the electronic medical record.    Allergies:   Review of patient's allergies indicates:   Allergen Reactions    Morphine Other (See Comments)     Abdominal pain    Flagyl [metronidazole] Other (See Comments)     Neuropathy    Nubain [nalbuphine] Anxiety     Upper abdominal burning          Medications:   Current Outpatient Medications   Medication Sig Dispense Refill    azaTHIOprine (IMURAN) 50 mg Tab Take 1 tablet (50 mg total) by mouth once daily. 90 tablet 0    calcium carbonate (TUMS ORAL) Take by mouth as needed (acid reflux).      diazePAM (VALIUM) 5 MG tablet Take 1 tablet (5 mg total) by mouth every 6 (six) hours as needed (muscle spasms). 20 tablet 0    HYDROcodone-acetaminophen (NORCO)  mg per tablet Take 1 tablet by mouth every 12 (twelve) hours as needed.       loperamide (IMODIUM A-D) 1 mg/7.5 mL Liqd Take 0.1 mg/kg by mouth every 12 (twelve) hours as needed.       promethazine (PHENERGAN) 25 MG tablet Take 1 tablet (25 mg total) by mouth every 8 (eight) hours as needed for Nausea. 90 tablet 1    venlafaxine (EFFEXOR) 37.5 MG Tab Take 1 tablet (37.5 mg total) by mouth 2 (two) times daily. 180 tablet 0     No current facility-administered medications for this visit.      All medications and past history have been reviewed.        Objective:        Vital Signs:  Blood pressure 102/64, pulse 99, temperature 99.2 °F (37.3 °C), resp. rate 18, height 5' 8" (1.727 m), weight " 57.2 kg (126 lb), last menstrual period 05/30/2009. Body mass index is 19.16 kg/m².    Physical Exam:   General Appearance: Well appearing in no acute distress, well developed, well                nourished  Head: Normocephalic, without obvious abnormality  Eyes:  Pupils equal, round and reactive to light  Throat: Lips, mucosa, and tongue normal; teeth and gums normal  Lungs: CTA bilaterally in anterior and posterior fields, no wheezes, no crackles  Heart:  Regular rate and rhythm, no murmurs heard  Abdomen: Soft, non tender, non distended with positive bowel sounds in all four quadrants. No hepatosplenomegaly, ascites, or mass. Negative for succusion splash  : female   Extremities: No cyanosis, edema or deformity  Skin: No rash  Neurologic: Normal exam    Labs:  Lab Results   Component Value Date    WBC 9.27 09/02/2018    HGB 7.9 (L) 09/02/2018    HCT 23.5 (L) 09/02/2018     09/02/2018    ALT 8 (L) 06/12/2018    AST 18 06/12/2018     09/02/2018    K 3.4 (L) 09/02/2018     09/02/2018    CREATININE 0.8 09/02/2018    BUN 6 09/02/2018    CO2 25 09/02/2018    INR 1.2 03/18/2018       Imaging:     All lab results and imaging results have been reviewed and discussed with the patient      Assessment:       No diagnosis found.    Plan:       There are no diagnoses linked to this encounter.    See HPI    No Follow-up on file.    The plan was discussed with the patient and all questions/concerns have been answered to the patient's satisfaction.        Electronically signed by Pablo Medina MD    This note was dictated using voice recognition software and may contain grammatical errors.

## 2018-09-12 NOTE — PROGRESS NOTES
CRS Post-operative visit    Visit Info:     DATE OF PROCEDURE:  08/29/2018     PREOPERATIVE DIAGNOSIS:  Colostomy with Crohn disease.     POSTOPERATIVE DIAGNOSIS:  Colostomy with Crohn disease.     PROCEDURE PERFORMED:  Colostomy lap converted to open colostomy closure.          INDICATION:  Ms. Chang is a 52-year-old female, known to Colorectal   Surgery with Crohn disease who had a previous colostomy to repair a colovaginal   fistula with stricture.  The fistula has repaired the stricture with treatment   with biologics has resolved.  She is here for colostomy closure.  She has an end   colostomy.      Current Status: Doing well postoperatively.With increased bowel frequency    Physical Exam:  General: White female in NAD sitting in chair in clinic  Neuro: aaox4 maex4 perrl  Respiratory: resps even unlabored  Cardiac: cap refill <2 sec  Abdomen: Normal, benign. Incisions:clean, dry, intact        Assessment and Plan:  Diagnoses and all orders for this visit:    Postoperative state    start fiber, rtc 2 weeks  My start remicade 3 weeks postop

## 2018-09-26 ENCOUNTER — OFFICE VISIT (OUTPATIENT)
Dept: GASTROENTEROLOGY | Facility: CLINIC | Age: 53
End: 2018-09-26
Payer: MEDICARE

## 2018-09-26 VITALS
HEART RATE: 96 BPM | BODY MASS INDEX: 18.67 KG/M2 | SYSTOLIC BLOOD PRESSURE: 114 MMHG | RESPIRATION RATE: 18 BRPM | WEIGHT: 123.19 LBS | HEIGHT: 68 IN | DIASTOLIC BLOOD PRESSURE: 82 MMHG | TEMPERATURE: 98 F

## 2018-09-26 DIAGNOSIS — K21.9 GASTROESOPHAGEAL REFLUX DISEASE, ESOPHAGITIS PRESENCE NOT SPECIFIED: ICD-10-CM

## 2018-09-26 DIAGNOSIS — K50.913 CROHN'S DISEASE WITH FISTULA, UNSPECIFIED GASTROINTESTINAL TRACT LOCATION: ICD-10-CM

## 2018-09-26 DIAGNOSIS — E87.6 HYPOKALEMIA: ICD-10-CM

## 2018-09-26 DIAGNOSIS — Z93.3 S/P COLOSTOMY: ICD-10-CM

## 2018-09-26 DIAGNOSIS — D64.9 ANEMIA, UNSPECIFIED TYPE: ICD-10-CM

## 2018-09-26 DIAGNOSIS — G89.18 POST-OP PAIN: ICD-10-CM

## 2018-09-26 DIAGNOSIS — E83.42 HYPOMAGNESEMIA: ICD-10-CM

## 2018-09-26 DIAGNOSIS — K50.119 CROHN'S DISEASE OF COLON WITH COMPLICATION: ICD-10-CM

## 2018-09-26 DIAGNOSIS — K60.3 FISTULA-IN-ANO: Primary | ICD-10-CM

## 2018-09-26 PROCEDURE — 99215 OFFICE O/P EST HI 40 MIN: CPT | Mod: ,,, | Performed by: INTERNAL MEDICINE

## 2018-09-26 RX ORDER — HYDROCODONE BITARTRATE AND ACETAMINOPHEN 10; 325 MG/1; MG/1
1 TABLET ORAL EVERY 8 HOURS PRN
Qty: 90 TABLET | Refills: 0 | Status: SHIPPED | OUTPATIENT
Start: 2018-09-26 | End: 2018-10-23 | Stop reason: SDUPTHER

## 2018-09-26 NOTE — PROGRESS NOTES
[facility] Established Patient Visit    Subjective:           PCP: Pablo Medina    Chief Complaint: Abdominal Pain and Bloated       HPI:  Carline Chang is a 52 y.o. female here for follow-up since her surgery. The patient is going to be started on Remicade as per protocol given by Dr. Serrato. Patient is having multiple bowel movements and was started on fiber. This makes her bloated. Patient appears dehydrated and would consider giving her some fluids and is to start giving Remicade on . Patient will be given IV fluids prn depending on her hydration status and the fact that the patient is having multiple bowel movements.     ROS:   Constitutional: No fevers, chills, weight loss  ENT: No allergies, sore throat, rhinorrhea  CV: No chest pain, palpitations, edema  Pulm: No cough, shortness of breath, wheezing  Ophtho: No vision changes  GI: No blood in stools, change in bowel habits, nausea/vomiting  Denies alternating diarrhea/constipation.   Derm: No rash  Heme: No easy bruising or lymphadenopathy  MSK: No arthralgias or myalgias  : No dysuria, hematuria, frequency, polyuria, or flank tenderness  Endo: No hot or cold intolerance  Neuro: No syncope or seizure, or dizziness  Psych: No hallucination, depression or suicidal ideation      Medical History:  has a past medical history of Acute deep vein thrombosis (DVT) of brachial vein of right upper extremity (2017), Bowel perforation, Chronic pain, Crohn's disease of both small and large intestine with intestinal obstruction (), Difficult intravenous access, GERD (gastroesophageal reflux disease) (), Inflammatory bowel disease (), Nephrolithiasis, and Stricture of bowel.      Surgical History:  has a past surgical history that includes Tubal ligation;  section; Abscess drainage (); Anal fistulotomy (); Colonoscopy (3/24/2018); Upper gastrointestinal endoscopy (); Small intestine surgery ();  Small intestine surgery (02/2009); Cholecystectomy (2000); Colon surgery (06/2015); rectovaginal fistula repair (01/2018); Bowel resection; CLOSURE, COLOSTOMY, LAPAROSCOPIC (N/A, 8/29/2018); COLONOSCOPY (N/A, 7/6/2018); EXPLORATORY-LAPAROTOMY, small bowel resection, possible ostomy (N/A, 4/30/2018); RESECTION-SMALL BOWEL (N/A, 4/30/2018); ILEOSTOMY (Left, 4/30/2018); COLONOSCOPY (N/A, 3/24/2018); COLONOSCOPY (N/A, 2/8/2018); REPAIR-FISTULA-RECTOVAGINAL (N/A, 1/10/2018); COLONOSCOPY (N/A, 12/4/2017); CYSTOSCOPY WITH STENT PLACEMENT (Right, 1/23/2017); BIOPSY-BLADDER (N/A, 1/23/2017); FULGURATION-BLADDER (N/A, 1/23/2017); ESOPHAGOGASTRODUODENOSCOPY (EGD) (N/A, 1/17/2017); COLONOSCOPY (N/A, 1/17/2017); COLONOSCOPY (N/A, 5/27/2016); and COLOSTOMY (N/A, 6/18/2015).    Family History:   Family History   Problem Relation Age of Onset    Cancer Maternal Aunt 66        colon ca    Heart attack Father 69    Anesthesia problems Neg Hx     Celiac disease Neg Hx     Cirrhosis Neg Hx     Colon cancer Neg Hx     Colon polyps Neg Hx     Crohn's disease Neg Hx     Cystic fibrosis Neg Hx     Esophageal cancer Neg Hx     Hemochromatosis Neg Hx     Inflammatory bowel disease Neg Hx     Irritable bowel syndrome Neg Hx     Liver cancer Neg Hx     Liver disease Neg Hx     Rectal cancer Neg Hx     Stomach cancer Neg Hx     Ulcerative colitis Neg Hx     Mars's disease Neg Hx     Lymphoma Neg Hx     Tuberculosis Neg Hx     Scleroderma Neg Hx     Rheum arthritis Neg Hx     Multiple sclerosis Neg Hx     Melanoma Neg Hx     Lupus Neg Hx     Psoriasis Neg Hx     Skin cancer Neg Hx        Social History:   Social History     Tobacco Use    Smoking status: Never Smoker    Smokeless tobacco: Never Used   Substance Use Topics    Alcohol use: No     Alcohol/week: 0.0 oz    Drug use: No       The patient's social and family histories were reviewed by me and updated in the appropriate section of the electronic medical  "record.    Allergies:   Review of patient's allergies indicates:   Allergen Reactions    Morphine Other (See Comments)     Abdominal pain    Flagyl [metronidazole] Other (See Comments)     Neuropathy    Nubain [nalbuphine] Anxiety     Upper abdominal burning          Medications:   Current Outpatient Medications   Medication Sig Dispense Refill    azaTHIOprine (IMURAN) 50 mg Tab Take 1 tablet (50 mg total) by mouth once daily. 90 tablet 0    calcium carbonate (TUMS ORAL) Take by mouth as needed (acid reflux).      HYDROcodone-acetaminophen (NORCO)  mg per tablet Take 1 tablet by mouth every 12 (twelve) hours as needed. 60 tablet 0    loperamide (IMODIUM A-D) 1 mg/7.5 mL Liqd Take 0.1 mg/kg by mouth every 12 (twelve) hours as needed.       promethazine (PHENERGAN) 25 MG tablet Take 1 tablet (25 mg total) by mouth every 8 (eight) hours as needed for Nausea. 90 tablet 1    venlafaxine (EFFEXOR) 37.5 MG Tab Take 1 tablet (37.5 mg total) by mouth 2 (two) times daily. 180 tablet 0     No current facility-administered medications for this visit.      All medications and past history have been reviewed.        Objective:        Vital Signs:  Blood pressure 114/82, pulse 96, temperature 98.4 °F (36.9 °C), resp. rate 18, height 5' 8" (1.727 m), weight 55.9 kg (123 lb 3.2 oz), last menstrual period 05/30/2009. Body mass index is 18.73 kg/m².    Physical Exam:   General Appearance: Well appearing in no acute distress, well developed, well                nourished  Head: Normocephalic, without obvious abnormality  Eyes:  Pupils equal, round and reactive to light  Throat: Lips, mucosa, and tongue normal; teeth and gums normal  Lungs: CTA bilaterally in anterior and posterior fields, no wheezes, no crackles  Heart:  Regular rate and rhythm, no murmurs heard  Abdomen: Soft, non tender, non distended with positive bowel sounds in all four quadrants. No hepatosplenomegaly, ascites, or mass. Negative for succusion " splash  : female   Extremities: No cyanosis, edema or deformity  Skin: No rash  Neurologic: Normal exam    Labs:  Lab Results   Component Value Date    WBC 9.27 09/02/2018    HGB 7.9 (L) 09/02/2018    HCT 23.5 (L) 09/02/2018     09/02/2018    ALT 8 (L) 06/12/2018    AST 18 06/12/2018     09/02/2018    K 3.4 (L) 09/02/2018     09/02/2018    CREATININE 0.8 09/02/2018    BUN 6 09/02/2018    CO2 25 09/02/2018    INR 1.2 03/18/2018       Imaging:    All lab results and imaging results have been reviewed and discussed with the patient      Assessment:       No diagnosis found.    Plan:       There are no diagnoses linked to this encounter.    See HPI    No Follow-up on file.    The plan was discussed with the patient and all questions/concerns have been answered to the patient's satisfaction.        Electronically signed by Pablo Medina MD    This note was dictated using voice recognition software and may contain grammatical errors.

## 2018-09-28 LAB
ALBUMIN SERPL-MCNC: 3.6 G/DL (ref 3.1–4.7)
ALP SERPL-CCNC: 121 IU/L (ref 40–104)
ALT (SGPT): 11 IU/L (ref 3–33)
AST SERPL-CCNC: 24 IU/L (ref 10–40)
BASOPHILS NFR BLD: 0 K/UL (ref 0–0.2)
BASOPHILS NFR BLD: 0.4 %
BILIRUB SERPL-MCNC: 1 MG/DL (ref 0.3–1)
BUN SERPL-MCNC: 9 MG/DL (ref 8–20)
CALCIUM SERPL-MCNC: 8 MG/DL (ref 7.7–10.4)
CHLORIDE: 100 MMOL/L (ref 98–110)
CO2 SERPL-SCNC: 27.8 MMOL/L (ref 22.8–31.6)
CREATININE: 0.62 MG/DL (ref 0.6–1.4)
EOSINOPHIL NFR BLD: 0.5 K/UL (ref 0–0.7)
EOSINOPHIL NFR BLD: 6.1 %
ERYTHROCYTE [DISTWIDTH] IN BLOOD BY AUTOMATED COUNT: 15 % (ref 11.7–14.9)
GLUCOSE: 92 MG/DL (ref 70–99)
GRAN #: 6 K/UL (ref 1.4–6.5)
GRAN%: 78.8 %
HCT VFR BLD AUTO: 30.8 % (ref 36–48)
HGB BLD-MCNC: 10.1 G/DL (ref 12–15)
IMMATURE GRANS (ABS): 0 K/UL (ref 0–1)
IMMATURE GRANULOCYTES: 0.3 %
LYMPH #: 0.8 K/UL (ref 1.2–3.4)
LYMPH%: 9.9 %
MCH RBC QN AUTO: 29 PG (ref 25–35)
MCHC RBC AUTO-ENTMCNC: 32.8 G/DL (ref 31–36)
MCV RBC AUTO: 88.5 FL (ref 79–98)
MONO #: 0.3 K/UL (ref 0.1–0.6)
MONO%: 4.5 %
NUCLEATED RBCS: 0 %
PHOSPHATE FLD-MCNC: 3.9 MG/DL (ref 2.5–4.9)
PLATELET # BLD AUTO: 226 K/UL (ref 140–440)
PMV BLD AUTO: 9.2 FL (ref 8.8–12.7)
POTASSIUM SERPL-SCNC: 3.4 MMOL/L (ref 3.5–5)
PROT SERPL-MCNC: 7 G/DL (ref 6–8.2)
RBC # BLD AUTO: 3.48 M/UL (ref 3.5–5.5)
SODIUM: 136 MMOL/L (ref 134–144)
WBC # BLD AUTO: 7.6 K/UL (ref 5–10)

## 2018-10-01 ENCOUNTER — TELEPHONE (OUTPATIENT)
Dept: FAMILY MEDICINE | Facility: CLINIC | Age: 53
End: 2018-10-01

## 2018-10-01 LAB — MAGNESIUM SERPL-MCNC: 0.9 MG/DL (ref 1.5–2.6)

## 2018-10-03 LAB
BUN SERPL-MCNC: 6 MG/DL (ref 8–20)
CALCIUM SERPL-MCNC: 7.3 MG/DL (ref 7.7–10.4)
CHLORIDE: 106 MMOL/L (ref 98–110)
CO2 SERPL-SCNC: 27.7 MMOL/L (ref 22.8–31.6)
CREATININE: 0.76 MG/DL (ref 0.6–1.4)
GLUCOSE: 75 MG/DL (ref 70–99)
MAGNESIUM SERPL-MCNC: 0.8 MG/DL (ref 1.5–2.6)
MAGNESIUM SERPL-MCNC: 1.7 MG/DL (ref 1.5–2.6)
POTASSIUM SERPL-SCNC: 2.7 MMOL/L (ref 3.5–5)
POTASSIUM SERPL-SCNC: 3.4 MMOL/L (ref 3.5–5)
SODIUM: 141 MMOL/L (ref 134–144)

## 2018-10-11 ENCOUNTER — TELEPHONE (OUTPATIENT)
Dept: GASTROENTEROLOGY | Facility: CLINIC | Age: 53
End: 2018-10-11

## 2018-10-11 NOTE — TELEPHONE ENCOUNTER
----- Message from Deidre Mcclure sent at 10/11/2018  9:15 AM CDT -----  Contact: SELF  THINKS SHE IS HAVING A DELAYED REACTION TO THE REMICADE THAT SHE HAD 10 DAYS AGO.  CANT WALK OR EVEN MOVE WITHOUT HELP.  CANT RAISE HER ARMS.  HAS BEEN TAKING BENADRYL.  IS THIS NORMAL AND WHAT CAN SHE DO?  PLEASE GIVE HER A CALL.

## 2018-10-12 ENCOUNTER — TELEPHONE (OUTPATIENT)
Dept: GASTROENTEROLOGY | Facility: CLINIC | Age: 53
End: 2018-10-12

## 2018-10-12 NOTE — TELEPHONE ENCOUNTER
----- Message from Iliana Gaston sent at 10/12/2018 12:40 PM CDT -----  Contact: pt#[phone]  Needs Advice    Reason for call:Pt states that she's having a reaction to remicade. She states that she's experiencing joint and muscle pain         Communication Preference:call    Additional Information:

## 2018-10-18 DIAGNOSIS — R11.0 NAUSEA: ICD-10-CM

## 2018-10-19 RX ORDER — PROMETHAZINE HYDROCHLORIDE 25 MG/1
25 TABLET ORAL EVERY 8 HOURS PRN
Qty: 90 TABLET | Refills: 1 | Status: SHIPPED | OUTPATIENT
Start: 2018-10-19 | End: 2019-01-10 | Stop reason: SDUPTHER

## 2018-10-23 DIAGNOSIS — G89.18 POST-OP PAIN: ICD-10-CM

## 2018-10-23 NOTE — TELEPHONE ENCOUNTER
----- Message from Deidre Mcclure sent at 10/23/2018  9:39 AM CDT -----  Contact: SELF  PT IS DUE FOR HYDROCODONE REFILL. WILL BE OUT ON Thursday.  ALSO, CAN SHE GET A PRESC FOR AMBIEN SO SHE CAN GET HER SLEEP BACK ON TRACK.  SHES HOPING SHE MIGHT BE ABLE TO SLEEP THRU THE REMICADE GARBAGE!

## 2018-10-25 RX ORDER — HYDROCODONE BITARTRATE AND ACETAMINOPHEN 10; 325 MG/1; MG/1
1 TABLET ORAL EVERY 8 HOURS PRN
Qty: 90 TABLET | Refills: 0 | Status: SHIPPED | OUTPATIENT
Start: 2018-10-25 | End: 2018-11-20 | Stop reason: SDUPTHER

## 2018-10-29 ENCOUNTER — PATIENT MESSAGE (OUTPATIENT)
Dept: GASTROENTEROLOGY | Facility: CLINIC | Age: 53
End: 2018-10-29

## 2018-10-30 ENCOUNTER — TELEPHONE (OUTPATIENT)
Dept: GASTROENTEROLOGY | Facility: CLINIC | Age: 53
End: 2018-10-30

## 2018-10-30 DIAGNOSIS — K50.813 CROHN'S DISEASE OF BOTH SMALL AND LARGE INTESTINE WITH FISTULA: Primary | ICD-10-CM

## 2018-10-30 DIAGNOSIS — R10.9 ABDOMINAL PAIN, UNSPECIFIED ABDOMINAL LOCATION: ICD-10-CM

## 2018-10-30 NOTE — TELEPHONE ENCOUNTER
Called and spoke with pt  I read her Dr Serrato's message   Pt scheduled 11/15 @ 1:00    Appoint reminder mailed

## 2018-10-30 NOTE — TELEPHONE ENCOUNTER
----- Message from Deidre Mcclure sent at 10/30/2018  1:21 PM CDT -----  Contact: self  Left message with the [facility] on Monday, 10/29/18.  She is having a reaction to her treatment, facial flushing and nausea.  Please call her at [phone].

## 2018-10-31 ENCOUNTER — TELEPHONE (OUTPATIENT)
Dept: SURGERY | Facility: CLINIC | Age: 53
End: 2018-10-31

## 2018-10-31 NOTE — TELEPHONE ENCOUNTER
Spoke with pt and she wants to schedule when she can see both Dr. Serrato and Jojo at the same time. Informed pt that both NPs can see her on 11/19. Rescheduled per pt request.

## 2018-10-31 NOTE — TELEPHONE ENCOUNTER
----- Message from Noemí Powell MA sent at 10/31/2018 12:14 PM CDT -----  Contact: [phone]  Needs Advice    Reason for call: pt is not sure what she cant see Dr Uribe since she is post op, asking to speak with Esme Xavier        Communication Preference: [phone]

## 2018-11-01 ENCOUNTER — OFFICE VISIT (OUTPATIENT)
Dept: GASTROENTEROLOGY | Facility: CLINIC | Age: 53
End: 2018-11-01
Payer: MEDICARE

## 2018-11-01 VITALS
HEART RATE: 97 BPM | DIASTOLIC BLOOD PRESSURE: 70 MMHG | BODY MASS INDEX: 19.25 KG/M2 | HEIGHT: 68 IN | SYSTOLIC BLOOD PRESSURE: 110 MMHG | WEIGHT: 127 LBS

## 2018-11-01 DIAGNOSIS — E86.0 DEHYDRATION: ICD-10-CM

## 2018-11-01 DIAGNOSIS — R14.0 ABDOMINAL DISTENSION (GASEOUS): ICD-10-CM

## 2018-11-01 DIAGNOSIS — D64.9 ANEMIA, UNSPECIFIED TYPE: ICD-10-CM

## 2018-11-01 DIAGNOSIS — Z93.3 S/P COLOSTOMY: ICD-10-CM

## 2018-11-01 DIAGNOSIS — K50.919 EXACERBATION OF CROHN'S DISEASE WITH COMPLICATION: ICD-10-CM

## 2018-11-01 DIAGNOSIS — R10.84 GENERALIZED ABDOMINAL PAIN: ICD-10-CM

## 2018-11-01 DIAGNOSIS — E83.42 HYPOMAGNESEMIA: ICD-10-CM

## 2018-11-01 DIAGNOSIS — K50.813 CROHN'S DISEASE OF BOTH SMALL AND LARGE INTESTINE WITH FISTULA: Primary | ICD-10-CM

## 2018-11-01 DIAGNOSIS — K60.3 FISTULA-IN-ANO: ICD-10-CM

## 2018-11-01 PROCEDURE — 90688 IIV4 VACCINE SPLT 0.5 ML IM: CPT | Mod: ,,, | Performed by: INTERNAL MEDICINE

## 2018-11-01 PROCEDURE — 99215 OFFICE O/P EST HI 40 MIN: CPT | Mod: 25,,, | Performed by: INTERNAL MEDICINE

## 2018-11-01 PROCEDURE — G0008 ADMIN INFLUENZA VIRUS VAC: HCPCS | Mod: ,,, | Performed by: INTERNAL MEDICINE

## 2018-11-02 LAB
BUN SERPL-MCNC: 9 MG/DL (ref 8–20)
CALCIUM SERPL-MCNC: 8 MG/DL (ref 7.7–10.4)
CHLORIDE: 103 MMOL/L (ref 98–110)
CO2 SERPL-SCNC: 30.3 MMOL/L (ref 22.8–31.6)
CREATININE: 0.6 MG/DL (ref 0.6–1.4)
GLUCOSE: 114 MG/DL (ref 70–99)
MAGNESIUM SERPL-MCNC: 1.9 MG/DL (ref 1.5–2.6)
PHOSPHATE FLD-MCNC: 4 MG/DL (ref 2.5–4.9)
POTASSIUM SERPL-SCNC: 3.3 MMOL/L (ref 3.5–5)
SODIUM: 140 MMOL/L (ref 134–144)

## 2018-11-05 ENCOUNTER — TELEPHONE (OUTPATIENT)
Dept: GASTROENTEROLOGY | Facility: CLINIC | Age: 53
End: 2018-11-05

## 2018-11-05 NOTE — TELEPHONE ENCOUNTER
Called and spoke with pt  I explained she is scheduled with Jenelle Serrato's NP.  I told her Dr Serrato will be in clinic that day so she will be around  I told her the only thing we have on 11/19 is a 3:00 with Jenelle  She stated she will take it if Dr Serrato will be around to talk to her     Pt rescheduled

## 2018-11-05 NOTE — TELEPHONE ENCOUNTER
----- Message from Lucrecia Chang sent at 11/5/2018  2:12 PM CST -----  Contact: Self- [phone]  Yfn- pt returning a missed call from Paulette- please contact pt at [phone]

## 2018-11-16 NOTE — PROGRESS NOTES
[facility]             Inflammatory Bowel Disease Follow-up  Note              TODAY'S VISIT DATE:  11/16/2018    Chief Complaint:   Chief Complaint   Patient presents with    Crohn's Disease     PCP: Pablo Medina    Previous History:  Carline Chang is a 52 y.o. female with fistulizing (rectovaginal fistula repaired) ileocolonic Crohn's disease with SB stricture:  diagnosed 1986 (age 21) with history of multiple surgeries including SB resections (1995), ileocolonic resection (2/2009-partial colectomy-3 cm, SB resection-11 cm), sigmoid loop colostomy (6/2015), and rectal vaginal fistula repair (1/2018).  She has a past medical history of cholecystectomy, history of RUE right brachial vein thrombus (RUE US 1/2017), and kidney stones (CT 1/2017).  She began with symptoms of a partial SBO that was attributed to a virus in 1982.  She continued with symptoms and in 1986 she had an UGI with SBFT with showed Crohn's disease.  She tried various medical therapies asacol, pentasa, azulfidine, imuran and prednisone all of which were ineffective and did not achieve clinic remission though prednisone helped her symptoms the most.  In 1995 she had obstructive symptoms with stricture and fistula requiring surgery at which time she recalls 10 inched of intestine being resected.  She was under the care of Dr. Hurd from [id number] and recall a normal colonoscopy in 2003.  She was on no medications from 1003-1642 and began seeing Dr. Boland in 2006.  In 2/2009 she had another surgery which included drainage of abdominal wall abscess, partial colectomy (3 cm in length) with small bowel resection (11 cm in length), and lysis of adhesions with surgical path showing mucosal ulcer, adjacent moderate ileocecal inflammation, serosal inflammatory adhesions, incidental lipomatous hypertrophy of ileocecal valve.  She started remicade but only took three doses but then discontinued due to loss of  insurance.  She then did well for two years on no medications.  In 6/2011 she had worsening symptoms that did not improve with steroids, pentasa, imuran and flagyl.  She developed a rectovaginal fistula in 2014 that increased in size by 11/2014 per CT scan.  On 6/18/2015 she had a sigmoid loop colostomy though post-op developed high ostomy output with plans to eventually have a colostomy takedown.  She also had some peristomal skin irritation that was treated with topical antifungal, calamine, and flagyl.  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  Due to ongoing active inflammation and rectal fistula per colonoscopy and CT scan, she started Humira in 12/2015 and did well on humira until 1/2017.  She discontinued humira on 1/12/2017 and was told it was not working.  Entyvio was considered but never started, rather she was started on Imuran 50 mg PO daily.  She continued with symptoms and multiple examinations showing active disease, active fistula, and free air adjacent to the proximal colonic anastomosis in the RLQ.  In 1/2018 she underwent a rectovaginal fistula repair.  She had a [place] at [facility] [place]17[place]-3/24/2018 at which time she initially met Dr. Serrato.  She had an MRE on 3/7/2018 which showed SB inflammation with upstream dilation, gastrograffin enemas showed no evidence of rectovaginal fistula and repeat MRE on 3/23/18 showed ongoing active inflammation of the SB with stricture and upstream dilation.  She had a colonoscopy through stoma by me which showed poor bowel prep with significant stool interfering with visualization. I was able to advance scope into the neoterminal ileum but due to poor air insufflation and bowel prep had difficulty seeing well.  She was started on IVF and IV steroids and eventually transitioned to oral steroids by discharged with improvement in her labs as well.       Interval History:  - current IBD meds: Imuran  50 mg PO daily  - 12-15 soft Bowel Movements/day with no blood and no nocturnal BMs  - 4/30/2018: Exploratory laparotomy, lysis of adhesions, a previous ileocolic resection anastomosis and distal small bowel resection with a primary anastomosis (path: severe chronic active enteritis with extensive ulceration, fistula tract formation, granulomas)  - 7/6/2018 Colonoscopy: The rectum appeared normal. No sign of fistula. The terminal ileum appeared normal. The anastomosis appeared normal  - 8/29/2018: Colostomy lap converted to open colostomy closure (path-colostomy: normal) (path-sigmoid and rectum: segment of bowel with mild transmural acute inflammation and serositis, surgical resection margins are viable)  - started remicade 10/5/2018 on and had a reaction of 10/29/2018 on her 2nd dose   - GERD: controlled with Tums  - a lot gas and bloating   - constitutional/GI symptoms: no fevers/chills, weight loss, dysphagia, abdominal pain  - extraintestinal manifestations: no eye pain/redness, skin lesions/rashes, liver problems, joint pain/swelling/stiffness, dysuria/hematuria    Prior Pertinent Surgeries:  1995: Small bowel resection (approx 10 inches) at [facility]  2/9/2009: Drainage of abdominal wall abscess, partial colectomy (3 cm in length) with Small bowel resection (11 cm in length), Lysis of adhesions at [facility] (path: ileocecal mucosal ulcer, adjacent moderate ileocecal inflammation, serosal inflammatory adhesions, incidental lipomatous hypertrophy of ileocecal valve, no malignancy)  6/18/2015: Sigmoid loop colostomy and mobilization of splenic flexure at [facility]  1/10/2018: RV fistula repair (path: reactive changes and focal granulation tissue formation, associated transmural defect and serosal adhesions, background colonic mucosa with no significant histopathologic changes)  4/30/2018: Exploratory laparotomy, lysis of adhesions, a previous ileocolic resection anastomosis and distal small bowel resection with a primary  anastomosis (path: severe chronic active enteritis with extensive ulceration, fistula tract formation, granulomas)  2018: Colostomy lap converted to open colostomy closure (path-colostomy: normal) (path-sigmoid and rectum: segment of bowel with mild transmural acute inflammation and serositis, surgical resection margins are viable)    Pertinent Endoscopy/Imagin2014 KUB: mild gaseous distention of the small bowel loops within the left mid abdomen with a couple of scattered small bowel air-fluid levels  2014 CT abd/pelvis: diffuse wall thickening of the distal ileum with engorgement of the vasculature and stranding within the adjacent fat; no obstruction or abscess; colon is fluid filled without wall thickening; prior cholecystectomy  10/22/2014 flex sig: fistula in the anal canal; 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 (path: focal surface denudation, focal tubular loss and superficial recent mucosal hemorrhage)  2014 CT abd/pelvis: perianal fistula significantly increased in size with several small abscesses in the right gluteal fat adjacent to the gluteal crease; small amount of air in the vagina near the region of the above fistula along the right lateral lateral margin of the anorectal verge, with abnormal soft tissue density obscuring the normal fat plane between the rectum and the vagina; pathologic bowel wall thickening involving the distal ileum with surrounding edema  2015 colonoscopy: obvious evidence of Crohns in the small bowel; Crohns recurrence at the anastomotic site (path: moderate active chronic enterocolitis with villous/crypt architectural distortion); hyperplastic polyp in the left colon (path: focal mucosal crypt hyperplasia); proctitis with evidence of intestinal fistula formation (path: normal)  2015 MRI Pelvis: perianal fistula originating at the right lateral spect of the anorectal junction, extending through the right levator ani and terminating in  the right gluteal cleft; possible rectovaginal fistula  4/28/2015 KUB: mildly dilated loops of small bowel with probable irregularity of the wall of a loop of small in the upper mid pelvic region  4/28/2015 CXR: normal  6/12/2015 CT abd/pelvis: acute on chronic inflammatory changes of the distal ileum without evidence for focal obstruction or perforation; rectal fistula  5/27/2016 Colonoscopy: Inflammation characterized by congestion (edema), erythema and scarring was found. The anus and the rectum were spared. This was mild in severity, and when compared to previous examinations, the findings are improved. The colon (entire examined portion) appeared normal (no path)  1/4/2017 CT abd/pelvis: several centimeter segment within the mid small bowel demonstrating inflammatory changes including wall thickening, surrounding fat stranding, and prominent vascularity with associated free fluid without evidence of obstruction, perforation, or abscess; rectal fistulas; cholecystectomy; descending colostomy; nonobstructing right renal calculus  1/15/2017 RUE US: noncompressibel occlusive thrombus throughout the right brachial vein  1/15/2017 CXR: optimal placement of the central line with no pneumothorax  1/15/2017 CT abd/pelvis: thickened loop of terminal ileum with prominence of the mesenteric blood vessels; no perforation, abscess formation, obstruction; no fistula identified  1/16/2017 CXR: interval placement of a left PICC with catheter tip in SVC  1/17/2017 EGD: The esophagus was normal. The stomach was normal. The cardia and gastric fundus were normal on retroflexion. The examined duodenum was normal; Normal esophagus; Normal stomach; Normal examined duodenum (no path)   1/17/2017 Colonoscopy: A small fistula opening was found in the rectum. The rectal remnant appeared normal. No stricture noted. The entire examined colon is normal. Colonic fistula in rectum appears closed. Biopsies were taken with a cold forceps from  the entire colon for evaluation of microscopic colitis   1/18/2017 KUB: nonspecific prominence of the intestinal bowel loops; post-op changes in the RUQ and RLQ  2/5/2017 KUB: Nonspecific scattered air-fluid levels without dilated bowel loops  2/5/2017 Pelvic US: normal appearance of the uterus and right ovary  4/12/2017 CXR: normal  4/12/2017 KUB: unchanged SBO with no evidence of free air  4/14/2017KUB: no evidence of dilated loops of bowel; slight increase in number of air fluid levels may suggest ileus  11/22/2017 MRI pelvis: Fistulous tract connecting the right posterolateral aspect of the vagina and right lateral wall of the anorectal junction (9:00) that extends through the levator ani muscle just above the level of the puborectalis muscle/internal sphincter and courses through the medial aspect of the right ischiorectal/ischioanal fossa to terminate at the medial gluteal fold (suspected supra-sphincteric fistula).  When compared to the MRI dated 04/20/2015, fistulous track appears mostly hypointense suggesting fibrosis without definite imaging findings to indicate patency. No focal drainable fluid collections. Circumferential wall thickening involving the distal small bowel, not significantly changed when compared to CT dated 01/19/2017 and likely reflecting sequela of the patient's known inflammatory bowel disease.  12/4/2017 Flex sig: anal stricture, normal mucosa in the rectum (no path)  2/2018 Colonoscopy: minimal inflammation. Biopsies normal   2/3/2018 CT abd/pelvis: Postoperative changes from ileocolonic resection and descending colostomy for repair of a rectovaginal fistula; Interval development of free air adjacent to the proximal colonic anastomosis within the right lower quadrant. Findings concerning for potential anastomotic leak; mild distention of the distal ileum with luis-ileal inflammation and edema of the adjacent mesentery    2/4/2018 CXR: no focal consolidation  2/4/2018 MRE: Small bowel  and colonic disease  2/21/2018 KUB: Linear collection of gas along the lateral margin of the cecum/ascending colon  suspicious for small collection of extraluminal air. This has apparently been  demonstrated on outside imaging which is not available for direct comparison.  Clinical/imaging follow-up are recommended. Retained gas and fecal material within the colon with scattered colonic air-fluid levels; postoperative changes  2/26/2018 KUB: stable linear collection of air along the lateral margin of the cecum suspicious extraluminal air; unremarkable bowel gas pattern  3/7/2018 MRE: Small bowel inflammation and upstream dilation, some small bowel stenosis  3/13/2018 Gastrograffin Enema: no evidence of rectovaginal fistula  3/23/2018 MRE: Active inflammatory Crohn's disease with stricture and upstream dilatation    Pertinent Labs:  Lab Results   Component Value Date    SEDRATE 52 (H) 02/03/2018    CRP 1.52 (H) 04/26/2018     No results found for: TTGIGA, IGA  No results found for: TSH, FREET4  Lab Results   Component Value Date    ZWFAIOOG32ZX 10 (L) 04/09/2018    HYXUAKYC77 289 04/09/2018     Lab Results   Component Value Date    HEPBSAG Negative 02/05/2018    HEPBCAB Negative 02/05/2018    HEPCAB Negative 02/05/2018     No results found for: GMI88UQZY  Lab Results   Component Value Date    NIL 0.079 02/05/2018    TBAG 0.077 02/05/2018    TBAGNIL -0.002 02/05/2018    MITOGENNIL 2.704 02/05/2018    TBGOLD Negative 02/05/2018     Lab Results   Component Value Date    TPTMINTERP Intermediate  03/18/2018    TPMTRESULT 19.9 (L) 01/17/2017     Lab Results   Component Value Date    STOOLCULTURE  03/18/2018     No Salmonella,Shigella,Vibrio,Campylobacter,Yersinia isolated.    DSFRYHYREH4A Negative 03/18/2018    FJJEQPITYY9C Negative 03/18/2018    CDIFFICILEAN Negative 06/12/2018    CDIFFTOX Negative 06/12/2018     No results found for: CALPROTECTIN    Therapeutic Drug Monitoring Labs:  None    Prior IBD Meds:  Asacol -  ineffective  Pentasa- ineffective  Azulfidine - ineffective  Azathioprine-on and off, no significant side effects  Remicade - 3 infusions 2009 then lost insurance, retried 10/5/2018 and had a RXN (CP & difficulty breathing) so it was discontinued   Humira 40 mg q 2 weeks in [id number]- ineffective    Current IBD Therapies:  Imuran 50 mg PO daily    Vaccinations:  Influenza (inactive): 11/1/2018  Pneumococcal PCV 13: 4/17/2018  Pneumococcal PCV 23: 6/12/2018  Tetanus (TdaP): 4/17/2018  HPV (males and females ages 19-25 yo): N/A   Meningococcal (risk factors- complement component deficiency, spleen damage or splenectomy, HIV, traveling to endemic areas, college student residing in residence childress,  recruits): no risk factors  Hepatitis B: 4/17/2018, 6/12/2018  Lab Results   Component Value Date    HEPBSAB Negative 02/05/2018   Hepatitis A (risk factors- traveling to high endemic areas, chronic liver disease, clotting factor disorders, MSM, illicit drug users): 4/17/2018, 6/12/2018  Lab Results   Component Value Date    HEPAIGG Negative 02/05/2018   MMR (live vaccine):        Chickenpox status/Varicella (live vaccine): Immune  Lab Results   Component Value Date    VARICELLAZOS 2.50 (H) 02/05/2018    VARICELLAINT Positive (A) 02/05/2018   Zoster (age >51 yo, live vaccine): Shingrix series recommended    NSAID use/indication:  No    Narcotic use:  Yes, hydrocodone 10/325 mg (Dr. Medina)    Alternative/Complementary Meds for IBD:  No    Review of Systems   Constitutional: Negative for chills, fever and weight loss.   HENT:        No oral ulcers, dysphagia, oral thrush   Eyes: Negative for blurred vision, pain and redness.   Respiratory: Negative for cough and shortness of breath.    Cardiovascular: Negative for chest pain.   Gastrointestinal: Positive for heartburn. Negative for abdominal pain, nausea and vomiting.   Genitourinary: Negative for dysuria and hematuria.   Musculoskeletal: Negative for back pain and  "joint pain.   Skin: Negative for rash.   Psychiatric/Behavioral: Negative for depression. The patient is not nervous/anxious and does not have insomnia.      All Medical History/Surgical History/Family History/Social History/Allergies have been reviewed and updated in EMR    Review of patient's allergies indicates:   Allergen Reactions    Morphine Other (See Comments)     Abdominal pain    Flagyl [metronidazole] Other (See Comments)     Neuropathy    Nubain [nalbuphine] Anxiety     Upper abdominal burning        Outpatient Medications Marked as Taking for the 11/19/18 encounter (Office Visit) with PRUDENCIO Pastor   Medication Sig Dispense Refill    azaTHIOprine (IMURAN) 50 mg Tab Take 1 tablet (50 mg total) by mouth once daily. 90 tablet 0    calcium carbonate (TUMS ORAL) Take by mouth as needed (acid reflux).      HYDROcodone-acetaminophen (NORCO)  mg per tablet Take 1 tablet by mouth every 8 (eight) hours as needed. 90 tablet 0    promethazine (PHENERGAN) 25 MG tablet Take 1 tablet (25 mg total) by mouth every 8 (eight) hours as needed for Nausea. 90 tablet 1    venlafaxine (EFFEXOR) 37.5 MG Tab Take 1 tablet (37.5 mg total) by mouth 2 (two) times daily. 180 tablet 0     Vital Signs:  Vitals:    11/19/18 1455   BP: 114/72   Pulse: 80   Resp: 14   Temp: 98.3 °F (36.8 °C)   Weight: 57.5 kg (126 lb 12.2 oz)   Height: 5' 8" (1.727 m)   PainSc: 0-No pain     Physical Exam   Constitutional: She is oriented to person, place, and time. She appears well-developed.   HENT:   Mouth/Throat: Oropharynx is clear and moist. No oral lesions.   No oral ulcers or thrush   Eyes: Conjunctivae are normal. Pupils are equal, round, and reactive to light.   Cardiovascular: Normal rate and regular rhythm.   Pulmonary/Chest: Effort normal and breath sounds normal.   Abdominal: Soft. Bowel sounds are normal. There is no tenderness. There is no rebound and no guarding.   Healed abdominal scars with no signs of infection "   Musculoskeletal:        Right lower leg: She exhibits no swelling.        Left lower leg: She exhibits no swelling.   Neurological: She is alert and oriented to person, place, and time.   Skin: No rash noted.   Psychiatric: She has a normal mood and affect.   Nursing note and vitals reviewed.    Labs:   Lab Results   Component Value Date    WBC 7.6 09/28/2018    HGB 10.1 (L) 09/28/2018    HCT 30.8 (L) 09/28/2018    MCV 88.5 09/28/2018     09/28/2018     Lab Results   Component Value Date    CREATININE 0.60 11/02/2018    ALBUMIN 3.6 09/28/2018    BILITOT 1.0 09/28/2018    ALKPHOS 121 (H) 09/28/2018    AST 24 09/28/2018    ALT 8 (L) 06/12/2018     Lab Results   Component Value Date    NIL 0.079 02/05/2018    TBAG 0.077 02/05/2018    TBAGNIL -0.002 02/05/2018    MITOGENNIL 2.704 02/05/2018    TBGOLD Negative 02/05/2018     Assessment/Plan:  Carline Chang is a 52 y.o. female with fistulizing (rectovaginal fistula repaired) ileocolonic Crohn's disease with SB stricture.  She underwent an ex lap with FOREIGN and a previous ileocolic resection anastomosis with distal small bowel resection with a primary anastomosis on 4/30/2018 with path showing severe chronic active enteritis with extensive ulceration and fistula tract formation.  Colonoscopy on 7/6/2018 showed a normal terminal ileum and normal anastomosis so she underwent a colostomy closure on 8/29/2018.  She did well post-op and started remicade on 10/5/2018.  Unfortunately during her second infusion she began to have SE of chest pain and difficulty breathing so remicade was discontinued.  She continues with stable GI symptoms and complains of gas and bloating and is on Imuran 50 mg PO daily which she thinks helps.  Today we discussed her next treatment options since she can no longer take remicade.  She is naive to cimzia, entyvio, MTX, and Stelara.  Cimzia is not likely to work since humira was previously ineffective and stelara is unlikely to be  covered by her insurance.  We think her next best option is entyvio so we discussed the risk and benefits in detail and she would like to proceed and she would like to continue Imuran 50 MG po daily which we do not see as a problem.  Dr. Serrato will discuss our recommendations with Dr. Medina and she will start entyvio with his office.  She is also due for a colonoscopy with ileoscopy in 2/2019 which will be 6 months post-op to assess for post-op recurrence.  We also discussed health maintenance and vaccinations in detail.      # Fistulizing (rectovaginal fistula repaired) ileocolonic Crohn's disease with SB stricture: diagnosed 1986 (age 21) with history of multiple surgeries including SB resections (1995), ileocolonic resection (2/2009-partial colectomy-3 cm, SB resection-11 cm), sigmoid loop colostomy (6/2015), rectal vaginal fistula repair (1/2018), and ileocolic resection anastomosis, distal small bowel resection with primary anastomosis and colostomy closure (4/2018, 8/2018)  - continue Imuran 50 mg PO daily   - repeat colonoscopy with ileoscopy due in 2/2019  - discussed MOA, dose, route, risks, and benefits of entyvio in detail during clinic visit-will start with Dr. Medina  - drug monitoring labs: CBC/LFTs every 3 months (Imuran monitoring), TPMT (intermediate activity/heterozygote- 3/2018), TB quantiferon (negative 2/2018), Hep B testing (negative HBsAg, HBtotalcoreAb 3/2018)    # History of RUE right brachial vein thrombus  - on US RUE 1/2017  - not on any blood thinners  - no active symptoms and likely related to IV/central line  - monitor and recommend inpateint DVT prophylaxis at all times given high risk with IBD and venous thrombosis    # Nephrolithiasis  - 1/2017 CT with kidney stones  - recommend urology f/u if stones return  - hydration and control of diarrhea are important  istory of RUE right brachial vein thrombus (RUE US 1/2017), kidney stones (CT 1/2017)    # IBD specific health  maintenance:  Colorectal cancer risk:  Risks factors: none-average risk  - Distribution of colonic disease:  primarily SB disease  - Year of symptom onset: 1982  - colonoscopy:  screening per age    Pap smear & Mammogram recommended yearly - next due now  Opthamologic exam recommended yearly - next due now  Dermatologic exam recommended yearly - next due now    Bone health:  Risk of osteopenia/osteoporosis:  Risks factors: none  Vitamin D: continue high dose D weekly, once complete take OTC vitamin D3 3000 IU daily   Lab Results   Component Value Date    [id number] 10 (L) 04/09/2018     Vaccine counseling:  - No LIVE VACCINES-reminded in clininc  - List of vaccinations given to patient     Follow up: with Dr. Medina as scheduled     Total visit time was 40 minutes, more than 50% of which was spent in face-to-face counseling with patient regarding evaluation and management goals and treatment options for Crohn's disease     Jenelle Coulter, FNP-C  Department of Gastroenterology  Inflammatory Bowel Disease Program

## 2018-11-19 ENCOUNTER — OFFICE VISIT (OUTPATIENT)
Dept: GASTROENTEROLOGY | Facility: CLINIC | Age: 53
End: 2018-11-19
Payer: MEDICARE

## 2018-11-19 ENCOUNTER — OFFICE VISIT (OUTPATIENT)
Dept: SURGERY | Facility: CLINIC | Age: 53
End: 2018-11-19
Payer: MEDICARE

## 2018-11-19 VITALS
TEMPERATURE: 98 F | SYSTOLIC BLOOD PRESSURE: 114 MMHG | SYSTOLIC BLOOD PRESSURE: 131 MMHG | HEART RATE: 80 BPM | BODY MASS INDEX: 19.21 KG/M2 | WEIGHT: 127 LBS | RESPIRATION RATE: 14 BRPM | BODY MASS INDEX: 19.31 KG/M2 | WEIGHT: 126.75 LBS | DIASTOLIC BLOOD PRESSURE: 72 MMHG | DIASTOLIC BLOOD PRESSURE: 68 MMHG | HEIGHT: 68 IN

## 2018-11-19 DIAGNOSIS — K21.9 GASTROESOPHAGEAL REFLUX DISEASE WITHOUT ESOPHAGITIS: ICD-10-CM

## 2018-11-19 DIAGNOSIS — N20.0 NEPHROLITHIASIS: ICD-10-CM

## 2018-11-19 DIAGNOSIS — K50.10 CROHN'S DISEASE OF LARGE INTESTINE WITHOUT COMPLICATION: Primary | ICD-10-CM

## 2018-11-19 DIAGNOSIS — Z79.899 HIGH RISK MEDICATION USE: ICD-10-CM

## 2018-11-19 DIAGNOSIS — Z86.718 HISTORY OF DVT (DEEP VEIN THROMBOSIS): ICD-10-CM

## 2018-11-19 DIAGNOSIS — K50.812 CROHN'S DISEASE OF BOTH SMALL AND LARGE INTESTINE WITH INTESTINAL OBSTRUCTION: Primary | ICD-10-CM

## 2018-11-19 PROBLEM — Z93.3 COLOSTOMY IN PLACE: Status: RESOLVED | Noted: 2018-08-29 | Resolved: 2018-11-19

## 2018-11-19 PROCEDURE — 99999 PR PBB SHADOW E&M-EST. PATIENT-LVL IV: CPT | Mod: PBBFAC,,, | Performed by: NURSE PRACTITIONER

## 2018-11-19 PROCEDURE — 99214 OFFICE O/P EST MOD 30 MIN: CPT | Mod: PBBFAC,27 | Performed by: NURSE PRACTITIONER

## 2018-11-19 PROCEDURE — 99999 PR PBB SHADOW E&M-EST. PATIENT-LVL III: CPT | Mod: PBBFAC,,, | Performed by: NURSE PRACTITIONER

## 2018-11-19 PROCEDURE — 99213 OFFICE O/P EST LOW 20 MIN: CPT | Mod: PBBFAC | Performed by: NURSE PRACTITIONER

## 2018-11-19 PROCEDURE — 99024 POSTOP FOLLOW-UP VISIT: CPT | Mod: POP,,, | Performed by: NURSE PRACTITIONER

## 2018-11-19 PROCEDURE — 99215 OFFICE O/P EST HI 40 MIN: CPT | Mod: S$PBB,,, | Performed by: NURSE PRACTITIONER

## 2018-11-19 NOTE — LETTER
November 19, 2018      Pablo Medina MD  [address]  [address]  [place] [address]           [facility]  [address]  [facility] [address]  Phone: [phone]          Patient: Carline Chang   MR Number: [id number]   YOB: 1965   Date of Visit: 11/19/2018       Dear Dr. Pablo Medina:    Thank you for referring Carline Chang to me for evaluation. Attached you will find relevant portions of my assessment and plan of care.    If you have questions, please do not hesitate to call me. I look forward to following Carline Chang along with you.    Sincerely,    Rosina Mcdonough NP    Enclosure  CC:  No Recipients    If you would like to receive this communication electronically, please contact [email or web address] or [phone] to request more information on [employer] [employer] access.    For providers and/or their staff who would like to refer a patient to [facility], please contact us through our one-stop-shop provider referral line, [facility], at [phone].    If you feel you have received this communication in error or would no longer like to receive these types of communications, please e-mail [email or web address]

## 2018-11-19 NOTE — PATIENT INSTRUCTIONS
Instructions:  - start Entyvio with Dr. Medina   - continue Imuran 50 mg PO daily  - need labs CBC/LFTs every 3 months  - need TB Quantiferon yearly   - finish high dose vitamin D weekly then take OTC vitamin D3 3000 daily   - colonoscopy with ileoscopy due 2/2019 (6 months after surgery)  - Avoid all NSAIDs (Advil, Ibuprofen, Motrin, Aspirin, Naprosyn, Aleve)  - Yearly Pap Smears & Mammogram recommended due now  - Yearly Eye exam due now  - Yearly Skin exam--wear sun block and hats due now  - Use antibiotics with caution  - For Dental Procedures, use antibiotics only if absolutely necessary  - Please make sure you establish care with a PCP  - If you have to take antibiotics for any infection, please take a 2 week course of OTC Florastor 1 capsule twice daily probiotic after completion of the antibiotic course  - Vaccines recommended:  Flu shot yearly  Tetanus every 10 years  Continue Hepatitis B series   Shingrix series  - Follow up with Dr. Medina on 12/3/2018 as scheduled     Vedolizumab (Entyvio): Biologics - Anti- integrin  - Mechanism of action:  Entyvio blocks alpha-[id number] which plays a role in the inflammatory process for inflammatory bowel disease  - Labs     - Repeat labs should be drawn every 3-6 months to monitor for side effects    Entyvio Dosage:  - Loading Dose: 300 mg IV on week 0, 2, and 6  - Maintenance Dose: 300 mg IV every 8 weeks  - 30 minute infusion     Common Adverse Reactions of Entyvio  Adverse reactions in >3% of Entyvio-treated patients and >1% higher than Placebo   Entyvio (E=5925)   Placebo (N=297)   Nasopharyngitis 13% - 13/100 7% - 7/100   Headache 12% - 12/100 11% - 11/100   Joint pain 12% - 12/100 10% - 10/100   Nausea 9% - 9/100 8% - 8/100   Fever 9% - 9/100 7% - 7/100   Upper Respiratory Tract Infection 7% - 7/100 6% - 6/100   Fatigue 6% - 6/100 3% - 3/100   Cough 5% - 5/100 3% - 3/100   Bronchitis 4% - 4/100 3% - 3/100   Influenza 4% - 4/100 2% - 2/100   Back Pain 4% -  4/100 3% - 3/100   Rash 3% - 3/100 2% - 2/100   Itching 3% - 3/100 1% - 1/100   Sinusitis 3% - 3/100 1% - 1/100   Oropharyngeal Pain 3% - 3/100 1% - 1/100   Pain in Extremities 3% - 3/100 1% - 1/100     **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  Entyvio is an [id number] whereas Tysabri is an [id number] integrin antagonist.  There are no reports of PML identified with Entyvio use in the published data from early 2016 and 2830 patients    Vaccine Counseling  See above    - All Immunizations ideally should be up to date prior to starting therapy--NO LIVE vaccines during therapy or 8 weeks prior to therapy  - Live Vaccines Include:   --Intranasal Influenza A/B  --Measles, Mumps, Rubella (MMR)  --Rotavirus  --Typhoid (oral)  --Vaccina (Smallpox)  --Varicella (Chicken Pox)  --Yellow Fever  --Zoster

## 2018-11-19 NOTE — PROGRESS NOTES
Subjective:       Patient ID: Carline Chang is a 52 y.o. female.    Chief Complaint: No chief complaint on file.    HPI   52 f s/p colostomy closure by Dr Uribe 8/29 for crohn's with rectovaginal fistula.  She had an uncomplicated post operative course. She was receiving remicade infusions, but did not tolerate it well.     Still having 8-10 bowel movements/day. Was on lomotil, made her bloated. No incontinence or blood in stool. No vaginal drainage. Tolerating diet.      Review of Systems   Constitutional: Negative for fatigue, fever and unexpected weight change.   Respiratory: Negative for shortness of breath.    Cardiovascular: Negative for chest pain.   Gastrointestinal: Positive for diarrhea. Negative for abdominal distention, abdominal pain, anal bleeding, blood in stool, constipation, nausea, rectal pain and vomiting.       Objective:      Physical Exam   Constitutional: She is oriented to person, place, and time. She appears well-developed and well-nourished. No distress.   Eyes: Conjunctivae and EOM are normal.   Pulmonary/Chest: Effort normal. No respiratory distress.   Abdominal: Soft. She exhibits no distension. There is no tenderness.   Musculoskeletal: Normal range of motion.   Neurological: She is alert and oriented to person, place, and time.   Skin: Skin is warm and dry.   Psychiatric: She has a normal mood and affect. Her behavior is normal.       Assessment:       1. Crohn's disease of large intestine without complication        Plan:       No restrictions  Start fiber   Will see GI today to further discuss medical management of Crohn's   Okay to do imodium prn for diarrhea

## 2018-11-20 DIAGNOSIS — K50.113 CROHN'S COLITIS, WITH FISTULA: ICD-10-CM

## 2018-11-20 DIAGNOSIS — G89.18 POST-OP PAIN: ICD-10-CM

## 2018-11-20 DIAGNOSIS — K50.119 CROHN'S DISEASE OF LARGE INTESTINE WITH COMPLICATION: ICD-10-CM

## 2018-11-20 NOTE — TELEPHONE ENCOUNTER
----- Message from Deidre Mcclure sent at 11/20/2018  3:02 PM CST -----  Contact: SELF  PT NEEDS REFILL OF HYDROCODONE.  WILL BE OUT ON Friday, 11/23/18.  ALSO, NEEDS REFILLS OF IMURAN AND EFFEXOR.

## 2018-11-21 RX ORDER — VENLAFAXINE 37.5 MG/1
37.5 TABLET ORAL 2 TIMES DAILY
Qty: 180 TABLET | Refills: 0 | Status: SHIPPED | OUTPATIENT
Start: 2018-11-21 | End: 2019-02-25 | Stop reason: SDUPTHER

## 2018-11-21 RX ORDER — AZATHIOPRINE 50 MG/1
50 TABLET ORAL DAILY
Qty: 90 TABLET | Refills: 0 | Status: SHIPPED | OUTPATIENT
Start: 2018-11-21 | End: 2019-02-25 | Stop reason: SDUPTHER

## 2018-11-21 RX ORDER — HYDROCODONE BITARTRATE AND ACETAMINOPHEN 10; 325 MG/1; MG/1
1 TABLET ORAL EVERY 8 HOURS PRN
Qty: 90 TABLET | Refills: 0 | Status: SHIPPED | OUTPATIENT
Start: 2018-11-21 | End: 2018-12-10 | Stop reason: SDUPTHER

## 2018-12-03 ENCOUNTER — OFFICE VISIT (OUTPATIENT)
Dept: GASTROENTEROLOGY | Facility: CLINIC | Age: 53
End: 2018-12-03
Payer: MEDICARE

## 2018-12-03 VITALS
DIASTOLIC BLOOD PRESSURE: 90 MMHG | RESPIRATION RATE: 18 BRPM | BODY MASS INDEX: 19.85 KG/M2 | SYSTOLIC BLOOD PRESSURE: 150 MMHG | HEART RATE: 86 BPM | TEMPERATURE: 98 F | WEIGHT: 131 LBS | HEIGHT: 68 IN

## 2018-12-03 DIAGNOSIS — Z79.60 LONG-TERM USE OF IMMUNOSUPPRESSANT MEDICATION: ICD-10-CM

## 2018-12-03 DIAGNOSIS — R10.9 ABDOMINAL PAIN, UNSPECIFIED ABDOMINAL LOCATION: ICD-10-CM

## 2018-12-03 DIAGNOSIS — N82.3 RECTOVAGINAL FISTULA: ICD-10-CM

## 2018-12-03 DIAGNOSIS — F32.A DEPRESSION, UNSPECIFIED DEPRESSION TYPE: ICD-10-CM

## 2018-12-03 DIAGNOSIS — F41.9 ANXIETY: ICD-10-CM

## 2018-12-03 DIAGNOSIS — E83.42 HYPOMAGNESEMIA: ICD-10-CM

## 2018-12-03 DIAGNOSIS — K50.818 CROHN'S DISEASE OF BOTH SMALL AND LARGE INTESTINE WITH OTHER COMPLICATION: Primary | ICD-10-CM

## 2018-12-03 DIAGNOSIS — E87.6 HYPOKALEMIA: ICD-10-CM

## 2018-12-03 DIAGNOSIS — D64.9 ANEMIA, UNSPECIFIED TYPE: ICD-10-CM

## 2018-12-03 DIAGNOSIS — Z86.718 HISTORY OF DVT (DEEP VEIN THROMBOSIS): ICD-10-CM

## 2018-12-03 DIAGNOSIS — K50.813 CROHN'S DISEASE OF BOTH SMALL AND LARGE INTESTINE WITH FISTULA: ICD-10-CM

## 2018-12-03 DIAGNOSIS — R19.7 DIARRHEA, UNSPECIFIED TYPE: ICD-10-CM

## 2018-12-03 DIAGNOSIS — K21.9 GASTROESOPHAGEAL REFLUX DISEASE WITHOUT ESOPHAGITIS: ICD-10-CM

## 2018-12-03 PROCEDURE — 99215 OFFICE O/P EST HI 40 MIN: CPT | Mod: 25,,, | Performed by: INTERNAL MEDICINE

## 2018-12-03 PROCEDURE — 90746 HEPB VACCINE 3 DOSE ADULT IM: CPT | Mod: ,,, | Performed by: INTERNAL MEDICINE

## 2018-12-03 PROCEDURE — G0010 ADMIN HEPATITIS B VACCINE: HCPCS | Mod: ,,, | Performed by: INTERNAL MEDICINE

## 2018-12-03 NOTE — PROGRESS NOTES
[facility] Established Patient Visit    Subjective:           PCP: Pablo Medina    Chief Complaint: Follow-up       HPI:  Carline Chang is a 52 y.o. female here for follow-up for management of Crohn's Disease and start patient on entyvio in the new year. Benefits and risks were explained to pt.  Detailed physical examination is noncontributory,blood pressure was elevated and she'll monitor it. Reviwedextensive old records from [facility] from Kourtney Roach, Ama, nurse practitioner, and records from various hospital admissions in [place] and [facility].      ROS:   Constitutional: No fevers, chills, weight loss  ENT: No allergies, sore throat, rhinorrhea  CV: No chest pain, palpitations, edema  Pulm: No cough, shortness of breath, wheezing  Ophtho: No vision changes  GI: No blood in stools, change in bowel habits, nausea/vomiting  Denies alternating diarrhea/constipation.   Derm: No rash  Heme: No easy bruising or lymphadenopathy  MSK: No arthralgias or myalgias  : No dysuria, hematuria, frequency, polyuria, or flank tenderness  Endo: No hot or cold intolerance  Neuro: No syncope or seizure, or dizziness  Psych: No hallucination, depression or suicidal ideation      Medical History:  has a past medical history of Acute deep vein thrombosis (DVT) of brachial vein of right upper extremity (2017), Bowel perforation, Chronic pain, Crohn's disease of both small and large intestine with intestinal obstruction (), Difficult intravenous access, GERD (gastroesophageal reflux disease) (), Nephrolithiasis, and Stricture of bowel.      Surgical History:  has a past surgical history that includes Tubal ligation;  section; Abscess drainage (); Anal fistulotomy (); Colonoscopy (3/24/2018); Upper gastrointestinal endoscopy (); Small intestine surgery (); Small intestine surgery (2009); Cholecystectomy (); Colon surgery (2015); rectovaginal fistula repair  (01/2018); Bowel resection; CLOSURE, COLOSTOMY, LAPAROSCOPIC (N/A, 8/29/2018); COLONOSCOPY (N/A, 7/6/2018); EXPLORATORY-LAPAROTOMY, small bowel resection, possible ostomy (N/A, 4/30/2018); RESECTION-SMALL BOWEL (N/A, 4/30/2018); ILEOSTOMY (Left, 4/30/2018); COLONOSCOPY (N/A, 3/24/2018); COLONOSCOPY (N/A, 2/8/2018); REPAIR-FISTULA-RECTOVAGINAL (N/A, 1/10/2018); COLONOSCOPY (N/A, 12/4/2017); CYSTOSCOPY WITH STENT PLACEMENT (Right, 1/23/2017); BIOPSY-BLADDER (N/A, 1/23/2017); FULGURATION-BLADDER (N/A, 1/23/2017); ESOPHAGOGASTRODUODENOSCOPY (EGD) (N/A, 1/17/2017); COLONOSCOPY (N/A, 1/17/2017); COLONOSCOPY (N/A, 5/27/2016); and COLOSTOMY (N/A, 6/18/2015).    Family History:   Family History   Problem Relation Age of Onset    Cancer Maternal Aunt 66        colon ca    Heart attack Father 69    Anesthesia problems Neg Hx     Celiac disease Neg Hx     Cirrhosis Neg Hx     Colon cancer Neg Hx     Colon polyps Neg Hx     Crohn's disease Neg Hx     Cystic fibrosis Neg Hx     Esophageal cancer Neg Hx     Hemochromatosis Neg Hx     Inflammatory bowel disease Neg Hx     Irritable bowel syndrome Neg Hx     Liver cancer Neg Hx     Liver disease Neg Hx     Rectal cancer Neg Hx     Stomach cancer Neg Hx     Ulcerative colitis Neg Hx     Mars's disease Neg Hx     Lymphoma Neg Hx     Tuberculosis Neg Hx     Scleroderma Neg Hx     Rheum arthritis Neg Hx     Multiple sclerosis Neg Hx     Melanoma Neg Hx     Lupus Neg Hx     Psoriasis Neg Hx     Skin cancer Neg Hx        Social History:   Social History     Tobacco Use    Smoking status: Never Smoker    Smokeless tobacco: Never Used   Substance Use Topics    Alcohol use: No     Alcohol/week: 0.0 oz    Drug use: No       The patient's social and family histories were reviewed by me and updated in the appropriate section of the electronic medical record.    Allergies:   Review of patient's allergies indicates:   Allergen Reactions    Remicade [infliximab]  "Shortness Of Breath and Palpitations     Severe muscle and joint, and bone pain    Morphine Other (See Comments)     Abdominal pain    Flagyl [metronidazole] Other (See Comments)     Neuropathy    Nubain [nalbuphine] Anxiety     Upper abdominal burning          Medications:   Current Outpatient Medications   Medication Sig Dispense Refill    azaTHIOprine (IMURAN) 50 mg Tab Take 1 tablet (50 mg total) by mouth once daily. 90 tablet 0    calcium carbonate (TUMS ORAL) Take by mouth as needed (acid reflux).      cholecalciferol, vitamin D3, (VITAMIN D3 ORAL) Take by mouth once a week.      HYDROcodone-acetaminophen (NORCO)  mg per tablet Take 1 tablet by mouth every 8 (eight) hours as needed. 90 tablet 0    loperamide (IMODIUM A-D) 1 mg/7.5 mL Liqd Take 0.1 mg/kg by mouth every 12 (twelve) hours as needed.       promethazine (PHENERGAN) 25 MG tablet Take 1 tablet (25 mg total) by mouth every 8 (eight) hours as needed for Nausea. 90 tablet 1    venlafaxine (EFFEXOR) 37.5 MG Tab Take 1 tablet (37.5 mg total) by mouth 2 (two) times daily. 180 tablet 0     No current facility-administered medications for this visit.      All medications and past history have been reviewed.        Objective:        Vital Signs:  Pulse 86, temperature 98.3 °F (36.8 °C), resp. rate 18, height 5' 8" (1.727 m), weight 59.4 kg (131 lb), last menstrual period 05/30/2009. Body mass index is 19.92 kg/m².    Physical Exam:   General Appearance: Well appearing in no acute distress, well developed, well                nourished  Head: Normocephalic, without obvious abnormality  Eyes:  Pupils equal, round and reactive to light  Throat: Lips, mucosa, and tongue normal; teeth and gums normal  Lungs: CTA bilaterally in anterior and posterior fields, no wheezes, no crackles  Heart:  Regular rate and rhythm, no murmurs heard  Abdomen: Soft, non tender, non distended with positive bowel sounds in all four quadrants. No hepatosplenomegaly, " ascites, or mass. Negative for succusion splash  : female  No hernia  Extremities: No cyanosis, edema or deformity  Skin: No rash  Neurologic: Normal exam    Labs:  Lab Results   Component Value Date    WBC 7.6 09/28/2018    HGB 10.1 (L) 09/28/2018    HCT 30.8 (L) 09/28/2018     09/28/2018    ALT 8 (L) 06/12/2018    AST 24 09/28/2018     11/02/2018    K 3.3 (L) 11/02/2018     11/02/2018    CREATININE 0.60 11/02/2018    BUN 9 11/02/2018    CO2 30.3 11/02/2018    INR 1.2 03/18/2018       Imaging:     All lab results and imaging results have been reviewed and discussed with the patient      Assessment:       No diagnosis found.    1. Crohn's Disease small and large intestine  2. Anemia  3. Abdomen pain  4. Diarrhea  See visit diagnoses  Plan:       There are no diagnoses linked to this encounter.    See HPI    No Follow-up on file.    The plan was discussed with the patient and all questions/concerns have been answered to the patient's satisfaction.        Electronically signed by Pablo Medina MD    This note was dictated using voice recognition software and may contain grammatical errors.

## 2018-12-05 ENCOUNTER — TELEPHONE (OUTPATIENT)
Dept: GASTROENTEROLOGY | Facility: CLINIC | Age: 53
End: 2018-12-05

## 2018-12-05 DIAGNOSIS — G89.18 POST-OP PAIN: ICD-10-CM

## 2018-12-05 NOTE — TELEPHONE ENCOUNTER
----- Message from Deidre Mcclure sent at 12/5/2018  5:13 PM CST -----  Contact: self  Pt is due for refill of hydrocodone in 2 weeks.  She has found a pain dr that will see her for Crohns but he cannot guarantee when he will be able to get her in.  Has to see all of her records first.

## 2018-12-10 RX ORDER — HYDROCODONE BITARTRATE AND ACETAMINOPHEN 10; 325 MG/1; MG/1
1 TABLET ORAL EVERY 8 HOURS PRN
Qty: 90 TABLET | Refills: 0 | Status: SHIPPED | OUTPATIENT
Start: 2018-12-10 | End: 2019-01-10

## 2019-01-10 ENCOUNTER — OFFICE VISIT (OUTPATIENT)
Dept: GASTROENTEROLOGY | Facility: CLINIC | Age: 54
End: 2019-01-10
Payer: MEDICARE

## 2019-01-10 VITALS
BODY MASS INDEX: 19.7 KG/M2 | SYSTOLIC BLOOD PRESSURE: 110 MMHG | DIASTOLIC BLOOD PRESSURE: 62 MMHG | WEIGHT: 130 LBS | HEIGHT: 68 IN | HEART RATE: 88 BPM

## 2019-01-10 DIAGNOSIS — G89.29 CHRONIC ABDOMINAL PAIN: ICD-10-CM

## 2019-01-10 DIAGNOSIS — K50.812 CROHN'S DISEASE OF BOTH SMALL AND LARGE INTESTINE WITH INTESTINAL OBSTRUCTION: ICD-10-CM

## 2019-01-10 DIAGNOSIS — R10.9 CHRONIC ABDOMINAL PAIN: ICD-10-CM

## 2019-01-10 DIAGNOSIS — R11.0 NAUSEA: ICD-10-CM

## 2019-01-10 DIAGNOSIS — K21.9 GASTROESOPHAGEAL REFLUX DISEASE WITHOUT ESOPHAGITIS: Primary | ICD-10-CM

## 2019-01-10 PROCEDURE — 99215 OFFICE O/P EST HI 40 MIN: CPT | Mod: ,,, | Performed by: INTERNAL MEDICINE

## 2019-01-10 PROCEDURE — 99215 PR OFFICE/OUTPT VISIT, EST, LEVL V, 40-54 MIN: ICD-10-PCS | Mod: ,,, | Performed by: INTERNAL MEDICINE

## 2019-01-10 RX ORDER — HYDROCODONE BITARTRATE AND ACETAMINOPHEN 10; 325 MG/1; MG/1
1 TABLET ORAL 3 TIMES DAILY PRN
Qty: 50 TABLET | Refills: 0 | Status: SHIPPED | OUTPATIENT
Start: 2019-01-10 | End: 2019-01-28 | Stop reason: SDUPTHER

## 2019-01-10 RX ORDER — HYDROCODONE BITARTRATE AND ACETAMINOPHEN 10; 325 MG/1; MG/1
1 TABLET ORAL 3 TIMES DAILY PRN
COMMUNITY
End: 2019-01-10 | Stop reason: SDUPTHER

## 2019-01-10 RX ORDER — PROMETHAZINE HYDROCHLORIDE 25 MG/1
25 TABLET ORAL EVERY 8 HOURS PRN
Qty: 90 TABLET | Refills: 1 | Status: SHIPPED | OUTPATIENT
Start: 2019-01-10 | End: 2019-02-25 | Stop reason: SDUPTHER

## 2019-01-10 RX ORDER — OXYCODONE AND ACETAMINOPHEN 5; 325 MG/1; MG/1
TABLET ORAL
Refills: 0 | COMMUNITY
Start: 2019-01-02 | End: 2019-01-10

## 2019-01-15 NOTE — PROGRESS NOTES
[facility] Established Patient Visit    Subjective:           PCP: Pablo Medina    Chief Complaint: Injections (Patient is here to start Entyvio injections.)       HPI:  Carline Chang is a 53 y.o. female here for to start injection for Crohn's Disease.  Risks and benefits of medication was explained to patient.  Explained immuno comprised state after injections. Detailed exam done. labwork ordered prior to starting injection.  Will discuss with Dr. Serrato at [facility] for further management.      ROS:   Constitutional: No fevers, chills, weight loss  ENT: No allergies, sore throat, rhinorrhea  CV: No chest pain, palpitations, edema  Pulm: No cough, shortness of breath, wheezing  Ophtho: No vision changes  GI: No blood in stools, change in bowel habits, nausea/vomiting  Denies alternating diarrhea/constipation.   Derm: No rash  Heme: No easy bruising or lymphadenopathy  MSK: No arthralgias or myalgias  : No dysuria, hematuria, frequency, polyuria, or flank tenderness  Endo: No hot or cold intolerance  Neuro: No syncope or seizure, or dizziness  Psych: No hallucination, depression or suicidal ideation      Medical History:  has a past medical history of Acute deep vein thrombosis (DVT) of brachial vein of right upper extremity (2017), Bowel perforation, Chronic pain, Crohn's disease of both small and large intestine with intestinal obstruction (), Difficult intravenous access, GERD (gastroesophageal reflux disease) (), Nephrolithiasis, and Stricture of bowel.      Surgical History:  has a past surgical history that includes Tubal ligation;  section; Colonoscopy (N/A, 2016); Colonoscopy (N/A, 2017); Colonoscopy (N/A, 2017); Colonoscopy (N/A, 2018); Colonoscopy (N/A, 3/24/2018); Abscess drainage (); Anal fistulotomy (); Colonoscopy (3/24/2018); Upper gastrointestinal endoscopy (); Small intestine surgery (); Small intestine surgery  (02/2009); Cholecystectomy (2000); Colon surgery (06/2015); rectovaginal fistula repair (01/2018); Colonoscopy (N/A, 7/6/2018); Bowel resection; and laparoscopic closure of colostomy (N/A, 8/29/2018).    Family History:   Family History   Problem Relation Age of Onset    Cancer Maternal Aunt 66        colon ca    Heart attack Father 69    Anesthesia problems Neg Hx     Celiac disease Neg Hx     Cirrhosis Neg Hx     Colon cancer Neg Hx     Colon polyps Neg Hx     Crohn's disease Neg Hx     Cystic fibrosis Neg Hx     Esophageal cancer Neg Hx     Hemochromatosis Neg Hx     Inflammatory bowel disease Neg Hx     Irritable bowel syndrome Neg Hx     Liver cancer Neg Hx     Liver disease Neg Hx     Rectal cancer Neg Hx     Stomach cancer Neg Hx     Ulcerative colitis Neg Hx     Mars's disease Neg Hx     Lymphoma Neg Hx     Tuberculosis Neg Hx     Scleroderma Neg Hx     Rheum arthritis Neg Hx     Multiple sclerosis Neg Hx     Melanoma Neg Hx     Lupus Neg Hx     Psoriasis Neg Hx     Skin cancer Neg Hx        Social History:   Social History     Tobacco Use    Smoking status: Never Smoker    Smokeless tobacco: Never Used   Substance Use Topics    Alcohol use: No     Alcohol/week: 0.0 oz    Drug use: No       The patient's social and family histories were reviewed by me and updated in the appropriate section of the electronic medical record.    Allergies:   Review of patient's allergies indicates:   Allergen Reactions    Remicade [infliximab] Shortness Of Breath and Palpitations     Severe muscle and joint, and bone pain    Morphine Other (See Comments)     Abdominal pain    Flagyl [metronidazole] Other (See Comments)     Neuropathy    Nubain [nalbuphine] Anxiety     Upper abdominal burning          Medications:   Current Outpatient Medications   Medication Sig Dispense Refill    azaTHIOprine (IMURAN) 50 mg Tab Take 1 tablet (50 mg total) by mouth once daily. 90 tablet 0    calcium  "carbonate (TUMS ORAL) Take by mouth as needed (acid reflux).      cholecalciferol, vitamin D3, (VITAMIN D3 ORAL) Take by mouth once a week.      HYDROcodone-acetaminophen (NORCO)  mg per tablet Take 1 tablet by mouth 3 (three) times daily as needed for Pain. 50 tablet 0    loperamide (IMODIUM A-D) 1 mg/7.5 mL Liqd Take 0.1 mg/kg by mouth every 12 (twelve) hours as needed.       promethazine (PHENERGAN) 25 MG tablet Take 1 tablet (25 mg total) by mouth every 8 (eight) hours as needed for Nausea. 90 tablet 1    venlafaxine (EFFEXOR) 37.5 MG Tab Take 1 tablet (37.5 mg total) by mouth 2 (two) times daily. 180 tablet 0     No current facility-administered medications for this visit.      All medications and past history have been reviewed.        Objective:        Vital Signs:  Blood pressure 110/62, pulse 88, height 5' 8" (1.727 m), weight 59 kg (130 lb), last menstrual period 05/30/2009. Body mass index is 19.77 kg/m².    Physical Exam:   General Appearance: Well appearing in no acute distress, well developed, well                nourished  Head: Normocephalic, without obvious abnormality  Eyes:  Pupils equal, round and reactive to light  Throat: Lips, mucosa, and tongue normal; teeth and gums normal  Lungs: CTA bilaterally in anterior and posterior fields, no wheezes, no crackles  Heart:  Regular rate and rhythm, no murmurs heard  Abdomen: Soft, non tender, non distended with positive bowel sounds in all four quadrants. No hepatosplenomegaly, ascites, or mass. Negative for succusion splash. Multiple scars in abdomen.  : female no hernia  Extremities: No cyanosis, edema or deformity  Skin: No rash  Neurologic: Normal exam    Labs:  Lab Results   Component Value Date    WBC 7.6 09/28/2018    HGB 10.1 (L) 09/28/2018    HCT 30.8 (L) 09/28/2018     09/28/2018    ALT 8 (L) 06/12/2018    AST 24 09/28/2018     11/02/2018    K 3.3 (L) 11/02/2018     11/02/2018    CREATININE 0.60 11/02/2018    " BUN 9 11/02/2018    CO2 30.3 11/02/2018    INR 1.2 03/18/2018       Imaging:     All lab results and imaging results have been reviewed and discussed with the patient      Assessment:       1. Gastroesophageal reflux disease without esophagitis    2. Nausea    3. Crohn's disease of both small and large intestine with intestinal obstruction    4. Chronic abdominal pain        Plan:       Carline was seen today for injections.    Diagnoses and all orders for this visit:    Gastroesophageal reflux disease without esophagitis  -     HYDROcodone-acetaminophen (NORCO)  mg per tablet; Take 1 tablet by mouth 3 (three) times daily as needed for Pain.  -     promethazine (PHENERGAN) 25 MG tablet; Take 1 tablet (25 mg total) by mouth every 8 (eight) hours as needed for Nausea.  -     CBC auto differential; Future  -     Comprehensive metabolic panel; Future  -     Magnesium; Future  -     Phosphorus; Future  -     Amylase; Future  -     Lipase; Future  -     Sedimentation rate; Future  -     C-reactive protein; Future  -     CBC auto differential  -     Comprehensive metabolic panel  -     Magnesium  -     Phosphorus  -     Amylase  -     Lipase  -     Sedimentation rate  -     C-reactive protein    Nausea  -     HYDROcodone-acetaminophen (NORCO)  mg per tablet; Take 1 tablet by mouth 3 (three) times daily as needed for Pain.  -     promethazine (PHENERGAN) 25 MG tablet; Take 1 tablet (25 mg total) by mouth every 8 (eight) hours as needed for Nausea.  -     CBC auto differential; Future  -     Comprehensive metabolic panel; Future  -     Magnesium; Future  -     Phosphorus; Future  -     Amylase; Future  -     Lipase; Future  -     Sedimentation rate; Future  -     C-reactive protein; Future  -     CBC auto differential  -     Comprehensive metabolic panel  -     Magnesium  -     Phosphorus  -     Amylase  -     Lipase  -     Sedimentation rate  -     C-reactive protein    Crohn's disease of both small and large  intestine with intestinal obstruction  -     HYDROcodone-acetaminophen (NORCO)  mg per tablet; Take 1 tablet by mouth 3 (three) times daily as needed for Pain.  -     promethazine (PHENERGAN) 25 MG tablet; Take 1 tablet (25 mg total) by mouth every 8 (eight) hours as needed for Nausea.  -     CBC auto differential; Future  -     Comprehensive metabolic panel; Future  -     Magnesium; Future  -     Phosphorus; Future  -     Amylase; Future  -     Lipase; Future  -     Sedimentation rate; Future  -     C-reactive protein; Future  -     CBC auto differential  -     Comprehensive metabolic panel  -     Magnesium  -     Phosphorus  -     Amylase  -     Lipase  -     Sedimentation rate  -     C-reactive protein    Chronic abdominal pain  -     HYDROcodone-acetaminophen (NORCO)  mg per tablet; Take 1 tablet by mouth 3 (three) times daily as needed for Pain.  -     promethazine (PHENERGAN) 25 MG tablet; Take 1 tablet (25 mg total) by mouth every 8 (eight) hours as needed for Nausea.  -     CBC auto differential; Future  -     Comprehensive metabolic panel; Future  -     Magnesium; Future  -     Phosphorus; Future  -     Amylase; Future  -     Lipase; Future  -     Sedimentation rate; Future  -     C-reactive protein; Future  -     CBC auto differential  -     Comprehensive metabolic panel  -     Magnesium  -     Phosphorus  -     Amylase  -     Lipase  -     Sedimentation rate  -     C-reactive protein        See HPI      The plan was discussed with the patient and all questions/concerns have been answered to the patient's satisfaction.        Electronically signed by Pablo Medina MD    This note was dictated using voice recognition software and may contain grammatical errors.

## 2019-01-28 DIAGNOSIS — R11.0 NAUSEA: ICD-10-CM

## 2019-01-28 DIAGNOSIS — R10.9 CHRONIC ABDOMINAL PAIN: ICD-10-CM

## 2019-01-28 DIAGNOSIS — K21.9 GASTROESOPHAGEAL REFLUX DISEASE WITHOUT ESOPHAGITIS: ICD-10-CM

## 2019-01-28 DIAGNOSIS — G89.29 CHRONIC ABDOMINAL PAIN: ICD-10-CM

## 2019-01-28 DIAGNOSIS — K50.812 CROHN'S DISEASE OF BOTH SMALL AND LARGE INTESTINE WITH INTESTINAL OBSTRUCTION: ICD-10-CM

## 2019-01-28 NOTE — TELEPHONE ENCOUNTER
----- Message from Deidre Mcclure sent at 1/28/2019  1:35 PM CST -----  Contact: self  Pt is due for refill of hydrocodone.  Last rx was only for 50 and should have been 90.  Please call her when ready.

## 2019-01-31 RX ORDER — HYDROCODONE BITARTRATE AND ACETAMINOPHEN 10; 325 MG/1; MG/1
1 TABLET ORAL 3 TIMES DAILY PRN
Qty: 90 TABLET | Refills: 0 | Status: SHIPPED | OUTPATIENT
Start: 2019-01-31 | End: 2019-02-25 | Stop reason: SDUPTHER

## 2019-02-04 DIAGNOSIS — Z12.11 ENCOUNTER FOR SCREENING COLONOSCOPY: Primary | ICD-10-CM

## 2019-02-04 RX ORDER — POLYETHYLENE GLYCOL 3350, SODIUM SULFATE ANHYDROUS, SODIUM BICARBONATE, SODIUM CHLORIDE, POTASSIUM CHLORIDE 236; 22.74; 6.74; 5.86; 2.97 G/4L; G/4L; G/4L; G/4L; G/4L
4 POWDER, FOR SOLUTION ORAL ONCE
Qty: 4000 ML | Refills: 0 | Status: SHIPPED | OUTPATIENT
Start: 2019-02-04 | End: 2019-02-04

## 2019-02-04 NOTE — TELEPHONE ENCOUNTER
----- Message from Deidre Mcclure sent at 1/31/2019  5:34 PM CST -----  Contact: self  Pt is having a colonoscopy on 3/6/19.  Please send prep to [facility].

## 2019-02-25 DIAGNOSIS — K50.113 CROHN'S COLITIS, WITH FISTULA: ICD-10-CM

## 2019-02-25 DIAGNOSIS — K50.119 CROHN'S DISEASE OF LARGE INTESTINE WITH COMPLICATION: ICD-10-CM

## 2019-02-25 DIAGNOSIS — G89.29 CHRONIC ABDOMINAL PAIN: ICD-10-CM

## 2019-02-25 DIAGNOSIS — R11.0 NAUSEA: ICD-10-CM

## 2019-02-25 DIAGNOSIS — R10.9 CHRONIC ABDOMINAL PAIN: ICD-10-CM

## 2019-02-25 DIAGNOSIS — Z12.11 ENCOUNTER FOR SCREENING COLONOSCOPY: Primary | ICD-10-CM

## 2019-02-25 DIAGNOSIS — K50.812 CROHN'S DISEASE OF BOTH SMALL AND LARGE INTESTINE WITH INTESTINAL OBSTRUCTION: ICD-10-CM

## 2019-02-25 DIAGNOSIS — K21.9 GASTROESOPHAGEAL REFLUX DISEASE WITHOUT ESOPHAGITIS: ICD-10-CM

## 2019-02-25 NOTE — TELEPHONE ENCOUNTER
----- Message from Deidre Mcclure sent at 2/25/2019  1:37 PM CST -----  Contact: self  Pt needs refills of phenergan, effexor, hydrocodone, and azathioprine.  Also, she is having a colonoscopy on 3/6/19.  Please put in orders and send prep to [facility] pharmacy.

## 2019-02-27 RX ORDER — PROMETHAZINE HYDROCHLORIDE 25 MG/1
25 TABLET ORAL EVERY 8 HOURS PRN
Qty: 90 TABLET | Refills: 1 | Status: SHIPPED | OUTPATIENT
Start: 2019-02-27 | End: 2019-05-14 | Stop reason: SDUPTHER

## 2019-02-27 RX ORDER — AZATHIOPRINE 50 MG/1
50 TABLET ORAL DAILY
Qty: 90 TABLET | Refills: 0 | Status: SHIPPED | OUTPATIENT
Start: 2019-02-27 | End: 2019-07-15 | Stop reason: SDUPTHER

## 2019-02-27 RX ORDER — HYDROCODONE BITARTRATE AND ACETAMINOPHEN 10; 325 MG/1; MG/1
1 TABLET ORAL 3 TIMES DAILY PRN
Qty: 90 TABLET | Refills: 0 | Status: SHIPPED | OUTPATIENT
Start: 2019-02-27 | End: 2019-03-25 | Stop reason: SDUPTHER

## 2019-02-27 RX ORDER — VENLAFAXINE 37.5 MG/1
37.5 TABLET ORAL 2 TIMES DAILY
Qty: 180 TABLET | Refills: 0 | Status: SHIPPED | OUTPATIENT
Start: 2019-02-27 | End: 2019-08-12 | Stop reason: SDUPTHER

## 2019-02-27 RX ORDER — POLYETHYLENE GLYCOL 3350, SODIUM SULFATE ANHYDROUS, SODIUM BICARBONATE, SODIUM CHLORIDE, POTASSIUM CHLORIDE 236; 22.74; 6.74; 5.86; 2.97 G/4L; G/4L; G/4L; G/4L; G/4L
4 POWDER, FOR SOLUTION ORAL ONCE
Qty: 1 BOTTLE | Refills: 0 | Status: SHIPPED | OUTPATIENT
Start: 2019-02-27 | End: 2019-02-27

## 2019-03-14 ENCOUNTER — OFFICE VISIT (OUTPATIENT)
Dept: GASTROENTEROLOGY | Facility: CLINIC | Age: 54
End: 2019-03-14
Payer: MEDICARE

## 2019-03-14 VITALS
HEART RATE: 85 BPM | DIASTOLIC BLOOD PRESSURE: 82 MMHG | HEIGHT: 68 IN | TEMPERATURE: 98 F | RESPIRATION RATE: 18 BRPM | SYSTOLIC BLOOD PRESSURE: 108 MMHG | WEIGHT: 137.63 LBS | BODY MASS INDEX: 20.86 KG/M2

## 2019-03-14 DIAGNOSIS — K50.113 CROHN'S COLITIS, WITH FISTULA: ICD-10-CM

## 2019-03-14 DIAGNOSIS — R14.0 ABDOMINAL DISTENSION (GASEOUS): ICD-10-CM

## 2019-03-14 DIAGNOSIS — K21.9 GASTROESOPHAGEAL REFLUX DISEASE WITHOUT ESOPHAGITIS: ICD-10-CM

## 2019-03-14 DIAGNOSIS — G89.29 CHRONIC ABDOMINAL PAIN: ICD-10-CM

## 2019-03-14 DIAGNOSIS — R10.9 CHRONIC ABDOMINAL PAIN: ICD-10-CM

## 2019-03-14 DIAGNOSIS — Z86.718 HISTORY OF DVT (DEEP VEIN THROMBOSIS): ICD-10-CM

## 2019-03-14 DIAGNOSIS — D64.9 ANEMIA, UNSPECIFIED TYPE: ICD-10-CM

## 2019-03-14 DIAGNOSIS — E83.42 HYPOMAGNESEMIA: ICD-10-CM

## 2019-03-14 DIAGNOSIS — Z79.60 LONG-TERM USE OF IMMUNOSUPPRESSANT MEDICATION: ICD-10-CM

## 2019-03-14 DIAGNOSIS — R19.7 DIARRHEA, UNSPECIFIED TYPE: ICD-10-CM

## 2019-03-14 DIAGNOSIS — K50.812 CROHN'S DISEASE OF BOTH SMALL AND LARGE INTESTINE WITH INTESTINAL OBSTRUCTION: Primary | ICD-10-CM

## 2019-03-14 DIAGNOSIS — Z79.899 HIGH RISK MEDICATION USE: ICD-10-CM

## 2019-03-14 PROCEDURE — 99215 PR OFFICE/OUTPT VISIT, EST, LEVL V, 40-54 MIN: ICD-10-PCS | Mod: ,,, | Performed by: INTERNAL MEDICINE

## 2019-03-14 PROCEDURE — 99215 OFFICE O/P EST HI 40 MIN: CPT | Mod: ,,, | Performed by: INTERNAL MEDICINE

## 2019-03-14 NOTE — PROGRESS NOTES
Formerly [facility] Established Patient Visit    Subjective:           PCP: Pablo Medina    Chief Complaint: Follow-up; Crohn's Disease; and Colonoscopy       HPI:  Carline Chang is a 53 y.o. female here for follow up of Crohn's disease and colonoscopy. She has IBD who has had innumerable surgeries. She presently does not have a colostomy. The fistulas have healed. An attempt is being made to look at the anastomotic sites. Will make arrangements for this. Patient may be a candidate for Entivio or Stelara. At present, is not on any definitive therapy for IBD. Patients main complaint right now is bloating, gas, and diarrhea. She may benefit from a PPI, H2 blockers, or pancreatic supplements. Will try to give samples of this.       ROS:   Constitutional: No fevers, chills, weight loss  ENT: No allergies, sore throat, rhinorrhea  CV: No chest pain, palpitations, edema  Pulm: No cough, shortness of breath, wheezing  Ophtho: No vision changes  GI: No blood in stools, change in bowel habits, nausea/vomiting  Denies alternating diarrhea/constipation.   Derm: No rash  Heme: No easy bruising or lymphadenopathy  MSK: No arthralgias or myalgias  : No dysuria, hematuria, frequency, polyuria, or flank tenderness  Endo: No hot or cold intolerance  Neuro: No syncope or seizure, or dizziness  Psych: No hallucination, depression or suicidal ideation      Medical History:  has a past medical history of Acute deep vein thrombosis (DVT) of brachial vein of right upper extremity (2017), Bowel perforation, Chronic pain, Crohn's disease of both small and large intestine with intestinal obstruction (), Difficult intravenous access, GERD (gastroesophageal reflux disease) (), Nephrolithiasis, and Stricture of bowel.      Surgical History:  has a past surgical history that includes Tubal ligation;  section; Colonoscopy (N/A, 2016); Colonoscopy (N/A, 2017); Colonoscopy (N/A, 2017);  Colonoscopy (N/A, 2/8/2018); Colonoscopy (N/A, 3/24/2018); Abscess drainage (2014); Anal fistulotomy (2018); Colonoscopy (3/24/2018); Upper gastrointestinal endoscopy (2000); Small intestine surgery (1995); Small intestine surgery (02/2009); Cholecystectomy (2000); Colon surgery (06/2015); rectovaginal fistula repair (01/2018); Colonoscopy (N/A, 7/6/2018); Bowel resection; and Laparoscopic closure of colostomy (N/A, 8/29/2018).    Family History:   Family History   Problem Relation Age of Onset    Cancer Maternal Aunt 66        colon ca    Heart attack Father 69    Anesthesia problems Neg Hx     Celiac disease Neg Hx     Cirrhosis Neg Hx     Colon cancer Neg Hx     Colon polyps Neg Hx     Crohn's disease Neg Hx     Cystic fibrosis Neg Hx     Esophageal cancer Neg Hx     Hemochromatosis Neg Hx     Inflammatory bowel disease Neg Hx     Irritable bowel syndrome Neg Hx     Liver cancer Neg Hx     Liver disease Neg Hx     Rectal cancer Neg Hx     Stomach cancer Neg Hx     Ulcerative colitis Neg Hx     Mars's disease Neg Hx     Lymphoma Neg Hx     Tuberculosis Neg Hx     Scleroderma Neg Hx     Rheum arthritis Neg Hx     Multiple sclerosis Neg Hx     Melanoma Neg Hx     Lupus Neg Hx     Psoriasis Neg Hx     Skin cancer Neg Hx        Social History:   Social History     Tobacco Use    Smoking status: Never Smoker    Smokeless tobacco: Never Used   Substance Use Topics    Alcohol use: No     Alcohol/week: 0.0 oz    Drug use: No       The patient's social and family histories were reviewed by me and updated in the appropriate section of the electronic medical record.    Allergies:   Review of patient's allergies indicates:   Allergen Reactions    Remicade [infliximab] Shortness Of Breath and Palpitations     Severe muscle and joint, and bone pain    Morphine Other (See Comments)     Abdominal pain    Flagyl [metronidazole] Other (See Comments)     Neuropathy    Nubain [nalbuphine] Anxiety  "    Upper abdominal burning          Medications:   Current Outpatient Medications   Medication Sig Dispense Refill    azaTHIOprine (IMURAN) 50 mg Tab Take 1 tablet (50 mg total) by mouth once daily. 90 tablet 0    calcium carbonate (TUMS ORAL) Take by mouth as needed (acid reflux).      cholecalciferol, vitamin D3, (VITAMIN D3 ORAL) Take by mouth once a week.      HYDROcodone-acetaminophen (NORCO)  mg per tablet Take 1 tablet by mouth 3 (three) times daily as needed for Pain. 90 tablet 0    loperamide (IMODIUM A-D) 1 mg/7.5 mL Liqd Take 0.1 mg/kg by mouth every 12 (twelve) hours as needed.       promethazine (PHENERGAN) 25 MG tablet Take 1 tablet (25 mg total) by mouth every 8 (eight) hours as needed for Nausea. 90 tablet 1    venlafaxine (EFFEXOR) 37.5 MG Tab Take 1 tablet (37.5 mg total) by mouth 2 (two) times daily. 180 tablet 0     No current facility-administered medications for this visit.      All medications and past history have been reviewed.        Objective:        Vital Signs:  Blood pressure 108/82, pulse 85, temperature 97.5 °F (36.4 °C), resp. rate 18, height 5' 8" (1.727 m), weight 62.4 kg (137 lb 9.6 oz), last menstrual period 05/30/2009. Body mass index is 20.92 kg/m².    Physical Exam:   General Appearance: Well appearing in no acute distress, well developed, well                nourished  Head: Normocephalic, without obvious abnormality  Eyes:  Pupils equal, round and reactive to light  Throat: Lips, mucosa, and tongue normal; teeth and gums normal  Lungs: CTA bilaterally in anterior and posterior fields, no wheezes, no crackles  Heart:  Regular rate and rhythm, no murmurs heard  Abdomen: Soft, non tender, non distended with positive bowel sounds in all four quadrants. No hepatosplenomegaly, ascites, or mass. Negative for succusion splash  : female   Extremities: No cyanosis, edema or deformity  Skin: No rash  Neurologic: Normal exam    Labs:  Lab Results   Component Value Date "    WBC 7.6 09/28/2018    HGB 10.1 (L) 09/28/2018    HCT 30.8 (L) 09/28/2018     09/28/2018    ALT 8 (L) 06/12/2018    AST 24 09/28/2018     11/02/2018    K 3.3 (L) 11/02/2018     11/02/2018    CREATININE 0.60 11/02/2018    BUN 9 11/02/2018    CO2 30.3 11/02/2018    INR 1.2 03/18/2018       Imaging:     All lab results and imaging results have been reviewed and discussed with the patient      Assessment:       1. Crohn's disease of both small and large intestine with intestinal obstruction    2. Crohn's colitis, with fistula    3. Chronic abdominal pain    4. Gastroesophageal reflux disease without esophagitis    5. Diarrhea, unspecified type    6. Abdominal distension (gaseous)    7. S/P colostomy    8. Fistula-in-ano    9. Hypomagnesemia    10. Anemia, unspecified type    11. History of DVT (deep vein thrombosis)- seems to be related to IV catheters    12. High risk medication use    13. Long-term use of immunosuppressant medication        Plan:       Carline was seen today for follow-up, crohn's disease and colonoscopy.    Diagnoses and all orders for this visit:    Crohn's disease of both small and large intestine with intestinal obstruction    Crohn's colitis, with fistula    Chronic abdominal pain    Gastroesophageal reflux disease without esophagitis    Diarrhea, unspecified type    Abdominal distension (gaseous)    S/P colostomy    Fistula-in-ano    Hypomagnesemia    Anemia, unspecified type    History of DVT (deep vein thrombosis)- seems to be related to IV catheters    High risk medication use    Long-term use of immunosuppressant medication        See HPI    No Follow-up on file.    The plan was discussed with the patient and all questions/concerns have been answered to the patient's satisfaction.        Electronically signed by Pablo Medina MD    This note was dictated using voice recognition software and may contain grammatical errors.

## 2019-03-25 ENCOUNTER — TELEPHONE (OUTPATIENT)
Dept: GASTROENTEROLOGY | Facility: CLINIC | Age: 54
End: 2019-03-25

## 2019-03-25 DIAGNOSIS — K21.9 GASTROESOPHAGEAL REFLUX DISEASE WITHOUT ESOPHAGITIS: ICD-10-CM

## 2019-03-25 DIAGNOSIS — R10.9 CHRONIC ABDOMINAL PAIN: ICD-10-CM

## 2019-03-25 DIAGNOSIS — G89.29 CHRONIC ABDOMINAL PAIN: ICD-10-CM

## 2019-03-25 DIAGNOSIS — R11.0 NAUSEA: ICD-10-CM

## 2019-03-25 DIAGNOSIS — K50.812 CROHN'S DISEASE OF BOTH SMALL AND LARGE INTESTINE WITH INTESTINAL OBSTRUCTION: ICD-10-CM

## 2019-03-25 RX ORDER — HYDROCODONE BITARTRATE AND ACETAMINOPHEN 10; 325 MG/1; MG/1
1 TABLET ORAL EVERY 6 HOURS PRN
Qty: 90 TABLET | Refills: 0 | Status: SHIPPED | OUTPATIENT
Start: 2019-03-25 | End: 2019-04-24

## 2019-03-25 NOTE — TELEPHONE ENCOUNTER
----- Message from Deidre Mcclure sent at 3/21/2019  5:41 PM CDT -----  Contact: self  Will be due for refill of hydrocodone.  Would like the rx to read take 1 tab every 4-6 hrs prn with the same number  as usual (90).  Would like to pick it up on Monday.

## 2019-03-26 ENCOUNTER — TELEPHONE (OUTPATIENT)
Dept: GASTROENTEROLOGY | Facility: CLINIC | Age: 54
End: 2019-03-26

## 2019-03-26 NOTE — TELEPHONE ENCOUNTER
----- Message from Deidre Mcclure sent at 3/26/2019 12:21 PM CDT -----  Contact: SELF  HAS DR PARK SPOKEN TO THE DOCTOR HE WAS REFERRING HER TO (DR DASH?).  DOES DR PARK WANT TO ORDER A SMALL BOWEL SERIES?  PT IS ANXIOUS TO START THE ENTYVIO.

## 2019-04-15 DIAGNOSIS — R11.0 NAUSEA: ICD-10-CM

## 2019-04-15 DIAGNOSIS — K21.9 GASTROESOPHAGEAL REFLUX DISEASE WITHOUT ESOPHAGITIS: ICD-10-CM

## 2019-04-15 DIAGNOSIS — R10.9 CHRONIC ABDOMINAL PAIN: ICD-10-CM

## 2019-04-15 DIAGNOSIS — G89.29 CHRONIC ABDOMINAL PAIN: ICD-10-CM

## 2019-04-15 DIAGNOSIS — K50.812 CROHN'S DISEASE OF BOTH SMALL AND LARGE INTESTINE WITH INTESTINAL OBSTRUCTION: ICD-10-CM

## 2019-04-15 NOTE — TELEPHONE ENCOUNTER
----- Message from Deidre Mcclure sent at 4/15/2019  4:41 PM CDT -----  Contact: self  Pt is due for refill of hydrocodone.  Also, pt wants to know when she is going to start the Entyvio.  Dr Serrato wanted that started 6 months ago.  Can Dr Do order a small bowel series?

## 2019-04-16 DIAGNOSIS — R10.9 ABDOMINAL PAIN, UNSPECIFIED ABDOMINAL LOCATION: Primary | ICD-10-CM

## 2019-04-16 DIAGNOSIS — K50.818 CROHN'S DISEASE OF BOTH SMALL AND LARGE INTESTINE WITH OTHER COMPLICATION: ICD-10-CM

## 2019-04-16 RX ORDER — HYDROCODONE BITARTRATE AND ACETAMINOPHEN 10; 325 MG/1; MG/1
1 TABLET ORAL EVERY 6 HOURS PRN
Qty: 90 TABLET | Refills: 0 | Status: SHIPPED | OUTPATIENT
Start: 2019-04-16 | End: 2019-05-14 | Stop reason: SDUPTHER

## 2019-04-17 NOTE — TELEPHONE ENCOUNTER
Called pt.  Eat small, frequent meals. Sent rx for hydrocodone, no phenergan until next week. Will do colonoscopy before starting Entyvio.

## 2019-04-18 RX ORDER — PROMETHAZINE HYDROCHLORIDE 25 MG/1
25 TABLET ORAL EVERY 8 HOURS PRN
Qty: 90 TABLET | Refills: 1 | OUTPATIENT
Start: 2019-04-18

## 2019-05-14 DIAGNOSIS — R11.0 NAUSEA: ICD-10-CM

## 2019-05-14 DIAGNOSIS — G89.29 CHRONIC ABDOMINAL PAIN: ICD-10-CM

## 2019-05-14 DIAGNOSIS — K50.812 CROHN'S DISEASE OF BOTH SMALL AND LARGE INTESTINE WITH INTESTINAL OBSTRUCTION: ICD-10-CM

## 2019-05-14 DIAGNOSIS — K50.818 CROHN'S DISEASE OF BOTH SMALL AND LARGE INTESTINE WITH OTHER COMPLICATION: ICD-10-CM

## 2019-05-14 DIAGNOSIS — R10.9 CHRONIC ABDOMINAL PAIN: ICD-10-CM

## 2019-05-14 DIAGNOSIS — R10.9 ABDOMINAL PAIN, UNSPECIFIED ABDOMINAL LOCATION: ICD-10-CM

## 2019-05-14 DIAGNOSIS — K21.9 GASTROESOPHAGEAL REFLUX DISEASE WITHOUT ESOPHAGITIS: ICD-10-CM

## 2019-05-14 RX ORDER — HYDROCODONE BITARTRATE AND ACETAMINOPHEN 10; 325 MG/1; MG/1
1 TABLET ORAL EVERY 6 HOURS PRN
Qty: 90 TABLET | Refills: 0 | Status: SHIPPED | OUTPATIENT
Start: 2019-05-14 | End: 2019-06-05 | Stop reason: SDUPTHER

## 2019-05-14 RX ORDER — PROMETHAZINE HYDROCHLORIDE 25 MG/1
25 TABLET ORAL EVERY 8 HOURS PRN
Qty: 60 TABLET | Refills: 1 | Status: SHIPPED | OUTPATIENT
Start: 2019-05-14 | End: 2019-07-15 | Stop reason: SDUPTHER

## 2019-05-14 NOTE — TELEPHONE ENCOUNTER
----- Message from Deidre Mcclure sent at 5/13/2019  2:31 PM CDT -----  Contact: SELF  PER PT, COLON IS SCHEDULED FOR 6/14/19.  SHE WANTS DR PARK TO TRY TO DO IT BECAUSE IT HAS BEEN 8 MONTHS SINCE IT WAS SUPPOSED TO HAVE BEEN DONE.  IF HE CANT GET PAST A CERTAIN POINT, SO BE IT.  SHE NEEDS TO START ENTYVIO AND DR PARK WANTS THE COLON DONE FIRST.  ALSO, SHE IS DUE FOR HER HYDROCODONE AND PHENERGAN TOMORROW.  THE PHENERGAN CAN BE DROPPED DOWN TO 60.

## 2019-06-05 ENCOUNTER — OFFICE VISIT (OUTPATIENT)
Dept: GASTROENTEROLOGY | Facility: CLINIC | Age: 54
End: 2019-06-05
Payer: MEDICARE

## 2019-06-05 VITALS
DIASTOLIC BLOOD PRESSURE: 78 MMHG | RESPIRATION RATE: 18 BRPM | HEART RATE: 76 BPM | HEIGHT: 68 IN | TEMPERATURE: 98 F | SYSTOLIC BLOOD PRESSURE: 104 MMHG | BODY MASS INDEX: 19.8 KG/M2 | WEIGHT: 130.63 LBS

## 2019-06-05 DIAGNOSIS — K50.818 CROHN'S DISEASE OF BOTH SMALL AND LARGE INTESTINE WITH OTHER COMPLICATION: ICD-10-CM

## 2019-06-05 DIAGNOSIS — R10.84 GENERALIZED ABDOMINAL PAIN: ICD-10-CM

## 2019-06-05 DIAGNOSIS — G89.29 CHRONIC ABDOMINAL PAIN: ICD-10-CM

## 2019-06-05 DIAGNOSIS — R10.9 CHRONIC ABDOMINAL PAIN: ICD-10-CM

## 2019-06-05 DIAGNOSIS — Z79.899 HIGH RISK MEDICATION USE: ICD-10-CM

## 2019-06-05 DIAGNOSIS — Z93.3 S/P COLOSTOMY: ICD-10-CM

## 2019-06-05 DIAGNOSIS — K50.113 CROHN'S COLITIS, WITH FISTULA: ICD-10-CM

## 2019-06-05 DIAGNOSIS — K21.9 GASTROESOPHAGEAL REFLUX DISEASE WITHOUT ESOPHAGITIS: Primary | ICD-10-CM

## 2019-06-05 DIAGNOSIS — R10.9 ABDOMINAL PAIN, UNSPECIFIED ABDOMINAL LOCATION: ICD-10-CM

## 2019-06-05 DIAGNOSIS — K21.9 GASTROESOPHAGEAL REFLUX DISEASE, ESOPHAGITIS PRESENCE NOT SPECIFIED: ICD-10-CM

## 2019-06-05 PROCEDURE — 99215 OFFICE O/P EST HI 40 MIN: CPT | Mod: ,,, | Performed by: INTERNAL MEDICINE

## 2019-06-05 PROCEDURE — 99215 PR OFFICE/OUTPT VISIT, EST, LEVL V, 40-54 MIN: ICD-10-PCS | Mod: ,,, | Performed by: INTERNAL MEDICINE

## 2019-06-05 RX ORDER — HYDROCODONE BITARTRATE AND ACETAMINOPHEN 10; 325 MG/1; MG/1
1 TABLET ORAL EVERY 8 HOURS PRN
Qty: 30 TABLET | Refills: 0 | Status: SHIPPED | OUTPATIENT
Start: 2019-06-05 | End: 2019-06-18 | Stop reason: SDUPTHER

## 2019-06-05 NOTE — PROGRESS NOTES
[facility] Established Patient Visit    Subjective:           PCP: Pablo Medina    Chief Complaint: Follow-up; Crohn's Disease; and Colonoscopy       HPI:  Carline Chang is a 53 y.o. female here for probable colonoscopy next week. Patient has had multiple previous surgeries and is being evaluated for treatment for one of the biologicals. Explained the benefits and risks of colonoscopy in detail. Patient had multiple prior bowel resections and a repeat colonoscopiy is being planned to see the extent of Crohn's prior to starting the biologicals. Patient has had multiple surgeries of the small bowel, large bowel, and rectum, and rectovaginal fistula. She has had multiple drainage of pararectal abscesses. A detailed physical examination was done and was noncontributory. Will order lab work prior to procedure.     ROS:   Constitutional: No fevers, chills, weight loss  ENT: No allergies, sore throat, rhinorrhea  CV: No chest pain, palpitations, edema  Pulm: No cough, shortness of breath, wheezing  Ophtho: No vision changes  GI: No blood in stools, change in bowel habits, nausea/vomiting  Denies alternating diarrhea/constipation.   Derm: No rash  Heme: No easy bruising or lymphadenopathy  MSK: No arthralgias or myalgias  : No dysuria, hematuria, frequency, polyuria, or flank tenderness  Endo: No hot or cold intolerance  Neuro: No syncope or seizure, or dizziness  Psych: No hallucination, depression or suicidal ideation      Medical History:  has a past medical history of Acute deep vein thrombosis (DVT) of brachial vein of right upper extremity (2017), Bowel perforation, Chronic pain, Crohn's disease of both small and large intestine with intestinal obstruction (), Difficult intravenous access, GERD (gastroesophageal reflux disease) (), Nephrolithiasis, and Stricture of bowel.      Surgical History:  has a past surgical history that includes Tubal ligation;  section;  Colonoscopy (N/A, 5/27/2016); Colonoscopy (N/A, 1/17/2017); Colonoscopy (N/A, 12/4/2017); Colonoscopy (N/A, 2/8/2018); Colonoscopy (N/A, 3/24/2018); Abscess drainage (2014); Anal fistulotomy (2018); Colonoscopy (3/24/2018); Upper gastrointestinal endoscopy (2000); Small intestine surgery (1995); Small intestine surgery (02/2009); Cholecystectomy (2000); Colon surgery (06/2015); rectovaginal fistula repair (01/2018); Colonoscopy (N/A, 7/6/2018); Bowel resection; and Laparoscopic closure of colostomy (N/A, 8/29/2018).    Family History:   Family History   Problem Relation Age of Onset    Cancer Maternal Aunt 66        colon ca    Heart attack Father 69    Anesthesia problems Neg Hx     Celiac disease Neg Hx     Cirrhosis Neg Hx     Colon cancer Neg Hx     Colon polyps Neg Hx     Crohn's disease Neg Hx     Cystic fibrosis Neg Hx     Esophageal cancer Neg Hx     Hemochromatosis Neg Hx     Inflammatory bowel disease Neg Hx     Irritable bowel syndrome Neg Hx     Liver cancer Neg Hx     Liver disease Neg Hx     Rectal cancer Neg Hx     Stomach cancer Neg Hx     Ulcerative colitis Neg Hx     Mars's disease Neg Hx     Lymphoma Neg Hx     Tuberculosis Neg Hx     Scleroderma Neg Hx     Rheum arthritis Neg Hx     Multiple sclerosis Neg Hx     Melanoma Neg Hx     Lupus Neg Hx     Psoriasis Neg Hx     Skin cancer Neg Hx        Social History:   Social History     Tobacco Use    Smoking status: Never Smoker    Smokeless tobacco: Never Used   Substance Use Topics    Alcohol use: No     Alcohol/week: 0.0 oz    Drug use: No       The patient's social and family histories were reviewed by me and updated in the appropriate section of the electronic medical record.    Allergies:   Review of patient's allergies indicates:   Allergen Reactions    Remicade [infliximab] Shortness Of Breath and Palpitations     Severe muscle and joint, and bone pain    Morphine Other (See Comments)     Abdominal pain     "Flagyl [metronidazole] Other (See Comments)     Neuropathy    Nubain [nalbuphine] Anxiety     Upper abdominal burning          Medications:   Current Outpatient Medications   Medication Sig Dispense Refill    azaTHIOprine (IMURAN) 50 mg Tab Take 1 tablet (50 mg total) by mouth once daily. 90 tablet 0    calcium carbonate (TUMS ORAL) Take by mouth as needed (acid reflux).      HYDROcodone-acetaminophen (NORCO)  mg per tablet Take 1 tablet by mouth every 8 (eight) hours as needed for Pain. 30 tablet 0    loperamide (IMODIUM A-D) 1 mg/7.5 mL Liqd Take 0.1 mg/kg by mouth every 12 (twelve) hours as needed.       promethazine (PHENERGAN) 25 MG tablet Take 1 tablet (25 mg total) by mouth every 8 (eight) hours as needed for Nausea. 60 tablet 1    venlafaxine (EFFEXOR) 37.5 MG Tab Take 1 tablet (37.5 mg total) by mouth 2 (two) times daily. 180 tablet 0     No current facility-administered medications for this visit.      All medications and past history have been reviewed.        Objective:        Vital Signs:  Blood pressure 104/78, pulse 76, temperature 97.6 °F (36.4 °C), resp. rate 18, height 5' 8" (1.727 m), weight 59.2 kg (130 lb 9.6 oz), last menstrual period 05/30/2009. Body mass index is 19.86 kg/m².    Physical Exam:   General Appearance: Well appearing in no acute distress, well developed, well                nourished  Head: Normocephalic, without obvious abnormality  Eyes:  Pupils equal, round and reactive to light  Throat: Lips, mucosa, and tongue normal; teeth and gums normal  Lungs: CTA bilaterally in anterior and posterior fields, no wheezes, no crackles  Heart:  Regular rate and rhythm, no murmurs heard  Abdomen: Soft, non tender, non distended with positive bowel sounds in all four quadrants. No hepatosplenomegaly, ascites, or mass. Negative for succusion splash  : female   Extremities: No cyanosis, edema or deformity  Skin: No rash  Neurologic: Normal exam    Labs:  Lab Results   Component " Value Date    WBC 7.6 09/28/2018    HGB 10.1 (L) 09/28/2018    HCT 30.8 (L) 09/28/2018     09/28/2018    ALT 8 (L) 06/12/2018    AST 24 09/28/2018     11/02/2018    K 3.3 (L) 11/02/2018     11/02/2018    CREATININE 0.60 11/02/2018    BUN 9 11/02/2018    CO2 30.3 11/02/2018    INR 1.2 03/18/2018       Imaging:     All lab results and imaging results have been reviewed and discussed with the patient      Assessment:       1. Gastroesophageal reflux disease without esophagitis    2. Abdominal pain, unspecified abdominal location    3. Crohn's disease of both small and large intestine with other complication    4. Chronic abdominal pain    5. Crohn's colitis, with fistula    6. High risk medication use    7. Generalized abdominal pain    8. Gastroesophageal reflux disease, esophagitis presence not specified    9. S/P colostomy        Plan:       Carline was seen today for follow-up, crohn's disease and colonoscopy.    Diagnoses and all orders for this visit:    Gastroesophageal reflux disease without esophagitis    Abdominal pain, unspecified abdominal location  -     HYDROcodone-acetaminophen (NORCO)  mg per tablet; Take 1 tablet by mouth every 8 (eight) hours as needed for Pain.    Crohn's disease of both small and large intestine with other complication  -     HYDROcodone-acetaminophen (NORCO)  mg per tablet; Take 1 tablet by mouth every 8 (eight) hours as needed for Pain.    Chronic abdominal pain    Crohn's colitis, with fistula    High risk medication use    Generalized abdominal pain    Gastroesophageal reflux disease, esophagitis presence not specified    S/P colostomy        See HPI    No follow-ups on file.    The plan was discussed with the patient and all questions/concerns have been answered to the patient's satisfaction.        Electronically signed by Pablo Medina MD    This note was dictated using voice recognition software and may contain grammatical errors.

## 2019-06-14 LAB
BASOPHILS NFR BLD: 0.1 K/UL (ref 0–0.2)
BASOPHILS NFR BLD: 0.8 %
BUN SERPL-MCNC: 6 MG/DL (ref 8–20)
CALCIUM SERPL-MCNC: 8.6 MG/DL (ref 7.7–10.4)
CHLORIDE: 105 MMOL/L (ref 98–110)
CO2 SERPL-SCNC: 28.3 MMOL/L (ref 22.8–31.6)
CREATININE: 0.86 MG/DL (ref 0.6–1.4)
EOSINOPHIL NFR BLD: 0.2 K/UL (ref 0–0.7)
EOSINOPHIL NFR BLD: 2.9 %
ERYTHROCYTE [DISTWIDTH] IN BLOOD BY AUTOMATED COUNT: 13.1 % (ref 11.7–14.9)
GLUCOSE: 81 MG/DL (ref 70–99)
GRAN #: 4.6 K/UL (ref 1.4–6.5)
GRAN%: 75.2 %
HCT VFR BLD AUTO: 39.6 % (ref 36–48)
HGB BLD-MCNC: 13.5 G/DL (ref 12–15)
IMMATURE GRANS (ABS): 0 K/UL (ref 0–1)
IMMATURE GRANULOCYTES: 0.3 %
LYMPH #: 0.8 K/UL (ref 1.2–3.4)
LYMPH%: 13.6 %
MAGNESIUM SERPL-MCNC: 1.2 MG/DL (ref 1.5–2.6)
MCH RBC QN AUTO: 31.3 PG (ref 25–35)
MCHC RBC AUTO-ENTMCNC: 34.1 G/DL (ref 31–36)
MCV RBC AUTO: 91.7 FL (ref 79–98)
MONO #: 0.4 K/UL (ref 0.1–0.6)
MONO%: 7.2 %
NUCLEATED RBCS: 0 %
PLATELET # BLD AUTO: 232 K/UL (ref 140–440)
PMV BLD AUTO: 8.8 FL (ref 8.8–12.7)
POTASSIUM SERPL-SCNC: 2.8 MMOL/L (ref 3.5–5)
RBC # BLD AUTO: 4.32 M/UL (ref 3.5–5.5)
SODIUM: 144 MMOL/L (ref 134–144)
WBC # BLD AUTO: 6.1 K/UL (ref 5–10)

## 2019-06-18 DIAGNOSIS — K50.818 CROHN'S DISEASE OF BOTH SMALL AND LARGE INTESTINE WITH OTHER COMPLICATION: ICD-10-CM

## 2019-06-18 DIAGNOSIS — R10.9 ABDOMINAL PAIN, UNSPECIFIED ABDOMINAL LOCATION: ICD-10-CM

## 2019-06-18 RX ORDER — HYDROCODONE BITARTRATE AND ACETAMINOPHEN 10; 325 MG/1; MG/1
1 TABLET ORAL EVERY 8 HOURS PRN
Qty: 90 TABLET | Refills: 0 | Status: SHIPPED | OUTPATIENT
Start: 2019-06-18 | End: 2019-07-15 | Stop reason: SDUPTHER

## 2019-06-18 NOTE — TELEPHONE ENCOUNTER
----- Message from Deidre Mcclure sent at 6/18/2019 12:17 PM CDT -----  Contact: self  Is due for refill of hydrocodone.  Please call her when its ready.

## 2019-06-20 ENCOUNTER — TELEPHONE (OUTPATIENT)
Dept: GASTROENTEROLOGY | Facility: CLINIC | Age: 54
End: 2019-06-20

## 2019-06-20 DIAGNOSIS — K50.818 CROHN'S DISEASE OF BOTH SMALL AND LARGE INTESTINE WITH OTHER COMPLICATION: Primary | ICD-10-CM

## 2019-06-20 NOTE — TELEPHONE ENCOUNTER
----- Message from Deidre Mcclure sent at 6/20/2019  3:13 PM CDT -----  DR PARK WANTS THE PATIENT TO HAVE AN IBD PANEL BEFORE HER NEXT APPT.

## 2019-07-15 DIAGNOSIS — K50.818 CROHN'S DISEASE OF BOTH SMALL AND LARGE INTESTINE WITH OTHER COMPLICATION: ICD-10-CM

## 2019-07-15 DIAGNOSIS — R11.0 NAUSEA: ICD-10-CM

## 2019-07-15 DIAGNOSIS — R10.9 ABDOMINAL PAIN, UNSPECIFIED ABDOMINAL LOCATION: ICD-10-CM

## 2019-07-15 DIAGNOSIS — G89.29 CHRONIC ABDOMINAL PAIN: ICD-10-CM

## 2019-07-15 DIAGNOSIS — K21.9 GASTROESOPHAGEAL REFLUX DISEASE WITHOUT ESOPHAGITIS: ICD-10-CM

## 2019-07-15 DIAGNOSIS — K50.119 CROHN'S DISEASE OF LARGE INTESTINE WITH COMPLICATION: ICD-10-CM

## 2019-07-15 DIAGNOSIS — K50.113 CROHN'S COLITIS, WITH FISTULA: ICD-10-CM

## 2019-07-15 DIAGNOSIS — R10.9 CHRONIC ABDOMINAL PAIN: ICD-10-CM

## 2019-07-15 DIAGNOSIS — K50.812 CROHN'S DISEASE OF BOTH SMALL AND LARGE INTESTINE WITH INTESTINAL OBSTRUCTION: ICD-10-CM

## 2019-07-15 NOTE — TELEPHONE ENCOUNTER
----- Message from Deidre Mcclure sent at 7/15/2019  2:42 PM CDT -----  Contact: self  Pt needs refills of Imuran and phenergan sent to [facility] pharmacy.  She is also due for refill of hydrocodone.  Please call her when this is ready.

## 2019-07-16 RX ORDER — AZATHIOPRINE 50 MG/1
50 TABLET ORAL DAILY
Qty: 90 TABLET | Refills: 0 | Status: SHIPPED | OUTPATIENT
Start: 2019-07-16 | End: 2019-11-07 | Stop reason: SDUPTHER

## 2019-07-16 RX ORDER — PROMETHAZINE HYDROCHLORIDE 25 MG/1
25 TABLET ORAL EVERY 8 HOURS PRN
Qty: 60 TABLET | Refills: 1 | Status: SHIPPED | OUTPATIENT
Start: 2019-07-16 | End: 2019-09-09 | Stop reason: SDUPTHER

## 2019-07-16 RX ORDER — HYDROCODONE BITARTRATE AND ACETAMINOPHEN 10; 325 MG/1; MG/1
1 TABLET ORAL EVERY 8 HOURS PRN
Qty: 90 TABLET | Refills: 0 | Status: SHIPPED | OUTPATIENT
Start: 2019-07-16 | End: 2019-08-12 | Stop reason: SDUPTHER

## 2019-08-12 DIAGNOSIS — K50.113 CROHN'S COLITIS, WITH FISTULA: ICD-10-CM

## 2019-08-12 DIAGNOSIS — K50.818 CROHN'S DISEASE OF BOTH SMALL AND LARGE INTESTINE WITH OTHER COMPLICATION: ICD-10-CM

## 2019-08-12 DIAGNOSIS — R10.9 ABDOMINAL PAIN, UNSPECIFIED ABDOMINAL LOCATION: ICD-10-CM

## 2019-08-12 RX ORDER — HYDROCODONE BITARTRATE AND ACETAMINOPHEN 10; 325 MG/1; MG/1
1 TABLET ORAL EVERY 8 HOURS PRN
Qty: 90 TABLET | Refills: 0 | Status: SHIPPED | OUTPATIENT
Start: 2019-08-12 | End: 2019-09-09 | Stop reason: SDUPTHER

## 2019-08-12 RX ORDER — VENLAFAXINE 37.5 MG/1
37.5 TABLET ORAL 2 TIMES DAILY
Qty: 180 TABLET | Refills: 0 | Status: SHIPPED | OUTPATIENT
Start: 2019-08-12 | End: 2019-12-26 | Stop reason: SDUPTHER

## 2019-08-12 NOTE — TELEPHONE ENCOUNTER
----- Message from Steven Garner sent at 8/12/2019  4:27 PM CDT -----  Patient is requesting Effexor and Norco to be called into [facility] in [place], [place]. Thanks

## 2019-08-29 ENCOUNTER — OFFICE VISIT (OUTPATIENT)
Dept: GASTROENTEROLOGY | Facility: CLINIC | Age: 54
End: 2019-08-29
Payer: MEDICARE

## 2019-08-29 VITALS
HEART RATE: 89 BPM | TEMPERATURE: 98 F | DIASTOLIC BLOOD PRESSURE: 69 MMHG | RESPIRATION RATE: 18 BRPM | WEIGHT: 133 LBS | BODY MASS INDEX: 20.16 KG/M2 | SYSTOLIC BLOOD PRESSURE: 107 MMHG | HEIGHT: 68 IN

## 2019-08-29 DIAGNOSIS — Z79.899 HIGH RISK MEDICATION USE: ICD-10-CM

## 2019-08-29 DIAGNOSIS — R10.9 ABDOMINAL PAIN, UNSPECIFIED ABDOMINAL LOCATION: ICD-10-CM

## 2019-08-29 DIAGNOSIS — R19.7 DIARRHEA, UNSPECIFIED TYPE: ICD-10-CM

## 2019-08-29 DIAGNOSIS — K21.9 GASTROESOPHAGEAL REFLUX DISEASE WITHOUT ESOPHAGITIS: ICD-10-CM

## 2019-08-29 DIAGNOSIS — K50.818 CROHN'S DISEASE OF BOTH SMALL AND LARGE INTESTINE WITH OTHER COMPLICATION: Primary | ICD-10-CM

## 2019-08-29 PROCEDURE — 99215 OFFICE O/P EST HI 40 MIN: CPT | Mod: S$GLB,,, | Performed by: INTERNAL MEDICINE

## 2019-08-29 PROCEDURE — 99215 PR OFFICE/OUTPT VISIT, EST, LEVL V, 40-54 MIN: ICD-10-PCS | Mod: S$GLB,,, | Performed by: INTERNAL MEDICINE

## 2019-08-29 NOTE — PROGRESS NOTES
[facility] Established Patient Visit    Subjective:           PCP: Pablo Medina    Chief Complaint: Follow-up (Labs)       HPI:  Carline Chang is a 53 y.o. female here for follow-up of her Crohn's disease, agent probably needs a biological, Entyvio. is not allergic and also on Humira some relief of symptoms. Will do a MR enterography  With Dr. Flor prior to starting Biologics, patient needs a mammogram, bone density, Imodium to control the diarrhea labs before starting Entyvio. Return to clinic after tests. Blood work done at [facility] in [place]. Reviewed.    ROS:   Constitutional: No fevers, chills, weight loss  ENT: No allergies, sore throat, rhinorrhea  CV: No chest pain, palpitations, edema  Pulm: No cough, shortness of breath, wheezing  Ophtho: No vision changes  GI: No blood in stools, change in bowel habits, nausea/vomiting Crohn's  Denies alternating diarrhea/constipation.   Derm: No rash  Heme: No easy bruising or lymphadenopathy  MSK: No arthralgias or myalgias  : No dysuria, hematuria, frequency, polyuria, or flank tenderness  Endo: No hot or cold intolerance  Neuro: No syncope or seizure, or dizziness  Psych: No hallucination, depression or suicidal ideation      Medical History:  has a past medical history of Acute deep vein thrombosis (DVT) of brachial vein of right upper extremity (2017), Bowel perforation, Chronic pain, Crohn's disease of both small and large intestine with intestinal obstruction (), Difficult intravenous access, GERD (gastroesophageal reflux disease) (), Nephrolithiasis, and Stricture of bowel.      Surgical History:  has a past surgical history that includes Tubal ligation;  section; Colonoscopy (N/A, 2016); Colonoscopy (N/A, 2017); Colonoscopy (N/A, 2017); Colonoscopy (N/A, 2018); Colonoscopy (N/A, 3/24/2018); Abscess drainage (); Anal fistulotomy (); Colonoscopy (3/24/2018); Upper  gastrointestinal endoscopy (2000); Small intestine surgery (1995); Small intestine surgery (02/2009); Cholecystectomy (2000); Colon surgery (06/2015); rectovaginal fistula repair (01/2018); Colonoscopy (N/A, 7/6/2018); Bowel resection; and Laparoscopic closure of colostomy (N/A, 8/29/2018).    Family History:   Family History   Problem Relation Age of Onset    Cancer Maternal Aunt 66        colon ca    Heart attack Father 69    Anesthesia problems Neg Hx     Celiac disease Neg Hx     Cirrhosis Neg Hx     Colon cancer Neg Hx     Colon polyps Neg Hx     Crohn's disease Neg Hx     Cystic fibrosis Neg Hx     Esophageal cancer Neg Hx     Hemochromatosis Neg Hx     Inflammatory bowel disease Neg Hx     Irritable bowel syndrome Neg Hx     Liver cancer Neg Hx     Liver disease Neg Hx     Rectal cancer Neg Hx     Stomach cancer Neg Hx     Ulcerative colitis Neg Hx     Mars's disease Neg Hx     Lymphoma Neg Hx     Tuberculosis Neg Hx     Scleroderma Neg Hx     Rheum arthritis Neg Hx     Multiple sclerosis Neg Hx     Melanoma Neg Hx     Lupus Neg Hx     Psoriasis Neg Hx     Skin cancer Neg Hx        Social History:   Social History     Tobacco Use    Smoking status: Never Smoker    Smokeless tobacco: Never Used   Substance Use Topics    Alcohol use: No     Alcohol/week: 0.0 oz    Drug use: No       The patient's social and family histories were reviewed by me and updated in the appropriate section of the electronic medical record.    Allergies:   Review of patient's allergies indicates:   Allergen Reactions    Remicade [infliximab] Shortness Of Breath and Palpitations     Severe muscle and joint, and bone pain    Morphine Other (See Comments)     Abdominal pain    Flagyl [metronidazole] Other (See Comments)     Neuropathy    Nubain [nalbuphine] Anxiety     Upper abdominal burning          Medications:   Current Outpatient Medications   Medication Sig Dispense Refill    azaTHIOprine  "(IMURAN) 50 mg Tab Take 1 tablet (50 mg total) by mouth once daily. 90 tablet 0    calcium carbonate (TUMS ORAL) Take by mouth as needed (acid reflux).      HYDROcodone-acetaminophen (NORCO)  mg per tablet Take 1 tablet by mouth every 8 (eight) hours as needed for Pain. 90 tablet 0    loperamide (IMODIUM A-D) 1 mg/7.5 mL Liqd Take 0.1 mg/kg by mouth every 12 (twelve) hours as needed.       promethazine (PHENERGAN) 25 MG tablet Take 1 tablet (25 mg total) by mouth every 8 (eight) hours as needed for Nausea. 60 tablet 1    venlafaxine (EFFEXOR) 37.5 MG Tab Take 1 tablet (37.5 mg total) by mouth 2 (two) times daily. 180 tablet 0     No current facility-administered medications for this visit.      All medications and past history have been reviewed.        Objective:        Vital Signs:  Blood pressure 107/69, pulse 89, temperature 97.6 °F (36.4 °C), resp. rate 18, height 5' 8" (1.727 m), weight 60.3 kg (133 lb), last menstrual period 05/30/2009. Body mass index is 20.22 kg/m².    Physical Exam:   General Appearance: Well appearing in no acute distress, well developed, well                nourished  Head: Normocephalic, without obvious abnormality  Eyes:  Pupils equal, round and reactive to light  Throat: Lips, mucosa, and tongue normal; teeth and gums normal  Lungs: CTA bilaterally in anterior and posterior fields, no wheezes, no crackles  Heart:  Regular rate and rhythm, no murmurs heard  Abdomen: Soft, mildly tender non distended with positive bowel sounds in all four quadrants. No hepatosplenomegaly, ascites, or mass. Negative for succusion splash    : female   Extremities: No cyanosis, edema or deformity  Skin: No rash  Neurologic: Normal exam    Labs:  Lab Results   Component Value Date    WBC 6.1 06/14/2019    HGB 13.5 06/14/2019    HCT 39.6 06/14/2019     06/14/2019    ALT 8 (L) 06/12/2018    AST 24 09/28/2018     06/14/2019    K 2.8 (LL) 06/14/2019     06/14/2019    CREATININE " 0.86 06/14/2019    BUN 6 (L) 06/14/2019    CO2 28.3 06/14/2019    INR 1.2 03/18/2018       Imaging:     All lab results and imaging results have been reviewed and discussed with the patient      Assessment:       1. Crohn's disease of both small and large intestine with other complication    2. Abdominal pain, unspecified abdominal location    3. Gastroesophageal reflux disease without esophagitis    4. Diarrhea, unspecified type    5. High risk medication use        Plan:       Carline was seen today for follow-up.    Diagnoses and all orders for this visit:    Crohn's disease of both small and large intestine with other complication    Abdominal pain, unspecified abdominal location    Gastroesophageal reflux disease without esophagitis    Diarrhea, unspecified type    High risk medication use        See HPI    Follow up for After Test(s).    The plan was discussed with the patient and all questions/concerns have been answered to the patient's satisfaction.        Electronically signed by Pablo Medina MD    This note was dictated using voice recognition software and may contain grammatical errors.

## 2019-09-09 DIAGNOSIS — R10.9 CHRONIC ABDOMINAL PAIN: ICD-10-CM

## 2019-09-09 DIAGNOSIS — K50.818 CROHN'S DISEASE OF BOTH SMALL AND LARGE INTESTINE WITH OTHER COMPLICATION: ICD-10-CM

## 2019-09-09 DIAGNOSIS — R11.0 NAUSEA: ICD-10-CM

## 2019-09-09 DIAGNOSIS — R10.9 ABDOMINAL PAIN, UNSPECIFIED ABDOMINAL LOCATION: ICD-10-CM

## 2019-09-09 DIAGNOSIS — K21.9 GASTROESOPHAGEAL REFLUX DISEASE WITHOUT ESOPHAGITIS: ICD-10-CM

## 2019-09-09 DIAGNOSIS — K50.812 CROHN'S DISEASE OF BOTH SMALL AND LARGE INTESTINE WITH INTESTINAL OBSTRUCTION: ICD-10-CM

## 2019-09-09 DIAGNOSIS — G89.29 CHRONIC ABDOMINAL PAIN: ICD-10-CM

## 2019-09-09 NOTE — TELEPHONE ENCOUNTER
----- Message from Deidre Mcclure sent at 9/9/2019  4:29 PM CDT -----  Contact: self  Is due for refill of hydrocodone and phenergan.  Please let her know when this is done.

## 2019-09-10 ENCOUNTER — TELEPHONE (OUTPATIENT)
Dept: GASTROENTEROLOGY | Facility: CLINIC | Age: 54
End: 2019-09-10

## 2019-09-10 RX ORDER — PROMETHAZINE HYDROCHLORIDE 25 MG/1
25 TABLET ORAL EVERY 8 HOURS PRN
Qty: 60 TABLET | Refills: 1 | Status: SHIPPED | OUTPATIENT
Start: 2019-09-10 | End: 2019-11-07 | Stop reason: SDUPTHER

## 2019-09-10 RX ORDER — HYDROCODONE BITARTRATE AND ACETAMINOPHEN 10; 325 MG/1; MG/1
1 TABLET ORAL EVERY 8 HOURS PRN
Qty: 90 TABLET | Refills: 0 | Status: SHIPPED | OUTPATIENT
Start: 2019-09-10 | End: 2019-10-08 | Stop reason: SDUPTHER

## 2019-09-10 NOTE — TELEPHONE ENCOUNTER
----- Message from Deidre Mcclure sent at 9/10/2019  8:12 AM CDT -----  Contact: self  Has Dr Medina spoken to the radiologist (Dr. Flor) regarding the enterography he wants the patient to have?  Please let her know.

## 2019-09-21 NOTE — TELEPHONE ENCOUNTER
Called pt.  Test has to be done when Dr. Flor is going to be at the hospital. MR enterography. Dr. Medina has spoken to Dr. Flor.

## 2019-09-24 DIAGNOSIS — K50.818 CROHN'S DISEASE OF BOTH SMALL AND LARGE INTESTINE WITH OTHER COMPLICATION: Primary | ICD-10-CM

## 2019-09-24 DIAGNOSIS — L98.8 FISTULA: ICD-10-CM

## 2019-10-08 DIAGNOSIS — R10.9 ABDOMINAL PAIN, UNSPECIFIED ABDOMINAL LOCATION: ICD-10-CM

## 2019-10-08 DIAGNOSIS — K50.818 CROHN'S DISEASE OF BOTH SMALL AND LARGE INTESTINE WITH OTHER COMPLICATION: ICD-10-CM

## 2019-10-08 NOTE — TELEPHONE ENCOUNTER
----- Message from Deidre Mcclure sent at 10/8/2019  9:32 AM CDT -----  Contact: SELF  IS DUE FOR REFILL OF HYDROCODONE.  PLEASE CALL WHEN READY TO BE PICKED UP.

## 2019-10-09 RX ORDER — HYDROCODONE BITARTRATE AND ACETAMINOPHEN 10; 325 MG/1; MG/1
1 TABLET ORAL EVERY 8 HOURS PRN
Qty: 90 TABLET | Refills: 0 | Status: SHIPPED | OUTPATIENT
Start: 2019-10-09 | End: 2019-12-03

## 2019-10-29 ENCOUNTER — HOSPITAL ENCOUNTER (OUTPATIENT)
Dept: RADIOLOGY | Facility: HOSPITAL | Age: 54
Discharge: HOME | End: 2019-10-29
Attending: INTERNAL MEDICINE
Payer: MEDICARE

## 2019-10-29 DIAGNOSIS — K50.818 CROHN'S DISEASE OF BOTH SMALL AND LARGE INTESTINE WITH OTHER COMPLICATION: ICD-10-CM

## 2019-10-29 DIAGNOSIS — L98.8 FISTULA: ICD-10-CM

## 2019-10-31 ENCOUNTER — TELEPHONE (OUTPATIENT)
Dept: GASTROENTEROLOGY | Facility: CLINIC | Age: 54
End: 2019-10-31

## 2019-10-31 NOTE — TELEPHONE ENCOUNTER
----- Message from Deidre Mcclure sent at 10/30/2019  5:25 PM CDT -----  Contact: SELF  NEEDS REFILL OF HYDROCODONE.  SHE KNOWS ITS A LITTLE EARLY BUT SHE HAS HAD TO USE MORE LATELY.  HAD BEEN DOING REALLY GOOD FOR 6-7 MONTHS.  CAN HE WRITE IT FOR Q 6 HRS PRN LIKE BEFORE?

## 2019-11-01 DIAGNOSIS — K50.818 CROHN'S DISEASE OF BOTH SMALL AND LARGE INTESTINE WITH OTHER COMPLICATION: Primary | ICD-10-CM

## 2019-11-01 DIAGNOSIS — R10.9 ABDOMINAL PAIN, UNSPECIFIED ABDOMINAL LOCATION: ICD-10-CM

## 2019-11-01 RX ORDER — HYDROCODONE BITARTRATE AND ACETAMINOPHEN 10; 325 MG/1; MG/1
1 TABLET ORAL EVERY 6 HOURS PRN
Qty: 120 TABLET | Refills: 0 | Status: SHIPPED | OUTPATIENT
Start: 2019-11-01 | End: 2019-12-02 | Stop reason: SDUPTHER

## 2019-11-05 ENCOUNTER — HOSPITAL ENCOUNTER (OUTPATIENT)
Dept: RADIOLOGY | Facility: HOSPITAL | Age: 54
Discharge: HOME | End: 2019-11-05
Attending: INTERNAL MEDICINE
Payer: MEDICARE

## 2019-11-05 PROCEDURE — 25500020 PHARM REV CODE 255: Performed by: INTERNAL MEDICINE

## 2019-11-05 PROCEDURE — 72197 MRI PELVIS W/O & W/DYE: CPT | Mod: TC

## 2019-11-05 PROCEDURE — A9585 GADOBUTROL INJECTION: HCPCS | Performed by: INTERNAL MEDICINE

## 2019-11-05 PROCEDURE — 63600175 PHARM REV CODE 636 W HCPCS: Mod: JG | Performed by: RADIOLOGY

## 2019-11-05 RX ORDER — GLUCAGON 1 MG
0.2 KIT INJECTION
Status: COMPLETED | OUTPATIENT
Start: 2019-11-05 | End: 2019-11-05

## 2019-11-05 RX ORDER — GADOBUTROL 604.72 MG/ML
6 INJECTION INTRAVENOUS
Status: COMPLETED | OUTPATIENT
Start: 2019-11-05 | End: 2019-11-05

## 2019-11-05 RX ADMIN — GLUCAGON 0.2 MG: KIT at 02:11

## 2019-11-05 RX ADMIN — GADOBUTROL 6 ML: 604.72 INJECTION INTRAVENOUS at 02:11

## 2019-11-07 ENCOUNTER — OFFICE VISIT (OUTPATIENT)
Dept: GASTROENTEROLOGY | Facility: CLINIC | Age: 54
End: 2019-11-07
Payer: MEDICARE

## 2019-11-07 VITALS
WEIGHT: 130 LBS | HEIGHT: 68 IN | BODY MASS INDEX: 19.7 KG/M2 | DIASTOLIC BLOOD PRESSURE: 67 MMHG | SYSTOLIC BLOOD PRESSURE: 107 MMHG | TEMPERATURE: 99 F | HEART RATE: 88 BPM

## 2019-11-07 DIAGNOSIS — R11.0 NAUSEA: ICD-10-CM

## 2019-11-07 DIAGNOSIS — G89.29 CHRONIC ABDOMINAL PAIN: ICD-10-CM

## 2019-11-07 DIAGNOSIS — E87.6 HYPOKALEMIA: ICD-10-CM

## 2019-11-07 DIAGNOSIS — K50.812 CROHN'S DISEASE OF BOTH SMALL AND LARGE INTESTINE WITH INTESTINAL OBSTRUCTION: ICD-10-CM

## 2019-11-07 DIAGNOSIS — K50.119 CROHN'S DISEASE OF LARGE INTESTINE WITH COMPLICATION: Primary | ICD-10-CM

## 2019-11-07 DIAGNOSIS — K50.113 CROHN'S COLITIS, WITH FISTULA: ICD-10-CM

## 2019-11-07 DIAGNOSIS — R10.9 CHRONIC ABDOMINAL PAIN: ICD-10-CM

## 2019-11-07 DIAGNOSIS — K21.9 GASTROESOPHAGEAL REFLUX DISEASE WITHOUT ESOPHAGITIS: ICD-10-CM

## 2019-11-07 PROCEDURE — 99215 PR OFFICE/OUTPT VISIT, EST, LEVL V, 40-54 MIN: ICD-10-PCS | Mod: S$GLB,,, | Performed by: INTERNAL MEDICINE

## 2019-11-07 PROCEDURE — 99215 OFFICE O/P EST HI 40 MIN: CPT | Mod: S$GLB,,, | Performed by: INTERNAL MEDICINE

## 2019-11-07 RX ORDER — AZATHIOPRINE 50 MG/1
50 TABLET ORAL DAILY
Qty: 90 TABLET | Refills: 0 | Status: SHIPPED | OUTPATIENT
Start: 2019-11-07 | End: 2021-10-05

## 2019-11-07 RX ORDER — PROMETHAZINE HYDROCHLORIDE 25 MG/1
25 TABLET ORAL EVERY 8 HOURS PRN
Qty: 60 TABLET | Refills: 1 | Status: SHIPPED | OUTPATIENT
Start: 2019-11-07 | End: 2019-12-26 | Stop reason: SDUPTHER

## 2019-11-07 RX ORDER — POTASSIUM CHLORIDE 20 MEQ/1
20 TABLET, EXTENDED RELEASE ORAL 2 TIMES DAILY
Qty: 180 TABLET | Refills: 1 | Status: ON HOLD | OUTPATIENT
Start: 2019-11-07 | End: 2022-01-16 | Stop reason: HOSPADM

## 2019-11-11 NOTE — PROGRESS NOTES
[facility] Established Patient Visit    Subjective:           PCP: Pablo Medina    Chief Complaint: Follow-up and Results (Enterography)       HPI:  Carline Chang is a 53 y.o. female here for follow up of results. Patient has Crohn's disease of both small and large intestines with other complications, GERD nausea. Patient has hx of left lower abdominal colostomy, but has had reversal  and small and large bowel resection.  Enterographpy suggest low grade ileus versus partial low grade bowel obstruction. Patient has abdomen pain. Patient is on Imuran. Reviewed blood work. Patient will return in two weeks.      ROS:   Constitutional: No fevers, chills, weight loss  ENT: No allergies, sore throat, rhinorrhea  CV: No chest pain, palpitations, edema  Pulm: No cough, shortness of breath, wheezing  Ophtho: No vision changes  GI: No blood in stools, change in bowel habits, nausea/vomiting, abdomen pain, Crohn's Disease, ileus, nausea  Denies alternating diarrhea/constipation.   Derm: No rash  Heme: No easy bruising or lymphadenopathy  MSK: No arthralgias or myalgias  : No dysuria, hematuria, frequency, polyuria, or flank tenderness  Endo: No hot or cold intolerance  Neuro: No syncope or seizure, or dizziness  Psych: No hallucination, depression or suicidal ideation      Medical History:  has a past medical history of Acute deep vein thrombosis (DVT) of brachial vein of right upper extremity (2017), Bowel perforation, Chronic pain, Crohn's disease of both small and large intestine with intestinal obstruction (), Difficult intravenous access, GERD (gastroesophageal reflux disease) (), Nephrolithiasis, and Stricture of bowel.      Surgical History:  has a past surgical history that includes Tubal ligation;  section; Colonoscopy (N/A, 2016); Colonoscopy (N/A, 2017); Colonoscopy (N/A, 2017); Colonoscopy (N/A, 2018); Colonoscopy (N/A, 3/24/2018); Abscess  drainage (2014); Anal fistulotomy (2018); Colonoscopy (3/24/2018); Upper gastrointestinal endoscopy (2000); Small intestine surgery (1995); Small intestine surgery (02/2009); Cholecystectomy (2000); Colon surgery (06/2015); rectovaginal fistula repair (01/2018); Colonoscopy (N/A, 7/6/2018); Bowel resection; and Laparoscopic closure of colostomy (N/A, 8/29/2018).    Family History:   Family History   Problem Relation Age of Onset    Cancer Maternal Aunt 66        colon ca    Heart attack Father 69    Anesthesia problems Neg Hx     Celiac disease Neg Hx     Cirrhosis Neg Hx     Colon cancer Neg Hx     Colon polyps Neg Hx     Crohn's disease Neg Hx     Cystic fibrosis Neg Hx     Esophageal cancer Neg Hx     Hemochromatosis Neg Hx     Inflammatory bowel disease Neg Hx     Irritable bowel syndrome Neg Hx     Liver cancer Neg Hx     Liver disease Neg Hx     Rectal cancer Neg Hx     Stomach cancer Neg Hx     Ulcerative colitis Neg Hx     Mars's disease Neg Hx     Lymphoma Neg Hx     Tuberculosis Neg Hx     Scleroderma Neg Hx     Rheum arthritis Neg Hx     Multiple sclerosis Neg Hx     Melanoma Neg Hx     Lupus Neg Hx     Psoriasis Neg Hx     Skin cancer Neg Hx        Social History:   Social History     Tobacco Use    Smoking status: Never Smoker    Smokeless tobacco: Never Used   Substance Use Topics    Alcohol use: No     Alcohol/week: 0.0 standard drinks    Drug use: No       The patient's social and family histories were reviewed by me and updated in the appropriate section of the electronic medical record.    Allergies:   Review of patient's allergies indicates:   Allergen Reactions    Remicade [infliximab] Shortness Of Breath and Palpitations     Severe muscle and joint, and bone pain    Morphine Other (See Comments)     Abdominal pain    Flagyl [metronidazole] Other (See Comments)     Neuropathy    Nubain [nalbuphine] Anxiety     Upper abdominal burning          Medications:  "  Current Outpatient Medications   Medication Sig Dispense Refill    azaTHIOprine (IMURAN) 50 mg Tab Take 1 tablet (50 mg total) by mouth once daily. 90 tablet 0    calcium carbonate (TUMS ORAL) Take by mouth as needed (acid reflux).      HYDROcodone-acetaminophen (NORCO)  mg per tablet Take 1 tablet by mouth every 8 (eight) hours as needed for Pain. 90 tablet 0    HYDROcodone-acetaminophen (NORCO)  mg per tablet Take 1 tablet by mouth every 6 (six) hours as needed for Pain. 120 tablet 0    loperamide (IMODIUM A-D) 1 mg/7.5 mL Liqd Take 0.1 mg/kg by mouth every 12 (twelve) hours as needed.       promethazine (PHENERGAN) 25 MG tablet Take 1 tablet (25 mg total) by mouth every 8 (eight) hours as needed for Nausea. 60 tablet 1    venlafaxine (EFFEXOR) 37.5 MG Tab Take 1 tablet (37.5 mg total) by mouth 2 (two) times daily. 180 tablet 0    potassium chloride SA (K-DUR,KLOR-CON) 20 MEQ tablet Take 1 tablet (20 mEq total) by mouth 2 (two) times daily. 180 tablet 1     No current facility-administered medications for this visit.      All medications and past history have been reviewed.        Objective:        Vital Signs:  Blood pressure 107/67, pulse 88, temperature 98.9 °F (37.2 °C), height 5' 8" (1.727 m), weight 59 kg (130 lb), last menstrual period 05/30/2009. Body mass index is 19.77 kg/m².    Physical Exam:   General Appearance: Well appearing in no acute distress, well developed, well                nourished  Head: Normocephalic, without obvious abnormality  Eyes:  Pupils equal, round and reactive to light  Throat: Lips, mucosa, and tongue normal; teeth and gums normal  Lungs: CTA bilaterally in anterior and posterior fields, no wheezes, no crackles  Heart:  Regular rate and rhythm, no murmurs heard  Abdomen: Soft, mildly tender, mildly distended with positive bowel sounds in all four quadrants. No hepatosplenomegaly, ascites, or mass. Negative for succusion splash  : female   Extremities: No " cyanosis, edema or deformity  Skin: No rash  Neurologic: Normal exam    Labs:  Lab Results   Component Value Date    WBC 6.1 06/14/2019    HGB 13.5 06/14/2019    HCT 39.6 06/14/2019     06/14/2019    ALT 8 (L) 06/12/2018    AST 24 09/28/2018     06/14/2019    K 2.8 (LL) 06/14/2019     06/14/2019    CREATININE 0.86 06/14/2019    BUN 6 (L) 06/14/2019    CO2 28.3 06/14/2019    INR 1.2 03/18/2018       Imaging:     All lab results and imaging results have been reviewed and discussed with the patient      Assessment:       1. Crohn's disease of large intestine with complication    2. Crohn's colitis, with fistula    3. Hypokalemia    4. Nausea    5. Gastroesophageal reflux disease without esophagitis    6. Crohn's disease of both small and large intestine with intestinal obstruction    7. Chronic abdominal pain        Plan:       Carline was seen today for follow-up and results.    Diagnoses and all orders for this visit:    Crohn's disease of large intestine with complication  -     azaTHIOprine (IMURAN) 50 mg Tab; Take 1 tablet (50 mg total) by mouth once daily.    Crohn's colitis, with fistula  -     azaTHIOprine (IMURAN) 50 mg Tab; Take 1 tablet (50 mg total) by mouth once daily.    Hypokalemia  -     potassium chloride SA (K-DUR,KLOR-CON) 20 MEQ tablet; Take 1 tablet (20 mEq total) by mouth 2 (two) times daily.    Nausea  -     promethazine (PHENERGAN) 25 MG tablet; Take 1 tablet (25 mg total) by mouth every 8 (eight) hours as needed for Nausea.    Gastroesophageal reflux disease without esophagitis  -     promethazine (PHENERGAN) 25 MG tablet; Take 1 tablet (25 mg total) by mouth every 8 (eight) hours as needed for Nausea.    Crohn's disease of both small and large intestine with intestinal obstruction  -     promethazine (PHENERGAN) 25 MG tablet; Take 1 tablet (25 mg total) by mouth every 8 (eight) hours as needed for Nausea.    Chronic abdominal pain  -     promethazine (PHENERGAN) 25 MG  tablet; Take 1 tablet (25 mg total) by mouth every 8 (eight) hours as needed for Nausea.        See HPI    Follow up in about 2 weeks (around 11/21/2019), or if symptoms worsen or fail to improve.    The plan was discussed with the patient and all questions/concerns have been answered to the patient's satisfaction.        Electronically signed by Pablo Medina MD    This note was dictated using voice recognition software and may contain grammatical errors.

## 2019-11-21 ENCOUNTER — OFFICE VISIT (OUTPATIENT)
Dept: GASTROENTEROLOGY | Facility: CLINIC | Age: 54
End: 2019-11-21
Payer: MEDICARE

## 2019-11-21 VITALS
HEART RATE: 101 BPM | HEIGHT: 68 IN | SYSTOLIC BLOOD PRESSURE: 108 MMHG | WEIGHT: 129.63 LBS | DIASTOLIC BLOOD PRESSURE: 79 MMHG | BODY MASS INDEX: 19.65 KG/M2 | TEMPERATURE: 98 F

## 2019-11-21 DIAGNOSIS — R19.8 ALTERNATING CONSTIPATION AND DIARRHEA: ICD-10-CM

## 2019-11-21 DIAGNOSIS — R10.9 ABDOMINAL PAIN, UNSPECIFIED ABDOMINAL LOCATION: ICD-10-CM

## 2019-11-21 DIAGNOSIS — R14.0 ABDOMINAL BLOATING: ICD-10-CM

## 2019-11-21 DIAGNOSIS — R14.0 ABDOMINAL DISTENSION: ICD-10-CM

## 2019-11-21 DIAGNOSIS — R11.0 NAUSEA: ICD-10-CM

## 2019-11-21 DIAGNOSIS — K50.819 CROHN'S DISEASE OF BOTH SMALL AND LARGE INTESTINE WITH COMPLICATION: Primary | ICD-10-CM

## 2019-11-21 DIAGNOSIS — K21.9 GASTROESOPHAGEAL REFLUX DISEASE, ESOPHAGITIS PRESENCE NOT SPECIFIED: ICD-10-CM

## 2019-11-21 PROCEDURE — 99215 PR OFFICE/OUTPT VISIT, EST, LEVL V, 40-54 MIN: ICD-10-PCS | Mod: S$GLB,,, | Performed by: INTERNAL MEDICINE

## 2019-11-21 PROCEDURE — 99215 OFFICE O/P EST HI 40 MIN: CPT | Mod: S$GLB,,, | Performed by: INTERNAL MEDICINE

## 2019-11-21 NOTE — PROGRESS NOTES
[facility] Established Patient Visit    Subjective:           PCP: Pablo Medina    Chief Complaint: Crohn's Disease       HPI:  Carline Chang is a 53 y.o. female here for evaluation of biologics, enterography shows some evidence of recurrent Crohn's at the colo enteric anastomosis, explained to patient that importance of continuing Entyvio for a long period of time may be necessary, exact duration cannot be estimated at this time.  we will get repeat blood work and TB gold test prior to starting Entyvio, this is a 50 50 chance that the drug would induce remission, detailed examination done, reviewed old records have emphasized that the bloating may not go away with Entyvio. Xray shows constipation, but she is having diarrhea.  Patient is allergic to Remicade, had trouble breathing, face turned red, and made her extremely nauseous.    ROS:   Constitutional: No fevers, chills, weight loss  ENT: No allergies, sore throat, rhinorrhea  CV: No chest pain, palpitations, edema  Pulm: No cough, shortness of breath, wheezing  Ophtho: No vision changes  GI: No blood in stools, change in bowel habits, nausea/vomiting   alternating diarrhea/constipation. Crohn's with complications, hx of partial obstruction, hx of colon surgery, hx of colostomy which was reversed  Derm: No rash  Heme: No easy bruising or lymphadenopathy  MSK: No arthralgias or myalgias  : No dysuria, hematuria, frequency, polyuria, or flank tenderness  Endo: No hot or cold intolerance  Neuro: No syncope or seizure, or dizziness  Psych: No hallucination, depression or suicidal ideation       Medical History:  has a past medical history of Acute deep vein thrombosis (DVT) of brachial vein of right upper extremity (01/2017), Bowel perforation, Chronic pain, Crohn's disease of both small and large intestine with intestinal obstruction (1989), Difficult intravenous access, GERD (gastroesophageal reflux disease) (2014),  Nephrolithiasis, and Stricture of bowel.      Surgical History:  has a past surgical history that includes Tubal ligation;  section; Colonoscopy (N/A, 2016); Colonoscopy (N/A, 2017); Colonoscopy (N/A, 2017); Colonoscopy (N/A, 2018); Colonoscopy (N/A, 3/24/2018); Abscess drainage (); Anal fistulotomy (); Colonoscopy (3/24/2018); Upper gastrointestinal endoscopy (); Small intestine surgery (); Small intestine surgery (2009); Cholecystectomy (); Colon surgery (2015); rectovaginal fistula repair (2018); Colonoscopy (N/A, 2018); Bowel resection; and Laparoscopic closure of colostomy (N/A, 2018).    Family History:   Family History   Problem Relation Age of Onset    Cancer Maternal Aunt 66        colon ca    Heart attack Father 69    Anesthesia problems Neg Hx     Celiac disease Neg Hx     Cirrhosis Neg Hx     Colon cancer Neg Hx     Colon polyps Neg Hx     Crohn's disease Neg Hx     Cystic fibrosis Neg Hx     Esophageal cancer Neg Hx     Hemochromatosis Neg Hx     Inflammatory bowel disease Neg Hx     Irritable bowel syndrome Neg Hx     Liver cancer Neg Hx     Liver disease Neg Hx     Rectal cancer Neg Hx     Stomach cancer Neg Hx     Ulcerative colitis Neg Hx     Mars's disease Neg Hx     Lymphoma Neg Hx     Tuberculosis Neg Hx     Scleroderma Neg Hx     Rheum arthritis Neg Hx     Multiple sclerosis Neg Hx     Melanoma Neg Hx     Lupus Neg Hx     Psoriasis Neg Hx     Skin cancer Neg Hx        Social History:   Social History     Tobacco Use    Smoking status: Never Smoker    Smokeless tobacco: Never Used   Substance Use Topics    Alcohol use: No     Alcohol/week: 0.0 standard drinks    Drug use: No       The patient's social and family histories were reviewed by me and updated in the appropriate section of the electronic medical record.    Allergies:   Review of patient's allergies indicates:   Allergen Reactions     "Remicade [infliximab] Shortness Of Breath and Palpitations     Severe muscle and joint, and bone pain    Morphine Other (See Comments)     Abdominal pain    Flagyl [metronidazole] Other (See Comments)     Neuropathy    Nubain [nalbuphine] Anxiety     Upper abdominal burning          Medications:   Current Outpatient Medications   Medication Sig Dispense Refill    azaTHIOprine (IMURAN) 50 mg Tab Take 1 tablet (50 mg total) by mouth once daily. 90 tablet 0    calcium carbonate (TUMS ORAL) Take by mouth as needed (acid reflux).      HYDROcodone-acetaminophen (NORCO)  mg per tablet Take 1 tablet by mouth every 8 (eight) hours as needed for Pain. 90 tablet 0    HYDROcodone-acetaminophen (NORCO)  mg per tablet Take 1 tablet by mouth every 6 (six) hours as needed for Pain. 120 tablet 0    loperamide (IMODIUM A-D) 1 mg/7.5 mL Liqd Take 0.1 mg/kg by mouth every 12 (twelve) hours as needed.       potassium chloride SA (K-DUR,KLOR-CON) 20 MEQ tablet Take 1 tablet (20 mEq total) by mouth 2 (two) times daily. 180 tablet 1    promethazine (PHENERGAN) 25 MG tablet Take 1 tablet (25 mg total) by mouth every 8 (eight) hours as needed for Nausea. 60 tablet 1    venlafaxine (EFFEXOR) 37.5 MG Tab Take 1 tablet (37.5 mg total) by mouth 2 (two) times daily. 180 tablet 0     No current facility-administered medications for this visit.      All medications and past history have been reviewed.        Objective:        Vital Signs:  Blood pressure 108/79, pulse 101, temperature 98.4 °F (36.9 °C), height 5' 8" (1.727 m), weight 58.8 kg (129 lb 9.6 oz), last menstrual period 05/30/2009. Body mass index is 19.71 kg/m².    Physical Exam:   General Appearance: Well appearing in no acute distress, well developed, well                nourished  Head: Normocephalic, without obvious abnormality  Eyes:  Pupils equal, round and reactive to light  Throat: Lips, mucosa, and tongue normal; teeth and gums normal  Lungs: CTA " bilaterally in anterior and posterior fields, no wheezes, no crackles  Heart:  Regular rate and rhythm, no murmurs heard  Abdomen: Soft, non tender, diffusely distended with positive bowel sounds in all four quadrants. No hepatosplenomegaly, ascites, or mass. Negative for succusion splash  : female   Extremities: No cyanosis, edema or deformity  Skin: No rash  Neurologic: Normal exam    Labs:  Lab Results   Component Value Date    WBC 6.1 06/14/2019    HGB 13.5 06/14/2019    HCT 39.6 06/14/2019     06/14/2019    ALT 8 (L) 06/12/2018    AST 24 09/28/2018     06/14/2019    K 2.8 (LL) 06/14/2019     06/14/2019    CREATININE 0.86 06/14/2019    BUN 6 (L) 06/14/2019    CO2 28.3 06/14/2019    INR 1.2 03/18/2018       Imaging:     All lab results and imaging results have been reviewed and discussed with the patient      Assessment:       1. Crohn's disease of both small and large intestine with complication    2. Gastroesophageal reflux disease, esophagitis presence not specified    3. Abdominal pain, unspecified abdominal location    4. Abdominal bloating    5. Abdominal distension    6. Nausea    7. Alternating constipation and diarrhea        Plan:       Carline was seen today for crohn's disease.    Diagnoses and all orders for this visit:    Crohn's disease of both small and large intestine with complication    Gastroesophageal reflux disease, esophagitis presence not specified    Abdominal pain, unspecified abdominal location    Abdominal bloating    Abdominal distension    Nausea    Alternating constipation and diarrhea        See HPI    Follow up if symptoms worsen or fail to improve, for when response from pharmaceutical company is received.    The plan was discussed with the patient and all questions/concerns have been answered to the patient's satisfaction.        Electronically signed by Pablo Medina MD    This note was dictated using voice recognition software and may contain  grammatical errors.

## 2019-12-02 DIAGNOSIS — K50.818 CROHN'S DISEASE OF BOTH SMALL AND LARGE INTESTINE WITH OTHER COMPLICATION: ICD-10-CM

## 2019-12-02 DIAGNOSIS — R10.9 ABDOMINAL PAIN, UNSPECIFIED ABDOMINAL LOCATION: ICD-10-CM

## 2019-12-02 NOTE — TELEPHONE ENCOUNTER
----- Message from Deidre Mcclure sent at 12/2/2019  4:48 PM CST -----  Contact: self  Needs a refill of hydrocodone.  Also, would like to know how the request for entyvio is coming along?  Please give her a call.

## 2019-12-03 RX ORDER — HYDROCODONE BITARTRATE AND ACETAMINOPHEN 10; 325 MG/1; MG/1
1 TABLET ORAL EVERY 6 HOURS PRN
Qty: 120 TABLET | Refills: 0 | Status: SHIPPED | OUTPATIENT
Start: 2019-12-03 | End: 2020-01-02

## 2019-12-03 RX ORDER — HYDROCODONE BITARTRATE AND ACETAMINOPHEN 10; 325 MG/1; MG/1
1 TABLET ORAL EVERY 6 HOURS PRN
Qty: 120 TABLET | Refills: 0 | Status: SHIPPED | OUTPATIENT
Start: 2019-12-03 | End: 2019-12-03

## 2019-12-26 DIAGNOSIS — R10.9 CHRONIC ABDOMINAL PAIN: ICD-10-CM

## 2019-12-26 DIAGNOSIS — K50.812 CROHN'S DISEASE OF BOTH SMALL AND LARGE INTESTINE WITH INTESTINAL OBSTRUCTION: ICD-10-CM

## 2019-12-26 DIAGNOSIS — K21.9 GASTROESOPHAGEAL REFLUX DISEASE WITHOUT ESOPHAGITIS: ICD-10-CM

## 2019-12-26 DIAGNOSIS — K50.819 CROHN'S DISEASE OF BOTH SMALL AND LARGE INTESTINE WITH COMPLICATION: Primary | ICD-10-CM

## 2019-12-26 DIAGNOSIS — K50.113 CROHN'S COLITIS, WITH FISTULA: ICD-10-CM

## 2019-12-26 DIAGNOSIS — R11.0 NAUSEA: ICD-10-CM

## 2019-12-26 DIAGNOSIS — G89.29 CHRONIC ABDOMINAL PAIN: ICD-10-CM

## 2019-12-26 RX ORDER — VENLAFAXINE 37.5 MG/1
37.5 TABLET ORAL 2 TIMES DAILY
Qty: 180 TABLET | Refills: 0 | Status: SHIPPED | OUTPATIENT
Start: 2019-12-26 | End: 2022-01-16

## 2019-12-26 RX ORDER — PROMETHAZINE HYDROCHLORIDE 25 MG/1
25 TABLET ORAL EVERY 8 HOURS PRN
Qty: 60 TABLET | Refills: 1 | Status: SHIPPED | OUTPATIENT
Start: 2019-12-26 | End: 2020-02-19 | Stop reason: SDUPTHER

## 2019-12-26 RX ORDER — HYDROCODONE BITARTRATE AND ACETAMINOPHEN 10; 325 MG/1; MG/1
1 TABLET ORAL EVERY 6 HOURS PRN
Qty: 120 TABLET | Refills: 0 | Status: SHIPPED | OUTPATIENT
Start: 2019-12-26 | End: 2020-01-27 | Stop reason: SDUPTHER

## 2020-01-27 DIAGNOSIS — K50.819 CROHN'S DISEASE OF BOTH SMALL AND LARGE INTESTINE WITH COMPLICATION: Primary | ICD-10-CM

## 2020-01-27 DIAGNOSIS — K50.819 CROHN'S DISEASE OF BOTH SMALL AND LARGE INTESTINE WITH COMPLICATION: ICD-10-CM

## 2020-01-27 RX ORDER — HYDROCODONE BITARTRATE AND ACETAMINOPHEN 10; 325 MG/1; MG/1
1 TABLET ORAL EVERY 6 HOURS PRN
Qty: 120 TABLET | Refills: 0 | Status: SHIPPED | OUTPATIENT
Start: 2020-01-27 | End: 2020-02-28 | Stop reason: SDUPTHER

## 2020-02-05 ENCOUNTER — OFFICE VISIT (OUTPATIENT)
Dept: GASTROENTEROLOGY | Facility: CLINIC | Age: 55
End: 2020-02-05
Payer: MEDICARE

## 2020-02-05 VITALS
WEIGHT: 133.63 LBS | OXYGEN SATURATION: 95 % | DIASTOLIC BLOOD PRESSURE: 64 MMHG | SYSTOLIC BLOOD PRESSURE: 105 MMHG | HEART RATE: 88 BPM | TEMPERATURE: 98 F | BODY MASS INDEX: 20.25 KG/M2 | HEIGHT: 68 IN

## 2020-02-05 DIAGNOSIS — E78.5 HYPERLIPIDEMIA, UNSPECIFIED: ICD-10-CM

## 2020-02-05 DIAGNOSIS — K50.119 CROHN'S DISEASE OF COLON WITH COMPLICATION: Primary | ICD-10-CM

## 2020-02-05 DIAGNOSIS — G89.29 CHRONIC ABDOMINAL PAIN: ICD-10-CM

## 2020-02-05 DIAGNOSIS — Z79.899 HIGH RISK MEDICATION USE: ICD-10-CM

## 2020-02-05 DIAGNOSIS — M85.80 OSTEOPENIA, UNSPECIFIED LOCATION: ICD-10-CM

## 2020-02-05 DIAGNOSIS — D64.9 ANEMIA, UNSPECIFIED TYPE: ICD-10-CM

## 2020-02-05 DIAGNOSIS — K21.9 GASTROESOPHAGEAL REFLUX DISEASE, ESOPHAGITIS PRESENCE NOT SPECIFIED: ICD-10-CM

## 2020-02-05 DIAGNOSIS — R53.83 FATIGUE, UNSPECIFIED TYPE: ICD-10-CM

## 2020-02-05 DIAGNOSIS — R19.7 DIARRHEA, UNSPECIFIED TYPE: ICD-10-CM

## 2020-02-05 DIAGNOSIS — R82.998 OTHER ABNORMAL FINDINGS IN URINE: ICD-10-CM

## 2020-02-05 DIAGNOSIS — R10.9 CHRONIC ABDOMINAL PAIN: ICD-10-CM

## 2020-02-05 PROCEDURE — 99215 OFFICE O/P EST HI 40 MIN: CPT | Mod: S$GLB,,, | Performed by: INTERNAL MEDICINE

## 2020-02-05 PROCEDURE — 99215 PR OFFICE/OUTPT VISIT, EST, LEVL V, 40-54 MIN: ICD-10-PCS | Mod: S$GLB,,, | Performed by: INTERNAL MEDICINE

## 2020-02-05 RX ORDER — METRONIDAZOLE 250 MG/1
250 TABLET ORAL
Qty: 90 TABLET | Refills: 0 | Status: SHIPPED | OUTPATIENT
Start: 2020-02-05 | End: 2020-03-06

## 2020-02-05 NOTE — PROGRESS NOTES
[facility] Established Patient Visit    Subjective:           PCP: Pablo Medina    Chief Complaint: Crohn's disease       HPI:  Carline Chang is a 54 y.o. female here for evaluation of her Crohn's disease evaluation of her Crohn's disease patient has symptoms of UTI has not had routine blood work for some time, has multiple bowel movements a day, sometimes up to 20.  .  Blood work ordered, detailed physical examination done, will evaluate patient for entyvio  Complex problem.  Long-term prognosis guarded patient complains of lot of diarrhea   Will try Flagyl this has helped her in the past    Constitutional: No fevers, chills, weight loss  ENT: No allergies, sore throat, rhinorrhea  CV: No chest pain, palpitations, edema  Pulm: No cough, shortness of breath, wheezing  Ophtho: No vision changes  GI: No blood in stools, change in bowel habits, nausea/vomiting  Crohn's Disease, chronic abdominal pain, history of bowel perforation, diarrhea, GERD, family history of colon cancer  Denies alternating diarrhea/constipation.   Derm: No rash  Heme: No easy bruising or lymphadenopathy  MSK: No arthralgias or myalgias  osteopenia  : No dysuria, hematuria, frequency, polyuria, or flank tenderness  Endo: No hot or cold intolerance  Neuro: No syncope or seizure, or dizziness  Chronic pain  Psych: No hallucination, depression or suicidal ideation      Medical History:  has a past medical history of Acute deep vein thrombosis (DVT) of brachial vein of right upper extremity (2017), Bowel perforation, Chronic pain, Crohn's disease of both small and large intestine with intestinal obstruction (), Difficult intravenous access, GERD (gastroesophageal reflux disease) (), Nephrolithiasis, and Stricture of bowel.      Surgical History:  has a past surgical history that includes Tubal ligation;  section; Colonoscopy (N/A, 2016); Colonoscopy (N/A, 2017); Colonoscopy (N/A,  12/4/2017); Colonoscopy (N/A, 2/8/2018); Colonoscopy (N/A, 3/24/2018); Abscess drainage (2014); Anal fistulotomy (2018); Colonoscopy (3/24/2018); Upper gastrointestinal endoscopy (2000); Small intestine surgery (1995); Small intestine surgery (02/2009); Cholecystectomy (2000); Colon surgery (06/2015); rectovaginal fistula repair (01/2018); Colonoscopy (N/A, 7/6/2018); Bowel resection; and Laparoscopic closure of colostomy (N/A, 8/29/2018).    Family History:   Family History   Problem Relation Age of Onset    Cancer Maternal Aunt 66        colon ca    Heart attack Father 69    Anesthesia problems Neg Hx     Celiac disease Neg Hx     Cirrhosis Neg Hx     Colon cancer Neg Hx     Colon polyps Neg Hx     Crohn's disease Neg Hx     Cystic fibrosis Neg Hx     Esophageal cancer Neg Hx     Hemochromatosis Neg Hx     Inflammatory bowel disease Neg Hx     Irritable bowel syndrome Neg Hx     Liver cancer Neg Hx     Liver disease Neg Hx     Rectal cancer Neg Hx     Stomach cancer Neg Hx     Ulcerative colitis Neg Hx     Mars's disease Neg Hx     Lymphoma Neg Hx     Tuberculosis Neg Hx     Scleroderma Neg Hx     Rheum arthritis Neg Hx     Multiple sclerosis Neg Hx     Melanoma Neg Hx     Lupus Neg Hx     Psoriasis Neg Hx     Skin cancer Neg Hx        Social History:   Social History     Tobacco Use    Smoking status: Never Smoker    Smokeless tobacco: Never Used   Substance Use Topics    Alcohol use: No     Alcohol/week: 0.0 standard drinks    Drug use: No       The patient's social and family histories were reviewed by me and updated in the appropriate section of the electronic medical record.    Allergies:   Review of patient's allergies indicates:   Allergen Reactions    Remicade [infliximab] Shortness Of Breath and Palpitations     Severe muscle and joint, and bone pain    Morphine Other (See Comments)     Abdominal pain    Flagyl [metronidazole] Other (See Comments)     Neuropathy     "Nubain [nalbuphine] Anxiety     Upper abdominal burning          Medications:   Current Outpatient Medications   Medication Sig Dispense Refill    azaTHIOprine (IMURAN) 50 mg Tab Take 1 tablet (50 mg total) by mouth once daily. 90 tablet 0    calcium carbonate (TUMS ORAL) Take by mouth as needed (acid reflux).      HYDROcodone-acetaminophen (NORCO)  mg per tablet Take 1 tablet by mouth every 6 (six) hours as needed for Pain. 120 tablet 0    loperamide (IMODIUM A-D) 1 mg/7.5 mL Liqd Take 0.1 mg/kg by mouth every 12 (twelve) hours as needed.       potassium chloride SA (K-DUR,KLOR-CON) 20 MEQ tablet Take 1 tablet (20 mEq total) by mouth 2 (two) times daily. 180 tablet 1    promethazine (PHENERGAN) 25 MG tablet Take 1 tablet (25 mg total) by mouth every 8 (eight) hours as needed for Nausea. 60 tablet 1    venlafaxine (EFFEXOR) 37.5 MG Tab Take 1 tablet (37.5 mg total) by mouth 2 (two) times daily. 180 tablet 0    metroNIDAZOLE (FLAGYL) 250 MG tablet Take 1 tablet (250 mg total) by mouth 3 (three) times daily with meals. 90 tablet 0     No current facility-administered medications for this visit.      All medications and past history have been reviewed.        Objective:        Vital Signs:  Blood pressure 105/64, pulse 88, temperature 98.2 °F (36.8 °C), temperature source Temporal, height 5' 8" (1.727 m), weight 60.6 kg (133 lb 9.6 oz), last menstrual period 05/30/2009, SpO2 95 %. Body mass index is 20.31 kg/m².    Physical Exam:   General Appearance: Well appearing in no acute distress, well developed, well  Nourished, fatigue  Head: Normocephalic, without obvious abnormality  Eyes:  Pupils equal, round and reactive to light  Throat: Lips, mucosa, and tongue normal; teeth and gums normal  Lungs: CTA bilaterally in anterior and posterior fields, no wheezes, no crackles  Heart:  Regular rate and rhythm, no murmurs heard  Abdomen: Soft, mildly tender, mildly distended with positive bowel sounds in all four " quadrants. No hepatosplenomegaly, ascites, or mass. Negative for succusion splash  : female   Extremities: No cyanosis, edema or deformity  Skin: No rash  Neurologic: Normal exam    Labs:  Lab Results   Component Value Date    WBC 6.1 06/14/2019    HGB 13.5 06/14/2019    HCT 39.6 06/14/2019     06/14/2019    ALT 8 (L) 06/12/2018    AST 24 09/28/2018     06/14/2019    K 2.8 (LL) 06/14/2019     06/14/2019    CREATININE 0.86 06/14/2019    BUN 6 (L) 06/14/2019    CO2 28.3 06/14/2019    INR 1.2 03/18/2018       Imaging:     All lab results and imaging results have been reviewed and discussed with the patient      Assessment:       1. Crohn's disease of colon with complication    2. Other abnormal findings in urine     3. Fatigue, unspecified type    4. Hyperlipidemia, unspecified     5. Diarrhea, unspecified type    6. Gastroesophageal reflux disease, esophagitis presence not specified    7. High risk medication use    8. Chronic abdominal pain    9. Anemia, unspecified type    10. Osteopenia, unspecified location        Plan:       Carline was seen today for crohn's disease.    Diagnoses and all orders for this visit:    Crohn's disease of colon with complication  -     Urinalysis; Future  -     Urine culture; Future  -     Amylase; Future  -     CBC auto differential; Future  -     Comprehensive metabolic panel; Future  -     C-reactive protein; Future  -     Lipase; Future  -     Ferritin; Future  -     Lipid panel; Future  -     Magnesium; Future  -     Phosphorus; Future  -     Vitamin D; Future  -     TSH; Future    Other abnormal findings in urine   -     Urine culture; Future  -     TSH; Future    Fatigue, unspecified type  -     TSH; Future    Hyperlipidemia, unspecified   -     Lipid panel; Future    Diarrhea, unspecified type    Gastroesophageal reflux disease, esophagitis presence not specified    High risk medication use    Chronic abdominal pain    Anemia, unspecified  type    Osteopenia, unspecified location    Other orders  -     metroNIDAZOLE (FLAGYL) 250 MG tablet; Take 1 tablet (250 mg total) by mouth 3 (three) times daily with meals.        See HPI    Follow up if symptoms worsen or fail to improve, for After Test(s).    The plan was discussed with the patient and all questions/concerns have been answered to the patient's satisfaction.        Electronically signed by Pablo Medina MD    This note was dictated using voice recognition software and may contain grammatical errors.

## 2020-02-11 DIAGNOSIS — K50.819 CROHN'S DISEASE OF SMALL AND LARGE INTESTINES WITH COMPLICATION: ICD-10-CM

## 2020-02-11 DIAGNOSIS — N39.0 URINARY TRACT INFECTION WITHOUT HEMATURIA, SITE UNSPECIFIED: ICD-10-CM

## 2020-02-11 DIAGNOSIS — N39.0 URINARY TRACT INFECTION WITHOUT HEMATURIA, SITE UNSPECIFIED: Primary | ICD-10-CM

## 2020-02-11 RX ORDER — AMOXICILLIN AND CLAVULANATE POTASSIUM 500; 125 MG/1; MG/1
1 TABLET, FILM COATED ORAL 2 TIMES DAILY WITH MEALS
Qty: 30 TABLET | Refills: 0 | Status: SHIPPED | OUTPATIENT
Start: 2020-02-11 | End: 2020-02-11 | Stop reason: SDUPTHER

## 2020-02-11 RX ORDER — AMOXICILLIN AND CLAVULANATE POTASSIUM 500; 125 MG/1; MG/1
1 TABLET, FILM COATED ORAL 2 TIMES DAILY WITH MEALS
Qty: 30 TABLET | Refills: 0 | Status: SHIPPED | OUTPATIENT
Start: 2020-02-11 | End: 2021-10-05

## 2020-02-19 DIAGNOSIS — K50.812 CROHN'S DISEASE OF BOTH SMALL AND LARGE INTESTINE WITH INTESTINAL OBSTRUCTION: ICD-10-CM

## 2020-02-19 DIAGNOSIS — G89.29 CHRONIC ABDOMINAL PAIN: ICD-10-CM

## 2020-02-19 DIAGNOSIS — R10.9 CHRONIC ABDOMINAL PAIN: ICD-10-CM

## 2020-02-19 DIAGNOSIS — K21.9 GASTROESOPHAGEAL REFLUX DISEASE WITHOUT ESOPHAGITIS: ICD-10-CM

## 2020-02-19 DIAGNOSIS — R11.0 NAUSEA: ICD-10-CM

## 2020-02-19 RX ORDER — PROMETHAZINE HYDROCHLORIDE 25 MG/1
25 TABLET ORAL EVERY 8 HOURS PRN
Qty: 60 TABLET | Refills: 1 | Status: ON HOLD | OUTPATIENT
Start: 2020-02-19 | End: 2022-03-11 | Stop reason: SDUPTHER

## 2020-02-19 NOTE — TELEPHONE ENCOUNTER
----- Message from Deidre Mcclure sent at 2/19/2020  2:48 PM CST -----  Contact: self  She needs refill of phenergan sent to [facility].  She has been very nauseated since starting the antibiotics and has been having liquid stools and incontinence.  She has been taking extra potassium as she is afraid of it getting too low.

## 2020-02-24 ENCOUNTER — OFFICE VISIT (OUTPATIENT)
Dept: GASTROENTEROLOGY | Facility: CLINIC | Age: 55
End: 2020-02-24
Payer: MEDICARE

## 2020-02-24 VITALS
DIASTOLIC BLOOD PRESSURE: 82 MMHG | OXYGEN SATURATION: 92 % | BODY MASS INDEX: 19.92 KG/M2 | WEIGHT: 131 LBS | SYSTOLIC BLOOD PRESSURE: 126 MMHG | HEART RATE: 118 BPM

## 2020-02-24 DIAGNOSIS — K50.80 CROHN'S DISEASE OF BOTH SMALL AND LARGE INTESTINE WITHOUT COMPLICATION: Primary | ICD-10-CM

## 2020-02-24 DIAGNOSIS — R53.83 FATIGUE, UNSPECIFIED TYPE: ICD-10-CM

## 2020-02-24 DIAGNOSIS — R10.9 ABDOMINAL PAIN, UNSPECIFIED ABDOMINAL LOCATION: ICD-10-CM

## 2020-02-24 DIAGNOSIS — Z79.899 HIGH RISK MEDICATION USE: ICD-10-CM

## 2020-02-24 DIAGNOSIS — K21.9 GASTROESOPHAGEAL REFLUX DISEASE, ESOPHAGITIS PRESENCE NOT SPECIFIED: ICD-10-CM

## 2020-02-24 DIAGNOSIS — G89.29 CHRONIC ABDOMINAL PAIN: ICD-10-CM

## 2020-02-24 DIAGNOSIS — N39.0 URINARY TRACT INFECTION WITHOUT HEMATURIA, SITE UNSPECIFIED: Primary | ICD-10-CM

## 2020-02-24 DIAGNOSIS — K50.119 CROHN'S DISEASE OF COLON WITH COMPLICATION: ICD-10-CM

## 2020-02-24 DIAGNOSIS — E78.5 HYPERLIPIDEMIA, UNSPECIFIED HYPERLIPIDEMIA TYPE: ICD-10-CM

## 2020-02-24 DIAGNOSIS — K50.819 CROHN'S DISEASE OF SMALL AND LARGE INTESTINES WITH COMPLICATION: ICD-10-CM

## 2020-02-24 DIAGNOSIS — R10.9 CHRONIC ABDOMINAL PAIN: ICD-10-CM

## 2020-02-24 PROCEDURE — 99215 PR OFFICE/OUTPT VISIT, EST, LEVL V, 40-54 MIN: ICD-10-PCS | Mod: S$GLB,,, | Performed by: INTERNAL MEDICINE

## 2020-02-24 PROCEDURE — 99215 OFFICE O/P EST HI 40 MIN: CPT | Mod: S$GLB,,, | Performed by: INTERNAL MEDICINE

## 2020-02-24 RX ORDER — HYDROCODONE BITARTRATE AND ACETAMINOPHEN 10; 325 MG/1; MG/1
1 TABLET ORAL EVERY 6 HOURS PRN
Qty: 120 TABLET | Refills: 0 | Status: ON HOLD | OUTPATIENT
Start: 2020-02-24 | End: 2022-01-26 | Stop reason: HOSPADM

## 2020-02-25 NOTE — PROGRESS NOTES
[facility] Established Patient Visit    Subjective:           PCP: Pablo Medina    Chief Complaint: Crohn's Disease (FOLLOW UP)       HPI:  Carline Chang is a 54 y.o. female here for evaluation of UTI.  Patient had a course of Augmentin and Flagyl.  [place] count was only 19,000. Will get an ID consultation and repeat urine culture and UA. Will finish 2 1/2 more days of Augmentin. She is having problems getting entyvio.  Would consider Stelara.  Patient is afebrile. Patient presents a complex problem with multiple comorbidities and we need to find an ideal biologic for the patient. Will consider ID if patient has recurrent urinary infection.     ROS:   Constitutional: No fevers, chills, weight loss  ENT: No allergies, sore throat, rhinorrhea  CV: No chest pain, palpitations, edema  Pulm: No cough, shortness of breath, wheezing  Ophtho: No vision changes  GI: No blood in stools, change in bowel habits, nausea/vomiting  Denies alternating diarrhea/constipation.   Derm: No rash  Heme: No easy bruising or lymphadenopathy  MSK: No arthralgias or myalgias  : No dysuria, hematuria, frequency, polyuria, or flank tenderness  Endo: No hot or cold intolerance  Neuro: No syncope or seizure, or dizziness  Psych: No hallucination, depression or suicidal ideation      Medical History:  has a past medical history of Acute deep vein thrombosis (DVT) of brachial vein of right upper extremity (2017), Bowel perforation, Chronic pain, Crohn's disease of both small and large intestine with intestinal obstruction (), Difficult intravenous access, GERD (gastroesophageal reflux disease) (), Nephrolithiasis, and Stricture of bowel.      Surgical History:  has a past surgical history that includes Tubal ligation;  section; Colonoscopy (N/A, 2016); Colonoscopy (N/A, 2017); Colonoscopy (N/A, 2017); Colonoscopy (N/A, 2018); Colonoscopy (N/A, 3/24/2018); Abscess drainage  (2014); Anal fistulotomy (2018); Colonoscopy (3/24/2018); Upper gastrointestinal endoscopy (2000); Small intestine surgery (1995); Small intestine surgery (02/2009); Cholecystectomy (2000); Colon surgery (06/2015); rectovaginal fistula repair (01/2018); Colonoscopy (N/A, 7/6/2018); Bowel resection; and Laparoscopic closure of colostomy (N/A, 8/29/2018).    Family History:   Family History   Problem Relation Age of Onset    Cancer Maternal Aunt 66        colon ca    Heart attack Father 69    Anesthesia problems Neg Hx     Celiac disease Neg Hx     Cirrhosis Neg Hx     Colon cancer Neg Hx     Colon polyps Neg Hx     Crohn's disease Neg Hx     Cystic fibrosis Neg Hx     Esophageal cancer Neg Hx     Hemochromatosis Neg Hx     Inflammatory bowel disease Neg Hx     Irritable bowel syndrome Neg Hx     Liver cancer Neg Hx     Liver disease Neg Hx     Rectal cancer Neg Hx     Stomach cancer Neg Hx     Ulcerative colitis Neg Hx     Mars's disease Neg Hx     Lymphoma Neg Hx     Tuberculosis Neg Hx     Scleroderma Neg Hx     Rheum arthritis Neg Hx     Multiple sclerosis Neg Hx     Melanoma Neg Hx     Lupus Neg Hx     Psoriasis Neg Hx     Skin cancer Neg Hx        Social History:   Social History     Tobacco Use    Smoking status: Never Smoker    Smokeless tobacco: Never Used   Substance Use Topics    Alcohol use: No     Alcohol/week: 0.0 standard drinks    Drug use: No       The patient's social and family histories were reviewed by me and updated in the appropriate section of the electronic medical record.    Allergies:   Review of patient's allergies indicates:   Allergen Reactions    Remicade [infliximab] Shortness Of Breath and Palpitations     Severe muscle and joint, and bone pain    Morphine Other (See Comments)     Abdominal pain    Flagyl [metronidazole] Other (See Comments)     Neuropathy    Nubain [nalbuphine] Anxiety     Upper abdominal burning          Medications:   Current  Outpatient Medications   Medication Sig Dispense Refill    amoxicillin-clavulanate 500-125mg (AUGMENTIN) 500-125 mg Tab Take 1 tablet (500 mg total) by mouth 2 (two) times daily with meals. 30 tablet 0    azaTHIOprine (IMURAN) 50 mg Tab Take 1 tablet (50 mg total) by mouth once daily. 90 tablet 0    calcium carbonate (TUMS ORAL) Take by mouth as needed (acid reflux).      HYDROcodone-acetaminophen (NORCO)  mg per tablet Take 1 tablet by mouth every 6 (six) hours as needed for Pain. 120 tablet 0    HYDROcodone-acetaminophen (NORCO)  mg per tablet Take 1 tablet by mouth every 6 (six) hours as needed for Pain. 120 tablet 0    loperamide (IMODIUM A-D) 1 mg/7.5 mL Liqd Take 0.1 mg/kg by mouth every 12 (twelve) hours as needed.       metroNIDAZOLE (FLAGYL) 250 MG tablet Take 1 tablet (250 mg total) by mouth 3 (three) times daily with meals. 90 tablet 0    potassium chloride SA (K-DUR,KLOR-CON) 20 MEQ tablet Take 1 tablet (20 mEq total) by mouth 2 (two) times daily. 180 tablet 1    promethazine (PHENERGAN) 25 MG tablet Take 1 tablet (25 mg total) by mouth every 8 (eight) hours as needed for Nausea. 60 tablet 1    venlafaxine (EFFEXOR) 37.5 MG Tab Take 1 tablet (37.5 mg total) by mouth 2 (two) times daily. 180 tablet 0     No current facility-administered medications for this visit.      All medications and past history have been reviewed.        Objective:        Vital Signs:  Blood pressure 126/82, pulse (!) 118, weight 59.4 kg (131 lb), last menstrual period 05/30/2009, SpO2 (!) 92 %. Body mass index is 19.92 kg/m².    Physical Exam:   General Appearance: Well appearing in no acute distress, well developed, well                nourished  Head: Normocephalic, without obvious abnormality  Eyes:  Pupils equal, round and reactive to light  Throat: Lips, mucosa, and tongue normal; teeth and gums normal  Lungs: CTA bilaterally in anterior and posterior fields, no wheezes, no crackles  Heart:  Regular rate  and rhythm, no murmurs heard  Abdomen: Soft, non tender, non distended with positive bowel sounds in all four quadrants. No hepatosplenomegaly, ascites, or mass. Negative for succusion splash  : female   Extremities: No cyanosis, edema or deformity  Skin: No rash  Neurologic: Normal exam    Labs:  Lab Results   Component Value Date    WBC 6.1 06/14/2019    HGB 13.5 06/14/2019    HCT 39.6 06/14/2019     06/14/2019    ALT 8 (L) 06/12/2018    AST 24 09/28/2018     06/14/2019    K 2.8 (LL) 06/14/2019     06/14/2019    CREATININE 0.86 06/14/2019    BUN 6 (L) 06/14/2019    CO2 28.3 06/14/2019    INR 1.2 03/18/2018       Imaging:     All lab results and imaging results have been reviewed and discussed with the patient      Assessment:       1. Urinary tract infection without hematuria, site unspecified    2. Crohn's disease of small and large intestines with complication    3. Crohn's disease of colon with complication    4. Gastroesophageal reflux disease, esophagitis presence not specified    5. Chronic abdominal pain    6. Hyperlipidemia, unspecified hyperlipidemia type    7. Fatigue, unspecified type    8. High risk medication use        Plan:       Carline was seen today for crohn's disease.    Diagnoses and all orders for this visit:    Urinary tract infection without hematuria, site unspecified    Crohn's disease of small and large intestines with complication    Crohn's disease of colon with complication    Gastroesophageal reflux disease, esophagitis presence not specified    Chronic abdominal pain    Hyperlipidemia, unspecified hyperlipidemia type    Fatigue, unspecified type    High risk medication use        See HPI    Follow up in about 8 weeks (around 4/20/2020).    The plan was discussed with the patient and all questions/concerns have been answered to the patient's satisfaction.        Electronically signed by Pablo Medina MD    This note was dictated using voice recognition  software and may contain grammatical errors.

## 2020-02-28 DIAGNOSIS — K50.819 CROHN'S DISEASE OF BOTH SMALL AND LARGE INTESTINE WITH COMPLICATION: ICD-10-CM

## 2020-02-28 RX ORDER — HYDROCODONE BITARTRATE AND ACETAMINOPHEN 10; 325 MG/1; MG/1
1 TABLET ORAL EVERY 6 HOURS PRN
Qty: 120 TABLET | Refills: 0 | Status: SHIPPED | OUTPATIENT
Start: 2020-02-28 | End: 2021-10-05 | Stop reason: SDUPTHER

## 2020-07-10 ENCOUNTER — OFFICE VISIT (OUTPATIENT)
Dept: OPTOMETRY | Facility: CLINIC | Age: 55
End: 2020-07-10
Payer: MEDICARE

## 2020-07-10 DIAGNOSIS — H53.8 BLURRY VISION, BILATERAL: Primary | ICD-10-CM

## 2020-07-10 DIAGNOSIS — H52.7 REFRACTIVE ERROR: ICD-10-CM

## 2020-07-10 PROCEDURE — 92015 DETERMINE REFRACTIVE STATE: CPT | Mod: ,,, | Performed by: OPTOMETRIST

## 2020-07-10 PROCEDURE — 99999 PR PBB SHADOW E&M-EST. PATIENT-LVL III: ICD-10-PCS | Mod: PBBFAC,,, | Performed by: OPTOMETRIST

## 2020-07-10 PROCEDURE — 92015 PR REFRACTION: ICD-10-PCS | Mod: ,,, | Performed by: OPTOMETRIST

## 2020-07-10 PROCEDURE — 99999 PR PBB SHADOW E&M-EST. PATIENT-LVL III: CPT | Mod: PBBFAC,,, | Performed by: OPTOMETRIST

## 2020-07-10 PROCEDURE — 92004 PR EYE EXAM, NEW PATIENT,COMPREHESV: ICD-10-PCS | Mod: S$PBB,,, | Performed by: OPTOMETRIST

## 2020-07-10 PROCEDURE — 99213 OFFICE O/P EST LOW 20 MIN: CPT | Mod: PBBFAC,PO | Performed by: OPTOMETRIST

## 2020-07-10 PROCEDURE — 92004 COMPRE OPH EXAM NEW PT 1/>: CPT | Mod: S$PBB,,, | Performed by: OPTOMETRIST

## 2020-07-10 RX ORDER — CHLORDIAZEPOXIDE HYDROCHLORIDE AND CLIDINIUM BROMIDE 5; 2.5 MG/1; MG/1
1 CAPSULE ORAL 3 TIMES DAILY PRN
COMMUNITY
End: 2021-10-22 | Stop reason: ALTCHOICE

## 2020-07-10 NOTE — PROGRESS NOTES
HPI     Annual Exam      Additional comments: DLE x 18 mos (outside [facility])   ocular health exam                 Blurred Vision      Additional comments: at both near & distance          Last edited by Jessi Mclean on 7/10/2020  3:41 PM. (History)            Assessment /Plan     For exam results, see Encounter Report.    Blurry vision, bilateral    Refractive error      Blurred vision, correctable with refraction. Dispensed spectacle Rx. Discussed various spectacle lens options. Discussed adaptation period to new specs. Ok to wear otc +1.00 readers prn for distance (loose lens to 20/20- OU). Continue current otc +2.75 prn for intermediate/near.    Discussed cls candidacy, pt scheduled to return for first time cl fitting.      RTC as scheduled for cl fitting.

## 2021-02-01 NOTE — PROGRESS NOTES
[facility] Established Patient Visit    Subjective:           PCP: Pablo Medina    Chief Complaint: Follow-up (f/u on Crohn's.  Labs done on 18 & 10/3/18 @ [facility].)       HPI:  Carline Chang is a 52 y.o. female here for follow-up of her idiopathic inflammatory bowel disease. She is status post op has a lot of gas. Lab work shows hypomagnesemia with a magnesium of 1.1. Will give IV fluids and see how she does. She is intolerant to Remicade as she had a reaction during the last therapy. Would consider alternative therapy to be approved by Dr. Serrato. She will receive IV fluids and magnesium at [facility].     ROS:   Constitutional: No fevers, chills, weight loss  ENT: No allergies, sore throat, rhinorrhea  CV: No chest pain, palpitations, edema  Pulm: No cough, shortness of breath, wheezing  Ophtho: No vision changes  GI: No blood in stools, change in bowel habits, nausea/vomiting  Denies alternating diarrhea/constipation.   Derm: No rash  Heme: No easy bruising or lymphadenopathy  MSK: No arthralgias or myalgias  : No dysuria, hematuria, frequency, polyuria, or flank tenderness  Endo: No hot or cold intolerance  Neuro: No syncope or seizure, or dizziness  Psych: No hallucination, depression or suicidal ideation      Medical History:  has a past medical history of Acute deep vein thrombosis (DVT) of brachial vein of right upper extremity (2017), Bowel perforation, Chronic pain, Crohn's disease of both small and large intestine with intestinal obstruction (), Difficult intravenous access, GERD (gastroesophageal reflux disease) (), Inflammatory bowel disease (), Nephrolithiasis, and Stricture of bowel.      Surgical History:  has a past surgical history that includes Tubal ligation;  section; Abscess drainage (); Anal fistulotomy (); Colonoscopy (3/24/2018); Upper gastrointestinal endoscopy (); Small intestine surgery (); Small intestine surgery (2009);  Subjective   Chief Complaint   Patient presents with   • Annual Exam     Bess Yu is a 58 y.o. year old  menopausal female presenting to be seen for her annual exam.  There has not been vaginal bleeding in the last 12 months.  Hot flashes and night sweats are not a significant problem.  She just saw Dr. Thien Santiago.  Labs were okay reviewed them briefly with her.  She has gained about 10 pounds suggested avoidance of 4 P's i.e. carbohydrates and little more exercise if possible.  Due to Covid her order for mammogram has  so we will reorder that.  She does have some occasional stress incontinence and maybe before she can get to the bathroom in time discussed we will see if she can do some Kegel's to help in that regard    SEXUAL Hx:  She is not sexually active.  Vaginal dryness is not a problem.  [place] is painful:no  She has concerns about domestic violence: no  Discussed risk for STD and sexual behavior.    HEALTH Hx:  She exercises regularly: no.  She wears her seat belt:yes.  Self breast awareness: no  She has noticed changes in height: no              Calcium intake is not adequate 1 daily              Caffeine intake: no or mild caffeine use              Discussed immunizations, screenings, mental and dental health.    The following portions of the patient's history were reviewed and updated as appropriate:problem list, current medications, allergies, past family history, past medical history, past social history and past surgical history.      Current Outpatient Medications:   •  hydrOXYzine (ATARAX) 25 MG tablet, Take 1 tablet by mouth every night at bedtime., Disp: 30 tablet, Rfl: 1  •  lisinopril-hydrochlorothiazide (PRINZIDE,ZESTORETIC) 20-12.5 MG per tablet, TAKE ONE TABLET BY MOUTH DAILY, Disp: 90 tablet, Rfl: 3  •  loratadine (CLARITIN) 10 MG tablet, Take 1 tablet by mouth Daily., Disp: 90 tablet, Rfl: 3  •  Multiple Vitamins-Minerals (MULTIVITAMIN ADULT PO), Take  by  "mouth., Disp: , Rfl:     Social History    Tobacco Use      Smoking status: Never Smoker      Smokeless tobacco: Never Used    Social History     Substance and Sexual Activity   Alcohol Use Not Currently   • Frequency: Monthly or less   • Drinks per session: 1 or 2    Comment: RARELY TWICE A YEAR     Discussed avoidance of illicit drugs    Review of systems  Constitutional   POS weight gain                           NEG fatigue, fevers, malaise and night sweats  Breast               POS nothing reported                           NEG persistent breast lump, skin dimpling, breast tenderness or nipple discharge  GI                     POS nothing reported                           NEG bloating, change in bowel habits, melena or reflux symptoms                      POS MICHAEL is present but it IS NOT effecting her ADL's                           NEG dysuria, frequency or hematuria           Objective   /90   Ht 154.3 cm (60.75\")   Wt 109 kg (241 lb)   LMP  (LMP Unknown)   Breastfeeding No   BMI 45.91 kg/m²      General:  well developed; well nourished  no acute distress  appears stated age   Skin:  No suspicious lesions seen   Thyroid: not examined   Breasts:  Examined in supine position  Symmetric without masses or skin dimpling  Nipples normal without inversion, lesions or discharge   Abdomen: soft, non-tender; no masses  no umbilical or inguinal hernias are present  no hepato-splenomegaly   Pelvis: Clinical staff was present for exam  External genitalia:  normal appearance of the external genitalia including Bartholin's and Driscoll's glands.  :  urethral meatus normal;  Vaginal:  atrophic mucosal changes are present; she is able to perform a Kegel contraction upon request;  Uterus:  normal size, shape and consistency.  Adnexa:  non palpable bilaterally.  Rectal:  digital rectal exam not performed; anus visually normal appearing.       Lab Review   CBC, CMP, LIPIDS and Pap test  Imaging review  Mammogram " Cholecystectomy (2000); Colon surgery (06/2015); rectovaginal fistula repair (01/2018); Bowel resection; CLOSURE, COLOSTOMY, LAPAROSCOPIC (N/A, 8/29/2018); COLONOSCOPY (N/A, 7/6/2018); EXPLORATORY-LAPAROTOMY, small bowel resection, possible ostomy (N/A, 4/30/2018); RESECTION-SMALL BOWEL (N/A, 4/30/2018); ILEOSTOMY (Left, 4/30/2018); COLONOSCOPY (N/A, 3/24/2018); COLONOSCOPY (N/A, 2/8/2018); REPAIR-FISTULA-RECTOVAGINAL (N/A, 1/10/2018); COLONOSCOPY (N/A, 12/4/2017); CYSTOSCOPY WITH STENT PLACEMENT (Right, 1/23/2017); BIOPSY-BLADDER (N/A, 1/23/2017); FULGURATION-BLADDER (N/A, 1/23/2017); ESOPHAGOGASTRODUODENOSCOPY (EGD) (N/A, 1/17/2017); COLONOSCOPY (N/A, 1/17/2017); COLONOSCOPY (N/A, 5/27/2016); and COLOSTOMY (N/A, 6/18/2015).    Family History:   Family History   Problem Relation Age of Onset    Cancer Maternal Aunt 66        colon ca    Heart attack Father 69    Anesthesia problems Neg Hx     Celiac disease Neg Hx     Cirrhosis Neg Hx     Colon cancer Neg Hx     Colon polyps Neg Hx     Crohn's disease Neg Hx     Cystic fibrosis Neg Hx     Esophageal cancer Neg Hx     Hemochromatosis Neg Hx     Inflammatory bowel disease Neg Hx     Irritable bowel syndrome Neg Hx     Liver cancer Neg Hx     Liver disease Neg Hx     Rectal cancer Neg Hx     Stomach cancer Neg Hx     Ulcerative colitis Neg Hx     Mars's disease Neg Hx     Lymphoma Neg Hx     Tuberculosis Neg Hx     Scleroderma Neg Hx     Rheum arthritis Neg Hx     Multiple sclerosis Neg Hx     Melanoma Neg Hx     Lupus Neg Hx     Psoriasis Neg Hx     Skin cancer Neg Hx        Social History:   Social History     Tobacco Use    Smoking status: Never Smoker    Smokeless tobacco: Never Used   Substance Use Topics    Alcohol use: No     Alcohol/week: 0.0 oz    Drug use: No       The patient's social and family histories were reviewed by me and updated in the appropriate section of the electronic medical record.    Allergies:   Review of  "patient's allergies indicates:   Allergen Reactions    Morphine Other (See Comments)     Abdominal pain    Flagyl [metronidazole] Other (See Comments)     Neuropathy    Nubain [nalbuphine] Anxiety     Upper abdominal burning          Medications:   Current Outpatient Medications   Medication Sig Dispense Refill    azaTHIOprine (IMURAN) 50 mg Tab Take 1 tablet (50 mg total) by mouth once daily. 90 tablet 0    calcium carbonate (TUMS ORAL) Take by mouth as needed (acid reflux).      HYDROcodone-acetaminophen (NORCO)  mg per tablet Take 1 tablet by mouth every 8 (eight) hours as needed. 90 tablet 0    loperamide (IMODIUM A-D) 1 mg/7.5 mL Liqd Take 0.1 mg/kg by mouth every 12 (twelve) hours as needed.       promethazine (PHENERGAN) 25 MG tablet Take 1 tablet (25 mg total) by mouth every 8 (eight) hours as needed for Nausea. 90 tablet 1    venlafaxine (EFFEXOR) 37.5 MG Tab Take 1 tablet (37.5 mg total) by mouth 2 (two) times daily. 180 tablet 0     No current facility-administered medications for this visit.      All medications and past history have been reviewed.        Objective:        Vital Signs:  Blood pressure 110/70, pulse 97, height 5' 8" (1.727 m), weight 57.6 kg (127 lb), last menstrual period 05/30/2009. Body mass index is 19.31 kg/m².    Physical Exam:   General Appearance: Well appearing in no acute distress, well developed, well                nourished  Head: Normocephalic, without obvious abnormality  Eyes:  Pupils equal, round and reactive to light  Throat: Lips, mucosa, and tongue normal; teeth and gums normal  Lungs: CTA bilaterally in anterior and posterior fields, no wheezes, no crackles  Heart:  Regular rate and rhythm, no murmurs heard  Abdomen: Soft, non tender, non distended with positive bowel sounds in all four quadrants. No hepatosplenomegaly, ascites, or mass. Negative for succusion splash  : female   Extremities: No cyanosis, edema or deformity  Skin: No rash  Neurologic: " report 2015               Assessment     Postmenopausal GYN examination   2.  Occasional stress urine incontinence with moderate Kegel response would like her to do these for 5 minutes daily and as needed timed voiding as well  Due to weight gain avoidance of 4 P's  Overdue for mammogram so we will order that       Plan     1. Annual or sooner as needed  2. Calcium discussed  1200 mg daily in divided doses ideally in diet  3. Regular weight bearing exercise, nutrition, injury avoidance and maintaining healthy weight discussed  4. Breast self awareness and mammograms discussed  5. Colonoscopy due in approximate 2024.  [facility] get [facility] last was done at  2014 it was normal  6. Voiding and Kegel's as above positive reinforcement discussed     No orders of the defined types were placed in this encounter.     Orders Placed This Encounter   Procedures   • Mammo Screening Digital Tomosynthesis Bilateral With CAD     Standing Status:   Future     Standing Expiration Date:   2/1/2022     Order Specific Question:   Reason for Exam:     Answer:   d              This note was electronically signed.    Yunior Ayoub M.D.  February 1, 2021   Normal exam    Labs:  Lab Results   Component Value Date    WBC 7.6 09/28/2018    HGB 10.1 (L) 09/28/2018    HCT 30.8 (L) 09/28/2018     09/28/2018    ALT 8 (L) 06/12/2018    AST 24 09/28/2018     10/03/2018    K 3.4 (L) 10/03/2018     10/03/2018    CREATININE 0.76 10/03/2018    BUN 6 (L) 10/03/2018    CO2 27.7 10/03/2018    INR 1.2 03/18/2018       Imaging:     All lab results and imaging results have been reviewed and discussed with the patient      Assessment:       No diagnosis found.      1. Acute exacerbation of Crohn's disease  2. Hypomagnesemia  See visit diagnoses  Plan:       There are no diagnoses linked to this encounter.    See HPI    No Follow-up on file.    The plan was discussed with the patient and all questions/concerns have been answered to the patient's satisfaction.        Electronically signed by Pablo Medina MD    This note was dictated using voice recognition software and may contain grammatical errors.

## 2021-08-10 ENCOUNTER — OCCUPATIONAL HEALTH (OUTPATIENT)
Dept: URGENT CARE | Facility: CLINIC | Age: 56
End: 2021-08-10

## 2021-08-10 PROCEDURE — 99499 UNLISTED E&M SERVICE: CPT | Mod: S$GLB,,, | Performed by: EMERGENCY MEDICINE

## 2021-08-10 PROCEDURE — 86580 PR  TB INTRADERMAL TEST: ICD-10-PCS | Mod: S$GLB,,, | Performed by: EMERGENCY MEDICINE

## 2021-08-10 PROCEDURE — 80305 PR DRUG SCREEN - 1: ICD-10-PCS | Mod: S$GLB,,, | Performed by: EMERGENCY MEDICINE

## 2021-08-10 PROCEDURE — 80305 DRUG TEST PRSMV DIR OPT OBS: CPT | Mod: S$GLB,,, | Performed by: EMERGENCY MEDICINE

## 2021-08-10 PROCEDURE — 86580 TB INTRADERMAL TEST: CPT | Mod: S$GLB,,, | Performed by: EMERGENCY MEDICINE

## 2021-08-10 PROCEDURE — 99499 PHYSICAL - BASIC COMPLEXITY: ICD-10-PCS | Mod: S$GLB,,, | Performed by: EMERGENCY MEDICINE

## 2021-10-05 ENCOUNTER — HOSPITAL ENCOUNTER (OUTPATIENT)
Dept: PREADMISSION TESTING | Facility: HOSPITAL | Age: 56
Discharge: HOME | End: 2021-10-05
Attending: INTERNAL MEDICINE
Payer: MEDICARE

## 2021-10-05 VITALS
HEART RATE: 82 BPM | OXYGEN SATURATION: 100 % | WEIGHT: 130.06 LBS | TEMPERATURE: 99 F | BODY MASS INDEX: 19.71 KG/M2 | DIASTOLIC BLOOD PRESSURE: 76 MMHG | HEIGHT: 68 IN | SYSTOLIC BLOOD PRESSURE: 122 MMHG | RESPIRATION RATE: 12 BRPM

## 2021-10-05 DIAGNOSIS — Z01.818 PREOP TESTING: Primary | ICD-10-CM

## 2021-10-05 LAB — SARS-COV-2 RDRP RESP QL NAA+PROBE: NEGATIVE

## 2021-10-05 PROCEDURE — U0002 COVID-19 LAB TEST NON-CDC: HCPCS | Performed by: INTERNAL MEDICINE

## 2021-10-05 RX ORDER — FUROSEMIDE 20 MG/1
20 TABLET ORAL DAILY PRN
COMMUNITY
End: 2022-01-14

## 2021-10-05 RX ORDER — PANTOPRAZOLE SODIUM 20 MG/1
20 TABLET, DELAYED RELEASE ORAL NIGHTLY
COMMUNITY
End: 2022-03-10

## 2021-10-07 ENCOUNTER — HOSPITAL ENCOUNTER (OUTPATIENT)
Facility: HOSPITAL | Age: 56
Discharge: HOME | End: 2021-10-07
Attending: INTERNAL MEDICINE | Admitting: INTERNAL MEDICINE
Payer: MEDICARE

## 2021-10-07 ENCOUNTER — ANESTHESIA EVENT (OUTPATIENT)
Dept: SURGERY | Facility: HOSPITAL | Age: 56
End: 2021-10-07
Payer: MEDICARE

## 2021-10-07 ENCOUNTER — ANESTHESIA (OUTPATIENT)
Dept: SURGERY | Facility: HOSPITAL | Age: 56
End: 2021-10-07
Payer: MEDICARE

## 2021-10-07 VITALS
DIASTOLIC BLOOD PRESSURE: 58 MMHG | OXYGEN SATURATION: 100 % | HEART RATE: 93 BPM | TEMPERATURE: 99 F | SYSTOLIC BLOOD PRESSURE: 101 MMHG | RESPIRATION RATE: 18 BRPM

## 2021-10-07 DIAGNOSIS — K50.10 CROHN'S COLITIS: ICD-10-CM

## 2021-10-07 DIAGNOSIS — K50.812 CROHN'S DISEASE OF BOTH SMALL AND LARGE INTESTINE WITH INTESTINAL OBSTRUCTION: Primary | ICD-10-CM

## 2021-10-07 LAB
ABO + RH BLD: NORMAL
BLD GP AB SCN CELLS X3 SERPL QL: NORMAL
BLD PROD TYP BPU: NORMAL
BLOOD UNIT EXPIRATION DATE: NORMAL
BLOOD UNIT TYPE CODE: 5100
BLOOD UNIT TYPE: NORMAL
CODING SYSTEM: NORMAL
CRP SERPL-MCNC: <0.02 MG/DL
DISPENSE STATUS: NORMAL
FERRITIN SERPL-MCNC: 226 NG/ML (ref 20–300)
FOLATE SERPL-MCNC: 10.8 NG/ML (ref 4–24)
IRON SERPL-MCNC: 194 UG/DL (ref 30–160)
NUM UNITS TRANS PACKED RBC: NORMAL
SATURATED IRON: 91 % (ref 20–50)
TOTAL IRON BINDING CAPACITY: 213 UG/DL (ref 250–450)
TRANSFERRIN SERPL-MCNC: 152 MG/DL (ref 200–375)
VIT B12 SERPL-MCNC: <50 PG/ML (ref 210–950)

## 2021-10-07 PROCEDURE — 86704 HEP B CORE ANTIBODY TOTAL: CPT | Performed by: INTERNAL MEDICINE

## 2021-10-07 PROCEDURE — 37000009 HC ANESTHESIA EA ADD 15 MINS: Performed by: INTERNAL MEDICINE

## 2021-10-07 PROCEDURE — 36415 COLL VENOUS BLD VENIPUNCTURE: CPT | Performed by: INTERNAL MEDICINE

## 2021-10-07 PROCEDURE — 86900 BLOOD TYPING SEROLOGIC ABO: CPT | Performed by: INTERNAL MEDICINE

## 2021-10-07 PROCEDURE — 25000003 PHARM REV CODE 250: Performed by: INTERNAL MEDICINE

## 2021-10-07 PROCEDURE — 27200043 HC FORCEPS, BIOPSY: Performed by: INTERNAL MEDICINE

## 2021-10-07 PROCEDURE — 82652 VIT D 1 25-DIHYDROXY: CPT | Performed by: INTERNAL MEDICINE

## 2021-10-07 PROCEDURE — 45380 COLONOSCOPY AND BIOPSY: CPT | Performed by: INTERNAL MEDICINE

## 2021-10-07 PROCEDURE — 86317 IMMUNOASSAY INFECTIOUS AGENT: CPT | Performed by: INTERNAL MEDICINE

## 2021-10-07 PROCEDURE — 86708 HEPATITIS A ANTIBODY: CPT | Performed by: INTERNAL MEDICINE

## 2021-10-07 PROCEDURE — 86920 COMPATIBILITY TEST SPIN: CPT | Performed by: INTERNAL MEDICINE

## 2021-10-07 PROCEDURE — 63600175 PHARM REV CODE 636 W HCPCS: Performed by: NURSE ANESTHETIST, CERTIFIED REGISTERED

## 2021-10-07 PROCEDURE — 84425 ASSAY OF VITAMIN B-1: CPT | Performed by: INTERNAL MEDICINE

## 2021-10-07 PROCEDURE — 82607 VITAMIN B-12: CPT | Performed by: INTERNAL MEDICINE

## 2021-10-07 PROCEDURE — 25000003 PHARM REV CODE 250: Performed by: NURSE ANESTHETIST, CERTIFIED REGISTERED

## 2021-10-07 PROCEDURE — 84466 ASSAY OF TRANSFERRIN: CPT | Performed by: INTERNAL MEDICINE

## 2021-10-07 PROCEDURE — 43239 EGD BIOPSY SINGLE/MULTIPLE: CPT | Performed by: INTERNAL MEDICINE

## 2021-10-07 PROCEDURE — 86140 C-REACTIVE PROTEIN: CPT | Performed by: INTERNAL MEDICINE

## 2021-10-07 PROCEDURE — 82746 ASSAY OF FOLIC ACID SERUM: CPT | Performed by: INTERNAL MEDICINE

## 2021-10-07 PROCEDURE — P9016 RBC LEUKOCYTES REDUCED: HCPCS | Performed by: INTERNAL MEDICINE

## 2021-10-07 PROCEDURE — 88305 TISSUE EXAM BY PATHOLOGIST: CPT | Mod: TC,59

## 2021-10-07 PROCEDURE — 82728 ASSAY OF FERRITIN: CPT | Performed by: INTERNAL MEDICINE

## 2021-10-07 PROCEDURE — 86480 TB TEST CELL IMMUN MEASURE: CPT | Performed by: INTERNAL MEDICINE

## 2021-10-07 PROCEDURE — 87340 HEPATITIS B SURFACE AG IA: CPT | Performed by: INTERNAL MEDICINE

## 2021-10-07 PROCEDURE — 37000008 HC ANESTHESIA 1ST 15 MINUTES: Performed by: INTERNAL MEDICINE

## 2021-10-07 RX ORDER — SODIUM CHLORIDE 9 MG/ML
INJECTION, SOLUTION INTRAVENOUS CONTINUOUS PRN
Status: DISCONTINUED | OUTPATIENT
Start: 2021-10-07 | End: 2021-10-07

## 2021-10-07 RX ORDER — PROPOFOL 10 MG/ML
VIAL (ML) INTRAVENOUS
Status: DISCONTINUED | OUTPATIENT
Start: 2021-10-07 | End: 2021-10-07

## 2021-10-07 RX ADMIN — PROPOFOL 50 MG: 10 INJECTION, EMULSION INTRAVENOUS at 11:10

## 2021-10-07 RX ADMIN — PROPOFOL 40 MG: 10 INJECTION, EMULSION INTRAVENOUS at 11:10

## 2021-10-07 RX ADMIN — SIMETHICONE 40 MG: 20 SUSPENSION/ DROPS ORAL at 11:10

## 2021-10-07 RX ADMIN — PROPOFOL 20 MG: 10 INJECTION, EMULSION INTRAVENOUS at 11:10

## 2021-10-07 RX ADMIN — PROPOFOL 100 MG: 10 INJECTION, EMULSION INTRAVENOUS at 11:10

## 2021-10-07 RX ADMIN — SODIUM CHLORIDE: 0.9 INJECTION, SOLUTION INTRAVENOUS at 11:10

## 2021-10-08 LAB
HAV AB SER QL IA: NEGATIVE
HBV CORE AB SERPL QL IA: NEGATIVE
HBV SURFACE AB SER-ACNC: 7.5 MIU/ML
HBV SURFACE AG SERPL QL IA: NEGATIVE

## 2021-10-09 LAB — 1,25(OH)2D3 SERPL-MCNC: 42.6 PG/ML (ref 19.9–79.3)

## 2021-10-12 LAB
GAMMA INTERFERON BACKGROUND BLD IA-ACNC: 0.03 IU/ML
M TB IFN-G BLD-IMP: NEGATIVE
M TB IFN-G CD4+ BCKGRND COR BLD-ACNC: 0.02 IU/ML
MITOGEN IGNF BLD-ACNC: >10 IU/ML
QUANTIFERON CRITERIA: NORMAL
QUANTIFERON TB2 AG VALUE: 0.02 IU/ML
VIT B1 BLD-SCNC: 116.7 NMOL/L (ref 66.5–200)

## 2021-10-22 ENCOUNTER — OFFICE VISIT (OUTPATIENT)
Dept: HEMATOLOGY/ONCOLOGY | Facility: CLINIC | Age: 56
End: 2021-10-22
Payer: MEDICARE

## 2021-10-22 VITALS
SYSTOLIC BLOOD PRESSURE: 130 MMHG | BODY MASS INDEX: 19.61 KG/M2 | HEART RATE: 118 BPM | WEIGHT: 129 LBS | TEMPERATURE: 99 F | DIASTOLIC BLOOD PRESSURE: 85 MMHG

## 2021-10-22 DIAGNOSIS — E53.8 B12 DEFICIENCY: Primary | ICD-10-CM

## 2021-10-22 PROCEDURE — 99204 OFFICE O/P NEW MOD 45 MIN: CPT | Mod: 25,S$GLB,, | Performed by: INTERNAL MEDICINE

## 2021-10-22 PROCEDURE — 96372 THER/PROPH/DIAG INJ SC/IM: CPT | Mod: S$GLB,,, | Performed by: INTERNAL MEDICINE

## 2021-10-22 PROCEDURE — 99204 PR OFFICE/OUTPT VISIT, NEW, LEVL IV, 45-59 MIN: ICD-10-PCS | Mod: 25,S$GLB,, | Performed by: INTERNAL MEDICINE

## 2021-10-22 PROCEDURE — 96372 PR INJECTION,THERAP/PROPH/DIAG2ST, IM OR SUBCUT: ICD-10-PCS | Mod: S$GLB,,, | Performed by: INTERNAL MEDICINE

## 2021-10-22 RX ORDER — PREDNISONE 20 MG/1
20 TABLET ORAL DAILY
Status: ON HOLD | COMMUNITY
Start: 2021-09-29 | End: 2022-01-26 | Stop reason: HOSPADM

## 2021-10-22 RX ORDER — VANCOMYCIN HYDROCHLORIDE 125 MG/1
CAPSULE ORAL
COMMUNITY
End: 2022-01-14

## 2021-10-22 RX ORDER — CYANOCOBALAMIN 1000 UG/ML
1 INJECTION, SOLUTION INTRAMUSCULAR; SUBCUTANEOUS
COMMUNITY
End: 2022-01-14

## 2021-10-22 RX ORDER — CYANOCOBALAMIN 1000 UG/ML
1000 INJECTION, SOLUTION INTRAMUSCULAR; SUBCUTANEOUS
Status: DISCONTINUED | OUTPATIENT
Start: 2021-10-23 | End: 2022-01-16 | Stop reason: HOSPADM

## 2021-10-22 RX ADMIN — CYANOCOBALAMIN 1000 MCG: 1000 INJECTION, SOLUTION INTRAMUSCULAR; SUBCUTANEOUS at 02:10

## 2021-11-02 ENCOUNTER — TELEPHONE (OUTPATIENT)
Dept: HEMATOLOGY/ONCOLOGY | Facility: CLINIC | Age: 56
End: 2021-11-02
Payer: MEDICARE

## 2021-11-02 DIAGNOSIS — D51.8 ANEMIA OF DECREASED VITAMIN B12 ABSORPTION: ICD-10-CM

## 2021-11-02 DIAGNOSIS — E53.8 B12 DEFICIENCY: Primary | ICD-10-CM

## 2021-12-17 DIAGNOSIS — K50.90 CROHN'S DISEASE WITHOUT COMPLICATION, UNSPECIFIED GASTROINTESTINAL TRACT LOCATION: ICD-10-CM

## 2021-12-22 ENCOUNTER — INFUSION (OUTPATIENT)
Dept: INFUSION THERAPY | Facility: HOSPITAL | Age: 56
End: 2021-12-22
Attending: INTERNAL MEDICINE
Payer: MEDICARE

## 2021-12-22 VITALS
HEART RATE: 105 BPM | TEMPERATURE: 100 F | RESPIRATION RATE: 18 BRPM | DIASTOLIC BLOOD PRESSURE: 80 MMHG | WEIGHT: 128.31 LBS | HEIGHT: 68 IN | SYSTOLIC BLOOD PRESSURE: 147 MMHG | OXYGEN SATURATION: 99 % | BODY MASS INDEX: 19.45 KG/M2

## 2022-01-14 ENCOUNTER — HOSPITAL ENCOUNTER (INPATIENT)
Facility: HOSPITAL | Age: 57
LOS: 2 days | Discharge: HOME HEALTH | DRG: 101 | End: 2022-01-16
Attending: EMERGENCY MEDICINE | Admitting: INTERNAL MEDICINE
Payer: MEDICARE

## 2022-01-14 DIAGNOSIS — E87.6 HYPOKALEMIA: Primary | ICD-10-CM

## 2022-01-14 DIAGNOSIS — E83.42 HYPOMAGNESEMIA: ICD-10-CM

## 2022-01-14 DIAGNOSIS — R07.9 CHEST PAIN: ICD-10-CM

## 2022-01-14 DIAGNOSIS — E83.51 HYPOCALCEMIA: ICD-10-CM

## 2022-01-14 DIAGNOSIS — R53.1 WEAKNESS: ICD-10-CM

## 2022-01-14 DIAGNOSIS — G40.909 SEIZURE DISORDER: ICD-10-CM

## 2022-01-14 DIAGNOSIS — R56.9 SEIZURE: ICD-10-CM

## 2022-01-14 PROBLEM — D64.9 CHRONIC ANEMIA: Status: ACTIVE | Noted: 2022-01-14

## 2022-01-14 PROBLEM — E87.8 ELECTROLYTE ABNORMALITY: Status: ACTIVE | Noted: 2022-01-14

## 2022-01-14 LAB
ALBUMIN SERPL BCP-MCNC: 2.8 G/DL (ref 3.5–5.2)
ALP SERPL-CCNC: 65 U/L (ref 55–135)
ALT SERPL W/O P-5'-P-CCNC: 16 U/L (ref 10–44)
AMPHET+METHAMPHET UR QL: NEGATIVE
ANION GAP SERPL CALC-SCNC: 14 MMOL/L (ref 8–16)
AST SERPL-CCNC: 13 U/L (ref 10–40)
BARBITURATES UR QL SCN>200 NG/ML: NEGATIVE
BASOPHILS # BLD AUTO: 0.05 K/UL (ref 0–0.2)
BASOPHILS NFR BLD: 0.3 % (ref 0–1.9)
BENZODIAZ UR QL SCN>200 NG/ML: NEGATIVE
BILIRUB SERPL-MCNC: 1 MG/DL (ref 0.1–1)
BUN SERPL-MCNC: 10 MG/DL (ref 6–20)
BZE UR QL SCN: NEGATIVE
CALCIUM SERPL-MCNC: 5.8 MG/DL (ref 8.7–10.5)
CANNABINOIDS UR QL SCN: NEGATIVE
CHLORIDE SERPL-SCNC: 107 MMOL/L (ref 95–110)
CO2 SERPL-SCNC: 19 MMOL/L (ref 23–29)
CREAT SERPL-MCNC: 0.8 MG/DL (ref 0.5–1.4)
CREAT UR-MCNC: 84 MG/DL (ref 15–325)
DIFFERENTIAL METHOD: ABNORMAL
EOSINOPHIL # BLD AUTO: 0 K/UL (ref 0–0.5)
EOSINOPHIL NFR BLD: 0.1 % (ref 0–8)
ERYTHROCYTE [DISTWIDTH] IN BLOOD BY AUTOMATED COUNT: 13.4 % (ref 11.5–14.5)
EST. GFR  (AFRICAN AMERICAN): >60 ML/MIN/1.73 M^2
EST. GFR  (NON AFRICAN AMERICAN): >60 ML/MIN/1.73 M^2
GLUCOSE SERPL-MCNC: 70 MG/DL (ref 70–110)
HCT VFR BLD AUTO: 36.7 % (ref 37–48.5)
HGB BLD-MCNC: 12 G/DL (ref 12–16)
IMM GRANULOCYTES # BLD AUTO: 0.32 K/UL (ref 0–0.04)
IMM GRANULOCYTES NFR BLD AUTO: 2.2 % (ref 0–0.5)
LYMPHOCYTES # BLD AUTO: 0.8 K/UL (ref 1–4.8)
LYMPHOCYTES NFR BLD: 5.3 % (ref 18–48)
MAGNESIUM SERPL-MCNC: 0.4 MG/DL (ref 1.6–2.6)
MCH RBC QN AUTO: 28.7 PG (ref 27–31)
MCHC RBC AUTO-ENTMCNC: 32.7 G/DL (ref 32–36)
MCV RBC AUTO: 88 FL (ref 82–98)
MONOCYTES # BLD AUTO: 0.5 K/UL (ref 0.3–1)
MONOCYTES NFR BLD: 3.1 % (ref 4–15)
NEUTROPHILS # BLD AUTO: 12.9 K/UL (ref 1.8–7.7)
NEUTROPHILS NFR BLD: 89 % (ref 38–73)
NRBC BLD-RTO: 0 /100 WBC
OPIATES UR QL SCN: ABNORMAL
PCP UR QL SCN>25 NG/ML: NEGATIVE
PLATELET # BLD AUTO: 219 K/UL (ref 150–450)
PMV BLD AUTO: 8 FL (ref 9.2–12.9)
POTASSIUM SERPL-SCNC: 2.2 MMOL/L (ref 3.5–5.1)
PROT SERPL-MCNC: 6.2 G/DL (ref 6–8.4)
RBC # BLD AUTO: 4.18 M/UL (ref 4–5.4)
SARS-COV-2 RDRP RESP QL NAA+PROBE: NEGATIVE
SODIUM SERPL-SCNC: 140 MMOL/L (ref 136–145)
TOXICOLOGY INFORMATION: ABNORMAL
TROPONIN I SERPL DL<=0.01 NG/ML-MCNC: 0.05 NG/ML
TSH SERPL DL<=0.005 MIU/L-ACNC: 0.79 UIU/ML (ref 0.34–5.6)
WBC # BLD AUTO: 14.52 K/UL (ref 3.9–12.7)

## 2022-01-14 PROCEDURE — 25000003 PHARM REV CODE 250: Performed by: EMERGENCY MEDICINE

## 2022-01-14 PROCEDURE — 83735 ASSAY OF MAGNESIUM: CPT | Performed by: EMERGENCY MEDICINE

## 2022-01-14 PROCEDURE — 83036 HEMOGLOBIN GLYCOSYLATED A1C: CPT | Performed by: INTERNAL MEDICINE

## 2022-01-14 PROCEDURE — 63600175 PHARM REV CODE 636 W HCPCS: Performed by: EMERGENCY MEDICINE

## 2022-01-14 PROCEDURE — 84484 ASSAY OF TROPONIN QUANT: CPT | Performed by: INTERNAL MEDICINE

## 2022-01-14 PROCEDURE — 93010 ELECTROCARDIOGRAM REPORT: CPT | Mod: ,,, | Performed by: SPECIALIST

## 2022-01-14 PROCEDURE — 80053 COMPREHEN METABOLIC PANEL: CPT | Performed by: EMERGENCY MEDICINE

## 2022-01-14 PROCEDURE — 84443 ASSAY THYROID STIM HORMONE: CPT | Performed by: EMERGENCY MEDICINE

## 2022-01-14 PROCEDURE — 36415 COLL VENOUS BLD VENIPUNCTURE: CPT | Performed by: INTERNAL MEDICINE

## 2022-01-14 PROCEDURE — 93005 ELECTROCARDIOGRAM TRACING: CPT | Performed by: SPECIALIST

## 2022-01-14 PROCEDURE — 63600175 PHARM REV CODE 636 W HCPCS: Performed by: INTERNAL MEDICINE

## 2022-01-14 PROCEDURE — 96375 TX/PRO/DX INJ NEW DRUG ADDON: CPT

## 2022-01-14 PROCEDURE — 20000000 HC ICU ROOM

## 2022-01-14 PROCEDURE — U0002 COVID-19 LAB TEST NON-CDC: HCPCS | Performed by: EMERGENCY MEDICINE

## 2022-01-14 PROCEDURE — 80307 DRUG TEST PRSMV CHEM ANLYZR: CPT | Performed by: INTERNAL MEDICINE

## 2022-01-14 PROCEDURE — 85025 COMPLETE CBC W/AUTO DIFF WBC: CPT | Performed by: EMERGENCY MEDICINE

## 2022-01-14 PROCEDURE — 93010 EKG 12-LEAD: ICD-10-PCS | Mod: ,,, | Performed by: SPECIALIST

## 2022-01-14 PROCEDURE — 99291 CRITICAL CARE FIRST HOUR: CPT

## 2022-01-14 PROCEDURE — 25000003 PHARM REV CODE 250: Performed by: INTERNAL MEDICINE

## 2022-01-14 PROCEDURE — 96365 THER/PROPH/DIAG IV INF INIT: CPT

## 2022-01-14 RX ORDER — MAGNESIUM SULFATE 1 G/100ML
1 INJECTION INTRAVENOUS
Status: DISCONTINUED | OUTPATIENT
Start: 2022-01-14 | End: 2022-01-16 | Stop reason: HOSPADM

## 2022-01-14 RX ORDER — DICYCLOMINE HYDROCHLORIDE 10 MG/1
20 CAPSULE ORAL 4 TIMES DAILY
Status: DISCONTINUED | OUTPATIENT
Start: 2022-01-14 | End: 2022-01-16 | Stop reason: HOSPADM

## 2022-01-14 RX ORDER — MAGNESIUM SULFATE HEPTAHYDRATE 40 MG/ML
2 INJECTION, SOLUTION INTRAVENOUS ONCE
Status: COMPLETED | OUTPATIENT
Start: 2022-01-14 | End: 2022-01-14

## 2022-01-14 RX ORDER — MAGNESIUM SULFATE HEPTAHYDRATE 40 MG/ML
2 INJECTION, SOLUTION INTRAVENOUS
Status: DISCONTINUED | OUTPATIENT
Start: 2022-01-14 | End: 2022-01-16 | Stop reason: HOSPADM

## 2022-01-14 RX ORDER — POTASSIUM CHLORIDE 7.45 MG/ML
10 INJECTION INTRAVENOUS ONCE
Status: COMPLETED | OUTPATIENT
Start: 2022-01-14 | End: 2022-01-14

## 2022-01-14 RX ORDER — POTASSIUM CHLORIDE 7.45 MG/ML
20 INJECTION INTRAVENOUS
Status: DISCONTINUED | OUTPATIENT
Start: 2022-01-14 | End: 2022-01-16 | Stop reason: HOSPADM

## 2022-01-14 RX ORDER — DEXMEDETOMIDINE HYDROCHLORIDE 4 UG/ML
0-1.4 INJECTION, SOLUTION INTRAVENOUS CONTINUOUS
Status: DISCONTINUED | OUTPATIENT
Start: 2022-01-14 | End: 2022-01-16

## 2022-01-14 RX ORDER — DICYCLOMINE HYDROCHLORIDE 20 MG/1
20 TABLET ORAL EVERY 6 HOURS
COMMUNITY
End: 2022-03-10

## 2022-01-14 RX ORDER — POTASSIUM CHLORIDE 20 MEQ/1
20 TABLET, EXTENDED RELEASE ORAL 2 TIMES DAILY
Status: DISCONTINUED | OUTPATIENT
Start: 2022-01-14 | End: 2022-01-15

## 2022-01-14 RX ORDER — DICYCLOMINE HYDROCHLORIDE 20 MG/1
20 TABLET ORAL EVERY 6 HOURS
Status: DISCONTINUED | OUTPATIENT
Start: 2022-01-14 | End: 2022-01-14

## 2022-01-14 RX ORDER — POTASSIUM CHLORIDE 20 MEQ/1
40 TABLET, EXTENDED RELEASE ORAL
Status: DISCONTINUED | OUTPATIENT
Start: 2022-01-14 | End: 2022-01-16 | Stop reason: HOSPADM

## 2022-01-14 RX ORDER — LORAZEPAM 2 MG/ML
1 INJECTION INTRAMUSCULAR
Status: COMPLETED | OUTPATIENT
Start: 2022-01-14 | End: 2022-01-14

## 2022-01-14 RX ORDER — LEVETIRACETAM 10 MG/ML
1000 INJECTION INTRAVASCULAR
Status: COMPLETED | OUTPATIENT
Start: 2022-01-14 | End: 2022-01-14

## 2022-01-14 RX ORDER — LEVETIRACETAM 500 MG/5ML
500 INJECTION, SOLUTION, CONCENTRATE INTRAVENOUS EVERY 12 HOURS
Status: DISCONTINUED | OUTPATIENT
Start: 2022-01-14 | End: 2022-01-14

## 2022-01-14 RX ORDER — VENLAFAXINE 75 MG/1
75 TABLET ORAL 2 TIMES DAILY
COMMUNITY

## 2022-01-14 RX ORDER — POTASSIUM CHLORIDE 20 MEQ/1
20 TABLET, EXTENDED RELEASE ORAL
Status: DISCONTINUED | OUTPATIENT
Start: 2022-01-14 | End: 2022-01-16 | Stop reason: HOSPADM

## 2022-01-14 RX ORDER — LEVETIRACETAM 5 MG/ML
500 INJECTION INTRAVASCULAR ONCE
Status: COMPLETED | OUTPATIENT
Start: 2022-01-15 | End: 2022-01-15

## 2022-01-14 RX ORDER — LEVETIRACETAM 5 MG/ML
500 INJECTION INTRAVASCULAR EVERY 12 HOURS
Status: DISCONTINUED | OUTPATIENT
Start: 2022-01-14 | End: 2022-01-16 | Stop reason: HOSPADM

## 2022-01-14 RX ORDER — GLUCAGON 1 MG
1 KIT INJECTION
Status: DISCONTINUED | OUTPATIENT
Start: 2022-01-14 | End: 2022-01-16 | Stop reason: HOSPADM

## 2022-01-14 RX ORDER — NALOXONE HCL 0.4 MG/ML
0.02 VIAL (ML) INJECTION
Status: DISCONTINUED | OUTPATIENT
Start: 2022-01-14 | End: 2022-01-16 | Stop reason: HOSPADM

## 2022-01-14 RX ORDER — LANOLIN ALCOHOL/MO/W.PET/CERES
800 CREAM (GRAM) TOPICAL
Status: DISCONTINUED | OUTPATIENT
Start: 2022-01-14 | End: 2022-01-16 | Stop reason: HOSPADM

## 2022-01-14 RX ORDER — LORAZEPAM 2 MG/ML
1 INJECTION INTRAMUSCULAR EVERY 4 HOURS PRN
Status: DISCONTINUED | OUTPATIENT
Start: 2022-01-14 | End: 2022-01-16 | Stop reason: HOSPADM

## 2022-01-14 RX ORDER — POTASSIUM CHLORIDE 7.45 MG/ML
40 INJECTION INTRAVENOUS
Status: DISCONTINUED | OUTPATIENT
Start: 2022-01-14 | End: 2022-01-16 | Stop reason: HOSPADM

## 2022-01-14 RX ORDER — PREDNISONE 20 MG/1
20 TABLET ORAL DAILY
Status: DISCONTINUED | OUTPATIENT
Start: 2022-01-15 | End: 2022-01-16 | Stop reason: HOSPADM

## 2022-01-14 RX ORDER — VENLAFAXINE 37.5 MG/1
75 TABLET ORAL 2 TIMES DAILY
Status: DISCONTINUED | OUTPATIENT
Start: 2022-01-14 | End: 2022-01-16 | Stop reason: HOSPADM

## 2022-01-14 RX ORDER — PANTOPRAZOLE SODIUM 20 MG/1
20 TABLET, DELAYED RELEASE ORAL NIGHTLY
Status: DISCONTINUED | OUTPATIENT
Start: 2022-01-14 | End: 2022-01-16 | Stop reason: HOSPADM

## 2022-01-14 RX ORDER — DEXTROSE MONOHYDRATE 50 MG/ML
INJECTION, SOLUTION INTRAVENOUS CONTINUOUS
Status: DISCONTINUED | OUTPATIENT
Start: 2022-01-14 | End: 2022-01-16

## 2022-01-14 RX ORDER — ONDANSETRON 2 MG/ML
4 INJECTION INTRAMUSCULAR; INTRAVENOUS
Status: COMPLETED | OUTPATIENT
Start: 2022-01-14 | End: 2022-01-14

## 2022-01-14 RX ORDER — HYDROCHLOROTHIAZIDE 12.5 MG/1
12.5 CAPSULE ORAL DAILY
COMMUNITY
End: 2022-06-23

## 2022-01-14 RX ORDER — IBUPROFEN 200 MG
16 TABLET ORAL
Status: DISCONTINUED | OUTPATIENT
Start: 2022-01-14 | End: 2022-01-16 | Stop reason: HOSPADM

## 2022-01-14 RX ORDER — SODIUM CHLORIDE 0.9 % (FLUSH) 0.9 %
10 SYRINGE (ML) INJECTION EVERY 12 HOURS PRN
Status: DISCONTINUED | OUTPATIENT
Start: 2022-01-14 | End: 2022-01-16 | Stop reason: HOSPADM

## 2022-01-14 RX ORDER — POTASSIUM CHLORIDE 7.45 MG/ML
20 INJECTION INTRAVENOUS ONCE
Status: COMPLETED | OUTPATIENT
Start: 2022-01-14 | End: 2022-01-14

## 2022-01-14 RX ORDER — IBUPROFEN 200 MG
24 TABLET ORAL
Status: DISCONTINUED | OUTPATIENT
Start: 2022-01-14 | End: 2022-01-16 | Stop reason: HOSPADM

## 2022-01-14 RX ORDER — MAGNESIUM SULFATE HEPTAHYDRATE 40 MG/ML
4 INJECTION, SOLUTION INTRAVENOUS
Status: DISCONTINUED | OUTPATIENT
Start: 2022-01-14 | End: 2022-01-16 | Stop reason: HOSPADM

## 2022-01-14 RX ADMIN — LEVETIRACETAM INJECTION 500 MG: 5 INJECTION INTRAVENOUS at 11:01

## 2022-01-14 RX ADMIN — CEFTRIAXONE 1 G: 1 INJECTION, SOLUTION INTRAVENOUS at 09:01

## 2022-01-14 RX ADMIN — POTASSIUM CHLORIDE 20 MEQ: 7.46 INJECTION, SOLUTION INTRAVENOUS at 08:01

## 2022-01-14 RX ADMIN — ONDANSETRON 4 MG: 2 INJECTION INTRAMUSCULAR; INTRAVENOUS at 01:01

## 2022-01-14 RX ADMIN — POTASSIUM CHLORIDE 10 MEQ: 7.46 INJECTION, SOLUTION INTRAVENOUS at 04:01

## 2022-01-14 RX ADMIN — MAGNESIUM SULFATE 2 G: 2 INJECTION INTRAVENOUS at 04:01

## 2022-01-14 RX ADMIN — LEVETIRACETAM INJECTION 1000 MG: 10 INJECTION INTRAVENOUS at 06:01

## 2022-01-14 RX ADMIN — DEXTROSE: 5 SOLUTION INTRAVENOUS at 08:01

## 2022-01-14 RX ADMIN — LEVETIRACETAM INJECTION 500 MG: 5 INJECTION INTRAVENOUS at 09:01

## 2022-01-14 RX ADMIN — LORAZEPAM 1 MG: 2 INJECTION INTRAMUSCULAR; INTRAVENOUS at 05:01

## 2022-01-14 RX ADMIN — LORAZEPAM 1 MG: 2 INJECTION INTRAMUSCULAR; INTRAVENOUS at 06:01

## 2022-01-14 RX ADMIN — CALCIUM GLUCONATE 1 G: 94 INJECTION, SOLUTION INTRAVENOUS at 05:01

## 2022-01-14 NOTE — ED PROVIDER NOTES
Encounter Date: 2022       History     Chief Complaint   Patient presents with    Vomiting    Nausea     EMS gave 4mg zofran w/o relief     This 56-year-old female who has a history of Crohn's disease which she has had bowel resected and additionally has a history of GERD, kidney stones any bowel perforation, presents now with nausea and vomiting x2 today.  Patient states she has had weakness and generalized tingling sensation.  She admits that she has had similar problems in the past associated with hypokalemia, hypomagnesemia, and low calcium.  This as all be attributed to her Crohn's.  No fever or chills.  States she did have a normal bowel movement this morning.  No difficulty urinating.        Review of patient's allergies indicates:   Allergen Reactions    Remicade [infliximab] Shortness Of Breath and Palpitations     Severe muscle and joint, and bone pain    Morphine Other (See Comments)     Abdominal pain    Flagyl [metronidazole] Other (See Comments)     Neuropathy    Nubain [nalbuphine] Anxiety     Upper abdominal burning      Past Medical History:   Diagnosis Date    Acute deep vein thrombosis (DVT) of brachial vein of right upper extremity 2017    on RUE ultrasound    Anxiety     Bowel perforation     Chronic pain     Crohn's disease of both small and large intestine with intestinal obstruction     Difficult intravenous access     Encounter for blood transfusion     GERD (gastroesophageal reflux disease)     Kidney stones     Nephrolithiasis     Stricture of bowel      Past Surgical History:   Procedure Laterality Date    ABSCESS DRAINAGE      ANAL FISTULOTOMY      BOWEL RESECTION      small bowel resection per Dr. Uribe 2018     SECTION      x2    CHOLECYSTECTOMY  2000    COLON SURGERY  2015    sigmoid loop colostomy    COLONOSCOPY N/A 2016    Procedure: COLONOSCOPY;  Surgeon: Andre Uribe MD;  Location: [facility] ([address] [place] [address]);   Service: Endoscopy;  Laterality: N/A;    COLONOSCOPY N/A 1/17/2017    Procedure: COLONOSCOPY;  Surgeon: Dany Stock MD;  Location: [facility] ENDO (2ND FLR);  Service: Endoscopy;  Laterality: N/A;    COLONOSCOPY N/A 12/4/2017    Procedure: COLONOSCOPY;  Surgeon: Andre Uribe MD;  Location: [facility] ENDO (4TH FLR);  Service: Endoscopy;  Laterality: N/A;    COLONOSCOPY N/A 2/8/2018    Procedure: COLONOSCOPY;  Surgeon: Andre Uribe MD;  Location: [facility] ENDO (4TH FLR);  Service: Endoscopy;  Laterality: N/A;    COLONOSCOPY N/A 3/24/2018    Procedure: COLONOSCOPY;  Surgeon: Elmer Serrato MD;  Location: [facility] ENDO (2ND FLR);  Service: Endoscopy;  Laterality: N/A;    COLONOSCOPY  3/24/2018    COLONOSCOPY N/A 7/6/2018    Procedure: COLONOSCOPY;  Surgeon: Andre Uribe MD;  Location: [facility] ENDO (4TH FLR);  Service: Endoscopy;  Laterality: N/A;    COLONOSCOPY N/A 10/7/2021    Procedure: COLONOSCOPY;  Surgeon: Jose Hughes MD;  Location: [facility];  Service: Endoscopy;  Laterality: N/A;    ESOPHAGOGASTRODUODENOSCOPY N/A 10/7/2021    Procedure: EGD (ESOPHAGOGASTRODUODENOSCOPY);  Surgeon: Jose Hughes MD;  Location: [facility];  Service: Endoscopy;  Laterality: N/A;    LAPAROSCOPIC CLOSURE OF COLOSTOMY N/A 8/29/2018    Procedure: CLOSURE, COLOSTOMY, LAPAROSCOPIC;  Surgeon: Andre Uribe MD;  Location: [facility] OR 2ND FLR;  Service: Colon and Rectal;  Laterality: N/A;  converted to open    rectovaginal fistula repair  01/2018    SMALL INTESTINE SURGERY  1995    per patient 10 inches removed    SMALL INTESTINE SURGERY  02/2009    abdominal abscess drained, 3 cm colon removed, 11 cm SB removed    TUBAL LIGATION      UPPER GASTROINTESTINAL ENDOSCOPY  2000     Family History   Problem Relation Age of Onset    Cancer Maternal Aunt 66        colon ca    Heart attack Father 69    Anesthesia problems Neg Hx     Celiac disease Neg Hx     Cirrhosis Neg Hx     Colon cancer Neg Hx     Colon polyps Neg Hx      Crohn's disease Neg Hx     Cystic fibrosis Neg Hx     Esophageal cancer Neg Hx     Hemochromatosis Neg Hx     Inflammatory bowel disease Neg Hx     Irritable bowel syndrome Neg Hx     Liver cancer Neg Hx     Liver disease Neg Hx     Rectal cancer Neg Hx     Stomach cancer Neg Hx     Ulcerative colitis Neg Hx     Mars's disease Neg Hx     Lymphoma Neg Hx     Tuberculosis Neg Hx     Scleroderma Neg Hx     Rheum arthritis Neg Hx     Multiple sclerosis Neg Hx     Melanoma Neg Hx     Lupus Neg Hx     Psoriasis Neg Hx     Skin cancer Neg Hx      Social History     Tobacco Use    Smoking status: Never Smoker    Smokeless tobacco: Never Used   Substance Use Topics    Alcohol use: No     Alcohol/week: 0.0 standard drinks    Drug use: No     Review of Systems   Constitutional: Positive for appetite change. Negative for activity change, chills, diaphoresis and fever.   HENT: Negative.  Negative for congestion and sore throat.    Respiratory: Negative for cough, choking, chest tightness and shortness of breath.    Cardiovascular: Negative for chest pain.   Gastrointestinal: Positive for nausea and vomiting.   Genitourinary: Negative for difficulty urinating, dysuria and flank pain.   Musculoskeletal: Negative for back pain.   Skin: Negative for rash.   Neurological: Positive for weakness. Negative for headaches.   Hematological: Does not bruise/bleed easily.   All other systems reviewed and are negative.      Physical Exam     Initial Vitals [01/14/22 1202]   BP Pulse Resp Temp SpO2   132/63 66 15 97.9 °F (36.6 °C) 98 %      MAP       --         Physical Exam    Vitals reviewed.  Constitutional: She appears well-developed and well-nourished. She is not diaphoretic. No distress.   Appearing uncomfortable   HENT:   Head: Normocephalic and atraumatic.   Nose: Nose normal.   Mouth/Throat: Oropharynx is clear and moist. No oropharyngeal exudate.   Eyes: Conjunctivae are normal. Pupils are equal, round, and  reactive to light.   Neck: Neck supple. No JVD present.   Normal range of motion.  Cardiovascular: Normal rate, regular rhythm, normal heart sounds and intact distal pulses. Exam reveals no gallop and no friction rub.    No murmur heard.  Pulmonary/Chest: Breath sounds normal. No respiratory distress. She has no wheezes. She has no rhonchi. She has no rales. She exhibits no tenderness.   Abdominal: Abdomen is soft. Bowel sounds are normal. She exhibits no distension. There is no abdominal tenderness. There is no rebound and no guarding.   Musculoskeletal:         General: No tenderness or edema. Normal range of motion.      Cervical back: Normal range of motion and neck supple.     Lymphadenopathy:     She has no cervical adenopathy.   Neurological: She is alert and oriented to person, place, and time. She has normal strength. GCS score is 15. GCS eye subscore is 4. GCS verbal subscore is 5. GCS motor subscore is 6.   Skin: Skin is warm and dry. Capillary refill takes less than 2 seconds. No rash noted. No erythema. No pallor.   Psychiatric: She has a normal mood and affect. Her behavior is normal. Judgment and thought content normal.         ED Course   Critical Care    Date/Time: 1/14/2022 5:28 PM  Performed by: Enmanuel Dinero Jr., MD  Authorized by: Enmanuel Dinero Jr., MD   Direct patient critical care time: 45 minutes  Ordering / reviewing critical care time: 15 minutes  Documentation critical care time: 5 minutes  Consulting other physicians critical care time: 5 minutes  Total critical care time (exclusive of procedural time) : 70 minutes        Labs Reviewed   CBC W/ AUTO DIFFERENTIAL - Abnormal; Notable for the following components:       Result Value    WBC 14.52 (*)     Hematocrit 36.7 (*)     MPV 8.0 (*)     Immature Granulocytes 2.2 (*)     Gran # (ANC) 12.9 (*)     Immature Grans (Abs) 0.32 (*)     Lymph # 0.8 (*)     Gran % 89.0 (*)     Lymph % 5.3 (*)     Mono % 3.1 (*)     All other  components within normal limits   COMPREHENSIVE METABOLIC PANEL - Abnormal; Notable for the following components:    Potassium 2.2 (*)     CO2 19 (*)     Calcium 5.8 (*)     Albumin 2.8 (*)     All other components within normal limits    Narrative:     K, Ca, Mg critical result(s) repeated. Called and verbal readback   obtained from Abi Flores NP/ED by S 01/14/2022 16:01   MAGNESIUM - Abnormal; Notable for the following components:    Magnesium 0.4 (*)     All other components within normal limits    Narrative:     K, Ca, Mg critical result(s) repeated. Called and verbal readback   obtained from Abi Flores NP/ED by S 01/14/2022 16:01   TSH   SARS-COV-2 RNA AMPLIFICATION, QUAL   TROPONIN I   HEMOGLOBIN A1C        ECG Results          EKG 12-lead (In process)  Result time 01/14/22 14:32:26    In process by Interface, Lab In [facility] (01/14/22 14:32:26)                 Narrative:    Test Reason : R53.1,    Vent. Rate : 072 BPM     Atrial Rate : 072 BPM     P-R Int : 120 ms          QRS Dur : 084 ms      QT Int : 426 ms       P-R-T Axes : 072 064 053 degrees     QTc Int : 466 ms    Normal sinus rhythm  Cannot rule out Anterior infarct ,age undetermined  Abnormal ECG  No previous ECGs available    Referred By: [email or web address]   SELF           Confirmed By:                             Imaging Results    None          Medications   magnesium sulfate 2g in water 50mL IVPB (premix) (2 g Intravenous New Bag 1/14/22 1630)   calcium gluconate 1 g in dextrose 5 % 100 mL IVPB (has no administration in time range)   dicyclomine tablet 20 mg (has no administration in time range)   pantoprazole EC tablet 20 mg (has no administration in time range)   potassium chloride SA CR tablet 20 mEq (has no administration in time range)   predniSONE tablet 20 mg (has no administration in time range)   venlafaxine tablet 75 mg (has no administration in time range)   sodium chloride 0.9% flush 10 mL (has no administration in time range)    glucose chewable tablet 16 g (has no administration in time range)   glucose chewable tablet 24 g (has no administration in time range)   dextrose 50% injection 12.5 g (has no administration in time range)   dextrose 50% injection 25 g (has no administration in time range)   glucagon (human recombinant) injection 1 mg (has no administration in time range)   naloxone 0.4 mg/mL injection 0.02 mg (has no administration in time range)   levETIRAcetam in NaCl (iso-os) IVPB 1,000 mg (has no administration in time range)   lorazepam injection 1 mg (has no administration in time range)   ondansetron injection 4 mg (4 mg Intravenous Given 1/14/22 1330)   potassium chloride 10 mEq in 100 mL IVPB (10 mEq Intravenous New Bag 1/14/22 1640)   lorazepam injection 1 mg (1 mg Intravenous Given 1/14/22 1721)                Attending Attestation:             Attending ED Notes:   Of Crohn's and with the passes had hypokalemia, hypocalcemia as well as hypomagnesemia, presented with complaints of weakness and tingling with 2 episodes of vomiting today.  Workup today did show a potassium of 2.1, calcium 5.8 and a magnesium of 0.4 for which she had order potassium rider, calcium gluconate IV and 2 g of magnesium IV.  The patient though apparently it was suspected to have been a generalized seizure but there was no reported history of any seizures previously.  He was noted on the cardiac monitor that she had desatted when the nurses went in the room she is noted to have had blood in her mouth and presumably bit of tongue.  She at that point was confused where she had not been previously.  Her attending nurse stated that there were 2 further episodes of her having witnessed seizures.  A CT scan of the head was ordered but as of this time was not completed.  I did speak to Hospital Medicine, Dr. Hill, who agreed to admit the patient to ICU.                 Clinical Impression:   Final diagnoses:  [R53.1] Weakness  [E87.6] Hypokalemia  (Primary)  [E83.51] Hypocalcemia  [G40.909] Seizure disorder  [E83.42] Hypomagnesemia          ED Disposition Condition    Admit               Enmanuel Dinero Jr., MD  01/14/22 5428

## 2022-01-14 NOTE — ED NOTES
FOUND UNRESPONSIVE WITH SLIGHT BLOOD ON R FACE.  MD CALLED TO BS. VSS. NSR. PICKING AT SHEETS, NOT MEETING GAZE OR FOLLOWING COMMANDS. REDDY. TACHYPNEA BUT CLEAR AIRWAY.  2 LNC APPLIED, SZ PRECAUTIONS AND MD CALLED TO BS.

## 2022-01-15 PROBLEM — Z86.69 HISTORY OF STATUS EPILEPTICUS: Chronic | Status: ACTIVE | Noted: 2022-01-15

## 2022-01-15 PROBLEM — F11.90 CHRONIC, CONTINUOUS USE OF OPIOIDS: Chronic | Status: ACTIVE | Noted: 2018-04-11

## 2022-01-15 PROBLEM — D64.9 CHRONIC ANEMIA: Chronic | Status: ACTIVE | Noted: 2022-01-14

## 2022-01-15 LAB
ANION GAP SERPL CALC-SCNC: 11 MMOL/L (ref 8–16)
ANION GAP SERPL CALC-SCNC: 13 MMOL/L (ref 8–16)
ANION GAP SERPL CALC-SCNC: 15 MMOL/L (ref 8–16)
BASOPHILS # BLD AUTO: 0.09 K/UL (ref 0–0.2)
BASOPHILS NFR BLD: 0.4 % (ref 0–1.9)
BUN SERPL-MCNC: 10 MG/DL (ref 6–20)
BUN SERPL-MCNC: 6 MG/DL (ref 6–20)
BUN SERPL-MCNC: 7 MG/DL (ref 6–20)
CA-I BLDV-SCNC: 0.82 MMOL/L (ref 1.06–1.42)
CA-I BLDV-SCNC: 1.37 MMOL/L (ref 1.06–1.42)
CALCIUM SERPL-MCNC: 6.5 MG/DL (ref 8.7–10.5)
CALCIUM SERPL-MCNC: 9.1 MG/DL (ref 8.7–10.5)
CALCIUM SERPL-MCNC: 9.7 MG/DL (ref 8.7–10.5)
CHLORIDE SERPL-SCNC: 100 MMOL/L (ref 95–110)
CHLORIDE SERPL-SCNC: 103 MMOL/L (ref 95–110)
CHLORIDE SERPL-SCNC: 103 MMOL/L (ref 95–110)
CO2 SERPL-SCNC: 21 MMOL/L (ref 23–29)
CO2 SERPL-SCNC: 21 MMOL/L (ref 23–29)
CO2 SERPL-SCNC: 22 MMOL/L (ref 23–29)
CREAT SERPL-MCNC: 0.8 MG/DL (ref 0.5–1.4)
CREAT SERPL-MCNC: 0.9 MG/DL (ref 0.5–1.4)
CREAT SERPL-MCNC: 1 MG/DL (ref 0.5–1.4)
DIFFERENTIAL METHOD: ABNORMAL
EOSINOPHIL # BLD AUTO: 0 K/UL (ref 0–0.5)
EOSINOPHIL NFR BLD: 0.2 % (ref 0–8)
ERYTHROCYTE [DISTWIDTH] IN BLOOD BY AUTOMATED COUNT: 13.8 % (ref 11.5–14.5)
EST. GFR  (AFRICAN AMERICAN): >60 ML/MIN/1.73 M^2
EST. GFR  (NON AFRICAN AMERICAN): >60 ML/MIN/1.73 M^2
ESTIMATED AVG GLUCOSE: 114 MG/DL (ref 68–131)
GLUCOSE SERPL-MCNC: 118 MG/DL (ref 70–110)
GLUCOSE SERPL-MCNC: 127 MG/DL (ref 70–110)
GLUCOSE SERPL-MCNC: 85 MG/DL (ref 70–110)
HBA1C MFR BLD: 5.6 % (ref 4.5–6.2)
HCT VFR BLD AUTO: 42.6 % (ref 37–48.5)
HGB BLD-MCNC: 13.6 G/DL (ref 12–16)
IMM GRANULOCYTES # BLD AUTO: 0.33 K/UL (ref 0–0.04)
IMM GRANULOCYTES NFR BLD AUTO: 1.4 % (ref 0–0.5)
LYMPHOCYTES # BLD AUTO: 0.6 K/UL (ref 1–4.8)
LYMPHOCYTES NFR BLD: 2.6 % (ref 18–48)
MAGNESIUM SERPL-MCNC: 1.2 MG/DL (ref 1.6–2.6)
MAGNESIUM SERPL-MCNC: 2.2 MG/DL (ref 1.6–2.6)
MCH RBC QN AUTO: 28.6 PG (ref 27–31)
MCHC RBC AUTO-ENTMCNC: 31.9 G/DL (ref 32–36)
MCV RBC AUTO: 90 FL (ref 82–98)
MONOCYTES # BLD AUTO: 0.7 K/UL (ref 0.3–1)
MONOCYTES NFR BLD: 2.8 % (ref 4–15)
NEUTROPHILS # BLD AUTO: 21.4 K/UL (ref 1.8–7.7)
NEUTROPHILS NFR BLD: 92.6 % (ref 38–73)
NRBC BLD-RTO: 0 /100 WBC
PLATELET # BLD AUTO: 189 K/UL (ref 150–450)
PMV BLD AUTO: 8.4 FL (ref 9.2–12.9)
POTASSIUM SERPL-SCNC: 2.4 MMOL/L (ref 3.5–5.1)
POTASSIUM SERPL-SCNC: 2.4 MMOL/L (ref 3.5–5.1)
POTASSIUM SERPL-SCNC: 3.7 MMOL/L (ref 3.5–5.1)
RBC # BLD AUTO: 4.75 M/UL (ref 4–5.4)
SODIUM SERPL-SCNC: 135 MMOL/L (ref 136–145)
SODIUM SERPL-SCNC: 135 MMOL/L (ref 136–145)
SODIUM SERPL-SCNC: 139 MMOL/L (ref 136–145)
TROPONIN I SERPL DL<=0.01 NG/ML-MCNC: 0.07 NG/ML
WBC # BLD AUTO: 23.06 K/UL (ref 3.9–12.7)

## 2022-01-15 PROCEDURE — A9585 GADOBUTROL INJECTION: HCPCS | Performed by: FAMILY MEDICINE

## 2022-01-15 PROCEDURE — 25000003 PHARM REV CODE 250: Performed by: FAMILY MEDICINE

## 2022-01-15 PROCEDURE — 87040 BLOOD CULTURE FOR BACTERIA: CPT | Performed by: FAMILY MEDICINE

## 2022-01-15 PROCEDURE — 36415 COLL VENOUS BLD VENIPUNCTURE: CPT | Performed by: FAMILY MEDICINE

## 2022-01-15 PROCEDURE — 25000003 PHARM REV CODE 250: Performed by: INTERNAL MEDICINE

## 2022-01-15 PROCEDURE — 84484 ASSAY OF TROPONIN QUANT: CPT | Performed by: INTERNAL MEDICINE

## 2022-01-15 PROCEDURE — 95819 EEG AWAKE AND ASLEEP: CPT

## 2022-01-15 PROCEDURE — 85025 COMPLETE CBC W/AUTO DIFF WBC: CPT | Performed by: FAMILY MEDICINE

## 2022-01-15 PROCEDURE — 82330 ASSAY OF CALCIUM: CPT | Mod: 91 | Performed by: FAMILY MEDICINE

## 2022-01-15 PROCEDURE — 36415 COLL VENOUS BLD VENIPUNCTURE: CPT | Performed by: INTERNAL MEDICINE

## 2022-01-15 PROCEDURE — 82330 ASSAY OF CALCIUM: CPT | Performed by: INTERNAL MEDICINE

## 2022-01-15 PROCEDURE — 63600175 PHARM REV CODE 636 W HCPCS: Performed by: FAMILY MEDICINE

## 2022-01-15 PROCEDURE — 25500020 PHARM REV CODE 255: Performed by: FAMILY MEDICINE

## 2022-01-15 PROCEDURE — 80048 BASIC METABOLIC PNL TOTAL CA: CPT | Performed by: INTERNAL MEDICINE

## 2022-01-15 PROCEDURE — 21000000 HC CCU ICU ROOM CHARGE

## 2022-01-15 PROCEDURE — 83735 ASSAY OF MAGNESIUM: CPT | Performed by: INTERNAL MEDICINE

## 2022-01-15 PROCEDURE — 83735 ASSAY OF MAGNESIUM: CPT | Mod: 91 | Performed by: FAMILY MEDICINE

## 2022-01-15 PROCEDURE — 63600175 PHARM REV CODE 636 W HCPCS: Performed by: INTERNAL MEDICINE

## 2022-01-15 PROCEDURE — 80048 BASIC METABOLIC PNL TOTAL CA: CPT | Mod: 91 | Performed by: INTERNAL MEDICINE

## 2022-01-15 RX ORDER — FERROUS SULFATE, DRIED 160(50) MG
1 TABLET, EXTENDED RELEASE ORAL 3 TIMES DAILY
Status: DISCONTINUED | OUTPATIENT
Start: 2022-01-15 | End: 2022-01-16

## 2022-01-15 RX ORDER — LANOLIN ALCOHOL/MO/W.PET/CERES
400 CREAM (GRAM) TOPICAL DAILY
Status: DISCONTINUED | OUTPATIENT
Start: 2022-01-16 | End: 2022-01-16

## 2022-01-15 RX ORDER — HYDRALAZINE HYDROCHLORIDE 20 MG/ML
10 INJECTION INTRAMUSCULAR; INTRAVENOUS EVERY 4 HOURS PRN
Status: DISCONTINUED | OUTPATIENT
Start: 2022-01-15 | End: 2022-01-16 | Stop reason: HOSPADM

## 2022-01-15 RX ORDER — MAGNESIUM SULFATE HEPTAHYDRATE 40 MG/ML
2 INJECTION, SOLUTION INTRAVENOUS ONCE
Status: COMPLETED | OUTPATIENT
Start: 2022-01-15 | End: 2022-01-15

## 2022-01-15 RX ORDER — ONDANSETRON 2 MG/ML
4 INJECTION INTRAMUSCULAR; INTRAVENOUS EVERY 6 HOURS PRN
Status: DISCONTINUED | OUTPATIENT
Start: 2022-01-15 | End: 2022-01-16 | Stop reason: HOSPADM

## 2022-01-15 RX ORDER — HYDROCODONE BITARTRATE AND ACETAMINOPHEN 10; 325 MG/1; MG/1
1 TABLET ORAL EVERY 6 HOURS PRN
Status: DISCONTINUED | OUTPATIENT
Start: 2022-01-15 | End: 2022-01-16 | Stop reason: HOSPADM

## 2022-01-15 RX ORDER — ACETAMINOPHEN 10 MG/ML
1000 INJECTION, SOLUTION INTRAVENOUS EVERY 8 HOURS
Status: DISCONTINUED | OUTPATIENT
Start: 2022-01-15 | End: 2022-01-15

## 2022-01-15 RX ORDER — POTASSIUM CHLORIDE 20 MEQ/1
40 TABLET, EXTENDED RELEASE ORAL 2 TIMES DAILY
Status: DISCONTINUED | OUTPATIENT
Start: 2022-01-15 | End: 2022-01-16 | Stop reason: HOSPADM

## 2022-01-15 RX ORDER — HYDRALAZINE HYDROCHLORIDE 20 MG/ML
5 INJECTION INTRAMUSCULAR; INTRAVENOUS EVERY 4 HOURS PRN
Status: DISCONTINUED | OUTPATIENT
Start: 2022-01-15 | End: 2022-01-16 | Stop reason: HOSPADM

## 2022-01-15 RX ORDER — GADOBUTROL 604.72 MG/ML
5 INJECTION INTRAVENOUS
Status: COMPLETED | OUTPATIENT
Start: 2022-01-15 | End: 2022-01-15

## 2022-01-15 RX ADMIN — CEFTRIAXONE SODIUM 1 G: 1 INJECTION, POWDER, FOR SOLUTION INTRAMUSCULAR; INTRAVENOUS at 10:01

## 2022-01-15 RX ADMIN — ONDANSETRON 4 MG: 2 INJECTION INTRAMUSCULAR; INTRAVENOUS at 11:01

## 2022-01-15 RX ADMIN — VENLAFAXINE 75 MG: 37.5 TABLET ORAL at 09:01

## 2022-01-15 RX ADMIN — LEVETIRACETAM INJECTION 500 MG: 5 INJECTION INTRAVENOUS at 09:01

## 2022-01-15 RX ADMIN — POTASSIUM CHLORIDE 40 MEQ: 7.46 INJECTION, SOLUTION INTRAVENOUS at 01:01

## 2022-01-15 RX ADMIN — CALCIUM CHLORIDE 1 G: 100 INJECTION, SOLUTION INTRAVENOUS at 02:01

## 2022-01-15 RX ADMIN — HYDROCODONE BITARTRATE AND ACETAMINOPHEN 1 TABLET: 10; 325 TABLET ORAL at 11:01

## 2022-01-15 RX ADMIN — MAGNESIUM SULFATE IN WATER 2 G: 40 INJECTION, SOLUTION INTRAVENOUS at 09:01

## 2022-01-15 RX ADMIN — POTASSIUM CHLORIDE 20 MEQ: 20 TABLET, EXTENDED RELEASE ORAL at 04:01

## 2022-01-15 RX ADMIN — DICYCLOMINE HYDROCHLORIDE 20 MG: 10 CAPSULE ORAL at 04:01

## 2022-01-15 RX ADMIN — LEVETIRACETAM INJECTION 500 MG: 5 INJECTION INTRAVENOUS at 10:01

## 2022-01-15 RX ADMIN — GADOBUTROL 5 ML: 604.72 INJECTION INTRAVENOUS at 04:01

## 2022-01-15 RX ADMIN — POTASSIUM CHLORIDE 40 MEQ: 1500 TABLET, EXTENDED RELEASE ORAL at 09:01

## 2022-01-15 RX ADMIN — Medication 1 TABLET: at 04:01

## 2022-01-15 RX ADMIN — DICYCLOMINE HYDROCHLORIDE 20 MG: 10 CAPSULE ORAL at 09:01

## 2022-01-15 RX ADMIN — POTASSIUM CHLORIDE 40 MEQ: 10 INJECTION, SOLUTION INTRAVENOUS at 09:01

## 2022-01-15 RX ADMIN — Medication 1 TABLET: at 09:01

## 2022-01-15 RX ADMIN — ACETAMINOPHEN 1000 MG: 10 INJECTION INTRAVENOUS at 06:01

## 2022-01-15 RX ADMIN — HYDROCODONE BITARTRATE AND ACETAMINOPHEN 1 TABLET: 10; 325 TABLET ORAL at 04:01

## 2022-01-15 NOTE — NURSING
Spoke to Dr. Barker regarding pt. Consult. He ordered 500mg keppra STAT and starting 1/15 am 500mg keppra BID. Order read back and verified.

## 2022-01-15 NOTE — PROGRESS NOTES
[facility] Medicine  Progress Note    Patient name: Carline Chang  MRN: [id number]  Admit Date: 1/14/2022   LOS: 1 day     SUBJECTIVE:     Principal problem: Seizure    Interval History:   Nursing reports no further episodes of seizures. Patient pulled at lines and hudson overnight and required restraints. Alert to self. Unaware of location, time, president. Follows commands and responds appropriately. Complains of tongue pain and swelling. Repeat labs pending.      Scheduled Meds:   acetaminophen  1,000 mg Intravenous Q8H    cefTRIAXone (ROCEPHIN) 1 g in dextrose 5 % 50 mL IVPB (ready to mix system)  1 g Intravenous Q24H    dicyclomine  20 mg Oral QID    levetiracetam IV  500 mg Intravenous Q12H    magnesium sulfate IVPB  2 g Intravenous Once    pantoprazole  20 mg Oral QHS    potassium chloride SA  20 mEq Oral BID    predniSONE  20 mg Oral Daily    venlafaxine  75 mg Oral BID     Continuous Infusions:   dexmedetomidine (PRECEDEX) infusion      dextrose 5 % 75 mL/hr at 01/14/22 2039     PRN Meds:calcium chloride IVPB, calcium chloride IVPB, calcium chloride IVPB, dextrose 50%, dextrose 50%, glucagon (human recombinant), glucose, glucose, hydrALAZINE, hydrALAZINE, lorazepam, magnesium oxide, magnesium sulfate IVPB, magnesium sulfate IVPB, magnesium sulfate IVPB, magnesium sulfate IVPB, naloxone, potassium chloride in water, potassium chloride in water, potassium chloride in water, potassium chloride in water, potassium chloride, potassium chloride, potassium chloride, potassium chloride, sodium chloride 0.9%    Review of patient's allergies indicates:   Allergen Reactions    Remicade [infliximab] Shortness Of Breath and Palpitations     Severe muscle and joint, and bone pain    Morphine Other (See Comments)     Abdominal pain    Flagyl [metronidazole] Other (See Comments)     Neuropathy    Nubain [nalbuphine] Anxiety     Upper abdominal burning        Review of  Systems  As per subjective    OBJECTIVE:     Vital Signs (Most Recent)  Temp: 97.9 °F (36.6 °C) (01/15/22 0301)  Pulse: 87 (01/15/22 0605)  Resp: (!) 29 (01/15/22 0605)  BP: (!) 168/74 (01/15/22 0605)  SpO2: 100 % (01/15/22 0605)    Vital Signs Range (Last 24H):  Temp:  [97.9 °F (36.6 °C)-98.2 °F (36.8 °C)]   Pulse:  []   Resp:  [11-52]   BP: (108-168)/(55-91)   SpO2:  [82 %-100 %]     I & O (Last 24H):    Intake/Output Summary (Last 24 hours) at 1/15/2022 0855  Last data filed at 1/15/2022 0533  Gross per 24 hour   Intake 1782.96 ml   Output 1000 ml   Net 782.96 ml       Physical Exam:  General: Patient resting comfortably in no acute distress. Appears as stated age. Calm  Eyes: EOM intact. No conjunctivae injection. No scleral icterus.  ENT: Hearing grossly intact. No discharge from ears. No nasal discharge.   CVS: RRR. No LE edema BL.  Lungs: CTA BL, no wheezing or crackles. Good breath sounds. No accessory muscle use. No acute respiratory distress  Neuro: Alert. Cranial nerves grossly intact. Moves all extremities equally. Follows commands. Responds appropriately   Psych: Mood, behavior, thought content and judgement normal    Laboratory:  All pertinent labs within the past 24 hours have been reviewed.  CBC:   Recent Labs   Lab 01/14/22  1420   WBC 14.52*   HGB 12.0   HCT 36.7*        CMP:   Recent Labs   Lab 01/14/22  1420 01/15/22  0009 01/15/22  0626    139 135*   K 2.2* 2.4* 2.4*    103 103   CO2 19* 21* 21*   GLU 70 127* 118*   BUN 10 10 7   CREATININE 0.8 1.0 0.8   CALCIUM 5.8* 6.5* 9.1   PROT 6.2  --   --    ALBUMIN 2.8*  --   --    BILITOT 1.0  --   --    ALKPHOS 65  --   --    AST 13  --   --    ALT 16  --   --    ANIONGAP 14 15 11   EGFRNONAA >60.0 >60.0 >60.0       Microbiology Results (last 7 days)     ** No results found for the last 168 hours. **           Diagnostic Results:      ASSESSMENT/PLAN:     Active Hospital Problems    Diagnosis  POA    *Seizure activity  [R56.9]   Yes    Electrolyte abnormality [E87.8]  Yes    Chronic anemia [D64.9]  Yes     Chronic    Bowel perforation in the past  [K63.1]  Yes     Chronic    Chronic, continuous use of opioids [F11.90]  Yes     Chronic    Calculus of ureter [N20.1]  Yes    GERD (gastroesophageal reflux disease) [K21.9]  Yes     Chronic    Crohn's disease of both small and large intestine with intestinal obstruction [K50.812]  Yes     Chronic      Resolved Hospital Problems   No resolved problems to display.           Plan:   ICU  Aggressive electrolyte repletion  Empirically rocephin IV  Keppra IV BID  Ativan PRN seizures  Continue home medications    Neurology consult  Sees Dr. Hughes, GI, outpatient      VTE Risk Mitigation (From admission, onward)         Ordered     IP VTE LOW RISK PATIENT  Once         01/14/22 1725     Place sequential compression device  Until discontinued         01/14/22 1725                        Patient care time was spent personally by me on the following activities: > 35 min  · Obtaining a history.  · Examination of patient.  · Providing medical care at the patients bedside.  · Developing a treatment plan with patient or surrogate and bedside caregivers.  · Ordering and reviewing laboratory studies, radiographic studies, pulse oximetry.  · Ordering and performing treatments and interventions.  · Evaluation of patient's response to treatment.  · Discussions with consultants while on the unit and immediately available to the patient.  · Re-evaluation of the patient's condition.  · Documentation in the medical record.       Department Hospital Medicine  [facility]  Harshad Garcia MD    Please note: This note was transcribed using voice recognition software. Because of this technology, there are often uinintended grammatical, spelling, and other transcription errors. Please disregard these errors.

## 2022-01-15 NOTE — SUBJECTIVE & OBJECTIVE
Past Medical History:   Diagnosis Date    Acute deep vein thrombosis (DVT) of brachial vein of right upper extremity 2017    on RUE ultrasound    Anxiety     Bowel perforation     Chronic pain     Crohn's disease of both small and large intestine with intestinal obstruction     Difficult intravenous access     Encounter for blood transfusion     GERD (gastroesophageal reflux disease)     Kidney stones     Nephrolithiasis     Stricture of bowel        Past Surgical History:   Procedure Laterality Date    ABSCESS DRAINAGE      ANAL FISTULOTOMY      BOWEL RESECTION      small bowel resection per Dr. Uribe 2018     SECTION      x2    CHOLECYSTECTOMY  2000    COLON SURGERY  2015    sigmoid loop colostomy    COLONOSCOPY N/A 2016    Procedure: COLONOSCOPY;  Surgeon: Andre Uribe MD;  Location: [facility] ENDO (4TH FLR);  Service: Endoscopy;  Laterality: N/A;    COLONOSCOPY N/A 2017    Procedure: COLONOSCOPY;  Surgeon: Dany Stock MD;  Location: [facility] ENDO (2ND FLR);  Service: Endoscopy;  Laterality: N/A;    COLONOSCOPY N/A 2017    Procedure: COLONOSCOPY;  Surgeon: Andre Uribe MD;  Location: [facility] ENDO (4TH FLR);  Service: Endoscopy;  Laterality: N/A;    COLONOSCOPY N/A 2018    Procedure: COLONOSCOPY;  Surgeon: Andre Uribe MD;  Location: [facility] ENDO (4TH FLR);  Service: Endoscopy;  Laterality: N/A;    COLONOSCOPY N/A 3/24/2018    Procedure: COLONOSCOPY;  Surgeon: Elmer Serrato MD;  Location: [facility] ENDO (2ND FLR);  Service: Endoscopy;  Laterality: N/A;    COLONOSCOPY  3/24/2018    COLONOSCOPY N/A 2018    Procedure: COLONOSCOPY;  Surgeon: Andre Uribe MD;  Location: [facility] ENDO (4TH FLR);  Service: Endoscopy;  Laterality: N/A;    COLONOSCOPY N/A 10/7/2021    Procedure: COLONOSCOPY;  Surgeon: Jose Hughes MD;  Location: [facility] ENDO;  Service: Endoscopy;  Laterality: N/A;    ESOPHAGOGASTRODUODENOSCOPY N/A 10/7/2021    Procedure:  EGD (ESOPHAGOGASTRODUODENOSCOPY);  Surgeon: Jose Hughes MD;  Location: [facility];  Service: Endoscopy;  Laterality: N/A;    LAPAROSCOPIC CLOSURE OF COLOSTOMY N/A 8/29/2018    Procedure: CLOSURE, COLOSTOMY, LAPAROSCOPIC;  Surgeon: Andre Uribe MD;  [facility];  Service: Colon and Rectal;  Laterality: N/A;  converted to open    rectovaginal fistula repair  01/2018    SMALL INTESTINE SURGERY  1995    per patient 10 inches removed    SMALL INTESTINE SURGERY  02/2009    abdominal abscess drained, 3 cm colon removed, 11 cm SB removed    TUBAL LIGATION      UPPER GASTROINTESTINAL ENDOSCOPY  2000       Review of patient's allergies indicates:   Allergen Reactions    Remicade [infliximab] Shortness Of Breath and Palpitations     Severe muscle and joint, and bone pain    Morphine Other (See Comments)     Abdominal pain    Flagyl [metronidazole] Other (See Comments)     Neuropathy    Nubain [nalbuphine] Anxiety     Upper abdominal burning        Current Facility-Administered Medications on File Prior to Encounter   Medication    cyanocobalamin injection 1,000 mcg     Current Outpatient Medications on File Prior to Encounter   Medication Sig    calcium carbonate (TUMS ORAL) Take 1 tablet by mouth as needed (acid reflux).    dicyclomine (BENTYL) 20 mg tablet Take 20 mg by mouth every 6 (six) hours.    HYDROcodone-acetaminophen (NORCO)  mg per tablet Take 1 tablet by mouth every 6 (six) hours as needed for Pain.    pantoprazole (PROTONIX) 20 MG tablet Take 20 mg by mouth every evening.    potassium chloride SA (K-DUR,KLOR-CON) 20 MEQ tablet Take 1 tablet (20 mEq total) by mouth 2 (two) times daily.    predniSONE (DELTASONE) 20 MG tablet Take 20 mg by mouth once daily.    venlafaxine (EFFEXOR) 75 MG tablet Take 75 mg by mouth 2 (two) times daily.    hydroCHLOROthiazide (MICROZIDE) 12.5 mg capsule Take 12.5 mg by mouth once daily.    promethazine (PHENERGAN) 25 MG tablet Take 1  tablet (25 mg total) by mouth every 8 (eight) hours as needed for Nausea.    [DISCONTINUED] cyanocobalamin 1,000 mcg/mL injection 1 mL.    [DISCONTINUED] furosemide (LASIX) 20 MG tablet Take 20 mg by mouth daily as needed.    [DISCONTINUED] vancomycin (VANCOCIN) 125 MG capsule vancomycin 125 mg capsule   Take 1 capsule every 6 hours by oral route for 10 days.     Family History     Problem Relation (Age of Onset)    Cancer Maternal Aunt (66)    Heart attack Father (69)        Tobacco Use    Smoking status: Never Smoker    Smokeless tobacco: Never Used   Substance and Sexual Activity    Alcohol use: No     Alcohol/week: 0.0 standard drinks    Drug use: No    Sexual activity: Not on file     Review of Systems   Unable to perform ROS: Acuity of condition     Objective:     Vital Signs (Most Recent):  Temp: 97.9 °F (36.6 °C) (01/14/22 1202)  Pulse: 97 (01/14/22 1650)  Resp: (!) 36 (01/14/22 1650)  BP: 137/66 (01/14/22 1650)  SpO2: 96 % (01/14/22 1650) Vital Signs (24h Range):  Temp:  [97.9 °F (36.6 °C)] 97.9 °F (36.6 °C)  Pulse:  [] 97  Resp:  [11-46] 36  SpO2:  [87 %-98 %] 96 %  BP: (110-137)/(55-74) 137/66     Weight: 56.7 kg (125 lb)  Body mass index is 19.01 kg/m².    Physical Exam  Vitals and nursing note reviewed.   HENT:      Head: Normocephalic and atraumatic.      Mouth/Throat:      Mouth: Mucous membranes are moist.   Eyes:      Conjunctiva/sclera: Conjunctivae normal.   Neck:      Vascular: No JVD.   Cardiovascular:      Rate and Rhythm: Normal rate.      Heart sounds: Normal heart sounds.   Pulmonary:      Effort: Pulmonary effort is normal.      Breath sounds: Normal breath sounds.   Abdominal:      Palpations: Abdomen is soft.   Musculoskeletal:         General: Normal range of motion.   Skin:     General: Skin is warm.   Neurological:      Mental Status: She is alert. She is disoriented.      Comments: From ativan              Significant Labs:   All pertinent labs within the past 24 hours  have been reviewed.  BMP:   Recent Labs   Lab 01/14/22  1420   GLU 70      K 2.2*      CO2 19*   BUN 10   CREATININE 0.8   CALCIUM 5.8*   MG 0.4*     CBC:   Recent Labs   Lab 01/14/22  1420   WBC 14.52*   HGB 12.0   HCT 36.7*        CMP:   Recent Labs   Lab 01/14/22  1420      K 2.2*      CO2 19*   GLU 70   BUN 10   CREATININE 0.8   CALCIUM 5.8*   PROT 6.2   ALBUMIN 2.8*   BILITOT 1.0   ALKPHOS 65   AST 13   ALT 16   ANIONGAP 14   EGFRNONAA >60.0       Significant Imaging: I have reviewed all pertinent imaging results/findings within the past 24 hours.

## 2022-01-15 NOTE — PROGRESS NOTES
MRI of the Brain was done. Patient had h/x of seizure activity on yesterday. Patient received 5.0ml of gadavist in l-arm iv.

## 2022-01-15 NOTE — CONSULTS
[facility]  Department of Neurology  Neurology Consultation Note        PATIENT NAME: Carline Chang  MRN: [id number]  CSN: [id number]      TODAY'S DATE: 01/15/2022  ADMIT DATE: 1/14/2022                            CONSULTING PROVIDER: Igor Barker MD  CONSULT REQUESTED BY: Harshad Garcia MD      Reason for consult: Seizures     History obtained from chart review.    HPI per EMR: Carline Chang is a 56 y.o. female with a history of Crohn's disease s/p bowel resection , previous electrolyte abnormalities , chronic anemia came with nausea and vomiting for 2 days with generalized weakness .found to have severe electrolyte abnormalities and ER MD ordered corrections .    But while in ER , she developed seizure episode with tongue bite and admitted to ICU . CT brain will be done after she got the permanent line since it was very difficult stick . No history of seizure reported ..  she was  talking ER attending prior to this and no confusion , headache or focal weakness as per attending.    Neurology consult:  Patient was seen and examined by me this morning in the ICU.  Patient had 3 generalized tonic clonic seizure episodes in the ER.  She did bite her tongue for 1 of these episodes.  She received Ativan in the ER and was given 1 g of Keppra and admitted to the ICU for further management.  This morning, patient was alert and oriented x3 however she does not remember why she presented to the hospital nor she remembers having seizure in the ER.  She denies any history of seizures.  She states that she has Crohn's disease and status post bowel resection and has been having multiple problems since then.    She denies any headache, dizziness, lightheadedness, vision loss, unilateral weakness or sensory changes.    PREVIOUS MEDICAL HISTORY:  Past Medical History:   Diagnosis Date    Acute deep vein thrombosis (DVT) of brachial vein of right upper extremity 01/2017    on RUKASSANDRA  ultrasound    Anxiety     Bowel perforation     Chronic pain     Crohn's disease of both small and large intestine with intestinal obstruction     Difficult intravenous access     Encounter for blood transfusion     GERD (gastroesophageal reflux disease)     Kidney stones     Nephrolithiasis     Stricture of bowel      PREVIOUS SURGICAL HISTORY:  Past Surgical History:   Procedure Laterality Date    ABSCESS DRAINAGE      ANAL FISTULOTOMY      BOWEL RESECTION      small bowel resection per Dr. Uribe 2018     SECTION      x2    CHOLECYSTECTOMY  2000    COLON SURGERY  2015    sigmoid loop colostomy    COLONOSCOPY N/A 2016    Procedure: COLONOSCOPY;  Surgeon: Andre Uribe MD;  Location: [facility] (4TH FLR);  Service: Endoscopy;  Laterality: N/A;    COLONOSCOPY N/A 2017    Procedure: COLONOSCOPY;  Surgeon: Dany Stock MD;  Location: [facility] END[facility] (2ND FLR);  Service: Endoscopy;  Laterality: N/A;    COLONOSCOPY N/A 2017    Procedure: COLONOSCOPY;  Surgeon: Andre Uribe MD;  Location: [facility] (4TH FLR);  Service: Endoscopy;  Laterality: N/A;    COLONOSCOPY N/A 2018    Procedure: COLONOSCOPY;  Surgeon: Andre Uribe MD;  Location: [facility] (4TH FLR);  Service: Endoscopy;  Laterality: N/A;    COLONOSCOPY N/A 3/24/2018    Procedure: COLONOSCOPY;  Surgeon: Elmer Serrato MD;  Location: [facility] (2ND FLR);  Service: Endoscopy;  Laterality: N/A;    COLONOSCOPY  3/24/2018    COLONOSCOPY N/A 2018    Procedure: COLONOSCOPY;  Surgeon: Andre Uribe MD;  Location: [facility] (4TH FLR);  Service: Endoscopy;  Laterality: N/A;    COLONOSCOPY N/A 10/7/2021    Procedure: COLONOSCOPY;  Surgeon: Jose Hughes MD;  Location: [facility];  Service: Endoscopy;  Laterality: N/A;    ESOPHAGOGASTRODUODENOSCOPY N/A 10/7/2021    Procedure: EGD (ESOPHAGOGASTRODUODENOSCOPY);  Surgeon: Jose Hughes MD;  Location: [facility];  Service: Endoscopy;   Laterality: N/A;    LAPAROSCOPIC CLOSURE OF COLOSTOMY N/A 8/29/2018    Procedure: CLOSURE, COLOSTOMY, LAPAROSCOPIC;  Surgeon: Andre Uribe MD;  Location: [facility] OR [address] [place] [address];  Service: Colon and Rectal;  Laterality: N/A;  converted to open    rectovaginal fistula repair  01/2018    SMALL INTESTINE SURGERY  1995    per patient 10 inches removed    SMALL INTESTINE SURGERY  02/2009    abdominal abscess drained, 3 cm colon removed, 11 cm SB removed    TUBAL LIGATION      UPPER GASTROINTESTINAL ENDOSCOPY  2000     FAMILY MEDICAL HISTORY:  Family History   Problem Relation Age of Onset    Cancer Maternal Aunt 66        colon ca    Heart attack Father 69    Anesthesia problems Neg Hx     Celiac disease Neg Hx     Cirrhosis Neg Hx     Colon cancer Neg Hx     Colon polyps Neg Hx     Crohn's disease Neg Hx     Cystic fibrosis Neg Hx     Esophageal cancer Neg Hx     Hemochromatosis Neg Hx     Inflammatory bowel disease Neg Hx     Irritable bowel syndrome Neg Hx     Liver cancer Neg Hx     Liver disease Neg Hx     Rectal cancer Neg Hx     Stomach cancer Neg Hx     Ulcerative colitis Neg Hx     Mars's disease Neg Hx     Lymphoma Neg Hx     Tuberculosis Neg Hx     Scleroderma Neg Hx     Rheum arthritis Neg Hx     Multiple sclerosis Neg Hx     Melanoma Neg Hx     Lupus Neg Hx     Psoriasis Neg Hx     Skin cancer Neg Hx      SOCIAL HISTORY:  Social History     Tobacco Use    Smoking status: Never Smoker    Smokeless tobacco: Never Used   Substance Use Topics    Alcohol use: No     Alcohol/week: 0.0 standard drinks    Drug use: No     ALLERGIES:  Review of patient's allergies indicates:   Allergen Reactions    Remicade [infliximab] Shortness Of Breath and Palpitations     Severe muscle and joint, and bone pain    Morphine Other (See Comments)     Abdominal pain    Flagyl [metronidazole] Other (See Comments)     Neuropathy    Nubain [nalbuphine] Anxiety     Upper abdominal burning       HOME MEDICATIONS:  Prior to Admission medications    Medication Sig Start Date End Date Taking? Authorizing Provider   calcium carbonate (TUMS ORAL) Take 1 tablet by mouth as needed (acid reflux).   Yes Historical Provider   dicyclomine (BENTYL) 20 mg tablet Take 20 mg by mouth every 6 (six) hours.   Yes Historical Provider   HYDROcodone-acetaminophen (NORCO)  mg per tablet Take 1 tablet by mouth every 6 (six) hours as needed for Pain. 2/24/20  Yes Pablo Medina MD   pantoprazole (PROTONIX) 20 MG tablet Take 20 mg by mouth every evening.   Yes Historical Provider   potassium chloride SA (K-DUR,KLOR-CON) 20 MEQ tablet Take 1 tablet (20 mEq total) by mouth 2 (two) times daily. 11/7/19  Yes Pablo Medina MD   predniSONE (DELTASONE) 20 MG tablet Take 20 mg by mouth once daily. 9/29/21  Yes Historical Provider   venlafaxine (EFFEXOR) 75 MG tablet Take 75 mg by mouth 2 (two) times daily.   Yes Historical Provider   hydroCHLOROthiazide (MICROZIDE) 12.5 mg capsule Take 12.5 mg by mouth once daily.    Historical Provider   promethazine (PHENERGAN) 25 MG tablet Take 1 tablet (25 mg total) by mouth every 8 (eight) hours as needed for Nausea. 2/19/20   Pablo Medina MD     CURRENT SCHEDULED MEDICATIONS:   calcium-vitamin D3  1 tablet Oral TID    cefTRIAXone (ROCEPHIN) 1 g in dextrose 5 % 50 mL IVPB (ready to mix system)  1 g Intravenous Q24H    dicyclomine  20 mg Oral QID    levetiracetam IV  500 mg Intravenous Q12H    [START ON 1/16/2022] magnesium oxide  400 mg Oral Daily    pantoprazole  20 mg Oral QHS    potassium chloride SA  40 mEq Oral BID    predniSONE  20 mg Oral Daily    venlafaxine  75 mg Oral BID     CURRENT INFUSIONS:   dexmedetomidine (PRECEDEX) infusion      dextrose 5 % 75 mL/hr at 01/14/22 2039     CURRENT PRN MEDICATIONS:  calcium chloride IVPB, calcium chloride IVPB, calcium chloride IVPB, dextrose 50%, dextrose 50%, glucagon (human recombinant), glucose, glucose,  "hydrALAZINE, hydrALAZINE, HYDROcodone-acetaminophen, lorazepam, magnesium oxide, magnesium sulfate IVPB, magnesium sulfate IVPB, magnesium sulfate IVPB, magnesium sulfate IVPB, naloxone, potassium chloride in water, potassium chloride in water, potassium chloride in water, potassium chloride in water, potassium chloride, potassium chloride, potassium chloride, potassium chloride, sodium chloride 0.9%    REVIEW OF SYSTEMS:  Please refer to the HPI for all pertinent positive and negative findings. A comprehensive review of all other systems was negative.       PHYSICAL EXAM:  Patient Vitals for the past 24 hrs:   BP Temp Temp src Pulse Resp SpO2 Height Weight   01/15/22 1200 (!) 163/74 -- -- 83 20 99 % -- --   01/15/22 1100 (!) 169/73 97.3 °F (36.3 °C) -- 86 20 99 % -- --   01/15/22 1000 (!) 171/78 -- -- 88 20 100 % -- --   01/15/22 0800 (!) 188/79 -- -- 76 (!) 23 100 % -- --   01/15/22 0700 (!) 160/73 97.3 °F (36.3 °C) -- 73 (!) 23 99 % -- --   01/15/22 0605 (!) 168/74 -- -- 87 (!) 29 100 % -- --   01/15/22 0501 (!) 166/70 -- -- 74 (!) 25 100 % -- --   01/15/22 0401 (!) 148/81 -- -- 93 (!) 28 100 % -- --   01/15/22 0301 (!) 138/91 97.9 °F (36.6 °C) Oral 95 (!) 26 100 % -- --   01/15/22 0201 136/78 -- -- 90 (!) 30 100 % -- --   01/15/22 0101 (!) 142/70 98.1 °F (36.7 °C) Oral 81 (!) 24 99 % -- --   01/15/22 0010 (!) 141/65 -- -- 92 (!) 29 100 % -- --   01/14/22 2301 131/73 98.2 °F (36.8 °C) Oral 92 (!) 28 98 % 5' 8" (1.727 m) 56.7 kg (125 lb)   01/14/22 2201 (!) 147/63 -- -- 91 (!) 31 96 % -- --   01/14/22 2130 134/66 98 °F (36.7 °C) Oral 92 (!) 32 99 % -- --   01/14/22 1916 -- -- -- (!) 124 (!) 32 (!) 91 % -- --   01/14/22 1840 108/62 -- -- (!) 123 (!) 38 -- -- --   01/14/22 1837 -- -- -- (!) 117 (!) 34 -- -- --   01/14/22 1831 -- -- -- (!) 126 (!) 32 (!) 88 % -- --   01/14/22 1824 -- -- -- (!) 157 (!) 35 (!) 82 % -- --   01/14/22 1820 111/68 -- -- (!) 134 (!) 42 (!) 86 % -- --   01/14/22 1803 -- -- -- (!) 142 (!) 52 " (!) 88 % -- --   01/14/22 1800 (!) 115/58 -- -- (!) 123 (!) 41 (!) 91 % -- --   01/14/22 1650 137/66 -- -- 97 (!) 36 96 % -- --   01/14/22 1640 127/71 -- -- (!) 115 (!) 46 95 % -- --   01/14/22 1635 112/69 -- -- (!) 111 (!) 35 95 % -- --   01/14/22 1629 -- -- -- (!) 115 (!) 36 (!) 94 % -- --   01/14/22 1628 -- -- -- (!) 123 (!) 31 (!) 87 % -- --   01/14/22 1620 (!) 110/55 -- -- 91 (!) 29 (!) 93 % -- --   01/14/22 1610 119/67 -- -- 91 (!) 35 (!) 94 % -- --   01/14/22 1609 -- -- -- 90 (!) 31 96 % -- --       GENERAL APPEARANCE: Alert, well-developed, well-nourished female in no acute distress.  HEENT: Normocephalic and atraumatic. PERRL. Oropharynx unremarkable.  PULM: Normal respiratory effort. No accessory muscle use.  CV: RRR.  ABDOMEN: Soft, nontender.  EXTREMITIES: No obvious signs of vascular compromise. Pulses present. No cyanosis, clubbing or edema.  SKIN: Clear; no rashes, lesions or skin breaks in exposed areas.    NEURO:  MENTAL STATUS: Patient is alert oriented x3.  Does not remember how she presented to the hospital and does not remember what happened in the ER.  He is able to follow commands appropriately..  Affect euthymic.    CRANIAL NERVES:  CN I: Not tested.  CN II: Fundoscopic exam deferred.  CN III, IV, VI: Pupils equal, round and reactive to light.  Extraocular movements full and intact.  CN V: Facial sensation normal.  CN VII: Facial asymmetry absent.  CN VIII: Hearing grossly normal and equal bilaterally.  No skew deviation or pathologic nystagmus.  CN IX, X: Palate elevates symmetrically. Speech/articulation is Mildly dysarthric due to recent tongue bite.  CN XI: Shoulder shrug and chin rotation equal with good strength.  CN XII: Tongue protrusion midline.    MOTOR:  Bulk normal. Tone normal and symmetric throughout.  Abnormal movements absent.  Tremor: none present.  Strength 5/5 throughout except/unless specified below:.  4/5 in bilateral lower extremities    REFLEXES:  DTRs 2+ throughout.   Plantar response downgoing bilaterally.  SENSATION: grossly intact throughout.  COORDINATION: normal finger-to-nose.  STATION: not tested.  GAIT: not tested.    Labs:  Recent Labs   Lab 01/14/22  1420 01/14/22  1420 01/15/22  0009 01/15/22  0626 01/15/22  1251 01/15/22  1252      < > 139 135*  --  135*   K 2.2*   < > 2.4* 2.4*  --  3.7      < > 103 103  --  100   CO2 19*   < > 21* 21*  --  22*   BUN 10   < > 10 7  --  6   CREATININE 0.8   < > 1.0 0.8  --  0.9   GLU 70   < > 127* 118*  --  85   CALCIUM 5.8*   < > 6.5* 9.1  --  9.7   MG 0.4*  --   --  1.2* 2.2  --     < > = values in this interval not displayed.     Recent Labs   Lab 01/14/22  1420 01/15/22  1252   WBC 14.52* 23.06*   HGB 12.0 13.6   HCT 36.7* 42.6    189     Recent Labs   Lab 01/14/22  1420   ALBUMIN 2.8*   PROT 6.2   BILITOT 1.0   ALKPHOS 65   ALT 16   AST 13     Lab Results   Component Value Date    INR 1.2 03/18/2018     No results found for: CHOLTOT, TRIG, HDL, CHOLHDL  Lab Results   Component Value Date    HGBA1C 5.6 01/14/2022     No results found for: PROTEINCSF, GLUCCSF, WBCCSF    Imaging:  I have reviewed and interpreted the pertinent imaging and lab results.      CT Head Without Contrast  Narrative: EXAMINATION:  CT HEAD WITHOUT CONTRAST    CLINICAL HISTORY:  Seizure, new-onset, no history of trauma;    TECHNIQUE:  Initial acquisition limited by motion artifact and patient positioning, 2nd acquisition has less motion artifact.    CMS Mandated Quality Data-CT Radiation Dose-436    All CT scans at this facility dose modulation, iterative reconstruction, and or weight-based dosing when appropriate to reduce radiation dose to as low as reasonably achievable.    COMPARISON:  None    FINDINGS:  Negative for acute intracranial hemorrhage, midline shift, or mass effect.  Ventricles and sulci are normal in size.  Gray-white differentiation is maintained.  Periventricular and deep white matter hypoattenuation consistent with  microangiopathic change.  Cerebellar hemispheres and brainstem are unremarkable.    No calvarial lesion or fracture.  Mastoid air cells appear clear.  Mucosal thickening in fluid within the maxillary sinuses.  Impression: No CT evidence of acute intracranial pathology.    White matter microangiopathic changes.    Electronically signed by: Min Zamudio MD  Date:    01/14/2022  Time:    19:07         ASSESSMENT & PLAN:    Status epilepticus  New onset seizures  Crohn's disease status post bowel resection  Electrolyte imbalance  56-year-old female with history of Crohn's disease status post bowel resection presented with nausea, vomiting and noted to have multiple electrolyte imbalance including hypokalemia, hypocalcemia, hypomagnesemia.  In the ER she had 3 back-to-back seizures without return to baseline mental status.  She was admitted to the ICU for management of status epilepticus.  Seizures likely in the setting of electrolyte imbalance.  Likely postictal state causing memory impairment    Workup  CT head:  No acute intracranial abnormality  MRI brain:  Pending  EEG:  Pending    Plan  · Patient is mental status improved and has had no more seizures since last night.  Status epilepticus is resolved.  · Continue care with Q 4 neuro checks.  Can be moved out of the ICU.  · She was given loading dose of Keppra 1500 mg at followed by 500 mg b.i.d. maintenance dose.  · Please correct all electrolyte abnormalities.  · Seizure precautions  · MRI brain and an EEG pending  · Recommend continuing Keppra 500 b.i.d. on discharge and can be tapered off outpatient if had no further seizure episodes.  · Will continue to follow          Thank you kindly for including us in the care of this patient. Please do not hesitate to contact us with any questions.      Critical Care:  56 minutes of critical care time has been spent evaluating with the patient. Time includes chart review not limited to diagnostic imaging, labs, and  vitals, patient assessment, discussion with family and nursing, current order evaluations, and new order entries.       Igor Barker MD  Neurology/vascular Neurology  Date of Service: 01/15/2022  4:08 PM    --------------------------------------------------------------------------------------------------------------------------------------------------------------------------------------------------------------------------------------------------------------  Please note: This note was transcribed using voice recognition software. Because of this technology there are often uinintended grammatical, spelling, and other transcription errors. Please disregard these errors.

## 2022-01-15 NOTE — ED NOTES
CONTINUES TO PULL AT ALL MEDICAL EQUIPMENT.  NO FURTHER SZ ACTIVITY.  ST AT MONITOR. NO RD.  GCS REMAINS 8.  REPORT TO SINCERE LOPEZ POST CT SCAN.

## 2022-01-15 NOTE — ED NOTES
WITNESSED SZ.  SZ PRECAUTIONS.  RT AND MD CALLED TO BS.  RESUSCITATION EQUIPMENT AT BS. [employer] PRECAUTIONS.

## 2022-01-15 NOTE — PLAN OF CARE
[facility]  Initial Discharge Assessment       Primary Care Provider: Pablo Medina MD (Inactive)    Admission Diagnosis: Weakness [R53.1]  Hypokalemia [E87.6]    Admission Date: 1/14/2022  Expected Discharge Date: 1/16/2022    Discharge Barriers Identified: None     Assessment completed at bedside.  No advanced directives or POA at this time.  Patient intends to discharge home where she lives with her mother with no needs identified at this time.    Payor: MEDICARE / Plan: MEDICARE PART A & B / Product Type: Government /     Extended Emergency Contact Information  Primary Emergency Contact: Tanesha Chang   [place]  Mobile Phone: [phone]  Relation: Mother  Preferred language: English   needed? No  Secondary Emergency Contact: Darwin Chang   [facility]  Home Phone: [phone]  Relation: Brother  Preferred language: English   needed? No    Discharge Plan A: Home  Discharge Plan B: Home with family      [facility] - [address] [address]  Phone: [phone] Fax: [phone]    [facility] [address] [place] [address] [facility] [place] [address]  Phone: [phone] Fax: [phone]      Initial Assessment (most recent)     Adult Discharge Assessment - 01/15/22 1410        Discharge Assessment    Assessment Type Discharge Planning Assessment     Confirmed/corrected address, phone number and insurance Yes     Confirmed Demographics Correct on Facesheet     Source of Information patient     When was your last doctors appointment? --   not sure    Communicated ANGIE with patient/caregiver Date not available/Unable to determine     Reason For Admission seizures     Lives With parent(s)     Facility Arrived From: home     Do you expect to return to your current living situation? Yes     Do you have help at home or someone to help you manage  your care at home? Yes     Who are your caregiver(s) and their phone number(s)? Tanesha Gr [phone]     Prior to hospitilization cognitive status: Alert/Oriented     Current cognitive status: Alert/Oriented     Walking or Climbing Stairs Difficulty none     Dressing/Bathing Difficulty none     Home Accessibility not wheelchair accessible     Home Layout Able to live on 1st floor     Equipment Currently Used at Home none     Readmission within 30 days? No     Patient currently being followed by outpatient case management? No     Do you currently have service(s) that help you manage your care at home? No     Do you take prescription medications? Yes     Do you have prescription coverage? Yes     Coverage medicare     Do you have any problems affording any of your prescribed medications? No     Is the patient taking medications as prescribed? yes     Who is going to help you get home at discharge? mother     How do you get to doctors appointments? car, drives self     Are you on dialysis? No     Do you take coumadin? No     Discharge Plan A Home     Discharge Plan B Home with family     DME Needed Upon Discharge  none     Discharge Plan discussed with: Patient     Discharge Barriers Identified None        Relationship/Environment    Name(s) of Who Lives With Patient Tanesha gr

## 2022-01-15 NOTE — H&P
[facility] - Emergency Dept  Hospital Medicine  History & Physical    Patient Name: Carline Chang  MRN: [id number]  Patient Class: IP- Inpatient  Admission Date: 2022  Attending Physician: No att. providers found   Primary Care Provider: Pablo Medina MD (Inactive)         Patient information was obtained from patient and ER records.     Subjective:     Principal Problem:<principal problem not specified>    Chief Complaint:   Chief Complaint   Patient presents with    Vomiting    Nausea     EMS gave 4mg zofran w/o relief        HPI:   56-year-old female who has a history of Crohn's disease s/p bowel resection , previous electrolyte abnormalities , chronic anemia came with nausea and vomiting for 2 days with generalized weakness .found to have severe electrolyte abnormalities and ER MD ordered corrections .  But while in ER , she developed seizure episode with tongue bite and admitted to ICU . CT brain will be done after she got the permanent line since it was very difficult stick . No history of seizure reported ..  she was  talking ER attending prior to this and no confusion , headache or focal weakness as per attending ..  On exam , no information from patient since she got ativan . Keppra started .           Past Medical History:   Diagnosis Date    Acute deep vein thrombosis (DVT) of brachial vein of right upper extremity 2017    on RUE ultrasound    Anxiety     Bowel perforation     Chronic pain     Crohn's disease of both small and large intestine with intestinal obstruction     Difficult intravenous access     Encounter for blood transfusion     GERD (gastroesophageal reflux disease)     Kidney stones     Nephrolithiasis     Stricture of bowel        Past Surgical History:   Procedure Laterality Date    ABSCESS DRAINAGE      ANAL FISTULOTOMY  2018    BOWEL RESECTION      small bowel resection per Dr. Uribe 2018     SECTION      x2     CHOLECYSTECTOMY  2000    COLON SURGERY  06/2015    sigmoid loop colostomy    COLONOSCOPY N/A 5/27/2016    Procedure: COLONOSCOPY;  Surgeon: Andre Uribe MD;  Location: [facility] ENDO (4TH FLR);  Service: Endoscopy;  Laterality: N/A;    COLONOSCOPY N/A 1/17/2017    Procedure: COLONOSCOPY;  Surgeon: Dany Stock MD;  Location: [facility] ENDO (2ND FLR);  Service: Endoscopy;  Laterality: N/A;    COLONOSCOPY N/A 12/4/2017    Procedure: COLONOSCOPY;  Surgeon: Andre Uribe MD;  Location: [facility] ENDO (4TH FLR);  Service: Endoscopy;  Laterality: N/A;    COLONOSCOPY N/A 2/8/2018    Procedure: COLONOSCOPY;  Surgeon: Andre Uribe MD;  Location: [facility] ENDO (4TH FLR);  Service: Endoscopy;  Laterality: N/A;    COLONOSCOPY N/A 3/24/2018    Procedure: COLONOSCOPY;  Surgeon: Elmer Serrato MD;  Location: [facility] ENDO (2ND FLR);  Service: Endoscopy;  Laterality: N/A;    COLONOSCOPY  3/24/2018    COLONOSCOPY N/A 7/6/2018    Procedure: COLONOSCOPY;  Surgeon: Andre Uribe MD;  Location: [facility] ENDO (4TH FLR);  Service: Endoscopy;  Laterality: N/A;    COLONOSCOPY N/A 10/7/2021    Procedure: COLONOSCOPY;  Surgeon: Jose Hughes MD;  Location: [facility] ENDO;  Service: Endoscopy;  Laterality: N/A;    ESOPHAGOGASTRODUODENOSCOPY N/A 10/7/2021    Procedure: EGD (ESOPHAGOGASTRODUODENOSCOPY);  Surgeon: Jose Hughes MD;  Location: [facility];  Service: Endoscopy;  Laterality: N/A;    LAPAROSCOPIC CLOSURE OF COLOSTOMY N/A 8/29/2018    Procedure: CLOSURE, COLOSTOMY, LAPAROSCOPIC;  Surgeon: Andre Uribe MD;  Location: [facility] OR 2ND FLR;  Service: Colon and Rectal;  Laterality: N/A;  converted to open    rectovaginal fistula repair  01/2018    SMALL INTESTINE SURGERY  1995    per patient 10 inches removed    SMALL INTESTINE SURGERY  02/2009    abdominal abscess drained, 3 cm colon removed, 11 cm SB removed    TUBAL LIGATION      UPPER GASTROINTESTINAL ENDOSCOPY  2000       Review of patient's allergies indicates:    Allergen Reactions    Remicade [infliximab] Shortness Of Breath and Palpitations     Severe muscle and joint, and bone pain    Morphine Other (See Comments)     Abdominal pain    Flagyl [metronidazole] Other (See Comments)     Neuropathy    Nubain [nalbuphine] Anxiety     Upper abdominal burning        Current Facility-Administered Medications on File Prior to Encounter   Medication    cyanocobalamin injection 1,000 mcg     Current Outpatient Medications on File Prior to Encounter   Medication Sig    calcium carbonate (TUMS ORAL) Take 1 tablet by mouth as needed (acid reflux).    dicyclomine (BENTYL) 20 mg tablet Take 20 mg by mouth every 6 (six) hours.    HYDROcodone-acetaminophen (NORCO)  mg per tablet Take 1 tablet by mouth every 6 (six) hours as needed for Pain.    pantoprazole (PROTONIX) 20 MG tablet Take 20 mg by mouth every evening.    potassium chloride SA (K-DUR,KLOR-CON) 20 MEQ tablet Take 1 tablet (20 mEq total) by mouth 2 (two) times daily.    predniSONE (DELTASONE) 20 MG tablet Take 20 mg by mouth once daily.    venlafaxine (EFFEXOR) 75 MG tablet Take 75 mg by mouth 2 (two) times daily.    hydroCHLOROthiazide (MICROZIDE) 12.5 mg capsule Take 12.5 mg by mouth once daily.    promethazine (PHENERGAN) 25 MG tablet Take 1 tablet (25 mg total) by mouth every 8 (eight) hours as needed for Nausea.    [DISCONTINUED] cyanocobalamin 1,000 mcg/mL injection 1 mL.    [DISCONTINUED] furosemide (LASIX) 20 MG tablet Take 20 mg by mouth daily as needed.    [DISCONTINUED] vancomycin (VANCOCIN) 125 MG capsule vancomycin 125 mg capsule   Take 1 capsule every 6 hours by oral route for 10 days.     Family History     Problem Relation (Age of Onset)    Cancer Maternal Aunt (66)    Heart attack Father (69)        Tobacco Use    Smoking status: Never Smoker    Smokeless tobacco: Never Used   Substance and Sexual Activity    Alcohol use: No     Alcohol/week: 0.0 standard drinks    Drug use: No     Sexual activity: Not on file     Review of Systems   Unable to perform ROS: Acuity of condition     Objective:     Vital Signs (Most Recent):  Temp: 97.9 °F (36.6 °C) (01/14/22 1202)  Pulse: 97 (01/14/22 1650)  Resp: (!) 36 (01/14/22 1650)  BP: 137/66 (01/14/22 1650)  SpO2: 96 % (01/14/22 1650) Vital Signs (24h Range):  Temp:  [97.9 °F (36.6 °C)] 97.9 °F (36.6 °C)  Pulse:  [] 97  Resp:  [11-46] 36  SpO2:  [87 %-98 %] 96 %  BP: (110-137)/(55-74) 137/66     Weight: 56.7 kg (125 lb)  Body mass index is 19.01 kg/m².    Physical Exam  Vitals and nursing note reviewed.   HENT:      Head: Normocephalic and atraumatic.      Mouth/Throat:      Mouth: Mucous membranes are moist.   Eyes:      Conjunctiva/sclera: Conjunctivae normal.   Neck:      Vascular: No JVD.   Cardiovascular:      Rate and Rhythm: Normal rate.      Heart sounds: Normal heart sounds.   Pulmonary:      Effort: Pulmonary effort is normal.      Breath sounds: Normal breath sounds.   Abdominal:      Palpations: Abdomen is soft.   Musculoskeletal:         General: Normal range of motion.   Skin:     General: Skin is warm.   Neurological:      Mental Status: She is alert. She is disoriented.      Comments: From ativan              Significant Labs:   All pertinent labs within the past 24 hours have been reviewed.  BMP:   Recent Labs   Lab 01/14/22  1420   GLU 70      K 2.2*      CO2 19*   BUN 10   CREATININE 0.8   CALCIUM 5.8*   MG 0.4*     CBC:   Recent Labs   Lab 01/14/22  1420   WBC 14.52*   HGB 12.0   HCT 36.7*        CMP:   Recent Labs   Lab 01/14/22  1420      K 2.2*      CO2 19*   GLU 70   BUN 10   CREATININE 0.8   CALCIUM 5.8*   PROT 6.2   ALBUMIN 2.8*   BILITOT 1.0   ALKPHOS 65   AST 13   ALT 16   ANIONGAP 14   EGFRNONAA >60.0       Significant Imaging: I have reviewed all pertinent imaging results/findings within the past 24 hours.    Assessment/Plan:     Active Hospital Problems    Diagnosis    Seizure activity      Electrolyte abnormality    Chronic anemia    Bowel perforation in the past     Chronic, continuous use of opioids    Calculus of ureter    GERD (gastroesophageal reflux disease)    Crohn's disease of both small and large intestine with intestinal obstruction       PLAN   Hydrate and repeat WBC   Antibiotics for now   Aggressive K supplementation   Get MID line   For CT brain   Admit to ICU   keppra IV   Ativan PRN   Supplement Ca and Mg   electrolyte sliding scale   Consult neurology   If needed , GI consult     VTE Risk Mitigation (From admission, onward)         Ordered     IP VTE LOW RISK PATIENT  Once         01/14/22 1725     Place sequential compression device  Until discontinued         01/14/22 1725                   Prashant Hill MD  Department of Hospital Medicine   Formerly [facility] - Emergency Dept

## 2022-01-16 VITALS
TEMPERATURE: 99 F | OXYGEN SATURATION: 100 % | HEART RATE: 91 BPM | DIASTOLIC BLOOD PRESSURE: 80 MMHG | HEIGHT: 68 IN | WEIGHT: 125 LBS | RESPIRATION RATE: 18 BRPM | BODY MASS INDEX: 18.94 KG/M2 | SYSTOLIC BLOOD PRESSURE: 140 MMHG

## 2022-01-16 LAB
25(OH)D3+25(OH)D2 SERPL-MCNC: 12 NG/ML (ref 30–96)
ALBUMIN SERPL BCP-MCNC: 3 G/DL (ref 3.5–5.2)
ALP SERPL-CCNC: 122 U/L (ref 55–135)
ALT SERPL W/O P-5'-P-CCNC: 52 U/L (ref 10–44)
ANION GAP SERPL CALC-SCNC: 9 MMOL/L (ref 8–16)
AST SERPL-CCNC: 136 U/L (ref 10–40)
BACTERIA #/AREA URNS HPF: NEGATIVE /HPF
BASOPHILS # BLD AUTO: 0.06 K/UL (ref 0–0.2)
BASOPHILS NFR BLD: 0.4 % (ref 0–1.9)
BILIRUB SERPL-MCNC: 1.6 MG/DL (ref 0.1–1)
BILIRUB UR QL STRIP: ABNORMAL
BUN SERPL-MCNC: 9 MG/DL (ref 6–20)
CALCIUM SERPL-MCNC: 8.6 MG/DL (ref 8.7–10.5)
CHLORIDE SERPL-SCNC: 105 MMOL/L (ref 95–110)
CLARITY UR: ABNORMAL
CO2 SERPL-SCNC: 22 MMOL/L (ref 23–29)
COLOR UR: YELLOW
CREAT SERPL-MCNC: 0.7 MG/DL (ref 0.5–1.4)
DIFFERENTIAL METHOD: ABNORMAL
EOSINOPHIL # BLD AUTO: 0.1 K/UL (ref 0–0.5)
EOSINOPHIL NFR BLD: 0.8 % (ref 0–8)
ERYTHROCYTE [DISTWIDTH] IN BLOOD BY AUTOMATED COUNT: 13.8 % (ref 11.5–14.5)
EST. GFR  (AFRICAN AMERICAN): >60 ML/MIN/1.73 M^2
EST. GFR  (NON AFRICAN AMERICAN): >60 ML/MIN/1.73 M^2
GLUCOSE SERPL-MCNC: 102 MG/DL (ref 70–110)
GLUCOSE UR QL STRIP: NEGATIVE
HCT VFR BLD AUTO: 39 % (ref 37–48.5)
HGB BLD-MCNC: 12.6 G/DL (ref 12–16)
HGB UR QL STRIP: ABNORMAL
HYALINE CASTS #/AREA URNS LPF: 96 /LPF
IMM GRANULOCYTES # BLD AUTO: 0.25 K/UL (ref 0–0.04)
IMM GRANULOCYTES NFR BLD AUTO: 1.5 % (ref 0–0.5)
KETONES UR QL STRIP: NEGATIVE
LEUKOCYTE ESTERASE UR QL STRIP: NEGATIVE
LYMPHOCYTES # BLD AUTO: 0.7 K/UL (ref 1–4.8)
LYMPHOCYTES NFR BLD: 4.2 % (ref 18–48)
MAGNESIUM SERPL-MCNC: 1.7 MG/DL (ref 1.6–2.6)
MCH RBC QN AUTO: 28.4 PG (ref 27–31)
MCHC RBC AUTO-ENTMCNC: 32.3 G/DL (ref 32–36)
MCV RBC AUTO: 88 FL (ref 82–98)
MICROSCOPIC COMMENT: ABNORMAL
MONOCYTES # BLD AUTO: 0.5 K/UL (ref 0.3–1)
MONOCYTES NFR BLD: 3.2 % (ref 4–15)
NEUTROPHILS # BLD AUTO: 14.7 K/UL (ref 1.8–7.7)
NEUTROPHILS NFR BLD: 89.9 % (ref 38–73)
NITRITE UR QL STRIP: NEGATIVE
NRBC BLD-RTO: 0 /100 WBC
PH UR STRIP: 6 [PH] (ref 5–8)
PLATELET # BLD AUTO: 181 K/UL (ref 150–450)
PMV BLD AUTO: 8.5 FL (ref 9.2–12.9)
POTASSIUM SERPL-SCNC: 3.6 MMOL/L (ref 3.5–5.1)
PROT SERPL-MCNC: 6.4 G/DL (ref 6–8.4)
PROT UR QL STRIP: ABNORMAL
PTH-INTACT SERPL-MCNC: 48.6 PG/ML (ref 9–77)
RBC # BLD AUTO: 4.44 M/UL (ref 4–5.4)
RBC #/AREA URNS HPF: >100 /HPF (ref 0–4)
SODIUM SERPL-SCNC: 136 MMOL/L (ref 136–145)
SP GR UR STRIP: 1.03 (ref 1–1.03)
SQUAMOUS #/AREA URNS HPF: 7 /HPF
URN SPEC COLLECT METH UR: ABNORMAL
UROBILINOGEN UR STRIP-ACNC: NEGATIVE EU/DL
WBC # BLD AUTO: 16.31 K/UL (ref 3.9–12.7)
WBC #/AREA URNS HPF: 13 /HPF (ref 0–5)

## 2022-01-16 PROCEDURE — 97535 SELF CARE MNGMENT TRAINING: CPT

## 2022-01-16 PROCEDURE — 27000221 HC OXYGEN, UP TO 24 HOURS

## 2022-01-16 PROCEDURE — 85025 COMPLETE CBC W/AUTO DIFF WBC: CPT | Performed by: FAMILY MEDICINE

## 2022-01-16 PROCEDURE — 83970 ASSAY OF PARATHORMONE: CPT | Performed by: FAMILY MEDICINE

## 2022-01-16 PROCEDURE — 87086 URINE CULTURE/COLONY COUNT: CPT | Performed by: FAMILY MEDICINE

## 2022-01-16 PROCEDURE — 99900035 HC TECH TIME PER 15 MIN (STAT)

## 2022-01-16 PROCEDURE — 82306 VITAMIN D 25 HYDROXY: CPT | Performed by: FAMILY MEDICINE

## 2022-01-16 PROCEDURE — 36415 COLL VENOUS BLD VENIPUNCTURE: CPT | Performed by: FAMILY MEDICINE

## 2022-01-16 PROCEDURE — 97116 GAIT TRAINING THERAPY: CPT

## 2022-01-16 PROCEDURE — 81001 URINALYSIS AUTO W/SCOPE: CPT | Performed by: FAMILY MEDICINE

## 2022-01-16 PROCEDURE — 80053 COMPREHEN METABOLIC PANEL: CPT | Performed by: FAMILY MEDICINE

## 2022-01-16 PROCEDURE — 25000003 PHARM REV CODE 250: Performed by: FAMILY MEDICINE

## 2022-01-16 PROCEDURE — 63600175 PHARM REV CODE 636 W HCPCS: Performed by: INTERNAL MEDICINE

## 2022-01-16 PROCEDURE — 97166 OT EVAL MOD COMPLEX 45 MIN: CPT

## 2022-01-16 PROCEDURE — 94761 N-INVAS EAR/PLS OXIMETRY MLT: CPT

## 2022-01-16 PROCEDURE — 25000003 PHARM REV CODE 250: Performed by: INTERNAL MEDICINE

## 2022-01-16 PROCEDURE — 83735 ASSAY OF MAGNESIUM: CPT | Performed by: FAMILY MEDICINE

## 2022-01-16 PROCEDURE — 97161 PT EVAL LOW COMPLEX 20 MIN: CPT

## 2022-01-16 RX ORDER — FERROUS SULFATE, DRIED 160(50) MG
2 TABLET, EXTENDED RELEASE ORAL 3 TIMES DAILY
Status: DISCONTINUED | OUTPATIENT
Start: 2022-01-16 | End: 2022-01-16 | Stop reason: HOSPADM

## 2022-01-16 RX ORDER — LANOLIN ALCOHOL/MO/W.PET/CERES
400 CREAM (GRAM) TOPICAL 2 TIMES DAILY
Qty: 60 TABLET | Refills: 0 | Status: ON HOLD | OUTPATIENT
Start: 2022-01-16 | End: 2022-01-26 | Stop reason: HOSPADM

## 2022-01-16 RX ORDER — FERROUS SULFATE, DRIED 160(50) MG
2 TABLET, EXTENDED RELEASE ORAL 2 TIMES DAILY WITH MEALS
Qty: 120 TABLET | Refills: 0 | Status: SHIPPED | OUTPATIENT
Start: 2022-01-16 | End: 2022-03-10

## 2022-01-16 RX ORDER — POTASSIUM CHLORIDE 20 MEQ/1
40 TABLET, EXTENDED RELEASE ORAL 2 TIMES DAILY
Qty: 120 TABLET | Refills: 0 | Status: ON HOLD | OUTPATIENT
Start: 2022-01-16 | End: 2022-01-26 | Stop reason: HOSPADM

## 2022-01-16 RX ORDER — LEVETIRACETAM 500 MG/1
500 TABLET ORAL 2 TIMES DAILY
Qty: 60 TABLET | Refills: 0 | Status: SHIPPED | OUTPATIENT
Start: 2022-01-16 | End: 2023-01-16

## 2022-01-16 RX ORDER — LANOLIN ALCOHOL/MO/W.PET/CERES
400 CREAM (GRAM) TOPICAL 2 TIMES DAILY
Status: DISCONTINUED | OUTPATIENT
Start: 2022-01-16 | End: 2022-01-16 | Stop reason: HOSPADM

## 2022-01-16 RX ORDER — CHLORHEXIDINE GLUCONATE ORAL RINSE 1.2 MG/ML
15 SOLUTION DENTAL 2 TIMES DAILY
Status: DISCONTINUED | OUTPATIENT
Start: 2022-01-16 | End: 2022-01-16 | Stop reason: HOSPADM

## 2022-01-16 RX ORDER — CYCLOBENZAPRINE HCL 5 MG
10 TABLET ORAL 3 TIMES DAILY PRN
Status: DISCONTINUED | OUTPATIENT
Start: 2022-01-16 | End: 2022-01-16 | Stop reason: HOSPADM

## 2022-01-16 RX ORDER — CYCLOBENZAPRINE HCL 10 MG
10 TABLET ORAL 3 TIMES DAILY PRN
Qty: 20 TABLET | Refills: 0 | Status: SHIPPED | OUTPATIENT
Start: 2022-01-16 | End: 2022-01-26

## 2022-01-16 RX ORDER — LEVETIRACETAM 500 MG/1
500 TABLET ORAL ONCE
Status: COMPLETED | OUTPATIENT
Start: 2022-01-16 | End: 2022-01-16

## 2022-01-16 RX ADMIN — VENLAFAXINE 75 MG: 37.5 TABLET ORAL at 08:01

## 2022-01-16 RX ADMIN — CHLORHEXIDINE GLUCONATE 15 ML: 1.2 RINSE ORAL at 11:01

## 2022-01-16 RX ADMIN — HYDROCODONE BITARTRATE AND ACETAMINOPHEN 1 TABLET: 10; 325 TABLET ORAL at 12:01

## 2022-01-16 RX ADMIN — DICYCLOMINE HYDROCHLORIDE 20 MG: 10 CAPSULE ORAL at 08:01

## 2022-01-16 RX ADMIN — LEVETIRACETAM 500 MG: 500 TABLET, FILM COATED ORAL at 02:01

## 2022-01-16 RX ADMIN — MAGNESIUM OXIDE 800 MG: 400 TABLET ORAL at 07:01

## 2022-01-16 RX ADMIN — CALCIUM CARBONATE-VITAMIN D TAB 500 MG-200 UNIT 2 TABLET: 500-200 TAB at 08:01

## 2022-01-16 RX ADMIN — PREDNISONE 20 MG: 20 TABLET ORAL at 08:01

## 2022-01-16 RX ADMIN — PANTOPRAZOLE SODIUM 20 MG: 20 TABLET, DELAYED RELEASE ORAL at 07:01

## 2022-01-16 RX ADMIN — LEVETIRACETAM INJECTION 500 MG: 5 INJECTION INTRAVENOUS at 10:01

## 2022-01-16 RX ADMIN — DICYCLOMINE HYDROCHLORIDE 20 MG: 10 CAPSULE ORAL at 02:01

## 2022-01-16 RX ADMIN — CALCIUM CARBONATE-VITAMIN D TAB 500 MG-200 UNIT 2 TABLET: 500-200 TAB at 02:01

## 2022-01-16 RX ADMIN — MAGNESIUM OXIDE 400 MG: 400 TABLET ORAL at 08:01

## 2022-01-16 RX ADMIN — POTASSIUM CHLORIDE 40 MEQ: 1500 TABLET, EXTENDED RELEASE ORAL at 10:01

## 2022-01-16 RX ADMIN — POTASSIUM CHLORIDE 40 MEQ: 1500 TABLET, EXTENDED RELEASE ORAL at 07:01

## 2022-01-16 NOTE — PT/OT/SLP EVAL
Physical Therapy Evaluation and Discharge Note    Patient Name:  Carline Chang   MRN:  [id number]    Recommendations:     Discharge Recommendations:  home health PT   Discharge Equipment Recommendations: walker, rolling   Barriers to discharge: None    Assessment:     Carline Chang is a 56 y.o. female admitted with a medical diagnosis of Seizure. .Pt has diminished mental acuity, weakness, and slight impairment of standing balance following  seizure of which pt has no recollection. She required SBA for bed mobility and t/f's . Pt took a few steps without RW with some instability. She did ambulate in the room for 50 ft with RW and SBA. Pt was convinced of her need for HHPT for strengthening  for progression to her PLOF.  Pt will require 24 hour supervision for optimal safety.  She does live with  her mother who can provide this supervision. Pt's brother present in the room was also educated on pt's present needs for home safety.  HHPT and RW are recommended for D/C.    Recent Surgery: * No surgery found *      Plan:     During this hospitalization, patient does not require further acute PT services.  Please re-consult if situation changes.      Subjective     Chief Complaint: no recall of seizure activity  Patient/Family Comments/goals: home with her mother  Pain/Comfort:  ·      Patients cultural, spiritual, Islam conflicts given the current situation:      Living Environment:  Pt lives at home with her mother in as one story house w/o entry steps  Prior to admission, patients level of function was independent.  Pt works part time as a ..  Equipment used at home: none.  DME owned (not currently used): none.  Upon discharge, patient will have assistance from her mother.    Objective:     Communicated with nurse prior to session.  Patient found supine with telemetry,pulse ox (continuous) upon PT entry to room.    General Precautions: Standard, fall,seizure   Orthopedic  Precautions:    Braces:     Respiratory Status: Room air    Exams:  · Cognitive Exam:  Patient is oriented to Person, Place, Time and Situation  · RLE ROM: WFL  · RLE Strength: WFL  · LLE ROM: WFL  · LLE Strength: WFL    Functional Mobility:  · Bed Mobility:     · Supine to Sit: stand by assistance  · Sit to Supine: stand by assistance  · Transfers:     · Sit to Stand:  stand by assistance with no AD  · Gait: 50 ft RW and SBA  · Balance: sitting balance  good, dynamic standing balance fair    AM-PAC 6 CLICK MOBILITY  Total Score:        Therapeutic Activities and Exercises:  Pt was educated on  the need for HHPT ,  RW and need for 24 hour supervision    AM-PAC 6 CLICK MOBILITY  Total Score:      Patient left supine with all lines intact, call button in reach, bed alarm on and her brother present.    GOALS:   Multidisciplinary Problems     Physical Therapy Goals     Not on file                History:     Past Medical History:   Diagnosis Date    Acute deep vein thrombosis (DVT) of brachial vein of right upper extremity 2017    on RUE ultrasound    Anxiety     Bowel perforation     Chronic pain     Crohn's disease of both small and large intestine with intestinal obstruction     Difficult intravenous access     Encounter for blood transfusion     GERD (gastroesophageal reflux disease)     Kidney stones     Nephrolithiasis     Stricture of bowel        Past Surgical History:   Procedure Laterality Date    ABSCESS DRAINAGE      ANAL FISTULOTOMY  2018    BOWEL RESECTION      small bowel resection per Dr. Uribe 2018     SECTION      x2    CHOLECYSTECTOMY  2000    COLON SURGERY  2015    sigmoid loop colostomy    COLONOSCOPY N/A 2016    Procedure: COLONOSCOPY;  Surgeon: Andre Uribe MD;  Location: [facility] (4TH FLR);  Service: Endoscopy;  Laterality: N/A;    COLONOSCOPY N/A 2017    Procedure: COLONOSCOPY;  Surgeon: Dany Stock MD;  Location: [facility] (2ND  FLR);  Service: Endoscopy;  Laterality: N/A;    COLONOSCOPY N/A 12/4/2017    Procedure: COLONOSCOPY;  Surgeon: Andre Uribe MD;  Location: [facility] ENDO (4TH FLR);  Service: Endoscopy;  Laterality: N/A;    COLONOSCOPY N/A 2/8/2018    Procedure: COLONOSCOPY;  Surgeon: Andre Uribe MD;  Location: [facility] ENDO (4TH FLR);  Service: Endoscopy;  Laterality: N/A;    COLONOSCOPY N/A 3/24/2018    Procedure: COLONOSCOPY;  Surgeon: Elmer Serrato MD;  Location: [facility] ENDO (2ND FLR);  Service: Endoscopy;  Laterality: N/A;    COLONOSCOPY  3/24/2018    COLONOSCOPY N/A 7/6/2018    Procedure: COLONOSCOPY;  Surgeon: Andre Uribe MD;  Location: [facility] ENDO (4TH FLR);  Service: Endoscopy;  Laterality: N/A;    COLONOSCOPY N/A 10/7/2021    Procedure: COLONOSCOPY;  Surgeon: Jose Hughes MD;  Location: [facility];  Service: Endoscopy;  Laterality: N/A;    ESOPHAGOGASTRODUODENOSCOPY N/A 10/7/2021    Procedure: EGD (ESOPHAGOGASTRODUODENOSCOPY);  Surgeon: Jose Hughes MD;  Location: [facility];  Service: Endoscopy;  Laterality: N/A;    LAPAROSCOPIC CLOSURE OF COLOSTOMY N/A 8/29/2018    Procedure: CLOSURE, COLOSTOMY, LAPAROSCOPIC;  Surgeon: Andre Uribe MD;  Location: [facility] OR 2ND FLR;  Service: Colon and Rectal;  Laterality: N/A;  converted to open    rectovaginal fistula repair  01/2018    SMALL INTESTINE SURGERY  1995    per patient 10 inches removed    SMALL INTESTINE SURGERY  02/2009    abdominal abscess drained, 3 cm colon removed, 11 cm SB removed    TUBAL LIGATION      UPPER GASTROINTESTINAL ENDOSCOPY  2000       Time Tracking:     PT Received On: 01/16/22  PT Start Time: 1500     PT Stop Time: 1522  PT Total Time (min): 22 min     Billable Minutes: Evaluation 14 minutes and Gait Training 8 minutes      01/16/2022

## 2022-01-16 NOTE — PT/OT/SLP PROGRESS
Physical Therapy      Patient Name:  Carline Chang   MRN:  [id number]    Patient not seen today secondary to  (D/C orders have been signed)..

## 2022-01-16 NOTE — PT/OT/SLP EVAL
Occupational Therapy   Evaluation    Name: Carline Chang  MRN: [id number]  Admitting Diagnosis:  Seizure  Recent Surgery: * No surgery found *      Recommendations:     Discharge Recommendations: home health OT with 24/7 SPV/assist from pt's mother; if her mother cannot provide this, then alternate d/c plans should be considered such as IPR  Discharge Equipment Recommendations:  walker, rolling  Barriers to discharge:  None    Assessment:     Carline Chang is a 56 y.o. female with a medical diagnosis of Seizure.   Performance deficits affecting function: weakness,impaired endurance,impaired self care skills,impaired functional mobilty,gait instability,impaired balance,pain,decreased safety awareness,impaired cognition.      Pt presented AAOx4 with no c/o pain at rest; once movement was initiated pt c/o 7/10 back pain and was unable to progress past standing at the bedside due to this.  Although oriented, pt was noted to require instructions on how to properly use the call light at the start and end of the session (pt did not recall instructions from the start of the session).      Rehab Prognosis: Good; patient would benefit from acute skilled OT services to address these deficits and reach maximum level of function.       Plan:     Patient to be seen 5 x/week to address the above listed problems via self-care/home management,therapeutic activities,therapeutic exercises,cognitive retraining  · Plan of Care Expires: 02/15/22  · Plan of Care Reviewed with: patient    Subjective     Chief Complaint: back pain with movement  Patient/Family Comments/goals: return home    Occupational Profile:  Living Environment: Lives with her mother in a 2SH with bed/bath on first floor  Previous level of function: Independent  Roles and Routines: On disability   Equipment Used at Home:  none  Assistance upon Discharge: mother who pt reports is in good health and able to assist as needed    Pain/Comfort:  · Pain  Rating 1: 0/10  · Pain Rating Post-Intervention 1: 0/10    Objective:     Communicated with: RN prior to session.  Patient found supine with telemetry,pulse ox (continuous),peripheral IV,blood pressure cuff upon OT entry to room.    General Precautions: Standard, fall   Orthopedic Precautions:N/A   Braces: N/A  Respiratory Status: Room air    Occupational Performance:    Bed Mobility:    · Patient completed Supine to Sit with minimum assistance  · Patient completed Sit to Supine with stand by assistance    Functional Mobility/Transfers:  · Patient completed Sit <> Stand Transfer with contact guard assistance  with  rolling walker   · Functional Mobility: Pt unable to tolerate 2/2 c/o back pain     Activities of Daily Living:  · Lower Body Dressing: moderate assistance ; pt initiated task by donning her right sock then reported back pain being too severe to don left sock  · Grooming/hygiene: pt combed her hair while seated EOB with stand by assistance    Cognitive/Visual Perceptual:  Cognitive/Psychosocial Skills:     -       Oriented to: Person, Place, Time and Situation   -       Follows Commands/attention:Follows multistep  commands  -       Communication: clear/fluent  -       Memory: Impaired STM  -       Safety awareness/insight to disability: impaired   -       Mood/Affect/Coping skills/emotional control: Appropriate to situation    Physical Exam:  Upper Extremity Range of Motion:     -       Right Upper Extremity: WNL  -       Left Upper Extremity: WNL  Upper Extremity Strength:    -       Right Upper Extremity: WNL  -       Left Upper Extremity: WNL   Strength:    -       Right Upper Extremity: WNL  -       Left Upper Extremity: WNL  Fine Motor Coordination:    -       Intact  Gross motor coordination:   WFL    AMPAC 6 Click ADL:  AMPAC Total Score: 17    Treatment & Education:  Pt educated on OT role/POC  Education:    Patient left supine with all lines intact, call button in reach and bed alarm on and  RN notified     GOALS:   Multidisciplinary Problems     Occupational Therapy Goals        Problem: Occupational Therapy Goal    Goal Priority Disciplines Outcome Interventions   Occupational Therapy Goal     OT, PT/OT     Description: Goals to be met by: d/c     Patient will increase functional independence with ADLs by performing:    UE Dressing with Supervision.  LE Dressing with Supervision.  Grooming while standing at sink with Supervision.  Toileting from toilet with Supervision for hygiene and clothing management.   Toilet transfer to toilet with Supervision.                     History:     Past Medical History:   Diagnosis Date    Acute deep vein thrombosis (DVT) of brachial vein of right upper extremity 2017    on RUE ultrasound    Anxiety     Bowel perforation     Chronic pain     Crohn's disease of both small and large intestine with intestinal obstruction     Difficult intravenous access     Encounter for blood transfusion     GERD (gastroesophageal reflux disease)     Kidney stones     Nephrolithiasis     Stricture of bowel        Past Surgical History:   Procedure Laterality Date    ABSCESS DRAINAGE      ANAL FISTULOTOMY      BOWEL RESECTION      small bowel resection per Dr. Uribe 2018     SECTION      x2    CHOLECYSTECTOMY  2000    COLON SURGERY  2015    sigmoid loop colostomy    COLONOSCOPY N/A 2016    Procedure: COLONOSCOPY;  Surgeon: Anrde Uribe MD;  Location: [facility] (4TH FLR);  Service: Endoscopy;  Laterality: N/A;    COLONOSCOPY N/A 2017    Procedure: COLONOSCOPY;  Surgeon: Dany Stock MD;  Location: [facility] (2ND FLR);  Service: Endoscopy;  Laterality: N/A;    COLONOSCOPY N/A 2017    Procedure: COLONOSCOPY;  Surgeon: Andre Uribe MD;  Location: [facility] (4TH FLR);  Service: Endoscopy;  Laterality: N/A;    COLONOSCOPY N/A 2018    Procedure: COLONOSCOPY;  Surgeon: Andre Uribe MD;  Location: [facility]  ENDO (4TH FLR);  Service: Endoscopy;  Laterality: N/A;    COLONOSCOPY N/A 3/24/2018    Procedure: COLONOSCOPY;  Surgeon: Elmer Serrato MD;  Location: [facility] ENDO (2ND FLR);  Service: Endoscopy;  Laterality: N/A;    COLONOSCOPY  3/24/2018    COLONOSCOPY N/A 7/6/2018    Procedure: COLONOSCOPY;  Surgeon: Andre Uribe MD;  Location: [facility] ENDO (4TH FLR);  Service: Endoscopy;  Laterality: N/A;    COLONOSCOPY N/A 10/7/2021    Procedure: COLONOSCOPY;  Surgeon: Jose Hughes MD;  Location: [facility] ENDO;  Service: Endoscopy;  Laterality: N/A;    ESOPHAGOGASTRODUODENOSCOPY N/A 10/7/2021    Procedure: EGD (ESOPHAGOGASTRODUODENOSCOPY);  Surgeon: Jose Hughes MD;  Location: [facility] ENDO;  Service: Endoscopy;  Laterality: N/A;    LAPAROSCOPIC CLOSURE OF COLOSTOMY N/A 8/29/2018    Procedure: CLOSURE, COLOSTOMY, LAPAROSCOPIC;  Surgeon: Andre Uribe MD;  Location: [facility] OR 2ND FLR;  Service: Colon and Rectal;  Laterality: N/A;  converted to open    rectovaginal fistula repair  01/2018    SMALL INTESTINE SURGERY  1995    per patient 10 inches removed    SMALL INTESTINE SURGERY  02/2009    abdominal abscess drained, 3 cm colon removed, 11 cm SB removed    TUBAL LIGATION      UPPER GASTROINTESTINAL ENDOSCOPY  2000       Time Tracking:     OT Date of Treatment: 01/16/22  OT Start Time: 1229  OT Stop Time: 1248  OT Total Time (min): 19 min    Billable Minutes:Evaluation 11  Self Care 08    1/16/2022

## 2022-01-16 NOTE — CARE UPDATE
01/16/22 1121   PRE-TX-O2   O2 Device (Oxygen Therapy) nasal cannula   $ Is the patient on Low Flow Oxygen? Yes   Flow (L/min) 2   Pulse Oximetry Type Continuous   $ Pulse Oximetry - Multiple Charge Pulse Oximetry - Multiple   Resp 16   Respiratory Evaluation   $ Care Plan Tech Time 15 min

## 2022-01-16 NOTE — HOSPITAL COURSE
Patient was admitted for severe electrolyte abnormalities including potassium, calcium, magnesium and developed status epilepticus while in the ED.  Patient received Ativan and was started on Keppra which resolved patient's seizures.  Electrolytes were aggressively repleted.  MRI brain was unremarkable.  EEG no evidence seizures.  Electrolytes remained stable.  Patient had no further episodes of seizures during hospitalization.  Patient was stable discharge to home.  Instructed patient to obtain labs prior to following up with Dr. Hughes.    General: Patient resting comfortably in no acute distress. Appears as stated age. Calm  Eyes: EOM intact. No conjunctivae injection. No scleral icterus.  ENT: Hearing grossly intact. No discharge from ears. No nasal discharge.   CVS: RRR. No LE edema BL.  Lungs: CTA BL, no wheezing or crackles. Good breath sounds. No accessory muscle use. No acute respiratory distress  Neuro: Alert. Cranial nerves grossly intact. Moves all extremities equally. Follows commands. Responds appropriately  Psych: Mood, behavior, thought content and judgement normal     Case discussed with Dr. Barker prior to discharge

## 2022-01-16 NOTE — NURSING
Pt and brother verbalized understanding of discharge education and instructions. Made aware of prescription sent to pharmacy. Pt wheeled off unit with no signs of distress.

## 2022-01-16 NOTE — PLAN OF CARE
Patient to discharge home with [facility] home health(patient will be seen 1/17 vs 1/18 depending on staffing ROMAN Almanzar [phone]) Rolling walker delivered from [facility] closet (ROMAN dodge)  no other needs identified at this time.     01/16/22 1612   Final Note   Assessment Type Final Discharge Note   Anticipated Discharge Disposition Home-Health   What phone number can be called within the next 1-3 days to see how you are doing after discharge? [phone]   Hospital Resources/Appts/Education Provided Post-Acute resouces added to AVS   Post-Acute Status   Post-Acute Authorization Home Health;HME   HME Status Set-up Complete/Auth obtained   Home Health Status Referrals Sent   Patient choice form signed by patient/caregiver List from CMS Compare   Discharge Delays None known at this time

## 2022-01-16 NOTE — PROCEDURES
EEG REPORT    NAME: Carline Chang  : 1965  MRN: [id number]    DATE of EE2022    CLINICAL INDICATION: This is a 56 y.o. female with history of Crohn's disease s/p bowel resection , previous electrolyte abnormalities , chronic anemia came with nausea and vomiting for 2 days with generalized weakness .found to have severe electrolyte abnormalities and had seizures in the ER. EEG being evaluated for for seizures.    MEDICATIONS: Rocephin, Keppra, prednisone, Venlafaxine,     EEG DESCRIPTION:  A 16-channel EEG was performed with electrode placement in 10/20 International System. Longitudinal bipolar and referential montages were utilized in analysis.    This is a technically adequate study with minor muscle and motion artifacts.    The dominant posterior background rhythm was a well modulated, symmetric, synchronous, reactive, 6-7 Hz rhythm.    The record was reactive to eye opening and closure. Anterior derivations showed the expected admixture of low-voltage beta and theta frequencies as well as intermittent eye blink artifact.    Drowsiness and Sleep was not captured.     Photic stimulation, performed elicited no driving response. Hyperventilation  was not performed.    No epileptiform discharges were recorded. No electrographic or electroclinical seizures were recorded.    DIAGNOSIS: This is an abnormal EEG due to the presence of mild to moderate generalised slowing. No lateralizing or epileptiform features are noted. No electrographic or electroclinical seizures are recorded.      CLINICAL INTERPRETATION: This is an abnormal EEG recorded during wakeful state due to presence of mild to moderate generalized slowing.  No seizures or epileptiform activity was recorded.    Findings of this study indicate a generalized encephalopathic process that is nonspecific in type. Toxic, metabolic, or structural abnormalities may be considered.  Further clinical correlation is advised.      Igor Gallagher  MD [facility]  Neurology

## 2022-01-16 NOTE — DISCHARGE SUMMARY
[facility]  Discharge Summary      Patient Name: Carline Chang  MRN: [id number]  Patient Class: IP- Inpatient  Admission Date: 1/14/2022  Hospital Length of Stay: 2 days  Discharge Date and Time:  01/16/2022 2:26 PM  Attending Physician: Harshad Garcia MD   Discharging Provider: Harshad Garcia MD  Primary Care Provider: Pablo Medina MD (Inactive)      HPI:     56-year-old female who has a history of Crohn's disease s/p bowel resection , previous electrolyte abnormalities , chronic anemia came with nausea and vomiting for 2 days with generalized weakness .found to have severe electrolyte abnormalities and ER MD ordered corrections .  But while in ER , she developed seizure episode with tongue bite and admitted to ICU . CT brain will be done after she got the permanent line since it was very difficult stick . No history of seizure reported ..  she was  talking ER attending prior to this and no confusion , headache or focal weakness as per attending ..  On exam , no information from patient since she got ativan . Keppra started .           * No surgery found *      Hospital Course:   Patient was admitted for severe electrolyte abnormalities including potassium, calcium, magnesium and developed status epilepticus while in the ED.  Patient received Ativan and was started on Keppra which resolved patient's seizures.  Electrolytes were aggressively repleted.  MRI brain was unremarkable.  EEG no evidence seizures.  Electrolytes remained stable.  Patient had no further episodes of seizures during hospitalization.  Patient was stable discharge to home.  Instructed patient to obtain labs prior to following up with Dr. Hughes.    General: Patient resting comfortably in no acute distress. Appears as stated age. Calm  Eyes: EOM intact. No conjunctivae injection. No scleral icterus.  ENT: Hearing grossly intact. No discharge from ears. No nasal discharge.   CVS: RRR. No LE  edema BL.  Lungs: CTA BL, no wheezing or crackles. Good breath sounds. No accessory muscle use. No acute respiratory distress  Neuro: Alert. Cranial nerves grossly intact. Moves all extremities equally. Follows commands. Responds appropriately  Psych: Mood, behavior, thought content and judgement normal     Case discussed with Dr. Barker prior to discharge       Goals of Care Treatment Preferences:  Code Status: Full Code      Consults:   Consults (From admission, onward)        Status Ordering Provider     Inpatient consult to Neurology  Once        Provider:  Igor Barker MD    Completed MADI DARLING.          No new Assessment & Plan notes have been filed under this hospital service since the last note was generated.  Service: Hospital Medicine    Final Active Diagnoses:    Diagnosis Date Noted POA    PRINCIPAL PROBLEM:  Seizure activity  [R56.9] 01/14/2022 Yes    History of status epilepticus [Z86.69] 01/15/2022 Not Applicable     Chronic    Electrolyte abnormality [E87.8] 01/14/2022 Yes    Chronic anemia [D64.9] 01/14/2022 Yes     Chronic    Bowel perforation in the past  [K63.1]  Yes     Chronic    Chronic, continuous use of opioids [F11.90] 04/11/2018 Yes     Chronic    Calculus of ureter [N20.1] 01/23/2017 Yes    GERD (gastroesophageal reflux disease) [K21.9] 01/01/2014 Yes     Chronic    Crohn's disease of both small and large intestine with intestinal obstruction [K50.812] 01/01/1989 Yes     Chronic      Problems Resolved During this Admission:       Discharged Condition: stable    Disposition: Home-Health Care [facility]    Follow Up:   Follow-up Information     Igor Barker MD In 2 weeks.    Specialty: Neurology  Why: Call to schedule appointment, Neurology follow-up  Contact information:  [address] [facility] [address]  [phone]             Jose Hughes MD In 1 week.    Specialty: Gastroenterology  Why: Call to schedule appointment, Gastroenterology follow-up  Contact information:  [id number]  [address] [facility] [address]  [phone]             [facility] - Emergency Dept.    Specialty: Emergency Medicine  Why: As needed  Contact information:  [address]  [facility] [address] [phone]  Additional information:  1st floor                     Patient Instructions:      Comprehensive metabolic panel   Standing Status: Future Standing Exp. Date: 03/17/23     Magnesium   Standing Status: Future Standing Exp. Date: 03/17/23     Ambulatory referral/consult to Home Health   Standing Status: Future   Referral Priority: Routine Referral Type: Home Health   Referral Reason: Specialty Services Required   Requested Specialty: Home Health Services   Number of Visits Requested: 1     Diet Cardiac     Notify your health care provider if you experience any of the following:  temperature >100.4     Notify your health care provider if you experience any of the following:  persistent nausea and vomiting or diarrhea     Notify your health care provider if you experience any of the following:  severe uncontrolled pain     Notify your health care provider if you experience any of the following:  redness, tenderness, or signs of infection (pain, swelling, redness, odor or green/yellow discharge around incision site)     Notify your health care provider if you experience any of the following:  difficulty breathing or increased cough     Notify your health care provider if you experience any of the following:  severe persistent headache     Notify your health care provider if you experience any of the following:  worsening rash     Notify your health care provider if you experience any of the following:  persistent dizziness, light-headedness, or visual disturbances     Notify your health care provider if you experience any of the following:  increased confusion or weakness     Activity as tolerated       Significant Diagnostic Studies: Labs:   CMP   Recent Labs   Lab 01/15/22  0601  01/15/22  1252 01/16/22  0425   * 135* 136   K 2.4* 3.7 3.6    100 105   CO2 21* 22* 22*   * 85 102   BUN 7 6 9   CREATININE 0.8 0.9 0.7   CALCIUM 9.1 9.7 8.6*   PROT  --   --  6.4   ALBUMIN  --   --  3.0*   BILITOT  --   --  1.6*   ALKPHOS  --   --  122   AST  --   --  136*   ALT  --   --  52*   ANIONGAP 11 13 9   ESTGFRAFRICA >60.0 >60.0 >60.0   EGFRNONAA >60.0 >60.0 >60.0    and CBC   Recent Labs   Lab 01/15/22  1252 01/15/22  1252 01/16/22  0425   WBC 23.06*  --  16.31*   HGB 13.6  --  12.6   HCT 42.6   < > 39.0     --  181    < > = values in this interval not displayed.       Pending Diagnostic Studies:     None         Medications:  Reconciled Home Medications:      Medication List      START taking these medications    calcium-vitamin D3 500 mg-5 mcg (200 unit) per tablet  Commonly known as: OS-CHEIKH 500 + D3  Take 2 tablets by mouth 2 (two) times daily with meals.     cyclobenzaprine 10 MG tablet  Commonly known as: FLEXERIL  Take 1 tablet (10 mg total) by mouth 3 (three) times daily as needed for Muscle spasms.     levETIRAcetam 500 MG Tab  Commonly known as: KEPPRA  Take 1 tablet (500 mg total) by mouth 2 (two) times daily.     magnesium oxide 400 mg (241.3 mg magnesium) tablet  Commonly known as: MAG-OX  Take 1 tablet (400 mg total) by mouth 2 (two) times daily.        CHANGE how you take these medications    potassium chloride SA 20 MEQ tablet  Commonly known as: K-DUR,KLOR-CON  Take 2 tablets (40 mEq total) by mouth 2 (two) times daily.  What changed: how much to take     venlafaxine 75 MG tablet  Commonly known as: EFFEXOR  Take 75 mg by mouth 2 (two) times daily.  What changed: Another medication with the same name was removed. Continue taking this medication, and follow the directions you see here.        CONTINUE taking these medications    dicyclomine 20 mg tablet  Commonly known as: BENTYL  Take 20 mg by mouth every 6 (six) hours.     hydroCHLOROthiazide 12.5 mg  capsule  Commonly known as: MICROZIDE  Take 12.5 mg by mouth once daily.     HYDROcodone-acetaminophen  mg per tablet  Commonly known as: NORCO  Take 1 tablet by mouth every 6 (six) hours as needed for Pain.     pantoprazole 20 MG tablet  Commonly known as: PROTONIX  Take 20 mg by mouth every evening.     predniSONE 20 MG tablet  Commonly known as: DELTASONE  Take 20 mg by mouth once daily.     promethazine 25 MG tablet  Commonly known as: PHENERGAN  Take 1 tablet (25 mg total) by mouth every 8 (eight) hours as needed for Nausea.     TUMS ORAL  Take 1 tablet by mouth as needed (acid reflux).        STOP taking these medications    cyanocobalamin 1,000 mcg/mL injection     furosemide 20 MG tablet  Commonly known as: LASIX     vancomycin 125 MG capsule  Commonly known as: VANCOCIN            Indwelling Lines/Drains at time of discharge:   Lines/Drains/Airways     None                 Time spent on the discharge of patient: 35 minutes         Harshad Garcia MD  Department of Hospital Medicine  [facility]

## 2022-01-16 NOTE — PLAN OF CARE
Home health order noted, patient refused.  No other needs identified at this time.   01/16/22 1438   Final Note   Assessment Type Final Discharge Note   Anticipated Discharge Disposition Home-Health   What phone number can be called within the next 1-3 days to see how you are doing after discharge? [phone]   Post-Acute Status   Post-Acute Authorization Home Health   Home Health Status Patient declined/refused   Discharge Delays None known at this time

## 2022-01-16 NOTE — PROGRESS NOTES
Formerly [facility], later [facility]  Department of Neurology  Progress Note  Date: 2022 2:23 PM          Patient Name: Carline Chang   MRN: [id number]   : 1965    AGE: 56 y.o.    LOS: 2 days Hospital Day: 3  Admit date: 2022 11:50 AM     HPI per EMR: Carline Chang is a 56 y.o. female with a history of Crohn's disease s/p bowel resection , previous electrolyte abnormalities , chronic anemia came with nausea and vomiting for 2 days with generalized weakness .found to have severe electrolyte abnormalities and ER MD ordered corrections .     But while in ER , she developed seizure episode with tongue bite and admitted to ICU . CT brain will be done after she got the permanent line since it was very difficult stick . No history of seizure reported ..  she was  talking ER attending prior to this and no confusion , headache or focal weakness as per attending.     Neurology consult:  Patient was seen and examined by me this morning in the ICU.  Patient had 3 generalized tonic clonic seizure episodes in the ER.  She did bite her tongue for 1 of these episodes.  She received Ativan in the ER and was given 1 g of Keppra and admitted to the ICU for further management.  This morning, patient was alert and oriented x3 however she does not remember why she presented to the hospital nor she remembers having seizure in the ER.  She denies any history of seizures.  She states that she has Crohn's disease and status post bowel resection and has been having multiple problems since then.     She denies any headache, dizziness, lightheadedness, vision loss, unilateral weakness or sensory changes.    2022: No acute events overnight. Patient was seen and examined by me this morning. Neuro exam is normal this morning.  No further seizure episodes.  Electrolytes have been improving.       Vitals:  Patient Vitals for the past 24 hrs:   BP Temp Temp src Pulse Resp SpO2   22 1248 -- -- -- -- 18 --    01/16/22 1147 (!) 140/80 98.7 °F (37.1 °C) Oral 91 18 --   01/16/22 1121 -- -- -- -- 16 --   01/16/22 0814 132/79 99 °F (37.2 °C) Oral 92 20 100 %   01/16/22 0300 132/64 -- -- 92 (!) 21 98 %   01/16/22 0100 (!) 143/68 -- -- 91 (!) 25 98 %   01/15/22 2306 -- -- -- -- 20 --   01/15/22 2300 137/78 98.3 °F (36.8 °C) Oral 91 (!) 27 (!) 94 %   01/15/22 2100 132/67 -- -- 109 (!) 27 (!) 92 %   01/15/22 1915 (!) 155/54 -- -- -- -- --   01/15/22 1914 -- 99.5 °F (37.5 °C) -- -- -- --   01/15/22 1900 135/68 -- -- 95 (!) 27 (!) 94 %   01/15/22 1800 -- -- -- -- -- (!) 90 %   01/15/22 1700 133/73 -- -- 99 (!) 21 97 %   01/15/22 1652 -- -- -- -- 20 --     PHYSICAL EXAM:     GENERAL APPEARANCE: Well-developed, well-nourished female in no acute distress.  HEENT: Normocephalic and atraumatic. PERRL. Oropharynx unremarkable.  PULM: Comfortable on room air.  CV: RRR.  ABDOMEN: Soft, nontender.  EXTREMITIES: No signs of vascular compromise. Pulses present. No cyanosis, clubbing or edema.  SKIN: Clear; no rashes, lesions or skin breaks in exposed areas.      NEURO:   MENTAL STATUS: Patient awake and oriented to time, place, and person. Affect normal.  CRANIAL NERVES II-XII: Pupils equal, round and reactive to light. Extraocular movements full and intact. No facial asymmetry.  MOTOR: Normal bulk. Tone normal and symmetrical throughout.  No abnormal movements. No tremor.   Strength 5/5 throughout unless specified below.  REFLEXES: DTRs 2+; normal and symmetric throughout.   SENSATION: Sensation grossly intact to fine touch.  COORDINATION: Finger-to-nose normal for age and symmetric.  STATION: Romberg deferred.  GAIT: Deferred.    CURRENT SCHEDULED MEDICATIONS:   calcium-vitamin D3  2 tablet Oral TID    cefTRIAXone (ROCEPHIN) 1 g in dextrose 5 % 50 mL IVPB (ready to mix system)  1 g Intravenous Q24H    chlorhexidine  15 mL Mouth/Throat BID    dicyclomine  20 mg Oral QID    levetiracetam IV  500 mg Intravenous Q12H    levETIRAcetam   500 mg Oral Once    magnesium oxide  400 mg Oral BID    pantoprazole  20 mg Oral QHS    potassium chloride SA  40 mEq Oral BID    predniSONE  20 mg Oral Daily    venlafaxine  75 mg Oral BID     CURRENT INFUSIONS:    DATA:  Recent Labs   Lab 01/14/22  1420 01/15/22  0009 01/15/22  0626 01/15/22  1251 01/15/22  1252 01/16/22  0425    139 135*  --  135* 136   K 2.2* 2.4* 2.4*  --  3.7 3.6    103 103  --  100 105   CO2 19* 21* 21*  --  22* 22*   BUN 10 10 7  --  6 9   CREATININE 0.8 1.0 0.8  --  0.9 0.7   GLU 70 127* 118*  --  85 102   CALCIUM 5.8* 6.5* 9.1  --  9.7 8.6*   MG 0.4*  --  1.2* 2.2  --  1.7   AST 13  --   --   --   --  136*   ALT 16  --   --   --   --  52*     Recent Labs   Lab 01/14/22  1420 01/15/22  1252 01/16/22  0425   WBC 14.52* 23.06* 16.31*   HGB 12.0 13.6 12.6   HCT 36.7* 42.6 39.0    189 181     No results found for: PROTEINCSF, GLUCCSF, WBCCSF, RBCCSF  Hemoglobin A1C   Date Value Ref Range Status   01/14/2022 5.6 4.5 - 6.2 % Final     Comment:     According to ADA guidelines, hemoglobin A1C <7.0% represents  optimal control in non-pregnant diabetic patients.  Different  metrics may apply to specific populations.   Standards of Medical Care in Diabetes - 2016.    For the purpose of screening for the presence of diabetes:  <5.7%     Consistent with the absence of diabetes  5.7-6.4%  Consistent with increasing risk for diabetes   (prediabetes)  >or=6.5%  Consistent with diabetes    Currently no consensus exists for use of hemoglobin A1C  for diagnosis of diabetes for children.              I have personally reviewed and interpreted the pertinent imaging and lab results.  Imaging Results          CT Head Without Contrast (Final result)  Result time 01/14/22 19:07:32    Final result by Min Zamudio MD (01/14/22 19:07:32)                 Impression:      No CT evidence of acute intracranial pathology.    White matter microangiopathic changes.      Electronically signed  by: Min Zamudio MD  Date:    01/14/2022  Time:    19:07             Narrative:    EXAMINATION:  CT HEAD WITHOUT CONTRAST    CLINICAL HISTORY:  Seizure, new-onset, no history of trauma;    TECHNIQUE:  Initial acquisition limited by motion artifact and patient positioning, 2nd acquisition has less motion artifact.    CMS Mandated Quality Data-CT Radiation Dose-436    All CT scans at this facility dose modulation, iterative reconstruction, and or weight-based dosing when appropriate to reduce radiation dose to as low as reasonably achievable.    COMPARISON:  None    FINDINGS:  Negative for acute intracranial hemorrhage, midline shift, or mass effect.  Ventricles and sulci are normal in size.  Gray-white differentiation is maintained.  Periventricular and deep white matter hypoattenuation consistent with microangiopathic change.  Cerebellar hemispheres and brainstem are unremarkable.    No calvarial lesion or fracture.  Mastoid air cells appear clear.  Mucosal thickening in fluid within the maxillary sinuses.                                        ASSESSMENT AND PLAN:     Status epilepticus-resolved  New onset seizures  Crohn's disease status post bowel resection  Electrolyte imbalance  56-year-old female with history of Crohn's disease status post bowel resection presented with nausea, vomiting and noted to have multiple electrolyte imbalance including hypokalemia, hypocalcemia, hypomagnesemia.  In the ER she had 3 back-to-back seizures without return to baseline mental status.  She was admitted to the ICU for management of status epilepticus.  Seizures likely in the setting of electrolyte imbalance.  Likely postictal state causing memory impairment     Workup  CT head:  No acute intracranial abnormality  MRI brain:   no acute intracranial abnormality.  Bilateral white matter changes likely microvascular ischemic changes.  EEG:   mild-to-moderate background slowing without any epileptiform activity or  seizures.     Plan  · Patient's mental status improved and has had no more seizures since admission.   Status epilepticus is resolved.  · She was given loading dose of Keppra 1500 mg and followed by 500 mg b.i.d. maintenance dose.  · Please correct all electrolyte abnormalities.  · Seizure precautions  · Recommend continuing Keppra 500 b.i.d. on discharge and can be tapered off outpatient if had no further seizure episodes.  · No additional neurological workup needed at this time.  Will sign off.    Seizure education provided.      No driving for now, no swimming alone, no climbing high areas, no operation of heavy machinery or working with high risk electricity equipment.    Continue to take AEDs as prescribed. If any major side effects from neurological medications go to the ED including mood changes and severe depression.  Patient and/or next of kin informed.  Follow up Neurology in 2 weeks. Call [phone] to make an appointment.    Medication side effects discussed with the patient and/or caregiver.      Igor Barker MD  Neurology/vascular Neurology  Date of Service: 01/16/2022  2:23 PM    Please note: This note was transcribed using voice recognition software. Because of this technology there are often uinintended grammatical, spelling, and other transcription errors. Please disregard these errors.

## 2022-01-16 NOTE — NURSING
Pt received from ICU, vss, sr on monitor. Odonnell catheter in place draining clear yellow urine. Call light within reach, bed alarm on.

## 2022-01-18 LAB — BACTERIA UR CULT: NO GROWTH

## 2022-01-19 ENCOUNTER — HOSPITAL ENCOUNTER (INPATIENT)
Facility: HOSPITAL | Age: 57
LOS: 8 days | Discharge: HOME HEALTH | DRG: 329 | End: 2022-01-27
Attending: EMERGENCY MEDICINE | Admitting: COLON & RECTAL SURGERY
Payer: MEDICARE

## 2022-01-19 ENCOUNTER — HOSPITAL ENCOUNTER (EMERGENCY)
Facility: HOSPITAL | Age: 57
Discharge: ANOTHER HOSPITAL | End: 2022-01-19
Attending: EMERGENCY MEDICINE
Payer: MEDICARE

## 2022-01-19 VITALS
OXYGEN SATURATION: 100 % | WEIGHT: 125 LBS | DIASTOLIC BLOOD PRESSURE: 56 MMHG | TEMPERATURE: 98 F | HEART RATE: 118 BPM | HEIGHT: 68 IN | SYSTOLIC BLOOD PRESSURE: 117 MMHG | BODY MASS INDEX: 18.94 KG/M2 | RESPIRATION RATE: 24 BRPM

## 2022-01-19 DIAGNOSIS — K56.609 COLONIC OBSTRUCTION: ICD-10-CM

## 2022-01-19 DIAGNOSIS — R00.0 TACHYCARDIA: ICD-10-CM

## 2022-01-19 DIAGNOSIS — R19.8 PERFORATED ABDOMINAL VISCUS: Primary | ICD-10-CM

## 2022-01-19 DIAGNOSIS — K56.609 LARGE BOWEL OBSTRUCTION: ICD-10-CM

## 2022-01-19 DIAGNOSIS — U07.1 COVID-19 VIRUS INFECTION: ICD-10-CM

## 2022-01-19 DIAGNOSIS — K50.818 CROHN'S DISEASE OF BOTH SMALL AND LARGE INTESTINE WITH OTHER COMPLICATION: Primary | ICD-10-CM

## 2022-01-19 DIAGNOSIS — U07.1 COVID: ICD-10-CM

## 2022-01-19 DIAGNOSIS — K50.812 CROHN'S DISEASE OF BOTH SMALL AND LARGE INTESTINE WITH INTESTINAL OBSTRUCTION: Chronic | ICD-10-CM

## 2022-01-19 LAB
ALBUMIN SERPL BCP-MCNC: 4 G/DL (ref 3.5–5.2)
ALP SERPL-CCNC: 147 U/L (ref 55–135)
ALT SERPL W/O P-5'-P-CCNC: 25 U/L (ref 10–44)
ANION GAP SERPL CALC-SCNC: 12 MMOL/L (ref 8–16)
AST SERPL-CCNC: 14 U/L (ref 10–40)
BASOPHILS # BLD AUTO: 0.09 K/UL (ref 0–0.2)
BASOPHILS NFR BLD: 0.5 % (ref 0–1.9)
BILIRUB SERPL-MCNC: 0.6 MG/DL (ref 0.1–1)
BUN SERPL-MCNC: 11 MG/DL (ref 6–20)
CALCIUM SERPL-MCNC: 10.1 MG/DL (ref 8.7–10.5)
CHLORIDE SERPL-SCNC: 98 MMOL/L (ref 95–110)
CK MB SERPL-MCNC: 0.8 NG/ML (ref 0.1–6.5)
CK SERPL-CCNC: 68 U/L (ref 20–180)
CO2 SERPL-SCNC: 23 MMOL/L (ref 23–29)
CREAT SERPL-MCNC: 1.1 MG/DL (ref 0.5–1.4)
DIFFERENTIAL METHOD: ABNORMAL
EOSINOPHIL # BLD AUTO: 0.1 K/UL (ref 0–0.5)
EOSINOPHIL NFR BLD: 0.5 % (ref 0–8)
ERYTHROCYTE [DISTWIDTH] IN BLOOD BY AUTOMATED COUNT: 13.9 % (ref 11.5–14.5)
EST. GFR  (AFRICAN AMERICAN): >60 ML/MIN/1.73 M^2
EST. GFR  (NON AFRICAN AMERICAN): 57.1 ML/MIN/1.73 M^2
GLUCOSE SERPL-MCNC: 105 MG/DL (ref 70–110)
HCT VFR BLD AUTO: 46.9 % (ref 37–48.5)
HGB BLD-MCNC: 14.7 G/DL (ref 12–16)
IMM GRANULOCYTES # BLD AUTO: 0.28 K/UL (ref 0–0.04)
IMM GRANULOCYTES NFR BLD AUTO: 1.4 % (ref 0–0.5)
LACTATE SERPL-SCNC: 1.5 MMOL/L (ref 0.5–1.9)
LYMPHOCYTES # BLD AUTO: 0.6 K/UL (ref 1–4.8)
LYMPHOCYTES NFR BLD: 3.1 % (ref 18–48)
MAGNESIUM SERPL-MCNC: 1.5 MG/DL (ref 1.6–2.6)
MCH RBC QN AUTO: 28.2 PG (ref 27–31)
MCHC RBC AUTO-ENTMCNC: 31.3 G/DL (ref 32–36)
MCV RBC AUTO: 90 FL (ref 82–98)
MONOCYTES # BLD AUTO: 1 K/UL (ref 0.3–1)
MONOCYTES NFR BLD: 5.2 % (ref 4–15)
NEUTROPHILS # BLD AUTO: 17.5 K/UL (ref 1.8–7.7)
NEUTROPHILS NFR BLD: 89.3 % (ref 38–73)
NRBC BLD-RTO: 0 /100 WBC
PHOSPHATE SERPL-MCNC: 3 MG/DL (ref 2.7–4.5)
PLATELET # BLD AUTO: 241 K/UL (ref 150–450)
PMV BLD AUTO: 9.1 FL (ref 9.2–12.9)
POTASSIUM SERPL-SCNC: 4.4 MMOL/L (ref 3.5–5.1)
PROCALCITONIN SERPL IA-MCNC: 0.18 NG/ML (ref 0–0.5)
PROT SERPL-MCNC: 8.6 G/DL (ref 6–8.4)
RBC # BLD AUTO: 5.21 M/UL (ref 4–5.4)
SARS-COV-2 RDRP RESP QL NAA+PROBE: POSITIVE
SODIUM SERPL-SCNC: 133 MMOL/L (ref 136–145)
TROPONIN I SERPL DL<=0.01 NG/ML-MCNC: <0.03 NG/ML
WBC # BLD AUTO: 19.59 K/UL (ref 3.9–12.7)

## 2022-01-19 PROCEDURE — 93005 ELECTROCARDIOGRAM TRACING: CPT | Performed by: INTERNAL MEDICINE

## 2022-01-19 PROCEDURE — 84484 ASSAY OF TROPONIN QUANT: CPT | Performed by: EMERGENCY MEDICINE

## 2022-01-19 PROCEDURE — 96375 TX/PRO/DX INJ NEW DRUG ADDON: CPT

## 2022-01-19 PROCEDURE — 93010 EKG 12-LEAD: ICD-10-PCS | Mod: ,,, | Performed by: INTERNAL MEDICINE

## 2022-01-19 PROCEDURE — 83735 ASSAY OF MAGNESIUM: CPT | Performed by: EMERGENCY MEDICINE

## 2022-01-19 PROCEDURE — 96361 HYDRATE IV INFUSION ADD-ON: CPT

## 2022-01-19 PROCEDURE — 25500020 PHARM REV CODE 255: Performed by: EMERGENCY MEDICINE

## 2022-01-19 PROCEDURE — 83605 ASSAY OF LACTIC ACID: CPT | Performed by: EMERGENCY MEDICINE

## 2022-01-19 PROCEDURE — 96374 THER/PROPH/DIAG INJ IV PUSH: CPT

## 2022-01-19 PROCEDURE — 82553 CREATINE MB FRACTION: CPT | Performed by: EMERGENCY MEDICINE

## 2022-01-19 PROCEDURE — 12000002 HC ACUTE/MED SURGE SEMI-PRIVATE ROOM

## 2022-01-19 PROCEDURE — U0002 COVID-19 LAB TEST NON-CDC: HCPCS | Performed by: EMERGENCY MEDICINE

## 2022-01-19 PROCEDURE — 25000003 PHARM REV CODE 250: Performed by: EMERGENCY MEDICINE

## 2022-01-19 PROCEDURE — 27000207 HC ISOLATION

## 2022-01-19 PROCEDURE — 93010 ELECTROCARDIOGRAM REPORT: CPT | Mod: ,,, | Performed by: INTERNAL MEDICINE

## 2022-01-19 PROCEDURE — 85025 COMPLETE CBC W/AUTO DIFF WBC: CPT | Performed by: EMERGENCY MEDICINE

## 2022-01-19 PROCEDURE — 82550 ASSAY OF CK (CPK): CPT | Performed by: EMERGENCY MEDICINE

## 2022-01-19 PROCEDURE — 25000003 PHARM REV CODE 250: Performed by: STUDENT IN AN ORGANIZED HEALTH CARE EDUCATION/TRAINING PROGRAM

## 2022-01-19 PROCEDURE — 84100 ASSAY OF PHOSPHORUS: CPT | Performed by: EMERGENCY MEDICINE

## 2022-01-19 PROCEDURE — 63600175 PHARM REV CODE 636 W HCPCS: Performed by: EMERGENCY MEDICINE

## 2022-01-19 PROCEDURE — 84145 PROCALCITONIN (PCT): CPT | Performed by: EMERGENCY MEDICINE

## 2022-01-19 PROCEDURE — 99285 EMERGENCY DEPT VISIT HI MDM: CPT

## 2022-01-19 PROCEDURE — 63600175 PHARM REV CODE 636 W HCPCS: Performed by: STUDENT IN AN ORGANIZED HEALTH CARE EDUCATION/TRAINING PROGRAM

## 2022-01-19 PROCEDURE — 99285 PR EMERGENCY DEPT VISIT,LEVEL V: ICD-10-PCS | Mod: CR,,, | Performed by: PHYSICIAN ASSISTANT

## 2022-01-19 PROCEDURE — 87040 BLOOD CULTURE FOR BACTERIA: CPT | Performed by: EMERGENCY MEDICINE

## 2022-01-19 PROCEDURE — 36415 COLL VENOUS BLD VENIPUNCTURE: CPT | Performed by: EMERGENCY MEDICINE

## 2022-01-19 PROCEDURE — 99285 EMERGENCY DEPT VISIT HI MDM: CPT | Mod: CR,,, | Performed by: PHYSICIAN ASSISTANT

## 2022-01-19 PROCEDURE — 80053 COMPREHEN METABOLIC PANEL: CPT | Performed by: EMERGENCY MEDICINE

## 2022-01-19 PROCEDURE — 99291 CRITICAL CARE FIRST HOUR: CPT

## 2022-01-19 RX ORDER — CYANOCOBALAMIN 1000 UG/ML
1000 INJECTION, SOLUTION INTRAMUSCULAR; SUBCUTANEOUS
COMMUNITY
Start: 2021-10-23 | End: 2022-03-10

## 2022-01-19 RX ORDER — ONDANSETRON 2 MG/ML
4 INJECTION INTRAMUSCULAR; INTRAVENOUS EVERY 6 HOURS PRN
Status: DISCONTINUED | OUTPATIENT
Start: 2022-01-19 | End: 2022-01-27 | Stop reason: HOSPADM

## 2022-01-19 RX ORDER — VEDOLIZUMAB 300 MG/5ML
INJECTION, POWDER, LYOPHILIZED, FOR SOLUTION INTRAVENOUS
Status: ON HOLD | COMMUNITY
End: 2022-01-26 | Stop reason: HOSPADM

## 2022-01-19 RX ORDER — CHOLECALCIFEROL (VITAMIN D3) 25 MCG
1000 TABLET ORAL
COMMUNITY
End: 2022-03-10

## 2022-01-19 RX ORDER — HYDROMORPHONE HYDROCHLORIDE 1 MG/ML
0.5 INJECTION, SOLUTION INTRAMUSCULAR; INTRAVENOUS; SUBCUTANEOUS EVERY 4 HOURS PRN
Status: DISCONTINUED | OUTPATIENT
Start: 2022-01-19 | End: 2022-01-20

## 2022-01-19 RX ORDER — MORPHINE SULFATE 4 MG/ML
4 INJECTION, SOLUTION INTRAMUSCULAR; INTRAVENOUS
Status: CANCELLED | OUTPATIENT
Start: 2022-01-19 | End: 2022-01-19

## 2022-01-19 RX ORDER — PANTOPRAZOLE SODIUM 20 MG/1
20 TABLET, DELAYED RELEASE ORAL NIGHTLY
Status: DISCONTINUED | OUTPATIENT
Start: 2022-01-19 | End: 2022-01-20

## 2022-01-19 RX ORDER — ACETAMINOPHEN 325 MG/1
650 TABLET ORAL EVERY 8 HOURS PRN
Status: DISCONTINUED | OUTPATIENT
Start: 2022-01-19 | End: 2022-01-21

## 2022-01-19 RX ORDER — HYDROMORPHONE HYDROCHLORIDE 1 MG/ML
0.5 INJECTION, SOLUTION INTRAMUSCULAR; INTRAVENOUS; SUBCUTANEOUS
Status: COMPLETED | OUTPATIENT
Start: 2022-01-19 | End: 2022-01-19

## 2022-01-19 RX ORDER — PREDNISONE 20 MG/1
20 TABLET ORAL DAILY
Status: DISCONTINUED | OUTPATIENT
Start: 2022-01-20 | End: 2022-01-20

## 2022-01-19 RX ORDER — SODIUM CHLORIDE, SODIUM LACTATE, POTASSIUM CHLORIDE, CALCIUM CHLORIDE 600; 310; 30; 20 MG/100ML; MG/100ML; MG/100ML; MG/100ML
INJECTION, SOLUTION INTRAVENOUS CONTINUOUS
Status: DISCONTINUED | OUTPATIENT
Start: 2022-01-19 | End: 2022-01-24 | Stop reason: ALTCHOICE

## 2022-01-19 RX ORDER — VENLAFAXINE 37.5 MG/1
75 TABLET ORAL 2 TIMES DAILY
Status: DISCONTINUED | OUTPATIENT
Start: 2022-01-20 | End: 2022-01-20

## 2022-01-19 RX ORDER — SODIUM CHLORIDE 0.9 % (FLUSH) 0.9 %
10 SYRINGE (ML) INJECTION
Status: DISCONTINUED | OUTPATIENT
Start: 2022-01-19 | End: 2022-01-27 | Stop reason: HOSPADM

## 2022-01-19 RX ORDER — MEROPENEM AND SODIUM CHLORIDE 1 G/50ML
1 INJECTION, SOLUTION INTRAVENOUS ONCE
Status: COMPLETED | OUTPATIENT
Start: 2022-01-19 | End: 2022-01-19

## 2022-01-19 RX ORDER — PROCHLORPERAZINE EDISYLATE 5 MG/ML
5 INJECTION INTRAMUSCULAR; INTRAVENOUS EVERY 6 HOURS PRN
Status: DISCONTINUED | OUTPATIENT
Start: 2022-01-19 | End: 2022-01-27 | Stop reason: HOSPADM

## 2022-01-19 RX ORDER — HYDROMORPHONE HYDROCHLORIDE 1 MG/ML
1 INJECTION, SOLUTION INTRAMUSCULAR; INTRAVENOUS; SUBCUTANEOUS
Status: COMPLETED | OUTPATIENT
Start: 2022-01-19 | End: 2022-01-19

## 2022-01-19 RX ORDER — LEVETIRACETAM 500 MG/1
500 TABLET ORAL 2 TIMES DAILY
Status: DISCONTINUED | OUTPATIENT
Start: 2022-01-20 | End: 2022-01-27 | Stop reason: HOSPADM

## 2022-01-19 RX ORDER — SODIUM CHLORIDE 9 MG/ML
INJECTION, SOLUTION INTRAVENOUS
Status: COMPLETED | OUTPATIENT
Start: 2022-01-19 | End: 2022-01-19

## 2022-01-19 RX ORDER — MAGNESIUM SULFATE HEPTAHYDRATE 40 MG/ML
2 INJECTION, SOLUTION INTRAVENOUS ONCE
Status: COMPLETED | OUTPATIENT
Start: 2022-01-19 | End: 2022-01-19

## 2022-01-19 RX ORDER — HYDROMORPHONE HYDROCHLORIDE 1 MG/ML
1 INJECTION, SOLUTION INTRAMUSCULAR; INTRAVENOUS; SUBCUTANEOUS EVERY 4 HOURS PRN
Status: DISCONTINUED | OUTPATIENT
Start: 2022-01-19 | End: 2022-01-20

## 2022-01-19 RX ORDER — FUROSEMIDE 40 MG/1
TABLET ORAL
Status: ON HOLD | COMMUNITY
End: 2022-01-26 | Stop reason: HOSPADM

## 2022-01-19 RX ADMIN — HYDROMORPHONE HYDROCHLORIDE 0.5 MG: 1 INJECTION, SOLUTION INTRAMUSCULAR; INTRAVENOUS; SUBCUTANEOUS at 05:01

## 2022-01-19 RX ADMIN — PANTOPRAZOLE SODIUM 20 MG: 20 TABLET, DELAYED RELEASE ORAL at 11:01

## 2022-01-19 RX ADMIN — SODIUM CHLORIDE 1000 ML: 0.9 INJECTION, SOLUTION INTRAVENOUS at 10:01

## 2022-01-19 RX ADMIN — MAGNESIUM SULFATE 2 G: 2 INJECTION INTRAVENOUS at 05:01

## 2022-01-19 RX ADMIN — AZITHROMYCIN 500 MG: 500 INJECTION, POWDER, LYOPHILIZED, FOR SOLUTION INTRAVENOUS at 07:01

## 2022-01-19 RX ADMIN — SODIUM CHLORIDE: 0.9 INJECTION, SOLUTION INTRAVENOUS at 06:01

## 2022-01-19 RX ADMIN — HYDROMORPHONE HYDROCHLORIDE 1 MG: 1 INJECTION, SOLUTION INTRAMUSCULAR; INTRAVENOUS; SUBCUTANEOUS at 10:01

## 2022-01-19 RX ADMIN — IOHEXOL 100 ML: 350 INJECTION, SOLUTION INTRAVENOUS at 06:01

## 2022-01-19 RX ADMIN — SODIUM CHLORIDE 1000 ML: 0.9 INJECTION, SOLUTION INTRAVENOUS at 05:01

## 2022-01-19 RX ADMIN — MEROPENEM AND SODIUM CHLORIDE 1 G: 1 INJECTION, SOLUTION INTRAVENOUS at 07:01

## 2022-01-19 NOTE — ED PROVIDER NOTES
Encounter Date: 2022       History     Chief Complaint   Patient presents with    Dehydration     Sent from Dr. Hughes for dehydration      Patient was recently discharged from this hospital with severe electrolyte abnormalities including hypokalemia and hypomagnesia.  Patient also with new onset seizures during last hospitalization.  Patient continues to have generalized weakness.  She reports her back hurts since her original seizure.  No fever chills.  Patient had follow-up appointment with the gastroenterologist.  She was noted to be very tachycardic in his office and sent here for further evaluation.  Patient was not aware that she had tachycardia.  She has no chest pain, pleurisy hemoptysis.  Patient with remote history of deep vein thrombosis.  Patient reports her abdominal pain is actually improving.  She continues to have back pain.        Review of patient's allergies indicates:   Allergen Reactions    Remicade [infliximab] Shortness Of Breath and Palpitations     Severe muscle and joint, and bone pain    Morphine Other (See Comments)     Abdominal pain    Flagyl [metronidazole] Other (See Comments)     Neuropathy    Nubain [nalbuphine] Anxiety     Upper abdominal burning      Past Medical History:   Diagnosis Date    Acute deep vein thrombosis (DVT) of brachial vein of right upper extremity 2017    on RUE ultrasound    Anxiety     Bowel perforation     Chronic pain     Crohn's disease of both small and large intestine with intestinal obstruction     Difficult intravenous access     Encounter for blood transfusion     GERD (gastroesophageal reflux disease)     Kidney stones     Nephrolithiasis     Stricture of bowel      Past Surgical History:   Procedure Laterality Date    ABSCESS DRAINAGE      ANAL FISTULOTOMY  2018    BOWEL RESECTION      small bowel resection per Dr. Uribe 2018     SECTION      x2    CHOLECYSTECTOMY  2000    COLON SURGERY  2015     sigmoid loop colostomy    COLONOSCOPY N/A 5/27/2016    Procedure: COLONOSCOPY;  Surgeon: Andre Uribe MD;  Location: [facility] ENDO (4TH FLR);  Service: Endoscopy;  Laterality: N/A;    COLONOSCOPY N/A 1/17/2017    Procedure: COLONOSCOPY;  Surgeon: Dany Stock MD;  Location: [facility] ENDO (2ND FLR);  Service: Endoscopy;  Laterality: N/A;    COLONOSCOPY N/A 12/4/2017    Procedure: COLONOSCOPY;  Surgeon: Andre Uribe MD;  Location: [facility] ENDO (4TH FLR);  Service: Endoscopy;  Laterality: N/A;    COLONOSCOPY N/A 2/8/2018    Procedure: COLONOSCOPY;  Surgeon: Andre Uribe MD;  Location: [facility] ENDO (4TH FLR);  Service: Endoscopy;  Laterality: N/A;    COLONOSCOPY N/A 3/24/2018    Procedure: COLONOSCOPY;  Surgeon: Elmer Serrato MD;  Location: [facility] ENDO (2ND FLR);  Service: Endoscopy;  Laterality: N/A;    COLONOSCOPY  3/24/2018    COLONOSCOPY N/A 7/6/2018    Procedure: COLONOSCOPY;  Surgeon: Andre Uribe MD;  Location: [facility] ENDO (4TH FLR);  Service: Endoscopy;  Laterality: N/A;    COLONOSCOPY N/A 10/7/2021    Procedure: COLONOSCOPY;  Surgeon: Jose Hughes MD;  Location: [facility] ENDO;  Service: Endoscopy;  Laterality: N/A;    ESOPHAGOGASTRODUODENOSCOPY N/A 10/7/2021    Procedure: EGD (ESOPHAGOGASTRODUODENOSCOPY);  Surgeon: Jose Hughes MD;  Location: [facility] ENDO;  Service: Endoscopy;  Laterality: N/A;    LAPAROSCOPIC CLOSURE OF COLOSTOMY N/A 8/29/2018    Procedure: CLOSURE, COLOSTOMY, LAPAROSCOPIC;  Surgeon: Andre Uribe MD;  Location: [facility] OR 2ND FLR;  Service: Colon and Rectal;  Laterality: N/A;  converted to open    rectovaginal fistula repair  01/2018    SMALL INTESTINE SURGERY  1995    per patient 10 inches removed    SMALL INTESTINE SURGERY  02/2009    abdominal abscess drained, 3 cm colon removed, 11 cm SB removed    TUBAL LIGATION      UPPER GASTROINTESTINAL ENDOSCOPY  2000     Family History   Problem Relation Age of Onset    Cancer Maternal Aunt 66        colon ca    Heart  attack Father 69    Anesthesia problems Neg Hx     Celiac disease Neg Hx     Cirrhosis Neg Hx     Colon cancer Neg Hx     Colon polyps Neg Hx     Crohn's disease Neg Hx     Cystic fibrosis Neg Hx     Esophageal cancer Neg Hx     Hemochromatosis Neg Hx     Inflammatory bowel disease Neg Hx     Irritable bowel syndrome Neg Hx     Liver cancer Neg Hx     Liver disease Neg Hx     Rectal cancer Neg Hx     Stomach cancer Neg Hx     Ulcerative colitis Neg Hx     Mars's disease Neg Hx     Lymphoma Neg Hx     Tuberculosis Neg Hx     Scleroderma Neg Hx     Rheum arthritis Neg Hx     Multiple sclerosis Neg Hx     Melanoma Neg Hx     Lupus Neg Hx     Psoriasis Neg Hx     Skin cancer Neg Hx      Social History     Tobacco Use    Smoking status: Never Smoker    Smokeless tobacco: Never Used   Substance Use Topics    Alcohol use: No     Alcohol/week: 0.0 standard drinks    Drug use: No     Review of Systems   Constitutional: Negative for chills and fever.   HENT: Negative for congestion.    Eyes: Negative for visual disturbance.   Respiratory: Negative for shortness of breath.    Cardiovascular: Negative for chest pain and palpitations.   Gastrointestinal: Positive for abdominal pain and nausea. Negative for vomiting.   Genitourinary: Negative for dysuria.   Musculoskeletal: Negative for joint swelling.   Neurological: Negative for headaches.   Psychiatric/Behavioral: Negative for confusion.       Physical Exam     Initial Vitals [01/19/22 1619]   BP Pulse Resp Temp SpO2   123/82 (!) 157 18 98.1 °F (36.7 °C) 100 %      MAP       --         Physical Exam    Nursing note and vitals reviewed.  Constitutional: She is not diaphoretic. No distress.   HENT:   Head: Normocephalic and atraumatic.   Eyes: Conjunctivae are normal.   Neck:   Normal range of motion.  Cardiovascular: Normal rate.   Pulmonary/Chest: Breath sounds normal.   Abdominal: Abdomen is soft. Bowel sounds are normal.   Mild diffuse  tenderness no guarding or rebound   Musculoskeletal:         General: Normal range of motion.      Cervical back: Normal range of motion.     Neurological: She is alert and oriented to person, place, and time. She has normal strength. No cranial nerve deficit or sensory deficit.   No gross deficits   Skin: No rash noted.   Psychiatric: She has a normal mood and affect.         ED Course   Critical Care    Date/Time: 1/19/2022 7:33 PM  Performed by: Presley Handley MD  Authorized by: Presley Handley MD   Direct patient critical care time: 15 minutes  Additional history critical care time: 5 minutes  Ordering / reviewing critical care time: 5 minutes  Documentation critical care time: 5 minutes  Consulting other physicians critical care time: 5 minutes  Total critical care time (exclusive of procedural time) : 35 minutes        Labs Reviewed   MAGNESIUM - Abnormal; Notable for the following components:       Result Value    Magnesium 1.5 (*)     All other components within normal limits   CBC W/ AUTO DIFFERENTIAL - Abnormal; Notable for the following components:    WBC 19.59 (*)     MCHC 31.3 (*)     MPV 9.1 (*)     Immature Granulocytes 1.4 (*)     Gran # (ANC) 17.5 (*)     Immature Grans (Abs) 0.28 (*)     Lymph # 0.6 (*)     Gran % 89.3 (*)     Lymph % 3.1 (*)     All other components within normal limits   COMPREHENSIVE METABOLIC PANEL - Abnormal; Notable for the following components:    Sodium 133 (*)     Total Protein 8.6 (*)     Alkaline Phosphatase 147 (*)     eGFR if non  57.1 (*)     All other components within normal limits   SARS-COV-2 RNA AMPLIFICATION, QUAL - Abnormal; Notable for the following components:    SARS-CoV-2 RNA, Amplification, Qual Positive (*)     All other components within normal limits   CULTURE, BLOOD   CULTURE, BLOOD   PHOSPHORUS   CK-MB   CK   TROPONIN I   LACTIC ACID, PLASMA   PROCALCITONIN   PROCALCITONIN   URINALYSIS, REFLEX TO URINE CULTURE        ECG Results           EKG 12-lead (In process)  Result time 01/19/22 16:44:29    In process by Interface, Lab In [facility] (01/19/22 16:44:29)                 Narrative:    Test Reason : R00.0,    Vent. Rate : 154 BPM     Atrial Rate : 154 BPM     P-R Int : 128 ms          QRS Dur : 062 ms      QT Int : 264 ms       P-R-T Axes : 073 070 094 degrees     QTc Int : 422 ms    Sinus tachycardia  Nonspecific ST abnormality  Abnormal ECG  When compared with ECG of 14-JAN-2022 14:21,  Vent. rate has increased BY  82 BPM  Minimal criteria for Anterior infarct are no longer Present  ST now depressed in Lateral leads    Referred By: [email or web address]   SELF           Confirmed By:                             Imaging Results          CT Abdomen Pelvis With Contrast (Final result)  Result time 01/19/22 18:49:29    Final result by Preston Kim IV, MD (01/19/22 18:49:29)                 Narrative:    All CT scans at this facility use dose modulation, degenerative reconstruction  and/or weight-based dosing when appropriate to reduce radiation dose to as low as reasonably achievable.    CT of the abdomen with contrast and CT of the pelvis with contrast    HISTORY: Abdominal pain.    The study utilizes 100 cc of intravenous nonionic contrast material.    There are surgical changes involving the right hemicolon and sigmoid segment of the colon. There is distention of the colon with the transverse colon measuring up to approximately 10 cm in diameter. There are small bubbles of free intraperitoneal gas adjacent to the right hemicolon. The ascending colon has a twisted appearance near the level of the hepatic flexure which results in focal luminal narrowing.    There is mild wall thickening involving small bowel loops within the lower abdomen and pelvis with mild inflammatory changes within the mesenteric fat compatible with enteritis. There are graph  The liver and spleen are normal in size. There our probable tiny subcentimeter cysts within the right  lobe. There are otherwise no focal parenchymal abnormalities. The gallbladder is surgically absent. The pancreas and adrenal glands appear unremarkable.    The kidneys are normal in size and position without suspicious mass or hydronephrosis.    The aorta tapers appropriately.    There is no adenopathy or significant free fluid. There is a fat-containing hernia within the left side of the mid anterior abdominal wall.    The uterus and adnexa appear unremarkable. The urinary bladder contains a tiny bubble of gas, potentially iatrogenic.    IMPRESSION:    Small bubbles of extraluminal gas adjacent to the ascending colon compatible with perforated viscus. Urgent surgical consultation is necessary.    Twisted configuration of the ascending colon near the hepatic flexure resulting in focal luminal narrowing.    Marked distention of the colon. There appears to be an additional area of luminal narrowing in the sigmoid region. This may represent a site of partial colonic obstruction.    Mild wall thickening involving small bowel loops within the lower abdomen and pelvis with adjacent mesenteric edema compatible with enteritis.    The above findings were discussed with Dr. Handley at 6:45 PM.    Electronically signed by:  Preston Kim MD  1/19/2022 6:49 PM CST Workstation: [id number]                             CTA Chest Non-Coronary - PE Study (Final result)  Result time 01/19/22 18:28:37    Final result by Preston Kim IV, MD (01/19/22 18:28:37)                 Narrative:    All CT scans at this facility use dose modulation, degenerative reconstruction  and/or weight-based dosing when appropriate to reduce radiation dose to as low as reasonably achievable.    CTA chest    CLINICAL HISTORY: Chest pain, shortness of breath.    The study was performed with thin sections with subsequent 3-D reformations  and reconstructions at multiple planes with images permanently stored in the PACS system.    The examination utilizes 100  cc of intravenous nonionic contrast material.        There is appropriate enhancement of the pulmonary arteries without findings of acute pulmonary thromboembolism.    The heart is normal in size. There is no significant pericardial fluid. The great vessels enhance appropriately. There is no pathologic hilar or mediastinal adenopathy.    There is a small area of airspace opacity within the right upper lobe posteriorly compatible with pneumonia in the appropriate clinical circumstance. Predominantly linear opacities are observed within the lower lobes, middle lobe and lingula likely reflecting platelike atelectasis. The central airways are patent. There is mild bronchial wall thickening observed within the lower lobes. There are no significant effusions.    There is mild loss of height involving both superior and inferior endplate of T5 and superior endplates of T8, T10 and T11.    IMPRESSION:    No findings of acute pulmonary thromboembolism.    Small area of infiltrate within the right upper lobe. Correlate with clinical evidence of pneumonia.    Bilateral atelectasis/scarring.    Mild thoracic compression deformities.    Electronically signed by:  Preston Kim MD  1/19/2022 6:28 PM CST Workstation: [id number]                             X-Ray Chest AP Portable (Final result)  Result time 01/19/22 17:04:54    Final result by Preston Kim IV, MD (01/19/22 17:04:54)                 Narrative:    Chest, single view    HISTORY: Tachycardia.    Comparison 1/15/2022.    The patient is rotated.    The heart size and pulmonary vasculature are within normal limits. There is mild tortuosity of the aorta.    The lungs are appropriately expanded. There are no confluent areas of airspace disease. Minimal linear opacities in the lung bases may reflect minor platelike atelectasis. There are no effusions. Monitoring electrodes overlie the chest.    IMPRESSION:    Probable minor platelike atelectasis in the lung  bases.    Otherwise, no acute cardiopulmonary disease.    Electronically signed by:  Preston Kim MD  1/19/2022 5:04 PM CST Workstation: [id number]                               Medications   magnesium sulfate 2g in water 50mL IVPB (premix) (2 g Intravenous New Bag 1/19/22 1739)   azithromycin 500 mg in dextrose 5 % 250 mL IVPB (ready to mix system) (has no administration in time range)   meropenem-0.9% sodium chloride 1 g/50 mL IVPB (1 g Intravenous New Bag 1/19/22 1920)   sodium chloride 0.9% bolus 1,000 mL (0 mLs Intravenous Stopped 1/19/22 1756)   HYDROmorphone injection 0.5 mg (0.5 mg Intravenous Given 1/19/22 1720)   0.9%  NaCl infusion ( Intravenous New Bag 1/19/22 1800)   iohexoL (OMNIPAQUE 350) injection 100 mL (100 mLs Intravenous Given 1/19/22 1816)     Medical Decision Making:   History:   Old Medical Records: I decided to obtain old medical records.  Clinical Tests:   Lab Tests: Reviewed  Radiological Study: Reviewed  Medical Tests: Reviewed  ED Management:  Patient presents with tachycardia.  Patient did have severe electrolyte abnormalities.  Magnesium given with IV fluids.  Pulse rate improved from 150-120.  Given positive COVID and history of DVT CTA of the chest obtained.  No evidence of pulmonary embolus.  Given previous abdominal pain with complex history CT of the abdomen pelvis obtained.  Patient does have perforated viscus with dilated colon.  Discussed with General surgery here, Dr Louise.  He reviewed study.  He recommends transfer for colorectal surgery to oxygen remained given complex case.  Patient is very happy with transfer.  Dr. Jeffers accepts at [facility] main campus.                      Clinical Impression:   Final diagnoses:  [R00.0] Tachycardia  [R19.8] Perforated abdominal viscus (Primary)  [K56.609] Colonic obstruction  [U07.1] COVID-19 virus infection          ED Disposition Condition    Admit               Presley Handley MD  01/19/22 1938

## 2022-01-20 ENCOUNTER — ANESTHESIA (OUTPATIENT)
Dept: SURGERY | Facility: HOSPITAL | Age: 57
DRG: 329 | End: 2022-01-20
Payer: MEDICARE

## 2022-01-20 ENCOUNTER — ANESTHESIA EVENT (OUTPATIENT)
Dept: SURGERY | Facility: HOSPITAL | Age: 57
DRG: 329 | End: 2022-01-20
Payer: MEDICARE

## 2022-01-20 LAB
ABO + RH BLD: NORMAL
ALBUMIN SERPL BCP-MCNC: 2.2 G/DL (ref 3.5–5.2)
ALP SERPL-CCNC: 126 U/L (ref 55–135)
ALT SERPL W/O P-5'-P-CCNC: 16 U/L (ref 10–44)
ANION GAP SERPL CALC-SCNC: 6 MMOL/L (ref 8–16)
APTT BLDCRRT: 27.7 SEC (ref 21–32)
AST SERPL-CCNC: 21 U/L (ref 10–40)
BACTERIA BLD CULT: NORMAL
BASOPHILS # BLD AUTO: 0.05 K/UL (ref 0–0.2)
BASOPHILS NFR BLD: 0.5 % (ref 0–1.9)
BILIRUB SERPL-MCNC: 0.7 MG/DL (ref 0.1–1)
BLD GP AB SCN CELLS X3 SERPL QL: NORMAL
BUN SERPL-MCNC: 5 MG/DL (ref 6–20)
CALCIUM SERPL-MCNC: 7.4 MG/DL (ref 8.7–10.5)
CHLORIDE SERPL-SCNC: 103 MMOL/L (ref 95–110)
CO2 SERPL-SCNC: 20 MMOL/L (ref 23–29)
CREAT SERPL-MCNC: 0.7 MG/DL (ref 0.5–1.4)
CRP SERPL-MCNC: 56.6 MG/L (ref 0–8.2)
DIFFERENTIAL METHOD: ABNORMAL
EOSINOPHIL # BLD AUTO: 0.1 K/UL (ref 0–0.5)
EOSINOPHIL NFR BLD: 1.3 % (ref 0–8)
ERYTHROCYTE [DISTWIDTH] IN BLOOD BY AUTOMATED COUNT: 13.9 % (ref 11.5–14.5)
EST. GFR  (AFRICAN AMERICAN): >60 ML/MIN/1.73 M^2
EST. GFR  (NON AFRICAN AMERICAN): >60 ML/MIN/1.73 M^2
GLUCOSE SERPL-MCNC: 85 MG/DL (ref 70–110)
HCT VFR BLD AUTO: 32.6 % (ref 37–48.5)
HGB BLD-MCNC: 10.4 G/DL (ref 12–16)
IMM GRANULOCYTES # BLD AUTO: 0.16 K/UL (ref 0–0.04)
IMM GRANULOCYTES NFR BLD AUTO: 1.5 % (ref 0–0.5)
INR PPP: 1 (ref 0.8–1.2)
LYMPHOCYTES # BLD AUTO: 0.6 K/UL (ref 1–4.8)
LYMPHOCYTES NFR BLD: 5.1 % (ref 18–48)
MAGNESIUM SERPL-MCNC: 1.5 MG/DL (ref 1.6–2.6)
MCH RBC QN AUTO: 28.5 PG (ref 27–31)
MCHC RBC AUTO-ENTMCNC: 31.9 G/DL (ref 32–36)
MCV RBC AUTO: 89 FL (ref 82–98)
MONOCYTES # BLD AUTO: 0.6 K/UL (ref 0.3–1)
MONOCYTES NFR BLD: 5.8 % (ref 4–15)
NEUTROPHILS # BLD AUTO: 9.4 K/UL (ref 1.8–7.7)
NEUTROPHILS NFR BLD: 85.8 % (ref 38–73)
NRBC BLD-RTO: 0 /100 WBC
PHOSPHATE SERPL-MCNC: 2.5 MG/DL (ref 2.7–4.5)
PLATELET # BLD AUTO: 161 K/UL (ref 150–450)
PMV BLD AUTO: 9.1 FL (ref 9.2–12.9)
POTASSIUM SERPL-SCNC: 3.9 MMOL/L (ref 3.5–5.1)
PREALB SERPL-MCNC: 11 MG/DL (ref 20–43)
PROT SERPL-MCNC: 5.2 G/DL (ref 6–8.4)
PROTHROMBIN TIME: 11.1 SEC (ref 9–12.5)
RBC # BLD AUTO: 3.65 M/UL (ref 4–5.4)
SODIUM SERPL-SCNC: 129 MMOL/L (ref 136–145)
WBC # BLD AUTO: 10.95 K/UL (ref 3.9–12.7)

## 2022-01-20 PROCEDURE — 83735 ASSAY OF MAGNESIUM: CPT | Performed by: STUDENT IN AN ORGANIZED HEALTH CARE EDUCATION/TRAINING PROGRAM

## 2022-01-20 PROCEDURE — 63600175 PHARM REV CODE 636 W HCPCS: Performed by: NURSE ANESTHETIST, CERTIFIED REGISTERED

## 2022-01-20 PROCEDURE — 36000708 HC OR TIME LEV III 1ST 15 MIN: Performed by: COLON & RECTAL SURGERY

## 2022-01-20 PROCEDURE — 80053 COMPREHEN METABOLIC PANEL: CPT | Performed by: STUDENT IN AN ORGANIZED HEALTH CARE EDUCATION/TRAINING PROGRAM

## 2022-01-20 PROCEDURE — 71000033 HC RECOVERY, INTIAL HOUR: Performed by: COLON & RECTAL SURGERY

## 2022-01-20 PROCEDURE — 71000039 HC RECOVERY, EACH ADD'L HOUR: Performed by: COLON & RECTAL SURGERY

## 2022-01-20 PROCEDURE — 25000003 PHARM REV CODE 250: Performed by: STUDENT IN AN ORGANIZED HEALTH CARE EDUCATION/TRAINING PROGRAM

## 2022-01-20 PROCEDURE — 99223 1ST HOSP IP/OBS HIGH 75: CPT | Mod: 57,AI,, | Performed by: COLON & RECTAL SURGERY

## 2022-01-20 PROCEDURE — 86140 C-REACTIVE PROTEIN: CPT | Performed by: STUDENT IN AN ORGANIZED HEALTH CARE EDUCATION/TRAINING PROGRAM

## 2022-01-20 PROCEDURE — 63600175 PHARM REV CODE 636 W HCPCS

## 2022-01-20 PROCEDURE — 37000009 HC ANESTHESIA EA ADD 15 MINS: Performed by: COLON & RECTAL SURGERY

## 2022-01-20 PROCEDURE — D9220A PRA ANESTHESIA: ICD-10-PCS | Mod: CRNA,,, | Performed by: NURSE ANESTHETIST, CERTIFIED REGISTERED

## 2022-01-20 PROCEDURE — 44160 REMOVAL OF COLON: CPT | Mod: 22,,, | Performed by: COLON & RECTAL SURGERY

## 2022-01-20 PROCEDURE — 44650 PR REPAIR BOWEL-BOWEL FISTULA: ICD-10-PCS | Mod: 51,,, | Performed by: COLON & RECTAL SURGERY

## 2022-01-20 PROCEDURE — 25000003 PHARM REV CODE 250: Performed by: NURSE ANESTHETIST, CERTIFIED REGISTERED

## 2022-01-20 PROCEDURE — 85025 COMPLETE CBC W/AUTO DIFF WBC: CPT | Mod: 91 | Performed by: STUDENT IN AN ORGANIZED HEALTH CARE EDUCATION/TRAINING PROGRAM

## 2022-01-20 PROCEDURE — 88307 TISSUE EXAM BY PATHOLOGIST: CPT | Mod: 26,,, | Performed by: PATHOLOGY

## 2022-01-20 PROCEDURE — P9045 ALBUMIN (HUMAN), 5%, 250 ML: HCPCS | Mod: JG | Performed by: NURSE ANESTHETIST, CERTIFIED REGISTERED

## 2022-01-20 PROCEDURE — D9220A PRA ANESTHESIA: Mod: ANES,,, | Performed by: ANESTHESIOLOGY

## 2022-01-20 PROCEDURE — 85610 PROTHROMBIN TIME: CPT | Performed by: STUDENT IN AN ORGANIZED HEALTH CARE EDUCATION/TRAINING PROGRAM

## 2022-01-20 PROCEDURE — 44160 PR REMVL COLON & TERM ILEUM W/ILEOCOLOSTOMY: ICD-10-PCS | Mod: 22,,, | Performed by: COLON & RECTAL SURGERY

## 2022-01-20 PROCEDURE — 27000221 HC OXYGEN, UP TO 24 HOURS

## 2022-01-20 PROCEDURE — 86901 BLOOD TYPING SEROLOGIC RH(D): CPT

## 2022-01-20 PROCEDURE — D9220A PRA ANESTHESIA: Mod: CRNA,,, | Performed by: NURSE ANESTHETIST, CERTIFIED REGISTERED

## 2022-01-20 PROCEDURE — 88302 PR  SURG PATH,LEVEL II: ICD-10-PCS | Mod: 26,,, | Performed by: PATHOLOGY

## 2022-01-20 PROCEDURE — 94761 N-INVAS EAR/PLS OXIMETRY MLT: CPT

## 2022-01-20 PROCEDURE — 49560 PR REPAIR INCISIONAL HERNIA,REDUCIBLE: ICD-10-PCS | Mod: 59,,, | Performed by: COLON & RECTAL SURGERY

## 2022-01-20 PROCEDURE — 36415 COLL VENOUS BLD VENIPUNCTURE: CPT | Performed by: STUDENT IN AN ORGANIZED HEALTH CARE EDUCATION/TRAINING PROGRAM

## 2022-01-20 PROCEDURE — 44650 REPAIR BOWEL FISTULA: CPT | Mod: 51,,, | Performed by: COLON & RECTAL SURGERY

## 2022-01-20 PROCEDURE — 99223 PR INITIAL HOSPITAL CARE,LEVL III: ICD-10-PCS | Mod: CR,GC,, | Performed by: INTERNAL MEDICINE

## 2022-01-20 PROCEDURE — 84100 ASSAY OF PHOSPHORUS: CPT | Mod: 91 | Performed by: STUDENT IN AN ORGANIZED HEALTH CARE EDUCATION/TRAINING PROGRAM

## 2022-01-20 PROCEDURE — 85730 THROMBOPLASTIN TIME PARTIAL: CPT | Performed by: STUDENT IN AN ORGANIZED HEALTH CARE EDUCATION/TRAINING PROGRAM

## 2022-01-20 PROCEDURE — 20600001 HC STEP DOWN PRIVATE ROOM

## 2022-01-20 PROCEDURE — 84134 ASSAY OF PREALBUMIN: CPT | Performed by: STUDENT IN AN ORGANIZED HEALTH CARE EDUCATION/TRAINING PROGRAM

## 2022-01-20 PROCEDURE — 88307 PR  SURG PATH,LEVEL V: ICD-10-PCS | Mod: 26,,, | Performed by: PATHOLOGY

## 2022-01-20 PROCEDURE — 99223 1ST HOSP IP/OBS HIGH 75: CPT | Mod: CR,GC,, | Performed by: INTERNAL MEDICINE

## 2022-01-20 PROCEDURE — D9220A PRA ANESTHESIA: ICD-10-PCS | Mod: ANES,,, | Performed by: ANESTHESIOLOGY

## 2022-01-20 PROCEDURE — 27201423 OPTIME MED/SURG SUP & DEVICES STERILE SUPPLY: Performed by: COLON & RECTAL SURGERY

## 2022-01-20 PROCEDURE — 99223 PR INITIAL HOSPITAL CARE,LEVL III: ICD-10-PCS | Mod: 57,AI,, | Performed by: COLON & RECTAL SURGERY

## 2022-01-20 PROCEDURE — 88302 TISSUE EXAM BY PATHOLOGIST: CPT | Mod: 26,,, | Performed by: PATHOLOGY

## 2022-01-20 PROCEDURE — 88302 TISSUE EXAM BY PATHOLOGIST: CPT | Performed by: PATHOLOGY

## 2022-01-20 PROCEDURE — 37000008 HC ANESTHESIA 1ST 15 MINUTES: Performed by: COLON & RECTAL SURGERY

## 2022-01-20 PROCEDURE — 99900035 HC TECH TIME PER 15 MIN (STAT)

## 2022-01-20 PROCEDURE — 63600175 PHARM REV CODE 636 W HCPCS: Performed by: STUDENT IN AN ORGANIZED HEALTH CARE EDUCATION/TRAINING PROGRAM

## 2022-01-20 PROCEDURE — 86920 COMPATIBILITY TEST SPIN: CPT

## 2022-01-20 PROCEDURE — 36000709 HC OR TIME LEV III EA ADD 15 MIN: Performed by: COLON & RECTAL SURGERY

## 2022-01-20 PROCEDURE — 49560 PR REPAIR INCISIONAL HERNIA,REDUCIBLE: CPT | Mod: 59,,, | Performed by: COLON & RECTAL SURGERY

## 2022-01-20 PROCEDURE — 27201037 HC PRESSURE MONITORING SET UP

## 2022-01-20 PROCEDURE — 25000003 PHARM REV CODE 250: Performed by: COLON & RECTAL SURGERY

## 2022-01-20 PROCEDURE — 88307 TISSUE EXAM BY PATHOLOGIST: CPT | Performed by: PATHOLOGY

## 2022-01-20 PROCEDURE — 27100025 HC TUBING, SET FLUID WARMER: Performed by: ANESTHESIOLOGY

## 2022-01-20 PROCEDURE — 27000207 HC ISOLATION

## 2022-01-20 RX ORDER — HYDROMORPHONE HCL IN 0.9% NACL 6 MG/30 ML
PATIENT CONTROLLED ANALGESIA SYRINGE INTRAVENOUS CONTINUOUS
Status: DISCONTINUED | OUTPATIENT
Start: 2022-01-20 | End: 2022-01-23

## 2022-01-20 RX ORDER — METHYLPREDNISOLONE ACETATE 40 MG/ML
16 INJECTION, SUSPENSION INTRA-ARTICULAR; INTRALESIONAL; INTRAMUSCULAR; SOFT TISSUE ONCE
Status: COMPLETED | OUTPATIENT
Start: 2022-01-21 | End: 2022-01-21

## 2022-01-20 RX ORDER — SODIUM CHLORIDE 0.9 % (FLUSH) 0.9 %
10 SYRINGE (ML) INJECTION
Status: DISCONTINUED | OUTPATIENT
Start: 2022-01-20 | End: 2022-01-27 | Stop reason: HOSPADM

## 2022-01-20 RX ORDER — ALBUMIN HUMAN 50 G/1000ML
SOLUTION INTRAVENOUS CONTINUOUS PRN
Status: DISCONTINUED | OUTPATIENT
Start: 2022-01-20 | End: 2022-01-20

## 2022-01-20 RX ORDER — PROPOFOL 10 MG/ML
VIAL (ML) INTRAVENOUS
Status: DISCONTINUED | OUTPATIENT
Start: 2022-01-20 | End: 2022-01-20

## 2022-01-20 RX ORDER — ONDANSETRON 2 MG/ML
4 INJECTION INTRAMUSCULAR; INTRAVENOUS DAILY PRN
Status: CANCELLED | OUTPATIENT
Start: 2022-01-20

## 2022-01-20 RX ORDER — HEPARIN SODIUM 5000 [USP'U]/ML
5000 INJECTION, SOLUTION INTRAVENOUS; SUBCUTANEOUS EVERY 8 HOURS
Status: DISCONTINUED | OUTPATIENT
Start: 2022-01-20 | End: 2022-01-20

## 2022-01-20 RX ORDER — NEOSTIGMINE METHYLSULFATE 0.5 MG/ML
INJECTION, SOLUTION INTRAVENOUS
Status: DISCONTINUED | OUTPATIENT
Start: 2022-01-20 | End: 2022-01-20

## 2022-01-20 RX ORDER — ONDANSETRON 2 MG/ML
INJECTION INTRAMUSCULAR; INTRAVENOUS
Status: DISCONTINUED | OUTPATIENT
Start: 2022-01-20 | End: 2022-01-20

## 2022-01-20 RX ORDER — PANTOPRAZOLE SODIUM 40 MG/10ML
40 INJECTION, POWDER, LYOPHILIZED, FOR SOLUTION INTRAVENOUS DAILY
Status: DISCONTINUED | OUTPATIENT
Start: 2022-01-21 | End: 2022-01-24

## 2022-01-20 RX ORDER — NALOXONE HCL 0.4 MG/ML
0.02 VIAL (ML) INJECTION
Status: DISCONTINUED | OUTPATIENT
Start: 2022-01-20 | End: 2022-01-23

## 2022-01-20 RX ORDER — BUPIVACAINE HYDROCHLORIDE AND EPINEPHRINE 2.5; 5 MG/ML; UG/ML
INJECTION, SOLUTION EPIDURAL; INFILTRATION; INTRACAUDAL; PERINEURAL
Status: DISCONTINUED | OUTPATIENT
Start: 2022-01-20 | End: 2022-01-20 | Stop reason: HOSPADM

## 2022-01-20 RX ORDER — LIDOCAINE HYDROCHLORIDE 10 MG/ML
INJECTION, SOLUTION EPIDURAL; INFILTRATION; INTRACAUDAL; PERINEURAL
Status: DISCONTINUED | OUTPATIENT
Start: 2022-01-20 | End: 2022-01-20 | Stop reason: HOSPADM

## 2022-01-20 RX ORDER — FENTANYL CITRATE 50 UG/ML
25 INJECTION, SOLUTION INTRAMUSCULAR; INTRAVENOUS EVERY 5 MIN PRN
Status: CANCELLED | OUTPATIENT
Start: 2022-01-20

## 2022-01-20 RX ORDER — HYDROMORPHONE HYDROCHLORIDE 2 MG/ML
INJECTION, SOLUTION INTRAMUSCULAR; INTRAVENOUS; SUBCUTANEOUS
Status: DISCONTINUED | OUTPATIENT
Start: 2022-01-20 | End: 2022-01-20

## 2022-01-20 RX ORDER — LIDOCAINE HYDROCHLORIDE 20 MG/ML
INJECTION, SOLUTION EPIDURAL; INFILTRATION; INTRACAUDAL; PERINEURAL
Status: DISCONTINUED | OUTPATIENT
Start: 2022-01-20 | End: 2022-01-20

## 2022-01-20 RX ORDER — MIDAZOLAM HYDROCHLORIDE 1 MG/ML
INJECTION, SOLUTION INTRAMUSCULAR; INTRAVENOUS
Status: DISCONTINUED | OUTPATIENT
Start: 2022-01-20 | End: 2022-01-20

## 2022-01-20 RX ORDER — FENTANYL CITRATE 50 UG/ML
INJECTION, SOLUTION INTRAMUSCULAR; INTRAVENOUS
Status: DISCONTINUED | OUTPATIENT
Start: 2022-01-20 | End: 2022-01-20

## 2022-01-20 RX ORDER — MUPIROCIN 20 MG/G
OINTMENT TOPICAL 2 TIMES DAILY
Status: COMPLETED | OUTPATIENT
Start: 2022-01-20 | End: 2022-01-25

## 2022-01-20 RX ORDER — KETAMINE HCL IN 0.9 % NACL 50 MG/5 ML
SYRINGE (ML) INTRAVENOUS
Status: DISCONTINUED | OUTPATIENT
Start: 2022-01-20 | End: 2022-01-20

## 2022-01-20 RX ORDER — PHENYLEPHRINE HCL IN 0.9% NACL 1 MG/10 ML
SYRINGE (ML) INTRAVENOUS
Status: DISCONTINUED | OUTPATIENT
Start: 2022-01-20 | End: 2022-01-20

## 2022-01-20 RX ORDER — SUCCINYLCHOLINE CHLORIDE 20 MG/ML
INJECTION INTRAMUSCULAR; INTRAVENOUS
Status: DISCONTINUED | OUTPATIENT
Start: 2022-01-20 | End: 2022-01-20

## 2022-01-20 RX ORDER — DIPHENHYDRAMINE HYDROCHLORIDE 50 MG/ML
INJECTION INTRAMUSCULAR; INTRAVENOUS
Status: DISCONTINUED | OUTPATIENT
Start: 2022-01-20 | End: 2022-01-20

## 2022-01-20 RX ORDER — ESMOLOL HYDROCHLORIDE 10 MG/ML
INJECTION INTRAVENOUS
Status: DISCONTINUED | OUTPATIENT
Start: 2022-01-20 | End: 2022-01-20

## 2022-01-20 RX ORDER — ACETAMINOPHEN 10 MG/ML
1000 INJECTION, SOLUTION INTRAVENOUS EVERY 8 HOURS
Status: DISPENSED | OUTPATIENT
Start: 2022-01-20 | End: 2022-01-21

## 2022-01-20 RX ORDER — ROCURONIUM BROMIDE 10 MG/ML
INJECTION, SOLUTION INTRAVENOUS
Status: DISCONTINUED | OUTPATIENT
Start: 2022-01-20 | End: 2022-01-20

## 2022-01-20 RX ADMIN — DIPHENHYDRAMINE HYDROCHLORIDE 6.25 MG: 50 INJECTION INTRAMUSCULAR; INTRAVENOUS at 04:01

## 2022-01-20 RX ADMIN — ONDANSETRON 4 MG: 2 INJECTION INTRAMUSCULAR; INTRAVENOUS at 08:01

## 2022-01-20 RX ADMIN — ROCURONIUM BROMIDE 10 MG: 10 INJECTION, SOLUTION INTRAVENOUS at 08:01

## 2022-01-20 RX ADMIN — ESMOLOL HYDROCHLORIDE 20 MG: 10 INJECTION INTRAVENOUS at 03:01

## 2022-01-20 RX ADMIN — Medication 100 MCG: at 06:01

## 2022-01-20 RX ADMIN — HYDROMORPHONE HYDROCHLORIDE 0.4 MG: 2 INJECTION INTRAMUSCULAR; INTRAVENOUS; SUBCUTANEOUS at 10:01

## 2022-01-20 RX ADMIN — FENTANYL CITRATE 25 MCG: 50 INJECTION INTRAMUSCULAR; INTRAVENOUS at 04:01

## 2022-01-20 RX ADMIN — ROCURONIUM BROMIDE 10 MG: 10 INJECTION, SOLUTION INTRAVENOUS at 05:01

## 2022-01-20 RX ADMIN — NEOSTIGMINE METHYLSULFATE 5 MG: 0.5 INJECTION INTRAVENOUS at 09:01

## 2022-01-20 RX ADMIN — MIDAZOLAM 1 MG: 1 INJECTION INTRAMUSCULAR; INTRAVENOUS at 03:01

## 2022-01-20 RX ADMIN — ALBUMIN (HUMAN): 12.5 SOLUTION INTRAVENOUS at 07:01

## 2022-01-20 RX ADMIN — Medication 100 MCG: at 07:01

## 2022-01-20 RX ADMIN — HYDROMORPHONE HYDROCHLORIDE 0.2 MG: 2 INJECTION INTRAMUSCULAR; INTRAVENOUS; SUBCUTANEOUS at 09:01

## 2022-01-20 RX ADMIN — ONDANSETRON 200 MG: 2 INJECTION INTRAMUSCULAR; INTRAVENOUS at 06:01

## 2022-01-20 RX ADMIN — FENTANYL CITRATE 50 MCG: 50 INJECTION INTRAMUSCULAR; INTRAVENOUS at 03:01

## 2022-01-20 RX ADMIN — Medication: at 11:01

## 2022-01-20 RX ADMIN — Medication 100 MCG: at 05:01

## 2022-01-20 RX ADMIN — Medication 200 MCG: at 06:01

## 2022-01-20 RX ADMIN — HYDROMORPHONE HYDROCHLORIDE 0.2 MG: 2 INJECTION INTRAMUSCULAR; INTRAVENOUS; SUBCUTANEOUS at 10:01

## 2022-01-20 RX ADMIN — HYDROCORTISONE SODIUM SUCCINATE 50 MG: 100 INJECTION, POWDER, FOR SOLUTION INTRAMUSCULAR; INTRAVENOUS at 03:01

## 2022-01-20 RX ADMIN — HYDROMORPHONE HYDROCHLORIDE 1 MG: 1 INJECTION, SOLUTION INTRAMUSCULAR; INTRAVENOUS; SUBCUTANEOUS at 06:01

## 2022-01-20 RX ADMIN — ROCURONIUM BROMIDE 40 MG: 10 INJECTION, SOLUTION INTRAVENOUS at 03:01

## 2022-01-20 RX ADMIN — FENTANYL CITRATE 25 MCG: 50 INJECTION INTRAMUSCULAR; INTRAVENOUS at 10:01

## 2022-01-20 RX ADMIN — Medication 150 MCG: at 06:01

## 2022-01-20 RX ADMIN — PIPERACILLIN AND TAZOBACTAM 4.5 G: 4; .5 INJECTION, POWDER, LYOPHILIZED, FOR SOLUTION INTRAVENOUS; PARENTERAL at 06:01

## 2022-01-20 RX ADMIN — Medication 100 MCG: at 04:01

## 2022-01-20 RX ADMIN — PROPOFOL 100 MG: 10 INJECTION, EMULSION INTRAVENOUS at 03:01

## 2022-01-20 RX ADMIN — HYDROMORPHONE HYDROCHLORIDE 0.5 MG: 1 INJECTION, SOLUTION INTRAMUSCULAR; INTRAVENOUS; SUBCUTANEOUS at 01:01

## 2022-01-20 RX ADMIN — GLYCOPYRROLATE 0.6 MG: 0.2 INJECTION INTRAMUSCULAR; INTRAVENOUS at 09:01

## 2022-01-20 RX ADMIN — Medication 200 MCG: at 09:01

## 2022-01-20 RX ADMIN — ROCURONIUM BROMIDE 10 MG: 10 INJECTION, SOLUTION INTRAVENOUS at 07:01

## 2022-01-20 RX ADMIN — ROCURONIUM BROMIDE 10 MG: 10 INJECTION, SOLUTION INTRAVENOUS at 06:01

## 2022-01-20 RX ADMIN — FENTANYL CITRATE 25 MCG: 50 INJECTION INTRAMUSCULAR; INTRAVENOUS at 03:01

## 2022-01-20 RX ADMIN — Medication 20 MG: at 05:01

## 2022-01-20 RX ADMIN — MUPIROCIN: 20 OINTMENT TOPICAL at 11:01

## 2022-01-20 RX ADMIN — Medication 200 MCG: at 07:01

## 2022-01-20 RX ADMIN — SUCCINYLCHOLINE CHLORIDE 100 MG: 20 INJECTION, SOLUTION INTRAMUSCULAR; INTRAVENOUS; PARENTERAL at 03:01

## 2022-01-20 RX ADMIN — HYDROMORPHONE HYDROCHLORIDE 0.2 MG: 2 INJECTION INTRAMUSCULAR; INTRAVENOUS; SUBCUTANEOUS at 05:01

## 2022-01-20 RX ADMIN — PROCHLORPERAZINE EDISYLATE 5 MG: 5 INJECTION INTRAMUSCULAR; INTRAVENOUS at 04:01

## 2022-01-20 RX ADMIN — LIDOCAINE HYDROCHLORIDE 100 MG: 20 INJECTION, SOLUTION EPIDURAL; INFILTRATION; INTRACAUDAL at 03:01

## 2022-01-20 RX ADMIN — PIPERACILLIN AND TAZOBACTAM 4.5 G: 4; .5 INJECTION, POWDER, LYOPHILIZED, FOR SOLUTION INTRAVENOUS; PARENTERAL at 08:01

## 2022-01-20 RX ADMIN — Medication 150 MCG: at 05:01

## 2022-01-20 RX ADMIN — SODIUM CHLORIDE, SODIUM LACTATE, POTASSIUM CHLORIDE, AND CALCIUM CHLORIDE 1000 ML: .6; .31; .03; .02 INJECTION, SOLUTION INTRAVENOUS at 11:01

## 2022-01-20 RX ADMIN — PIPERACILLIN AND TAZOBACTAM 4.5 G: 4; .5 INJECTION, POWDER, LYOPHILIZED, FOR SOLUTION INTRAVENOUS; PARENTERAL at 04:01

## 2022-01-20 RX ADMIN — PIPERACILLIN AND TAZOBACTAM 4.5 G: 4; .5 INJECTION, POWDER, LYOPHILIZED, FOR SOLUTION INTRAVENOUS; PARENTERAL at 12:01

## 2022-01-20 RX ADMIN — HYDROMORPHONE HYDROCHLORIDE 1 MG: 1 INJECTION, SOLUTION INTRAMUSCULAR; INTRAVENOUS; SUBCUTANEOUS at 01:01

## 2022-01-20 RX ADMIN — Medication 200 MCG: at 08:01

## 2022-01-20 RX ADMIN — SODIUM CHLORIDE, SODIUM LACTATE, POTASSIUM CHLORIDE, AND CALCIUM CHLORIDE: .6; .31; .03; .02 INJECTION, SOLUTION INTRAVENOUS at 01:01

## 2022-01-20 NOTE — PROGRESS NOTES
[facility]  Colorectal Surgery  Progress Note    Patient Name: Carline Chang  MRN: [id number]  Admission Date: 1/19/2022  Hospital Length of Stay: 1 days  Attending Physician: LLUVIA Post MD    Subjective:     Interval History: Admitted from OSH yesterday. Tachycardic, improving with fluid administration. On assessment patient reporting improved abdominal pain with no nausea or vomiting. Endorses some mild back pain. Afebrile.     Post-Op Info:  Procedure(s) (LRB):  LAPAROTOMY, EXPLORATORY (N/A)          Medications:  Continuous Infusions:   lactated ringers 100 mL/hr at 01/20/22 0147     Scheduled Meds:   heparin (porcine)  5,000 Units Subcutaneous Q8H    levETIRAcetam  500 mg Oral BID    pantoprazole  20 mg Oral QHS    piperacillin-tazobactam (ZOSYN) IVPB  4.5 g Intravenous Q8H    predniSONE  20 mg Oral Daily    venlafaxine  75 mg Oral BID     PRN Meds:   acetaminophen    HYDROmorphone    HYDROmorphone    ondansetron    prochlorperazine    sodium chloride 0.9%        Objective:     Vital Signs (Most Recent):  Temp: 98.2 °F (36.8 °C) (01/20/22 0400)  Pulse: (!) 118 (01/20/22 0700)  Resp: 16 (01/20/22 0605)  BP: 110/71 (01/20/22 0400)  SpO2: 97 % (01/20/22 0400) Vital Signs (24h Range):  Temp:  [98.1 °F (36.7 °C)-99.1 °F (37.3 °C)] 98.2 °F (36.8 °C)  Pulse:  [102-162] 118  Resp:  [16-30] 16  SpO2:  [95 %-100 %] 97 %  BP: (109-130)/(56-84) 110/71     Intake/Output - Last 3 Shifts       01/18 0700 01/19 0659 01/19 0700 01/20 0659 01/20 0700 01/21 0659    IV Piggyback  1000     Total Intake(mL/kg)  1000 (17.2)     Net  +1000                  Physical Exam  Vitals and nursing note reviewed.   Constitutional:       General: She is not in acute distress.     Appearance: She is ill-appearing.   HENT:      Head: Normocephalic.   Eyes:      Extraocular Movements: Extraocular movements intact.      Conjunctiva/sclera: Conjunctivae normal.   Cardiovascular:      Rate and Rhythm: Regular  rhythm. Tachycardia present.   Pulmonary:      Effort: Pulmonary effort is normal. No respiratory distress.   Abdominal:      General: There is distension.      Palpations: Abdomen is soft.      Tenderness: There is abdominal tenderness. There is no guarding or rebound.      Comments: Prior midline laparotomy scar. Mild distention with diffuse mild tenderness to palpation.     Skin:     General: Skin is warm and dry.   Neurological:      General: No focal deficit present.      Mental Status: She is alert and oriented to person, place, and time.       Significant Labs:  All pertinent lab results within the last 24 hours have been reviewed.     Significant Diagnostics:  I have reviewed all pertinent imaging results/findings within the past 24 hours.    Assessment/Plan:     Crohn disease  Carline Chang is a 56 y.o. female with Crohn's disease on steroids and complicated surgical history presents with leukocytosis of 19K and small volume extra-luminal free air near the ascending colon concerning for perforated viscus.     -To OR today for Ex-lap with possible colectomy, small bowel resection, ostomy placement  -NPO, mIVF  -PICC team consulted, will initiate TPN today  -Continue zosyn  -Serial abdominal exams  -GI consult for assistance with med managment  -Hold DVT ppx in anticipation of procedure.            Sanchez Mcclure MD  Colorectal Surgery  [facility]

## 2022-01-20 NOTE — ASSESSMENT & PLAN NOTE
Carline Chang is a 56 y.o. female with Crohn's disease on steroids and complicated surgical history presents with leukocytosis of 19K and small volume extra-luminal free air near the ascending colon concerning for perforated viscus.     -To OR today for Ex-lap with possible colectomy, small bowel resection, ostomy placement  -NPO, mIVF  -PICC team consulted, will initiate TPN today  -Continue zosyn  -Serial abdominal exams  -GI consult for assistance with med managment  -Hold DVT ppx in anticipation of procedure.

## 2022-01-20 NOTE — ANESTHESIA PROCEDURE NOTES
Intubation    Date/Time: 1/20/2022 3:37 PM  Performed by: Brenda Ken CRNA  Authorized by: Andre Juarez MD     Intubation:     Induction:  Rapid sequence induction    Intubated:  Postinduction    Mask Ventilation:  N/a    Attempts:  1    Attempted By:  CRNA    Method of Intubation:  Video laryngoscopy    Blade:  Roberson 3    Laryngeal View Grade: Grade I - full view of cords      Difficult Airway Encountered?: No      Complications:  None    Airway Device:  Oral endotracheal tube    Airway Device Size:  7.0    Style/Cuff Inflation:  Cuffed (inflated to minimal occlusive pressure)    Tube secured:  21    Secured at:  The teeth    Placement Verified By:  Capnometry    Complicating Factors:  None    Findings Post-Intubation:  BS equal bilateral

## 2022-01-20 NOTE — PLAN OF CARE
[id number]  PT REQUESTED THIS CM TO CALL HER MOTHER AND TELL HER THAT SHE IS GOING TO SURGERY   CM PLACED CALL TO ELOISA EASLEY @[phone] AND INFORMED HER OF THE ABOVE   CM WILL CONTINUE TO FOLLOW

## 2022-01-20 NOTE — ANESTHESIA PREPROCEDURE EVALUATION
01/20/2022  Carline Chang is a 56 y.o., female.  Pre-operative evaluation for Procedure(s) (LRB):  LAPAROTOMY, EXPLORATORY (N/A)    Carline Chang is a 56 y.o. female   Carline Chang is a 56 y.o. female with Crohn's disease on steroids and complicated surgical history presents with leukocytosis of 19K and small volume extra-luminal free air near the ascending colon concerning for perforated viscus  Patient Active Problem List   Diagnosis    Calculus of ureter    Diarrhea    Rectovaginal fistula    Chronic, continuous use of opioids    Chronic abdominal pain    Special screening for malignant neoplasms, colon    Difficult intravenous access    History of DVT (deep vein thrombosis)- seems to be related to IV catheters    Anxiety    Alkaline phosphatase elevation    Crohn's disease of both small and large intestine with intestinal obstruction    Nephrolithiasis    High risk medication use    GERD (gastroesophageal reflux disease)    Crohn's colitis    Crohn disease    Seizure activity     Bowel perforation in the past     Electrolyte abnormality    Chronic anemia    History of status epilepticus       Review of patient's allergies indicates:   Allergen Reactions    Remicade [infliximab] Shortness Of Breath and Palpitations     Severe muscle and joint, and bone pain    Morphine Other (See Comments)     Abdominal pain    Flagyl [metronidazole] Other (See Comments)     Neuropathy    Nubain [nalbuphine] Anxiety     Upper abdominal burning        No current facility-administered medications on file prior to encounter.     Current Outpatient Medications on File Prior to Encounter   Medication Sig Dispense Refill    calcium carbonate (TUMS ORAL) Take 1 tablet by mouth as needed (acid reflux).      calcium-vitamin D3 (OS-CHEIKH 500 + D3) 500 mg-5 mcg (200 unit)  per tablet Take 2 tablets by mouth 2 (two) times daily with meals. 120 tablet 0    cyanocobalamin 1,000 mcg/mL injection Inject 1,000 mcg into the skin every 30 days.      cyclobenzaprine (FLEXERIL) 10 MG tablet Take 1 tablet (10 mg total) by mouth 3 (three) times daily as needed for Muscle spasms. 20 tablet 0    dicyclomine (BENTYL) 20 mg tablet Take 20 mg by mouth every 6 (six) hours.      hydroCHLOROthiazide (MICROZIDE) 12.5 mg capsule Take 12.5 mg by mouth once daily.      HYDROcodone-acetaminophen (NORCO)  mg per tablet Take 1 tablet by mouth every 6 (six) hours as needed for Pain. 120 tablet 0    levETIRAcetam (KEPPRA) 500 MG Tab Take 1 tablet (500 mg total) by mouth 2 (two) times daily. 60 tablet 0    magnesium oxide (MAG-OX) 400 mg (241.3 mg magnesium) tablet Take 1 tablet (400 mg total) by mouth 2 (two) times daily. 60 tablet 0    pantoprazole (PROTONIX) 20 MG tablet Take 20 mg by mouth every evening.      potassium chloride SA (K-DUR,KLOR-CON) 20 MEQ tablet Take 2 tablets (40 mEq total) by mouth 2 (two) times daily. 120 tablet 0    predniSONE (DELTASONE) 20 MG tablet Take 20 mg by mouth once daily.      promethazine (PHENERGAN) 25 MG tablet Take 1 tablet (25 mg total) by mouth every 8 (eight) hours as needed for Nausea. 60 tablet 1    rifAXIMin (XIFAXAN) 550 mg Tab Take 550 mg by mouth.      vedolizumab (ENTYVIO) 300 mg SolR injection Entyvio 300 mg intravenous solution   INFUSE 300MG AT WEEK 0, 2, 6.      venlafaxine (EFFEXOR) 75 MG tablet Take 75 mg by mouth 2 (two) times daily.      vitamin D (VITAMIN D3) 1000 units Tab Take 1,000 Units by mouth.         Past Surgical History:   Procedure Laterality Date    ABSCESS DRAINAGE      ANAL FISTULOTOMY      BOWEL RESECTION      small bowel resection per Dr. Uribe 2018     SECTION      x2    CHOLECYSTECTOMY  2000    COLON SURGERY  2015    sigmoid loop colostomy    COLONOSCOPY N/A 2016    Procedure:  COLONOSCOPY;  Surgeon: Andre Uribe MD;  Location: [facility] ENDO (4TH FLR);  Service: Endoscopy;  Laterality: N/A;    COLONOSCOPY N/A 1/17/2017    Procedure: COLONOSCOPY;  Surgeon: Dany Stock MD;  Location: [facility] ENDO (2ND FLR);  Service: Endoscopy;  Laterality: N/A;    COLONOSCOPY N/A 12/4/2017    Procedure: COLONOSCOPY;  Surgeon: Andre Uribe MD;  Location: [facility] ENDO (4TH FLR);  Service: Endoscopy;  Laterality: N/A;    COLONOSCOPY N/A 2/8/2018    Procedure: COLONOSCOPY;  Surgeon: Andre Uribe MD;  Location: [facility] ENDO (4TH FLR);  Service: Endoscopy;  Laterality: N/A;    COLONOSCOPY N/A 3/24/2018    Procedure: COLONOSCOPY;  Surgeon: Elmer Serrato MD;  Location: [facility] ENDO (2ND FLR);  Service: Endoscopy;  Laterality: N/A;    COLONOSCOPY  3/24/2018    COLONOSCOPY N/A 7/6/2018    Procedure: COLONOSCOPY;  Surgeon: Andre Uribe MD;  Location: [facility] ENDO (4TH FLR);  Service: Endoscopy;  Laterality: N/A;    COLONOSCOPY N/A 10/7/2021    Procedure: COLONOSCOPY;  Surgeon: Jose Hughes MD;  Location: [facility];  Service: Endoscopy;  Laterality: N/A;    ESOPHAGOGASTRODUODENOSCOPY N/A 10/7/2021    Procedure: EGD (ESOPHAGOGASTRODUODENOSCOPY);  Surgeon: Jose Hughes MD;  Location: [facility];  Service: Endoscopy;  Laterality: N/A;    LAPAROSCOPIC CLOSURE OF COLOSTOMY N/A 8/29/2018    Procedure: CLOSURE, COLOSTOMY, LAPAROSCOPIC;  Surgeon: Andre Uribe MD;  Location: [facility] OR 2ND FLR;  Service: Colon and Rectal;  Laterality: N/A;  converted to open    rectovaginal fistula repair  01/2018    SMALL INTESTINE SURGERY  1995    per patient 10 inches removed    SMALL INTESTINE SURGERY  02/2009    abdominal abscess drained, 3 cm colon removed, 11 cm SB removed    TUBAL LIGATION      UPPER GASTROINTESTINAL ENDOSCOPY  2000       Social History     Socioeconomic History    Marital status:    Tobacco Use    Smoking status: Never Smoker    Smokeless tobacco: Never Used   Substance and  Sexual Activity    Alcohol use: No     Alcohol/week: 0.0 standard drinks    Drug use: No         CBC:   Recent Labs     22  1700 22  0804   WBC 19.59* 10.95   RBC 5.21 3.65*   HGB 14.7 10.4*   HCT 46.9 32.6*    161   MCV 90 89   MCH 28.2 28.5   MCHC 31.3* 31.9*       CMP:   Recent Labs     22  1700 22  0804   * 129*   K 4.4 3.9   CL 98 103   CO2 23 20*   BUN 11 5*   CREATININE 1.1 0.7    85   MG 1.5* 1.5*   PHOS 3.0 2.5*   CALCIUM 10.1 7.4*   ALBUMIN 4.0 2.2*   PROT 8.6* 5.2*   ALKPHOS 147* 126   ALT 25 16   AST 14 21   BILITOT 0.6 0.7       INR  Recent Labs     22  0804   INR 1.0   APTT 27.7           Diagnostic Studies:      EKD Echo:  No results found for this or any previous visit.      Anesthesia Evaluation    I have reviewed the Patient Summary Reports.    I have reviewed the Nursing Notes. I have reviewed the NPO Status.   I have reviewed the Medications.     Review of Systems      Physical Exam   Airway/Jaw/Neck:  Airway Findings: Mouth Opening: Normal Tongue: Normal  General Airway Assessment: Adult  Mallampati: II     Eyes/Ears/Nose:  EYES/EARS/NOSE FINDINGS: Normal         Mental Status:  Mental Status Findings: Normal        Anesthesia Plan  Type of Anesthesia, risks & benefits discussed:  Anesthesia Type:  general    Patient's Preference:   Plan Factors:          Intra-op Monitoring Plan: standard ASA monitors  Intra-op Monitoring Plan Comments:   Post Op Pain Control Plan: multimodal analgesia  Post Op Pain Control Plan Comments:     Induction:   IV  Beta Blocker:  Patient is not currently on a Beta-Blocker (No further documentation required).       Informed Consent: Patient understands risks and agrees with Anesthesia plan.  Questions answered. Anesthesia consent signed with patient.  ASA Score: 3     Day of Surgery Review of History & Physical: I have interviewed and examined the patient. I have reviewed the patient's H&P dated:             Ready For Surgery From Anesthesia Perspective.

## 2022-01-20 NOTE — PROGRESS NOTES
"1)  I have discussed case with Dr Velasquez.  He does not think this is a Crohn's flare.  He referenced a study performed in [place] just 6 weeks ago    12- CTE performed in [place] showed mild ileal inflammation.  No colonic disease  "There are postsurgical of the ascending colon proximally. There is insufficient distention of the small bowel for an adequate enterography protocol and does not appear to have drank any negative oral contrast or given glucagon intravenously for a true enterography protocol. However there is hyperenhancement of the distal small bowel/ileum. There is some small amount of free fluid in the mesentery adjacent to the inflamed distal small bowel loops but no intraloop abscess is noted. No fistulous are apparent. Patient has a left lateral small fat-containing ventral hernia. No gross evidence of stricture is appreciated but without proper small bowel dilatation this is difficult to exclude. Appendix is surgically absent. There is mild distention of the colon with stool and fluid. "      2)  I reviewed CT scan abd pelvis 1- with Dr Worley.  Small amt of free air adjacent to the ileocolic anastomosis.  No fluid.  There is "possible twist" of the ileum. Colon transition point noted in sigmoid colon..      Assessment:    Focal perforation?    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  Patient understands stoma likely necessary.      "

## 2022-01-20 NOTE — SUBJECTIVE & OBJECTIVE
Subjective:     Interval History: Admitted from OSH yesterday. Tachycardic, improving with fluid administration. On assessment patient reporting improved abdominal pain with no nausea or vomiting. Endorses some mild back pain. Afebrile.     Post-Op Info:  Procedure(s) (LRB):  LAPAROTOMY, EXPLORATORY (N/A)          Medications:  Continuous Infusions:   lactated ringers 100 mL/hr at 01/20/22 0147     Scheduled Meds:   heparin (porcine)  5,000 Units Subcutaneous Q8H    levETIRAcetam  500 mg Oral BID    pantoprazole  20 mg Oral QHS    piperacillin-tazobactam (ZOSYN) IVPB  4.5 g Intravenous Q8H    predniSONE  20 mg Oral Daily    venlafaxine  75 mg Oral BID     PRN Meds:   acetaminophen    HYDROmorphone    HYDROmorphone    ondansetron    prochlorperazine    sodium chloride 0.9%        Objective:     Vital Signs (Most Recent):  Temp: 98.2 °F (36.8 °C) (01/20/22 0400)  Pulse: (!) 118 (01/20/22 0700)  Resp: 16 (01/20/22 0605)  BP: 110/71 (01/20/22 0400)  SpO2: 97 % (01/20/22 0400) Vital Signs (24h Range):  Temp:  [98.1 °F (36.7 °C)-99.1 °F (37.3 °C)] 98.2 °F (36.8 °C)  Pulse:  [102-162] 118  Resp:  [16-30] 16  SpO2:  [95 %-100 %] 97 %  BP: (109-130)/(56-84) 110/71     Intake/Output - Last 3 Shifts       01/18 0700  01/19 0659 01/19 0700 01/20 0659 01/20 0700  01/21 0659    IV Piggyback  1000     Total Intake(mL/kg)  1000 (17.2)     Net  +1000                  Physical Exam  Vitals and nursing note reviewed.   Constitutional:       General: She is not in acute distress.     Appearance: She is ill-appearing.   HENT:      Head: Normocephalic.   Eyes:      Extraocular Movements: Extraocular movements intact.      Conjunctiva/sclera: Conjunctivae normal.   Cardiovascular:      Rate and Rhythm: Regular rhythm. Tachycardia present.   Pulmonary:      Effort: Pulmonary effort is normal. No respiratory distress.   Abdominal:      General: There is distension.      Palpations: Abdomen is soft.      Tenderness: There is  abdominal tenderness. There is no guarding or rebound.      Comments: Prior midline laparotomy scar. Mild distention with diffuse mild tenderness to palpation.     Skin:     General: Skin is warm and dry.   Neurological:      General: No focal deficit present.      Mental Status: She is alert and oriented to person, place, and time.       Significant Labs:  All pertinent lab results within the last 24 hours have been reviewed.     Significant Diagnostics:  I have reviewed all pertinent imaging results/findings within the past 24 hours.

## 2022-01-20 NOTE — SUBJECTIVE & OBJECTIVE
Past Medical History:   Diagnosis Date    Acute deep vein thrombosis (DVT) of brachial vein of right upper extremity 2017    on RUE ultrasound    Anxiety     Bowel perforation     Chronic pain     Crohn's disease of both small and large intestine with intestinal obstruction     Difficult intravenous access     Encounter for blood transfusion     GERD (gastroesophageal reflux disease)     Kidney stones     Nephrolithiasis     Stricture of bowel        Past Surgical History:   Procedure Laterality Date    ABSCESS DRAINAGE      ANAL FISTULOTOMY      BOWEL RESECTION      small bowel resection per Dr. Uribe 2018     SECTION      x2    CHOLECYSTECTOMY  2000    COLON SURGERY  2015    sigmoid loop colostomy    COLONOSCOPY N/A 2016    Procedure: COLONOSCOPY;  Surgeon: Andre Uribe MD;  Location: [facility] ENDO (4TH FLR);  Service: Endoscopy;  Laterality: N/A;    COLONOSCOPY N/A 2017    Procedure: COLONOSCOPY;  Surgeon: Dany Stock MD;  Location: [facility] ENDO (2ND FLR);  Service: Endoscopy;  Laterality: N/A;    COLONOSCOPY N/A 2017    Procedure: COLONOSCOPY;  Surgeon: Andre Uribe MD;  Location: [facility] ENDO (4TH FLR);  Service: Endoscopy;  Laterality: N/A;    COLONOSCOPY N/A 2018    Procedure: COLONOSCOPY;  Surgeon: Andre Uribe MD;  Location: [facility] ENDO (4TH FLR);  Service: Endoscopy;  Laterality: N/A;    COLONOSCOPY N/A 3/24/2018    Procedure: COLONOSCOPY;  Surgeon: Elmer Serrato MD;  Location: [facility] ENDO (2ND FLR);  Service: Endoscopy;  Laterality: N/A;    COLONOSCOPY  3/24/2018    COLONOSCOPY N/A 2018    Procedure: COLONOSCOPY;  Surgeon: Andre Uribe MD;  Location: [facility] ENDO (4TH FLR);  Service: Endoscopy;  Laterality: N/A;    COLONOSCOPY N/A 10/7/2021    Procedure: COLONOSCOPY;  Surgeon: Jose Hughes MD;  Location: [facility] ENDO;  Service: Endoscopy;  Laterality: N/A;    ESOPHAGOGASTRODUODENOSCOPY N/A 10/7/2021    Procedure:  EGD (ESOPHAGOGASTRODUODENOSCOPY);  Surgeon: Jose Hughes MD;  Location: [facility];  Service: Endoscopy;  Laterality: N/A;    LAPAROSCOPIC CLOSURE OF COLOSTOMY N/A 8/29/2018    Procedure: CLOSURE, COLOSTOMY, LAPAROSCOPIC;  Surgeon: Andre Uribe MD;  [facility];  Service: Colon and Rectal;  Laterality: N/A;  converted to open    rectovaginal fistula repair  01/2018    SMALL INTESTINE SURGERY  1995    per patient 10 inches removed    SMALL INTESTINE SURGERY  02/2009    abdominal abscess drained, 3 cm colon removed, 11 cm SB removed    TUBAL LIGATION      UPPER GASTROINTESTINAL ENDOSCOPY  2000       Review of patient's allergies indicates:   Allergen Reactions    Remicade [infliximab] Shortness Of Breath and Palpitations     Severe muscle and joint, and bone pain    Morphine Other (See Comments)     Abdominal pain    Flagyl [metronidazole] Other (See Comments)     Neuropathy    Nubain [nalbuphine] Anxiety     Upper abdominal burning      Family History     Problem Relation (Age of Onset)    Cancer Maternal Aunt (66)    Heart attack Father (69)        Tobacco Use    Smoking status: Never Smoker    Smokeless tobacco: Never Used   Substance and Sexual Activity    Alcohol use: No     Alcohol/week: 0.0 standard drinks    Drug use: No    Sexual activity: Not on file     Review of Systems   Constitutional: Positive for fatigue. Negative for appetite change, chills and fever.   HENT: Negative for congestion and trouble swallowing.    Eyes: Negative for pain and redness.   Respiratory: Negative for cough and shortness of breath.    Cardiovascular: Negative for chest pain and palpitations.   Gastrointestinal: Negative for abdominal pain, constipation, diarrhea, nausea and vomiting.   Genitourinary: Negative for difficulty urinating and dysuria.   Musculoskeletal: Positive for back pain. Negative for neck pain.   Skin: Negative for rash.   Neurological: Negative for dizziness,  light-headedness and headaches.     Objective:     Vital Signs (Most Recent):  Temp: 98.3 °F (36.8 °C) (01/20/22 1213)  Pulse: (!) 118 (01/20/22 1213)  Resp: 17 (01/20/22 1300)  BP: 112/67 (01/20/22 1213)  SpO2: 97 % (01/20/22 1213) Vital Signs (24h Range):  Temp:  [97.8 °F (36.6 °C)-99.1 °F (37.3 °C)] 98.3 °F (36.8 °C)  Pulse:  [102-162] 118  Resp:  [16-30] 17  SpO2:  [95 %-100 %] 97 %  BP: (108-130)/(56-84) 112/67     Weight: 58.2 kg (128 lb 4.9 oz) (01/20/22 0146)  Body mass index is 19.51 kg/m².      Intake/Output Summary (Last 24 hours) at 1/20/2022 1345  Last data filed at 1/20/2022 0026  Gross per 24 hour   Intake 1000 ml   Output --   Net 1000 ml       Lines/Drains/Airways     Peripheral Intravenous Line                 Peripheral IV - Single Lumen 01/19/22 1752 20 G Right Antecubital <1 day         Peripheral IV - Single Lumen 01/19/22 2040 20 G Right Forearm <1 day         Peripheral IV - Single Lumen 01/19/22 2040 22 G Left Wrist <1 day                Physical Exam  Constitutional:       General: She is not in acute distress.     Appearance: Normal appearance. She is not ill-appearing.   HENT:      Mouth/Throat:      Mouth: Oropharynx is clear and moist and mucous membranes are normal.   Eyes:      General: No scleral icterus.     Conjunctiva/sclera: Conjunctivae normal.   Cardiovascular:      Rate and Rhythm: Regular rhythm. Tachycardia present.      Pulses: Normal pulses.      Heart sounds: Normal heart sounds.   Pulmonary:      Effort: Pulmonary effort is normal. No respiratory distress.      Breath sounds: Normal breath sounds.   Abdominal:      General: Bowel sounds are normal. There is distension.      Palpations: Abdomen is soft.      Tenderness: There is no abdominal tenderness.      Comments: Well-healed surgical scar in abdominal midline.    Skin:     General: Skin is warm and dry.      Findings: No bruising or rash.   Neurological:      Mental Status: She is alert and oriented to person, place,  and time.         Significant Labs:  All pertinent lab results from the last 24 hours have been reviewed.    Significant Imaging:  Imaging results within the past 24 hours have been reviewed.

## 2022-01-20 NOTE — ED PROVIDER NOTES
Encounter Date: 1/19/2022       History     Chief Complaint   Patient presents with    Abdominal Pain     Pt presents to ED via EMS from [facility] with a complaint of perforated bowel, tachycardia, abdominal pain, and low mag. Pt is a surgical consult.  Pt recevied 2 L NS, 1g meropenem, and 500 mg azithromycin prior to arrival. Pt is + covid.     Weakness     This is a 56 y.o. year old female with a PMH of Crohn's disease who presents to the ED with a transfer from outside hospital. Patient presented with back pain following recent seizure secondary to electrolyte abnormalities. Workup revealed she is Covid positive. Additionally she underwent imaging of her abdomen and was found to have colitis with perforation of the ascending colon. Patient was transferred to [facility] for colorectal surgery evaluation. Treatment prior to arrival includes 2L NS, meropenem, azithromycin, pain control. On arrival, patient is in pain. She denies fever, chills, nasal congestion, cough, chest pain, shortness of breath. Surgical hx of multiple bowel resections, cholecystectomy. Patient denies known exposure to a COVID-19 patient. She is not vaccinated.          Review of patient's allergies indicates:   Allergen Reactions    Remicade [infliximab] Shortness Of Breath and Palpitations     Severe muscle and joint, and bone pain    Morphine Other (See Comments)     Abdominal pain    Flagyl [metronidazole] Other (See Comments)     Neuropathy    Nubain [nalbuphine] Anxiety     Upper abdominal burning      Past Medical History:   Diagnosis Date    Acute deep vein thrombosis (DVT) of brachial vein of right upper extremity 01/2017    on RUE ultrasound    Anxiety     Bowel perforation     Chronic pain     Crohn's disease of both small and large intestine with intestinal obstruction 1989    Difficult intravenous access     Encounter for blood transfusion     GERD (gastroesophageal reflux disease) 2014    Kidney stones     Nephrolithiasis      Stricture of bowel      Past Surgical History:   Procedure Laterality Date    ABSCESS DRAINAGE      ANAL FISTULOTOMY      BOWEL RESECTION      small bowel resection per Dr. Uribe 2018     SECTION      x2    CHOLECYSTECTOMY  2000    COLON SURGERY  2015    sigmoid loop colostomy    COLONOSCOPY N/A 2016    Procedure: COLONOSCOPY;  Surgeon: Andre Uribe MD;  Location: [facility] ENDO (4TH FLR);  Service: Endoscopy;  Laterality: N/A;    COLONOSCOPY N/A 2017    Procedure: COLONOSCOPY;  Surgeon: Dany Stock MD;  Location: [facility] ENDO (2ND FLR);  Service: Endoscopy;  Laterality: N/A;    COLONOSCOPY N/A 2017    Procedure: COLONOSCOPY;  Surgeon: Andre Uribe MD;  Location: [facility] ENDO (4TH FLR);  Service: Endoscopy;  Laterality: N/A;    COLONOSCOPY N/A 2018    Procedure: COLONOSCOPY;  Surgeon: Andre Uribe MD;  Location: [facility] ENDO (4TH FLR);  Service: Endoscopy;  Laterality: N/A;    COLONOSCOPY N/A 3/24/2018    Procedure: COLONOSCOPY;  Surgeon: Elmer Serrato MD;  Location: [facility] ENDO (2ND FLR);  Service: Endoscopy;  Laterality: N/A;    COLONOSCOPY  3/24/2018    COLONOSCOPY N/A 2018    Procedure: COLONOSCOPY;  Surgeon: Andre Uribe MD;  Location: [facility] ENDO (4TH FLR);  Service: Endoscopy;  Laterality: N/A;    COLONOSCOPY N/A 10/7/2021    Procedure: COLONOSCOPY;  Surgeon: Jose Hughes MD;  [facility];  Service: Endoscopy;  Laterality: N/A;    ESOPHAGOGASTRODUODENOSCOPY N/A 10/7/2021    Procedure: EGD (ESOPHAGOGASTRODUODENOSCOPY);  Surgeon: Jose Hughes MD;  Location: [facility] ENDO;  Service: Endoscopy;  Laterality: N/A;    LAPAROSCOPIC CLOSURE OF COLOSTOMY N/A 2018    Procedure: CLOSURE, COLOSTOMY, LAPAROSCOPIC;  Surgeon: Andre Uribe MD;  Location: [facility] OR 2ND FLR;  Service: Colon and Rectal;  Laterality: N/A;  converted to open    rectovaginal fistula repair  2018    SMALL INTESTINE SURGERY  1995    per patient 10 inches  removed    SMALL INTESTINE SURGERY  02/2009    abdominal abscess drained, 3 cm colon removed, 11 cm SB removed    TUBAL LIGATION      UPPER GASTROINTESTINAL ENDOSCOPY  2000     Family History   Problem Relation Age of Onset    Cancer Maternal Aunt 66        colon ca    Heart attack Father 69    Anesthesia problems Neg Hx     Celiac disease Neg Hx     Cirrhosis Neg Hx     Colon cancer Neg Hx     Colon polyps Neg Hx     Crohn's disease Neg Hx     Cystic fibrosis Neg Hx     Esophageal cancer Neg Hx     Hemochromatosis Neg Hx     Inflammatory bowel disease Neg Hx     Irritable bowel syndrome Neg Hx     Liver cancer Neg Hx     Liver disease Neg Hx     Rectal cancer Neg Hx     Stomach cancer Neg Hx     Ulcerative colitis Neg Hx     Mars's disease Neg Hx     Lymphoma Neg Hx     Tuberculosis Neg Hx     Scleroderma Neg Hx     Rheum arthritis Neg Hx     Multiple sclerosis Neg Hx     Melanoma Neg Hx     Lupus Neg Hx     Psoriasis Neg Hx     Skin cancer Neg Hx      Social History     Tobacco Use    Smoking status: Never Smoker    Smokeless tobacco: Never Used   Substance Use Topics    Alcohol use: No     Alcohol/week: 0.0 standard drinks    Drug use: No     Review of Systems   Constitutional: Positive for fatigue. Negative for chills and fever.   HENT: Negative for sore throat.    Respiratory: Negative for cough and shortness of breath.    Cardiovascular: Negative for chest pain.   Gastrointestinal: Positive for abdominal pain, nausea and vomiting.   Genitourinary: Negative for dysuria.   Musculoskeletal: Positive for back pain.   Allergic/Immunologic: Positive for immunocompromised state.   Neurological: Negative for weakness.       Physical Exam     Initial Vitals [01/19/22 2051]   BP Pulse Resp Temp SpO2   116/76 (!) 126 18 99.1 °F (37.3 °C) 95 %      MAP       --         Physical Exam    Constitutional: She appears well-developed and well-nourished. No distress.   HENT:   Head:  Atraumatic.   Mouth/Throat: Oral lesions (tongue ulcerations from bite wound) present.   Eyes: Conjunctivae and EOM are normal. Pupils are equal, round, and reactive to light.   Cardiovascular: Normal rate, regular rhythm and normal heart sounds.   Pulmonary/Chest: Breath sounds normal. No respiratory distress. She has no wheezes. She has no rhonchi. She has no rales.   Abdominal: Abdomen is soft. Bowel sounds are normal. There is generalized abdominal tenderness.   Musculoskeletal:      Thoracic back: Tenderness present. No bony tenderness.      Lumbar back: Tenderness present. No bony tenderness.     Neurological: She is alert and oriented to person, place, and time.   Skin: Skin is warm and dry. No rash noted.         ED Course   Procedures  Labs Reviewed - No data to display       Imaging Results    None          Medications   HYDROmorphone injection 1 mg (has no administration in time range)   piperacillin-tazobactam 4.5 g in sodium chloride 0.9% 100 mL IVPB (ready to mix system) (has no administration in time range)   levETIRAcetam tablet 500 mg (has no administration in time range)   pantoprazole EC tablet 20 mg (has no administration in time range)   predniSONE tablet 20 mg (has no administration in time range)   venlafaxine tablet 75 mg (has no administration in time range)   sodium chloride 0.9% flush 10 mL (has no administration in time range)   acetaminophen tablet 650 mg (has no administration in time range)   lactated ringers infusion (has no administration in time range)   HYDROmorphone injection 0.5 mg (has no administration in time range)   HYDROmorphone injection 1 mg (has no administration in time range)   ondansetron injection 4 mg (has no administration in time range)   prochlorperazine injection Soln 5 mg (has no administration in time range)   sodium chloride 0.9% bolus 1,000 mL (has no administration in time range)     Medical Decision Making:   History:   Old Medical Records: I decided to  obtain old medical records.  Old Records Summarized: records from previous admission(s) and records from another hospital.  Clinical Tests:   Lab Tests: Reviewed  Radiological Study: Reviewed  Medical Tests: Reviewed  Other:   I have discussed this case with another health care provider.       APC / Resident Notes:   56 year old covid positive patient transferred with bowel perforation secondary to Crohn's colitis.  Discussed with colorectal surgery who will admit for management.  Pain control. I discussed the care of this patient with my supervising MD.                  Clinical Impression:   Final diagnoses:  [U07.1] COVID  [K50.818] Crohn's disease of both small and large intestine with other complication (Primary)          ED Disposition Condition    Admit               Caterina Francis PA-C  01/19/22 [id number]

## 2022-01-20 NOTE — CONSULTS
[facility]  Wound Care    Patient Name:  Carline Chang   MRN:  [id number]  Date: 1/20/2022  Diagnosis: <principal problem not specified>    History:     Past Medical History:   Diagnosis Date    Acute deep vein thrombosis (DVT) of brachial vein of right upper extremity 01/2017    on RUE ultrasound    Anxiety     Bowel perforation     Chronic pain     Crohn's disease of both small and large intestine with intestinal obstruction 1989    Difficult intravenous access     Encounter for blood transfusion     GERD (gastroesophageal reflux disease) 2014    Kidney stones     Nephrolithiasis     Stricture of bowel        Social History     Socioeconomic History    Marital status:    Tobacco Use    Smoking status: Never Smoker    Smokeless tobacco: Never Used   Substance and Sexual Activity    Alcohol use: No     Alcohol/week: 0.0 standard drinks    Drug use: No       Precautions:     Allergies as of 01/19/2022 - Reviewed 01/19/2022   Allergen Reaction Noted    Remicade [infliximab] Shortness Of Breath and Palpitations 11/19/2018    Morphine Other (See Comments) 04/09/2015    Flagyl [metronidazole] Other (See Comments) 04/09/2018    Nubain [nalbuphine] Anxiety 05/27/2016       [facility] Assessment Details/Treatment     Ileostomy marking consult received.   Patient reports had a colostomy in past.   Assessed patient's abdomen laying, sitting, and standing, belt line identified by patient.  Marked with surgical marking pen to RUQ.  Patient denied any questions or needs at this time.   Ostomy dept to assist as needed.

## 2022-01-20 NOTE — OP NOTE
Date of procedure:   January 20, 2022    Indications for procedure:  57 yo female with Crohn's disease, ileocolitis, who presents with massive colonic distension and evidence of possible twisting of the neoterminal ileum with small bubbles of air suggesting microperforation of the bowel near prior ileocolonic anastomosis.  Distally, the colon may have narrowing in the sigmoid colon proximal to a prior colocolonic anastomosis.  Colonoscopy 10- showed a normal colon with evidence of ileitis. Biopsies confirmed normal colon and ileitis.  CTE 12- showed ileitis but no evidence of colitis.  It is unclear what the etiology of the colonic obstruction but with a question of twisting of the neoterminal ileum and evidence of extraluminal air bubbles exploratory laparotomy is indicated to exclude perforation.  The patient understands that if resection is necessary stoma creation is likely.      Preoperative diagnosis:   Perforated viscus, large bowel obstruction.     Postoperative diagnosis:  No evidence of perforated viscous.  Fistulizing ileitis, with ileosigmoid fistula, extensive adhesions, torsion of ascending colon secondary to inflammatory changes of ileocolonic anastomosis, large bowel obstruction due to ileosigmoid fistula     Name of procedure:  Exploratory laparotomy, extensive lysis of adhesions lasting more than two hours, repair of ileosigmoid fistula, ileocolonic resection with KONO S anastomosis, omental wrap of anastomosis, hernia repair (stoma site, left side abdomen).      Surgeon:   LLUVIA Post MD    Assistant surgeon:  Alex Neves MD    Type of anesthesia:  GETA    EBL:  250  Cc's    Drains:  none    Specimen:   1.  Abdominal scar  2.  Ileocolonic resection    Findings:  1.  No perforation  2.  Crohn's ileitis at proximal anastomosis ileoileal with evidence of ileosigmoid fistula  3.  Ileocolic resection 45 cm of terminal ileum including both previous anastomosis.  Margins of resection  negative for gross mucosal disease  4.  Residual small bowel measured 115 cm  5.  KONO S anastomosis between neoterminal ileum and proximal transverse colon  6.  Extensive adhesions requiring adhesiolysis lasting greater than 2 hours.    7.  Stoma hernia    Technique in detail:  The patient was brought to the operating room positive identified and placed on the operating table in a supine position with sequential compression devices on her lower extremities.  Patient received intravenous antibiotics.  She underwent general endotracheal anesthesia without complication.  Nasogastric tube and Odonnell catheter were inserted.  She was positioned in the modified lithotomy position and padded Yellofin stirrups.  The left arm was tucked at her side.    A critical time-out was performed.    Patient was explored through a long midline incision.  The previous scar was excised.  Abdomen is entered with a knife with care being taken to avoid injury to the underlying viscera.  Upon opening the abdomen extensive dense adhesions were encountered and required deliberate sharp adhesiolysis lasting more than 2 hours and justifies the modifier 22. Adhesions were noted to the anterior abdominal wall, retroperitoneum and intra-loop.  In particular, the neoterminal ileum was noted to be densely adherent to the right side of the abdomen, right lower quadrant as well as to the sigmoid colon.  Ultimately, I was able to free up the entire abdominal wall anteriorly without any enterotomies and we able to place an Deshaun wound retractor for exposure.    Intra-loop adhesions were then taken down such that I was able to identify the ligament of Treitz and dissect the small bowel away from the transverse colon and from the hepatic flexure.  Careful inspection of the hepatic flexure and the ascending colon revealed evidence of the twist.  The bowel was viable.  There was no evidence of any perforation.  Chronic inflammation was noted in the  mesentery of the neoterminal ileum consistent with Crohn's disease.  Again, 2 separate side-to-side anastomoses were identified.  I was able mobilize the ascending colon and the ileocolonic anastomosis off of the right side of the abdomen with care being taken to avoid injury to the underlying right ureter.  Using a combination of sharp and pinch dissection I was able to dissect this off of the right lower quadrant again preserving retroperitoneal structures mobilizing the small bowel mesentery back to the level of the duodenum.  Lastly, I sharply dissected the inflammatory mass off of the colon.  It was apparent that fistulization had occurred although a mucosal defect could not be identified.  The serosa of the sigmoid colon was repaired transversely using interrupted 3-0 Vicryl suture.  The bowel was noted to be viable.  I was able to separate the sigmoid colon mesentery completely away from the small bowel mesentery.    The extent of resection would include distally the area of twisting of the ascending colon and I resected both anastomoses with division of the bowel proximally in an area where there was no evidence of any thickening of the mesentery adjacent to the bowel.  This palpable step-off was carefully identified and we had resected 2 cm proximal to this.  The bowel was then divided proximally and distally using the linear stapler dividing the bowel perpendicular to the axis of the mesentery in anticipation of a KONO S anastomosis.  The mesentery of the bowel was then divided next to the bowel wall using crushing clamps and suture ligatures of 0 Vicryl suture.  The specimen was handed off the operative field, opened on a back table and photographed.  The proximal and distal resection margins were clear of any ulceration or inflammation.    We then proceeded with anastomosis using KONO S technique.  At the corners of the staple lines we placed figure-of-eight sutures of 2-0 Vicryl.  The sutures within  then tied bringing 2 staple lines into proximity as a supporting column.  We completed the supporting column using interrupted 2 0 Vicryl sutures.  Bowel clamps were then placed on the bowel proximally and distally and strict aseptic technique was employed for hand-sewn anastomosis.  The operative field was quarantine with laparotomy sponges.  We outlined 7 cm ileostomy and colotomy incisions 1/2 cm away from the stapled ends of bowel.  These incisions were made along the longitudinal axis and the anastomosis was then performed transversely using interrupted 3-0 Vicryl suture inverting the mucosa.  Posterior row of sutures was placed 1st with knots tied on the inside.  The anterior row was then placed again inverting the mucosa with knots tied on the outside.  There was no tension on the anastomosis and excellent blood supply was noted to both portions of bowel.  A pedicled omental graft was then sutured over the anastomosis.    Please note that we measured the residual small bowel at 115 cm.  Abdomen is irrigated with saline solution.  Hemostasis was assured.  Transversus abdominis plane block was performed using local anesthetic.    We changed gown and gloves.  A separate closing tray was used to close the abdomen.  Hernia defect at the previous stoma site in the left side of the abdomen was repaired using interrupted 0 Vicryl suture.  Midline fascia was approximated using continuous 1 PDS suture with intermittent placement of internal retention sutures of 1 Vicryl.  Subcutaneous tissues were irrigated with saline solution and the skin was approximated using subcuticular Monocryl and Dermabond.    The patient tolerated the procedure well.  She was awakened from anesthesia, extubated without incident and transferred to the recovery area in stable condition.

## 2022-01-20 NOTE — CONSULTS
[facility] - Emergency Dept  Colon and Rectal Surgery  Consult Note    Inpatient consult to Colorectal Surgery  Consult performed by: Alex Neves MD  Consult ordered by: Alex Neves MD        Subjective:     Chief Complaint/Reason for Admission: colitis    History of Present Illness: Carline Chang is a 56 y.o. female with severe crohn's disease who has failed multiple medications and has undergone multiple surgeries for strictures and fistulas. Today she presents as a transfer from [place]. Initially presented to [place] ED earlier today with chief complaint of back pain. Of note she was recently admitted there with electrolyte derangements. During that hospitalization had witnessed seizures thought to be related to her electrolyte abnormalities. The patient reports that her back pain started after the seizures. In the ED was found to be tachycardic to 120s. Labs significant for WBC 19k. Lactate and procal are wnl. CT scan revealed colonic distention with possible volvulus on mid ascending colon and narrowing at level of the sigmoid colon. There is also evidence of extra luminal air next to a segment of ascending colon.   The patient reports mild abdominal discomfort which is baseline for her. She has been having liquid stools which is consistent with her previous Crohn's flares. She denies any nausea, vomiting, fever or chills. She currently takes Prednisone 20mg daily with plans to start Entyvio soon.   Received 2L crystalloid, Meropenem, and Azithromycin at outside ED.     Last colonoscopy 10/7/21:   - The entire examined colon is normal. Biopsied.  - Crohn's disease with ileitis.   - The examination was otherwise normal on direct and retroflexion views.     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  4/30/18: ex-lap, ?ileocecectomy for stricture  8/29/18: Colostomy reversal    Current  Facility-Administered Medications on File Prior to Encounter   Medication    [COMPLETED] 0.9%  NaCl infusion    [COMPLETED] azithromycin 500 mg in dextrose 5 % 250 mL IVPB (ready to mix system)    [COMPLETED] HYDROmorphone injection 0.5 mg    [COMPLETED] iohexoL (OMNIPAQUE 350) injection 100 mL    [COMPLETED] magnesium sulfate 2g in water 50mL IVPB (premix)    [COMPLETED] meropenem-0.9% sodium chloride 1 g/50 mL IVPB    [COMPLETED] sodium chloride 0.9% bolus 1,000 mL    [DISCONTINUED] cefTRIAXone (ROCEPHIN) 2 g/50 mL D5W IVPB     Current Outpatient Medications on File Prior to Encounter   Medication Sig    calcium carbonate (TUMS ORAL) Take 1 tablet by mouth as needed (acid reflux).    calcium-vitamin D3 (OS-CHEIKH 500 + D3) 500 mg-5 mcg (200 unit) per tablet Take 2 tablets by mouth 2 (two) times daily with meals.    cyanocobalamin 1,000 mcg/mL injection Inject 1,000 mcg into the skin every 30 days.    cyclobenzaprine (FLEXERIL) 10 MG tablet Take 1 tablet (10 mg total) by mouth 3 (three) times daily as needed for Muscle spasms.    dicyclomine (BENTYL) 20 mg tablet Take 20 mg by mouth every 6 (six) hours.    furosemide (LASIX) 40 MG tablet furosemide 40 mg tablet   TAKE ONE TABLET BY MOUTH DAILY AS NEEDED    hydroCHLOROthiazide (MICROZIDE) 12.5 mg capsule Take 12.5 mg by mouth once daily.    HYDROcodone-acetaminophen (NORCO)  mg per tablet Take 1 tablet by mouth every 6 (six) hours as needed for Pain.    levETIRAcetam (KEPPRA) 500 MG Tab Take 1 tablet (500 mg total) by mouth 2 (two) times daily.    magnesium oxide (MAG-OX) 400 mg (241.3 mg magnesium) tablet Take 1 tablet (400 mg total) by mouth 2 (two) times daily.    pantoprazole (PROTONIX) 20 MG tablet Take 20 mg by mouth every evening.    potassium chloride SA (K-DUR,KLOR-CON) 20 MEQ tablet Take 2 tablets (40 mEq total) by mouth 2 (two) times daily.    predniSONE (DELTASONE) 20 MG tablet Take 20 mg by mouth once daily.    promethazine  (PHENERGAN) 25 MG tablet Take 1 tablet (25 mg total) by mouth every 8 (eight) hours as needed for Nausea.    rifAXIMin (XIFAXAN) 550 mg Tab Take 550 mg by mouth.    vedolizumab (ENTYVIO) 300 mg SolR injection Entyvio 300 mg intravenous solution   INFUSE 300MG AT WEEK 0, 2, 6.    venlafaxine (EFFEXOR) 75 MG tablet Take 75 mg by mouth 2 (two) times daily.    vitamin D (VITAMIN D3) 1000 units Tab Take 1,000 Units by mouth.       Review of patient's allergies indicates:   Allergen Reactions    Remicade [infliximab] Shortness Of Breath and Palpitations     Severe muscle and joint, and bone pain    Morphine Other (See Comments)     Abdominal pain    Flagyl [metronidazole] Other (See Comments)     Neuropathy    Nubain [nalbuphine] Anxiety     Upper abdominal burning        Past Medical History:   Diagnosis Date    Acute deep vein thrombosis (DVT) of brachial vein of right upper extremity 2017    on RUE ultrasound    Anxiety     Bowel perforation     Chronic pain     Crohn's disease of both small and large intestine with intestinal obstruction     Difficult intravenous access     Encounter for blood transfusion     GERD (gastroesophageal reflux disease)     Kidney stones     Nephrolithiasis     Stricture of bowel      Past Surgical History:   Procedure Laterality Date    ABSCESS DRAINAGE      ANAL FISTULOTOMY      BOWEL RESECTION      small bowel resection per Dr. Uribe 2018     SECTION      x2    CHOLECYSTECTOMY  2000    COLON SURGERY  2015    sigmoid loop colostomy    COLONOSCOPY N/A 2016    Procedure: COLONOSCOPY;  Surgeon: Andre Uribe MD;  Location: [facility] (4TH FLR);  Service: Endoscopy;  Laterality: N/A;    COLONOSCOPY N/A 2017    Procedure: COLONOSCOPY;  Surgeon: Dany Stock MD;  Location: [facility] (2ND FLR);  Service: Endoscopy;  Laterality: N/A;    COLONOSCOPY N/A 2017    Procedure: COLONOSCOPY;  Surgeon: Andre Uribe MD;   Location: [facility] ENDO (4TH FLR);  Service: Endoscopy;  Laterality: N/A;    COLONOSCOPY N/A 2/8/2018    Procedure: COLONOSCOPY;  Surgeon: Andre Uribe MD;  Location: [facility] ENDO (4TH FLR);  Service: Endoscopy;  Laterality: N/A;    COLONOSCOPY N/A 3/24/2018    Procedure: COLONOSCOPY;  Surgeon: Elmer Serrato MD;  Location: [facility] ENDO (2ND FLR);  Service: Endoscopy;  Laterality: N/A;    COLONOSCOPY  3/24/2018    COLONOSCOPY N/A 7/6/2018    Procedure: COLONOSCOPY;  Surgeon: Andre Uribe MD;  Location: [facility] ENDO (4TH FLR);  Service: Endoscopy;  Laterality: N/A;    COLONOSCOPY N/A 10/7/2021    Procedure: COLONOSCOPY;  Surgeon: Jose Hughes MD;  Location: [facility];  Service: Endoscopy;  Laterality: N/A;    ESOPHAGOGASTRODUODENOSCOPY N/A 10/7/2021    Procedure: EGD (ESOPHAGOGASTRODUODENOSCOPY);  Surgeon: Jose Hughes MD;  Location: [facility];  Service: Endoscopy;  Laterality: N/A;    LAPAROSCOPIC CLOSURE OF COLOSTOMY N/A 8/29/2018    Procedure: CLOSURE, COLOSTOMY, LAPAROSCOPIC;  Surgeon: Andre Uribe MD;  Location: [facility] OR 2ND FLR;  Service: Colon and Rectal;  Laterality: N/A;  converted to open    rectovaginal fistula repair  01/2018    SMALL INTESTINE SURGERY  1995    per patient 10 inches removed    SMALL INTESTINE SURGERY  02/2009    abdominal abscess drained, 3 cm colon removed, 11 cm SB removed    TUBAL LIGATION      UPPER GASTROINTESTINAL ENDOSCOPY  2000     Family History     Problem Relation (Age of Onset)    Cancer Maternal Aunt (66)    Heart attack Father (69)        Tobacco Use    Smoking status: Never Smoker    Smokeless tobacco: Never Used   Substance and Sexual Activity    Alcohol use: No     Alcohol/week: 0.0 standard drinks    Drug use: No    Sexual activity: Not on file     Review of Systems   Constitutional: Positive for activity change. Negative for appetite change, chills and fever.   HENT: Negative.    Eyes: Negative.    Respiratory: Negative.    Cardiovascular:  Negative.    Gastrointestinal: Positive for abdominal pain and diarrhea. Negative for nausea and vomiting.   Endocrine: Negative.    Genitourinary: Negative.    Musculoskeletal: Positive for back pain.   Skin: Negative.    Allergic/Immunologic: Negative.    Neurological: Negative.    Hematological: Negative.    Psychiatric/Behavioral: Negative.      Objective:     Vital Signs (Most Recent):  Temp: 98.7 °F (37.1 °C) (01/19/22 2258)  Pulse: (!) 122 (01/19/22 2258)  Resp: 18 (01/19/22 2258)  BP: 117/62 (01/19/22 2258)  SpO2: 97 % (01/19/22 2258) Vital Signs (24h Range):  Temp:  [98.1 °F (36.7 °C)-99.1 °F (37.3 °C)] 98.7 °F (37.1 °C)  Pulse:  [118-162] 122  Resp:  [18-30] 18  SpO2:  [95 %-100 %] 97 %  BP: (109-124)/(56-84) 117/62     Weight: 58.2 kg (128 lb 4.9 oz)  Body mass index is 19.51 kg/m².    No intake or output data in the 24 hours ending 01/19/22 2319    Physical Exam  Vitals and nursing note reviewed.   Constitutional:       General: She is not in acute distress.     Appearance: Normal appearance. She is not ill-appearing or toxic-appearing.   HENT:      Head: Normocephalic.      Nose: Nose normal.      Mouth/Throat:      Mouth: Mucous membranes are moist.   Eyes:      Extraocular Movements: Extraocular movements intact.      Pupils: Pupils are equal, round, and reactive to light.   Cardiovascular:      Rate and Rhythm: Normal rate and regular rhythm.      Pulses: Normal pulses.   Pulmonary:      Effort: Pulmonary effort is normal.   Abdominal:      General: Abdomen is flat. There is no distension.      Palpations: Abdomen is soft. There is no mass.      Tenderness: There is no abdominal tenderness.      Hernia: No hernia is present.      Comments: Well healed midline and LLQ incisions  Mild distention. No TTP   No flank tenderness   Musculoskeletal:         General: Normal range of motion.      Cervical back: Normal range of motion.   Skin:     General: Skin is warm.   Neurological:      General: No focal  deficit present.      Mental Status: She is alert and oriented to person, place, and time.   Psychiatric:         Mood and Affect: Mood normal.         Behavior: Behavior normal.       Significant Labs:  All pertinent labs from the last 24 hours have been reviewed.    Significant Diagnostics:  I have reviewed all pertinent imaging results/findings within the past 24 hours.    Assessment/Plan:     56 y.o. female with Crohn's disease on steroids and complicated surgical history presents with concern for perforated viscus    Abdominal exam completely benign and labs largely unremarkable except for leukocytosis of 19k. There is a small amount of extraluminal air that is immediately adjacent to and area of the ascending colon but no free intra-peritoneal air or fluid.   Doubt that this is an acute perforation given clinical history and exam.   Will treat conservatively and monitor abdominal exam closely  Admit to CRS  Start Zosyn Q8H  Serial abdominal exams  Telemetry  NPO, IVF  Hold Prednisone for now. Will contact GI in AM    Urgent surgical intervention if abdominal exam worsens or if she deteriorates hemodynamically    Alex Neves MD  General Surgery PGYV

## 2022-01-20 NOTE — ED NOTES
Patient identifiers verified and correct for Carline Chang   C/C: Patient presents via EMS transferred from [facility] for surgical evaluation with perforated bowel, tachycardia, abdominal pain. Patient stated that she also had a seizure last Friday, complains of back pain.   APPEARANCE: awake and alert in NAD.  SKIN: warm, dry and intact. No breakdown or bruising.  MUSCULOSKELETAL: Patient moving all extremities spontaneously, no obvious swelling or deformities noted. Ambulates independently. Complains of back pain   RESPIRATORY: Denies shortness of breath.Respirations unlabored.   CARDIAC: Tachycardic,  CP, 2+ distal pulses; no peripheral edema  ABDOMEN: Abdominal pain, tx for perforated bowel  : voids spontaneously, denies difficulty  Neurologic: AAO x 4; follows commands equal strength in all extremities; denies numbness/tingling. Denies dizziness

## 2022-01-20 NOTE — CONSULTS
[facility]  Gastroenterology  Consult Note    Patient Name: Carline Chang  MRN: [id number]  Admission Date: 1/19/2022  Hospital Length of Stay: 1 days  Code Status: Full Code   Attending Provider: LLUVIA Post MD   Consulting Provider: Darshan Richardson MD  Primary Care Physician: Pablo Medina MD (Inactive)  Principal Problem:<principal problem not specified>    Inpatient consult to Gastroenterology  Consult performed by: Darshan Richardson MD  Consult ordered by: Alex Neves MD        Subjective:     HPI:  Ms. Carline Chang is a 56 year old female for whom GI is consulted with concern for Crohn's disease flare. She has a PMH significant for Crohn's disease (ileal and rectal disease status post multiple small bowel resections associated with rectovaginal fistula status post repair) with chronic pain syndrome, DVT of right upper extremity (01/2017), and possible new seizure disorder as of 01/2022 (on antiepileptic medication).     She was hospitalized at [facility] from 01/14/2022 to 01/16/2022 after presenting with new onset seizure activity associated with hypokalemia and hypomagnesemia that was attributed to nausea and vomiting of two day duration. She was initiated on KEPPRA. CT head, MRI brain, and EEG were normal. She was discharged with outpatient follow-up with neurology and GI.     She then presented to [facility] again on 01/19/2022 with generalized weakness and lower back pain since discharge. She reports having at least 10-20 liquid non-bloody bowel movements daily for approximately one year without abdominal pain. She further denies fevers, chills, nausea, vomiting, joint pains, and difficulty swallowing. She reports being on Prednisone 20 mg daily since 09/2021, however she has not received any other medications for her Crohn's disease since late 2017. She reports plans to initiate ENTYVIO outpatient sometime this month.     Hospital Course: On  arrival, her vital signs were significant for tachycardia (150's) with normal blood pressure. Labs notable for leukocytosis (19), normal Hgb, hyponatremia (133), hypomagnesemia (1.5), and normal liver function. She tested positive for COVID-19. CTA chest was negative for PE, but did show possible pneumonia in the right upper lobe. CT A/P showed small bubbles of extraluminal gas adjacent to the ascending colon concerning for a perforated viscus, with a twisted configuration of the colon near the hepatic flexure and narrowing in the sigmoid colon concerning for obstruction. She was transferred to [facility] for CRS evaluation.     IBD Treatment Summary:     Outpatient GI Provider: Dr. Radha Medina at [facility].     Year of Diagnosis: 1984    Location of Disease: Ileal    Current Medical Therapy:   -Plans to initiate ENTYVIO in 01/2022  -Prednisone 20 mg daily since 09/2021.     Prior Medical Therapy:   -ASACOL: used during initial diagnosis with lack of improvement in symptoms.   -PENTASA: used during initial diagnosis with lack of improvement in symptoms.   -AZULFIDINE: used during initial diagnosis with lack of improvement in symptoms.   -IMURAN: used from 05/2015 to 09/2021; discontinued due to pancytopenia.   -REMICADE: used in 09/2018 but discontinued due to development of shortness of breath and palpitations after infusions; no drug levels or antibodies on file.   -HUMIRA: 12/2015 to 01/2017; discontinued due to lack of improvement in symptoms and development of recto-vaginal fistula; no drug levels or antibodies on file.      Prior Surgical Therapy:   -Small bowel resection in 1995 (reportedly with removal of 10 inches of distal small bowel).   -Small bowel resection in 2009 secondary to development of intra-abdominal abscess (reportedly with 15 inches of additional small bowel removed).   -Diverting sigmoid loop colostomy in 06/2015 as part of treatment for rectal-vaginal fistula with eventual closure  of colostomy in 2018.   -Repair of recto-vaginal fistula in 2018.     Most Recent Endoscopic Procedures:   -Colonoscopy in 10/2021 for assessment of disease activity showing entirely normal colon with patchy areas of inflammation confined to ileocolonic anastomosis. Biopsy from ileum showing focal chronic ileitis without granulomas seen.   -EGD in 10/2021 for assessment of Crohn's disease that was entirely normal; biopsy from duodenum without histopathologic abnormalities.   -Colonoscopy in 2018 for assessment of disease activity showing entirely normal colon and ileum.   -Flexible sigmoidoscopy in 2017 for assessment of disease activity showing anal stricture on LIZ, but normal rectal mucosa.   -EGD in 2017 for evaluation of disease activity that was entirely normal.   -Colonoscopy in 2017 for evaluation of disease activity that showed colonic fistula in the rectum and normal appearing colon.         Past Medical History:   Diagnosis Date    Acute deep vein thrombosis (DVT) of brachial vein of right upper extremity 2017    on RUE ultrasound    Anxiety     Bowel perforation     Chronic pain     Crohn's disease of both small and large intestine with intestinal obstruction     Difficult intravenous access     Encounter for blood transfusion     GERD (gastroesophageal reflux disease)     Kidney stones     Nephrolithiasis     Stricture of bowel        Past Surgical History:   Procedure Laterality Date    ABSCESS DRAINAGE      ANAL FISTULOTOMY      BOWEL RESECTION      small bowel resection per Dr. Uribe 2018     SECTION      x2    CHOLECYSTECTOMY  2000    COLON SURGERY  2015    sigmoid loop colostomy    COLONOSCOPY N/A 2016    Procedure: COLONOSCOPY;  Surgeon: Andre Uribe MD;  Location: [address];  Service: Endoscopy;  Laterality: N/A;    COLONOSCOPY N/A 2017    Procedure: COLONOSCOPY;  Surgeon: Dany Stock MD;  Location:  [facility] (2ND FLR);  Service: Endoscopy;  Laterality: N/A;    COLONOSCOPY N/A 12/4/2017    Procedure: COLONOSCOPY;  Surgeon: Andre Uribe MD;  Location: [facility] ENDO (4TH FLR);  Service: Endoscopy;  Laterality: N/A;    COLONOSCOPY N/A 2/8/2018    Procedure: COLONOSCOPY;  Surgeon: Adnre Uribe MD;  Location: [facility] ENDO (4TH FLR);  Service: Endoscopy;  Laterality: N/A;    COLONOSCOPY N/A 3/24/2018    Procedure: COLONOSCOPY;  Surgeon: Elmer Serrato MD;  Location: [facility] ENDO (2ND FLR);  Service: Endoscopy;  Laterality: N/A;    COLONOSCOPY  3/24/2018    COLONOSCOPY N/A 7/6/2018    Procedure: COLONOSCOPY;  Surgeon: Andre Uribe MD;  Location: [facility] ENDO (4TH FLR);  Service: Endoscopy;  Laterality: N/A;    COLONOSCOPY N/A 10/7/2021    Procedure: COLONOSCOPY;  Surgeon: Jose Hughes MD;  Location: [facility] ENDO;  Service: Endoscopy;  Laterality: N/A;    ESOPHAGOGASTRODUODENOSCOPY N/A 10/7/2021    Procedure: EGD (ESOPHAGOGASTRODUODENOSCOPY);  Surgeon: Jose Hughes MD;  Location: [facility];  Service: Endoscopy;  Laterality: N/A;    LAPAROSCOPIC CLOSURE OF COLOSTOMY N/A 8/29/2018    Procedure: CLOSURE, COLOSTOMY, LAPAROSCOPIC;  Surgeon: Andre Uribe MD;  Location: [facility] OR 2ND FLR;  Service: Colon and Rectal;  Laterality: N/A;  converted to open    rectovaginal fistula repair  01/2018    SMALL INTESTINE SURGERY  1995    per patient 10 inches removed    SMALL INTESTINE SURGERY  02/2009    abdominal abscess drained, 3 cm colon removed, 11 cm SB removed    TUBAL LIGATION      UPPER GASTROINTESTINAL ENDOSCOPY  2000       Review of patient's allergies indicates:   Allergen Reactions    Remicade [infliximab] Shortness Of Breath and Palpitations     Severe muscle and joint, and bone pain    Morphine Other (See Comments)     Abdominal pain    Flagyl [metronidazole] Other (See Comments)     Neuropathy    Nubain [nalbuphine] Anxiety     Upper abdominal burning      Family History     Problem  Relation (Age of Onset)    Cancer Maternal Aunt (66)    Heart attack Father (69)        Tobacco Use    Smoking status: Never Smoker    Smokeless tobacco: Never Used   Substance and Sexual Activity    Alcohol use: No     Alcohol/week: 0.0 standard drinks    Drug use: No    Sexual activity: Not on file     Review of Systems   Constitutional: Positive for fatigue. Negative for appetite change, chills and fever.   HENT: Negative for congestion and trouble swallowing.    Eyes: Negative for pain and redness.   Respiratory: Negative for cough and shortness of breath.    Cardiovascular: Negative for chest pain and palpitations.   Gastrointestinal: Negative for abdominal pain, constipation, diarrhea, nausea and vomiting.   Genitourinary: Negative for difficulty urinating and dysuria.   Musculoskeletal: Positive for back pain. Negative for neck pain.   Skin: Negative for rash.   Neurological: Negative for dizziness, light-headedness and headaches.     Objective:     Vital Signs (Most Recent):  Temp: 98.3 °F (36.8 °C) (01/20/22 1213)  Pulse: (!) 118 (01/20/22 1213)  Resp: 17 (01/20/22 1300)  BP: 112/67 (01/20/22 1213)  SpO2: 97 % (01/20/22 1213) Vital Signs (24h Range):  Temp:  [97.8 °F (36.6 °C)-99.1 °F (37.3 °C)] 98.3 °F (36.8 °C)  Pulse:  [102-162] 118  Resp:  [16-30] 17  SpO2:  [95 %-100 %] 97 %  BP: (108-130)/(56-84) 112/67     Weight: 58.2 kg (128 lb 4.9 oz) (01/20/22 0146)  Body mass index is 19.51 kg/m².      Intake/Output Summary (Last 24 hours) at 1/20/2022 1345  Last data filed at 1/20/2022 0026  Gross per 24 hour   Intake 1000 ml   Output --   Net 1000 ml       Lines/Drains/Airways     Peripheral Intravenous Line                 Peripheral IV - Single Lumen 01/19/22 1752 20 G Right Antecubital <1 day         Peripheral IV - Single Lumen 01/19/22 2040 20 G Right Forearm <1 day         Peripheral IV - Single Lumen 01/19/22 2040 22 G Left Wrist <1 day                Physical Exam  Constitutional:       General:  She is not in acute distress.     Appearance: Normal appearance. She is not ill-appearing.   HENT:      Mouth/Throat:      Mouth: Oropharynx is clear and moist and mucous membranes are normal.   Eyes:      General: No scleral icterus.     Conjunctiva/sclera: Conjunctivae normal.   Cardiovascular:      Rate and Rhythm: Regular rhythm. Tachycardia present.      Pulses: Normal pulses.      Heart sounds: Normal heart sounds.   Pulmonary:      Effort: Pulmonary effort is normal. No respiratory distress.      Breath sounds: Normal breath sounds.   Abdominal:      General: Bowel sounds are normal. There is distension.      Palpations: Abdomen is soft.      Tenderness: There is no abdominal tenderness.      Comments: Well-healed surgical scar in abdominal midline.    Skin:     General: Skin is warm and dry.      Findings: No bruising or rash.   Neurological:      Mental Status: She is alert and oriented to person, place, and time.         Significant Labs:  All pertinent lab results from the last 24 hours have been reviewed.    Significant Imaging:  Imaging results within the past 24 hours have been reviewed.    Assessment/Plan:     Crohn disease  This is a 56 year old female with PMH significant for Crohn's disease (ileal and rectal disease status post multiple small bowel resections associated with rectovaginal fistula status post repair) with chronic pain syndrome, DVT of right upper extremity (01/2017), and possible new seizure disorder as of 01/2022 (on antiepileptic medication) who presented on 01/19/2022 with generalized weakness and back pain, but without abdominal pain or increase in stool frequency.     Her presentation was initially notable for signs of sepsis with tachycardia and leukocytosis. Her initial work-up concerning for positive testing for COVID-19 and concern for perforation of ascending colon with obstruction at sigmoid colon. She is pending exploratory laparotomy.     Overall, her presentation does  not seem to be related to underlying Crohn's disease and may be more secondary to complication from prior small bowel resections with associated adhesions.     Problem List:     #1. Crohn's disease (ileal and rectal disease status post multiple small bowel resections associated with rectovaginal fistula status post repair).   #2. Pneumoperitoneum with distal large bowel obstruction.   #3. COVID-19.   #4. Seizure disorder.   #5. Hypoalbuminemia    Our recommendations are as follows:     -Agree with surgical exploration of CT imaging findings.   -Agree with use of antibiotics with intra-abdominal coverage. Monitor for signs of worsening sepsis.   -Recommend transitioning from oral Prednisone to SOLUMEDROL 16 mg daily with plans to wean post-op as to not interfere with wound healing.   -No plans for repeat endoscopic intervention currently.   -Trend CRP daily.   -Low fiber/low residue diet once allowed for PO intake.   -DVT prophylaxis daily.   -Avoid NSAID products.   -Minimize use of opioids with risk of intra-abdominal infections and increase mortality in IBD patients.           Thank you for your consult. I will follow-up with patient. Please contact us if you have any additional questions.    Darshan Richardson MD  Gastroenterology  [facility]

## 2022-01-20 NOTE — HPI
Ms. Carline Chang is a 56 year old female for whom GI is consulted with concern for Crohn's disease flare. She has a PMH significant for Crohn's disease (ileal and rectal disease status post multiple small bowel resections associated with rectovaginal fistula status post repair) with chronic pain syndrome, DVT of right upper extremity (01/2017), and possible new seizure disorder as of 01/2022 (on antiepileptic medication).     [facility]2022 after presenting with new onset seizure activity associated with hypokalemia and hypomagnesemia that was attributed to nausea and vomiting of two day duration. She was initiated on KEPPRA. CT head, MRI brain, and EEG were normal. She was discharged with outpatient follow-up with neurology and GI.     She then presented to [facility] again on 01/19/2022 with generalized weakness and lower back pain since discharge. She reports having at least 10-20 liquid non-bloody bowel movements daily for approximately one year without abdominal pain. She further denies fevers, chills, nausea, vomiting, joint pains, and difficulty swallowing. She reports being on Prednisone 20 mg daily since 09/2021, however she has not received any other medications for her Crohn's disease since late 2017. She reports plans to initiate ENTYVIO outpatient sometime this month.     Hospital Course: On arrival, her vital signs were significant for tachycardia (150's) with normal blood pressure. Labs notable for leukocytosis (19), normal Hgb, hyponatremia (133), hypomagnesemia (1.5), and normal liver function. She tested positive for COVID-19. CTA chest was negative for PE, but did show possible pneumonia in the right upper lobe. CT A/P showed small bubbles of extraluminal gas adjacent to the ascending colon concerning for a perforated viscus, with a twisted configuration of the colon near the hepatic flexure and narrowing in the  sigmoid colon concerning for obstruction. She was transferred to [facility] for CRS evaluation.     IBD Treatment Summary:     Outpatient GI Provider: Dr. Radha Medina at [facility].     Year of Diagnosis: 1984    Location of Disease: Ileal    Current Medical Therapy:   -Plans to initiate ENTYVIO in 01/2022  -Prednisone 20 mg daily since 09/2021.     Prior Medical Therapy:   -ASACOL: used during initial diagnosis with lack of improvement in symptoms.   -PENTASA: used during initial diagnosis with lack of improvement in symptoms.   -AZULFIDINE: used during initial diagnosis with lack of improvement in symptoms.   -IMURAN: used from 05/2015 to 09/2021; discontinued due to pancytopenia.   -REMICADE: used in 09/2018 but discontinued due to development of shortness of breath and palpitations after infusions; no drug levels or antibodies on file.   -HUMIRA: 12/2015 to 01/2017; discontinued due to lack of improvement in symptoms and development of recto-vaginal fistula; no drug levels or antibodies on file.      Prior Surgical Therapy:   -Small bowel resection in 1995 (reportedly with removal of 10 inches of distal small bowel).   -Small bowel resection in 2009 secondary to development of intra-abdominal abscess (reportedly with 15 inches of additional small bowel removed).   -Diverting sigmoid loop colostomy in 06/2015 as part of treatment for rectal-vaginal fistula with eventual closure of colostomy in 08/2018.   -Repair of recto-vaginal fistula in 01/2018.     Most Recent Endoscopic Procedures:   -Colonoscopy in 10/2021 for assessment of disease activity showing entirely normal colon with patchy areas of inflammation confined to ileocolonic anastomosis. Biopsy from ileum showing focal chronic ileitis without granulomas seen.   -EGD in 10/2021 for assessment of Crohn's disease that was entirely normal; biopsy from duodenum without histopathologic abnormalities.   -Colonoscopy in 07/2018 for assessment of disease  activity showing entirely normal colon and ileum.   -Flexible sigmoidoscopy in 12/2017 for assessment of disease activity showing anal stricture on LIZ, but normal rectal mucosa.   -EGD in 01/2017 for evaluation of disease activity that was entirely normal.   -Colonoscopy in 01/2017 for evaluation of disease activity that showed colonic fistula in the rectum and normal appearing colon.

## 2022-01-20 NOTE — ASSESSMENT & PLAN NOTE
This is a 56 year old female with PMH significant for Crohn's disease (ileal and rectal disease status post multiple small bowel resections associated with rectovaginal fistula status post repair) with chronic pain syndrome, DVT of right upper extremity (01/2017), and possible new seizure disorder as of 01/2022 (on antiepileptic medication) who presented on 01/19/2022 with generalized weakness and back pain, but without abdominal pain or increase in stool frequency.     Her presentation was initially notable for signs of sepsis with tachycardia and leukocytosis. Her initial work-up concerning for positive testing for COVID-19 and concern for perforation of ascending colon with obstruction at sigmoid colon. She is pending exploratory laparotomy.     Overall, her presentation does not seem to be related to underlying Crohn's disease and may be more secondary to complication from prior small bowel resections with associated adhesions.     Problem List:     #1. Crohn's disease (ileal and rectal disease status post multiple small bowel resections associated with rectovaginal fistula status post repair).   #2. Pneumoperitoneum with distal large bowel obstruction.   #3. COVID-19.   #4. Seizure disorder.   #5. Hypoalbuminemia    Our recommendations are as follows:     -Agree with surgical exploration of CT imaging findings.   -Agree with use of antibiotics with intra-abdominal coverage. Monitor for signs of worsening sepsis.   -Recommend transitioning from oral Prednisone to SOLUMEDROL 16 mg daily with plans to wean post-op as to not interfere with wound healing.   -No plans for repeat endoscopic intervention currently.   -Trend CRP daily.   -Low fiber/low residue diet once allowed for PO intake.   -DVT prophylaxis daily.   -Avoid NSAID products.   -Minimize use of opioids with risk of intra-abdominal infections and increase mortality in IBD patients.

## 2022-01-20 NOTE — PLAN OF CARE
CM MET WITH PT TO DISCUSS DISCHARGE PLANNING PT STATES THAT SHE LIVES WITH HER PARENTS IN A 1 STORY HOUSE SHE IS NOT ON DIALYSIS AND DOES NOT TAKE COUMADIN HER MOTHER WILL PROVIDE TRANSPORTATION HOME   PHARMACY [facility] [address] [facility]   PCP DR CHAN [address] [place] [id number]  POA/MOTHER TANESHA Madrigal - GIS[place]  Initial Discharge Assessment       Primary Care Provider: Pablo Medina MD (Inactive)    Admission Diagnosis: Crohn's disease of both small and large intestine with other complication [K50.818]  COVID [U07.1]    Admission Date: 1/19/2022  Expected Discharge Date:     Discharge Barriers Identified: Transportation    Payor: MEDICARE / Plan: MEDICARE PART A & B / Product Type: Government /     Extended Emergency Contact Information  Primary Emergency Contact: Tanesha Chang   [place]  Mobile Phone: [phone]  Relation: Mother  Preferred language: English   needed? No  Secondary Emergency Contact: Darwin Chang   [facility]  Home Phone: [phone]  Relation: Brother  Preferred language: English   needed? No    Discharge Plan A: Home with family,Home Health,Home  Discharge Plan B: Home with family,Home,Home Health      [facility] [address] [facility]  [address] [facility] [address]  Phone: [phone] Fax: [phone]    [facility] [place] [facility] [place] - [id number] [place] [address]  Phone: [phone] Fax: [phone]      Initial Assessment (most recent)     Adult Discharge Assessment - 01/20/22 1044        Discharge Assessment    Assessment Type Discharge Planning Assessment     Confirmed/corrected address, phone number and insurance Yes     Confirmed Demographics Correct on Facesheet     Source of Information patient     When was your last doctors appointment? 01/05/22     Communicated ANGIE with patient/caregiver Date not  available/Unable to determine     Lives With parent(s)     Do you expect to return to your current living situation? Yes     Do you have help at home or someone to help you manage your care at home? Yes     Prior to hospitilization cognitive status: No Deficits     Current cognitive status: No Deficits     Walking or Climbing Stairs Difficulty none     Dressing/Bathing Difficulty none     Equipment Currently Used at Home walker, rolling     Readmission within 30 days? Yes     Patient currently being followed by outpatient case management? No     Do you currently have service(s) that help you manage your care at home? No     Do you take prescription medications? Yes     Do you have prescription coverage? No     Do you have any problems affording any of your prescribed medications? No     Is the patient taking medications as prescribed? yes     Who is going to help you get home at discharge? MOTHER ELOISA      How do you get to doctors appointments? family or friend will provide     Are you on dialysis? No     Do you take coumadin? No     Discharge Plan A Home with family;Home Health;Home     Discharge Plan B Home with family;Home;Home Health     Discharge Plan discussed with: Patient     Discharge Barriers Identified Transportation

## 2022-01-21 ENCOUNTER — DOCUMENTATION ONLY (OUTPATIENT)
Dept: INFUSION THERAPY | Facility: HOSPITAL | Age: 57
End: 2022-01-21
Payer: MEDICARE

## 2022-01-21 LAB
ALBUMIN SERPL BCP-MCNC: 2.3 G/DL (ref 3.5–5.2)
ALBUMIN SERPL BCP-MCNC: 2.8 G/DL (ref 3.5–5.2)
ALBUMIN SERPL BCP-MCNC: 3 G/DL (ref 3.5–5.2)
ALP SERPL-CCNC: 122 U/L (ref 55–135)
ALP SERPL-CCNC: 140 U/L (ref 55–135)
ALP SERPL-CCNC: 153 U/L (ref 55–135)
ALT SERPL W/O P-5'-P-CCNC: 19 U/L (ref 10–44)
ALT SERPL W/O P-5'-P-CCNC: 19 U/L (ref 10–44)
ALT SERPL W/O P-5'-P-CCNC: 20 U/L (ref 10–44)
ANION GAP SERPL CALC-SCNC: 14 MMOL/L (ref 8–16)
ANION GAP SERPL CALC-SCNC: 5 MMOL/L (ref 8–16)
ANION GAP SERPL CALC-SCNC: 9 MMOL/L (ref 8–16)
ANISOCYTOSIS BLD QL SMEAR: SLIGHT
AST SERPL-CCNC: 21 U/L (ref 10–40)
AST SERPL-CCNC: 22 U/L (ref 10–40)
AST SERPL-CCNC: 24 U/L (ref 10–40)
BASOPHILS # BLD AUTO: 0.03 K/UL (ref 0–0.2)
BASOPHILS # BLD AUTO: 0.05 K/UL (ref 0–0.2)
BASOPHILS # BLD AUTO: 0.06 K/UL (ref 0–0.2)
BASOPHILS NFR BLD: 0.2 % (ref 0–1.9)
BILIRUB SERPL-MCNC: 0.7 MG/DL (ref 0.1–1)
BILIRUB SERPL-MCNC: 1.2 MG/DL (ref 0.1–1)
BILIRUB SERPL-MCNC: 1.7 MG/DL (ref 0.1–1)
BLD PROD TYP BPU: NORMAL
BLOOD UNIT EXPIRATION DATE: NORMAL
BLOOD UNIT TYPE CODE: 5100
BLOOD UNIT TYPE: NORMAL
BUN SERPL-MCNC: 10 MG/DL (ref 6–20)
BUN SERPL-MCNC: 6 MG/DL (ref 6–20)
BUN SERPL-MCNC: 7 MG/DL (ref 6–20)
BURR CELLS BLD QL SMEAR: ABNORMAL
CALCIUM SERPL-MCNC: 7.8 MG/DL (ref 8.7–10.5)
CALCIUM SERPL-MCNC: 7.8 MG/DL (ref 8.7–10.5)
CALCIUM SERPL-MCNC: 8 MG/DL (ref 8.7–10.5)
CHLORIDE SERPL-SCNC: 100 MMOL/L (ref 95–110)
CHLORIDE SERPL-SCNC: 101 MMOL/L (ref 95–110)
CHLORIDE SERPL-SCNC: 101 MMOL/L (ref 95–110)
CO2 SERPL-SCNC: 18 MMOL/L (ref 23–29)
CO2 SERPL-SCNC: 23 MMOL/L (ref 23–29)
CO2 SERPL-SCNC: 25 MMOL/L (ref 23–29)
CODING SYSTEM: NORMAL
CREAT SERPL-MCNC: 0.8 MG/DL (ref 0.5–1.4)
CREAT SERPL-MCNC: 0.9 MG/DL (ref 0.5–1.4)
CREAT SERPL-MCNC: 0.9 MG/DL (ref 0.5–1.4)
DIFFERENTIAL METHOD: ABNORMAL
DISPENSE STATUS: NORMAL
EOSINOPHIL # BLD AUTO: 0 K/UL (ref 0–0.5)
EOSINOPHIL # BLD AUTO: 0 K/UL (ref 0–0.5)
EOSINOPHIL # BLD AUTO: 0.1 K/UL (ref 0–0.5)
EOSINOPHIL NFR BLD: 0 % (ref 0–8)
EOSINOPHIL NFR BLD: 0 % (ref 0–8)
EOSINOPHIL NFR BLD: 0.2 % (ref 0–8)
ERYTHROCYTE [DISTWIDTH] IN BLOOD BY AUTOMATED COUNT: 13.7 % (ref 11.5–14.5)
ERYTHROCYTE [DISTWIDTH] IN BLOOD BY AUTOMATED COUNT: 13.8 % (ref 11.5–14.5)
ERYTHROCYTE [DISTWIDTH] IN BLOOD BY AUTOMATED COUNT: 14.1 % (ref 11.5–14.5)
EST. GFR  (AFRICAN AMERICAN): >60 ML/MIN/1.73 M^2
EST. GFR  (NON AFRICAN AMERICAN): >60 ML/MIN/1.73 M^2
GLUCOSE SERPL-MCNC: 107 MG/DL (ref 70–110)
GLUCOSE SERPL-MCNC: 126 MG/DL (ref 70–110)
GLUCOSE SERPL-MCNC: 143 MG/DL (ref 70–110)
GLUCOSE SERPL-MCNC: 164 MG/DL (ref 70–110)
HCO3 UR-SCNC: 23.6 MMOL/L (ref 24–28)
HCT VFR BLD AUTO: 22.1 % (ref 37–48.5)
HCT VFR BLD AUTO: 26.1 % (ref 37–48.5)
HCT VFR BLD AUTO: 27.8 % (ref 37–48.5)
HCT VFR BLD CALC: 24 %PCV (ref 36–54)
HGB BLD-MCNC: 7.1 G/DL (ref 12–16)
HGB BLD-MCNC: 8.4 G/DL (ref 12–16)
HGB BLD-MCNC: 8.7 G/DL (ref 12–16)
IMM GRANULOCYTES # BLD AUTO: 0.12 K/UL (ref 0–0.04)
IMM GRANULOCYTES # BLD AUTO: 0.21 K/UL (ref 0–0.04)
IMM GRANULOCYTES # BLD AUTO: 0.22 K/UL (ref 0–0.04)
IMM GRANULOCYTES NFR BLD AUTO: 0.6 % (ref 0–0.5)
IMM GRANULOCYTES NFR BLD AUTO: 0.7 % (ref 0–0.5)
IMM GRANULOCYTES NFR BLD AUTO: 0.8 % (ref 0–0.5)
LACTATE SERPL-SCNC: 1.7 MMOL/L (ref 0.5–2.2)
LYMPHOCYTES # BLD AUTO: 0.2 K/UL (ref 1–4.8)
LYMPHOCYTES # BLD AUTO: 0.5 K/UL (ref 1–4.8)
LYMPHOCYTES # BLD AUTO: 0.6 K/UL (ref 1–4.8)
LYMPHOCYTES NFR BLD: 1.2 % (ref 18–48)
LYMPHOCYTES NFR BLD: 1.6 % (ref 18–48)
LYMPHOCYTES NFR BLD: 2 % (ref 18–48)
MAGNESIUM SERPL-MCNC: 1.4 MG/DL (ref 1.6–2.6)
MAGNESIUM SERPL-MCNC: 1.6 MG/DL (ref 1.6–2.6)
MCH RBC QN AUTO: 28.7 PG (ref 27–31)
MCH RBC QN AUTO: 28.9 PG (ref 27–31)
MCH RBC QN AUTO: 29.5 PG (ref 27–31)
MCHC RBC AUTO-ENTMCNC: 31.3 G/DL (ref 32–36)
MCHC RBC AUTO-ENTMCNC: 32.1 G/DL (ref 32–36)
MCHC RBC AUTO-ENTMCNC: 32.2 G/DL (ref 32–36)
MCV RBC AUTO: 89 FL (ref 82–98)
MCV RBC AUTO: 92 FL (ref 82–98)
MCV RBC AUTO: 92 FL (ref 82–98)
MONOCYTES # BLD AUTO: 0.3 K/UL (ref 0.3–1)
MONOCYTES # BLD AUTO: 1 K/UL (ref 0.3–1)
MONOCYTES # BLD AUTO: 1.1 K/UL (ref 0.3–1)
MONOCYTES NFR BLD: 1.7 % (ref 4–15)
MONOCYTES NFR BLD: 3.4 % (ref 4–15)
MONOCYTES NFR BLD: 3.7 % (ref 4–15)
NEUTROPHILS # BLD AUTO: 18.3 K/UL (ref 1.8–7.7)
NEUTROPHILS # BLD AUTO: 25.5 K/UL (ref 1.8–7.7)
NEUTROPHILS # BLD AUTO: 29.2 K/UL (ref 1.8–7.7)
NEUTROPHILS NFR BLD: 93.1 % (ref 38–73)
NEUTROPHILS NFR BLD: 94.1 % (ref 38–73)
NEUTROPHILS NFR BLD: 96.3 % (ref 38–73)
NRBC BLD-RTO: 0 /100 WBC
NUM UNITS TRANS PACKED RBC: NORMAL
OVALOCYTES BLD QL SMEAR: ABNORMAL
PCO2 BLDA: 48 MMHG (ref 35–45)
PH SMN: 7.3 [PH] (ref 7.35–7.45)
PHOSPHATE SERPL-MCNC: 3.2 MG/DL (ref 2.7–4.5)
PHOSPHATE SERPL-MCNC: 3.5 MG/DL (ref 2.7–4.5)
PLATELET # BLD AUTO: 182 K/UL (ref 150–450)
PLATELET # BLD AUTO: 215 K/UL (ref 150–450)
PLATELET # BLD AUTO: 237 K/UL (ref 150–450)
PLATELET BLD QL SMEAR: ABNORMAL
PMV BLD AUTO: 9.3 FL (ref 9.2–12.9)
PMV BLD AUTO: 9.3 FL (ref 9.2–12.9)
PMV BLD AUTO: 9.5 FL (ref 9.2–12.9)
PO2 BLDA: 52 MMHG (ref 40–60)
POC BE: -3 MMOL/L
POC IONIZED CALCIUM: 1.21 MMOL/L (ref 1.06–1.42)
POC SATURATED O2: 82 % (ref 95–100)
POC TCO2: 25 MMOL/L (ref 24–29)
POCT GLUCOSE: 109 MG/DL (ref 70–110)
POCT GLUCOSE: 113 MG/DL (ref 70–110)
POCT GLUCOSE: 120 MG/DL (ref 70–110)
POIKILOCYTOSIS BLD QL SMEAR: ABNORMAL
POTASSIUM BLD-SCNC: 3.8 MMOL/L (ref 3.5–5.1)
POTASSIUM SERPL-SCNC: 4.6 MMOL/L (ref 3.5–5.1)
POTASSIUM SERPL-SCNC: 4.6 MMOL/L (ref 3.5–5.1)
POTASSIUM SERPL-SCNC: 4.7 MMOL/L (ref 3.5–5.1)
PROT SERPL-MCNC: 4.5 G/DL (ref 6–8.4)
PROT SERPL-MCNC: 4.9 G/DL (ref 6–8.4)
PROT SERPL-MCNC: 5.2 G/DL (ref 6–8.4)
RBC # BLD AUTO: 2.41 M/UL (ref 4–5.4)
RBC # BLD AUTO: 2.93 M/UL (ref 4–5.4)
RBC # BLD AUTO: 3.01 M/UL (ref 4–5.4)
SAMPLE: ABNORMAL
SODIUM BLD-SCNC: 134 MMOL/L (ref 136–145)
SODIUM SERPL-SCNC: 130 MMOL/L (ref 136–145)
SODIUM SERPL-SCNC: 133 MMOL/L (ref 136–145)
SODIUM SERPL-SCNC: 133 MMOL/L (ref 136–145)
WBC # BLD AUTO: 18.98 K/UL (ref 3.9–12.7)
WBC # BLD AUTO: 27.37 K/UL (ref 3.9–12.7)
WBC # BLD AUTO: 31.06 K/UL (ref 3.9–12.7)

## 2022-01-21 PROCEDURE — 76937 US GUIDE VASCULAR ACCESS: CPT

## 2022-01-21 PROCEDURE — P9016 RBC LEUKOCYTES REDUCED: HCPCS

## 2022-01-21 PROCEDURE — 80053 COMPREHEN METABOLIC PANEL: CPT | Performed by: STUDENT IN AN ORGANIZED HEALTH CARE EDUCATION/TRAINING PROGRAM

## 2022-01-21 PROCEDURE — 63600175 PHARM REV CODE 636 W HCPCS

## 2022-01-21 PROCEDURE — 83735 ASSAY OF MAGNESIUM: CPT | Performed by: STUDENT IN AN ORGANIZED HEALTH CARE EDUCATION/TRAINING PROGRAM

## 2022-01-21 PROCEDURE — 80053 COMPREHEN METABOLIC PANEL: CPT | Mod: 91 | Performed by: COLON & RECTAL SURGERY

## 2022-01-21 PROCEDURE — 93005 ELECTROCARDIOGRAM TRACING: CPT

## 2022-01-21 PROCEDURE — 99900035 HC TECH TIME PER 15 MIN (STAT)

## 2022-01-21 PROCEDURE — 93010 ELECTROCARDIOGRAM REPORT: CPT | Mod: 77,,, | Performed by: INTERNAL MEDICINE

## 2022-01-21 PROCEDURE — B4185 PARENTERAL SOL 10 GM LIPIDS: HCPCS | Performed by: COLON & RECTAL SURGERY

## 2022-01-21 PROCEDURE — 85025 COMPLETE CBC W/AUTO DIFF WBC: CPT | Mod: 91 | Performed by: COLON & RECTAL SURGERY

## 2022-01-21 PROCEDURE — 25000003 PHARM REV CODE 250: Performed by: COLON & RECTAL SURGERY

## 2022-01-21 PROCEDURE — A4217 STERILE WATER/SALINE, 500 ML: HCPCS | Performed by: STUDENT IN AN ORGANIZED HEALTH CARE EDUCATION/TRAINING PROGRAM

## 2022-01-21 PROCEDURE — 93010 EKG 12-LEAD: ICD-10-PCS | Mod: ,,, | Performed by: INTERNAL MEDICINE

## 2022-01-21 PROCEDURE — C1751 CATH, INF, PER/CENT/MIDLINE: HCPCS

## 2022-01-21 PROCEDURE — 94761 N-INVAS EAR/PLS OXIMETRY MLT: CPT

## 2022-01-21 PROCEDURE — 84100 ASSAY OF PHOSPHORUS: CPT | Performed by: STUDENT IN AN ORGANIZED HEALTH CARE EDUCATION/TRAINING PROGRAM

## 2022-01-21 PROCEDURE — 93010 ELECTROCARDIOGRAM REPORT: CPT | Mod: ,,, | Performed by: INTERNAL MEDICINE

## 2022-01-21 PROCEDURE — 25000003 PHARM REV CODE 250: Performed by: STUDENT IN AN ORGANIZED HEALTH CARE EDUCATION/TRAINING PROGRAM

## 2022-01-21 PROCEDURE — A4216 STERILE WATER/SALINE, 10 ML: HCPCS | Performed by: COLON & RECTAL SURGERY

## 2022-01-21 PROCEDURE — 93010 EKG 12-LEAD: ICD-10-PCS | Mod: 77,,, | Performed by: INTERNAL MEDICINE

## 2022-01-21 PROCEDURE — 83605 ASSAY OF LACTIC ACID: CPT | Performed by: COLON & RECTAL SURGERY

## 2022-01-21 PROCEDURE — 63600175 PHARM REV CODE 636 W HCPCS: Performed by: STUDENT IN AN ORGANIZED HEALTH CARE EDUCATION/TRAINING PROGRAM

## 2022-01-21 PROCEDURE — 36573 INSJ PICC RS&I 5 YR+: CPT

## 2022-01-21 PROCEDURE — 27000207 HC ISOLATION

## 2022-01-21 PROCEDURE — 85025 COMPLETE CBC W/AUTO DIFF WBC: CPT | Performed by: STUDENT IN AN ORGANIZED HEALTH CARE EDUCATION/TRAINING PROGRAM

## 2022-01-21 PROCEDURE — 20600001 HC STEP DOWN PRIVATE ROOM

## 2022-01-21 PROCEDURE — 27000221 HC OXYGEN, UP TO 24 HOURS

## 2022-01-21 PROCEDURE — 99232 SBSQ HOSP IP/OBS MODERATE 35: CPT | Mod: CR,GC,, | Performed by: INTERNAL MEDICINE

## 2022-01-21 PROCEDURE — C9113 INJ PANTOPRAZOLE SODIUM, VIA: HCPCS | Performed by: STUDENT IN AN ORGANIZED HEALTH CARE EDUCATION/TRAINING PROGRAM

## 2022-01-21 PROCEDURE — 99232 PR SUBSEQUENT HOSPITAL CARE,LEVL II: ICD-10-PCS | Mod: CR,GC,, | Performed by: INTERNAL MEDICINE

## 2022-01-21 PROCEDURE — 25000003 PHARM REV CODE 250

## 2022-01-21 RX ORDER — ACETAMINOPHEN 500 MG
1000 TABLET ORAL EVERY 8 HOURS
Status: DISCONTINUED | OUTPATIENT
Start: 2022-01-22 | End: 2022-01-27 | Stop reason: HOSPADM

## 2022-01-21 RX ORDER — LIDOCAINE 50 MG/G
1 PATCH TOPICAL
Status: DISCONTINUED | OUTPATIENT
Start: 2022-01-21 | End: 2022-01-27 | Stop reason: HOSPADM

## 2022-01-21 RX ORDER — VENLAFAXINE 37.5 MG/1
75 TABLET ORAL 2 TIMES DAILY
Status: DISCONTINUED | OUTPATIENT
Start: 2022-01-21 | End: 2022-01-27 | Stop reason: HOSPADM

## 2022-01-21 RX ORDER — KETOROLAC TROMETHAMINE 15 MG/ML
15 INJECTION, SOLUTION INTRAMUSCULAR; INTRAVENOUS ONCE
Status: COMPLETED | OUTPATIENT
Start: 2022-01-21 | End: 2022-01-21

## 2022-01-21 RX ORDER — HYDROCODONE BITARTRATE AND ACETAMINOPHEN 500; 5 MG/1; MG/1
TABLET ORAL
Status: DISCONTINUED | OUTPATIENT
Start: 2022-01-21 | End: 2022-01-22

## 2022-01-21 RX ORDER — ENOXAPARIN SODIUM 100 MG/ML
40 INJECTION SUBCUTANEOUS EVERY 24 HOURS
Status: DISCONTINUED | OUTPATIENT
Start: 2022-01-21 | End: 2022-01-21

## 2022-01-21 RX ORDER — SODIUM CHLORIDE 0.9 % (FLUSH) 0.9 %
10 SYRINGE (ML) INJECTION EVERY 6 HOURS
Status: DISCONTINUED | OUTPATIENT
Start: 2022-01-21 | End: 2022-01-27 | Stop reason: HOSPADM

## 2022-01-21 RX ORDER — MAGNESIUM SULFATE HEPTAHYDRATE 40 MG/ML
2 INJECTION, SOLUTION INTRAVENOUS ONCE
Status: COMPLETED | OUTPATIENT
Start: 2022-01-21 | End: 2022-01-21

## 2022-01-21 RX ORDER — METHOCARBAMOL 500 MG/1
500 TABLET, FILM COATED ORAL 4 TIMES DAILY
Status: DISCONTINUED | OUTPATIENT
Start: 2022-01-21 | End: 2022-01-27 | Stop reason: HOSPADM

## 2022-01-21 RX ORDER — SODIUM CHLORIDE 0.9 % (FLUSH) 0.9 %
10 SYRINGE (ML) INJECTION
Status: DISCONTINUED | OUTPATIENT
Start: 2022-01-21 | End: 2022-01-27 | Stop reason: HOSPADM

## 2022-01-21 RX ORDER — KETOROLAC TROMETHAMINE 15 MG/ML
15 INJECTION, SOLUTION INTRAMUSCULAR; INTRAVENOUS EVERY 6 HOURS
Status: DISPENSED | OUTPATIENT
Start: 2022-01-21 | End: 2022-01-24

## 2022-01-21 RX ADMIN — KETOROLAC TROMETHAMINE 15 MG: 15 INJECTION, SOLUTION INTRAMUSCULAR; INTRAVENOUS at 05:01

## 2022-01-21 RX ADMIN — Medication: at 10:01

## 2022-01-21 RX ADMIN — PIPERACILLIN AND TAZOBACTAM 4.5 G: 4; .5 INJECTION, POWDER, LYOPHILIZED, FOR SOLUTION INTRAVENOUS; PARENTERAL at 04:01

## 2022-01-21 RX ADMIN — MAGNESIUM SULFATE 2 G: 2 INJECTION INTRAVENOUS at 03:01

## 2022-01-21 RX ADMIN — MUPIROCIN: 20 OINTMENT TOPICAL at 10:01

## 2022-01-21 RX ADMIN — METHYLPREDNISOLONE SODIUM SUCCINATE 16 MG: 40 INJECTION, POWDER, FOR SOLUTION INTRAMUSCULAR; INTRAVENOUS at 10:01

## 2022-01-21 RX ADMIN — METHOCARBAMOL 500 MG: 500 TABLET ORAL at 09:01

## 2022-01-21 RX ADMIN — PROCHLORPERAZINE EDISYLATE 5 MG: 5 INJECTION INTRAMUSCULAR; INTRAVENOUS at 01:01

## 2022-01-21 RX ADMIN — CALCIUM GLUCONATE 1 G: 98 INJECTION, SOLUTION INTRAVENOUS at 10:01

## 2022-01-21 RX ADMIN — SODIUM CHLORIDE, SODIUM LACTATE, POTASSIUM CHLORIDE, AND CALCIUM CHLORIDE 1000 ML: .6; .31; .03; .02 INJECTION, SOLUTION INTRAVENOUS at 04:01

## 2022-01-21 RX ADMIN — Medication 10 ML: at 06:01

## 2022-01-21 RX ADMIN — LIDOCAINE 5% 1 PATCH: 700 PATCH TOPICAL at 10:01

## 2022-01-21 RX ADMIN — SODIUM CHLORIDE, SODIUM LACTATE, POTASSIUM CHLORIDE, AND CALCIUM CHLORIDE: .6; .31; .03; .02 INJECTION, SOLUTION INTRAVENOUS at 06:01

## 2022-01-21 RX ADMIN — ACETAMINOPHEN 1000 MG: 10 INJECTION, SOLUTION INTRAVENOUS at 01:01

## 2022-01-21 RX ADMIN — MAGNESIUM SULFATE 2 G: 2 INJECTION INTRAVENOUS at 10:01

## 2022-01-21 RX ADMIN — PANTOPRAZOLE SODIUM 40 MG: 40 INJECTION, POWDER, FOR SOLUTION INTRAVENOUS at 10:01

## 2022-01-21 RX ADMIN — METHOCARBAMOL 500 MG: 500 TABLET ORAL at 06:01

## 2022-01-21 RX ADMIN — METHYLPREDNISOLONE ACETATE 16 MG: 40 INJECTION, SUSPENSION INTRA-ARTICULAR; INTRALESIONAL; INTRAMUSCULAR; SOFT TISSUE at 01:01

## 2022-01-21 RX ADMIN — MAGNESIUM SULFATE HEPTAHYDRATE: 500 INJECTION, SOLUTION INTRAMUSCULAR; INTRAVENOUS at 10:01

## 2022-01-21 RX ADMIN — SODIUM CHLORIDE 1000 ML: 0.9 INJECTION, SOLUTION INTRAVENOUS at 11:01

## 2022-01-21 RX ADMIN — PIPERACILLIN AND TAZOBACTAM 4.5 G: 4; .5 INJECTION, POWDER, LYOPHILIZED, FOR SOLUTION INTRAVENOUS; PARENTERAL at 01:01

## 2022-01-21 RX ADMIN — VENLAFAXINE 75 MG: 37.5 TABLET ORAL at 09:01

## 2022-01-21 RX ADMIN — SMOFLIPID 250 ML: 6; 6; 5; 3 INJECTION, EMULSION INTRAVENOUS at 10:01

## 2022-01-21 RX ADMIN — KETOROLAC TROMETHAMINE 15 MG: 15 INJECTION, SOLUTION INTRAMUSCULAR; INTRAVENOUS at 02:01

## 2022-01-21 RX ADMIN — MUPIROCIN: 20 OINTMENT TOPICAL at 09:01

## 2022-01-21 RX ADMIN — ACETAMINOPHEN 1000 MG: 10 INJECTION, SOLUTION INTRAVENOUS at 08:01

## 2022-01-21 RX ADMIN — LEVETIRACETAM 500 MG: 500 TABLET, FILM COATED ORAL at 09:01

## 2022-01-21 RX ADMIN — SODIUM CHLORIDE, SODIUM LACTATE, POTASSIUM CHLORIDE, AND CALCIUM CHLORIDE: .6; .31; .03; .02 INJECTION, SOLUTION INTRAVENOUS at 03:01

## 2022-01-21 NOTE — CONSULTS
SOLE placed double lumen PICC to left brachial vein using u/s guidance.  34 cm in length,  27 cm arm circumference and 0 cm exposed.   Lot # [id number].    PICC inserted for TPN

## 2022-01-21 NOTE — ANESTHESIA POSTPROCEDURE EVALUATION
Anesthesia Post Evaluation    Patient: Carline Chang    Procedure(s) Performed: Procedure(s) (LRB):  LAPAROTOMY, EXPLORATORY (N/A)  COLON RESECTION (N/A)  LYSIS, ADHESIONS (N/A)  WRAP-OMENTAL (N/A)  REPAIR OF ILEOCOLIC FISTULA (N/A)    Final Anesthesia Type: general      Patient location: recovered in the OR due to covid.  Patient participation: Yes- Able to Participate  Level of consciousness: awake  Post-procedure vital signs: reviewed and stable  Pain management: adequate  Airway patency: patent    PONV status at discharge: No PONV  Anesthetic complications: no      Cardiovascular status: blood pressure returned to baseline  Respiratory status: unassisted  Hydration status: euvolemic  Follow-up not needed.          Vitals Value Taken Time   /53 01/21/22 0539   Temp 36.5 °C (97.7 °F) 01/21/22 0539   Pulse 114 01/21/22 0539   Resp 20 01/21/22 0539   SpO2 99 % 01/21/22 0539         No case tracking events are documented in the log.      Pain/Estiven Score: Pain Rating Prior to Med Admin: 7 (1/21/2022  2:31 AM)  Pain Rating Post Med Admin: 7 (1/21/2022  2:50 AM)

## 2022-01-21 NOTE — PLAN OF CARE
POC reviewed w/ pt, verbalized understanding. Pt AAOx4. VS stable on 2L NC. On TELE, Sinus Tachy 150's - 160's, MD made aware, see previous note. IS bedside. Denies chest pain & SOB. SCD's in place. Pt remains free of fall & injury. No acute events. Bed in lowest position, call light in reach, frequent rounds made for safety.    - m/l ABD incision w/ island border dressing CDI  - L nare NG tube, secured to nose, to LIWS  - L neck EJ 14 Angolan iv access, & 2 Left wrist Peripheral Iv's  - 2x 1000mL bolus of Lactated Ringers given  - ABX given per MAR  - Odonnell Catheter in place, output documented  - status NPO  - PCA hydromorphone given per MAR      WCTM

## 2022-01-21 NOTE — CARE UPDATE
Call placed to [facility]. Notified of patient HR sustaining 155, /52. All other VSS, respirations shallow pain uncontrolled with PCA. Orders being placed for EKG, other orders to follow

## 2022-01-21 NOTE — CARE UPDATE
RAPID RESPONSE NURSE PROACTIVE ROUNDING NOTE       Time of Visit:     Admit Date: 2022  LOS: 2  Code Status: Full Code   Date of Visit: 2022  : 1965  Age: 56 y.o.  Sex: female  Race: White  Bed: 5365/1005 A:   MRN: [id number]  Was the patient discharged from an ICU this admission? No   Was the patient discharged from a PACU within last 24 hours? No   Did the patient receive conscious sedation/general anesthesia in last 24 hours? No  Was the patient in the ED within the past 24 hours? No  Was the patient on NIPPV within the past 24 hours? No   Attending Physician: LLUVIA Post MD  Primary Service: Colon and Rectal Surgery   Time spent at the bedside: 15 -30 min    SITUATION    Notified by telemetry via phone call.  Reason for alert: HR 150s  Called to evaluate the patient for Dysrythmia    BACKGROUND     Why is the patient in the hospital?: <principal problem not specified>    Patient has a past medical history of Acute deep vein thrombosis (DVT) of brachial vein of right upper extremity, Anxiety, Bowel perforation, Chronic pain, Crohn's disease of both small and large intestine with intestinal obstruction, Difficult intravenous access, Encounter for blood transfusion, GERD (gastroesophageal reflux disease), Kidney stones, Nephrolithiasis, and Stricture of bowel.    Last Vitals:  Temp: 99.1 °F (37.3 °C) ( 0300)  Pulse: 148 (201)  Resp: 16 (201)  BP: 129/74 (201)  SpO2: 97 % (201)    24 Hours Vitals Range:  Temp:  [97.8 °F (36.6 °C)-99.1 °F (37.3 °C)]   Pulse:  [102-148]   Resp:  [16-20]   BP: (107-129)/(67-74)   SpO2:  [97 %-100 %]     Labs:  Recent Labs     22  1700 22  0804 22  2338   WBC 19.59* 10.95 31.06*   HGB 14.7 10.4* 8.7*   HCT 46.9 32.6* 27.8*    161 215       Recent Labs     22  1700 22  0804 22  2338   * 129* 133*   K 4.4 3.9 4.6   CL 98 103 101   CO2 23 20* 18*   CREATININE 1.1 0.7 0.8    85  164*   PHOS 3.0 2.5* 3.5   MG 1.5* 1.5* 1.6        No results for input(s): PH, PCO2, PO2, HCO3, POCSATURATED, BE in the last 72 hours.     ASSESSMENT      INTERVENTIONS    Patient resting in room. Patient is drowsy but awakens to verbal stimuli. Complains of generalized abdominal pain 8/10. No distress noted.  all other vital signs stable on NC. PCA within reach, patient sleeping between care. Patient is s/p ex-lap.     RECOMMENDATIONS    Continue current care. EKG performed demonstrating ST, HR 150s-160s. Temp 99.1 oral/99.6 axillary. MAP 92. Respiratory rate even and unlabored.   Recommend IVFB and monitor HR for response. Replace Magnesium. Discussed with MD jones to use IV access to obtain labs as multiple venous sticks have failed. Patient complaining of pain but remains drowsy from OR and not using PCA. 15 mg IVP Toradol given x1 dose. Consider medication for rate control if HR does not improve with IVF resuscitation and pain control. Monitor intake and output and document results.     PROVIDER ESCALATION    Yes/No  yes    Orders received and case discussed with Dr. Malachi Lombardo.    Disposition: Remain in room 1005.    FOLLOW-UP    Bedside Rob LOPEZ updated on plan of care. Instructed to call the Rapid Response Nurse, Karissa Prieto RN at [phone] for additional questions or concerns.

## 2022-01-21 NOTE — SUBJECTIVE & OBJECTIVE
Subjective:     Interval History: Underwent ex-lap with SBR and FOREIGN with approximately 100cm small bowel remaining. Small run of SVT at induction. Extubated without difficulty. Continued tachycardia in 160s post op. EKG with sinus tach. Given 2L LR with improvement, but sustained tachycardia. Pain control difficult with PCA immediately post-op but improved with additional education.    Post-Op Info:  Procedure(s) (LRB):  LAPAROTOMY, EXPLORATORY (N/A)  COLON RESECTION (N/A)  LYSIS, ADHESIONS (N/A)  WRAP-OMENTAL (N/A)  REPAIR OF ILEOCOLIC FISTULA (N/A)   1 Day Post-Op      Medications:  Continuous Infusions:   hydromorphone in 0.9 % NaCl 6 mg/30 ml      lactated ringers 100 mL/hr at 01/21/22 0636     Scheduled Meds:   acetaminophen  1,000 mg Intravenous Q8H    calcium gluconate IVPB  1 g Intravenous Once    ketorolac  15 mg Intravenous Q6H    levETIRAcetam  500 mg Oral BID    magnesium sulfate IVPB  2 g Intravenous Once    mupirocin   Nasal BID    pantoprazole  40 mg Intravenous Daily    piperacillin-tazobactam (ZOSYN) IVPB  4.5 g Intravenous Q8H     PRN Meds:   acetaminophen    naloxone    ondansetron    prochlorperazine    sodium chloride 0.9%    sodium chloride 0.9%        Objective:     Vital Signs (Most Recent):  Temp: 97.7 °F (36.5 °C) (01/21/22 0539)  Pulse: (!) 114 (01/21/22 0539)  Resp: 20 (01/21/22 0539)  BP: (!) 115/53 (01/21/22 0539)  SpO2: 99 % (01/21/22 0539) Vital Signs (24h Range):  Temp:  [97.7 °F (36.5 °C)-99.1 °F (37.3 °C)] 97.7 °F (36.5 °C)  Pulse:  [102-160] 114  Resp:  [16-20] 20  SpO2:  [97 %-100 %] 99 %  BP: (107-129)/(53-74) 115/53     Intake/Output - Last 3 Shifts       01/19 0700  01/20 0659 01/20 0700  01/21 0659    I.V. (mL/kg)  500 (8.6)    IV Piggyback 1000 4100    Total Intake(mL/kg) 1000 (17.2) 4600 (79)    Urine (mL/kg/hr)  1405 (1)    Blood  400    Total Output  1805    Net +1000 +2795                Physical Exam  Vitals and nursing note reviewed.   Constitutional:        General: She is not in acute distress.     Appearance: She is ill-appearing.   HENT:      Head: Normocephalic.      Nose:      Comments: NGT with small volume dark green liquid output.  Eyes:      Extraocular Movements: Extraocular movements intact.      Conjunctiva/sclera: Conjunctivae normal.   Cardiovascular:      Rate and Rhythm: Regular rhythm. Tachycardia present.   Pulmonary:      Effort: Pulmonary effort is normal. No respiratory distress.   Abdominal:      General: There is distension.      Palpations: Abdomen is soft.      Tenderness: There is abdominal tenderness. There is no guarding or rebound.      Comments: Midline incision with overlying island dressing. Mild distention with diffuse tenderness to palpation at incision.    Skin:     General: Skin is warm and dry.   Neurological:      General: No focal deficit present.      Mental Status: She is alert and oriented to person, place, and time.     Significant Labs:  CBC (Last 3 Results):   Recent Labs   Lab 01/20/22  0804 01/20/22  1910 01/20/22  2338 01/21/22  0346   WBC 10.95  --  31.06* 27.37*   RBC 3.65*  --  3.01* 2.93*   HGB 10.4*  --  8.7* 8.4*   HCT 32.6* 24* 27.8* 26.1*     --  215 237   MCV 89  --  92 89   MCH 28.5  --  28.9 28.7   MCHC 31.9*  --  31.3* 32.2     CMP (Last 3 Results):   Recent Labs   Lab 01/20/22  0804 01/20/22  2338 01/21/22  0346   GLU 85 164* 126*   CALCIUM 7.4* 7.8* 7.8*   ALBUMIN 2.2* 3.0* 2.8*   PROT 5.2* 5.2* 4.9*   * 133* 133*   K 3.9 4.6 4.6   CO2 20* 18* 23    101 101   BUN 5* 6 7   CREATININE 0.7 0.8 0.9   ALKPHOS 126 153* 140*   ALT 16 20 19   AST 21 24 21   BILITOT 0.7 1.7* 1.2*       Significant Diagnostics:  I have reviewed all pertinent imaging results/findings within the past 24 hours.

## 2022-01-21 NOTE — PROGRESS NOTES
[facility]  Gastroenterology  Progress Note    Patient Name: Carline Chang  MRN: [id number]  Admission Date: 1/19/2022  Hospital Length of Stay: 2 days  Code Status: Full Code   Attending Provider: LLUVIA Post MD  Consulting Provider: Darshan Richardson MD  Primary Care Physician: Pablo Medina MD (Inactive)  Principal Problem: <principal problem not specified>      Subjective:     Interval History: Patient status post exploratory laparotomy last night showing ileitis at prior anastomosis with evidence of ileosigmoid fistula; she is status post ileocolic resection with anastomosis between small bowel and proximal transverse colon. Post-op, she is noted to have tachycardia up to 160's. She is status post initiation of PCA pump. Patient was resting comfortably on initial bedside encounter this morning. Post-op labs notable for continued leukocytosis. Plans for TPN initiation.     Review of Systems   Constitutional: Positive for fatigue. Negative for diaphoresis and fever.   Respiratory: Negative for cough and shortness of breath.    Cardiovascular: Negative for chest pain and leg swelling.   Gastrointestinal: Positive for abdominal pain and diarrhea. Negative for abdominal distention, nausea and vomiting.     Objective:     Vital Signs (Most Recent):  Temp: 97.9 °F (36.6 °C) (01/21/22 1327)  Pulse: (!) 123 (01/21/22 1327)  Resp: 14 (01/21/22 1327)  BP: (!) 106/55 (01/21/22 1327)  SpO2: 97 % (01/21/22 1356) Vital Signs (24h Range):  Temp:  [97.6 °F (36.4 °C)-99.1 °F (37.3 °C)] 97.9 °F (36.6 °C)  Pulse:  [114-160] 123  Resp:  [14-20] 14  SpO2:  [96 %-100 %] 97 %  BP: (103-129)/(52-74) 106/55     Weight: 58.2 kg (128 lb 4.9 oz) (01/20/22 0146)  Body mass index is 19.51 kg/m².      Intake/Output Summary (Last 24 hours) at 1/21/2022 1513  Last data filed at 1/21/2022 1120  Gross per 24 hour   Intake 6650 ml   Output 2155 ml   Net 4495 ml       Lines/Drains/Airways     Drain                 NG/OG  Tube 01/20/22 1540 Welling sump 18 Fr. Left nostril <1 day         Urethral Catheter 01/20/22 1544 Straight-tip;Silicone 16 Fr. <1 day          Peripheral Intravenous Line                 Peripheral IV - Single Lumen 01/19/22 2040 22 G Left Wrist 1 day         Peripheral IV Triple Lumen 01/20/22 1540 22 G Left Hand <1 day                Physical Exam  Constitutional:       General: She is not in acute distress.     Appearance: Normal appearance. She is not ill-appearing.   Eyes:      General: No scleral icterus.     Conjunctiva/sclera: Conjunctivae normal.   Cardiovascular:      Rate and Rhythm: Regular rhythm. Tachycardia present.      Pulses: Normal pulses.      Heart sounds: Normal heart sounds.   Pulmonary:      Effort: Pulmonary effort is normal. No respiratory distress.      Breath sounds: Normal breath sounds.   Abdominal:      General: Bowel sounds are normal. There is distension.      Palpations: Abdomen is soft.      Tenderness: There is generalized abdominal tenderness.      Comments: Clean dressing in place over abdominal midline.    Skin:     General: Skin is warm and dry.      Findings: No bruising or rash.   Neurological:      Mental Status: She is alert and oriented to person, place, and time.         Significant Labs:  All pertinent lab results from the last 24 hours have been reviewed.      Significant Imaging:  Imaging results within the past 24 hours have been reviewed.    Assessment/Plan:     Crohn disease  This is a 56 year old female with PMH significant for Crohn's disease (ileal and rectal disease status post multiple small bowel resections associated with rectovaginal fistula status post repair) with chronic pain syndrome, DVT of right upper extremity (01/2017), and possible new seizure disorder as of 01/2022 (on antiepileptic medication) who presented on 01/19/2022 with generalized weakness and back pain, but without abdominal pain or increase in stool frequency.     Her presentation was  initially notable for signs of sepsis with tachycardia and leukocytosis. Her initial work-up concerning for positive testing for COVID-19 and concern for possible perforation of ascending colon with obstruction at sigmoid colon. She status post exploratory laparotomy on 01/20/2022 showing ileitis at proximal anastomosis with evidence of fistula between her ileum and sigmoid colon; no evidence of perforation. She is status post ileocolic resection 45 cm of terminal ileum with approximately 115 cm of small bowel remaining. Post-op course notable for continued signs of sepsis (tachycardia and leukocytosis) with pain requiring PCA pump.     Problem List:     #1. 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   #2. At risk for short bowel syndrome (<200 cm small bowel remaining)  #3. COVID-19.   #4. Seizure disorder.   #5. Hypoalbuminemia    Our recommendations are as follows:     -Agree with use of antibiotics with intra-abdominal coverage. Monitor for signs of worsening sepsis.   -Continue SOLUMEDROL 16 mg daily; will follow-up on 01/24 with further plans to begin weaning dose.   -No plans for repeat endoscopic intervention currently.   -Trend CRP daily.   -Low fiber/low residue diet once allowed for PO intake.   -Agree with use of TPN to optimize nutrition post-op.   -Post-op, will need to monitor for increase stool frequency with patient at risk for short bowel syndrome; anticipated need for scheduled anti-motility agents.   -Maintain DVT prophylaxis daily.   -Avoid NSAID products.   -Minimize use of opioids with risk of intra-abdominal infections and increase mortality in IBD patients. Agree with maximizing multi-modal pain control as much as possible.   -In terms of longterm medical management of Crohn's, anticipated need for initiation of ENTYVIO or STELARA in the coming weeks. Will confirm patient's  preference for follow-up either here or in [place] in order to arrange infusions.           Thank you for your consult. I will follow-up with patient. Please contact us if you have any additional questions.    Darshan Richardson MD  Gastroenterology  [facility]

## 2022-01-21 NOTE — PT/OT/SLP PROGRESS
Physical Therapy      Patient Name:  Carline Chang   MRN:  [id number]    Patient not seen today secondary to  (hold per nursing due to patient with c/o pain). Will follow-up tomorrow.    Andre Jolly, PT  1/21/2022

## 2022-01-21 NOTE — RESPIRATORY THERAPY
RAPID RESPONSE RESPIRATORY THERAPY ETCO2 CHECK         Time of visit: 1356     Code Status: Full Code   : 1965  Bed: 1005/1005 A:   MRN: [id number]  Time spent at the bedside: < 15 min    SITUATION    Evaluated patient for: ETCo2 compliance    BACKGROUND    Why is the patient in the hospital?: <principal problem not specified>    Patient has a past medical history of Acute deep vein thrombosis (DVT) of brachial vein of right upper extremity, Anxiety, Bowel perforation, Chronic pain, Crohn's disease of both small and large intestine with intestinal obstruction, Difficult intravenous access, Encounter for blood transfusion, GERD (gastroesophageal reflux disease), Kidney stones, Nephrolithiasis, and Stricture of bowel.    24 Hours Vitals Range:  Temp:  [97.6 °F (36.4 °C)-99.1 °F (37.3 °C)]   Pulse:  [114-160]   Resp:  [14-20]   BP: (103-129)/(52-74)   SpO2:  [96 %-100 %]     Labs:    Recent Labs     22  0804 22  2338 22  0346   * 133* 133*   K 3.9 4.6 4.6    101 101   CO2 20* 18* 23   CREATININE 0.7 0.8 0.9   GLU 85 164* 126*   PHOS 2.5* 3.5 3.2   MG 1.5* 1.6 1.4*        Recent Labs     22  1910   PH 7.300*   PCO2 48.0*   PO2 52   HCO3 23.6*   POCSATURATED 82*   BE -3       ASSESSMENT/INTERVENTIONS      Last VS   Temp: 97.9 °F (36.6 °C) (1327)  Pulse: 123 (1327)  Resp: 14 (1327)  BP: 106/55 (1327)  SpO2: 97 % (1356)    Level of Consciousness: Level of Consciousness (AVPU): alert  Respiratory Effort: Respiratory Effort: Normal,Unlabored Expansion/Accessory Muscle Usage: Expansion/Accessory Muscles/Retractions: no use of accessory muscles  All Lung Field Breath Sounds:    O2 Device/Concentration: Flow (L/min): 2,  , O2 Device (Oxygen Therapy): nasal cannula  Is the ETCO2 monitor on? Yes  Is the patient wearing a cannula? Yes  Are ETCO2 orders placed? Yes  Is the patient on a PCA pump? Yes  ETCO2 monitored: ETCO2 (mmHg): 38 mmHg  Ambu at bedside: Ambu  bag with the patient?: Yes, Adult Ambu    Active Orders   Respiratory Care    END TIDAL CO2 MONITOR Q12H     Frequency: Q12H     Number of Occurrences: Until Specified    Incentive spirometry     Frequency: Q4H     Number of Occurrences: Until Specified     Order Comments: Q4 while awake until discharge.  Educate patient in use; Keep incentive spirometer within reach; and Document incentive spirometer volume every 4 hours while awake.    ((10 sets per hour (3-5 inspirations per set)) on original order)      Oxygen Continuous     Frequency: Continuous     Number of Occurrences: Until Specified     Order Questions:      Device type: Low flow      Device: Nasal Cannula (1- 5 Liters)      LPM: 2      Titrate O2 per Oxygen Titration Protocol: Yes      To maintain SpO2 goal of: >= 90%      Notify MD of: Inability to achieve desired SpO2; Sudden change in patient status and requires 20% increase in FiO2; Patient requires >60% FiO2    Pulse Oximetry Q4H     Frequency: Q4H     Number of Occurrences: Until Specified       RECOMMENDATIONS    We recommend: RRT Recs: Continue POC per primary team.    ESCALATION      FOLLOW-UP    Please call back the Rapid Response RT, Sheila Crook RRT at x [phone] for any questions or concerns.

## 2022-01-21 NOTE — ASSESSMENT & PLAN NOTE
Carline Chang is a 56 y.o. female with Crohn's disease on steroids and complicated surgical history presents with leukocytosis of 19K and small volume extra-luminal free air near the ascending colon concerning for perforated viscus now s/p ex-lap with FOREIGN and resection of ileocolic anastomosis and segment of proximal small bowel with dense adhesions.     -Pain: Continue PCA, adding MM (robaxin, tylenol, lidocaine patch, toradol)  -Continue NPO, mIVF. Ok for PO meds and small amount of ice chips  -Discontinue NGT  -PICC team consulted, BCx drawn in ED NGTD at 48 hours, plan to start TPN today once PICC placed  -Continue zosyn for total of 4 days post op  -Home meds reviewed, hold diuretics and anti-hypertensives for now  -GI consulted for assistance with med management, recommending 16 mg IV solumedrol post-operatively; appreciate recommendations  -DVT ppx: LVX

## 2022-01-21 NOTE — PROGRESS NOTES
Pt experiencing Tachycardia and sustaining 150's - 160's HR, /74. Pt lethargic, s/p ex lap, in pain but too drowsy to press pain button. Pt asked for IV pain meds.    MD called made aware.  MD placed new order for Toradol.    Rapid Response team also made aware. They came and evaluated.    Medications and labs were ordered, EKG resulted in Sinus Tachycardia.    MD ordered Bolus and Magnesium.    Pt current HR is 119. Currently resting, pain is 6/10.    Will continue to monitor.

## 2022-01-21 NOTE — PLAN OF CARE
Recommendations    1. Suggest Custom TPN 65 gm AA / 250 gm Dex + IV lipids to provide 1610 kcal, 65 gm protein, and GIR = 2.9 mg/kg/min.     2. As medically able, ADAT to low fiber with texture per SLP.    - If po intake <50%, recommend adding Sujata Reyes Standard Oral Supplement BID.     3. RD following.     Goals: receive nutrition by RD follow-up  Nutrition Goal Status: new

## 2022-01-21 NOTE — BRIEF OP NOTE
[facility]  Brief Operative Note    SUMMARY     Surgery Date: 1/20/2022     Surgeon(s) and Role:     * LLUVIA Post MD - Primary     * Alex Neves MD - Resident - Assisting     * Sanchez Mcclure MD - Resident - Assisting    Pre-op Diagnosis:  Crohn's disease of both small and large intestine with other complication [K50.818]    Post-op Diagnosis:  Post-Op Diagnosis Codes:     * Crohn's disease of both small and large intestine with other complication [K50.818]    Procedure(s) (LRB):  LAPAROTOMY, EXPLORATORY (N/A)  COLON RESECTION (N/A)  LYSIS, ADHESIONS (N/A)  WRAP-OMENTAL (N/A)  REPAIR OF ILEOCOLIC FISTULA    Anesthesia: General    Operative Findings: Ex-lap and resection of ileocolic anastomosis. Dense adhesions throughout   12cm colon, 50cm ileum resected  100cm small bowel remains    Estimated Blood Loss: 400 mL    Estimated Blood Loss has been documented.         Specimens:   Specimen (24h ago, onward)             Start     Ordered    Pending  Specimen to Pathology, Surgery Gastrointestinal tract  Once        Comments: Pre-op Diagnosis: Crohn's disease of both small and large intestine with other complication [K50.818]    Procedure(s):  LAPAROTOMY, EXPLORATORY     Number of specimens: 1    Name of specimens: 1. Scar-- permanent   References:    Click here for ordering Quick Tip   Question Answer Comment   Procedure Type: Gastrointestinal tract    Release to patient Immediate        Pending                [id number]

## 2022-01-21 NOTE — PT/OT/SLP DISCHARGE
Occupational Therapy Discharge Summary    Carline Chang  MRN: [id number]   Principal Problem: Seizure      Patient Discharged from acute Occupational Therapy on 1/16/2022.  Please refer to prior OT note dated 1/16/2022 for functional status.    Assessment:      Patient has not met goals.    Objective:     GOALS:   Multidisciplinary Problems     Occupational Therapy Goals     Not on file          Multidisciplinary Problems (Resolved)        Problem: Occupational Therapy Goal    Goal Priority Disciplines Outcome Interventions   Occupational Therapy Goal   (Resolved)     OT, PT/OT Met    Description: Goals to be met by: d/c     Patient will increase functional independence with ADLs by performing:    UE Dressing with Supervision.  LE Dressing with Supervision.  Grooming while standing at sink with Supervision.  Toileting from toilet with Supervision for hygiene and clothing management.   Toilet transfer to toilet with Supervision.                     Reasons for Discontinuation of Therapy Services  Patient d/c home.      Plan:     Patient Discharged to: Home with Home Health Service    1/21/2022

## 2022-01-21 NOTE — PROGRESS NOTES
Girish jonnathan [facility]  Colorectal Surgery  Progress Note    Patient Name: Carline Chang  MRN: [id number]  Admission Date: 1/19/2022  Hospital Length of Stay: 2 days  Attending Physician: LLUVIA Post MD    Subjective:     Interval History: Underwent ex-lap with SBR and FOREIGN with approximately 100cm small bowel remaining. Small run of SVT at induction. Extubated without difficulty. Continued tachycardia in 160s post op. EKG with sinus tach. Given 2L LR with improvement, but sustained tachycardia. Pain control difficult with PCA immediately post-op but improved with additional education.    Post-Op Info:  Procedure(s) (LRB):  LAPAROTOMY, EXPLORATORY (N/A)  COLON RESECTION (N/A)  LYSIS, ADHESIONS (N/A)  WRAP-OMENTAL (N/A)  REPAIR OF ILEOCOLIC FISTULA (N/A)   1 Day Post-Op      Medications:  Continuous Infusions:   hydromorphone in 0.9 % NaCl 6 mg/30 ml      lactated ringers 100 mL/hr at 01/21/22 0636     Scheduled Meds:   acetaminophen  1,000 mg Intravenous Q8H    calcium gluconate IVPB  1 g Intravenous Once    ketorolac  15 mg Intravenous Q6H    levETIRAcetam  500 mg Oral BID    magnesium sulfate IVPB  2 g Intravenous Once    mupirocin   Nasal BID    pantoprazole  40 mg Intravenous Daily    piperacillin-tazobactam (ZOSYN) IVPB  4.5 g Intravenous Q8H     PRN Meds:   acetaminophen    naloxone    ondansetron    prochlorperazine    sodium chloride 0.9%    sodium chloride 0.9%        Objective:     Vital Signs (Most Recent):  Temp: 97.7 °F (36.5 °C) (01/21/22 0539)  Pulse: (!) 114 (01/21/22 0539)  Resp: 20 (01/21/22 0539)  BP: (!) 115/53 (01/21/22 0539)  SpO2: 99 % (01/21/22 0539) Vital Signs (24h Range):  Temp:  [97.7 °F (36.5 °C)-99.1 °F (37.3 °C)] 97.7 °F (36.5 °C)  Pulse:  [102-160] 114  Resp:  [16-20] 20  SpO2:  [97 %-100 %] 99 %  BP: (107-129)/(53-74) 115/53     Intake/Output - Last 3 Shifts       01/19 0700 01/20 0659 01/20 0700 01/21 0659    I.V. (mL/kg)  500 (8.6)    IV Piggyback 1000 4100     Total Intake(mL/kg) 1000 (17.2) 4600 (79)    Urine (mL/kg/hr)  1405 (1)    Blood  400    Total Output  1805    Net +1000 +2795                Physical Exam  Vitals and nursing note reviewed.   Constitutional:       General: She is not in acute distress.     Appearance: She is ill-appearing.   HENT:      Head: Normocephalic.      Nose:      Comments: NGT with small volume dark green liquid output.  Eyes:      Extraocular Movements: Extraocular movements intact.      Conjunctiva/sclera: Conjunctivae normal.   Cardiovascular:      Rate and Rhythm: Regular rhythm. Tachycardia present.   Pulmonary:      Effort: Pulmonary effort is normal. No respiratory distress.   Abdominal:      General: There is distension.      Palpations: Abdomen is soft.      Tenderness: There is abdominal tenderness. There is no guarding or rebound.      Comments: Midline incision with overlying island dressing. Mild distention with diffuse tenderness to palpation at incision.    Skin:     General: Skin is warm and dry.   Neurological:      General: No focal deficit present.      Mental Status: She is alert and oriented to person, place, and time.     Significant Labs:  CBC (Last 3 Results):   Recent Labs   Lab 01/20/22  0804 01/20/22  1910 01/20/22  2338 01/21/22  0346   WBC 10.95  --  31.06* 27.37*   RBC 3.65*  --  3.01* 2.93*   HGB 10.4*  --  8.7* 8.4*   HCT 32.6* 24* 27.8* 26.1*     --  215 237   MCV 89  --  92 89   MCH 28.5  --  28.9 28.7   MCHC 31.9*  --  31.3* 32.2     CMP (Last 3 Results):   Recent Labs   Lab 01/20/22  0804 01/20/22  2338 01/21/22  0346   GLU 85 164* 126*   CALCIUM 7.4* 7.8* 7.8*   ALBUMIN 2.2* 3.0* 2.8*   PROT 5.2* 5.2* 4.9*   * 133* 133*   K 3.9 4.6 4.6   CO2 20* 18* 23    101 101   BUN 5* 6 7   CREATININE 0.7 0.8 0.9   ALKPHOS 126 153* 140*   ALT 16 20 19   AST 21 24 21   BILITOT 0.7 1.7* 1.2*       Significant Diagnostics:  I have reviewed all pertinent imaging results/findings within the past 24  hours.    Assessment/Plan:     Crohn disease  Carline Chang is a 56 y.o. female with Crohn's disease on steroids and complicated surgical history presents with leukocytosis of 19K and small volume extra-luminal free air near the ascending colon concerning for perforated viscus now s/p ex-lap with FOREIGN and resection of ileocolic anastomosis and segment of proximal small bowel with dense adhesions.     -Pain: Continue PCA, adding MM (robaxin, tylenol, lidocaine patch, toradol)  -Continue NPO, mIVF. Ok for PO meds and small amount of ice chips  -Discontinue NGT  -PICC team consulted, BCx drawn in ED NGTD at 48 hours, plan to start TPN today once PICC placed  -Mag and Ca replacement   -Continue zosyn for total of 4 days post op  -Home meds reviewed, hold diuretics and anti-hypertensives for now  -GI consulted for assistance with med management, recommending 16 mg IV solumedrol post-operatively; appreciate recommendations  -DVT ppx: LVX            Sanchez Mcclure MD  Colorectal Surgery  [facility]

## 2022-01-21 NOTE — PROCEDURES
"Carline Chang is a 56 y.o. female patient.    Temp: 97.8 °F (36.6 °C) (01/21/22 1629)  Pulse: (!) 118 (01/21/22 1629)  Resp: 18 (01/21/22 1629)  BP: 111/68 (01/21/22 1629)  SpO2: 98 % (01/21/22 1629)  Weight: 58.2 kg (128 lb 4.9 oz) (01/20/22 0146)  Height: 5' 8" (172.7 cm) (01/20/22 0146)    PICC  Date/Time: 1/21/2022 3:03 PM  Performed by: Prem Leonard RN  Assisting provider: Lalitha Crews RN  Time out: Immediately prior to procedure a time out was called to verify the correct patient, procedure, equipment, support staff and site/side marked as required  Indications: med administration and vascular access  Anesthesia: local infiltration  Local anesthetic: lidocaine 1% without epinephrine  Anesthetic Total (mL): 2  Preparation: skin prepped with ChloraPrep  Skin prep agent dried: skin prep agent completely dried prior to procedure  Sterile barriers: all five maximum sterile barriers used - cap, mask, sterile gown, sterile gloves, and large sterile sheet  Hand hygiene: hand hygiene performed prior to central venous catheter insertion  Location details: left brachial  Catheter type: double lumen  Catheter size: 5 Fr  Catheter Length: 34cm    Ultrasound guidance: yes  Vessel Caliber: medium and patent, compressibility normal  Vascular Doppler: not done  Needle advanced into vessel with real time Ultrasound guidance.  Guidewire confirmed in vessel.  Image recorded and saved.  Sterile sheath used.  Number of attempts: 1  Post-procedure: blood return through all ports, chlorhexidine patch and sterile dressing applied  Technical procedures used: 3CG  Specimens: No  Implants: No  Assessment: placement verified by x-ray  Complications: none          Name MARIA ELENA Crews RN  1/21/2022  "

## 2022-01-21 NOTE — SUBJECTIVE & OBJECTIVE
Subjective:     Interval History: Patient status post exploratory laparotomy last night showing ileitis at prior anastomosis with evidence of ileosigmoid fistula; she is status post ileocolic resection with anastomosis between small bowel and proximal transverse colon. Post-op, she is noted to have tachycardia up to 160's. She is status post initiation of PCA pump. Patient was resting comfortably on initial bedside encounter this morning. Post-op labs notable for continued leukocytosis. Plans for TPN initiation.     Review of Systems   Constitutional: Positive for fatigue. Negative for diaphoresis and fever.   Respiratory: Negative for cough and shortness of breath.    Cardiovascular: Negative for chest pain and leg swelling.   Gastrointestinal: Positive for abdominal pain and diarrhea. Negative for abdominal distention, nausea and vomiting.     Objective:     Vital Signs (Most Recent):  Temp: 97.9 °F (36.6 °C) (01/21/22 1327)  Pulse: (!) 123 (01/21/22 1327)  Resp: 14 (01/21/22 1327)  BP: (!) 106/55 (01/21/22 1327)  SpO2: 97 % (01/21/22 1356) Vital Signs (24h Range):  Temp:  [97.6 °F (36.4 °C)-99.1 °F (37.3 °C)] 97.9 °F (36.6 °C)  Pulse:  [114-160] 123  Resp:  [14-20] 14  SpO2:  [96 %-100 %] 97 %  BP: (103-129)/(52-74) 106/55     Weight: 58.2 kg (128 lb 4.9 oz) (01/20/22 0146)  Body mass index is 19.51 kg/m².      Intake/Output Summary (Last 24 hours) at 1/21/2022 1513  Last data filed at 1/21/2022 1120  Gross per 24 hour   Intake 6650 ml   Output 2155 ml   Net 4495 ml       Lines/Drains/Airways     Drain                 NG/OG Tube 01/20/22 1540 [place] sump 18 Fr. Left nostril <1 day         Urethral Catheter 01/20/22 1544 Straight-tip;Silicone 16 Fr. <1 day          Peripheral Intravenous Line                 Peripheral IV - Single Lumen 01/19/22 2040 22 G Left Wrist 1 day         Peripheral IV Triple Lumen 01/20/22 1540 22 G Left Hand <1 day                Physical Exam  Constitutional:       General: She is not  in acute distress.     Appearance: Normal appearance. She is not ill-appearing.   Eyes:      General: No scleral icterus.     Conjunctiva/sclera: Conjunctivae normal.   Cardiovascular:      Rate and Rhythm: Regular rhythm. Tachycardia present.      Pulses: Normal pulses.      Heart sounds: Normal heart sounds.   Pulmonary:      Effort: Pulmonary effort is normal. No respiratory distress.      Breath sounds: Normal breath sounds.   Abdominal:      General: Bowel sounds are normal. There is distension.      Palpations: Abdomen is soft.      Tenderness: There is generalized abdominal tenderness.      Comments: Clean dressing in place over abdominal midline.    Skin:     General: Skin is warm and dry.      Findings: No bruising or rash.   Neurological:      Mental Status: She is alert and oriented to person, place, and time.         Significant Labs:  All pertinent lab results from the last 24 hours have been reviewed.      Significant Imaging:  Imaging results within the past 24 hours have been reviewed.

## 2022-01-21 NOTE — PROGRESS NOTES
"  Girish jonnathan [facility]  Adult Nutrition  Progress Note    SUMMARY     Recommendations    1. Suggest Custom TPN 65 gm AA / 250 gm Dex + IV lipids to provide 1610 kcal, 65 gm protein, and GIR = 2.9 mg/kg/min.     2. As medically able, ADAT to low fiber with texture per SLP.    - If po intake <50%, recommend adding [employer] Standard Oral Supplement BID.     3. RD following.     Goals: receive nutrition by RD follow-up  Nutrition Goal Status: new  Communication of RD Recs:  (POC)    Assessment and Plan    Nutrition Problem  Inadequate Energy Intake    Related to (etiology):   Inability to consume sufficient energy     Signs and Symptoms (as evidenced by):   NPO with no alternative means of nutrition      Intervention (treatment strategy):  Collaboration of nutrition care with other providers    Nutrition Diagnosis Status:   New     Reason for Assessment    Reason For Assessment: new TPN  Diagnosis: gastrointestinal disease  Relevant Medical History: Crohn's disease, GERD, DVT, fistula  Interdisciplinary Rounds: did not attend  General Information Comments: POD1 ex-lap with SBR and FOREIGN. Noted COVID-19+. NG tube discontinued. NPO, PICC to be placed and TPN to start today. No significant wt loss PTA. Pt does not meet malnutrition criteria with current information. Will continue to monitor energy intake and wt changes.   Nutrition Discharge Planning: pending medical course    Nutrition Risk Screen    Nutrition Risk Screen: dysphagia or difficulty swallowing    Nutrition/Diet History    Factors Affecting Nutritional Intake: altered gastrointestinal function,NPO    Anthropometrics    Temp: 97.9 °F (36.6 °C)  Height Method: Stated  Height: 5' 8" (172.7 cm)  Height (inches): 68 in  Weight Method: Bed Scale  Weight: 58.2 kg (128 lb 4.9 oz)  Weight (lb): 128.31 lb  Ideal Body Weight (IBW), Female: 140 lb  % Ideal Body Weight, Female (lb): 91.65 %  BMI (Calculated): 19.5  BMI Grade: 18.5-24.9 - normal   "   Lab/Procedures/Meds    Pertinent Labs Reviewed: reviewed  Pertinent Labs Comments: Na 133, glucose 126, alk phos 140, T bili 1.2  Pertinent Medications Reviewed: reviewed  Pertinent Medications Comments: acetaminophen, PCA, lactated ringers    Estimated/Assessed Needs    Weight Used For Calorie Calculations: 58.2 kg (128 lb 4.9 oz)  Energy Calorie Requirements (kcal): 1537 kcal/day  Energy Need Method: Kane-St Jeor (x 1.25)  Protein Requirements: 58-70 gm/day (1.0-1.2 gm/kg)  Weight Used For Protein Calculations: 58.2 kg (128 lb 4.9 oz)  Fluid Requirements (mL): 1 mL/kcal or per MD     RDA Method (mL): 1537     Nutrition Prescription Ordered    Current Diet Order: NPO    Evaluation of Received Nutrient/Fluid Intake    I/O: +4.695L x 24 hrs, +5.695L since admit   Comments: LBM 1/14  % Intake of Estimated Energy Needs: 0 - 25 %  % Meal Intake: NPO    Nutrition Risk    Level of Risk/Frequency of Follow-up:  (f/u 1 x wk)       Monitor and Evaluation    Food and Nutrient Intake: energy intake  Food and Nutrient Adminstration: diet order,enteral and parenteral nutrition administration  Anthropometric Measurements: weight,weight change,body mass index  Biochemical Data, Medical Tests and Procedures: electrolyte and renal panel,gastrointestinal profile,glucose/endocrine profile,inflammatory profile,lipid profile  Nutrition-Focused Physical Findings: overall appearance       Nutrition Follow-Up    RD Follow-up?: Yes

## 2022-01-21 NOTE — TRANSFER OF CARE
"Anesthesia Transfer of Care Note    Patient: Carline Chang    Procedure(s) Performed: Procedure(s) (LRB):  LAPAROTOMY, EXPLORATORY (N/A)  COLON RESECTION (N/A)  LYSIS, ADHESIONS (N/A)  WRAP-OMENTAL (N/A)  REPAIR OF ILEOCOLIC FISTULA    Patient location: Room #1005.    Transport from OR: Transported from OR on 6-10 L/min O2 by face mask with adequate spontaneous ventilation    Post pain: adequate analgesia    Post assessment: no apparent anesthetic complications    Post vital signs: stable    Level of consciousness: awake    Nausea/Vomiting: no nausea/vomiting    Complications: none    Transfer of care protocol was followed      Last vitals:   Visit Vitals  /67 (Patient Position: Lying)   Pulse (!) 118   Temp 36.8 °C (98.3 °F) (Oral)   Resp 17   Ht 5' 8" (1.727 m)   Wt 58.2 kg (128 lb 4.9 oz)   LMP 05/30/2009   SpO2 97%   Breastfeeding No   BMI 19.51 kg/m²     "

## 2022-01-21 NOTE — PROGRESS NOTES
Left message for Dr. Hughes's MA that patient is in the hspital and it looks like she had surgery. We will cancel her appt on Monday 1/24/2022 and they can call to R/S when patient good to come in.Scheduling notified.

## 2022-01-21 NOTE — ASSESSMENT & PLAN NOTE
This is a 56 year old female with PMH significant for Crohn's disease (ileal and rectal disease status post multiple small bowel resections associated with rectovaginal fistula status post repair) with chronic pain syndrome, DVT of right upper extremity (01/2017), and possible new seizure disorder as of 01/2022 (on antiepileptic medication) who presented on 01/19/2022 with generalized weakness and back pain, but without abdominal pain or increase in stool frequency.     Her presentation was initially notable for signs of sepsis with tachycardia and leukocytosis. Her initial work-up concerning for positive testing for COVID-19 and concern for possible perforation of ascending colon with obstruction at sigmoid colon. She status post exploratory laparotomy on 01/20/2022 showing ileitis at proximal anastomosis with evidence of fistula between her ileum and sigmoid colon; no evidence of perforation. She is status post ileocolic resection 45 cm of terminal ileum with approximately 115 cm of small bowel remaining. Post-op course notable for continued signs of sepsis (tachycardia and leukocytosis) with pain requiring PCA pump.     Problem List:     #1. 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   #2. At risk for short bowel syndrome (<200 cm small bowel remaining)  #3. COVID-19.   #4. Seizure disorder.   #5. Hypoalbuminemia    Our recommendations are as follows:     -Agree with use of antibiotics with intra-abdominal coverage. Monitor for signs of worsening sepsis.   -Continue SOLUMEDROL 16 mg daily; will follow-up on 01/24 with further plans to begin weaning dose.   -No plans for repeat endoscopic intervention currently.   -Trend CRP daily.   -Low fiber/low residue diet once allowed for PO intake.   -Agree with use of TPN to optimize nutrition post-op.   -Post-op, will need to monitor for increase stool  frequency with patient at risk for short bowel syndrome; anticipated need for scheduled anti-motility agents.   -Maintain DVT prophylaxis daily.   -Avoid NSAID products.   -Minimize use of opioids with risk of intra-abdominal infections and increase mortality in IBD patients. Agree with maximizing multi-modal pain control as much as possible.   -In terms of longterm medical management of Crohn's, anticipated need for initiation of ENTYVIO or STELARA in the coming weeks. Will confirm patient's preference for follow-up either here or in [place] in order to arrange infusions.

## 2022-01-21 NOTE — PT/OT/SLP PROGRESS
Occupational Therapy      Patient Name:  Carline Chang   MRN:  [id number]    Patient not seen today secondary to nurse hold as pt lethargic and may need to go to ICU. Will follow-up.    1/21/2022

## 2022-01-22 PROBLEM — K56.609 LARGE BOWEL OBSTRUCTION: Status: ACTIVE | Noted: 2022-01-22

## 2022-01-22 LAB
ANION GAP SERPL CALC-SCNC: 7 MMOL/L (ref 8–16)
BASOPHILS # BLD AUTO: 0.01 K/UL (ref 0–0.2)
BASOPHILS # BLD AUTO: 0.01 K/UL (ref 0–0.2)
BASOPHILS NFR BLD: 0.1 % (ref 0–1.9)
BASOPHILS NFR BLD: 0.1 % (ref 0–1.9)
BLD PROD TYP BPU: NORMAL
BLOOD UNIT EXPIRATION DATE: NORMAL
BLOOD UNIT TYPE CODE: 5100
BLOOD UNIT TYPE: NORMAL
BUN SERPL-MCNC: 9 MG/DL (ref 6–20)
CALCIUM SERPL-MCNC: 7.7 MG/DL (ref 8.7–10.5)
CHLORIDE SERPL-SCNC: 99 MMOL/L (ref 95–110)
CO2 SERPL-SCNC: 24 MMOL/L (ref 23–29)
CODING SYSTEM: NORMAL
CREAT SERPL-MCNC: 0.6 MG/DL (ref 0.5–1.4)
DIFFERENTIAL METHOD: ABNORMAL
DIFFERENTIAL METHOD: ABNORMAL
DISPENSE STATUS: NORMAL
EOSINOPHIL # BLD AUTO: 0 K/UL (ref 0–0.5)
EOSINOPHIL # BLD AUTO: 0.1 K/UL (ref 0–0.5)
EOSINOPHIL NFR BLD: 0 % (ref 0–8)
EOSINOPHIL NFR BLD: 0.4 % (ref 0–8)
ERYTHROCYTE [DISTWIDTH] IN BLOOD BY AUTOMATED COUNT: 14.3 % (ref 11.5–14.5)
ERYTHROCYTE [DISTWIDTH] IN BLOOD BY AUTOMATED COUNT: 14.4 % (ref 11.5–14.5)
EST. GFR  (AFRICAN AMERICAN): >60 ML/MIN/1.73 M^2
EST. GFR  (NON AFRICAN AMERICAN): >60 ML/MIN/1.73 M^2
GLUCOSE SERPL-MCNC: 112 MG/DL (ref 70–110)
HCT VFR BLD AUTO: 23.3 % (ref 37–48.5)
HCT VFR BLD AUTO: 26.2 % (ref 37–48.5)
HGB BLD-MCNC: 7.6 G/DL (ref 12–16)
HGB BLD-MCNC: 8.8 G/DL (ref 12–16)
IMM GRANULOCYTES # BLD AUTO: 0.09 K/UL (ref 0–0.04)
IMM GRANULOCYTES # BLD AUTO: 0.14 K/UL (ref 0–0.04)
IMM GRANULOCYTES NFR BLD AUTO: 0.6 % (ref 0–0.5)
IMM GRANULOCYTES NFR BLD AUTO: 0.8 % (ref 0–0.5)
LYMPHOCYTES # BLD AUTO: 0.3 K/UL (ref 1–4.8)
LYMPHOCYTES # BLD AUTO: 0.4 K/UL (ref 1–4.8)
LYMPHOCYTES NFR BLD: 1.6 % (ref 18–48)
LYMPHOCYTES NFR BLD: 2.5 % (ref 18–48)
MAGNESIUM SERPL-MCNC: 1.9 MG/DL (ref 1.6–2.6)
MCH RBC QN AUTO: 29.5 PG (ref 27–31)
MCH RBC QN AUTO: 30 PG (ref 27–31)
MCHC RBC AUTO-ENTMCNC: 32.6 G/DL (ref 32–36)
MCHC RBC AUTO-ENTMCNC: 33.6 G/DL (ref 32–36)
MCV RBC AUTO: 89 FL (ref 82–98)
MCV RBC AUTO: 90 FL (ref 82–98)
MONOCYTES # BLD AUTO: 0.2 K/UL (ref 0.3–1)
MONOCYTES # BLD AUTO: 0.4 K/UL (ref 0.3–1)
MONOCYTES NFR BLD: 1.4 % (ref 4–15)
MONOCYTES NFR BLD: 2.9 % (ref 4–15)
NEUTROPHILS # BLD AUTO: 14.3 K/UL (ref 1.8–7.7)
NEUTROPHILS # BLD AUTO: 16.1 K/UL (ref 1.8–7.7)
NEUTROPHILS NFR BLD: 93.5 % (ref 38–73)
NEUTROPHILS NFR BLD: 96.1 % (ref 38–73)
NRBC BLD-RTO: 0 /100 WBC
NRBC BLD-RTO: 0 /100 WBC
NUM UNITS TRANS PACKED RBC: NORMAL
PHOSPHATE SERPL-MCNC: 2.5 MG/DL (ref 2.7–4.5)
PLATELET # BLD AUTO: 139 K/UL (ref 150–450)
PLATELET # BLD AUTO: 153 K/UL (ref 150–450)
PMV BLD AUTO: 9.2 FL (ref 9.2–12.9)
PMV BLD AUTO: 9.5 FL (ref 9.2–12.9)
POTASSIUM SERPL-SCNC: 3.9 MMOL/L (ref 3.5–5.1)
RBC # BLD AUTO: 2.58 M/UL (ref 4–5.4)
RBC # BLD AUTO: 2.93 M/UL (ref 4–5.4)
SODIUM SERPL-SCNC: 130 MMOL/L (ref 136–145)
WBC # BLD AUTO: 15.3 K/UL (ref 3.9–12.7)
WBC # BLD AUTO: 16.75 K/UL (ref 3.9–12.7)

## 2022-01-22 PROCEDURE — 83735 ASSAY OF MAGNESIUM: CPT | Performed by: STUDENT IN AN ORGANIZED HEALTH CARE EDUCATION/TRAINING PROGRAM

## 2022-01-22 PROCEDURE — P9016 RBC LEUKOCYTES REDUCED: HCPCS

## 2022-01-22 PROCEDURE — 36430 TRANSFUSION BLD/BLD COMPNT: CPT

## 2022-01-22 PROCEDURE — 97161 PT EVAL LOW COMPLEX 20 MIN: CPT

## 2022-01-22 PROCEDURE — 97116 GAIT TRAINING THERAPY: CPT

## 2022-01-22 PROCEDURE — 27000207 HC ISOLATION

## 2022-01-22 PROCEDURE — 63600175 PHARM REV CODE 636 W HCPCS: Performed by: STUDENT IN AN ORGANIZED HEALTH CARE EDUCATION/TRAINING PROGRAM

## 2022-01-22 PROCEDURE — 27000221 HC OXYGEN, UP TO 24 HOURS

## 2022-01-22 PROCEDURE — B4185 PARENTERAL SOL 10 GM LIPIDS: HCPCS | Performed by: STUDENT IN AN ORGANIZED HEALTH CARE EDUCATION/TRAINING PROGRAM

## 2022-01-22 PROCEDURE — 97535 SELF CARE MNGMENT TRAINING: CPT

## 2022-01-22 PROCEDURE — C9113 INJ PANTOPRAZOLE SODIUM, VIA: HCPCS | Performed by: STUDENT IN AN ORGANIZED HEALTH CARE EDUCATION/TRAINING PROGRAM

## 2022-01-22 PROCEDURE — 85025 COMPLETE CBC W/AUTO DIFF WBC: CPT | Mod: 91 | Performed by: STUDENT IN AN ORGANIZED HEALTH CARE EDUCATION/TRAINING PROGRAM

## 2022-01-22 PROCEDURE — A4217 STERILE WATER/SALINE, 500 ML: HCPCS | Performed by: STUDENT IN AN ORGANIZED HEALTH CARE EDUCATION/TRAINING PROGRAM

## 2022-01-22 PROCEDURE — 85025 COMPLETE CBC W/AUTO DIFF WBC: CPT | Performed by: COLON & RECTAL SURGERY

## 2022-01-22 PROCEDURE — 25000003 PHARM REV CODE 250: Performed by: STUDENT IN AN ORGANIZED HEALTH CARE EDUCATION/TRAINING PROGRAM

## 2022-01-22 PROCEDURE — 94761 N-INVAS EAR/PLS OXIMETRY MLT: CPT

## 2022-01-22 PROCEDURE — 25000003 PHARM REV CODE 250: Performed by: COLON & RECTAL SURGERY

## 2022-01-22 PROCEDURE — 84100 ASSAY OF PHOSPHORUS: CPT | Performed by: STUDENT IN AN ORGANIZED HEALTH CARE EDUCATION/TRAINING PROGRAM

## 2022-01-22 PROCEDURE — A4216 STERILE WATER/SALINE, 10 ML: HCPCS | Performed by: COLON & RECTAL SURGERY

## 2022-01-22 PROCEDURE — 94799 UNLISTED PULMONARY SVC/PX: CPT

## 2022-01-22 PROCEDURE — 20600001 HC STEP DOWN PRIVATE ROOM

## 2022-01-22 PROCEDURE — 99900035 HC TECH TIME PER 15 MIN (STAT)

## 2022-01-22 PROCEDURE — 25000003 PHARM REV CODE 250

## 2022-01-22 PROCEDURE — 63600175 PHARM REV CODE 636 W HCPCS

## 2022-01-22 PROCEDURE — 80048 BASIC METABOLIC PNL TOTAL CA: CPT | Performed by: STUDENT IN AN ORGANIZED HEALTH CARE EDUCATION/TRAINING PROGRAM

## 2022-01-22 PROCEDURE — 97165 OT EVAL LOW COMPLEX 30 MIN: CPT

## 2022-01-22 RX ORDER — ENOXAPARIN SODIUM 100 MG/ML
40 INJECTION SUBCUTANEOUS EVERY 24 HOURS
Status: DISCONTINUED | OUTPATIENT
Start: 2022-01-22 | End: 2022-01-22

## 2022-01-22 RX ORDER — HYDROCODONE BITARTRATE AND ACETAMINOPHEN 500; 5 MG/1; MG/1
TABLET ORAL
Status: DISCONTINUED | OUTPATIENT
Start: 2022-01-22 | End: 2022-01-24

## 2022-01-22 RX ADMIN — METHOCARBAMOL 500 MG: 500 TABLET ORAL at 08:01

## 2022-01-22 RX ADMIN — Medication 10 ML: at 12:01

## 2022-01-22 RX ADMIN — PIPERACILLIN AND TAZOBACTAM 4.5 G: 4; .5 INJECTION, POWDER, LYOPHILIZED, FOR SOLUTION INTRAVENOUS; PARENTERAL at 05:01

## 2022-01-22 RX ADMIN — PANTOPRAZOLE SODIUM 40 MG: 40 INJECTION, POWDER, FOR SOLUTION INTRAVENOUS at 09:01

## 2022-01-22 RX ADMIN — MAGNESIUM SULFATE HEPTAHYDRATE: 500 INJECTION, SOLUTION INTRAMUSCULAR; INTRAVENOUS at 09:01

## 2022-01-22 RX ADMIN — PIPERACILLIN AND TAZOBACTAM 4.5 G: 4; .5 INJECTION, POWDER, LYOPHILIZED, FOR SOLUTION INTRAVENOUS; PARENTERAL at 04:01

## 2022-01-22 RX ADMIN — VENLAFAXINE 75 MG: 37.5 TABLET ORAL at 08:01

## 2022-01-22 RX ADMIN — VENLAFAXINE 75 MG: 37.5 TABLET ORAL at 09:01

## 2022-01-22 RX ADMIN — METHYLPREDNISOLONE SODIUM SUCCINATE 16 MG: 40 INJECTION, POWDER, FOR SOLUTION INTRAMUSCULAR; INTRAVENOUS at 09:01

## 2022-01-22 RX ADMIN — Medication 10 ML: at 06:01

## 2022-01-22 RX ADMIN — LEVETIRACETAM 500 MG: 500 TABLET, FILM COATED ORAL at 09:01

## 2022-01-22 RX ADMIN — PIPERACILLIN AND TAZOBACTAM 4.5 G: 4; .5 INJECTION, POWDER, LYOPHILIZED, FOR SOLUTION INTRAVENOUS; PARENTERAL at 11:01

## 2022-01-22 RX ADMIN — METHOCARBAMOL 500 MG: 500 TABLET ORAL at 01:01

## 2022-01-22 RX ADMIN — KETOROLAC TROMETHAMINE 15 MG: 15 INJECTION, SOLUTION INTRAMUSCULAR; INTRAVENOUS at 05:01

## 2022-01-22 RX ADMIN — METHOCARBAMOL 500 MG: 500 TABLET ORAL at 05:01

## 2022-01-22 RX ADMIN — LIDOCAINE 5% 1 PATCH: 700 PATCH TOPICAL at 09:01

## 2022-01-22 RX ADMIN — ACETAMINOPHEN 1000 MG: 500 TABLET ORAL at 06:01

## 2022-01-22 RX ADMIN — ACETAMINOPHEN 1000 MG: 500 TABLET ORAL at 01:01

## 2022-01-22 RX ADMIN — ACETAMINOPHEN 1000 MG: 500 TABLET ORAL at 10:01

## 2022-01-22 RX ADMIN — MUPIROCIN: 20 OINTMENT TOPICAL at 09:01

## 2022-01-22 RX ADMIN — SMOFLIPID 250 ML: 6; 6; 5; 3 INJECTION, EMULSION INTRAVENOUS at 09:01

## 2022-01-22 RX ADMIN — Medication: at 08:01

## 2022-01-22 RX ADMIN — Medication 10 ML: at 05:01

## 2022-01-22 RX ADMIN — LEVETIRACETAM 500 MG: 500 TABLET, FILM COATED ORAL at 08:01

## 2022-01-22 RX ADMIN — MUPIROCIN: 20 OINTMENT TOPICAL at 08:01

## 2022-01-22 RX ADMIN — KETOROLAC TROMETHAMINE 15 MG: 15 INJECTION, SOLUTION INTRAMUSCULAR; INTRAVENOUS at 06:01

## 2022-01-22 RX ADMIN — Medication: at 04:01

## 2022-01-22 RX ADMIN — METHOCARBAMOL 500 MG: 500 TABLET ORAL at 09:01

## 2022-01-22 RX ADMIN — KETOROLAC TROMETHAMINE 15 MG: 15 INJECTION, SOLUTION INTRAMUSCULAR; INTRAVENOUS at 11:01

## 2022-01-22 RX ADMIN — Medication: at 09:01

## 2022-01-22 NOTE — SUBJECTIVE & OBJECTIVE
Subjective:     Interval History: Patient seen and examined at bedside. NAEON. Continues to complain of pain, mainly controlled with PCA. Distended but denies nausea or emesis. Burping, no BM or flatus yet. Has not been OOB.    Post-Op Info:  Procedure(s) (LRB):  LAPAROTOMY, EXPLORATORY (N/A)  COLON RESECTION (N/A)  LYSIS, ADHESIONS (N/A)  WRAP-OMENTAL (N/A)  REPAIR OF ILEOCOLIC FISTULA (N/A)   2 Days Post-Op      Medications:  Continuous Infusions:   hydromorphone in 0.9 % NaCl 6 mg/30 ml      lactated ringers 75 mL/hr at 01/21/22 1801    TPN ADULT CENTRAL LINE CUSTOM 60 mL/hr at 01/21/22 2251     Scheduled Meds:   acetaminophen  1,000 mg Oral Q8H    ketorolac  15 mg Intravenous Q6H    levETIRAcetam  500 mg Oral BID    LIDOcaine  1 patch Transdermal Q24H    lipid (SMOFLIPID)  250 mL Intravenous Daily    methocarbamoL  500 mg Oral QID    methylPREDNISolone sodium succinate injection  16 mg Intravenous Daily    mupirocin   Nasal BID    pantoprazole  40 mg Intravenous Daily    piperacillin-tazobactam (ZOSYN) IVPB  4.5 g Intravenous Q8H    sodium chloride 0.9%  10 mL Intravenous Q6H    venlafaxine  75 mg Oral BID     PRN Meds:   sodium chloride    naloxone    ondansetron    prochlorperazine    sodium chloride 0.9%    sodium chloride 0.9%    sodium chloride 0.9%        Objective:     Vital Signs (Most Recent):  Temp: 98.2 °F (36.8 °C) (01/22/22 0441)  Pulse: (!) 122 (01/22/22 0441)  Resp: 20 (01/22/22 0441)  BP: 127/72 (01/22/22 0441)  SpO2: (!) 93 % (01/22/22 0441) Vital Signs (24h Range):  Temp:  [97.4 °F (36.3 °C)-98.2 °F (36.8 °C)] 98.2 °F (36.8 °C)  Pulse:  [118-144] 122  Resp:  [14-20] 20  SpO2:  [93 %-100 %] 93 %  BP: (103-127)/(52-72) 127/72     Intake/Output - Last 3 Shifts       01/20 0700 01/21 0659 01/21 0700 01/22 0659 01/22 0700 01/23 0659    I.V. (mL/kg) 550 (9.5)      Blood  237.5     IV Piggyback 6100      Total Intake(mL/kg) 6650 (114.3) 237.5 (4.1)     Urine (mL/kg/hr) 1405  (1) 200 (0.1) 1450 (24.7)    Drains 150      Stool  0     Blood 400      Total Output 5182 613 1220    Net +4695 +37.5 -1450           Stool Occurrence  0 x           Physical Exam  Vitals and nursing note reviewed.   Constitutional:       General: She is not in acute distress.  HENT:      Head: Normocephalic.      Nose:      Comments: NGT with small volume dark green liquid output.  Eyes:      Extraocular Movements: Extraocular movements intact.      Conjunctiva/sclera: Conjunctivae normal.   Cardiovascular:      Rate and Rhythm: Regular rhythm. Tachycardia present.   Pulmonary:      Effort: Pulmonary effort is normal. No respiratory distress.   Abdominal:      General: There is distension.      Palpations: Abdomen is soft.      Tenderness: There is abdominal tenderness. There is no guarding or rebound.      Comments: Midline incision with overlying island dressing. Mild distention with diffuse tenderness to palpation at incision.    Skin:     General: Skin is warm and dry.   Neurological:      General: No focal deficit present.      Mental Status: She is alert and oriented to person, place, and time.     Significant Labs:  CBC (Last 3 Results):   Recent Labs   Lab 01/21/22  0346 01/21/22  1509 01/22/22  0627   WBC 27.37* 18.98* 15.30*   RBC 2.93* 2.41* 2.58*   HGB 8.4* 7.1* 7.6*   HCT 26.1* 22.1* 23.3*    182 153   MCV 89 92 90   MCH 28.7 29.5 29.5   MCHC 32.2 32.1 32.6     CMP (Last 3 Results):   Recent Labs   Lab 01/20/22  2338 01/20/22  2338 01/21/22  0346 01/21/22  1509 01/22/22  0627   *   < > 126* 107 112*   CALCIUM 7.8*   < > 7.8* 8.0* 7.7*   ALBUMIN 3.0*  --  2.8* 2.3*  --    PROT 5.2*  --  4.9* 4.5*  --    *   < > 133* 130* 130*   K 4.6   < > 4.6 4.7 3.9   CO2 18*   < > 23 25 24      < > 101 100 99   BUN 6   < > 7 10 9   CREATININE 0.8   < > 0.9 0.9 0.6   ALKPHOS 153*  --  140* 122  --    ALT 20  --  19 19  --    AST 24  --  21 22  --    BILITOT 1.7*  --  1.2* 0.7  --     < > =  values in this interval not displayed.       Significant Diagnostics:  I have reviewed all pertinent imaging results/findings within the past 24 hours.

## 2022-01-22 NOTE — PLAN OF CARE
Plan of care reviewed with pt, who verbalized understanding. Pt remains on covid precautions. Abd ML PRAFUL w/ dermabond. Remains NPO. Pt OOB to chair for majority of afternoon, ambulating in room and to BR w/ staff assist. TPN infusing per MAR. Pt utilizing PCA for pain control. Currently on 3L by nasal cannula. Odonnell removed this AM, pt has voided post removal. On telemetry, pt continues to experience tachycardia - HR as high as 150's while ambulating. Call bell in reach, bed locked in lowest position. Frequent rounds made for pt safety. VSS, will continue to monitor.

## 2022-01-22 NOTE — PLAN OF CARE
Problem: Occupational Therapy Goal  Goal: Occupational Therapy Goal  Description: Goals to be met by: 1/29/2022 (1 week)     Patient will increase functional independence with ADLs by performing:    UE Dressing with Supervision.  LE Dressing with Supervision.  Grooming while standing at sink with Supervision.  Toileting from toilet with Supervision for hygiene and clothing management.   Rolling to Bilateral with [place].   Supine to sit with [place].  Step transfer with Supervision  Toilet transfer to toilet with Supervision.  Upper extremity exercise program x10 reps per handout, with independence.    Evaluated pt and established OT POC. Continue OT as tolerated.  Ela Dangelo OT  1/22/2022    Outcome: Ongoing, Progressing

## 2022-01-22 NOTE — ASSESSMENT & PLAN NOTE
Carline Chang is a 56 y.o. female with Crohn's disease on steroids and complicated surgical history presents with leukocytosis of 19K and small volume extra-luminal free air near the ascending colon concerning for perforated viscus now s/p ex-lap with FOREIGN and resection of ileocolic anastomosis and segment of proximal small bowel with dense adhesions.     -Pain: Continue PCA, adding MM (robaxin, tylenol, lidocaine patch, toradol)  -Continue NPO, mIVF. Ok for PO meds and small amount of ice chips  -PICC/TPN   -Continue zosyn for total of 4 days post op  -Home meds reviewed, hold diuretics and anti-hypertensives for now  -GI consulted for assistance with med management, recommending 16 mg IV solumedrol post-operatively; appreciate recommendations  -DVT ppx: LVX

## 2022-01-22 NOTE — PT/OT/SLP EVAL
Physical Therapy  Co-Evaluation and co-treatment with OT    Patient Name:  Carline Chang   MRN:  [id number]    Recommendations:     Discharge Recommendations:  home health PT   Discharge Equipment Recommendations:  (will determine DME needs closer to discharge)   Barriers to discharge: None    Assessment:     Carline Chang is a 56 y.o. female admitted with a medical diagnosis of Chron's disease.  She presents with the following impairments/functional limitations:  impaired endurance,impaired functional mobilty,gait instability,impaired balance,decreased safety awareness pt tolerated treatment well and will benefit from skilled PT 4x/wk to progress physically. Pt will be able to discharge home with HHPT when medically stable. Pt is s/p exp lap, FOREIGN, repair  ileosigmoid fistula, ileoclonic resection etc 1/20/22    Rehab Prognosis: Good; patient would benefit from acute skilled PT services to address these deficits and reach maximum level of function.    Recent Surgery: Procedure(s) (LRB):  LAPAROTOMY, EXPLORATORY (N/A)  COLON RESECTION (N/A)  LYSIS, ADHESIONS (N/A)  WRAP-OMENTAL (N/A)  REPAIR OF ILEOCOLIC FISTULA (N/A) 2 Days Post-Op    Plan:     During this hospitalization, patient to be seen 4 x/week to address the identified rehab impairments via gait training,therapeutic activities and progress toward the following goals:    · Plan of Care Expires:  02/19/22    Subjective     Chief Complaint: pt c/o pain, feeling weak and dizzy with treatment.   Patient/Family Comments/goals:  To get better and go home.   Pain/Comfort:  · Pain Rating 1: 7/10 (back and abdomen)  · Pain Addressed 1: Distraction  · Pain Rating Post-Intervention 1: 7/10 (back and abdomen)    Patients cultural, spiritual, Evangelical conflicts given the current situation: no    Living Environment:  Pt works part-time as  and lives with her mother in 2 story with B&B downstairs and 5 steps with R handrail to entrance.    Prior to admission, patients level of function was modified Independent using RW prior to admit. .  Equipment used at home: walker, rolling (walk in shower).  DME owned (not currently used): none.  Upon discharge, patient will have assistance from mother.    Objective:     Communicated with nurse prior to session.  Patient found supine with peripheral IV,PCA,oxygen,hudson catheter  upon PT entry to room.    General Precautions: Standard, fall   Orthopedic Precautions:    Braces:    Respiratory Status: Nasal cannula, flow 3 L/min    Exams:  · Cognitive Exam:  Patient is oriented to Person, Place, Time and Situation  · RLE ROM: WFL  · RLE Strength: WFL  · LLE ROM: WFL  · LLE Strength: WFL    Functional Mobility:  · Bed Mobility:   Pt needed verbal cues for hand placement and sequencing for functional mobility.   · Rolling Right: contact guard assistance  · Supine to Sit: contact guard assistance  ·   · Transfers:     · Sit to Stand:  minimum assistance with hand-held assist  ·   · Gait: pt received gait training ~ 3 steps with CGA.   ·   · Balance: pt stood at bedside with CGA to perform ADLS with OT.     PT concentrated on BLE MMT, ROM and gait training with evaluation and treatment.     Therapeutic Activities and Exercises:   pt received verbal instructions in role of PT and POC. Pt verbally expressed understanding of such.     AM-PAC 6 CLICK MOBILITY  Total Score:17     Patient left up in chair with all lines intact, call button in reach and RN notified.    GOALS:   Multidisciplinary Problems     Physical Therapy Goals        Problem: Physical Therapy Goal    Goal Priority Disciplines Outcome Goal Variances Interventions   Physical Therapy Goal     PT, PT/OT Ongoing, Progressing     Description: Goals to be met by: 22    Patient will increase functional independence with mobility by performin. Supine to sit with Stand-by Assistance -not met  2. Sit to stand transfer with Stand-by Assistance with AD if  needed - not met  3. Gait  x 100 feet with Supervision using AD if needed  - not met.                      History:     Past Medical History:   Diagnosis Date    Acute deep vein thrombosis (DVT) of brachial vein of right upper extremity 2017    on RUE ultrasound    Anxiety     Bowel perforation     Chronic pain     Crohn's disease of both small and large intestine with intestinal obstruction     Difficult intravenous access     Encounter for blood transfusion     GERD (gastroesophageal reflux disease)     Kidney stones     Nephrolithiasis     Stricture of bowel        Past Surgical History:   Procedure Laterality Date    ABSCESS DRAINAGE      ANAL FISTULOTOMY      BOWEL RESECTION      small bowel resection per Dr. Uribe 2018     SECTION      x2    CHOLECYSTECTOMY  2000    COLON SURGERY  2015    sigmoid loop colostomy    COLONOSCOPY N/A 2016    Procedure: COLONOSCOPY;  Surgeon: Andre Uribe MD;  Location: [facility] ENDO (4TH FLR);  Service: Endoscopy;  Laterality: N/A;    COLONOSCOPY N/A 2017    Procedure: COLONOSCOPY;  Surgeon: Dany Stock MD;  Location: [facility] ENDO (2ND FLR);  Service: Endoscopy;  Laterality: N/A;    COLONOSCOPY N/A 2017    Procedure: COLONOSCOPY;  Surgeon: Andre Uribe MD;  Location: [facility] ENDO (4TH FLR);  Service: Endoscopy;  Laterality: N/A;    COLONOSCOPY N/A 2018    Procedure: COLONOSCOPY;  Surgeon: Andre Uribe MD;  Location: [facility] ENDO (4TH FLR);  Service: Endoscopy;  Laterality: N/A;    COLONOSCOPY N/A 3/24/2018    Procedure: COLONOSCOPY;  Surgeon: Elmer Serrato MD;  Location: [facility] ENDO (2ND FLR);  Service: Endoscopy;  Laterality: N/A;    COLONOSCOPY  3/24/2018    COLONOSCOPY N/A 2018    Procedure: COLONOSCOPY;  Surgeon: Andre Uribe MD;  Location: [facility] ENDO (4TH FLR);  Service: Endoscopy;  Laterality: N/A;    COLONOSCOPY N/A 10/7/2021    Procedure: COLONOSCOPY;  Surgeon: Jose Hughes MD;   Location: [facility];  Service: Endoscopy;  Laterality: N/A;    ESOPHAGOGASTRODUODENOSCOPY N/A 10/7/2021    Procedure: EGD (ESOPHAGOGASTRODUODENOSCOPY);  Surgeon: Jose Hughes MD;  Location: [facility];  Service: Endoscopy;  Laterality: N/A;    LAPAROSCOPIC CLOSURE OF COLOSTOMY N/A 8/29/2018    Procedure: CLOSURE, COLOSTOMY, LAPAROSCOPIC;  Surgeon: Andre Uribe MD;  Location: [facility] OR 2ND FLR;  Service: Colon and Rectal;  Laterality: N/A;  converted to open    LYSIS OF ADHESIONS N/A 1/20/2022    Procedure: LYSIS, ADHESIONS;  Surgeon: LLUVIA Post MD;  Location: [facility] OR [facility] FLR;  Service: Colon and Rectal;  Laterality: N/A;  lasting longer than 2 hours, modifier 22      rectovaginal fistula repair  01/2018    SMALL INTESTINE SURGERY  1995    per patient 10 inches removed    SMALL INTESTINE SURGERY  02/2009    abdominal abscess drained, 3 cm colon removed, 11 cm SB removed    TUBAL LIGATION      UPPER GASTROINTESTINAL ENDOSCOPY  2000       Time Tracking:     PT Received On: 01/22/22  PT Start Time: 0938     PT Stop Time: 1005  PT Total Time (min): 27 min     Billable Minutes: Evaluation 8 min and Gait Training 19 min      01/22/2022

## 2022-01-22 NOTE — PT/OT/SLP EVAL
Occupational Therapy   Co-Evaluation and Co-treat  Co-treatment with PT for maximal pt participation, safety, and activity tolerance       Name: Carline Chang  MRN: [id number]  Admitting Diagnosis:  Large bowel obstruction  Recent Surgery: Procedure(s) (LRB):  LAPAROTOMY, EXPLORATORY (N/A)  COLON RESECTION (N/A)  LYSIS, ADHESIONS (N/A)  WRAP-OMENTAL (N/A)  REPAIR OF ILEOCOLIC FISTULA (N/A) 2 Days Post-Op    Recommendations:     Discharge Recommendations: home health OT  Discharge Equipment Recommendations:  other (see comments) (tbd)  Barriers to discharge:       Assessment:     Carline Chang is a 56 y.o. female with a medical diagnosis of Large bowel obstruction. Pt presents with decreased endurance and impaired mobility performance as limited by cardiovascular status and generalized weakness. Pt found upright and agreeable for therapy. Pt explains she lives with her mother in a two story home. PTA, pt was I with ADLs and mobility. She was receiving  PT and mobilizing without AD. During eval, pt required CGA for bed mobility and stood with min A-HHA. Pt took several steps to bedside chair with CGA-HHA after completing 5 minutes of standing ADLs at bedside. Pt will have A from mother as able at d/c.  Pt would benefit from continued OT skilled services 4x/wk to improve daily living skills to optimize QOL.  Pt is recommended to discharge to OT at this time.  Performance deficits affecting function: weakness,impaired endurance,impaired self care skills,gait instability,impaired functional mobilty,impaired cardiopulmonary response to activity,pain,impaired balance,decreased upper extremity function,decreased lower extremity function.      Rehab Prognosis: Good; patient would benefit from acute skilled OT services to address these deficits and reach maximum level of function.       Plan:     Patient to be seen 4 x/week to address the above listed problems via self-care/home management,therapeutic  activities,therapeutic exercises  · Plan of Care Expires: 02/22/22  · Plan of Care Reviewed with: patient    Subjective     Chief Complaint: pain in abdomen and back  Patient/Family Comments/goals: return home    Occupational Profile:  Living Environment: Pt lives with her mother in a two story home with bed/bath on both levels of home. Pt has both a shower/tub combo and WIS.  Previous level of function: I with ADLs and mobility  Roles and Routines: Pt still drives; does not work. Mother of two children. Enjoys reading.  Equipment Used at Home:  walker, rolling  Assistance upon Discharge: Mother    Pain/Comfort:  · Pain Rating 1: 7/10 (back & abdomen)    Patients cultural, spiritual, Religion conflicts given the current situation: no    Objective:     Communicated with: RN prior to session.  Patient found HOB elevated with peripheral IV,PCA,oxygen,hudson catheter upon OT entry to room.    General Precautions: Standard, fall   Orthopedic Precautions:N/A   Braces: N/A  Respiratory Status: Nasal cannula, flow 4L L/min    Occupational Performance:    Bed Mobility:    · Patient completed Rolling/Turning to Right with contact guard assistance  · Patient completed Scooting/Bridging with contact guard assistance  · Patient completed Supine to Sit with contact guard assistance    Functional Mobility/Transfers:  · Patient completed Sit <> Stand Transfer with minimum assistance  with  hand-held assist   · Patient completed Bed <> Chair Transfer using Step Transfer technique with contact guard assistance with hand-held assist  · Functional Mobility: Pt completed sit<Stand from bed for 5 min of ADLs using tray table for support with CGA. Pt then completed stand pivot to chair with CGA-HHA    Activities of Daily Living:  · Grooming: stand by assistance brushing teeth standing   · Upper Body Dressing: minimum assistance donning gown around back     Cognitive/Visual Perceptual:  Cognitive/Psychosocial Skills:     -       Oriented  to: Person, Place, Time and Situation   -       Follows Commands/attention:Follows multistep  commands  -       Communication: clear/fluent  -       Memory: WFL  -       Safety awareness/insight to disability: WFL  -       Mood/Affect/Coping skills/emotional control: Pleasant    Physical Exam:  Balance:    -       demo good sitting and fair standing balance   Dominant hand:    -       right  Upper Extremity Range of Motion:     -       Right Upper Extremity: WFL  -       Left Upper Extremity: WFL  Upper Extremity Strength:    -       Right Upper Extremity: WFL  -       Left Upper Extremity: WFL   Strength:    -       Right Upper Extremity: WFL  -       Left Upper Extremity: WFL    AMPAC 6 Click ADL:  AMPAC Total Score: 21    Treatment & Education:  Pt educated on role of occupational therapy, POC, and safety during ADLs and functional mobility. Pt and OT discussed importance of safe, continued mobility to optimize daily living skills. Pt verbalized understanding.   White board updated during session. Pt given instruction to call for medical staff/nurse for assistance.     Education:    Patient left up in chair with all lines intact, call button in reach and RN notified    GOALS:   Multidisciplinary Problems     Occupational Therapy Goals        Problem: Occupational Therapy Goal    Goal Priority Disciplines Outcome Interventions   Occupational Therapy Goal     OT, PT/OT Ongoing, Progressing    Description: Goals to be met by: 1/29/2022 (1 week)     Patient will increase functional independence with ADLs by performing:    UE Dressing with Supervision.  LE Dressing with Supervision.  Grooming while standing at sink with Supervision.  Toileting from toilet with Supervision for hygiene and clothing management.   Rolling to Bilateral with [place].   Supine to sit with [place].  Step transfer with Supervision  Toilet transfer to toilet with Supervision.  Upper extremity exercise program x10 reps per handout,  with independence.                     History:     Past Medical History:   Diagnosis Date    Acute deep vein thrombosis (DVT) of brachial vein of right upper extremity 2017    on RUE ultrasound    Anxiety     Bowel perforation     Chronic pain     Crohn's disease of both small and large intestine with intestinal obstruction     Difficult intravenous access     Encounter for blood transfusion     GERD (gastroesophageal reflux disease)     Kidney stones     Nephrolithiasis     Stricture of bowel        Past Surgical History:   Procedure Laterality Date    ABSCESS DRAINAGE      ANAL FISTULOTOMY      BOWEL RESECTION      small bowel resection per Dr. Uribe 2018     SECTION      x2    CHOLECYSTECTOMY  2000    COLON SURGERY  2015    sigmoid loop colostomy    COLONOSCOPY N/A 2016    Procedure: COLONOSCOPY;  Surgeon: Andre Uribe MD;  Location: [facility] ENDO (4TH FLR);  Service: Endoscopy;  Laterality: N/A;    COLONOSCOPY N/A 2017    Procedure: COLONOSCOPY;  Surgeon: Dany Stock MD;  Location: [facility] ENDO (2ND FLR);  Service: Endoscopy;  Laterality: N/A;    COLONOSCOPY N/A 2017    Procedure: COLONOSCOPY;  Surgeon: Andre Uribe MD;  Location: [facility] ENDO (4TH FLR);  Service: Endoscopy;  Laterality: N/A;    COLONOSCOPY N/A 2018    Procedure: COLONOSCOPY;  Surgeon: Andre Uribe MD;  Location: [facility] ENDO (4TH FLR);  Service: Endoscopy;  Laterality: N/A;    COLONOSCOPY N/A 3/24/2018    Procedure: COLONOSCOPY;  Surgeon: Elmer Serrato MD;  Location: [facility] ENDO (2ND FLR);  Service: Endoscopy;  Laterality: N/A;    COLONOSCOPY  3/24/2018    COLONOSCOPY N/A 2018    Procedure: COLONOSCOPY;  Surgeon: Andre Uribe MD;  Location: [facility] ENDO (4TH FLR);  Service: Endoscopy;  Laterality: N/A;    COLONOSCOPY N/A 10/7/2021    Procedure: COLONOSCOPY;  Surgeon: Jose Hughes MD;  Location: [facility] ENDO;  Service: Endoscopy;  Laterality: N/A;     ESOPHAGOGASTRODUODENOSCOPY N/A 10/7/2021    Procedure: EGD (ESOPHAGOGASTRODUODENOSCOPY);  Surgeon: Jose Hughes MD;  Location: [facility];  Service: Endoscopy;  Laterality: N/A;    LAPAROSCOPIC CLOSURE OF COLOSTOMY N/A 8/29/2018    Procedure: CLOSURE, COLOSTOMY, LAPAROSCOPIC;  Surgeon: Andre Uribe MD;  Location: [facility] FLR;  Service: Colon and Rectal;  Laterality: N/A;  converted to open    LYSIS OF ADHESIONS N/A 1/20/2022    Procedure: LYSIS, ADHESIONS;  Surgeon: LLUVIA Post MD;  Location: [facility] OR 2ND FLR;  Service: Colon and Rectal;  Laterality: N/A;  lasting longer than 2 hours, modifier 22      rectovaginal fistula repair  01/2018    SMALL INTESTINE SURGERY  1995    per patient 10 inches removed    SMALL INTESTINE SURGERY  02/2009    abdominal abscess drained, 3 cm colon removed, 11 cm SB removed    TUBAL LIGATION      UPPER GASTROINTESTINAL ENDOSCOPY  2000       Time Tracking:     OT Date of Treatment: 01/22/22  OT Start Time: 0938  OT Stop Time: 1005  OT Total Time (min): 27 min    Billable Minutes:Evaluation 12 min  Self Care/Home Management 15 min    1/22/2022

## 2022-01-22 NOTE — PLAN OF CARE
Problem: Physical Therapy Goal  Goal: Physical Therapy Goal  Description: Goals to be met by: 22    Patient will increase functional independence with mobility by performin. Supine to sit with Stand-by Assistance -not met  2. Sit to stand transfer with Stand-by Assistance with AD if needed - not met  3. Gait  x 100 feet with Supervision using AD if needed  - not met.     Outcome: Ongoing, Progressing   Evaluation completed and goals appropriate. 2022

## 2022-01-22 NOTE — PROGRESS NOTES
[facility]  Colorectal Surgery  Progress Note    Patient Name: Carline Chang  MRN: [id number]  Admission Date: 1/19/2022  Hospital Length of Stay: 3 days  Attending Physician: LLUVIA Post MD    Subjective:     Interval History: Patient seen and examined at bedside. NAEON. Continues to complain of pain, mainly controlled with PCA. Distended but denies nausea or emesis. Burping, no BM or flatus yet. Has not been OOB.    Post-Op Info:  Procedure(s) (LRB):  LAPAROTOMY, EXPLORATORY (N/A)  COLON RESECTION (N/A)  LYSIS, ADHESIONS (N/A)  WRAP-OMENTAL (N/A)  REPAIR OF ILEOCOLIC FISTULA (N/A)   2 Days Post-Op      Medications:  Continuous Infusions:   hydromorphone in 0.9 % NaCl 6 mg/30 ml      lactated ringers 75 mL/hr at 01/21/22 1801    TPN ADULT CENTRAL LINE CUSTOM 60 mL/hr at 01/21/22 2251     Scheduled Meds:   acetaminophen  1,000 mg Oral Q8H    ketorolac  15 mg Intravenous Q6H    levETIRAcetam  500 mg Oral BID    LIDOcaine  1 patch Transdermal Q24H    lipid (SMOFLIPID)  250 mL Intravenous Daily    methocarbamoL  500 mg Oral QID    methylPREDNISolone sodium succinate injection  16 mg Intravenous Daily    mupirocin   Nasal BID    pantoprazole  40 mg Intravenous Daily    piperacillin-tazobactam (ZOSYN) IVPB  4.5 g Intravenous Q8H    sodium chloride 0.9%  10 mL Intravenous Q6H    venlafaxine  75 mg Oral BID     PRN Meds:   sodium chloride    naloxone    ondansetron    prochlorperazine    sodium chloride 0.9%    sodium chloride 0.9%    sodium chloride 0.9%        Objective:     Vital Signs (Most Recent):  Temp: 98.2 °F (36.8 °C) (01/22/22 0441)  Pulse: (!) 122 (01/22/22 0441)  Resp: 20 (01/22/22 0441)  BP: 127/72 (01/22/22 0441)  SpO2: (!) 93 % (01/22/22 0441) Vital Signs (24h Range):  Temp:  [97.4 °F (36.3 °C)-98.2 °F (36.8 °C)] 98.2 °F (36.8 °C)  Pulse:  [118-144] 122  Resp:  [14-20] 20  SpO2:  [93 %-100 %] 93 %  BP: (103-127)/(52-72) 127/72     Intake/Output - Last 3 Shifts        01/20 0700  01/21 0659 01/21 0700  01/22 0659 01/22 0700  01/23 0659    I.V. (mL/kg) 550 (9.5)      Blood  237.5     IV Piggyback 6100      Total Intake(mL/kg) 6650 (114.3) 237.5 (4.1)     Urine (mL/kg/hr) 1405 (1) 200 (0.1) 1450 (24.7)    Drains 150      Stool  0     Blood 400      Total Output 2982 883 5844    Net +4695 +37.5 -1450           Stool Occurrence  0 x           Physical Exam  Vitals and nursing note reviewed.   Constitutional:       General: She is not in acute distress.  HENT:      Head: Normocephalic.      Nose:      Comments: NGT with small volume dark green liquid output.  Eyes:      Extraocular Movements: Extraocular movements intact.      Conjunctiva/sclera: Conjunctivae normal.   Cardiovascular:      Rate and Rhythm: Regular rhythm. Tachycardia present.   Pulmonary:      Effort: Pulmonary effort is normal. No respiratory distress.   Abdominal:      General: There is distension.      Palpations: Abdomen is soft.      Tenderness: There is abdominal tenderness. There is no guarding or rebound.      Comments: Midline incision with overlying island dressing. Mild distention with diffuse tenderness to palpation at incision.    Skin:     General: Skin is warm and dry.   Neurological:      General: No focal deficit present.      Mental Status: She is alert and oriented to person, place, and time.     Significant Labs:  CBC (Last 3 Results):   Recent Labs   Lab 01/21/22  0346 01/21/22  1509 01/22/22  0627   WBC 27.37* 18.98* 15.30*   RBC 2.93* 2.41* 2.58*   HGB 8.4* 7.1* 7.6*   HCT 26.1* 22.1* 23.3*    182 153   MCV 89 92 90   MCH 28.7 29.5 29.5   MCHC 32.2 32.1 32.6     CMP (Last 3 Results):   Recent Labs   Lab 01/20/22  2338 01/20/22  2338 01/21/22  0346 01/21/22  1509 01/22/22  0627   *   < > 126* 107 112*   CALCIUM 7.8*   < > 7.8* 8.0* 7.7*   ALBUMIN 3.0*  --  2.8* 2.3*  --    PROT 5.2*  --  4.9* 4.5*  --    *   < > 133* 130* 130*   K 4.6   < > 4.6 4.7 3.9   CO2 18*   < > 23 25 24       < > 101 100 99   BUN 6   < > 7 10 9   CREATININE 0.8   < > 0.9 0.9 0.6   ALKPHOS 153*  --  140* 122  --    ALT 20  --  19 19  --    AST 24  --  21 22  --    BILITOT 1.7*  --  1.2* 0.7  --     < > = values in this interval not displayed.       Significant Diagnostics:  I have reviewed all pertinent imaging results/findings within the past 24 hours.    Assessment/Plan:     Crohn disease  Carline Chang is a 56 y.o. female with Crohn's disease on steroids and complicated surgical history presents with leukocytosis of 19K and small volume extra-luminal free air near the ascending colon concerning for perforated viscus now s/p ex-lap with FOREIGN and resection of ileocolic anastomosis and segment of proximal small bowel with dense adhesions.     -Pain: Continue PCA, adding MM (robaxin, tylenol, lidocaine patch, toradol)  -Continue NPO, mIVF. Ok for PO meds and small amount of ice chips  -PICC/TPN   -Continue zosyn for total of 4 days post op  -Home meds reviewed, hold diuretics and anti-hypertensives for now  -GI consulted for assistance with med management, recommending 16 mg IV solumedrol post-operatively; appreciate recommendations  -DVT ppx: LVX            Tish Ordaz MD  Colorectal Surgery  [facility]

## 2022-01-22 NOTE — RESPIRATORY THERAPY
RAPID RESPONSE RESPIRATORY THERAPY ETCO2 CHECK         Time of visit: 753    Code Status: Full Code   : 1965  Bed: 1005/1005 A:   MRN: [id number]  Time spent at the bedside: < 15 min    SITUATION    Evaluated patient for: ETCo2 compliance    BACKGROUND    Why is the patient in the hospital?: <principal problem not specified>    Patient has a past medical history of Acute deep vein thrombosis (DVT) of brachial vein of right upper extremity, Anxiety, Bowel perforation, Chronic pain, Crohn's disease of both small and large intestine with intestinal obstruction, Difficult intravenous access, Encounter for blood transfusion, GERD (gastroesophageal reflux disease), Kidney stones, Nephrolithiasis, and Stricture of bowel.    24 Hours Vitals Range:  Temp:  [97.4 °F (36.3 °C)-98.2 °F (36.8 °C)]   Pulse:  [118-144]   Resp:  [14-20]   BP: (103-127)/(52-72)   SpO2:  [93 %-100 %]     Labs:    Recent Labs     22  2338 22  2338 22  0346 22  1509 22  0627   *   < > 133* 130* 130*   K 4.6   < > 4.6 4.7 3.9      < > 101 100 99   CO2 18*   < > 23 25 24   CREATININE 0.8   < > 0.9 0.9 0.6   *   < > 126* 107 112*   PHOS 3.5  --  3.2  --  2.5*   MG 1.6  --  1.4*  --  1.9    < > = values in this interval not displayed.        Recent Labs     22   PH 7.300*   PCO2 48.0*   PO2 52   HCO3 23.6*   POCSATURATED 82*   BE -3       ASSESSMENT/INTERVENTIONS        Last VS   Temp: 98.2 °F (36.8 °C) (441)  Pulse: 122 (441)  Resp: 18 (754)  BP: 127/72 (441)  SpO2: 93 % (441)    Level of Consciousness: Level of Consciousness (AVPU): alert  Respiratory Effort: Respiratory Effort: Normal,Unlabored Expansion/Accessory Muscle Usage: Expansion/Accessory Muscles/Retractions: no use of accessory muscles  All Lung Field Breath Sounds:    O2 Device/Concentration: Flow (L/min): 2,  , O2 Device (Oxygen Therapy): nasal cannula  Is the ETCO2 monitor on? Yes  Is the  patient wearing a cannula? Yes  Are ETCO2 orders placed? Yes  Is the patient on a PCA pump? Yes  ETCO2 monitored: ETCO2 (mmHg): 40 mmHg  Ambu at bedside: Ambu bag with the patient?: Yes, Adult Ambu    Active Orders   Respiratory Care    END TIDAL CO2 MONITOR Q12H     Frequency: Q12H     Number of Occurrences: Until Specified    Incentive spirometry     Frequency: Q4H     Number of Occurrences: Until Specified     Order Comments: Q4 while awake until discharge.  Educate patient in use; Keep incentive spirometer within reach; and Document incentive spirometer volume every 4 hours while awake.    ((10 sets per hour (3-5 inspirations per set)) on original order)      Oxygen Continuous     Frequency: Continuous     Number of Occurrences: Until Specified     Order Questions:      Device type: Low flow      Device: Nasal Cannula (1- 5 Liters)      LPM: 2      Titrate O2 per Oxygen Titration Protocol: Yes      To maintain SpO2 goal of: >= 90%      Notify MD of: Inability to achieve desired SpO2; Sudden change in patient status and requires 20% increase in FiO2; Patient requires >60% FiO2    Pulse Oximetry Q4H     Frequency: Q4H     Number of Occurrences: Until Specified       RECOMMENDATIONS    We recommend: RRT Recs: Continue POC per primary team.    ESCALATION        FOLLOW-UP    Please call back the Rapid Response R[facility]helley RRT at x [phone] for any questions or concerns.

## 2022-01-23 LAB
ANION GAP SERPL CALC-SCNC: 9 MMOL/L (ref 8–16)
BASOPHILS # BLD AUTO: 0 K/UL (ref 0–0.2)
BASOPHILS # BLD AUTO: 0.01 K/UL (ref 0–0.2)
BASOPHILS NFR BLD: 0 % (ref 0–1.9)
BASOPHILS NFR BLD: 0.1 % (ref 0–1.9)
BUN SERPL-MCNC: 10 MG/DL (ref 6–20)
CALCIUM SERPL-MCNC: 8.6 MG/DL (ref 8.7–10.5)
CHLORIDE SERPL-SCNC: 99 MMOL/L (ref 95–110)
CO2 SERPL-SCNC: 26 MMOL/L (ref 23–29)
CREAT SERPL-MCNC: 0.6 MG/DL (ref 0.5–1.4)
CRP SERPL-MCNC: 183.6 MG/L (ref 0–8.2)
DIFFERENTIAL METHOD: ABNORMAL
DIFFERENTIAL METHOD: ABNORMAL
EOSINOPHIL # BLD AUTO: 0 K/UL (ref 0–0.5)
EOSINOPHIL # BLD AUTO: 0.1 K/UL (ref 0–0.5)
EOSINOPHIL NFR BLD: 0 % (ref 0–8)
EOSINOPHIL NFR BLD: 0.4 % (ref 0–8)
ERYTHROCYTE [DISTWIDTH] IN BLOOD BY AUTOMATED COUNT: 14.7 % (ref 11.5–14.5)
ERYTHROCYTE [DISTWIDTH] IN BLOOD BY AUTOMATED COUNT: 15.2 % (ref 11.5–14.5)
EST. GFR  (AFRICAN AMERICAN): >60 ML/MIN/1.73 M^2
EST. GFR  (NON AFRICAN AMERICAN): >60 ML/MIN/1.73 M^2
GLUCOSE SERPL-MCNC: 74 MG/DL (ref 70–110)
HCT VFR BLD AUTO: 22.4 % (ref 37–48.5)
HCT VFR BLD AUTO: 23.1 % (ref 37–48.5)
HGB BLD-MCNC: 7.3 G/DL (ref 12–16)
HGB BLD-MCNC: 7.7 G/DL (ref 12–16)
IMM GRANULOCYTES # BLD AUTO: 0.11 K/UL (ref 0–0.04)
IMM GRANULOCYTES # BLD AUTO: 0.19 K/UL (ref 0–0.04)
IMM GRANULOCYTES NFR BLD AUTO: 1.1 % (ref 0–0.5)
IMM GRANULOCYTES NFR BLD AUTO: 1.4 % (ref 0–0.5)
LYMPHOCYTES # BLD AUTO: 0.2 K/UL (ref 1–4.8)
LYMPHOCYTES # BLD AUTO: 0.5 K/UL (ref 1–4.8)
LYMPHOCYTES NFR BLD: 1.4 % (ref 18–48)
LYMPHOCYTES NFR BLD: 3.4 % (ref 18–48)
MAGNESIUM SERPL-MCNC: 1.7 MG/DL (ref 1.6–2.6)
MCH RBC QN AUTO: 29.7 PG (ref 27–31)
MCH RBC QN AUTO: 31.1 PG (ref 27–31)
MCHC RBC AUTO-ENTMCNC: 32.6 G/DL (ref 32–36)
MCHC RBC AUTO-ENTMCNC: 33.3 G/DL (ref 32–36)
MCV RBC AUTO: 89 FL (ref 82–98)
MCV RBC AUTO: 95 FL (ref 82–98)
MONOCYTES # BLD AUTO: 0.2 K/UL (ref 0.3–1)
MONOCYTES # BLD AUTO: 0.3 K/UL (ref 0.3–1)
MONOCYTES NFR BLD: 1.5 % (ref 4–15)
MONOCYTES NFR BLD: 2.6 % (ref 4–15)
NEUTROPHILS # BLD AUTO: 10 K/UL (ref 1.8–7.7)
NEUTROPHILS # BLD AUTO: 12.1 K/UL (ref 1.8–7.7)
NEUTROPHILS NFR BLD: 92.1 % (ref 38–73)
NEUTROPHILS NFR BLD: 96 % (ref 38–73)
NRBC BLD-RTO: 0 /100 WBC
NRBC BLD-RTO: 0 /100 WBC
PHOSPHATE SERPL-MCNC: 2.2 MG/DL (ref 2.7–4.5)
PLATELET # BLD AUTO: 123 K/UL (ref 150–450)
PLATELET # BLD AUTO: 130 K/UL (ref 150–450)
PMV BLD AUTO: 9.3 FL (ref 9.2–12.9)
PMV BLD AUTO: 9.3 FL (ref 9.2–12.9)
POTASSIUM SERPL-SCNC: 3.8 MMOL/L (ref 3.5–5.1)
RBC # BLD AUTO: 2.35 M/UL (ref 4–5.4)
RBC # BLD AUTO: 2.59 M/UL (ref 4–5.4)
SODIUM SERPL-SCNC: 134 MMOL/L (ref 136–145)
WBC # BLD AUTO: 10.38 K/UL (ref 3.9–12.7)
WBC # BLD AUTO: 13.18 K/UL (ref 3.9–12.7)

## 2022-01-23 PROCEDURE — 27000207 HC ISOLATION

## 2022-01-23 PROCEDURE — C9113 INJ PANTOPRAZOLE SODIUM, VIA: HCPCS | Performed by: STUDENT IN AN ORGANIZED HEALTH CARE EDUCATION/TRAINING PROGRAM

## 2022-01-23 PROCEDURE — 86140 C-REACTIVE PROTEIN: CPT | Performed by: STUDENT IN AN ORGANIZED HEALTH CARE EDUCATION/TRAINING PROGRAM

## 2022-01-23 PROCEDURE — 25000003 PHARM REV CODE 250: Performed by: STUDENT IN AN ORGANIZED HEALTH CARE EDUCATION/TRAINING PROGRAM

## 2022-01-23 PROCEDURE — 85025 COMPLETE CBC W/AUTO DIFF WBC: CPT | Performed by: STUDENT IN AN ORGANIZED HEALTH CARE EDUCATION/TRAINING PROGRAM

## 2022-01-23 PROCEDURE — A4216 STERILE WATER/SALINE, 10 ML: HCPCS | Performed by: COLON & RECTAL SURGERY

## 2022-01-23 PROCEDURE — 25000003 PHARM REV CODE 250

## 2022-01-23 PROCEDURE — 85025 COMPLETE CBC W/AUTO DIFF WBC: CPT | Mod: 91

## 2022-01-23 PROCEDURE — 80048 BASIC METABOLIC PNL TOTAL CA: CPT | Performed by: STUDENT IN AN ORGANIZED HEALTH CARE EDUCATION/TRAINING PROGRAM

## 2022-01-23 PROCEDURE — 83735 ASSAY OF MAGNESIUM: CPT | Performed by: STUDENT IN AN ORGANIZED HEALTH CARE EDUCATION/TRAINING PROGRAM

## 2022-01-23 PROCEDURE — 99900035 HC TECH TIME PER 15 MIN (STAT)

## 2022-01-23 PROCEDURE — A4217 STERILE WATER/SALINE, 500 ML: HCPCS | Performed by: STUDENT IN AN ORGANIZED HEALTH CARE EDUCATION/TRAINING PROGRAM

## 2022-01-23 PROCEDURE — 94761 N-INVAS EAR/PLS OXIMETRY MLT: CPT

## 2022-01-23 PROCEDURE — 63600175 PHARM REV CODE 636 W HCPCS

## 2022-01-23 PROCEDURE — 20600001 HC STEP DOWN PRIVATE ROOM

## 2022-01-23 PROCEDURE — B4185 PARENTERAL SOL 10 GM LIPIDS: HCPCS | Performed by: STUDENT IN AN ORGANIZED HEALTH CARE EDUCATION/TRAINING PROGRAM

## 2022-01-23 PROCEDURE — 63600175 PHARM REV CODE 636 W HCPCS: Performed by: STUDENT IN AN ORGANIZED HEALTH CARE EDUCATION/TRAINING PROGRAM

## 2022-01-23 PROCEDURE — 84100 ASSAY OF PHOSPHORUS: CPT | Performed by: STUDENT IN AN ORGANIZED HEALTH CARE EDUCATION/TRAINING PROGRAM

## 2022-01-23 PROCEDURE — 94799 UNLISTED PULMONARY SVC/PX: CPT

## 2022-01-23 PROCEDURE — 25000003 PHARM REV CODE 250: Performed by: COLON & RECTAL SURGERY

## 2022-01-23 RX ORDER — OXYCODONE HYDROCHLORIDE 5 MG/1
5 TABLET ORAL EVERY 6 HOURS PRN
Status: DISCONTINUED | OUTPATIENT
Start: 2022-01-23 | End: 2022-01-24

## 2022-01-23 RX ORDER — FUROSEMIDE 10 MG/ML
40 INJECTION INTRAMUSCULAR; INTRAVENOUS ONCE
Status: COMPLETED | OUTPATIENT
Start: 2022-01-23 | End: 2022-01-23

## 2022-01-23 RX ORDER — OXYCODONE HYDROCHLORIDE 10 MG/1
10 TABLET ORAL EVERY 6 HOURS PRN
Status: DISCONTINUED | OUTPATIENT
Start: 2022-01-23 | End: 2022-01-24

## 2022-01-23 RX ADMIN — METHOCARBAMOL 500 MG: 500 TABLET ORAL at 05:01

## 2022-01-23 RX ADMIN — LIDOCAINE 5% 1 PATCH: 700 PATCH TOPICAL at 09:01

## 2022-01-23 RX ADMIN — Medication 10 ML: at 12:01

## 2022-01-23 RX ADMIN — FUROSEMIDE 40 MG: 40 INJECTION, SOLUTION INTRAMUSCULAR; INTRAVENOUS at 09:01

## 2022-01-23 RX ADMIN — SMOFLIPID 250 ML: 6; 6; 5; 3 INJECTION, EMULSION INTRAVENOUS at 09:01

## 2022-01-23 RX ADMIN — KETOROLAC TROMETHAMINE 15 MG: 15 INJECTION, SOLUTION INTRAMUSCULAR; INTRAVENOUS at 12:01

## 2022-01-23 RX ADMIN — PIPERACILLIN AND TAZOBACTAM 4.5 G: 4; .5 INJECTION, POWDER, LYOPHILIZED, FOR SOLUTION INTRAVENOUS; PARENTERAL at 06:01

## 2022-01-23 RX ADMIN — KETOROLAC TROMETHAMINE 15 MG: 15 INJECTION, SOLUTION INTRAMUSCULAR; INTRAVENOUS at 05:01

## 2022-01-23 RX ADMIN — Medication: at 06:01

## 2022-01-23 RX ADMIN — ACETAMINOPHEN 1000 MG: 500 TABLET ORAL at 05:01

## 2022-01-23 RX ADMIN — OXYCODONE HYDROCHLORIDE 10 MG: 10 TABLET ORAL at 09:01

## 2022-01-23 RX ADMIN — MUPIROCIN: 20 OINTMENT TOPICAL at 09:01

## 2022-01-23 RX ADMIN — VENLAFAXINE 75 MG: 37.5 TABLET ORAL at 09:01

## 2022-01-23 RX ADMIN — OXYCODONE HYDROCHLORIDE 10 MG: 10 TABLET ORAL at 05:01

## 2022-01-23 RX ADMIN — MAGNESIUM SULFATE HEPTAHYDRATE: 500 INJECTION, SOLUTION INTRAMUSCULAR; INTRAVENOUS at 09:01

## 2022-01-23 RX ADMIN — LEVETIRACETAM 500 MG: 500 TABLET, FILM COATED ORAL at 09:01

## 2022-01-23 RX ADMIN — PIPERACILLIN AND TAZOBACTAM 4.5 G: 4; .5 INJECTION, POWDER, LYOPHILIZED, FOR SOLUTION INTRAVENOUS; PARENTERAL at 03:01

## 2022-01-23 RX ADMIN — METHOCARBAMOL 500 MG: 500 TABLET ORAL at 12:01

## 2022-01-23 RX ADMIN — Medication 10 ML: at 06:01

## 2022-01-23 RX ADMIN — METHOCARBAMOL 500 MG: 500 TABLET ORAL at 09:01

## 2022-01-23 RX ADMIN — ACETAMINOPHEN 1000 MG: 500 TABLET ORAL at 01:01

## 2022-01-23 RX ADMIN — METHYLPREDNISOLONE SODIUM SUCCINATE 16 MG: 40 INJECTION, POWDER, FOR SOLUTION INTRAMUSCULAR; INTRAVENOUS at 09:01

## 2022-01-23 RX ADMIN — PIPERACILLIN AND TAZOBACTAM 4.5 G: 4; .5 INJECTION, POWDER, LYOPHILIZED, FOR SOLUTION INTRAVENOUS; PARENTERAL at 12:01

## 2022-01-23 RX ADMIN — PANTOPRAZOLE SODIUM 40 MG: 40 INJECTION, POWDER, FOR SOLUTION INTRAVENOUS at 09:01

## 2022-01-23 RX ADMIN — Medication 10 ML: at 11:01

## 2022-01-23 NOTE — PROGRESS NOTES
[facility]  Colorectal Surgery  Progress Note    Patient Name: Carline Chang  MRN: [id number]  Admission Date: 1/19/2022  Hospital Length of Stay: 4 days  Attending Physician: LLUVIA Post MD    Subjective:     Interval History: Patient seen and examined at bedside. NAEON. Continues to complain of pain, mainly controlled with PCA. Distended but denies nausea or emesis. Burping, no BM or flatus yet. OOB to chair yesterday but limited ability to walk due to COVID restrictions.    Post-Op Info:  Procedure(s) (LRB):  LAPAROTOMY, EXPLORATORY (N/A)  COLON RESECTION (N/A)  LYSIS, ADHESIONS (N/A)  WRAP-OMENTAL (N/A)  REPAIR OF ILEOCOLIC FISTULA (N/A)   3 Days Post-Op      Medications:  Continuous Infusions:   hydromorphone in 0.9 % NaCl 6 mg/30 ml      lactated ringers 75 mL/hr at 01/21/22 1801    TPN ADULT CENTRAL LINE CUSTOM 60 mL/hr at 01/22/22 2125     Scheduled Meds:   acetaminophen  1,000 mg Oral Q8H    ketorolac  15 mg Intravenous Q6H    levETIRAcetam  500 mg Oral BID    LIDOcaine  1 patch Transdermal Q24H    lipid (SMOFLIPID)  250 mL Intravenous Daily    methocarbamoL  500 mg Oral QID    methylPREDNISolone sodium succinate injection  16 mg Intravenous Daily    mupirocin   Nasal BID    pantoprazole  40 mg Intravenous Daily    piperacillin-tazobactam (ZOSYN) IVPB  4.5 g Intravenous Q8H    sodium chloride 0.9%  10 mL Intravenous Q6H    venlafaxine  75 mg Oral BID     PRN Meds:   sodium chloride    naloxone    ondansetron    prochlorperazine    sodium chloride 0.9%    sodium chloride 0.9%    sodium chloride 0.9%        Objective:     Vital Signs (Most Recent):  Temp: 98 °F (36.7 °C) (01/23/22 0750)  Pulse: 104 (01/23/22 0750)  Resp: 18 (01/23/22 0750)  BP: 108/67 (01/23/22 0750)  SpO2: 98 % (01/23/22 0750) Vital Signs (24h Range):  Temp:  [97.4 °F (36.3 °C)-98.2 °F (36.8 °C)] 98 °F (36.7 °C)  Pulse:  [] 104  Resp:  [16-20] 18  SpO2:  [92 %-98 %] 98 %  BP: ()/(50-67)  108/67     Intake/Output - Last 3 Shifts       01/21 0700  01/22 0659 01/22 0700  01/23 0659 01/23 0700  01/24 0659    I.V. (mL/kg)       Blood 237.5 372.9     IV Piggyback       Total Intake(mL/kg) 237.5 (4.1) 372.9 (6.4)     Urine (mL/kg/hr) 200 (0.1) 1775 (1.3)     Drains       Stool 0 0     Blood       Total Output 200 1775     Net +37.5 -1402.1            Stool Occurrence 0 x 0 x           Physical Exam  Vitals and nursing note reviewed.   Constitutional:       General: She is not in acute distress.  HENT:      Head: Normocephalic.      Nose:      Comments: NGT with small volume dark green liquid output.  Eyes:      Extraocular Movements: Extraocular movements intact.      Conjunctiva/sclera: Conjunctivae normal.   Cardiovascular:      Rate and Rhythm: Regular rhythm. Tachycardia present.   Pulmonary:      Effort: Pulmonary effort is normal. No respiratory distress.   Abdominal:      General: There is distension.      Palpations: Abdomen is soft.      Tenderness: There is abdominal tenderness. There is no guarding or rebound.      Comments: Midline incision with overlying island dressing. Mild distention with diffuse tenderness to palpation at incision.    Skin:     General: Skin is warm and dry.   Neurological:      General: No focal deficit present.      Mental Status: She is alert and oriented to person, place, and time.     Significant Labs:  CBC (Last 3 Results):   Recent Labs   Lab 01/22/22  0627 01/22/22  1343 01/23/22  0521   WBC 15.30* 16.75* 13.18*   RBC 2.58* 2.93* 2.59*   HGB 7.6* 8.8* 7.7*   HCT 23.3* 26.2* 23.1*    139* 130*   MCV 90 89 89   MCH 29.5 30.0 29.7   MCHC 32.6 33.6 33.3     CMP (Last 3 Results):   Recent Labs   Lab 01/20/22  2338 01/20/22  2338 01/21/22  0346 01/21/22  0346 01/21/22  1509 01/22/22  0627 01/23/22  0521   *   < > 126*   < > 107 112* 74   CALCIUM 7.8*   < > 7.8*   < > 8.0* 7.7* 8.6*   ALBUMIN 3.0*  --  2.8*  --  2.3*  --   --    PROT 5.2*  --  4.9*  --  4.5*   --   --    *   < > 133*   < > 130* 130* 134*   K 4.6   < > 4.6   < > 4.7 3.9 3.8   CO2 18*   < > 23   < > 25 24 26      < > 101   < > 100 99 99   BUN 6   < > 7   < > 10 9 10   CREATININE 0.8   < > 0.9   < > 0.9 0.6 0.6   ALKPHOS 153*  --  140*  --  122  --   --    ALT 20  --  19  --  19  --   --    AST 24  --  21  --  22  --   --    BILITOT 1.7*  --  1.2*  --  0.7  --   --     < > = values in this interval not displayed.       Significant Diagnostics:  I have reviewed all pertinent imaging results/findings within the past 24 hours.    Assessment/Plan:     Crohn disease  Carline Chang is a 56 y.o. female with Crohn's disease on steroids and complicated surgical history presents with leukocytosis of 19K and small volume extra-luminal free air near the ascending colon concerning for perforated viscus now s/p ex-lap with FOREIGN and resection of ileocolic anastomosis and segment of proximal small bowel with dense adhesions.     -Pain: D/C PCA today, continue MM (robaxin, tylenol, lidocaine patch, toradol)  -Continue NPO, mIVF. Ok for PO meds and small amount of ice chips  -PICC/TPN   -Continue zosyn for total of 4 days post op  -Home meds reviewed, hold diuretics and anti-hypertensives for now  -GI consulted for assistance with med management, recommending 16 mg IV solumedrol post-operatively; appreciate recommendations  -DVT ppx: LVX            Tish Ordaz MD  Colorectal Surgery  [facility]

## 2022-01-23 NOTE — ASSESSMENT & PLAN NOTE
Carline Chang is a 56 y.o. female with Crohn's disease on steroids and complicated surgical history presents with leukocytosis of 19K and small volume extra-luminal free air near the ascending colon concerning for perforated viscus now s/p ex-lap with FOREIGN and resection of ileocolic anastomosis and segment of proximal small bowel with dense adhesions.     -Pain: D/C PCA today, continue MM (robaxin, tylenol, lidocaine patch, toradol)  -Continue NPO, mIVF. Ok for PO meds and small amount of ice chips  -PICC/TPN   -Continue zosyn for total of 4 days post op  -Home meds reviewed, hold diuretics and anti-hypertensives for now  -GI consulted for assistance with med management, recommending 16 mg IV solumedrol post-operatively; appreciate recommendations  -DVT ppx: LVX

## 2022-01-23 NOTE — PLAN OF CARE
Plan of care reviewed with pt, who verbalized understanding. Pt remains on covid precautions. Abd ML PRAFUL w/ dermabond. Remains NPO. Pt OOB to chair ambulating frequently in room and to BR w/ staff assist. TPN infusing per MAR. PCA takedown this afternoon, pain made manageable w/ PRN meds. Weaned off oxygen this shift.  On telemetry, pt continues to experience tachycardia - HR as high as 150's while ambulating. Call bell in reach, bed locked in lowest position. Frequent rounds made for pt safety. VSS, will continue to monitor.

## 2022-01-23 NOTE — SUBJECTIVE & OBJECTIVE
Subjective:     Interval History: Patient seen and examined at bedside. NAEON. Continues to complain of pain, mainly controlled with PCA. Distended but denies nausea or emesis. Burping, no BM or flatus yet. OOB to chair yesterday but limited ability to walk due to COVID restrictions.    Post-Op Info:  Procedure(s) (LRB):  LAPAROTOMY, EXPLORATORY (N/A)  COLON RESECTION (N/A)  LYSIS, ADHESIONS (N/A)  WRAP-OMENTAL (N/A)  REPAIR OF ILEOCOLIC FISTULA (N/A)   3 Days Post-Op      Medications:  Continuous Infusions:   hydromorphone in 0.9 % NaCl 6 mg/30 ml      lactated ringers 75 mL/hr at 01/21/22 1801    TPN ADULT CENTRAL LINE CUSTOM 60 mL/hr at 01/22/22 2125     Scheduled Meds:   acetaminophen  1,000 mg Oral Q8H    ketorolac  15 mg Intravenous Q6H    levETIRAcetam  500 mg Oral BID    LIDOcaine  1 patch Transdermal Q24H    lipid (SMOFLIPID)  250 mL Intravenous Daily    methocarbamoL  500 mg Oral QID    methylPREDNISolone sodium succinate injection  16 mg Intravenous Daily    mupirocin   Nasal BID    pantoprazole  40 mg Intravenous Daily    piperacillin-tazobactam (ZOSYN) IVPB  4.5 g Intravenous Q8H    sodium chloride 0.9%  10 mL Intravenous Q6H    venlafaxine  75 mg Oral BID     PRN Meds:   sodium chloride    naloxone    ondansetron    prochlorperazine    sodium chloride 0.9%    sodium chloride 0.9%    sodium chloride 0.9%        Objective:     Vital Signs (Most Recent):  Temp: 98 °F (36.7 °C) (01/23/22 0750)  Pulse: 104 (01/23/22 0750)  Resp: 18 (01/23/22 0750)  BP: 108/67 (01/23/22 0750)  SpO2: 98 % (01/23/22 0750) Vital Signs (24h Range):  Temp:  [97.4 °F (36.3 °C)-98.2 °F (36.8 °C)] 98 °F (36.7 °C)  Pulse:  [] 104  Resp:  [16-20] 18  SpO2:  [92 %-98 %] 98 %  BP: ()/(50-67) 108/67     Intake/Output - Last 3 Shifts       01/21 0700 01/22 0659 01/22 0700 01/23 0659 01/23 0700 01/24 0659    I.V. (mL/kg)       Blood 237.5 372.9     IV Piggyback       Total Intake(mL/kg) 237.5 (4.1)  372.9 (6.4)     Urine (mL/kg/hr) 200 (0.1) 1775 (1.3)     Drains       Stool 0 0     Blood       Total Output 200 1775     Net +37.5 -1402.1            Stool Occurrence 0 x 0 x           Physical Exam  Vitals and nursing note reviewed.   Constitutional:       General: She is not in acute distress.  HENT:      Head: Normocephalic.      Nose:      Comments: NGT with small volume dark green liquid output.  Eyes:      Extraocular Movements: Extraocular movements intact.      Conjunctiva/sclera: Conjunctivae normal.   Cardiovascular:      Rate and Rhythm: Regular rhythm. Tachycardia present.   Pulmonary:      Effort: Pulmonary effort is normal. No respiratory distress.   Abdominal:      General: There is distension.      Palpations: Abdomen is soft.      Tenderness: There is abdominal tenderness. There is no guarding or rebound.      Comments: Midline incision with overlying island dressing. Mild distention with diffuse tenderness to palpation at incision.    Skin:     General: Skin is warm and dry.   Neurological:      General: No focal deficit present.      Mental Status: She is alert and oriented to person, place, and time.     Significant Labs:  CBC (Last 3 Results):   Recent Labs   Lab 01/22/22  0627 01/22/22  1343 01/23/22  0521   WBC 15.30* 16.75* 13.18*   RBC 2.58* 2.93* 2.59*   HGB 7.6* 8.8* 7.7*   HCT 23.3* 26.2* 23.1*    139* 130*   MCV 90 89 89   MCH 29.5 30.0 29.7   MCHC 32.6 33.6 33.3     CMP (Last 3 Results):   Recent Labs   Lab 01/20/22  2338 01/20/22  2338 01/21/22  0346 01/21/22  0346 01/21/22  1509 01/22/22  0627 01/23/22  0521   *   < > 126*   < > 107 112* 74   CALCIUM 7.8*   < > 7.8*   < > 8.0* 7.7* 8.6*   ALBUMIN 3.0*  --  2.8*  --  2.3*  --   --    PROT 5.2*  --  4.9*  --  4.5*  --   --    *   < > 133*   < > 130* 130* 134*   K 4.6   < > 4.6   < > 4.7 3.9 3.8   CO2 18*   < > 23   < > 25 24 26      < > 101   < > 100 99 99   BUN 6   < > 7   < > 10 9 10   CREATININE 0.8   < >  0.9   < > 0.9 0.6 0.6   ALKPHOS 153*  --  140*  --  122  --   --    ALT 20  --  19  --  19  --   --    AST 24  --  21  --  22  --   --    BILITOT 1.7*  --  1.2*  --  0.7  --   --     < > = values in this interval not displayed.       Significant Diagnostics:  I have reviewed all pertinent imaging results/findings within the past 24 hours.

## 2022-01-24 LAB
ANION GAP SERPL CALC-SCNC: 6 MMOL/L (ref 8–16)
ANION GAP SERPL CALC-SCNC: 9 MMOL/L (ref 8–16)
BACTERIA BLD CULT: NORMAL
BACTERIA BLD CULT: NORMAL
BASOPHILS # BLD AUTO: 0.01 K/UL (ref 0–0.2)
BASOPHILS NFR BLD: 0.1 % (ref 0–1.9)
BUN SERPL-MCNC: 6 MG/DL (ref 6–20)
BUN SERPL-MCNC: 7 MG/DL (ref 6–20)
CALCIUM SERPL-MCNC: 7.9 MG/DL (ref 8.7–10.5)
CALCIUM SERPL-MCNC: 8.5 MG/DL (ref 8.7–10.5)
CHLORIDE SERPL-SCNC: 103 MMOL/L (ref 95–110)
CHLORIDE SERPL-SCNC: 99 MMOL/L (ref 95–110)
CO2 SERPL-SCNC: 26 MMOL/L (ref 23–29)
CO2 SERPL-SCNC: 31 MMOL/L (ref 23–29)
CREAT SERPL-MCNC: 0.6 MG/DL (ref 0.5–1.4)
CREAT SERPL-MCNC: 0.6 MG/DL (ref 0.5–1.4)
CRP SERPL-MCNC: 109.4 MG/L (ref 0–8.2)
DIFFERENTIAL METHOD: ABNORMAL
EOSINOPHIL # BLD AUTO: 0 K/UL (ref 0–0.5)
EOSINOPHIL NFR BLD: 0.3 % (ref 0–8)
ERYTHROCYTE [DISTWIDTH] IN BLOOD BY AUTOMATED COUNT: 14.4 % (ref 11.5–14.5)
EST. GFR  (AFRICAN AMERICAN): >60 ML/MIN/1.73 M^2
EST. GFR  (AFRICAN AMERICAN): >60 ML/MIN/1.73 M^2
EST. GFR  (NON AFRICAN AMERICAN): >60 ML/MIN/1.73 M^2
EST. GFR  (NON AFRICAN AMERICAN): >60 ML/MIN/1.73 M^2
GLUCOSE SERPL-MCNC: 122 MG/DL (ref 70–110)
GLUCOSE SERPL-MCNC: 88 MG/DL (ref 70–110)
HCT VFR BLD AUTO: 22.5 % (ref 37–48.5)
HGB BLD-MCNC: 7.4 G/DL (ref 12–16)
IMM GRANULOCYTES # BLD AUTO: 0.24 K/UL (ref 0–0.04)
IMM GRANULOCYTES NFR BLD AUTO: 2.6 % (ref 0–0.5)
LYMPHOCYTES # BLD AUTO: 0.4 K/UL (ref 1–4.8)
LYMPHOCYTES NFR BLD: 4.6 % (ref 18–48)
MAGNESIUM SERPL-MCNC: 1.4 MG/DL (ref 1.6–2.6)
MCH RBC QN AUTO: 30.1 PG (ref 27–31)
MCHC RBC AUTO-ENTMCNC: 32.9 G/DL (ref 32–36)
MCV RBC AUTO: 92 FL (ref 82–98)
MONOCYTES # BLD AUTO: 0.3 K/UL (ref 0.3–1)
MONOCYTES NFR BLD: 3.6 % (ref 4–15)
NEUTROPHILS # BLD AUTO: 8.3 K/UL (ref 1.8–7.7)
NEUTROPHILS NFR BLD: 88.8 % (ref 38–73)
NRBC BLD-RTO: 0 /100 WBC
PHOSPHATE SERPL-MCNC: 1.4 MG/DL (ref 2.7–4.5)
PLATELET # BLD AUTO: 155 K/UL (ref 150–450)
PMV BLD AUTO: 9.4 FL (ref 9.2–12.9)
POCT GLUCOSE: 71 MG/DL (ref 70–110)
POTASSIUM SERPL-SCNC: 2.9 MMOL/L (ref 3.5–5.1)
POTASSIUM SERPL-SCNC: 3.6 MMOL/L (ref 3.5–5.1)
RBC # BLD AUTO: 2.46 M/UL (ref 4–5.4)
SODIUM SERPL-SCNC: 136 MMOL/L (ref 136–145)
SODIUM SERPL-SCNC: 138 MMOL/L (ref 136–145)
WBC # BLD AUTO: 9.34 K/UL (ref 3.9–12.7)

## 2022-01-24 PROCEDURE — A4216 STERILE WATER/SALINE, 10 ML: HCPCS | Performed by: COLON & RECTAL SURGERY

## 2022-01-24 PROCEDURE — 63600175 PHARM REV CODE 636 W HCPCS: Performed by: STUDENT IN AN ORGANIZED HEALTH CARE EDUCATION/TRAINING PROGRAM

## 2022-01-24 PROCEDURE — 86140 C-REACTIVE PROTEIN: CPT | Performed by: STUDENT IN AN ORGANIZED HEALTH CARE EDUCATION/TRAINING PROGRAM

## 2022-01-24 PROCEDURE — 27000207 HC ISOLATION

## 2022-01-24 PROCEDURE — 25000003 PHARM REV CODE 250: Performed by: STUDENT IN AN ORGANIZED HEALTH CARE EDUCATION/TRAINING PROGRAM

## 2022-01-24 PROCEDURE — 84100 ASSAY OF PHOSPHORUS: CPT | Performed by: STUDENT IN AN ORGANIZED HEALTH CARE EDUCATION/TRAINING PROGRAM

## 2022-01-24 PROCEDURE — 25000003 PHARM REV CODE 250: Performed by: COLON & RECTAL SURGERY

## 2022-01-24 PROCEDURE — 83735 ASSAY OF MAGNESIUM: CPT | Performed by: STUDENT IN AN ORGANIZED HEALTH CARE EDUCATION/TRAINING PROGRAM

## 2022-01-24 PROCEDURE — 20600001 HC STEP DOWN PRIVATE ROOM

## 2022-01-24 PROCEDURE — 63600175 PHARM REV CODE 636 W HCPCS

## 2022-01-24 PROCEDURE — 25000003 PHARM REV CODE 250

## 2022-01-24 PROCEDURE — 80048 BASIC METABOLIC PNL TOTAL CA: CPT | Mod: 91 | Performed by: COLON & RECTAL SURGERY

## 2022-01-24 PROCEDURE — 85025 COMPLETE CBC W/AUTO DIFF WBC: CPT | Performed by: STUDENT IN AN ORGANIZED HEALTH CARE EDUCATION/TRAINING PROGRAM

## 2022-01-24 PROCEDURE — 80048 BASIC METABOLIC PNL TOTAL CA: CPT | Performed by: STUDENT IN AN ORGANIZED HEALTH CARE EDUCATION/TRAINING PROGRAM

## 2022-01-24 PROCEDURE — 97535 SELF CARE MNGMENT TRAINING: CPT

## 2022-01-24 RX ORDER — HYDROMORPHONE HYDROCHLORIDE 1 MG/ML
0.5 INJECTION, SOLUTION INTRAMUSCULAR; INTRAVENOUS; SUBCUTANEOUS EVERY 4 HOURS PRN
Status: DISCONTINUED | OUTPATIENT
Start: 2022-01-24 | End: 2022-01-26

## 2022-01-24 RX ORDER — PREDNISONE 5 MG/1
15 TABLET ORAL DAILY
Status: DISCONTINUED | OUTPATIENT
Start: 2022-01-24 | End: 2022-01-27 | Stop reason: HOSPADM

## 2022-01-24 RX ORDER — GABAPENTIN 300 MG/1
300 CAPSULE ORAL 3 TIMES DAILY
Status: DISCONTINUED | OUTPATIENT
Start: 2022-01-24 | End: 2022-01-27 | Stop reason: HOSPADM

## 2022-01-24 RX ORDER — FUROSEMIDE 10 MG/ML
40 INJECTION INTRAMUSCULAR; INTRAVENOUS ONCE
Status: DISCONTINUED | OUTPATIENT
Start: 2022-01-24 | End: 2022-01-25

## 2022-01-24 RX ORDER — LANOLIN ALCOHOL/MO/W.PET/CERES
400 CREAM (GRAM) TOPICAL ONCE
Status: DISCONTINUED | OUTPATIENT
Start: 2022-01-24 | End: 2022-01-24

## 2022-01-24 RX ORDER — OXYCODONE HYDROCHLORIDE 10 MG/1
10 TABLET ORAL EVERY 4 HOURS PRN
Status: DISCONTINUED | OUTPATIENT
Start: 2022-01-24 | End: 2022-01-25

## 2022-01-24 RX ORDER — POTASSIUM CHLORIDE 7.45 MG/ML
40 INJECTION INTRAVENOUS ONCE
Status: COMPLETED | OUTPATIENT
Start: 2022-01-24 | End: 2022-01-24

## 2022-01-24 RX ORDER — LANOLIN ALCOHOL/MO/W.PET/CERES
800 CREAM (GRAM) TOPICAL ONCE
Status: COMPLETED | OUTPATIENT
Start: 2022-01-24 | End: 2022-01-24

## 2022-01-24 RX ORDER — PANTOPRAZOLE SODIUM 40 MG/1
40 TABLET, DELAYED RELEASE ORAL DAILY
Status: DISCONTINUED | OUTPATIENT
Start: 2022-01-24 | End: 2022-01-27 | Stop reason: HOSPADM

## 2022-01-24 RX ORDER — OXYCODONE HYDROCHLORIDE 5 MG/1
5 TABLET ORAL EVERY 4 HOURS PRN
Status: DISCONTINUED | OUTPATIENT
Start: 2022-01-24 | End: 2022-01-25

## 2022-01-24 RX ADMIN — ACETAMINOPHEN 1000 MG: 500 TABLET ORAL at 06:01

## 2022-01-24 RX ADMIN — Medication 400 MG: at 11:01

## 2022-01-24 RX ADMIN — Medication 800 MG: at 11:01

## 2022-01-24 RX ADMIN — PIPERACILLIN AND TAZOBACTAM 4.5 G: 4; .5 INJECTION, POWDER, LYOPHILIZED, FOR SOLUTION INTRAVENOUS; PARENTERAL at 03:01

## 2022-01-24 RX ADMIN — LEVETIRACETAM 500 MG: 500 TABLET, FILM COATED ORAL at 11:01

## 2022-01-24 RX ADMIN — Medication 10 ML: at 06:01

## 2022-01-24 RX ADMIN — GABAPENTIN 300 MG: 300 CAPSULE ORAL at 11:01

## 2022-01-24 RX ADMIN — GABAPENTIN 300 MG: 300 CAPSULE ORAL at 01:01

## 2022-01-24 RX ADMIN — ACETAMINOPHEN 1000 MG: 500 TABLET ORAL at 01:01

## 2022-01-24 RX ADMIN — PREDNISONE 15 MG: 5 TABLET ORAL at 08:01

## 2022-01-24 RX ADMIN — METHOCARBAMOL 500 MG: 500 TABLET ORAL at 01:01

## 2022-01-24 RX ADMIN — METHOCARBAMOL 500 MG: 500 TABLET ORAL at 11:01

## 2022-01-24 RX ADMIN — MUPIROCIN: 20 OINTMENT TOPICAL at 09:01

## 2022-01-24 RX ADMIN — GABAPENTIN 300 MG: 300 CAPSULE ORAL at 08:01

## 2022-01-24 RX ADMIN — KETOROLAC TROMETHAMINE 15 MG: 15 INJECTION, SOLUTION INTRAMUSCULAR; INTRAVENOUS at 06:01

## 2022-01-24 RX ADMIN — VENLAFAXINE 75 MG: 37.5 TABLET ORAL at 11:01

## 2022-01-24 RX ADMIN — ACETAMINOPHEN 1000 MG: 500 TABLET ORAL at 11:01

## 2022-01-24 RX ADMIN — OXYCODONE HYDROCHLORIDE 10 MG: 10 TABLET ORAL at 03:01

## 2022-01-24 RX ADMIN — METHOCARBAMOL 500 MG: 500 TABLET ORAL at 08:01

## 2022-01-24 RX ADMIN — LIDOCAINE 5% 1 PATCH: 700 PATCH TOPICAL at 08:01

## 2022-01-24 RX ADMIN — POTASSIUM PHOSPHATE, MONOBASIC AND POTASSIUM PHOSPHATE, DIBASIC 30 MMOL: 224; 236 INJECTION, SOLUTION, CONCENTRATE INTRAVENOUS at 11:01

## 2022-01-24 RX ADMIN — OXYCODONE HYDROCHLORIDE 10 MG: 10 TABLET ORAL at 04:01

## 2022-01-24 RX ADMIN — KETOROLAC TROMETHAMINE 15 MG: 15 INJECTION, SOLUTION INTRAMUSCULAR; INTRAVENOUS at 12:01

## 2022-01-24 RX ADMIN — HYDROMORPHONE HYDROCHLORIDE 0.5 MG: 1 INJECTION, SOLUTION INTRAMUSCULAR; INTRAVENOUS; SUBCUTANEOUS at 05:01

## 2022-01-24 RX ADMIN — POTASSIUM CHLORIDE 40 MEQ: 7.46 INJECTION, SOLUTION INTRAVENOUS at 11:01

## 2022-01-24 RX ADMIN — VENLAFAXINE 75 MG: 37.5 TABLET ORAL at 08:01

## 2022-01-24 RX ADMIN — LEVETIRACETAM 500 MG: 500 TABLET, FILM COATED ORAL at 08:01

## 2022-01-24 RX ADMIN — MUPIROCIN: 20 OINTMENT TOPICAL at 08:01

## 2022-01-24 RX ADMIN — HYDROMORPHONE HYDROCHLORIDE 0.5 MG: 1 INJECTION, SOLUTION INTRAMUSCULAR; INTRAVENOUS; SUBCUTANEOUS at 11:01

## 2022-01-24 RX ADMIN — Medication 10 ML: at 12:01

## 2022-01-24 RX ADMIN — METHOCARBAMOL 500 MG: 500 TABLET ORAL at 05:01

## 2022-01-24 RX ADMIN — PANTOPRAZOLE SODIUM 40 MG: 40 TABLET, DELAYED RELEASE ORAL at 08:01

## 2022-01-24 RX ADMIN — OXYCODONE HYDROCHLORIDE 10 MG: 10 TABLET ORAL at 11:01

## 2022-01-24 NOTE — PT/OT/SLP PROGRESS
"Occupational Therapy   Treatment    Name: Carline Chang  MRN: [id number]  Admitting Diagnosis:  Large bowel obstruction  4 Days Post-Op    Recommendations:     Discharge Recommendations: home health OT  Discharge Equipment Recommendations:   (TBD)  Barriers to discharge:  None    Assessment:     Carline Chang is a 56 y.o. female with a medical diagnosis of Large bowel obstruction.  She presents with reports of pain in abdomen 2/10 at rest increasing to 7/10 with bed mobility. Performance deficits affecting function are weakness,gait instability,impaired endurance,impaired cardiopulmonary response to activity,impaired balance,pain,impaired self care skills,decreased safety awareness,impaired functional mobilty.  Patient completed roll to right side with cues to log roll to prevent tension on abdominal muscles, supine to sit with SBA, EOB sit good balance. Sit to stand at EOB RW CGA, into bathroom toilet transfer CGA, toilet hygiene CGA for balance.     Rehab Prognosis:  Good; patient would benefit from acute skilled OT services to address these deficits and reach maximum level of function.       Plan:     Patient to be seen 4 x/week to address the above listed problems via self-care/home management,therapeutic activities,therapeutic exercises  · Plan of Care Expires: 02/22/22  · Plan of Care Reviewed with: patient    Subjective   "I dont hurt when I am lying down.'  Pain/Comfort:  · Pain Rating 1: 7/10 (at rest 2/10)  · Location 1: abdomen  · Pain Addressed 1: Pre-medicate for activity,Reposition,Distraction  · Pain Rating Post-Intervention 1: 3/10    Objective:     Communicated with: RN prior to session.  Patient found HOB elevated with telemetry,peripheral IV upon OT entry to room.    General Precautions: Standard, fall   Orthopedic Precautions:N/A   Braces: N/A  Respiratory Status: Room air     Occupational Performance:     Bed Mobility:    · Patient completed Rolling/Turning to Right with " stand by assistance  · Patient completed Supine to Sit with stand by assistance  · Patient completed Sit to Supine with stand by assistance     Functional Mobility/Transfers:  · Patient completed Sit <> Stand Transfer with stand by assistance  with  rolling walker   · Patient completed Toilet Transfer Step Transfer technique with contact guard assistance with  rolling walker  · Functional Mobility: in room mild loss of balance noted with functional mobility    Activities of Daily Living:  · Toileting: contact guard assistance for balance      [facility] 6 Click ADL: 21    Treatment & Education:  Education on fall prevention and use of call light, education on log rolling to decrease pain in abdomin.     Patient left HOB elevated with all lines intact, call button in reach and RN notifiedEducation:      GOALS:   Multidisciplinary Problems     Occupational Therapy Goals        Problem: Occupational Therapy Goal    Goal Priority Disciplines Outcome Interventions   Occupational Therapy Goal     OT, PT/OT Ongoing, Progressing    Description: Goals to be met by: 1/29/2022 (1 week)     Patient will increase functional independence with ADLs by performing:    UE Dressing with Supervision.  LE Dressing with Supervision.  Grooming while standing at sink with Supervision.  Toileting from toilet with Supervision for hygiene and clothing management.   Rolling to Bilateral with [place].   Supine to sit with [place].  Step transfer with Supervision  Toilet transfer to toilet with Supervision.  Upper extremity exercise program x10 reps per handout, with independence.                     Time Tracking:     OT Date of Treatment: 01/24/22  OT Start Time: 1505  OT Stop Time: 1528  OT Total Time (min): 23 min    Billable Minutes:Self Care/Home Management 23    OT/PRAFUL: OT          1/24/2022

## 2022-01-24 NOTE — SUBJECTIVE & OBJECTIVE
Subjective:     Interval History: No longer belching, denies nausea. Reports one bowel movement overnight. Transitioned to PO pain meds from PCA, she reports continued pain, but controlled. Up to chair, but unable to walk in halls given COVID positive.     Post-Op Info:  Procedure(s) (LRB):  LAPAROTOMY, EXPLORATORY (N/A)  COLON RESECTION (N/A)  LYSIS, ADHESIONS (N/A)  WRAP-OMENTAL (N/A)  REPAIR OF ILEOCOLIC FISTULA (N/A)   4 Days Post-Op      Medications:  Continuous Infusions:   TPN ADULT CENTRAL LINE CUSTOM 60 mL/hr at 01/23/22 2157     Scheduled Meds:   acetaminophen  1,000 mg Oral Q8H    ketorolac  15 mg Intravenous Q6H    levETIRAcetam  500 mg Oral BID    LIDOcaine  1 patch Transdermal Q24H    lipid (SMOFLIPID)  250 mL Intravenous Daily    methocarbamoL  500 mg Oral QID    methylPREDNISolone sodium succinate injection  16 mg Intravenous Daily    mupirocin   Nasal BID    pantoprazole  40 mg Intravenous Daily    piperacillin-tazobactam (ZOSYN) IVPB  4.5 g Intravenous Q8H    sodium chloride 0.9%  10 mL Intravenous Q6H    venlafaxine  75 mg Oral BID     PRN Meds:   sodium chloride    ondansetron    oxyCODONE    oxyCODONE    prochlorperazine    sodium chloride 0.9%    sodium chloride 0.9%    sodium chloride 0.9%        Objective:     Vital Signs (Most Recent):  Temp: 100 °F (37.8 °C) (01/24/22 0505)  Pulse: 108 (01/24/22 0713)  Resp: 16 (01/24/22 0505)  BP: 125/69 (01/24/22 0505)  SpO2: (!) 92 % (01/24/22 0505) Vital Signs (24h Range):  Temp:  [97.7 °F (36.5 °C)-100 °F (37.8 °C)] 100 °F (37.8 °C)  Pulse:  [] 108  Resp:  [15-19] 16  SpO2:  [91 %-98 %] 92 %  BP: (108-165)/(66-85) 125/69     Intake/Output - Last 3 Shifts       01/22 0700  01/23 0659 01/23 0700 01/24 0659 01/24 0700  01/25 0659    Blood 372.9      Total Intake(mL/kg) 372.9 (6.4)      Urine (mL/kg/hr) 1775 (1.3) 3660 (2.6)     Stool 0 0     Total Output 1775 3660     Net -1402.1 -3660            Stool Occurrence 0 x 0 x            Physical Exam  Vitals and nursing note reviewed.   Constitutional:       General: She is not in acute distress.  HENT:      Head: Normocephalic.   Eyes:      Extraocular Movements: Extraocular movements intact.      Conjunctiva/sclera: Conjunctivae normal.   Cardiovascular:      Rate and Rhythm: Regular rhythm. Tachycardia present.   Pulmonary:      Effort: Pulmonary effort is normal. No respiratory distress.   Abdominal:      General: Abdomen is flat. There is no distension.      Palpations: Abdomen is soft.      Tenderness: There is abdominal tenderness. There is no guarding or rebound.      Comments: Midline incision well approximated, clean dry and intact. Mild perincisional pain.   Skin:     General: Skin is warm and dry.   Neurological:      General: No focal deficit present.      Mental Status: She is alert and oriented to person, place, and time.         Significant Labs:  Pending    Significant Diagnostics:  None

## 2022-01-24 NOTE — TREATMENT PLAN
Gastroenterology Treatment Plan    Carline Chang is a 56 y.o. female admitted to hospital 1/19/2022 (Hospital Day: 6) due to Large bowel obstruction.     Interval History  Patient initiated on TPN and weaned from PCA pump to oral pain medications yesterday in addition to multi-modal pain regimen. Two bowel movements documented overnight. Vital signs significant for tachycardia improved to 110's. No labs resulted today.     Objective  Temp:  [97.7 °F (36.5 °C)-100 °F (37.8 °C)] 100 °F (37.8 °C) (01/24 0505)  Pulse:  [] 108 (01/24 0713)  BP: (123-165)/(66-85) 125/69 (01/24 0505)  Resp:  [15-19] 16 (01/24 0505)  SpO2:  [91 %-97 %] 92 % (01/24 0505)    Laboratory    Recent Labs   Lab 01/22/22  1343 01/23/22  0521 01/23/22  1655   HGB 8.8* 7.7* 7.3*         Assessment and Plan  This is a 56 year old female with PMH significant for Crohn's disease since 1986 (ileal and rectal disease status post multiple small bowel resections in 1995 and 2009 associated with rectovaginal fistula status post repair in 2018) with chronic pain syndrome, DVT of right upper extremity (01/2017), and possible new seizure disorder as of 01/2022 (on antiepileptic medication) who presented on 01/19/2022 with generalized weakness and back pain, but without abdominal pain or increase in stool frequency.      Her presentation was initially notable for signs of sepsis with tachycardia and leukocytosis. Her initial work-up concerning for positive testing for COVID-19 and concern for possible perforation of ascending colon with obstruction at sigmoid colon. She status post exploratory laparotomy on 01/20/2022 showing ileitis at proximal anastomosis with evidence of fistula between her ileum and sigmoid colon; no evidence of perforation. She is status post ileocolic resection 45 cm of terminal ileum with approximately 115 cm of small bowel remaining. Post-op course notable for continued signs of sepsis (tachycardia and leukocytosis)  that has since improved.      Problem List:      #1. 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   #2. At risk for short bowel syndrome (<200 cm small bowel remaining)  #3. COVID-19.   #4. Seizure disorder.   #5. Hypoalbuminemia     Our recommendations are as follows:      -Agree with use of antibiotics with intra-abdominal coverage. Monitor for signs of worsening sepsis.   -Okay to wean steroids to SOLUMEDROL 12 mg IV daily or Prednisone 15 mg daily if able to tolerate PO.   -No plans for repeat endoscopic intervention currently.   -Trend CRP daily.   -Low fiber/low residue diet once allowed for PO intake.   -Agree with use of TPN to optimize nutrition post-op.   -Post-op, will need to monitor for increase stool frequency with patient at risk for short bowel syndrome; anticipated need for scheduled anti-motility agents.   -Maintain DVT prophylaxis daily.   -Avoid NSAID products.   -Minimize use of opioids with risk of intra-abdominal infections and increase mortality in IBD patients. Agree with maximizing multi-modal pain control as much as possible.   -In terms of longterm medical management of Crohn's, anticipated need for initiation of ENTYVIO or STELARA in the coming weeks. Will confirm patient's preference for follow-up either here or in [place] in order to arrange infusions  - We will continue to follow.    Thank you for involving us in the care of Carline Resendezineslakisha. Please call with any additional questions, concerns or changes in the patient's clinical status.        Darshan Richardson MD, PGY-IV  Gastroenterology Fellow  [facility]

## 2022-01-24 NOTE — ASSESSMENT & PLAN NOTE
Carline Chang is a 56 y.o. female with Crohn's disease on steroids and complicated surgical history presents with leukocytosis of 19K and small volume extra-luminal free air near the ascending colon concerning for perforated viscus now s/p ex-lap with FOREIGN and resection of ileocolic anastomosis and segment of proximal small bowel with dense adhesions.     -Pain: MM (robaxin, tylenol, lidocaine patch, toradol) + PRN breakthrough  -Diet: Advance to Clears. Continue mIVF  -PICC/TPN   -Hb 7.3 yesterday afternoon, possibly dilutional; will follow am labs  -Discontinue Zosyn  -Home meds reviewed, hold diuretics and anti-hypertensives for now  -GI consulted for assistance with med management, will transition to PO steriods; appreciate recommendations  -DVT ppx: Holding lovenox

## 2022-01-24 NOTE — NURSING
Lab called about abnormal glucose above 600. Recollected the lab again at 1715 and sent to the lab.

## 2022-01-24 NOTE — PROGRESS NOTES
[facility]  Colorectal Surgery  Progress Note    Patient Name: Carline Chang  MRN: [id number]  Admission Date: 1/19/2022  Hospital Length of Stay: 5 days  Attending Physician: LLUVIA Post MD    Subjective:     Interval History: No longer belching, denies nausea. Reports one bowel movement overnight. Transitioned to PO pain meds from PCA, she reports continued pain, but controlled. Up to chair, but unable to walk in halls given COVID positive.     Post-Op Info:  Procedure(s) (LRB):  LAPAROTOMY, EXPLORATORY (N/A)  COLON RESECTION (N/A)  LYSIS, ADHESIONS (N/A)  WRAP-OMENTAL (N/A)  REPAIR OF ILEOCOLIC FISTULA (N/A)   4 Days Post-Op      Medications:  Continuous Infusions:   TPN ADULT CENTRAL LINE CUSTOM 60 mL/hr at 01/23/22 2157     Scheduled Meds:   acetaminophen  1,000 mg Oral Q8H    ketorolac  15 mg Intravenous Q6H    levETIRAcetam  500 mg Oral BID    LIDOcaine  1 patch Transdermal Q24H    lipid (SMOFLIPID)  250 mL Intravenous Daily    methocarbamoL  500 mg Oral QID    methylPREDNISolone sodium succinate injection  16 mg Intravenous Daily    mupirocin   Nasal BID    pantoprazole  40 mg Intravenous Daily    piperacillin-tazobactam (ZOSYN) IVPB  4.5 g Intravenous Q8H    sodium chloride 0.9%  10 mL Intravenous Q6H    venlafaxine  75 mg Oral BID     PRN Meds:   sodium chloride    ondansetron    oxyCODONE    oxyCODONE    prochlorperazine    sodium chloride 0.9%    sodium chloride 0.9%    sodium chloride 0.9%        Objective:     Vital Signs (Most Recent):  Temp: 100 °F (37.8 °C) (01/24/22 0505)  Pulse: 108 (01/24/22 0713)  Resp: 16 (01/24/22 0505)  BP: 125/69 (01/24/22 0505)  SpO2: (!) 92 % (01/24/22 0505) Vital Signs (24h Range):  Temp:  [97.7 °F (36.5 °C)-100 °F (37.8 °C)] 100 °F (37.8 °C)  Pulse:  [] 108  Resp:  [15-19] 16  SpO2:  [91 %-98 %] 92 %  BP: (108-165)/(66-85) 125/69     Intake/Output - Last 3 Shifts       01/22 0700 01/23 0659 01/23 0700 01/24 0659 01/24  0700  01/25 0659    Blood 372.9      Total Intake(mL/kg) 372.9 (6.4)      Urine (mL/kg/hr) 1775 (1.3) 3660 (2.6)     Stool 0 0     Total Output 1775 3660     Net -1402.1 -3660            Stool Occurrence 0 x 0 x           Physical Exam  Vitals and nursing note reviewed.   Constitutional:       General: She is not in acute distress.  HENT:      Head: Normocephalic.   Eyes:      Extraocular Movements: Extraocular movements intact.      Conjunctiva/sclera: Conjunctivae normal.   Cardiovascular:      Rate and Rhythm: Regular rhythm. Tachycardia present.   Pulmonary:      Effort: Pulmonary effort is normal. No respiratory distress.   Abdominal:      General: Abdomen is flat. There is no distension.      Palpations: Abdomen is soft.      Tenderness: There is abdominal tenderness. There is no guarding or rebound.      Comments: Midline incision well approximated, clean dry and intact. Mild perincisional pain.   Skin:     General: Skin is warm and dry.   Neurological:      General: No focal deficit present.      Mental Status: She is alert and oriented to person, place, and time.         Significant Labs:  Pending    Significant Diagnostics:  None    Assessment/Plan:     Crohn disease  Carline Chang is a 56 y.o. female with Crohn's disease on steroids and complicated surgical history presents with leukocytosis of 19K and small volume extra-luminal free air near the ascending colon concerning for perforated viscus now s/p ex-lap with FOREIGN and resection of ileocolic anastomosis and segment of proximal small bowel with dense adhesions.     -Pain: MM (robaxin, tylenol, lidocaine patch, toradol) + PRN breakthrough  -Diet: Advance to Clears. Continue mIVF  -PICC/TPN   -Hb 7.3 yesterday afternoon, possibly dilutional; will follow am labs  -Discontinue Zosyn  -Home meds reviewed, hold diuretics and anti-hypertensives for now  -GI consulted for assistance with med management, will transition to PO steriods; appreciate  recommendations  -DVT ppx: Holding lovenox            Sanchez Mcclure MD  Colorectal Surgery  [facility]

## 2022-01-25 LAB
ANION GAP SERPL CALC-SCNC: 10 MMOL/L (ref 8–16)
ANION GAP SERPL CALC-SCNC: 7 MMOL/L (ref 8–16)
BASOPHILS # BLD AUTO: 0.01 K/UL (ref 0–0.2)
BASOPHILS NFR BLD: 0.1 % (ref 0–1.9)
BUN SERPL-MCNC: 5 MG/DL (ref 6–20)
BUN SERPL-MCNC: 6 MG/DL (ref 6–20)
CALCIUM SERPL-MCNC: 8.4 MG/DL (ref 8.7–10.5)
CALCIUM SERPL-MCNC: 8.5 MG/DL (ref 8.7–10.5)
CHLORIDE SERPL-SCNC: 101 MMOL/L (ref 95–110)
CHLORIDE SERPL-SCNC: 99 MMOL/L (ref 95–110)
CO2 SERPL-SCNC: 26 MMOL/L (ref 23–29)
CO2 SERPL-SCNC: 31 MMOL/L (ref 23–29)
CREAT SERPL-MCNC: 0.5 MG/DL (ref 0.5–1.4)
CREAT SERPL-MCNC: 0.6 MG/DL (ref 0.5–1.4)
CRP SERPL-MCNC: 98.2 MG/L (ref 0–8.2)
DIFFERENTIAL METHOD: ABNORMAL
EOSINOPHIL # BLD AUTO: 0.1 K/UL (ref 0–0.5)
EOSINOPHIL NFR BLD: 0.8 % (ref 0–8)
ERYTHROCYTE [DISTWIDTH] IN BLOOD BY AUTOMATED COUNT: 14.7 % (ref 11.5–14.5)
EST. GFR  (AFRICAN AMERICAN): >60 ML/MIN/1.73 M^2
EST. GFR  (AFRICAN AMERICAN): >60 ML/MIN/1.73 M^2
EST. GFR  (NON AFRICAN AMERICAN): >60 ML/MIN/1.73 M^2
EST. GFR  (NON AFRICAN AMERICAN): >60 ML/MIN/1.73 M^2
GLUCOSE SERPL-MCNC: 151 MG/DL (ref 70–110)
GLUCOSE SERPL-MCNC: 87 MG/DL (ref 70–110)
HCT VFR BLD AUTO: 23.1 % (ref 37–48.5)
HGB BLD-MCNC: 7.5 G/DL (ref 12–16)
IMM GRANULOCYTES # BLD AUTO: 0.27 K/UL (ref 0–0.04)
IMM GRANULOCYTES NFR BLD AUTO: 3.7 % (ref 0–0.5)
LYMPHOCYTES # BLD AUTO: 0.5 K/UL (ref 1–4.8)
LYMPHOCYTES NFR BLD: 7.3 % (ref 18–48)
MAGNESIUM SERPL-MCNC: 1.5 MG/DL (ref 1.6–2.6)
MCH RBC QN AUTO: 29.5 PG (ref 27–31)
MCHC RBC AUTO-ENTMCNC: 32.5 G/DL (ref 32–36)
MCV RBC AUTO: 91 FL (ref 82–98)
MONOCYTES # BLD AUTO: 0.3 K/UL (ref 0.3–1)
MONOCYTES NFR BLD: 4.3 % (ref 4–15)
NEUTROPHILS # BLD AUTO: 6.1 K/UL (ref 1.8–7.7)
NEUTROPHILS NFR BLD: 83.8 % (ref 38–73)
NRBC BLD-RTO: 0 /100 WBC
PHOSPHATE SERPL-MCNC: 2.6 MG/DL (ref 2.7–4.5)
PLATELET # BLD AUTO: 166 K/UL (ref 150–450)
PMV BLD AUTO: 8.9 FL (ref 9.2–12.9)
POTASSIUM SERPL-SCNC: 2.9 MMOL/L (ref 3.5–5.1)
POTASSIUM SERPL-SCNC: 3.8 MMOL/L (ref 3.5–5.1)
RBC # BLD AUTO: 2.54 M/UL (ref 4–5.4)
SODIUM SERPL-SCNC: 137 MMOL/L (ref 136–145)
SODIUM SERPL-SCNC: 137 MMOL/L (ref 136–145)
WBC # BLD AUTO: 7.27 K/UL (ref 3.9–12.7)

## 2022-01-25 PROCEDURE — 25000003 PHARM REV CODE 250

## 2022-01-25 PROCEDURE — 99233 PR SUBSEQUENT HOSPITAL CARE,LEVL III: ICD-10-PCS | Mod: GC,,, | Performed by: INTERNAL MEDICINE

## 2022-01-25 PROCEDURE — 27000207 HC ISOLATION

## 2022-01-25 PROCEDURE — 94761 N-INVAS EAR/PLS OXIMETRY MLT: CPT

## 2022-01-25 PROCEDURE — 20600001 HC STEP DOWN PRIVATE ROOM

## 2022-01-25 PROCEDURE — A4216 STERILE WATER/SALINE, 10 ML: HCPCS | Performed by: COLON & RECTAL SURGERY

## 2022-01-25 PROCEDURE — 97116 GAIT TRAINING THERAPY: CPT

## 2022-01-25 PROCEDURE — A4217 STERILE WATER/SALINE, 500 ML: HCPCS

## 2022-01-25 PROCEDURE — 25000003 PHARM REV CODE 250: Performed by: COLON & RECTAL SURGERY

## 2022-01-25 PROCEDURE — 97530 THERAPEUTIC ACTIVITIES: CPT

## 2022-01-25 PROCEDURE — 85025 COMPLETE CBC W/AUTO DIFF WBC: CPT | Performed by: STUDENT IN AN ORGANIZED HEALTH CARE EDUCATION/TRAINING PROGRAM

## 2022-01-25 PROCEDURE — 63600175 PHARM REV CODE 636 W HCPCS

## 2022-01-25 PROCEDURE — 86140 C-REACTIVE PROTEIN: CPT | Performed by: STUDENT IN AN ORGANIZED HEALTH CARE EDUCATION/TRAINING PROGRAM

## 2022-01-25 PROCEDURE — 99233 SBSQ HOSP IP/OBS HIGH 50: CPT | Mod: GC,,, | Performed by: INTERNAL MEDICINE

## 2022-01-25 PROCEDURE — 80048 BASIC METABOLIC PNL TOTAL CA: CPT | Mod: 91

## 2022-01-25 PROCEDURE — 84100 ASSAY OF PHOSPHORUS: CPT | Performed by: STUDENT IN AN ORGANIZED HEALTH CARE EDUCATION/TRAINING PROGRAM

## 2022-01-25 PROCEDURE — 63600175 PHARM REV CODE 636 W HCPCS: Performed by: STUDENT IN AN ORGANIZED HEALTH CARE EDUCATION/TRAINING PROGRAM

## 2022-01-25 PROCEDURE — 25000003 PHARM REV CODE 250: Performed by: STUDENT IN AN ORGANIZED HEALTH CARE EDUCATION/TRAINING PROGRAM

## 2022-01-25 PROCEDURE — 80048 BASIC METABOLIC PNL TOTAL CA: CPT | Performed by: STUDENT IN AN ORGANIZED HEALTH CARE EDUCATION/TRAINING PROGRAM

## 2022-01-25 PROCEDURE — 83735 ASSAY OF MAGNESIUM: CPT | Performed by: STUDENT IN AN ORGANIZED HEALTH CARE EDUCATION/TRAINING PROGRAM

## 2022-01-25 PROCEDURE — B4185 PARENTERAL SOL 10 GM LIPIDS: HCPCS

## 2022-01-25 RX ORDER — KETOROLAC TROMETHAMINE 15 MG/ML
15 INJECTION, SOLUTION INTRAMUSCULAR; INTRAVENOUS EVERY 6 HOURS
Status: DISCONTINUED | OUTPATIENT
Start: 2022-01-25 | End: 2022-01-25

## 2022-01-25 RX ORDER — POTASSIUM CHLORIDE 20 MEQ/1
40 TABLET, EXTENDED RELEASE ORAL ONCE
Status: COMPLETED | OUTPATIENT
Start: 2022-01-25 | End: 2022-01-25

## 2022-01-25 RX ORDER — LOPERAMIDE HYDROCHLORIDE 2 MG/1
2 CAPSULE ORAL 3 TIMES DAILY
Status: DISCONTINUED | OUTPATIENT
Start: 2022-01-25 | End: 2022-01-25

## 2022-01-25 RX ORDER — SODIUM,POTASSIUM PHOSPHATES 280-250MG
1 POWDER IN PACKET (EA) ORAL ONCE
Status: COMPLETED | OUTPATIENT
Start: 2022-01-25 | End: 2022-01-25

## 2022-01-25 RX ORDER — MAGNESIUM SULFATE HEPTAHYDRATE 40 MG/ML
2 INJECTION, SOLUTION INTRAVENOUS ONCE
Status: COMPLETED | OUTPATIENT
Start: 2022-01-25 | End: 2022-01-25

## 2022-01-25 RX ORDER — ENOXAPARIN SODIUM 100 MG/ML
40 INJECTION SUBCUTANEOUS EVERY 24 HOURS
Status: DISCONTINUED | OUTPATIENT
Start: 2022-01-25 | End: 2022-01-27 | Stop reason: HOSPADM

## 2022-01-25 RX ORDER — OXYCODONE HYDROCHLORIDE 10 MG/1
10 TABLET ORAL EVERY 4 HOURS PRN
Status: DISCONTINUED | OUTPATIENT
Start: 2022-01-25 | End: 2022-01-27 | Stop reason: HOSPADM

## 2022-01-25 RX ORDER — SODIUM,POTASSIUM PHOSPHATES 280-250MG
2 POWDER IN PACKET (EA) ORAL ONCE
Status: DISCONTINUED | OUTPATIENT
Start: 2022-01-25 | End: 2022-01-25

## 2022-01-25 RX ORDER — KETOROLAC TROMETHAMINE 15 MG/ML
15 INJECTION, SOLUTION INTRAMUSCULAR; INTRAVENOUS EVERY 6 HOURS
Status: COMPLETED | OUTPATIENT
Start: 2022-01-25 | End: 2022-01-26

## 2022-01-25 RX ORDER — LOPERAMIDE HYDROCHLORIDE 2 MG/1
2 CAPSULE ORAL 4 TIMES DAILY
Status: DISCONTINUED | OUTPATIENT
Start: 2022-01-25 | End: 2022-01-26

## 2022-01-25 RX ORDER — POTASSIUM CHLORIDE 7.45 MG/ML
10 INJECTION INTRAVENOUS
Status: COMPLETED | OUTPATIENT
Start: 2022-01-25 | End: 2022-01-25

## 2022-01-25 RX ORDER — KETOROLAC TROMETHAMINE 15 MG/ML
15 INJECTION, SOLUTION INTRAMUSCULAR; INTRAVENOUS EVERY 8 HOURS
Status: DISCONTINUED | OUTPATIENT
Start: 2022-01-25 | End: 2022-01-25

## 2022-01-25 RX ADMIN — VENLAFAXINE 75 MG: 37.5 TABLET ORAL at 10:01

## 2022-01-25 RX ADMIN — POTASSIUM CHLORIDE 40 MEQ: 1500 TABLET, EXTENDED RELEASE ORAL at 11:01

## 2022-01-25 RX ADMIN — ACETAMINOPHEN 1000 MG: 500 TABLET ORAL at 03:01

## 2022-01-25 RX ADMIN — VENLAFAXINE 75 MG: 37.5 TABLET ORAL at 08:01

## 2022-01-25 RX ADMIN — ENOXAPARIN SODIUM 40 MG: 100 INJECTION SUBCUTANEOUS at 05:01

## 2022-01-25 RX ADMIN — LOPERAMIDE HYDROCHLORIDE 2 MG: 2 CAPSULE ORAL at 03:01

## 2022-01-25 RX ADMIN — HYDROMORPHONE HYDROCHLORIDE 0.5 MG: 1 INJECTION, SOLUTION INTRAMUSCULAR; INTRAVENOUS; SUBCUTANEOUS at 12:01

## 2022-01-25 RX ADMIN — Medication 10 ML: at 12:01

## 2022-01-25 RX ADMIN — ACETAMINOPHEN 1000 MG: 500 TABLET ORAL at 10:01

## 2022-01-25 RX ADMIN — MAGNESIUM SULFATE HEPTAHYDRATE: 500 INJECTION, SOLUTION INTRAMUSCULAR; INTRAVENOUS at 12:01

## 2022-01-25 RX ADMIN — POTASSIUM CHLORIDE 10 MEQ: 7.46 INJECTION, SOLUTION INTRAVENOUS at 03:01

## 2022-01-25 RX ADMIN — LOPERAMIDE HYDROCHLORIDE 2 MG: 2 CAPSULE ORAL at 05:01

## 2022-01-25 RX ADMIN — GABAPENTIN 300 MG: 300 CAPSULE ORAL at 03:01

## 2022-01-25 RX ADMIN — LOPERAMIDE HYDROCHLORIDE 2 MG: 2 CAPSULE ORAL at 10:01

## 2022-01-25 RX ADMIN — OXYCODONE HYDROCHLORIDE 10 MG: 10 TABLET ORAL at 06:01

## 2022-01-25 RX ADMIN — POTASSIUM & SODIUM PHOSPHATES POWDER PACK 280-160-250 MG 1 PACKET: 280-160-250 PACK at 08:01

## 2022-01-25 RX ADMIN — PANTOPRAZOLE SODIUM 40 MG: 40 TABLET, DELAYED RELEASE ORAL at 08:01

## 2022-01-25 RX ADMIN — METHOCARBAMOL 500 MG: 500 TABLET ORAL at 03:01

## 2022-01-25 RX ADMIN — MAGNESIUM SULFATE IN WATER 2 G: 40 INJECTION, SOLUTION INTRAVENOUS at 11:01

## 2022-01-25 RX ADMIN — HYDROMORPHONE HYDROCHLORIDE 0.5 MG: 1 INJECTION, SOLUTION INTRAMUSCULAR; INTRAVENOUS; SUBCUTANEOUS at 10:01

## 2022-01-25 RX ADMIN — MAGNESIUM SULFATE HEPTAHYDRATE: 500 INJECTION, SOLUTION INTRAMUSCULAR; INTRAVENOUS at 10:01

## 2022-01-25 RX ADMIN — METHOCARBAMOL 500 MG: 500 TABLET ORAL at 08:01

## 2022-01-25 RX ADMIN — POTASSIUM & SODIUM PHOSPHATES POWDER PACK 280-160-250 MG 1 PACKET: 280-160-250 PACK at 11:01

## 2022-01-25 RX ADMIN — POTASSIUM CHLORIDE 10 MEQ: 7.46 INJECTION, SOLUTION INTRAVENOUS at 04:01

## 2022-01-25 RX ADMIN — GABAPENTIN 300 MG: 300 CAPSULE ORAL at 08:01

## 2022-01-25 RX ADMIN — SMOFLIPID 250 ML: 6; 6; 5; 3 INJECTION, EMULSION INTRAVENOUS at 02:01

## 2022-01-25 RX ADMIN — Medication 10 ML: at 06:01

## 2022-01-25 RX ADMIN — POTASSIUM CHLORIDE 10 MEQ: 7.46 INJECTION, SOLUTION INTRAVENOUS at 01:01

## 2022-01-25 RX ADMIN — MUPIROCIN: 20 OINTMENT TOPICAL at 08:01

## 2022-01-25 RX ADMIN — SMOFLIPID 250 ML: 6; 6; 5; 3 INJECTION, EMULSION INTRAVENOUS at 10:01

## 2022-01-25 RX ADMIN — KETOROLAC TROMETHAMINE 15 MG: 15 INJECTION, SOLUTION INTRAMUSCULAR; INTRAVENOUS at 11:01

## 2022-01-25 RX ADMIN — METHOCARBAMOL 500 MG: 500 TABLET ORAL at 05:01

## 2022-01-25 RX ADMIN — METHOCARBAMOL 500 MG: 500 TABLET ORAL at 10:01

## 2022-01-25 RX ADMIN — HYDROMORPHONE HYDROCHLORIDE 0.5 MG: 1 INJECTION, SOLUTION INTRAMUSCULAR; INTRAVENOUS; SUBCUTANEOUS at 05:01

## 2022-01-25 RX ADMIN — LIDOCAINE 5% 1 PATCH: 700 PATCH TOPICAL at 08:01

## 2022-01-25 RX ADMIN — LOPERAMIDE HYDROCHLORIDE 2 MG: 2 CAPSULE ORAL at 08:01

## 2022-01-25 RX ADMIN — OXYCODONE HYDROCHLORIDE 15 MG: 10 TABLET ORAL at 03:01

## 2022-01-25 RX ADMIN — PREDNISONE 15 MG: 5 TABLET ORAL at 08:01

## 2022-01-25 RX ADMIN — LEVETIRACETAM 500 MG: 500 TABLET, FILM COATED ORAL at 08:01

## 2022-01-25 RX ADMIN — KETOROLAC TROMETHAMINE 15 MG: 15 INJECTION, SOLUTION INTRAMUSCULAR; INTRAVENOUS at 05:01

## 2022-01-25 RX ADMIN — GABAPENTIN 300 MG: 300 CAPSULE ORAL at 10:01

## 2022-01-25 RX ADMIN — POTASSIUM CHLORIDE 10 MEQ: 7.46 INJECTION, SOLUTION INTRAVENOUS at 05:01

## 2022-01-25 RX ADMIN — LEVETIRACETAM 500 MG: 500 TABLET, FILM COATED ORAL at 10:01

## 2022-01-25 RX ADMIN — POTASSIUM CHLORIDE 10 MEQ: 7.46 INJECTION, SOLUTION INTRAVENOUS at 11:01

## 2022-01-25 RX ADMIN — HYDROMORPHONE HYDROCHLORIDE 0.5 MG: 1 INJECTION, SOLUTION INTRAMUSCULAR; INTRAVENOUS; SUBCUTANEOUS at 08:01

## 2022-01-25 NOTE — SUBJECTIVE & OBJECTIVE
Subjective:     Interval History: Difficulty with pain control overnight requiring frequent IV dilaudid for control. Per patient at home takes norco  QID on average. Tolerating CLD, but with minimal intake. Multiple bouts of diarrhea yesterday as well. Continued intermittent tachycardia (120s), mostly with ambulation.     Post-Op Info:  Procedure(s) (LRB):  LAPAROTOMY, EXPLORATORY (N/A)  COLON RESECTION (N/A)  LYSIS, ADHESIONS (N/A)  WRAP-OMENTAL (N/A)  REPAIR OF ILEOCOLIC FISTULA (N/A)   5 Days Post-Op      Medications:  Continuous Infusions:   TPN ADULT CENTRAL LINE CUSTOM 60 mL/hr at 01/25/22 0015     Scheduled Meds:   acetaminophen  1,000 mg Oral Q8H    furosemide (LASIX) injection  40 mg Intravenous Once    gabapentin  300 mg Oral TID    levETIRAcetam  500 mg Oral BID    LIDOcaine  1 patch Transdermal Q24H    lipid (SMOFLIPID)  250 mL Intravenous Daily    methocarbamoL  500 mg Oral QID    mupirocin   Nasal BID    pantoprazole  40 mg Oral Daily    predniSONE  15 mg Oral Daily    sodium chloride 0.9%  10 mL Intravenous Q6H    venlafaxine  75 mg Oral BID     PRN Meds:   HYDROmorphone    ondansetron    oxyCODONE    oxyCODONE    prochlorperazine    sodium chloride 0.9%    sodium chloride 0.9%    sodium chloride 0.9%        Objective:     Vital Signs (Most Recent):  Temp: 98.5 °F (36.9 °C) (01/25/22 0624)  Pulse: 93 (01/25/22 0624)  Resp: 15 (01/25/22 0639)  BP: (!) 164/82 (01/25/22 0624)  SpO2: (!) 90 % (01/25/22 0624) Vital Signs (24h Range):  Temp:  [98.3 °F (36.8 °C)-98.7 °F (37.1 °C)] 98.5 °F (36.9 °C)  Pulse:  [] 93  Resp:  [15-16] 15  SpO2:  [90 %-94 %] 90 %  BP: (131-164)/(67-82) 164/82     Intake/Output - Last 3 Shifts       01/23 0700 01/24 0659 01/24 0700 01/25 0659 01/25 0700 01/26 0659    P.O.  1000     Blood       Total Intake(mL/kg)  1000 (17.2)     Urine (mL/kg/hr) 3660 (2.6) 1600 (1.1)     Stool 0 0     Total Output 3660 1600     Net -3660 -600            Urine  Occurrence  2 x     Stool Occurrence 0 x 6 x           Physical Exam  Vitals and nursing note reviewed.   Constitutional:       General: She is not in acute distress.     Appearance: She is ill-appearing.   HENT:      Head: Normocephalic.   Eyes:      Extraocular Movements: Extraocular movements intact.      Conjunctiva/sclera: Conjunctivae normal.   Cardiovascular:      Rate and Rhythm: Regular rhythm. Tachycardia present.   Pulmonary:      Effort: Pulmonary effort is normal. No respiratory distress.   Abdominal:      General: Abdomen is flat. There is no distension.      Palpations: Abdomen is soft.      Tenderness: There is abdominal tenderness. There is no guarding or rebound.      Comments: Midline incision well approximated, clean dry and intact. Mild perincisional pain.   Skin:     General: Skin is warm and dry.   Neurological:      General: No focal deficit present.      Mental Status: She is alert and oriented to person, place, and time.       Significant Labs:  BMP (Last 3 Results):   Recent Labs   Lab 01/22/22  0627 01/22/22  0627 01/23/22  0521 01/24/22  0847 01/24/22  1715   *   < > 74 88 122*   *   < > 134* 136 138   K 3.9   < > 3.8 2.9* 3.6   CL 99   < > 99 99 103   CO2 24   < > 26 31* 26   BUN 9   < > 10 7 6   CREATININE 0.6   < > 0.6 0.6 0.6   CALCIUM 7.7*   < > 8.6* 8.5* 7.9*   MG 1.9  --  1.7 1.4*  --     < > = values in this interval not displayed.     CBC (Last 3 Results):   Recent Labs   Lab 01/23/22  0521 01/23/22  1655 01/24/22  0847   WBC 13.18* 10.38 9.34   RBC 2.59* 2.35* 2.46*   HGB 7.7* 7.3* 7.4*   HCT 23.1* 22.4* 22.5*   * 123* 155   MCV 89 95 92   MCH 29.7 31.1* 30.1   MCHC 33.3 32.6 32.9     CRP (Last 3 Results):   Recent Labs   Lab 01/20/22  0804 01/23/22  0521 01/24/22  0847   CRP 56.6* 183.6* 109.4*       Significant Diagnostics:  None

## 2022-01-25 NOTE — PT/OT/SLP PROGRESS
Occupational Therapy      Patient Name:  Carline Chang   MRN:  [id number]    Patient not seen today secondary to RN hold 2' pain upon first attempt; second attempt pt recently finished working with PT and remained 7/10 abdominal pain. Will follow-up as scheduled.    1/25/2022

## 2022-01-25 NOTE — PLAN OF CARE
01/25/22 0952   Post-Acute Status   Post-Acute Authorization IV Infusion   IV Infusion Status Referral(s) sent   SW initiated referral to [facility] for home TPN. HH orders still needed.    Abi Chang LMSW  [facility]  [phone]

## 2022-01-25 NOTE — PT/OT/SLP PROGRESS
Physical Therapy Treatment    Patient Name:  Carline Chang   MRN:  [id number]    Pre-op Diagnosis: Crohn's disease of both small and large intestine with other complication [K50.818]    Procedure(s):  LAPAROTOMY, EXPLORATORY  COLON RESECTION  LYSIS, ADHESIONS  WRAP-OMENTAL  REPAIR OF ILEOCOLIC FISTULA     Recommendations:     Discharge Recommendations:  home health PT   Discharge Equipment Recommendations:  (tbd)   Barriers to discharge: None    Highest Level of Mobility: Gait ~15'x2 trials  Assistance Required: CGA with RW    Assessment:     Carline Chang is a 56 y.o. female admitted with a medical diagnosis of Large bowel obstruction.  She presents with the following impairments/functional limitations:  weakness,impaired endurance,impaired coordination,decreased lower extremity function,impaired self care skills,impaired functional mobilty,gait instability,impaired balance,pain. Carline Chang would benefit from acute PT intervention to address listed functional deficits, provide patient/caregiver education, reduce fall risk, and maximize (I) and safety with functional mobility.    Pt met with HOB elevated, mother present and agreeable to PT treatment. Pt states she is primarily limited by abdominal pain during all mobility. Today's PT treatment focus was on gait training, transfer training, and therapeutic activities to maximize function. Pt is progressing towards acute PT goals appropriately and continues to benefit from acute PT sessions. After hospital discharge, pt would benefit from HHPT to maximize rehab potential.    Rehab Prognosis: Good; patient would benefit from acute skilled PT services to address these deficits and reach maximum level of function.      Plan:     During this hospitalization, patient to be seen 4 x/week to address the identified rehab impairments via gait training,therapeutic activities,therapeutic exercises and progress toward the following  "goals:    · Plan of Care Expires:  02/19/22    This plan of care has been discussed with the patient/caregiver, who was included in its development and is in agreement with the identified goals and treatment plan.     Subjective     Communicated with RN prior to session.  Patient agreeable to participate.     Pain/Comfort:  · Pain Rating 1: 7/10  · Location - Orientation 1: generalized  · Location 1: abdomen  · Pain Addressed 1: Pre-medicate for activity,Reposition,Distraction  · Pain Rating Post-Intervention 1: 7/10    Chief Complaint: Abdominal pain  Patient/Family Comments/goals: "I'm not 100% yet but I'm getting there"      Objective:     Patient found HOB elevated with telemetry,peripheral IV  upon PT entry to room.    General Precautions: Standard, fall   Orthopedic Precautions:N/A   Braces: N/A         Exams:     Cognition:  o Pt is alert and oriented x 4  o Command following: intact    Functional Mobility:    Bed Mobility:  · Supine to Sit: Contact Guard Assistance on R side of bed  · Via logroll technique   · Sit to Supine: Contact Guard Assistance  · Rolling R: Contact Guard Assistance  · Scooting anteriorly to EOB to plant feet on floor: Contact Guard Assistance    Transfers:   · Sit to Stand Transfer: Contact Guard Assistance  from EOB   · x3 trials with RW AD  · X2 trials with CGA and HHAx1   · Bed to Chair: Activity did not occur , pt declined   · Toilet transfer: CGA for eccentric descent             Gait:  · Patient received gait training 15'x2 with Contact Guard Assistance and rolling walker  · Gait Assessment: decreased step length , decreased joey, narrow base of support and unsteady gait   · Gait Pattern Observed: Step-to pattern  · Comments: Pt demos a guarded posture during gait with trunk in FL. She has no overt LOB, but relies on support from RW to improve balance. Pt navigates obstacles successfully.     Balance:  · Static Sit:   · Supervision at EOB   · Normal: Patient able to maintain " steady balance without handhold support.  · Static Stand:   · Stand-By Assist with Rolling walker  · Good: Patient able to maintain balance without handhold support, limited postural sway      Therapeutic Activities/Exercises      Patient assisted with functional mobility as noted above   Patient assisted with gait training where she was able to complete a toilet transfer   STSx5 from EOB   Patient educated on the importance of early mobility to prevent functional decline during hospital stay   Patient was instructed to utilize staff assistance for mobility/transfers.  o Patient is appropriate to transfer with CGA and RN/PCT assist   Patient educated on PT POC and role of PT in acute care   White board updated regarding patient's safest level of mobility with staff assistance, RN also updated.     AM-PAC 6 CLICK MOBILITY  Turning over in bed (including adjusting bedclothes, sheets and blankets)?: 4  Sitting down on and standing up from a chair with arms (e.g., wheelchair, bedside commode, etc.): 3  Moving from lying on back to sitting on the side of the bed?: 4  Moving to and from a bed to a chair (including a wheelchair)?: 3  Need to walk in hospital room?: 3  Climbing 3-5 steps with a railing?: 3  Basic Mobility Total Score: 20     Patient left HOB elevated with all lines intact, call button in reach, RN notified and mother present.        History/Goals:     PAST MEDICAL HISTORY:  Past Medical History:   Diagnosis Date    Acute deep vein thrombosis (DVT) of brachial vein of right upper extremity 01/2017    on RUE ultrasound    Anxiety     Bowel perforation     Chronic pain     Crohn's disease of both small and large intestine with intestinal obstruction 1989    Difficult intravenous access     Encounter for blood transfusion     GERD (gastroesophageal reflux disease) 2014    Kidney stones     Nephrolithiasis     Stricture of bowel        Past Surgical History:   Procedure Laterality Date     ABSCESS DRAINAGE      ANAL FISTULOTOMY      BOWEL RESECTION      small bowel resection per Dr. Uribe 2018     SECTION      x2    CHOLECYSTECTOMY  2000    COLON SURGERY  2015    sigmoid loop colostomy    COLONOSCOPY N/A 2016    Procedure: COLONOSCOPY;  Surgeon: Andre Uribe MD;  Location: [facility] ENDO (4TH FLR);  Service: Endoscopy;  Laterality: N/A;    COLONOSCOPY N/A 2017    Procedure: COLONOSCOPY;  Surgeon: Dany Stock MD;  Location: [facility] ENDO (2ND FLR);  Service: Endoscopy;  Laterality: N/A;    COLONOSCOPY N/A 2017    Procedure: COLONOSCOPY;  Surgeon: Andre Uribe MD;  Location: [facility] ENDO (4TH FLR);  Service: Endoscopy;  Laterality: N/A;    COLONOSCOPY N/A 2018    Procedure: COLONOSCOPY;  Surgeon: Andre Uribe MD;  Location: [facility] ENDO (4TH FLR);  Service: Endoscopy;  Laterality: N/A;    COLONOSCOPY N/A 3/24/2018    Procedure: COLONOSCOPY;  Surgeon: Elmer Serrato MD;  Location: [facility] ENDO (2ND FLR);  Service: Endoscopy;  Laterality: N/A;    COLONOSCOPY  3/24/2018    COLONOSCOPY N/A 2018    Procedure: COLONOSCOPY;  Surgeon: Andre Uribe MD;  Location: [facility] ENDO (4TH FLR);  Service: Endoscopy;  Laterality: N/A;    COLONOSCOPY N/A 10/7/2021    Procedure: COLONOSCOPY;  Surgeon: Jose Hughes MD;  Location: [facility] ENDO;  Service: Endoscopy;  Laterality: N/A;    ESOPHAGOGASTRODUODENOSCOPY N/A 10/7/2021    Procedure: EGD (ESOPHAGOGASTRODUODENOSCOPY);  Surgeon: Jose Hughes MD;  Location: [facility] ENDO;  Service: Endoscopy;  Laterality: N/A;    LAPAROSCOPIC CLOSURE OF COLOSTOMY N/A 2018    Procedure: CLOSURE, COLOSTOMY, LAPAROSCOPIC;  Surgeon: Andre Uribe MD;  Location: [facility] OR 2ND FLR;  Service: Colon and Rectal;  Laterality: N/A;  converted to open    LYSIS OF ADHESIONS N/A 2022    Procedure: LYSIS, ADHESIONS;  Surgeon: LLUVIA Post MD;  Location: [facility] OR 2ND FLR;  Service: Colon and Rectal;  Laterality: N/A;   lasting longer than 2 hours, modifier 22      rectovaginal fistula repair  2018    SMALL INTESTINE SURGERY      per patient 10 inches removed    SMALL INTESTINE SURGERY  2009    abdominal abscess drained, 3 cm colon removed, 11 cm SB removed    TUBAL LIGATION      UPPER GASTROINTESTINAL ENDOSCOPY         GOALS:   Multidisciplinary Problems     Physical Therapy Goals        Problem: Physical Therapy Goal    Goal Priority Disciplines Outcome Goal Variances Interventions   Physical Therapy Goal     PT, PT/OT Ongoing, Progressing     Description: Goals to be met by: 22    Patient will increase functional independence with mobility by performin. Supine to sit with Stand-by Assistance -not met  2. Sit to stand transfer with Stand-by Assistance with AD if needed - not met  3. Gait  x 100 feet with Supervision using AD if needed  - not met.                      Time Tracking:     PT Received On: 22  PT Start Time: 1402     PT Stop Time: 1430  PT Total Time (min): 28 min      Billable Minutes: Gait Training 10 and Therapeutic Activity 18      Kiesha Flor, PT  2022  Pager# [phone]

## 2022-01-25 NOTE — ASSESSMENT & PLAN NOTE
Carline Chang is a 56 y.o. female with Crohn's disease on steroids and complicated surgical history presents with leukocytosis of 19K and small volume extra-luminal free air near the ascending colon concerning for perforated viscus now s/p ex-lap with FOREIGN and resection of ileocolic anastomosis and segment of proximal small bowel with dense adhesions.     -Pain: Will increase PRN oral oxy to 10/15 given home norco use. Continue MM (robaxin, tylenol, lidocaine patch, toradol) + PRN breakthrough  -Diet: Advance to Regular. Continue mIVF given poor intake  -PICC/TPN   -Home meds reviewed, hold diuretics and anti-hypertensives for now  -GI consulted for assistance with med management, Continue PO steriods; appreciate recommendations  -Arranging for prior auth on Teduglutide given 100cm small bowel remaining  -PT/OT: Home Health, no DME at this time.  -DVT ppx: Resume LVX

## 2022-01-25 NOTE — PROGRESS NOTES
[facility]  Gastroenterology  Progress Note    Patient Name: Carline Chang  MRN: [id number]  Admission Date: 1/19/2022  Hospital Length of Stay: 6 days  Code Status: Full Code   Attending Provider: LLUVIA Post MD  Consulting Provider: Darshan Richardson MD  Primary Care Physician: Pablo Medina MD (Inactive)  Principal Problem: Large bowel obstruction      Subjective:     Interval History: Patient status post initiation of weaning steroid taper yesterday. She continues to report abdominal and back pain that is worse with movement. She reports having five liquid non-bloody bowel movements in the last 24 hours. She continues on TPN and CRS reportedly working on approval for initiation of GATTEX.     Review of Systems   Constitutional: Positive for fatigue. Negative for diaphoresis and fever.   Respiratory: Negative for cough and shortness of breath.    Cardiovascular: Negative for chest pain and leg swelling.   Gastrointestinal: Positive for abdominal pain and diarrhea. Negative for abdominal distention, nausea and vomiting.     Objective:     Vital Signs (Most Recent):  Temp: 98.5 °F (36.9 °C) (01/25/22 0624)  Pulse: 93 (01/25/22 0624)  Resp: 16 (01/25/22 1509)  BP: (!) 164/82 (01/25/22 0624)  SpO2: 95 % (01/25/22 1227) Vital Signs (24h Range):  Temp:  [98.3 °F (36.8 °C)-98.5 °F (36.9 °C)] 98.5 °F (36.9 °C)  Pulse:  [80-93] 93  Resp:  [15-16] 16  SpO2:  [90 %-95 %] 95 %  BP: (141-164)/(77-82) 164/82     Weight: 58.2 kg (128 lb 4.9 oz) (01/20/22 0146)  Body mass index is 19.51 kg/m².      Intake/Output Summary (Last 24 hours) at 1/25/2022 1608  Last data filed at 1/24/2022 1900  Gross per 24 hour   Intake 500 ml   Output 600 ml   Net -100 ml       Lines/Drains/Airways     Peripherally Inserted Central Catheter Line            PICC Double Lumen 01/21/22 1617 left brachial 3 days          Peripheral Intravenous Line                 Midline Catheter Insertion/Assessment  - Single Lumen 01/21/22  0800 Right 4 days                Physical Exam  Constitutional:       General: She is not in acute distress.     Appearance: Normal appearance. She is not ill-appearing.   Eyes:      General: No scleral icterus.     Conjunctiva/sclera: Conjunctivae normal.   Cardiovascular:      Rate and Rhythm: Regular rhythm. Tachycardia present.      Pulses: Normal pulses.      Heart sounds: Normal heart sounds.   Pulmonary:      Effort: Pulmonary effort is normal. No respiratory distress.      Breath sounds: Normal breath sounds.   Abdominal:      General: Bowel sounds are normal. There is distension.      Palpations: Abdomen is soft.      Tenderness: There is generalized abdominal tenderness.      Comments: Clean dressing in place over abdominal midline.    Skin:     General: Skin is warm and dry.      Findings: No bruising or rash.   Neurological:      Mental Status: She is alert and oriented to person, place, and time.         Significant Labs:  All pertinent lab results from the last 24 hours have been reviewed.      Significant Imaging:  Imaging results within the past 24 hours have been reviewed.    Assessment/Plan:     Crohn disease  This is a 56 year old female with PMH significant for Crohn's disease (ileal and rectal disease status post multiple small bowel resections associated with rectovaginal fistula status post repair) with chronic pain syndrome, DVT of right upper extremity (01/2017), and possible new seizure disorder as of 01/2022 (on antiepileptic medication) who presented on 01/19/2022 with generalized weakness and back pain, but without abdominal pain or increase in stool frequency.     Her presentation was initially notable for signs of sepsis with tachycardia and leukocytosis. Her initial work-up concerning for positive testing for COVID-19 and concern for possible perforation of ascending colon with obstruction at sigmoid colon. She status post exploratory laparotomy on 01/20/2022 showing ileitis at  proximal anastomosis with evidence of fistula between her ileum and sigmoid colon; no evidence of perforation. She is status post ileocolic resection 45 cm of terminal ileum with approximately 115 cm of small bowel remaining. Post-op course notable for continued signs of sepsis (tachycardia and leukocytosis) that has since improved.     Problem List:     #1. 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   #2. At risk for short bowel syndrome (<200 cm small bowel remaining)  #3. COVID-19.   #4. Seizure disorder.   #5. Hypoalbuminemia    Our recommendations are as follows:     -Okay to begin tapering off Prednisone by reducing dose by 5 mg every two week.   -No plans for repeat endoscopic intervention currently.   -Trend CRP daily.   -Low fiber/low residue diet once allowed for PO intake.   -Agree with use of TPN to optimize nutrition post-op.   -Post-op, will need to monitor for increase stool frequency with patient at risk for short bowel syndrome; anticipated need for scheduled anti-motility agents. Agree with use of GATTEX; IBD clinic here can assist with medication approval and management, if desired.   -Maintain DVT prophylaxis daily.   -Avoid NSAID products.   -Minimize use of opioids with risk of intra-abdominal infections and increase mortality in IBD patients. Agree with maximizing multi-modal pain control as much as possible.   -In terms of longterm medical management of Crohn's, anticipated need for initiation of ENTYVIO or STELARA in the coming weeks. Will work on setting up outpatient follow-up with GI here.           Thank you for your consult. I will follow-up with patient. Please contact us if you have any additional questions.    Darshan Richardson MD  Gastroenterology  [facility]

## 2022-01-25 NOTE — PHYSICIAN QUERY
PT Name: Carline Chang  MR #: [id number]     DOCUMENTATION CLARIFICATION     CDS: Brandy Capley, RN  Email: [email or web address]    This form is a permanent document in the medical record.     Query Date: January 25, 2022    By submitting this query, we are merely seeking further clarification of documentation.  Please utilize your independent clinical judgment when addressing the question(s) below.  The Medical Record contains the following:  Indicators Supporting Clinical Findings Location in Medical Record   X HR         RR          BP        Temp Vital Signs (24h Range):  Temp:  [97.8 °F (36.6 °C)-99.1 °F (37.3 °C)] 98.3 °F (36.8 °C)  Pulse:  [102-162] 118  Resp:  [16-30] 17  SpO2:  [95 %-100 %] 97 %  BP: (108-130)/(56-84) 112/67    Vital Signs (24h Range):  Temp:  [97.7 °F (36.5 °C)-99.1 °F (37.3 °C)] 97.7 °F (36.5 °C)  Pulse:  [102-160] 114  Resp:  [16-20] 20  SpO2:  [97 %-100 %] 99 %  BP: (107-129)/(53-74) 115/53   Gastroenterology consult 1/20              Colon and rectal surgery progress notes 1/21, filed 1/22   X Lactic Acid          Procalcitonin    1/19/2022 18:45 1/19/2022 18:49 1/21/2022 03:00   Lactate, Marco  1.5 1.7   Procalcitonin 0.18          Labs 1/19-1/21   X WBC           Bands          CRP    1/19/2022 17:00 1/20/2022 08:04 1/20/2022 23:38 1/21/2022 03:46 1/22/2022 06:27   WBC 19.59 (H) 10.95 31.06 (H) 27.37 (H) 15.30 (H)      1/20/2022 08:04   CRP 56.6 (H)        Labs 1/19-1/22   X Culture(s) No Growth after 5 days   Blood cultures X 2 sets 1/19    AMS, Confusion, LOC, etc.      Organ Dysfunction/Failure      Bacteremia or Sepsis / Septic Her presentation was initially notable for signs of sepsis with tachycardia and leukocytosis. Her initial work-up concerning for positive testing for COVID-19 and concern for perforation of ascending colon with obstruction at sigmoid colon. She is pending exploratory laparotomy.   Gastroenterology consult 1/20    Known or Suspected Source of  Infection documented      (Failed) Outpatient Treatment     X Medication piperacillin-tazobactam 4.5 g in sodium chloride 0.9% 100 mL IVPB (ready to mix system)  Dose: 4.5 g  Freq: Every 8 hours (non-standard times) Route: IV  Indications of Use: Intra-abdominal  Start: 01/20/22 0300 End: 01/24/22 0727    azithromycin 500 mg in dextrose 5 % 250 mL IVPB (ready to mix system)  Dose: 500 mg  Freq: Once Route: IV  Indications of Use: lower respiratory infection  Start: 01/19/22 1945 End: 01/19/22 2051    meropenem-0.9% sodium chloride 1 g/50 mL IVPB  Dose: 1 g  Freq: Once Route: IV  Indications of Use: Intra-abdominal  Start: 01/19/22 2000 End: 01/19/22 2020   MAR 1/19-1/24   X Treatment Preoperative diagnosis:   Perforated viscus, large bowel obstruction.      Postoperative diagnosis:    No evidence of perforated viscous.  Fistulizing ileitis, with ileosigmoid fistula, extensive adhesions, torsion of ascending colon secondary to inflammatory changes of ileocolonic anastomosis, large bowel obstruction due to ileosigmoid fistula      Name of procedure:    Exploratory laparotomy, extensive lysis of adhesions lasting more than two hours, repair of ileosigmoid fistula, ileocolonic resection with KONO S anastomosis, omental wrap of anastomosis, hernia repair (stoma site, left side abdomen).     Op note 1/20, filed 1/21    Other          Provider, please clarify consultants documentation of sepsis:    [   ] Sepsis due to unknown organism (please clarify source): __________________     [   ] Sepsis due to (suspected) organism (please specify organism): __________  (please clarify source): __________________     [   ] SIRS without infectious etiology     [   ] SIRS with infection but without Sepsis (please clarify source of infection): ____________________     [   ] Other Infectious Disease (please specify): __________     [ x  ] Sepsis Ruled Out     [  ] Clinically Undetermined         Please document in your progress notes  daily for the duration of treatment until resolved and include in your discharge summary.

## 2022-01-25 NOTE — PLAN OF CARE
POC reviewed with the pt, verbalized understanding. Pt remains on covid precautions. Abd ML PRAFUL w/ dermabond. CLD, tolerated well. ambulating in room and to BR w/ staff assist. TPN infusing per MAR. Pain managed with, PRN pain medication, Pt frequently needed the IV dilaudid because the oral oxycodone was not helping. Voided adequate in the bedside commode. On telemetry, pt continues to experience tachycardia - HR as high as 120's while ambulating. Call bell in reach, bed locked in lowest position. Frequent rounds made for pt safety. VSS, will continue to monitor.

## 2022-01-25 NOTE — PROGRESS NOTES
[facility]  Colorectal Surgery  Progress Note    Patient Name: Carline Chang  MRN: [id number]  Admission Date: 1/19/2022  Hospital Length of Stay: 6 days  Attending Physician: LLUVIA Post MD    Subjective:     Interval History: Difficulty with pain control overnight requiring frequent IV dilaudid for control. Per patient at home takes norco  QID on average. Tolerating CLD, but with minimal intake. Multiple bouts of diarrhea yesterday as well. Continued intermittent tachycardia (120s), mostly with ambulation.     Post-Op Info:  Procedure(s) (LRB):  LAPAROTOMY, EXPLORATORY (N/A)  COLON RESECTION (N/A)  LYSIS, ADHESIONS (N/A)  WRAP-OMENTAL (N/A)  REPAIR OF ILEOCOLIC FISTULA (N/A)   5 Days Post-Op      Medications:  Continuous Infusions:   TPN ADULT CENTRAL LINE CUSTOM 60 mL/hr at 01/25/22 0015     Scheduled Meds:   acetaminophen  1,000 mg Oral Q8H    furosemide (LASIX) injection  40 mg Intravenous Once    gabapentin  300 mg Oral TID    levETIRAcetam  500 mg Oral BID    LIDOcaine  1 patch Transdermal Q24H    lipid (SMOFLIPID)  250 mL Intravenous Daily    methocarbamoL  500 mg Oral QID    mupirocin   Nasal BID    pantoprazole  40 mg Oral Daily    predniSONE  15 mg Oral Daily    sodium chloride 0.9%  10 mL Intravenous Q6H    venlafaxine  75 mg Oral BID     PRN Meds:   HYDROmorphone    ondansetron    oxyCODONE    oxyCODONE    prochlorperazine    sodium chloride 0.9%    sodium chloride 0.9%    sodium chloride 0.9%        Objective:     Vital Signs (Most Recent):  Temp: 98.5 °F (36.9 °C) (01/25/22 0624)  Pulse: 93 (01/25/22 0624)  Resp: 15 (01/25/22 0639)  BP: (!) 164/82 (01/25/22 0624)  SpO2: (!) 90 % (01/25/22 0624) Vital Signs (24h Range):  Temp:  [98.3 °F (36.8 °C)-98.7 °F (37.1 °C)] 98.5 °F (36.9 °C)  Pulse:  [] 93  Resp:  [15-16] 15  SpO2:  [90 %-94 %] 90 %  BP: (131-164)/(67-82) 164/82     Intake/Output - Last 3 Shifts       01/23 0700 01/24 0659 01/24 0700 01/25  0659 01/25 0700  01/26 0659    P.O.  1000     Blood       Total Intake(mL/kg)  1000 (17.2)     Urine (mL/kg/hr) 3660 (2.6) 1600 (1.1)     Stool 0 0     Total Output 3660 1600     Net -3660 -600            Urine Occurrence  2 x     Stool Occurrence 0 x 6 x           Physical Exam  Vitals and nursing note reviewed.   Constitutional:       General: She is not in acute distress.     Appearance: She is ill-appearing.   HENT:      Head: Normocephalic.   Eyes:      Extraocular Movements: Extraocular movements intact.      Conjunctiva/sclera: Conjunctivae normal.   Cardiovascular:      Rate and Rhythm: Regular rhythm. Tachycardia present.   Pulmonary:      Effort: Pulmonary effort is normal. No respiratory distress.   Abdominal:      General: Abdomen is flat. There is no distension.      Palpations: Abdomen is soft.      Tenderness: There is abdominal tenderness. There is no guarding or rebound.      Comments: Midline incision well approximated, clean dry and intact. Mild perincisional pain.   Skin:     General: Skin is warm and dry.   Neurological:      General: No focal deficit present.      Mental Status: She is alert and oriented to person, place, and time.       Significant Labs:  BMP (Last 3 Results):   Recent Labs   Lab 01/22/22  0627 01/22/22  0627 01/23/22  0521 01/24/22  0847 01/24/22  1715   *   < > 74 88 122*   *   < > 134* 136 138   K 3.9   < > 3.8 2.9* 3.6   CL 99   < > 99 99 103   CO2 24   < > 26 31* 26   BUN 9   < > 10 7 6   CREATININE 0.6   < > 0.6 0.6 0.6   CALCIUM 7.7*   < > 8.6* 8.5* 7.9*   MG 1.9  --  1.7 1.4*  --     < > = values in this interval not displayed.     CBC (Last 3 Results):   Recent Labs   Lab 01/23/22  0521 01/23/22  1655 01/24/22  0847   WBC 13.18* 10.38 9.34   RBC 2.59* 2.35* 2.46*   HGB 7.7* 7.3* 7.4*   HCT 23.1* 22.4* 22.5*   * 123* 155   MCV 89 95 92   MCH 29.7 31.1* 30.1   MCHC 33.3 32.6 32.9     CRP (Last 3 Results):   Recent Labs   Lab 01/20/22  0804  01/23/22  0521 01/24/22  0847   CRP 56.6* 183.6* 109.4*       Significant Diagnostics:  None    Assessment/Plan:     Crohn disease  Carline Chang is a 56 y.o. female with Crohn's disease on steroids and complicated surgical history presents with leukocytosis of 19K and small volume extra-luminal free air near the ascending colon concerning for perforated viscus now s/p ex-lap with FOREIGN and resection of ileocolic anastomosis and segment of proximal small bowel with dense adhesions.     -Pain: Will increase PRN oral oxy to 10/15 given home norco use. Continue MM (robaxin, tylenol, lidocaine patch, toradol) + PRN breakthrough  -Diet: Advance to Regular. Continue mIVF given poor intake  -PICC/TPN   -Add immodium TID  -Home meds reviewed, hold diuretics and anti-hypertensives for now  -GI consulted for assistance with med management, Continue PO steriods; appreciate recommendations  -Arranging for prior auth on Teduglutide given 100cm small bowel remaining  -PT/OT: Home Health, no DME at this time.  -DVT ppx: Resume LVX            Sanchez Mcclure MD  Colorectal Surgery  [facility]

## 2022-01-25 NOTE — PLAN OF CARE
Pt continues to progress towards treatment goals established in POC. Will reassess as appropriate.    Problem: Physical Therapy Goal  Goal: Physical Therapy Goal  Description: Goals to be met by: 22    Patient will increase functional independence with mobility by performin. Supine to sit with Stand-by Assistance -not met  2. Sit to stand transfer with Stand-by Assistance with AD if needed - not met  3. Gait  x 100 feet with Supervision using AD if needed  - not met.     Outcome: Ongoing, Progressing

## 2022-01-25 NOTE — ASSESSMENT & PLAN NOTE
This is a 56 year old female with PMH significant for Crohn's disease (ileal and rectal disease status post multiple small bowel resections associated with rectovaginal fistula status post repair) with chronic pain syndrome, DVT of right upper extremity (01/2017), and possible new seizure disorder as of 01/2022 (on antiepileptic medication) who presented on 01/19/2022 with generalized weakness and back pain, but without abdominal pain or increase in stool frequency.     Her presentation was initially notable for signs of sepsis with tachycardia and leukocytosis. Her initial work-up concerning for positive testing for COVID-19 and concern for possible perforation of ascending colon with obstruction at sigmoid colon. She status post exploratory laparotomy on 01/20/2022 showing ileitis at proximal anastomosis with evidence of fistula between her ileum and sigmoid colon; no evidence of perforation. She is status post ileocolic resection 45 cm of terminal ileum with approximately 115 cm of small bowel remaining. Post-op course notable for continued signs of sepsis (tachycardia and leukocytosis) that has since improved.     Problem List:     #1. 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   #2. At risk for short bowel syndrome (<200 cm small bowel remaining)  #3. COVID-19.   #4. Seizure disorder.   #5. Hypoalbuminemia    Our recommendations are as follows:     -Okay to begin tapering off Prednisone by reducing dose by 5 mg every two week.   -No plans for repeat endoscopic intervention currently.   -Trend CRP daily.   -Low fiber/low residue diet once allowed for PO intake.   -Agree with use of TPN to optimize nutrition post-op.   -Post-op, will need to monitor for increase stool frequency with patient at risk for short bowel syndrome; anticipated need for scheduled anti-motility agents. Agree with use of  GATTEX; IBD clinic here can assist with medication approval and management, if desired.   -Maintain DVT prophylaxis daily.   -Avoid NSAID products.   -Minimize use of opioids with risk of intra-abdominal infections and increase mortality in IBD patients. Agree with maximizing multi-modal pain control as much as possible.   -In terms of longterm medical management of Crohn's, anticipated need for initiation of ENTYVIO or STELARA in the coming weeks. Will work on setting up outpatient follow-up with GI here.

## 2022-01-25 NOTE — SUBJECTIVE & OBJECTIVE
Subjective:     Interval History: Patient status post initiation of weaning steroid taper yesterday. She continues to report abdominal and back pain that is worse with movement. She reports having five liquid non-bloody bowel movements in the last 24 hours. She continues on TPN and CRS reportedly working on approval for initiation of GATTEX.     Review of Systems   Constitutional: Positive for fatigue. Negative for diaphoresis and fever.   Respiratory: Negative for cough and shortness of breath.    Cardiovascular: Negative for chest pain and leg swelling.   Gastrointestinal: Positive for abdominal pain and diarrhea. Negative for abdominal distention, nausea and vomiting.     Objective:     Vital Signs (Most Recent):  Temp: 98.5 °F (36.9 °C) (01/25/22 0624)  Pulse: 93 (01/25/22 0624)  Resp: 16 (01/25/22 1509)  BP: (!) 164/82 (01/25/22 0624)  SpO2: 95 % (01/25/22 1227) Vital Signs (24h Range):  Temp:  [98.3 °F (36.8 °C)-98.5 °F (36.9 °C)] 98.5 °F (36.9 °C)  Pulse:  [80-93] 93  Resp:  [15-16] 16  SpO2:  [90 %-95 %] 95 %  BP: (141-164)/(77-82) 164/82     Weight: 58.2 kg (128 lb 4.9 oz) (01/20/22 0146)  Body mass index is 19.51 kg/m².      Intake/Output Summary (Last 24 hours) at 1/25/2022 1608  Last data filed at 1/24/2022 1900  Gross per 24 hour   Intake 500 ml   Output 600 ml   Net -100 ml       Lines/Drains/Airways     Peripherally Inserted Central Catheter Line            PICC Double Lumen 01/21/22 1617 left brachial 3 days          Peripheral Intravenous Line                 Midline Catheter Insertion/Assessment  - Single Lumen 01/21/22 0800 Right 4 days                Physical Exam  Constitutional:       General: She is not in acute distress.     Appearance: Normal appearance. She is not ill-appearing.   Eyes:      General: No scleral icterus.     Conjunctiva/sclera: Conjunctivae normal.   Cardiovascular:      Rate and Rhythm: Regular rhythm. Tachycardia present.      Pulses: Normal pulses.      Heart sounds:  Normal heart sounds.   Pulmonary:      Effort: Pulmonary effort is normal. No respiratory distress.      Breath sounds: Normal breath sounds.   Abdominal:      General: Bowel sounds are normal. There is distension.      Palpations: Abdomen is soft.      Tenderness: There is generalized abdominal tenderness.      Comments: Clean dressing in place over abdominal midline.    Skin:     General: Skin is warm and dry.      Findings: No bruising or rash.   Neurological:      Mental Status: She is alert and oriented to person, place, and time.         Significant Labs:  All pertinent lab results from the last 24 hours have been reviewed.      Significant Imaging:  Imaging results within the past 24 hours have been reviewed.

## 2022-01-26 LAB
ANION GAP SERPL CALC-SCNC: 7 MMOL/L (ref 8–16)
ANISOCYTOSIS BLD QL SMEAR: SLIGHT
BASOPHILS # BLD AUTO: ABNORMAL K/UL (ref 0–0.2)
BASOPHILS NFR BLD: 0 % (ref 0–1.9)
BUN SERPL-MCNC: 6 MG/DL (ref 6–20)
CALCIUM SERPL-MCNC: 8.5 MG/DL (ref 8.7–10.5)
CHLORIDE SERPL-SCNC: 106 MMOL/L (ref 95–110)
CO2 SERPL-SCNC: 25 MMOL/L (ref 23–29)
CREAT SERPL-MCNC: 0.6 MG/DL (ref 0.5–1.4)
CRP SERPL-MCNC: 130.7 MG/L (ref 0–8.2)
DIFFERENTIAL METHOD: ABNORMAL
EOSINOPHIL # BLD AUTO: ABNORMAL K/UL (ref 0–0.5)
EOSINOPHIL NFR BLD: 1 % (ref 0–8)
ERYTHROCYTE [DISTWIDTH] IN BLOOD BY AUTOMATED COUNT: 14.9 % (ref 11.5–14.5)
EST. GFR  (AFRICAN AMERICAN): >60 ML/MIN/1.73 M^2
EST. GFR  (NON AFRICAN AMERICAN): >60 ML/MIN/1.73 M^2
GLUCOSE SERPL-MCNC: 97 MG/DL (ref 70–110)
HCT VFR BLD AUTO: 24.3 % (ref 37–48.5)
HGB BLD-MCNC: 7.7 G/DL (ref 12–16)
HYPOCHROMIA BLD QL SMEAR: ABNORMAL
IMM GRANULOCYTES # BLD AUTO: ABNORMAL K/UL (ref 0–0.04)
IMM GRANULOCYTES NFR BLD AUTO: ABNORMAL % (ref 0–0.5)
LYMPHOCYTES # BLD AUTO: ABNORMAL K/UL (ref 1–4.8)
LYMPHOCYTES NFR BLD: 2 % (ref 18–48)
MAGNESIUM SERPL-MCNC: 1.9 MG/DL (ref 1.6–2.6)
MCH RBC QN AUTO: 29.5 PG (ref 27–31)
MCHC RBC AUTO-ENTMCNC: 31.7 G/DL (ref 32–36)
MCV RBC AUTO: 93 FL (ref 82–98)
MONOCYTES # BLD AUTO: ABNORMAL K/UL (ref 0.3–1)
MONOCYTES NFR BLD: 0 % (ref 4–15)
MYELOCYTES NFR BLD MANUAL: 1 %
NEUTROPHILS NFR BLD: 96 % (ref 38–73)
NRBC BLD-RTO: 1 /100 WBC
OVALOCYTES BLD QL SMEAR: ABNORMAL
PHOSPHATE SERPL-MCNC: 2.3 MG/DL (ref 2.7–4.5)
PLATELET # BLD AUTO: 174 K/UL (ref 150–450)
PMV BLD AUTO: 9 FL (ref 9.2–12.9)
POIKILOCYTOSIS BLD QL SMEAR: SLIGHT
POLYCHROMASIA BLD QL SMEAR: ABNORMAL
POTASSIUM SERPL-SCNC: 3.8 MMOL/L (ref 3.5–5.1)
RBC # BLD AUTO: 2.61 M/UL (ref 4–5.4)
SODIUM SERPL-SCNC: 138 MMOL/L (ref 136–145)
SPHEROCYTES BLD QL SMEAR: ABNORMAL
TRIGL SERPL-MCNC: 119 MG/DL (ref 30–150)
WBC # BLD AUTO: 8.84 K/UL (ref 3.9–12.7)

## 2022-01-26 PROCEDURE — 86140 C-REACTIVE PROTEIN: CPT | Performed by: STUDENT IN AN ORGANIZED HEALTH CARE EDUCATION/TRAINING PROGRAM

## 2022-01-26 PROCEDURE — 27000207 HC ISOLATION

## 2022-01-26 PROCEDURE — 63600175 PHARM REV CODE 636 W HCPCS

## 2022-01-26 PROCEDURE — 25000003 PHARM REV CODE 250: Performed by: STUDENT IN AN ORGANIZED HEALTH CARE EDUCATION/TRAINING PROGRAM

## 2022-01-26 PROCEDURE — 25000003 PHARM REV CODE 250: Performed by: COLON & RECTAL SURGERY

## 2022-01-26 PROCEDURE — 83735 ASSAY OF MAGNESIUM: CPT | Performed by: STUDENT IN AN ORGANIZED HEALTH CARE EDUCATION/TRAINING PROGRAM

## 2022-01-26 PROCEDURE — 84100 ASSAY OF PHOSPHORUS: CPT | Performed by: STUDENT IN AN ORGANIZED HEALTH CARE EDUCATION/TRAINING PROGRAM

## 2022-01-26 PROCEDURE — A4216 STERILE WATER/SALINE, 10 ML: HCPCS | Performed by: COLON & RECTAL SURGERY

## 2022-01-26 PROCEDURE — 63600175 PHARM REV CODE 636 W HCPCS: Performed by: STUDENT IN AN ORGANIZED HEALTH CARE EDUCATION/TRAINING PROGRAM

## 2022-01-26 PROCEDURE — 94761 N-INVAS EAR/PLS OXIMETRY MLT: CPT

## 2022-01-26 PROCEDURE — 25000003 PHARM REV CODE 250

## 2022-01-26 PROCEDURE — 20600001 HC STEP DOWN PRIVATE ROOM

## 2022-01-26 PROCEDURE — 85007 BL SMEAR W/DIFF WBC COUNT: CPT | Performed by: STUDENT IN AN ORGANIZED HEALTH CARE EDUCATION/TRAINING PROGRAM

## 2022-01-26 PROCEDURE — 80048 BASIC METABOLIC PNL TOTAL CA: CPT | Performed by: STUDENT IN AN ORGANIZED HEALTH CARE EDUCATION/TRAINING PROGRAM

## 2022-01-26 PROCEDURE — 84478 ASSAY OF TRIGLYCERIDES: CPT | Performed by: COLON & RECTAL SURGERY

## 2022-01-26 PROCEDURE — 85027 COMPLETE CBC AUTOMATED: CPT | Performed by: STUDENT IN AN ORGANIZED HEALTH CARE EDUCATION/TRAINING PROGRAM

## 2022-01-26 RX ORDER — METHOCARBAMOL 500 MG/1
500 TABLET, FILM COATED ORAL 4 TIMES DAILY
Qty: 40 TABLET | Refills: 0 | Status: SHIPPED | OUTPATIENT
Start: 2022-01-26 | End: 2022-02-06

## 2022-01-26 RX ORDER — POTASSIUM CHLORIDE 750 MG/1
20 CAPSULE, EXTENDED RELEASE ORAL ONCE
Status: COMPLETED | OUTPATIENT
Start: 2022-01-26 | End: 2022-01-26

## 2022-01-26 RX ORDER — LOPERAMIDE HYDROCHLORIDE 2 MG/1
4 CAPSULE ORAL 4 TIMES DAILY
Status: DISCONTINUED | OUTPATIENT
Start: 2022-01-26 | End: 2022-01-27 | Stop reason: HOSPADM

## 2022-01-26 RX ORDER — LOPERAMIDE HYDROCHLORIDE 2 MG/1
4 CAPSULE ORAL 4 TIMES DAILY
Qty: 90 CAPSULE | Refills: 5 | Status: SHIPPED | OUTPATIENT
Start: 2022-01-26

## 2022-01-26 RX ORDER — PREDNISONE 5 MG/1
15 TABLET ORAL DAILY
Qty: 47 TABLET | Refills: 0 | Status: SHIPPED | OUTPATIENT
Start: 2022-01-27 | End: 2022-02-15

## 2022-01-26 RX ORDER — PREDNISONE 5 MG/1
5 TABLET ORAL DAILY
Qty: 14 TABLET | Refills: 0 | Status: SHIPPED | OUTPATIENT
Start: 2022-02-22 | End: 2022-02-14

## 2022-01-26 RX ORDER — MAGNESIUM SULFATE 1 G/100ML
1 INJECTION INTRAVENOUS ONCE
Status: COMPLETED | OUTPATIENT
Start: 2022-01-26 | End: 2022-01-26

## 2022-01-26 RX ORDER — SODIUM,POTASSIUM PHOSPHATES 280-250MG
2 POWDER IN PACKET (EA) ORAL EVERY 4 HOURS
Status: COMPLETED | OUTPATIENT
Start: 2022-01-26 | End: 2022-01-26

## 2022-01-26 RX ORDER — PREDNISONE 10 MG/1
10 TABLET ORAL DAILY
Qty: 14 TABLET | Refills: 0 | Status: SHIPPED | OUTPATIENT
Start: 2022-02-08 | End: 2022-02-22

## 2022-01-26 RX ORDER — HYDROMORPHONE HYDROCHLORIDE 1 MG/ML
0.5 INJECTION, SOLUTION INTRAMUSCULAR; INTRAVENOUS; SUBCUTANEOUS EVERY 6 HOURS PRN
Status: DISCONTINUED | OUTPATIENT
Start: 2022-01-26 | End: 2022-01-27 | Stop reason: HOSPADM

## 2022-01-26 RX ORDER — OXYCODONE HYDROCHLORIDE 10 MG/1
10 TABLET ORAL EVERY 6 HOURS PRN
Qty: 30 TABLET | Refills: 0 | Status: SHIPPED | OUTPATIENT
Start: 2022-01-26 | End: 2022-03-10

## 2022-01-26 RX ADMIN — LOPERAMIDE HYDROCHLORIDE 4 MG: 2 CAPSULE ORAL at 09:01

## 2022-01-26 RX ADMIN — KETOROLAC TROMETHAMINE 15 MG: 15 INJECTION, SOLUTION INTRAMUSCULAR; INTRAVENOUS at 12:01

## 2022-01-26 RX ADMIN — MAGNESIUM SULFATE HEPTAHYDRATE 1 G: 500 INJECTION, SOLUTION INTRAMUSCULAR; INTRAVENOUS at 01:01

## 2022-01-26 RX ADMIN — PREDNISONE 15 MG: 5 TABLET ORAL at 09:01

## 2022-01-26 RX ADMIN — POTASSIUM CHLORIDE 20 MEQ: 10 CAPSULE, COATED, EXTENDED RELEASE ORAL at 09:01

## 2022-01-26 RX ADMIN — OXYCODONE HYDROCHLORIDE 15 MG: 10 TABLET ORAL at 10:01

## 2022-01-26 RX ADMIN — SODIUM CHLORIDE 300 MG: 9 INJECTION, SOLUTION INTRAVENOUS at 12:01

## 2022-01-26 RX ADMIN — LEVETIRACETAM 500 MG: 500 TABLET, FILM COATED ORAL at 10:01

## 2022-01-26 RX ADMIN — LOPERAMIDE HYDROCHLORIDE 4 MG: 2 CAPSULE ORAL at 05:01

## 2022-01-26 RX ADMIN — OXYCODONE HYDROCHLORIDE 15 MG: 10 TABLET ORAL at 05:01

## 2022-01-26 RX ADMIN — PANTOPRAZOLE SODIUM 40 MG: 40 TABLET, DELAYED RELEASE ORAL at 10:01

## 2022-01-26 RX ADMIN — ACETAMINOPHEN 1000 MG: 500 TABLET ORAL at 05:01

## 2022-01-26 RX ADMIN — LEVETIRACETAM 500 MG: 500 TABLET, FILM COATED ORAL at 09:01

## 2022-01-26 RX ADMIN — VENLAFAXINE 75 MG: 37.5 TABLET ORAL at 09:01

## 2022-01-26 RX ADMIN — HYDROMORPHONE HYDROCHLORIDE 0.5 MG: 1 INJECTION, SOLUTION INTRAMUSCULAR; INTRAVENOUS; SUBCUTANEOUS at 05:01

## 2022-01-26 RX ADMIN — Medication 10 ML: at 02:01

## 2022-01-26 RX ADMIN — KETOROLAC TROMETHAMINE 15 MG: 15 INJECTION, SOLUTION INTRAMUSCULAR; INTRAVENOUS at 05:01

## 2022-01-26 RX ADMIN — ACETAMINOPHEN 1000 MG: 500 TABLET ORAL at 09:01

## 2022-01-26 RX ADMIN — METHOCARBAMOL 500 MG: 500 TABLET ORAL at 05:01

## 2022-01-26 RX ADMIN — Medication 10 ML: at 05:01

## 2022-01-26 RX ADMIN — METHOCARBAMOL 500 MG: 500 TABLET ORAL at 10:01

## 2022-01-26 RX ADMIN — GABAPENTIN 300 MG: 300 CAPSULE ORAL at 09:01

## 2022-01-26 RX ADMIN — ACETAMINOPHEN 1000 MG: 500 TABLET ORAL at 02:01

## 2022-01-26 RX ADMIN — METHOCARBAMOL 500 MG: 500 TABLET ORAL at 09:01

## 2022-01-26 RX ADMIN — LIDOCAINE 5% 1 PATCH: 700 PATCH TOPICAL at 10:01

## 2022-01-26 RX ADMIN — ENOXAPARIN SODIUM 40 MG: 100 INJECTION SUBCUTANEOUS at 05:01

## 2022-01-26 RX ADMIN — POTASSIUM & SODIUM PHOSPHATES POWDER PACK 280-160-250 MG 2 PACKET: 280-160-250 PACK at 02:01

## 2022-01-26 RX ADMIN — VENLAFAXINE 75 MG: 37.5 TABLET ORAL at 10:01

## 2022-01-26 RX ADMIN — LOPERAMIDE HYDROCHLORIDE 4 MG: 2 CAPSULE ORAL at 02:01

## 2022-01-26 RX ADMIN — GABAPENTIN 300 MG: 300 CAPSULE ORAL at 02:01

## 2022-01-26 RX ADMIN — HYDROMORPHONE HYDROCHLORIDE 0.5 MG: 1 INJECTION, SOLUTION INTRAMUSCULAR; INTRAVENOUS; SUBCUTANEOUS at 09:01

## 2022-01-26 RX ADMIN — HYDROMORPHONE HYDROCHLORIDE 0.5 MG: 1 INJECTION, SOLUTION INTRAMUSCULAR; INTRAVENOUS; SUBCUTANEOUS at 02:01

## 2022-01-26 RX ADMIN — POTASSIUM & SODIUM PHOSPHATES POWDER PACK 280-160-250 MG 2 PACKET: 280-160-250 PACK at 10:01

## 2022-01-26 RX ADMIN — LOPERAMIDE HYDROCHLORIDE 4 MG: 2 CAPSULE ORAL at 10:01

## 2022-01-26 RX ADMIN — METHOCARBAMOL 500 MG: 500 TABLET ORAL at 02:01

## 2022-01-26 RX ADMIN — GABAPENTIN 300 MG: 300 CAPSULE ORAL at 10:01

## 2022-01-26 RX ADMIN — Medication 10 ML: at 12:01

## 2022-01-26 NOTE — PLAN OF CARE
01/26/22 1301   Post-Acute Status   Post-Acute Authorization Home Health   Home Health Status Referrals Sent   SW sent HH orders to [facility].  Pt does not need TPN for home now. SW informed [facility] of the update.    Abi Chang LMSW  [facility]  [id number]

## 2022-01-26 NOTE — ASSESSMENT & PLAN NOTE
Carline Chang is a 56 y.o. female with Crohn's disease on steroids and complicated surgical history presents with leukocytosis of 19K and small volume extra-luminal free air near the ascending colon concerning for perforated viscus now s/p ex-lap with FOREIGN and resection of ileocolic anastomosis and segment of proximal small bowel with dense adhesions.     -Pain: Continue MM (robaxin, tylenol, lidocaine patch, toradol). Wean off IV meds today  -Diet: LRD+ boost  -Imodium 4mg QID  -Wean TPN off by tomorrow  -GI consulted for assistance with med management: continue PO steroids will plan to taper prednisone by 5 mg every 2 weeks and f/u in IBD clinic 2 weeks post op  -Arranging for prior auth on Teduglutide given 100cm small bowel remaining  -IV Iron  -PT/OT: Home Health, no DME at this time.  -DVT ppx: LVX    Possibly d/c home tomorrow vs Friday

## 2022-01-26 NOTE — PLAN OF CARE
Girish jonnathan [facility]      HOME HEALTH ORDERS  FACE TO FACE ENCOUNTER    Patient Name: Carline Chang  YOB: 1965    PCP: Pablo Medina MD (Inactive)   PCP Address: [address] [place] [address] [address] [facility] [address]  PCP Phone Number: [phone]  PCP Fax: [phone]    Encounter Date: 1/19/22    Admit to Home Health    Diagnoses:  Active Hospital Problems    Diagnosis  POA    *Large bowel obstruction [K56.609]  Yes    Crohn disease [K50.90]  Yes      Resolved Hospital Problems   No resolved problems to display.       Follow Up Appointments:  No future appointments.    Allergies:  Review of patient's allergies indicates:   Allergen Reactions    Remicade [infliximab] Shortness Of Breath and Palpitations     Severe muscle and joint, and bone pain    Morphine Other (See Comments)     Abdominal pain    Flagyl [metronidazole] Other (See Comments)     Neuropathy    Nubain [nalbuphine] Anxiety     Upper abdominal burning        Medications: Review discharge medications with patient and family and provide education.    Current Facility-Administered Medications   Medication Dose Route Frequency Provider Last Rate Last Admin    acetaminophen tablet 1,000 mg  1,000 mg Oral Q8H Alex Neves MD   1,000 mg at 01/26/22 0503    enoxaparin injection 40 mg  40 mg Subcutaneous Daily Sanchez Mcclure MD   40 mg at 01/25/22 1747    gabapentin capsule 300 mg  300 mg Oral TID Alex Neves MD   300 mg at 01/26/22 1002    HYDROmorphone injection 0.5 mg  0.5 mg Intravenous Q6H PRN Alex Neves MD        iron sucrose (VENOFER) 300 mg in sodium chloride 0.9% 250 mL IVPB  300 mg Intravenous Once Alex Neves MD        ketorolac injection 15 mg  15 mg Intravenous Q6H Alex Neves MD   15 mg at 01/26/22 0503    levETIRAcetam tablet 500 mg  500 mg Oral BID Alex Neves MD   500 mg at 01/26/22 1002    LIDOcaine 5 % patch 1 patch  1 patch Transdermal Q24H Alex Neves MD   1 patch at 01/26/22 1006    loperamide capsule 4 mg  4 mg Oral QID  Hospitalist Discharge Summary     Patient ID:  Isabela Medina  [id number]  89 y.o.  1947 9/28/2021    PCP on record: Collin Lazar MD    Admit date: 9/28/2021  Discharge date and time: 10/4/2021    DISCHARGE DIAGNOSIS:    Atypical chest pain to rule out acute coronary syndrome, POA  Orthostatic hypotension  Debility  Severe spinal stenosis L4-L5  History of GERD  Hypertension       Hypovolemic hypernatremia  Hypokalemia  Replace electrolytes  Sodium is 134  Follow BMP    CONSULTATIONS:  IP CONSULT TO CARDIOLOGY  IP CONSULT TO HOSPITALIST  IP CONSULT TO NEUROSURGERY    Excerpted HPI from H&P of Claudette Sanz MD:    Isabela Medina is a 69 y.o.  male who presents with left sided worsening chest pain for the past week. Pain is aggravated by carbonated drinks and is worse on pressure application. Accompanying symptoms include SOB and nausea. Patient was recently seen in ED for the same complaint last 08/20/21 and was sent home on Famotidine and cardiology follow up. Patient never followed up with cardiology. He also reports left LE weakness and left hip pain making him unable to bear weight on his left leg and ambulates with crutches because of that. Also reports numbness and intermittent swelling on his left leg. Only documented past medical history is hypertension but also admitted to having asthma and uses inhaler intermittently.     We were asked to admit for work up and evaluation of the above problems. ______________________________________________________________________  DISCHARGE SUMMARY/HOSPITAL COURSE:  for full details see H&P, daily progress notes, labs, consult notes.      Atypical chest pain to rule out acute coronary syndrome, POA  EKG on admission sinus tachycardia with second-degree AV block Mobitz 1  Chest x-ray on admission with no acute abnormality  Troponin less than 0.05 on admission, less than 0.05 for repeat  proBNP 690  On aspirin and Plavix, Alex Neves MD   4 mg at 01/26/22 1002    magnesium sulfate in dextrose IVPB (premix) 1 g  1 g Intravenous Once Sanchez Mcclure MD        methocarbamoL tablet 500 mg  500 mg Oral QID Sanchez Mcclure MD   500 mg at 01/26/22 1002    ondansetron injection 4 mg  4 mg Intravenous Q6H PRN Alex Neves MD        oxyCODONE immediate release tablet 15 mg  15 mg Oral Q4H PRN Sanchez Mcclure MD   15 mg at 01/26/22 1001    oxyCODONE immediate release tablet Tab 10 mg  10 mg Oral Q4H PRN Sanchez Mcclure MD        pantoprazole EC tablet 40 mg  40 mg Oral Daily Sanchez Mcclure MD   40 mg at 01/26/22 1000    potassium, sodium phosphates 280-160-250 mg packet 2 packet  2 packet Oral Q4H Sanchez Mcclure MD   2 packet at 01/26/22 1003    predniSONE tablet 15 mg  15 mg Oral Daily Sanchez Mcclure MD   15 mg at 01/26/22 0959    prochlorperazine injection Soln 5 mg  5 mg Intravenous Q6H PRN Alex Neves MD   5 mg at 01/21/22 0140    sodium chloride 0.9% flush 10 mL  10 mL Intravenous PRN Alex Neves MD        sodium chloride 0.9% flush 10 mL  10 mL Intravenous PRN Alex Neves MD        sodium chloride 0.9% flush 10 mL  10 mL Intravenous Q6H H. Andre Post MD   10 mL at 01/26/22 0503    And    sodium chloride 0.9% flush 10 mL  10 mL Intravenous PRN H. Andre Post MD        TPN ADULT CENTRAL LINE CUSTOM   Intravenous Continuous Sanchez Mcclure MD        venlafaxine tablet 75 mg  75 mg Oral BID Alex Neves MD   75 mg at 01/26/22 1000     Current Discharge Medication List      START taking these medications    Details   loperamide (IMODIUM) 2 mg capsule Take 2 capsules (4 mg total) by mouth 4 (four) times daily.  Qty: 90 capsule, Refills: 5      methocarbamoL (ROBAXIN) 500 MG Tab Take 1 tablet (500 mg total) by mouth 4 (four) times daily. for 10 days  Qty: 40 tablet, Refills: 0      oxyCODONE (ROXICODONE) 10 mg Tab immediate release tablet Take 1 tablet (10 mg total) by mouth every 6 (six) hours as needed for Pain.  Qty: 30 tablet, Refills: 0     continue  Cardiology recommended to continue metoprolol  Cardiology was consulted, stress test with no evidence of ischemia reported  EP were consulted, no plans for pacemaker at this point  [facility] recurrent chest pain 10/3, troponin negative again. EKG shows first-degree heart block. Patient's chest pain is exacerbated by the movement of the left shoulder and is likely musculoskeletal in nature  Supportive care     Orthostatic hypotension  Debility  Severe spinal stenosis L4-L5  -Status post IV hydration, orthostasis is resolved  PT OT evaluated patient recommending SNF. [facility]   CT scan revealed severe spinal canal stenosis L4-5, appreciated neurosurgery consult. Recommended MRI  Patient refused to get an MRI, will have outpatient follow-up with neurosurgery     History of GERD  Started on Protonix     Hypertension  Resume home meds as orthostatic hypotension is resolved  Hypovolemic hypernatremia  Hypokalemia  Replace electrolytes  Sodium is 134  Follow BMP        _______________________________________________________________________  Patient seen and examined by me on discharge day. Pertinent Findings:  Gen:    Not in distress  Chest: Clear lungs  CVS:   Regular rhythm. No edema  Abd:  Soft, not distended, not tender  Neuro:  Alert, oriented x4  _______________________________________________________________________  DISCHARGE MEDICATIONS:   Current Discharge Medication List      START taking these medications    Details   aspirin 81 mg chewable tablet Take 1 Tablet by mouth daily. Qty: 30 Tablet, Refills: 0  Start date: 10/1/2021      metoprolol tartrate (LOPRESSOR) 25 mg tablet Take 1 Tablet by mouth every twelve (12) hours. Qty: 60 Tablet, Refills: 0  Start date: 10/1/2021      pantoprazole (Protonix) 40 mg tablet Take 1 Tablet by mouth daily.   Qty: 30 Tablet, Refills: 0  Start date: 10/1/2021         CONTINUE these medications which have CHANGED    Details   clopidogreL (PLAVIX) 75 mg tab Take 1 Tablet by mouth daily. Qty: 30 Tablet, Refills: 0  Start date: 10/1/2021    Associated Diagnoses: HTN (hypertension), benign         CONTINUE these medications which have NOT CHANGED    Details   lisinopril (PRINIVIL, ZESTRIL) 10 mg tablet Take 1 Tab by mouth daily. Qty: 30 Tab, Refills: 0      spironolactone (ALDACTONE) 50 mg tablet Take 1 Tab by mouth daily. Qty: 30 Tab, Refills: 0         STOP taking these medications       labetalol (NORMODYNE) 100 mg tablet Comments:   Reason for Stopping:         ibuprofen (MOTRIN) 800 mg tablet Comments:   Reason for Stopping:         labetalol (NORMODYNE) 100 mg tablet Comments:   Reason for Stopping:                 Patient Follow Up Instructions: Activity: Activity as tolerated  Diet: Cardiac Diet  Wound Care: None needed    Follow-up with PCP and neurosurgery in 1 to 4 weeks.   Follow-up tests/labs none  Follow-up Information     Follow up With Specialties Details Why Contact Info    Other, MD Collin    Patient can only remember the practice name and not the physician      [facility] [address] [id number]  [phone]    Ifeoma Stearns MD Neurosurgery In 4 weeks  [address]  [phone]          ________________________________________________________________    Risk of deterioration: High    Condition at Discharge:  Stable  __________________________________________________________________    Disposition  SNF/LTC    ____________________________________________________________________    Code Status: Full Code  ___________________________________________________________________      Total time in minutes spent coordinating this discharge (includes going over instructions, follow-up, prescriptions, and preparing report for sign off to her PCP) :  35 minutes    Signed:  Mirian Olea MD Comments: n/a          CONTINUE these medications which have CHANGED    Details   !! predniSONE (DELTASONE) 10 MG tablet Take 1 tablet (10 mg total) by mouth once daily. for 14 days  Qty: 14 tablet, Refills: 0      !! predniSONE (DELTASONE) 5 MG tablet Take 3 tablets (15 mg total) by mouth once daily. for 11 days  Qty: 33 tablet, Refills: 0      !! predniSONE (DELTASONE) 5 MG tablet Take 1 tablet (5 mg total) by mouth once daily. for 14 days  Qty: 14 tablet, Refills: 0       !! - Potential duplicate medications found. Please discuss with provider.      CONTINUE these medications which have NOT CHANGED    Details   calcium carbonate (TUMS ORAL) Take 1 tablet by mouth as needed (acid reflux).      calcium-vitamin D3 (OS-CHEIKH 500 + D3) 500 mg-5 mcg (200 unit) per tablet Take 2 tablets by mouth 2 (two) times daily with meals.  Qty: 120 tablet, Refills: 0      cyanocobalamin 1,000 mcg/mL injection Inject 1,000 mcg into the skin every 30 days.      cyclobenzaprine (FLEXERIL) 10 MG tablet Take 1 tablet (10 mg total) by mouth 3 (three) times daily as needed for Muscle spasms.  Qty: 20 tablet, Refills: 0      dicyclomine (BENTYL) 20 mg tablet Take 20 mg by mouth every 6 (six) hours.      hydroCHLOROthiazide (MICROZIDE) 12.5 mg capsule Take 12.5 mg by mouth once daily.      levETIRAcetam (KEPPRA) 500 MG Tab Take 1 tablet (500 mg total) by mouth 2 (two) times daily.  Qty: 60 tablet, Refills: 0      pantoprazole (PROTONIX) 20 MG tablet Take 20 mg by mouth every evening.      promethazine (PHENERGAN) 25 MG tablet Take 1 tablet (25 mg total) by mouth every 8 (eight) hours as needed for Nausea.  Qty: 60 tablet, Refills: 1    Associated Diagnoses: Nausea; Gastroesophageal reflux disease without esophagitis; Crohn's disease of both small and large intestine with intestinal obstruction; Chronic abdominal pain      venlafaxine (EFFEXOR) 75 MG tablet Take 75 mg by mouth 2 (two) times daily.      vitamin D (VITAMIN D3) 1000 units Tab  Take 1,000 Units by mouth.         STOP taking these medications       furosemide (LASIX) 40 MG tablet Comments:   Reason for Stopping:         HYDROcodone-acetaminophen (NORCO)  mg per tablet Comments:   Reason for Stopping:         magnesium oxide (MAG-OX) 400 mg (241.3 mg magnesium) tablet Comments:   Reason for Stopping:         potassium chloride SA (K-DUR,KLOR-CON) 20 MEQ tablet Comments:   Reason for Stopping:         rifAXIMin (XIFAXAN) 550 mg Tab Comments:   Reason for Stopping:         vedolizumab (ENTYVIO) 300 mg SolR injection Comments:   Reason for Stopping:                 I have seen and examined this patient within the last 30 days. My clinical findings that support the need for the home health skilled services and home bound status are the following:no   Weakness/numbness causing balance and gait disturbance due to Surgery making it taxing to leave home.     Diet:   regular diet    Labs:  N/a    Referrals/ Consults  Physical Therapy to evaluate and treat. Evaluate for home safety and equipment needs; Establish/upgrade home exercise program. Perform / instruct on therapeutic exercises, gait training, transfer training, and Range of Motion.  Occupational Therapy to evaluate and treat. Evaluate home environment for safety and equipment needs. Perform/Instruct on transfers, ADL training, ROM, and therapeutic exercises.   to evaluate for community resources/long-range planning.    Activities:   no driving while on analgesics, no lifting for 6 weeks and no strenuous exercise for 6 weeks    Nursing:   Agency to admit patient within 24 hours of hospital discharge unless specified on physician order or at patient request    SN to complete comprehensive assessment including routine vital signs. Instruct on disease process and s/s of complications to report to MD. Review/verify medication list sent home with the patient at time of discharge  and instruct patient/caregiver as needed.  Frequency may be adjusted depending on start of care date.     Skilled nurse to perform up to 3 visits PRN for symptoms related to diagnosis    Notify MD if SBP > 160 or < 90; DBP > 90 or < 50; HR > 120 or < 50; Temp > 101; O2 < 88%    Ok to schedule additional visits based on staff availability and patient request on consecutive days within the home health episode.    When multiple disciplines ordered:    Start of Care occurs on Sunday - Wednesday schedule remaining discipline evaluations as ordered on separate consecutive days following the start of care.    Thursday SOC -schedule subsequent evaluations Friday and Monday the following week.     Friday - Saturday SOC - schedule subsequent discipline evaluations on consecutive days starting Monday of the following week.    For all post-discharge communication and subsequent orders please contact patient's primary care physician. If unable to reach primary care physician or do not receive response within 30 minutes, please contact Dr. Post for clinical staff order clarification    Miscellaneous   N/A    Home Health Aide:  Physical Therapy Monday, Wednesday and Friday and Occupational Therapy Monday, Wednesday and Friday    Wound Care Orders  n/a    I certify that this patient is confined to her home and needs physical therapy and occupational therapy.

## 2022-01-26 NOTE — SUBJECTIVE & OBJECTIVE
Subjective:     Interval History: No acute events overnight  Pain much better today  AFVSS  Tolerating reg diet   6 loose BMs yesterday    Post-Op Info:  Procedure(s) (LRB):  LAPAROTOMY, EXPLORATORY (N/A)  COLON RESECTION (N/A)  LYSIS, ADHESIONS (N/A)  WRAP-OMENTAL (N/A)  REPAIR OF ILEOCOLIC FISTULA (N/A)   6 Days Post-Op      Medications:  Continuous Infusions:   TPN ADULT CENTRAL LINE CUSTOM 60 mL/hr at 01/25/22 2212     Scheduled Meds:   acetaminophen  1,000 mg Oral Q8H    enoxaparin  40 mg Subcutaneous Daily    gabapentin  300 mg Oral TID    ketorolac  15 mg Intravenous Q6H    levETIRAcetam  500 mg Oral BID    LIDOcaine  1 patch Transdermal Q24H    lipid (SMOFLIPID)  250 mL Intravenous Daily    loperamide  2 mg Oral QID    methocarbamoL  500 mg Oral QID    pantoprazole  40 mg Oral Daily    predniSONE  15 mg Oral Daily    sodium chloride 0.9%  10 mL Intravenous Q6H    venlafaxine  75 mg Oral BID     PRN Meds:   HYDROmorphone    ondansetron    oxyCODONE    oxyCODONE    prochlorperazine    sodium chloride 0.9%    sodium chloride 0.9%    sodium chloride 0.9%        Objective:     Vital Signs (Most Recent):  Temp: 98 °F (36.7 °C) (01/26/22 0437)  Pulse: 100 (01/26/22 0437)  Resp: 18 (01/26/22 0502)  BP: 138/80 (01/26/22 0437)  SpO2: (!) 93 % (01/26/22 0437) Vital Signs (24h Range):  Temp:  [97.2 °F (36.2 °C)-98.5 °F (36.9 °C)] 98 °F (36.7 °C)  Pulse:  [] 100  Resp:  [15-20] 18  SpO2:  [90 %-95 %] 93 %  BP: (125-164)/(69-82) 138/80     Intake/Output - Last 3 Shifts       01/24 0700  01/25 0659 01/25 0700 01/26 0659    P.O. 1000 980    Total Intake(mL/kg) 1000 (17.2) 980 (16.8)    Urine (mL/kg/hr) 1600 (1.1)     Stool 0     Total Output 1600     Net -600 +980          Urine Occurrence 2 x     Stool Occurrence 6 x 6 x          Physical Exam  Vitals and nursing note reviewed.   Constitutional:       General: She is not in acute distress.     Appearance: She is not ill-appearing.   HENT:       Head: Normocephalic.   Eyes:      Extraocular Movements: Extraocular movements intact.      Conjunctiva/sclera: Conjunctivae normal.   Cardiovascular:      Rate and Rhythm: Normal rate and regular rhythm.   Pulmonary:      Effort: Pulmonary effort is normal. No respiratory distress.   Abdominal:      General: Abdomen is flat. There is no distension.      Palpations: Abdomen is soft.      Tenderness: There is abdominal tenderness. There is no guarding or rebound.      Comments: Midline incision well approximated, clean dry and intact. Mild perincisional pain.   Skin:     General: Skin is warm and dry.   Neurological:      General: No focal deficit present.      Mental Status: She is alert and oriented to person, place, and time.         Significant Labs:  All pertinent lab results within the last 24 hours have been reviewed.     Significant Diagnostics:  I have reviewed all pertinent imaging results/findings within the past 24 hours.

## 2022-01-26 NOTE — PLAN OF CARE
POC reviewed with the pt, verbalized understanding. Pt remains on covid precautions. Abd ML PRAFUL w/ dermabond. CLD, tolerated well. ambulating in room and to BR w/ staff assist. TPN infusing per MAR. Pain managed with, PRN pain medication. Voided adequate in the bedside commode. On telemetry, sinus tachy. Call bell in reach, bed locked in lowest position. Frequent rounds made for pt safety. VSS, will continue to monitor.

## 2022-01-26 NOTE — PROGRESS NOTES
"Problem List:    1. H/o cor pulmonale with know 3-4+ TR and RV dysfunction in past- slight increase in RV size and decline in RV systolic function compared to 2011 echo  2. Chronic afib- VR well controlled, on coumadin  3. Moderate obstruction on PFT 2011  4. STEWART- no compliant with CPAP  5. H/o PE- no PE on CT chest this admission  6. Non obstructive CAD CT cor angio 2011, negative stress test this admission  7. Obesity- lost 100 lbs over last several years  8. Cellulitis of lower extremity  9. Recurrent headaches requiring Tramadol    Oscar Parekh MD    [address] [place] [id number]    [phone] (Fax)    The patient returns for follow-up of heart failure.  There is no interim history of chest pain, tightness, paroxysmal nocturnal dyspnea, orthopnea, peripheral edema, palpitation, pre-syncope, syncope, device discharge.  He complains of excessive fatigue and being cold.  He has history of BROOKE.   Exercise tolerance is adequate to get through grocery . The patient is attempting to exercise regularly and following a sodium restricted, calorically appropriate diet.  Medications are reviewed and the patient is taking medications as prescribed.  The patient is generally sleeping variably.  Most of day is spent reading or playing cards. He is moving furniture!.    His interim history is significant for \"sepsis\" treated at [facility] and sources to venous ulceration, open wounds from edema.  He also provides interim history of myalgia and has been on steroids after being found to have elevated ESR/CRP.      PAST MEDICAL HISTORY:  Past Medical History:   Diagnosis Date     Atrial fibrillation (H) 12/27/2011    Sees Dr Carcamo, avoid amio due to cor pulmonale, asymptomatic rates up to160 with exersion.        Bilateral leg edema     compression wraps     CAD (coronary artery disease)     Nonobstructive     Cardiomyopathy (H) 5/1/2012     Carotid stenosis      Cellulitis of left lower " [facility]  Colorectal Surgery  Progress Note    Patient Name: Carline Chang  MRN: [id number]  Admission Date: 1/19/2022  Hospital Length of Stay: 7 days  Attending Physician: LLUVIA Post MD    Subjective:     Interval History: No acute events overnight  Pain much better today  AFVSS  Tolerating reg diet   6 loose BMs yesterday    Post-Op Info:  Procedure(s) (LRB):  LAPAROTOMY, EXPLORATORY (N/A)  COLON RESECTION (N/A)  LYSIS, ADHESIONS (N/A)  WRAP-OMENTAL (N/A)  REPAIR OF ILEOCOLIC FISTULA (N/A)   6 Days Post-Op      Medications:  Continuous Infusions:   TPN ADULT CENTRAL LINE CUSTOM 60 mL/hr at 01/25/22 2212     Scheduled Meds:   acetaminophen  1,000 mg Oral Q8H    enoxaparin  40 mg Subcutaneous Daily    gabapentin  300 mg Oral TID    ketorolac  15 mg Intravenous Q6H    levETIRAcetam  500 mg Oral BID    LIDOcaine  1 patch Transdermal Q24H    lipid (SMOFLIPID)  250 mL Intravenous Daily    loperamide  2 mg Oral QID    methocarbamoL  500 mg Oral QID    pantoprazole  40 mg Oral Daily    predniSONE  15 mg Oral Daily    sodium chloride 0.9%  10 mL Intravenous Q6H    venlafaxine  75 mg Oral BID     PRN Meds:   HYDROmorphone    ondansetron    oxyCODONE    oxyCODONE    prochlorperazine    sodium chloride 0.9%    sodium chloride 0.9%    sodium chloride 0.9%        Objective:     Vital Signs (Most Recent):  Temp: 98 °F (36.7 °C) (01/26/22 0437)  Pulse: 100 (01/26/22 0437)  Resp: 18 (01/26/22 0502)  BP: 138/80 (01/26/22 0437)  SpO2: (!) 93 % (01/26/22 0437) Vital Signs (24h Range):  Temp:  [97.2 °F (36.2 °C)-98.5 °F (36.9 °C)] 98 °F (36.7 °C)  Pulse:  [] 100  Resp:  [15-20] 18  SpO2:  [90 %-95 %] 93 %  BP: (125-164)/(69-82) 138/80     Intake/Output - Last 3 Shifts       01/24 0700 01/25 0659 01/25 0700 01/26 0659    P.O. 1000 980    Total Intake(mL/kg) 1000 (17.2) 980 (16.8)    Urine (mL/kg/hr) 1600 (1.1)     Stool 0     Total Output 1600     Net -600 +980          Urine  Occurrence 2 x     Stool Occurrence 6 x 6 x          Physical Exam  Vitals and nursing note reviewed.   Constitutional:       General: She is not in acute distress.     Appearance: She is not ill-appearing.   HENT:      Head: Normocephalic.   Eyes:      Extraocular Movements: Extraocular movements intact.      Conjunctiva/sclera: Conjunctivae normal.   Cardiovascular:      Rate and Rhythm: Normal rate and regular rhythm.   Pulmonary:      Effort: Pulmonary effort is normal. No respiratory distress.   Abdominal:      General: Abdomen is flat. There is no distension.      Palpations: Abdomen is soft.      Tenderness: There is abdominal tenderness. There is no guarding or rebound.      Comments: Midline incision well approximated, clean dry and intact. Mild perincisional pain.   Skin:     General: Skin is warm and dry.   Neurological:      General: No focal deficit present.      Mental Status: She is alert and oriented to person, place, and time.         Significant Labs:  All pertinent lab results within the last 24 hours have been reviewed.     Significant Diagnostics:  I have reviewed all pertinent imaging results/findings within the past 24 hours.    Assessment/Plan:     Crohn disease  Carline Chang is a 56 y.o. female with Crohn's disease on steroids and complicated surgical history presents with leukocytosis of 19K and small volume extra-luminal free air near the ascending colon concerning for perforated viscus now s/p ex-lap with FOREIGN and resection of ileocolic anastomosis and segment of proximal small bowel with dense adhesions.     -Pain: Continue MM (robaxin, tylenol, lidocaine patch, toradol). Wean off IV meds today  -Diet: LRD+ boost  -Imodium 4mg QID  -Wean TPN off by tomorrow  -GI consulted for assistance with med management: continue PO steroids will plan to taper prednisone by 5 mg every 2 weeks and f/u in IBD clinic 2 weeks post op  -Arranging for prior auth on Teduglutide given 100cm small  extremity 11/5/2018     COPD (chronic obstructive pulmonary disease) (H)      Cor pulmonale (H) 5/1/2012     Diastolic dysfunction      DM2 (diabetes mellitus, type 2) (H)      IYER (dyspnea on exertion)      GERD (gastroesophageal reflux disease)      HTN (hypertension)      Hypothyroidism      Leg wound, left      LV dysfunction 05/01/2012     Due to cor pulmonale and a fib related tachycardia      Morbid obesity (H) 5/1/2012     On supplemental oxygen by nasal cannula     at [facility]     STEWART (obstructive sleep apnea) 05/01/2012    Non compliant with CPAP     Pulmonary embolism (H)      Pulmonary HTN (H)      Varicose veins of bilateral lower extremities with other complications      Vitamin B12 deficiency disease 5/1/2012    On oral replacement        SOCIAL HISTORY:  Social History     Social History     Marital status:      Spouse name: N/A     Number of children: N/A     Years of education: N/A     Social History Main Topics     Smoking status: Never Smoker     Smokeless tobacco: Never Used     Alcohol use No     Drug use: No     Sexual activity: Not on file     Other Topics Concern     Not on file     Social History Narrative       CURRENT MEDICATIONS:  Current Outpatient Medications   Medication     cyanocolbalamin (VITAMIN  B-12) 1000 MCG tablet     divalproex sodium delayed-release (DEPAKOTE) 250 MG DR tablet     furosemide (LASIX) 20 MG tablet     levothyroxine (SYNTHROID) 150 MCG tablet     metoprolol succinate (TOPROL-XL) 25 MG 24 hr tablet     multivitamin, therapeutic with minerals (THERA-VIT-M) TABS tablet     OMEPRAZOLE PO     predniSONE (DELTASONE) 10 MG tablet     simvastatin (ZOCOR) 40 MG tablet     terazosin (HYTRIN) 5 MG capsule     traMADol (ULTRAM) 50 MG tablet     warfarin (COUMADIN) 1 MG tablet     warfarin (COUMADIN) 1 MG tablet     No current facility-administered medications for this visit.        ROS:   10 point ROS negative except HPI    EXAM:  Home weight  General: appears  bowel remaining  -IV Iron  -PT/OT: Home Health, no DME at this time.  -DVT ppx: LVX    Possibly d/c home tomorrow vs Friday          Alex Neves MD  Colorectal Surgery  [facility]       comfortable, alert and articulate  Head: normocephalic, atraumatic  Eyes: anicteric sclera, EOMI  Neck: no adenopathy  Orophyarynx: moist mucosa, no lesions, dentition intact  Heart: regular, S1/S2, no murmur, gallop, rub, estimated JVP WNL  Lungs: clear, no rales or wheezing  Abdomen: soft, non-tender, bowel sounds present, no hepatosplenomegaly  Extremities: no clubbing, cyanosis or edema  Neurological: normal speech and affect, no gross motor deficits      Wt Readings from Last 24 Encounters:   07/16/19 88 kg (194 lb)   06/06/19 95 kg (209 lb 8 oz)   01/15/19 83.9 kg (185 lb)   12/19/18 85.2 kg (187 lb 14.4 oz)   11/08/18 85.6 kg (188 lb 11.2 oz)   10/30/18 93 kg (205 lb)   09/06/18 87.4 kg (192 lb 9.6 oz)   05/15/18 87 kg (191 lb 12.8 oz)   10/17/17 88.7 kg (195 lb 8 oz)   06/04/17 88.5 kg (195 lb)   04/18/17 94.3 kg (208 lb)   04/11/17 91.6 kg (201 lb 15.1 oz)   04/04/17 93 kg (205 lb 1.6 oz)   02/27/17 100.9 kg (222 lb 6 oz)   01/08/15 96.6 kg (213 lb)   12/13/14 98.7 kg (217 lb 8 oz)   12/27/13 100.7 kg (222 lb)   05/17/12 117.9 kg (260 lb)       Labs:      CBC RESULTS:  Lab Results   Component Value Date    WBC 6.1 07/16/2019    RBC 3.56 (L) 07/16/2019    HGB 10.9 (L) 07/16/2019    HCT 33.4 (L) 07/16/2019    MCV 94 07/16/2019    MCH 30.6 07/16/2019    MCHC 32.6 07/16/2019    RDW 16.1 (H) 07/16/2019     (L) 07/16/2019       CMP RESULTS:  Lab Results   Component Value Date     07/16/2019    POTASSIUM 4.0 07/16/2019    CHLORIDE 103 07/16/2019    CO2 32 (H) 07/16/2019    ANIONGAP 7 07/16/2019    GLC 82 07/16/2019    BUN 21 07/16/2019    CR 1.00 07/16/2019    GFRESTIMATED 71 07/16/2019    GFRESTBLACK 86 07/16/2019    TYSON 9.9 07/16/2019    BILITOTAL 0.4 11/08/2018    ALBUMIN 2.7 (L) 11/08/2018    ALKPHOS 52 11/08/2018    ALT 9 11/08/2018    AST 22 11/08/2018        INR RESULTS:  Lab Results   Component Value Date    INR 2.34 (H) 06/06/2019       No components found for: CK  Lab Results   Component  Value Date    MAG 2.0 11/06/2018     Lab Results   Component Value Date    NTBNPI 787 02/24/2017       Echocardiogram (2/25/2017):  Interpretation Summary  The left ventricle is normal in size. There is normal left ventricular wall  thickness. Left ventricular systolic function is normal. The visual ejection  fraction is estimated at 55-60%. Flattened septum is consistent with RV  pressure/volume overload. There is no thrombus seen in the left ventricle.  The right ventricle is severely dilated. The right ventricular systolic  function is mild to moderately reduced.  The left atrium is moderately dilated. The right atrium is severely dilated.  There is severe (4+) tricuspid regurgitation. The right ventricular systolic  pressure is approximated at 30.9 mmHg plus the right atrial pressure. This  most likely represents an underestimation of the true RVSP.  Mild aortic stenosis.  In direct comparison to the previous study dated 11/07/2012, there has been a  mild interval increase in right ventricular size and deterioration in right  ventricular systolic function.      CT Chest Pulmonary (2/24/2017):  COMPARISON: 4/13/2011     FINDINGS: Evaluation of the pulmonary arterial system shows no  evidence of embolus. The thoracic aorta is calcified and slightly  tortuous. No aortic aneurysm or dissection. There are coronary artery  atherosclerotic calcifications. The heart is enlarged. There is no  mediastinal, hilar or axillary lymph node enlargement. There are a few  tiny calcified granulomas in the right upper lobe. Mild dependent  atelectasis bilaterally. A few bands of scar or atelectasis  bilaterally. Small bilateral pleural effusions. Images through the  upper abdomen are remarkable for small amount of ascites. The inferior  vena cava is very large which may be due to right heart dysfunction.  The liver has a slightly nodular contour suggesting cirrhosis.     IMPRESSION:  1. There is no pulmonary embolus, aortic  aneurysm or dissection.  2. Coronary artery atherosclerotic calcification. Cardiomegaly.  3. Small bilateral pleural effusions.  4. Probable hepatic cirrhosis. Small amount of ascites.      XR Chest 2 Views (4/5/2017):  HISTORY: Other secondary pulmonary hypertension  COMPARISON: 2/24/2017.  IMPRESSION: No acute abnormality.       Right Heart Catheterization (4/11/2017):  84-year-old gentleman with unexplained pulmonary hypertension, severe  right heart failure and right ventricular dysfunction, and severe  tricuspid regurgitation is referred for further evaluation of his  pulmonary hypertension etiology 2 guide further evaluation and  treatment.    Catheterization results  Baseline with thermal dilution cardiac output  -RA pressure mean = 12. Patient is defibrillation  -RV pressure-49/11  -Pulmonary artery pressure 48/21 (31)  -Pulmonary capillary wedge pressure -/24 (15)  -Thermal dilution cardiac output/index 4.9/2.4  -Calculated pulmonary vascular arteriolar resistance 3.2 Woods units    With estimated Rachael cardiac output  -Pulmonary pressure 47/19 (31)  -Pulmonary capillary wedge pressure-/27 (17)  -Pulmonary artery oxygen saturation 67% aortic oxygen saturation 98  percent on room air  -Estimated Rachael cardiac output/index 5.1/2.5  -Pulmonary vascular arteriolar resistance 2.6 Woods units       VQ Scan (4/5/2017):  IMPRESSION:   1. Central airways deposition, suggestive of chronic obstructive  pulmonary disease.  2. Mild diffusely heterogeneous pulmonary perfusion, similar to that  of the ventilation images. These findings most likely relate to  underlying pulmonary disease. There are no focal pulmonary perfusion  defects.       NM MPI w Lexiscan (2/24/2017):  Indication/Clinical History: Shortness of breath, chest pain     Impression  1. Myocardial perfusion imaging using single isotope technique  demonstrated no evidence of ischemia or infarction.   2. Gated images demonstrated normal size left ventricle with  normal  wall motion. The left ventricular systolic function is normal at 76%.  3. Compared to the prior study from  .     Procedure  Pharmacologic stress testing was performed with Lexiscan at a rate of  0.08 mg/ml rapid bolus injection, for 15 seconds, 0.4 mg/5ml  intravenously. Low-level exercise was not performed along with the  vasodilator infusion. The heart rate was 6767 at baseline and bj to  82 beats per minute during the Lexiscan infusion. The rest blood  pressure was 118/64 mmHg and was 118/57 mm Hg during Lexiscan  infusion. The patient experienced no chest pain during the test.     Myocardial perfusion imaging was performed at rest, approximately 45  minutes after the injection intravenously of 11 mCi of Tc-99m Myoview.  At peak pharmacologic effect, 10-20 seconds after Lexiscan, the  patient was injected intravenously with 32 mCi of Tc-99m Myoview. The  post-stress tomographic imaging was performed approximately 60 minutes  after stress.     EKG Findings  The resting EKG demonstrated sinus rhythm with poor R wave progression  in the low voltage pattern. The stress EKG demonstrated no EKG  changes suggestive of ischemia.     Tomographic Findings  Overall, the study quality is fair. Mild visceral tracer artifact is  noted . On the stress images, mild inferior count reduction is noted.  On the rest images, mild inferior count reduction is seen and likely  due to diaphragmatic attenuation and visceral tracer artifact . Gated  images demonstrated normal size left ventricle with normal wall  motion. The left ventricular ejection fraction was calculated to be  76%. TID was absent.      Assessment and Plan:   1. Appears to be clinically stable though tenuous.  His iron appeared to be low and will recheck it.  He is on steroid taper.      2. He is being fitted for compression stocking and will need these indefinitely  He is to call immediately should legs start to swell.      3. Today has no organ specific  symptoms of inflammation, myositis today.    4. He has PH and RV dysfunction and probably needs more diuretics to keep edema at bay, though does not appear to have ascites.    Will have him return after recover to consider if reassessment of PH is desirable or warranted.      CC: Dr. Parekh        .

## 2022-01-27 VITALS
RESPIRATION RATE: 16 BRPM | DIASTOLIC BLOOD PRESSURE: 67 MMHG | OXYGEN SATURATION: 92 % | HEIGHT: 68 IN | WEIGHT: 128.31 LBS | HEART RATE: 116 BPM | TEMPERATURE: 99 F | BODY MASS INDEX: 19.45 KG/M2 | SYSTOLIC BLOOD PRESSURE: 120 MMHG

## 2022-01-27 LAB
ANION GAP SERPL CALC-SCNC: 4 MMOL/L (ref 8–16)
ANISOCYTOSIS BLD QL SMEAR: SLIGHT
BASOPHILS # BLD AUTO: ABNORMAL K/UL (ref 0–0.2)
BASOPHILS NFR BLD: 0 % (ref 0–1.9)
BUN SERPL-MCNC: 7 MG/DL (ref 6–20)
CALCIUM SERPL-MCNC: 8.5 MG/DL (ref 8.7–10.5)
CHLORIDE SERPL-SCNC: 106 MMOL/L (ref 95–110)
CO2 SERPL-SCNC: 26 MMOL/L (ref 23–29)
CREAT SERPL-MCNC: 0.5 MG/DL (ref 0.5–1.4)
CRP SERPL-MCNC: 150.4 MG/L (ref 0–8.2)
DIFFERENTIAL METHOD: ABNORMAL
EOSINOPHIL # BLD AUTO: ABNORMAL K/UL (ref 0–0.5)
EOSINOPHIL NFR BLD: 0 % (ref 0–8)
ERYTHROCYTE [DISTWIDTH] IN BLOOD BY AUTOMATED COUNT: 15.5 % (ref 11.5–14.5)
EST. GFR  (AFRICAN AMERICAN): >60 ML/MIN/1.73 M^2
EST. GFR  (NON AFRICAN AMERICAN): >60 ML/MIN/1.73 M^2
GLUCOSE SERPL-MCNC: 71 MG/DL (ref 70–110)
HCT VFR BLD AUTO: 24.8 % (ref 37–48.5)
HGB BLD-MCNC: 7.6 G/DL (ref 12–16)
HYPOCHROMIA BLD QL SMEAR: ABNORMAL
IMM GRANULOCYTES # BLD AUTO: ABNORMAL K/UL (ref 0–0.04)
IMM GRANULOCYTES NFR BLD AUTO: ABNORMAL % (ref 0–0.5)
LYMPHOCYTES # BLD AUTO: ABNORMAL K/UL (ref 1–4.8)
LYMPHOCYTES NFR BLD: 8 % (ref 18–48)
MAGNESIUM SERPL-MCNC: 1.6 MG/DL (ref 1.6–2.6)
MCH RBC QN AUTO: 29.1 PG (ref 27–31)
MCHC RBC AUTO-ENTMCNC: 30.6 G/DL (ref 32–36)
MCV RBC AUTO: 95 FL (ref 82–98)
MONOCYTES # BLD AUTO: ABNORMAL K/UL (ref 0.3–1)
MONOCYTES NFR BLD: 0 % (ref 4–15)
NEUTROPHILS NFR BLD: 91 % (ref 38–73)
NEUTS BAND NFR BLD MANUAL: 1 %
NRBC BLD-RTO: 0 /100 WBC
OVALOCYTES BLD QL SMEAR: ABNORMAL
PHOSPHATE SERPL-MCNC: 3.2 MG/DL (ref 2.7–4.5)
PLATELET # BLD AUTO: 183 K/UL (ref 150–450)
PMV BLD AUTO: 9.2 FL (ref 9.2–12.9)
POIKILOCYTOSIS BLD QL SMEAR: SLIGHT
POLYCHROMASIA BLD QL SMEAR: ABNORMAL
POTASSIUM SERPL-SCNC: 4.1 MMOL/L (ref 3.5–5.1)
RBC # BLD AUTO: 2.61 M/UL (ref 4–5.4)
SODIUM SERPL-SCNC: 136 MMOL/L (ref 136–145)
SPHEROCYTES BLD QL SMEAR: ABNORMAL
WBC # BLD AUTO: 10.38 K/UL (ref 3.9–12.7)

## 2022-01-27 PROCEDURE — 84100 ASSAY OF PHOSPHORUS: CPT | Performed by: STUDENT IN AN ORGANIZED HEALTH CARE EDUCATION/TRAINING PROGRAM

## 2022-01-27 PROCEDURE — 63600175 PHARM REV CODE 636 W HCPCS

## 2022-01-27 PROCEDURE — 63600175 PHARM REV CODE 636 W HCPCS: Performed by: STUDENT IN AN ORGANIZED HEALTH CARE EDUCATION/TRAINING PROGRAM

## 2022-01-27 PROCEDURE — 25000003 PHARM REV CODE 250: Performed by: STUDENT IN AN ORGANIZED HEALTH CARE EDUCATION/TRAINING PROGRAM

## 2022-01-27 PROCEDURE — 80048 BASIC METABOLIC PNL TOTAL CA: CPT | Performed by: STUDENT IN AN ORGANIZED HEALTH CARE EDUCATION/TRAINING PROGRAM

## 2022-01-27 PROCEDURE — 25000003 PHARM REV CODE 250

## 2022-01-27 PROCEDURE — 25000003 PHARM REV CODE 250: Performed by: COLON & RECTAL SURGERY

## 2022-01-27 PROCEDURE — 85007 BL SMEAR W/DIFF WBC COUNT: CPT | Performed by: STUDENT IN AN ORGANIZED HEALTH CARE EDUCATION/TRAINING PROGRAM

## 2022-01-27 PROCEDURE — 85027 COMPLETE CBC AUTOMATED: CPT | Performed by: STUDENT IN AN ORGANIZED HEALTH CARE EDUCATION/TRAINING PROGRAM

## 2022-01-27 PROCEDURE — A4216 STERILE WATER/SALINE, 10 ML: HCPCS | Performed by: COLON & RECTAL SURGERY

## 2022-01-27 PROCEDURE — 86140 C-REACTIVE PROTEIN: CPT | Performed by: STUDENT IN AN ORGANIZED HEALTH CARE EDUCATION/TRAINING PROGRAM

## 2022-01-27 PROCEDURE — 83735 ASSAY OF MAGNESIUM: CPT | Performed by: STUDENT IN AN ORGANIZED HEALTH CARE EDUCATION/TRAINING PROGRAM

## 2022-01-27 RX ADMIN — Medication 10 ML: at 06:01

## 2022-01-27 RX ADMIN — LIDOCAINE 5% 1 PATCH: 700 PATCH TOPICAL at 10:01

## 2022-01-27 RX ADMIN — ACETAMINOPHEN 1000 MG: 500 TABLET ORAL at 04:01

## 2022-01-27 RX ADMIN — HYDROMORPHONE HYDROCHLORIDE 0.5 MG: 1 INJECTION, SOLUTION INTRAMUSCULAR; INTRAVENOUS; SUBCUTANEOUS at 04:01

## 2022-01-27 RX ADMIN — Medication 10 ML: at 12:01

## 2022-01-27 RX ADMIN — OXYCODONE HYDROCHLORIDE 10 MG: 10 TABLET ORAL at 06:01

## 2022-01-27 RX ADMIN — VENLAFAXINE 75 MG: 37.5 TABLET ORAL at 10:01

## 2022-01-27 RX ADMIN — LOPERAMIDE HYDROCHLORIDE 4 MG: 2 CAPSULE ORAL at 10:01

## 2022-01-27 RX ADMIN — OXYCODONE HYDROCHLORIDE 10 MG: 10 TABLET ORAL at 12:01

## 2022-01-27 RX ADMIN — PANTOPRAZOLE SODIUM 40 MG: 40 TABLET, DELAYED RELEASE ORAL at 10:01

## 2022-01-27 RX ADMIN — Medication 10 ML: at 01:01

## 2022-01-27 RX ADMIN — LEVETIRACETAM 500 MG: 500 TABLET, FILM COATED ORAL at 10:01

## 2022-01-27 RX ADMIN — GABAPENTIN 300 MG: 300 CAPSULE ORAL at 10:01

## 2022-01-27 RX ADMIN — PREDNISONE 15 MG: 5 TABLET ORAL at 10:01

## 2022-01-27 RX ADMIN — METHOCARBAMOL 500 MG: 500 TABLET ORAL at 10:01

## 2022-01-27 RX ADMIN — HYDROMORPHONE HYDROCHLORIDE 0.5 MG: 1 INJECTION, SOLUTION INTRAMUSCULAR; INTRAVENOUS; SUBCUTANEOUS at 10:01

## 2022-01-27 RX ADMIN — OXYCODONE HYDROCHLORIDE 15 MG: 10 TABLET ORAL at 10:01

## 2022-01-27 NOTE — PLAN OF CARE
Problem: Adult Inpatient Plan of Care  Goal: Plan of Care Review  1/27/2022 0834 by Dyan Chavez RN  Outcome: Adequate for Care Transition  1/26/2022 1915 by Dyan Chavez RN  Outcome: Ongoing, Progressing  Goal: Patient-Specific Goal (Individualized)  1/27/2022 0834 by Dyan Chavez RN  Outcome: Adequate for Care Transition  1/26/2022 1915 by Dyan Chavez RN  Outcome: Ongoing, Progressing  Goal: Absence of Hospital-Acquired Illness or Injury  1/27/2022 0834 by Dyan Chavez RN  Outcome: Adequate for Care Transition  1/26/2022 1915 by Dyan Chavez RN  Outcome: Ongoing, Progressing  Goal: Optimal Comfort and Wellbeing  1/27/2022 0834 by Dyan Chavez RN  Outcome: Adequate for Care Transition  1/26/2022 1915 by Dyan Chavez RN  Outcome: Ongoing, Progressing  Goal: Readiness for Transition of Care  1/27/2022 0834 by Dyan Chavez RN  Outcome: Adequate for Care Transition  1/26/2022 1915 by Dyan Chavez RN  Outcome: Ongoing, Progressing

## 2022-01-27 NOTE — DISCHARGE INSTRUCTIONS
Colon and Rectal Surgery Discharge Instructions    Woundcare:  - No dressing needed, do not pick at surgical glue. It will come off on its own.  - Shower daily, do not scrub incision site. Allow warm soapy water to run over incisions    Prednisone Taper:  -Take 15 mg (Three 5mg tablets) of prednisone once per day until 2/8/22  -Take 10 mg of prednisone once per day starting 2/8/22 until 2/22/22  -Take 5 mg of prednisone once per day starting 2/22/22 through 3/8/22    Activity:  - No lifting greater than 10 lbs for 8 weeks following surgery.  - No swimming in pools, [employer], [employer] etc. for 6 weeks after your surgery    Diet:  -Resume your pre-operative home diet    Follow up:  -Refer to follow up instructions     Call the Colon and Rectal Surgery Office if you experience:  -Increased redness, warmth, tenderness, or draining pus at your incision(s)  -Worsening fevers, chills, nausea/vomiting  -Pain, weakness, coldness, or numbness in your hand  -Uncontrolled pain  -Your call will be returned within 24 hours and further instructions will be provided    Go to ER/Urgent Care if you experience:  -Worsening shortness of breath or chest pain

## 2022-01-27 NOTE — PT/OT/SLP PROGRESS
Occupational Therapy      Patient Name:  Carline Chang   MRN:  [id number]    Patient not seen today secondary to  PICC being removed upon OT attempt, pt with scheduled discharge orders. Will follow-up if pt fails to discharge.    1/27/2022

## 2022-01-27 NOTE — DISCHARGE SUMMARY
Girish jonnathan - [facility]  Colorectal Surgery  Discharge Summary      Patient Name: Carline Chang  MRN: [id number]  Admission Date: 1/19/2022  Hospital Length of Stay: 8 days  Discharge Date and Time:  01/27/2022 7:19 AM  Attending Physician: LLUVIA Post MD   Discharging Provider: Sanchez Mcclure MD  Primary Care Provider: Pablo Medina MD (Inactive)     HPI:  No notes on file    Procedure(s) (LRB):  LAPAROTOMY, EXPLORATORY (N/A)  COLON RESECTION (N/A)  LYSIS, ADHESIONS (N/A)  WRAP-OMENTAL (N/A)  REPAIR OF ILEOCOLIC FISTULA (N/A)     Hospital Course:  Please see the preoperative H&P and other available documentation for full details related to history leading up to this admission.  Briefly, Carline Chang is a 56 y.o. female who was admitted on 1/19/2022 following scheduled elective surgery for Crohn's disease of both small and large intestine with other complication [K50.818].  Following a complete preoperative discussion of the risks and benefits of surgery with signed informed consent, the patient was taken to the operating room on 1/20/2022 and underwent the above stated procedures.  The patient tolerated surgery well and there were no complications.  Please see the operative report for full intraoperative findings and details.  Postoperatively, the patient did well and was transferred from the PACU to the 10th floor [facility] unit in stable condition where they overall had a stable and relatively uncomplicated hospital course.  Labs and vital signs remained essentially stable and appropriate throughout course, and repletion of electrolytes occurred as indicated.  She was initiated on TPN given poor preoperative nutritional status, concern for dietary intolerance and short gut syndrome. This was discontinued once the patient began tolerating a regular diet. Currently, the patient is doing well at 7 Days Post-Op, and is stable and appropriate for discharge home at this time.          Goals of  Care Treatment Preferences:  Code Status: Full Code      Consults (From admission, onward)        Status Ordering Provider     Inpatient consult to PICC team ([facility])  Once        Provider:  (Not yet assigned)    Completed DAYAMI, MALICK     Inpatient consult to Gastroenterology  Once        Provider:  (Not yet assigned)    Completed DAYAMI, TUNG     Inpatient consult to Colorectal Surgery  Once        Provider:  LLUVIA Post MD    Completed DAYAMI, TUNG          Physical Exam  Physical Exam  Vitals and nursing note reviewed.   Constitutional:       General: She is not in acute distress.     Appearance: Normal appearance. She is not ill-appearing.   HENT:      Head: Normocephalic and atraumatic.   Eyes:      Extraocular Movements: Extraocular movements intact.      Conjunctiva/sclera: Conjunctivae normal.   Cardiovascular:      Rate and Rhythm: Regular rhythm. Tachycardia present.   Pulmonary:      Effort: Pulmonary effort is normal. No respiratory distress.   Abdominal:      General: There is no distension.      Palpations: Abdomen is soft.      Tenderness: There is no abdominal tenderness. There is no guarding or rebound.      Comments: Midline incision well approximated, C/D/I. No surrounding erythema.   Musculoskeletal:         General: Normal range of motion.      Right lower leg: No edema.      Left lower leg: No edema.   Skin:     General: Skin is warm and dry.   Neurological:      General: No focal deficit present.      Mental Status: She is alert and oriented to person, place, and time.   Psychiatric:         Mood and Affect: Mood normal.         Behavior: Behavior normal.         Pending Diagnostic Studies:     Procedure Component Value Units Date/Time    Specimen to Pathology, Surgery General Surgery (Colorectal) [[id number]] Collected: 01/20/22 6230    Order Status: Sent Lab Status: In process Updated: 01/21/22 1613        Final Active Diagnoses:    Diagnosis Date Noted POA    PRINCIPAL PROBLEM:  Large bowel  obstruction [K56.609] 01/22/2022 Yes    Crohn disease [K50.90] 12/17/2021 Yes      Problems Resolved During this Admission:      Discharged Condition: fair    [place] [facility] [place] Up:   Follow-up Information     H Andre Post MD In 2 weeks.    Specialty: Colon and Rectal Surgery  Contact information:  [address]  [facility] [address]  [phone]                       Patient Instructions:      CBC W/ AUTO DIFFERENTIAL   Standing Status: Future Standing Exp. Date: 03/28/23     COMPREHENSIVE METABOLIC PANEL   Standing Status: Future Standing Exp. Date: 03/28/23     Magnesium   Standing Status: Future Standing Exp. Date: 03/28/23     PHOSPHORUS   Standing Status: Future Standing Exp. Date: 03/28/23     PREALBUMIN   Standing Status: Future Standing Exp. Date: 03/28/23     Diet Adult Regular     Lifting restrictions     Notify your health care provider if you experience any of the following:  temperature >100.4     Notify your health care provider if you experience any of the following:  persistent nausea and vomiting or diarrhea     Notify your health care provider if you experience any of the following:  severe uncontrolled pain     Notify your health care provider if you experience any of the following:  redness, tenderness, or signs of infection (pain, swelling, redness, odor or green/yellow discharge around incision site)     Notify your health care provider if you experience any of the following:  difficulty breathing or increased cough     Notify your health care provider if you experience any of the following:  severe persistent headache     Notify your health care provider if you experience any of the following:  worsening rash     Notify your health care provider if you experience any of the following:  persistent dizziness, light-headedness, or visual disturbances     Notify your health care provider if you experience any of the following:  increased confusion or weakness      No dressing needed     Activity as tolerated     Shower on day dressing removed (No bath)     Medications:  Reconciled Home Medications:      Medication List      START taking these medications    loperamide 2 mg capsule  Commonly known as: IMODIUM  Take 2 capsules (4 mg total) by mouth 4 (four) times daily.     methocarbamoL 500 MG Tab  Commonly known as: ROBAXIN  Take 1 tablet (500 mg total) by mouth 4 (four) times daily. for 10 days     oxyCODONE 10 mg Tab immediate release tablet  Commonly known as: ROXICODONE  Take 1 tablet (10 mg total) by mouth every 6 (six) hours as needed for Pain.        CHANGE how you take these medications    * predniSONE 5 MG tablet  Commonly known as: DELTASONE  BEGINNING 1/27/22: Take 3 tablets (15 mg total) by mouth once daily for 11 days then STOP. RESTART 2/22/22 take 1 tablet (5 mg total) by mouth once daily for 14 days  What changed:   · medication strength  · how much to take     * predniSONE 10 MG tablet  Commonly known as: DELTASONE  STARTING 2/8/22 - Take 1 tablet (10 mg total) by mouth once daily. for 14 days  Start taking on: February 8, 2022  What changed: You were already taking a medication with the same name, and this prescription was added. Make sure you understand how and when to take each.     * predniSONE 5 MG tablet  Commonly known as: DELTASONE  Take 1 tablet (5 mg total) by mouth once daily. for 14 days  Start taking on: February 22, 2022  What changed: You were already taking a medication with the same name, and this prescription was added. Make sure you understand how and when to take each.         * This list has 3 medication(s) that are the same as other medications prescribed for you. Read the directions carefully, and ask your doctor or other care provider to review them with you.            CONTINUE taking these medications    calcium-vitamin D3 500 mg-5 mcg (200 unit) per tablet  Commonly known as: OS-CHEIKH 500 + D3  Take 2 tablets by mouth 2 (two)  times daily with meals.     cyanocobalamin 1,000 mcg/mL injection  Inject 1,000 mcg into the skin every 30 days.     dicyclomine 20 mg tablet  Commonly known as: BENTYL  Take 20 mg by mouth every 6 (six) hours.     hydroCHLOROthiazide 12.5 mg capsule  Commonly known as: MICROZIDE  Take 12.5 mg by mouth once daily.     levETIRAcetam 500 MG Tab  Commonly known as: KEPPRA  Take 1 tablet (500 mg total) by mouth 2 (two) times daily.     pantoprazole 20 MG tablet  Commonly known as: PROTONIX  Take 20 mg by mouth every evening.     promethazine 25 MG tablet  Commonly known as: PHENERGAN  Take 1 tablet (25 mg total) by mouth every 8 (eight) hours as needed for Nausea.     TUMS ORAL  Take 1 tablet by mouth as needed (acid reflux).     venlafaxine 75 MG tablet  Commonly known as: EFFEXOR  Take 75 mg by mouth 2 (two) times daily.     vitamin D 1000 units Tab  Commonly known as: VITAMIN D3  Take 1,000 Units by mouth.        STOP taking these medications    ENTYVIO 300 mg Solr injection  Generic drug: vedolizumab     furosemide 40 MG tablet  Commonly known as: LASIX     HYDROcodone-acetaminophen  mg per tablet  Commonly known as: NORCO     magnesium oxide 400 mg (241.3 mg magnesium) tablet  Commonly known as: MAG-OX     potassium chloride SA 20 MEQ tablet  Commonly known as: K-DUR,KLOR-CON     rifAXIMin 550 mg Tab  Commonly known as: XIFAXAN        ASK your doctor about these medications    cyclobenzaprine 10 MG tablet  Commonly known as: FLEXERIL  Take 1 tablet (10 mg total) by mouth 3 (three) times daily as needed for Muscle spasms.  Ask about: Should I take this medication?            Sanchez Mcclure MD  Colorectal Surgery  Girish jonnathan [facility]

## 2022-01-27 NOTE — HOSPITAL COURSE
Please see the preoperative H&P and other available documentation for full details related to history leading up to this admission.  Briefly, Carline Chang is a 56 y.o. female who was admitted on 1/19/2022 following scheduled elective surgery for Crohn's disease of both small and large intestine with other complication [K50.818].  Following a complete preoperative discussion of the risks and benefits of surgery with signed informed consent, the patient was taken to the operating room on 1/20/2022 and underwent the above stated procedures.  The patient tolerated surgery well and there were no complications.  Please see the operative report for full intraoperative findings and details.  Postoperatively, the patient did well and was transferred from the PACU to the 10th floor [facility] unit in stable condition where they overall had a stable and relatively uncomplicated hospital course.  Labs and vital signs remained essentially stable and appropriate throughout course, and repletion of electrolytes occurred as indicated.  She was initiated on TPN given poor preoperative nutritional status, concern for dietary intolerance and short gut syndrome. This was discontinued once the patient began tolerating a regular diet. Currently, the patient is doing well at 7 Days Post-Op, and is stable and appropriate for discharge home at this time.

## 2022-01-27 NOTE — PLAN OF CARE
PLAN IS FOR PT TO DISCHARGE HOME TODAY WITH [facility] ALL POST OP APPT ON THE AVS  Girish CORREIA  Discharge Final Note    Primary Care Provider: Pablo Medina MD (Inactive)    Expected Discharge Date: 1/27/2022    Final Discharge Note (most recent)     Final Note - 01/27/22 1129        Final Note    Assessment Type Final Discharge Note     Anticipated Discharge Disposition Home-Health Care [facility] [place]     Hospital Resources/Appts/Education Provided Appointments scheduled and added to AVS        Post-Acute Status    Post-Acute Authorization Home Health     Home Health Status Set-up Complete/Auth obtained     Discharge Delays None known at this time                 Important Message from Medicare  Important Message from Medicare regarding Discharge Appeal Rights: Explained to patient/caregiver,Signed/date by patient/caregiver,Other (comments) (phone/verbal   witness Abi)     Date IMM was signed: 01/27/22  Time IMM was signed: 0934    Contact Info     H Andre Post MD   Specialty: Colon and Rectal Surgery    [address] [address]   Phone: [phone]       Next Steps: Follow up in 2 week(s)    Pablo Medina MD   Specialty: Hepatology, Gastroenterology   Relationship: PCP - General    [facility]  [address]  [place] [address]   Phone: [phone]       Next Steps: Follow up

## 2022-01-27 NOTE — PLAN OF CARE
01/27/22 0958   Post-Acute Status   Post-Acute Authorization Home Health   Home Health Status Set-up Complete/Auth obtained   Pt accepted by Edward TAYLOR.    Abi Chang LMSW  [facility]  [id number]

## 2022-01-28 ENCOUNTER — TELEPHONE (OUTPATIENT)
Dept: GASTROENTEROLOGY | Facility: CLINIC | Age: 57
End: 2022-01-28
Payer: MEDICARE

## 2022-01-28 ENCOUNTER — PATIENT OUTREACH (OUTPATIENT)
Dept: ADMINISTRATIVE | Facility: CLINIC | Age: 57
End: 2022-01-28
Payer: MEDICARE

## 2022-01-28 NOTE — TELEPHONE ENCOUNTER
Spoke with patient and scheduled hospital f/u with Dr. Velasquez on 2/14/22 at 2 pm with 1:30 arrival time.  Pt verbalized understanding to all and has no further questions at this time.

## 2022-01-28 NOTE — PROGRESS NOTES
C3 nurse spoke with Carline Chang's mother, Tanesha Chang, for a TCC post hospital discharge follow up call. C3 nurse was unable to schedule [facility] appointment for [facility] PCP.  Patient has post op with MD Sincere on 2/16/2022 @ 5192.

## 2022-01-28 NOTE — PATIENT INSTRUCTIONS
Patient Education       Exploratory Laparotomy Discharge Instructions   About this topic   Surgery to open the belly or abdomen is a laparotomy. This is done so your doctor can look at different muscles and organs in the abdomen. Your doctor may look at your liver, gallbladder, and pancreas. The doctor may also look at your kidneys, stomach, and large and small bowels. The doctor may also look at your uterus and ovaries, if you have them. Your doctor may need to do an exploratory laparotomy to:  · Take out a tumor or cancer.  · Get rid of blood, blood clots, pus, or extra fluids.  · Take out part of or all of an organ.  · Treat problems of organs in the belly area.  · Look at organs after a crash, injury, or trauma.     What care is needed at home?   · Ask your doctor what you need to do when you go home. Make sure you ask questions if you do not understand what the doctor says. This way you will know what you need to do.  · Some drugs may cause you to have a hard time going to the bathroom. Drink 6 to 8 glasses of water each day and eat food with a lot of fiber to prevent this.  · Talk to your doctor about how to care for your cut site. Ask your doctor about:  ? When you should change your bandages  ? When you may take a bath or shower  ? If you need to be careful with lifting things over 10 pounds (4.5 kg)  ? When you may go back to your normal activities like work or driving  ? If you have a drain, how to take care of it  · Be sure to wash your hands before and after touching your wound or dressing.  · Take daily walks around your block or in your home. This will help you feel better and prevent some problems from surgery.  What follow-up care is needed?   · Your doctor may ask you to make visits to the office to check on your progress. Be sure to keep these visits.  · If you have stitches or staples, you will need to have them taken out. Your doctor will often want to do this in 1 to 2 weeks.  · If you have a  drain tube, ask your doctor how to care for it.  What drugs may be needed?   The doctor may order drugs to:  · Help with pain  · Prevent infection  · Prevent blood clots  Will physical activity be limited?   You may need to rest for a while. You should not do physical activity that makes your health problem worse. If you run, work out, or play sports, you may not be able to do those things until your doctor tells you it is OK.  What problems could happen?   · Bleeding or blood clots  · Infection  · Nerve injury  · Injury to nearby organs or tissues  · Your surgery cut site does not heal the way it should  · Need for repeat surgeries in the future  When do I need to call the doctor?   · Signs of infection. These include a fever of 100.4°F (38°C) or higher, chills, cough, more sputum or change in color of sputum, pain with passing urine, wound that will not heal.  · Signs of wound infection. These include swelling, redness, warmth around the cut site; too much pain when touched; yellowish, greenish, or bloody discharge; foul smell coming from the cut site; cut site opens up.  · Very bad belly pain  · Trouble breathing  · Chest pain  · Coughing up blood that does not go away with drugs  · Dizziness  · A large amount of swelling in your belly  · If the drain falls out or gets dislodged  Teach Back: Helping You Understand   The Teach Back Method helps you understand the information we are giving you. After you talk with the staff, tell them in your own words what you learned. This helps to make sure the staff has described each thing clearly. It also helps to explain things that may have been confusing. Before going home, make sure you can do these:  · I can tell you about my procedure.  · I can tell you how to care for my cut site.  · I can tell you what I will do if I have redness or warmth around my wound.  Last Reviewed Date   2021-05-06  Consumer Information Use and Disclaimer   This information is not specific  medical advice and does not replace information you receive from your health care provider. This is only a brief summary of general information. It does NOT include all information about conditions, illnesses, injuries, tests, procedures, treatments, therapies, discharge instructions or life-style choices that may apply to you. You must talk with your health care provider for complete information about your health and treatment options. This information should not be used to decide whether or not to accept your health care providers advice, instructions or recommendations. Only your health care provider has the knowledge and training to provide advice that is right for you.  Copyright   Copyright © 2022 [employer], [employer] and its affiliates and/or licensors. All rights reserved.    Irasema teaching reviewed with Tanesha Chang . She verbalized understanding.    Education was provided based on the patient's discharge diagnosis using the attached Irasema patient education as a reference.

## 2022-01-31 ENCOUNTER — TELEPHONE (OUTPATIENT)
Dept: GASTROENTEROLOGY | Facility: CLINIC | Age: 57
End: 2022-01-31
Payer: MEDICARE

## 2022-01-31 NOTE — TELEPHONE ENCOUNTER
LVM for patient to call me back at [phone] re: need correct email address to send new patient information to.

## 2022-01-31 NOTE — TELEPHONE ENCOUNTER
----- Message from Elmer Serrato MD sent at 1/28/2022  4:02 PM CST -----  Regarding: RE: Hospital follow up  Thanks    SS  ----- Message -----  From: aFdumo Yang RN  Sent: 1/28/2022   3:41 PM CST  To: Cholo Velasquez MD, Elmer Serrato MD  Subject: Hospital follow up                               She has been scheduled for 2/14/2022 at 2 pm.    Thanks,  C  ----- Message -----  From: Elmer Serrato MD  Sent: 1/28/2022  10:57 AM CST  To: Cholo Velasquez MD, #    This is in patient discharge that Dr. Velasquez saw.   Can you reach out to pt and schedule her for 60 min f/u visit with Dr. Velasquez in about 2 weeks.     She also has appt likely with [facility] if easy to coordinate but not necessary since she lives in [place]    Thanks

## 2022-02-04 LAB
FINAL PATHOLOGIC DIAGNOSIS: NORMAL
GROSS: NORMAL
Lab: NORMAL

## 2022-02-08 ENCOUNTER — TELEPHONE (OUTPATIENT)
Dept: SURGERY | Facility: CLINIC | Age: 57
End: 2022-02-08
Payer: MEDICARE

## 2022-02-08 NOTE — TELEPHONE ENCOUNTER
Spoke with Concepc[place] with [facility], reports that patient has an opening at the bottom of incision cite that is draining milky and serosanguinous fluid. Hard knot has formed, is slightly red but not warm to the touch and patient has no reports of fever. Sooner appointment made, will call patient with new appointment.

## 2022-02-09 ENCOUNTER — OFFICE VISIT (OUTPATIENT)
Dept: SURGERY | Facility: CLINIC | Age: 57
End: 2022-02-09
Payer: MEDICARE

## 2022-02-09 ENCOUNTER — LAB VISIT (OUTPATIENT)
Dept: LAB | Facility: HOSPITAL | Age: 57
End: 2022-02-09
Attending: COLON & RECTAL SURGERY
Payer: MEDICARE

## 2022-02-09 VITALS
HEART RATE: 127 BPM | BODY MASS INDEX: 16.79 KG/M2 | DIASTOLIC BLOOD PRESSURE: 61 MMHG | HEIGHT: 68 IN | WEIGHT: 110.81 LBS | SYSTOLIC BLOOD PRESSURE: 124 MMHG

## 2022-02-09 DIAGNOSIS — E43 SEVERE PROTEIN-CALORIE MALNUTRITION: ICD-10-CM

## 2022-02-09 DIAGNOSIS — E43 SEVERE PROTEIN-CALORIE MALNUTRITION: Primary | ICD-10-CM

## 2022-02-09 DIAGNOSIS — K50.013: ICD-10-CM

## 2022-02-09 DIAGNOSIS — K56.609 LARGE BOWEL OBSTRUCTION: ICD-10-CM

## 2022-02-09 DIAGNOSIS — K90.829 SHORT BOWEL SYNDROME: ICD-10-CM

## 2022-02-09 LAB
ALBUMIN SERPL BCP-MCNC: 3.4 G/DL (ref 3.5–5.2)
ALP SERPL-CCNC: 230 U/L (ref 55–135)
ALT SERPL W/O P-5'-P-CCNC: 14 U/L (ref 10–44)
ANION GAP SERPL CALC-SCNC: 9 MMOL/L (ref 8–16)
AST SERPL-CCNC: 17 U/L (ref 10–40)
BASOPHILS # BLD AUTO: 0.02 K/UL (ref 0–0.2)
BASOPHILS NFR BLD: 0.1 % (ref 0–1.9)
BILIRUB SERPL-MCNC: 0.5 MG/DL (ref 0.1–1)
BUN SERPL-MCNC: 15 MG/DL (ref 6–20)
CALCIUM SERPL-MCNC: 10.5 MG/DL (ref 8.7–10.5)
CHLORIDE SERPL-SCNC: 107 MMOL/L (ref 95–110)
CO2 SERPL-SCNC: 16 MMOL/L (ref 23–29)
CREAT SERPL-MCNC: 0.9 MG/DL (ref 0.5–1.4)
CRP SERPL-MCNC: 16.5 MG/L (ref 0–8.2)
DIFFERENTIAL METHOD: ABNORMAL
EOSINOPHIL # BLD AUTO: 0.1 K/UL (ref 0–0.5)
EOSINOPHIL NFR BLD: 0.4 % (ref 0–8)
ERYTHROCYTE [DISTWIDTH] IN BLOOD BY AUTOMATED COUNT: 15.6 % (ref 11.5–14.5)
EST. GFR  (AFRICAN AMERICAN): >60 ML/MIN/1.73 M^2
EST. GFR  (NON AFRICAN AMERICAN): >60 ML/MIN/1.73 M^2
GLUCOSE SERPL-MCNC: 80 MG/DL (ref 70–110)
HCT VFR BLD AUTO: 47.6 % (ref 37–48.5)
HGB BLD-MCNC: 14 G/DL (ref 12–16)
IMM GRANULOCYTES # BLD AUTO: 0.4 K/UL (ref 0–0.04)
IMM GRANULOCYTES NFR BLD AUTO: 1.8 % (ref 0–0.5)
LYMPHOCYTES # BLD AUTO: 1.2 K/UL (ref 1–4.8)
LYMPHOCYTES NFR BLD: 5.5 % (ref 18–48)
MAGNESIUM SERPL-MCNC: 1 MG/DL (ref 1.6–2.6)
MCH RBC QN AUTO: 29.2 PG (ref 27–31)
MCHC RBC AUTO-ENTMCNC: 29.4 G/DL (ref 32–36)
MCV RBC AUTO: 99 FL (ref 82–98)
MONOCYTES # BLD AUTO: 0.6 K/UL (ref 0.3–1)
MONOCYTES NFR BLD: 2.8 % (ref 4–15)
NEUTROPHILS # BLD AUTO: 20.1 K/UL (ref 1.8–7.7)
NEUTROPHILS NFR BLD: 89.4 % (ref 38–73)
NRBC BLD-RTO: 0 /100 WBC
PLATELET # BLD AUTO: 225 K/UL (ref 150–450)
PMV BLD AUTO: 9.6 FL (ref 9.2–12.9)
POTASSIUM SERPL-SCNC: 6.1 MMOL/L (ref 3.5–5.1)
PREALB SERPL-MCNC: 31 MG/DL (ref 20–43)
PROT SERPL-MCNC: 8.4 G/DL (ref 6–8.4)
RBC # BLD AUTO: 4.8 M/UL (ref 4–5.4)
SODIUM SERPL-SCNC: 132 MMOL/L (ref 136–145)
WBC # BLD AUTO: 22.43 K/UL (ref 3.9–12.7)

## 2022-02-09 PROCEDURE — 83735 ASSAY OF MAGNESIUM: CPT | Performed by: COLON & RECTAL SURGERY

## 2022-02-09 PROCEDURE — 86140 C-REACTIVE PROTEIN: CPT | Performed by: COLON & RECTAL SURGERY

## 2022-02-09 PROCEDURE — 36415 COLL VENOUS BLD VENIPUNCTURE: CPT | Performed by: COLON & RECTAL SURGERY

## 2022-02-09 PROCEDURE — 99024 PR POST-OP FOLLOW-UP VISIT: ICD-10-PCS | Mod: POP,,, | Performed by: COLON & RECTAL SURGERY

## 2022-02-09 PROCEDURE — 84134 ASSAY OF PREALBUMIN: CPT | Performed by: COLON & RECTAL SURGERY

## 2022-02-09 PROCEDURE — 80053 COMPREHEN METABOLIC PANEL: CPT | Performed by: COLON & RECTAL SURGERY

## 2022-02-09 PROCEDURE — 99999 PR PBB SHADOW E&M-EST. PATIENT-LVL III: ICD-10-PCS | Mod: PBBFAC,,, | Performed by: COLON & RECTAL SURGERY

## 2022-02-09 PROCEDURE — 85025 COMPLETE CBC W/AUTO DIFF WBC: CPT | Performed by: COLON & RECTAL SURGERY

## 2022-02-09 PROCEDURE — 99213 OFFICE O/P EST LOW 20 MIN: CPT | Mod: PBBFAC | Performed by: COLON & RECTAL SURGERY

## 2022-02-09 PROCEDURE — 99999 PR PBB SHADOW E&M-EST. PATIENT-LVL III: CPT | Mod: PBBFAC,,, | Performed by: COLON & RECTAL SURGERY

## 2022-02-09 PROCEDURE — 99024 POSTOP FOLLOW-UP VISIT: CPT | Mod: POP,,, | Performed by: COLON & RECTAL SURGERY

## 2022-02-09 NOTE — PROGRESS NOTES
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 She had 105 cm of small bowel remaining. KONO S anastomosis was performed. She is currently eating food but has a poor appetite. She is not drinking her nutritional supplements. She is taking Imodium two tablets four times a day. Shes having five bowel movements per day. She is continent. She denies any fever. She has any nausea or vomiting. She reports drainage from the wound which looks purulent.      SPECIMEN  FINAL PATHOLOGIC DIAGNOSIS  Gross Description  Ordering: Attending:  Diagnosed by  Electronically Signed By:  Crohn's disease of both small and large  intestine with other complication  1. . Scar  2. Ileocolic anastomosis  Received in formalin, labeled with the patients name and medical record number, and designated as scar -  perm" is an unoriented 21.1 cm in length x 0.2-0.6 cm in diameter skin and soft tissue fragment consistent  with a well-healed scar. The epidermal surface in white-tan, smooth, and unremarkable and the underlying  subcutaneous tissue is yellow-tan, dense, fibrous, and unremarkable. Representative sections are  submitted in cassette YNL--[id number].  Part 2 of 2  Received in formalin, labeled with the patients name and medical record number, and designated as  ileocolic anastomosis is a previously opened segment of convoluted bowel consisting of a previous  anastomosis between ileum (35.1 cm in length x 6.9 cm in average circumference) and large bowel (12.3  cm in length x 10.2 cm in average circumference). The ileum contains a convoluted, dense, fibrous area of  adhesions (11.8 x 9.6 x 4.1 cm) extending 3.3 cm from the ileal margin and an outpouching (2.8 x 2.8 x 2.6  cm) extending 3.4 cm from the ileocecal anastomosis and 13.2 cm from the large bowel margin. The ileal  wall (1.1 cm in average " thickness) and remaining ileal mucosa is pink-tan, glistening, and unremarkable.  The large bowel is markedly edematous and contains an outpouching (3.2 x 2.0 x 1.8 cm) that extends 1.93  cm to the ileocecal anastomosis and 7.4 cm the large bowel margin. The external surface of the bowel is  pink-tan and roughened. No perforations, fistulas, or lesions are grossly identified.      PE:    Patient is non-toxic appearing  Vital signs  Skin is anicteric  Wound has evidence of mild separation caudally for a distance of 2 to 3 cm. This was probed and coerced into a cavity, subcutaneous. The fascia is intact. Minimal purulence is noted. There is small amount of undermining cranial a.  Abdomen otherwisee nontender. No masses.      Wound sinus, inferior aspect.  No erythema, no pus  Fascia intact      Assessment  Wound, skin, dehiscence with small cavity or sinus  Short bowel syndrome, five bowel movements per day on Imodium four times a day  Malnutrition  Crohns disease,ileitis, fistulizing phenotype      Recommend  Local wound care demonstrated for mom who will change the dressing once a day.  GATX for short bowel syndrome  Continue Imodium for short bowel syndrome  Begin meta-Meusel 1 teaspoon twice a day  Malnutrition - nutritional supplemental shakes three times a day  Continue prednisone taper per G.I.  Entyvio per G.I.

## 2022-02-14 ENCOUNTER — OFFICE VISIT (OUTPATIENT)
Dept: GASTROENTEROLOGY | Facility: CLINIC | Age: 57
End: 2022-02-14
Payer: MEDICARE

## 2022-02-14 ENCOUNTER — TELEPHONE (OUTPATIENT)
Dept: HEMATOLOGY/ONCOLOGY | Facility: CLINIC | Age: 57
End: 2022-02-14
Payer: MEDICARE

## 2022-02-14 ENCOUNTER — CLINICAL SUPPORT (OUTPATIENT)
Dept: GASTROENTEROLOGY | Facility: CLINIC | Age: 57
End: 2022-02-14
Payer: MEDICARE

## 2022-02-14 VITALS
HEART RATE: 108 BPM | OXYGEN SATURATION: 100 % | WEIGHT: 109.38 LBS | BODY MASS INDEX: 16.63 KG/M2 | TEMPERATURE: 98 F | DIASTOLIC BLOOD PRESSURE: 80 MMHG | SYSTOLIC BLOOD PRESSURE: 113 MMHG

## 2022-02-14 DIAGNOSIS — K50.813 CROHN'S DISEASE OF BOTH SMALL AND LARGE INTESTINE WITH FISTULA: Primary | ICD-10-CM

## 2022-02-14 DIAGNOSIS — R74.8 ALKALINE PHOSPHATASE ELEVATION: ICD-10-CM

## 2022-02-14 DIAGNOSIS — K90.829 SHORT BOWEL SYNDROME: ICD-10-CM

## 2022-02-14 PROCEDURE — 99215 PR OFFICE/OUTPT VISIT, EST, LEVL V, 40-54 MIN: ICD-10-PCS | Mod: S$GLB,,, | Performed by: INTERNAL MEDICINE

## 2022-02-14 PROCEDURE — 99215 OFFICE O/P EST HI 40 MIN: CPT | Mod: S$GLB,,, | Performed by: INTERNAL MEDICINE

## 2022-02-14 RX ORDER — DICYCLOMINE HYDROCHLORIDE 20 MG/1
TABLET ORAL
COMMUNITY
End: 2022-03-10

## 2022-02-14 RX ORDER — HYDROCODONE BITARTRATE AND ACETAMINOPHEN 10; 325 MG/1; MG/1
1 TABLET ORAL EVERY 6 HOURS PRN
COMMUNITY
Start: 2022-01-31 | End: 2022-07-05 | Stop reason: SDUPTHER

## 2022-02-14 RX ORDER — PANTOPRAZOLE SODIUM 40 MG/1
40 TABLET, DELAYED RELEASE ORAL DAILY
COMMUNITY
Start: 2021-04-04

## 2022-02-14 RX ORDER — CYCLOBENZAPRINE HCL 10 MG
TABLET ORAL
COMMUNITY
End: 2022-03-10

## 2022-02-14 RX ORDER — POTASSIUM CHLORIDE 20 MEQ/1
10 TABLET, EXTENDED RELEASE ORAL DAILY
COMMUNITY

## 2022-02-14 NOTE — PROGRESS NOTES
[facility]          Inflammatory Bowel Disease New Patient Consultation Note         TODAY'S VISIT DATE:  2/14/2022    Reason for Consult:    Chief Complaint   Patient presents with    Crohn's Disease       PCP: Pablo Medina (Inactive)      Referring MD:   Dr. Elmer Serrato    History of Present Illness:   Carline Chang who is a 56 y.o. female is being seen today at the [facility] on 02/14/2022 for inflammatory bowel disease- Crohn's disease.  She is here today for her first visit after discharge from the hospital. She has been recovering slowly. She was admitted to the hospital on 1/19/22 and underwent surgery on 1/20/22 due to colonic obstruction. At the time of surgery she was found to have active ileal Crohn's disease with a fistula extending from the ileocolonic anastomosis to the sigmoid colon. This was resected along with 45cm of ileum. An ileocolonic anastomosis (KONO-S) was made in the proximal transverse colon with 115cm of small bowel remaining. Since surgery she has had some issues with loose, frequent stools and is taking about 8 imodium daily. Gattex has been prescribed and is awaiting insurance approval. She has had some issues with purulent drainage from her incision and this was recently opened and packed. She is here today to discuss future treatment options. She reports that her chronic abd pain has improved significantly since surgery.      IBD History:  Carline Chang is a 52 y.o. female with fistulizing (rectovaginal fistula repaired) ileocolonic Crohn's disease with SB stricture:  diagnosed 1986 (age 21) with history of multiple surgeries including SB resections (1995), ileocolonic resection (2/2009-partial colectomy-3 cm, SB resection-11 cm), sigmoid loop colostomy (6/2015), and rectal vaginal fistula repair (1/2018).  She has a past medical history of cholecystectomy, history of RUE right  brachial vein thrombus (RUE US 1/2017), and kidney stones (CT 1/2017).  She began with symptoms of a partial SBO that was attributed to a virus in 1982.  She continued with symptoms and in 1986 she had an UGI with SBFT with showed Crohn's disease.  She tried various medical therapies asacol, pentasa, azulfidine, imuran and prednisone all of which were ineffective and did not achieve clinic remission though prednisone helped her symptoms the most.  In 1995 she had obstructive symptoms with stricture and fistula requiring surgery at which time she recalls 10 inches of intestine being resected.  She was under the care of Dr. Hurd from 7436-7609 and had a normal colonoscopy in 2003.  She was on no medications from [id number] and began seeing Dr. Boland in 2006.  In 2/2009 she had another surgery which included drainage of abdominal wall abscess, partial colectomy (3 cm in length) with small bowel resection (11 cm in length), and lysis of adhesions with surgical path showing mucosal ulcer, adjacent moderate ileocecal inflammation, serosal inflammatory adhesions, incidental lipomatous hypertrophy of ileocecal valve.  She started remicade but only took three doses but then discontinued due to loss of insurance.  She then did well for two years on no medications.  In 6/2011 she had worsening symptoms that did not improve with steroids, pentasa, imuran and flagyl.  She developed a rectovaginal fistula in 2014 that increased in size by 11/2014 per CT scan.  On 6/18/2015 she had a sigmoid loop colostomy though post-op developed high ostomy output with plans to eventually have a colostomy takedown.  She also had some peristomal skin irritation that was treated with topical antifungal, calamine, and flagyl.  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  Due to ongoing active inflammation and rectal fistula per colonoscopy and CT scan, she started Humira in 12/2015 and did  well on humira until 1/2017.  She discontinued humira on 1/12/2017 and was told it was not working although a colonoscopy did not show any evidence of active disease at that time. A CT scan at that time did show an area suggesting active inflammation in the mid small bowel.  Entyvio was considered but never started, rather she was started on Imuran 50 mg PO daily.  She continued with symptoms and multiple examinations showing active disease, active fistula, and free air adjacent to the proximal colonic anastomosis in the RLQ.  In 1/2018 she underwent a rectovaginal fistula repair.  She had a hospitalization at [facility] from 3/17/2018-3/24/2018 at which time she initially met Dr. Serrato.  She had an MRE on 3/7/2018 which showed SB inflammation with upstream dilation, gastrograffin enemas showed no evidence of rectovaginal fistula and repeat MRE on 3/23/18 showed ongoing active inflammation of the SB with stricture and upstream dilation.  She had a colonoscopy through stoma which showed poor bowel prep with significant stool interfering with visualization. The scope was able to be advanced scope into the neoterminal ileum but due to poor air insufflation and bowel prep had difficulty seeing well.  She was started on IVF and IV steroids and eventually transitioned to oral steroids by discharged with improvement in her labs as well. In April 2018 she underwent a repeat surgery due to ongoing symptoms and active disease. A small bowel resection was performed and a phlegmonous area adjacent the the ileocolonic anastomosis was resected. In August 2018 she underwent colostomy takedown. Throughout this time Entyvio was never started. Instead, Remicade was tried again in 10/2018. She had a significant infusion reaction with her second dose and this was stopped. In 2021 she began seeing Dr. Hughes. She had a colonoscopy performed in 10/21 that revealed active ileitis. She developed an infection which prevented her from starting  Entyvio.  She was hospitalized at [facility] from 01/14/2022 to 01/16/2022 after presenting with new onset seizure activity associated with hypokalemia and hypomagnesemia that was attributed to nausea and vomiting of two day duration. She was initiated on KEPPRA. CT head, MRI brain, and EEG were normal. She was discharged with outpatient follow-up with neurology and GI. She was admitted to the hospital on 1/19/22 and underwent surgery on 1/20/22 due to colonic obstruction. At the time of surgery she was found to have active ileal Crohn's disease with a fistula extending from the ileocolonic anastomosis to the sigmoid colon. This was resected along with 45cm of ileum. An ileocolonic anastomosis (KONO-S) was made in the proximal transverse colon with 115cm of small bowel remaining. She was also noted to be positive for COVID at that time. She was on prednisone 20mg daily and had been since 9/21 so this was weaned slowly. Since surgery she has had some issues with loose, frequent stools and is taking about 8 imodium daily. Gattex has been prescribed and is awaiting insurance approval. She has had some issues with purulent drainage from her incision and this was recently opened and packed. She is here today to discuss future treatment options.       IBD Details:  Dx Date: 1986  Disease type/distribution:Crohn's disease/Small bowel and colonic involvement with stricturing and fistulizing disease  Current Treatment: Prednisone  Start Date: 9/21 Response: Incomplete  Optimized: N/A  Adverse reactions: None  Prior surgeries:    1995: [facility] bowel resection (approx 10 inches) at [facility]   2/9/2009: Drainage of abdominal wall abscess, partial colectomy (3 cm in length) with Small bowel resection (11 cm in length), Lysis of adhesions at [facility] (path: ileocecal mucosal ulcer, adjacent moderate     ileocecal inflammation, serosal inflammatory adhesions, incidental lipomatous hypertrophy of ileocecal valve, no  malignancy)   6/18/2015: Sigmoid loop colostomy and mobilization of splenic flexure at [facility]   1/10/2018: RV fistula repair (path: reactive changes and focal granulation tissue formation, associated transmural defect and serosal adhesions, background colonic mucosa with no significant histopathologic changes)   4/30/2018: Exploratory laparotomy, lysis of adhesions, a previous ileocolic resection anastomosis and distal small bowel resection with a primary anastomosis (path: severe chronic active enteritis with extensive     ulceration, fistula tract formation, granulomas)   8/29/2018: Colostomy lap converted to open colostomy closure (path-colostomy: normal) (path-sigmoid and rectum: segment of bowel with mild transmural acute inflammation and serositis, surgical resection     margins are viable)   1/20/2022: Small bowel and anastomotic resection due to ileosigmoid fistula causing colonic obstruction - KONO-S anastomosis - 115cm of small bowel remaining  CRP Elevation: Y      Calpro: Unknown  Disease Complications: Stricturing and fistulizing disease requiring multiple surgical interventions  Extraintestinal manifestations: Anemia  Prior treatments:   Steroids: Incomplete response  5ASA:  Ineffective  IMM: AZA ineffective  TNF Inh: Humira: Ineffective at controlling disease    IFX: Infusion reaction   Anti-Integrin: None   IL 12/23: None  MARGARITA Inh: N/A    Previous Clinical Trials: None    Last Colonoscopy: 10/21 - active ileal inflammation    Other Endoscopies: Previous reports reviewed    Imaging:   MRE: Previous studies reviewed   CT: Recent CT reviewed   Other: Studies reviewed    Pertinent Labs:  Lab Results   Component Value Date    SEDRATE 52 (H) 02/03/2018    CRP 16.5 (H) 02/09/2022     No results found for: TTGIGA, IGA  Lab Results   Component Value Date    TSH 0.790 01/14/2022     Lab Results   Component Value Date    DYRWDFXY97YT 12 (L) 01/16/2022    PCZKOMKE22 <50 (L) 10/07/2021     Lab Results   Component  Value Date    HEPBSAG Negative 10/07/2021    HEPBCAB Negative 10/07/2021    HEPCAB Negative 02/05/2018     No results found for: ASC75QEOM  Lab Results   Component Value Date    NIL 0.079 02/05/2018    TBAG 0.077 02/05/2018    TBAGNIL -0.002 02/05/2018    MITOGENNIL 2.704 02/05/2018    TBGOLD Negative 02/05/2018     Lab Results   Component Value Date    TPTMINTERP SEE BELOW 03/18/2018    TPMTRESULT 19.9 (L) 01/17/2017     Lab Results   Component Value Date    STOOLCULTURE  03/18/2018     No Salmonella,Shigella,Vibrio,Campylobacter,Yersinia isolated.    PSPZXRUTTH6J Negative 03/18/2018    WOTIMRETZI2A Negative 03/18/2018    CDIFFICILEAN Negative 06/12/2018    CDIFFTOX Negative 06/12/2018     No results found for: CALPROTECTIN    Therapeutic Drug Monitoring Labs:  No results found for: PROMETH  No results found for: ANSADAINIT, INFLIXIMAB, INFLIXINTERP    Vaccinations:  Lab Results   Component Value Date    HEPBSAB 7.5 (L) 10/07/2021     Lab Results   Component Value Date    HEPAIGG Negative 02/05/2018     Lab Results   Component Value Date    VARICELLAZOS 2.50 (H) 02/05/2018    VARICELLAINT Positive (A) 02/05/2018     Immunization History   Administered Date(s) Administered    Hepatitis A / Hepatitis B 04/17/2018, 06/12/2018    Hepatitis B, Adult 12/03/2018    Influenza - Quadrivalent 11/01/2018    Pneumococcal Conjugate - 13 Valent 04/17/2018    Pneumococcal Polysaccharide - 23 Valent 06/12/2018    Tdap 04/17/2018         Review of Systems  Review of Systems   Constitutional: Negative for chills, fever and weight loss.   HENT: Negative for sore throat.    Eyes: Negative for pain, discharge and redness.   Respiratory: Negative for cough, shortness of breath and wheezing.    Cardiovascular: Negative for chest pain, orthopnea and leg swelling.   Gastrointestinal: Positive for diarrhea and nausea. Negative for abdominal pain, blood in stool, constipation, heartburn, melena and vomiting.   Genitourinary: Negative  for dysuria, frequency and urgency.   Musculoskeletal: Positive for back pain. Negative for joint pain and myalgias.   Skin: Negative for itching and rash.   Neurological: Negative for focal weakness and seizures.   Endo/Heme/Allergies: Does not bruise/bleed easily.   Psychiatric/Behavioral: Negative for depression. The patient is not nervous/anxious.        Medical History:   Past Medical History:   Diagnosis Date    Acute deep vein thrombosis (DVT) of brachial vein of right upper extremity 2017    on RUE ultrasound    Anxiety     Bowel perforation     Chronic pain     Crohn's disease of both small and large intestine with intestinal obstruction     Difficult intravenous access     Encounter for blood transfusion     GERD (gastroesophageal reflux disease)     Kidney stones     Nephrolithiasis     Stricture of bowel        Surgical History:  Past Surgical History:   Procedure Laterality Date    ABSCESS DRAINAGE      ANAL FISTULOTOMY      BOWEL RESECTION      small bowel resection per Dr. Uribe 2018     SECTION      x2    CHOLECYSTECTOMY  2000    COLON SURGERY  2015    sigmoid loop colostomy    COLONOSCOPY N/A 2016    Procedure: COLONOSCOPY;  Surgeon: Andre Uribe MD;  Location: [facility] (4TH FLR);  Service: Endoscopy;  Laterality: N/A;    COLONOSCOPY N/A 2017    Procedure: COLONOSCOPY;  Surgeon: Dany Stock MD;  Location: [facility] (2ND FLR);  Service: Endoscopy;  Laterality: N/A;    COLONOSCOPY N/A 2017    Procedure: COLONOSCOPY;  Surgeon: Andre Uribe MD;  Location: [facility] (4TH FLR);  Service: Endoscopy;  Laterality: N/A;    COLONOSCOPY N/A 2018    Procedure: COLONOSCOPY;  Surgeon: Andre Uribe MD;  Location: [facility] ENDO (4TH FLR);  Service: Endoscopy;  Laterality: N/A;    COLONOSCOPY N/A 3/24/2018    Procedure: COLONOSCOPY;  Surgeon: Elmer Serrato MD;  Location: [facility] ENDO (2ND FLR);  Service: Endoscopy;  Laterality:  N/A;    COLONOSCOPY  3/24/2018    COLONOSCOPY N/A 7/6/2018    Procedure: COLONOSCOPY;  Surgeon: Andre Uribe MD;  Location: [facility] ENDO (4TH FLR);  Service: Endoscopy;  Laterality: N/A;    COLONOSCOPY N/A 10/7/2021    Procedure: COLONOSCOPY;  Surgeon: Jose Hughes MD;  Location: [facility] ENDO;  Service: Endoscopy;  Laterality: N/A;    ESOPHAGOGASTRODUODENOSCOPY N/A 10/7/2021    Procedure: EGD (ESOPHAGOGASTRODUODENOSCOPY);  Surgeon: Jose Hughes MD;  Location: [facility] ENDO;  Service: Endoscopy;  Laterality: N/A;    LAPAROSCOPIC CLOSURE OF COLOSTOMY N/A 8/29/2018    Procedure: CLOSURE, COLOSTOMY, LAPAROSCOPIC;  Surgeon: Andre Uribe MD;  Location: [facility] OR 2ND FLR;  Service: Colon and Rectal;  Laterality: N/A;  converted to open    LYSIS OF ADHESIONS N/A 1/20/2022    Procedure: LYSIS, ADHESIONS;  Surgeon: LLUVIA Post MD;  Location: [facility] OR 2ND FLR;  Service: Colon and Rectal;  Laterality: N/A;  lasting longer than 2 hours, modifier 22      rectovaginal fistula repair  01/2018    SMALL INTESTINE SURGERY  1995    per patient 10 inches removed    SMALL INTESTINE SURGERY  02/2009    abdominal abscess drained, 3 cm colon removed, 11 cm SB removed    TUBAL LIGATION      UPPER GASTROINTESTINAL ENDOSCOPY  2000       Family History:   Family History   Problem Relation Age of Onset    Cancer Maternal Aunt 66        colon ca    Heart attack Father 69    Anesthesia problems Neg Hx     Celiac disease Neg Hx     Cirrhosis Neg Hx     Colon cancer Neg Hx     Colon polyps Neg Hx     Crohn's disease Neg Hx     Cystic fibrosis Neg Hx     Esophageal cancer Neg Hx     Hemochromatosis Neg Hx     Inflammatory bowel disease Neg Hx     Irritable bowel syndrome Neg Hx     Liver cancer Neg Hx     Liver disease Neg Hx     Rectal cancer Neg Hx     Stomach cancer Neg Hx     Ulcerative colitis Neg Hx     Mars's disease Neg Hx     Lymphoma Neg Hx     Tuberculosis Neg Hx     Scleroderma Neg Hx      Rheum arthritis Neg Hx     Multiple sclerosis Neg Hx     Melanoma Neg Hx     Lupus Neg Hx     Psoriasis Neg Hx     Skin cancer Neg Hx        Social History:   Social History     Tobacco Use    Smoking status: Never Smoker    Smokeless tobacco: Never Used   Substance Use Topics    Alcohol use: No     Alcohol/week: 0.0 standard drinks    Drug use: No       Allergies: Reviewed    Home Medications:   Medication List with Changes/Refills   Current Medications    CALCIUM CARBONATE (TUMS ORAL)    Take 1 tablet by mouth as needed (acid reflux).    CALCIUM-VITAMIN D3 (OS-CHEIKH 500 + D3) 500 MG-5 MCG (200 UNIT) PER TABLET    Take 2 tablets by mouth 2 (two) times daily with meals.    CYANOCOBALAMIN 1,000 MCG/ML INJECTION    Inject 1,000 mcg into the skin every 30 days.    CYCLOBENZAPRINE (FLEXERIL) 10 MG TABLET    Flexeril 10 mg tablet   Take 1 tablet 3 times a day by oral route.    DICYCLOMINE (BENTYL) 20 MG TABLET    Take 20 mg by mouth every 6 (six) hours.    DICYCLOMINE (BENTYL) 20 MG TABLET    dicyclomine 20 mg tablet   Take 1 tablet 4 times a day by oral route.    HYDROCHLOROTHIAZIDE (MICROZIDE) 12.5 MG CAPSULE    Take 12.5 mg by mouth once daily.    HYDROCODONE-ACETAMINOPHEN (NORCO)  MG PER TABLET    Take 1 tablet by mouth every 6 (six) hours as needed.    LEVETIRACETAM (KEPPRA) 500 MG TAB    Take 1 tablet (500 mg total) by mouth 2 (two) times daily.    LOPERAMIDE (IMODIUM) 2 MG CAPSULE    Take 2 capsules (4 mg total) by mouth 4 (four) times daily.    OXYCODONE (ROXICODONE) 10 MG TAB IMMEDIATE RELEASE TABLET    Take 1 tablet (10 mg total) by mouth every 6 (six) hours as needed for Pain.    PANTOPRAZOLE (PROTONIX) 20 MG TABLET    Take 20 mg by mouth every evening.    PANTOPRAZOLE (PROTONIX) 40 MG TABLET    pantoprazole 40 mg tablet,delayed release   TAKE ONE TABLET BY MOUTH AT BEDTIME    POTASSIUM CHLORIDE SA (K-DUR,KLOR-CON) 20 MEQ TABLET    Take 40 mEq by mouth.    PREDNISONE (DELTASONE) 10 MG TABLET     STARTING 2/8/22 - Take 1 tablet (10 mg total) by mouth once daily. for 14 days    PREDNISONE (DELTASONE) 5 MG TABLET    BEGINNING 1/27/22: Take 3 tablets (15 mg total) by mouth once daily for 11 days then STOP. RESTART 2/22/22 take 1 tablet (5 mg total) by mouth once daily for 14 days    PROMETHAZINE (PHENERGAN) 25 MG TABLET    Take 1 tablet (25 mg total) by mouth every 8 (eight) hours as needed for Nausea.    VENLAFAXINE (EFFEXOR) 75 MG TABLET    Take 75 mg by mouth 2 (two) times daily.    VITAMIN D (VITAMIN D3) 1000 UNITS TAB    Take 1,000 Units by mouth.   Discontinued Medications    PREDNISONE (DELTASONE) 5 MG TABLET    Take 1 tablet (5 mg total) by mouth once daily. for 14 days       Physical Exam:  Vital Signs:  /80 (BP Location: Right arm, Patient Position: Sitting)   Pulse 108   Temp 97.8 °F (36.6 °C)   Wt 49.6 kg (109 lb 5.6 oz)   LMP 05/30/2009   SpO2 100%   BMI 16.63 kg/m²   Body mass index is 16.63 kg/m².    Physical Exam  Vitals and nursing note reviewed.   Constitutional:       General: She is not in acute distress.     Appearance: Normal appearance. She is well-developed. She is not ill-appearing or toxic-appearing.   Eyes:      Conjunctiva/sclera: Conjunctivae normal.      Pupils: Pupils are equal, round, and reactive to light.   Neck:      Thyroid: No thyromegaly.   Cardiovascular:      Rate and Rhythm: Normal rate and regular rhythm.      Heart sounds: Normal heart sounds. No murmur heard.      Pulmonary:      Effort: Pulmonary effort is normal.      Breath sounds: Normal breath sounds. No wheezing or rales.   Abdominal:      General: Bowel sounds are normal. There is no distension.      Palpations: Abdomen is soft. There is no mass.      Tenderness: There is no abdominal tenderness.      Comments: Incision site assessed   Musculoskeletal:         General: No tenderness. Normal range of motion.   Lymphadenopathy:      Cervical: No cervical adenopathy.   Skin:     Findings: No erythema  or rash.   Neurological:      General: No focal deficit present.      Mental Status: She is alert and oriented to person, place, and time.   Psychiatric:         Mood and Affect: Mood normal.         Behavior: Behavior normal.         Thought Content: Thought content normal.         Judgment: Judgment normal.         Labs: reviewed and pertinent noted above    Assessment/Plan:  Carline Chang is a 56 y.o. female with complicated fistulizing/stricturing Crohn's disease. The following issues were addresssed:    1. Crohn's disease of both small and large intestine with fistula    2. Alkaline phosphatase elevation    3. Short bowel syndrome      1. Crohn's disease: She has a long history of very complicated Crohn's disease requiring multiple surgeries for fistulizing and stricturing disease. Currently she is in surgical remission but has a very high risk if she is to develop further active disease or complications requiring further surgeries as she only has 115 cm of small bowel remaining. Entyvio has been approved by her insurance, but given the severity of her disease and complicated nature of her disease course I think she would potentially benefit from Stelara over Entyvio. Risks and benefits of each medication were discussed today. After discussion she would like to try Stelara. Will place orders today and she will meet with our clinical pharmacist to discuss further. Will plan to start once OK per Dr. Post. Will also continue to wean prednisone.    2. Elevated alk phos: Intermittently elevated since at least 2018. Labs are scheduled for Wednesday. Will follow up and if remains elevated will discuss further evaluation at that time.    3. Short bowel syndrome: Agree with Gattex. Will need close monitoring of electrolytes and nutritional markers. Thankfully she has a significant amount of colon remaining which will be beneficial in minimizing volume depletion.           # IBD specific health  maintenance:  Colon cancer surveillance: UTD    Annual:  - Eye exam: N/A  - Skin exam (if on IMM/TNF): Discuss in the future  - reminded pt to use sunblock/hats/sunprotective clothing  - PAP (if immunosuppressed): Discuss in the future    DEXA: Schedule in the future    Vitamin D: Check when possible    Vaccines:    Influenza: Recommend vaccination   Pneumovax:     PCV13: UTD    PSV23: UTD   HAV: Check serology in future   HBV: Check serology in future   Tdap: UTD   MMR: Check serologies in the future   VZV: Immune   HZV: Discuss in the future   HPV: N/A   Meningococcus: N/A   COVID: Discuss in the future    Follow up: Follow up in about 2 months (around 4/14/2022).    Thank you again for sending Carline Chang to see Dr. Gunner Velasquez today at the [facility]. Please don't hesitate to contact Dr. Velasquez if there are any questions regarding this evaluation, or if you have any other patients with inflammatory bowel disease for whom you would like a consultation. You can reach Dr. Velasquez at [phone] or by email at [email or web address]    Cholo Velasquez MD  Department of Gastroenterology  Inflammatory Bowel Disease

## 2022-02-14 NOTE — PROGRESS NOTES
[facility]  Inflammatory Bowel Disease  Pharmacy Note    TODAY'S VISIT DATE:  2/14/2022    Reason for visit: New start biologic    IBD treatment to be initiated/optimized: Jhon    IBD MD: Ron      Pertinent History:  - IBD phenotype: Crohn's disease  - Signs and symptoms:  o BM: 5 watery BM/ day (up to 30BM/day before surgery)  o Blood/ Mucus/ night time BM: nocturnal BM urgency once a month.   o Abdominal pain: No   - Dispensing pharmacy/infusion center:  Lives in [place]. Will go to [facility]  - Current therapy: Prednisone 10mg  (started several months ago, tapered to 10 on 2/8/22, will go down to 5mg daily on 2/22/22)    Therapeutic Drug Monitoring Labs:  N/A    Prior IBD Therapies:  Remicade - took a few doses in 2009 then d/c due to insurance issue. Reports getting on and off on it 3 times. Last time d/c due to hypersensitivity reaction   Humira 40mg Q2W - Dec 2015 - Dec 2016  - ineffective  Asacol - ineffective  Pentasa- ineffective  Azulfidine - ineffective  Azathioprine 50mg -on and off, no significant side effects    Vaccinations:  Lab Results   Component Value Date    HEPBSAB 7.5 (L) 10/07/2021     No results found for: HEPBSURFABQU  Lab Results   Component Value Date    HEPAIGG Negative 02/05/2018     Lab Results   Component Value Date    VARICELLAZOS 2.50 (H) 02/05/2018    VARICELLAINT Positive (A) 02/05/2018     Immunization History   Administered Date(s) Administered    Hepatitis A / Hepatitis B 04/17/2018, 06/12/2018    Hepatitis B, Adult 12/03/2018    Influenza - Quadrivalent 11/01/2018    Pneumococcal Conjugate - 13 Valent 04/17/2018    Pneumococcal Polysaccharide - 23 Valent 06/12/2018    Tdap 04/17/2018     Influenza: annually    PCV 13: compelted  PPSV 23: repeat in 2023 (5 years)  Tetanus (TdaP): completed 2018, repeat 2028   HPV: N/A      Meningococcal:  Hepatitis B: immune    Hepatitis A: check immunity after repeating Hep A in 2018  MMR (live vaccine): check  immunity         Chickenpox status/Varicella (live vaccine): immune  Shingrix: recommended. 2 doses 2-6 months apart  Covid:  Recommended (2 doses)      All Medical History/Surgical History/Family History/Social History/Allergies have been reviewed and updated in EMR    Review of patient's allergies indicates:   Allergen Reactions    Remicade [infliximab] Shortness Of Breath and Palpitations     Severe muscle and joint, and bone pain    Morphine Other (See Comments)     Abdominal pain    Flagyl [metronidazole] Other (See Comments)     Neuropathy    Nubain [nalbuphine] Anxiety     Upper abdominal burning          Current Medications:   No outpatient medications have been marked as taking for the 2/14/22 encounter (Appointment) with IBD PHARMACIST.       Reviewed all current medications including OTC, herbals and supplements.    Labs:   Lab Results   Component Value Date    HEPBSAG Negative 10/07/2021    HEPBCAB Negative 10/07/2021     No results found for: TBGOLDPLUS  Lab Results   Component Value Date    SQICRBCX00GF 12 (L) 01/16/2022    OSWQNGFB81 <50 (L) 10/07/2021     Lab Results   Component Value Date    WBC 22.43 (H) 02/09/2022    HGB 14.0 02/09/2022    HCT 47.6 02/09/2022    MCV 99 (H) 02/09/2022     02/09/2022     Lab Results   Component Value Date    CREATININE 0.9 02/09/2022    ALBUMIN 3.4 (L) 02/09/2022    BILITOT 0.5 02/09/2022    ALKPHOS 230 (H) 02/09/2022    AST 17 02/09/2022    ALT 14 02/09/2022         Assessment/Plan:  Carline Chang is a 56 y.o. female that  has a past medical history of Acute deep vein thrombosis (DVT) of brachial vein of right upper extremity, Anxiety, Bowel perforation, Chronic pain, Crohn's disease of both small and large intestine with intestinal obstruction, Difficult intravenous access, Encounter for blood transfusion, GERD (gastroesophageal reflux disease), Kidney stones, Nephrolithiasis, and Stricture of bowel. Pt presents to clinic for a new patient  visit with Dr. Velasquez. Referred to see IBD pharmacist to discuss new start biologic. Patient has tried and failed TNF inhibitors in the past including Humira and Remicade. Currently on Prednisone taper, at 10mg/ day until 2/22/22 then down to 5mg/ day. She recently underwent surgery on 1/20/22 for colonic obstruction. Even though she still is experiencing watery bowel movement and abdominal discomfort, it has significantly improved since surgery. Pt reports having a phobia of needles. She has been on Humira self injections in the past but her son and mother were assisting with injections.       # Recommendations:  - Discussed mechanism of action, safety profile and onset of action of Stelara, it can take 8-16 weeks for best results.  - Reviewed proper storage of Stelara in the refrigerator. Ok in room temperature for up to 30 days, but if out of the fridge not recommended to put it back in the refrigerator.   - Encouraged pt to practice proper hand hygiene considering increased risk of infection with biologics. Pt to make us aware if she ever experiences s/sx of an infection including fever, chills, URI symptoms or UTI symptoms.   - Discussed importance of immunizations considering the increased risk of infections. Will discuss recommended immunizations at next visit.   - Pt to avoid live vaccines and uncooked/ raw seafood such as raw oysters or sushi.   - Advised pt to use sun protection and get annual skin exams considering possible increased risk of skin cancer.   - Therapy plan for Stelara IV sent to [facility] and script for SC pended for provider to be sent to [facility]  - Discussed self injections and patient expresses interest in coming to clinic for her injections every 8 weeks as she will not be able to self inject due to fear of needles.   - Will f/u after approval and schedule first SC injection in clinic 8 weeks after IV infusion   - Patient verbalized understanding instructions       Sakshi  Nadeem PharmD  IBD Clinical Pharmacist

## 2022-02-14 NOTE — TELEPHONE ENCOUNTER
----- Message from LLUVIA Post MD sent at 2/13/2022 12:12 PM CST -----  Regarding: repeat labs  Please schedule pt for repeat labwork:    CBC CMP prealbumin

## 2022-02-14 NOTE — TELEPHONE ENCOUNTER
Left message that Dr Post wants her to get labs drawn. Asked th at she call me back so that I can order labs.

## 2022-02-15 ENCOUNTER — PATIENT MESSAGE (OUTPATIENT)
Dept: SURGERY | Facility: CLINIC | Age: 57
End: 2022-02-15
Payer: MEDICARE

## 2022-02-15 ENCOUNTER — TELEPHONE (OUTPATIENT)
Dept: GASTROENTEROLOGY | Facility: CLINIC | Age: 57
End: 2022-02-15
Payer: MEDICARE

## 2022-02-15 RX ORDER — IPRATROPIUM BROMIDE AND ALBUTEROL SULFATE 2.5; .5 MG/3ML; MG/3ML
3 SOLUTION RESPIRATORY (INHALATION)
Status: CANCELLED | OUTPATIENT
Start: 2022-02-15

## 2022-02-15 RX ORDER — DIPHENHYDRAMINE HYDROCHLORIDE 50 MG/ML
25 INJECTION INTRAMUSCULAR; INTRAVENOUS
Status: CANCELLED | OUTPATIENT
Start: 2022-02-15

## 2022-02-15 RX ORDER — USTEKINUMAB 90 MG/ML
90 INJECTION, SOLUTION SUBCUTANEOUS
Qty: 1 EACH | Refills: 2 | Status: SHIPPED | OUTPATIENT
Start: 2022-02-15 | End: 2022-07-06

## 2022-02-15 RX ORDER — EPINEPHRINE 1 MG/ML
0.3 INJECTION, SOLUTION, CONCENTRATE INTRAVENOUS
Status: CANCELLED | OUTPATIENT
Start: 2022-02-15

## 2022-02-15 RX ORDER — HEPARIN 100 UNIT/ML
500 SYRINGE INTRAVENOUS
Status: CANCELLED | OUTPATIENT
Start: 2022-02-15

## 2022-02-15 RX ORDER — ACETAMINOPHEN 325 MG/1
650 TABLET ORAL
Status: CANCELLED | OUTPATIENT
Start: 2022-02-15

## 2022-02-15 RX ORDER — METHYLPREDNISOLONE SOD SUCC 125 MG
40 VIAL (EA) INJECTION
Status: CANCELLED | OUTPATIENT
Start: 2022-02-15

## 2022-02-15 RX ORDER — SODIUM CHLORIDE 0.9 % (FLUSH) 0.9 %
10 SYRINGE (ML) INJECTION
Status: CANCELLED | OUTPATIENT
Start: 2022-02-15

## 2022-02-15 NOTE — TELEPHONE ENCOUNTER
Spoke with patient  She is on 10mg prednisone since 1/8  Advised that tomorrow she is to go down to 5 mg for 7 days then stop.  She is to call and let me know if she starts having problems when she drops down.  Pt verbalized understanding to all and has no further questions at this time.

## 2022-02-15 NOTE — TELEPHONE ENCOUNTER
----- Message from Cholo Velasquez MD sent at 2/15/2022  8:06 AM CST -----  Hey,    I realized when I was doing her note last night that I didn't address her prednisone. When you have a chance can you reach out to her and find out what dose she's on and when she last dropped the dose down? I think she was on 10mg daily so if that's the case she can go down to 5mg daily for a week and then stop it.    Thanks.

## 2022-02-15 NOTE — TELEPHONE ENCOUNTER
[facility] for patient to call me back at [phone] re: Prednisone  find out what dose she's on and when she last dropped the dose down? I think she was on 10mg daily so if that's the case she can go down to 5mg daily for a week and then stop it.

## 2022-02-16 ENCOUNTER — SPECIALTY PHARMACY (OUTPATIENT)
Dept: PHARMACY | Facility: CLINIC | Age: 57
End: 2022-02-16
Payer: MEDICARE

## 2022-02-16 NOTE — TELEPHONE ENCOUNTER
PA#  [id number]. Stelara subq approved until 12/31/22. Test claim [id number]  Pt does not qualify for copay card since she has Medicare. Forwarding to financial assistance team to see if pt qualifies for  assistance     Spoke to pt and welcome call completed. Informed pt [facility] will reach out with any updates. Pt was appreciative and had no further questions

## 2022-02-17 NOTE — TELEPHONE ENCOUNTER
Called patient to discuss Stelara copay and apply for FA. Patient requested PAP application be emailed to her-emailed pt portion.     Faxed provider portion    ARS

## 2022-02-17 NOTE — TELEPHONE ENCOUNTER
BENEFIT INVESTIGATION: NON LIS  Jhon  [facility] Medicare plan.  $480 deductible- met  No TrOOP max (Patient remains in catastrophic stage until end of the year).  Patient is in catastrophic stage of coverage.( after 1 fill)  Estimated co pay: $ 3724.15 ($1824.93 after)  Co pay assistance required. Forwarded to FA team for review.    ARS

## 2022-02-18 ENCOUNTER — TELEPHONE (OUTPATIENT)
Dept: GASTROENTEROLOGY | Facility: CLINIC | Age: 57
End: 2022-02-18
Payer: MEDICARE

## 2022-02-22 ENCOUNTER — PATIENT MESSAGE (OUTPATIENT)
Dept: PHARMACY | Facility: CLINIC | Age: 57
End: 2022-02-22
Payer: MEDICARE

## 2022-02-22 NOTE — TELEPHONE ENCOUNTER
[facility] for patient to call me back at [phone] re: prednisone taper check in.  Is she on 5 mg now?

## 2022-02-22 NOTE — TELEPHONE ENCOUNTER
Called patient for Stelara PAP patient portion. Pt did not answer, LVM, and sent Fritter msg    ARS

## 2022-02-23 NOTE — TELEPHONE ENCOUNTER
2nd attempt - LVM for patient to call me back at [phone] re: how is she doing on 5mg prednisone?

## 2022-02-25 ENCOUNTER — TELEPHONE (OUTPATIENT)
Dept: GASTROENTEROLOGY | Facility: CLINIC | Age: 57
End: 2022-02-25
Payer: MEDICARE

## 2022-02-25 NOTE — TELEPHONE ENCOUNTER
----- Message from Drew Oglesby sent at 2/24/2022  5:00 PM CST -----  Contact: YA EASLEY [[id number]]  Type:  Patient Returning Call    Who Called:YA EASLEY [[id number]]    Who Left Message for Patient: Fadumo     Does the patient know what this is regarding?:    Would the patient rather a call back or a response via [facility]?     Best Call Back Number: [phone] (mobile)    Additional Information:

## 2022-02-28 ENCOUNTER — TELEPHONE (OUTPATIENT)
Dept: SURGERY | Facility: CLINIC | Age: 57
End: 2022-02-28
Payer: MEDICARE

## 2022-02-28 NOTE — TELEPHONE ENCOUNTER
----- Message from Sharona Buckner sent at 2/28/2022  2:56 PM CST -----  Regarding: pt called  Name of Who is Calling: YA EASLEY [[id number]]      What is the request in detail: pt wound is draining  and still not closing up.   Patient cannot do a biologic and needs to speak with the Dr . Please advise       Can the clinic reply by [facility]: NO      What Number to [facility] Back [facility]: [phone]

## 2022-02-28 NOTE — TELEPHONE ENCOUNTER
Left message I was returning her call but if she felt like she needed to be seen sooner she should call me Wed morning.

## 2022-03-02 ENCOUNTER — OFFICE VISIT (OUTPATIENT)
Dept: SURGERY | Facility: CLINIC | Age: 57
End: 2022-03-02
Payer: MEDICARE

## 2022-03-02 ENCOUNTER — LAB VISIT (OUTPATIENT)
Dept: LAB | Facility: HOSPITAL | Age: 57
End: 2022-03-02
Attending: COLON & RECTAL SURGERY
Payer: MEDICARE

## 2022-03-02 ENCOUNTER — PATIENT MESSAGE (OUTPATIENT)
Dept: GASTROENTEROLOGY | Facility: CLINIC | Age: 57
End: 2022-03-02
Payer: MEDICARE

## 2022-03-02 ENCOUNTER — TELEPHONE (OUTPATIENT)
Dept: GASTROENTEROLOGY | Facility: CLINIC | Age: 57
End: 2022-03-02
Payer: MEDICARE

## 2022-03-02 VITALS
WEIGHT: 111.31 LBS | HEART RATE: 130 BPM | DIASTOLIC BLOOD PRESSURE: 71 MMHG | SYSTOLIC BLOOD PRESSURE: 136 MMHG | HEIGHT: 68 IN | BODY MASS INDEX: 16.87 KG/M2

## 2022-03-02 DIAGNOSIS — T81.83XD POSTOPERATIVE WOUND SINUS, SUBSEQUENT ENCOUNTER: ICD-10-CM

## 2022-03-02 DIAGNOSIS — K50.013 CROHN'S DISEASE OF ILEUM WITH FISTULA: Primary | ICD-10-CM

## 2022-03-02 DIAGNOSIS — K50.013 CROHN'S DISEASE OF ILEUM WITH FISTULA: ICD-10-CM

## 2022-03-02 LAB
ALBUMIN SERPL BCP-MCNC: 3.1 G/DL (ref 3.5–5.2)
ALP SERPL-CCNC: 143 U/L (ref 55–135)
ALT SERPL W/O P-5'-P-CCNC: 11 U/L (ref 10–44)
ANION GAP SERPL CALC-SCNC: 15 MMOL/L (ref 8–16)
AST SERPL-CCNC: 16 U/L (ref 10–40)
BASOPHILS # BLD AUTO: 0.06 K/UL (ref 0–0.2)
BASOPHILS NFR BLD: 0.6 % (ref 0–1.9)
BILIRUB SERPL-MCNC: 0.4 MG/DL (ref 0.1–1)
BUN SERPL-MCNC: 9 MG/DL (ref 6–20)
CALCIUM SERPL-MCNC: 7.8 MG/DL (ref 8.7–10.5)
CHLORIDE SERPL-SCNC: 106 MMOL/L (ref 95–110)
CO2 SERPL-SCNC: 18 MMOL/L (ref 23–29)
CREAT SERPL-MCNC: 0.8 MG/DL (ref 0.5–1.4)
CRP SERPL-MCNC: 17.7 MG/L (ref 0–8.2)
DIFFERENTIAL METHOD: ABNORMAL
EOSINOPHIL # BLD AUTO: 0.2 K/UL (ref 0–0.5)
EOSINOPHIL NFR BLD: 1.5 % (ref 0–8)
ERYTHROCYTE [DISTWIDTH] IN BLOOD BY AUTOMATED COUNT: 16.2 % (ref 11.5–14.5)
EST. GFR  (AFRICAN AMERICAN): >60 ML/MIN/1.73 M^2
EST. GFR  (NON AFRICAN AMERICAN): >60 ML/MIN/1.73 M^2
GLUCOSE SERPL-MCNC: 77 MG/DL (ref 70–110)
HCT VFR BLD AUTO: 42.4 % (ref 37–48.5)
HGB BLD-MCNC: 13.4 G/DL (ref 12–16)
IMM GRANULOCYTES # BLD AUTO: 0.05 K/UL (ref 0–0.04)
IMM GRANULOCYTES NFR BLD AUTO: 0.5 % (ref 0–0.5)
LYMPHOCYTES # BLD AUTO: 1.4 K/UL (ref 1–4.8)
LYMPHOCYTES NFR BLD: 13.4 % (ref 18–48)
MCH RBC QN AUTO: 30.2 PG (ref 27–31)
MCHC RBC AUTO-ENTMCNC: 31.6 G/DL (ref 32–36)
MCV RBC AUTO: 96 FL (ref 82–98)
MONOCYTES # BLD AUTO: 0.4 K/UL (ref 0.3–1)
MONOCYTES NFR BLD: 3.8 % (ref 4–15)
NEUTROPHILS # BLD AUTO: 8.3 K/UL (ref 1.8–7.7)
NEUTROPHILS NFR BLD: 80.2 % (ref 38–73)
NRBC BLD-RTO: 0 /100 WBC
PLATELET # BLD AUTO: 274 K/UL (ref 150–450)
PMV BLD AUTO: 8.9 FL (ref 9.2–12.9)
POTASSIUM SERPL-SCNC: 4.1 MMOL/L (ref 3.5–5.1)
PREALB SERPL-MCNC: 13 MG/DL (ref 20–43)
PROT SERPL-MCNC: 7 G/DL (ref 6–8.4)
RBC # BLD AUTO: 4.44 M/UL (ref 4–5.4)
SODIUM SERPL-SCNC: 139 MMOL/L (ref 136–145)
WBC # BLD AUTO: 10.31 K/UL (ref 3.9–12.7)

## 2022-03-02 PROCEDURE — 36415 COLL VENOUS BLD VENIPUNCTURE: CPT | Performed by: COLON & RECTAL SURGERY

## 2022-03-02 PROCEDURE — 99024 POSTOP FOLLOW-UP VISIT: CPT | Mod: POP,,, | Performed by: COLON & RECTAL SURGERY

## 2022-03-02 PROCEDURE — 86140 C-REACTIVE PROTEIN: CPT | Performed by: COLON & RECTAL SURGERY

## 2022-03-02 PROCEDURE — 80053 COMPREHEN METABOLIC PANEL: CPT | Performed by: COLON & RECTAL SURGERY

## 2022-03-02 PROCEDURE — 99214 OFFICE O/P EST MOD 30 MIN: CPT | Mod: PBBFAC | Performed by: COLON & RECTAL SURGERY

## 2022-03-02 PROCEDURE — 99999 PR PBB SHADOW E&M-EST. PATIENT-LVL IV: CPT | Mod: PBBFAC,,, | Performed by: COLON & RECTAL SURGERY

## 2022-03-02 PROCEDURE — 84134 ASSAY OF PREALBUMIN: CPT | Performed by: COLON & RECTAL SURGERY

## 2022-03-02 PROCEDURE — 85025 COMPLETE CBC W/AUTO DIFF WBC: CPT | Performed by: COLON & RECTAL SURGERY

## 2022-03-02 PROCEDURE — 99024 PR POST-OP FOLLOW-UP VISIT: ICD-10-PCS | Mod: POP,,, | Performed by: COLON & RECTAL SURGERY

## 2022-03-02 PROCEDURE — 99999 PR PBB SHADOW E&M-EST. PATIENT-LVL IV: ICD-10-PCS | Mod: PBBFAC,,, | Performed by: COLON & RECTAL SURGERY

## 2022-03-02 NOTE — Clinical Note
Charleen Martino - I think we should give her 4 weeks before medical therapy.  All in all she is coming along.  I do think she will benefit from Gattex.  Happy to discuss.  Thanks Andre

## 2022-03-02 NOTE — TELEPHONE ENCOUNTER
----- Message from Sharona Buckner sent at 2/28/2022  2:58 PM CST -----  Regarding: pt called  Name of Who is Calling: YA EASLEY [[id number]]      What is the request in detail:  pt wound is draining  and still not closing up.   Patient cannot do a biologic and needs to speak with the Dr . Please advise           Can the clinic reply by [facility]: NO      What [facility] Back [facility]: [phone]

## 2022-03-02 NOTE — TELEPHONE ENCOUNTER
Spoke with patient and advised that she is to get clearance from [facility] before starting Stelara  Pt verbalized understanding to all and has no further questions at this time.

## 2022-03-02 NOTE — TELEPHONE ENCOUNTER
Spoke with patient and advised that she needs to contact her surgeon about this.  Stated she has an appointment today  Her Stelara infusion is scheduled for 3/4/22, but I advised that most likely her surgeon would need to clear her, but that I would also check with Dr. Velasquez    Additionally, she is now off the prednisone  BMs 3x/daily, applesauce-like, no blood or mucous  + nausea, - vomiting  Denies fever, abx use, sick contacts/travel    Will route to Dr. Velasquez

## 2022-03-02 NOTE — PROGRESS NOTES
"HPI  57 yo female with Crohn's ileocolitis, fistulizing phenotype.     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 KONO S anastomosis was performed.     She underwent ileocolic resection, repair of sigmoid colon She had 105 cm of small bowel remaining. She is currently eating food but has a poor appetite. She is not drinking her nutritional supplements. She is taking Imodium two tablets four times a day. Shes having five bowel movements per day. She is continent. She denies any fever. She has any nausea or vomiting. She reports drainage from the wound which looks purulent.      SPECIMEN  FINAL PATHOLOGIC DIAGNOSIS  Gross Description  Ordering: Attending:  Diagnosed by  Electronically Signed By:  Crohn's disease of both small and large  intestine with other complication  1. . Scar  2.   Ileocolic anastomosis  Received in formalin, labeled with the patients name and medical record number, and designated as scar -  perm" is an unoriented 21.1 cm in length x 0.2-0.6 cm in diameter skin and soft tissue fragment consistent  with a well-healed scar. The epidermal surface in white-tan, smooth, and unremarkable and the underlying  subcutaneous tissue is yellow-tan, dense, fibrous, and unremarkable. Representative sections are  submitted in cassette [id number].  Part 2 of 2  Received in formalin, labeled with the patients name and medical record number, and designated as  ileocolic anastomosis is a previously opened segment of convoluted bowel consisting of a previous  anastomosis between ileum (35.1 cm in length x 6.9 cm in average circumference) and large bowel (12.3  cm in length x 10.2 cm in average circumference). The ileum contains a convoluted, dense, fibrous area of  adhesions (11.8 x 9.6 x 4.1 cm) extending 3.3 cm from the ileal margin and an outpouching (2.8 x 2.8 x " "2.6  cm) extending 3.4 cm from the ileocecal anastomosis and 13.2 cm from the large bowel margin. The ileal  wall (1.1 cm in average thickness) and remaining ileal mucosa is pink-tan, glistening, and unremarkable.  The large bowel is markedly edematous and contains an outpouching (3.2 x 2.0 x 1.8 cm) that extends 1.93  cm to the ileocecal anastomosis and 7.4 cm the large bowel margin. The external surface of the bowel is  pink-tan and roughened. No perforations, fistulas, or lesions are grossly identified.        Feels better.  2-3 BMs using Imodium.  No blood.  Gained 3 lbs.    Eating well.  Drinking BOOST BID    /71 (BP Location: Left arm, Patient Position: Sitting, BP Method: Large (Automatic))   Pulse (!) 130   Ht 5' 8" (1.727 m)   Wt 50.5 kg (111 lb 4.8 oz)   LMP 05/30/2009   BMI 16.92 kg/m²   Appears well  Skin anicteric  Abdomen                  No erythema.  No tenderness.  No pus.    Wounds clean.  Granulation tissue    Assessment  1.  Wound sinus - no signs of infection.  2.  Good appetite and compliant with BOOST supplements.    3.  Short bowel syndrome - 115 cm measured residual SB.   S/p prior ileocolic resection so no ICV.  Most of the transverse colon, left colon.  Currently decreased BMs with Imodium.    4.  Malnutrition   5.  Decreased physical conditioning    Recommend  Continue BOOST BID  Continue imodium  Local wound care  RTC 6 weeeks  "

## 2022-03-03 ENCOUNTER — TELEPHONE (OUTPATIENT)
Dept: SURGERY | Facility: CLINIC | Age: 57
End: 2022-03-03
Payer: MEDICARE

## 2022-03-03 NOTE — TELEPHONE ENCOUNTER
Called Khushboo at [facility] for clarification on document she needs faxed over. No answer. Left a message with my name and call back number.

## 2022-03-03 NOTE — TELEPHONE ENCOUNTER
----- Message from Mara Liriano sent at 3/3/2022  4:03 PM CST -----  Contact: Khushboo @[facility] [phone]  Caller requesting the 1st page of the consent form for the injection training be faxed to [facility] [phone]

## 2022-03-09 ENCOUNTER — HOSPITAL ENCOUNTER (INPATIENT)
Facility: HOSPITAL | Age: 57
LOS: 1 days | Discharge: HOME | DRG: 641 | End: 2022-03-11
Attending: EMERGENCY MEDICINE | Admitting: HOSPITALIST
Payer: MEDICARE

## 2022-03-09 DIAGNOSIS — R42 DIZZINESS: ICD-10-CM

## 2022-03-09 DIAGNOSIS — E83.42 HYPOMAGNESEMIA: Primary | ICD-10-CM

## 2022-03-09 DIAGNOSIS — G89.29 CHRONIC ABDOMINAL PAIN: ICD-10-CM

## 2022-03-09 DIAGNOSIS — R10.9 CHRONIC ABDOMINAL PAIN: ICD-10-CM

## 2022-03-09 DIAGNOSIS — E83.51 HYPOCALCEMIA: ICD-10-CM

## 2022-03-09 DIAGNOSIS — K21.9 GASTROESOPHAGEAL REFLUX DISEASE WITHOUT ESOPHAGITIS: ICD-10-CM

## 2022-03-09 DIAGNOSIS — R11.0 NAUSEA: ICD-10-CM

## 2022-03-09 DIAGNOSIS — E87.8 ELECTROLYTE ABNORMALITY: ICD-10-CM

## 2022-03-09 DIAGNOSIS — R07.9 CHEST PAIN: ICD-10-CM

## 2022-03-09 DIAGNOSIS — E87.6 HYPOKALEMIA: ICD-10-CM

## 2022-03-09 DIAGNOSIS — K50.812 CROHN'S DISEASE OF BOTH SMALL AND LARGE INTESTINE WITH INTESTINAL OBSTRUCTION: ICD-10-CM

## 2022-03-09 DIAGNOSIS — E83.42 HYPOMAGNESEMIA: ICD-10-CM

## 2022-03-09 PROCEDURE — 96361 HYDRATE IV INFUSION ADD-ON: CPT

## 2022-03-09 PROCEDURE — 99285 EMERGENCY DEPT VISIT HI MDM: CPT | Mod: 25

## 2022-03-10 ENCOUNTER — TELEPHONE (OUTPATIENT)
Dept: GASTROENTEROLOGY | Facility: CLINIC | Age: 57
End: 2022-03-10
Payer: MEDICARE

## 2022-03-10 ENCOUNTER — TELEPHONE (OUTPATIENT)
Dept: SURGERY | Facility: CLINIC | Age: 57
End: 2022-03-10
Payer: MEDICARE

## 2022-03-10 PROBLEM — E83.51 HYPOCALCEMIA: Status: ACTIVE | Noted: 2022-03-10

## 2022-03-10 PROBLEM — E87.6 HYPOKALEMIA: Status: ACTIVE | Noted: 2022-03-10

## 2022-03-10 PROBLEM — E83.42 HYPOMAGNESEMIA: Status: ACTIVE | Noted: 2022-03-10

## 2022-03-10 LAB
ALBUMIN SERPL BCP-MCNC: 2.7 G/DL (ref 3.5–5.2)
ALBUMIN SERPL BCP-MCNC: 3 G/DL (ref 3.5–5.2)
ALP SERPL-CCNC: 108 U/L (ref 55–135)
ALT SERPL W/O P-5'-P-CCNC: 13 U/L (ref 10–44)
ANION GAP SERPL CALC-SCNC: 12 MMOL/L (ref 8–16)
ANION GAP SERPL CALC-SCNC: 8 MMOL/L (ref 8–16)
AST SERPL-CCNC: 15 U/L (ref 10–40)
BASOPHILS # BLD AUTO: 0.04 K/UL (ref 0–0.2)
BASOPHILS NFR BLD: 0.4 % (ref 0–1.9)
BILIRUB SERPL-MCNC: 0.5 MG/DL (ref 0.1–1)
BUN SERPL-MCNC: 10 MG/DL (ref 6–20)
BUN SERPL-MCNC: 9 MG/DL (ref 6–20)
CALCIUM SERPL-MCNC: 6.6 MG/DL (ref 8.7–10.5)
CALCIUM SERPL-MCNC: 7.3 MG/DL (ref 8.7–10.5)
CHLORIDE SERPL-SCNC: 104 MMOL/L (ref 95–110)
CHLORIDE SERPL-SCNC: 108 MMOL/L (ref 95–110)
CO2 SERPL-SCNC: 20 MMOL/L (ref 23–29)
CO2 SERPL-SCNC: 21 MMOL/L (ref 23–29)
CREAT SERPL-MCNC: 0.7 MG/DL (ref 0.5–1.4)
CREAT SERPL-MCNC: 0.8 MG/DL (ref 0.5–1.4)
DIFFERENTIAL METHOD: ABNORMAL
EOSINOPHIL # BLD AUTO: 0.1 K/UL (ref 0–0.5)
EOSINOPHIL NFR BLD: 0.7 % (ref 0–8)
ERYTHROCYTE [DISTWIDTH] IN BLOOD BY AUTOMATED COUNT: 15.1 % (ref 11.5–14.5)
EST. GFR  (AFRICAN AMERICAN): >60 ML/MIN/1.73 M^2
EST. GFR  (AFRICAN AMERICAN): >60 ML/MIN/1.73 M^2
EST. GFR  (NON AFRICAN AMERICAN): >60 ML/MIN/1.73 M^2
EST. GFR  (NON AFRICAN AMERICAN): >60 ML/MIN/1.73 M^2
GLUCOSE SERPL-MCNC: 85 MG/DL (ref 70–110)
GLUCOSE SERPL-MCNC: 96 MG/DL (ref 70–110)
HCT VFR BLD AUTO: 35.1 % (ref 37–48.5)
HGB BLD-MCNC: 11.1 G/DL (ref 12–16)
IMM GRANULOCYTES # BLD AUTO: 0.03 K/UL (ref 0–0.04)
IMM GRANULOCYTES NFR BLD AUTO: 0.3 % (ref 0–0.5)
LYMPHOCYTES # BLD AUTO: 1.1 K/UL (ref 1–4.8)
LYMPHOCYTES NFR BLD: 11.5 % (ref 18–48)
MAGNESIUM SERPL-MCNC: 0.5 MG/DL (ref 1.6–2.6)
MAGNESIUM SERPL-MCNC: 2.2 MG/DL (ref 1.6–2.6)
MCH RBC QN AUTO: 29 PG (ref 27–31)
MCHC RBC AUTO-ENTMCNC: 31.6 G/DL (ref 32–36)
MCV RBC AUTO: 92 FL (ref 82–98)
MONOCYTES # BLD AUTO: 0.5 K/UL (ref 0.3–1)
MONOCYTES NFR BLD: 5.4 % (ref 4–15)
NEUTROPHILS # BLD AUTO: 7.9 K/UL (ref 1.8–7.7)
NEUTROPHILS NFR BLD: 81.7 % (ref 38–73)
NRBC BLD-RTO: 0 /100 WBC
PHOSPHATE SERPL-MCNC: 3 MG/DL (ref 2.7–4.5)
PLATELET # BLD AUTO: 288 K/UL (ref 150–450)
PMV BLD AUTO: 9.3 FL (ref 9.2–12.9)
POTASSIUM SERPL-SCNC: 3 MMOL/L (ref 3.5–5.1)
POTASSIUM SERPL-SCNC: 3.9 MMOL/L (ref 3.5–5.1)
PROT SERPL-MCNC: 6 G/DL (ref 6–8.4)
RBC # BLD AUTO: 3.83 M/UL (ref 4–5.4)
SODIUM SERPL-SCNC: 136 MMOL/L (ref 136–145)
SODIUM SERPL-SCNC: 137 MMOL/L (ref 136–145)
T4 FREE SERPL-MCNC: 0.98 NG/DL (ref 0.71–1.51)
TSH SERPL DL<=0.005 MIU/L-ACNC: 0.31 UIU/ML (ref 0.34–5.6)
WBC # BLD AUTO: 9.71 K/UL (ref 3.9–12.7)

## 2022-03-10 PROCEDURE — 63600175 PHARM REV CODE 636 W HCPCS: Performed by: HOSPITALIST

## 2022-03-10 PROCEDURE — 83735 ASSAY OF MAGNESIUM: CPT | Mod: 91 | Performed by: HOSPITALIST

## 2022-03-10 PROCEDURE — 96366 THER/PROPH/DIAG IV INF ADDON: CPT

## 2022-03-10 PROCEDURE — 36415 COLL VENOUS BLD VENIPUNCTURE: CPT | Performed by: EMERGENCY MEDICINE

## 2022-03-10 PROCEDURE — 96365 THER/PROPH/DIAG IV INF INIT: CPT

## 2022-03-10 PROCEDURE — 25000003 PHARM REV CODE 250: Performed by: HOSPITALIST

## 2022-03-10 PROCEDURE — 25000003 PHARM REV CODE 250: Performed by: EMERGENCY MEDICINE

## 2022-03-10 PROCEDURE — 83735 ASSAY OF MAGNESIUM: CPT | Performed by: EMERGENCY MEDICINE

## 2022-03-10 PROCEDURE — 85025 COMPLETE CBC W/AUTO DIFF WBC: CPT | Performed by: EMERGENCY MEDICINE

## 2022-03-10 PROCEDURE — 96375 TX/PRO/DX INJ NEW DRUG ADDON: CPT

## 2022-03-10 PROCEDURE — 84439 ASSAY OF FREE THYROXINE: CPT | Performed by: EMERGENCY MEDICINE

## 2022-03-10 PROCEDURE — 80069 RENAL FUNCTION PANEL: CPT | Performed by: HOSPITALIST

## 2022-03-10 PROCEDURE — 96367 TX/PROPH/DG ADDL SEQ IV INF: CPT

## 2022-03-10 PROCEDURE — 96376 TX/PRO/DX INJ SAME DRUG ADON: CPT

## 2022-03-10 PROCEDURE — 36415 COLL VENOUS BLD VENIPUNCTURE: CPT | Performed by: HOSPITALIST

## 2022-03-10 PROCEDURE — 80053 COMPREHEN METABOLIC PANEL: CPT | Performed by: EMERGENCY MEDICINE

## 2022-03-10 PROCEDURE — 12000002 HC ACUTE/MED SURGE SEMI-PRIVATE ROOM

## 2022-03-10 PROCEDURE — 84443 ASSAY THYROID STIM HORMONE: CPT | Performed by: EMERGENCY MEDICINE

## 2022-03-10 RX ORDER — TALC
6 POWDER (GRAM) TOPICAL NIGHTLY PRN
Status: DISCONTINUED | OUTPATIENT
Start: 2022-03-10 | End: 2022-03-11 | Stop reason: HOSPADM

## 2022-03-10 RX ORDER — POTASSIUM CHLORIDE 20 MEQ/1
40 TABLET, EXTENDED RELEASE ORAL 2 TIMES DAILY
Status: DISCONTINUED | OUTPATIENT
Start: 2022-03-10 | End: 2022-03-10

## 2022-03-10 RX ORDER — HYDROCODONE BITARTRATE AND ACETAMINOPHEN 10; 325 MG/1; MG/1
1 TABLET ORAL EVERY 6 HOURS PRN
Status: DISCONTINUED | OUTPATIENT
Start: 2022-03-10 | End: 2022-03-11 | Stop reason: HOSPADM

## 2022-03-10 RX ORDER — CALCIUM CARBONATE 200(500)MG
1000 TABLET,CHEWABLE ORAL
Status: DISCONTINUED | OUTPATIENT
Start: 2022-03-10 | End: 2022-03-11 | Stop reason: HOSPADM

## 2022-03-10 RX ORDER — MAGNESIUM SULFATE HEPTAHYDRATE 40 MG/ML
4 INJECTION, SOLUTION INTRAVENOUS ONCE
Status: COMPLETED | OUTPATIENT
Start: 2022-03-10 | End: 2022-03-10

## 2022-03-10 RX ORDER — SODIUM CHLORIDE AND POTASSIUM CHLORIDE 150; 900 MG/100ML; MG/100ML
INJECTION, SOLUTION INTRAVENOUS CONTINUOUS
Status: DISCONTINUED | OUTPATIENT
Start: 2022-03-10 | End: 2022-03-10

## 2022-03-10 RX ORDER — SODIUM,POTASSIUM PHOSPHATES 280-250MG
2 POWDER IN PACKET (EA) ORAL
Status: DISCONTINUED | OUTPATIENT
Start: 2022-03-10 | End: 2022-03-11 | Stop reason: HOSPADM

## 2022-03-10 RX ORDER — IBUPROFEN 200 MG
24 TABLET ORAL
Status: DISCONTINUED | OUTPATIENT
Start: 2022-03-10 | End: 2022-03-11 | Stop reason: HOSPADM

## 2022-03-10 RX ORDER — MAGNESIUM SULFATE 1 G/100ML
1 INJECTION INTRAVENOUS ONCE
Status: COMPLETED | OUTPATIENT
Start: 2022-03-10 | End: 2022-03-10

## 2022-03-10 RX ORDER — SIMETHICONE 80 MG
1 TABLET,CHEWABLE ORAL 4 TIMES DAILY PRN
Status: DISCONTINUED | OUTPATIENT
Start: 2022-03-10 | End: 2022-03-11 | Stop reason: HOSPADM

## 2022-03-10 RX ORDER — POTASSIUM CHLORIDE 20 MEQ/1
40 TABLET, EXTENDED RELEASE ORAL ONCE
Status: COMPLETED | OUTPATIENT
Start: 2022-03-10 | End: 2022-03-10

## 2022-03-10 RX ORDER — IBUPROFEN 200 MG
16 TABLET ORAL
Status: DISCONTINUED | OUTPATIENT
Start: 2022-03-10 | End: 2022-03-11 | Stop reason: HOSPADM

## 2022-03-10 RX ORDER — CALCIUM CARBONATE 200(500)MG
500 TABLET,CHEWABLE ORAL
Status: COMPLETED | OUTPATIENT
Start: 2022-03-10 | End: 2022-03-10

## 2022-03-10 RX ORDER — VENLAFAXINE 37.5 MG/1
75 TABLET ORAL 2 TIMES DAILY
Status: DISCONTINUED | OUTPATIENT
Start: 2022-03-10 | End: 2022-03-11 | Stop reason: HOSPADM

## 2022-03-10 RX ORDER — LANOLIN ALCOHOL/MO/W.PET/CERES
800 CREAM (GRAM) TOPICAL
Status: DISCONTINUED | OUTPATIENT
Start: 2022-03-10 | End: 2022-03-11 | Stop reason: HOSPADM

## 2022-03-10 RX ORDER — CHOLESTYRAMINE 4 G/4.8G
4 POWDER, FOR SUSPENSION ORAL 2 TIMES DAILY
Status: DISCONTINUED | OUTPATIENT
Start: 2022-03-10 | End: 2022-03-11 | Stop reason: HOSPADM

## 2022-03-10 RX ORDER — LEVETIRACETAM 500 MG/1
500 TABLET ORAL 2 TIMES DAILY
Status: DISCONTINUED | OUTPATIENT
Start: 2022-03-10 | End: 2022-03-11 | Stop reason: HOSPADM

## 2022-03-10 RX ORDER — ONDANSETRON 2 MG/ML
4 INJECTION INTRAMUSCULAR; INTRAVENOUS EVERY 8 HOURS PRN
Status: DISCONTINUED | OUTPATIENT
Start: 2022-03-10 | End: 2022-03-11 | Stop reason: HOSPADM

## 2022-03-10 RX ORDER — NALOXONE HCL 0.4 MG/ML
0.02 VIAL (ML) INJECTION
Status: DISCONTINUED | OUTPATIENT
Start: 2022-03-10 | End: 2022-03-11 | Stop reason: HOSPADM

## 2022-03-10 RX ORDER — SODIUM CHLORIDE 0.9 % (FLUSH) 0.9 %
10 SYRINGE (ML) INJECTION EVERY 12 HOURS PRN
Status: DISCONTINUED | OUTPATIENT
Start: 2022-03-10 | End: 2022-03-11 | Stop reason: HOSPADM

## 2022-03-10 RX ORDER — ACETAMINOPHEN 325 MG/1
650 TABLET ORAL EVERY 4 HOURS PRN
Status: DISCONTINUED | OUTPATIENT
Start: 2022-03-10 | End: 2022-03-11 | Stop reason: HOSPADM

## 2022-03-10 RX ORDER — GLUCAGON 1 MG
1 KIT INJECTION
Status: DISCONTINUED | OUTPATIENT
Start: 2022-03-10 | End: 2022-03-11 | Stop reason: HOSPADM

## 2022-03-10 RX ORDER — LOPERAMIDE HYDROCHLORIDE 2 MG/1
4 CAPSULE ORAL 4 TIMES DAILY
Status: DISCONTINUED | OUTPATIENT
Start: 2022-03-10 | End: 2022-03-11 | Stop reason: HOSPADM

## 2022-03-10 RX ORDER — POTASSIUM CHLORIDE 20 MEQ/1
40 TABLET, EXTENDED RELEASE ORAL 3 TIMES DAILY
Status: DISCONTINUED | OUTPATIENT
Start: 2022-03-10 | End: 2022-03-11 | Stop reason: HOSPADM

## 2022-03-10 RX ORDER — PANTOPRAZOLE SODIUM 40 MG/1
40 TABLET, DELAYED RELEASE ORAL
Status: DISCONTINUED | OUTPATIENT
Start: 2022-03-10 | End: 2022-03-11 | Stop reason: HOSPADM

## 2022-03-10 RX ORDER — AMILORIDE HYDROCHLORIDE 5 MG/1
5 TABLET ORAL DAILY
COMMUNITY
End: 2022-03-11

## 2022-03-10 RX ORDER — MAGNESIUM SULFATE HEPTAHYDRATE 40 MG/ML
4 INJECTION, SOLUTION INTRAVENOUS ONCE
Status: DISCONTINUED | OUTPATIENT
Start: 2022-03-10 | End: 2022-03-10

## 2022-03-10 RX ORDER — PROMETHAZINE HYDROCHLORIDE 25 MG/1
25 TABLET ORAL EVERY 8 HOURS PRN
Status: DISCONTINUED | OUTPATIENT
Start: 2022-03-10 | End: 2022-03-11 | Stop reason: HOSPADM

## 2022-03-10 RX ADMIN — POTASSIUM BICARBONATE 25 MEQ: 977.5 TABLET, EFFERVESCENT ORAL at 03:03

## 2022-03-10 RX ADMIN — LEVETIRACETAM 500 MG: 500 TABLET, FILM COATED ORAL at 10:03

## 2022-03-10 RX ADMIN — ONDANSETRON 4 MG: 2 INJECTION INTRAMUSCULAR; INTRAVENOUS at 02:03

## 2022-03-10 RX ADMIN — ONDANSETRON 4 MG: 2 INJECTION INTRAMUSCULAR; INTRAVENOUS at 06:03

## 2022-03-10 RX ADMIN — SODIUM CHLORIDE AND POTASSIUM CHLORIDE: 9; 1.49 INJECTION, SOLUTION INTRAVENOUS at 12:03

## 2022-03-10 RX ADMIN — PROMETHAZINE HYDROCHLORIDE 25 MG: 25 TABLET ORAL at 12:03

## 2022-03-10 RX ADMIN — CALCIUM CARBONATE (ANTACID) CHEW TAB 500 MG 1000 MG: 500 CHEW TAB at 11:03

## 2022-03-10 RX ADMIN — VENLAFAXINE 75 MG: 37.5 TABLET ORAL at 09:03

## 2022-03-10 RX ADMIN — POTASSIUM CHLORIDE 40 MEQ: 20 TABLET, EXTENDED RELEASE ORAL at 06:03

## 2022-03-10 RX ADMIN — CALCIUM CARBONATE (ANTACID) CHEW TAB 500 MG 1000 MG: 500 CHEW TAB at 04:03

## 2022-03-10 RX ADMIN — LEVETIRACETAM 500 MG: 500 TABLET, FILM COATED ORAL at 09:03

## 2022-03-10 RX ADMIN — LOPERAMIDE HYDROCHLORIDE 4 MG: 2 CAPSULE ORAL at 04:03

## 2022-03-10 RX ADMIN — ONDANSETRON 4 MG: 2 INJECTION INTRAMUSCULAR; INTRAVENOUS at 10:03

## 2022-03-10 RX ADMIN — POTASSIUM CHLORIDE 40 MEQ: 20 TABLET, EXTENDED RELEASE ORAL at 02:03

## 2022-03-10 RX ADMIN — PROMETHAZINE HYDROCHLORIDE 25 MG: 25 TABLET ORAL at 09:03

## 2022-03-10 RX ADMIN — MAGNESIUM SULFATE IN WATER 4 G: 40 INJECTION, SOLUTION INTRAVENOUS at 07:03

## 2022-03-10 RX ADMIN — MELATONIN 6 MG: at 09:03

## 2022-03-10 RX ADMIN — LOPERAMIDE HYDROCHLORIDE 4 MG: 2 CAPSULE ORAL at 09:03

## 2022-03-10 RX ADMIN — CALCIUM CARBONATE (ANTACID) CHEW TAB 500 MG 500 MG: 500 CHEW TAB at 02:03

## 2022-03-10 RX ADMIN — SODIUM CHLORIDE 1000 ML: 0.9 INJECTION, SOLUTION INTRAVENOUS at 02:03

## 2022-03-10 RX ADMIN — POTASSIUM CHLORIDE 40 MEQ: 20 TABLET, EXTENDED RELEASE ORAL at 09:03

## 2022-03-10 RX ADMIN — HYDROCODONE BITARTRATE AND ACETAMINOPHEN 1 TABLET: 10; 325 TABLET ORAL at 06:03

## 2022-03-10 RX ADMIN — CALCIUM CARBONATE (ANTACID) CHEW TAB 500 MG 1000 MG: 500 CHEW TAB at 06:03

## 2022-03-10 RX ADMIN — LOPERAMIDE HYDROCHLORIDE 4 MG: 2 CAPSULE ORAL at 02:03

## 2022-03-10 RX ADMIN — MAGNESIUM SULFATE 1 G: 1 INJECTION INTRAVENOUS at 04:03

## 2022-03-10 RX ADMIN — PANTOPRAZOLE SODIUM 40 MG: 40 TABLET, DELAYED RELEASE ORAL at 06:03

## 2022-03-10 NOTE — TELEPHONE ENCOUNTER
Spoke with patient. She is currently in [facility] with an electrolyte imbalance (has a hx of this)  States she is low on magnesium, calcium and potassium and that they are giving her IV fluids  She just wanted Dr. Velasquez to know

## 2022-03-10 NOTE — PROGRESS NOTES
56-year-old female with significant GI history and recent abdominal surgery for Crohn's disease related fistula and obstruction admitted after presenting with dizziness.  Found to have severe electrolyte derangements.  Admitted earlier today by overnight provider.     Admission H&P reviewed.  Patient was seen and examined at the bedside.  Electrolytes have been replenished.  Repeat labs are scheduled for this afternoon.  Will add cholestyramine for diarrhea in addition to loperamide.  Continue remote telemetry monitoring.    Faizan Erickson MD  Internal Medicine

## 2022-03-10 NOTE — HPI
56-year-old lady with history of Crohn's disease, bowel perforation, recent surgery for recurrent Crohn's and large bowel obstruction secondary to ileus sigmoid fistula came with dizziness, perioral tingling and numbness in multiple electrolyte abnormalities.  Patient endorses dizziness, peripheral tingling and numbness and tells me that she can sense when her electrolytes are out of balance due to these symptoms.  She reports up to 5 loose bowel movements however denies any hematochezia or melena.  She denies any fever, chills, headache, dizziness, chest pain, palpitations, nausea or vomiting.  She has midline abdominal incision with mild oozing.  She has prior history of severe electrolyte abnormalities resulting into seizure/ status epilepticus.     In ED patient was found to have severe electrolyte abnormalities including hypocalcemia, critical hypomagnesemia, hypokalemia.

## 2022-03-10 NOTE — TELEPHONE ENCOUNTER
Outgoing call - unsuccessful - left message     I have attempted to contact patient regarding additional information to continue the financial assistance process. I have been unable to reach patient after 3 attemps. I am rerouting to assigned pharmacist to close referral

## 2022-03-10 NOTE — ED NOTES
Adult Physical Assessment  LOC: Carline Chang, 56 y.o. female verified via two identifiers.  The patient is awake, alert, oriented and speaking appropriately at this time.  APPEARANCE: Patient resting comfortably and appears to be in no acute distress at this time. Patient is clean and well groomed, patient's clothing is properly fastened.  SKIN:The skin is warm and dry, color consistent with ethnicity, patient has normal skin turgor and moist mucus membranes, skin intact, no breakdown or brusing noted.  MUSCULOSKELETAL: Patient moving all extremities well, no obvious swelling or deformities noted.  RESPIRATORY: Airway is open and patent, respirations are spontaneous, patient has a normal effort and rate, no accessory muscle use noted.  CARDIAC: Patient has a normal rate and rhythm, no periphreal edema noted in any extremity, capillary refill < 3 seconds in all extremities  ABDOMEN: Soft and non tender to palpation, no abdominal distention noted. Bowel sounds present in all four quadrants.  NEUROLOGIC: Eyes open spontaneously, behavior appropriate to situation, follows commands, facial expression symmetrical, bilateral hand grasp equal and even, purposeful motor response noted, normal sensation in all extremities when touched with a finger.

## 2022-03-10 NOTE — PLAN OF CARE
Problem: Malnutrition  Goal: Improved Nutritional Intake  Intervention: Promote and Optimize Oral Intake  Flowsheets (Taken 3/10/2022 1451)  Oral Nutrition Promotion: calorie-dense liquids provided

## 2022-03-10 NOTE — TELEPHONE ENCOUNTER
----- Message from Ashanti Hernandez sent at 3/10/2022  8:54 AM CST -----  Regarding: Admission to hospital  Contact: [phone]  Calling to inform provider she is currently admitted to the hospital. Please call and advise

## 2022-03-10 NOTE — ED PROVIDER NOTES
"Encounter Date: 3/9/2022       History     Chief Complaint   Patient presents with    Dizziness     Abd surgery 4 weeks ago for crohns issues.  Patient states she feels like electrolytes are "off".      56-year-old female with past medical history of nephrolithiasis, Crohn's disease, anxiety and DVT presents secondary to dizziness.  Patient feels as if her electrolytes are abnormal.  She states she has had episodes like this previously when her Crohn's disease flares up.  She denies any chest pain, shortness of breath, nausea vomiting.  Patient is otherwise stable and has no other complaints.        Review of patient's allergies indicates:   Allergen Reactions    Remicade [infliximab] Shortness Of Breath and Palpitations     Severe muscle and joint, and bone pain    Morphine Other (See Comments)     Abdominal pain    Flagyl [metronidazole] Other (See Comments)     Neuropathy    Nubain [nalbuphine] Anxiety     Upper abdominal burning      Past Medical History:   Diagnosis Date    Acute deep vein thrombosis (DVT) of brachial vein of right upper extremity 2017    on RUE ultrasound    Anxiety     Bowel perforation     Chronic pain     Crohn's disease of both small and large intestine with intestinal obstruction     Difficult intravenous access     Encounter for blood transfusion     GERD (gastroesophageal reflux disease)     Kidney stones     Nephrolithiasis     Stricture of bowel      Past Surgical History:   Procedure Laterality Date    ABSCESS DRAINAGE      ANAL FISTULOTOMY  2018    BOWEL RESECTION      small bowel resection per Dr. Uribe 2018     SECTION      x2    CHOLECYSTECTOMY  2000    COLON SURGERY  2015    sigmoid loop colostomy    COLONOSCOPY N/A 2016    Procedure: COLONOSCOPY;  Surgeon: Andre Uribe MD;  Location: [address];  Service: Endoscopy;  Laterality: N/A;    COLONOSCOPY N/A 2017    Procedure: COLONOSCOPY;  Surgeon: Dany" MD Montrell;  Location: [facility] (2ND FLR);  Service: Endoscopy;  Laterality: N/A;    COLONOSCOPY N/A 12/4/2017    Procedure: COLONOSCOPY;  Surgeon: Andre Uribe MD;  Location: [facility] (4TH FLR);  Service: Endoscopy;  Laterality: N/A;    COLONOSCOPY N/A 2/8/2018    Procedure: COLONOSCOPY;  Surgeon: Andre Uribe MD;  Location: [facility] (4TH FLR);  Service: Endoscopy;  Laterality: N/A;    COLONOSCOPY N/A 3/24/2018    Procedure: COLONOSCOPY;  Surgeon: Elmer Serrato MD;  Location: [facility] (2ND FLR);  Service: Endoscopy;  Laterality: N/A;    COLONOSCOPY  3/24/2018    COLONOSCOPY N/A 7/6/2018    Procedure: COLONOSCOPY;  Surgeon: Andre Uribe MD;  Location: [facility] (4TH FLR);  Service: Endoscopy;  Laterality: N/A;    COLONOSCOPY N/A 10/7/2021    Procedure: COLONOSCOPY;  Surgeon: Jose Hughes MD;  Location: [facility];  Service: Endoscopy;  Laterality: N/A;    ESOPHAGOGASTRODUODENOSCOPY N/A 10/7/2021    Procedure: EGD (ESOPHAGOGASTRODUODENOSCOPY);  Surgeon: Jose Hughes MD;  Location: [facility];  Service: Endoscopy;  Laterality: N/A;    LAPAROSCOPIC CLOSURE OF COLOSTOMY N/A 8/29/2018    Procedure: CLOSURE, COLOSTOMY, LAPAROSCOPIC;  Surgeon: Andre Uribe MD;  Location: [facility] OR 2ND FLR;  Service: Colon and Rectal;  Laterality: N/A;  converted to open    LYSIS OF ADHESIONS N/A 1/20/2022    Procedure: LYSIS, ADHESIONS;  Surgeon: LLUVIA Post MD;  Location: [facility] OR 2ND FLR;  Service: Colon and Rectal;  Laterality: N/A;  lasting longer than 2 hours, modifier 22      rectovaginal fistula repair  01/2018    SMALL INTESTINE SURGERY  1995    per patient 10 inches removed    SMALL INTESTINE SURGERY  02/2009    abdominal abscess drained, 3 cm colon removed, 11 cm SB removed    TUBAL LIGATION      UPPER GASTROINTESTINAL ENDOSCOPY  2000     Family History   Problem Relation Age of Onset    Cancer Maternal Aunt 66        colon ca    Heart attack Father 69    Anesthesia problems Neg  Hx     Celiac disease Neg Hx     Cirrhosis Neg Hx     Colon cancer Neg Hx     Colon polyps Neg Hx     Crohn's disease Neg Hx     Cystic fibrosis Neg Hx     Esophageal cancer Neg Hx     Hemochromatosis Neg Hx     Inflammatory bowel disease Neg Hx     Irritable bowel syndrome Neg Hx     Liver cancer Neg Hx     Liver disease Neg Hx     Rectal cancer Neg Hx     Stomach cancer Neg Hx     Ulcerative colitis Neg Hx     Mars's disease Neg Hx     Lymphoma Neg Hx     Tuberculosis Neg Hx     Scleroderma Neg Hx     Rheum arthritis Neg Hx     Multiple sclerosis Neg Hx     Melanoma Neg Hx     Lupus Neg Hx     Psoriasis Neg Hx     Skin cancer Neg Hx      Social History     Tobacco Use    Smoking status: Never Smoker    Smokeless tobacco: Never Used   Substance Use Topics    Alcohol use: No     Alcohol/week: 0.0 standard drinks    Drug use: No     Review of Systems   Neurological: Positive for dizziness.   All other systems reviewed and are negative.      Physical Exam     Initial Vitals [03/09/22 2221]   BP Pulse Resp Temp SpO2   128/86 (!) 111 20 98.3 °F (36.8 °C) 100 %      MAP       --         Physical Exam    Nursing note and vitals reviewed.  Constitutional: She appears well-developed. No distress.   HENT:   Head: Normocephalic and atraumatic.   Mouth/Throat: Oropharynx is clear and moist.   Eyes: Conjunctivae and EOM are normal. Pupils are equal, round, and reactive to light.   Neck: No tracheal deviation present. No JVD present.   Normal range of motion.  Cardiovascular: Normal rate, regular rhythm, normal heart sounds and intact distal pulses. Exam reveals no gallop and no friction rub.    No murmur heard.  Pulmonary/Chest: Breath sounds normal. No respiratory distress. She has no wheezes. She exhibits no tenderness.   Abdominal: Abdomen is soft. Bowel sounds are normal. She exhibits no distension. There is no abdominal tenderness. There is no rebound and no guarding.   Musculoskeletal:          General: No tenderness or edema. Normal range of motion.      Cervical back: Normal range of motion.     Neurological: She is alert and oriented to person, place, and time. She has normal strength. No cranial nerve deficit or sensory deficit.   Skin: Skin is warm and dry. Capillary refill takes less than 2 seconds. No erythema.   Psychiatric: She has a normal mood and affect.         ED Course   Procedures  Labs Reviewed   CBC W/ AUTO DIFFERENTIAL - Abnormal; Notable for the following components:       Result Value    RBC 3.83 (*)     Hemoglobin 11.1 (*)     Hematocrit 35.1 (*)     MCHC 31.6 (*)     RDW 15.1 (*)     Gran # (ANC) 7.9 (*)     Gran % 81.7 (*)     Lymph % 11.5 (*)     All other components within normal limits   COMPREHENSIVE METABOLIC PANEL - Abnormal; Notable for the following components:    Potassium 3.0 (*)     CO2 20 (*)     Calcium 6.6 (*)     Albumin 2.7 (*)     All other components within normal limits    Narrative:      ca critical result(s) repeated. Called and verbal readback obtained   from josé atnonio rasheed rn er  [facility] [place] 03/10/2022 01:23   TSH - Abnormal; Notable for the following components:    TSH 0.310 (*)     All other components within normal limits   MAGNESIUM - Abnormal; Notable for the following components:    Magnesium 0.5 (*)     All other components within normal limits    Narrative:     mg critical result(s) repeated. Called and verbal readback obtained   from kimberli mckeon rn er  [facility] [place] 03/10/2022 03:17   MAGNESIUM   TSH   T4, FREE          Imaging Results          CT Head Without Contrast (Final result)  Result time 03/10/22 01:49:07    Final result by Kalyan Boyle MD (03/10/22 01:49:07)                 Narrative:    EXAM: NONCONTRAST BRAIN CT EXAMINATION.    CLINICAL INDICATION: Dizziness.    COMPARISON:  Compared to the brain CT examination January 14, 2022 brain MRI examination of January 15, 2022.    TECHNIQUE: Using low dose helical CT technique, thin section  axial images were performed through the brain without the administration of intravenous or subarachnoid contrast material. This exam was performed according to our departmental dose-optimization program, which includes automated exposure control, adjustment of the mA and/or kV according to patient size and/or use of iterative reconstruction technique.    FINDINGS: Brain volume is normal. No diffuse brain swelling or brain herniation.  No hydrocephalus.  No subdural or epidural hematomas.  No large subacute brain infarction.  No parenchymal brain hemorrhage or evidence of intracranial mass lesion.    The brainstem and cerebellum are normal.    Mild to moderate nonspecific bilateral cerebral white matter disease. Caudate heads, lentiform nuclei, thalami and internal capsules are normal.    Bones of the skull and skull base are normal.  The middle ears and mastoid air cells are clear.    The partially visualized paranasal sinuses are clear.    Globes and orbits are grossly normal on this noncontrast examination..    If clinical concern persists, brain MRI should be considered for further evaluation as warranted.    IMPRESSION:  Mild to moderate nonspecific bilateral cerebral white matter disease. Otherwise, normal noncontrast brain CT examination.    Electronically signed by:  Kalyan Boyle MD  3/10/2022 1:49 AM CST Workstation: [id number]                               Medications   potassium bicarbonate disintegrating tablet 25 mEq (has no administration in time range)   magnesium sulfate in dextrose IVPB (premix) 1 g (has no administration in time range)   sodium chloride 0.9% bolus 1,000 mL (0 mLs Intravenous Stopped 3/10/22 6318)   calcium carbonate 200 mg calcium (500 mg) chewable tablet 500 mg (500 mg Oral Given 3/10/22 2263)     Medical Decision Making:   Initial Assessment:   [age] year female initial assessment in mild distress secondary to dizziness.  Patient is alert and oriented x3, neurologically and  neurovascularly intact with no focal deficits.  She is nontoxic-appearing and vitals stable at this time.  Differential Diagnosis:   Electrolyte abnormality, dehydration, Crohn's disease flare  Clinical Tests:   Lab Tests: Ordered and Reviewed  The following lab test(s) were unremarkable: CBC, CMP and Urinalysis  ED Management:  Patient has been reassessed noted to have no acute changes condition.  Labs were positive for hypocalcemia and hypokalemia.  Patient given IV fluids as well as Tums p.o. and p.o. potassium.  She has been consult to hospitalist for admission due to symptomatic electrolyte abnormalities.  Patient has remained stable while in the ED and Ms. Chang is aware of the plan and in agreement with admission.    Laboratory results and radiology imaging results reviewed.  Based on the patient's history, physical, and workup here in the emergency department I believe the patient requires admission for a diagnosis of symptomatic electrolyte abnormality.  I discussed the patient's case with the on-call hospitalist who has agreed to evaluate the patient for admission.  In addition, I discussed the results with the patient and they have verbalized understanding of the results, plan, and need for admission.    ________________________  VERENICE Sheldon MD   Emergency Medicine                        Clinical Impression:   Final diagnoses:  [R42] Dizziness (Primary)  [E83.51] Hypocalcemia  [E87.6] Hypokalemia  [E83.42] Hypomagnesemia          ED Disposition Condition    Observation               Perry Sheldon MD  03/10/22 [id number]

## 2022-03-10 NOTE — PLAN OF CARE
Medicare Outpatient Observation Notice was signed, explained and given to patient/caregiver on 03/10/2022 at 8:19am     addressed any questions or concerns.    Medicare Outpatient Observation Notice will be scanned into patient's medical record

## 2022-03-10 NOTE — TELEPHONE ENCOUNTER
----- Message from Carolyn Funes NP sent at 3/10/2022 12:20 PM CST -----  Regarding: FW: call back  Contact: rep    ----- Message -----  From: Hemal Gutierrez  Sent: 3/10/2022  11:14 AM CST  To: [facility] Staff  Subject: call back                                        Khushboo with [employer] requesting call back in regards to papers faxed over.        Khushboo @ [phone]

## 2022-03-10 NOTE — H&P
"[facility] Medicine  History & Physical    Patient Name: Carline Chang  MRN: [id number]  Patient Class: OP- Observation  Admission Date: 3/9/2022  Attending Physician: Perry Sheldon MD   Primary Care Provider: Pablo Medina MD (Inactive)         Patient information was obtained from patient, past medical records and ER records.     Subjective:     Principal Problem:Electrolyte abnormality    Chief Complaint:   Chief Complaint   Patient presents with    Dizziness     Abd surgery 4 weeks ago for crohns issues.  Patient states she feels like electrolytes are "off".         HPI: 56-year-old lady with history of Crohn's disease, bowel perforation, recent surgery for recurrent Crohn's and large bowel obstruction secondary to ileus sigmoid fistula came with dizziness, perioral tingling and numbness in multiple electrolyte abnormalities.  Patient endorses dizziness, peripheral tingling and numbness and tells me that she can sense when her electrolytes are out of balance due to these symptoms.  She reports up to 5 loose bowel movements however denies any hematochezia or melena.  She denies any fever, chills, headache, dizziness, chest pain, palpitations, nausea or vomiting.  She has midline abdominal incision with mild oozing.  She has prior history of severe electrolyte abnormalities resulting into seizure/ status epilepticus.     In ED patient was found to have severe electrolyte abnormalities including hypocalcemia, critical hypomagnesemia, hypokalemia.       Past Medical History:   Diagnosis Date    Acute deep vein thrombosis (DVT) of brachial vein of right upper extremity 01/2017    on RUE ultrasound    Anxiety     Bowel perforation     Chronic pain     Crohn's disease of both small and large intestine with intestinal obstruction 1989    Difficult intravenous access     Encounter for blood transfusion     GERD (gastroesophageal reflux disease) 2014    Kidney stones  "    Nephrolithiasis     Stricture of bowel        Past Surgical History:   Procedure Laterality Date    ABSCESS DRAINAGE      ANAL FISTULOTOMY      BOWEL RESECTION      small bowel resection per Dr. Uribe 2018     SECTION      x2    CHOLECYSTECTOMY  2000    COLON SURGERY  2015    sigmoid loop colostomy    COLONOSCOPY N/A 2016    Procedure: COLONOSCOPY;  Surgeon: Andre Uribe MD;  Location: [facility] ENDO (4TH FLR);  Service: Endoscopy;  Laterality: N/A;    COLONOSCOPY N/A 2017    Procedure: COLONOSCOPY;  Surgeon: Dany Stock MD;  Location: [facility] ENDO (2ND FLR);  Service: Endoscopy;  Laterality: N/A;    COLONOSCOPY N/A 2017    Procedure: COLONOSCOPY;  Surgeon: Andre Uribe MD;  Location: [facility] ENDO (4TH FLR);  Service: Endoscopy;  Laterality: N/A;    COLONOSCOPY N/A 2018    Procedure: COLONOSCOPY;  Surgeon: Andre Uribe MD;  Location: [facility] ENDO (4TH FLR);  Service: Endoscopy;  Laterality: N/A;    COLONOSCOPY N/A 3/24/2018    Procedure: COLONOSCOPY;  Surgeon: Elmer Serrato MD;  Location: [facility] ENDO (2ND FLR);  Service: Endoscopy;  Laterality: N/A;    COLONOSCOPY  3/24/2018    COLONOSCOPY N/A 2018    Procedure: COLONOSCOPY;  Surgeon: Andre Uribe MD;  Location: [facility] ENDO (4TH FLR);  Service: Endoscopy;  Laterality: N/A;    COLONOSCOPY N/A 10/7/2021    Procedure: COLONOSCOPY;  Surgeon: Jose Hughes MD;  Location: [facility];  Service: Endoscopy;  Laterality: N/A;    ESOPHAGOGASTRODUODENOSCOPY N/A 10/7/2021    Procedure: EGD (ESOPHAGOGASTRODUODENOSCOPY);  Surgeon: Jose Hughes MD;  Location: [facility] ENDO;  Service: Endoscopy;  Laterality: N/A;    LAPAROSCOPIC CLOSURE OF COLOSTOMY N/A 2018    Procedure: CLOSURE, COLOSTOMY, LAPAROSCOPIC;  Surgeon: Andre Uribe MD;  Location: [facility] OR 2ND FLR;  Service: Colon and Rectal;  Laterality: N/A;  converted to open    LYSIS OF ADHESIONS N/A 2022    Procedure: LYSIS, ADHESIONS;   Surgeon: LLUVIA Post MD;  Location: [facility] OR [address] [place] [address];  Service: Colon and Rectal;  Laterality: N/A;  lasting longer than 2 hours, modifier 22      rectovaginal fistula repair  01/2018    SMALL INTESTINE SURGERY  1995    per patient 10 inches removed    SMALL INTESTINE SURGERY  02/2009    abdominal abscess drained, 3 cm colon removed, 11 cm SB removed    TUBAL LIGATION      UPPER GASTROINTESTINAL ENDOSCOPY  2000       Review of patient's allergies indicates:   Allergen Reactions    Remicade [infliximab] Shortness Of Breath and Palpitations     Severe muscle and joint, and bone pain    Morphine Other (See Comments)     Abdominal pain    Flagyl [metronidazole] Other (See Comments)     Neuropathy    Nubain [nalbuphine] Anxiety     Upper abdominal burning        No current facility-administered medications on file prior to encounter.     Current Outpatient Medications on File Prior to Encounter   Medication Sig    calcium carbonate (TUMS ORAL) Take 1 tablet by mouth as needed (acid reflux).    hydroCHLOROthiazide (MICROZIDE) 12.5 mg capsule Take 12.5 mg by mouth once daily.    HYDROcodone-acetaminophen (NORCO)  mg per tablet Take 1 tablet by mouth every 6 (six) hours as needed.    levETIRAcetam (KEPPRA) 500 MG Tab Take 1 tablet (500 mg total) by mouth 2 (two) times daily.    loperamide (IMODIUM) 2 mg capsule Take 2 capsules (4 mg total) by mouth 4 (four) times daily.    pantoprazole (PROTONIX) 40 MG tablet Take 40 mg by mouth once daily.    potassium chloride SA (K-DUR,KLOR-CON) 20 MEQ tablet Take 40 mEq by mouth 2 (two) times daily.    promethazine (PHENERGAN) 25 MG tablet Take 1 tablet (25 mg total) by mouth every 8 (eight) hours as needed for Nausea.    venlafaxine (EFFEXOR) 75 MG tablet Take 75 mg by mouth 2 (two) times daily.    aMILoride (MIDAMOR) 5 MG Tab Take 5 mg by mouth once daily.    ustekinumab (STELARA) 90 mg/mL Syrg syringe Inject 1 mL (90 mg total) into the skin every 8  weeks.    [DISCONTINUED] calcium-vitamin D3 (OS-CHEIKH 500 + D3) 500 mg-5 mcg (200 unit) per tablet Take 2 tablets by mouth 2 (two) times daily with meals.    [DISCONTINUED] cyanocobalamin 1,000 mcg/mL injection Inject 1,000 mcg into the skin every 30 days.    [DISCONTINUED] cyclobenzaprine (FLEXERIL) 10 MG tablet Flexeril 10 mg tablet   Take 1 tablet 3 times a day by oral route.    [DISCONTINUED] dicyclomine (BENTYL) 20 mg tablet Take 20 mg by mouth every 6 (six) hours.    [DISCONTINUED] dicyclomine (BENTYL) 20 mg tablet dicyclomine 20 mg tablet   Take 1 tablet 4 times a day by oral route.    [DISCONTINUED] oxyCODONE (ROXICODONE) 10 mg Tab immediate release tablet Take 1 tablet (10 mg total) by mouth every 6 (six) hours as needed for Pain. (Patient not taking: No sig reported)    [DISCONTINUED] pantoprazole (PROTONIX) 20 MG tablet Take 20 mg by mouth every evening.    [DISCONTINUED] vitamin D (VITAMIN D3) 1000 units Tab Take 1,000 Units by mouth.     Family History       Problem Relation (Age of Onset)    Cancer Maternal Aunt (66)    Heart attack Father (69)          Tobacco Use    Smoking status: Never Smoker    Smokeless tobacco: Never Used   Substance and Sexual Activity    Alcohol use: No     Alcohol/week: 0.0 standard drinks    Drug use: No    Sexual activity: Not on file     Review of Systems   Constitutional:  Positive for fatigue. Negative for chills and fever.   HENT:  Negative for sore throat.    Eyes:  Negative for redness and itching.   Respiratory:  Negative for chest tightness and shortness of breath.    Cardiovascular:  Negative for chest pain and palpitations.   Gastrointestinal:  Positive for abdominal pain and diarrhea. Negative for blood in stool and nausea.   Genitourinary:  Negative for dysuria.   Musculoskeletal:  Negative for gait problem and joint swelling.   Skin:  Positive for wound.   Neurological:  Negative for tremors and weakness.   Psychiatric/Behavioral:  Negative for  behavioral problems and sleep disturbance.    All other systems reviewed and are negative.  Objective:     Vital Signs (Most Recent):  Temp: 98.3 °F (36.8 °C) (03/09/22 2221)  Pulse: 97 (03/10/22 0330)  Resp: 20 (03/09/22 2221)  BP: 132/69 (03/10/22 0330)  SpO2: 98 % (03/10/22 0330) Vital Signs (24h Range):  Temp:  [98.3 °F (36.8 °C)] 98.3 °F (36.8 °C)  Pulse:  [] 97  Resp:  [20] 20  SpO2:  [98 %-100 %] 98 %  BP: (117-138)/(62-86) 132/69     Weight: 49 kg (108 lb)  Body mass index is 16.42 kg/m².    Physical Exam  Vitals and nursing note reviewed.   Constitutional:       Appearance: She is well-developed.   HENT:      Head: Atraumatic.   Eyes:      Extraocular Movements: EOM normal.      Pupils: Pupils are equal, round, and reactive to light.   Neck:      Vascular: No JVD.   Cardiovascular:      Rate and Rhythm: Normal rate and regular rhythm.      Heart sounds: Normal heart sounds. No murmur heard.    No gallop.   Pulmonary:      Effort: No respiratory distress.      Breath sounds: Normal breath sounds. No wheezing.   Abdominal:      General: Bowel sounds are normal. There is no distension.      Palpations: Abdomen is soft.      Tenderness: There is no guarding or rebound.      Comments: Midline incision from recent surgery   Musculoskeletal:      Cervical back: Neck supple.   Lymphadenopathy:      Cervical: No cervical adenopathy.   Skin:     General: Skin is warm.      Capillary Refill: Capillary refill takes less than 2 seconds.      Findings: No rash.   Neurological:      Mental Status: She is alert and oriented to person, place, and time.      Cranial Nerves: No cranial nerve deficit.   Psychiatric:         Behavior: Behavior normal.         CRANIAL NERVES     CN III, IV, VI   Pupils are equal, round, and reactive to light.  Extraocular motions are normal.      Significant Labs: All pertinent labs within the past 24 hours have been reviewed.    Significant Imaging: I have reviewed all pertinent imaging  results/findings within the past 24 hours.    Assessment/Plan:     56-year-old lady with history of Crohn's disease recent surgery 4 weeks ago with ileocolic resection and repair of sigmoid colon came with dizziness, tingling, perioral numbness found to have critical hypomagnesemia and other electrolyte abnormalities likely due to ongoing GI losses.     Assessment:  Active Hospital Problems    Diagnosis  POA    *Electrolyte abnormality [E87.8]  Yes     Priority: 1 - High    Hypocalcemia [E83.51]  Yes     Priority: 2     Severe Hypomagnesemia [E83.42]  Yes     Priority: 3     Hypokalemia [E87.6]  Yes     Priority: 4     Crohn's disease of both small and large intestine with intestinal obstruction [K50.812]  Yes     Priority: 5      Chronic    History of status epilepticus [Z86.69]  Not Applicable     Chronic    Bowel perforation in the past  [K63.1]  Yes     Chronic    Diarrhea [R19.7]  Yes    Chronic abdominal pain [R10.9, G89.29]  Yes      Resolved Hospital Problems   No resolved problems to display.       Plan:  Admit to [facility] with remote telemetry-observation  Has critical hypomagnesemia, mild hypocalcemia with symptoms and hypokalemia.  Likely from ongoing GI losses  Aggressive electrolyte replacement, recheck and replace  Repeat labs at 4:00 p.m.  Baseline EKG, telemetry monitoring  Seizure precautions  Resume essential home medications  Will hold HCTZ due to hypokalemia(eventually she might benefit from it due to hypocalcemia)  Patient will need increased amount of p.o. electrolytes on discharge.  She may benefit from prescription of magnesium chloride(would avoid magnesium oxide due to ongoing diarrhea)  Check TSH, free T4  Further management as per clinical course    VTE Risk Mitigation (From admission, onward)         Ordered     IP VTE LOW RISK PATIENT  Once         03/10/22 0400     Place sequential compression device  Until discontinued         03/10/22 0400                   Sasha  MD Osman  Department of Hospital Medicine   [facility] - Emergency Dept

## 2022-03-10 NOTE — TELEPHONE ENCOUNTER
Patient has not been cleared to start Stelara per chart review. [facility] will assist with FA as needed if patient is cleared to start medication.    Closing current referral due to no contact at this time.    Syncope

## 2022-03-10 NOTE — PROGRESS NOTES
"[facility]  Adult Nutrition   Progress Note (Initial Assessment)     SUMMARY     Recommendations  Recommendation/Intervention: 1. Change diet to [place] for any Crohn's exacerabation. 2. Rec Unjury with meals.  Goals: 1. Patient to consume >75% po meals and supplements to meet needs. 2. Labs trend towards target range.  Nutrition Goal Status: new    Dietitian Rounds Brief  Patient identified at risk by RD per underweight status--77% IBW, BMI 16.53. Patient with decreased intake and significant weight loss prior to admit.    Malnutrition Assessment    Nutrition Problem  Severe Malnutrition in the context of chronic illness    Related to (etiology):   Inadequate oral intake and malabsorption due altered GI function    Signs and Symptoms (as evidenced by):   18% weight loss in 1 year and decreased intake/appetite > 1 month due to crohn's diseased process.     Interventions/Recommendations (treatment strategy):  ONS and [place] diet    Nutrition Diagnosis Status:   New      Diet order:   Current Diet Order: Regular diet per MD   Oral Nutrition Supplement: Unjury TID with meals     Evaluation of Received Nutrient/Fluid Intake  None documented     % Intake of Estimated Energy Needs: 0 - 25 %  % Meal Intake: 0 - 25 %    Energy Calories Required: not meeting needs  Protein Required: not meeting needs  Fluid Required: meeting needs       Anthropometrics  Temp: 97.9 °F (36.6 °C)  Height Method: Stated  Height: 5' 8" (172.7 cm)  Height (inches): 68 in  Weight Method: Bed Scale  Weight: 49.3 kg (108 lb 11 oz)  Weight (lb): 108.69 lb  Ideal Body Weight (IBW), Female: 140 lb  % Ideal Body Weight, Female (lb): 77.64 %  BMI (Calculated): 16.5  BMI Grade: 16 - 16.9 protein-energy malnutrition grade II       Estimated/Assessed Needs  Weight Used For Calorie Calculations: 49.3 kg (108 lb 11 oz)  Energy Calorie Requirements (kcal): 30-35 kcal/kg (4139-2588 kcal/day)  Energy Need Method: Kcal/kg  Protein Requirements: 74-99 " gm/day (1.5-2.0 gm/kg)  Weight Used For Protein Calculations: 49.3 kg (108 lb 11 oz)  Fluid Requirements (mL): 2465ml/day (50ml./kg)     RDA Method (mL): 30       Reason for Assessment  Reason For Assessment: identified at risk by screening criteria (Underweight)  Diagnosis: gastrointestinal disease  Relevant Medical History: Crohn's  Interdisciplinary Rounds: did not attend    Nutrition/Diet History  Food Allergies:  (crohn's)  Factors Affecting Nutritional Intake: other (see comments), abdominal pain    Nutrition Risk Screen  Nutrition Risk Screen: no indicators present     MST Score: 2  Have you recently lost weight without trying?: Yes: 2-13 lbs  Weight loss score: 1  Have you been eating poorly because of a decreased appetite?: Yes  Appetite score: 1       Weight History:  Wt Readings from Last 5 Encounters:   03/10/22 49.3 kg (108 lb 11 oz)   03/02/22 50.5 kg (111 lb 4.8 oz)   02/14/22 49.6 kg (109 lb 5.6 oz)   02/09/22 50.3 kg (110 lb 12.8 oz)   01/20/22 58.2 kg (128 lb 4.9 oz)        Lab/Procedures/Meds: Pertinent Labs/Meds Reviewed    Medications:Pertinent Medications Reviewed  Scheduled Meds:   calcium carbonate  1,000 mg Oral TID AC    levETIRAcetam  500 mg Oral BID    loperamide  4 mg Oral QID    [START ON 3/11/2022] magnesium chloride  128 mg Oral Daily    pantoprazole  40 mg Oral Before breakfast    potassium chloride SA  40 mEq Oral TID    venlafaxine  75 mg Oral BID     Continuous Infusions:   0/9% NACL & POTASSIUM CHLORIDE 20 MEQ/L 100 mL/hr at 03/10/22 1215     PRN Meds:.acetaminophen, dextrose 50%, dextrose 50%, glucagon (human recombinant), glucose, glucose, HYDROcodone-acetaminophen, magnesium oxide, magnesium oxide, melatonin, naloxone, ondansetron, potassium bicarbonate, potassium bicarbonate, potassium bicarbonate, potassium, sodium phosphates, potassium, sodium phosphates, potassium, sodium phosphates, promethazine, simethicone, sodium chloride 0.9%    Labs: Pertinent Labs  Reviewed  Clinical Chemistry:  Recent Labs   Lab 03/10/22  0012 03/10/22  0021     --    K 3.0*  --      --    CO2 20*  --    GLU 85  --    BUN 9  --    CREATININE 0.8  --    CALCIUM 6.6*  --    PROT 6.0  --    ALBUMIN 2.7*  --    BILITOT 0.5  --    ALKPHOS 108  --    AST 15  --    ALT 13  --    ANIONGAP 12  --    ESTGFRAFRICA >60.0  --    EGFRNONAA >60.0  --    MG  --  0.5*     CBC:   Recent Labs   Lab 03/10/22  0012   WBC 9.71   RBC 3.83*   HGB 11.1*   HCT 35.1*      MCV 92   MCH 29.0   MCHC 31.6*     Thyroid & Parathyroid:  Recent Labs   Lab 03/10/22  0021   TSH 0.310*   FREET4 0.98       Monitor and Evaluation  Food and Nutrient Intake: energy intake, food and beverage intake  Food and Nutrient Adminstration: diet order  Knowledge/Beliefs/Attitudes: food and nutrition knowledge/skill  Physical Activity and Function: nutrition-related ADLs and IADLs  Anthropometric Measurements: weight, weight change, body mass index  Biochemical Data, Medical Tests and Procedures: electrolyte and renal panel, gastrointestinal profile, glucose/endocrine profile, inflammatory profile, lipid profile  Nutrition-Focused Physical Findings: overall appearance     Nutrition Risk    High    Nutrition Follow-Up     Yes      Eli Fletcher RD, LDN 03/10/2022 2:41 PM

## 2022-03-11 VITALS
TEMPERATURE: 98 F | BODY MASS INDEX: 16.47 KG/M2 | RESPIRATION RATE: 18 BRPM | WEIGHT: 108.69 LBS | DIASTOLIC BLOOD PRESSURE: 85 MMHG | HEART RATE: 106 BPM | HEIGHT: 68 IN | SYSTOLIC BLOOD PRESSURE: 136 MMHG | OXYGEN SATURATION: 97 %

## 2022-03-11 PROBLEM — E83.51 HYPOCALCEMIA: Status: RESOLVED | Noted: 2022-03-10 | Resolved: 2022-03-11

## 2022-03-11 PROBLEM — E87.8 ELECTROLYTE ABNORMALITY: Status: RESOLVED | Noted: 2022-01-14 | Resolved: 2022-03-11

## 2022-03-11 PROBLEM — E83.42 HYPOMAGNESEMIA: Status: RESOLVED | Noted: 2022-03-10 | Resolved: 2022-03-11

## 2022-03-11 PROBLEM — R19.7 DIARRHEA: Status: RESOLVED | Noted: 2018-04-11 | Resolved: 2022-03-11

## 2022-03-11 PROBLEM — E87.6 HYPOKALEMIA: Status: RESOLVED | Noted: 2022-03-10 | Resolved: 2022-03-11

## 2022-03-11 LAB
ALBUMIN SERPL BCP-MCNC: 2.6 G/DL (ref 3.5–5.2)
ALP SERPL-CCNC: 107 U/L (ref 55–135)
ALT SERPL W/O P-5'-P-CCNC: 11 U/L (ref 10–44)
ANION GAP SERPL CALC-SCNC: 10 MMOL/L (ref 8–16)
AST SERPL-CCNC: 15 U/L (ref 10–40)
BILIRUB SERPL-MCNC: 0.6 MG/DL (ref 0.1–1)
BUN SERPL-MCNC: 8 MG/DL (ref 6–20)
CALCIUM SERPL-MCNC: 7.6 MG/DL (ref 8.7–10.5)
CHLORIDE SERPL-SCNC: 111 MMOL/L (ref 95–110)
CO2 SERPL-SCNC: 18 MMOL/L (ref 23–29)
CREAT SERPL-MCNC: 0.7 MG/DL (ref 0.5–1.4)
EST. GFR  (AFRICAN AMERICAN): >60 ML/MIN/1.73 M^2
EST. GFR  (NON AFRICAN AMERICAN): >60 ML/MIN/1.73 M^2
GLUCOSE SERPL-MCNC: 84 MG/DL (ref 70–110)
MAGNESIUM SERPL-MCNC: 1.8 MG/DL (ref 1.6–2.6)
PHOSPHATE SERPL-MCNC: 2.5 MG/DL (ref 2.7–4.5)
POTASSIUM SERPL-SCNC: 4 MMOL/L (ref 3.5–5.1)
PROT SERPL-MCNC: 5.8 G/DL (ref 6–8.4)
SODIUM SERPL-SCNC: 139 MMOL/L (ref 136–145)

## 2022-03-11 PROCEDURE — 25000003 PHARM REV CODE 250: Performed by: HOSPITALIST

## 2022-03-11 PROCEDURE — 83735 ASSAY OF MAGNESIUM: CPT | Performed by: HOSPITALIST

## 2022-03-11 PROCEDURE — 84100 ASSAY OF PHOSPHORUS: CPT | Performed by: HOSPITALIST

## 2022-03-11 PROCEDURE — 80053 COMPREHEN METABOLIC PANEL: CPT | Performed by: HOSPITALIST

## 2022-03-11 PROCEDURE — 36415 COLL VENOUS BLD VENIPUNCTURE: CPT | Performed by: HOSPITALIST

## 2022-03-11 RX ORDER — CHOLESTYRAMINE 4 G/4.8G
4 POWDER, FOR SUSPENSION ORAL 2 TIMES DAILY
Qty: 180 PACKET | Refills: 0 | Status: SHIPPED | OUTPATIENT
Start: 2022-03-11 | End: 2022-03-11 | Stop reason: HOSPADM

## 2022-03-11 RX ORDER — SODIUM,POTASSIUM PHOSPHATES 280-250MG
2 POWDER IN PACKET (EA) ORAL ONCE
Status: DISCONTINUED | OUTPATIENT
Start: 2022-03-11 | End: 2022-03-11 | Stop reason: HOSPADM

## 2022-03-11 RX ORDER — PROMETHAZINE HYDROCHLORIDE 12.5 MG/1
12.5 TABLET ORAL EVERY 8 HOURS PRN
Qty: 30 TABLET | Refills: 0 | Status: SHIPPED | OUTPATIENT
Start: 2022-03-11 | End: 2022-03-21

## 2022-03-11 RX ADMIN — LEVETIRACETAM 500 MG: 500 TABLET, FILM COATED ORAL at 10:03

## 2022-03-11 RX ADMIN — CALCIUM CARBONATE (ANTACID) CHEW TAB 500 MG 1000 MG: 500 CHEW TAB at 11:03

## 2022-03-11 RX ADMIN — LOPERAMIDE HYDROCHLORIDE 4 MG: 2 CAPSULE ORAL at 10:03

## 2022-03-11 RX ADMIN — MAGNESIUM 64 MG (MAGNESIUM CHLORIDE) TABLET,DELAYED RELEASE 128 MG: at 09:03

## 2022-03-11 RX ADMIN — VENLAFAXINE 75 MG: 37.5 TABLET ORAL at 10:03

## 2022-03-11 RX ADMIN — PANTOPRAZOLE SODIUM 40 MG: 40 TABLET, DELAYED RELEASE ORAL at 05:03

## 2022-03-11 RX ADMIN — CALCIUM CARBONATE (ANTACID) CHEW TAB 500 MG 1000 MG: 500 CHEW TAB at 06:03

## 2022-03-11 NOTE — PROGRESS NOTES
[facility]  Adult Nutrition   Progress Note (Follow-Up)    SUMMARY      Recommendations:   1. Recommend change diet to [place].  2. Recommend Unjury with meals.     Goals:   Goals: 1. Patient to consume >75% po meals and supplements to meet needs. 2. Labs trend towards target range.    Dietitian Rounds Brief  Patient with loose stools medically managed per MD. Patient not eating per nursing. Diet changed to [place] (low fiber), discussed with nursing. Patient with MD. Plan is for d/c home today per nursing. Patient to have Unjury or protein supplement of choice at home (premier protein).    Diet order: [place] diet    % Intake of Estimated Energy Needs: 25 - 50 %  % Meal Intake: 25 - 50 %    Estimated/Assessed Needs  Weight Used For Calorie Calculations: 49.3 kg (108 lb 11 oz)  Energy Calorie Requirements (kcal): 30-35 kcal/kg (6714-7164 kcal/day)  Energy Need Method: Kcal/kg  Protein Requirements: 74-99 gm/day (1.5-2.0 gm/kg)  Weight Used For Protein Calculations: 49.3 kg (108 lb 11 oz)  Fluid Requirements (mL): 2465ml/day (50ml./kg)     RDA Method (mL): 30       Weight History:  Wt Readings from Last 5 Encounters:   03/10/22 49.3 kg (108 lb 11 oz)   03/02/22 50.5 kg (111 lb 4.8 oz)   02/14/22 49.6 kg (109 lb 5.6 oz)   02/09/22 50.3 kg (110 lb 12.8 oz)   01/20/22 58.2 kg (128 lb 4.9 oz)        Medications:Pertinent Medications Reviewed  Scheduled Meds:   calcium carbonate  1,000 mg Oral TID AC    cholestyramine-aspartame  4 g Oral BID    levETIRAcetam  500 mg Oral BID    loperamide  4 mg Oral QID    magnesium chloride  128 mg Oral Daily    pantoprazole  40 mg Oral Before breakfast    potassium chloride SA  40 mEq Oral TID    venlafaxine  75 mg Oral BID     Continuous Infusions:  PRN Meds:.acetaminophen, dextrose 50%, dextrose 50%, glucagon (human recombinant), glucose, glucose, HYDROcodone-acetaminophen, magnesium oxide, magnesium oxide, melatonin, naloxone, ondansetron, potassium bicarbonate,  potassium bicarbonate, potassium bicarbonate, potassium, sodium phosphates, potassium, sodium phosphates, potassium, sodium phosphates, promethazine, simethicone, sodium chloride 0.9%    Labs: Pertinent Labs Reviewed  Clinical Chemistry:  Recent Labs   Lab 03/10/22  1631 03/11/22  0443    139   K 3.9 4.0    111*   CO2 21* 18*   GLU 96 84   BUN 10 8   CREATININE 0.7 0.7   CALCIUM 7.3* 7.6*   PROT  --  5.8*   ALBUMIN 3.0* 2.6*   BILITOT  --  0.6   ALKPHOS  --  107   AST  --  15   ALT  --  11   ANIONGAP 8 10   ESTGFRAFRICA >60.0 >60.0   EGFRNONAA >60.0 >60.0   MG 2.2 1.8   PHOS 3.0 2.5*     CBC:   Recent Labs   Lab 03/10/22  0012   WBC 9.71   RBC 3.83*   HGB 11.1*   HCT 35.1*      MCV 92   MCH 29.0   MCHC 31.6*       Recent Labs   Lab 03/10/22  0021   TSH 0.310*   FREET4 0.98       Nutrition Risk  Level of Risk/Frequency of Follow-up: Moderate-High    Eli Fletcher, KYLEE 03/11/2022 8:55 AM

## 2022-03-11 NOTE — DISCHARGE SUMMARY
[facility] Medicine  Discharge Summary      Patient Name: Carline Chang  MRN: [id number]  Patient Class: IP- Inpatient  Admission Date: 3/9/2022  Hospital Length of Stay: 1 days  Discharge Date and Time: 3/11/2022  2:56 PM  Attending Physician: No att. 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  Patient endorses dizziness, peripheral tingling and numbness and tells me that she can sense when her electrolytes are out of balance due to these symptoms.  She reports up to 5 loose bowel movements however denies any hematochezia or melena.  She denies any fever, chills, headache, dizziness, chest pain, palpitations, nausea or vomiting.  She has midline abdominal incision with mild oozing.  She has prior history of severe electrolyte abnormalities resulting into seizure/ status epilepticus.     In ED patient was found to have severe electrolyte abnormalities including hypocalcemia, critical hypomagnesemia, hypokalemia.       * No surgery found *      Hospital Course:   56-year-old female with prior history of multiple abdominal surgeries and recent abdominal surgery for Crohn's disease related fistula and obstruction admitted after presenting with dizziness.  Found to have severe electrolyte derangements including severe hypomagnesemia, hypocalcemia and hypokalemia.  These are likely from ongoing GI losses.  Replaced appropriately with improvement in her symptoms.  Diarrhea has improved after up titrating loperamide.  Discussed cholestyramine however she refused it. Repeat labs show stable electrolytes.  Provided a lab order to check electrolytes in  1 week.  Started on magnesium supplements.  Advised to follow-up with PCP for worsening symptoms.  She has a follow-up appointment with her colorectal surgeon in 3 weeks.  Deemed medically stable to be discharged home.    Physical exam:  General: Patient resting comfortably in no acute distress.  Lungs:  No tachypnea or accessory muscle  Cor: Regular rate and rhythm.  No pedal edema.  Abd: Soft.  Midline incision noted  Neuro: A&O x3. Moving all 4 extremities equally           Goals of Care Treatment Preferences:  Code Status: Full Code      Consults:   Consults (From admission, onward)        Status Ordering Provider     Inpatient consult to Hospitalist  Once        Provider:  Sasha Brewer MD    Acknowledged SASHA BREWER          No new Assessment & Plan notes have been filed under this hospital service since the last note was generated.  Service: Hospital Medicine    Final Active Diagnoses:    Diagnosis Date Noted POA    History of status epilepticus [Z86.69] 01/15/2022 Not Applicable     Chronic    Bowel perforation in the past  [K63.1]  Yes     Chronic    Chronic abdominal pain [R10.9, G89.29] 04/11/2018 Yes    Crohn's disease of both small and large intestine with intestinal obstruction [K50.812] 01/01/1989 Yes     Chronic      Problems Resolved During this Admission:    Diagnosis Date Noted Date Resolved POA    PRINCIPAL PROBLEM:  Electrolyte abnormality [E87.8] 01/14/2022 03/11/2022 Yes    Hypocalcemia [E83.51] 03/10/2022 03/11/2022 Yes    Hypokalemia [E87.6] 03/10/2022 03/11/2022 Yes    Severe Hypomagnesemia [E83.42] 03/10/2022 03/11/2022 Yes    Diarrhea [R19.7] 04/11/2018 03/11/2022 Yes       Discharged Condition: stable    Disposition: Home or Self Care    Follow Up:   Follow-up Information     Jenelle Alegre MD Follow up in 2 week(s).    Specialty: Family Medicine  Why: Hospital discharge follow-up.  Follow-up labs  Contact information:  [address]  [address]  [place]  [id number]  [phone]              Andre Post MD Follow up.    Specialty: Colon and Rectal Surgery  Why: Go to appointment as scheduled  Contact information:  [address]  [facility] [id number] [phone]                       Patient Instructions:      Diet Adult Regular     Notify your health care provider if you experience any of the following:  temperature >100.4     Notify your health care provider if you experience any of the following:  persistent nausea and vomiting or diarrhea     Notify your health care provider if you experience any of the following:  severe uncontrolled pain     Notify your health care provider if you experience any of the following:  persistent dizziness, light-headedness, or visual disturbances     Notify your health care provider if you experience any of the following:  increased confusion or weakness     Activity as tolerated       Significant Diagnostic Studies: Labs:   CMP   Recent Labs   Lab 03/10/22  0012 03/10/22  1631 03/11/22  0443    137 139   K 3.0* 3.9 4.0    108 111*   CO2 20* 21* 18*   GLU 85 96 84   BUN 9 10 8   CREATININE 0.8 0.7 0.7   CALCIUM 6.6* 7.3* 7.6*   PROT 6.0  --  5.8*   ALBUMIN 2.7* 3.0* 2.6*   BILITOT 0.5  --  0.6   ALKPHOS 108  --  107   AST 15  --  15   ALT 13  --  11   ANIONGAP 12 8 10   ESTGFRAFRICA >60.0 >60.0 >60.0   EGFRNONAA >60.0 >60.0 >60.0    and CBC   Recent Labs   Lab 03/10/22  0012   WBC 9.71   HGB 11.1*   HCT 35.1*            Medications:  Reconciled Home Medications:      Medication List      START taking these medications    magnesium chloride 64 mg Tbec  Commonly known as: MAG 64  Take 2 tablets (128 mg total) by mouth once daily.        CHANGE how you take these medications    pantoprazole 40 MG tablet  Commonly known as: PROTONIX  Take 40 mg by mouth once daily.  What changed: Another medication with the same name was removed. Continue taking this medication, and follow the directions you see here.      promethazine 12.5 MG Tab  Commonly known as: PHENERGAN  Take 1 tablet (12.5 mg total) by mouth every 8 (eight) hours as needed for Nausea.  What changed:   · medication strength  · how much to take        CONTINUE taking these medications    hydroCHLOROthiazide 12.5 mg capsule  Commonly known as: MICROZIDE  Take 12.5 mg by mouth once daily.     HYDROcodone-acetaminophen  mg per tablet  Commonly known as: NORCO  Take 1 tablet by mouth every 6 (six) hours as needed.     levETIRAcetam 500 MG Tab  Commonly known as: KEPPRA  Take 1 tablet (500 mg total) by mouth 2 (two) times daily.     loperamide 2 mg capsule  Commonly known as: IMODIUM  Take 2 capsules (4 mg total) by mouth 4 (four) times daily.     potassium chloride SA 20 MEQ tablet  Commonly known as: K-DUR,KLOR-CON  Take 40 mEq by mouth 2 (two) times daily.     STELARA 90 mg/mL Syrg syringe  Generic drug: ustekinumab  Inject 1 mL (90 mg total) into the skin every 8 weeks.     TUMS ORAL  Take 1 tablet by mouth as needed (acid reflux).     venlafaxine 75 MG tablet  Commonly known as: EFFEXOR  Take 75 mg by mouth 2 (two) times daily.        STOP taking these medications    aMILoride 5 MG Tab  Commonly known as: MIDAMOR     calcium-vitamin D3 500 mg-5 mcg (200 unit) per tablet  Commonly known as: OS-CHEIKH 500 + D3     cyanocobalamin 1,000 mcg/mL injection     cyclobenzaprine 10 MG tablet  Commonly known as: FLEXERIL     dicyclomine 20 mg tablet  Commonly known as: BENTYL     oxyCODONE 10 mg Tab immediate release tablet  Commonly known as: ROXICODONE     vitamin D 1000 units Tab  Commonly known as: VITAMIN D3              Time spent on the discharge of patient: 35 minutes         Faizan Erickson MD  Department of Hospital Medicine  [facility]

## 2022-03-11 NOTE — HOSPITAL COURSE
56-year-old female with prior history of multiple abdominal surgeries and recent abdominal surgery for Crohn's disease related fistula and obstruction admitted after presenting with dizziness.  Found to have severe electrolyte derangements including severe hypomagnesemia, hypocalcemia and hypokalemia.  These are likely from ongoing GI losses.  Replaced appropriately with improvement in her symptoms.  Diarrhea has improved after up titrating loperamide.  Discussed cholestyramine however she refused it. Repeat labs show stable electrolytes.  Provided a lab order to check electrolytes in 1 week.  Started on magnesium supplements.  Advised to follow-up with PCP for worsening symptoms.  She has a follow-up appointment with her colorectal surgeon in 3 weeks.  Deemed medically stable to be discharged home.    Physical exam:  General: Patient resting comfortably in no acute distress.  Lungs:  No tachypnea or accessory muscle  Cor: Regular rate and rhythm.  No pedal edema.  Abd: Soft.  Midline incision noted  Neuro: A&O x3. Moving all 4 extremities equally

## 2022-03-11 NOTE — PLAN OF CARE
Problem: Malnutrition  Goal: Improved Nutritional Intake  Intervention: Promote and Optimize Oral Intake  Flowsheets (Taken 3/11/2022 1135)  Oral Nutrition Promotion: (Patient to have Unjury or protein supplement of choice at home (premier protein).)   calorie-dense liquids provided   other (see comments)

## 2022-03-11 NOTE — PLAN OF CARE
Problem: Adult Inpatient Plan of Care  Goal: Plan of Care Review  Outcome: Ongoing, Progressing  Goal: Optimal Comfort and Wellbeing  Outcome: Ongoing, Progressing  Goal: Readiness for Transition of Care  Outcome: Ongoing, Progressing     Problem: Infection  Goal: Absence of Infection Signs and Symptoms  Outcome: Ongoing, Progressing     Problem: Fall Injury Risk  Goal: Absence of Fall and Fall-Related Injury  Outcome: Ongoing, Progressing     Problem: Malnutrition  Goal: Improved Nutritional Intake  Outcome: Ongoing, Progressing

## 2022-03-11 NOTE — PLAN OF CARE
Important Message from Medicare was sign, explained and given to patient/caregiver on 03/11/2022 at 8:29am     addressed any questions or concerns.    Important Message from Medicare document will be scanned into patient's medical record

## 2022-03-12 NOTE — PLAN OF CARE
03/12/22 0815   Final Note   Assessment Type Final Discharge Note   Anticipated Discharge Disposition Home   Orders reviewed - pt discharged home 3/11/22 with no case management needs noted.

## 2022-04-06 ENCOUNTER — LAB VISIT (OUTPATIENT)
Dept: LAB | Facility: HOSPITAL | Age: 57
End: 2022-04-06
Attending: COLON & RECTAL SURGERY
Payer: MEDICARE

## 2022-04-06 ENCOUNTER — OFFICE VISIT (OUTPATIENT)
Dept: SURGERY | Facility: CLINIC | Age: 57
End: 2022-04-06
Payer: MEDICARE

## 2022-04-06 VITALS
SYSTOLIC BLOOD PRESSURE: 132 MMHG | HEIGHT: 68 IN | HEART RATE: 112 BPM | BODY MASS INDEX: 16.17 KG/M2 | DIASTOLIC BLOOD PRESSURE: 84 MMHG | WEIGHT: 106.69 LBS

## 2022-04-06 DIAGNOSIS — K50.012 CROHN'S DISEASE OF ILEUM WITH INTESTINAL OBSTRUCTION: Primary | ICD-10-CM

## 2022-04-06 DIAGNOSIS — K50.012 CROHN'S DISEASE OF ILEUM WITH INTESTINAL OBSTRUCTION: ICD-10-CM

## 2022-04-06 LAB
ALBUMIN SERPL BCP-MCNC: 3.5 G/DL (ref 3.5–5.2)
ALP SERPL-CCNC: 170 U/L (ref 55–135)
ALT SERPL W/O P-5'-P-CCNC: 50 U/L (ref 10–44)
ANION GAP SERPL CALC-SCNC: 11 MMOL/L (ref 8–16)
AST SERPL-CCNC: 84 U/L (ref 10–40)
BASOPHILS # BLD AUTO: 0.07 K/UL (ref 0–0.2)
BASOPHILS NFR BLD: 0.5 % (ref 0–1.9)
BILIRUB SERPL-MCNC: 0.4 MG/DL (ref 0.1–1)
BUN SERPL-MCNC: 11 MG/DL (ref 6–20)
CALCIUM SERPL-MCNC: 9.4 MG/DL (ref 8.7–10.5)
CHLORIDE SERPL-SCNC: 103 MMOL/L (ref 95–110)
CO2 SERPL-SCNC: 25 MMOL/L (ref 23–29)
CREAT SERPL-MCNC: 0.8 MG/DL (ref 0.5–1.4)
CRP SERPL-MCNC: 4.1 MG/L (ref 0–8.2)
DIFFERENTIAL METHOD: ABNORMAL
EOSINOPHIL # BLD AUTO: 0.2 K/UL (ref 0–0.5)
EOSINOPHIL NFR BLD: 1.4 % (ref 0–8)
ERYTHROCYTE [DISTWIDTH] IN BLOOD BY AUTOMATED COUNT: 14 % (ref 11.5–14.5)
EST. GFR  (AFRICAN AMERICAN): >60 ML/MIN/1.73 M^2
EST. GFR  (NON AFRICAN AMERICAN): >60 ML/MIN/1.73 M^2
GLUCOSE SERPL-MCNC: 79 MG/DL (ref 70–110)
HCT VFR BLD AUTO: 41.7 % (ref 37–48.5)
HGB BLD-MCNC: 13 G/DL (ref 12–16)
IMM GRANULOCYTES # BLD AUTO: 0.04 K/UL (ref 0–0.04)
IMM GRANULOCYTES NFR BLD AUTO: 0.3 % (ref 0–0.5)
LYMPHOCYTES # BLD AUTO: 1.6 K/UL (ref 1–4.8)
LYMPHOCYTES NFR BLD: 11.6 % (ref 18–48)
MCH RBC QN AUTO: 29 PG (ref 27–31)
MCHC RBC AUTO-ENTMCNC: 31.2 G/DL (ref 32–36)
MCV RBC AUTO: 93 FL (ref 82–98)
MONOCYTES # BLD AUTO: 0.5 K/UL (ref 0.3–1)
MONOCYTES NFR BLD: 3.8 % (ref 4–15)
NEUTROPHILS # BLD AUTO: 11.4 K/UL (ref 1.8–7.7)
NEUTROPHILS NFR BLD: 82.4 % (ref 38–73)
NRBC BLD-RTO: 0 /100 WBC
PLATELET # BLD AUTO: 355 K/UL (ref 150–450)
PMV BLD AUTO: 8.7 FL (ref 9.2–12.9)
POTASSIUM SERPL-SCNC: 4.9 MMOL/L (ref 3.5–5.1)
PREALB SERPL-MCNC: 16 MG/DL (ref 20–43)
PROT SERPL-MCNC: 7.2 G/DL (ref 6–8.4)
RBC # BLD AUTO: 4.48 M/UL (ref 4–5.4)
SODIUM SERPL-SCNC: 139 MMOL/L (ref 136–145)
WBC # BLD AUTO: 13.86 K/UL (ref 3.9–12.7)

## 2022-04-06 PROCEDURE — 99212 OFFICE O/P EST SF 10 MIN: CPT | Mod: PBBFAC | Performed by: COLON & RECTAL SURGERY

## 2022-04-06 PROCEDURE — 99024 PR POST-OP FOLLOW-UP VISIT: ICD-10-PCS | Mod: POP,,, | Performed by: COLON & RECTAL SURGERY

## 2022-04-06 PROCEDURE — 85025 COMPLETE CBC W/AUTO DIFF WBC: CPT | Performed by: COLON & RECTAL SURGERY

## 2022-04-06 PROCEDURE — 86140 C-REACTIVE PROTEIN: CPT | Performed by: COLON & RECTAL SURGERY

## 2022-04-06 PROCEDURE — 99999 PR PBB SHADOW E&M-EST. PATIENT-LVL II: ICD-10-PCS | Mod: PBBFAC,,, | Performed by: COLON & RECTAL SURGERY

## 2022-04-06 PROCEDURE — 99024 POSTOP FOLLOW-UP VISIT: CPT | Mod: POP,,, | Performed by: COLON & RECTAL SURGERY

## 2022-04-06 PROCEDURE — 36415 COLL VENOUS BLD VENIPUNCTURE: CPT | Performed by: COLON & RECTAL SURGERY

## 2022-04-06 PROCEDURE — 84134 ASSAY OF PREALBUMIN: CPT | Performed by: COLON & RECTAL SURGERY

## 2022-04-06 PROCEDURE — 99999 PR PBB SHADOW E&M-EST. PATIENT-LVL II: CPT | Mod: PBBFAC,,, | Performed by: COLON & RECTAL SURGERY

## 2022-04-06 PROCEDURE — 80053 COMPREHEN METABOLIC PANEL: CPT | Performed by: COLON & RECTAL SURGERY

## 2022-04-06 NOTE — Clinical Note
Charleen Crowley looks really good.  I am repeating some labs today and just wanted to check with you regarding plan for biologics post op.  Thanks Jayme

## 2022-04-06 NOTE — PROGRESS NOTES
"  HPI:  Carline Chang is a 56 y.o. 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 KONO S anastomosis was performed.      She underwent ileocolic resection, repair of sigmoid colon She had 105 cm of small bowel remaining. She is currently eating food but has a poor appetite. She is not drinking her nutritional supplements. She is taking Imodium two tablets four times a day. Shes having five bowel movements per day. She is continent. She denies any fever. She has any nausea or vomiting. She reports drainage from the wound which looks purulent.        SPECIMEN  FINAL PATHOLOGIC DIAGNOSIS  Gross Description  Ordering: Attending:  Diagnosed by  Electronically Signed By:  Crohn's disease of both small and large  intestine with other complication  1. . Scar  2.   Ileocolic anastomosis  Received in formalin, labeled with the patients name and medical record number, and designated as scar -  perm" is an unoriented 21.1 cm in length x 0.2-0.6 cm in diameter skin and soft tissue fragment consistent  with a well-healed scar. The epidermal surface in white-tan, smooth, and unremarkable and the underlying  subcutaneous tissue is yellow-tan, dense, fibrous, and unremarkable. Representative sections are  submitted in cassette CVZ--1-A.  Part 2 of 2  Received in formalin, labeled with the patients name and medical record number, and designated as  ileocolic anastomosis is a previously opened segment of convoluted bowel consisting of a previous  anastomosis between ileum (35.1 cm in length x 6.9 cm in average circumference) and large bowel (12.3  cm in length x 10.2 cm in average circumference). The ileum contains a convoluted, dense, fibrous area of  adhesions (11.8 x 9.6 x 4.1 cm) extending 3.3 cm from the ileal margin and an outpouching " (2.8 x 2.8 x 2.6  cm) extending 3.4 cm from the ileocecal anastomosis and 13.2 cm from the large bowel margin. The ileal  wall (1.1 cm in average thickness) and remaining ileal mucosa is pink-tan, glistening, and unremarkable.  The large bowel is markedly edematous and contains an outpouching (3.2 x 2.0 x 1.8 cm) that extends 1.93  cm to the ileocecal anastomosis and 7.4 cm the large bowel margin. The external surface of the bowel is  pink-tan and roughened. No perforations, fistulas, or lesions are grossly identified.        Interval hx  2-3 BMs per day  Eating improved without pain, bloating or vomiting.  Stools occasionally formed.  No blood.  No fever.        Past Medical History:   Diagnosis Date    Acute deep vein thrombosis (DVT) of brachial vein of right upper extremity 2017    on RUE ultrasound    Anxiety     Bowel perforation     Chronic pain     Crohn's disease of both small and large intestine with intestinal obstruction     Difficult intravenous access     Encounter for blood transfusion     GERD (gastroesophageal reflux disease)     Kidney stones     Nephrolithiasis     Stricture of bowel         Past Surgical History:   Procedure Laterality Date    ABSCESS DRAINAGE      ANAL FISTULOTOMY      BOWEL RESECTION      small bowel resection per Dr. Uribe 2018     SECTION      x2    CHOLECYSTECTOMY  2000    COLON SURGERY  2015    sigmoid loop colostomy    COLONOSCOPY N/A 2016    Procedure: COLONOSCOPY;  Surgeon: Andre Uribe MD;  Location: [facility] (4TH FLR);  Service: Endoscopy;  Laterality: N/A;    COLONOSCOPY N/A 2017    Procedure: COLONOSCOPY;  Surgeon: Dany Stock MD;  Location: [facility] (2ND FLR);  Service: Endoscopy;  Laterality: N/A;    COLONOSCOPY N/A 2017    Procedure: COLONOSCOPY;  Surgeon: Andre Uribe MD;  Location: [facility] (4TH FLR);  Service: Endoscopy;  Laterality: N/A;    COLONOSCOPY N/A 2018     Procedure: COLONOSCOPY;  Surgeon: Andre Uribe MD;  Location: [facility] ENDO (4TH FLR);  Service: Endoscopy;  Laterality: N/A;    COLONOSCOPY N/A 3/24/2018    Procedure: COLONOSCOPY;  Surgeon: Elmer Serrato MD;  Location: [facility] ENDO (2ND FLR);  Service: Endoscopy;  Laterality: N/A;    COLONOSCOPY  3/24/2018    COLONOSCOPY N/A 7/6/2018    Procedure: COLONOSCOPY;  Surgeon: Andre Uribe MD;  Location: [facility] ENDO (4TH FLR);  Service: Endoscopy;  Laterality: N/A;    COLONOSCOPY N/A 10/7/2021    Procedure: COLONOSCOPY;  Surgeon: Jose Hughes MD;  Location: [facility] ENDO;  Service: Endoscopy;  Laterality: N/A;    ESOPHAGOGASTRODUODENOSCOPY N/A 10/7/2021    Procedure: EGD (ESOPHAGOGASTRODUODENOSCOPY);  Surgeon: Jose Hughes MD;  Location: [facility] ENDO;  Service: Endoscopy;  Laterality: N/A;    LAPAROSCOPIC CLOSURE OF COLOSTOMY N/A 8/29/2018    Procedure: CLOSURE, COLOSTOMY, LAPAROSCOPIC;  Surgeon: Andre Uribe MD;  Location: [facility] OR 2ND FLR;  Service: Colon and Rectal;  Laterality: N/A;  converted to open    LYSIS OF ADHESIONS N/A 1/20/2022    Procedure: LYSIS, ADHESIONS;  Surgeon: LLUVIA Post MD;  Location: [facility] OR 2ND FLR;  Service: Colon and Rectal;  Laterality: N/A;  lasting longer than 2 hours, modifier 22      rectovaginal fistula repair  01/2018    SMALL INTESTINE SURGERY  1995    per patient 10 inches removed    SMALL INTESTINE SURGERY  02/2009    abdominal abscess drained, 3 cm colon removed, 11 cm SB removed    TUBAL LIGATION      UPPER GASTROINTESTINAL ENDOSCOPY  2000       Review of patient's allergies indicates:   Allergen Reactions    Remicade [infliximab] Shortness Of Breath and Palpitations     Severe muscle and joint, and bone pain    Morphine Other (See Comments)     Abdominal pain  Pt states she is ok to take morphine    Flagyl [metronidazole] Other (See Comments)     Neuropathy    Nubain [nalbuphine] Anxiety     Upper abdominal burning        Family History   Problem  "Relation Age of Onset    Cancer Maternal Aunt 66        colon ca    Heart attack Father 69    Anesthesia problems Neg Hx     Celiac disease Neg Hx     Cirrhosis Neg Hx     Colon cancer Neg Hx     Colon polyps Neg Hx     Crohn's disease Neg Hx     Cystic fibrosis Neg Hx     Esophageal cancer Neg Hx     Hemochromatosis Neg Hx     Inflammatory bowel disease Neg Hx     Irritable bowel syndrome Neg Hx     Liver cancer Neg Hx     Liver disease Neg Hx     Rectal cancer Neg Hx     Stomach cancer Neg Hx     Ulcerative colitis Neg Hx     Mars's disease Neg Hx     Lymphoma Neg Hx     Tuberculosis Neg Hx     Scleroderma Neg Hx     Rheum arthritis Neg Hx     Multiple sclerosis Neg Hx     Melanoma Neg Hx     Lupus Neg Hx     Psoriasis Neg Hx     Skin cancer Neg Hx        Social History     Socioeconomic History    Marital status:    Tobacco Use    Smoking status: Never Smoker    Smokeless tobacco: Never Used   Substance and Sexual Activity    Alcohol use: No     Alcohol/week: 0.0 standard drinks    Drug use: No       ROS:  GENERAL: No fever, chills, fatigability or weight loss.  Integument: No rashes, redness, icterus  CHEST: Denies CASTORENA, cyanosis, wheezing, cough and sputum production.  CARDIOVASCULAR: Denies chest pain, PND, orthopnea or reduced exercise tolerance.  GI: Denies abd pain, dysphagia, nausea, vomiting, no hematemesis   : Denies burning on urination, no hematuria, no bacteriuria  MSK: No deformities, swelling, joint pain swelling  Neurologic: No HAs, seizures, weakness, paresthesias, gait problems    PE:  General appearance well  /84 (BP Location: Left arm, Patient Position: Sitting, BP Method: Small (Automatic))   Pulse (!) 112   Ht 5' 8" (1.727 m)   Wt 48.4 kg (106 lb 11.2 oz)   LMP 05/30/2009   BMI 16.22 kg/m²   Sclera/ Skin anicteric  AT NC EOMI  Neck supple trachea midline   Chest symmetric, nl excursion, no retractions, breathing " comfortably  Abdomen  Wound healed  ND soft NT.  no masses, no organomegaly  EXT - no CCE  Neuro:  Mood/ affect nl, alert and oriented x 3, moves all ext's, gait nl    Assessment:  Wound healed  Hernia repair intact  Good performance status  BMs not indicative of short bowel syndrome    Plan:  Check labs  OV 3 months.  May be able to forego Gattex.

## 2022-04-18 ENCOUNTER — SPECIALTY PHARMACY (OUTPATIENT)
Dept: PHARMACY | Facility: CLINIC | Age: 57
End: 2022-04-18

## 2022-04-18 ENCOUNTER — TELEPHONE (OUTPATIENT)
Dept: INFUSION THERAPY | Facility: HOSPITAL | Age: 57
End: 2022-04-18
Payer: MEDICARE

## 2022-04-18 DIAGNOSIS — K50.813 CROHN'S DISEASE OF BOTH SMALL AND LARGE INTESTINE WITH FISTULA: Primary | ICD-10-CM

## 2022-04-18 RX ORDER — USTEKINUMAB 90 MG/ML
90 INJECTION, SOLUTION SUBCUTANEOUS
Qty: 1 EACH | Refills: 2 | OUTPATIENT
Start: 2022-04-18 | End: 2022-07-06 | Stop reason: SDUPTHER

## 2022-04-18 NOTE — TELEPHONE ENCOUNTER
Patient has been cleared to start Stelara. She was scheduled for IV induction dose today (4/18/22), but did not show up. Infusion RN attempted to reach patient to reschedule.    PA for Stelara SQ dose on file. [facility] has provider portion of PAP application and will assist patient with FA for Stelara SQ dose.

## 2022-04-20 ENCOUNTER — TELEPHONE (OUTPATIENT)
Dept: GASTROENTEROLOGY | Facility: CLINIC | Age: 57
End: 2022-04-20
Payer: MEDICARE

## 2022-04-20 NOTE — TELEPHONE ENCOUNTER
----- Message from Min Foster sent at 4/20/2022  3:17 PM CDT -----  Regarding: sched f/u appt      The Pt states that she would like to f/u with you for her Crohn's.      The sched won't let the Pt get sched as an Est Pt and keeps converting her to New Pt.    Please contact the Pt to sched.     # [phone]

## 2022-04-20 NOTE — TELEPHONE ENCOUNTER
Benefits investigation for [employer]    Payor: [employer]  Part d    Estimated Copay: [id number]   This fill puts the patient into catastrophic coverage.  Network Status: in network     Sending and leaving into FA que , for further instruction. The patient missed her first infusion dose at clinic.    PAP received from MD, holding patient portion for her to complete.

## 2022-04-21 ENCOUNTER — OFFICE VISIT (OUTPATIENT)
Dept: GASTROENTEROLOGY | Facility: CLINIC | Age: 57
End: 2022-04-21
Payer: MEDICARE

## 2022-04-21 VITALS
WEIGHT: 111.13 LBS | SYSTOLIC BLOOD PRESSURE: 111 MMHG | BODY MASS INDEX: 16.89 KG/M2 | HEART RATE: 101 BPM | DIASTOLIC BLOOD PRESSURE: 75 MMHG | TEMPERATURE: 98 F | OXYGEN SATURATION: 98 %

## 2022-04-21 DIAGNOSIS — R74.8 ABNORMAL LIVER ENZYMES: ICD-10-CM

## 2022-04-21 DIAGNOSIS — E83.42 HYPOMAGNESEMIA: ICD-10-CM

## 2022-04-21 DIAGNOSIS — K50.813 CROHN'S DISEASE OF BOTH SMALL AND LARGE INTESTINE WITH FISTULA: Primary | ICD-10-CM

## 2022-04-21 PROCEDURE — 99214 PR OFFICE/OUTPT VISIT, EST, LEVL IV, 30-39 MIN: ICD-10-PCS | Mod: S$GLB,,, | Performed by: INTERNAL MEDICINE

## 2022-04-21 PROCEDURE — 99214 OFFICE O/P EST MOD 30 MIN: CPT | Mod: S$GLB,,, | Performed by: INTERNAL MEDICINE

## 2022-04-21 RX ORDER — ERGOCALCIFEROL 1.25 MG/1
50000 CAPSULE ORAL
Qty: 12 CAPSULE | Refills: 1 | Status: SHIPPED | OUTPATIENT
Start: 2022-04-21 | End: 2022-07-08

## 2022-04-21 NOTE — PROGRESS NOTES
[facility]          Inflammatory Bowel Disease New Patient Consultation Note         TODAY'S VISIT DATE:  4/21/2022    Reason for Consult:    Chief Complaint   Patient presents with    Crohn's Disease       PCP: Pablo Medina (Inactive)      Referring MD:   No ref. provider found    History of Present Illness:   Carline Chang who is a 56 y.o. female is being seen today at the [facility] on 04/21/2022 for inflammatory bowel disease- Crohn's disease.  She is here today for a follow-up visit.  She has been back in the hospital a few times since our last visit.  One was due to severe hypomagnesemia.  She also had some issues with wound healing that delayed starting therapy for her Crohn's disease.  Currently she is feeling pretty well.  She has about 3 bowel movements per day on most days.  If she takes magnesium supplements this causes looser bowel movements and more frequent bowel movements.  She has a few nocturnal bowel movements throughout the week.  Her weight has been stable.  She occasionally has some gas and bloating issues but these are much better than prior to her surgery.  She was supposed to start Stelara yesterday but did not show up for the infusion.  There was some confusion as she was not sure that her injections had been approved yet.  She denies any other new complaints at this time.      IBD History:  Carline Chang is a 52 y.o. female with fistulizing (rectovaginal fistula repaired) ileocolonic Crohn's disease with SB stricture:  diagnosed 1986 (age 21) with history of multiple surgeries including SB resections (1995), ileocolonic resection (2/2009-partial colectomy-3 cm, SB resection-11 cm), sigmoid loop colostomy (6/2015), and rectal vaginal fistula repair (1/2018).  She has a past medical history of cholecystectomy, history of RUE right brachial vein thrombus (RUE US 1/2017), and kidney stones  (CT 1/2017).  She began with symptoms of a partial SBO that was attributed to a virus in 1982.  She continued with symptoms and in 1986 she had an UGI with SBFT with showed Crohn's disease.  She tried various medical therapies asacol, pentasa, azulfidine, imuran and prednisone all of which were ineffective and did not achieve clinic remission though prednisone helped her symptoms the most.  In 1995 she had obstructive symptoms with stricture and fistula requiring surgery at which time she recalls 10 inches of intestine being resected.  She was under the care of Dr. Hurd from 5602-2325 and had a normal colonoscopy in 2003.  She was on no medications from 0418-2999 and began seeing Dr. Boland in 2006.  In 2/2009 she had another surgery which included drainage of abdominal wall abscess, partial colectomy (3 cm in length) with small bowel resection (11 cm in length), and lysis of adhesions with surgical path showing mucosal ulcer, adjacent moderate ileocecal inflammation, serosal inflammatory adhesions, incidental lipomatous hypertrophy of ileocecal valve.  She started remicade but only took three doses but then discontinued due to loss of insurance.  She then did well for two years on no medications.  In 6/2011 she had worsening symptoms that did not improve with steroids, pentasa, imuran and flagyl.  She developed a rectovaginal fistula in 2014 that increased in size by 11/2014 per CT scan.  On 6/18/2015 she had a sigmoid loop colostomy though post-op developed high ostomy output with plans to eventually have a colostomy takedown.  She also had some peristomal skin irritation that was treated with topical antifungal, calamine, and flagyl.  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  Due to ongoing active inflammation and rectal fistula per colonoscopy and CT scan, she started Humira in 12/2015 and did well on humira until 1/2017.  She discontinued humira on  1/12/2017 and was told it was not working although a colonoscopy did not show any evidence of active disease at that time. A CT scan at that time did show an area suggesting active inflammation in the mid small bowel.  Entyvio was considered but never started, rather she was started on Imuran 50 mg PO daily.  She continued with symptoms and multiple examinations showing active disease, active fistula, and free air adjacent to the proximal colonic anastomosis in the RLQ.  In 1/2018 she underwent a rectovaginal fistula repair.  She had a hospitalization at [facility] from 3/17/2018-3/24/2018 at which time she initially met Dr. Serrato.  She had an MRE on 3/7/2018 which showed SB inflammation with upstream dilation, gastrograffin enemas showed no evidence of rectovaginal fistula and repeat MRE on 3/23/18 showed ongoing active inflammation of the SB with stricture and upstream dilation.  She had a colonoscopy through stoma which showed poor bowel prep with significant stool interfering with visualization. The scope was able to be advanced scope into the neoterminal ileum but due to poor air insufflation and bowel prep had difficulty seeing well.  She was started on IVF and IV steroids and eventually transitioned to oral steroids by discharged with improvement in her labs as well. In April 2018 she underwent a repeat surgery due to ongoing symptoms and active disease. A small bowel resection was performed and a phlegmonous area adjacent the the ileocolonic anastomosis was resected. In August 2018 she underwent colostomy takedown. Throughout this time Entyvio was never started. Instead, Remicade was tried again in 10/2018. She had a significant infusion reaction with her second dose and this was stopped. In 2021 she began seeing Dr. Hughes. She had a colonoscopy performed in 10/21 that revealed active ileitis. She developed an infection which prevented her from starting Entyvio.  She was hospitalized at [facility]  from 01/14/2022 to 01/16/2022 after presenting with new onset seizure activity associated with hypokalemia and hypomagnesemia that was attributed to nausea and vomiting of two day duration. She was initiated on KEPPRA. CT head, MRI brain, and EEG were normal. She was discharged with outpatient follow-up with neurology and GI. She was admitted to the hospital on 1/19/22 and underwent surgery on 1/20/22 due to colonic obstruction. At the time of surgery she was found to have active ileal Crohn's disease with a fistula extending from the ileocolonic anastomosis to the sigmoid colon. This was resected along with 45cm of ileum. An ileocolonic anastomosis (KONO-S) was made in the proximal transverse colon with 115cm of small bowel remaining. She was also noted to be positive for COVID at that time. She was on prednisone 20mg daily and had been since 9/21 so this was weaned slowly. Since surgery she has had some issues with loose, frequent stools and is taking about 8 imodium daily. Gattex has been prescribed and is awaiting insurance approval. She has had some issues with purulent drainage from her incision and this was recently opened and packed.  Plans were made to start Stelara but she did not make it to the appointment as there was some confusion over her prescription.  She never started Gattex, either.      IBD Details:  Dx Date: 1986  Disease type/distribution:Crohn's disease/Small bowel and colonic involvement with stricturing and fistulizing disease  Current Treatment: Prednisone  Start Date: 9/21 Response: Incomplete  Optimized: N/A  Adverse reactions: None  Prior surgeries:    1995: Small bowel resection (approx 10 inches) at [facility]   2/9/2009: Drainage of abdominal wall abscess, partial colectomy (3 cm in length) with Small bowel resection (11 cm in length), Lysis of adhesions at [facility] (path: ileocecal mucosal ulcer, adjacent moderate     ileocecal inflammation, serosal inflammatory adhesions, incidental  lipomatous hypertrophy of ileocecal valve, no malignancy)   6/18/2015: Sigmoid loop colostomy and mobilization of splenic flexure at [facility]   1/10/2018: RV fistula repair (path: reactive changes and focal granulation tissue formation, associated transmural defect and serosal adhesions, background colonic mucosa with no significant histopathologic changes)   4/30/2018: Exploratory laparotomy, lysis of adhesions, a previous ileocolic resection anastomosis and distal small bowel resection with a primary anastomosis (path: severe chronic active enteritis with extensive     ulceration, fistula tract formation, granulomas)   8/29/2018: Colostomy lap converted to open colostomy closure (path-colostomy: normal) (path-sigmoid and rectum: segment of bowel with mild transmural acute inflammation and serositis, surgical resection     margins are viable)   1/20/2022: Small bowel and anastomotic resection due to ileosigmoid fistula causing colonic obstruction - KONO-S anastomosis - 115cm of small bowel remaining  CRP Elevation: Y      Calpro: Unknown  Disease Complications: Stricturing and fistulizing disease requiring multiple surgical interventions  Extraintestinal manifestations: Anemia  Prior treatments:   Steroids: Incomplete response  5ASA:  Ineffective  IMM: AZA ineffective  TNF Inh: Humira: Ineffective at controlling disease    IFX: Infusion reaction   Anti-Integrin: None   IL 12/23: None  MARGARITA Inh: N/A    Previous Clinical Trials: None    Last Colonoscopy: 10/21 - active ileal inflammation    Other Endoscopies: Previous reports reviewed    Imaging:   MRE: Previous studies reviewed   CT: Recent CT reviewed   Other: Studies reviewed    Pertinent Labs:  Lab Results   Component Value Date    SEDRATE 52 (H) 02/03/2018    CRP 4.1 04/06/2022     No results found for: TTGIGA, IGA  Lab Results   Component Value Date    TSH 0.310 (L) 03/10/2022    FREET4 0.98 03/10/2022     Lab Results   Component Value Date    XUFBSMCD68ED 12 (L)  01/16/2022    BCKWKUMZ41 <50 (L) 10/07/2021     Lab Results   Component Value Date    HEPBSAG Negative 10/07/2021    HEPBCAB Negative 10/07/2021    HEPCAB Negative 02/05/2018     No results found for: TYI66VLYJ  Lab Results   Component Value Date    NIL 0.079 02/05/2018    TBAG 0.077 02/05/2018    TBAGNIL -0.002 02/05/2018    MITOGENNIL 2.704 02/05/2018    TBGOLD Negative 02/05/2018     Lab Results   Component Value Date    TPTMINTERP SEE BELOW 03/18/2018    TPMTRESULT 19.9 (L) 01/17/2017     Lab Results   Component Value Date    STOOLCULTURE  03/18/2018     No Salmonella,Shigella,Vibrio,Campylobacter,Yersinia isolated.    OBEAOYARFV1D Negative 03/18/2018    ANSAGNPBNQ2L Negative 03/18/2018    CDIFFICILEAN Negative 06/12/2018    CDIFFTOX Negative 06/12/2018     No results found for: CALPROTECTIN    Therapeutic Drug Monitoring Labs:  No results found for: PROMETH  No results found for: ANSADAINIT, INFLIXIMAB, INFLIXINTERP    Vaccinations:  Lab Results   Component Value Date    HEPBSAB 7.5 (L) 10/07/2021     Lab Results   Component Value Date    HEPAIGG Negative 02/05/2018     Lab Results   Component Value Date    VARICELLAZOS 2.50 (H) 02/05/2018    VARICELLAINT Positive (A) 02/05/2018     Immunization History   Administered Date(s) Administered    Hepatitis A / Hepatitis B 04/17/2018, 06/12/2018    Hepatitis B, Adult 12/03/2018    Influenza - Quadrivalent 11/01/2018    Pneumococcal Conjugate - 13 Valent 04/17/2018    Pneumococcal Polysaccharide - 23 Valent 06/12/2018    Tdap 04/17/2018         Review of Systems  Review of Systems   Constitutional: Negative for chills, fever and weight loss.   HENT: Negative for sore throat.    Eyes: Negative for pain, discharge and redness.   Respiratory: Negative for cough, shortness of breath and wheezing.    Cardiovascular: Negative for chest pain, orthopnea and leg swelling.   Gastrointestinal: Positive for heartburn and nausea. Negative for abdominal pain, blood in stool,  constipation, diarrhea, melena and vomiting.   Genitourinary: Negative for dysuria, frequency and urgency.   Musculoskeletal: Negative for back pain, joint pain and myalgias.   Skin: Negative for itching and rash.   Neurological: Negative for focal weakness and seizures.   Endo/Heme/Allergies: Does not bruise/bleed easily.   Psychiatric/Behavioral: Negative for depression. The patient is not nervous/anxious.        Medical History:   Past Medical History:   Diagnosis Date    Acute deep vein thrombosis (DVT) of brachial vein of right upper extremity 2017    on RUE ultrasound    Anxiety     Bowel perforation     Chronic pain     Compression fracture of C-spine     Crohn's disease of both small and large intestine with intestinal obstruction     Difficult intravenous access     Encounter for blood transfusion     GERD (gastroesophageal reflux disease)     Kidney stones     Nephrolithiasis     Osteoporosis     Stricture of bowel        Surgical History:  Past Surgical History:   Procedure Laterality Date    ABSCESS DRAINAGE      ANAL FISTULOTOMY      BOWEL RESECTION      small bowel resection per Dr. Uribe 2018     SECTION      x2    CHOLECYSTECTOMY  2000    COLON SURGERY  2015    sigmoid loop colostomy    COLONOSCOPY N/A 2016    Procedure: COLONOSCOPY;  Surgeon: Andre Uribe MD;  Location: [facility] (4TH FLR);  Service: Endoscopy;  Laterality: N/A;    COLONOSCOPY N/A 2017    Procedure: COLONOSCOPY;  Surgeon: Dany Stock MD;  Location: [facility] ENDO (2ND FLR);  Service: Endoscopy;  Laterality: N/A;    COLONOSCOPY N/A 2017    Procedure: COLONOSCOPY;  Surgeon: Andre Uribe MD;  Location: [facility] ENDO (4TH FLR);  Service: Endoscopy;  Laterality: N/A;    COLONOSCOPY N/A 2018    Procedure: COLONOSCOPY;  Surgeon: Andre Uribe MD;  Location: [facility] ENDO (4TH FLR);  Service: Endoscopy;  Laterality: N/A;    COLONOSCOPY N/A 3/24/2018     Procedure: COLONOSCOPY;  Surgeon: Elmer Serrato MD;  Location: [facility] ENDO (2ND FLR);  Service: Endoscopy;  Laterality: N/A;    COLONOSCOPY  3/24/2018    COLONOSCOPY N/A 7/6/2018    Procedure: COLONOSCOPY;  Surgeon: Andre Uribe MD;  Location: [facility] ENDO (4TH FLR);  Service: Endoscopy;  Laterality: N/A;    COLONOSCOPY N/A 10/7/2021    Procedure: COLONOSCOPY;  Surgeon: Jose Hughes MD;  Location: [facility] ENDO;  Service: Endoscopy;  Laterality: N/A;    ESOPHAGOGASTRODUODENOSCOPY N/A 10/7/2021    Procedure: EGD (ESOPHAGOGASTRODUODENOSCOPY);  Surgeon: Jose Hughes MD;  Location: [facility];  Service: Endoscopy;  Laterality: N/A;    LAPAROSCOPIC CLOSURE OF COLOSTOMY N/A 8/29/2018    Procedure: CLOSURE, COLOSTOMY, LAPAROSCOPIC;  Surgeon: Andre Uribe MD;  Location: [facility] OR 2ND FLR;  Service: Colon and Rectal;  Laterality: N/A;  converted to open    LYSIS OF ADHESIONS N/A 1/20/2022    Procedure: LYSIS, ADHESIONS;  Surgeon: LLUVIA Post MD;  Location: [facility] OR 2ND FLR;  Service: Colon and Rectal;  Laterality: N/A;  lasting longer than 2 hours, modifier 22      rectovaginal fistula repair  01/2018    SMALL INTESTINE SURGERY  1995    per patient 10 inches removed    SMALL INTESTINE SURGERY  02/2009    abdominal abscess drained, 3 cm colon removed, 11 cm SB removed    TUBAL LIGATION      UPPER GASTROINTESTINAL ENDOSCOPY  2000       Family History:   Family History   Problem Relation Age of Onset    Cancer Maternal Aunt 66        colon ca    Heart attack Father 69    Anesthesia problems Neg Hx     Celiac disease Neg Hx     Cirrhosis Neg Hx     Colon cancer Neg Hx     Colon polyps Neg Hx     Crohn's disease Neg Hx     Cystic fibrosis Neg Hx     Esophageal cancer Neg Hx     Hemochromatosis Neg Hx     Inflammatory bowel disease Neg Hx     Irritable bowel syndrome Neg Hx     Liver cancer Neg Hx     Liver disease Neg Hx     Rectal cancer Neg Hx     Stomach cancer Neg Hx     Ulcerative  colitis Neg Hx     Mars's disease Neg Hx     Lymphoma Neg Hx     Tuberculosis Neg Hx     Scleroderma Neg Hx     Rheum arthritis Neg Hx     Multiple sclerosis Neg Hx     Melanoma Neg Hx     Lupus Neg Hx     Psoriasis Neg Hx     Skin cancer Neg Hx        Social History:   Social History     Tobacco Use    Smoking status: Never Smoker    Smokeless tobacco: Never Used   Substance Use Topics    Alcohol use: No     Alcohol/week: 0.0 standard drinks    Drug use: No       Allergies: Reviewed    Home Medications:   Medication List with Changes/Refills   New Medications    ERGOCALCIFEROL (ERGOCALCIFEROL) 50,000 UNIT CAP    Take 1 capsule (50,000 Units total) by mouth every 7 days. for 12 doses   Current Medications    CALCIUM CARBONATE (TUMS ORAL)    Take 1 tablet by mouth as needed (acid reflux).    HYDROCHLOROTHIAZIDE (MICROZIDE) 12.5 MG CAPSULE    Take 12.5 mg by mouth once daily.    HYDROCODONE-ACETAMINOPHEN (NORCO)  MG PER TABLET    Take 1 tablet by mouth every 6 (six) hours as needed.    LEVETIRACETAM (KEPPRA) 500 MG TAB    Take 1 tablet (500 mg total) by mouth 2 (two) times daily.    LOPERAMIDE (IMODIUM) 2 MG CAPSULE    Take 2 capsules (4 mg total) by mouth 4 (four) times daily.    OYSTER SHELL CALCIUM 500 500 MG CALCIUM (1,250 MG) TABLET    Take 2 tablets by mouth 2 (two) times daily.    PANTOPRAZOLE (PROTONIX) 40 MG TABLET    Take 40 mg by mouth once daily.    POTASSIUM CHLORIDE SA (K-DUR,KLOR-CON) 20 MEQ TABLET    Take 40 mEq by mouth 2 (two) times daily.    USTEKINUMAB (STELARA) 90 MG/ML SYRG SYRINGE    Inject 1 mL (90 mg total) into the skin every 8 weeks.    VENLAFAXINE (EFFEXOR) 75 MG TABLET    Take 75 mg by mouth 2 (two) times daily.   Discontinued Medications    MAGNESIUM CHLORIDE (MAG 64) 64 MG TBEC    Take 2 tablets (128 mg total) by mouth once daily.       Physical Exam:  Vital Signs:  /75   Pulse 101   Temp 97.6 °F (36.4 °C)   Wt 50.4 kg (111 lb 1.8 oz)   LMP 05/30/2009    SpO2 98%   BMI 16.89 kg/m²   Body mass index is 16.89 kg/m².    Physical Exam  Vitals and nursing note reviewed.   Constitutional:       General: She is not in acute distress.     Appearance: Normal appearance. She is well-developed. She is not ill-appearing or toxic-appearing.   Eyes:      Conjunctiva/sclera: Conjunctivae normal.      Pupils: Pupils are equal, round, and reactive to light.   Neck:      Thyroid: No thyromegaly.   Cardiovascular:      Rate and Rhythm: Normal rate and regular rhythm.      Heart sounds: Normal heart sounds. No murmur heard.  Pulmonary:      Effort: Pulmonary effort is normal.      Breath sounds: Normal breath sounds. No wheezing or rales.   Abdominal:      General: Bowel sounds are normal. There is no distension.      Palpations: Abdomen is soft. There is no mass.      Tenderness: There is no abdominal tenderness.      Comments: Incision site assessed   Musculoskeletal:         General: No tenderness. Normal range of motion.   Lymphadenopathy:      Cervical: No cervical adenopathy.   Skin:     Findings: No erythema or rash.   Neurological:      General: No focal deficit present.      Mental Status: She is alert and oriented to person, place, and time.   Psychiatric:         Mood and Affect: Mood normal.         Behavior: Behavior normal.         Thought Content: Thought content normal.         Judgment: Judgment normal.         Labs: reviewed and pertinent noted above    Assessment/Plan:  Carline Chang is a 56 y.o. female with complicated fistulizing/stricturing Crohn's disease. The following issues were addresssed:    1. Crohn's disease of both small and large intestine with fistula    2. Abnormal liver enzymes    3. Hypomagnesemia      1. Crohn's disease:  Overall she is doing pretty well.  Her symptoms have been fairly well controlled and she is no longer on prednisone.  She did not start Stelara yesterday as planned so we will work on trying to get this rescheduled for  her in the next week or so and confirm that her injections are approved as it appears that they were based on the telephone notes.    2. Abnormal liver enzymes:  Her liver enzymes in April were abnormal.  More recently they were normal other than her alkaline phosphatase which has been improving.  We will continue to follow this and may need to consider some further imaging in the future.    3. Short bowel syndrome:  She never started Gattex.  She is doing fairly well and not having significant diarrhea or volume related issues.  Her albumin has been 3.5 most recently.  I do not know that she really needs start this right now.  I think we would be better off getting her on treatment for Crohn's disease 1st.    4. Hypomagnesemia:  I recommend that she discuss this with her primary care physician.  With only having 3 bowel movements on most days it certainly does not explain the severely low magnesium levels that she has had.  This is most likely caused by her hydrochlorothiazide and would likely warrant switching to a different blood pressure medication that is not a diuretic.  Otherwise may need to evaluate for renal losses of magnesium through other means.           # IBD specific health maintenance:  Colon cancer surveillance: UTD    Annual:  - Eye exam: N/A  - Skin exam (if on IMM/TNF): Discuss in the future  - reminded pt to use sunblock/hats/sunprotective clothing  - PAP (if immunosuppressed): Discuss in the future    DEXA: Schedule in the future    Vitamin D: Check when possible    Vaccines:    Influenza: Recommend vaccination   Pneumovax:     PCV13: UTD    PSV23: UTD   HAV: Check serology in future   HBV: Check serology in future   Tdap: UTD   MMR: Check serologies in the future   VZV: Immune   HZV: Discuss in the future   HPV: N/A   Meningococcus: N/A   COVID: Discuss in the future    Follow up: Follow up in about 3 months (around 7/21/2022).    Thank you again for sending Carline Chang to see  Dr. Gunner Velasquez today at the [facility]. Please don't hesitate to contact Dr. Velasquez if there are any questions regarding this evaluation, or if you have any other patients with inflammatory bowel disease for whom you would like a consultation. You can reach Dr. Velasquez at [phone] or by email at [email or web address]    Cholo Velasquez MD  Department of Gastroenterology  Inflammatory Bowel Disease

## 2022-04-21 NOTE — PATIENT INSTRUCTIONS
Start vitamin D 64383 IU once a week  Continue calcium  Start Stelara as soon as possible  Discuss possibly changing hydrochlorothiazide to another BP treatment due to low magnesium  Follow up in 3 months

## 2022-04-29 ENCOUNTER — PATIENT MESSAGE (OUTPATIENT)
Dept: GASTROENTEROLOGY | Facility: CLINIC | Age: 57
End: 2022-04-29
Payer: MEDICARE

## 2022-05-23 ENCOUNTER — PATIENT MESSAGE (OUTPATIENT)
Dept: GASTROENTEROLOGY | Facility: CLINIC | Age: 57
End: 2022-05-23
Payer: MEDICARE

## 2022-05-23 ENCOUNTER — PATIENT MESSAGE (OUTPATIENT)
Dept: PHARMACY | Facility: CLINIC | Age: 57
End: 2022-05-23
Payer: MEDICARE

## 2022-05-23 NOTE — TELEPHONE ENCOUNTER
Outgoing call to patient: asking patient to see if she would like to start PAP, can we email or mail her PAP form. We have provider portion already.    Also Mycharted patient,    I will continue to follow up.

## 2022-05-25 ENCOUNTER — TELEPHONE (OUTPATIENT)
Dept: GASTROENTEROLOGY | Facility: CLINIC | Age: 57
End: 2022-05-25
Payer: MEDICARE

## 2022-06-07 NOTE — TELEPHONE ENCOUNTER
Outgoing call to patient: KAYCEE, calling to follow up with patient's PAP , patient really needs to complete forms , the infusion will not be initiated until her home injection is financially secured.     I will continue to follow up.

## 2022-06-23 ENCOUNTER — OFFICE VISIT (OUTPATIENT)
Dept: OBSTETRICS AND GYNECOLOGY | Facility: CLINIC | Age: 57
End: 2022-06-23
Payer: MEDICARE

## 2022-06-23 VITALS
SYSTOLIC BLOOD PRESSURE: 118 MMHG | BODY MASS INDEX: 17.24 KG/M2 | HEIGHT: 68 IN | WEIGHT: 113.75 LBS | DIASTOLIC BLOOD PRESSURE: 82 MMHG

## 2022-06-23 DIAGNOSIS — N76.4 VULVAR ABSCESS: Primary | ICD-10-CM

## 2022-06-23 DIAGNOSIS — K50.812 CROHN'S DISEASE OF BOTH SMALL AND LARGE INTESTINE WITH INTESTINAL OBSTRUCTION: ICD-10-CM

## 2022-06-23 DIAGNOSIS — Z87.410 HISTORY OF CERVICAL DYSPLASIA: ICD-10-CM

## 2022-06-23 PROCEDURE — 99999 PR PBB SHADOW E&M-EST. PATIENT-LVL II: ICD-10-PCS | Mod: PBBFAC,,, | Performed by: OBSTETRICS & GYNECOLOGY

## 2022-06-23 PROCEDURE — 56405 I&D VULVA/PERINEAL ABSCESS: CPT | Mod: S$PBB,,, | Performed by: OBSTETRICS & GYNECOLOGY

## 2022-06-23 PROCEDURE — 99204 PR OFFICE/OUTPT VISIT, NEW, LEVL IV, 45-59 MIN: ICD-10-PCS | Mod: 25,S$PBB,, | Performed by: OBSTETRICS & GYNECOLOGY

## 2022-06-23 PROCEDURE — 87186 SC STD MICRODIL/AGAR DIL: CPT | Performed by: OBSTETRICS & GYNECOLOGY

## 2022-06-23 PROCEDURE — 87070 CULTURE OTHR SPECIMN AEROBIC: CPT | Performed by: OBSTETRICS & GYNECOLOGY

## 2022-06-23 PROCEDURE — 87077 CULTURE AEROBIC IDENTIFY: CPT | Performed by: OBSTETRICS & GYNECOLOGY

## 2022-06-23 PROCEDURE — 56405 PR I&D OF VULVA/PERINEUM ABSCESS: ICD-10-PCS | Mod: S$PBB,,, | Performed by: OBSTETRICS & GYNECOLOGY

## 2022-06-23 PROCEDURE — 56405 I&D VULVA/PERINEAL ABSCESS: CPT | Mod: PBBFAC,PO | Performed by: OBSTETRICS & GYNECOLOGY

## 2022-06-23 PROCEDURE — 99212 OFFICE O/P EST SF 10 MIN: CPT | Mod: PBBFAC,PO,25 | Performed by: OBSTETRICS & GYNECOLOGY

## 2022-06-23 PROCEDURE — 99204 OFFICE O/P NEW MOD 45 MIN: CPT | Mod: 25,S$PBB,, | Performed by: OBSTETRICS & GYNECOLOGY

## 2022-06-23 PROCEDURE — 87075 CULTR BACTERIA EXCEPT BLOOD: CPT | Performed by: OBSTETRICS & GYNECOLOGY

## 2022-06-23 PROCEDURE — 99999 PR PBB SHADOW E&M-EST. PATIENT-LVL II: CPT | Mod: PBBFAC,,, | Performed by: OBSTETRICS & GYNECOLOGY

## 2022-06-23 RX ORDER — SULFAMETHOXAZOLE AND TRIMETHOPRIM 400; 80 MG/1; MG/1
1 TABLET ORAL 2 TIMES DAILY
Qty: 20 TABLET | Refills: 0 | Status: SHIPPED | OUTPATIENT
Start: 2022-06-23 | End: 2022-07-03

## 2022-06-23 RX ORDER — LIDOCAINE HYDROCHLORIDE 20 MG/ML
3 INJECTION, SOLUTION EPIDURAL; INFILTRATION; INTRACAUDAL; PERINEURAL
Status: COMPLETED | OUTPATIENT
Start: 2022-06-23 | End: 2022-06-23

## 2022-06-23 RX ORDER — SPIRONOLACTONE 25 MG/1
25 TABLET ORAL DAILY
COMMUNITY
Start: 2022-04-29 | End: 2022-08-20

## 2022-06-23 RX ADMIN — LIDOCAINE HYDROCHLORIDE 60 MG: 20 INJECTION, SOLUTION EPIDURAL; INFILTRATION; INTRACAUDAL; PERINEURAL at 04:06

## 2022-06-23 NOTE — PROGRESS NOTES
Subjective:   Chief Complaint:  Gynecologic Exam (Vaginal cyst on left side with serosanguineous drainage, painful x 5-6 days)       Patient ID: Carline Chang is a  56 y.o. female.    Contraception: BTL    2022     She presents today due to the following:    The patient has a long history of GI issues related to longstanding Crohn's disease.  She presents today due to with 6 day history of a painful left outer cyst.  She reports the cyst began draining yesterday.    From a gyn standpoint she does report a history of moderate dysplasia in  and is status post LEEP conization.  Pap smear since that time have been within normal limits.  She has not had a Pap smear for approximately 4-5 years.  She has been on biologics due to Crohn's disease in the past.    GYN & OB History  Patient's last menstrual period was 2009.     Date of Last Pap: No result found    OB History    Para Term  AB Living   2 2 2     2   SAB IAB Ectopic Multiple Live Births                  # Outcome Date GA Lbr Getachew/2nd Weight Sex Delivery Anes PTL Lv   2 Term            1 Term               Obstetric Comments    section x 2         Past Medical History:   Diagnosis Date    Abnormal Pap smear of cervix     LEEP procedure,     Acute deep vein thrombosis (DVT) of brachial vein of right upper extremity 2017    on RUE ultrasound    Anxiety     Bowel perforation     Chronic pain     Compression fracture of C-spine     Crohn's disease of both small and large intestine with intestinal obstruction     Difficult intravenous access     Encounter for blood transfusion     GERD (gastroesophageal reflux disease)     Kidney stones     Nephrolithiasis     Osteoporosis     Stricture of bowel         Past Surgical History:   Procedure Laterality Date    ABSCESS DRAINAGE      ANAL FISTULOTOMY  2018    BOWEL RESECTION      small bowel resection per Dr. Uribe 2018     CERVICAL BIOPSY  W/ LOOP ELECTRODE EXCISION       SECTION      x2    CHOLECYSTECTOMY  2000    COLON SURGERY  2015    sigmoid loop colostomy    COLONOSCOPY N/A 2016    Procedure: COLONOSCOPY;  Surgeon: Andre Uribe MD;  Location: [facility] ENDO (4TH FLR);  Service: Endoscopy;  Laterality: N/A;    COLONOSCOPY N/A 2017    Procedure: COLONOSCOPY;  Surgeon: Dany Stock MD;  Location: [facility] ENDO (2ND FLR);  Service: Endoscopy;  Laterality: N/A;    COLONOSCOPY N/A 2017    Procedure: COLONOSCOPY;  Surgeon: Andre Uribe MD;  Location: [facility] ENDO (4TH FLR);  Service: Endoscopy;  Laterality: N/A;    COLONOSCOPY N/A 2018    Procedure: COLONOSCOPY;  Surgeon: Andre Uribe MD;  Location: [facility] ENDO (4TH FLR);  Service: Endoscopy;  Laterality: N/A;    COLONOSCOPY N/A 3/24/2018    Procedure: COLONOSCOPY;  Surgeon: Elmer Serrato MD;  Location: [facility] ENDO (2ND FLR);  Service: Endoscopy;  Laterality: N/A;    COLONOSCOPY  3/24/2018    COLONOSCOPY N/A 2018    Procedure: COLONOSCOPY;  Surgeon: Andre Uribe MD;  Location: [facility] ENDO (4TH FLR);  Service: Endoscopy;  Laterality: N/A;    COLONOSCOPY N/A 10/7/2021    Procedure: COLONOSCOPY;  Surgeon: Jose Hughes MD;  Location[facility] ENDO;  Service: Endoscopy;  Laterality: N/A;    ESOPHAGOGASTRODUODENOSCOPY N/A 10/7/2021    Procedure: EGD (ESOPHAGOGASTRODUODENOSCOPY);  Surgeon: Jose Hughes MD;  Location: [facility];  Service: Endoscopy;  Laterality: N/A;    LAPAROSCOPIC CLOSURE OF COLOSTOMY N/A 2018    Procedure: CLOSURE, COLOSTOMY, LAPAROSCOPIC;  Surgeon: Andre Uribe MD;  Location: [facility] OR 2ND FLR;  Service: Colon and Rectal;  Laterality: N/A;  converted to open    LYSIS OF ADHESIONS N/A 2022    Procedure: LYSIS, ADHESIONS;  Surgeon: LLUVIA Post MD;  Location: [facility] OR 2ND FLR;  Service: Colon and Rectal;  Laterality: N/A;  lasting longer than 2 hours, modifier 22      rectovaginal fistula repair   01/2018    SMALL INTESTINE SURGERY  1995    per patient 10 inches removed    SMALL INTESTINE SURGERY  02/2009    abdominal abscess drained, 3 cm colon removed, 11 cm SB removed    TUBAL LIGATION      UPPER GASTROINTESTINAL ENDOSCOPY  2000        Review of Systems  Review of Systems   Constitutional: Negative for fever and unexpected weight change.   HENT: Negative.    Respiratory: Negative for cough and shortness of breath.    Cardiovascular: Negative for chest pain.   Gastrointestinal:        Longstanding multiple GI issues secondary to history of Crohn's disease.   Genitourinary: Negative for dysuria and urgency.          Gyn as per HPI   Musculoskeletal: Negative for myalgias.   Integumentary:  Negative for rash.   Neurological: Negative.  Negative for headaches.   Breast: negative.         Objective:      Vitals:    06/23/22 1550   BP: 118/82     Physical Exam:   Constitutional: She appears well-developed and well-nourished.    HENT:   Head: Normocephalic.     Neck: No thyroid mass present.    Cardiovascular: Normal rate, regular rhythm and normal heart sounds.     Pulmonary/Chest: Effort normal and breath sounds normal.            Abdominal: Soft. There is no abdominal tenderness.     Genitourinary:    Inguinal canal, vagina, uterus, right adnexa and left adnexa normal.            Pelvic exam was performed with patient supine.   External genitalia lesions present. There is tenderness and lesion on the left labia. Cervix is normal. Right adnexum displays no mass and no tenderness. Left adnexum displays no mass and no tenderness. No tenderness or bleeding in the vagina. Uterus is not tender. Normal urethral meatus.Urethra findings: no tendernessBladder findings: no bladder tenderness          Musculoskeletal:      Right lower leg: No edema.      Left lower leg: No edema.      Lymphadenopathy:     She has no cervical adenopathy. No inguinal adenopathy noted on the right or left side.    Neurological: She is  alert.    Skin: Skin is warm and dry.    Psychiatric: Mood normal.       Procedure Note:  Incision and drainage of vulvar abscess    06/23/2022     Prior to the procedure the findings on physical exam as well as the pros cons risks benefits alternatives indications of incision and drainage of vulvar abscess were reviewed and discussed with the patient.  The patient's questions were answered and she is in agreement with the current plan.    ANESTHESIA:  3 cc of 2% plain lidocaine.    ESTIMATED BLOOD LOSS:  Minimal.     FINDINGS:  As per physical exam    PROCEDURE:     The patient was in the dorsal lithotomy position on the exam table.  The area of concern was cleaned with Betadine.  Lidocaine as above was injected into the area of concern.    Using a # 11 scalpel an incision was made into the body of the abscess.  Approximately 2 cc of purulent drainage was noted.    A culture was obtained.    Hemostasis was obtained with direct pressure.    The patient tolerated the procedure without difficulty.    Complications:  None       Assessment:        1. Vulvar abscess    2. Crohn's disease of both small and large intestine with intestinal obstruction    3. History of cervical dysplasia         Plan:      Vulvar abscess  -     sulfamethoxazole-trimethoprim 400-80mg (BACTRIM) 400-80 mg per tablet; Take 1 tablet by mouth 2 (two) times daily. for 10 days  Dispense: 20 tablet; Refill: 0  -     LIDOcaine (PF) 20 mg/ml (2%) injection 60 mg  -     Aerobic culture  -     Culture, Anaerobic    Crohn's disease of both small and large intestine with intestinal obstruction    History of cervical dysplasia         Follow up in about 1 week (around 6/30/2022).     The above was reviewed discussed with the patient.    We discussed the findings on physical exam of a left vulvar abscess.  The patient tolerated the incision and drainage of the abscess without difficulty.  Home care of the I and D site was discussed.    In light of the  surrounding cellulitis will initiate therapy with oral Bactrim.  The pros, cons, risks, benefits, alternatives and indications of the medication(s) prescribed, as well as appropriate use and potential side effects were discussed.    We discussed the patient's history of dysplasia as well as the increased risk when a immunosuppressive medications have been used.  Will plan for a repeat Pap smear in the near future.    The patient's questions regarding the above were answered and she is in agreement with this plan.

## 2022-06-27 LAB — BACTERIA SPEC AEROBE CULT: ABNORMAL

## 2022-06-28 LAB — BACTERIA SPEC ANAEROBE CULT: NORMAL

## 2022-06-30 ENCOUNTER — PATIENT MESSAGE (OUTPATIENT)
Dept: SPINE | Facility: CLINIC | Age: 57
End: 2022-06-30
Payer: MEDICARE

## 2022-07-05 ENCOUNTER — OFFICE VISIT (OUTPATIENT)
Dept: FAMILY MEDICINE | Facility: CLINIC | Age: 57
End: 2022-07-05
Payer: MEDICARE

## 2022-07-05 VITALS
DIASTOLIC BLOOD PRESSURE: 72 MMHG | WEIGHT: 110 LBS | HEIGHT: 68 IN | SYSTOLIC BLOOD PRESSURE: 116 MMHG | TEMPERATURE: 99 F | BODY MASS INDEX: 16.67 KG/M2 | HEART RATE: 100 BPM | OXYGEN SATURATION: 97 %

## 2022-07-05 DIAGNOSIS — K50.118 CROHN'S DISEASE OF COLON WITH OTHER COMPLICATION: Primary | ICD-10-CM

## 2022-07-05 DIAGNOSIS — M81.8 STEROID-INDUCED OSTEOPOROSIS: ICD-10-CM

## 2022-07-05 DIAGNOSIS — Z78.9 DIFFICULT INTRAVENOUS ACCESS: ICD-10-CM

## 2022-07-05 DIAGNOSIS — R11.0 NAUSEA IN ADULT: ICD-10-CM

## 2022-07-05 DIAGNOSIS — E53.8 B12 DEFICIENCY: ICD-10-CM

## 2022-07-05 DIAGNOSIS — E83.51 HYPOCALCEMIA: ICD-10-CM

## 2022-07-05 DIAGNOSIS — Z95.828 PRESENCE OF PERMANENT CENTRAL VENOUS CATHETER: ICD-10-CM

## 2022-07-05 DIAGNOSIS — G89.4 CHRONIC PAIN DISORDER: ICD-10-CM

## 2022-07-05 DIAGNOSIS — E83.42 HYPOMAGNESEMIA: ICD-10-CM

## 2022-07-05 DIAGNOSIS — D64.9 ANEMIA, UNSPECIFIED TYPE: ICD-10-CM

## 2022-07-05 DIAGNOSIS — E88.09 HYPOALBUMINEMIA: ICD-10-CM

## 2022-07-05 DIAGNOSIS — T38.0X5A STEROID-INDUCED OSTEOPOROSIS: ICD-10-CM

## 2022-07-05 PROCEDURE — 99205 OFFICE O/P NEW HI 60 MIN: CPT | Mod: S$GLB,,, | Performed by: INTERNAL MEDICINE

## 2022-07-05 PROCEDURE — 99205 PR OFFICE/OUTPT VISIT, NEW, LEVL V, 60-74 MIN: ICD-10-PCS | Mod: S$GLB,,, | Performed by: INTERNAL MEDICINE

## 2022-07-05 RX ORDER — PROMETHAZINE HYDROCHLORIDE 25 MG/1
25 SUPPOSITORY RECTAL EVERY 6 HOURS PRN
COMMUNITY
End: 2022-07-29

## 2022-07-05 RX ORDER — HYDROCODONE BITARTRATE AND ACETAMINOPHEN 10; 325 MG/1; MG/1
1 TABLET ORAL EVERY 6 HOURS PRN
Qty: 30 TABLET | Refills: 0 | Status: SHIPPED | OUTPATIENT
Start: 2022-07-05 | End: 2022-07-29

## 2022-07-05 RX ORDER — PROMETHAZINE HYDROCHLORIDE 25 MG/1
25 TABLET ORAL EVERY 4 HOURS
Qty: 30 TABLET | Refills: 1 | Status: SHIPPED | OUTPATIENT
Start: 2022-07-05

## 2022-07-05 NOTE — PROGRESS NOTES
Subjective:       Patient ID: Carline Chang is a 56 y.o. female.    Chief Complaint: Crohn's Disease (Pt reports having severe Crohn's disease and short gut syndrome resulting in hospital stays/admit. Last hospital visit was June 9th; Primary Dx: Hypomagnesia and Hypocalcemia and Leukocytosis. Suffered from seizures in January and has prior history of steroid use due to low electrolytes. PT sees Dr. Velasquez for Gastroenterology; Dr. Post she sees as well. ), Establish Care, Fall (PT fell 3 times at home due to pain in legs that eventually makes legs go out; is concerned on balance and joint issues. Right leg and knee has bruising. PT can not bear weight on extremeities), Medication Concern (PT states she is concerned if she should be taking Aldacatone. Please advise. Requesting Immodium to take. ), PES - Care Gap (Pt is up to date on bloodwork; except for Magnesium. ), and Ovarian Cyst (Gynecologic Exam (Vaginal cyst on left side with serosanguineous drainage, painful x 5-6 days))    As per the chief complaint.  Patient has a very complex medical history which mainly at this time revolves around her anemia with vitamin B12 deficiency because of poor compliance to therapy call patient said that she was never told that she had to take the B12 supplementation for life.  She is under the care of Hematology by Dr. Tejada and actually appears to have been lost to follow-up.    A Port-A-Cath has been placed into the left upper chest because of difficult venous access from multiple vena punctures.  Locally we were able to obtain a sample today peripherally for follow-up of albumin calcium iron TIBC B12 and magnesium levels.    She is currently under the care of Dr. Velasquez at [facility] from the gastroenterology standpoint and her primary colorectal surgeon was Dr. Post at the same facility.     Patient mentions that she has multiple admissions due to severe electrolyte disturbances related to her  short gut syndrome.  Usually because of severe hypokalemia, hypomagnesemia and per her own words she has been told before that this is never going to be fix because of her short gut syndrome.    She is a failure of Humira  therapy and that is the is a reason why she ended up on many years of prednisone therapy that have cause now the steroid related osteoporosis.  Patient mention that they are supposed to be trying to place her on Stelara therapy.  Her T-score was -3.1 at the time of the bone densitometry on April of 2022 and since then she has received Reclast infusion which will be yearly because of the severity of her condition.  A routine will be for her to receive for 5 years in a row and then off for 2.     On further questioning patient mentions she has never been seen by a nephrologist or endocrinologist pertaining to all these electrolyte disturbances.  Being that this is going on for years and multiple specialties and practitioners have had a try and being able to resolve same not sure that I will be either on I would rather she continues that aspect of her care on her primary gastroenterologist.    For management of her chronic pain hydrocodone will be refilled today number 30 tablets of the 10 mg dose and also 30 tablets of the Phenergan 25 mg dose because of the chronic nausea.    Prescription monitoring program was reviewed and no discrepancies were found.  Patient had a refill for Tylenol No.  3 after the Port-A-Cath placement by .  Her primary provider that was caring for her pain management has since passed away and I have explained to the patient that I will not be managing her chronic pain.    I Have recommended for her to resume the spironolactone 25 mg a day that she was on to try and help with her chronic hypokalemia.          This report was signed by Malachi Witt MD on 4/8/2022 3:33 PM on the following workstation: [id number].     HISTORY: Age-related osteoporosis without  current pathological fracture     COMPARISON: None available     LUMBAR SPINE:   Bone mineral density in the lumbar spine from L1 through L4 is 0.807 gm/cm2.     The T-score is -3.1  (standard deviations of Young Adult mean).   The Z-score is -2.3  (standard deviations of Age Matched mean).     The WHO classification is considered osteoporotic, with risk of insufficiency spinal fracture increased when compared to Young Adults based on T-score.     LEFT HIP:   Bone mineral density total proximal left femur is 0.685 gm/cm2.     The T-score is -2.6  (standard deviations of Young Adult mean).   The Z-score is -1.9  (standard deviations of Age Matched mean).     RIGHT HIP:   Bone mineral density total proximal right femur is 0.642 gm/cm2.     The T-score is -2.9 (standard deviations of Young Adult mean).   The Z-score is -2.3 (standard deviations of Age Matched mean).     The WHO classification is considered osteoporotic, with risk of insufficiency hip fracture increased when compared to Young Adults based on T-score.          Review of Systems   All other systems reviewed and are negative.        Objective:      Physical Exam  Vitals and nursing note reviewed. Exam conducted with a chaperone present.   Constitutional:       General: She is not in acute distress.     Appearance: Normal appearance. She is not ill-appearing, toxic-appearing or diaphoretic.   HENT:      Head: Normocephalic and atraumatic.   Cardiovascular:      Rate and Rhythm: Normal rate and regular rhythm.      Pulses: Normal pulses.      Heart sounds: Normal heart sounds.   Pulmonary:      Effort: Pulmonary effort is normal.      Breath sounds: Normal breath sounds.   Musculoskeletal:      Right lower leg: No edema.      Left lower leg: No edema.   Skin:     General: Skin is warm and dry.      Capillary Refill: Capillary refill takes less than 2 seconds.      Coloration: Skin is pale. Skin is not jaundiced.      Findings: Bruising present.       "Comments: Slight bruise to the left knee laterally which she had the last bump to her leg.  Patient denies:  "Passing out.  She was able to ambulate in and out of the office without assistance.   Neurological:      General: No focal deficit present.      Mental Status: She is alert and oriented to person, place, and time. Mental status is at baseline.   Psychiatric:         Behavior: Behavior normal.         Thought Content: Thought content normal.         Judgment: Judgment normal.         Assessment:       Problem List Items Addressed This Visit        Cardiac/Vascular    Difficult intravenous access       GI    Crohn's colitis - Primary      Other Visit Diagnoses     Anemia, unspecified type        Relevant Orders    Iron and TIBC    B12 deficiency        Relevant Orders    Vitamin B12    Folate    Hypoalbuminemia        Relevant Orders    Albumin    Hypocalcemia        Relevant Orders    Calcium    Hypomagnesemia        Relevant Orders    Magnesium    Steroid-induced osteoporosis        Presence of permanent central venous catheter        Chronic pain disorder        Relevant Medications    HYDROcodone-acetaminophen (NORCO)  mg per tablet    Nausea in adult        Relevant Medications    promethazine (PHENERGAN) 25 MG tablet          Plan:       As per the HPI patient has a very complicated clinical picture mainly related to the post operative complications because of her severe Crohn's colitis that failed to respond to long-term steroids and Humira.  She was told that she is experiencing severe short gut syndrome as the reason for the major electrolyte disturbances that she has every time that she has lab work done.  It is not very practical for the patient, in fact, she does not prefer the testing being that every time she gets lab work done panic values are identified like on her calcium magnesium or potassium that would warrant admission to the hospital.  She has been on calcium supplements, Aldactone " 25 mg every day, vitamin-D supplementation, but has not been on the B12 that have been recommended before.  At the time of her last B12 level the value was less than 150 with low normal 232.    Patient is to resume the spironolactone 25 mg every day.    I reviewed the lab work from June 23rd at which time she was on the hospital and have decided on the lab work to be repeated today based on same.    Left upper chest Port Placement site is healing well.  I mention to the patient that will not be able to access the port for blood drawings etc.  here that that has to be performed at the hospital outpatient department by properly trend personal with the proper equipment which we do not have.      On review of the chart I have agreed to provide her with 30 doses of the Phenergan 25 mg for chronic nausea and hydrocodone 10 mg Number 30 no refill  for chronic pain.  I mention to the patient that I will not be managing her pain chronically.  No narcotics agreement was obtained nor a toxicology screen.  Prescription monitoring program shows no discrepancies.    I believe that she would be better served solely under the care of her gastroenterologist and maybe endocrinology and Nephrology related to these chronic electrolyte disturbances.     Regarding the care gaps patient is not interested in mammography, COVID vaccine, shingles vaccine, or catching up on her regular gynecological care and mammography.    Over one hour spent reviewing her chart and labs, in direct pt contact and decision making regarding her care from here on.    Pt is well aware of the signs and symptoms to watch for and to seek medical attention in case these appear

## 2022-07-06 ENCOUNTER — TELEPHONE (OUTPATIENT)
Dept: FAMILY MEDICINE | Facility: CLINIC | Age: 57
End: 2022-07-06
Payer: MEDICARE

## 2022-07-06 NOTE — TELEPHONE ENCOUNTER
[facility] has been unable to contact patient regarding completing her PAP application for Stelara from [facility]. Patient also has not rescheduled her IV infusion dose, nor has been been in contact with [facility] or the clinic. Messaged GI clinic who agreed that at this time [facility] can close patient's referral du to no contact. Clinic will resend order later once patient ready to start therapy.

## 2022-07-06 NOTE — TELEPHONE ENCOUNTER
Called and left voicemail message for patient requesting call back to office at [phone]. JESSICA Phillips.

## 2022-07-07 ENCOUNTER — TELEPHONE (OUTPATIENT)
Dept: FAMILY MEDICINE | Facility: CLINIC | Age: 57
End: 2022-07-07
Payer: MEDICARE

## 2022-07-07 NOTE — TELEPHONE ENCOUNTER
Called left voicemail message requesting call back to Dr. Campos office at  [phone]. JESSICA Phillips

## 2022-07-07 NOTE — TELEPHONE ENCOUNTER
Called patient on her mother's cell phone. Spoke to patient to report per Dr. Campos that she needs to take one Tums three times a day by mouth and to take one  Magnesium Oxide 400mg twice day by mouth. Patient verbalized understanding of OTC medications. Patient reports that she has seen her lab results. Per Dr. Campos, strongly encouraged patient that she needs to follow up with Dr. Velasquez as soon as possible. Patient verbalized understanding. Instructed to call with any questions or concerns. JESSICA Phillips

## 2022-07-20 DIAGNOSIS — M51.34 DDD (DEGENERATIVE DISC DISEASE), THORACIC: ICD-10-CM

## 2022-07-20 DIAGNOSIS — M51.36 DDD (DEGENERATIVE DISC DISEASE), LUMBAR: Primary | ICD-10-CM

## 2022-07-21 ENCOUNTER — HOSPITAL ENCOUNTER (OUTPATIENT)
Dept: RADIOLOGY | Facility: HOSPITAL | Age: 57
Discharge: HOME | End: 2022-07-21
Attending: ORTHOPAEDIC SURGERY
Payer: MEDICARE

## 2022-07-21 ENCOUNTER — OFFICE VISIT (OUTPATIENT)
Dept: ORTHOPEDICS | Facility: CLINIC | Age: 57
End: 2022-07-21
Payer: MEDICARE

## 2022-07-21 ENCOUNTER — TELEPHONE (OUTPATIENT)
Dept: GASTROENTEROLOGY | Facility: CLINIC | Age: 57
End: 2022-07-21
Payer: MEDICARE

## 2022-07-21 VITALS — WEIGHT: 111.75 LBS | HEIGHT: 65 IN | BODY MASS INDEX: 18.62 KG/M2

## 2022-07-21 DIAGNOSIS — M51.34 DDD (DEGENERATIVE DISC DISEASE), THORACIC: Primary | ICD-10-CM

## 2022-07-21 DIAGNOSIS — M51.34 DDD (DEGENERATIVE DISC DISEASE), THORACIC: ICD-10-CM

## 2022-07-21 DIAGNOSIS — M51.36 DDD (DEGENERATIVE DISC DISEASE), LUMBAR: ICD-10-CM

## 2022-07-21 DIAGNOSIS — S32.030A CLOSED COMPRESSION FRACTURE OF L3 LUMBAR VERTEBRA, INITIAL ENCOUNTER: Primary | ICD-10-CM

## 2022-07-21 DIAGNOSIS — S22.080A COMPRESSION FRACTURE OF T12 VERTEBRA, INITIAL ENCOUNTER: ICD-10-CM

## 2022-07-21 PROCEDURE — 72070 X-RAY EXAM THORAC SPINE 2VWS: CPT | Mod: 26,,, | Performed by: RADIOLOGY

## 2022-07-21 PROCEDURE — 99204 OFFICE O/P NEW MOD 45 MIN: CPT | Mod: S$PBB,GC,, | Performed by: ORTHOPAEDIC SURGERY

## 2022-07-21 PROCEDURE — 72110 X-RAY EXAM L-2 SPINE 4/>VWS: CPT | Mod: 26,,, | Performed by: RADIOLOGY

## 2022-07-21 PROCEDURE — 99204 PR OFFICE/OUTPT VISIT, NEW, LEVL IV, 45-59 MIN: ICD-10-PCS | Mod: S$PBB,GC,, | Performed by: ORTHOPAEDIC SURGERY

## 2022-07-21 PROCEDURE — 72070 X-RAY EXAM THORAC SPINE 2VWS: CPT | Mod: TC

## 2022-07-21 PROCEDURE — 72070 XR THORACIC SPINE AP LATERAL: ICD-10-PCS | Mod: 26,,, | Performed by: RADIOLOGY

## 2022-07-21 PROCEDURE — 72110 XR LUMBAR SPINE AP AND LAT WITH FLEX/EXT: ICD-10-PCS | Mod: 26,,, | Performed by: RADIOLOGY

## 2022-07-21 PROCEDURE — 72110 X-RAY EXAM L-2 SPINE 4/>VWS: CPT | Mod: TC

## 2022-07-21 PROCEDURE — 99214 OFFICE O/P EST MOD 30 MIN: CPT | Mod: PBBFAC | Performed by: ORTHOPAEDIC SURGERY

## 2022-07-21 PROCEDURE — 99999 PR PBB SHADOW E&M-EST. PATIENT-LVL IV: ICD-10-PCS | Mod: PBBFAC,,, | Performed by: ORTHOPAEDIC SURGERY

## 2022-07-21 PROCEDURE — 99999 PR PBB SHADOW E&M-EST. PATIENT-LVL IV: CPT | Mod: PBBFAC,,, | Performed by: ORTHOPAEDIC SURGERY

## 2022-07-21 NOTE — PROGRESS NOTES
"  DATE: 7/21/2022  PATIENT: Carline Chang    Attending Physician: Min Harden M.D.    CHIEF COMPLAINT: Thoracic back pain    HISTORY:  Carline Chang is a 56 y.o. female with hx of Crohn's (taken 30 years of steroids; she takes Norco for pain but her PCP passed away) and seizures presents for initial evaluation of low back pain (Back - 9). Pt complains of a fall 3 weeks ago and landed on her wrist. She complains of dorsal wrist pain since the fall. The pain has been present for 6 months. Pt states that she was 5'8" and now is 5'4". The patient describes the pain as tired, achey.  The pain is worse with activity and improved by lying down. There is associated numbness and tingling for 10 years in her feet after taking flagyl. Denies any new onset numbness or tingling. There is subjective weakness of left hip flexion. Prior treatments have included hydrocodone 10/325 QID, TLSO bracing which did not give relief, but no spine surgery, RAMOS, PT for back.    The Patient denies myelopathic symptoms such as handwriting changes or difficulty with buttons/coins/keys. Denies perineal paresthesias, bowel/bladder dysfunction.    The patient does not smoke, have DM or endorse IVDU. The patient is not on any blood thinners. She is disabled.    PAST MEDICAL/SURGICAL HISTORY:  Past Medical History:   Diagnosis Date    Abnormal Pap smear of cervix     LEEP procedure, 1995    Acute deep vein thrombosis (DVT) of brachial vein of right upper extremity 01/2017    on RUE ultrasound    Anxiety     Bowel perforation     Chronic pain     Compression fracture of C-spine     Crohn's disease of both small and large intestine with intestinal obstruction 1989    Difficult intravenous access     Encounter for blood transfusion     GERD (gastroesophageal reflux disease) 2014    Kidney stones     Nephrolithiasis     Osteoporosis     Stricture of bowel      Past Surgical History:   Procedure Laterality Date    " ABSCESS DRAINAGE      ANAL FISTULOTOMY      BOWEL RESECTION      small bowel resection per Dr. Uribe 2018    CERVICAL BIOPSY  W/ LOOP ELECTRODE EXCISION       SECTION      x2    CHOLECYSTECTOMY      COLON SURGERY  2015    sigmoid loop colostomy with subsequent reversal 3 years later    COLONOSCOPY N/A 2016    Procedure: COLONOSCOPY;  Surgeon: Andre Uribe MD;  Location: [facility] ENDO (4TH FLR);  Service: Endoscopy;  Laterality: N/A;    COLONOSCOPY N/A 2017    Procedure: COLONOSCOPY;  Surgeon: Dany Stock MD;  Location: [facility] ENDO (2ND FLR);  Service: Endoscopy;  Laterality: N/A;    COLONOSCOPY N/A 2017    Procedure: COLONOSCOPY;  Surgeon: Andre Uribe MD;  Location: [facility] ENDO (4TH FLR);  Service: Endoscopy;  Laterality: N/A;    COLONOSCOPY N/A 2018    Procedure: COLONOSCOPY;  Surgeon: Andre Uribe MD;  Location: [facility] ENDO (4TH FLR);  Service: Endoscopy;  Laterality: N/A;    COLONOSCOPY N/A 2018    Procedure: COLONOSCOPY;  Surgeon: Elmer Serrato MD;  Location: [facility] ENDO (2ND FLR);  Service: Endoscopy;  Laterality: N/A;    COLONOSCOPY  2018    COLONOSCOPY N/A 2018    Procedure: COLONOSCOPY;  Surgeon: Andre Uribe MD;  Location: [facility] ENDO (4TH FLR);  Service: Endoscopy;  Laterality: N/A;    COLONOSCOPY N/A 10/07/2021    Procedure: COLONOSCOPY;  Surgeon: Jose Hughes MD;  Location: [facility];  Service: Endoscopy;  Laterality: N/A;    ESOPHAGOGASTRODUODENOSCOPY N/A 10/07/2021    Procedure: EGD (ESOPHAGOGASTRODUODENOSCOPY);  Surgeon: Jose Hughes MD;  Location: [facility] ENDO;  Service: Endoscopy;  Laterality: N/A;    LAPAROSCOPIC CLOSURE OF COLOSTOMY N/A 2018    Procedure: CLOSURE, COLOSTOMY, LAPAROSCOPIC;  Surgeon: Andre Uribe MD;  Location: [facility] OR 2ND FLR;  Service: Colon and Rectal;  Laterality: N/A;  converted to open    LYSIS OF ADHESIONS N/A 2022    Procedure: LYSIS, ADHESIONS;  Surgeon:  LLUVIA Post MD;  Location: [facility] OR [address] [place] [address];  Service: Colon and Rectal;  Laterality: N/A;  lasting longer than 2 hours, modifier 22      rectovaginal fistula repair  01/2018    SMALL INTESTINE SURGERY  1995    per patient 10 inches removed    SMALL INTESTINE SURGERY  02/2009    abdominal abscess drained, 3 cm colon removed, 11 cm SB removed    TUBAL LIGATION      UPPER GASTROINTESTINAL ENDOSCOPY  2000       Current Medications:   Current Outpatient Medications:     calcium carbonate (TUMS ORAL), Take 1 tablet by mouth as needed (acid reflux)., Disp: , Rfl:     HYDROcodone-acetaminophen (NORCO)  mg per tablet, Take 1 tablet by mouth every 6 (six) hours as needed for Pain., Disp: 30 tablet, Rfl: 0    levETIRAcetam (KEPPRA) 500 MG Tab, Take 1 tablet (500 mg total) by mouth 2 (two) times daily., Disp: 60 tablet, Rfl: 0    loperamide (IMODIUM) 2 mg capsule, Take 2 capsules (4 mg total) by mouth 4 (four) times daily., Disp: 90 capsule, Rfl: 5    OYSTER SHELL CALCIUM 500 500 mg calcium (1,250 mg) tablet, Take 2 tablets by mouth 2 (two) times daily., Disp: , Rfl:     pantoprazole (PROTONIX) 40 MG tablet, Take 40 mg by mouth once daily., Disp: , Rfl:     potassium chloride SA (K-DUR,KLOR-CON) 20 MEQ tablet, Take 40 mEq by mouth 2 (two) times daily., Disp: , Rfl:     promethazine (PHENERGAN) 25 MG tablet, Take 1 tablet (25 mg total) by mouth every 4 (four) hours., Disp: 30 tablet, Rfl: 1    spironolactone (ALDACTONE) 25 MG tablet, Take 25 mg by mouth., Disp: , Rfl:     venlafaxine (EFFEXOR) 75 MG tablet, Take 75 mg by mouth 2 (two) times daily., Disp: , Rfl:     promethazine (PHENERGAN) 25 MG suppository, Place 25 mg rectally every 6 (six) hours as needed for Nausea., Disp: , Rfl:     Social History:   Social History     Socioeconomic History    Marital status:    Tobacco Use    Smoking status: Never Smoker    Smokeless tobacco: Never Used   Substance and Sexual Activity    Alcohol use:  "No     Alcohol/week: 0.0 standard drinks    Drug use: No    Sexual activity: Not Currently       REVIEW OF SYSTEMS:  Constitution: Negative. Negative for chills, fever and night sweats.   Cardiovascular: Negative for chest pain and syncope.   Respiratory: Negative for cough and shortness of breath.   Gastrointestinal: See HPI. Negative for nausea/vomiting. Negative for abdominal pain.  Genitourinary: See HPI. Negative for discoloration or dysuria.  Skin: Negative for dry skin, itching and rash.   Hematologic/Lymphatic: No for bleeding/clotting disorders.   Musculoskeletal: Negative for falls and muscle weakness.   Neurological: See HPI for seizure hx. No history of cranial surgery or shunts.  Endocrine: Negative for polydipsia, polyphagia and polyuria.   Allergic/Immunologic: Negative for hives and persistent infections.    PHYSICAL EXAMINATION:    Ht 5' 4.5" (1.638 m)   Wt 50.7 kg (111 lb 12.4 oz)   LMP 05/30/2009   BMI 18.89 kg/m²     General: The patient is a 56 y.o. female in no apparent distress, the patient is orientatied to person, place and time.   Psych: Normal mood and affect  HEENT: Vision grossly intact, hearing intact to the spoken word.  Lungs: Respirations unlabored.  Gait: Normal station and gait, no difficulty with toe or heel walk.   Skin: Dorsal lumbar skin negative for rashes, lesions, hairy patches and surgical scars.  Range of motion: Lumbar range of motion is acceptable. There is tenderness to palpation over the thoracic spine.  Spinal Balance: Global saggital and coronal spinal balance acceptable, no significant for scoliosis and kyphosis.  Musculoskeletal: Pain with extension of the left knee. No pain with the range of motion of the bilateral hips. No trochanteric tenderness to palpation.  Vascular: Bilateral lower extremities warm and well perfused, Dorsalis pedis pulses 2+ bilaterally.  Neurological: Normal strength and tone in all major motor groups in the bilateral lower " extremities. Normal sensation to light touch in the L2-S1 dermatomes bilaterally.      IMAGING:   Today I independently reviewed the following images and my interpretations are as follows:    AP, Lat and Flex/Ex upright T and L-spine films demonstrate severe osteoporosis L3 compression fracture, compression fractures of T5, T8, and T12.       Body mass index is 18.89 kg/m².  Hemoglobin A1C   Date Value Ref Range Status   01/14/2022 5.6 4.5 - 6.2 % Final     Comment:     According to ADA guidelines, hemoglobin A1C <7.0% represents  optimal control in non-pregnant diabetic patients.  Different  metrics may apply to specific populations.   Standards of Medical Care in Diabetes - 2016.    For the purpose of screening for the presence of diabetes:  <5.7%     Consistent with the absence of diabetes  5.7-6.4%  Consistent with increasing risk for diabetes   (prediabetes)  >or=6.5%  Consistent with diabetes    Currently no consensus exists for use of hemoglobin A1C  for diagnosis of diabetes for children.           ASSESSMENT/PLAN:    Carline was seen today for spine injury, back pain, knee pain, leg pain and arm pain.    Diagnoses and all orders for this visit:    Closed compression fracture of L3 lumbar vertebra, initial encounter  -     Ambulatory referral/consult to Endocrinology; Future  -     Ambulatory referral/consult to Physical/Occupational Therapy; Future  -     Ambulatory referral/consult to Pain Clinic; Future    Compression fracture of T12 vertebra, initial encounter  -     Ambulatory referral/consult to Endocrinology; Future  -     Ambulatory referral/consult to Physical/Occupational Therapy; Future      Follow up in about 6 weeks (around 9/1/2022).    Patient has severe osteoporosis and multiple compression fractures (T5,T8,T12,L3). I discussed the natural history of their diagnoses as well as surgical and nonsurgical treatment options. I educated the patient on the importance of core/back strengthening,  correct posture, bending/lifting ergonomics, and low-impact aerobic exercises (walking, elliptical, and aquatherapy). Continue medications. I will refer the patient to PT for back/core strengthening. I referred her to pain management. I also referred her to Chucky Carpenter for bone health optimization, as well as endocrinology. Patient will follow up in 6 weeks with standing XRs.    I have personally examined the patient and agree with the above plan.    Min Harden MD  Orthopaedic Spine Surgeon  Department of Orthopaedic Surgery  [phone]

## 2022-07-21 NOTE — TELEPHONE ENCOUNTER
Pt ambulated to 4th floor gastroenterology requesting pain medication for back pain  - States she saw orthopedic MD today for severe osteoporosis w/ plan for pain management though no pain medications were prescribed today  - Requesting that Dr. Velasquez send in prescription for pain medication since she only has 4 pills left  - Informed pt that I would discuss with Dr. Velasquez & will send portal message with update  - Pt expressed understanding

## 2022-07-27 ENCOUNTER — TELEPHONE (OUTPATIENT)
Dept: ORTHOPEDICS | Facility: CLINIC | Age: 57
End: 2022-07-27
Payer: MEDICARE

## 2022-07-27 NOTE — TELEPHONE ENCOUNTER
Tried contacting patient no answer ,left message on voice mail regarding scheduling an appointment in the orthopedics fracture clinic

## 2022-07-29 ENCOUNTER — OFFICE VISIT (OUTPATIENT)
Dept: PAIN MEDICINE | Facility: CLINIC | Age: 57
End: 2022-07-29
Payer: MEDICARE

## 2022-07-29 VITALS
HEART RATE: 100 BPM | BODY MASS INDEX: 18.95 KG/M2 | DIASTOLIC BLOOD PRESSURE: 85 MMHG | SYSTOLIC BLOOD PRESSURE: 134 MMHG | HEIGHT: 64 IN | WEIGHT: 111 LBS | RESPIRATION RATE: 18 BRPM

## 2022-07-29 DIAGNOSIS — K50.018 CROHN'S DISEASE OF SMALL INTESTINE WITH OTHER COMPLICATION: ICD-10-CM

## 2022-07-29 DIAGNOSIS — K50.812 CROHN'S DISEASE OF BOTH SMALL AND LARGE INTESTINE WITH INTESTINAL OBSTRUCTION: ICD-10-CM

## 2022-07-29 DIAGNOSIS — S32.030A COMPRESSION FRACTURE OF L3 LUMBAR VERTEBRA, CLOSED, INITIAL ENCOUNTER: ICD-10-CM

## 2022-07-29 DIAGNOSIS — F11.90 CHRONIC, CONTINUOUS USE OF OPIOIDS: ICD-10-CM

## 2022-07-29 DIAGNOSIS — S22.080A COMPRESSION FRACTURE OF T12 VERTEBRA, INITIAL ENCOUNTER: Primary | ICD-10-CM

## 2022-07-29 PROCEDURE — 99999 PR PBB SHADOW E&M-EST. PATIENT-LVL IV: ICD-10-PCS | Mod: PBBFAC,,, | Performed by: STUDENT IN AN ORGANIZED HEALTH CARE EDUCATION/TRAINING PROGRAM

## 2022-07-29 PROCEDURE — 99204 PR OFFICE/OUTPT VISIT, NEW, LEVL IV, 45-59 MIN: ICD-10-PCS | Mod: S$PBB,ICN,CMP, | Performed by: STUDENT IN AN ORGANIZED HEALTH CARE EDUCATION/TRAINING PROGRAM

## 2022-07-29 PROCEDURE — 99204 OFFICE O/P NEW MOD 45 MIN: CPT | Mod: S$PBB,ICN,CMP, | Performed by: STUDENT IN AN ORGANIZED HEALTH CARE EDUCATION/TRAINING PROGRAM

## 2022-07-29 PROCEDURE — 99999 PR PBB SHADOW E&M-EST. PATIENT-LVL IV: CPT | Mod: PBBFAC,,, | Performed by: STUDENT IN AN ORGANIZED HEALTH CARE EDUCATION/TRAINING PROGRAM

## 2022-07-29 PROCEDURE — 99214 OFFICE O/P EST MOD 30 MIN: CPT | Mod: PBBFAC | Performed by: STUDENT IN AN ORGANIZED HEALTH CARE EDUCATION/TRAINING PROGRAM

## 2022-07-29 RX ORDER — HYDROCODONE BITARTRATE AND ACETAMINOPHEN 10; 325 MG/1; MG/1
1 TABLET ORAL EVERY 6 HOURS PRN
Qty: 120 TABLET | Refills: 0 | Status: SHIPPED | OUTPATIENT
Start: 2022-07-29 | End: 2022-08-30 | Stop reason: SDUPTHER

## 2022-07-29 NOTE — PROGRESS NOTES
Chronic Pain - New Consult    Referring Physician: Min Hardne MD    Date: 07/29/2022     Re: Carline Chang  MR#: [id number]  YOB: 1965  Age: 56 y.o.    Chief Complaint:   Chief Complaint   Patient presents with    Low-back Pain    Mid-back Pain    Knee Pain     B/l     **This note is dictated using the M*Modal Fluency Direct word recognition program. There are word recognition mistakes that are occasionally missed on review.**    ASSESSMENT: 56 y.o. year old female with back pain, consistent with     1. Compression fracture of T12 vertebra, initial encounter  MRI Thoracic Spine Without Contrast    MRI Lumbar Spine Without Contrast    HYDROcodone-acetaminophen (NORCO)  mg per tablet   2. Compression fracture of L3 lumbar vertebra, closed, initial encounter  MRI Thoracic Spine Without Contrast    MRI Lumbar Spine Without Contrast    HYDROcodone-acetaminophen (NORCO)  mg per tablet   3. Crohn's disease of small intestine with other complication     4. Chronic, continuous use of opioids     5. Crohn's disease of both small and large intestine with intestinal obstruction           PLAN:     Chronic back pain 2/2 multiple compression fractures  -T5, T8, severe T12, L3 compression fractures on XR  -Look into finding a pain physisian or PCP in [place] that can refill her narcotic medications so she does not need to travel so far to get them.  I will provide a refill of her pain meds for the next 2-3 months while she is working on that.  -MRI thoracic and Lumbar to assess the compression fractures and see if they are still acute enough for vertebral augmentation    Crohn's disease  -recent surgery  -hx of 30 years of oral steroids    History of seizures  -continue keppra    Chronic use of opioids  -her pain provider has passed away and she does not have anyone to fill her medications  -will provide patient with 2-3 months of medications while she works on finding a physician  closer to home  -will not get UDS at this time since medications are temporary  -The patient is here today for opioid pain medications and they believe these provide effective pain control and improvements in quality of life.  The patient notes no serious side effects, and feels the benefits outweigh the risks.  The patient was reminded of the pain contract that they signed previously as well as the risks and benefits of the medication including possible death.  The updated [place] [employer] prescription monitoring program was reviewed, and the patient has been found to be compliant with current treatment plan.      - RTC 1 week after MRI virtual visit  - Counseled patient regarding the importance of activity modification.    The above plan and management options were discussed at length with patient. Patient is in agreement with the above and verbalized understanding. It will be communicated with the referring physician via electronic record, fax, or mail.  Lab/study reports reviewed were important and necessary because subsequent medical and treatment recommendations required review of the above lab/study reports. Images viewed/reviewed above were important and necessary because subsequent medical and treatment recommendations required review of the reviewed image(s).     Electronically signed by:  Royal Brewster DO  07/29/2022    =========================================================================================================    SUBJECTIVE:    Carline Chang is a 56 y.o. female presents to the clinic for the evaluation of mid/ lower back pain. The pain started 7 months ago following seizures  and symptoms have been worsening.  The patient states that she had a bunch of seizures in January and think that the compression fractures happened at that time.  She saw Dr. Harden recently for the back pain. Hx of crohns disease and 30 year history of chronic steroids.  She stopped taking  "them a year or two ago. She has lost 3-4" in height.     The patient had abdominal surgery in January for her Crohn's for lysis of adhesions. She stil gets abdominal bloating.  She has poor absorption of electrolytes and needs to get frequent IV medications.    The patient was seeing a PCP in [place] but they passed away.  She was getting Norco 10mg QID.     Pain Description:    The pain is located in the mid/lower back area and radiates to the bilalteral knee.    At BEST  3/10   At WORST  8/10 on the WORST day.    On average pain is rated as 5/10.   Today the pain is rated as 6/10  The pain is continuous.  The pain is described as aching and sharp    Symptoms interfere with daily activity and sleeping.   Exacerbating factors: Sitting, Standing, Bending, Walking, Extension, Lifting and Getting out of bed/chair.    Mitigating factors nothing and medications.   She reports 8 hours of sleep per night.    Physical Therapy/Home Exercise: Yes, currently in Physical therapy    Current Pain Medications:    - Norco 10mg QID    Failed Pain Medications:    - Icy hot, Tylenol, cannot take NSIADs    Pain Treatment Therapies:    Pain procedures: none  Physical Therapy: none  Chiropractor: none  Acupuncture: none  TENS unit: none  Spinal decompression: none  Joint replacement: none    Patient denies urinary incontinence, bowel incontinence, significant motor weakness and loss of sensations.  Patient denies any suicidal or homicidal ideations     report:  Reviewed and consistent with medication use as prescribed.    Imaging:   Lumbar XR 07/2022:    FINDINGS:  There is a severe compression fracture deformity of T12 with mild retropulsion of osseous structure posteriorly.     There is a mild loss of height at the superior endplate of L3, unchanged.     No new fracture seen.     Flexion and extension views demonstrate no translational abnormalities.     The sacroiliac joints appear symmetrical.     Surgical clips right upper " abdominal quadrant, surgical bowel suture in the right mid and right lower abdominal region.     Air-fluid levels noted, midline with distended colon measuring about 13 cm.     Impression:     Severe compression fracture of T12 and mild superior endplate compression fracture of L3, unchanged from 01/19/2022    Thoracic XR 07/2022:     FINDINGS:  There is a port in the left upper thorax, distal tip projects in the region of the SVC/right atrium region.     The alignment of the thoracic spine demonstrates a subtle dextroscoliosis.  There is a severe wedge compression fracture deformity of T12.  There is a mild height loss fracture deformity of T8 and T5 which appears to have been present on CT 01/19/2022 chest and abdomen CT examination.  Subtle concavity at the superior endplate of T10-T11 is unchanged from CT examination of 01/19/2022.  No definite new fracture identified.  No rib abnormality seen.  Surgical clips right upper abdominal quadrant.     Impression:     Multiple thoracic spine vertebrae fractures, all appears to have been present on 01/19/2022 CT exam.  There is a severe compression fracture of T12.  Please see details above.      Past Medical History:   Diagnosis Date    Abnormal Pap smear of cervix     LEEP procedure, 1995    Acute deep vein thrombosis (DVT) of brachial vein of right upper extremity 01/2017    on RUE ultrasound    Anxiety     Bowel perforation     Chronic pain     Compression fracture of C-spine     Crohn's disease of both small and large intestine with intestinal obstruction 1989    Difficult intravenous access     Encounter for blood transfusion     GERD (gastroesophageal reflux disease) 2014    Kidney stones     Nephrolithiasis     Osteoporosis     Stricture of bowel      Past Surgical History:   Procedure Laterality Date    ABSCESS DRAINAGE  2014    ANAL FISTULOTOMY  2018    BOWEL RESECTION      small bowel resection per Dr. Uribe 4/2018    CERVICAL BIOPSY  W/  LOOP ELECTRODE EXCISION       SECTION      x2    CHOLECYSTECTOMY  2000    COLON SURGERY  2015    sigmoid loop colostomy with subsequent reversal 3 years later    COLONOSCOPY N/A 2016    Procedure: COLONOSCOPY;  Surgeon: Andre Uribe MD;  Location: [facility] ENDO (4TH FLR);  Service: Endoscopy;  Laterality: N/A;    COLONOSCOPY N/A 2017    Procedure: COLONOSCOPY;  Surgeon: Dany Stock MD;  Location: [facility] ENDO (2ND FLR);  Service: Endoscopy;  Laterality: N/A;    COLONOSCOPY N/A 2017    Procedure: COLONOSCOPY;  Surgeon: Andre Uribe MD;  Location: [facility] ENDO (4TH FLR);  Service: Endoscopy;  Laterality: N/A;    COLONOSCOPY N/A 2018    Procedure: COLONOSCOPY;  Surgeon: Andre Uribe MD;  Location: [facility] ENDO (4TH FLR);  Service: Endoscopy;  Laterality: N/A;    COLONOSCOPY N/A 2018    Procedure: COLONOSCOPY;  Surgeon: Elmer Serrato MD;  Location: [facility] ENDO (2ND FLR);  Service: Endoscopy;  Laterality: N/A;    COLONOSCOPY  2018    COLONOSCOPY N/A 2018    Procedure: COLONOSCOPY;  Surgeon: Andre Uribe MD;  Location: [facility] ENDO (4TH FLR);  Service: Endoscopy;  Laterality: N/A;    COLONOSCOPY N/A 10/07/2021    Procedure: COLONOSCOPY;  Surgeon: Jose Hughes MD;  Location: [facility] ENDO;  Service: Endoscopy;  Laterality: N/A;    ESOPHAGOGASTRODUODENOSCOPY N/A 10/07/2021    Procedure: EGD (ESOPHAGOGASTRODUODENOSCOPY);  Surgeon: Jose Hughes MD;  Location[facility];  Service: Endoscopy;  Laterality: N/A;    LAPAROSCOPIC CLOSURE OF COLOSTOMY N/A 2018    Procedure: CLOSURE, COLOSTOMY, LAPAROSCOPIC;  Surgeon: Andre Uribe MD;  Location: [facility] OR 2ND FLR;  Service: Colon and Rectal;  Laterality: N/A;  converted to open    LYSIS OF ADHESIONS N/A 2022    Procedure: LYSIS, ADHESIONS;  Surgeon: LLUVIA Post MD;  Location: [facility] OR 2ND FLR;  Service: Colon and Rectal;  Laterality: N/A;  lasting longer than 2 hours, modifier 22       rectovaginal fistula repair  01/2018    SMALL INTESTINE SURGERY  1995    per patient 10 inches removed    SMALL INTESTINE SURGERY  02/2009    abdominal abscess drained, 3 cm colon removed, 11 cm SB removed    TUBAL LIGATION      UPPER GASTROINTESTINAL ENDOSCOPY  2000     Social History     Socioeconomic History    Marital status:    Tobacco Use    Smoking status: Never Smoker    Smokeless tobacco: Never Used   Substance and Sexual Activity    Alcohol use: No     Alcohol/week: 0.0 standard drinks    Drug use: No    Sexual activity: Not Currently     Family History   Problem Relation Age of Onset    Cancer Maternal Aunt 66        colon ca    Heart attack Father 69    Anesthesia problems Neg Hx     Celiac disease Neg Hx     Cirrhosis Neg Hx     Colon cancer Neg Hx     Colon polyps Neg Hx     Crohn's disease Neg Hx     Cystic fibrosis Neg Hx     Esophageal cancer Neg Hx     Hemochromatosis Neg Hx     Inflammatory bowel disease Neg Hx     Irritable bowel syndrome Neg Hx     Liver cancer Neg Hx     Liver disease Neg Hx     Rectal cancer Neg Hx     Stomach cancer Neg Hx     Ulcerative colitis Neg Hx     Mars's disease Neg Hx     Lymphoma Neg Hx     Tuberculosis Neg Hx     Scleroderma Neg Hx     Rheum arthritis Neg Hx     Multiple sclerosis Neg Hx     Melanoma Neg Hx     Lupus Neg Hx     Psoriasis Neg Hx     Skin cancer Neg Hx        Review of patient's allergies indicates:   Allergen Reactions    Remicade [infliximab] Shortness Of Breath and Palpitations     Severe muscle and joint, and bone pain    Adalimumab Other (See Comments)    Flagyl [metronidazole] Other (See Comments)     Neuropathy    Nubain [nalbuphine] Anxiety     Upper abdominal burning        Current Outpatient Medications   Medication Sig    calcium carbonate (TUMS ORAL) Take 1 tablet by mouth as needed (acid reflux).    levETIRAcetam (KEPPRA) 500 MG Tab Take 1 tablet (500 mg total) by mouth 2 (two) times  daily.    loperamide (IMODIUM) 2 mg capsule Take 2 capsules (4 mg total) by mouth 4 (four) times daily.    OYSTER SHELL CALCIUM 500 500 mg calcium (1,250 mg) tablet Take 2 tablets by mouth 2 (two) times daily.    pantoprazole (PROTONIX) 40 MG tablet Take 40 mg by mouth once daily.    potassium chloride SA (K-DUR,KLOR-CON) 20 MEQ tablet Take 40 mEq by mouth 2 (two) times daily.    promethazine (PHENERGAN) 25 MG tablet Take 1 tablet (25 mg total) by mouth every 4 (four) hours.    spironolactone (ALDACTONE) 25 MG tablet Take 25 mg by mouth.    venlafaxine (EFFEXOR) 75 MG tablet Take 75 mg by mouth 2 (two) times daily.    HYDROcodone-acetaminophen (NORCO)  mg per tablet Take 1 tablet by mouth every 6 (six) hours as needed for Pain.    promethazine (PHENERGAN) 25 MG suppository Place 25 mg rectally every 6 (six) hours as needed for Nausea.     No current facility-administered medications for this visit.       REVIEW OF SYSTEMS:    GENERAL:  No weight loss, malaise or fevers.  HEENT:   No recent changes in vision or hearing + vision   NECK:  Negative for lumps, no difficulty with swallowing.  RESPIRATORY:  Negative for cough, wheezing or shortness of breath, patient denies any recent URI.  CARDIOVASCULAR:  Negative for chest pain, leg swelling or palpitations.  GI:  Negative for abdominal discomfort, blood in stools or black stools or change in bowel habits. + abdominal discomfort   MUSCULOSKELETAL:  See HPI.  SKIN:  Negative for lesions, rash, and itching.  PSYCH:  No mood disorder or recent psychosocial stressors.  Patients sleep is not disturbed secondary to pain.  HEMATOLOGY/LYMPHOLOGY:  Negative for prolonged bleeding, bruising easily or swollen nodes.  Patient is not currently taking any anti-coagulants  NEURO:   No history of headaches, syncope, paralysis, seizures or tremors. + seizures (4this year)   All other reviewed and negative other than HPI.    OBJECTIVE:    /85   Pulse 100   Resp 18   " Ht 5' 4" (1.626 m)   Wt 50.3 kg (111 lb)   LMP 05/30/2009   BMI 19.05 kg/m²     PHYSICAL EXAMINATION:    GENERAL: Well appearing, in no acute distress, alert and oriented x3. fraile  PSYCH:  Mood and affect appropriate.  SKIN: Skin color, texture, turgor normal, no rashes or lesions.  HEAD/FACE:  Normocephalic, atraumatic. Cranial nerves grossly intact.  CV: RRR with palpation of the radial artery.  PULM: CTAB. No evidence of respiratory difficulty, symmetric chest rise.  GI:  Soft and non-tender.    BACK:   - Hyperkyphosis of the thoracic spine with paravertebral humping  - Negative spinous process tenderness  - Negative paravertebral tenderness  - Negative pain to palpation over the facet joints of the thoracic and lumbar spine.   -decreased ROM with flexion and extension of the lumbar spine  -flexion causes increased pain in the mid/low back without radiation into the legs  -Light percussion of the thoracic and lumbar spine reproduces pain around the L2 level    MUSCULOSKELETAL:  No atrophy or tone abnormalities are noted in the UE or LE.  No deformities, edema, or skin discoloration are noted on visible skin. Good capillary refill.    NEURO: Bilateral upper and lower extremity coordination and muscle stretch reflexes are physiologic and symmetric.      NEUROLOGICAL EXAM:  MENTAL STATUS: A x O x 3, good concentration, speech is fluent and goal directed  MEMORY: recent and remote are intact  CN: CN2-12 grossly intact  MOTOR: 5/5 in all muscle groups except b/l hip flexion 4/5  DTRs: 2+ intact symmetric  Sensation:    -no Loss of sensation in a left lower and right lower L-1, L-2, L-3, L-4 and L-5 bilaterally distribution.  Babinski: absent on the bilateral side(s)    GAIT: flexed, kyphotic posture    "

## 2022-08-03 ENCOUNTER — TELEPHONE (OUTPATIENT)
Dept: GASTROENTEROLOGY | Facility: CLINIC | Age: 57
End: 2022-08-03
Payer: MEDICARE

## 2022-08-04 ENCOUNTER — OFFICE VISIT (OUTPATIENT)
Dept: URGENT CARE | Facility: CLINIC | Age: 57
End: 2022-08-04
Payer: MEDICARE

## 2022-08-04 ENCOUNTER — CLINICAL SUPPORT (OUTPATIENT)
Dept: REHABILITATION | Facility: HOSPITAL | Age: 57
End: 2022-08-04
Attending: ORTHOPAEDIC SURGERY
Payer: MEDICARE

## 2022-08-04 VITALS
WEIGHT: 111 LBS | DIASTOLIC BLOOD PRESSURE: 76 MMHG | RESPIRATION RATE: 14 BRPM | OXYGEN SATURATION: 96 % | BODY MASS INDEX: 19.05 KG/M2 | SYSTOLIC BLOOD PRESSURE: 124 MMHG | TEMPERATURE: 98 F | HEART RATE: 99 BPM

## 2022-08-04 DIAGNOSIS — Z74.09 IMPAIRED FUNCTIONAL MOBILITY AND ACTIVITY TOLERANCE: Primary | ICD-10-CM

## 2022-08-04 DIAGNOSIS — S32.030A CLOSED COMPRESSION FRACTURE OF L3 LUMBAR VERTEBRA, INITIAL ENCOUNTER: ICD-10-CM

## 2022-08-04 DIAGNOSIS — S22.080A COMPRESSION FRACTURE OF T12 VERTEBRA, INITIAL ENCOUNTER: ICD-10-CM

## 2022-08-04 DIAGNOSIS — N39.0 URINARY TRACT INFECTION WITHOUT HEMATURIA, SITE UNSPECIFIED: ICD-10-CM

## 2022-08-04 DIAGNOSIS — R30.0 BURNING WITH URINATION: Primary | ICD-10-CM

## 2022-08-04 LAB
BILIRUB UR QL STRIP: POSITIVE
GLUCOSE UR QL STRIP: NEGATIVE
KETONES UR QL STRIP: POSITIVE
LEUKOCYTE ESTERASE UR QL STRIP: POSITIVE
PH, POC UA: 5.5
POC BLOOD, URINE: POSITIVE
POC NITRATES, URINE: NEGATIVE
PROT UR QL STRIP: POSITIVE
SP GR UR STRIP: 1.03 (ref 1–1.03)
UROBILINOGEN UR STRIP-ACNC: ABNORMAL (ref 0.1–1.1)

## 2022-08-04 PROCEDURE — 99213 OFFICE O/P EST LOW 20 MIN: CPT | Mod: S$GLB,,, | Performed by: NURSE PRACTITIONER

## 2022-08-04 PROCEDURE — 81003 URINALYSIS AUTO W/O SCOPE: CPT | Mod: QW,S$GLB,, | Performed by: NURSE PRACTITIONER

## 2022-08-04 PROCEDURE — 99213 PR OFFICE/OUTPT VISIT, EST, LEVL III, 20-29 MIN: ICD-10-PCS | Mod: S$GLB,,, | Performed by: NURSE PRACTITIONER

## 2022-08-04 PROCEDURE — 81003 POCT URINALYSIS, DIPSTICK, MANUAL, W/O SCOPE: ICD-10-PCS | Mod: QW,S$GLB,, | Performed by: NURSE PRACTITIONER

## 2022-08-04 PROCEDURE — 97163 PT EVAL HIGH COMPLEX 45 MIN: CPT | Mod: PN

## 2022-08-04 RX ORDER — PHENAZOPYRIDINE HYDROCHLORIDE 100 MG/1
100 TABLET, FILM COATED ORAL 3 TIMES DAILY PRN
Qty: 9 TABLET | Refills: 0 | Status: SHIPPED | OUTPATIENT
Start: 2022-08-04 | End: 2022-08-07

## 2022-08-04 RX ORDER — NITROFURANTOIN 25; 75 MG/1; MG/1
100 CAPSULE ORAL 2 TIMES DAILY
Qty: 10 CAPSULE | Refills: 0 | Status: SHIPPED | OUTPATIENT
Start: 2022-08-04 | End: 2022-08-09

## 2022-08-04 NOTE — PROGRESS NOTES
CHIEF COMPLAINT  Chief Complaint   Patient presents with    Urinary Tract Infection       HPI  Carline Muhammad a 56 y.o. female who presents with c/o malodorous urine that started 4 days ago and then urinary frequency, burning with urination and urgency for the past 2 days. Pt reports history of frequent UTI's with the last one approx 6 months ago. Pt denies abdominal pain, n/v/d, fever,flank pain, hematuria, rash or vaginal discharge.       CURRENT MEDICATIONS  Current Outpatient Medications on File Prior to Visit   Medication Sig Dispense Refill    calcium carbonate (TUMS ORAL) Take 1 tablet by mouth as needed (acid reflux).      HYDROcodone-acetaminophen (NORCO)  mg per tablet Take 1 tablet by mouth every 6 (six) hours as needed for Pain. 120 tablet 0    levETIRAcetam (KEPPRA) 500 MG Tab Take 1 tablet (500 mg total) by mouth 2 (two) times daily. 60 tablet 0    loperamide (IMODIUM) 2 mg capsule Take 2 capsules (4 mg total) by mouth 4 (four) times daily. 90 capsule 5    OYSTER SHELL CALCIUM 500 500 mg calcium (1,250 mg) tablet Take 2 tablets by mouth 2 (two) times daily.      pantoprazole (PROTONIX) 40 MG tablet Take 40 mg by mouth once daily.      potassium chloride SA (K-DUR,KLOR-CON) 20 MEQ tablet Take 40 mEq by mouth 2 (two) times daily.      promethazine (PHENERGAN) 25 MG tablet Take 1 tablet (25 mg total) by mouth every 4 (four) hours. 30 tablet 1    spironolactone (ALDACTONE) 25 MG tablet Take 25 mg by mouth.      venlafaxine (EFFEXOR) 75 MG tablet Take 75 mg by mouth 2 (two) times daily.      [DISCONTINUED] aMILoride (MIDAMOR) 5 MG Tab Take 5 mg by mouth once daily.      [DISCONTINUED] calcium-vitamin D3 (OS-CHEIKH 500 + D3) 500 mg-5 mcg (200 unit) per tablet Take 2 tablets by mouth 2 (two) times daily with meals. 120 tablet 0    [DISCONTINUED] cholestyramine-aspartame (QUESTRAN LIGHT) 4 gram PwPk Take 1 packet (4 g total) by mouth 2 (two) times daily. 180 packet 0     No  current facility-administered medications on file prior to visit.       ALLERGIES  Review of patient's allergies indicates:   Allergen Reactions    Remicade [infliximab] Shortness Of Breath and Palpitations     Severe muscle and joint, and bone pain    Adalimumab Other (See Comments)    Flagyl [metronidazole] Other (See Comments)     Neuropathy    Nubain [nalbuphine] Anxiety     Upper abdominal burning        Immunization History   Administered Date(s) Administered    Hepatitis A / Hepatitis B 2018, 2018    Hepatitis B, Adult 2018    Influenza - Quadrivalent 2018    Pneumococcal Conjugate - 13 Valent 2018    Pneumococcal Polysaccharide - 23 Valent 2018    Tdap 2018       PAST MEDICAL HISTORY  Past Medical History:   Diagnosis Date    Abnormal Pap smear of cervix     LEEP procedure,     Acute deep vein thrombosis (DVT) of brachial vein of right upper extremity 2017    on RUE ultrasound    Anxiety     Bowel perforation     Chronic pain     Compression fracture of C-spine     Crohn's disease of both small and large intestine with intestinal obstruction     Difficult intravenous access     Encounter for blood transfusion     GERD (gastroesophageal reflux disease)     Kidney stones     Nephrolithiasis     Osteoporosis     Stricture of bowel        SURGICAL HISTORY  Past Surgical History:   Procedure Laterality Date    ABSCESS DRAINAGE  2014    ANAL FISTULOTOMY  2018    BOWEL RESECTION      small bowel resection per Dr. Uribe 2018    CERVICAL BIOPSY  W/ LOOP ELECTRODE EXCISION       SECTION      x2    CHOLECYSTECTOMY  2000    COLON SURGERY  2015    sigmoid loop colostomy with subsequent reversal 3 years later    COLONOSCOPY N/A 2016    Procedure: COLONOSCOPY;  Surgeon: Andre Uribe MD;  Location: [address];  Service: Endoscopy;  Laterality: N/A;    COLONOSCOPY N/A 2017    Procedure:  COLONOSCOPY;  Surgeon: Dany Stock MD;  Location: [facility] ENDO (2ND FLR);  Service: Endoscopy;  Laterality: N/A;    COLONOSCOPY N/A 12/04/2017    Procedure: COLONOSCOPY;  Surgeon: Andre Uribe MD;  Location: [facility] ENDO (4TH FLR);  Service: Endoscopy;  Laterality: N/A;    COLONOSCOPY N/A 02/08/2018    Procedure: COLONOSCOPY;  Surgeon: Andre Uribe MD;  Location: [facility] ENDO (4TH FLR);  Service: Endoscopy;  Laterality: N/A;    COLONOSCOPY N/A 03/24/2018    Procedure: COLONOSCOPY;  Surgeon: Elmer Serrato MD;  Location: [facility] ENDO (2ND FLR);  Service: Endoscopy;  Laterality: N/A;    COLONOSCOPY  03/24/2018    COLONOSCOPY N/A 07/06/2018    Procedure: COLONOSCOPY;  Surgeon: Andre Uribe MD;  Location: [facility] ENDO (4TH FLR);  Service: Endoscopy;  Laterality: N/A;    COLONOSCOPY N/A 10/07/2021    Procedure: COLONOSCOPY;  Surgeon: Jose Hughes MD;  Location: [facility];  Service: Endoscopy;  Laterality: N/A;    ESOPHAGOGASTRODUODENOSCOPY N/A 10/07/2021    Procedure: EGD (ESOPHAGOGASTRODUODENOSCOPY);  Surgeon: Jose Hughes MD;  Location: [facility];  Service: Endoscopy;  Laterality: N/A;    LAPAROSCOPIC CLOSURE OF COLOSTOMY N/A 08/29/2018    Procedure: CLOSURE, COLOSTOMY, LAPAROSCOPIC;  Surgeon: Andre Uribe MD;  Location: [facility] OR 2ND FLR;  Service: Colon and Rectal;  Laterality: N/A;  converted to open    LYSIS OF ADHESIONS N/A 01/20/2022    Procedure: LYSIS, ADHESIONS;  Surgeon: LLUVIA Post MD;  Location: [facility] OR 2ND FLR;  Service: Colon and Rectal;  Laterality: N/A;  lasting longer than 2 hours, modifier 22      rectovaginal fistula repair  01/2018    SMALL INTESTINE SURGERY  1995    per patient 10 inches removed    SMALL INTESTINE SURGERY  02/2009    abdominal abscess drained, 3 cm colon removed, 11 cm SB removed    TUBAL LIGATION      UPPER GASTROINTESTINAL ENDOSCOPY  2000       SOCIAL HISTORY  Social History     Socioeconomic History    Marital status:    Tobacco Use     Smoking status: Never Smoker    Smokeless tobacco: Never Used   Substance and Sexual Activity    Alcohol use: No     Alcohol/week: 0.0 standard drinks    Drug use: No    Sexual activity: Not Currently       FAMILY HISTORY  Family History   Problem Relation Age of Onset    Cancer Maternal Aunt 66        colon ca    Heart attack Father 69    Anesthesia problems Neg Hx     Celiac disease Neg Hx     Cirrhosis Neg Hx     Colon cancer Neg Hx     Colon polyps Neg Hx     Crohn's disease Neg Hx     Cystic fibrosis Neg Hx     Esophageal cancer Neg Hx     Hemochromatosis Neg Hx     Inflammatory bowel disease Neg Hx     Irritable bowel syndrome Neg Hx     Liver cancer Neg Hx     Liver disease Neg Hx     Rectal cancer Neg Hx     Stomach cancer Neg Hx     Ulcerative colitis Neg Hx     Mars's disease Neg Hx     Lymphoma Neg Hx     Tuberculosis Neg Hx     Scleroderma Neg Hx     Rheum arthritis Neg Hx     Multiple sclerosis Neg Hx     Melanoma Neg Hx     Lupus Neg Hx     Psoriasis Neg Hx     Skin cancer Neg Hx        REVIEW OF SYSTEMS  Constitutional: No fever, chills, or weakness.  Respiratory: No cough, wheezing or shortness of breath  Cardiovascular: No chest pain, palpitations or edema  GI: No abdominal pain, nausea, vomiting or diarrhea  Gu: +Urinary frequency, urgency, and burning with urination,  no hematuria, or discharge  Musculoskeletal: No pain, full range of motion. Good sensation  Skin: No rash or abrasion  Neurologic: No focal weakness or sensory changes.  All systems otherwise negative except as noted in the Review of Systems and History of Present Illness      PHYSICAL EXAM  Reviewed Triage Note  VITAL SIGNS: 124/76  Constitutional: Well developed, well nourished, Alert and oriented x3, No acute distress, non-toxic appearance.  Respiratory: Clear breath sounds, no respiratory distress, no wheezing, no rhonchi, no rales  Cardiovascular:HR 99, no murmurs, no rubs, no gallops.  Gi:  Bowel sounds normal, soft, no tenderness, non-distended, no masses, no pulsatile masses.  Musculoskeletal:  Good range of motion in all major joints.   Integument: Warm, Dry, No erythema, no rash  Neurologic: Normal motor function, normal sensory function. No focal deficits noted. Intact distal pulses  Psychiatric: Affect normal, judgment normal, mood normal      LABS  Pertinent labs reviewed. (see chart for details)  UA + bld, phil, ket, pro, boogie    RADIOLOGY  No orders to display         PROCEDURE  Procedures      Physical exam findings and lab results discussed with patient. No acute emergent medical condition identified at this time to warrant further testing. Will dispo home with instructions to follow up with PCP tomorrow. Script for macrobid and pyridium provided with instructions of usage. Pt agrees with plan of care.     DISPOSITION  Patient discharged in stable condition     CLINICAL IMPRESSION:  The primary encounter diagnosis was Burning with urination. A diagnosis of Urinary tract infection without hematuria, site unspecified was also pertinent to this visit.    Patient advised to follow-up with your PCP within 3 days for BP re-check if Blood Pressure was >120/80 without history of hypertension.

## 2022-08-04 NOTE — PLAN OF CARE
"[facility]   Physical Therapy Initial Evaluation     Date: 8/4/2022   Name: Carline [facility] [place], operated by [facility] Number: [id number]    Therapy Diagnosis:   Encounter Diagnoses   Name Primary?    Impaired functional mobility and activity tolerance Yes    Closed compression fracture of L3 lumbar vertebra, initial encounter     Compression fracture of T12 vertebra, initial encounter      Physician: Min Harden MD    Physician Orders: PT Eval and Treat   Medical Diagnosis from Referral: Closed compression fracture of L3 lumbar vertebra, initial encounter; Compression fracture of T12 vertebra, initial encounter   Evaluation Date: 8/4/2022  Authorization Period Expiration: 07/21/2023   Plan of Care Expiration: 09/16/2022  Progress Note Due: 09/04/2022  Visit # / Visits authorized: 1/ 1   FOTO: Next survey due week, of 8/15/2022    Precautions: Compression fractures, osteoporosis.       Time In: 1548  Time Out: 1630  Total Appointment Time (timed & untimed codes): 40 minutes      SUBJECTIVE     Date of onset: 1/2022    History of current condition - Carline reports: she was in her usual state of health until 1/2022 when she suffered 4 seizures due to electrolyte imbalance that resulted in 4 compression fractures in her spine.  She has had back pain since that time.  Symptoms have improved somewhat since that time.  She is being treated with hydrocodone which has helped.  She is now being referred to PT.      Falls: Yes.  She reports that she experienced about 4 falls within a few days.      Imaging, X-rays lumbar spine AP, lateral w/flex ext 7/21/2022: Impression per report "Severe compression fracture of T12 and mild superior endplate compression fracture of L3, unchanged from 01/19/2022"  X-rays thoracic spine AP lateral: impression per report: "Multiple thoracic spine vertebrae fractures, all appears to have been present on 01/19/2022 CT exam.  There is a severe compression fracture of T12. " "   Prior Therapy: Yes following abdominal surgery.    Social History:  lives with her mother in 2 story with 2 short flights of steps to access 2nd floor.  Rail is on L on 1st flight and B on 2nd.    Occupation: on disability  Prior Level of Function: Prior to 2022 she was more mobile with relation to back but was ill with Crohn's disease  Current Level of Function: increased pain with sitting without support, difficulty standing up straight, standing tolerance limited to about 10 minutes ("it would be a sloppy stand), difficulty getting off toilet, difficulty rolling in bed/scooting in bed, difficulty with bending to yvon shoes and socks.      Pain:  Current 3/10, worst 8/10, best 3/10   Location: central thoracic region mid to lower region   Description: constant dull and aching  Aggravating Factors: standing, rolling in bed, transitioning from sitting to standing, walking  Easing Factors: laying flat on her back    Patients goals: No pain/decreased pain, to be able to stand up straighter, to increase mobility     Medical History:   Past Medical History:   Diagnosis Date    Abnormal Pap smear of cervix     LEEP procedure,     Acute deep vein thrombosis (DVT) of brachial vein of right upper extremity 2017    on RUE ultrasound    Anxiety     Bowel perforation     Chronic pain     Compression fracture of C-spine     Crohn's disease of both small and large intestine with intestinal obstruction     Difficult intravenous access     Encounter for blood transfusion     GERD (gastroesophageal reflux disease)     Kidney stones     Nephrolithiasis     Osteoporosis     Stricture of bowel        Surgical History:   Carline Chang  has a past surgical history that includes Tubal ligation;  section; Colonoscopy (N/A, 2016); Colonoscopy (N/A, 2017); Colonoscopy (N/A, 2017); Colonoscopy (N/A, 2018); Colonoscopy (N/A, 2018); Abscess drainage (); " Anal fistulotomy (2018); Colonoscopy (03/24/2018); Upper gastrointestinal endoscopy (2000); Small intestine surgery (1995); Small intestine surgery (02/2009); Cholecystectomy (2000); Colon surgery (06/2015); rectovaginal fistula repair (01/2018); Colonoscopy (N/A, 07/06/2018); Bowel resection; Laparoscopic closure of colostomy (N/A, 08/29/2018); Colonoscopy (N/A, 10/07/2021); Esophagogastroduodenoscopy (N/A, 10/07/2021); Lysis of adhesions (N/A, 01/20/2022); and Cervical biopsy w/ loop electrode excision.    Medications:   Carline has a current medication list which includes the following prescription(s): calcium carbonate, hydrocodone-acetaminophen, levetiracetam, loperamide, nitrofurantoin (macrocrystal-monohydrate), oyster shell calcium 500, pantoprazole, phenazopyridine, potassium chloride sa, promethazine, spironolactone, venlafaxine, [DISCONTINUED] amiloride, [DISCONTINUED] calcium-vitamin d3, and [DISCONTINUED] cholestyramine-aspartame.    Allergies:   Review of patient's allergies indicates:   Allergen Reactions    Remicade [infliximab] Shortness Of Breath and Palpitations     Severe muscle and joint, and bone pain    Adalimumab Other (See Comments)    Flagyl [metronidazole] Other (See Comments)     Neuropathy    Nubain [nalbuphine] Anxiety     Upper abdominal burning         OBJECTIVE     Posture: Standing: R shoulder minimally lower than L, prominent R posterior ribs (4-7); Sitting: minimally reversed lumbar lordosis, increased thoracic kyphosis, moderate rounded shoulder and forward head posture.    Palpation: Tenderness present mid thoracic paraspinals; increased muscle tension B thoracolumbar paraspinals.    Sensation: Reports pre-existing neuropathy due to medication toxicity.    DTRs: Ankle jerk and knee jerk +2 and symmetrical.   Range of Motion/Strength:   Thoracic/Lumbar AROM: Pain/Dysfunction with Movement:   Flexion                       75* - 45* = 30*    Extension                  25* - 18*  = 7*    Right side bending    25*    Left side bending      23*    Right rotation            50%    Left rotation              50%    B LE ROM Grossly WFL   Strength: R Hip flex/ext 4-/5, abd/add 4/5; L hip flex/ext 3+/5; abd/add 4-/5-4/5; knee flexion/extension 4-/5-4/5; ankle DF 4/5  Core strength impaired.    Flexibility: Hamstring length R 130*, L 122*; minimal piriformis tightness; Hip flexors WFL; moderate gastroc tightness.    Gait: Without AD  Analysis: No significant deviations noted this date.   Bed Mobility:Independent but with moderate difficulty rolling and scooting  Transfers: Independent but with increased UE support needed   Special Tests: N/A  Other: N/A      Limitation/Restriction for FOTO Lumbar Spine Survey    Therapist reviewed FOTO scores for Carline Chang on 8/4/2022.   FOTO documents entered into [employer] - see Media section.    Limitation Score: 53%       TREATMENT     Total Treatment time (time-based codes) separate from Evaluation: 0 minutes      No treatment initiated this date    PATIENT EDUCATION AND HOME EXERCISES     Education provided:   - Discussed role of PT and proposed POC.      Written Home Exercises Provided: To be issued during subsequent visits.. Exercises were reviewed and Carline was able to demonstrate them prior to the end of the session.  Carline demonstrated good  understanding of the education provided. See EMR under Patient Instructions for exercises provided during therapy sessions.    ASSESSMENT     Carline is a 56 y.o. female referred to outpatient Physical Therapy with a medical diagnosis of Closed compression fracture of L3 lumbar vertebra, initial encounter; Compression fracture of T12 vertebra, initial encounter. Patient presents with increased pain, impaired posture, increased tenderness, decreased trunk ROM, decreased LE flexibility, decreased LE strength, decreased core strength.  These problems contribute to impaired functional mobility and impaired  activity tolerance.  She should benefit from skilled PT services to address these problems in order to minimize functional deficits and increase activity tolerance.      Patient prognosis is Fair.   Patient will benefit from skilled outpatient Physical Therapy to address the deficits stated above and in the chart below, provide patient /family education, and to maximize patientt's level of independence.     Plan of care discussed with patient: Yes  Patient's spiritual, cultural and educational needs considered and patient is agreeable to the plan of care and goals as stated below:     Anticipated Barriers for therapy: Multiple fractures    Medical Necessity is demonstrated by the following  History  Co-morbidities and personal factors that may impact the plan of care Co-morbidities:   anxiety and Crohn's, osteoporosis, multiple compression fractures.     Personal Factors:   no deficits     high   Examination  Body Structures and Functions, activity limitations and participation restrictions that may impact the plan of care Body Regions:   back  lower extremities    Body Systems:    gross symmetry  ROM  strength  balance  gait  transfers    Participation Restrictions:   High pain level due to multiple compression fractures    Activity limitations:   Learning and applying knowledge  no deficits    General Tasks and Commands  undertaking multiple tasks    Communication  no deficits    Mobility  lifting and carrying objects  walking  driving (bike, car, motorcycle)    Self care  washing oneself (bathing, drying, washing hands)  toileting  dressing    Domestic Life  shopping  cooking  doing house work (cleaning house, washing dishes, laundry)    Interactions/Relationships  no deficits    Life Areas  no deficits    Community and Social Life  community life  recreation and leisure         high   Clinical Presentation unstable clinical presentation with unpredictable characteristics high   Decision Making/ Complexity Score:  high     Goals:  Short Term Goals: 3 weeks    1) Decrease max pain to < 5/10   2) Facilitate improved posture   3) Facilitate improved LE flexibility   4) Patient will bend forward to perform LE dressing without increased pain    Long Term Goals: 6 weeks    1) Patient will consistently perform sit <> stand transfers with minimal UE support and appropriate assistive device   2) Patient will consistently perform rolling and scooting in bed without increased pain   3) Patient will consistently perform toilet transfers with no more that minimal difficulty   4) Increase walking tolerance to > 15 minutes   5) Increase standing tolerance to > 15 min   6) Patient will be independent in complete HEP    PLAN   Plan of care Certification: 8/4/2022 to 09/16/2022.    Outpatient Physical Therapy 2 times weekly for 6 weeks to include the following interventions: Gait Training, Manual Therapy, Moist Heat/ Ice, Patient Education, Therapeutic Activities and Therapeutic Exercise.     Una See, PT      I CERTIFY THE NEED FOR THESE SERVICES FURNISHED UNDER THIS PLAN OF TREATMENT AND WHILE UNDER MY CARE   Physician's comments:     Physician's Signature: ___________________________________________________

## 2022-08-04 NOTE — PROGRESS NOTES
Subjective:       Patient ID: Carline Chang is a 56 y.o. female.    Vitals:  weight is 50.3 kg (111 lb). Her temperature is 98.4 °F (36.9 °C). Her blood pressure is 124/76 and her pulse is 99. Her respiration is 14 and oxygen saturation is 96%.     Chief Complaint: Urinary Tract Infection    Urinary Tract Infection   This is a recurrent problem. The current episode started in the past 7 days. The problem occurs every urination. The quality of the pain is described as burning. There has been no fever. Associated symptoms include frequency and urgency. She has tried nothing for the symptoms. Her past medical history is significant for recurrent UTIs.       Genitourinary: Positive for frequency and urgency.       Objective:      Physical Exam      Assessment:       No diagnosis found.      Plan:         There are no diagnoses linked to this encounter.

## 2022-08-09 ENCOUNTER — TELEPHONE (OUTPATIENT)
Dept: NEUROSURGERY | Facility: CLINIC | Age: 57
End: 2022-08-09
Payer: MEDICARE

## 2022-08-09 NOTE — TELEPHONE ENCOUNTER
Lm on pt vm to call office back to reschedule 8/29 appt with Dr. Martinez due to provider being out of the office.

## 2022-08-10 ENCOUNTER — TELEPHONE (OUTPATIENT)
Dept: URGENT CARE | Facility: CLINIC | Age: 57
End: 2022-08-10
Payer: MEDICARE

## 2022-08-10 DIAGNOSIS — N39.0 URINARY TRACT INFECTION WITHOUT HEMATURIA, SITE UNSPECIFIED: Primary | ICD-10-CM

## 2022-08-10 LAB
BACTERIA UR CULT: ABNORMAL
BACTERIA UR CULT: ABNORMAL
OTHER ANTIBIOTIC SUSC ISLT: ABNORMAL

## 2022-08-15 ENCOUNTER — CLINICAL SUPPORT (OUTPATIENT)
Dept: REHABILITATION | Facility: HOSPITAL | Age: 57
End: 2022-08-15
Attending: ORTHOPAEDIC SURGERY
Payer: MEDICARE

## 2022-08-15 DIAGNOSIS — Z74.09 IMPAIRED FUNCTIONAL MOBILITY AND ACTIVITY TOLERANCE: Primary | ICD-10-CM

## 2022-08-15 DIAGNOSIS — S32.030A CLOSED COMPRESSION FRACTURE OF L3 LUMBAR VERTEBRA, INITIAL ENCOUNTER: ICD-10-CM

## 2022-08-15 PROCEDURE — 97110 THERAPEUTIC EXERCISES: CPT | Mod: PN,CQ

## 2022-08-15 NOTE — PROGRESS NOTES
"[facility]   Physical Therapy Treatment Note     [facility]  Cl[facility] Number: [id number]    Therapy Diagnosis:   Encounter Diagnoses   Name Primary?    Impaired functional mobility and activity tolerance Yes    Closed compression fracture of L3 lumbar vertebra, initial encounter      Physician: Min Harden MD    Visit Date: 8/15/2022    Physician Orders: PT Eval and Treat   Medical Diagnosis from Referral: Closed compression fracture of L3 lumbar vertebra, initial encounter; Compression fracture of T12 vertebra, initial encounter   Evaluation Date: 8/4/2022  Authorization Period Expiration: 07/21/2023   Plan of Care Expiration: 09/16/2022  Progress Note Due: 09/04/2022  Visit # / Visits authorized: 1/ 20 (2 total)   FOTO: Next survey due week, of 8/15/2022     Precautions: Compression fractures, osteoporosis    PTA Visit #: 1/5     Time In: 1320  Time Out: 1420  Total Billable Time: 45 minutes    SUBJECTIVE     Pt reports: "slightly nauseous lately."  She was not compliant with home exercise program because one has not been issued at this time.  Response to previous treatment: No change since initial eval  Functional change: N/A at this time    Pain: 4/10  Location: Pain in thoracic spine and LEs    OBJECTIVE     Objective Measures updated at progress report unless specified.     Treatment     Carline received the treatments listed below:      therapeutic exercises to develop increased core, paraspinals, and LE strength, as well as endurance for 45 minutes including:  Recumbent bike lvl3 x10'  Standing heel cord stretch [id number]   Hip ext x10ea   Mini squat x10   Heel raises x10   Step up & over eccentric 4" x10   Lateral step up & over 4" x10   Closed chain TKE x10ea  Seated hamstring stretch [id number]   March with core engagement x10ea   Hip abd x10   Hip add x10   LAQ x10ea   Resisted red TB trunk rotation x 10ea   Physioball rollout x10ea   UTR x10ea      Patient " Education and Home Exercises     Home Exercises Provided and Patient Education Provided     Education provided:   - Instructed in hamstring stretches and beginning of therapeutic exercise program.    Written Home Exercises Provided: The pt will be provided established HEP in next session.. Exercises were reviewed and Carline was able to demonstrate them prior to the end of the session.  Carline demonstrated fair  understanding of the education provided. See EMR under Patient Instructions for exercises provided during therapy sessions    ASSESSMENT     The pt tolerated today's Rx session well with minimal to no signs of fatigue. The pt is more comfortable in sitting and standing positions due to fracture. Carline requires minimal to moderate verbal and tactile cues in order to promote improved posture and better form with the performance of therapeutic exercises.    Carline is slowly progressing towards her goals.   Pt prognosis is Fair.     Pt will continue to benefit from skilled outpatient physical therapy to address the deficits listed in the problem list box on initial evaluation, provide pt/family education and to maximize pt's level of independence in the home and community environment.     Pt's spiritual, cultural and educational needs considered and pt agreeable to plan of care and goals.     Anticipated barriers to physical therapy: Multiple fractures    Goals:   Goals:  Short Term Goals: 3 weeks               1) Decrease max pain to < 5/10 Ongoing              2) Facilitate improved posture Ongoing              3) Facilitate improved LE flexibility Initiated              4) Patient will bend forward to perform LE dressing without increased pain Initiated     Long Term Goals: 6 weeks               1) Patient will consistently perform sit <> stand transfers with minimal UE support and appropriate assistive device Initiated              2) Patient will consistently perform rolling and scooting in bed without  increased pain Ongiong              3) Patient will consistently perform toilet transfers with no more that minimal difficulty Ongoing              4) Increase walking tolerance to > 15 minutes Ongoing              5) Increase standing tolerance to > 15 min Ongoing              6) Patient will be independent in complete HEP Ongoing    PLAN     The pt is to be progressed within the established POC as tolerated in order to accomplish goals stated above and address deficits. Plan to add pelvic tilts on Physioball with core engagement, hamstring curls, hip extension, and postural strengthening in subsequent sessions, as well as continuing to increase exercise complexity, repetitions, and resistance prn.    Kiesha Mcfadden, PTA

## 2022-08-17 ENCOUNTER — PATIENT MESSAGE (OUTPATIENT)
Dept: NEUROSURGERY | Facility: CLINIC | Age: 57
End: 2022-08-17
Payer: MEDICARE

## 2022-08-17 ENCOUNTER — TELEPHONE (OUTPATIENT)
Dept: NEUROSURGERY | Facility: CLINIC | Age: 57
End: 2022-08-17
Payer: MEDICARE

## 2022-08-17 ENCOUNTER — HOSPITAL ENCOUNTER (OUTPATIENT)
Dept: RADIOLOGY | Facility: HOSPITAL | Age: 57
Discharge: HOME | DRG: 385 | End: 2022-08-17
Attending: STUDENT IN AN ORGANIZED HEALTH CARE EDUCATION/TRAINING PROGRAM
Payer: MEDICARE

## 2022-08-17 DIAGNOSIS — S22.080A COMPRESSION FRACTURE OF T12 VERTEBRA, INITIAL ENCOUNTER: ICD-10-CM

## 2022-08-17 DIAGNOSIS — S32.030A COMPRESSION FRACTURE OF L3 LUMBAR VERTEBRA, CLOSED, INITIAL ENCOUNTER: ICD-10-CM

## 2022-08-17 PROCEDURE — 72148 MRI LUMBAR SPINE W/O DYE: CPT | Mod: TC,PO

## 2022-08-17 NOTE — TELEPHONE ENCOUNTER
Called patient no answer message left with date and time of appointment change. Slip mailed and MYCHART message sent as well.

## 2022-08-18 ENCOUNTER — HOSPITAL ENCOUNTER (INPATIENT)
Facility: HOSPITAL | Age: 57
LOS: 2 days | Discharge: HOME | DRG: 385 | End: 2022-08-20
Attending: EMERGENCY MEDICINE | Admitting: INTERNAL MEDICINE
Payer: MEDICARE

## 2022-08-18 DIAGNOSIS — K50.10 CROHN'S COLITIS: ICD-10-CM

## 2022-08-18 DIAGNOSIS — K50.018 CROHN'S DISEASE OF SMALL INTESTINE WITH OTHER COMPLICATION: Primary | ICD-10-CM

## 2022-08-18 DIAGNOSIS — R07.9 CHEST PAIN: ICD-10-CM

## 2022-08-18 LAB
25(OH)D3+25(OH)D2 SERPL-MCNC: 13 NG/ML (ref 30–96)
ALBUMIN SERPL BCP-MCNC: 3.4 G/DL (ref 3.5–5.2)
ALP SERPL-CCNC: 85 U/L (ref 55–135)
ALT SERPL W/O P-5'-P-CCNC: 29 U/L (ref 10–44)
ANION GAP SERPL CALC-SCNC: 12 MMOL/L (ref 8–16)
APTT PPP: 35.4 SEC (ref 23.3–35.1)
AST SERPL-CCNC: 23 U/L (ref 10–40)
BASOPHILS # BLD AUTO: 0.02 K/UL (ref 0–0.2)
BASOPHILS NFR BLD: 0.2 % (ref 0–1.9)
BILIRUB SERPL-MCNC: 0.9 MG/DL (ref 0.1–1)
BNP SERPL-MCNC: 48 PG/ML (ref 0–99)
BUN SERPL-MCNC: 6 MG/DL (ref 6–20)
CALCIUM SERPL-MCNC: 5.6 MG/DL (ref 8.7–10.5)
CHLORIDE SERPL-SCNC: 104 MMOL/L (ref 95–110)
CK SERPL-CCNC: 270 U/L (ref 20–180)
CO2 SERPL-SCNC: 19 MMOL/L (ref 23–29)
CREAT SERPL-MCNC: 0.7 MG/DL (ref 0.5–1.4)
D DIMER PPP IA.FEU-MCNC: 1.63 UG/ML FEU
DIFFERENTIAL METHOD: ABNORMAL
EOSINOPHIL # BLD AUTO: 0 K/UL (ref 0–0.5)
EOSINOPHIL NFR BLD: 0.1 % (ref 0–8)
ERYTHROCYTE [DISTWIDTH] IN BLOOD BY AUTOMATED COUNT: 14.6 % (ref 11.5–14.5)
ERYTHROCYTE [SEDIMENTATION RATE] IN BLOOD BY WESTERGREN METHOD: 11 MM/HR (ref 0–20)
EST. GFR  (NO RACE VARIABLE): >60 ML/MIN/1.73 M^2
FERRITIN SERPL-MCNC: 414 NG/ML (ref 20–300)
GLUCOSE SERPL-MCNC: 92 MG/DL (ref 70–110)
HCT VFR BLD AUTO: 26.8 % (ref 37–48.5)
HGB BLD-MCNC: 9 G/DL (ref 12–16)
IMM GRANULOCYTES # BLD AUTO: 0.08 K/UL (ref 0–0.04)
IMM GRANULOCYTES NFR BLD AUTO: 0.9 % (ref 0–0.5)
INR PPP: 1.5
LACTATE SERPL-SCNC: 0.7 MMOL/L (ref 0.5–1.9)
LDH SERPL L TO P-CCNC: 215 U/L (ref 110–260)
LIPASE SERPL-CCNC: 25 U/L (ref 4–60)
LYMPHOCYTES # BLD AUTO: 0.5 K/UL (ref 1–4.8)
LYMPHOCYTES NFR BLD: 5.5 % (ref 18–48)
MCH RBC QN AUTO: 31.4 PG (ref 27–31)
MCHC RBC AUTO-ENTMCNC: 33.6 G/DL (ref 32–36)
MCV RBC AUTO: 93 FL (ref 82–98)
MONOCYTES # BLD AUTO: 0.5 K/UL (ref 0.3–1)
MONOCYTES NFR BLD: 6.1 % (ref 4–15)
NEUTROPHILS # BLD AUTO: 7.6 K/UL (ref 1.8–7.7)
NEUTROPHILS NFR BLD: 87.2 % (ref 38–73)
NRBC BLD-RTO: 0 /100 WBC
PLATELET # BLD AUTO: 150 K/UL (ref 150–450)
PMV BLD AUTO: 9.6 FL (ref 9.2–12.9)
POTASSIUM SERPL-SCNC: 2.1 MMOL/L (ref 3.5–5.1)
POTASSIUM SERPL-SCNC: 2.3 MMOL/L (ref 3.5–5.1)
PROT SERPL-MCNC: 6.5 G/DL (ref 6–8.4)
PROTHROMBIN TIME: 17.4 SEC (ref 11.4–13.7)
RBC # BLD AUTO: 2.87 M/UL (ref 4–5.4)
SARS-COV-2 RDRP RESP QL NAA+PROBE: POSITIVE
SODIUM SERPL-SCNC: 135 MMOL/L (ref 136–145)
TROPONIN I SERPL DL<=0.01 NG/ML-MCNC: <0.03 NG/ML
WBC # BLD AUTO: 8.68 K/UL (ref 3.9–12.7)

## 2022-08-18 PROCEDURE — 85610 PROTHROMBIN TIME: CPT | Performed by: INTERNAL MEDICINE

## 2022-08-18 PROCEDURE — 83605 ASSAY OF LACTIC ACID: CPT | Performed by: INTERNAL MEDICINE

## 2022-08-18 PROCEDURE — 83880 ASSAY OF NATRIURETIC PEPTIDE: CPT | Performed by: INTERNAL MEDICINE

## 2022-08-18 PROCEDURE — 82306 VITAMIN D 25 HYDROXY: CPT | Performed by: INTERNAL MEDICINE

## 2022-08-18 PROCEDURE — 99285 EMERGENCY DEPT VISIT HI MDM: CPT | Mod: 25

## 2022-08-18 PROCEDURE — 96367 TX/PROPH/DG ADDL SEQ IV INF: CPT

## 2022-08-18 PROCEDURE — 96365 THER/PROPH/DIAG IV INF INIT: CPT

## 2022-08-18 PROCEDURE — 63600175 PHARM REV CODE 636 W HCPCS: Performed by: EMERGENCY MEDICINE

## 2022-08-18 PROCEDURE — 96368 THER/DIAG CONCURRENT INF: CPT

## 2022-08-18 PROCEDURE — 96375 TX/PRO/DX INJ NEW DRUG ADDON: CPT

## 2022-08-18 PROCEDURE — 25000242 PHARM REV CODE 250 ALT 637 W/ HCPCS: Performed by: INTERNAL MEDICINE

## 2022-08-18 PROCEDURE — 25000003 PHARM REV CODE 250: Performed by: INTERNAL MEDICINE

## 2022-08-18 PROCEDURE — 85651 RBC SED RATE NONAUTOMATED: CPT | Performed by: INTERNAL MEDICINE

## 2022-08-18 PROCEDURE — 84132 ASSAY OF SERUM POTASSIUM: CPT | Performed by: INTERNAL MEDICINE

## 2022-08-18 PROCEDURE — 94761 N-INVAS EAR/PLS OXIMETRY MLT: CPT

## 2022-08-18 PROCEDURE — 85730 THROMBOPLASTIN TIME PARTIAL: CPT | Performed by: INTERNAL MEDICINE

## 2022-08-18 PROCEDURE — 85025 COMPLETE CBC W/AUTO DIFF WBC: CPT | Performed by: EMERGENCY MEDICINE

## 2022-08-18 PROCEDURE — 99900031 HC PATIENT EDUCATION (STAT)

## 2022-08-18 PROCEDURE — 82550 ASSAY OF CK (CPK): CPT | Performed by: INTERNAL MEDICINE

## 2022-08-18 PROCEDURE — 25500020 PHARM REV CODE 255: Performed by: EMERGENCY MEDICINE

## 2022-08-18 PROCEDURE — 63600175 PHARM REV CODE 636 W HCPCS: Performed by: INTERNAL MEDICINE

## 2022-08-18 PROCEDURE — 83690 ASSAY OF LIPASE: CPT | Performed by: EMERGENCY MEDICINE

## 2022-08-18 PROCEDURE — 82728 ASSAY OF FERRITIN: CPT | Performed by: INTERNAL MEDICINE

## 2022-08-18 PROCEDURE — 99900035 HC TECH TIME PER 15 MIN (STAT)

## 2022-08-18 PROCEDURE — 96366 THER/PROPH/DIAG IV INF ADDON: CPT

## 2022-08-18 PROCEDURE — 25000003 PHARM REV CODE 250: Performed by: EMERGENCY MEDICINE

## 2022-08-18 PROCEDURE — 85379 FIBRIN DEGRADATION QUANT: CPT | Performed by: INTERNAL MEDICINE

## 2022-08-18 PROCEDURE — U0002 COVID-19 LAB TEST NON-CDC: HCPCS | Performed by: EMERGENCY MEDICINE

## 2022-08-18 PROCEDURE — 80053 COMPREHEN METABOLIC PANEL: CPT | Performed by: EMERGENCY MEDICINE

## 2022-08-18 PROCEDURE — 84484 ASSAY OF TROPONIN QUANT: CPT | Performed by: INTERNAL MEDICINE

## 2022-08-18 PROCEDURE — 83615 LACTATE (LD) (LDH) ENZYME: CPT | Performed by: INTERNAL MEDICINE

## 2022-08-18 PROCEDURE — 12000002 HC ACUTE/MED SURGE SEMI-PRIVATE ROOM

## 2022-08-18 PROCEDURE — 94640 AIRWAY INHALATION TREATMENT: CPT

## 2022-08-18 RX ORDER — CIPROFLOXACIN HYDROCHLORIDE 500 MG/1
500 TABLET, FILM COATED ORAL DAILY
Status: ON HOLD | COMMUNITY
Start: 2022-08-10 | End: 2022-08-20 | Stop reason: HOSPADM

## 2022-08-18 RX ORDER — ACETAMINOPHEN AND CODEINE PHOSPHATE 300; 30 MG/1; MG/1
TABLET ORAL
COMMUNITY
Start: 2022-06-20 | End: 2022-08-18

## 2022-08-18 RX ORDER — CETIRIZINE HYDROCHLORIDE, PSEUDOEPHEDRINE HYDROCHLORIDE 5; 120 MG/1; MG/1
1 TABLET, FILM COATED, EXTENDED RELEASE ORAL 2 TIMES DAILY
COMMUNITY
Start: 2022-06-20 | End: 2022-09-07

## 2022-08-18 RX ORDER — ENOXAPARIN SODIUM 100 MG/ML
1 INJECTION SUBCUTANEOUS 2 TIMES DAILY
Status: DISCONTINUED | OUTPATIENT
Start: 2022-08-18 | End: 2022-08-19

## 2022-08-18 RX ORDER — MORPHINE SULFATE 2 MG/ML
2 INJECTION, SOLUTION INTRAMUSCULAR; INTRAVENOUS
Status: COMPLETED | OUTPATIENT
Start: 2022-08-18 | End: 2022-08-18

## 2022-08-18 RX ORDER — POTASSIUM CHLORIDE 20 MEQ/1
20 TABLET, EXTENDED RELEASE ORAL
Status: DISCONTINUED | OUTPATIENT
Start: 2022-08-18 | End: 2022-08-20 | Stop reason: HOSPADM

## 2022-08-18 RX ORDER — LANOLIN ALCOHOL/MO/W.PET/CERES
800 CREAM (GRAM) TOPICAL
Status: DISCONTINUED | OUTPATIENT
Start: 2022-08-18 | End: 2022-08-20 | Stop reason: HOSPADM

## 2022-08-18 RX ORDER — SODIUM CHLORIDE 0.9 % (FLUSH) 0.9 %
10 SYRINGE (ML) INJECTION
Status: DISCONTINUED | OUTPATIENT
Start: 2022-08-18 | End: 2022-08-20 | Stop reason: HOSPADM

## 2022-08-18 RX ORDER — IBUPROFEN 200 MG
24 TABLET ORAL
Status: DISCONTINUED | OUTPATIENT
Start: 2022-08-18 | End: 2022-08-20 | Stop reason: HOSPADM

## 2022-08-18 RX ORDER — MAGNESIUM SULFATE 1 G/100ML
1 INJECTION INTRAVENOUS
Status: DISCONTINUED | OUTPATIENT
Start: 2022-08-18 | End: 2022-08-20 | Stop reason: HOSPADM

## 2022-08-18 RX ORDER — POTASSIUM CHLORIDE 7.45 MG/ML
10 INJECTION INTRAVENOUS ONCE
Status: COMPLETED | OUTPATIENT
Start: 2022-08-18 | End: 2022-08-18

## 2022-08-18 RX ORDER — ERGOCALCIFEROL 1.25 MG/1
50000 CAPSULE ORAL
COMMUNITY
End: 2022-10-03 | Stop reason: ALTCHOICE

## 2022-08-18 RX ORDER — ONDANSETRON 2 MG/ML
4 INJECTION INTRAMUSCULAR; INTRAVENOUS EVERY 6 HOURS PRN
Status: DISCONTINUED | OUTPATIENT
Start: 2022-08-18 | End: 2022-08-20 | Stop reason: HOSPADM

## 2022-08-18 RX ORDER — HYDROCODONE BITARTRATE AND ACETAMINOPHEN 10; 325 MG/1; MG/1
1 TABLET ORAL EVERY 4 HOURS PRN
Status: DISCONTINUED | OUTPATIENT
Start: 2022-08-18 | End: 2022-08-19

## 2022-08-18 RX ORDER — POTASSIUM CHLORIDE 20 MEQ/1
40 TABLET, EXTENDED RELEASE ORAL
Status: DISCONTINUED | OUTPATIENT
Start: 2022-08-18 | End: 2022-08-20 | Stop reason: HOSPADM

## 2022-08-18 RX ORDER — MAGNESIUM SULFATE HEPTAHYDRATE 40 MG/ML
4 INJECTION, SOLUTION INTRAVENOUS
Status: DISCONTINUED | OUTPATIENT
Start: 2022-08-18 | End: 2022-08-20 | Stop reason: HOSPADM

## 2022-08-18 RX ORDER — SODIUM CHLORIDE 9 MG/ML
1000 INJECTION, SOLUTION INTRAVENOUS
Status: COMPLETED | OUTPATIENT
Start: 2022-08-18 | End: 2022-08-18

## 2022-08-18 RX ORDER — ASCORBIC ACID 500 MG
500 TABLET ORAL 2 TIMES DAILY
Status: DISCONTINUED | OUTPATIENT
Start: 2022-08-18 | End: 2022-08-19

## 2022-08-18 RX ORDER — ONDANSETRON 2 MG/ML
4 INJECTION INTRAMUSCULAR; INTRAVENOUS
Status: COMPLETED | OUTPATIENT
Start: 2022-08-18 | End: 2022-08-18

## 2022-08-18 RX ORDER — DEXAMETHASONE 2 MG/1
6 TABLET ORAL DAILY
Status: DISCONTINUED | OUTPATIENT
Start: 2022-08-19 | End: 2022-08-19

## 2022-08-18 RX ORDER — ALBUTEROL SULFATE 90 UG/1
2 AEROSOL, METERED RESPIRATORY (INHALATION) EVERY 6 HOURS PRN
Status: DISCONTINUED | OUTPATIENT
Start: 2022-08-18 | End: 2022-08-20 | Stop reason: HOSPADM

## 2022-08-18 RX ORDER — MAGNESIUM SULFATE HEPTAHYDRATE 40 MG/ML
2 INJECTION, SOLUTION INTRAVENOUS
Status: DISCONTINUED | OUTPATIENT
Start: 2022-08-18 | End: 2022-08-20 | Stop reason: HOSPADM

## 2022-08-18 RX ORDER — GLUCAGON 1 MG
1 KIT INJECTION
Status: DISCONTINUED | OUTPATIENT
Start: 2022-08-18 | End: 2022-08-20 | Stop reason: HOSPADM

## 2022-08-18 RX ORDER — INSULIN ASPART 100 [IU]/ML
0-5 INJECTION, SOLUTION INTRAVENOUS; SUBCUTANEOUS
Status: DISCONTINUED | OUTPATIENT
Start: 2022-08-18 | End: 2022-08-20 | Stop reason: HOSPADM

## 2022-08-18 RX ORDER — ALBUTEROL SULFATE 90 UG/1
2 AEROSOL, METERED RESPIRATORY (INHALATION) EVERY 6 HOURS
Status: DISCONTINUED | OUTPATIENT
Start: 2022-08-18 | End: 2022-08-18

## 2022-08-18 RX ORDER — IBUPROFEN 200 MG
16 TABLET ORAL
Status: DISCONTINUED | OUTPATIENT
Start: 2022-08-18 | End: 2022-08-20 | Stop reason: HOSPADM

## 2022-08-18 RX ORDER — CALCIUM GLUCONATE 20 MG/ML
1 INJECTION, SOLUTION INTRAVENOUS
Status: COMPLETED | OUTPATIENT
Start: 2022-08-18 | End: 2022-08-18

## 2022-08-18 RX ADMIN — SODIUM CHLORIDE 1000 ML: 0.9 INJECTION, SOLUTION INTRAVENOUS at 01:08

## 2022-08-18 RX ADMIN — POTASSIUM CHLORIDE 40 MEQ: 1500 TABLET, EXTENDED RELEASE ORAL at 09:08

## 2022-08-18 RX ADMIN — HYDROCODONE BITARTRATE AND ACETAMINOPHEN 1 TABLET: 10; 325 TABLET ORAL at 10:08

## 2022-08-18 RX ADMIN — ENOXAPARIN SODIUM 50 MG: 60 INJECTION SUBCUTANEOUS at 09:08

## 2022-08-18 RX ADMIN — ONDANSETRON 4 MG: 2 INJECTION INTRAMUSCULAR; INTRAVENOUS at 09:08

## 2022-08-18 RX ADMIN — MORPHINE SULFATE 2 MG: 2 INJECTION, SOLUTION INTRAMUSCULAR; INTRAVENOUS at 01:08

## 2022-08-18 RX ADMIN — POTASSIUM CHLORIDE 10 MEQ: 7.46 INJECTION, SOLUTION INTRAVENOUS at 02:08

## 2022-08-18 RX ADMIN — CALCIUM GLUCONATE 1 G: 20 INJECTION, SOLUTION INTRAVENOUS at 02:08

## 2022-08-18 RX ADMIN — ALBUTEROL SULFATE 2 PUFF: 90 AEROSOL, METERED RESPIRATORY (INHALATION) at 07:08

## 2022-08-18 RX ADMIN — PROMETHAZINE HYDROCHLORIDE 12.5 MG: 25 INJECTION INTRAMUSCULAR; INTRAVENOUS at 04:08

## 2022-08-18 RX ADMIN — OXYCODONE HYDROCHLORIDE AND ACETAMINOPHEN 500 MG: 500 TABLET ORAL at 09:08

## 2022-08-18 RX ADMIN — ONDANSETRON 4 MG: 2 INJECTION INTRAMUSCULAR; INTRAVENOUS at 01:08

## 2022-08-18 RX ADMIN — IOHEXOL 100 ML: 350 INJECTION, SOLUTION INTRAVENOUS at 02:08

## 2022-08-18 NOTE — ED PROVIDER NOTES
Encounter Date: 8/18/2022       History     Chief Complaint   Patient presents with    Emesis     N/V and abd pain since awakening this am. Pt reports hx of crohns.      Patient presents emergency department with reported nausea vomiting onset this morning associated abdominal pain patient has a history of severe Crohn's disease not currently on any Crohn's specific medications she has had extensive surgery in the past she sees Dr. Hughes states that her last to treatments have stopped working they were debating her next course of therapy patient reports that she has been having significant nausea vomiting abdominal pain started today she reports chronic diarrhea she has not noticed any blood in her stool no recorded fever states that she has had significant difficulty with electrolyte abnormalities in her chronic diarrhea and she is concerned she may be developing hyponatremia again she has not urinated today denies any hematuria or dysuria at this time states she is currently on antibiotics for urinary tract infection states she was started on Macrobid but the resistance require to be changed to Cipro which she is currently taking        Review of patient's allergies indicates:   Allergen Reactions    Remicade [infliximab] Shortness Of Breath and Palpitations     Severe muscle and joint, and bone pain    Adalimumab Other (See Comments)    Flagyl [metronidazole] Other (See Comments)     Neuropathy    Nubain [nalbuphine] Anxiety     Upper abdominal burning      Past Medical History:   Diagnosis Date    Abnormal Pap smear of cervix     LEEP procedure, 1995    Acute deep vein thrombosis (DVT) of brachial vein of right upper extremity 01/2017    on RUE ultrasound    Anxiety     Bowel perforation     Chronic pain     Compression fracture of C-spine     Crohn's disease of both small and large intestine with intestinal obstruction 1989    Difficult intravenous access     Encounter for blood transfusion      GERD (gastroesophageal reflux disease)     Kidney stones     Nephrolithiasis     Osteoporosis     Stricture of bowel      Past Surgical History:   Procedure Laterality Date    ABSCESS DRAINAGE      ANAL FISTULOTOMY      BOWEL RESECTION      small bowel resection per Dr. Uribe 2018    CERVICAL BIOPSY  W/ LOOP ELECTRODE EXCISION       SECTION      x2    CHOLECYSTECTOMY  2000    COLON SURGERY  2015    sigmoid loop colostomy with subsequent reversal 3 years later    COLONOSCOPY N/A 2016    Procedure: COLONOSCOPY;  Surgeon: Andre Uribe MD;  Location: [facility] ENDO (4TH FLR);  Service: Endoscopy;  Laterality: N/A;    COLONOSCOPY N/A 2017    Procedure: COLONOSCOPY;  Surgeon: Dany Stock MD;  Location: [facility] ENDO (2ND FLR);  Service: Endoscopy;  Laterality: N/A;    COLONOSCOPY N/A 2017    Procedure: COLONOSCOPY;  Surgeon: Andre Uribe MD;  Location: [facility] ENDO (4TH FLR);  Service: Endoscopy;  Laterality: N/A;    COLONOSCOPY N/A 2018    Procedure: COLONOSCOPY;  Surgeon: Andre Uribe MD;  Location: [facility] ENDO (4TH FLR);  Service: Endoscopy;  Laterality: N/A;    COLONOSCOPY N/A 2018    Procedure: COLONOSCOPY;  Surgeon: Elmer Serrato MD;  Location: [facility] ENDO (2ND FLR);  Service: Endoscopy;  Laterality: N/A;    COLONOSCOPY  2018    COLONOSCOPY N/A 2018    Procedure: COLONOSCOPY;  Surgeon: Andre Uribe MD;  Location: [facility] ENDO (4TH FLR);  Service: Endoscopy;  Laterality: N/A;    COLONOSCOPY N/A 10/07/2021    Procedure: COLONOSCOPY;  Surgeon: Jose Hughes MD;  Location: [facility];  Service: Endoscopy;  Laterality: N/A;    ESOPHAGOGASTRODUODENOSCOPY N/A 10/07/2021    Procedure: EGD (ESOPHAGOGASTRODUODENOSCOPY);  Surgeon: Jose Hughes MD;  Location[facility] ENDO;  Service: Endoscopy;  Laterality: N/A;    LAPAROSCOPIC CLOSURE OF COLOSTOMY N/A 2018    Procedure: CLOSURE, COLOSTOMY, LAPAROSCOPIC;  Surgeon: Andre ERVIN  MD Jojo;  Location: [facility] OR 2ND FLR;  Service: Colon and Rectal;  Laterality: N/A;  converted to open    LYSIS OF ADHESIONS N/A 01/20/2022    Procedure: LYSIS, ADHESIONS;  Surgeon: LLUVIA Post MD;  Location: [facility] OR 2ND FLR;  Service: Colon and Rectal;  Laterality: N/A;  lasting longer than 2 hours, modifier 22      rectovaginal fistula repair  01/2018    SMALL INTESTINE SURGERY  1995    per patient 10 inches removed    SMALL INTESTINE SURGERY  02/2009    abdominal abscess drained, 3 cm colon removed, 11 cm SB removed    TUBAL LIGATION      UPPER GASTROINTESTINAL ENDOSCOPY  2000     Family History   Problem Relation Age of Onset    Cancer Maternal Aunt 66        colon ca    Heart attack Father 69    Anesthesia problems Neg Hx     Celiac disease Neg Hx     Cirrhosis Neg Hx     Colon cancer Neg Hx     Colon polyps Neg Hx     Crohn's disease Neg Hx     Cystic fibrosis Neg Hx     Esophageal cancer Neg Hx     Hemochromatosis Neg Hx     Inflammatory bowel disease Neg Hx     Irritable bowel syndrome Neg Hx     Liver cancer Neg Hx     Liver disease Neg Hx     Rectal cancer Neg Hx     Stomach cancer Neg Hx     Ulcerative colitis Neg Hx     Mars's disease Neg Hx     Lymphoma Neg Hx     Tuberculosis Neg Hx     Scleroderma Neg Hx     Rheum arthritis Neg Hx     Multiple sclerosis Neg Hx     Melanoma Neg Hx     Lupus Neg Hx     Psoriasis Neg Hx     Skin cancer Neg Hx      Social History     Tobacco Use    Smoking status: Never Smoker    Smokeless tobacco: Never Used   Substance Use Topics    Alcohol use: No     Alcohol/week: 0.0 standard drinks    Drug use: No     Review of Systems   Constitutional: Positive for fatigue. Negative for chills and fever.   Respiratory: Negative for shortness of breath.    Cardiovascular: Negative for chest pain.   Gastrointestinal: Positive for abdominal pain, diarrhea, nausea and vomiting. Negative for blood in stool.   Genitourinary: Negative for  enuresis and urgency.   Neurological: Positive for weakness.   All other systems reviewed and are negative.      Physical Exam     Initial Vitals [08/18/22 1237]   BP Pulse Resp Temp SpO2   118/60 90 18 98.6 °F (37 °C) 100 %      MAP       --         Physical Exam    Constitutional: She appears well-developed and well-nourished. She appears distressed.   HENT:   Head: Normocephalic and atraumatic.   Right Ear: External ear normal.   Left Ear: External ear normal.   Mouth/Throat: Oropharynx is clear and moist.   Eyes: Conjunctivae and EOM are normal. Pupils are equal, round, and reactive to light.   Neck: Neck supple.   Normal range of motion.  Cardiovascular: Normal rate, regular rhythm, normal heart sounds and intact distal pulses.   Pulmonary/Chest: Breath sounds normal. No respiratory distress.   Abdominal: Abdomen is soft. Bowel sounds are increased. There is generalized abdominal tenderness.   Musculoskeletal:         General: No edema. Normal range of motion.      Cervical back: Normal range of motion and neck supple.     Neurological: She is alert and oriented to person, place, and time. GCS score is 15. GCS eye subscore is 4. GCS verbal subscore is 5. GCS motor subscore is 6.   Skin: Skin is warm and dry. Capillary refill takes less than 2 seconds. No rash noted.   Psychiatric: She has a normal mood and affect. Her behavior is normal.         ED Course   Procedures  Labs Reviewed   CBC W/ AUTO DIFFERENTIAL - Abnormal; Notable for the following components:       Result Value    RBC 2.87 (*)     Hemoglobin 9.0 (*)     Hematocrit 26.8 (*)     MCH 31.4 (*)     RDW 14.6 (*)     Immature Granulocytes 0.9 (*)     Immature Grans (Abs) 0.08 (*)     Lymph # 0.5 (*)     Gran % 87.2 (*)     Lymph % 5.5 (*)     All other components within normal limits   COMPREHENSIVE METABOLIC PANEL - Abnormal; Notable for the following components:    Sodium 135 (*)     Potassium 2.3 (*)     CO2 19 (*)     Calcium 5.6 (*)     Albumin  3.4 (*)     All other components within normal limits    Narrative:     potassium and calcium   critical result(s) called and verbal readback   obtained from lynn hanley rn by CAF 08/18/2022 13:59   SARS-COV-2 RNA AMPLIFICATION, QUAL - Abnormal; Notable for the following components:    SARS-CoV-2 RNA, Amplification, Qual Positive (*)     All other components within normal limits   LIPASE   URINALYSIS, REFLEX TO URINE CULTURE   POCT GLUCOSE MONITORING CONTINUOUS          Imaging Results          CT Abdomen Pelvis With Contrast (Final result)  Result time 08/18/22 15:00:46    Final result by Ashia Stuart MD (08/18/22 15:00:46)                 Narrative:    All CT scans at this facility used dose modulation, iterative reconstruction and/or weight-based dosing when appropriate to reduce radiation doses  as low as reasonably achievable.    HISTORY: Nausea/vomiting    FINDINGS: Axial postcontrast imaging was performed with 100 mL Omnipaque 350 IV contrast . As compared to 1/19/2022    CT ABDOMEN: The lung bases are clear. There is atelectasis in left lung base. There is coronary artery calcification. There is no pericardial effusion.    There is a small hiatal hernia.    The liver is hypodense compatible with fatty infiltration. There is no biliary duct dilatation. The gallbladder is absent.  Spleen and pancreas are normal.  Adrenal glands are normal  Kidneys enhance symmetrically without hydronephrosis or calculi.    There has been a right hemicolectomy. There is dilatation of the remaining colon which has improved compared to the prior study. There are surgical anastomotic sutures at the sigmoid colon. The small bowel is decompressed.    There is no mesenteric or retroperitoneal adenopathy. The aorta is normal in caliber.    There is levoscoliosis of the lumbar spine. There is a moderate wedge compression of T12 with vertebral plana deformity. There is mild wedge compression of the superior endplate of T10-T11  and L3 with Schmorl's nodes.    CT PELVIS: The uterus, ovaries and bladder are normal. There is no free fluid.    IMPRESSION: Prior right hemicolectomy and distal small bowel resection with mild dilatation of the remainder of the colon which has improved    No evidence of small bowel obstruction    Small hiatal hernia    Fatty infiltration of the liver    Electronically signed by:  Ashia Stuart MD  8/18/2022 3:00 PM CDT Workstation: [id number]                               Medications   sodium chloride 0.9% flush 10 mL (has no administration in time range)   glucose chewable tablet 16 g (has no administration in time range)   glucose chewable tablet 24 g (has no administration in time range)   glucagon (human recombinant) injection 1 mg (has no administration in time range)   ascorbic acid (vitamin C) tablet 500 mg (500 mg Oral Given 8/18/22 2128)   multivitamin tablet (has no administration in time range)   dexAMETHasone tablet 6 mg (has no administration in time range)   dextrose 10% bolus 125 mL (has no administration in time range)   dextrose 10% bolus 250 mL (has no administration in time range)   enoxaparin injection 50 mg (50 mg Subcutaneous Given 8/18/22 2128)   insulin aspart U-100 pen 0-5 Units (has no administration in time range)   albuterol inhaler 2 puff (has no administration in time range)   ondansetron injection 4 mg (4 mg Intravenous Given 8/18/22 2154)   potassium chloride SA CR tablet 20 mEq (has no administration in time range)   potassium chloride SA CR tablet 40 mEq (has no administration in time range)   potassium chloride SA CR tablet 20 mEq (has no administration in time range)   potassium chloride SA CR tablet 40 mEq (40 mEq Oral Given 8/18/22 2154)   magnesium oxide tablet 800 mg (has no administration in time range)   magnesium sulfate 2g in water 50mL IVPB (premix) (has no administration in time range)   magnesium sulfate in dextrose IVPB (premix) 1 g (has no administration in  time range)   magnesium sulfate 2g in water 50mL IVPB (premix) (has no administration in time range)   magnesium sulfate 2g in water 50mL IVPB (premix) (has no administration in time range)   sodium phosphate 15 mmol in dextrose 5 % 250 mL IVPB (has no administration in time range)   sodium phosphate 20.01 mmol in dextrose 5 % 250 mL IVPB (has no administration in time range)   sodium phosphate 15 mmol in dextrose 5 % 250 mL IVPB (has no administration in time range)   sodium phosphate 30 mmol in dextrose 5 % 250 mL IVPB (has no administration in time range)   sodium phosphate 20.01 mmol in dextrose 5 % 250 mL IVPB (has no administration in time range)   calcium chloride 1 g in dextrose 5 % 100 mL IVPB (has no administration in time range)   calcium chloride 1 g in dextrose 5 % 100 mL IVPB (has no administration in time range)   calcium chloride 1 g in dextrose 5 % 100 mL IVPB (has no administration in time range)   0.9%  NaCl infusion (1,000 mLs Intravenous New Bag 8/18/22 1324)   ondansetron injection 4 mg (4 mg Intravenous Given 8/18/22 1324)   morphine injection 2 mg (2 mg Intravenous Given 8/18/22 1324)   calcium gluconate 1 g in NS IVPB (premixed) (0 g Intravenous Stopped 8/18/22 1928)   potassium chloride 10 mEq in 100 mL IVPB (0 mEq Intravenous Stopped 8/18/22 1623)   iohexoL (OMNIPAQUE 350) injection 100 mL (100 mLs Intravenous Given 8/18/22 1430)   promethazine (PHENERGAN) 12.5 mg in dextrose 5 % 50 mL IVPB (0 mg Intravenous Stopped 8/18/22 1650)     Medical Decision Making:   ED Management:  Laboratory evaluation patient has significant hypocalcemia hypokalemia I have begun IV potassium and calcium in the emergency department I have begun IV fluid resuscitation reviewed I have discussed patient's findings with the hospitalist who will evaluate patient emergency department for admission                      Clinical Impression:   Final diagnoses:  [K50.10] Crohn's colitis          ED Disposition Condition     Admit               Michael Mcclelland MD  08/18/22 2207       Michael Mcclelland MD  08/18/22 2200

## 2022-08-18 NOTE — ASSESSMENT & PLAN NOTE
Inpatient admission for Crohn exacerbation; electrolyte replacement; Cardiac monitoring;GI consult; clears advancing as tolerated; repeat K+ in 4 hours; AM labs for review

## 2022-08-18 NOTE — H&P
"[facility] - Emergency Dept  Hospital Medicine  History & Physical    Patient Name: Carline Chang  MRN: [id number]  Patient Class: IP- Inpatient  Admission Date: 8/18/2022  Attending Physician: Malachi Bermudez MD  Primary Care Provider: Jose Hughes MD         Patient information was obtained from patient, past medical records, ER records and ED MD.     Subjective:     Principal Problem:Crohn's colitis    Chief Complaint:   Chief Complaint   Patient presents with    Emesis     N/V and abd pain since awakening this am. Pt reports hx of crohns.         HPI: 56 year old female with history of Crohn's (s/p sub total colectomy), GERD, Renal Lithiasis and does not wish to be vaccinated against COVID presented to ED complaining waking to N/V and cramping abdominal discomfort. She became concerned when her N/V persisted all morning as she has had seizure history when she experienced electrolyte imbalance in the past from N/V secondary to her long standing Crohn's. Vomitus: foodstuffs, turning bilious; no blood or coffee grounds. Abdominal discomfort 5-6/10 generalized, waxing/waning own accord. Never has formed stool after above colectomy. No blood/black pr. No CP/SOB. No palpitations    In ED: labs reviewed and noted below: no leukocytosis with moderate normocytic anemia; trivial hyponatremia, severe hypokalemia, mild metabolic acidosis, significant hypocalcemia, bilirubin and transaminases are normal. Telemetry reviewed: NSR. CT Abdomen: "Prior right hemicolectomy and distal small bowel resection with mild dilatation of the remainder of the colon which has improved. No evidence of small bowel obstruction. Small hiatal hernia. Fatty infiltration of the liver".    Discussed with ED MD: Inpatient admission for Crohn exacerbation; electrolyte replacement; Cardiac monitoring; GI consult; clears advancing as tolerated; repeat K+ in 4 hours; AM labs for review     Addendum: After above was written, but " before patient went to floor, COVID returned positive. She is not hypoxic currently. Will not use RDV, but will start Dexamethasone as may help her Crohn flair    Past Medical History:   Diagnosis Date    Abnormal Pap smear of cervix     LEEP procedure,     Acute deep vein thrombosis (DVT) of brachial vein of right upper extremity 2017    on RUE ultrasound    Anxiety     Bowel perforation     Chronic pain     Compression fracture of C-spine     Crohn's disease of both small and large intestine with intestinal obstruction     Difficult intravenous access     Encounter for blood transfusion     GERD (gastroesophageal reflux disease)     Kidney stones     Nephrolithiasis     Osteoporosis     Stricture of bowel        Past Surgical History:   Procedure Laterality Date    ABSCESS DRAINAGE      ANAL FISTULOTOMY      BOWEL RESECTION      small bowel resection per Dr. Uribe 2018    CERVICAL BIOPSY  W/ LOOP ELECTRODE EXCISION       SECTION      x2    CHOLECYSTECTOMY      COLON SURGERY  2015    sigmoid loop colostomy with subsequent reversal 3 years later    COLONOSCOPY N/A 2016    Procedure: COLONOSCOPY;  Surgeon: Andre Uribe MD;  [facility]  Service: Endoscopy;  Laterality: N/A;    COLONOSCOPY N/A 2017    Procedure: COLONOSCOPY;  Surgeon: Dany Stock MD;  Location: [facility] (2ND FLR);  Service: Endoscopy;  Laterality: N/A;    COLONOSCOPY N/A 2017    Procedure: COLONOSCOPY;  Surgeon: Andre Uribe MD;  Location: [facility] (4TH FLR);  Service: Endoscopy;  Laterality: N/A;    COLONOSCOPY N/A 2018    Procedure: COLONOSCOPY;  Surgeon: Andre Uribe MD;  Location: [facility] (4TH FLR);  Service: Endoscopy;  Laterality: N/A;    COLONOSCOPY N/A 2018    Procedure: COLONOSCOPY;  Surgeon: Elmer Serrato MD;  Location: [facility] (2ND FLR);  Service: Endoscopy;  Laterality: N/A;    COLONOSCOPY   03/24/2018    COLONOSCOPY N/A 07/06/2018    Procedure: COLONOSCOPY;  Surgeon: Andre Uribe MD;  Location: [facility] ENDO (4TH FLR);  Service: Endoscopy;  Laterality: N/A;    COLONOSCOPY N/A 10/07/2021    Procedure: COLONOSCOPY;  Surgeon: Jose Hughse MD;  Location: [facility] ENDO;  Service: Endoscopy;  Laterality: N/A;    ESOPHAGOGASTRODUODENOSCOPY N/A 10/07/2021    Procedure: EGD (ESOPHAGOGASTRODUODENOSCOPY);  Surgeon: Jose Hughes MD;  Location: [facility] ENDO;  Service: Endoscopy;  Laterality: N/A;    LAPAROSCOPIC CLOSURE OF COLOSTOMY N/A 08/29/2018    Procedure: CLOSURE, COLOSTOMY, LAPAROSCOPIC;  Surgeon: Andre Uribe MD;  Location: [facility] OR 2ND FLR;  Service: Colon and Rectal;  Laterality: N/A;  converted to open    LYSIS OF ADHESIONS N/A 01/20/2022    Procedure: LYSIS, ADHESIONS;  Surgeon: LLUVIA Post MD;  Location: [facility] OR 2ND FLR;  Service: Colon and Rectal;  Laterality: N/A;  lasting longer than 2 hours, modifier 22      rectovaginal fistula repair  01/2018    SMALL INTESTINE SURGERY  1995    per patient 10 inches removed    SMALL INTESTINE SURGERY  02/2009    abdominal abscess drained, 3 cm colon removed, 11 cm SB removed    TUBAL LIGATION      UPPER GASTROINTESTINAL ENDOSCOPY  2000       Review of patient's allergies indicates:   Allergen Reactions    Remicade [infliximab] Shortness Of Breath and Palpitations     Severe muscle and joint, and bone pain    Adalimumab Other (See Comments)    Flagyl [metronidazole] Other (See Comments)     Neuropathy    Nubain [nalbuphine] Anxiety     Upper abdominal burning        No current facility-administered medications on file prior to encounter.     Current Outpatient Medications on File Prior to Encounter   Medication Sig    calcium carbonate (TUMS ORAL) Take 1 tablet by mouth as needed (acid reflux).    CIPRO 500 mg tablet Take 500 mg by mouth once daily.    ergocalciferol (ERGOCALCIFEROL) 50,000 unit Cap Take 50,000 Units by mouth every 7  days.    HYDROcodone-acetaminophen (NORCO)  mg per tablet Take 1 tablet by mouth every 6 (six) hours as needed for Pain.    levETIRAcetam (KEPPRA) 500 MG Tab Take 1 tablet (500 mg total) by mouth 2 (two) times daily.    loperamide (IMODIUM) 2 mg capsule Take 2 capsules (4 mg total) by mouth 4 (four) times daily.    OYSTER SHELL CALCIUM 500 500 mg calcium (1,250 mg) tablet Take 2 tablets by mouth 2 (two) times daily.    pantoprazole (PROTONIX) 40 MG tablet Take 40 mg by mouth once daily.    potassium chloride SA (K-DUR,KLOR-CON) 20 MEQ tablet Take 40 mEq by mouth 2 (two) times daily.    promethazine (PHENERGAN) 25 MG tablet Take 1 tablet (25 mg total) by mouth every 4 (four) hours.    SENNA PLUS 8.6-50 mg per tablet Take 1 tablet by mouth 2 (two) times a day.    spironolactone (ALDACTONE) 25 MG tablet Take 25 mg by mouth once daily.    venlafaxine (EFFEXOR) 75 MG tablet Take 75 mg by mouth 2 (two) times daily.    [DISCONTINUED] acetaminophen-codeine 300-30mg (TYLENOL #3) 300-30 mg Tab Take 1 tablet by mouth every 6 (six) hours as needed.    [DISCONTINUED] aMILoride (MIDAMOR) 5 MG Tab Take 5 mg by mouth once daily.    [DISCONTINUED] calcium-vitamin D3 (OS-CHEIKH 500 + D3) 500 mg-5 mcg (200 unit) per tablet Take 2 tablets by mouth 2 (two) times daily with meals.    [DISCONTINUED] cholestyramine-aspartame (QUESTRAN LIGHT) 4 gram PwPk Take 1 packet (4 g total) by mouth 2 (two) times daily.     Family History       Problem Relation (Age of Onset)    Cancer Maternal Aunt (66)    Heart attack Father (69)          Tobacco Use    Smoking status: Never Smoker    Smokeless tobacco: Never Used   Substance and Sexual Activity    Alcohol use: No     Alcohol/week: 0.0 standard drinks    Drug use: No    Sexual activity: Not Currently     Review of Systems   Constitutional: Negative.    HENT: Negative.     Eyes: Negative.    Respiratory: Negative.     Cardiovascular: Negative.    Gastrointestinal:  Positive for  abdominal pain, diarrhea, nausea and vomiting.   Endocrine: Negative.    Genitourinary: Negative.    Musculoskeletal: Negative.    Skin: Negative.    Allergic/Immunologic: Negative.    Neurological: Negative.    Hematological: Negative.    All other systems reviewed and are negative.  Objective:     Vital Signs (Most Recent):  Temp: 98.6 °F (37 °C) (08/18/22 1237)  Pulse: 90 (08/18/22 1237)  Resp: 20 (08/18/22 1324)  BP: 118/60 (08/18/22 1237)  SpO2: 100 % (08/18/22 1237) Vital Signs (24h Range):  Temp:  [98.6 °F (37 °C)] 98.6 °F (37 °C)  Pulse:  [90] 90  Resp:  [18-20] 20  SpO2:  [100 %] 100 %  BP: (118)/(60) 118/60        There is no height or weight on file to calculate BMI.    Physical Exam  Vitals and nursing note reviewed.   Constitutional:       Appearance: She is well-developed.      Comments: Cachetic    HENT:      Head: Normocephalic and atraumatic.      Right Ear: External ear normal.      Left Ear: External ear normal.      Nose: Nose normal.   Eyes:      Conjunctiva/sclera: Conjunctivae normal.      Pupils: Pupils are equal, round, and reactive to light.   Cardiovascular:      Rate and Rhythm: Normal rate and regular rhythm.      Heart sounds: Normal heart sounds.   Pulmonary:      Effort: Pulmonary effort is normal.      Breath sounds: Normal breath sounds.   Abdominal:      General: Bowel sounds are normal.      Palpations: Abdomen is soft.      Comments: Soft with voluntary guarding; no peritoneal sign   Musculoskeletal:         General: Normal range of motion.      Cervical back: Normal range of motion and neck supple.   Skin:     General: Skin is warm and dry.      Capillary Refill: Capillary refill takes less than 2 seconds.   Neurological:      Mental Status: She is alert and oriented to person, place, and time.   Psychiatric:         Behavior: Behavior normal.         Thought Content: Thought content normal.         Judgment: Judgment normal.         CRANIAL NERVES     CN III, IV, VI   Pupils  are equal, round, and reactive to light.     Significant Labs: All pertinent labs within the past 24 hours have been reviewed.  CBC:   Recent Labs   Lab 08/18/22  1239   WBC 8.68   HGB 9.0*   HCT 26.8*        CMP:   Recent Labs   Lab 08/18/22  1239   *   K 2.3*      CO2 19*   GLU 92   BUN 6   CREATININE 0.7   CALCIUM 5.6*   PROT 6.5   ALBUMIN 3.4*   BILITOT 0.9   ALKPHOS 85   AST 23   ALT 29   ANIONGAP 12       Significant Imaging: I have reviewed all pertinent imaging results/findings within the past 24 hours.    Assessment/Plan:     * Crohn's colitis  Inpatient admission for Crohn exacerbation; electrolyte replacement; Cardiac monitoring;GI consult; clears advancing as tolerated; repeat K+ in 4 hours; AM labs for review         Electrolyte abnormality  Inpatient admission for Crohn exacerbation; electrolyte replacement; Cardiac monitoring;GI consult; clears advancing as tolerated; repeat K+ in 4 hours; AM labs for review           VTE Risk Mitigation (From admission, onward)    None             Malachi Bermudez MD  Department of Hospital Medicine   [facility] - Emergency Dept

## 2022-08-18 NOTE — HPI
"56 year old female with history of Crohn's (s/p sub total colectomy), GERD, Renal Lithiasis and does not wish to be vaccinated against COVID presented to ED complaining waking to N/V and cramping abdominal discomfort. She became concerned when her N/V persisted all morning as she has had seizure history when she experienced electrolyte imbalance in the past from N/V secondary to her long standing Crohn's. Vomitus: foodstuffs, turning bilious; no blood or coffee grounds. Abdominal discomfort 5-6/10 generalized, waxing/waning own accord. Never has formed stool after above colectomy. No blood/black pr. No CP/SOB. No palpitations    In ED: labs reviewed and noted below: no leukocytosis with moderate normocytic anemia; trivial hyponatremia, severe hypokalemia, mild metabolic acidosis, significant hypocalcemia, bilirubin and transaminases are normal. Telemetry reviewed: NSR. CT Abdomen: "Prior right hemicolectomy and distal small bowel resection with mild dilatation of the remainder of the colon which has improved. No evidence of small bowel obstruction. Small hiatal hernia. Fatty infiltration of the liver".    Discussed with ED MD: Inpatient admission for Crohn exacerbation; electrolyte replacement; Cardiac monitoring; GI consult; clears advancing as tolerated; repeat K+ in 4 hours; AM labs for review     "
187.96

## 2022-08-18 NOTE — SUBJECTIVE & OBJECTIVE
Past Medical History:   Diagnosis Date    Abnormal Pap smear of cervix     LEEP procedure,     Acute deep vein thrombosis (DVT) of brachial vein of right upper extremity 2017    on RUE ultrasound    Anxiety     Bowel perforation     Chronic pain     Compression fracture of C-spine     Crohn's disease of both small and large intestine with intestinal obstruction     Difficult intravenous access     Encounter for blood transfusion     GERD (gastroesophageal reflux disease)     Kidney stones     Nephrolithiasis     Osteoporosis     Stricture of bowel        Past Surgical History:   Procedure Laterality Date    ABSCESS DRAINAGE      ANAL FISTULOTOMY      BOWEL RESECTION      small bowel resection per Dr. Uribe 2018    CERVICAL BIOPSY  W/ LOOP ELECTRODE EXCISION       SECTION      x2    CHOLECYSTECTOMY  2000    COLON SURGERY  2015    sigmoid loop colostomy with subsequent reversal 3 years later    COLONOSCOPY N/A 2016    Procedure: COLONOSCOPY;  Surgeon: Andre Uribe MD;  Location: [facility] ENDO (4TH FLR);  Service: Endoscopy;  Laterality: N/A;    COLONOSCOPY N/A 2017    Procedure: COLONOSCOPY;  Surgeon: Dany Stock MD;  Location: [facility] ENDO (2ND FLR);  Service: Endoscopy;  Laterality: N/A;    COLONOSCOPY N/A 2017    Procedure: COLONOSCOPY;  Surgeon: Andre Uribe MD;  Location: [facility] ENDO (4TH FLR);  Service: Endoscopy;  Laterality: N/A;    COLONOSCOPY N/A 2018    Procedure: COLONOSCOPY;  Surgeon: Andre Uribe MD;  Location: [facility] ENDO (4TH FLR);  Service: Endoscopy;  Laterality: N/A;    COLONOSCOPY N/A 2018    Procedure: COLONOSCOPY;  Surgeon: Elmer Serrato MD;  Location: [facility] ENDO (2ND FLR);  Service: Endoscopy;  Laterality: N/A;    COLONOSCOPY  2018    COLONOSCOPY N/A 2018    Procedure: COLONOSCOPY;  Surgeon: Andre Uribe MD;  Location: [facility] ENDO (4TH FLR);  Service: Endoscopy;  Laterality: N/A;    COLONOSCOPY N/A  10/07/2021    Procedure: COLONOSCOPY;  Surgeon: Jose Hughes MD;  Location: [facility] ENDO;  Service: Endoscopy;  Laterality: N/A;    ESOPHAGOGASTRODUODENOSCOPY N/A 10/07/2021    Procedure: EGD (ESOPHAGOGASTRODUODENOSCOPY);  Surgeon: Jose Hughes MD;  Location: [facility] ENDO;  Service: Endoscopy;  Laterality: N/A;    LAPAROSCOPIC CLOSURE OF COLOSTOMY N/A 08/29/2018    Procedure: CLOSURE, COLOSTOMY, LAPAROSCOPIC;  Surgeon: Andre Uribe MD;  Location: [facility] FLR;  Service: Colon and Rectal;  Laterality: N/A;  converted to open    LYSIS OF ADHESIONS N/A 01/20/2022    Procedure: LYSIS, ADHESIONS;  Surgeon: LLUVIA Post MD;  Location: [facility] OR 2ND FLR;  Service: Colon and Rectal;  Laterality: N/A;  lasting longer than 2 hours, modifier 22      rectovaginal fistula repair  01/2018    SMALL INTESTINE SURGERY  1995    per patient 10 inches removed    SMALL INTESTINE SURGERY  02/2009    abdominal abscess drained, 3 cm colon removed, 11 cm SB removed    TUBAL LIGATION      UPPER GASTROINTESTINAL ENDOSCOPY  2000       Review of patient's allergies indicates:   Allergen Reactions    Remicade [infliximab] Shortness Of Breath and Palpitations     Severe muscle and joint, and bone pain    Adalimumab Other (See Comments)    Flagyl [metronidazole] Other (See Comments)     Neuropathy    Nubain [nalbuphine] Anxiety     Upper abdominal burning        No current facility-administered medications on file prior to encounter.     Current Outpatient Medications on File Prior to Encounter   Medication Sig    calcium carbonate (TUMS ORAL) Take 1 tablet by mouth as needed (acid reflux).    CIPRO 500 mg tablet Take 500 mg by mouth once daily.    ergocalciferol (ERGOCALCIFEROL) 50,000 unit Cap Take 50,000 Units by mouth every 7 days.    HYDROcodone-acetaminophen (NORCO)  mg per tablet Take 1 tablet by mouth every 6 (six) hours as needed for Pain.    levETIRAcetam (KEPPRA) 500 MG Tab Take 1 tablet (500 mg total) by mouth 2 (two)  times daily.    loperamide (IMODIUM) 2 mg capsule Take 2 capsules (4 mg total) by mouth 4 (four) times daily.    OYSTER SHELL CALCIUM 500 500 mg calcium (1,250 mg) tablet Take 2 tablets by mouth 2 (two) times daily.    pantoprazole (PROTONIX) 40 MG tablet Take 40 mg by mouth once daily.    potassium chloride SA (K-DUR,KLOR-CON) 20 MEQ tablet Take 40 mEq by mouth 2 (two) times daily.    promethazine (PHENERGAN) 25 MG tablet Take 1 tablet (25 mg total) by mouth every 4 (four) hours.    SENNA PLUS 8.6-50 mg per tablet Take 1 tablet by mouth 2 (two) times a day.    spironolactone (ALDACTONE) 25 MG tablet Take 25 mg by mouth once daily.    venlafaxine (EFFEXOR) 75 MG tablet Take 75 mg by mouth 2 (two) times daily.    [DISCONTINUED] acetaminophen-codeine 300-30mg (TYLENOL #3) 300-30 mg Tab Take 1 tablet by mouth every 6 (six) hours as needed.    [DISCONTINUED] aMILoride (MIDAMOR) 5 MG Tab Take 5 mg by mouth once daily.    [DISCONTINUED] calcium-vitamin D3 (OS-CHEIKH 500 + D3) 500 mg-5 mcg (200 unit) per tablet Take 2 tablets by mouth 2 (two) times daily with meals.    [DISCONTINUED] cholestyramine-aspartame (QUESTRAN LIGHT) 4 gram PwPk Take 1 packet (4 g total) by mouth 2 (two) times daily.     Family History       Problem Relation (Age of Onset)    Cancer Maternal Aunt (66)    Heart attack Father (69)          Tobacco Use    Smoking status: Never Smoker    Smokeless tobacco: Never Used   Substance and Sexual Activity    Alcohol use: No     Alcohol/week: 0.0 standard drinks    Drug use: No    Sexual activity: Not Currently     Review of Systems   Constitutional: Negative.    HENT: Negative.     Eyes: Negative.    Respiratory: Negative.     Cardiovascular: Negative.    Gastrointestinal:  Positive for abdominal pain, diarrhea, nausea and vomiting.   Endocrine: Negative.    Genitourinary: Negative.    Musculoskeletal: Negative.    Skin: Negative.    Allergic/Immunologic: Negative.    Neurological: Negative.    Hematological:  Negative.    All other systems reviewed and are negative.  Objective:     Vital Signs (Most Recent):  Temp: 98.6 °F (37 °C) (08/18/22 1237)  Pulse: 90 (08/18/22 1237)  Resp: 20 (08/18/22 1324)  BP: 118/60 (08/18/22 1237)  SpO2: 100 % (08/18/22 1237) Vital Signs (24h Range):  Temp:  [98.6 °F (37 °C)] 98.6 °F (37 °C)  Pulse:  [90] 90  Resp:  [18-20] 20  SpO2:  [100 %] 100 %  BP: (118)/(60) 118/60        There is no height or weight on file to calculate BMI.    Physical Exam  Vitals and nursing note reviewed.   Constitutional:       Appearance: She is well-developed.      Comments: Cachetic    HENT:      Head: Normocephalic and atraumatic.      Right Ear: External ear normal.      Left Ear: External ear normal.      Nose: Nose normal.   Eyes:      Conjunctiva/sclera: Conjunctivae normal.      Pupils: Pupils are equal, round, and reactive to light.   Cardiovascular:      Rate and Rhythm: Normal rate and regular rhythm.      Heart sounds: Normal heart sounds.   Pulmonary:      Effort: Pulmonary effort is normal.      Breath sounds: Normal breath sounds.   Abdominal:      General: Bowel sounds are normal.      Palpations: Abdomen is soft.      Comments: Soft with voluntary guarding; no peritoneal sign   Musculoskeletal:         General: Normal range of motion.      Cervical back: Normal range of motion and neck supple.   Skin:     General: Skin is warm and dry.      Capillary Refill: Capillary refill takes less than 2 seconds.   Neurological:      Mental Status: She is alert and oriented to person, place, and time.   Psychiatric:         Behavior: Behavior normal.         Thought Content: Thought content normal.         Judgment: Judgment normal.         CRANIAL NERVES     CN III, IV, VI   Pupils are equal, round, and reactive to light.     Significant Labs: All pertinent labs within the past 24 hours have been reviewed.  CBC:   Recent Labs   Lab 08/18/22  1239   WBC 8.68   HGB 9.0*   HCT 26.8*        CMP:   Recent  Labs   Lab 08/18/22  1239   *   K 2.3*      CO2 19*   GLU 92   BUN 6   CREATININE 0.7   CALCIUM 5.6*   PROT 6.5   ALBUMIN 3.4*   BILITOT 0.9   ALKPHOS 85   AST 23   ALT 29   ANIONGAP 12       Significant Imaging: I have reviewed all pertinent imaging results/findings within the past 24 hours.

## 2022-08-18 NOTE — CONSULTS
GASTROENTEROLOGY INPATIENT CONSULT NOTE  Patient Name: Carline Chang  Patient MRN: [id number]  Patient : 1965    Admit Date: 2022  Service date: 2022    Reason for Consult: Crohn's; n/v    PCP: Jose Hughes MD    Chief Complaint   Patient presents with    Emesis     N/V and abd pain since awakening this am. Pt reports hx of crohns.        HPI: Patient is a 56 y.o. female with PMHx of Crohn's s/p multiple bowel resections, hemicolectomy w/colostomy and subsequent colostomy reversal, history of medical non-compliance, pancytopenia d/t Imuran therapy & other history as listed below presents to the ER today with 4 day history of persistent nausea and onset of vomiting today. Patient denies any fever, chills or change in bowel habits. She chronically has 3-4 loose, non-bloody stools daily. She reports some fecal urgency with +nocturnal symptoms occasionally. She is currently not on medical treatment for Crohn's but states she is in the process of starting biologic therapy. She failed Humira and Remicade in the past and had bone marrow suppression from Imuran. She is followed by Dr. Hughes.     CHART REVIEW:  CT 22: no bowel obstruction; no acute changes  Exp lap 2022- Dr. Post- lysis of adhesions, repair of ileosigmoid fistula, ileocolonic resection, hernia repair- pathology Crohn's with ileitis no dysplasia  Colonoscopy 10/2021- Dr. Hughes- Crohn's with Ileitis- pathology with focal chronic and active Ileitis. Random biopsies normal      Past Medical History:  Past Medical History:   Diagnosis Date    Abnormal Pap smear of cervix     LEEP procedure,     Acute deep vein thrombosis (DVT) of brachial vein of right upper extremity 2017    on RUE ultrasound    Anxiety     Bowel perforation     Chronic pain     Compression fracture of C-spine     Crohn's disease of both small and large intestine with intestinal obstruction     Difficult intravenous access     Encounter  for blood transfusion     GERD (gastroesophageal reflux disease)     Kidney stones     Nephrolithiasis     Osteoporosis     Stricture of bowel         Past Surgical History:  Past Surgical History:   Procedure Laterality Date    ABSCESS DRAINAGE      ANAL FISTULOTOMY      BOWEL RESECTION      small bowel resection per Dr. Uribe 2018    CERVICAL BIOPSY  W/ LOOP ELECTRODE EXCISION       SECTION      x2    CHOLECYSTECTOMY  2000    COLON SURGERY  2015    sigmoid loop colostomy with subsequent reversal 3 years later    COLONOSCOPY N/A 2016    Procedure: COLONOSCOPY;  Surgeon: Andre Uribe MD;  Location: [facility] ENDO (4TH FLR);  Service: Endoscopy;  Laterality: N/A;    COLONOSCOPY N/A 2017    Procedure: COLONOSCOPY;  Surgeon: Dany Stock MD;  Location: [facility] ENDO (2ND FLR);  Service: Endoscopy;  Laterality: N/A;    COLONOSCOPY N/A 2017    Procedure: COLONOSCOPY;  Surgeon: Andre Uribe MD;  Location: [facility] ENDO (4TH FLR);  Service: Endoscopy;  Laterality: N/A;    COLONOSCOPY N/A 2018    Procedure: COLONOSCOPY;  Surgeon: Andre Uribe MD;  Location: [facility] ENDO (4TH FLR);  Service: Endoscopy;  Laterality: N/A;    COLONOSCOPY N/A 2018    Procedure: COLONOSCOPY;  Surgeon: Elmer Serrato MD;  Location: [facility] ENDO (2ND FLR);  Service: Endoscopy;  Laterality: N/A;    COLONOSCOPY  2018    COLONOSCOPY N/A 2018    Procedure: COLONOSCOPY;  Surgeon: Andre Uribe MD;  Location: [facility] ENDO (4TH FLR);  Service: Endoscopy;  Laterality: N/A;    COLONOSCOPY N/A 10/07/2021    Procedure: COLONOSCOPY;  Surgeon: Jose Hughes MD;  Location: [facility];  Service: Endoscopy;  Laterality: N/A;    ESOPHAGOGASTRODUODENOSCOPY N/A 10/07/2021    Procedure: EGD (ESOPHAGOGASTRODUODENOSCOPY);  Surgeon: Jose Hughes MD;  Location: [facility] ENDO;  Service: Endoscopy;  Laterality: N/A;    LAPAROSCOPIC CLOSURE OF COLOSTOMY N/A 2018    Procedure:  CLOSURE, COLOSTOMY, LAPAROSCOPIC;  Surgeon: Andre Uribe MD;  Location: [facility] OR 2ND FLR;  Service: Colon and Rectal;  Laterality: N/A;  converted to open    LYSIS OF ADHESIONS N/A 01/20/2022    Procedure: LYSIS, ADHESIONS;  Surgeon: LLUVIA Post MD;  Location: [facility] OR 2ND FLR;  Service: Colon and Rectal;  Laterality: N/A;  lasting longer than 2 hours, modifier 22      rectovaginal fistula repair  01/2018    SMALL INTESTINE SURGERY  1995    per patient 10 inches removed    SMALL INTESTINE SURGERY  02/2009    abdominal abscess drained, 3 cm colon removed, 11 cm SB removed    TUBAL LIGATION      UPPER GASTROINTESTINAL ENDOSCOPY  2000        Home Medications:  (Not in a hospital admission)      Inpatient Medications:   calcium gluconate IVPB  1 g Intravenous ED 1 Time    promethazine (PHENERGAN) IVPB  12.5 mg Intravenous ED 1 Time         Review of patient's allergies indicates:   Allergen Reactions    Remicade [infliximab] Shortness Of Breath and Palpitations     Severe muscle and joint, and bone pain    Adalimumab Other (See Comments)    Flagyl [metronidazole] Other (See Comments)     Neuropathy    Nubain [nalbuphine] Anxiety     Upper abdominal burning        Social History:   Social History     Occupational History    Not on file   Tobacco Use    Smoking status: Never Smoker    Smokeless tobacco: Never Used   Substance and Sexual Activity    Alcohol use: No     Alcohol/week: 0.0 standard drinks    Drug use: No    Sexual activity: Not Currently       Family History:   Family History   Problem Relation Age of Onset    Cancer Maternal Aunt 66        colon ca    Heart attack Father 69    Anesthesia problems Neg Hx     Celiac disease Neg Hx     Cirrhosis Neg Hx     Colon cancer Neg Hx     Colon polyps Neg Hx     Crohn's disease Neg Hx     Cystic fibrosis Neg Hx     Esophageal cancer Neg Hx     Hemochromatosis Neg Hx     Inflammatory bowel disease Neg Hx     Irritable bowel  syndrome Neg Hx     Liver cancer Neg Hx     Liver disease Neg Hx     Rectal cancer Neg Hx     Stomach cancer Neg Hx     Ulcerative colitis Neg Hx     Mars's disease Neg Hx     Lymphoma Neg Hx     Tuberculosis Neg Hx     Scleroderma Neg Hx     Rheum arthritis Neg Hx     Multiple sclerosis Neg Hx     Melanoma Neg Hx     Lupus Neg Hx     Psoriasis Neg Hx     Skin cancer Neg Hx        Review of Systems:  A 10 point review of systems was performed and was normal, except as mentioned in the HPI, including constitutional, HEENT, heme, lymph, cardiovascular, respiratory, gastrointestinal, genitourinary, neurologic, endocrine, psychiatric and musculoskeletal.      OBJECTIVE:    Physical Exam:  24 Hour Vital Sign Ranges: Temp:  [98.6 °F (37 °C)] 98.6 °F (37 °C)  Pulse:  [90] 90  Resp:  [18-20] 20  SpO2:  [100 %] 100 %  BP: (118)/(60) 118/60  Most recent vitals: /60 (BP Location: Left arm, Patient Position: Lying)   Pulse 90   Temp 98.6 °F (37 °C) (Oral)   Resp 20   LMP 05/30/2009   SpO2 100%    GEN: well-developed, mildly uncomfortable, awake and alert, non-toxic appearing adult  HEENT: PERRL, sclera anicteric, oral mucosa pale  NECK: trachea midline; Good ROM  CV: regular rate and rhythm, no murmurs or gallops  RESP: clear to auscultation bilaterally, no wheezes, rhonci or rales  ABD: soft, +lower abdominal tenderness, hypoactive bowel sounds  EXT: no swelling or edema, 2+ pulses distally  SKIN: no rashes or jaundice  PSYCH: normal affect    Labs:   Recent Labs     08/18/22  1239   WBC 8.68   MCV 93        Recent Labs     08/18/22  1239   *   K 2.3*      CO2 19*   BUN 6   GLU 92     No results for input(s): ALB in the last 72 hours.    Invalid input(s): ALKP, SGOT, SGPT, TBIL, DBIL, TPRO  No results for input(s): PT, INR, PTT in the last 72 hours.      Radiology Review:  CT Abdomen Pelvis With Contrast   Final Result            IMPRESSION / RECOMMENDATIONS:    Plan:  1.  Crohn's  -Anemia secondary to Crohn's but does appear to have worsened over the past few months. Denies any bloody stools.   -Do not feel like acute n/v is result of Crohn's flare & CT does not demonstrate bowel obstruction. she is still in the process of starting needing biologic therapy with Dr. Hughes  -Continue to follow-up with Dr. Hughes     2. N/V  -Differential includes infectious/viral gastroenteritis/gastritis vs medication induced/side effect (started Cipro 5 days ago) vs other  -Takes protonix daily; can increase to BID for now  -Supportive care w/IV hydration and electrolyte correction. Repeat K+ this evening    Thank you for this consult.    Carline Anand  8/18/2022  4:32 PM

## 2022-08-19 PROBLEM — E55.9 VITAMIN D INSUFFICIENCY: Status: ACTIVE | Noted: 2022-08-19

## 2022-08-19 PROBLEM — E83.51 HYPOCALCEMIA: Status: ACTIVE | Noted: 2022-08-19

## 2022-08-19 PROBLEM — E83.42 HYPOMAGNESEMIA: Status: ACTIVE | Noted: 2022-08-19

## 2022-08-19 PROBLEM — E87.6 HYPOKALEMIA: Status: ACTIVE | Noted: 2022-08-19

## 2022-08-19 PROBLEM — U07.1 COVID-19 VIRUS INFECTION: Status: ACTIVE | Noted: 2022-08-19

## 2022-08-19 LAB
ALBUMIN SERPL BCP-MCNC: 3.1 G/DL (ref 3.5–5.2)
ALBUMIN SERPL BCP-MCNC: 3.1 G/DL (ref 3.5–5.2)
ALBUMIN SERPL BCP-MCNC: 3.2 G/DL (ref 3.5–5.2)
ALP SERPL-CCNC: 82 U/L (ref 55–135)
ALP SERPL-CCNC: 83 U/L (ref 55–135)
ALP SERPL-CCNC: 87 U/L (ref 55–135)
ALT SERPL W/O P-5'-P-CCNC: 26 U/L (ref 10–44)
ALT SERPL W/O P-5'-P-CCNC: 26 U/L (ref 10–44)
ALT SERPL W/O P-5'-P-CCNC: 28 U/L (ref 10–44)
ANION GAP SERPL CALC-SCNC: 12 MMOL/L (ref 8–16)
ANION GAP SERPL CALC-SCNC: 8 MMOL/L (ref 8–16)
ANION GAP SERPL CALC-SCNC: 9 MMOL/L (ref 8–16)
AST SERPL-CCNC: 21 U/L (ref 10–40)
AST SERPL-CCNC: 23 U/L (ref 10–40)
AST SERPL-CCNC: 24 U/L (ref 10–40)
BASOPHILS # BLD AUTO: 0.04 K/UL (ref 0–0.2)
BASOPHILS NFR BLD: 0.6 % (ref 0–1.9)
BILIRUB SERPL-MCNC: 0.6 MG/DL (ref 0.1–1)
BILIRUB SERPL-MCNC: 0.6 MG/DL (ref 0.1–1)
BILIRUB SERPL-MCNC: 1.5 MG/DL (ref 0.1–1)
BILIRUB UR QL STRIP: NEGATIVE
BUN SERPL-MCNC: 7 MG/DL (ref 6–20)
BUN SERPL-MCNC: 7 MG/DL (ref 6–20)
BUN SERPL-MCNC: 8 MG/DL (ref 6–20)
CA-I BLDV-SCNC: 0.94 MMOL/L (ref 1.06–1.42)
CA-I BLDV-SCNC: 1.04 MMOL/L (ref 1.06–1.42)
CALCIUM SERPL-MCNC: 5.6 MG/DL (ref 8.7–10.5)
CALCIUM SERPL-MCNC: 7.4 MG/DL (ref 8.7–10.5)
CALCIUM SERPL-MCNC: 8.1 MG/DL (ref 8.7–10.5)
CHLORIDE SERPL-SCNC: 104 MMOL/L (ref 95–110)
CHLORIDE SERPL-SCNC: 108 MMOL/L (ref 95–110)
CHLORIDE SERPL-SCNC: 109 MMOL/L (ref 95–110)
CLARITY UR: CLEAR
CO2 SERPL-SCNC: 20 MMOL/L (ref 23–29)
CO2 SERPL-SCNC: 21 MMOL/L (ref 23–29)
CO2 SERPL-SCNC: 22 MMOL/L (ref 23–29)
COLOR UR: YELLOW
CREAT SERPL-MCNC: 0.7 MG/DL (ref 0.5–1.4)
CREAT SERPL-MCNC: 0.7 MG/DL (ref 0.5–1.4)
CREAT SERPL-MCNC: 0.8 MG/DL (ref 0.5–1.4)
CRP SERPL-MCNC: 3.99 MG/DL
DIFFERENTIAL METHOD: ABNORMAL
EOSINOPHIL # BLD AUTO: 0 K/UL (ref 0–0.5)
EOSINOPHIL NFR BLD: 0.3 % (ref 0–8)
ERYTHROCYTE [DISTWIDTH] IN BLOOD BY AUTOMATED COUNT: 14.6 % (ref 11.5–14.5)
EST. GFR  (NO RACE VARIABLE): >60 ML/MIN/1.73 M^2
GLUCOSE SERPL-MCNC: 141 MG/DL (ref 70–110)
GLUCOSE SERPL-MCNC: 176 MG/DL (ref 70–110)
GLUCOSE SERPL-MCNC: 91 MG/DL (ref 70–110)
GLUCOSE UR QL STRIP: NEGATIVE
HCT VFR BLD AUTO: 29.7 % (ref 37–48.5)
HGB BLD-MCNC: 10.2 G/DL (ref 12–16)
HGB UR QL STRIP: NEGATIVE
IMM GRANULOCYTES # BLD AUTO: 0.03 K/UL (ref 0–0.04)
IMM GRANULOCYTES NFR BLD AUTO: 0.4 % (ref 0–0.5)
KETONES UR QL STRIP: ABNORMAL
LEUKOCYTE ESTERASE UR QL STRIP: NEGATIVE
LYMPHOCYTES # BLD AUTO: 0.9 K/UL (ref 1–4.8)
LYMPHOCYTES NFR BLD: 12.8 % (ref 18–48)
MAGNESIUM SERPL-MCNC: 0.4 MG/DL (ref 1.6–2.6)
MAGNESIUM SERPL-MCNC: 1.8 MG/DL (ref 1.6–2.6)
MAGNESIUM SERPL-MCNC: 1.8 MG/DL (ref 1.6–2.6)
MCH RBC QN AUTO: 31.8 PG (ref 27–31)
MCHC RBC AUTO-ENTMCNC: 34.3 G/DL (ref 32–36)
MCV RBC AUTO: 93 FL (ref 82–98)
MONOCYTES # BLD AUTO: 0.7 K/UL (ref 0.3–1)
MONOCYTES NFR BLD: 10.2 % (ref 4–15)
NEUTROPHILS # BLD AUTO: 5.1 K/UL (ref 1.8–7.7)
NEUTROPHILS NFR BLD: 75.7 % (ref 38–73)
NITRITE UR QL STRIP: NEGATIVE
NRBC BLD-RTO: 0 /100 WBC
PH UR STRIP: 6 [PH] (ref 5–8)
PHOSPHATE SERPL-MCNC: 3.2 MG/DL (ref 2.7–4.5)
PLATELET # BLD AUTO: 153 K/UL (ref 150–450)
PMV BLD AUTO: 9.3 FL (ref 9.2–12.9)
POTASSIUM SERPL-SCNC: 2.8 MMOL/L (ref 3.5–5.1)
POTASSIUM SERPL-SCNC: 3.1 MMOL/L (ref 3.5–5.1)
POTASSIUM SERPL-SCNC: 4.1 MMOL/L (ref 3.5–5.1)
POTASSIUM SERPL-SCNC: 4.3 MMOL/L (ref 3.5–5.1)
PROT SERPL-MCNC: 6.1 G/DL (ref 6–8.4)
PROT SERPL-MCNC: 6.2 G/DL (ref 6–8.4)
PROT SERPL-MCNC: 6.2 G/DL (ref 6–8.4)
PROT UR QL STRIP: ABNORMAL
RBC # BLD AUTO: 3.21 M/UL (ref 4–5.4)
SODIUM SERPL-SCNC: 136 MMOL/L (ref 136–145)
SODIUM SERPL-SCNC: 138 MMOL/L (ref 136–145)
SODIUM SERPL-SCNC: 139 MMOL/L (ref 136–145)
SP GR UR STRIP: >1.03 (ref 1–1.03)
URN SPEC COLLECT METH UR: ABNORMAL
UROBILINOGEN UR STRIP-ACNC: NEGATIVE EU/DL
WBC # BLD AUTO: 6.79 K/UL (ref 3.9–12.7)

## 2022-08-19 PROCEDURE — 83735 ASSAY OF MAGNESIUM: CPT | Performed by: INTERNAL MEDICINE

## 2022-08-19 PROCEDURE — 36415 COLL VENOUS BLD VENIPUNCTURE: CPT | Performed by: INTERNAL MEDICINE

## 2022-08-19 PROCEDURE — 20000000 HC ICU ROOM

## 2022-08-19 PROCEDURE — 25000003 PHARM REV CODE 250: Performed by: INTERNAL MEDICINE

## 2022-08-19 PROCEDURE — 63600175 PHARM REV CODE 636 W HCPCS: Performed by: INTERNAL MEDICINE

## 2022-08-19 PROCEDURE — 85025 COMPLETE CBC W/AUTO DIFF WBC: CPT | Performed by: INTERNAL MEDICINE

## 2022-08-19 PROCEDURE — 94761 N-INVAS EAR/PLS OXIMETRY MLT: CPT

## 2022-08-19 PROCEDURE — 93005 ELECTROCARDIOGRAM TRACING: CPT | Performed by: SPECIALIST

## 2022-08-19 PROCEDURE — 81003 URINALYSIS AUTO W/O SCOPE: CPT | Performed by: INTERNAL MEDICINE

## 2022-08-19 PROCEDURE — 99900035 HC TECH TIME PER 15 MIN (STAT)

## 2022-08-19 PROCEDURE — 80053 COMPREHEN METABOLIC PANEL: CPT | Performed by: INTERNAL MEDICINE

## 2022-08-19 PROCEDURE — 93010 EKG 12-LEAD: ICD-10-PCS | Mod: ,,, | Performed by: SPECIALIST

## 2022-08-19 PROCEDURE — 99900031 HC PATIENT EDUCATION (STAT)

## 2022-08-19 PROCEDURE — 86140 C-REACTIVE PROTEIN: CPT | Performed by: INTERNAL MEDICINE

## 2022-08-19 PROCEDURE — 84132 ASSAY OF SERUM POTASSIUM: CPT | Performed by: INTERNAL MEDICINE

## 2022-08-19 PROCEDURE — 84100 ASSAY OF PHOSPHORUS: CPT | Performed by: INTERNAL MEDICINE

## 2022-08-19 PROCEDURE — 82330 ASSAY OF CALCIUM: CPT | Mod: 91 | Performed by: INTERNAL MEDICINE

## 2022-08-19 PROCEDURE — 93010 ELECTROCARDIOGRAM REPORT: CPT | Mod: ,,, | Performed by: SPECIALIST

## 2022-08-19 PROCEDURE — 83735 ASSAY OF MAGNESIUM: CPT | Mod: 91 | Performed by: INTERNAL MEDICINE

## 2022-08-19 PROCEDURE — 80053 COMPREHEN METABOLIC PANEL: CPT | Mod: 91 | Performed by: INTERNAL MEDICINE

## 2022-08-19 PROCEDURE — 25000242 PHARM REV CODE 250 ALT 637 W/ HCPCS: Performed by: INTERNAL MEDICINE

## 2022-08-19 RX ORDER — POTASSIUM CHLORIDE 20 MEQ/1
40 TABLET, EXTENDED RELEASE ORAL 2 TIMES DAILY
Status: DISCONTINUED | OUTPATIENT
Start: 2022-08-19 | End: 2022-08-20 | Stop reason: HOSPADM

## 2022-08-19 RX ORDER — HYDROMORPHONE HYDROCHLORIDE 1 MG/ML
0.5 INJECTION, SOLUTION INTRAMUSCULAR; INTRAVENOUS; SUBCUTANEOUS EVERY 6 HOURS PRN
Status: DISCONTINUED | OUTPATIENT
Start: 2022-08-19 | End: 2022-08-20 | Stop reason: HOSPADM

## 2022-08-19 RX ORDER — TIZANIDINE 4 MG/1
4 TABLET ORAL EVERY 8 HOURS
Status: COMPLETED | OUTPATIENT
Start: 2022-08-19 | End: 2022-08-20

## 2022-08-19 RX ORDER — CHOLESTYRAMINE 4 G/4.8G
4 POWDER, FOR SUSPENSION ORAL 2 TIMES DAILY
Status: DISCONTINUED | OUTPATIENT
Start: 2022-08-19 | End: 2022-08-20 | Stop reason: HOSPADM

## 2022-08-19 RX ORDER — POTASSIUM CHLORIDE 20 MEQ/1
40 TABLET, EXTENDED RELEASE ORAL
Status: DISCONTINUED | OUTPATIENT
Start: 2022-08-19 | End: 2022-08-20 | Stop reason: HOSPADM

## 2022-08-19 RX ORDER — POTASSIUM CHLORIDE 20 MEQ/1
20 TABLET, EXTENDED RELEASE ORAL
Status: DISCONTINUED | OUTPATIENT
Start: 2022-08-19 | End: 2022-08-20 | Stop reason: HOSPADM

## 2022-08-19 RX ORDER — CALCIUM CARBONATE 200(500)MG
1000 TABLET,CHEWABLE ORAL 2 TIMES DAILY
Status: DISCONTINUED | OUTPATIENT
Start: 2022-08-19 | End: 2022-08-19

## 2022-08-19 RX ORDER — MAGNESIUM SULFATE HEPTAHYDRATE 40 MG/ML
4 INJECTION, SOLUTION INTRAVENOUS
Status: DISCONTINUED | OUTPATIENT
Start: 2022-08-19 | End: 2022-08-20 | Stop reason: HOSPADM

## 2022-08-19 RX ORDER — ASPIRIN 325 MG
50000 TABLET, DELAYED RELEASE (ENTERIC COATED) ORAL WEEKLY
Status: DISCONTINUED | OUTPATIENT
Start: 2022-08-19 | End: 2022-08-20 | Stop reason: HOSPADM

## 2022-08-19 RX ORDER — CALCIUM CARBONATE 200(500)MG
1000 TABLET,CHEWABLE ORAL 2 TIMES DAILY
Status: DISCONTINUED | OUTPATIENT
Start: 2022-08-19 | End: 2022-08-20 | Stop reason: HOSPADM

## 2022-08-19 RX ORDER — ACETAMINOPHEN 325 MG/1
650 TABLET ORAL EVERY 8 HOURS PRN
Status: DISCONTINUED | OUTPATIENT
Start: 2022-08-19 | End: 2022-08-20 | Stop reason: HOSPADM

## 2022-08-19 RX ORDER — LANOLIN ALCOHOL/MO/W.PET/CERES
800 CREAM (GRAM) TOPICAL
Status: DISCONTINUED | OUTPATIENT
Start: 2022-08-19 | End: 2022-08-20 | Stop reason: HOSPADM

## 2022-08-19 RX ORDER — SODIUM CHLORIDE AND POTASSIUM CHLORIDE 150; 900 MG/100ML; MG/100ML
INJECTION, SOLUTION INTRAVENOUS CONTINUOUS
Status: DISCONTINUED | OUTPATIENT
Start: 2022-08-19 | End: 2022-08-19

## 2022-08-19 RX ORDER — TALC
6 POWDER (GRAM) TOPICAL NIGHTLY PRN
Status: DISCONTINUED | OUTPATIENT
Start: 2022-08-19 | End: 2022-08-20 | Stop reason: HOSPADM

## 2022-08-19 RX ORDER — LOPERAMIDE HYDROCHLORIDE 2 MG/1
4 CAPSULE ORAL 4 TIMES DAILY
Status: DISCONTINUED | OUTPATIENT
Start: 2022-08-19 | End: 2022-08-20 | Stop reason: HOSPADM

## 2022-08-19 RX ORDER — MAGNESIUM SULFATE 1 G/100ML
1 INJECTION INTRAVENOUS
Status: DISCONTINUED | OUTPATIENT
Start: 2022-08-19 | End: 2022-08-20 | Stop reason: HOSPADM

## 2022-08-19 RX ORDER — LEVETIRACETAM 500 MG/1
500 TABLET ORAL 2 TIMES DAILY
Status: DISCONTINUED | OUTPATIENT
Start: 2022-08-19 | End: 2022-08-20 | Stop reason: HOSPADM

## 2022-08-19 RX ORDER — LANOLIN ALCOHOL/MO/W.PET/CERES
400 CREAM (GRAM) TOPICAL 2 TIMES DAILY
Status: DISCONTINUED | OUTPATIENT
Start: 2022-08-19 | End: 2022-08-20 | Stop reason: HOSPADM

## 2022-08-19 RX ORDER — AMOXICILLIN 250 MG
1 CAPSULE ORAL 2 TIMES DAILY
Status: DISCONTINUED | OUTPATIENT
Start: 2022-08-19 | End: 2022-08-19

## 2022-08-19 RX ORDER — POTASSIUM CHLORIDE 20 MEQ/1
60 TABLET, EXTENDED RELEASE ORAL ONCE
Status: COMPLETED | OUTPATIENT
Start: 2022-08-19 | End: 2022-08-19

## 2022-08-19 RX ORDER — HYDROCODONE BITARTRATE AND ACETAMINOPHEN 10; 325 MG/1; MG/1
1 TABLET ORAL EVERY 6 HOURS PRN
Status: DISCONTINUED | OUTPATIENT
Start: 2022-08-19 | End: 2022-08-20 | Stop reason: HOSPADM

## 2022-08-19 RX ORDER — CALCIUM GLUCONATE 20 MG/ML
2 INJECTION, SOLUTION INTRAVENOUS DAILY
Status: DISCONTINUED | OUTPATIENT
Start: 2022-08-19 | End: 2022-08-19

## 2022-08-19 RX ORDER — MAGNESIUM SULFATE HEPTAHYDRATE 40 MG/ML
2 INJECTION, SOLUTION INTRAVENOUS
Status: DISCONTINUED | OUTPATIENT
Start: 2022-08-19 | End: 2022-08-20 | Stop reason: HOSPADM

## 2022-08-19 RX ORDER — ENOXAPARIN SODIUM 100 MG/ML
40 INJECTION SUBCUTANEOUS
Status: DISCONTINUED | OUTPATIENT
Start: 2022-08-20 | End: 2022-08-20 | Stop reason: HOSPADM

## 2022-08-19 RX ORDER — SPIRONOLACTONE 25 MG/1
25 TABLET ORAL DAILY
Status: DISCONTINUED | OUTPATIENT
Start: 2022-08-19 | End: 2022-08-19

## 2022-08-19 RX ORDER — VENLAFAXINE 37.5 MG/1
75 TABLET ORAL 2 TIMES DAILY
Status: DISCONTINUED | OUTPATIENT
Start: 2022-08-19 | End: 2022-08-20 | Stop reason: HOSPADM

## 2022-08-19 RX ORDER — MORPHINE SULFATE 4 MG/ML
4 INJECTION, SOLUTION INTRAMUSCULAR; INTRAVENOUS EVERY 4 HOURS PRN
Status: DISCONTINUED | OUTPATIENT
Start: 2022-08-19 | End: 2022-08-19 | Stop reason: SDUPTHER

## 2022-08-19 RX ORDER — PROCHLORPERAZINE EDISYLATE 5 MG/ML
5 INJECTION INTRAMUSCULAR; INTRAVENOUS EVERY 6 HOURS PRN
Status: DISCONTINUED | OUTPATIENT
Start: 2022-08-19 | End: 2022-08-20 | Stop reason: HOSPADM

## 2022-08-19 RX ORDER — PANTOPRAZOLE SODIUM 40 MG/1
40 TABLET, DELAYED RELEASE ORAL
Status: DISCONTINUED | OUTPATIENT
Start: 2022-08-19 | End: 2022-08-19

## 2022-08-19 RX ADMIN — TIZANIDINE 4 MG: 4 TABLET ORAL at 02:08

## 2022-08-19 RX ADMIN — LOPERAMIDE HYDROCHLORIDE 4 MG: 2 CAPSULE ORAL at 09:08

## 2022-08-19 RX ADMIN — PANTOPRAZOLE SODIUM 40 MG: 40 TABLET, DELAYED RELEASE ORAL at 05:08

## 2022-08-19 RX ADMIN — LOPERAMIDE HYDROCHLORIDE 4 MG: 2 CAPSULE ORAL at 02:08

## 2022-08-19 RX ADMIN — ENOXAPARIN SODIUM 50 MG: 60 INJECTION SUBCUTANEOUS at 08:08

## 2022-08-19 RX ADMIN — Medication: at 09:08

## 2022-08-19 RX ADMIN — LOPERAMIDE HYDROCHLORIDE 4 MG: 2 CAPSULE ORAL at 08:08

## 2022-08-19 RX ADMIN — VENLAFAXINE 75 MG: 37.5 TABLET ORAL at 08:08

## 2022-08-19 RX ADMIN — OXYCODONE HYDROCHLORIDE AND ACETAMINOPHEN 500 MG: 500 TABLET ORAL at 08:08

## 2022-08-19 RX ADMIN — LEVETIRACETAM 500 MG: 500 TABLET, FILM COATED ORAL at 08:08

## 2022-08-19 RX ADMIN — PROCHLORPERAZINE EDISYLATE 5 MG: 5 INJECTION INTRAMUSCULAR; INTRAVENOUS at 01:08

## 2022-08-19 RX ADMIN — POTASSIUM CHLORIDE 40 MEQ: 1500 TABLET, EXTENDED RELEASE ORAL at 09:08

## 2022-08-19 RX ADMIN — CHOLECALCIFEROL CAP 1.25 MG (50000 UNIT) 50000 UNITS: 1.25 CAP at 06:08

## 2022-08-19 RX ADMIN — CALCIUM GLUCONATE 2 G: 20 INJECTION, SOLUTION INTRAVENOUS at 01:08

## 2022-08-19 RX ADMIN — POTASSIUM CHLORIDE 40 MEQ: 1500 TABLET, EXTENDED RELEASE ORAL at 01:08

## 2022-08-19 RX ADMIN — HYDROCODONE BITARTRATE AND ACETAMINOPHEN 1 TABLET: 10; 325 TABLET ORAL at 01:08

## 2022-08-19 RX ADMIN — TIZANIDINE 4 MG: 4 TABLET ORAL at 09:08

## 2022-08-19 RX ADMIN — CHOLESTYRAMINE 4 G: 4 POWDER, FOR SUSPENSION ORAL at 09:08

## 2022-08-19 RX ADMIN — THERA TABS 1 TABLET: TAB at 08:08

## 2022-08-19 RX ADMIN — POTASSIUM CHLORIDE 60 MEQ: 1500 TABLET, EXTENDED RELEASE ORAL at 10:08

## 2022-08-19 RX ADMIN — CALCIUM GLUCONATE 8 MG/KG/HR: 98 INJECTION, SOLUTION INTRAVENOUS at 04:08

## 2022-08-19 RX ADMIN — MAGNESIUM SULFATE 4 G: 2 INJECTION INTRAVENOUS at 06:08

## 2022-08-19 RX ADMIN — DEXAMETHASONE 6 MG: 2 TABLET ORAL at 08:08

## 2022-08-19 RX ADMIN — VENLAFAXINE 75 MG: 37.5 TABLET ORAL at 09:08

## 2022-08-19 RX ADMIN — Medication 400 MG: at 11:08

## 2022-08-19 RX ADMIN — CALCIUM CARBONATE 1000 MG: 500 TABLET, CHEWABLE ORAL at 08:08

## 2022-08-19 RX ADMIN — CALCIUM CARBONATE 1000 MG: 500 TABLET, CHEWABLE ORAL at 11:08

## 2022-08-19 RX ADMIN — SPIRONOLACTONE 25 MG: 25 TABLET ORAL at 08:08

## 2022-08-19 RX ADMIN — POTASSIUM CHLORIDE AND SODIUM CHLORIDE: 900; 150 INJECTION, SOLUTION INTRAVENOUS at 12:08

## 2022-08-19 RX ADMIN — LOPERAMIDE HYDROCHLORIDE 4 MG: 2 CAPSULE ORAL at 04:08

## 2022-08-19 RX ADMIN — CALCIUM CARBONATE 1000 MG: 500 TABLET, CHEWABLE ORAL at 09:08

## 2022-08-19 RX ADMIN — METHYLPREDNISOLONE SODIUM SUCCINATE 40 MG: 40 INJECTION, POWDER, FOR SOLUTION INTRAMUSCULAR; INTRAVENOUS at 10:08

## 2022-08-19 RX ADMIN — CHOLESTYRAMINE 4 G: 4 POWDER, FOR SUSPENSION ORAL at 11:08

## 2022-08-19 RX ADMIN — LEVETIRACETAM 500 MG: 500 TABLET, FILM COATED ORAL at 09:08

## 2022-08-19 RX ADMIN — Medication 400 MG: at 09:08

## 2022-08-19 RX ADMIN — CEFTRIAXONE 2 G: 2 INJECTION, SOLUTION INTRAVENOUS at 11:08

## 2022-08-19 NOTE — CARE UPDATE
08/19/22 0893   Patient Assessment/Suction   Level of Consciousness (AVPU) alert   Respiratory Effort Normal;Unlabored   Expansion/Accessory Muscles/Retractions no use of accessory muscles;no retractions;expansion symmetric   All Lung Fields Breath Sounds clear   Cough Frequency no cough   PRE-TX-O2   O2 Device (Oxygen Therapy) room air   SpO2 95 %   Pulse Oximetry Type Intermittent   $ Pulse Oximetry - Multiple Charge Pulse Oximetry - Multiple   Pulse 80   Resp 18   Inhaler   $ Inhaler Charges PRN treatment not required   Education   $ Education Bronchodilator;15 min   Respiratory Evaluation   $ Care Plan Tech Time 15 min

## 2022-08-19 NOTE — CONSULTS
INPATIENT NEPHROLOGY CONSULT   [facility]    Patient Name: Carline Chang  Date: 08/19/2022    Reason for consultation: Low electrolytes    Chief Complaint:   Chief Complaint   Patient presents with    Emesis     N/V and abd pain since awakening this am. Pt reports hx of crohns.        History of Present Illness:  56 year old female with history of Crohn's (s/p sub total colectomy), GERD, Renal Lithiasis and does not wish to be vaccinated against COVID presented to ED complaining waking to N/V and cramping abdominal discomfort. She became concerned when her N/V persisted all morning as she has had seizure history when she experienced electrolyte imbalance in the past from N/V secondary to her long standing Crohn's. Vomitus: foodstuffs, turning bilious; no blood or coffee grounds. Abdominal discomfort 5-6/10 generalized, waxing/waning own accord. Never has formed stool after above colectomy. No blood/black pr. No CP/SOB. No palpitations. She is COVID positive. She has diffuse metabolic derangements- consulted for management.      Interval History:  8/19- patient transferred to the ICU    Plan of Care:    Assessment:  COVID positive; hx of Crohn's  Hypokalemia  Acidosis  Hypocalcemia/vitamin D deficiency  HypoMg    Plan:    - on KCl gtt- ordered oral KCl 40mEq BID standing  - hold off bicarb supplement for now  - agree with IV calcium repletion- resumed tums 100mg BID and ergo 50K weekly  - recheck PTH - is she hypopara?  - got IV Mg repletion- resumed MgOH 400mg BID  - q4 labs  - I think this is more GI loss rather than renal wasting- would hold off aldactone    Thank you for allowing us to participate in this patient's care. We will continue to follow.    Vital Signs:  Temp Readings from Last 3 Encounters:   08/19/22 98.9 °F (37.2 °C) (Oral)   08/04/22 98.4 °F (36.9 °C)   04/21/22 97.6 °F (36.4 °C)       Pulse Readings from Last 3 Encounters:   08/19/22 96   08/04/22 99   07/29/22 100        BP Readings from Last 3 Encounters:   22 133/70   22 124/76   22 134/85       Weight:  Wt Readings from Last 3 Encounters:   22 52 kg (114 lb 10.2 oz)   22 50.3 kg (111 lb)   22 50.3 kg (111 lb)       INS/OUTS:  I/O last 3 completed shifts:  In: 200 [IV Piggyback:200]  Out: -   No intake/output data recorded.    Past Medical & Surgical History:  Past Medical History:   Diagnosis Date    Abnormal Pap smear of cervix     LEEP procedure,     Acute deep vein thrombosis (DVT) of brachial vein of right upper extremity 2017    on RUE ultrasound    Anxiety     Bowel perforation     Chronic pain     Compression fracture of C-spine     Crohn's disease of both small and large intestine with intestinal obstruction     Difficult intravenous access     Encounter for blood transfusion     GERD (gastroesophageal reflux disease)     Kidney stones     Nephrolithiasis     Osteoporosis     Stricture of bowel        Past Surgical History:   Procedure Laterality Date    ABSCESS DRAINAGE      ANAL FISTULOTOMY      BOWEL RESECTION      small bowel resection per Dr. Uribe 2018    CERVICAL BIOPSY  W/ LOOP ELECTRODE EXCISION       SECTION      x2    CHOLECYSTECTOMY  2000    COLON SURGERY  2015    sigmoid loop colostomy with subsequent reversal 3 years later    COLONOSCOPY N/A 2016    Procedure: COLONOSCOPY;  Surgeon: Andre Uribe MD;  Location: [facility] (4TH FLR);  Service: Endoscopy;  Laterality: N/A;    COLONOSCOPY N/A 2017    Procedure: COLONOSCOPY;  Surgeon: Dany Stock MD;  Location: [facility] (2ND FLR);  Service: Endoscopy;  Laterality: N/A;    COLONOSCOPY N/A 2017    Procedure: COLONOSCOPY;  Surgeon: Andre Uribe MD;  Location: [facility] ENDO (4TH FLR);  Service: Endoscopy;  Laterality: N/A;    COLONOSCOPY N/A 2018    Procedure: COLONOSCOPY;  Surgeon: Andre Uribe MD;  Location: [facility] (4TH  FLR);  Service: Endoscopy;  Laterality: N/A;    COLONOSCOPY N/A 03/24/2018    Procedure: COLONOSCOPY;  Surgeon: Elmer Serrato MD;  Location: [facility] ENDO (2ND FLR);  Service: Endoscopy;  Laterality: N/A;    COLONOSCOPY  03/24/2018    COLONOSCOPY N/A 07/06/2018    Procedure: COLONOSCOPY;  Surgeon: Andre Uribe MD;  Location: [facility] ENDO (4TH FLR);  Service: Endoscopy;  Laterality: N/A;    COLONOSCOPY N/A 10/07/2021    Procedure: COLONOSCOPY;  Surgeon: Jose Hughes MD;  Location: [facility] ENDO;  Service: Endoscopy;  Laterality: N/A;    ESOPHAGOGASTRODUODENOSCOPY N/A 10/07/2021    Procedure: EGD (ESOPHAGOGASTRODUODENOSCOPY);  Surgeon: Jose Hughes MD;  Location: [facility] ENDO;  Service: Endoscopy;  Laterality: N/A;    LAPAROSCOPIC CLOSURE OF COLOSTOMY N/A 08/29/2018    Procedure: CLOSURE, COLOSTOMY, LAPAROSCOPIC;  Surgeon: Andre Uribe MD;  Location: [facility] OR 2ND FLR;  Service: Colon and Rectal;  Laterality: N/A;  converted to open    LYSIS OF ADHESIONS N/A 01/20/2022    Procedure: LYSIS, ADHESIONS;  Surgeon: LLUVIA Post MD;  Location: [facility] OR 2ND FLR;  Service: Colon and Rectal;  Laterality: N/A;  lasting longer than 2 hours, modifier 22      rectovaginal fistula repair  01/2018    SMALL INTESTINE SURGERY  1995    per patient 10 inches removed    SMALL INTESTINE SURGERY  02/2009    abdominal abscess drained, 3 cm colon removed, 11 cm SB removed    TUBAL LIGATION      UPPER GASTROINTESTINAL ENDOSCOPY  2000       Past Social History:  Social History     Socioeconomic History    Marital status:    Tobacco Use    Smoking status: Never Smoker    Smokeless tobacco: Never Used   Substance and Sexual Activity    Alcohol use: No     Alcohol/week: 0.0 standard drinks    Drug use: No    Sexual activity: Not Currently       Medications:  Scheduled Meds:   calcium carbonate  1,000 mg Oral BID    calcium gluconate IVPB  2 g Intravenous Daily    cholestyramine-aspartame  4 g Oral BID     [START ON 8/20/2022] enoxparin  40 mg Subcutaneous Q24H    levETIRAcetam  500 mg Oral BID    loperamide  4 mg Oral QID    magnesium oxide  400 mg Oral BID    multivitamin  1 tablet Oral Daily    tiZANidine  4 mg Oral Q8H    venlafaxine  75 mg Oral BID     Continuous Infusions:   0/9% NACL & POTASSIUM CHLORIDE 20 MEQ/L 50 mL/hr at 08/19/22 1256    calcium gluconate infusion (NICU) 8 mg/kg/hr (08/19/22 1615)     PRN Meds:.acetaminophen, albuterol **AND** MDI Q6H PRN, benzocaine, calcium chloride IVPB, calcium chloride IVPB, calcium chloride IVPB, calcium chloride IVPB, calcium chloride IVPB, calcium chloride IVPB, dextrose 10%, dextrose 10%, glucagon (human recombinant), glucose, glucose, HYDROcodone-acetaminophen, HYDROmorphone, insulin aspart U-100, magnesium oxide, magnesium oxide, magnesium sulfate IVPB, magnesium sulfate IVPB, magnesium sulfate IVPB, magnesium sulfate IVPB, magnesium sulfate IVPB, magnesium sulfate IVPB, magnesium sulfate IVPB, magnesium sulfate IVPB, melatonin, ondansetron, potassium chloride, potassium chloride, potassium chloride, potassium chloride, potassium chloride, potassium chloride, potassium chloride, potassium chloride, prochlorperazine, sodium chloride 0.9%, sodium phosphate IVPB, sodium phosphate IVPB, sodium phosphate IVPB, sodium phosphate IVPB, sodium phosphate IVPB, sodium phosphate IVPB, sodium phosphate IVPB, sodium phosphate IVPB, sodium phosphate IVPB  No current facility-administered medications on file prior to encounter.     Current Outpatient Medications on File Prior to Encounter   Medication Sig Dispense Refill    calcium carbonate (TUMS ORAL) Take 1 tablet by mouth as needed (acid reflux).      CIPRO 500 mg tablet Take 500 mg by mouth once daily.      ergocalciferol (ERGOCALCIFEROL) 50,000 unit Cap Take 50,000 Units by mouth every 7 days.      HYDROcodone-acetaminophen (NORCO)  mg per tablet Take 1 tablet by mouth every 6 (six) hours as needed for  "Pain. 120 tablet 0    levETIRAcetam (KEPPRA) 500 MG Tab Take 1 tablet (500 mg total) by mouth 2 (two) times daily. 60 tablet 0    loperamide (IMODIUM) 2 mg capsule Take 2 capsules (4 mg total) by mouth 4 (four) times daily. 90 capsule 5    OYSTER SHELL CALCIUM 500 500 mg calcium (1,250 mg) tablet Take 2 tablets by mouth 2 (two) times daily.      pantoprazole (PROTONIX) 40 MG tablet Take 40 mg by mouth once daily.      potassium chloride SA (K-DUR,KLOR-CON) 20 MEQ tablet Take 40 mEq by mouth 2 (two) times daily.      promethazine (PHENERGAN) 25 MG tablet Take 1 tablet (25 mg total) by mouth every 4 (four) hours. 30 tablet 1    SENNA PLUS 8.6-50 mg per tablet Take 1 tablet by mouth 2 (two) times a day.      spironolactone (ALDACTONE) 25 MG tablet Take 25 mg by mouth once daily.      venlafaxine (EFFEXOR) 75 MG tablet Take 75 mg by mouth 2 (two) times daily.      [DISCONTINUED] aMILoride (MIDAMOR) 5 MG Tab Take 5 mg by mouth once daily.      [DISCONTINUED] calcium-vitamin D3 (OS-CHEIKH 500 + D3) 500 mg-5 mcg (200 unit) per tablet Take 2 tablets by mouth 2 (two) times daily with meals. 120 tablet 0    [DISCONTINUED] cholestyramine-aspartame (QUESTRAN LIGHT) 4 gram PwPk Take 1 packet (4 g total) by mouth 2 (two) times daily. 180 packet 0       Allergies:  Remicade [infliximab], Adalimumab, Flagyl [metronidazole], and Nubain [nalbuphine]    Past Family History:  Reviewed; refer to Hospitalist Admission Note    Review of Systems:  Review of Systems - All 14 systems reviewed and negative, except as noted in HPI    Physical Exam:  /70 (BP Location: Right arm, Patient Position: Lying)   Pulse 96   Temp 98.9 °F (37.2 °C) (Oral)   Resp 14   Ht 5' 5" (1.651 m)   Wt 52 kg (114 lb 10.2 oz)   LMP 05/30/2009   SpO2 (!) 94%   BMI 19.08 kg/m²     Exam deferred due to COVID precautions    Results:  Lab Results   Component Value Date     08/19/2022    K 3.1 (L) 08/19/2022     08/19/2022    CO2 20 (L) " 08/19/2022    BUN 8 08/19/2022    CREATININE 0.8 08/19/2022    CALCIUM 5.6 (LL) 08/19/2022    ANIONGAP 12 08/19/2022    ESTGFRAFRICA >60.0 04/06/2022    EGFRNONAA >60.0 04/06/2022       Lab Results   Component Value Date    CALCIUM 5.6 (LL) 08/19/2022    PHOS 3.2 08/19/2022       Recent Labs   Lab 08/19/22  0500   WBC 6.79   RBC 3.21*   HGB 10.2*   HCT 29.7*      MCV 93   MCH 31.8*   MCHC 34.3       I have personally reviewed pertinent radiological imaging and reports.    I have spent > 70 minutes providing care for this patient for the above diagnoses. These services have included chart/data/imaging review, evaluation, exam, formulation of plan, , note preparation, and discussions with staff involved in this patient's care.    Lourdes Hayward MD MPH  [facility]  [address] [place] [id number]  [phone] (p)  [phone] (f)

## 2022-08-19 NOTE — ASSESSMENT & PLAN NOTE
Conservative management   Never had Vaccines even though has active ongoing chronic crohn's colitis

## 2022-08-19 NOTE — SUBJECTIVE & OBJECTIVE
Interval History:     Review of Systems   Constitutional:  Negative for activity change and appetite change.   HENT:  Negative for congestion and dental problem.    Eyes:  Negative for discharge and itching.   Respiratory:  Negative for shortness of breath.    Cardiovascular:  Negative for chest pain.   Gastrointestinal:  Positive for diarrhea. Negative for abdominal distention and abdominal pain.   Endocrine: Negative for cold intolerance.   Genitourinary:  Negative for difficulty urinating and dysuria.   Musculoskeletal:  Positive for back pain. Negative for arthralgias.   Skin:  Negative for color change.   Neurological:  Negative for dizziness and facial asymmetry.   Hematological:  Negative for adenopathy.   Psychiatric/Behavioral:  Negative for agitation and behavioral problems.    Objective:     Vital Signs (Most Recent):  Temp: 97.4 °F (36.3 °C) (08/19/22 1530)  Pulse: (!) 58 (08/19/22 1630)  Resp: 17 (08/19/22 1630)  BP: 96/63 (08/19/22 1630)  SpO2: 95 % (08/19/22 1630)   Vital Signs (24h Range):  Temp:  [97.4 °F (36.3 °C)-99.3 °F (37.4 °C)] 97.4 °F (36.3 °C)  Pulse:  [58-96] 58  Resp:  [14-20] 17  SpO2:  [94 %-96 %] 95 %  BP: ()/(56-74) 96/63     Weight: 52 kg (114 lb 10.2 oz)  Body mass index is 19.08 kg/m².    Intake/Output Summary (Last 24 hours) at 8/19/2022 1641  Last data filed at 8/18/2022 1928  Gross per 24 hour   Intake 100 ml   Output --   Net 100 ml      Physical Exam  Vitals and nursing note reviewed.   Constitutional:       General: She is not in acute distress.  HENT:      Head: Atraumatic.      Right Ear: External ear normal.      Left Ear: External ear normal.      Nose: Nose normal.      Mouth/Throat:      Mouth: Mucous membranes are moist.   Eyes:      General: No scleral icterus.  Cardiovascular:      Rate and Rhythm: Normal rate.   Pulmonary:      Effort: Pulmonary effort is normal.   Musculoskeletal:         General: Normal range of motion.      Cervical back: Normal range of  motion.   Skin:     General: Skin is warm.   Neurological:      Mental Status: She is alert and oriented to person, place, and time.   Psychiatric:         Behavior: Behavior normal.       Significant Labs: All pertinent labs within the past 24 hours have been reviewed.  CBC:   Recent Labs   Lab 08/18/22  1239 08/19/22  0500   WBC 8.68 6.79   HGB 9.0* 10.2*   HCT 26.8* 29.7*    153     CMP:   Recent Labs   Lab 08/18/22  1239 08/18/22  2025 08/19/22  0100 08/19/22  0500   *  --   --  136   K 2.3* 2.1* 2.8* 3.1*     --   --  104   CO2 19*  --   --  20*   GLU 92  --   --  91   BUN 6  --   --  8   CREATININE 0.7  --   --  0.8   CALCIUM 5.6*  --   --  5.6*   PROT 6.5  --   --  6.1   ALBUMIN 3.4*  --   --  3.2*   BILITOT 0.9  --   --  1.5*   ALKPHOS 85  --   --  87   AST 23  --   --  24   ALT 29  --   --  28   ANIONGAP 12  --   --  12       Significant Imaging: I have reviewed all pertinent imaging results/findings within the past 24 hours.

## 2022-08-19 NOTE — RESPIRATORY THERAPY
08/18/22 1939   Patient Assessment/Suction   Level of Consciousness (AVPU) alert   Respiratory Effort Unlabored   Expansion/Accessory Muscles/Retractions no use of accessory muscles   All Lung Fields Breath Sounds clear   Rhythm/Pattern, Respiratory unlabored;pattern regular;depth regular   PRE-TX-O2   O2 Device (Oxygen Therapy) room air   SpO2 95 %   Pulse Oximetry Type Continuous   $ Pulse Oximetry - Multiple Charge Pulse Oximetry - Multiple   Pulse 81   Resp 18   Inhaler   $ Inhaler Charges MDI (Metered Dose Inahler) Treatment;Given With Spacer;Spacer - Equipment   Daily Review of Necessity (Inhaler) completed   Respiratory Treatment Status (Inhaler) given   Treatment Route (Inhaler) breath hold;mouthpiece;spacer/holding chamber   Patient Position (Inhaler) HOB elevated   Post Treatment Assessment (Inhaler) breath sounds unchanged   Signs of Intolerance (Inhaler) none   Breath Sounds Post-Respiratory Treatment   Throughout All Fields Post-Treatment All Fields   Throughout All Fields Post-Treatment no change   Post-treatment Heart Rate (beats/min) 83   Post-treatment Resp Rate (breaths/min) 18   Education   $ Education Bronchodilator;15 min   Respiratory Evaluation   $ Care Plan Tech Time 15 min   Changed patient to PRN inhaler. Patient on room air and has clear breath sounds.

## 2022-08-19 NOTE — HOSPITAL COURSE
"  HPI: 56 year old female with history of Crohn's (s/p sub total colectomy), GERD, Renal Lithiasis and does not wish to be vaccinated against COVID presented to ED complaining waking to N/V and cramping abdominal discomfort. She became concerned when her N/V persisted all morning as she has had seizure history when she experienced electrolyte imbalance in the past from N/V secondary to her long standing Crohn's. Vomitus: foodstuffs, turning bilious; no blood or coffee grounds. Abdominal discomfort 5-6/10 generalized, waxing/waning own accord. Never has formed stool after above colectomy. No blood/black pr. No CP/SOB. No palpitations     In ED: labs reviewed and noted below: no leukocytosis with moderate normocytic anemia; trivial hyponatremia, severe hypokalemia, mild metabolic acidosis, significant hypocalcemia, bilirubin and transaminases are normal. Telemetry reviewed: NSR. CT Abdomen: "Prior right hemicolectomy and distal small bowel resection with mild dilatation of the remainder of the colon which has improved. No evidence of small bowel obstruction. Small hiatal hernia. Fatty infiltration of the liver".     Discussed with ED MD: Inpatient admission for Crohn exacerbation; electrolyte replacement; Cardiac monitoring; GI consult; clears advancing as tolerated; repeat K+ in 4 hours; AM labs for review     Hospital course   Pt was admitted with symptoms fo colitis and electrolyte imbalance .  Patient had severe hypocalcemia and hypokalemia . IVF and Ca supplement given and improved . Her mild diarrhea is chronic and no change   Later nephrology reviewed and DC aldactone since appear from GI loss.  Patient later electrolytes are back to normal and DC in stable condition .  "

## 2022-08-19 NOTE — ASSESSMENT & PLAN NOTE
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

## 2022-08-19 NOTE — PLAN OF CARE
[facility]  Initial Discharge Assessment       Primary Care Provider: Jose Hughes MD    Admission Diagnosis: Crohn's colitis [K50.10]    Admission Date: 8/18/2022  Expected Discharge Date:          Payor: MEDICARE / Plan: MEDICARE PART A & B / Product Type: Government /     Extended Emergency Contact Information  Primary Emergency Contact: Tanesha Chang   [place]  Mobile Phone: [phone]  Relation: Mother  Preferred language: English   needed? No  Secondary Emergency Contact: CharleneDarwin   [facility]  Home Phone: [phone]  Relation: Brother  Preferred language: English   needed? No    Discharge Plan A: Home  Discharge Plan B: Home      [facility] - [facility] [address]  [address] [facility] [address]  Phone: [phone] Fax: [phone]    [facility] - [place], [place] - [address]  [address]  [place] [address]  Phone: [phone] Fax: [phone]    [id number] - [place], [place] - [address]  [address] [facility] [address]  Phone: [phone] Fax: [phone]    [id number] - [place], [place] - [address] [facility]  [address]  [place] [address]  Phone: [phone] Fax: [phone]      Initial Assessment (most recent)     Adult Discharge Assessment - 08/19/22 1515        Discharge Assessment    Assessment Type Discharge Planning Assessment     Reason For Admission Crohn's colitis     Lives With alone     Facility Arrived From: home     Do you expect to return to your current living situation? Yes     Do you have help at home or someone to help you manage your care at home? No     Walking or Climbing Stairs Difficulty none     Dressing/Bathing Difficulty none     Equipment Currently Used at Home none     Do you take prescription medications? Yes     Do you have prescription coverage? Yes     Do you have any  problems affording any of your prescribed medications? No     Is the patient taking medications as prescribed? yes     Who is going to help you get home at discharge? Mother     Discharge Plan A Home     Discharge Plan B Home     DME Needed Upon Discharge  none

## 2022-08-19 NOTE — ASSESSMENT & PLAN NOTE
Pt has severe Hypocalcemia  Treat Hypomagnesemia too  D/C PPI  Transfer to ICU  Start Calcium Gluconate infusion

## 2022-08-19 NOTE — PROGRESS NOTES
"[facility]  Progress Note    Patient Name: Carline Chang  MRN: [id number]  Patient Class: IP- Inpatient   Admission Date: 8/18/2022  Length of Stay: 1 days  Attending Physician: Loco Odonnell MD  Primary Care Provider: Jose Hughes MD        Subjective:     Principal Problem:Crohn's colitis        HPI:  56 year old female with history of Crohn's (s/p sub total colectomy), GERD, Renal Lithiasis and does not wish to be vaccinated against COVID presented to ED complaining waking to N/V and cramping abdominal discomfort. She became concerned when her N/V persisted all morning as she has had seizure history when she experienced electrolyte imbalance in the past from N/V secondary to her long standing Crohn's. Vomitus: foodstuffs, turning bilious; no blood or coffee grounds. Abdominal discomfort 5-6/10 generalized, waxing/waning own accord. Never has formed stool after above colectomy. No blood/black pr. No CP/SOB. No palpitations    In ED: labs reviewed and noted below: no leukocytosis with moderate normocytic anemia; trivial hyponatremia, severe hypokalemia, mild metabolic acidosis, significant hypocalcemia, bilirubin and transaminases are normal. Telemetry reviewed: NSR. CT Abdomen: "Prior right hemicolectomy and distal small bowel resection with mild dilatation of the remainder of the colon which has improved. No evidence of small bowel obstruction. Small hiatal hernia. Fatty infiltration of the liver".    Discussed with ED MD: Inpatient admission for Crohn exacerbation; electrolyte replacement; Cardiac monitoring; GI consult; clears advancing as tolerated; repeat K+ in 4 hours; AM labs for review         Overview/Hospital Course:  08/19  Pt having severe spasms, perioral anesthesia with classic Trousseau's sign  Pt was transferred to ICU      Interval History:     Review of Systems   Constitutional:  Negative for activity change and appetite change.   HENT:  Negative for " congestion and dental problem.    Eyes:  Negative for discharge and itching.   Respiratory:  Negative for shortness of breath.    Cardiovascular:  Negative for chest pain.   Gastrointestinal:  Positive for diarrhea. Negative for abdominal distention and abdominal pain.   Endocrine: Negative for cold intolerance.   Genitourinary:  Negative for difficulty urinating and dysuria.   Musculoskeletal:  Positive for back pain. Negative for arthralgias.   Skin:  Negative for color change.   Neurological:  Negative for dizziness and facial asymmetry.   Hematological:  Negative for adenopathy.   Psychiatric/Behavioral:  Negative for agitation and behavioral problems.    Objective:     Vital Signs (Most Recent):  Temp: 97.4 °F (36.3 °C) (08/19/22 1530)  Pulse: (!) 58 (08/19/22 1630)  Resp: 17 (08/19/22 1630)  BP: 96/63 (08/19/22 1630)  SpO2: 95 % (08/19/22 1630)   Vital Signs (24h Range):  Temp:  [97.4 °F (36.3 °C)-99.3 °F (37.4 °C)] 97.4 °F (36.3 °C)  Pulse:  [58-96] 58  Resp:  [14-20] 17  SpO2:  [94 %-96 %] 95 %  BP: ()/(56-74) 96/63     Weight: 52 kg (114 lb 10.2 oz)  Body mass index is 19.08 kg/m².    Intake/Output Summary (Last 24 hours) at 8/19/2022 1641  Last data filed at 8/18/2022 1928  Gross per 24 hour   Intake 100 ml   Output --   Net 100 ml      Physical Exam  Vitals and nursing note reviewed.   Constitutional:       General: She is not in acute distress.  HENT:      Head: Atraumatic.      Right Ear: External ear normal.      Left Ear: External ear normal.      Nose: Nose normal.      Mouth/Throat:      Mouth: Mucous membranes are moist.   Eyes:      General: No scleral icterus.  Cardiovascular:      Rate and Rhythm: Normal rate.   Pulmonary:      Effort: Pulmonary effort is normal.   Musculoskeletal:         General: Normal range of motion.      Cervical back: Normal range of motion.   Skin:     General: Skin is warm.   Neurological:      Mental Status: She is alert and oriented to person, place, and time.    Psychiatric:         Behavior: Behavior normal.       Significant Labs: All pertinent labs within the past 24 hours have been reviewed.  CBC:   Recent Labs   Lab 08/18/22  1239 08/19/22  0500   WBC 8.68 6.79   HGB 9.0* 10.2*   HCT 26.8* 29.7*    153     CMP:   Recent Labs   Lab 08/18/22  1239 08/18/22 2025 08/19/22  0100 08/19/22  0500   *  --   --  136   K 2.3* 2.1* 2.8* 3.1*     --   --  104   CO2 19*  --   --  20*   GLU 92  --   --  91   BUN 6  --   --  8   CREATININE 0.7  --   --  0.8   CALCIUM 5.6*  --   --  5.6*   PROT 6.5  --   --  6.1   ALBUMIN 3.4*  --   --  3.2*   BILITOT 0.9  --   --  1.5*   ALKPHOS 85  --   --  87   AST 23  --   --  24   ALT 29  --   --  28   ANIONGAP 12  --   --  12       Significant Imaging: I have reviewed all pertinent imaging results/findings within the past 24 hours.      Assessment/Plan:      * Crohn's colitis  Continue Present Treatment  This is an ongoing issue for this pt  She must follow up with GI MD on Outpatient basis for initiation of biologics  H/o Surgery due to Fistula and since then she has been suffering from chronic diarrhea          Hypocalcemia  Pt has severe Hypocalcemia  Treat Hypomagnesemia too  D/C PPI  Transfer to ICU  Start Calcium Gluconate infusion       COVID-19 virus infection  Conservative management   Never had Vaccines even though has active ongoing chronic crohn's colitis    Vitamin D insufficiency  Continue Supplements      Hypokalemia  Monitor and replete  Start Gentle iv hydration with NS +KCL      Hypomagnesemia  Monitor and replete       Impaired functional mobility and activity tolerance  Per Chart review      History of status epilepticus  Still on keppra  She suffered from seizure due to electrolyte abnormalities      Rectovaginal fistula  Aware   In the background of Crohns        VTE Risk Mitigation (From admission, onward)         Ordered     enoxaparin injection 40 mg  Every 24 hours (non-standard times)          08/19/22 1345     IP VTE LOW RISK PATIENT  Once         08/19/22 0116     Place SPENCER regane  Until discontinued         08/19/22 0116                Discharge Planning   ANGIE: 8/21/2022     Code Status: Full Code   Is the patient medically ready for discharge?: No    Reason for patient still in hospital (select all that apply): Treatment  Discharge Plan A: Home                  Loco Odonnell MD  Department of Hospital Medicine   [facility]

## 2022-08-19 NOTE — PLAN OF CARE
Problem: Adult Inpatient Plan of Care  Goal: Plan of Care Review  Outcome: Ongoing, Progressing  Goal: Patient-Specific Goal (Individualized)  Outcome: Ongoing, Progressing  Goal: Absence of Hospital-Acquired Illness or Injury  Outcome: Ongoing, Progressing  Goal: Optimal Comfort and Wellbeing  Outcome: Ongoing, Progressing  Goal: Readiness for Transition of Care  Outcome: Ongoing, Progressing     Problem: Infection  Goal: Absence of Infection Signs and Symptoms  Outcome: Ongoing, Progressing     Problem: Adjustment to Illness (Bowel Disease, Inflammatory)  Goal: Optimal Adaptation to Chronic Illness  Outcome: Ongoing, Progressing     Problem: Diarrhea (Bowel Disease, Inflammatory)  Goal: Diarrhea Symptom Relief  Outcome: Ongoing, Progressing     Problem: Infection (Bowel Disease, Inflammatory)  Goal: Absence of Infection Signs and Symptoms  Outcome: Ongoing, Progressing     Problem: Nutrition Impaired (Bowel Disease, Inflammatory)  Goal: Optimal Nutrition  Outcome: Ongoing, Progressing     Problem: Pain (Bowel Disease, Inflammatory)  Goal: Acceptable Pain Control  Outcome: Ongoing, Progressing

## 2022-08-19 NOTE — CARE UPDATE
08/18/22 1571   Patient Assessment/Suction   Level of Consciousness (AVPU)   (resting quietly with nadn)   PRE-TX-O2   O2 Device (Oxygen Therapy) nasal cannula   Resp 20   Inhaler   $ Inhaler Charges PRN treatment not required   Daily Review of Necessity (Inhaler) completed   Respiratory Treatment Status (Inhaler) PRN treatment not required

## 2022-08-20 VITALS
DIASTOLIC BLOOD PRESSURE: 56 MMHG | SYSTOLIC BLOOD PRESSURE: 87 MMHG | WEIGHT: 114.63 LBS | HEIGHT: 65 IN | BODY MASS INDEX: 19.1 KG/M2 | RESPIRATION RATE: 23 BRPM | OXYGEN SATURATION: 99 % | HEART RATE: 60 BPM | TEMPERATURE: 97 F

## 2022-08-20 LAB
ALBUMIN SERPL BCP-MCNC: 3 G/DL (ref 3.5–5.2)
ALBUMIN SERPL BCP-MCNC: 3 G/DL (ref 3.5–5.2)
ALBUMIN SERPL BCP-MCNC: 3.1 G/DL (ref 3.5–5.2)
ALBUMIN SERPL BCP-MCNC: 3.1 G/DL (ref 3.5–5.2)
ALP SERPL-CCNC: 78 U/L (ref 55–135)
ALP SERPL-CCNC: 78 U/L (ref 55–135)
ALP SERPL-CCNC: 79 U/L (ref 55–135)
ALP SERPL-CCNC: 79 U/L (ref 55–135)
ALT SERPL W/O P-5'-P-CCNC: 23 U/L (ref 10–44)
ALT SERPL W/O P-5'-P-CCNC: 24 U/L (ref 10–44)
ANION GAP SERPL CALC-SCNC: 3 MMOL/L (ref 8–16)
ANION GAP SERPL CALC-SCNC: 4 MMOL/L (ref 8–16)
AST SERPL-CCNC: 20 U/L (ref 10–40)
AST SERPL-CCNC: 20 U/L (ref 10–40)
AST SERPL-CCNC: 21 U/L (ref 10–40)
AST SERPL-CCNC: 23 U/L (ref 10–40)
BASOPHILS # BLD AUTO: 0.01 K/UL (ref 0–0.2)
BASOPHILS NFR BLD: 0.2 % (ref 0–1.9)
BILIRUB SERPL-MCNC: 0.7 MG/DL (ref 0.1–1)
BILIRUB SERPL-MCNC: 0.7 MG/DL (ref 0.1–1)
BILIRUB SERPL-MCNC: 0.9 MG/DL (ref 0.1–1)
BILIRUB SERPL-MCNC: 0.9 MG/DL (ref 0.1–1)
BUN SERPL-MCNC: 7 MG/DL (ref 6–20)
CA-I BLDV-SCNC: 1.17 MMOL/L (ref 1.06–1.42)
CA-I BLDV-SCNC: 1.26 MMOL/L (ref 1.06–1.42)
CA-I BLDV-SCNC: 1.37 MMOL/L (ref 1.06–1.42)
CALCIUM SERPL-MCNC: 7.6 MG/DL (ref 8.7–10.5)
CALCIUM SERPL-MCNC: 8.1 MG/DL (ref 8.7–10.5)
CALCIUM SERPL-MCNC: 8.7 MG/DL (ref 8.7–10.5)
CHLORIDE SERPL-SCNC: 107 MMOL/L (ref 95–110)
CHLORIDE SERPL-SCNC: 108 MMOL/L (ref 95–110)
CHLORIDE SERPL-SCNC: 109 MMOL/L (ref 95–110)
CO2 SERPL-SCNC: 20 MMOL/L (ref 23–29)
CO2 SERPL-SCNC: 21 MMOL/L (ref 23–29)
CREAT SERPL-MCNC: 0.6 MG/DL (ref 0.5–1.4)
CREAT SERPL-MCNC: 0.7 MG/DL (ref 0.5–1.4)
CREAT SERPL-MCNC: 0.7 MG/DL (ref 0.5–1.4)
DIFFERENTIAL METHOD: ABNORMAL
EOSINOPHIL # BLD AUTO: 0 K/UL (ref 0–0.5)
EOSINOPHIL NFR BLD: 0.2 % (ref 0–8)
ERYTHROCYTE [DISTWIDTH] IN BLOOD BY AUTOMATED COUNT: 14.9 % (ref 11.5–14.5)
EST. GFR  (NO RACE VARIABLE): >60 ML/MIN/1.73 M^2
GLUCOSE SERPL-MCNC: 115 MG/DL (ref 70–110)
GLUCOSE SERPL-MCNC: 160 MG/DL (ref 70–110)
GLUCOSE SERPL-MCNC: 92 MG/DL (ref 70–110)
HCT VFR BLD AUTO: 27.4 % (ref 37–48.5)
HGB BLD-MCNC: 8.9 G/DL (ref 12–16)
IMM GRANULOCYTES # BLD AUTO: 0.03 K/UL (ref 0–0.04)
IMM GRANULOCYTES NFR BLD AUTO: 0.5 % (ref 0–0.5)
LYMPHOCYTES # BLD AUTO: 0.8 K/UL (ref 1–4.8)
LYMPHOCYTES NFR BLD: 13.2 % (ref 18–48)
MAGNESIUM SERPL-MCNC: 1.7 MG/DL (ref 1.6–2.6)
MAGNESIUM SERPL-MCNC: 2.4 MG/DL (ref 1.6–2.6)
MAGNESIUM SERPL-MCNC: 2.6 MG/DL (ref 1.6–2.6)
MCH RBC QN AUTO: 31.2 PG (ref 27–31)
MCHC RBC AUTO-ENTMCNC: 32.5 G/DL (ref 32–36)
MCV RBC AUTO: 96 FL (ref 82–98)
MONOCYTES # BLD AUTO: 0.5 K/UL (ref 0.3–1)
MONOCYTES NFR BLD: 8 % (ref 4–15)
NEUTROPHILS # BLD AUTO: 4.7 K/UL (ref 1.8–7.7)
NEUTROPHILS NFR BLD: 77.9 % (ref 38–73)
NRBC BLD-RTO: 0 /100 WBC
PLATELET # BLD AUTO: 144 K/UL (ref 150–450)
PMV BLD AUTO: 10.1 FL (ref 9.2–12.9)
POTASSIUM SERPL-SCNC: 4.5 MMOL/L (ref 3.5–5.1)
POTASSIUM SERPL-SCNC: 4.7 MMOL/L (ref 3.5–5.1)
POTASSIUM SERPL-SCNC: 4.8 MMOL/L (ref 3.5–5.1)
PROT SERPL-MCNC: 6 G/DL (ref 6–8.4)
PROT SERPL-MCNC: 6.1 G/DL (ref 6–8.4)
PTH-INTACT SERPL-MCNC: 19.2 PG/ML (ref 9–77)
RBC # BLD AUTO: 2.85 M/UL (ref 4–5.4)
SODIUM SERPL-SCNC: 132 MMOL/L (ref 136–145)
SODIUM SERPL-SCNC: 132 MMOL/L (ref 136–145)
SODIUM SERPL-SCNC: 133 MMOL/L (ref 136–145)
WBC # BLD AUTO: 6 K/UL (ref 3.9–12.7)

## 2022-08-20 PROCEDURE — 87086 URINE CULTURE/COLONY COUNT: CPT | Performed by: INTERNAL MEDICINE

## 2022-08-20 PROCEDURE — 25000003 PHARM REV CODE 250: Performed by: INTERNAL MEDICINE

## 2022-08-20 PROCEDURE — 80053 COMPREHEN METABOLIC PANEL: CPT | Performed by: INTERNAL MEDICINE

## 2022-08-20 PROCEDURE — 94761 N-INVAS EAR/PLS OXIMETRY MLT: CPT

## 2022-08-20 PROCEDURE — 85025 COMPLETE CBC W/AUTO DIFF WBC: CPT | Performed by: INTERNAL MEDICINE

## 2022-08-20 PROCEDURE — 83735 ASSAY OF MAGNESIUM: CPT | Mod: 91 | Performed by: INTERNAL MEDICINE

## 2022-08-20 PROCEDURE — 82330 ASSAY OF CALCIUM: CPT | Performed by: INTERNAL MEDICINE

## 2022-08-20 PROCEDURE — 83735 ASSAY OF MAGNESIUM: CPT | Performed by: INTERNAL MEDICINE

## 2022-08-20 PROCEDURE — 83970 ASSAY OF PARATHORMONE: CPT | Performed by: INTERNAL MEDICINE

## 2022-08-20 PROCEDURE — 63600175 PHARM REV CODE 636 W HCPCS: Performed by: INTERNAL MEDICINE

## 2022-08-20 PROCEDURE — 82330 ASSAY OF CALCIUM: CPT | Mod: 91 | Performed by: INTERNAL MEDICINE

## 2022-08-20 PROCEDURE — 99900035 HC TECH TIME PER 15 MIN (STAT)

## 2022-08-20 PROCEDURE — 80053 COMPREHEN METABOLIC PANEL: CPT | Mod: 91 | Performed by: INTERNAL MEDICINE

## 2022-08-20 RX ORDER — CALCIUM CARBONATE 500 MG/1
2 TABLET ORAL 2 TIMES DAILY
Qty: 60 TABLET | Refills: 0 | Status: SHIPPED | OUTPATIENT
Start: 2022-08-20 | End: 2023-08-20

## 2022-08-20 RX ORDER — CHOLESTYRAMINE 4 G/4.8G
4 POWDER, FOR SUSPENSION ORAL 2 TIMES DAILY
Qty: 180 PACKET | Refills: 0 | Status: SHIPPED | OUTPATIENT
Start: 2022-08-20 | End: 2022-09-07

## 2022-08-20 RX ADMIN — MAGNESIUM SULFATE HEPTAHYDRATE 2 G: 40 INJECTION, SOLUTION INTRAVENOUS at 01:08

## 2022-08-20 RX ADMIN — TIZANIDINE 4 MG: 4 TABLET ORAL at 05:08

## 2022-08-20 RX ADMIN — LOPERAMIDE HYDROCHLORIDE 4 MG: 2 CAPSULE ORAL at 08:08

## 2022-08-20 RX ADMIN — ONDANSETRON 4 MG: 2 INJECTION INTRAMUSCULAR; INTRAVENOUS at 09:08

## 2022-08-20 RX ADMIN — LEVETIRACETAM 500 MG: 500 TABLET, FILM COATED ORAL at 08:08

## 2022-08-20 RX ADMIN — VENLAFAXINE 75 MG: 37.5 TABLET ORAL at 08:08

## 2022-08-20 RX ADMIN — CALCIUM CARBONATE 1000 MG: 500 TABLET, CHEWABLE ORAL at 08:08

## 2022-08-20 RX ADMIN — ENOXAPARIN SODIUM 40 MG: 40 INJECTION SUBCUTANEOUS at 08:08

## 2022-08-20 RX ADMIN — THERA TABS 1 TABLET: TAB at 08:08

## 2022-08-20 RX ADMIN — Medication 400 MG: at 08:08

## 2022-08-20 RX ADMIN — POTASSIUM CHLORIDE 40 MEQ: 1500 TABLET, EXTENDED RELEASE ORAL at 08:08

## 2022-08-20 RX ADMIN — CHOLESTYRAMINE 4 G: 4 POWDER, FOR SUSPENSION ORAL at 08:08

## 2022-08-20 NOTE — NURSING
She was discharged into the care of her mother and brother. All medication were reviewed with patient. She was given instruction on Covid and prevention of spreading. Pt IV was removed and port flushed and de-accessed. She is stable and ready for discharge.

## 2022-08-20 NOTE — CONSULTS
INPATIENT NEPHROLOGY Progress Note  [facility]    Patient Name: Carline Chang  Date: 08/20/2022    Reason for consultation: Low electrolytes    Chief Complaint:   Chief Complaint   Patient presents with    Emesis     N/V and abd pain since awakening this am. Pt reports hx of crohns.        History of Present Illness:  56 year old female with history of Crohn's (s/p sub total colectomy), GERD, Renal Lithiasis and does not wish to be vaccinated against COVID presented to ED complaining waking to N/V and cramping abdominal discomfort. She became concerned when her N/V persisted all morning as she has had seizure history when she experienced electrolyte imbalance in the past from N/V secondary to her long standing Crohn's. Vomitus: foodstuffs, turning bilious; no blood or coffee grounds. Abdominal discomfort 5-6/10 generalized, waxing/waning own accord. Never has formed stool after above colectomy. No blood/black pr. No CP/SOB. No palpitations. She is COVID positive. She has diffuse metabolic derangements- consulted for management.      Interval History:  8/19- patient transferred to the ICU  8/20- labs are better this AM, however BP is low, shagufta, on RA, voiding    Plan of Care:    Assessment:  COVID positive; hx of Crohn's- hypotension  Hypokalemia  Acidosis  Hypocalcemia/vitamin D deficiency  HypoMg    Plan:    - BP low- can do maintenance IVFs if needed- this may be her baseline  - metabolic derangements are all improved  - stopped all gtts  - transitioned to all PO supplements  - warrants recheck of PTH if develops recurrent hypocalcemia to evaluate for hypoparathyroidism    Thank you for allowing us to participate in this patient's care. We will continue to follow.    Vital Signs:  Temp Readings from Last 3 Encounters:   08/20/22 96.9 °F (36.1 °C) (Oral)   08/04/22 98.4 °F (36.9 °C)   04/21/22 97.6 °F (36.4 °C)       Pulse Readings from Last 3 Encounters:   08/20/22 61   08/04/22  99   07/29/22 100       BP Readings from Last 3 Encounters:   08/20/22 (!) 87/56   08/04/22 124/76   07/29/22 134/85       Weight:  Wt Readings from Last 3 Encounters:   08/18/22 52 kg (114 lb 10.2 oz)   08/04/22 50.3 kg (111 lb)   07/29/22 50.3 kg (111 lb)       INS/OUTS:  I/O last 3 completed shifts:  In: 619.5 [I.V.:353.3; IV Piggyback:266.2]  Out: -   No intake/output data recorded.    Medications:  Scheduled Meds:   calcium carbonate  1,000 mg Oral BID    cholecalciferol (vitamin D3)  50,000 Units Oral Weekly    cholestyramine-aspartame  4 g Oral BID    enoxparin  40 mg Subcutaneous Q24H    levETIRAcetam  500 mg Oral BID    loperamide  4 mg Oral QID    magnesium oxide  400 mg Oral BID    multivitamin  1 tablet Oral Daily    potassium chloride  40 mEq Oral BID    venlafaxine  75 mg Oral BID     Continuous Infusions:    PRN Meds:.acetaminophen, albuterol **AND** MDI Q6H PRN, benzocaine, calcium chloride IVPB, calcium chloride IVPB, calcium chloride IVPB, calcium chloride IVPB, calcium chloride IVPB, calcium chloride IVPB, dextrose 10%, dextrose 10%, glucagon (human recombinant), glucose, glucose, HYDROcodone-acetaminophen, HYDROmorphone, insulin aspart U-100, magnesium oxide, magnesium oxide, magnesium sulfate IVPB, magnesium sulfate IVPB, magnesium sulfate IVPB, magnesium sulfate IVPB, magnesium sulfate IVPB, magnesium sulfate IVPB, magnesium sulfate IVPB, magnesium sulfate IVPB, melatonin, ondansetron, potassium chloride, potassium chloride, potassium chloride, potassium chloride, potassium chloride, potassium chloride, potassium chloride, potassium chloride, prochlorperazine, sodium chloride 0.9%, sodium phosphate IVPB, sodium phosphate IVPB, sodium phosphate IVPB, sodium phosphate IVPB, sodium phosphate IVPB, sodium phosphate IVPB, sodium phosphate IVPB, sodium phosphate IVPB, sodium phosphate IVPB  No current facility-administered medications on file prior to encounter.     Current Outpatient  "Medications on File Prior to Encounter   Medication Sig Dispense Refill    calcium carbonate (TUMS ORAL) Take 1 tablet by mouth as needed (acid reflux).      CIPRO 500 mg tablet Take 500 mg by mouth once daily.      ergocalciferol (ERGOCALCIFEROL) 50,000 unit Cap Take 50,000 Units by mouth every 7 days.      HYDROcodone-acetaminophen (NORCO)  mg per tablet Take 1 tablet by mouth every 6 (six) hours as needed for Pain. 120 tablet 0    levETIRAcetam (KEPPRA) 500 MG Tab Take 1 tablet (500 mg total) by mouth 2 (two) times daily. 60 tablet 0    loperamide (IMODIUM) 2 mg capsule Take 2 capsules (4 mg total) by mouth 4 (four) times daily. 90 capsule 5    OYSTER SHELL CALCIUM 500 500 mg calcium (1,250 mg) tablet Take 2 tablets by mouth 2 (two) times daily.      pantoprazole (PROTONIX) 40 MG tablet Take 40 mg by mouth once daily.      potassium chloride SA (K-DUR,KLOR-CON) 20 MEQ tablet Take 40 mEq by mouth 2 (two) times daily.      promethazine (PHENERGAN) 25 MG tablet Take 1 tablet (25 mg total) by mouth every 4 (four) hours. 30 tablet 1    SENNA PLUS 8.6-50 mg per tablet Take 1 tablet by mouth 2 (two) times a day.      spironolactone (ALDACTONE) 25 MG tablet Take 25 mg by mouth once daily.      venlafaxine (EFFEXOR) 75 MG tablet Take 75 mg by mouth 2 (two) times daily.      [DISCONTINUED] aMILoride (MIDAMOR) 5 MG Tab Take 5 mg by mouth once daily.      [DISCONTINUED] calcium-vitamin D3 (OS-CHEIKH 500 + D3) 500 mg-5 mcg (200 unit) per tablet Take 2 tablets by mouth 2 (two) times daily with meals. 120 tablet 0    [DISCONTINUED] cholestyramine-aspartame (QUESTRAN LIGHT) 4 gram PwPk Take 1 packet (4 g total) by mouth 2 (two) times daily. 180 packet 0     Review of Systems:  Neg    Physical Exam:  BP (!) 87/56   Pulse 61   Temp 96.9 °F (36.1 °C) (Oral)   Resp 19   Ht 5' 5" (1.651 m)   Wt 52 kg (114 lb 10.2 oz)   LMP 05/30/2009   SpO2 99%   BMI 19.08 kg/m²     Exam deferred due to COVID " precautions    Results:  Lab Results   Component Value Date     (L) 08/20/2022    K 4.5 08/20/2022     08/20/2022    CO2 20 (L) 08/20/2022    BUN 7 08/20/2022    CREATININE 0.7 08/20/2022    CALCIUM 8.7 08/20/2022    ANIONGAP 3 (L) 08/20/2022    ESTGFRAFRICA >60.0 04/06/2022    EGFRNONAA >60.0 04/06/2022       Lab Results   Component Value Date    CALCIUM 8.7 08/20/2022    PHOS 3.2 08/19/2022       Recent Labs   Lab 08/20/22  0423   WBC 6.00   RBC 2.85*   HGB 8.9*   HCT 27.4*   *   MCV 96   MCH 31.2*   MCHC 32.5       I have personally reviewed pertinent radiological imaging and reports.    I have spent > 35 minutes providing care for this patient for the above diagnoses. These services have included chart/data/imaging review, evaluation, exam, formulation of plan, , note preparation, and discussions with staff involved in this patient's care.    Lourdes Hayward MD MPH  [facility]  [address] [place] [address]  [phone] (p)  [phone] (f)

## 2022-08-20 NOTE — PLAN OF CARE
08/20/22 1136   Final Note   Assessment Type Final Discharge Note   Anticipated Discharge Disposition Home   What phone number can be called within the next 1-3 days to see how you are doing after discharge? [phone]   Hospital Resources/Appts/Education Provided Appointments scheduled and added to AVS   Post-Acute Status   Discharge Delays None known at this time     Pt is discharging home with no identified CM needs. Pt is clear to discharge from a CM standpoint.

## 2022-08-20 NOTE — PLAN OF CARE
08/20/22 1135   Medicare Message   Important Message from Medicare regarding Discharge Appeal Rights Given to patient/caregiver;Explained to patient/caregiver;Signed/date by patient/caregiver   Date IMM was signed 08/20/22   Time IMM was signed 0830

## 2022-08-20 NOTE — DISCHARGE SUMMARY
"[facility]  Discharge Summary      Patient Name: Carline Chang  MRN: [id number]  Patient Class: IP- Inpatient  Admission Date: 8/18/2022  Hospital Length of Stay: 2 days  Discharge Date and Time:  08/20/2022 2:25 PM  Attending Physician: No att. providers found   Discharging Provider: Prashant Hill MD  Primary Care Provider: Jose Hughes MD      HPI:   56 year old female with history of Crohn's (s/p sub total colectomy), GERD, Renal Lithiasis and does not wish to be vaccinated against COVID presented to ED complaining waking to N/V and cramping abdominal discomfort. She became concerned when her N/V persisted all morning as she has had seizure history when she experienced electrolyte imbalance in the past from N/V secondary to her long standing Crohn's. Vomitus: foodstuffs, turning bilious; no blood or coffee grounds. Abdominal discomfort 5-6/10 generalized, waxing/waning own accord. Never has formed stool after above colectomy. No blood/black pr. No CP/SOB. No palpitations    In ED: labs reviewed and noted below: no leukocytosis with moderate normocytic anemia; trivial hyponatremia, severe hypokalemia, mild metabolic acidosis, significant hypocalcemia, bilirubin and transaminases are normal. Telemetry reviewed: NSR. CT Abdomen: "Prior right hemicolectomy and distal small bowel resection with mild dilatation of the remainder of the colon which has improved. No evidence of small bowel obstruction. Small hiatal hernia. Fatty infiltration of the liver".    Discussed with ED MD: Inpatient admission for Crohn exacerbation; electrolyte replacement; Cardiac monitoring; GI consult; clears advancing as tolerated; repeat K+ in 4 hours; AM labs for review         * No surgery found *      Hospital Course:     HPI: 56 year old female with history of Crohn's (s/p sub total colectomy), GERD, Renal Lithiasis and does not wish to be vaccinated against COVID presented to ED complaining " "waking to N/V and cramping abdominal discomfort. She became concerned when her N/V persisted all morning as she has had seizure history when she experienced electrolyte imbalance in the past from N/V secondary to her long standing Crohn's. Vomitus: foodstuffs, turning bilious; no blood or coffee grounds. Abdominal discomfort 5-6/10 generalized, waxing/waning own accord. Never has formed stool after above colectomy. No blood/black pr. No CP/SOB. No palpitations     In ED: labs reviewed and noted below: no leukocytosis with moderate normocytic anemia; trivial hyponatremia, severe hypokalemia, mild metabolic acidosis, significant hypocalcemia, bilirubin and transaminases are normal. Telemetry reviewed: NSR. CT Abdomen: "Prior right hemicolectomy and distal small bowel resection with mild dilatation of the remainder of the colon which has improved. No evidence of small bowel obstruction. Small hiatal hernia. Fatty infiltration of the liver".     Discussed with ED MD: Inpatient admission for Crohn exacerbation; electrolyte replacement; Cardiac monitoring; GI consult; clears advancing as tolerated; repeat K+ in 4 hours; AM labs for review     Hospital course   Pt was admitted with symptoms fo colitis and electrolyte imbalance .  Patient had severe hypocalcemia and hypokalemia . IVF and Ca supplement given and improved . Her mild diarrhea is chronic and no change   Later nephrology reviewed and DC aldactone since appear from GI loss.  Patient later electrolytes are back to normal and DC in stable condition .       Goals of Care Treatment Preferences:  Code Status: Full Code      Consults:   Consults (From admission, onward)        Status Ordering Provider     Inpatient consult to Nephrology  Once        Provider:  Lourdes Hayward MD    Completed KHADIJAH SHELL          No new Assessment & Plan notes have been filed under this hospital service since the last note was generated.  Service: Hospital Medicine    Final Active " Diagnoses:    Diagnosis Date Noted POA    PRINCIPAL PROBLEM:  Crohn's colitis [K50.10] 10/07/2021 Yes    Hypocalcemia [E83.51] 08/19/2022 Unknown    Hypomagnesemia [E83.42] 08/19/2022 Unknown    COVID-19 virus infection [U07.1] 08/19/2022 Unknown    Hypokalemia [E87.6] 08/19/2022 Unknown    Vitamin D insufficiency [E55.9] 08/19/2022 Unknown    Impaired functional mobility and activity tolerance [Z74.09] 08/04/2022 Yes    History of status epilepticus [Z86.69] 01/15/2022 Not Applicable     Chronic    Rectovaginal fistula [N82.3] 04/11/2018 Yes      Problems Resolved During this Admission:       Discharged Condition: good    Disposition: Home or Self Care    Follow Up:   Follow-up Information     Jose Hughes MD Follow up in 2 week(s).    Specialty: Gastroenterology  Contact information:  [id number] [facility] [address]  [phone]             Jose Hughes MD Follow up.    Specialty: Gastroenterology  Why: pt has appointment  Contact information:  [id number]  [place] [address]  [phone]                       Patient Instructions:      Diet Adult Regular     Diet Cardiac     Activity as tolerated       Significant Diagnostic Studies: Labs:   CMP   Recent Labs   Lab 08/20/22  0016 08/20/22  0423 08/20/22  0831   * 133*  133*  133* 132*   K 4.7 4.8  4.8  4.8 4.5    108  108  108 109   CO2 21* 21*  21*  21* 20*   * 92  92  92 115*   BUN 7 7  7  7 7   CREATININE 0.7 0.6  0.6  0.6 0.7   CALCIUM 7.6* 8.1*  8.1*  8.1* 8.7   PROT 6.0 6.0  6.0 6.1   ALBUMIN 3.0* 3.1*  3.1* 3.0*   BILITOT 0.7 0.9  0.9 0.7   ALKPHOS 78 79  79 78   AST 23 20  20 21   ALT 24 23  23 23   ANIONGAP 4* 4*  4*  4* 3*    and CBC   Recent Labs   Lab 08/19/22  0500 08/20/22  0423   WBC 6.79 6.00   HGB 10.2* 8.9*   HCT 29.7* 27.4*    144*       Pending Diagnostic Studies:     None         Medications:  Reconciled Home Medications:      Medication List      START taking these  medications    TUMS 200 mg calcium (500 mg) chewable tablet  Generic drug: calcium carbonate  Take 2 tablets (1,000 mg total) by mouth 2 (two) times daily.  Replaces: TUMS ORAL        CONTINUE taking these medications    cholestyramine-aspartame 4 gram Pwpk  Commonly known as: QUESTRAN LIGHT  Take 1 packet (4 g total) by mouth 2 (two) times daily.     ergocalciferol 50,000 unit Cap  Commonly known as: ERGOCALCIFEROL  Take 50,000 Units by mouth every 7 days.     HYDROcodone-acetaminophen  mg per tablet  Commonly known as: NORCO  Take 1 tablet by mouth every 6 (six) hours as needed for Pain.     levETIRAcetam 500 MG Tab  Commonly known as: KEPPRA  Take 1 tablet (500 mg total) by mouth 2 (two) times daily.     loperamide 2 mg capsule  Commonly known as: IMODIUM  Take 2 capsules (4 mg total) by mouth 4 (four) times daily.     OYSTER SHELL CALCIUM 500 500 mg calcium (1,250 mg) tablet  Generic drug: calcium carbonate  Take 2 tablets by mouth 2 (two) times daily.     pantoprazole 40 MG tablet  Commonly known as: PROTONIX  Take 40 mg by mouth once daily.     potassium chloride SA 20 MEQ tablet  Commonly known as: K-DUR,KLOR-CON  Take 40 mEq by mouth 2 (two) times daily.     promethazine 25 MG tablet  Commonly known as: PHENERGAN  Take 1 tablet (25 mg total) by mouth every 4 (four) hours.     SENNA PLUS 8.6-50 mg per tablet  Generic drug: senna-docusate 8.6-50 mg  Take 1 tablet by mouth 2 (two) times a day.     venlafaxine 75 MG tablet  Commonly known as: EFFEXOR  Take 75 mg by mouth 2 (two) times daily.        STOP taking these medications    acetaminophen-codeine 300-30mg 300-30 mg Tab  Commonly known as: TYLENOL #3     aMILoride 5 MG Tab  Commonly known as: MIDAMOR     calcium-vitamin D3 500 mg-5 mcg (200 unit) per tablet  Commonly known as: OS-CHEIKH 500 + D3     CIPRO 500 MG tablet  Generic drug: ciprofloxacin HCl     spironolactone 25 MG tablet  Commonly known as: ALDACTONE     TUMS ORAL  Replaced by: TUMS 200 mg  calcium (500 mg) chewable tablet            Indwelling Lines/Drains at time of discharge:   Lines/Drains/Airways     None               General: Patient resting comfortably in no acute distress. Appears as stated age. Calm  Eyes: EOM intact. No conjunctivae injection. No scleral icterus.  ENT: Hearing grossly intact. No discharge from ears. No nasal discharge.   CVS: RRR. No LE edema BL.  Lungs: CTA BL, no wheezing or crackles. Good breath sounds. No accessory muscle use. No acute respiratory distress  Neuro: non focal , Follows commands. Responds appropriately  Time spent on the discharge of patient: 22 minutes         Prashant Hill MD  Department of Hospital Medicine  [facility]

## 2022-08-20 NOTE — CARE UPDATE
08/20/22 0855   Patient Assessment/Suction   Level of Consciousness (AVPU) alert   Respiratory Effort Normal;Unlabored   Expansion/Accessory Muscles/Retractions no use of accessory muscles   All Lung Fields Breath Sounds clear   PRE-TX-O2   O2 Device (Oxygen Therapy) room air   SpO2 99 %   Pulse Oximetry Type Continuous   $ Pulse Oximetry - Multiple Charge Pulse Oximetry - Multiple   Pulse 68   Resp (!) 28   Aerosol Therapy   $ Aerosol Therapy Charges PRN treatment not required   Respiratory Evaluation   $ Care Plan Tech Time 15 min

## 2022-08-20 NOTE — NURSING
Late Entry 08/19/2022:    Pt was transferred to ICU. Pt is refusing POC finger sticks. Pt stats she is not a diabetic and does not want these done. Pt is getting Q4h BMP will monitor glucose according to lab draws

## 2022-08-20 NOTE — CARE UPDATE
08/19/22 2015   Patient Assessment/Suction   Level of Consciousness (AVPU) alert   Respiratory Effort Normal;Unlabored   Expansion/Accessory Muscles/Retractions no use of accessory muscles   All Lung Fields Breath Sounds clear   Cough Frequency no cough   PRE-TX-O2   O2 Device (Oxygen Therapy) room air   SpO2 97 %   Pulse Oximetry Type Continuous   $ Pulse Oximetry - Multiple Charge Pulse Oximetry - Multiple   Pulse 71   Resp (!) 32   Inhaler   $ Inhaler Charges PRN treatment not required   Daily Review of Necessity (Inhaler) completed   Respiratory Treatment Status (Inhaler) PRN treatment not required   Education   $ Education Bronchodilator;15 min   Respiratory Evaluation   $ Care Plan Tech Time 15 min

## 2022-08-21 LAB
BACTERIA UR CULT: NORMAL
BACTERIA UR CULT: NORMAL

## 2022-08-29 ENCOUNTER — CLINICAL SUPPORT (OUTPATIENT)
Dept: REHABILITATION | Facility: HOSPITAL | Age: 57
End: 2022-08-29
Attending: INTERNAL MEDICINE
Payer: MEDICARE

## 2022-08-29 DIAGNOSIS — Z74.09 IMPAIRED FUNCTIONAL MOBILITY AND ACTIVITY TOLERANCE: Primary | ICD-10-CM

## 2022-08-29 DIAGNOSIS — S32.030A CLOSED COMPRESSION FRACTURE OF L3 LUMBAR VERTEBRA, INITIAL ENCOUNTER: ICD-10-CM

## 2022-08-29 DIAGNOSIS — S22.080S COMPRESSION FRACTURE OF T12 VERTEBRA, SEQUELA: ICD-10-CM

## 2022-08-29 PROCEDURE — 97110 THERAPEUTIC EXERCISES: CPT | Mod: PN

## 2022-09-01 ENCOUNTER — HOSPITAL ENCOUNTER (OUTPATIENT)
Dept: RADIOLOGY | Facility: HOSPITAL | Age: 57
Discharge: HOME | End: 2022-09-01
Attending: ORTHOPAEDIC SURGERY
Payer: MEDICARE

## 2022-09-01 ENCOUNTER — OFFICE VISIT (OUTPATIENT)
Dept: ORTHOPEDICS | Facility: CLINIC | Age: 57
End: 2022-09-01
Payer: MEDICARE

## 2022-09-01 ENCOUNTER — CLINICAL SUPPORT (OUTPATIENT)
Dept: REHABILITATION | Facility: HOSPITAL | Age: 57
End: 2022-09-01
Attending: ORTHOPAEDIC SURGERY
Payer: MEDICARE

## 2022-09-01 VITALS — HEIGHT: 65 IN | BODY MASS INDEX: 18.55 KG/M2 | WEIGHT: 111.31 LBS

## 2022-09-01 DIAGNOSIS — S22.080S COMPRESSION FRACTURE OF T12 VERTEBRA, SEQUELA: ICD-10-CM

## 2022-09-01 DIAGNOSIS — S32.030A CLOSED COMPRESSION FRACTURE OF L3 LUMBAR VERTEBRA, INITIAL ENCOUNTER: ICD-10-CM

## 2022-09-01 DIAGNOSIS — S22.080A COMPRESSION FRACTURE OF T12 VERTEBRA, INITIAL ENCOUNTER: Primary | ICD-10-CM

## 2022-09-01 DIAGNOSIS — M51.36 DDD (DEGENERATIVE DISC DISEASE), LUMBAR: ICD-10-CM

## 2022-09-01 DIAGNOSIS — Z74.09 IMPAIRED FUNCTIONAL MOBILITY AND ACTIVITY TOLERANCE: Primary | ICD-10-CM

## 2022-09-01 DIAGNOSIS — M51.34 DDD (DEGENERATIVE DISC DISEASE), THORACIC: ICD-10-CM

## 2022-09-01 PROCEDURE — 99213 OFFICE O/P EST LOW 20 MIN: CPT | Mod: PBBFAC | Performed by: ORTHOPAEDIC SURGERY

## 2022-09-01 PROCEDURE — 99999 PR PBB SHADOW E&M-EST. PATIENT-LVL III: ICD-10-PCS | Mod: PBBFAC,,, | Performed by: ORTHOPAEDIC SURGERY

## 2022-09-01 PROCEDURE — 99213 PR OFFICE/OUTPT VISIT, EST, LEVL III, 20-29 MIN: ICD-10-PCS | Mod: S$PBB,,, | Performed by: ORTHOPAEDIC SURGERY

## 2022-09-01 PROCEDURE — 99999 PR PBB SHADOW E&M-EST. PATIENT-LVL III: CPT | Mod: PBBFAC,,, | Performed by: ORTHOPAEDIC SURGERY

## 2022-09-01 PROCEDURE — 72100 X-RAY EXAM L-S SPINE 2/3 VWS: CPT | Mod: 26,,, | Performed by: RADIOLOGY

## 2022-09-01 PROCEDURE — 72070 X-RAY EXAM THORAC SPINE 2VWS: CPT | Mod: TC

## 2022-09-01 PROCEDURE — 97110 THERAPEUTIC EXERCISES: CPT | Mod: PN,CQ

## 2022-09-01 PROCEDURE — 72070 X-RAY EXAM THORAC SPINE 2VWS: CPT | Mod: 26,,, | Performed by: RADIOLOGY

## 2022-09-01 PROCEDURE — 72100 X-RAY EXAM L-S SPINE 2/3 VWS: CPT | Mod: TC

## 2022-09-01 PROCEDURE — 99213 OFFICE O/P EST LOW 20 MIN: CPT | Mod: S$PBB,,, | Performed by: ORTHOPAEDIC SURGERY

## 2022-09-01 PROCEDURE — 72070 XR THORACIC SPINE AP LATERAL: ICD-10-PCS | Mod: 26,,, | Performed by: RADIOLOGY

## 2022-09-01 PROCEDURE — 72100 XR LUMBAR SPINE AP AND LATERAL: ICD-10-PCS | Mod: 26,,, | Performed by: RADIOLOGY

## 2022-09-01 RX ORDER — GABAPENTIN 100 MG/1
100 CAPSULE ORAL 3 TIMES DAILY
Qty: 90 CAPSULE | Refills: 11 | Status: SHIPPED | OUTPATIENT
Start: 2022-09-01 | End: 2023-02-15

## 2022-09-01 NOTE — PROGRESS NOTES
"[facility]   Physical Therapy Treatment Note     [facility]  [facility] Number: [id number]    Therapy Diagnosis:   Encounter Diagnoses   Name Primary?    Impaired functional mobility and activity tolerance Yes    Closed compression fracture of L3 lumbar vertebra, initial encounter     Compression fracture of T12 vertebra, sequela      Physician: Min Harden MD    Visit Date: 9/1/2022    Physician Orders: PT Eval and Treat   Medical Diagnosis from Referral: Closed compression fracture of L3 lumbar vertebra, initial encounter; Compression fracture of T12 vertebra, initial encounter   Evaluation Date: 8/4/2022  Authorization Period Expiration: 07/21/2023   Plan of Care Expiration: 09/16/2022  Progress Note Due: 09/04/2022  Visit # / Visits authorized: 3/ 20 (4 total)   FOTO: Next survey due week, of 9/5/2022     Precautions: Compression fractures, osteoporosis    PTA Visit #: 1/5     Time In: 1034 (Pt was approximately ~20 minutes late)  Time Out: 1117  Total Billable Time: 43 minutes    SUBJECTIVE     Pt reports: "I was very sore after last time; I am having some back and knee pain, but my legs are sore; I was so sore that I had to take pain medication prior to coming because I could not even tolerate to stand"  She was not compliant with home exercise program at this time.  Response to previous treatment: Sore  Functional change: N/A at this time    Pain: 8/10 (this morning prior to taking pain medication)  Location: Pain in thoracic spine and LEs    OBJECTIVE     Objective Measures updated at progress report unless specified.     Treatment     Carline received the treatments listed below:      therapeutic exercises to develop increased core, paraspinals, and LE strength, as well as endurance for 43 minutes including:  Recumbent bike lvl2 x8'   Standing heel cord stretch [id number]    Hip ext x15ea    Mini squat x10    Heel raises x20    Step up & over eccentric 6" " "x5ea    Lateral step up & over 6" x10    Closed chain TKE 6" x 10ea  Seated hamstring stretch [id number]   Piriformis stretch 3x30s     March with core engagement x15ea    Hip abd red TB w/core engagement x15    Hip add w/core engagement x15    LAQ x10ea    Physioball rollout x10ea    UTR x10ea    Did not perform this date:  Resisted red TB trunk rotation x 10ea    Patient Education and Home Exercises     Home Exercises Provided and Patient Education Provided     Education provided:   - Reviewed exercises as instructed above, as well as instructed to utilize heat for pain and muscle soreness.    Written Home Exercises Provided: Illustrated instructions issued this visit.  Exercises were reviewed and Carline was able to demonstrate them prior to the end of the session.  Carline demonstrated fair  understanding of the education provided. See EMR under Patient Instructions for exercises provided during therapy sessions    ASSESSMENT     The patient arrived approximately 20 minutes late to physical therapy appointment today. Patient tolerated today's treatment session well without increased pain. Carline was slightly progressed with increased repetitions, resistance, and step height. The patient required moderate verbal and demonstration cues in order to correct posture throughout treatment. Patient had slight difficulty with closed chain total knee extension on right lower extremity. Carline still demonstrates poor posture (upon entering clinic) and curvature of thoracic spine.    Carline is slowly progressing towards her goals.   Pt prognosis is Fair.     Pt will continue to benefit from skilled outpatient physical therapy to address the deficits listed in the problem list box on initial evaluation, provide pt/family education and to maximize pt's level of independence in the home and community environment.     Pt's spiritual, cultural and educational needs considered and pt agreeable to plan of care and goals.   "   Anticipated barriers to physical therapy: Multiple fractures    Goals:   Goals:  Short Term Goals: 3 weeks               1) Decrease max pain to < 5/10 Minimal progress              2) Facilitate improved posture Minimal progress              3) Facilitate improved LE flexibility Progressing              4) Patient will bend forward to perform LE dressing without increased pain Ongoing     Long Term Goals: 6 weeks               1) Patient will consistently perform sit <> stand transfers with minimal UE support and appropriate assistive device Ongoing              2) Patient will consistently perform rolling and scooting in bed without increased pain Progressing              3) Patient will consistently perform toilet transfers with no more that minimal difficulty Minimal progress              4) Increase walking tolerance to > 15 minutes Minimal progress              5) Increase standing tolerance to > 15 min Ongoing              6) Patient will be independent in complete HEP Ongoing    PLAN     The patient is to be progressed within the established plan of care, increasing in exercise complexity, resistance, and duration as tolerated in order to accomplish goals stated above and address current deficits. Plan to utilize manual soft tissue mobilizations as needed, as well as instruction for postural correction.    Kiesha Mcfadden, PTA

## 2022-09-01 NOTE — PROGRESS NOTES
DATE: 9/1/2022  PATIENT: Carline Chang    Attending Physician: Min Harden MD    HISTORY:  Carline Chang is a 56 y.o. female who returns to me today for follow up.  She was last seen by Dr. Harden on 7/21/22.  Today she is doing well but notes she continues to have mid/low back pain and bilateral anterior thigh pain. She has been taking norco 10mg q6PRN with good relief. Pt says the pain all started in January after having multiple seizures and before that she never had back problems.    The Patient denies myelopathic symptoms such as handwriting changes or difficulty with buttons/coins/keys. Denies perineal paresthesias, bowel/bladder dysfunction.    PMH/PSH/FamHx/SocHx:  Unchanged from prior visit    ROS:  REVIEW OF SYSTEMS:  Constitution: Negative. Negative for chills, fever and night sweats.   HENT: Negative for congestion and headaches.    Eyes: Negative for blurred vision, left vision loss and right vision loss.   Cardiovascular: Negative for chest pain and syncope.   Respiratory: Negative for cough and shortness of breath.    Endocrine: Negative for polydipsia, polyphagia and polyuria.   Hematologic/Lymphatic: Negative for bleeding problem. Does not bruise/bleed easily.   Skin: Negative for dry skin, itching and rash.   Musculoskeletal: Negative for falls and muscle weakness.   Gastrointestinal: Negative for abdominal pain and bowel incontinence.   Allergic/Immunologic: Negative for hives and persistent infections.  Genitourinary: Negative for urinary retention/incontinence and nocturia.   Neurological: negative for disturbances in coordination, no myelopathic symptoms such as handwriting changes or difficulty with buttons, coins, keys or small objects. No loss of balance and seizures.   Psychiatric/Behavioral: Negative for depression. The patient does not have insomnia.   Denies perineal paresthesias, bowel or bladder incontinence    EXAM:  LMP 05/30/2009     My physical examination was  notable for the following findings:     Musculoskeletal and neuro exam stable.    IMAGING:    Today I personally re- reviewed AP, Lat and Flex/Ex  upright L-spine that demonstrate severe compression fracture with wedging of T12 and mild compression fracture upper endplate of L3, no change from 7/21/22.    MRI lumbar demonstrates compression fractures of T11 and L3 superior endplates, and extensive compression fracture of T12, all not significantly changed in degree of vertebral body height loss since 1/19/2022 suggesting that these are most likely chronic in nature. These vertebral bodies do contain somewhat patchy increased T2 bone marrow signal which could be degenerative in nature although bone marrow edema is also conceivable, most prominent in T12. Recent subtle progression of T12 or L3 vertebral bodies can be considered if patient has symptoms correlating with these regions. Moderate central canal narrowing at level of T11 compression fracture due to 7 mm of retropulsion. Left foraminal and extraforaminal L4-L5 disc protrusion contacting left L4 spinal nerve.    There is no height or weight on file to calculate BMI.    Hemoglobin A1C   Date Value Ref Range Status   01/14/2022 5.6 4.5 - 6.2 % Final     Comment:     According to ADA guidelines, hemoglobin A1C <7.0% represents  optimal control in non-pregnant diabetic patients.  Different  metrics may apply to specific populations.   Standards of Medical Care in Diabetes - 2016.    For the purpose of screening for the presence of diabetes:  <5.7%     Consistent with the absence of diabetes  5.7-6.4%  Consistent with increasing risk for diabetes   (prediabetes)  >or=6.5%  Consistent with diabetes    Currently no consensus exists for use of hemoglobin A1C  for diagnosis of diabetes for children.           ASSESSMENT/PLAN:    There are no diagnoses linked to this encounter.    Today we discussed at length all of the different treatment options including  anti-inflammatories, acetaminophen, rest, ice, heat, physical therapy including strengthening and stretching exercises, home exercises, ROM, aerobic conditioning, aqua therapy, other modalities including ultrasound, massage, and dry needling, epidural steroid injections and finally surgical intervention.      Pt presents with chronic low back pain and radiculopathy, most likely due to severe compression fx at T12 as well as multi level degenerative changes. Discussed with Dr. Harden, pt would possibly benefit from surgical intervention in the future but will need to work with endocrinology to improve bone strength. Will message Dr. Servin about possible pain management procedures.

## 2022-09-02 ENCOUNTER — TELEPHONE (OUTPATIENT)
Dept: ENDOCRINOLOGY | Facility: CLINIC | Age: 57
End: 2022-09-02
Payer: MEDICARE

## 2022-09-02 NOTE — TELEPHONE ENCOUNTER
Spoke with patient offered patient a sooner appointment. Patient confirmed appointment and approved time and date.

## 2022-09-06 ENCOUNTER — CLINICAL SUPPORT (OUTPATIENT)
Dept: REHABILITATION | Facility: HOSPITAL | Age: 57
End: 2022-09-06
Attending: ORTHOPAEDIC SURGERY
Payer: MEDICARE

## 2022-09-06 DIAGNOSIS — S22.080S COMPRESSION FRACTURE OF T12 VERTEBRA, SEQUELA: ICD-10-CM

## 2022-09-06 DIAGNOSIS — S32.030A CLOSED COMPRESSION FRACTURE OF L3 LUMBAR VERTEBRA, INITIAL ENCOUNTER: ICD-10-CM

## 2022-09-06 DIAGNOSIS — Z74.09 IMPAIRED FUNCTIONAL MOBILITY AND ACTIVITY TOLERANCE: Primary | ICD-10-CM

## 2022-09-06 PROCEDURE — 97110 THERAPEUTIC EXERCISES: CPT | Mod: PN,CQ

## 2022-09-06 NOTE — PROGRESS NOTES
"[facility]   Physical Therapy Treatment Note     [facility]  [facility] Number: [id number]    Therapy Diagnosis:   Encounter Diagnoses   Name Primary?    Impaired functional mobility and activity tolerance Yes    Closed compression fracture of L3 lumbar vertebra, initial encounter     Compression fracture of T12 vertebra, sequela      Physician: Min Harden MD    Visit Date: 9/6/2022    Physician Orders: PT Eval and Treat   Medical Diagnosis from Referral: Closed compression fracture of L3 lumbar vertebra, initial encounter; Compression fracture of T12 vertebra, initial encounter   Evaluation Date: 8/4/2022  Authorization Period Expiration: 07/21/2023   Plan of Care Expiration: 09/16/2022  Progress Note Due: 09/04/2022  Visit # / Visits authorized: 4/ 20 (5 total)   FOTO: Completed 9/6/2022 58% Deficit   Next Survey Due Visit 10     Precautions: Compression fractures, osteoporosis    PTA Visit #: 2/5     Time In: 1028   Time Out: 1111  Total Billable Time: 43 minutes    SUBJECTIVE     Pt reports: "I was sore after last session, but I got out of bed much easier today. I am having less pain today, and my Dr. 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  Response to previous treatment: Sore  Functional change: N/A at this time    Pain: 4/10  Location: Pain in thoracic spine and LEs    OBJECTIVE     Objective Measures updated at progress report unless specified.     Treatment     Carline received the treatments listed below:      therapeutic exercises to develop increased core, paraspinals, and LE strength, as well as endurance for 43 minutes including:  Recumbent bike lvl2 x8'   Standing heel cord stretch [id number]    Hip ext x20ea    Hamstring Curl x15ea    Mini squat x15    Heel raises x20    Step up & over eccentric 6" x5ea    Lateral step up & over 6" x10    Closed chain TKE 6" x " 10ea  Seated hamstring stretch [id number]   Piriformis stretch 3x30s     March with core engagement x20ea    Hip abd red TB w/core engagement x20    Hip add w/core engagement x20    LAQ x15ea    Physioball rollout x10ea    UTR x10ea    Did not perform this date:  Resisted red TB trunk rotation x 10ea    Patient Education and Home Exercises     Home Exercises Provided and Patient Education Provided     Education provided:   - The patient was instructed in additional exercise(s) listed above in bold print.    Written Home Exercises Provided: Illustrated instructions issued this visit.  Exercises were reviewed and Carline was able to demonstrate them prior to the end of the session.  Carline demonstrated fair  understanding of the education provided. See EMR under Patient Instructions for exercises provided during therapy sessions    ASSESSMENT     The patient had an appointment at 12:30pm, but Carline thought her appointment was at 10:15AM, not showing up until 10:28AM. Patient was seen because originial 10:15 cancelled. The patient tolerated today's treatment session well without increased pain, experiencing minimal to moderate fatigue. Carline was progressed with increased repetitions and exercise complexity, as well as additional exercise. Carline requires moderate cuing for posture throughout treatment.    Carline is slowly progressing towards her goals.   Pt prognosis is Fair.     Pt will continue to benefit from skilled outpatient physical therapy to address the deficits listed in the problem list box on initial evaluation, provide pt/family education and to maximize pt's level of independence in the home and community environment.     Pt's spiritual, cultural and educational needs considered and pt agreeable to plan of care and goals.     Anticipated barriers to physical therapy: Multiple fractures    Goals:   Short Term Goals: 3 weeks               1) Decrease max pain to < 5/10 Minimal progress              2)  Facilitate improved posture Minimal progress              3) Facilitate improved LE flexibility Progressing              4) Patient will bend forward to perform LE dressing without increased pain Ongoing     Long Term Goals: 6 weeks               1) Patient will consistently perform sit <> stand transfers with minimal UE support and appropriate assistive device Ongoing              2) Patient will consistently perform rolling and scooting in bed without increased pain Progressing              3) Patient will consistently perform toilet transfers with no more that minimal difficulty Minimal progress              4) Increase walking tolerance to > 15 minutes Minimal progress              5) Increase standing tolerance to > 15 min Ongoing              6) Patient will be independent in complete HEP Ongoing    PLAN     The patient is to be progressed within the established plan of care as tolerated in order to accomplish goals listed above and address current deficits. Plan to increase exercise complexity, resistance, and repetition within subsequent sessions as tolerated.    Kiesha Mcfadden, PTA

## 2022-09-07 ENCOUNTER — OFFICE VISIT (OUTPATIENT)
Dept: OPHTHALMOLOGY | Facility: CLINIC | Age: 57
End: 2022-09-07
Payer: MEDICARE

## 2022-09-07 ENCOUNTER — OFFICE VISIT (OUTPATIENT)
Dept: SPINE | Facility: CLINIC | Age: 57
End: 2022-09-07
Payer: MEDICARE

## 2022-09-07 DIAGNOSIS — M54.16 LUMBAR RADICULOPATHY: Primary | ICD-10-CM

## 2022-09-07 DIAGNOSIS — S22.080D COMPRESSION FRACTURE OF T12 VERTEBRA WITH ROUTINE HEALING, SUBSEQUENT ENCOUNTER: ICD-10-CM

## 2022-09-07 DIAGNOSIS — M80.00XA AGE-RELATED OSTEOPOROSIS WITH CURRENT PATHOLOGICAL FRACTURE, INITIAL ENCOUNTER: ICD-10-CM

## 2022-09-07 DIAGNOSIS — H25.13 NUCLEAR SCLEROSIS, BILATERAL: Primary | ICD-10-CM

## 2022-09-07 DIAGNOSIS — S32.030A CLOSED COMPRESSION FRACTURE OF L3 LUMBAR VERTEBRA, INITIAL ENCOUNTER: ICD-10-CM

## 2022-09-07 DIAGNOSIS — F11.90 CHRONIC, CONTINUOUS USE OF OPIOIDS: ICD-10-CM

## 2022-09-07 DIAGNOSIS — K50.812 CROHN'S DISEASE OF BOTH SMALL AND LARGE INTESTINE WITH INTESTINAL OBSTRUCTION: ICD-10-CM

## 2022-09-07 PROCEDURE — 99214 OFFICE O/P EST MOD 30 MIN: CPT | Mod: 95,,, | Performed by: STUDENT IN AN ORGANIZED HEALTH CARE EDUCATION/TRAINING PROGRAM

## 2022-09-07 PROCEDURE — 99999 PR PBB SHADOW E&M-EST. PATIENT-LVL III: ICD-10-PCS | Mod: PBBFAC,,, | Performed by: OPHTHALMOLOGY

## 2022-09-07 PROCEDURE — 99214 PR OFFICE/OUTPT VISIT, EST, LEVL IV, 30-39 MIN: ICD-10-PCS | Mod: 95,,, | Performed by: STUDENT IN AN ORGANIZED HEALTH CARE EDUCATION/TRAINING PROGRAM

## 2022-09-07 PROCEDURE — 99999 PR PBB SHADOW E&M-EST. PATIENT-LVL III: CPT | Mod: PBBFAC,,, | Performed by: OPHTHALMOLOGY

## 2022-09-07 PROCEDURE — 99213 OFFICE O/P EST LOW 20 MIN: CPT | Mod: PBBFAC | Performed by: OPHTHALMOLOGY

## 2022-09-07 PROCEDURE — 92004 COMPRE OPH EXAM NEW PT 1/>: CPT | Mod: S$PBB,,, | Performed by: OPHTHALMOLOGY

## 2022-09-07 PROCEDURE — 92004 PR EYE EXAM, NEW PATIENT,COMPREHESV: ICD-10-PCS | Mod: S$PBB,,, | Performed by: OPHTHALMOLOGY

## 2022-09-07 NOTE — PROGRESS NOTES
HPI    Dls: 3/2022 ?      57 y/o female presents today for cat eval.   Pt c/o blurry vision at distance and near ou.   Pt wears bifocal glasses.     No tearing  No pain  No floaters  No glare    Eye meds  None      Last edited by Helene De La Torre MA on 9/7/2022  2:52 PM.            Assessment /Plan     For exam results, see Encounter Report.    Nuclear sclerosis, bilateral      Visually Significant Cataract: Patient reports decreased vision consistent with the clinical amount of lenticular opacity, which reaches the level of visual significance and affects activities of daily living.     Specifically, this patient describes difficulty with:  - driving safely at night  - reading road signs  - reading small print  - deciphering medicine bottles  - reading the newspaper  - using the phone  - reading texts     Risks, benefits, and alternatives to cataract surgery were discussed and the consent reviewed. IOL options were discussed, including ATIOLs and the associated side effects and additional patient cost associated with them.   IOL Selections:   Right eye  IOL: CNWTT0 20.5     Left eye  IOL: CNWTT0 20.5    Pt wishes to have EITHER eye done first.  The patient expresses a desire to reduce spectacle dependence. I reviewed various IOL and LASER refractive surgical options and we will attempt to minimize spectacle dependence by managing astigmatism and optimizing IOL selection. Femtosecond LASER assisted cataract surgery (FLACS) technology was explained to the patient with educational videos and discussion.  The patient voices understanding and wishes to implement this technology during the cataract procedure.  I explained the increased precision of the LASER versus manual techniques, especially as it relates to astigmatism reduction with arcuate incisions.  I emphasized that although our goal is to reduce the need for refractive correction after surgery, there may still be a need for spectacle correction to achieve  optimal visual acuity, and that a reasonable range of functional vision should be the expectation.  No guarantees are made about post operative refraction or visual acuity, as the eye may heal in unpredictable ways, and the standard risks, benefits, and alternatives to cataract surgery were explained.  The patient understands that the refractive portions of this cataract procedure are not covered by insurance, and that there is an out of pocket expense of $2250 per eye. I also explained that even though our pre-operative plan is to utilize advanced refractive technologies during surgery, that I may decide to eliminate part or all of this plan if surgical challenges or complications arise, or I feel that it is not in the patient's best interest. Consent forms and an ABN form were given to the patient to review.    [facility] ONLY, IF NEEDED

## 2022-09-07 NOTE — PROGRESS NOTES
Chronic Pain - f/u    Referring Physician: No ref. provider found    Date: 09/07/2022     Re: Carline Chang  MR#: [id number]  YOB: 1965  Age: 56 y.o.    Chief Complaint: back and leg pain  No chief complaint on file.    **This note is dictated using the M*Modal Fluency Direct word recognition program. There are word recognition mistakes that are occasionally missed on review.**    ASSESSMENT: 56 y.o. year old female with back pain, consistent with     1. Lumbar radiculopathy  Procedure Request Order for Pain Management      2. Compression fracture of T12 vertebra with routine healing, subsequent encounter        3. Closed compression fracture of L3 lumbar vertebra, initial encounter        4. Chronic, continuous use of opioids        5. Crohn's disease of both small and large intestine with intestinal obstruction        6. Age-related osteoporosis with current pathological fracture, initial encounter                PLAN:     Chronic back pain 2/2 multiple compression fractures  -T5, T8, severe T12, L3 compression fractures on XR. Chornic on MRI.  Not suitable for vertebral augmentation.  -Look into finding a pain physisian or PCP in [place] that can refill her narcotic medications so she does not need to travel so far to get them.  I will provide a refill of her pain meds for the next 2-3 months while she is working on that.  -MRI lumbar spine completed.  -discussed trial of MBB/RFA for the T12 compression fracture to target the posterior elements.  Will defer that for now and try RAMOS first.    Lumbar radiculopathy  -has some elements of radiculitis on the right side. Does not correlated with the herniation on the left, but could benefit from RAMOS.  Risks, benefits, and alternatives were discussed with the patient, and She would like to proceed.    Crohn's disease  -recent surgery  -hx of 30 years of oral steroids leading to osteoporosis and compression fracture    History of  seizures  -continue keppra    Chronic use of opioids  -her pain provider has passed away and she does not have anyone to fill her medications  -will provide patient with 2-3 months of medications while she works on finding a physician closer to home  -will not get UDS at this time since medications are temporary  -The patient is here today for opioid pain medications and they believe these provide effective pain control and improvements in quality of life.  The patient notes no serious side effects, and feels the benefits outweigh the risks.  The patient was reminded of the pain contract that they signed previously as well as the risks and benefits of the medication including possible death.  The updated [place] [employer] prescription monitoring program was reviewed, and the patient has been found to be compliant with current treatment plan.      - RTC 3 weeks after epidural  - Counseled patient regarding the importance of activity modification.    The above plan and management options were discussed at length with patient. Patient is in agreement with the above and verbalized understanding. It will be communicated with the referring physician via electronic record, fax, or mail.  Lab/study reports reviewed were important and necessary because subsequent medical and treatment recommendations required review of the above lab/study reports. Images viewed/reviewed above were important and necessary because subsequent medical and treatment recommendations required review of the reviewed image(s).     Electronically signed by:  Royal Brewster DO  09/07/2022    =========================================================================================================    SUBJECTIVE:    Chronic Pain-Tele-Medicine     The patient location is: Home  The chief complaint leading to consultation is: Pain  Visit type: Virtual visit with synchronous audio and video  Total time spent with patient: 20 min  Each  "patient to whom he or she provides medical services by telemedicine is:  (1) informed of the relationship between the physician and patient and the respective role of any other health care provider with respect to management of the patient; and (2) notified that he or she may decline to receive medical services by telemedicine and may withdraw from such care at any time.      Interval History 9/7/2022:     Carline Chang is a 56 y.o. female presents to the clinic for follow up.  Since last visit the pain has is unchanged. Pain is in the mid back and she gets radiation down the right leg to the knee. May have some knee arthritis as well that is confounding symptoms.  Difficult to walk and move. Average pain is 6/10. Today is 6/10.    Current pain medications: Norco 10mg QID  Failed Pain Medications:  Icy hot, Tylenol, cannot take NSIADs    Initial hx:  Carline Chang is a 56 y.o. female presents to the clinic for the evaluation of mid/ lower back pain. The pain started 7 months ago following  seizures   and symptoms have been worsening.  The patient states that she had a bunch of seizures in January and think that the compression fractures happened at that time.  She saw Dr. Harden recently for the back pain. Hx of crohns disease and 30 year history of chronic steroids.  She stopped taking them a year or two ago. She has lost 3-4" in height.     The patient had abdominal surgery in January for her Crohn's for lysis of adhesions. She stil gets abdominal bloating.  She has poor absorption of electrolytes and needs to get frequent IV medications.    The patient was seeing a PCP in [place] but they passed away.  She was getting Norco 10mg QID.     Pain Description:    The pain is located in the mid/lower back area and radiates to the bilalteral knee .    At BEST  3/10   At WORST  8/10 on the WORST day.    On average pain is rated as 5/10.   Today the pain is rated as 6/10  The pain is continuous.  The " pain is described as aching and sharp    Symptoms interfere with daily activity and sleeping.   Exacerbating factors: Sitting, Standing, Bending, Walking, Extension, Lifting and Getting out of bed/chair.    Mitigating factors nothing and medications.   She reports 8 hours of sleep per night.    Physical Therapy/Home Exercise: Yes, currently in Physical therapy    Current Pain Medications:    - Norco 10mg QID    Failed Pain Medications:    - Icy hot, Tylenol, cannot take NSIADs    Pain Treatment Therapies:    Pain procedures: none  Physical Therapy: none  Chiropractor: none  Acupuncture: none  TENS unit: none  Spinal decompression: none  Joint replacement: none    Patient denies urinary incontinence, bowel incontinence, significant motor weakness and loss of sensations.  Patient denies any suicidal or homicidal ideations     report:  Reviewed and consistent with medication use as prescribed.    Imaging:   Lumbar XR 07/2022:    FINDINGS:  There is a severe compression fracture deformity of T12 with mild retropulsion of osseous structure posteriorly.     There is a mild loss of height at the superior endplate of L3, unchanged.     No new fracture seen.     Flexion and extension views demonstrate no translational abnormalities.     The sacroiliac joints appear symmetrical.     Surgical clips right upper abdominal quadrant, surgical bowel suture in the right mid and right lower abdominal region.     Air-fluid levels noted, midline with distended colon measuring about 13 cm.     Impression:     Severe compression fracture of T12 and mild superior endplate compression fracture of L3, unchanged from 01/19/2022    Thoracic XR 07/2022:     FINDINGS:  There is a port in the left upper thorax, distal tip projects in the region of the SVC/right atrium region.     The alignment of the thoracic spine demonstrates a subtle dextroscoliosis.  There is a severe wedge compression fracture deformity of T12.  There is a mild height  loss fracture deformity of T8 and T5 which appears to have been present on CT 2022 chest and abdomen CT examination.  Subtle concavity at the superior endplate of T10-T11 is unchanged from CT examination of 2022.  No definite new fracture identified.  No rib abnormality seen.  Surgical clips right upper abdominal quadrant.     Impression:     Multiple thoracic spine vertebrae fractures, all appears to have been present on 2022 CT exam.  There is a severe compression fracture of T12.  Please see details above.      Past Medical History:   Diagnosis Date    Abnormal Pap smear of cervix     LEEP procedure,     Acute deep vein thrombosis (DVT) of brachial vein of right upper extremity 2017    on RUE ultrasound    Anxiety     Bowel perforation     Chronic pain     Compression fracture of C-spine     Crohn's disease of both small and large intestine with intestinal obstruction     Difficult intravenous access     Encounter for blood transfusion     GERD (gastroesophageal reflux disease)     Kidney stones     Nephrolithiasis     Osteoporosis     Stricture of bowel      Past Surgical History:   Procedure Laterality Date    ABSCESS DRAINAGE      ANAL FISTULOTOMY      BOWEL RESECTION      small bowel resection per Dr. Uribe 2018    CERVICAL BIOPSY  W/ LOOP ELECTRODE EXCISION       SECTION      x2    CHOLECYSTECTOMY  2000    COLON SURGERY  2015    sigmoid loop colostomy with subsequent reversal 3 years later    COLONOSCOPY N/A 2016    Procedure: COLONOSCOPY;  Surgeon: Andre Uribe MD;  Location: [facility] ENDO (4TH FLR);  Service: Endoscopy;  Laterality: N/A;    COLONOSCOPY N/A 2017    Procedure: COLONOSCOPY;  Surgeon: Dany Stock MD;  Location: [facility] ENDO (2ND FLR);  Service: Endoscopy;  Laterality: N/A;    COLONOSCOPY N/A 2017    Procedure: COLONOSCOPY;  Surgeon: Andre Uribe MD;  Location: [facility] ENDO (4TH FLR);  Service: Endoscopy;  Laterality:  N/A;    COLONOSCOPY N/A 02/08/2018    Procedure: COLONOSCOPY;  Surgeon: Andre Uribe MD;  Location: [facility] ENDO (4TH FLR);  Service: Endoscopy;  Laterality: N/A;    COLONOSCOPY N/A 03/24/2018    Procedure: COLONOSCOPY;  Surgeon: Elmer Serrato MD;  Location: [facility] ENDO (2ND FLR);  Service: Endoscopy;  Laterality: N/A;    COLONOSCOPY  03/24/2018    COLONOSCOPY N/A 07/06/2018    Procedure: COLONOSCOPY;  Surgeon: Andre Uribe MD;  Location: [facility] ENDO (4TH FLR);  Service: Endoscopy;  Laterality: N/A;    COLONOSCOPY N/A 10/07/2021    Procedure: COLONOSCOPY;  Surgeon: Jose Hughes MD;  Location: [facility] ENDO;  Service: Endoscopy;  Laterality: N/A;    ESOPHAGOGASTRODUODENOSCOPY N/A 10/07/2021    Procedure: EGD (ESOPHAGOGASTRODUODENOSCOPY);  Surgeon: Jose Hughes MD;  Location: [facility];  Service: Endoscopy;  Laterality: N/A;    LAPAROSCOPIC CLOSURE OF COLOSTOMY N/A 08/29/2018    Procedure: CLOSURE, COLOSTOMY, LAPAROSCOPIC;  Surgeon: Andre Uribe MD;  Location: [facility] OR 2ND FLR;  Service: Colon and Rectal;  Laterality: N/A;  converted to open    LYSIS OF ADHESIONS N/A 01/20/2022    Procedure: LYSIS, ADHESIONS;  Surgeon: LLUVIA Post MD;  Location: [facility] OR 2ND FLR;  Service: Colon and Rectal;  Laterality: N/A;  lasting longer than 2 hours, modifier 22      rectovaginal fistula repair  01/2018    SMALL INTESTINE SURGERY  1995    per patient 10 inches removed    SMALL INTESTINE SURGERY  02/2009    abdominal abscess drained, 3 cm colon removed, 11 cm SB removed    TUBAL LIGATION      UPPER GASTROINTESTINAL ENDOSCOPY  2000     Social History     Socioeconomic History    Marital status:    Tobacco Use    Smoking status: Never    Smokeless tobacco: Never   Substance and Sexual Activity    Alcohol use: No     Alcohol/week: 0.0 standard drinks    Drug use: No    Sexual activity: Not Currently     Family History   Problem Relation Age of Onset    Cancer Maternal Aunt 66        colon ca    Heart attack  Father 69    Anesthesia problems Neg Hx     Celiac disease Neg Hx     Cirrhosis Neg Hx     Colon cancer Neg Hx     Colon polyps Neg Hx     Crohn's disease Neg Hx     Cystic fibrosis Neg Hx     Esophageal cancer Neg Hx     Hemochromatosis Neg Hx     Inflammatory bowel disease Neg Hx     Irritable bowel syndrome Neg Hx     Liver cancer Neg Hx     Liver disease Neg Hx     Rectal cancer Neg Hx     Stomach cancer Neg Hx     Ulcerative colitis Neg Hx     Mars's disease Neg Hx     Lymphoma Neg Hx     Tuberculosis Neg Hx     Scleroderma Neg Hx     Rheum arthritis Neg Hx     Multiple sclerosis Neg Hx     Melanoma Neg Hx     Lupus Neg Hx     Psoriasis Neg Hx     Skin cancer Neg Hx        Review of patient's allergies indicates:   Allergen Reactions    Remicade [infliximab] Shortness Of Breath and Palpitations     Severe muscle and joint, and bone pain    Adalimumab Other (See Comments)    Flagyl [metronidazole] Other (See Comments)     Neuropathy    Nubain [nalbuphine] Anxiety     Upper abdominal burning        Current Outpatient Medications   Medication Sig    cholestyramine-aspartame (QUESTRAN LIGHT) 4 gram PwPk Take 1 packet (4 g total) by mouth 2 (two) times daily.    ergocalciferol (ERGOCALCIFEROL) 50,000 unit Cap Take 50,000 Units by mouth every 7 days.    gabapentin (NEURONTIN) 100 MG capsule Take 1 capsule (100 mg total) by mouth 3 (three) times daily.    HYDROcodone-acetaminophen (NORCO)  mg per tablet Take 1 tablet by mouth every 6 (six) hours as needed for Pain.    levETIRAcetam (KEPPRA) 500 MG Tab Take 1 tablet (500 mg total) by mouth 2 (two) times daily.    loperamide (IMODIUM) 2 mg capsule Take 2 capsules (4 mg total) by mouth 4 (four) times daily.    OYSTER SHELL CALCIUM 500 500 mg calcium (1,250 mg) tablet Take 2 tablets by mouth 2 (two) times daily.    pantoprazole (PROTONIX) 40 MG tablet Take 40 mg by mouth once daily.    potassium chloride SA (K-DUR,KLOR-CON) 20 MEQ tablet Take 40 mEq by mouth 2  (two) times daily.    promethazine (PHENERGAN) 25 MG tablet Take 1 tablet (25 mg total) by mouth every 4 (four) hours.    SENNA PLUS 8.6-50 mg per tablet Take 1 tablet by mouth 2 (two) times a day.    TUMS 200 mg calcium (500 mg) chewable tablet Take 2 tablets (1,000 mg total) by mouth 2 (two) times daily.    venlafaxine (EFFEXOR) 75 MG tablet Take 75 mg by mouth 2 (two) times daily.     No current facility-administered medications for this visit.       REVIEW OF SYSTEMS:    GENERAL:  No weight loss, malaise or fevers.  HEENT:   No recent changes in vision or hearing + vision   NECK:  Negative for lumps, no difficulty with swallowing.  RESPIRATORY:  Negative for cough, wheezing or shortness of breath, patient denies any recent URI.  CARDIOVASCULAR:  Negative for chest pain, leg swelling or palpitations.  GI:  Negative for abdominal discomfort, blood in stools or black stools or change in bowel habits. + abdominal discomfort   MUSCULOSKELETAL:  See HPI.  SKIN:  Negative for lesions, rash, and itching.  PSYCH:  No mood disorder or recent psychosocial stressors.  Patients sleep is not disturbed secondary to pain.  HEMATOLOGY/LYMPHOLOGY:  Negative for prolonged bleeding, bruising easily or swollen nodes.  Patient is not currently taking any anti-coagulants  NEURO:   No history of headaches, syncope, paralysis, seizures or tremors. + seizures (4this year)   All other reviewed and negative other than HPI.    OBJECTIVE:    [facility] 05/30/2009     PHYSICAL EXAMINATION:    Video visit:  GENERAL: Well appearing, in no acute distress, alert and oriented x3. fraile  PSYCH:  Mood and affect appropriate.  SKIN: Skin color, texture, turgor normal, no rashes or lesions.  HEAD/FACE:  Normocephalic, atraumatic. Cranial nerves grossly intact.    Prior visit  BACK:   - Hyperkyphosis of the thoracic spine with paravertebral humping  - Negative spinous process tenderness  - Negative paravertebral tenderness  - Negative pain to palpation over  the facet joints of the thoracic and lumbar spine.   -decreased ROM with flexion and extension of the lumbar spine  -flexion causes increased pain in the mid/low back without radiation into the legs  -Light percussion of the thoracic and lumbar spine reproduces pain around the L2 level    MUSCULOSKELETAL:  No atrophy or tone abnormalities are noted in the UE or LE.  No deformities, edema, or skin discoloration are noted on visible skin. Good capillary refill.    NEURO: Bilateral upper and lower extremity coordination and muscle stretch reflexes are physiologic and symmetric.      NEUROLOGICAL EXAM:  MENTAL STATUS: A x O x 3, good concentration, speech is fluent and goal directed  MEMORY: recent and remote are intact  CN: CN2-12 grossly intact  MOTOR: 5/5 in all muscle groups except b/l hip flexion 4/5  DTRs: 2+ intact symmetric  Sensation:    -no Loss of sensation in a left lower and right lower L-1, L-2, L-3, L-4 and L-5 bilaterally distribution.  Babinski: absent on the bilateral side(s)    GAIT: flexed, kyphotic posture

## 2022-09-08 ENCOUNTER — TELEPHONE (OUTPATIENT)
Dept: ADMINISTRATIVE | Facility: OTHER | Age: 57
End: 2022-09-08
Payer: MEDICARE

## 2022-09-13 ENCOUNTER — TELEPHONE (OUTPATIENT)
Dept: ADMINISTRATIVE | Facility: OTHER | Age: 57
End: 2022-09-13
Payer: MEDICARE

## 2022-09-13 ENCOUNTER — CLINICAL SUPPORT (OUTPATIENT)
Dept: REHABILITATION | Facility: HOSPITAL | Age: 57
End: 2022-09-13
Attending: ORTHOPAEDIC SURGERY
Payer: MEDICARE

## 2022-09-13 DIAGNOSIS — S22.080D COMPRESSION FRACTURE OF T12 VERTEBRA WITH ROUTINE HEALING, SUBSEQUENT ENCOUNTER: ICD-10-CM

## 2022-09-13 DIAGNOSIS — Z74.09 IMPAIRED FUNCTIONAL MOBILITY AND ACTIVITY TOLERANCE: Primary | ICD-10-CM

## 2022-09-13 DIAGNOSIS — S32.030A CLOSED COMPRESSION FRACTURE OF L3 LUMBAR VERTEBRA, INITIAL ENCOUNTER: ICD-10-CM

## 2022-09-13 PROCEDURE — 97110 THERAPEUTIC EXERCISES: CPT | Mod: PN,CQ

## 2022-09-14 ENCOUNTER — TELEPHONE (OUTPATIENT)
Dept: PAIN MEDICINE | Facility: OTHER | Age: 57
End: 2022-09-14
Payer: MEDICARE

## 2022-09-14 NOTE — TELEPHONE ENCOUNTER
----- Message from Royal Brewster DO sent at 9/13/2022  4:11 PM CDT -----  Yes, please reschedule until she is 7 days off antibiotics.  Thank you!    ----- Message -----  From: Jed Alvarez MA  Sent: 9/13/2022  10:48 AM CDT  To: Maya Grijalva, Royal Brewster DO, #    No ma'am, please reschedule until patient has completed antibiotics.    Thank you,  Jed Abdi   ----- Message -----  From: Maya Grijalva  Sent: 9/13/2022  10:44 AM CDT  To: Malathi Paige Staff    Pt. called stating she's taking antibiotics for 30 days, I've advise pt. I'll forward message to Dr. Servin  , she wants know will you move forward with procedure, please advise.

## 2022-09-15 ENCOUNTER — TELEPHONE (OUTPATIENT)
Dept: REHABILITATION | Facility: HOSPITAL | Age: 57
End: 2022-09-15
Payer: MEDICARE

## 2022-09-15 NOTE — TELEPHONE ENCOUNTER
Called patient regarding missed appointment.  Stated she forgot appointment this afternoon and rescheduled for tomorrow morning.

## 2022-09-16 ENCOUNTER — CLINICAL SUPPORT (OUTPATIENT)
Dept: REHABILITATION | Facility: HOSPITAL | Age: 57
End: 2022-09-16
Attending: ORTHOPAEDIC SURGERY
Payer: MEDICARE

## 2022-09-16 DIAGNOSIS — S32.030A CLOSED COMPRESSION FRACTURE OF L3 LUMBAR VERTEBRA, INITIAL ENCOUNTER: ICD-10-CM

## 2022-09-16 DIAGNOSIS — Z74.09 IMPAIRED FUNCTIONAL MOBILITY AND ACTIVITY TOLERANCE: Primary | ICD-10-CM

## 2022-09-16 DIAGNOSIS — S22.080D COMPRESSION FRACTURE OF T12 VERTEBRA WITH ROUTINE HEALING, SUBSEQUENT ENCOUNTER: ICD-10-CM

## 2022-09-16 PROCEDURE — 97110 THERAPEUTIC EXERCISES: CPT | Mod: PN

## 2022-09-16 NOTE — PLAN OF CARE
"[facility]  Physical Therapy Plan of Care Note    Name: Carline Chang  [facility] Number: [id number]    Therapy Diagnosis:   Encounter Diagnoses   Name Primary?    Impaired functional mobility and activity tolerance Yes    Closed compression fracture of L3 lumbar vertebra, initial encounter     Compression fracture of T12 vertebra with routine healing, subsequent encounter      Physician: Min Harden MD    Visit Date: 9/16/2022    Physician Orders: PT Eval and Treat   Medical Diagnosis from Referral: Closed compression fracture of L3 lumbar vertebra, initial encounter; Compression fracture of T12 vertebra, initial encounter   Evaluation Date: 8/4/2022  Authorization Period Expiration: 07/21/2023   Plan of Care Expiration: 09/16/2022  Progress Note Due: 09/04/2022  Visit # / Visits authorized: 6/ 20 (7 total)   FOTO: Completed 9/6/2022 58% Deficit   Next Survey Due Visit 10     Precautions: Compression fractures, osteoporosis  Functional Level Prior to Evaluation:  increased pain with sitting without support, difficulty standing up straight, standing tolerance limited to about 10 minutes ("it would be a sloppy stand), difficulty getting off toilet, difficulty rolling in bed/scooting in bed, difficulty with bending to yvon shoes and socks.       SUBJECTIVE     Update:   Pain:  Current 6/10, worst 7/10, best 3/10   Location: central thoracic region mid to lower region, legs, R knee     Current Level of Function: increased pain with sitting without support, decreased difficulty standing up straight, standing tolerance has improved to about 30 minutes, difficulty getting off toilet, some effort with rolling in bed/scooting in bed "First thing in the morning is worse.", decreased difficulty with bending to yvon shoes and socks.       OBJECTIVE     Update:   Posture: Standing: R shoulder minimally lower than L, prominent R posterior ribs (4-7); Sitting: flattened lumbar lordosis, " minimal to moderate increased thoracic kyphosis, minimal to moderate rounded shoulder and forward head posture.    Palpation: Decreased tenderness present mid thoracic paraspinals; increased muscle tension B thoracolumbar paraspinals.    Sensation: Reports pre-existing neuropathy due to medication toxicity.    Range of Motion/Strength:   Thoracic/Lumbar AROM: Pain/Dysfunction with Movement:   Flexion                       85* - 50* = 35*     Extension                  30* - 20* = 10*     Right side bending    30*     Left side bending      27*     Right rotation            75%     Left rotation              75%     B LE ROM Grossly WFL              Strength: R Hip flex/ext 4/5, abd/add 4/5-4+/5; L hip flex/ext 4-/5-4/5; abd/add 4/5-4+/5; knee flexion/extension 4/5; ankle DF 4+/5  Core strength impaired but improving.    Flexibility: Hamstring length R 138*, L 130*; minimal piriformis tightness; Hip flexors WFL; minimal to moderate gastroc tightness.    Gait: Without AD  Analysis: No significant deviations noted this date.   Bed Mobility:Independent but with minimal to moderate difficulty rolling and scooting.  Does not utilize log roll unless cued to do so.    Transfers: Independent but with minimal to moderate UE support needed     ASSESSMENT     Update: Patient is making steady progress in PT with minimal improvement in max pain, decreased tenderness, improved posture, improved LE flexibility, increased trunk/lumbar ROM, and improved LE/core strength.  The has contributed to increased tolerance for standing/walking activities, decreased difficulty with sit <> stand transfers and decreased difficulty with LE dressing.  She continues to report significant back and LE pain and exhibit impaired posture, limited trunk/lumbar ROM, limited LE flexibility, and limited LE/core strength that contributes to residual limitations in functional mobility and activity tolerance.  She should benefit from continued skilled PT  services to address remaining problems in order to minimize functional deficit and maximize activity tolerance.      Previous Short Term Goals Status:      1) Decrease max pain to < 5/10 Minimal progress              2) Facilitate improved posture Minimal progress              3) Facilitate improved LE flexibility Progressing              4) Patient will bend forward to perform LE dressing without increased pain Met 9/16/2022    New Short Term Goals Status:      #1-3 continue   #4 Met   5) Patient will consistently demonstrate log rolling technique for supine <> sit transitions with minimal increase in discomfort    Long Term Goal Status: modified:      1) Patient will consistently perform sit <> stand transfers with minimal UE support and appropriate assistive device Progressing/modified (perform without assistive device)              2) Patient will consistently perform rolling and scooting in bed without increased pain Progressing              3) Patient will consistently perform toilet transfers with no more that minimal difficulty Progressing              4) Increase walking tolerance to > 15 minutes Progressing              5) Increase standing tolerance to > 15 min Met 9/16/2022              6) Patient will be independent in complete HEP Progressing     Reasons for Recertification of Therapy:   Patient is making steady progress but continues with deficits that interfere with her functional ability.       PLAN     Updated Certification Period: 9/17/2022 to 10/28/2022   Recommended Treatment Plan: 2 times per week for 6 weeks:  Gait Training, Manual Therapy, Moist Heat/ Ice, Patient Education, Therapeutic Activities, Therapeutic Exercise, and Balance Activities  Other Recommendations: N/A    Una See, PT    I CERTIFY THE NEED FOR THESE SERVICES FURNISHED UNDER THIS PLAN OF TREATMENT AND WHILE UNDER MY CARE  Physician's comments:      Physician's Signature:  ___________________________________________________

## 2022-09-16 NOTE — PROGRESS NOTES
"[facility]   Physical Therapy Treatment Note/POC update    [facility]  [facility] Number: [id number]    Therapy Diagnosis:   Encounter Diagnoses   Name Primary?    Impaired functional mobility and activity tolerance Yes    Closed compression fracture of L3 lumbar vertebra, initial encounter     Compression fracture of T12 vertebra with routine healing, subsequent encounter      Physician: Min Harden MD    Visit Date: 9/16/2022    Physician Orders: PT Eval and Treat   Medical Diagnosis from Referral: Closed compression fracture of L3 lumbar vertebra, initial encounter; Compression fracture of T12 vertebra, initial encounter   Evaluation Date: 8/4/2022  Authorization Period Expiration: 07/21/2023   Plan of Care Expiration: 09/16/2022  Progress Note Due: 09/04/2022  Visit # / Visits authorized: 6/ 20 (7 total)   FOTO: Completed 9/6/2022 58% Deficit   Next Survey Due Visit 10     Precautions: Compression fractures, osteoporosis    PTA Visit #: 0/5     Time In: 0835   Time Out: 0934  Total Billable Time: 42 minutes    SUBJECTIVE     Pt reports: "This has helped but I'm still having a lot of pain"  She was somewhat compliant with home exercise program.  Response to previous treatment: Felt good  Functional change: Increasing energy and endurance    Pain: 6/10  Location: Pain in thoracic spine and LEs    OBJECTIVE     Reassessed for POC update. See attached note    Treatment     Carline received the treatments listed below:      therapeutic exercises to develop increased core, paraspinals, and LE strength, as well as endurance for 42 minutes including (Changes indicated in bold print):   Recumbent bike lvl3 x8'   Standing heel cord stretch [id number]    Hip ext x20ea with 2# wt    Hip abduction with 2# wt x 10 ea    March with 2# wt x 10 ea    Hamstring Curl x20ea with 2# wt    Mini squat  x20    Heel raises x20    Step up & over eccentric 6" x10 ea    Lateral step up & over 6" " "x10 ea    Closed chain TKE 6" x 10ea  Seated hamstring stretch [id number]   Piriformis stretch 3x30s   LAQ 2x10 ea using 2# wt    March with core engagement 2x10ea with 2# wt     Hip abd red TB w/core engagement x20    Hip add w/core engagement x20      Did not perform this date:  Resisted red TB trunk rotation x 10ea  Physioball rollout x10ea   UTR x15ea    Patient Education and Home Exercises     Home Exercises Provided and Patient Education Provided     Education provided:   - New exercises indicated in bold print above.     Written Home Exercises Provided: Illustrated instructions issued this visit.  Exercises were reviewed and Carline was able to demonstrate them prior to the end of the session.  Carline demonstrated fair  understanding of the education provided. See EMR under Patient Instructions for exercises provided during therapy sessions    ASSESSMENT     See updated POC    Carline is slowly progressing towards her goals.   Pt prognosis is Fair.     Pt will continue to benefit from skilled outpatient physical therapy to address the deficits listed in the problem list box on initial evaluation, provide pt/family education and to maximize pt's level of independence in the home and community environment.     Pt's spiritual, cultural and educational needs considered and pt agreeable to plan of care and goals.     Anticipated barriers to physical therapy: Multiple fractures    Goals:   See POC update    PLAN     See POC update    Una See, PT                 "

## 2022-09-20 ENCOUNTER — CLINICAL SUPPORT (OUTPATIENT)
Dept: REHABILITATION | Facility: HOSPITAL | Age: 57
End: 2022-09-20
Attending: ORTHOPAEDIC SURGERY
Payer: MEDICARE

## 2022-09-20 DIAGNOSIS — S22.080D COMPRESSION FRACTURE OF T12 VERTEBRA WITH ROUTINE HEALING, SUBSEQUENT ENCOUNTER: ICD-10-CM

## 2022-09-20 DIAGNOSIS — S32.030A CLOSED COMPRESSION FRACTURE OF L3 LUMBAR VERTEBRA, INITIAL ENCOUNTER: ICD-10-CM

## 2022-09-20 DIAGNOSIS — Z74.09 IMPAIRED FUNCTIONAL MOBILITY AND ACTIVITY TOLERANCE: Primary | ICD-10-CM

## 2022-09-20 PROCEDURE — 97110 THERAPEUTIC EXERCISES: CPT | Mod: PN

## 2022-09-20 NOTE — PROGRESS NOTES
"[facility]   Physical Therapy Treatment Note    [id number]    Therapy Diagnosis:   Encounter Diagnoses   Name Primary?    Impaired functional mobility and activity tolerance Yes    Closed compression fracture of L3 lumbar vertebra, initial encounter     Compression fracture of T12 vertebra with routine healing, subsequent encounter      Physician: Min Harden MD    Visit Date: 9/20/2022    Physician Orders: PT Eval and Treat   Medical Diagnosis from Referral: Closed compression fracture of L3 lumbar vertebra, initial encounter; Compression fracture of T12 vertebra, initial encounter   Evaluation Date: 8/4/2022  Authorization Period Expiration: 07/21/2023   Plan of Care Expiration: 10/28/2022  Progress Note Due: 09/04/2022  Visit # / Visits authorized: 6/ 20 (7 total)   FOTO: Completed 9/6/2022 58% Deficit   Next Survey Due Visit 10     Precautions: Compression fractures, osteoporosis    PTA Visit #: 0/5     Time In: 1103  Time Out: 1150  Total Billable Time: 42 minutes    SUBJECTIVE     Pt reports: "I think we did too much the other day"  She was not compliant with home exercise program since last visit.  Response to previous treatment: Felt good  Functional change: Increasing energy and endurance    Pain: 6/10  Location: Pain in thoracic spine and LEs    OBJECTIVE     Objective Measures updated at progress report unless specified.     Treatment     Carline received the treatments listed below:      therapeutic exercises to develop increased core, paraspinals, and LE strength, as well as endurance for 42 minutes including (Changes indicated in bold print):   Recumbent bike lvl3 x8'   Standing heel cord stretch [id number]    Hip ext x20ea with 2# wt     Hip abduction with 2# wt x 10 ea                          March with 2# wt x 10 ea    Hamstring Curl x20ea with 2# wt    Mini squat  x10    Heel raises x20    Step up & over eccentric 6" x10 " "ea    Lateral step up & over 6" x10 ea    Closed chain TKE 6" x 20 ea  Seated hamstring stretch 3x30 sec   Piriformis stretch 3x30 sec   LAQ 2x10 ea using 2# wt    March with core engagement 2x10ea with 2# wt     Hip abd red TB w/core engagement x20    Hip add ball squeeze w/core engagement x20      Did not perform this date:  Resisted red TB trunk rotation x 10ea  Physioball rollout x10ea   UTR x15ea    Patient Education and Home Exercises     Home Exercises Provided and Patient Education Provided     Education provided:   - New exercises indicated in bold print above. Patient instructed to avoid sustained UE support while performing seated activities to reduce stress on shoulders.      Written Home Exercises Provided: Illustrated instructions issued this visit.  Exercises were reviewed and Carline was able to demonstrate them prior to the end of the session.  Carline demonstrated fair  understanding of the education provided. See EMR under Patient Instructions for exercises provided during therapy sessions    ASSESSMENT     Patient presents today with reports of increased UE stiffness/soreness that might be attributable to tendency to sustain UE weight bearing while performing seated LAQ, marching, and hip abd/add exercises.  She completed all activities as per previous visit despite requesting to limit activities due to increased soreness following previous soreness, only decreasing number of repetitions of mini squats.      Calrine is slowly progressing towards her goals.   Pt prognosis is Fair.     Pt will continue to benefit from skilled outpatient physical therapy to address the deficits listed in the problem list box on initial evaluation, provide pt/family education and to maximize pt's level of independence in the home and community environment.     Pt's spiritual, cultural and educational needs considered and pt agreeable to plan of care and goals.     Anticipated barriers to physical therapy: Multiple " fractures    Goals:   Short Term Goals:                 1) Decrease max pain to < 5/10 Minimal progress              2) Facilitate improved posture Minimal progress              3) Facilitate improved LE flexibility Progressing              4) Patient will bend forward to perform LE dressing without increased pain Met 9/16/2022              5) Patient will consistently demonstrate log rolling technique for supine <> sit transitions with minimal increase in discomfort Ongoing     Long Term Goals: modified:                1) Patient will consistently perform sit <> stand transfers with minimal UE support and appropriate assistive device Progressing/modified (perform without assistive device Progressing)              2) Patient will consistently perform rolling and scooting in bed without increased pain Progressing              3) Patient will consistently perform toilet transfers with no more that minimal difficulty Progressing              4) Increase walking tolerance to > 15 minutes Progressing              5) Increase standing tolerance to > 15 min Met 9/16/2022              6) Patient will be independent in complete HEP Progressing    PLAN     Practice rolling, scooting on mat, practice sit <> stand without UE support, Incorporate balance activities as patient tolerates.     Una See, PT

## 2022-09-23 ENCOUNTER — CLINICAL SUPPORT (OUTPATIENT)
Dept: REHABILITATION | Facility: HOSPITAL | Age: 57
End: 2022-09-23
Attending: ORTHOPAEDIC SURGERY
Payer: MEDICARE

## 2022-09-23 DIAGNOSIS — S32.030A CLOSED COMPRESSION FRACTURE OF L3 LUMBAR VERTEBRA, INITIAL ENCOUNTER: ICD-10-CM

## 2022-09-23 DIAGNOSIS — Z74.09 IMPAIRED FUNCTIONAL MOBILITY AND ACTIVITY TOLERANCE: Primary | ICD-10-CM

## 2022-09-23 DIAGNOSIS — S22.080D COMPRESSION FRACTURE OF T12 VERTEBRA WITH ROUTINE HEALING, SUBSEQUENT ENCOUNTER: ICD-10-CM

## 2022-09-23 PROCEDURE — 97110 THERAPEUTIC EXERCISES: CPT | Mod: PN,CQ

## 2022-09-23 NOTE — PROGRESS NOTES
"[facility]   Physical Therapy Treatment Note    [facility]  Cl[facility] Number: [id number]    Therapy Diagnosis:   Encounter Diagnoses   Name Primary?    Impaired functional mobility and activity tolerance Yes    Closed compression fracture of L3 lumbar vertebra, initial encounter     Compression fracture of T12 vertebra with routine healing, subsequent encounter      Physician: Min Harden MD    Visit Date: 9/23/2022    Physician Orders: PT Eval and Treat   Medical Diagnosis from Referral: Closed compression fracture of L3 lumbar vertebra, initial encounter; Compression fracture of T12 vertebra, initial encounter   Evaluation Date: 8/4/2022  Authorization Period Expiration: 07/21/2023   Plan of Care Expiration: 10/28/2022  Progress Note Due: 09/04/2022  Visit # / Visits authorized: 8/ 20 (9 total)   FOTO: Completed 9/6/2022 58% Deficit   Next Survey Due Visit 10     Precautions: Compression fractures, osteoporosis    PTA Visit #: 1/5     Time In: 1020  Time Out: 1103  Total Billable Time: 43 minutes    SUBJECTIVE     Pt reports: "I am still sore from 2 visits ago because I did not stop because we were talking, and then I had a 6 hour ride to [place], [place]."  She was not compliant with home exercise program since last visit.  Response to previous treatment: Sore  Functional change: Increasing energy and endurance    Pain: 1/10  Location: Pain in thoracic spine and LEs    OBJECTIVE     Objective Measures updated at progress report unless specified.     Treatment     Carline received the treatments listed below:      therapeutic exercises to develop increased core, paraspinals, and LE strength, as well as endurance for 43 minutes including (Changes indicated in bold print):   Recumbent bike lvl3 x8'   Standing heel cord stretch [id number]    Hip ext x20ea with 2# wt    Hip abduction with 2# wt 2x10 ea                          March with 2# wt 2x10 ea    Hamstring Curl x20ea with " "2# wt    Mini squat  x10    Heel raises x20    Step up & over eccentric 6" 2x10 ea    Lateral step up & over 6" 2x10 ea    Closed chain TKE 6" x 20 ea  Seated hamstring stretch 3x30 sec   Piriformis stretch 3x30 sec   LAQ 2x10 ea using 2# wt    March with core engagement 2x10ea with 2# wt     Hip abd red TB w/core engagement x20    Hip add ball squeeze w/core engagement x20      Did not perform this date:  Resisted red TB trunk rotation x 10ea  Physioball rollout x10ea   UTR x15ea    Patient Education and Home Exercises     Home Exercises Provided and Patient Education Provided     Education provided:   - The patient was instructed in additional repetitions, as well as utilizing cold pack or heating pad as needed for soreness.      Written Home Exercises Provided: Illustrated instructions issued this visit.  Exercises were reviewed and Carline was able to demonstrate them prior to the end of the session.  Carline demonstrated fair  understanding of the education provided. See EMR under Patient Instructions for exercises provided during therapy sessions    ASSESSMENT     The patient tolerated today's treatment session well without increased fatigue or pain. Patient explains that going down stairs is stear guarded due to fear of knee buckling. Carline was slightly progressed with repetitions. Patient demonstrates rounded shoulder with increased thoracic kyphosis, worse in sitting.    Carline is slowly progressing towards her goals.   Pt prognosis is Fair.     Pt will continue to benefit from skilled outpatient physical therapy to address the deficits listed in the problem list box on initial evaluation, provide pt/family education and to maximize pt's level of independence in the home and community environment.     Pt's spiritual, cultural and educational needs considered and pt agreeable to plan of care and goals.     Anticipated barriers to physical therapy: Multiple fractures    Goals:   Short Term Goals:           "       1) Decrease max pain to < 5/10 Minimal progress              2) Facilitate improved posture Minimal progress              3) Facilitate improved LE flexibility Progressing              4) Patient will bend forward to perform LE dressing without increased pain Met 9/16/2022              5) Patient will consistently demonstrate log rolling technique for supine <> sit transitions with minimal increase in discomfort Ongoing     Long Term Goals: modified:                1) Patient will consistently perform sit <> stand transfers with minimal UE support and appropriate assistive device Progressing/modified (perform without assistive device Progressing)              2) Patient will consistently perform rolling and scooting in bed without increased pain Progressing              3) Patient will consistently perform toilet transfers with no more that minimal difficulty Progressing              4) Increase walking tolerance to > 15 minutes Progressing              5) Increase standing tolerance to > 15 min Met 9/16/2022              6) Patient will be independent in complete HEP Progressing    PLAN     The patient is to be progressed within the established plan of care as tolerated in order to accomplish goals listed above. Plan to review log rolling, scooting on mat, and sit <> stand without UE support, while gradually incorporating balance activities in subsequent sessions.     Kiesha Mcfadden, PTA

## 2022-09-26 ENCOUNTER — PATIENT MESSAGE (OUTPATIENT)
Dept: SPINE | Facility: CLINIC | Age: 57
End: 2022-09-26
Payer: MEDICARE

## 2022-09-26 ENCOUNTER — CLINICAL SUPPORT (OUTPATIENT)
Dept: REHABILITATION | Facility: HOSPITAL | Age: 57
End: 2022-09-26
Attending: ORTHOPAEDIC SURGERY
Payer: MEDICARE

## 2022-09-26 DIAGNOSIS — S32.030A CLOSED COMPRESSION FRACTURE OF L3 LUMBAR VERTEBRA, INITIAL ENCOUNTER: ICD-10-CM

## 2022-09-26 DIAGNOSIS — S22.080D COMPRESSION FRACTURE OF T12 VERTEBRA WITH ROUTINE HEALING, SUBSEQUENT ENCOUNTER: ICD-10-CM

## 2022-09-26 DIAGNOSIS — Z74.09 IMPAIRED FUNCTIONAL MOBILITY AND ACTIVITY TOLERANCE: Primary | ICD-10-CM

## 2022-09-26 DIAGNOSIS — S32.030A COMPRESSION FRACTURE OF L3 LUMBAR VERTEBRA, CLOSED, INITIAL ENCOUNTER: ICD-10-CM

## 2022-09-26 DIAGNOSIS — S22.080A COMPRESSION FRACTURE OF T12 VERTEBRA, INITIAL ENCOUNTER: Primary | ICD-10-CM

## 2022-09-26 PROCEDURE — 97110 THERAPEUTIC EXERCISES: CPT | Mod: PN

## 2022-09-26 RX ORDER — HYDROCODONE BITARTRATE AND ACETAMINOPHEN 10; 325 MG/1; MG/1
1 TABLET ORAL EVERY 6 HOURS PRN
Qty: 120 TABLET | Refills: 0 | Status: SHIPPED | OUTPATIENT
Start: 2022-09-29 | End: 2022-10-03 | Stop reason: SDUPTHER

## 2022-09-26 RX ORDER — HYDROCODONE BITARTRATE AND ACETAMINOPHEN 10; 325 MG/1; MG/1
1 TABLET ORAL EVERY 6 HOURS PRN
Qty: 120 TABLET | Refills: 0 | Status: SHIPPED | OUTPATIENT
Start: 2022-10-29 | End: 2022-11-23 | Stop reason: SDUPTHER

## 2022-09-26 NOTE — PROGRESS NOTES
"[facility]   Physical Therapy Treatment Note    [facility]  Clinic Number: [id number]    Therapy Diagnosis:   Encounter Diagnoses   Name Primary?    Impaired functional mobility and activity tolerance Yes    Closed compression fracture of L3 lumbar vertebra, initial encounter     Compression fracture of T12 vertebra with routine healing, subsequent encounter      Physician: Min Harden MD    Visit Date: 9/26/2022    Physician Orders: PT Eval and Treat   Medical Diagnosis from Referral: Closed compression fracture of L3 lumbar vertebra, initial encounter; Compression fracture of T12 vertebra, initial encounter   Evaluation Date: 8/4/2022  Authorization Period Expiration: 07/21/2023   Plan of Care Expiration: 10/28/2022  Progress Note Due: 10/16/2022  Visit # / Visits authorized: 9/ 20 (10 total)   FOTO: Completed 9/26/2022 48% Deficit   Next Survey Due Visit 15     Precautions: Compression fractures, osteoporosis    PTA Visit #: 0/5     Time In: 1108  Time Out: 1200  Total Billable Time: 45 minutes    SUBJECTIVE     Pt reports: "They started me on neuronton cause they think that the fracture may be  "  She was compliant with home exercise program.  Response to previous treatment: Sore  Functional change: Increasing energy and endurance    Pain: 7/10  Location: Pain in thoracic spine and LEs    OBJECTIVE     Objective Measures updated at progress report unless specified.     Treatment     Carline received the treatments listed below:      therapeutic exercises to develop increased core, paraspinals, and LE strength, as well as endurance for 45 minutes including (Changes indicated in bold print):   Recumbent bike lvl3 x8'   Standing heel cord stretch [id number]    Hip ext x20ea with 2# wt    Hip abduction with 2# wt 2x10 ea                          March with 2# wt 2x10 ea    Hamstring Curl x20ea with 2# wt    Mini squat with glut med bias 2x10    Heel raises x20    Step " "up & over eccentric 6" x10 ea    Lateral step up & over 6" x10 ea    Closed chain TKE 6" x 20 ea  Seated hamstring stretch 3x30 sec   Piriformis stretch 3x30 sec   LAQ 2x10 ea using 2# wt    March with core engagement 2x10ea with 2# wt     Upper trunk rotation x 10 ea    Resisted greenTB trunk rotation x 10ea    Did not perform this date:  Physioball rollout x10ea   Hip abd red TB w/core engagement x20    Hip add ball squeeze w/core engagement x20    Patient Education and Home Exercises     Home Exercises Provided and Patient Education Provided     Education provided:   - The patient was instructed in upper trunk rotation      Written Home Exercises Provided: Instructed patient to continue prior HEP  Exercises were reviewed and Carline was able to demonstrate them prior to the end of the session.  Carline demonstrated fair  understanding of the education provided. See EMR under Patient Instructions for exercises provided during therapy sessions    ASSESSMENT     Able to complete exercise program with mild complaints of fatigue.  Increased pace of exercise performance. Pain present in R quad.  Patient may benefit from hip flexor and quad stretches as well as resisted shoulder extension, resisted low row and active thoracic extension.     Carline is slowly progressing towards her goals.   Pt prognosis is Fair.     Pt will continue to benefit from skilled outpatient physical therapy to address the deficits listed in the problem list box on initial evaluation, provide pt/family education and to maximize pt's level of independence in the home and community environment.     Pt's spiritual, cultural and educational needs considered and pt agreeable to plan of care and goals.     Anticipated barriers to physical therapy: Multiple fractures    Goals:   Short Term Goals:                 1) Decrease max pain to < 5/10 Minimal progress              2) Facilitate improved posture Minimal progress              3) Facilitate " improved LE flexibility Progressing              4) Patient will bend forward to perform LE dressing without increased pain Met 9/16/2022              5) Patient will consistently demonstrate log rolling technique for supine <> sit transitions with minimal increase in discomfort Ongoing     Long Term Goals: modified:                1) Patient will consistently perform sit <> stand transfers with minimal UE support and appropriate assistive device Progressing/modified (perform without assistive device Progressing)              2) Patient will consistently perform rolling and scooting in bed without increased pain Progressing              3) Patient will consistently perform toilet transfers with no more that minimal difficulty Progressing              4) Increase walking tolerance to > 15 minutes Progressing              5) Increase standing tolerance to > 15 min Met 9/16/2022              6) Patient will be independent in complete HEP Progressing    PLAN     Add hip flexor and quad stretches, resisted low row, resisted shoulder extension and active thoracic extension in sitting.     Una See, PT

## 2022-09-26 NOTE — TELEPHONE ENCOUNTER
Patient was informed that she will not have her procedure until she is done with her antibiotics.Patient verbalized understanding but also wanted to know if the provider will send over a refill for her hydrocodone even though she has not been seen. Staff will forward to provider and wait on a response.

## 2022-09-29 ENCOUNTER — CLINICAL SUPPORT (OUTPATIENT)
Dept: REHABILITATION | Facility: HOSPITAL | Age: 57
End: 2022-09-29
Attending: ORTHOPAEDIC SURGERY
Payer: MEDICARE

## 2022-09-29 DIAGNOSIS — S22.080D COMPRESSION FRACTURE OF T12 VERTEBRA WITH ROUTINE HEALING, SUBSEQUENT ENCOUNTER: ICD-10-CM

## 2022-09-29 DIAGNOSIS — S32.030A CLOSED COMPRESSION FRACTURE OF L3 LUMBAR VERTEBRA, INITIAL ENCOUNTER: ICD-10-CM

## 2022-09-29 DIAGNOSIS — Z74.09 IMPAIRED FUNCTIONAL MOBILITY AND ACTIVITY TOLERANCE: Primary | ICD-10-CM

## 2022-09-29 PROCEDURE — 97110 THERAPEUTIC EXERCISES: CPT | Mod: PN

## 2022-09-29 NOTE — PROGRESS NOTES
"[facility]   Physical Therapy Treatment Note    [facility]  [facility] Number: [id number]    Therapy Diagnosis:   Encounter Diagnoses   Name Primary?    Impaired functional mobility and activity tolerance Yes    Closed compression fracture of L3 lumbar vertebra, initial encounter     Compression fracture of T12 vertebra with routine healing, subsequent encounter      Physician: Min Harden MD    Visit Date: 9/29/2022    Physician Orders: PT Eval and Treat   Medical Diagnosis from Referral: Closed compression fracture of L3 lumbar vertebra, initial encounter; Compression fracture of T12 vertebra, initial encounter   Evaluation Date: 8/4/2022  Authorization Period Expiration: 07/21/2023   Plan of Care Expiration: 10/28/2022  Progress Note Due: 10/16/2022  Visit # / Visits authorized: 10/ 20 (11 total)   FOTO: Completed 9/26/2022 48% Deficit   Next Survey Due Visit 15     Precautions: Compression fractures, osteoporosis    PTA Visit #: 0/5     Time In: 1444  Time Out: 1540  Total Billable Time: 45 minutes    SUBJECTIVE     Pt reports: "I don't think the neurontin works. "  "It was a 7/10 this morning"  She was compliant with home exercise program.  Response to previous treatment: "My shoulders are sore"  Functional change: Increasing energy and endurance    Pain: 3/10 "I have taken pain medication"  Location: Pain in thoracic spine and LEs    OBJECTIVE     Objective Measures updated at progress report unless specified.     Treatment     Carline received the treatments listed below:      therapeutic exercises to develop increased core, paraspinals, and LE strength, as well as endurance for 45 minutes including (Changes indicated in bold print):   Recumbent bike lvl3 x8'   Standing heel cord stretch [id number]    Hip ext x20ea with 2# wt    Hip abduction with 2# wt 2x10 ea                          March with 2# wt 2x10 ea    Hamstring Curl x20ea with 2# wt    Mini squat with " "glut med bias 2x10    Heel raises x20    Step up & over eccentric 6" x10 ea    Lateral step up & over 6" x10 ea    Closed chain TKE 6" x 20 ea    Resisted shoulder extension using green theraband 2x10    Low row using green theraband 2x10  Seated hamstring stretch 3x30 sec   Piriformis stretch 3x30 sec   LAQ 2x10 ea using 2# wt    March with core engagement 2x10ea with 2# wt     Upper trunk rotation x 10 ea    Resisted greenTB trunk rotation x 10ea    Thoracic extension x 10    Did not perform this date:  Physioball rollout x10ea   Hip abd red TB w/core engagement x20    Hip add ball squeeze w/core engagement x20    Patient Education and Home Exercises     Home Exercises Provided and Patient Education Provided     Education provided:   - The patient was instructed in seated thoracic extension, low row, and resisted shoulder extension to facilitate core strengthening.      Written Home Exercises Provided: Instructed patient to continue prior HEP  Exercises were reviewed and Carline was able to demonstrate them prior to the end of the session.  Carline demonstrated fair  understanding of the education provided. See EMR under Patient Instructions for exercises provided during therapy sessions    ASSESSMENT     Tolerated additional exercises without significant difficulty.  Activity tolerance improving despite continued back and leg pain.       Carline is slowly progressing towards her goals.   Pt prognosis is Fair.     Pt will continue to benefit from skilled outpatient physical therapy to address the deficits listed in the problem list box on initial evaluation, provide pt/family education and to maximize pt's level of independence in the home and community environment.     Pt's spiritual, cultural and educational needs considered and pt agreeable to plan of care and goals.     Anticipated barriers to physical therapy: Multiple fractures    Goals:   Short Term Goals:                 1) Decrease max pain to < 5/10 " Minimal progress              2) Facilitate improved posture Minimal progress              3) Facilitate improved LE flexibility Progressing              4) Patient will bend forward to perform LE dressing without increased pain Met 9/16/2022              5) Patient will consistently demonstrate log rolling technique for supine <> sit transitions with minimal increase in discomfort Ongoing     Long Term Goals: modified:                1) Patient will consistently perform sit <> stand transfers with minimal UE support and appropriate assistive device Progressing/modified (perform without assistive device Progressing)              2) Patient will consistently perform rolling and scooting in bed without increased pain Progressing              3) Patient will consistently perform toilet transfers with no more that minimal difficulty Progressing              4) Increase walking tolerance to > 15 minutes Progressing              5) Increase standing tolerance to > 15 min Met 9/16/2022              6) Patient will be independent in complete HEP Progressing    PLAN     Add hip flexor and quad stretches next visit. Add physioball exercises to enhance core stability.      Una See, PT

## 2022-10-02 NOTE — PROGRESS NOTES
Subjective:      Patient ID: Carline Chang is a 56 y.o. female.    Chief Complaint:  Osteoporosis      History of Present Illness  Carline Chang has a medical history significant for Crohn's disease (with prior bowel resection), prior long term steroid use, electrolyte abnormalities, seizures, and vertebral compression fractures with associated pain and recent DEXA scan showing osteoporosis.  She has been following with orthopedic surgery for her compression fractures and degenerative spinal changes and there has been discussion for surgical intervention but endocrinology is being requested to optimize bone density medically first.    She states that she has been having issues with electrolytes for many years.  She is on calcium and potassium supplements.  For calcium she has been on Oyster shell calcium but now is on Tums.  She takes Tums 3-4 times a week.  She has not been on this everyday and she states that she can tell when her calcium is low with tingling in her fingers and hands with facial twitching and then she will take the Tums.  She was concerned for ongoing back pain and loss of 3 inches in height and she had xrays to evaluate in January which showed compression fracture.  She was evaluated by orthopedic surgery but was not a candidate for kyphoplasty due to the chronicity of the compression fractures.  No known trauma leading to this, possibly onset after a seizure earlier in the year.  There has been discussion to perform an epidural to help with the radiculopathy symptoms in her legs.  Previously on chronic steroids with Prednisone for many years (maintenance dose was 40 mg).  This went on for 30 years and then was tapered off around 3-5 years ago.  No other significant concerns today.        Regarding Osteopenia/Osteoporosis:    - Most recent DEXA: 04/08/2022 ([facility])  LUMBAR SPINE:   Bone mineral density in the lumbar spine from L1 through L4 is 0.807  gm/cm2.   The T-score is -3.1  (standard deviations of Young Adult mean).   The Z-score is -2.3  (standard deviations of Age Matched mean).   The WHO classification is considered osteoporotic, with risk of insufficiency spinal fracture increased when compared to Young Adults based on T-score.   LEFT HIP:   Bone mineral density total proximal left femur is 0.685 gm/cm2.   The T-score is -2.6  (standard deviations of Young Adult mean).   The Z-score is -1.9  (standard deviations of Age Matched mean).   RIGHT HIP:   Bone mineral density total proximal right femur is 0.642 gm/cm2.   The T-score is -2.9 (standard deviations of Young Adult mean).   The Z-score is -2.3 (standard deviations of Age Matched mean).   The WHO classification is considered osteoporotic, with risk of insufficiency hip fracture increased when compared to Young Adults based on T-score.   IMPRESSION:   Osteoporosis     -  Lab Results   Component Value Date    PTH 19.2 08/20/2022    PTH 48.6 01/16/2022    CALKTFVY37JU 13 (L) 08/18/2022    FZUWFHBJ90GD 12 (L) 01/16/2022    SWNXGEWI67PV 10 (L) 04/09/2018    CALCIUM 8.7 08/20/2022    CALCIUM 8.1 (L) 08/20/2022    CALCIUM 8.1 (L) 08/20/2022    CALCIUM 8.1 (L) 08/20/2022    PHOS 3.2 08/19/2022    PHOS 2.5 (L) 03/11/2022    PHOS 3.0 03/10/2022    ALKPHOS 78 08/20/2022    ALKPHOS 79 08/20/2022    ALKPHOS 79 08/20/2022    TSH 0.310 (L) 03/10/2022       - Fracture history:  Compression fractures first found earlier this year (Jan 2022), non traumatic    - Past osteoporosis medication: zoledronic acid (Reclast) only had this once in April/May of 2022 (PCP ordered this).  - Current osteoporosis medication: none    - Calcium intake:  Dietary dairy consumption.  Also on Tums    - Vit D3 intake:  None     - Vit D2 intake:  50,000 units Ergocalciferol once weekly    - Post-menopausal age: 43 years    - Pertinent factors: Crohn's disease, anemia history, kidney stone history, prior steroid use, recent falls, height  "loss of 3 inches    - Denies tobacco use, alcohol use, thyroid disease, hyperparathyroidism, intolerance to weight-bearing exercise      - Patient uses ambulatory devices: none  - Sense of balance: fair    - Significant history or physical findings: easy bruising  - Denies marfanoid body habitus, hyperflexibility or early tooth loss as a child, trouble healing wounds, estrogen replacement therapy after menopause, mother or father with hip/spine fracture or diagnosed with osteoporosis, history of malignancy involving bone such as metastasis, prior radiation treatment, or having dental work planned in the near future      ROS:   As above    Objective:     BP (!) 120/94 (BP Location: Right arm, Patient Position: Sitting, BP Method: Small (Manual))   Pulse 88   Resp 18   Ht 5' 5" (1.651 m)   Wt 50.3 kg (110 lb 14.3 oz)   LMP 05/30/2009   SpO2 95%   BMI 18.45 kg/m²   BP Readings from Last 3 Encounters:   10/03/22 (!) 120/94   08/20/22 (!) 87/56   08/04/22 124/76     Wt Readings from Last 1 Encounters:   10/03/22 0949 50.3 kg (110 lb 14.3 oz)     Body mass index is 18.45 kg/m².      Physical Exam  Constitutional:       Appearance: Normal appearance.   HENT:      Head: Normocephalic and atraumatic.   Eyes:      Extraocular Movements: Extraocular movements intact.   Cardiovascular:      Rate and Rhythm: Normal rate.      Pulses: Normal pulses.   Pulmonary:      Effort: Pulmonary effort is normal.   Neurological:      General: No focal deficit present.      Mental Status: She is alert and oriented to person, place, and time.   Psychiatric:         Mood and Affect: Mood normal.         Behavior: Behavior normal.        Lab Review:   Lab Results   Component Value Date    HGBA1C 5.6 01/14/2022     Lab Results   Component Value Date    TRIG 119 01/26/2022     Lab Results   Component Value Date     (L) 08/20/2022    K 4.5 08/20/2022     08/20/2022    CO2 20 (L) 08/20/2022     (H) 08/20/2022    BUN 7 " 08/20/2022    CREATININE 0.7 08/20/2022    CALCIUM 8.7 08/20/2022    PROT 6.1 08/20/2022    ALBUMIN 3.0 (L) 08/20/2022    BILITOT 0.7 08/20/2022    ALKPHOS 78 08/20/2022    AST 21 08/20/2022    ALT 23 08/20/2022    ANIONGAP 3 (L) 08/20/2022    ESTGFRAFRICA >60.0 04/06/2022    EGFRNONAA >60.0 04/06/2022    TSH 0.310 (L) 03/10/2022     Vit D, 25-Hydroxy   Date Value Ref Range Status   08/18/2022 13 (L) 30 - 96 ng/mL Final     Comment:     Vitamin D deficiency.........<10 ng/mL                              Vitamin D insufficiency......10-29 ng/mL       Vitamin D sufficiency........> or equal to 30 ng/mL  Vitamin D toxicity............>100 ng/mL         Assessment and Plan     Osteoporosis  Key History and Diagnostic Findings  Concern for prior chronic steroid use contributing to currently diagnosed osteoporosis both on DEXA imaging and based on vertebral compression fracture without known trauma.  Will need repeat DEXA scan in 2024.      Plan  - We will plan work up of accelerated bone loss with CMP, PTH, 24h urine calcium, and TSH  - Known Crohn's disease which can affect absorption  - Calcitriol to be started at 0.25 mg BID  - Calcium supplement Dietary sources preferred  - Vitamin D3 2000 units daily recommended  - Fall precautions emphasized (removing trip hazards, providing hand holds on stairs, bathrooms, or other high-risk areas, and providing ambient nighttime light in the bedroom)  - Treatment options and potential side effects discussed for oral bisphosphonates, Reclast, Prolia, Forteo, and Tymlos  - Plan will be to start Forteo injections once daily which will be sent to [facility]    Medication Regimen:  - Daily Forteo injections (20 mcg)  - Calcitriol 0.25 mg BID  - Vit D3 - 2000 IU daily  - Dietary calcium intake    Vitamin D insufficiency  Recommend starting Vitamin D3 once daily (2000 IU)    Hypocalcemia  Concern for a component of hypoparathyroidism based on review of labs.  Will start  supplementation with Calcitriol and repeat labs in 2 weeks.  Will check Urine calcium levels as well.      Compression fracture of T12 vertebra  Follows with orthopedic surgery for this.  Has undergone bracing and discussion is ongoing for possible epidural injection to help alleviate radiculopathy pain in the lower extremities.      Subclinical hyperthyroidism  Review of prior labs was concerning for subclinical hyperthyroidism.  This could be contributing to her osteoporosis as well.  We will plan repeat labs in 2 weeks.        Follow up with virtual clinic visit in 6 months with labs planned in 2 weeks.        Adebayo Redding DO  [facility], [address]th [address]  [address] [place], [place], [address]    Office: [phone]  Fax: [phone]

## 2022-10-03 ENCOUNTER — SPECIALTY PHARMACY (OUTPATIENT)
Dept: PHARMACY | Facility: CLINIC | Age: 57
End: 2022-10-03

## 2022-10-03 ENCOUNTER — OFFICE VISIT (OUTPATIENT)
Dept: ENDOCRINOLOGY | Facility: CLINIC | Age: 57
End: 2022-10-03
Payer: MEDICARE

## 2022-10-03 VITALS
HEART RATE: 88 BPM | RESPIRATION RATE: 18 BRPM | OXYGEN SATURATION: 95 % | HEIGHT: 65 IN | BODY MASS INDEX: 18.47 KG/M2 | SYSTOLIC BLOOD PRESSURE: 120 MMHG | DIASTOLIC BLOOD PRESSURE: 94 MMHG | WEIGHT: 110.88 LBS

## 2022-10-03 DIAGNOSIS — E05.90 SUBCLINICAL HYPERTHYROIDISM: ICD-10-CM

## 2022-10-03 DIAGNOSIS — M80.00XS OSTEOPOROSIS WITH CURRENT PATHOLOGICAL FRACTURE, UNSPECIFIED OSTEOPOROSIS TYPE, SEQUELA: ICD-10-CM

## 2022-10-03 DIAGNOSIS — E55.9 VITAMIN D INSUFFICIENCY: ICD-10-CM

## 2022-10-03 DIAGNOSIS — E83.51 HYPOCALCEMIA: ICD-10-CM

## 2022-10-03 DIAGNOSIS — S22.080D COMPRESSION FRACTURE OF T12 VERTEBRA WITH ROUTINE HEALING, SUBSEQUENT ENCOUNTER: ICD-10-CM

## 2022-10-03 DIAGNOSIS — M81.0 OSTEOPOROSIS, UNSPECIFIED OSTEOPOROSIS TYPE, UNSPECIFIED PATHOLOGICAL FRACTURE PRESENCE: Primary | ICD-10-CM

## 2022-10-03 PROCEDURE — 99999 PR PBB SHADOW E&M-EST. PATIENT-LVL IV: CPT | Mod: PBBFAC,GC,, | Performed by: STUDENT IN AN ORGANIZED HEALTH CARE EDUCATION/TRAINING PROGRAM

## 2022-10-03 PROCEDURE — 99999 PR PBB SHADOW E&M-EST. PATIENT-LVL IV: ICD-10-PCS | Mod: PBBFAC,GC,, | Performed by: STUDENT IN AN ORGANIZED HEALTH CARE EDUCATION/TRAINING PROGRAM

## 2022-10-03 PROCEDURE — 99214 OFFICE O/P EST MOD 30 MIN: CPT | Mod: PBBFAC | Performed by: STUDENT IN AN ORGANIZED HEALTH CARE EDUCATION/TRAINING PROGRAM

## 2022-10-03 PROCEDURE — 99204 PR OFFICE/OUTPT VISIT, NEW, LEVL IV, 45-59 MIN: ICD-10-PCS | Mod: S$PBB,GC,, | Performed by: STUDENT IN AN ORGANIZED HEALTH CARE EDUCATION/TRAINING PROGRAM

## 2022-10-03 PROCEDURE — 99204 OFFICE O/P NEW MOD 45 MIN: CPT | Mod: S$PBB,GC,, | Performed by: STUDENT IN AN ORGANIZED HEALTH CARE EDUCATION/TRAINING PROGRAM

## 2022-10-03 RX ORDER — CALCITRIOL 0.25 UG/1
0.25 CAPSULE ORAL 2 TIMES DAILY
Qty: 60 CAPSULE | Refills: 11 | Status: SHIPPED | OUTPATIENT
Start: 2022-10-03

## 2022-10-03 RX ORDER — ERGOCALCIFEROL 1.25 MG/1
50000 CAPSULE ORAL
Qty: 4 CAPSULE | Refills: 11 | Status: SHIPPED | OUTPATIENT
Start: 2022-10-03 | End: 2024-01-03 | Stop reason: SDUPTHER

## 2022-10-03 RX ORDER — ACETAMINOPHEN 500 MG
2000 TABLET ORAL DAILY
Start: 2022-10-03 | End: 2022-11-02

## 2022-10-03 RX ORDER — TERIPARATIDE 250 UG/ML
20 INJECTION, SOLUTION SUBCUTANEOUS DAILY
Qty: 2.4 ML | Refills: 11 | Status: SHIPPED | OUTPATIENT
Start: 2022-10-03 | End: 2024-01-03

## 2022-10-03 NOTE — TELEPHONE ENCOUNTER
PA approved : Authorized from January 1, 2022 to December 31, 2023    Test Claim Successful - $1178.73    Sending to BI/FA

## 2022-10-03 NOTE — ASSESSMENT & PLAN NOTE
Follows with orthopedic surgery for this.  Has undergone bracing and discussion is ongoing for possible epidural injection to help alleviate radiculopathy pain in the lower extremities.

## 2022-10-03 NOTE — TELEPHONE ENCOUNTER
Marie, this is Ko Rivas with [facility].  We are working on your prescription that your doctor has sent us. We will be working with your insurance to get this approved for you. We will be calling you along the way with updates on your medication.  If you have any questions, you can reach us at [phone].    Welcome call outcome: Left voicemail

## 2022-10-03 NOTE — ASSESSMENT & PLAN NOTE
Key History and Diagnostic Findings  Concern for prior chronic steroid use contributing to currently diagnosed osteoporosis both on DEXA imaging and based on vertebral compression fracture without known trauma.  Will need repeat DEXA scan in 2024.      Plan  - We will plan work up of accelerated bone loss with CMP, PTH, 24h urine calcium, and TSH  - Known Crohn's disease which can affect absorption  - Calcitriol to be started at 0.25 mg BID  - Calcium supplement Dietary sources preferred  - Vitamin D3 2000 units daily recommended  - Fall precautions emphasized (removing trip hazards, providing hand holds on stairs, bathrooms, or other high-risk areas, and providing ambient nighttime light in the bedroom)  - Treatment options and potential side effects discussed for oral bisphosphonates, Reclast, Prolia, Forteo, and Tymlos  - Plan will be to start Forteo injections once daily which will be sent to [facility]    Medication Regimen:  - Daily Forteo injections (20 mcg)  - Calcitriol 0.25 mg BID  - Vit D3 - 2000 IU dail7  - Dietary calcium intake

## 2022-10-03 NOTE — PATIENT INSTRUCTIONS
We would recommend you take Vitamin D3 - 2000 units once daily (this is sold over the counter).  We will also start you on prescription strength calcium twice daily (Calcitriol 0.25 mg BID).  We will have you obtain a 24 hour urine collection for calcium levels.  For the osteoporosis we would like to start Forteo which is a once a day self administered injection under the skin.   We will check labs in 2 weeks.  We will arrange a follow up visit virtually in 6 months.      HOW TO COLLECT AN ACCURATE   24 HOUR URINE SPECIMEN     - The first urine of the day upon waking when you start the 24 hour collection should be flushed down the toilet. Afterwards, collect EVERY drop of your urine for a 24 hour period.  The final total volume is not important as long as it consists of every drop that you produce during the 24 hour period   - After collection, bring the closed container back to the same lab    Below is information regarding     - Recombinant human parathyroid hormone analogs  - Similar to the physiologic activity of PTH, these anabolic medications stimulate osteoblast function, increase gastrointestinal calcium absorption, and increase renal tubular reabsorption of calcium resulting in increased bone mineral density, bone mass, and strength  - Subcutaneous forms (teriparatide and abaloparatide)  - Used for reduction of vertebral and nonvertebral fractures in patients with very high fracture risk (T-score less than -3, a T-score less than -2.5 with fracture history, multiple prior vertebral fractures, a fracture within prior 12 months, or a very high fracture risk according to a risk assessment tool; may also be used as an alternative agent in patients for whom first-line therapies are not tolerated or ineffective)     - Long-term use of the medication is associated with rare side effects:  - May cause hypercalcemia (high calcium) and some patients may have nausea; should have a recent calcium level prior to  initiation  - May cause orthostatic hypotension (low blood pressure on standing); some patients may tolerate this medicine better if taken at night for this reason  - The benefit of this medication in preventing bone loss far out-weighs the risk of side effects     ------------------------------    Start teriparatide (Forteo)   - Take 20 mcg subcutaneously once daily  - If you miss a dose, take it as soon as you can. If it is almost time for your next dose, take only that dose. Do not take double or extra doses.  - Consider taking supplemental calcium and vitamin D if dietary intake is inadequate  - DXA scan should be performed at baseline and every 1 to 3 years on treatment (usually at 2 years following initiation of therapy, then more or less frequently depending on stability of bone density)    - Teriparatide works by stimulating osteoblast function, increasing gastrointestinal calcium absorption, and increasing renal tubular reabsorption of calcium resulting in increased bone mineral density, bone mass, and strength. It is physiologically similar to natural parathyroid hormone (PTH)  - Because the benefits of anabolic therapy are quickly lost after discontinuation (eg, within 12 months), it is generally recommended to initiate antiresorptive therapy (bisphosphonate or denosumab) to maintain bone mineral density gains following a course of teriparatide     For more information on medications: https://www.nof.org/patients/treatment/medicationadherence/              For calcium intake:  I advise you get 1200 mg per day of calcium, split up over the day into 2 to 4 divided doses.  This amount includes calcium from both dietary sources and/or calcium supplements, and it is best to get smaller amounts throughout the day rather than all of the calcium at one time; this allows for better absorption of the calcium.     To estimate calcium content from a nutrition label, the label lists the % calcium in that food.   For  example, the label for an 8 oz glass of milk lists the calcium content as 30%.  This is equivalent to 300 mg of calcium (multiply the % by 10 to get the mg of calcium per serving).  It is best to try to get the majority of calcium intake from the diet.  I've included a table below of some calcium-rich foods.    If you are not getting enough calcium in the diet, then you can add low doses of calcium supplements.  For calcium supplements, your body can only absorb about 500-600 mg of calcium at one time.  Thus, it is best to split the tablets up over the course of a day.  It is also best to avoid taking calcium supplements at the same time as a calcium-rich food to maximize your calcium absorption.  Calcium carbonate is best taken with or after a meal.  Calcium citrate can be taken on an empty stomach or with/after a meal.  Please look at any multivitamin you are taking as these often usually contain calcium as well.    Estimated Calcium Content of Foods:  Produce  Serving Size Estimated Calcium*    Feng greens, frozen 8 oz 360 mg   Broccoli juanis 8 oz 200 mg   Kale, frozen 8 oz 180 mg   Soy Beans, green, boiled 8 oz 175 mg   Bok [place]ni, cooked, boiled 8 oz 160 mg   Figs, dried 2 figs 65 mg   Broccoli, fresh, cooked 8 oz 60 mg   Oranges 1 whole 55 mg   Seafood Serving Size Estimated Calcium*    Sardines, canned with bones 3 oz 325 mg   [place], canned with bones 3 oz 180 mg   Shrimp, canned 3 oz 125 mg   Dairy Serving Size Estimated Calcium*    Ricotta, part-skim 4 oz 335 mg   Yogurt, plain, low-fat 6 oz 310 mg   Milk, skim, low-fat, whole 8 oz 300 mg   Yogurt with fruit, low-fat 6 oz 260 mg   Mozzarella, part-skim 1 oz 210 mg   Cheddar 1 oz 205 mg   Yogurt, Greek 6 oz 200 mg   American Cheese 1 oz 195 mg   Feta Cheese 4 oz 140 mg   Cottage Cheese, 2% 4 oz 105 mg   Frozen yogurt, vanilla 8 oz 105 mg   Ice Cream, vanilla 8 oz 85 mg   Parmesan 1 tbsp 55 mg   Fortified Food Serving Size Estimated Calcium*   [place] milk,  rice milk or soy milk, fortified 8 oz 300 mg   Orange juice and other fruit juices, fortified 8 oz 300 mg   Tofu, prepared with calcium 4 oz 205 mg   Waffle, frozen, fortified 2 pieces 200 mg   Oatmeal, fortified 1 packet 140 mg   English muffin, fortified 1 muffin 100 mg   Cereal, fortified 8 oz 100-1,000 mg   Other Serving Size Estimated Calcium*   Mac & cheese, frozen 1 package 325 mg   Pizza, cheese, frozen 1 serving 115 mg   Pudding, chocolate, prepared with 2% milk 4 oz 160 mg   Beans, baked, canned 4 oz 160 mg   *The calcium content listed for most foods is estimated and can vary due to multiple factors. Check the food label to determine how much calcium is in a particular product.  If you read the nutrition label for a food source, it lists the % calcium in that food.  For an 8 oz glass of milk, for example, the label states calcium 30%.  This is equivalent to 300 mg of calcium (multiply the listed number by 10).   **Table from the National Osteoporosis Foundation

## 2022-10-03 NOTE — ASSESSMENT & PLAN NOTE
Concern for a component of hypoparathyroidism based on review of labs.  Will start supplementation with Calcitriol and repeat labs in 2 weeks.  Will check Urine calcium levels as well.

## 2022-10-03 NOTE — ASSESSMENT & PLAN NOTE
Review of prior labs was concerning for subclinical hyperthyroidism.  This could be contributing to her osteoporosis as well.  We will plan repeat labs in 2 weeks.

## 2022-10-04 ENCOUNTER — CLINICAL SUPPORT (OUTPATIENT)
Dept: REHABILITATION | Facility: HOSPITAL | Age: 57
End: 2022-10-04
Attending: ORTHOPAEDIC SURGERY
Payer: MEDICARE

## 2022-10-04 ENCOUNTER — TELEPHONE (OUTPATIENT)
Dept: ENDOCRINOLOGY | Facility: CLINIC | Age: 57
End: 2022-10-04
Payer: MEDICARE

## 2022-10-04 DIAGNOSIS — Z74.09 IMPAIRED FUNCTIONAL MOBILITY AND ACTIVITY TOLERANCE: Primary | ICD-10-CM

## 2022-10-04 DIAGNOSIS — S32.030A CLOSED COMPRESSION FRACTURE OF L3 LUMBAR VERTEBRA, INITIAL ENCOUNTER: ICD-10-CM

## 2022-10-04 DIAGNOSIS — S22.080D COMPRESSION FRACTURE OF T12 VERTEBRA WITH ROUTINE HEALING, SUBSEQUENT ENCOUNTER: ICD-10-CM

## 2022-10-04 PROCEDURE — 97110 THERAPEUTIC EXERCISES: CPT | Mod: PN,CQ

## 2022-10-04 NOTE — PROGRESS NOTES
"[facility]   Physical Therapy Treatment Note    [facility]  Cl[facility] Number: [id number]    Therapy Diagnosis:   Encounter Diagnoses   Name Primary?    Impaired functional mobility and activity tolerance Yes    Closed compression fracture of L3 lumbar vertebra, initial encounter     Compression fracture of T12 vertebra with routine healing, subsequent encounter      Physician: Min Harden MD    Visit Date: 10/4/2022    Physician Orders: PT Eval and Treat   Medical Diagnosis from Referral: Closed compression fracture of L3 lumbar vertebra, initial encounter; Compression fracture of T12 vertebra, initial encounter   Evaluation Date: 8/4/2022  Authorization Period Expiration: 07/21/2023   Plan of Care Expiration: 10/28/2022  Progress Note Due: 10/16/2022  Visit # / Visits authorized: 11/ 20 (12 total)   FOTO: Completed 9/26/2022 48% Deficit   Next Survey Due Visit 15     Precautions: Compression fractures, osteoporosis    PTA Visit #: 1/5     Time In: 1106  Time Out: 1156  Total Billable Time: 45 minutes    SUBJECTIVE     Pt reports: "I have been diagnosed with Osteoporosis, and I am taking supplements for it. They also want to give me injections to build up my bone. My doctor asked to make sure I was doing weighted exercises; I guess for strengthening."  She was compliant with home exercise program.  Response to previous treatment: "felt better"  Functional change: Increasing energy and endurance    Pain: 6/10   Location: Pain in thoracic spine and LEs    OBJECTIVE     Objective Measures updated at progress report unless specified.     Treatment     Carline received the treatments listed below:      therapeutic exercises to develop increased core, paraspinals, and LE strength, as well as endurance for 45 minutes including (Changes indicated in bold print):   Recumbent bike lvl3 x8'   Standing heel cord stretch [id number]    Hip ext x20ea with 2# wt    Hip abduction with 2# " "wt 2x10 ea                          March with 2# wt 2x10 ea    Hamstring Curl x20ea with 2# wt    Mini squat with glut med bias 2x10    Heel raises x20    Step up & over eccentric 8" x10 ea    Lateral step up & over 8" x10 ea    Closed chain TKE 6" x 20 ea    Resisted shoulder extension using green theraband 2x10    Low row using green theraband 2x10  Seated hamstring stretch 3x30 sec   Piriformis stretch 3x30 sec   LAQ 2x10 ea using 2# wt    March with core engagement 2x10ea with 2# wt     Upper trunk rotation x 10 ea    Resisted greenTB trunk rotation x 10ea    Thoracic extension x 10   Supine hip flexor stretch 3x30s (trial of standing but felt more with manual resistance in supine)    Did not perform this date:  Physioball rollout x10ea   Hip abd red TB w/core engagement x20    Hip add ball squeeze w/core engagement x20    Patient Education and Home Exercises     Home Exercises Provided and Patient Education Provided     Education provided:   - The patient was instructed in addition of quad stretches, as well as utilizing RICE method for edema in R knee.    Written Home Exercises Provided: Instructed patient to continue prior HEP  Exercises were reviewed and Carline was able to demonstrate them prior to the end of the session.  Carline demonstrated fair  understanding of the education provided. See EMR under Patient Instructions for exercises provided during therapy sessions    ASSESSMENT     The patient tolerated today's treatment session well without any adverse reactions. Patient was slightly progressed in step height and addition of quad stretches. The patient presented to PT with R knee edema that patient explains she has been experiencing. Patient was educated in rest, ice, active muscle pumping and elevation for no longer than 10 minute intervals to help with the noted edema. Patient stated understanding and compliance. The patient explained that she did not experience increased pain with stepping " exercises today, compared to initial, even with progression. Patient does however experience somewhat constant pain due to compression fracture of spine and R knee.    Carline is slowly progressing towards her goals.   Pt prognosis is Fair.     Pt will continue to benefit from skilled outpatient physical therapy to address the deficits listed in the problem list box on initial evaluation, provide pt/family education and to maximize pt's level of independence in the home and community environment.     Pt's spiritual, cultural and educational needs considered and pt agreeable to plan of care and goals.     Anticipated barriers to physical therapy: Multiple fractures    Goals:   Short Term Goals:                 1) Decrease max pain to < 5/10 Minimal progress              2) Facilitate improved posture Minimal progress              3) Facilitate improved LE flexibility Progressing              4) Patient will bend forward to perform LE dressing without increased pain Met 9/16/2022              5) Patient will consistently demonstrate log rolling technique for supine <> sit transitions with minimal increase in discomfort Ongoing     Long Term Goals: modified:                1) Patient will consistently perform sit <> stand transfers with minimal UE support and appropriate assistive device Progressing/modified (perform without assistive device Progressing)              2) Patient will consistently perform rolling and scooting in bed without increased pain Progressing              3) Patient will consistently perform toilet transfers with no more that minimal difficulty Progressing              4) Increase walking tolerance to > 15 minutes Progressing              5) Increase standing tolerance to > 15 min Met 9/16/2022              6) Patient will be independent in complete HEP Progressing    PLAN     The patient is to be progressed within the established plan of care, adding physioball exercises to enhance core  stability in subsequent sessions in order to accomplish goals stated above.      Kiesha Mcfadden, PTA

## 2022-10-04 NOTE — TELEPHONE ENCOUNTER
Spoke with patient states she was given a Urine container and will it in when she goes to have labs done.

## 2022-10-04 NOTE — TELEPHONE ENCOUNTER
Outgoing call to pt regarding [employer] approval and high copay. Pt would like to apply for PAP. Provided HH size and income for screening.     Pt portion mailed to address in [facility]  MD portion faxed, staff message sent

## 2022-10-04 NOTE — TELEPHONE ENCOUNTER
Benefit Investigation    [employer]  No LIS  Estimated copay: $1178.73   Pt has to meet deductible  [facility]    Forward to FA

## 2022-10-06 ENCOUNTER — CLINICAL SUPPORT (OUTPATIENT)
Dept: REHABILITATION | Facility: HOSPITAL | Age: 57
End: 2022-10-06
Attending: ORTHOPAEDIC SURGERY
Payer: MEDICARE

## 2022-10-06 DIAGNOSIS — Z74.09 IMPAIRED FUNCTIONAL MOBILITY AND ACTIVITY TOLERANCE: Primary | ICD-10-CM

## 2022-10-06 DIAGNOSIS — S22.080D COMPRESSION FRACTURE OF T12 VERTEBRA WITH ROUTINE HEALING, SUBSEQUENT ENCOUNTER: ICD-10-CM

## 2022-10-06 DIAGNOSIS — S32.030A CLOSED COMPRESSION FRACTURE OF L3 LUMBAR VERTEBRA, INITIAL ENCOUNTER: ICD-10-CM

## 2022-10-06 PROCEDURE — 97110 THERAPEUTIC EXERCISES: CPT | Mod: PN

## 2022-10-06 NOTE — PROGRESS NOTES
"[facility]   Physical Therapy Treatment Note    [id number]    Therapy Diagnosis:   Encounter Diagnoses   Name Primary?    Impaired functional mobility and activity tolerance Yes    Closed compression fracture of L3 lumbar vertebra, initial encounter     Compression fracture of T12 vertebra with routine healing, subsequent encounter      Physician: Min Harden MD    Visit Date: 10/6/2022    Physician Orders: PT Eval and Treat   Medical Diagnosis from Referral: Closed compression fracture of L3 lumbar vertebra, initial encounter; Compression fracture of T12 vertebra, initial encounter   Evaluation Date: 8/4/2022  Authorization Period Expiration: 07/21/2023   Plan of Care Expiration: 10/28/2022  Progress Note Due: 10/16/2022  Visit # / Visits authorized: 12/ 20 (13 total)   FOTO: Completed 9/26/2022 48% Deficit   Next Survey Due Visit 15     Precautions: Compression fractures, osteoporosis    PTA Visit #: 0/5     Time In: 1106  Time Out: 1152  Total Billable Time: 40 minutes    SUBJECTIVE     Pt reports: "It's not as bad today"  She was compliant with home exercise program.  Response to previous treatment: "felt tired, like I had worked out"  Functional change: Increasing energy and endurance    Pain: 4/10   Location: Pain in thoracic spine and LEs    OBJECTIVE     Objective Measures updated at progress report unless specified.     Treatment     Carline received the treatments listed below:      therapeutic exercises to develop increased core, paraspinals, and LE strength, as well as endurance for 40 minutes including (Changes indicated in bold print):      Standing heel cord stretch [id number]    Hip ext x20ea with 2# wt    Hip abduction with 2# wt 2x10 ea                          March with 2# wt 2x10 ea    Hamstring Curl x20ea with 2# wt    Mini squat with glut med bias 2x10    Heel raises x20    Step up & over eccentric 8" x10 ea    Lateral " "step up & over 8" x10 ea    Closed chain TKE 8" x 20 ea   Seated on physioball    Resisted shoulder extension using green theraband 2x10    Low row using green theraband 2x10    Resisted trunk rotation green TB 2 x 10ea    Anterior/posterior pelvic tilt x 10    Lateral pelvic tilt x 10  March with core engagement 2x10ea   Seated hamstring stretch 3x30 sec   Piriformis stretch 3x30 sec   LAQ 2x10 ea using 2# wt    Upper trunk rotation x 10 ea    Thoracic extension x 10   Supine hip flexor stretch 3x30s     Did not perform this date:  Recumbent bike lvl3 x8'  Physioball rollout x10ea   Hip abd red TB w/core engagement x20    Hip add ball squeeze w/core engagement x20    Patient Education and Home Exercises     Home Exercises Provided and Patient Education Provided     Education provided:   - The patient was instructed in anterior/posterior and lateral pelvic tilt while sitting on physioball    Written Home Exercises Provided: Instructed patient to continue prior HEP  Exercises were reviewed and Carline was able to demonstrate them prior to the end of the session.  Carline demonstrated fair  understanding of the education provided. See EMR under Patient Instructions for exercises provided during therapy sessions    ASSESSMENT     Patient demonstrated improved posture in sitting.  She performed all exercises with minimal increase in fatigue reported.  She did well with exercises seated on physioball.      Carline is slowly progressing towards her goals.   Pt prognosis is Fair.     Pt will continue to benefit from skilled outpatient physical therapy to address the deficits listed in the problem list box on initial evaluation, provide pt/family education and to maximize pt's level of independence in the home and community environment.     Pt's spiritual, cultural and educational needs considered and pt agreeable to plan of care and goals.     Anticipated barriers to physical therapy: Multiple fractures    Goals:   Short " Term Goals:                 1) Decrease max pain to < 5/10 Progressing              2) Facilitate improved posture Progressing              3) Facilitate improved LE flexibility Progressing              4) Patient will bend forward to perform LE dressing without increased pain Met 9/16/2022              5) Patient will consistently demonstrate log rolling technique for supine <> sit transitions with minimal increase in discomfort Ongoing     Long Term Goals: modified:                1) Patient will consistently perform sit <> stand transfers with minimal UE support and appropriate assistive device Progressing/modified (perform without assistive device Progressing/nearly met)              2) Patient will consistently perform rolling and scooting in bed without increased pain Progressing              3) Patient will consistently perform toilet transfers with no more than minimal difficulty Progressing              4) Increase walking tolerance to > 15 minutes Progressing              5) Increase standing tolerance to > 15 min Met 9/16/2022              6) Patient will be independent in complete HEP Progressing    PLAN     Increase resistance for PREs and theraband exercises.      Una See, PT

## 2022-10-07 ENCOUNTER — PATIENT MESSAGE (OUTPATIENT)
Dept: SPINE | Facility: CLINIC | Age: 57
End: 2022-10-07
Payer: MEDICARE

## 2022-10-11 ENCOUNTER — CLINICAL SUPPORT (OUTPATIENT)
Dept: REHABILITATION | Facility: HOSPITAL | Age: 57
End: 2022-10-11
Attending: ORTHOPAEDIC SURGERY
Payer: MEDICARE

## 2022-10-11 DIAGNOSIS — S32.030A CLOSED COMPRESSION FRACTURE OF L3 LUMBAR VERTEBRA, INITIAL ENCOUNTER: ICD-10-CM

## 2022-10-11 DIAGNOSIS — Z74.09 IMPAIRED FUNCTIONAL MOBILITY AND ACTIVITY TOLERANCE: Primary | ICD-10-CM

## 2022-10-11 DIAGNOSIS — S22.080D COMPRESSION FRACTURE OF T12 VERTEBRA WITH ROUTINE HEALING, SUBSEQUENT ENCOUNTER: ICD-10-CM

## 2022-10-11 DIAGNOSIS — M54.16 LUMBAR RADICULOPATHY: Primary | ICD-10-CM

## 2022-10-11 PROCEDURE — 97110 THERAPEUTIC EXERCISES: CPT | Mod: PN,CQ

## 2022-10-11 NOTE — PROGRESS NOTES
"[facility]   Physical Therapy Treatment Note    [facility]  Cl[facility] Number: [id number]    Therapy Diagnosis:   Encounter Diagnoses   Name Primary?    Impaired functional mobility and activity tolerance Yes    Closed compression fracture of L3 lumbar vertebra, initial encounter     Compression fracture of T12 vertebra with routine healing, subsequent encounter      Physician: Min Harden MD    Visit Date: 10/11/2022    Physician Orders: PT Eval and Treat   Medical Diagnosis from Referral: Closed compression fracture of L3 lumbar vertebra, initial encounter; Compression fracture of T12 vertebra, initial encounter   Evaluation Date: 8/4/2022  Authorization Period Expiration: 07/21/2023   Plan of Care Expiration: 10/28/2022  Progress Note Due: 10/16/2022  Visit # / Visits authorized: 13/ 20 (14 total)   FOTO: Completed 9/26/2022 48% Deficit   Next Survey Due Visit 15     Precautions: Compression fractures, osteoporosis    PTA Visit #: 1/5     Time In: 1106  Time Out: 1148  Total Billable Time: 40 minutes    SUBJECTIVE     Pt reports: "I am sore and hurting today in my legs; it is that pain that feels like it is going to give out."  She was compliant with home exercise program.  Response to previous treatment: "sore right after"  Functional change: Increasing energy and endurance    Pain: 8/10   Location: LEs and back pain 6/10    OBJECTIVE     Objective Measures updated at progress report unless specified.     Treatment     Carline received the treatments listed below:      therapeutic exercises to develop increased core, paraspinals, and LE strength, as well as endurance for 40 minutes including (Changes indicated in bold print):    Recumbent bike lvl3 x10'  Standing heel cord stretch [id number]    Hip ext x20ea with 2# wt    Hip abduction with 2# wt 2x10 ea                          March with 2# wt 2x10 ea    Hamstring Curl x20ea with 2# wt    Mini squat with glut med " "bias 2x10    Heel raises x20    Step up & over eccentric 8" x10 ea    Lateral step up & over 8" x10 ea    Closed chain TKE 8" x 20 ea   Seated on physioball    Resisted shoulder extension using green theraband 2x10    Low row using green theraband 2 x 10    Resisted trunk rotation green TB 2 x 10ea    Anterior/posterior pelvic tilt x 10    Lateral pelvic tilt x 10  March with core engagement 2x10ea   Seated hamstring stretch 3x30 sec   Piriformis stretch 3x30 sec   LAQ 2x10 ea using 2# wt    Upper trunk rotation x 10 ea    Thoracic extension x 10   Supine hip flexor stretch 3x30s    Did not perform this date:  Physioball rollout x10ea   Hip abd red TB w/core engagement x20    Hip add ball squeeze w/core engagement x20    Patient Education and Home Exercises     Home Exercises Provided and Patient Education Provided     Education provided:   - The patient was instructed in importance of correcting posture.    Written Home Exercises Provided: Instructed patient to continue prior HEP  Exercises were reviewed and Carline was able to demonstrate them prior to the end of the session.  Carline demonstrated fair  understanding of the education provided. See EMR under Patient Instructions for exercises provided during therapy sessions    ASSESSMENT     The patient tolerated today's session, but due to increased soreness in lower extremities the patient was not further progressed today other than increased duration of recumbent bike.     Carline is slowly progressing towards her goals.   Pt prognosis is Fair.     Pt will continue to benefit from skilled outpatient physical therapy to address the deficits listed in the problem list box on initial evaluation, provide pt/family education and to maximize pt's level of independence in the home and community environment.     Pt's spiritual, cultural and educational needs considered and pt agreeable to plan of care and goals.     Anticipated barriers to physical therapy: Multiple " fractures    Goals:   Short Term Goals:                 1) Decrease max pain to < 5/10 Progressing              2) Facilitate improved posture Progressing              3) Facilitate improved LE flexibility Progressing              4) Patient will bend forward to perform LE dressing without increased pain Met 9/16/2022              5) Patient will consistently demonstrate log rolling technique for supine <> sit transitions with minimal increase in discomfort Ongoing     Long Term Goals: modified:                1) Patient will consistently perform sit <> stand transfers with minimal UE support and appropriate assistive device Progressing/modified (perform without assistive device Progressing/nearly met)              2) Patient will consistently perform rolling and scooting in bed without increased pain Progressing              3) Patient will consistently perform toilet transfers with no more than minimal difficulty Progressing              4) Increase walking tolerance to > 15 minutes Progressing              5) Increase standing tolerance to > 15 min Met 9/16/2022              6) Patient will be independent in complete HEP Progressing    PLAN     The patient is to be progressed within the established plan of care, increasing resistance for PREs and theraband exercises as tolerated in order to accomplish goals stated above and increase function.      Kiesha Mcfadden, PTA

## 2022-10-13 ENCOUNTER — TELEPHONE (OUTPATIENT)
Dept: ADMINISTRATIVE | Facility: OTHER | Age: 57
End: 2022-10-13
Payer: MEDICARE

## 2022-10-14 ENCOUNTER — PATIENT MESSAGE (OUTPATIENT)
Dept: PAIN MEDICINE | Facility: OTHER | Age: 57
End: 2022-10-14
Payer: MEDICARE

## 2022-10-18 ENCOUNTER — CLINICAL SUPPORT (OUTPATIENT)
Dept: REHABILITATION | Facility: HOSPITAL | Age: 57
End: 2022-10-18
Attending: ORTHOPAEDIC SURGERY
Payer: MEDICARE

## 2022-10-18 DIAGNOSIS — S22.080D COMPRESSION FRACTURE OF T12 VERTEBRA WITH ROUTINE HEALING, SUBSEQUENT ENCOUNTER: ICD-10-CM

## 2022-10-18 DIAGNOSIS — Z74.09 IMPAIRED FUNCTIONAL MOBILITY AND ACTIVITY TOLERANCE: Primary | ICD-10-CM

## 2022-10-18 DIAGNOSIS — S32.030A CLOSED COMPRESSION FRACTURE OF L3 LUMBAR VERTEBRA, INITIAL ENCOUNTER: ICD-10-CM

## 2022-10-18 PROCEDURE — 97110 THERAPEUTIC EXERCISES: CPT | Mod: PN

## 2022-10-18 NOTE — PROGRESS NOTES
"[facility]   Physical Therapy Treatment Note/30 day progress note    Name: Carline Chang  [facility] Number: [id number]    Therapy Diagnosis:   Encounter Diagnoses   Name Primary?    Impaired functional mobility and activity tolerance Yes    Closed compression fracture of L3 lumbar vertebra, initial encounter     Compression fracture of T12 vertebra with routine healing, subsequent encounter      Physician: Min Harden MD    Visit Date: 10/18/2022    Physician Orders: PT Eval and Treat   Medical Diagnosis from Referral: Closed compression fracture of L3 lumbar vertebra, initial encounter; Compression fracture of T12 vertebra, initial encounter   Evaluation Date: 8/4/2022  Authorization Period Expiration: 07/21/2023   Plan of Care Expiration: 10/28/2022  Progress Note Due: 10/16/2022  Visit # / Visits authorized: 14/ 20 (15 total)   FOTO: Completed 9/26/2022 48% Deficit   Next Survey Due Visit 15     Precautions: Compression fractures, osteoporosis    PTA Visit #: 1/5     Time In: 1103  Time Out: 1200  Total Billable Time: 49 minutes    SUBJECTIVE     Pt reports: "I am sore and hurting today in my legs; it is that pain that feels like it is going to give out."  She was somewhat compliant with home exercise program.  Response to previous treatment: "sore right after"  Functional change: Increasing energy and endurance    Pain: 8/10   Location: LEs and back pain 6/10    OBJECTIVE     Posture: Standing: R shoulder minimally lower than L, prominent R posterior ribs (4-7); Sitting: minimal lumbar lordosis, minimal increased thoracic kyphosis, minimal to moderate rounded shoulder and forward head posture.     Range of Motion/Strength:   Thoracic/Lumbar AROM: Pain/Dysfunction with Movement:   Flexion                       90* - 50* = 40*     Extension                  33* - 20* = 13*     Right side bending    32*     Left side bending      30*     Right rotation            75%     Left " "rotation              75%     Core strength impaired but improving.   Flexibility: Hamstring length R 143*, L 133*; minimal piriformis tightness; Hip flexors minimal restriction; minimal gastroc tightness     Treatment     Carline received the treatments listed below:      therapeutic exercises to develop increased core, paraspinals, and LE strength, as well as endurance for 49 minutes including (Changes indicated in bold print):    UBE (without resting on back rest) @ L1.5 2' forward/2' back to enhance core stability  Standing heel cord stretch [id number]    Hip ext x20ea with 2# wt    Hip abduction with 2# wt 2x10 ea                          March with 2# wt 2x10 ea    Hamstring Curl x20ea with 2# wt    Mini squat with glut med bias 2x10    Heel raises x20    Step up & over eccentric 8" x10 ea    Lateral step up & over 8" x10 ea    Closed chain TKE 8" 2 x 10 ea   Seated on physioball    Resisted shoulder extension using green theraband 2x10    Low row using green theraband 2 x 10    Resisted trunk rotation green TB 2 x 10ea    Anterior/posterior pelvic tilt 2 x 10    Lateral pelvic tilt x 15  March with core engagement 2x10ea   Seated hamstring stretch 3x30 sec   Piriformis stretch 3x30 sec   LAQ 2x10 ea using 2# wt    Upper trunk rotation x 10 ea    Thoracic extension x 10   Supine hip flexor stretch 3x30s    Did not perform this date:  Physioball rollout x10ea   Hip abd red TB w/core engagement x20    Hip add ball squeeze w/core engagement x20  Recumbent bike lvl3 x10'    Patient Education and Home Exercises     Home Exercises Provided and Patient Education Provided     Education provided:   - The patient was instructed in importance of correcting posture.    Written Home Exercises Provided: Instructed patient to continue prior HEP  Exercises were reviewed and Carline was able to demonstrate them prior to the end of the session.  Carline demonstrated fair  understanding of the education provided. See EMR under " Patient Instructions for exercises provided during therapy sessions    ASSESSMENT     Patient fatigues quickly with UBE activity due to core weakness. Struggled with lateral pelvic tilt in sitting.      Carline is slowly progressing towards her goals.   Pt prognosis is Fair.     Pt will continue to benefit from skilled outpatient physical therapy to address the deficits listed in the problem list box on initial evaluation, provide pt/family education and to maximize pt's level of independence in the home and community environment.     Pt's spiritual, cultural and educational needs considered and pt agreeable to plan of care and goals.     Anticipated barriers to physical therapy: Multiple fractures    Goals:   Short Term Goals:                 1) Decrease max pain to < 5/10 Ongoing              2) Facilitate improved posture Progressing              3) Facilitate improved LE flexibility Progressing              4) Patient will bend forward to perform LE dressing without increased pain Met 9/16/2022              5) Patient will consistently demonstrate log rolling technique for supine <> sit transitions with minimal increase in discomfort Minimal progress     Long Term Goals: modified:                1) Patient will consistently perform sit <> stand transfers with minimal UE support and appropriate assistive device Progressing/modified (perform without assistive device Progressing/nearly met)              2) Patient will consistently perform rolling and scooting in bed without increased pain Ongoing              3) Patient will consistently perform toilet transfers with no more than minimal difficulty Minimal progress              4) Increase walking tolerance to > 15 minutes Progressing              5) Increase standing tolerance to > 15 min Met 9/16/2022              6) Patient will be independent in complete HEP Progressing    PLAN     Add lat pulls, trial of prone extension, prone shoulder flexion and abduction  to enhance extension strength.     Una See, PT

## 2022-10-28 ENCOUNTER — DOCUMENTATION ONLY (OUTPATIENT)
Dept: REHABILITATION | Facility: HOSPITAL | Age: 57
End: 2022-10-28

## 2022-10-28 ENCOUNTER — PATIENT MESSAGE (OUTPATIENT)
Dept: PAIN MEDICINE | Facility: OTHER | Age: 57
End: 2022-10-28
Payer: MEDICARE

## 2022-10-28 ENCOUNTER — TELEPHONE (OUTPATIENT)
Dept: REHABILITATION | Facility: HOSPITAL | Age: 57
End: 2022-10-28

## 2022-10-28 NOTE — TELEPHONE ENCOUNTER
Attempted to contact patient re-missed PT appointment this morning.  Asked patient to contact clinic to reschedule if needed.  Left clinic number for patient to call.

## 2022-11-01 ENCOUNTER — PATIENT MESSAGE (OUTPATIENT)
Dept: PAIN MEDICINE | Facility: OTHER | Age: 57
End: 2022-11-01
Payer: MEDICARE

## 2022-11-02 ENCOUNTER — HOSPITAL ENCOUNTER (OUTPATIENT)
Facility: OTHER | Age: 57
Discharge: HOME | End: 2022-11-02
Attending: STUDENT IN AN ORGANIZED HEALTH CARE EDUCATION/TRAINING PROGRAM | Admitting: STUDENT IN AN ORGANIZED HEALTH CARE EDUCATION/TRAINING PROGRAM
Payer: MEDICARE

## 2022-11-02 VITALS
DIASTOLIC BLOOD PRESSURE: 72 MMHG | RESPIRATION RATE: 16 BRPM | SYSTOLIC BLOOD PRESSURE: 109 MMHG | WEIGHT: 108 LBS | HEIGHT: 65 IN | HEART RATE: 85 BPM | TEMPERATURE: 97 F | OXYGEN SATURATION: 98 % | BODY MASS INDEX: 17.99 KG/M2

## 2022-11-02 DIAGNOSIS — M54.16 LUMBAR RADICULOPATHY: Primary | ICD-10-CM

## 2022-11-02 PROCEDURE — 62323 NJX INTERLAMINAR LMBR/SAC: CPT | Mod: ,,, | Performed by: STUDENT IN AN ORGANIZED HEALTH CARE EDUCATION/TRAINING PROGRAM

## 2022-11-02 PROCEDURE — 25500020 PHARM REV CODE 255: Performed by: STUDENT IN AN ORGANIZED HEALTH CARE EDUCATION/TRAINING PROGRAM

## 2022-11-02 PROCEDURE — 62323 PR INJ LUMBAR/SACRAL, W/IMAGING GUIDANCE: ICD-10-PCS | Mod: ,,, | Performed by: STUDENT IN AN ORGANIZED HEALTH CARE EDUCATION/TRAINING PROGRAM

## 2022-11-02 PROCEDURE — 25000003 PHARM REV CODE 250: Performed by: STUDENT IN AN ORGANIZED HEALTH CARE EDUCATION/TRAINING PROGRAM

## 2022-11-02 PROCEDURE — 63600175 PHARM REV CODE 636 W HCPCS: Performed by: STUDENT IN AN ORGANIZED HEALTH CARE EDUCATION/TRAINING PROGRAM

## 2022-11-02 PROCEDURE — 62323 NJX INTERLAMINAR LMBR/SAC: CPT | Performed by: STUDENT IN AN ORGANIZED HEALTH CARE EDUCATION/TRAINING PROGRAM

## 2022-11-02 RX ORDER — DEXAMETHASONE SODIUM PHOSPHATE 10 MG/ML
INJECTION INTRAMUSCULAR; INTRAVENOUS
Status: DISCONTINUED | OUTPATIENT
Start: 2022-11-02 | End: 2022-11-02 | Stop reason: HOSPADM

## 2022-11-02 RX ORDER — FENTANYL CITRATE 50 UG/ML
INJECTION, SOLUTION INTRAMUSCULAR; INTRAVENOUS
Status: DISCONTINUED | OUTPATIENT
Start: 2022-11-02 | End: 2022-11-02 | Stop reason: HOSPADM

## 2022-11-02 RX ORDER — SODIUM CHLORIDE 9 MG/ML
500 INJECTION, SOLUTION INTRAVENOUS CONTINUOUS
Status: DISCONTINUED | OUTPATIENT
Start: 2022-11-03 | End: 2022-11-02 | Stop reason: HOSPADM

## 2022-11-02 RX ORDER — MIDAZOLAM HYDROCHLORIDE 1 MG/ML
INJECTION INTRAMUSCULAR; INTRAVENOUS
Status: DISCONTINUED | OUTPATIENT
Start: 2022-11-02 | End: 2022-11-02 | Stop reason: HOSPADM

## 2022-11-02 RX ORDER — MIDAZOLAM HYDROCHLORIDE 1 MG/ML
2 INJECTION INTRAMUSCULAR; INTRAVENOUS ONCE AS NEEDED
Status: DISCONTINUED | OUTPATIENT
Start: 2022-11-03 | End: 2022-11-02 | Stop reason: HOSPADM

## 2022-11-02 RX ORDER — LIDOCAINE HYDROCHLORIDE 20 MG/ML
INJECTION, SOLUTION INFILTRATION; PERINEURAL
Status: DISCONTINUED | OUTPATIENT
Start: 2022-11-02 | End: 2022-11-02 | Stop reason: HOSPADM

## 2022-11-02 NOTE — DISCHARGE SUMMARY
[facility] ([place])  Discharge Note  Short Stay    Procedure(s) (LRB):  INJECTION, STEROID, EPIDURAL L4-L5 CONTRAST (N/A)      OUTCOME: Patient tolerated treatment/procedure well without complication and is now ready for discharge.    DISPOSITION: Home or Self Care    FINAL DIAGNOSIS:  <principal problem not specified>    FOLLOWUP: In clinic    DISCHARGE INSTRUCTIONS:  No discharge procedures on file.     TIME SPENT ON DISCHARGE: 10 minutes

## 2022-11-02 NOTE — OP NOTE
"PROCEDURE:  LUMBAR L4-5 INTERLAMINAR EPIDURAL STEROID INJECTION    Patient Name: Carline Chang  MRN: [id number]  DATE OF PROCEDURE: 11/02/2022    INJECTION # 1    DIAGNOSIS: Lumbar Radiculopathy  CPT CODE: 43106      POSTPROCEDURE DIAGNOSIS: Same    PHYSICIAN: Royal Brewster DO  Assistant: galileo joyce  NEEDLE TYPE: - 20G 3.5" Touhy Needle  MEDICATIONS INJECTED: 6cc mixture of 5cc Normal Saline + 10mg Dexamethasone (10mg/ml)  CONTRAST: Omni 300  LOSS OF RESISTANCE DEPTH: 5 cm    Sedation Medications - Mild Sedation with 2md Versed and 75mcg Fentanyl    Estimated Blood Loss - <2ml  Drains: None  Specimens Removed: None  Urine Output - Not Measured  Complications: None  Outcome: Good    Informed Consent:  The patient's condition and proposed procedures, risks, and alternatives were discussed with the patient or responsible party.  The patient's / responsible party's questions were answered.   The patient / responsible party appeared to understand and chose to proceed.  Informed consent was obtained.  After obtaining written consent, an IV hep lock was placed. (See nurses notes for details).     Procedure in Detail:  The patient was taken back to the OR suite and placed in a prone position. The skin overlying the injection site was prepped and draped in an aseptic fashion. The target injection site (see above) was identified with fluoroscopy and marked.     Procedural Pause:  A procedural pause verifying correct patient, medical record number, allergies, medications to be administered, current vital signs, and surgical site was performed immediately prior to beginning the procedure.    The skin and subcutaneous tissue overlying the target site of injection for the L4-5 epidural steroid injection was/were anesthetized using 4 mL of 1% lidocaine with a 25-gauge, 1½-inch needle.  The above noted Tuohy needle was advanced under fluoroscopic guidance towards the epidural space. Lateral fluoroscopic " imaging was used to confirm depth. The epidural space was identified using a loss of resistance to saline technique. (See above for loss of resistance depth). A microbore extension tubing was attached to the needle to minimize any movement of the needle during injection or syringe change.  After negative aspiration for heme or CSF, 1ml of contrast was injected to confirm placement and no intrathecal or vascular spread.  After repeat negative aspiration for heme or CSF, the above noted steroid solution was slowly injected in increments. The needle was then retracted approximately assisted and the needle track was flushed with 0.5 ml of Lidocaine 1% to clear the needle prior to removal. The Tuohy needle was then removed.     The heart rate, pulse oximetry, and blood pressure were continuously monitored throughout the procedure.  The prpocedure was well tolerated. She was carefully escorted to the recovery room in stable condition. Patient was monitored by RN for recovery period.  The patient will be contacted in the next few days to determine extent of relief.  Patient was given post procedure and discharge instructions to follow at home.  The patient was discharged in a stable condition.    Note Electronically Signed By:  Royal Vidal

## 2022-11-02 NOTE — DISCHARGE INSTRUCTIONS

## 2022-11-02 NOTE — H&P
[facility]  Carline Chang presents for L4/5 interlaminar epidural steroid injection        Past Medical History:   Diagnosis Date    Abnormal Pap smear of cervix     LEEP procedure,     Acute deep vein thrombosis (DVT) of brachial vein of right upper extremity 2017    on RUE ultrasound    Anxiety     Bowel perforation     Chronic pain     Compression fracture of C-spine     Crohn's disease of both small and large intestine with intestinal obstruction     Difficult intravenous access     Encounter for blood transfusion     GERD (gastroesophageal reflux disease)     Kidney stones     Nephrolithiasis     Osteoporosis     Stricture of bowel      Past Surgical History:   Procedure Laterality Date    ABSCESS DRAINAGE      ANAL FISTULOTOMY      BOWEL RESECTION      small bowel resection per Dr. Uribe 2018    CERVICAL BIOPSY  W/ LOOP ELECTRODE EXCISION       SECTION      x2    CHOLECYSTECTOMY      COLON SURGERY  2015    sigmoid loop colostomy with subsequent reversal 3 years later    COLONOSCOPY N/A 2016    Procedure: COLONOSCOPY;  Surgeon: Andre Uribe MD;  Location: [facility] ENDO (4TH FLR);  Service: Endoscopy;  Laterality: N/A;    COLONOSCOPY N/A 2017    Procedure: COLONOSCOPY;  Surgeon: Dany Stock MD;  Location: [facility] ENDO (2ND FLR);  Service: Endoscopy;  Laterality: N/A;    COLONOSCOPY N/A 2017    Procedure: COLONOSCOPY;  Surgeon: Andre Uribe MD;  Location: [facility] ENDO (4TH FLR);  Service: Endoscopy;  Laterality: N/A;    COLONOSCOPY N/A 2018    Procedure: COLONOSCOPY;  Surgeon: Andre Uribe MD;  Location: [facility] ENDO (4TH FLR);  Service: Endoscopy;  Laterality: N/A;    COLONOSCOPY N/A 2018    Procedure: COLONOSCOPY;  Surgeon: Elmer Serrato MD;  Location: [facility] ENDO (2ND FLR);  Service: Endoscopy;  Laterality: N/A;    COLONOSCOPY  2018    COLONOSCOPY N/A 2018    Procedure: COLONOSCOPY;  Surgeon: Andre Uribe MD;   "Location: [facility] ENDO (4TH FLR);  Service: Endoscopy;  Laterality: N/A;    COLONOSCOPY N/A 10/07/2021    Procedure: COLONOSCOPY;  Surgeon: Jose Hughes MD;  Location: [facility] ENDO;  Service: Endoscopy;  Laterality: N/A;    ESOPHAGOGASTRODUODENOSCOPY N/A 10/07/2021    Procedure: EGD (ESOPHAGOGASTRODUODENOSCOPY);  Surgeon: Jose Hughes MD;  Location: [facility] ENDO;  Service: Endoscopy;  Laterality: N/A;    LAPAROSCOPIC CLOSURE OF COLOSTOMY N/A 08/29/2018    Procedure: CLOSURE, COLOSTOMY, LAPAROSCOPIC;  Surgeon: Andre Uribe MD;  Location: [facility] OR 2ND FLR;  Service: Colon and Rectal;  Laterality: N/A;  converted to open    LYSIS OF ADHESIONS N/A 01/20/2022    Procedure: LYSIS, ADHESIONS;  Surgeon: LLUVIA Post MD;  Location: [facility] OR 2ND FLR;  Service: Colon and Rectal;  Laterality: N/A;  lasting longer than 2 hours, modifier 22      rectovaginal fistula repair  01/2018    SMALL INTESTINE SURGERY  1995    per patient 10 inches removed    SMALL INTESTINE SURGERY  02/2009    abdominal abscess drained, 3 cm colon removed, 11 cm SB removed    TUBAL LIGATION      UPPER GASTROINTESTINAL ENDOSCOPY  2000     Review of patient's allergies indicates:   Allergen Reactions    Remicade [infliximab] Shortness Of Breath and Palpitations     Severe muscle and joint, and bone pain    Adalimumab Other (See Comments)    Flagyl [metronidazole] Other (See Comments)     Neuropathy    Nubain [nalbuphine] Anxiety     Upper abdominal burning         PMHx, PSHx, Allergies, Medications reviewed in epic      ROS negative except pain complaints in HPI    OBJECTIVE:    /70   Pulse 75   Temp 97 °F (36.1 °C) (Oral)   Resp 18   Ht 5' 5" (1.651 m)   Wt 49 kg (108 lb)   LMP 05/30/2009   SpO2 100%   Breastfeeding No   BMI 17.97 kg/m²     PHYSICAL EXAMINATION:    GENERAL: Well appearing, in no acute distress, alert and oriented x3.  PSYCH:  Mood and affect appropriate.  SKIN: Skin color, texture, turgor normal, no rashes or " lesions.  CV: RRR with palpation of the radial artery.  PULM: No evidence of respiratory difficulty, symmetric chest rise. Clear to auscultation.  NEURO: Cranial nerves grossly intact.    Plan:    Proceed with procedure as planned    Keri Gusman  11/02/2022

## 2022-11-03 NOTE — TELEPHONE ENCOUNTER
Call attempt 3- LVM to assess status of the patient's portion of [employer] P[employer]     Will leave referral open for 1 week to allow for patient response. If no response by 11/10, will close referral at [facility]

## 2022-11-11 NOTE — TELEPHONE ENCOUNTER
No response from patient regarding [employer] application. Notifying [facility] and closing referral at [facility].

## 2022-11-21 ENCOUNTER — PATIENT MESSAGE (OUTPATIENT)
Dept: SPINE | Facility: CLINIC | Age: 57
End: 2022-11-21
Payer: MEDICARE

## 2022-11-23 ENCOUNTER — OFFICE VISIT (OUTPATIENT)
Dept: SPINE | Facility: CLINIC | Age: 57
End: 2022-11-23
Payer: MEDICARE

## 2022-11-23 ENCOUNTER — INFUSION (OUTPATIENT)
Dept: INFUSION THERAPY | Facility: HOSPITAL | Age: 57
End: 2022-11-23
Attending: INTERNAL MEDICINE
Payer: MEDICARE

## 2022-11-23 VITALS
RESPIRATION RATE: 18 BRPM | OXYGEN SATURATION: 100 % | TEMPERATURE: 98 F | WEIGHT: 115 LBS | BODY MASS INDEX: 19.16 KG/M2 | SYSTOLIC BLOOD PRESSURE: 125 MMHG | DIASTOLIC BLOOD PRESSURE: 84 MMHG | HEART RATE: 100 BPM | HEIGHT: 65 IN

## 2022-11-23 DIAGNOSIS — S22.080A COMPRESSION FRACTURE OF T12 VERTEBRA, INITIAL ENCOUNTER: ICD-10-CM

## 2022-11-23 DIAGNOSIS — K50.90 CROHN'S DISEASE WITHOUT COMPLICATION, UNSPECIFIED GASTROINTESTINAL TRACT LOCATION: Primary | ICD-10-CM

## 2022-11-23 DIAGNOSIS — M80.00XA AGE-RELATED OSTEOPOROSIS WITH CURRENT PATHOLOGICAL FRACTURE, INITIAL ENCOUNTER: ICD-10-CM

## 2022-11-23 DIAGNOSIS — F11.90 CHRONIC, CONTINUOUS USE OF OPIOIDS: ICD-10-CM

## 2022-11-23 DIAGNOSIS — K50.812 CROHN'S DISEASE OF BOTH SMALL AND LARGE INTESTINE WITH INTESTINAL OBSTRUCTION: ICD-10-CM

## 2022-11-23 DIAGNOSIS — S22.080D COMPRESSION FRACTURE OF T12 VERTEBRA WITH ROUTINE HEALING, SUBSEQUENT ENCOUNTER: ICD-10-CM

## 2022-11-23 DIAGNOSIS — S32.030A COMPRESSION FRACTURE OF L3 LUMBAR VERTEBRA, CLOSED, INITIAL ENCOUNTER: Primary | ICD-10-CM

## 2022-11-23 DIAGNOSIS — M54.16 LUMBAR RADICULOPATHY: ICD-10-CM

## 2022-11-23 DIAGNOSIS — S32.030A CLOSED COMPRESSION FRACTURE OF L3 LUMBAR VERTEBRA, INITIAL ENCOUNTER: ICD-10-CM

## 2022-11-23 PROCEDURE — 99214 OFFICE O/P EST MOD 30 MIN: CPT | Mod: 95,,, | Performed by: STUDENT IN AN ORGANIZED HEALTH CARE EDUCATION/TRAINING PROGRAM

## 2022-11-23 PROCEDURE — 63600175 PHARM REV CODE 636 W HCPCS: Mod: JG | Performed by: INTERNAL MEDICINE

## 2022-11-23 PROCEDURE — 25000003 PHARM REV CODE 250: Performed by: INTERNAL MEDICINE

## 2022-11-23 PROCEDURE — 96365 THER/PROPH/DIAG IV INF INIT: CPT

## 2022-11-23 PROCEDURE — 99214 PR OFFICE/OUTPT VISIT, EST, LEVL IV, 30-39 MIN: ICD-10-PCS | Mod: 95,,, | Performed by: STUDENT IN AN ORGANIZED HEALTH CARE EDUCATION/TRAINING PROGRAM

## 2022-11-23 RX ORDER — HEPARIN 100 UNIT/ML
500 SYRINGE INTRAVENOUS
Status: COMPLETED | OUTPATIENT
Start: 2022-11-23 | End: 2022-11-23

## 2022-11-23 RX ORDER — HYDROCODONE BITARTRATE AND ACETAMINOPHEN 10; 325 MG/1; MG/1
1 TABLET ORAL EVERY 6 HOURS PRN
Qty: 120 TABLET | Refills: 0 | Status: SHIPPED | OUTPATIENT
Start: 2022-11-28 | End: 2022-12-28

## 2022-11-23 RX ORDER — HEPARIN 100 UNIT/ML
500 SYRINGE INTRAVENOUS
Status: CANCELLED
Start: 2023-01-12

## 2022-11-23 RX ORDER — HYDROCODONE BITARTRATE AND ACETAMINOPHEN 10; 325 MG/1; MG/1
1 TABLET ORAL EVERY 6 HOURS PRN
Qty: 120 TABLET | Refills: 0 | Status: SHIPPED | OUTPATIENT
Start: 2022-12-28 | End: 2023-01-25 | Stop reason: SDUPTHER

## 2022-11-23 RX ADMIN — HEPARIN 500 UNITS: 100 SYRINGE at 04:11

## 2022-11-23 RX ADMIN — VEDOLIZUMAB 300 MG: 300 INJECTION, POWDER, LYOPHILIZED, FOR SOLUTION INTRAVENOUS at 03:11

## 2022-11-23 NOTE — PLAN OF CARE
Problem: Fatigue  Goal: Improved Activity Tolerance  Outcome: Ongoing, Progressing  Intervention: Promote Improved Energy  Flowsheets (Taken 11/23/2022 1511)  Fatigue Management:   fatigue-related activity identified   paced activity encouraged   frequent rest breaks encouraged  Sleep/Rest Enhancement:   noise level reduced   relaxation techniques promoted   regular sleep/rest pattern promoted

## 2022-11-23 NOTE — PROGRESS NOTES
Chronic Pain - f/u    Referring Physician: No ref. provider found    Date: 11/23/2022     Re: Carline Chang  MR#: [id number]  YOB: 1965  Age: 56 y.o.    Chief Complaint: back and leg pain  No chief complaint on file.    **This note is dictated using the M*Modal Fluency Direct word recognition program. There are word recognition mistakes that are occasionally missed on review.**    ASSESSMENT: 56 y.o. year old female with back pain, consistent with     1. Compression fracture of L3 lumbar vertebra, closed, initial encounter  HYDROcodone-acetaminophen (NORCO)  mg per tablet    HYDROcodone-acetaminophen (NORCO)  mg per tablet      2. Compression fracture of T12 vertebra, initial encounter  HYDROcodone-acetaminophen (NORCO)  mg per tablet    HYDROcodone-acetaminophen (NORCO)  mg per tablet      3. Compression fracture of T12 vertebra with routine healing, subsequent encounter        4. Closed compression fracture of L3 lumbar vertebra, initial encounter        5. Lumbar radiculopathy        6. Chronic, continuous use of opioids        7. Crohn's disease of both small and large intestine with intestinal obstruction        8. Age-related osteoporosis with current pathological fracture, initial encounter          PLAN:     Chronic back pain 2/2 multiple compression fractures  -T5, T8, severe T12, L3 compression fractures on XR. Chornic on MRI.  Not suitable for vertebral augmentation.  -Look into finding a pain physisian or PCP in [place] that can refill her narcotic medications so she does not need to travel so far to get them.  I will provide a refill of her pain meds for the next 2-3 months while she is working on that.  -MRI lumbar spine completed.  -discussed trial of MBB/RFA for the T12 compression fracture to target the posterior elements.  Will defer that for now and try RAMOS first.  -s/p L4-5 RAMOS on 11/222 - 100% for 7 days, then pain back to baseline. Discussed  risks of repeated steroid exposure and her osteoporosis.  Do not recommend a repeat for another 3-4 months.    Lumbar radiculopathy  -has some elements of radiculitis on the right side.   -discussed trial of SCS.  Will discuss more at f/u in January 2023    Crohn's disease  -recent surgery  -hx of 30 years of oral steroids leading to osteoporosis and compression fracture    History of seizures  -continue keppra    Chronic use of opioids  -her pain provider has passed away and she does not have anyone to fill her medications  -will provide patient with 2-3 months of medications while she works on finding a physician closer to home  -will not get UDS at this time since medications are temporary  -The patient is here today for opioid pain medications and they believe these provide effective pain control and improvements in quality of life.  The patient notes no serious side effects, and feels the benefits outweigh the risks.  The patient was reminded of the pain contract that they signed previously as well as the risks and benefits of the medication including possible death.  The updated [place] Board [employer] prescription monitoring program was reviewed, and the patient has been found to be compliant with current treatment plan.      - RTC 3 weeks after epidural  - Counseled patient regarding the importance of activity modification.    The above plan and management options were discussed at length with patient. Patient is in agreement with the above and verbalized understanding. It will be communicated with the referring physician via electronic record, fax, or mail.  Lab/study reports reviewed were important and necessary because subsequent medical and treatment recommendations required review of the above lab/study reports. Images viewed/reviewed above were important and necessary because subsequent medical and treatment recommendations required review of the reviewed image(s).     Electronically signed  by:  Royal Brewster,   11/23/2022    =========================================================================================================    SUBJECTIVE:    Chronic Pain-Tele-Medicine     The patient location is: Home  The chief complaint leading to consultation is: Pain  Visit type: Virtual visit with synchronous audio and video  Total time spent with patient: 15 min  Each patient to whom he or she provides medical services by telemedicine is:  (1) informed of the relationship between the physician and patient and the respective role of any other health care provider with respect to management of the patient; and (2) notified that he or she may decline to receive medical services by telemedicine and may withdraw from such care at any time.    Interval History 11/23/2022:     Carline Chang is a 56 y.o. female presents to the clinic for follow up.  Since last visit the pain has is unchanged. Patient is s/p L4/L5 ILESI 11/02/2022. Worked very well for one week, 100%. Pain then returned back to same location and same intensity.  Pain is 7/10 without hydrocodone. Feels right leg stronger than the left.     Current pain medications: Norco 10mg QID  Failed Pain Medications:  Icy hot, Tylenol, cannot take NSIADs    Interval History 9/7/2022:     Carline Chang is a 56 y.o. female presents to the clinic for follow up.  Since last visit the pain has is unchanged. Pain is in the mid back and she gets radiation down the right leg to the knee. May have some knee arthritis as well that is confounding symptoms.  Difficult to walk and move. Average pain is 6/10. Today is 6/10.    Initial hx:  Carline Chang is a 56 y.o. female presents to the clinic for the evaluation of mid/ lower back pain. The pain started 7 months ago following  seizures   and symptoms have been worsening.  The patient states that she had a bunch of seizures in January and think that the compression fractures  "happened at that time.  She saw Dr. Harden recently for the back pain. Hx of crohns disease and 30 year history of chronic steroids.  She stopped taking them a year or two ago. She has lost 3-4" in height.     The patient had abdominal surgery in January for her Crohn's for lysis of adhesions. She stil gets abdominal bloating.  She has poor absorption of electrolytes and needs to get frequent IV medications.    The patient was seeing a PCP in [place] but they passed away.  She was getting Norco 10mg QID.     Pain Description:    The pain is located in the mid/lower back area and radiates to the bilalteral knee .    At BEST  3/10   At WORST  8/10 on the WORST day.    On average pain is rated as 5/10.   Today the pain is rated as 6/10  The pain is continuous.  The pain is described as aching and sharp    Symptoms interfere with daily activity and sleeping.   Exacerbating factors: Sitting, Standing, Bending, Walking, Extension, Lifting and Getting out of bed/chair.    Mitigating factors nothing and medications.   She reports 8 hours of sleep per night.    Physical Therapy/Home Exercise: Yes, currently in Physical therapy    Current Pain Medications:    - Norco 10mg QID    Failed Pain Medications:    - Icy hot, Tylenol, cannot take NSIADs    Pain Treatment Therapies:    Pain procedures: none  Physical Therapy: none  Chiropractor: none  Acupuncture: none  TENS unit: none  Spinal decompression: none  Joint replacement: none    Patient denies urinary incontinence, bowel incontinence, significant motor weakness and loss of sensations.  Patient denies any suicidal or homicidal ideations     report:  Reviewed and consistent with medication use as prescribed.    Imaging:   Lumbar XR 07/2022:    FINDINGS:  There is a severe compression fracture deformity of T12 with mild retropulsion of osseous structure posteriorly.     There is a mild loss of height at the superior endplate of L3, unchanged.     No new fracture seen.   "   Flexion and extension views demonstrate no translational abnormalities.     The sacroiliac joints appear symmetrical.     Surgical clips right upper abdominal quadrant, surgical bowel suture in the right mid and right lower abdominal region.     Air-fluid levels noted, midline with distended colon measuring about 13 cm.     Impression:     Severe compression fracture of T12 and mild superior endplate compression fracture of L3, unchanged from 01/19/2022    Thoracic XR 07/2022:     FINDINGS:  There is a port in the left upper thorax, distal tip projects in the region of the SVC/right atrium region.     The alignment of the thoracic spine demonstrates a subtle dextroscoliosis.  There is a severe wedge compression fracture deformity of T12.  There is a mild height loss fracture deformity of T8 and T5 which appears to have been present on CT 01/19/2022 chest and abdomen CT examination.  Subtle concavity at the superior endplate of T10-T11 is unchanged from CT examination of 01/19/2022.  No definite new fracture identified.  No rib abnormality seen.  Surgical clips right upper abdominal quadrant.     Impression:     Multiple thoracic spine vertebrae fractures, all appears to have been present on 01/19/2022 CT exam.  There is a severe compression fracture of T12.  Please see details above.      Past Medical History:   Diagnosis Date    Abnormal Pap smear of cervix     LEEP procedure, 1995    Acute deep vein thrombosis (DVT) of brachial vein of right upper extremity 01/2017    on RUE ultrasound    Anxiety     Bowel perforation     Chronic pain     Compression fracture of C-spine     Crohn's disease of both small and large intestine with intestinal obstruction 1989    Difficult intravenous access     Encounter for blood transfusion     GERD (gastroesophageal reflux disease) 2014    Kidney stones     Nephrolithiasis     Osteoporosis     Stricture of bowel      Past Surgical History:   Procedure Laterality Date    ABSCESS  DRAINAGE      ANAL FISTULOTOMY      BOWEL RESECTION      small bowel resection per Dr. Uribe 2018    CERVICAL BIOPSY  W/ LOOP ELECTRODE EXCISION       SECTION      x2    CHOLECYSTECTOMY  2000    COLON SURGERY  2015    sigmoid loop colostomy with subsequent reversal 3 years later    COLONOSCOPY N/A 2016    Procedure: COLONOSCOPY;  Surgeon: Andre Uribe MD;  Location: [facility] ENDO (4TH FLR);  Service: Endoscopy;  Laterality: N/A;    COLONOSCOPY N/A 2017    Procedure: COLONOSCOPY;  Surgeon: Dany Stock MD;  Location: [facility] ENDO (2ND FLR);  Service: Endoscopy;  Laterality: N/A;    COLONOSCOPY N/A 2017    Procedure: COLONOSCOPY;  Surgeon: Andre Uribe MD;  Location: [facility] ENDO (4TH FLR);  Service: Endoscopy;  Laterality: N/A;    COLONOSCOPY N/A 2018    Procedure: COLONOSCOPY;  Surgeon: Andre Uribe MD;  Location: [facility] ENDO (4TH FLR);  Service: Endoscopy;  Laterality: N/A;    COLONOSCOPY N/A 2018    Procedure: COLONOSCOPY;  Surgeon: Elmer Serrato MD;  Location: [facility] ENDO (2ND FLR);  Service: Endoscopy;  Laterality: N/A;    COLONOSCOPY  2018    COLONOSCOPY N/A 2018    Procedure: COLONOSCOPY;  Surgeon: Andre Uribe MD;  Location: [facility] ENDO (4TH FLR);  Service: Endoscopy;  Laterality: N/A;    COLONOSCOPY N/A 10/07/2021    Procedure: COLONOSCOPY;  Surgeon: Jose Hughes MD;  Location: [facility];  Service: Endoscopy;  Laterality: N/A;    EPIDURAL STEROID INJECTION N/A 2022    Procedure: INJECTION, STEROID, EPIDURAL L4-L5 CONTRAST;  Surgeon: Royal Brewster DO;  Location: [facility] PAIN MGT;  Service: Pain Management;  Laterality: N/A;    ESOPHAGOGASTRODUODENOSCOPY N/A 10/07/2021    Procedure: EGD (ESOPHAGOGASTRODUODENOSCOPY);  Surgeon: Jose Hughes MD;  Location: [facility] ENDO;  Service: Endoscopy;  Laterality: N/A;    LAPAROSCOPIC CLOSURE OF COLOSTOMY N/A 2018    Procedure: CLOSURE, COLOSTOMY, LAPAROSCOPIC;  Surgeon: Andre ERVIN  MD Jojo;  Location: [facility] OR 2ND FLR;  Service: Colon and Rectal;  Laterality: N/A;  converted to open    LYSIS OF ADHESIONS N/A 01/20/2022    Procedure: LYSIS, ADHESIONS;  Surgeon: LLUVIA Post MD;  Location: [facility] OR 2ND FLR;  Service: Colon and Rectal;  Laterality: N/A;  lasting longer than 2 hours, modifier 22      rectovaginal fistula repair  01/2018    SMALL INTESTINE SURGERY  1995    per patient 10 inches removed    SMALL INTESTINE SURGERY  02/2009    abdominal abscess drained, 3 cm colon removed, 11 cm SB removed    TUBAL LIGATION      UPPER GASTROINTESTINAL ENDOSCOPY  2000     Social History     Socioeconomic History    Marital status:    Tobacco Use    Smoking status: Never    Smokeless tobacco: Never   Substance and Sexual Activity    Alcohol use: No     Alcohol/week: 0.0 standard drinks    Drug use: No    Sexual activity: Not Currently     Family History   Problem Relation Age of Onset    Cataracts Mother     Heart attack Father 69    Cataracts Father     No Known Problems Sister     No Known Problems Brother     Cancer Maternal Aunt 66        colon ca    No Known Problems Maternal Uncle     No Known Problems Paternal Aunt     No Known Problems Paternal Uncle     No Known Problems Maternal Grandmother     No Known Problems Maternal Grandfather     No Known Problems Paternal Grandmother     No Known Problems Paternal Grandfather     No Known Problems Son     No Known Problems Son     No Known Problems Other     Anesthesia problems Neg Hx     Celiac disease Neg Hx     Cirrhosis Neg Hx     Colon cancer Neg Hx     Colon polyps Neg Hx     Crohn's disease Neg Hx     Cystic fibrosis Neg Hx     Esophageal cancer Neg Hx     Hemochromatosis Neg Hx     Inflammatory bowel disease Neg Hx     Irritable bowel syndrome Neg Hx     Liver cancer Neg Hx     Liver disease Neg Hx     Rectal cancer Neg Hx     Stomach cancer Neg Hx     Ulcerative colitis Neg Hx     Mars's disease Neg Hx     Lymphoma Neg Hx      Tuberculosis Neg Hx     Scleroderma Neg Hx     Rheum arthritis Neg Hx     Multiple sclerosis Neg Hx     Melanoma Neg Hx     Lupus Neg Hx     Psoriasis Neg Hx     Skin cancer Neg Hx        Review of patient's allergies indicates:   Allergen Reactions    Remicade [infliximab] Shortness Of Breath and Palpitations     Severe muscle and joint, and bone pain    Adalimumab Other (See Comments)    Flagyl [metronidazole] Other (See Comments)     Neuropathy    Nubain [nalbuphine] Anxiety     Upper abdominal burning        Current Outpatient Medications   Medication Sig    calcitRIOL (ROCALTROL) 0.25 MCG Cap Take 1 capsule (0.25 mcg total) by mouth 2 (two) times daily.    ergocalciferol (ERGOCALCIFEROL) 50,000 unit Cap Take 1 capsule (50,000 Units total) by mouth every 7 days.    gabapentin (NEURONTIN) 100 MG capsule Take 1 capsule (100 mg total) by mouth 3 (three) times daily.    [START ON 11/28/2022] HYDROcodone-acetaminophen (NORCO)  mg per tablet Take 1 tablet by mouth every 6 (six) hours as needed for Pain.    [START ON 12/28/2022] HYDROcodone-acetaminophen (NORCO)  mg per tablet Take 1 tablet by mouth every 6 (six) hours as needed for Pain.    levETIRAcetam (KEPPRA) 500 MG Tab Take 1 tablet (500 mg total) by mouth 2 (two) times daily.    loperamide (IMODIUM) 2 mg capsule Take 2 capsules (4 mg total) by mouth 4 (four) times daily.    OYSTER SHELL CALCIUM 500 500 mg calcium (1,250 mg) tablet Take 2 tablets by mouth 2 (two) times daily.    pantoprazole (PROTONIX) 40 MG tablet Take 40 mg by mouth once daily.    potassium chloride SA (K-DUR,KLOR-CON) 20 MEQ tablet Take 40 mEq by mouth 2 (two) times daily.    promethazine (PHENERGAN) 25 MG tablet Take 1 tablet (25 mg total) by mouth every 4 (four) hours.    teriparatide (FORTEO) 20 mcg/dose (600mcg/2.4mL) PnIj Inject 0.08 mLs (20 mcg total) into the skin once daily.    TUMS 200 mg calcium (500 mg) chewable tablet Take 2 tablets (1,000 mg total) by mouth 2 (two)  times daily.    venlafaxine (EFFEXOR) 75 MG tablet Take 75 mg by mouth 2 (two) times daily.     No current facility-administered medications for this visit.       REVIEW OF SYSTEMS:    GENERAL:  No weight loss, malaise or fevers.  HEENT:   No recent changes in vision or hearing + vision   NECK:  Negative for lumps, no difficulty with swallowing.  RESPIRATORY:  Negative for cough, wheezing or shortness of breath, patient denies any recent URI.  CARDIOVASCULAR:  Negative for chest pain, leg swelling or palpitations.  GI:  Negative for abdominal discomfort, blood in stools or black stools or change in bowel habits. + abdominal discomfort   MUSCULOSKELETAL:  See HPI.  SKIN:  Negative for lesions, rash, and itching.  PSYCH:  No mood disorder or recent psychosocial stressors.  Patients sleep is not disturbed secondary to pain.  HEMATOLOGY/LYMPHOLOGY:  Negative for prolonged bleeding, bruising easily or swollen nodes.  Patient is not currently taking any anti-coagulants  NEURO:   No history of headaches, syncope, paralysis, seizures or tremors. + seizures (4this year)   All other reviewed and negative other than HPI.    OBJECTIVE:    LMP 05/30/2009     PHYSICAL EXAMINATION:    Video visit:  GENERAL: Well appearing, in no acute distress, alert and oriented x3. Skinny, Fraile  PSYCH:  Mood and affect appropriate.  SKIN: Skin color, texture, turgor normal, no rashes or lesions.  HEAD/FACE:  Normocephalic, atraumatic. Cranial nerves grossly intact.    Prior visit  BACK:   - Hyperkyphosis of the thoracic spine with paravertebral humping  - Negative spinous process tenderness  - Negative paravertebral tenderness  - Negative pain to palpation over the facet joints of the thoracic and lumbar spine.   -decreased ROM with flexion and extension of the lumbar spine  -flexion causes increased pain in the mid/low back without radiation into the legs  -Light percussion of the thoracic and lumbar spine reproduces pain around the L2  level    MUSCULOSKELETAL:  No atrophy or tone abnormalities are noted in the UE or LE.  No deformities, edema, or skin discoloration are noted on visible skin. Good capillary refill.    NEURO: Bilateral upper and lower extremity coordination and muscle stretch reflexes are physiologic and symmetric.      NEUROLOGICAL EXAM:  MENTAL STATUS: A x O x 3, good concentration, speech is fluent and goal directed  MEMORY: recent and remote are intact  CN: CN2-12 grossly intact  MOTOR: 5/5 in all muscle groups except b/l hip flexion 4/5  DTRs: 2+ intact symmetric  Sensation:    -no Loss of sensation in a left lower and right lower L-1, L-2, L-3, L-4 and L-5 bilaterally distribution.  Babinski: absent on the bilateral side(s)    GAIT: flexed, kyphotic posture

## 2022-12-09 ENCOUNTER — OFFICE VISIT (OUTPATIENT)
Dept: URGENT CARE | Facility: CLINIC | Age: 57
End: 2022-12-09
Payer: MEDICARE

## 2022-12-09 VITALS
HEART RATE: 101 BPM | WEIGHT: 115 LBS | BODY MASS INDEX: 19.16 KG/M2 | DIASTOLIC BLOOD PRESSURE: 89 MMHG | SYSTOLIC BLOOD PRESSURE: 133 MMHG | TEMPERATURE: 98 F | RESPIRATION RATE: 18 BRPM | HEIGHT: 65 IN | OXYGEN SATURATION: 98 %

## 2022-12-09 DIAGNOSIS — Z20.822 COVID-19 VIRUS NOT DETECTED: ICD-10-CM

## 2022-12-09 DIAGNOSIS — J06.9 URI WITH COUGH AND CONGESTION: Primary | ICD-10-CM

## 2022-12-09 DIAGNOSIS — R89.4 INFLUENZA A VIRUS NOT DETECTED: ICD-10-CM

## 2022-12-09 DIAGNOSIS — B34.9 VIRAL SYNDROME: ICD-10-CM

## 2022-12-09 LAB
CTP QC/QA: YES
FLUAV AG NPH QL: NEGATIVE
FLUBV AG NPH QL: NEGATIVE
S PYO RRNA THROAT QL PROBE: NEGATIVE
SARS-COV-2 AG RESP QL IA.RAPID: NEGATIVE

## 2022-12-09 PROCEDURE — 87804 POCT INFLUENZA A/B: ICD-10-PCS | Mod: QW,,, | Performed by: NURSE PRACTITIONER

## 2022-12-09 PROCEDURE — 99214 PR OFFICE/OUTPT VISIT, EST, LEVL IV, 30-39 MIN: ICD-10-PCS | Mod: S$GLB,,, | Performed by: NURSE PRACTITIONER

## 2022-12-09 PROCEDURE — 87811 SARS CORONAVIRUS 2 ANTIGEN POCT, MANUAL READ: ICD-10-PCS | Mod: QW,CR,S$GLB, | Performed by: NURSE PRACTITIONER

## 2022-12-09 PROCEDURE — 87811 SARS-COV-2 COVID19 W/OPTIC: CPT | Mod: QW,CR,S$GLB, | Performed by: NURSE PRACTITIONER

## 2022-12-09 PROCEDURE — 99214 OFFICE O/P EST MOD 30 MIN: CPT | Mod: S$GLB,,, | Performed by: NURSE PRACTITIONER

## 2022-12-09 PROCEDURE — 87804 INFLUENZA ASSAY W/OPTIC: CPT | Mod: QW,,, | Performed by: NURSE PRACTITIONER

## 2022-12-09 PROCEDURE — 87880 STREP A ASSAY W/OPTIC: CPT | Mod: QW,,, | Performed by: NURSE PRACTITIONER

## 2022-12-09 PROCEDURE — 87880 POCT RAPID STREP A: ICD-10-PCS | Mod: QW,,, | Performed by: NURSE PRACTITIONER

## 2022-12-09 RX ORDER — FLUTICASONE PROPIONATE 50 MCG
2 SPRAY, SUSPENSION (ML) NASAL DAILY PRN
Qty: 15.8 ML | Refills: 0 | Status: SHIPPED | OUTPATIENT
Start: 2022-12-09 | End: 2022-12-23

## 2022-12-09 RX ORDER — GUAIFENESIN AND DEXTROMETHORPHAN HYDROBROMIDE 20; 400 MG/1; MG/1
1 TABLET ORAL EVERY 4 HOURS PRN
Qty: 30 EACH | Refills: 0 | Status: SHIPPED | OUTPATIENT
Start: 2022-12-09 | End: 2022-12-19

## 2022-12-09 RX ORDER — BENZONATATE 100 MG/1
100 CAPSULE ORAL 3 TIMES DAILY PRN
Qty: 21 CAPSULE | Refills: 0 | Status: SHIPPED | OUTPATIENT
Start: 2022-12-09 | End: 2022-12-16

## 2022-12-09 RX ORDER — PROMETHAZINE HYDROCHLORIDE AND DEXTROMETHORPHAN HYDROBROMIDE 6.25; 15 MG/5ML; MG/5ML
5 SYRUP ORAL NIGHTLY PRN
Qty: 118 ML | Refills: 0 | Status: SHIPPED | OUTPATIENT
Start: 2022-12-09 | End: 2022-12-19

## 2022-12-09 RX ORDER — BROMPHENIRAM/PHENYLEPHRINE/DM 1-2.5-5/5
20 SOLUTION, ORAL ORAL EVERY 4 HOURS PRN
Qty: 118 ML | Refills: 0 | Status: SHIPPED | OUTPATIENT
Start: 2022-12-09 | End: 2022-12-16

## 2022-12-09 NOTE — PATIENT INSTRUCTIONS
Increase clear fluid intake  Stop all current over the counter cough, cold, flu medicine  Tylenol/motrin otc for fever or pain  Flonase nasal spray and Dimetap syrup for sinus congestion and pressure.  Take Mucinex DM as needed for chest congestion and tessalon perles as needed for daytime coughing. Take mucinex with a full glass of water at each dose  May take promethazine cough syrup as needed for nighttime cough.  Saltwater gargles 4 x daily and OTC anesthetic throat lozenges for sore throat. May also add honey based cough syrup  Follow up with PCP  Go immediately to the nearest emergency room for shortness of breath, chest pain,  or other emergent concern.  Return to clinic for new, worse, or unresolving symptoms

## 2022-12-09 NOTE — PROGRESS NOTES
"Subjective:       Patient ID: Carline Chang is a 56 y.o. female.    Vitals:  height is 5' 5" (1.651 m) and weight is 52.2 kg (115 lb). Her oral temperature is 98.4 °F (36.9 °C). Her blood pressure is 133/89 and her pulse is 101. Her respiration is 18 and oxygen saturation is 98%.     Chief Complaint: URI    URI   This is a new problem. The current episode started in the past 7 days. The problem has been gradually worsening. There has been no fever. Associated symptoms include congestion, coughing and nausea. Pertinent negatives include no abdominal pain, chest pain, ear pain, rash, sore throat or vomiting.     Constitution: Negative for chills, fatigue and fever.   HENT:  Positive for congestion. Negative for ear pain and sore throat.    Neck: Negative for painful lymph nodes.   Cardiovascular:  Negative for chest pain, palpitations and sob on exertion.   Respiratory:  Positive for cough and sputum production.    Gastrointestinal:  Positive for nausea. Negative for abdominal pain and vomiting.   Musculoskeletal:  Negative for muscle ache.   Skin:  Negative for rash.   Neurological:  Negative for dizziness, light-headedness, passing out, disorientation and altered mental status.   Hematologic/Lymphatic: Negative for swollen lymph nodes.   Psychiatric/Behavioral:  Negative for altered mental status, disorientation and confusion.      Objective:      Physical Exam   Constitutional: She is oriented to person, place, and time. She appears well-developed. She is cooperative.  Non-toxic appearance. She does not appear ill. No distress.   HENT:   Head: Normocephalic and atraumatic.   Ears:   Right Ear: Hearing, tympanic membrane, external ear and ear canal normal.   Left Ear: Hearing, tympanic membrane, external ear and ear canal normal.   Nose: Mucosal edema, rhinorrhea and congestion present. No nasal deformity. No epistaxis. Right sinus exhibits no maxillary sinus tenderness and no frontal sinus tenderness. " Left sinus exhibits no maxillary sinus tenderness and no frontal sinus tenderness.   Mouth/Throat: Uvula is midline, oropharynx is clear and moist and mucous membranes are normal. Mucous membranes are moist. No trismus in the jaw. Normal dentition. No uvula swelling. No oropharyngeal exudate, posterior oropharyngeal edema or posterior oropharyngeal erythema. Tonsils are 1+ on the right. Tonsils are 1+ on the left. No tonsillar exudate.   Eyes: Conjunctivae and lids are normal. No scleral icterus.   Neck: Trachea normal and phonation normal. Neck supple. No edema present. No erythema present. No neck rigidity present.   Cardiovascular: Normal rate, regular rhythm, normal heart sounds and normal pulses.   Pulmonary/Chest: Effort normal and breath sounds normal. No respiratory distress. She has no decreased breath sounds. She has no rhonchi.   Abdominal: Normal appearance.   Musculoskeletal: Normal range of motion.         General: No deformity. Normal range of motion.   Lymphadenopathy:     She has no cervical adenopathy.   Neurological: She is alert and oriented to person, place, and time. She exhibits normal muscle tone. Coordination normal.   Skin: Skin is warm, dry, intact, not diaphoretic and not pale. Capillary refill takes 2 to 3 seconds.   Psychiatric: Her speech is normal and behavior is normal. Judgment and thought content normal.   Nursing note and vitals reviewed.      Assessment:       1. URI with cough and congestion    2. COVID-19 virus not detected    3. Influenza A virus not detected          Plan:         URI with cough and congestion  -     SARS Coronavirus 2 Antigen, POCT Manual Read  -     POCT rapid strep A  -     POCT Influenza A/B    COVID-19 virus not detected    Influenza A virus not detected  Strep negative       I have discussed the test results and physical exam findings with the patient. We discussed symptom monitoring, conservative care methods, medication use, and follow up orders. She  verbalized understanding and agreement with the plan of care.            Increase clear fluid intake  Stop all current over the counter cough, cold, flu medicine  Tylenol/motrin otc for fever or pain  Flonase nasal spray and Dimetap syrup for sinus congestion and pressure.  Take Mucinex DM as needed for chest congestion and tessalon perles as needed for daytime coughing. Take mucinex with a full glass of water at each dose  May take promethazine cough syrup as needed for nighttime cough.  Saltwater gargles 4 x daily and OTC anesthetic throat lozenges for sore throat. May also add honey based cough syrup  Follow up with PCP  Go immediately to the nearest emergency room for shortness of breath, chest pain,  or other emergent concern.  Return to clinic for new, worse, or unresolving symptoms

## 2022-12-14 ENCOUNTER — INFUSION (OUTPATIENT)
Dept: INFUSION THERAPY | Facility: HOSPITAL | Age: 57
End: 2022-12-14
Attending: INTERNAL MEDICINE
Payer: MEDICARE

## 2022-12-14 VITALS
OXYGEN SATURATION: 100 % | BODY MASS INDEX: 18.38 KG/M2 | RESPIRATION RATE: 18 BRPM | WEIGHT: 110.31 LBS | TEMPERATURE: 98 F | HEART RATE: 91 BPM | HEIGHT: 65 IN | DIASTOLIC BLOOD PRESSURE: 69 MMHG | SYSTOLIC BLOOD PRESSURE: 105 MMHG

## 2022-12-14 DIAGNOSIS — K50.90 CROHN'S DISEASE WITHOUT COMPLICATION, UNSPECIFIED GASTROINTESTINAL TRACT LOCATION: Primary | ICD-10-CM

## 2022-12-14 PROCEDURE — 96365 THER/PROPH/DIAG IV INF INIT: CPT

## 2022-12-14 PROCEDURE — 63600175 PHARM REV CODE 636 W HCPCS: Performed by: INTERNAL MEDICINE

## 2022-12-14 RX ORDER — HEPARIN 100 UNIT/ML
500 SYRINGE INTRAVENOUS
Status: CANCELLED
Start: 2023-01-12

## 2022-12-14 RX ORDER — HEPARIN 100 UNIT/ML
500 SYRINGE INTRAVENOUS
Status: COMPLETED | OUTPATIENT
Start: 2022-12-14 | End: 2022-12-14

## 2022-12-14 RX ADMIN — HEPARIN 500 UNITS: 100 SYRINGE at 01:12

## 2022-12-14 NOTE — PLAN OF CARE
Problem: Fatigue  Goal: Improved Activity Tolerance  12/14/2022 1220 by Brenda Becker RN  Outcome: Met  12/14/2022 1220 by Brenda Becker RN  Outcome: Ongoing, Progressing

## 2023-01-05 ENCOUNTER — INFUSION (OUTPATIENT)
Dept: INFUSION THERAPY | Facility: HOSPITAL | Age: 58
End: 2023-01-05
Attending: INTERNAL MEDICINE
Payer: MEDICARE

## 2023-01-05 VITALS
RESPIRATION RATE: 18 BRPM | TEMPERATURE: 98 F | HEART RATE: 83 BPM | HEIGHT: 65 IN | BODY MASS INDEX: 18.85 KG/M2 | SYSTOLIC BLOOD PRESSURE: 117 MMHG | DIASTOLIC BLOOD PRESSURE: 69 MMHG | WEIGHT: 113.13 LBS

## 2023-01-05 DIAGNOSIS — K50.90 CROHN'S DISEASE WITHOUT COMPLICATION, UNSPECIFIED GASTROINTESTINAL TRACT LOCATION: Primary | ICD-10-CM

## 2023-01-05 PROCEDURE — 63600175 PHARM REV CODE 636 W HCPCS: Mod: JG | Performed by: INTERNAL MEDICINE

## 2023-01-05 PROCEDURE — 25000003 PHARM REV CODE 250: Performed by: INTERNAL MEDICINE

## 2023-01-05 PROCEDURE — 96365 THER/PROPH/DIAG IV INF INIT: CPT

## 2023-01-05 RX ORDER — HEPARIN 100 UNIT/ML
500 SYRINGE INTRAVENOUS
Status: CANCELLED
Start: 2023-01-12

## 2023-01-05 RX ORDER — HEPARIN 100 UNIT/ML
500 SYRINGE INTRAVENOUS
Status: COMPLETED | OUTPATIENT
Start: 2023-01-05 | End: 2023-01-05

## 2023-01-05 RX ADMIN — VEDOLIZUMAB 300 MG: 300 INJECTION, POWDER, LYOPHILIZED, FOR SOLUTION INTRAVENOUS at 03:01

## 2023-01-05 RX ADMIN — HEPARIN 500 UNITS: 100 SYRINGE at 04:01

## 2023-01-05 NOTE — PLAN OF CARE
Problem: Fatigue  Goal: Improved Activity Tolerance  Outcome: Ongoing, Progressing  Intervention: Promote Improved Energy  Flowsheets (Taken 1/5/2023 1536)  Fatigue Management:   fatigue-related activity identified   paced activity encouraged   frequent rest breaks encouraged  Sleep/Rest Enhancement:   noise level reduced   relaxation techniques promoted   regular sleep/rest pattern promoted

## 2023-01-23 ENCOUNTER — PATIENT MESSAGE (OUTPATIENT)
Dept: SURGERY | Facility: CLINIC | Age: 58
End: 2023-01-23
Payer: MEDICARE

## 2023-01-25 ENCOUNTER — PATIENT MESSAGE (OUTPATIENT)
Dept: SURGERY | Facility: CLINIC | Age: 58
End: 2023-01-25
Payer: MEDICARE

## 2023-01-25 ENCOUNTER — PATIENT MESSAGE (OUTPATIENT)
Dept: PAIN MEDICINE | Facility: CLINIC | Age: 58
End: 2023-01-25
Payer: MEDICARE

## 2023-01-25 DIAGNOSIS — S22.080A COMPRESSION FRACTURE OF T12 VERTEBRA, INITIAL ENCOUNTER: Primary | ICD-10-CM

## 2023-01-25 DIAGNOSIS — S32.030A COMPRESSION FRACTURE OF L3 LUMBAR VERTEBRA, CLOSED, INITIAL ENCOUNTER: ICD-10-CM

## 2023-01-25 RX ORDER — HYDROCODONE BITARTRATE AND ACETAMINOPHEN 10; 325 MG/1; MG/1
1 TABLET ORAL EVERY 6 HOURS PRN
Qty: 120 TABLET | Refills: 0 | Status: SHIPPED | OUTPATIENT
Start: 2023-01-27 | End: 2023-02-15 | Stop reason: SDUPTHER

## 2023-02-15 ENCOUNTER — OFFICE VISIT (OUTPATIENT)
Dept: PAIN MEDICINE | Facility: CLINIC | Age: 58
End: 2023-02-15
Payer: MEDICARE

## 2023-02-15 DIAGNOSIS — M54.16 LUMBAR RADICULOPATHY: Primary | ICD-10-CM

## 2023-02-15 DIAGNOSIS — S32.030A COMPRESSION FRACTURE OF L3 LUMBAR VERTEBRA, CLOSED, INITIAL ENCOUNTER: ICD-10-CM

## 2023-02-15 DIAGNOSIS — S22.080A COMPRESSION FRACTURE OF T12 VERTEBRA, INITIAL ENCOUNTER: ICD-10-CM

## 2023-02-15 DIAGNOSIS — F11.90 CHRONIC, CONTINUOUS USE OF OPIOIDS: ICD-10-CM

## 2023-02-15 DIAGNOSIS — K50.018 CROHN'S DISEASE OF SMALL INTESTINE WITH OTHER COMPLICATION: ICD-10-CM

## 2023-02-15 PROCEDURE — 99214 OFFICE O/P EST MOD 30 MIN: CPT | Mod: 95,,, | Performed by: STUDENT IN AN ORGANIZED HEALTH CARE EDUCATION/TRAINING PROGRAM

## 2023-02-15 PROCEDURE — 99214 PR OFFICE/OUTPT VISIT, EST, LEVL IV, 30-39 MIN: ICD-10-PCS | Mod: 95,,, | Performed by: STUDENT IN AN ORGANIZED HEALTH CARE EDUCATION/TRAINING PROGRAM

## 2023-02-15 RX ORDER — GABAPENTIN 300 MG/1
300 CAPSULE ORAL 3 TIMES DAILY
Qty: 90 CAPSULE | Refills: 5 | Status: SHIPPED | OUTPATIENT
Start: 2023-02-15 | End: 2023-08-10 | Stop reason: SDUPTHER

## 2023-02-15 RX ORDER — HYDROCODONE BITARTRATE AND ACETAMINOPHEN 10; 325 MG/1; MG/1
1 TABLET ORAL EVERY 6 HOURS PRN
Qty: 120 TABLET | Refills: 0 | Status: SHIPPED | OUTPATIENT
Start: 2023-03-28 | End: 2023-04-27

## 2023-02-15 RX ORDER — HYDROCODONE BITARTRATE AND ACETAMINOPHEN 10; 325 MG/1; MG/1
1 TABLET ORAL EVERY 6 HOURS PRN
Qty: 120 TABLET | Refills: 0 | Status: SHIPPED | OUTPATIENT
Start: 2023-02-26 | End: 2023-03-28

## 2023-02-15 RX ORDER — HYDROCODONE BITARTRATE AND ACETAMINOPHEN 10; 325 MG/1; MG/1
1 TABLET ORAL EVERY 6 HOURS PRN
Qty: 120 TABLET | Refills: 0 | Status: SHIPPED | OUTPATIENT
Start: 2023-04-27 | End: 2023-05-18 | Stop reason: SDUPTHER

## 2023-02-15 NOTE — PROGRESS NOTES
Chronic Pain - f/u    Referring Physician: No ref. provider found    Date: 02/15/2023     Re: Carline Chang  MR#: [id number]  YOB: 1965  Age: 57 y.o.    Chief Complaint: back and leg pain  No chief complaint on file.      **This note is dictated using the M*Modal Fluency Direct word recognition program. There are word recognition mistakes that are occasionally missed on review.**    ASSESSMENT: 57 y.o. year old female with back pain, consistent with     1. Lumbar radiculopathy  gabapentin (NEURONTIN) 300 MG capsule      2. Compression fracture of T12 vertebra, initial encounter  HYDROcodone-acetaminophen (NORCO)  mg per tablet    HYDROcodone-acetaminophen (NORCO)  mg per tablet    HYDROcodone-acetaminophen (NORCO)  mg per tablet      3. Compression fracture of L3 lumbar vertebra, closed, initial encounter  HYDROcodone-acetaminophen (NORCO)  mg per tablet    HYDROcodone-acetaminophen (NORCO)  mg per tablet    HYDROcodone-acetaminophen (NORCO)  mg per tablet      4. Crohn's disease of small intestine with other complication        5. Chronic, continuous use of opioids            PLAN:     Chronic back pain 2/2 multiple compression fractures  -T5, T8, severe T12, L3 compression fractures on XR. Chornic on MRI.  Not suitable for vertebral augmentation.  -Look into finding a pain physisian or PCP in [place] that can refill her narcotic medications so she does not need to travel so far to get them.  I will provide a refill of her pain meds in the meantime.  -MRI lumbar spine completed.  -discussed trial of MBB/RFA for the T12 compression fracture to target the posterior elements.  Will defer that for now and try RAMOS first.  -s/p L4-5 RAMOS on 11/222 - 100% for 7 days, then pain back to baseline. Discussed risks of repeated steroid exposure and her osteoporosis.  Do not recommend a repeat for another 3-4 months.  -recommended speaking with endocrinology again  about medications for bone quality.    Lumbar radiculopathy  -has some elements of radiculitis on both sides  -discussed trial of SCS.  Defer for now. This would be a reasonable option if she is not a candidate for surgery and her bone quality continues to be poor.  -increase gabapentin to 300mg TID    Crohn's disease  -recent surgery  -hx of 30 years of oral steroids leading to osteoporosis and compression fracture    History of seizures  -continue keppra    Chronic use of opioids  -her pain provider has passed away and she does not have anyone to fill her medications  -will provide patient with 2-3 months of medications while she works on finding a physician closer to home  -will not get UDS at this time since medications are temporary  -The patient is here today for opioid pain medications and they believe these provide effective pain control and improvements in quality of life.  The patient notes no serious side effects, and feels the benefits outweigh the risks.  The patient was reminded of the pain contract that they signed previously as well as the risks and benefits of the medication including possible death.  The updated [place] [employer] prescription monitoring program was reviewed, and the patient has been found to be compliant with current treatment plan.      - RTC before 5/27.  - Counseled patient regarding the importance of activity modification.    The above plan and management options were discussed at length with patient. Patient is in agreement with the above and verbalized understanding. It will be communicated with the referring physician via electronic record, fax, or mail.  Lab/study reports reviewed were important and necessary because subsequent medical and treatment recommendations required review of the above lab/study reports. Images viewed/reviewed above were important and necessary because subsequent medical and treatment recommendations required review of the reviewed  image(s).     Electronically signed by:  Royal Brewster DO  02/15/2023    =========================================================================================================    SUBJECTIVE:    Chronic Pain-Tele-Medicine     The patient location is: Home  The chief complaint leading to consultation is: Pain  Visit type: Virtual visit with synchronous audio and video  Total time spent with patient: 20 min  Each patient to whom he or she provides medical services by telemedicine is:  (1) informed of the relationship between the physician and patient and the respective role of any other health care provider with respect to management of the patient; and (2) notified that he or she may decline to receive medical services by telemedicine and may withdraw from such care at any time.    Interval History 2/15/2023:   Carline Chang is a 57 y.o. female presents to the clinic for follow up.  Since last visit the pain has is unchanged. Pain 6/10. She is still getting a lot of pain going down the legs.    Current pain medications: Norco 10mg QID, gabapentin 100mg TID  Failed Pain Medications:  Icy hot, Tylenol, cannot take NSIADs    Interval History 11/23/2022:     Carline Chang is a 57 y.o. female presents to the clinic for follow up.  Since last visit the pain has is unchanged. Patient is s/p L4/L5 ILESI 11/02/2022. Worked very well for one week, 100%. Pain then returned back to same location and same intensity.  Pain is 7/10 without hydrocodone. Feels right leg stronger than the left.     Interval History 9/7/2022:     Carline Chang is a 57 y.o. female presents to the clinic for follow up.  Since last visit the pain has is unchanged. Pain is in the mid back and she gets radiation down the right leg to the knee. May have some knee arthritis as well that is confounding symptoms.  Difficult to walk and move. Average pain is 6/10. Today is 6/10.    Initial hx:  Carline Hancock  "Charlene is a 57 y.o. female presents to the clinic for the evaluation of mid/ lower back pain. The pain started 7 months ago following  seizures   and symptoms have been worsening.  The patient states that she had a bunch of seizures in January and think that the compression fractures happened at that time.  She saw Dr. Harden recently for the back pain. Hx of crohns disease and 30 year history of chronic steroids.  She stopped taking them a year or two ago. She has lost 3-4" in height.     The patient had abdominal surgery in January for her Crohn's for lysis of adhesions. She stil gets abdominal bloating.  She has poor absorption of electrolytes and needs to get frequent IV medications.    The patient was seeing a PCP in [place] but they passed away.  She was getting Norco 10mg QID.     Pain Description:    The pain is located in the mid/lower back area and radiates to the bilalteral knee .    At BEST  3/10   At WORST  8/10 on the WORST day.    On average pain is rated as 5/10.   Today the pain is rated as 6/10  The pain is continuous.  The pain is described as aching and sharp    Symptoms interfere with daily activity and sleeping.   Exacerbating factors: Sitting, Standing, Bending, Walking, Extension, Lifting and Getting out of bed/chair.    Mitigating factors nothing and medications.   She reports 8 hours of sleep per night.    Physical Therapy/Home Exercise: Yes, currently in Physical therapy    Current Pain Medications:    - Norco 10mg QID    Failed Pain Medications:    - Icy hot, Tylenol, cannot take NSIADs    Pain Treatment Therapies:    Pain procedures: none  Physical Therapy: none  Chiropractor: none  Acupuncture: none  TENS unit: none  Spinal decompression: none  Joint replacement: none    Patient denies urinary incontinence, bowel incontinence, significant motor weakness and loss of sensations.  Patient denies any suicidal or homicidal ideations     report:  Reviewed and consistent with medication " use as prescribed.    Imaging:   Lumbar XR 07/2022:    FINDINGS:  There is a severe compression fracture deformity of T12 with mild retropulsion of osseous structure posteriorly.     There is a mild loss of height at the superior endplate of L3, unchanged.     No new fracture seen.     Flexion and extension views demonstrate no translational abnormalities.     The sacroiliac joints appear symmetrical.     Surgical clips right upper abdominal quadrant, surgical bowel suture in the right mid and right lower abdominal region.     Air-fluid levels noted, midline with distended colon measuring about 13 cm.     Impression:     Severe compression fracture of T12 and mild superior endplate compression fracture of L3, unchanged from 01/19/2022    Thoracic XR 07/2022:     FINDINGS:  There is a port in the left upper thorax, distal tip projects in the region of the SVC/right atrium region.     The alignment of the thoracic spine demonstrates a subtle dextroscoliosis.  There is a severe wedge compression fracture deformity of T12.  There is a mild height loss fracture deformity of T8 and T5 which appears to have been present on CT 01/19/2022 chest and abdomen CT examination.  Subtle concavity at the superior endplate of T10-T11 is unchanged from CT examination of 01/19/2022.  No definite new fracture identified.  No rib abnormality seen.  Surgical clips right upper abdominal quadrant.     Impression:     Multiple thoracic spine vertebrae fractures, all appears to have been present on 01/19/2022 CT exam.  There is a severe compression fracture of T12.  Please see details above.      Past Medical History:   Diagnosis Date    Abnormal Pap smear of cervix     LEEP procedure, 1995    Acute deep vein thrombosis (DVT) of brachial vein of right upper extremity 01/2017    on RUE ultrasound    Anxiety     Bowel perforation     Chronic pain     Compression fracture of C-spine     Crohn's disease of both small and large intestine with  intestinal obstruction     Difficult intravenous access     Encounter for blood transfusion     GERD (gastroesophageal reflux disease)     Kidney stones     Nephrolithiasis     Osteoporosis     Stricture of bowel      Past Surgical History:   Procedure Laterality Date    ABSCESS DRAINAGE      ANAL FISTULOTOMY      BOWEL RESECTION      small bowel resection per Dr. Uribe 2018    CERVICAL BIOPSY  W/ LOOP ELECTRODE EXCISION       SECTION      x2    CHOLECYSTECTOMY  2000    COLON SURGERY  2015    sigmoid loop colostomy with subsequent reversal 3 years later    COLONOSCOPY N/A 2016    Procedure: COLONOSCOPY;  Surgeon: Andre Uribe MD;  Location: [facility] (4TH FLR);  Service: Endoscopy;  Laterality: N/A;    COLONOSCOPY N/A 2017    Procedure: COLONOSCOPY;  Surgeon: Dany Stock MD;  Location: [facility] ENDO (2ND FLR);  Service: Endoscopy;  Laterality: N/A;    COLONOSCOPY N/A 2017    Procedure: COLONOSCOPY;  Surgeon: Andre Uribe MD;  Location: [facility] ENDO (4TH FLR);  Service: Endoscopy;  Laterality: N/A;    COLONOSCOPY N/A 2018    Procedure: COLONOSCOPY;  Surgeon: Andre Uribe MD;  Location: [facility] (4TH FLR);  Service: Endoscopy;  Laterality: N/A;    COLONOSCOPY N/A 2018    Procedure: COLONOSCOPY;  Surgeon: Elmer Serrato MD;  Location: [facility] (2ND FLR);  Service: Endoscopy;  Laterality: N/A;    COLONOSCOPY  2018    COLONOSCOPY N/A 2018    Procedure: COLONOSCOPY;  Surgeon: Andre Uribe MD;  Location: [facility] (4TH FLR);  Service: Endoscopy;  Laterality: N/A;    COLONOSCOPY N/A 10/07/2021    Procedure: COLONOSCOPY;  Surgeon: Jose Hughes MD;  Location: [facility] ENDO;  Service: Endoscopy;  Laterality: N/A;    EPIDURAL STEROID INJECTION N/A 2022    Procedure: INJECTION, STEROID, EPIDURAL L4-L5 CONTRAST;  Surgeon: Royal Brewster DO;  Location: [facility] PAIN MGT;  Service: Pain Management;  Laterality: N/A;     ESOPHAGOGASTRODUODENOSCOPY N/A 10/07/2021    Procedure: EGD (ESOPHAGOGASTRODUODENOSCOPY);  Surgeon: Jose Hughes MD;  Location: [facility];  Service: Endoscopy;  Laterality: N/A;    LAPAROSCOPIC CLOSURE OF COLOSTOMY N/A 08/29/2018    Procedure: CLOSURE, COLOSTOMY, LAPAROSCOPIC;  Surgeon: Andre Uribe MD;  Location: [facility] OR 2ND FLR;  Service: Colon and Rectal;  Laterality: N/A;  converted to open    LYSIS OF ADHESIONS N/A 01/20/2022    Procedure: LYSIS, ADHESIONS;  Surgeon: LLUVIA Post MD;  Location: [facility] OR 2ND FLR;  Service: Colon and Rectal;  Laterality: N/A;  lasting longer than 2 hours, modifier 22      rectovaginal fistula repair  01/2018    SMALL INTESTINE SURGERY  1995    per patient 10 inches removed    SMALL INTESTINE SURGERY  02/2009    abdominal abscess drained, 3 cm colon removed, 11 cm SB removed    TUBAL LIGATION      UPPER GASTROINTESTINAL ENDOSCOPY  2000     Social History     Socioeconomic History    Marital status:    Tobacco Use    Smoking status: Never    Smokeless tobacco: Never   Substance and Sexual Activity    Alcohol use: No     Alcohol/week: 0.0 standard drinks    Drug use: No    Sexual activity: Not Currently     Family History   Problem Relation Age of Onset    Cataracts Mother     Heart attack Father 69    Cataracts Father     No Known Problems Sister     No Known Problems Brother     Cancer Maternal Aunt 66        colon ca    No Known Problems Maternal Uncle     No Known Problems Paternal Aunt     No Known Problems Paternal Uncle     No Known Problems Maternal Grandmother     No Known Problems Maternal Grandfather     No Known Problems Paternal Grandmother     No Known Problems Paternal Grandfather     No Known Problems Son     No Known Problems Son     No Known Problems Other     Anesthesia problems Neg Hx     Celiac disease Neg Hx     Cirrhosis Neg Hx     Colon cancer Neg Hx     Colon polyps Neg Hx     Crohn's disease Neg Hx     Cystic fibrosis Neg Hx      Esophageal cancer Neg Hx     Hemochromatosis Neg Hx     Inflammatory bowel disease Neg Hx     Irritable bowel syndrome Neg Hx     Liver cancer Neg Hx     Liver disease Neg Hx     Rectal cancer Neg Hx     Stomach cancer Neg Hx     Ulcerative colitis Neg Hx     Mars's disease Neg Hx     Lymphoma Neg Hx     Tuberculosis Neg Hx     Scleroderma Neg Hx     Rheum arthritis Neg Hx     Multiple sclerosis Neg Hx     Melanoma Neg Hx     Lupus Neg Hx     Psoriasis Neg Hx     Skin cancer Neg Hx        Review of patient's allergies indicates:   Allergen Reactions    Remicade [infliximab] Shortness Of Breath and Palpitations     Severe muscle and joint, and bone pain    Adalimumab Other (See Comments)    Flagyl [metronidazole] Other (See Comments)     Neuropathy    Nubain [nalbuphine] Anxiety     Upper abdominal burning        Current Outpatient Medications   Medication Sig    calcitRIOL (ROCALTROL) 0.25 MCG Cap Take 1 capsule (0.25 mcg total) by mouth 2 (two) times daily.    ergocalciferol (ERGOCALCIFEROL) 50,000 unit Cap Take 1 capsule (50,000 Units total) by mouth every 7 days.    gabapentin (NEURONTIN) 300 MG capsule Take 1 capsule (300 mg total) by mouth 3 (three) times daily.    [START ON 2/26/2023] HYDROcodone-acetaminophen (NORCO)  mg per tablet Take 1 tablet by mouth every 6 (six) hours as needed for Pain.    [START ON 3/28/2023] HYDROcodone-acetaminophen (NORCO)  mg per tablet Take 1 tablet by mouth every 6 (six) hours as needed for Pain.    [START ON 4/27/2023] HYDROcodone-acetaminophen (NORCO)  mg per tablet Take 1 tablet by mouth every 6 (six) hours as needed for Pain.    levETIRAcetam (KEPPRA) 500 MG Tab Take 1 tablet (500 mg total) by mouth 2 (two) times daily.    loperamide (IMODIUM) 2 mg capsule Take 2 capsules (4 mg total) by mouth 4 (four) times daily.    OYSTER SHELL CALCIUM 500 500 mg calcium (1,250 mg) tablet Take 2 tablets by mouth 2 (two) times daily.    pantoprazole (PROTONIX) 40 MG  tablet Take 40 mg by mouth once daily.    potassium chloride SA (K-DUR,KLOR-CON) 20 MEQ tablet Take 40 mEq by mouth 2 (two) times daily.    promethazine (PHENERGAN) 25 MG tablet Take 1 tablet (25 mg total) by mouth every 4 (four) hours.    teriparatide (FORTEO) 20 mcg/dose (600mcg/2.4mL) PnIj Inject 0.08 mLs (20 mcg total) into the skin once daily.    TUMS 200 mg calcium (500 mg) chewable tablet Take 2 tablets (1,000 mg total) by mouth 2 (two) times daily.    venlafaxine (EFFEXOR) 75 MG tablet Take 75 mg by mouth 2 (two) times daily.     No current facility-administered medications for this visit.       REVIEW OF SYSTEMS:    GENERAL:  No weight loss, malaise or fevers.  HEENT:   No recent changes in vision or hearing + vision   NECK:  Negative for lumps, no difficulty with swallowing.  RESPIRATORY:  Negative for cough, wheezing or shortness of breath, patient denies any recent URI.  CARDIOVASCULAR:  Negative for chest pain, leg swelling or palpitations.  GI:  Negative for abdominal discomfort, blood in stools or black stools or change in bowel habits. + abdominal discomfort   MUSCULOSKELETAL:  See HPI.  SKIN:  Negative for lesions, rash, and itching.  PSYCH:  No mood disorder or recent psychosocial stressors.  Patients sleep is not disturbed secondary to pain.  HEMATOLOGY/LYMPHOLOGY:  Negative for prolonged bleeding, bruising easily or swollen nodes.  Patient is not currently taking any anti-coagulants  NEURO:   No history of headaches, syncope, paralysis, seizures or tremors. + seizures (4this year)   All other reviewed and negative other than HPI.    OBJECTIVE:    [facility] 05/30/2009     PHYSICAL EXAMINATION:    Video visit:  GENERAL: Well appearing, in no acute distress, alert and oriented x3. Skinny, Fraile  PSYCH:  Mood and affect appropriate.  SKIN: Skin color, texture, turgor normal, no rashes or lesions.  HEAD/FACE:  Normocephalic, atraumatic. Cranial nerves grossly intact.    Prior in-person visit  BACK:   -  Hyperkyphosis of the thoracic spine with paravertebral humping  - Negative spinous process tenderness  - Negative paravertebral tenderness  - Negative pain to palpation over the facet joints of the thoracic and lumbar spine.   -decreased ROM with flexion and extension of the lumbar spine  -flexion causes increased pain in the mid/low back without radiation into the legs  -Light percussion of the thoracic and lumbar spine reproduces pain around the L2 level    MUSCULOSKELETAL:  No atrophy or tone abnormalities are noted in the UE or LE.  No deformities, edema, or skin discoloration are noted on visible skin. Good capillary refill.    NEURO: Bilateral upper and lower extremity coordination and muscle stretch reflexes are physiologic and symmetric.      NEUROLOGICAL EXAM:  MENTAL STATUS: A x O x 3, good concentration, speech is fluent and goal directed  MEMORY: recent and remote are intact  CN: CN2-12 grossly intact  MOTOR: 5/5 in all muscle groups except b/l hip flexion 4/5  DTRs: 2+ intact symmetric  Sensation:    -no Loss of sensation in a left lower and right lower L-1, L-2, L-3, L-4 and L-5 bilaterally distribution.  Babinski: absent on the bilateral side(s)    GAIT: flexed, kyphotic posture

## 2023-02-21 ENCOUNTER — PATIENT MESSAGE (OUTPATIENT)
Dept: PAIN MEDICINE | Facility: CLINIC | Age: 58
End: 2023-02-21
Payer: MEDICARE

## 2023-02-21 NOTE — ASSESSMENT & PLAN NOTE
History of DVT  No PE  1 Episode  In the setting Mid line ( ? Infiltration)  Associated with no reduced mobility, no long journeys,no cancer,no  prior surgery, Hospital stay, No HRT  Was not Anticoagulated after the hospital stay   Sounds like she received prophylactic LMWH   IVC filter- No     Increased risk of thrombosis in the luis operative period    Unable to reach patient after 2 attempts. Unable to leave a message voicemail is full.        Reason for Disposition  • Message left on identified voice mail    Protocols used: NO CONTACT OR DUPLICATE CONTACT CALL-A-

## 2023-03-02 ENCOUNTER — INFUSION (OUTPATIENT)
Dept: INFUSION THERAPY | Facility: HOSPITAL | Age: 58
End: 2023-03-02
Attending: INTERNAL MEDICINE
Payer: MEDICARE

## 2023-03-02 VITALS
HEIGHT: 65 IN | DIASTOLIC BLOOD PRESSURE: 80 MMHG | TEMPERATURE: 99 F | OXYGEN SATURATION: 100 % | BODY MASS INDEX: 19.94 KG/M2 | SYSTOLIC BLOOD PRESSURE: 125 MMHG | RESPIRATION RATE: 16 BRPM | WEIGHT: 119.69 LBS | HEART RATE: 90 BPM

## 2023-03-02 DIAGNOSIS — K50.90 CROHN'S DISEASE WITHOUT COMPLICATION, UNSPECIFIED GASTROINTESTINAL TRACT LOCATION: Primary | ICD-10-CM

## 2023-03-02 PROCEDURE — 96365 THER/PROPH/DIAG IV INF INIT: CPT

## 2023-03-02 PROCEDURE — 63600175 PHARM REV CODE 636 W HCPCS: Performed by: INTERNAL MEDICINE

## 2023-03-02 RX ORDER — HEPARIN 100 UNIT/ML
500 SYRINGE INTRAVENOUS
Status: CANCELLED
Start: 2023-03-09

## 2023-03-02 RX ORDER — HEPARIN 100 UNIT/ML
500 SYRINGE INTRAVENOUS
Status: COMPLETED | OUTPATIENT
Start: 2023-03-02 | End: 2023-03-02

## 2023-03-02 RX ADMIN — HEPARIN 500 UNITS: 100 SYRINGE at 04:03

## 2023-03-02 NOTE — PLAN OF CARE
Problem: Fatigue  Goal: Improved Activity Tolerance  Outcome: Ongoing, Progressing  Intervention: Promote Improved Energy  Flowsheets (Taken 3/2/2023 4949)  Fatigue Management: frequent rest breaks encouraged  Sleep/Rest Enhancement:   regular sleep/rest pattern promoted   relaxation techniques promoted  Activity Management: Ambulated -L4

## 2023-03-03 ENCOUNTER — DOCUMENTATION ONLY (OUTPATIENT)
Dept: REHABILITATION | Facility: HOSPITAL | Age: 58
End: 2023-03-03
Payer: MEDICARE

## 2023-03-03 DIAGNOSIS — S32.030A CLOSED COMPRESSION FRACTURE OF L3 LUMBAR VERTEBRA, INITIAL ENCOUNTER: ICD-10-CM

## 2023-03-03 DIAGNOSIS — Z74.09 IMPAIRED FUNCTIONAL MOBILITY AND ACTIVITY TOLERANCE: Primary | ICD-10-CM

## 2023-03-03 DIAGNOSIS — S22.080D COMPRESSION FRACTURE OF T12 VERTEBRA WITH ROUTINE HEALING, SUBSEQUENT ENCOUNTER: ICD-10-CM

## 2023-03-03 NOTE — PROGRESS NOTES
[facility]  PT Discharge Note    [id number]    Therapy Diagnosis:   Encounter Diagnoses   Name Primary?    Impaired functional mobility and activity tolerance Yes    Closed compression fracture of L3 lumbar vertebra, initial encounter     Compression fracture of T12 vertebra with routine healing, subsequent encounter      Physician: Min Harden MD     Physician Orders: PT Eval and Treat   Medical Diagnosis from Referral: Closed compression fracture of L3 lumbar vertebra, initial encounter; Compression fracture of T12 vertebra, initial encounter   Evaluation Date: 8/4/2022      Date of Last visit: 10/18/2022  Total Visits Received: 16    ASSESSMENT      Patient was making progress in PT but did not complete updated POC.      Discharge reason: Patient has not attended therapy since 10/18/2022    Discharge FOTO Score: 48% residual deficit    Goals: As documented in final treatment note  Short Term Goals:                 1) Decrease max pain to < 5/10 Ongoing              2) Facilitate improved posture Progressing              3) Facilitate improved LE flexibility Progressing              4) Patient will bend forward to perform LE dressing without increased pain Met 9/16/2022              5) Patient will consistently demonstrate log rolling technique for supine <> sit transitions with minimal increase in discomfort Minimal progress     Long Term Goals: modified:                1) Patient will consistently perform sit <> stand transfers with minimal UE support and appropriate assistive device Progressing/modified (perform without assistive device Progressing/nearly met)              2) Patient will consistently perform rolling and scooting in bed without increased pain Ongoing              3) Patient will consistently perform toilet transfers with no more than minimal difficulty Minimal progress              4) Increase walking tolerance to >  15 minutes Progressing              5) Increase standing tolerance to > 15 min Met 9/16/2022              6) Patient will be independent in complete HEP Progressing    PLAN   This patient is discharged from Physical Therapy      Una See, PT

## 2023-03-26 NOTE — PROGRESS NOTES
"HPI:  Carline Chang is a 57 y.o. female with history of Crohn's ileocolitis     1989 2015 sigmoid loop colostomy for r-v fistula  2018  ileocolic resection    1-  emergency surgery for LBO, ileosigmoid fistula - ileocolectomy, KONO S anastomosis  recurrent Crohns disease and large bowel obstruction secondary to ileosigmoid fistula with secondary obstruction of the colon. KONO S anastomosis was performed.   She underwent ileocolic resection, repair of sigmoid colon   She had 105 cm of small bowel remaining.      SPECIMEN  FINAL PATHOLOGIC DIAGNOSIS  Gross Description  Ordering: Attending:  Diagnosed by  Electronically Signed By:  Crohn's disease of both small and large  intestine with other complication  1. . Scar  2.   Ileocolic anastomosis  Received in formalin, labeled with the patients name and medical record number, and designated as scar -  perm" is an unoriented 21.1 cm in length x 0.2-0.6 cm in diameter skin and soft tissue fragment consistent  with a well-healed scar. The epidermal surface in white-tan, smooth, and unremarkable and the underlying  subcutaneous tissue is yellow-tan, dense, fibrous, and unremarkable. Representative sections are  submitted in cassette ANU--1-A.  Part 2 of 2  Received in formalin, labeled with the patients name and medical record number, and designated as  ileocolic anastomosis is a previously opened segment of convoluted bowel consisting of a previous  anastomosis between ileum (35.1 cm in length x 6.9 cm in average circumference) and large bowel (12.3  cm in length x 10.2 cm in average circumference). The ileum contains a convoluted, dense, fibrous area of  adhesions (11.8 x 9.6 x 4.1 cm) extending 3.3 cm from the ileal margin and an outpouching (2.8 x 2.8 x 2.6  cm) extending 3.4 cm from the ileocecal anastomosis and 13.2 cm from the large bowel margin. The ileal  wall (1.1 cm in average thickness) and remaining ileal mucosa is pink-tan, " glistening, and unremarkable.  The large bowel is markedly edematous and contains an outpouching (3.2 x 2.0 x 1.8 cm) that extends 1.93  cm to the ileocecal anastomosis and 7.4 cm the large bowel margin. The external surface of the bowel is  pink-tan and roughened. No perforations, fistulas, or lesions are grossly identified.        Interval hx  Reports hernia at old stoma site.  No pain.  No history of strangulation or incarceration.    BMs 2 per day.  Formed.  No blood.    Gained 10 lbs  On entyvio      Past Medical History:   Diagnosis Date    Abnormal Pap smear of cervix     LEEP procedure,     Acute deep vein thrombosis (DVT) of brachial vein of right upper extremity 2017    on RUE ultrasound    Anxiety     Bowel perforation     Chronic pain     Compression fracture of C-spine     Crohn's disease of both small and large intestine with intestinal obstruction     Difficult intravenous access     Encounter for blood transfusion     GERD (gastroesophageal reflux disease)     Kidney stones     Nephrolithiasis     Osteoporosis     Stricture of bowel         Past Surgical History:   Procedure Laterality Date    ABSCESS DRAINAGE      ANAL FISTULOTOMY      BOWEL RESECTION      small bowel resection per Dr. Uribe 2018    CERVICAL BIOPSY  W/ LOOP ELECTRODE EXCISION       SECTION      x2    CHOLECYSTECTOMY  2000    COLON SURGERY  2015    sigmoid loop colostomy with subsequent reversal 3 years later    COLONOSCOPY N/A 2016    Procedure: COLONOSCOPY;  Surgeon: Andre Uribe MD;  Location: [facility] ENDO (4TH FLR);  Service: Endoscopy;  Laterality: N/A;    COLONOSCOPY N/A 2017    Procedure: COLONOSCOPY;  Surgeon: Dany Stock MD;  Location: [facility] ENDO (2ND FLR);  Service: Endoscopy;  Laterality: N/A;    COLONOSCOPY N/A 2017    Procedure: COLONOSCOPY;  Surgeon: Andre Uribe MD;  Location: [facility] ENDO (4TH FLR);  Service: Endoscopy;  Laterality: N/A;    COLONOSCOPY N/A  02/08/2018    Procedure: COLONOSCOPY;  Surgeon: Andre Uribe MD;  Location: [facility] ENDO (4TH FLR);  Service: Endoscopy;  Laterality: N/A;    COLONOSCOPY N/A 03/24/2018    Procedure: COLONOSCOPY;  Surgeon: Elmer Serrato MD;  Location: [facility] ENDO (2ND FLR);  Service: Endoscopy;  Laterality: N/A;    COLONOSCOPY  03/24/2018    COLONOSCOPY N/A 07/06/2018    Procedure: COLONOSCOPY;  Surgeon: Andre Uribe MD;  Location: [facility] ENDO (4TH FLR);  Service: Endoscopy;  Laterality: N/A;    COLONOSCOPY N/A 10/07/2021    Procedure: COLONOSCOPY;  Surgeon: Jose Hughes MD;  Location: [facility] ENDO;  Service: Endoscopy;  Laterality: N/A;    EPIDURAL STEROID INJECTION N/A 11/2/2022    Procedure: INJECTION, STEROID, EPIDURAL L4-L5 CONTRAST;  Surgeon: Royal Brewster DO;  Location: [facility] PAIN MGT;  Service: Pain Management;  Laterality: N/A;    ESOPHAGOGASTRODUODENOSCOPY N/A 10/07/2021    Procedure: EGD (ESOPHAGOGASTRODUODENOSCOPY);  Surgeon: Jose Hughes MD;  Location: [facility];  Service: Endoscopy;  Laterality: N/A;    LAPAROSCOPIC CLOSURE OF COLOSTOMY N/A 08/29/2018    Procedure: CLOSURE, COLOSTOMY, LAPAROSCOPIC;  Surgeon: Andre Uribe MD;  Location: [facility] OR 2ND FLR;  Service: Colon and Rectal;  Laterality: N/A;  converted to open    LYSIS OF ADHESIONS N/A 01/20/2022    Procedure: LYSIS, ADHESIONS;  Surgeon: LLUVIA Post MD;  Location: [facility] OR 2ND FLR;  Service: Colon and Rectal;  Laterality: N/A;  lasting longer than 2 hours, modifier 22      rectovaginal fistula repair  01/2018    SMALL INTESTINE SURGERY  1995    per patient 10 inches removed    SMALL INTESTINE SURGERY  02/2009    abdominal abscess drained, 3 cm colon removed, 11 cm SB removed    TUBAL LIGATION      UPPER GASTROINTESTINAL ENDOSCOPY  2000       Review of patient's allergies indicates:   Allergen Reactions    Remicade [infliximab] Shortness Of Breath and Palpitations     Severe muscle and joint, and bone pain    Adalimumab Other (See  Comments)    Flagyl [metronidazole] Other (See Comments)     Neuropathy    Nubain [nalbuphine] Anxiety     Upper abdominal burning        Family History   Problem Relation Age of Onset    Cataracts Mother     Heart attack Father 69    Cataracts Father     No Known Problems Sister     No Known Problems Brother     Cancer Maternal Aunt 66        colon ca    No Known Problems Maternal Uncle     No Known Problems Paternal Aunt     No Known Problems Paternal Uncle     No Known Problems Maternal Grandmother     No Known Problems Maternal Grandfather     No Known Problems Paternal Grandmother     No Known Problems Paternal Grandfather     No Known Problems Son     No Known Problems Son     No Known Problems Other     Anesthesia problems Neg Hx     Celiac disease Neg Hx     Cirrhosis Neg Hx     Colon cancer Neg Hx     Colon polyps Neg Hx     Crohn's disease Neg Hx     Cystic fibrosis Neg Hx     Esophageal cancer Neg Hx     Hemochromatosis Neg Hx     Inflammatory bowel disease Neg Hx     Irritable bowel syndrome Neg Hx     Liver cancer Neg Hx     Liver disease Neg Hx     Rectal cancer Neg Hx     Stomach cancer Neg Hx     Ulcerative colitis Neg Hx     Mars's disease Neg Hx     Lymphoma Neg Hx     Tuberculosis Neg Hx     Scleroderma Neg Hx     Rheum arthritis Neg Hx     Multiple sclerosis Neg Hx     Melanoma Neg Hx     Lupus Neg Hx     Psoriasis Neg Hx     Skin cancer Neg Hx        Social History     Socioeconomic History    Marital status:    Tobacco Use    Smoking status: Never    Smokeless tobacco: Never   Substance and Sexual Activity    Alcohol use: No     Alcohol/week: 0.0 standard drinks    Drug use: No    Sexual activity: Not Currently       ROS:  GENERAL: No fever, chills, fatigability or weight loss.  Integument: No rashes, redness, icterus  CHEST: Denies CASTORENA, cyanosis, wheezing, cough and sputum production.  CARDIOVASCULAR: Denies chest pain, PND, orthopnea or reduced exercise tolerance.  GI: Denies abd  pain, dysphagia, nausea, vomiting, no hematemesis   : Denies burning on urination, no hematuria, no bacteriuria  MSK: No deformities, swelling, joint pain swelling  Neurologic: No HAs, seizures, weakness, paresthesias, gait problems    PE:  General appearance healthy  /79 (BP Location: Right arm, Patient Position: Sitting, BP Method: Small (Automatic))   Pulse 95   Wt 52.1 kg (114 lb 13.8 oz)   LMP 05/30/2009   BMI 19.11 kg/m²     Sclera/ Skin anicteric  AT NC EOMI  Neck supple trachea midline   Chest symmetric, nl excursion, no retractions, breathing comfortably  Abdomen  3 cm defect at left stoma site just cephalad to scar.  Non tender.    ND soft NT.  no masses, no organomegaly  EXT - no CCE  Neuro:  Mood/ affect nl, alert and oriented x 3, moves all ext's, gait nl    Assessment:   Ventral hernia at old stoma site without incarceration or strangulation.  Asymptomatic  History of Crohn's ileocolitis - good function on Entyvio without suggestion of disease activity - gaining wt, no wt loss, formed stools  Malnutrition - short bowel syndrome - improved, gaining wt.  Alb 3.9    Plan:  Counseled pt about hernia repair.  She does not feel it is bothering her.  No h/o incarceration.  She wants to observe for now and I believe this is reasonable  Continue Entyvio per Dr Hughes.  Colonoscopy per Dr Hughes  Small bowel evaluation with CTE - will discuss with Dr Hughes  OV 6 months

## 2023-03-27 ENCOUNTER — OFFICE VISIT (OUTPATIENT)
Dept: SURGERY | Facility: CLINIC | Age: 58
End: 2023-03-27
Payer: MEDICARE

## 2023-03-27 VITALS
BODY MASS INDEX: 19.11 KG/M2 | HEART RATE: 95 BPM | DIASTOLIC BLOOD PRESSURE: 79 MMHG | WEIGHT: 114.88 LBS | SYSTOLIC BLOOD PRESSURE: 129 MMHG

## 2023-03-27 DIAGNOSIS — K50.012 CROHN'S DISEASE OF ILEUM WITH INTESTINAL OBSTRUCTION: Primary | ICD-10-CM

## 2023-03-27 PROCEDURE — 99999 PR PBB SHADOW E&M-EST. PATIENT-LVL I: ICD-10-PCS | Mod: PBBFAC,,, | Performed by: COLON & RECTAL SURGERY

## 2023-03-27 PROCEDURE — 99214 OFFICE O/P EST MOD 30 MIN: CPT | Mod: S$PBB,,, | Performed by: COLON & RECTAL SURGERY

## 2023-03-27 PROCEDURE — 99999 PR PBB SHADOW E&M-EST. PATIENT-LVL I: CPT | Mod: PBBFAC,,, | Performed by: COLON & RECTAL SURGERY

## 2023-03-27 PROCEDURE — 99214 PR OFFICE/OUTPT VISIT, EST, LEVL IV, 30-39 MIN: ICD-10-PCS | Mod: S$PBB,,, | Performed by: COLON & RECTAL SURGERY

## 2023-03-27 PROCEDURE — 99211 OFF/OP EST MAY X REQ PHY/QHP: CPT | Mod: PBBFAC,PN | Performed by: COLON & RECTAL SURGERY

## 2023-03-27 RX ORDER — OMEPRAZOLE 40 MG/1
40 CAPSULE, DELAYED RELEASE ORAL
COMMUNITY
Start: 2023-02-13

## 2023-03-27 RX ORDER — LANOLIN ALCOHOL/MO/W.PET/CERES
400 CREAM (GRAM) TOPICAL
COMMUNITY
Start: 2023-03-10 | End: 2024-03-09

## 2023-04-14 DIAGNOSIS — K50.012 CROHN'S DISEASE OF ILEUM WITH INTESTINAL OBSTRUCTION: Primary | ICD-10-CM

## 2023-04-27 ENCOUNTER — INFUSION (OUTPATIENT)
Dept: INFUSION THERAPY | Facility: HOSPITAL | Age: 58
End: 2023-04-27
Attending: INTERNAL MEDICINE
Payer: MEDICARE

## 2023-04-27 VITALS
BODY MASS INDEX: 19.94 KG/M2 | HEIGHT: 65 IN | RESPIRATION RATE: 18 BRPM | SYSTOLIC BLOOD PRESSURE: 105 MMHG | OXYGEN SATURATION: 96 % | TEMPERATURE: 97 F | WEIGHT: 119.69 LBS | HEART RATE: 82 BPM | DIASTOLIC BLOOD PRESSURE: 65 MMHG

## 2023-04-27 DIAGNOSIS — K50.90 CROHN'S DISEASE WITHOUT COMPLICATION, UNSPECIFIED GASTROINTESTINAL TRACT LOCATION: Primary | ICD-10-CM

## 2023-04-27 PROCEDURE — 63600175 PHARM REV CODE 636 W HCPCS: Performed by: INTERNAL MEDICINE

## 2023-04-27 PROCEDURE — 96365 THER/PROPH/DIAG IV INF INIT: CPT

## 2023-04-27 RX ORDER — HEPARIN 100 UNIT/ML
500 SYRINGE INTRAVENOUS
Status: COMPLETED | OUTPATIENT
Start: 2023-04-27 | End: 2023-04-27

## 2023-04-27 RX ORDER — HEPARIN 100 UNIT/ML
500 SYRINGE INTRAVENOUS
Status: CANCELLED
Start: 2023-06-22

## 2023-04-27 RX ADMIN — HEPARIN 500 UNITS: 100 SYRINGE at 04:04

## 2023-04-27 NOTE — PLAN OF CARE
Problem: Fatigue  Goal: Improved Activity Tolerance  Outcome: Ongoing, Progressing  Intervention: Promote Improved Energy  Flowsheets (Taken 4/27/2023 1525)  Fatigue Management:   fatigue-related activity identified   paced activity encouraged   frequent rest breaks encouraged  Sleep/Rest Enhancement:   noise level reduced   relaxation techniques promoted   regular sleep/rest pattern promoted  Activity Management:   Up in chair - L3   Ambulated -L4

## 2023-05-05 ENCOUNTER — TELEPHONE (OUTPATIENT)
Dept: PAIN MEDICINE | Facility: CLINIC | Age: 58
End: 2023-05-05
Payer: MEDICARE

## 2023-05-05 NOTE — TELEPHONE ENCOUNTER
Contacted pt to reschedule appointment scheduled for Tuesday 05/09/2023.  Dr. Servin will be in surgery on that day and he will not be able to see patient on that date.  Left vm to contact Clair with [facility] to reschedule.

## 2023-05-17 ENCOUNTER — PATIENT MESSAGE (OUTPATIENT)
Dept: PAIN MEDICINE | Facility: CLINIC | Age: 58
End: 2023-05-17
Payer: MEDICARE

## 2023-05-18 ENCOUNTER — OFFICE VISIT (OUTPATIENT)
Dept: PAIN MEDICINE | Facility: CLINIC | Age: 58
End: 2023-05-18
Payer: MEDICARE

## 2023-05-18 ENCOUNTER — LAB VISIT (OUTPATIENT)
Dept: LAB | Facility: HOSPITAL | Age: 58
End: 2023-05-18
Attending: STUDENT IN AN ORGANIZED HEALTH CARE EDUCATION/TRAINING PROGRAM
Payer: MEDICARE

## 2023-05-18 VITALS — DIASTOLIC BLOOD PRESSURE: 83 MMHG | HEART RATE: 97 BPM | SYSTOLIC BLOOD PRESSURE: 120 MMHG

## 2023-05-18 DIAGNOSIS — S22.080A COMPRESSION FRACTURE OF T12 VERTEBRA, INITIAL ENCOUNTER: ICD-10-CM

## 2023-05-18 DIAGNOSIS — M54.16 LUMBAR RADICULOPATHY: ICD-10-CM

## 2023-05-18 DIAGNOSIS — F11.90 CHRONIC, CONTINUOUS USE OF OPIOIDS: Primary | ICD-10-CM

## 2023-05-18 DIAGNOSIS — K50.018 CROHN'S DISEASE OF SMALL INTESTINE WITH OTHER COMPLICATION: ICD-10-CM

## 2023-05-18 DIAGNOSIS — S32.030A COMPRESSION FRACTURE OF L3 LUMBAR VERTEBRA, CLOSED, INITIAL ENCOUNTER: ICD-10-CM

## 2023-05-18 DIAGNOSIS — F11.90 CHRONIC, CONTINUOUS USE OF OPIOIDS: ICD-10-CM

## 2023-05-18 PROCEDURE — 99999 PR PBB SHADOW E&M-EST. PATIENT-LVL III: CPT | Mod: PBBFAC,,, | Performed by: STUDENT IN AN ORGANIZED HEALTH CARE EDUCATION/TRAINING PROGRAM

## 2023-05-18 PROCEDURE — 80326 AMPHETAMINES 5 OR MORE: CPT | Performed by: STUDENT IN AN ORGANIZED HEALTH CARE EDUCATION/TRAINING PROGRAM

## 2023-05-18 PROCEDURE — 99999 PR PBB SHADOW E&M-EST. PATIENT-LVL III: ICD-10-PCS | Mod: PBBFAC,,, | Performed by: STUDENT IN AN ORGANIZED HEALTH CARE EDUCATION/TRAINING PROGRAM

## 2023-05-18 PROCEDURE — 99213 OFFICE O/P EST LOW 20 MIN: CPT | Mod: PBBFAC,PN | Performed by: STUDENT IN AN ORGANIZED HEALTH CARE EDUCATION/TRAINING PROGRAM

## 2023-05-18 PROCEDURE — 99214 OFFICE O/P EST MOD 30 MIN: CPT | Mod: S$PBB,,, | Performed by: STUDENT IN AN ORGANIZED HEALTH CARE EDUCATION/TRAINING PROGRAM

## 2023-05-18 PROCEDURE — 99214 PR OFFICE/OUTPT VISIT, EST, LEVL IV, 30-39 MIN: ICD-10-PCS | Mod: S$PBB,,, | Performed by: STUDENT IN AN ORGANIZED HEALTH CARE EDUCATION/TRAINING PROGRAM

## 2023-05-18 RX ORDER — HYDROCODONE BITARTRATE AND ACETAMINOPHEN 10; 325 MG/1; MG/1
1 TABLET ORAL EVERY 6 HOURS PRN
Qty: 120 TABLET | Refills: 0 | Status: SHIPPED | OUTPATIENT
Start: 2023-06-21 | End: 2023-07-21

## 2023-05-18 RX ORDER — HYDROCODONE BITARTRATE AND ACETAMINOPHEN 10; 325 MG/1; MG/1
1 TABLET ORAL EVERY 6 HOURS PRN
Qty: 120 TABLET | Refills: 0 | Status: SHIPPED | OUTPATIENT
Start: 2023-05-22 | End: 2023-06-21

## 2023-05-18 RX ORDER — HYDROCODONE BITARTRATE AND ACETAMINOPHEN 10; 325 MG/1; MG/1
1 TABLET ORAL EVERY 6 HOURS PRN
Qty: 120 TABLET | Refills: 0 | Status: SHIPPED | OUTPATIENT
Start: 2023-07-21 | End: 2023-08-10 | Stop reason: SDUPTHER

## 2023-05-18 NOTE — PROGRESS NOTES
Chronic Pain - f/u    Referring Physician: No ref. provider found    Date: 05/18/2023     Re: Carline Chang  MR#: [id number]  YOB: 1965  Age: 57 y.o.    Chief Complaint: back and leg pain  Chief Complaint   Patient presents with    Back Pain       **This note is dictated using the M*Modal Fluency Direct word recognition program. There are word recognition mistakes that are occasionally missed on review.**    ASSESSMENT: 57 y.o. year old female with back pain, consistent with     1. Chronic, continuous use of opioids  Pain Clinic Drug Screen      2. Compression fracture of T12 vertebra, initial encounter  HYDROcodone-acetaminophen (NORCO)  mg per tablet    HYDROcodone-acetaminophen (NORCO)  mg per tablet    HYDROcodone-acetaminophen (NORCO)  mg per tablet      3. Compression fracture of L3 lumbar vertebra, closed, initial encounter  HYDROcodone-acetaminophen (NORCO)  mg per tablet    HYDROcodone-acetaminophen (NORCO)  mg per tablet    HYDROcodone-acetaminophen (NORCO)  mg per tablet      4. Crohn's disease of small intestine with other complication        5. Lumbar radiculopathy            PLAN:     Chronic back pain 2/2 multiple compression fractures  -T5, T8, severe T12, L3 compression fractures on XR. Chornic on MRI.  Not suitable for vertebral augmentation.  -Look into finding a pain physisian or PCP in [place] that can refill her narcotic medications so she does not need to travel so far to get them.  I will provide a refill of her pain meds in the meantime.  -MRI lumbar spine completed.  -discussed trial of MBB/RFA for the T12 compression fracture to target the posterior elements.  Will defer that for now and try RAMOS first.  -s/p L4-5 RAMOS on 11/222 - 100% for 7 days, then pain back to baseline. Discussed risks of repeated steroid exposure and her osteoporosis.  Do not recommend a repeat for another 3-4 months.  -recommended speaking with  endocrinology again about medications for bone quality.  -refill Hydrocodone 10mg QID x3    Lumbar radiculopathy  -has some elements of radiculitis on both sides  -discussed trial of SCS.  Defer for now. This would be a reasonable option if she is not a candidate for surgery and her bone quality continues to be poor.  -continue  gabapentin to 300mg TID    Crohn's disease  -recent surgery  -hx of 30 years of oral steroids leading to osteoporosis and compression fracture    History of seizures  -continue keppra    Chronic use of opioids  -her pain provider has passed away and she does not have anyone to fill her medications  -UDS and pain contract today  -The patient is here today for opioid pain medications and they believe these provide effective pain control and improvements in quality of life.  The patient notes no serious side effects, and feels the benefits outweigh the risks.  The patient was reminded of the pain contract that they signed previously as well as the risks and benefits of the medication including possible death.  The updated [place] [employer] prescription monitoring program was reviewed, and the patient has been found to be compliant with current treatment plan.      - RTC 3 months  - Counseled patient regarding the importance of activity modification.    The above plan and management options were discussed at length with patient. Patient is in agreement with the above and verbalized understanding. It will be communicated with the referring physician via electronic record, fax, or mail.  Lab/study reports reviewed were important and necessary because subsequent medical and treatment recommendations required review of the above lab/study reports. Images viewed/reviewed above were important and necessary because subsequent medical and treatment recommendations required review of the reviewed image(s).     Electronically signed by:  Royal Brewster  DO  05/18/2023    =========================================================================================================    SUBJECTIVE:    Interval History 5/18/2023:   Carline Chang is a 57 y.o. female presents to the clinic for follow up.  Since last visit the pain has has moderately improved.    The pain is located in the back area and radiates to the legs .  The pain is described as aching    At BEST  3/10   At WORST  8/10 on the WORST day.    On average pain is rated as 4/10.   Today the pain is rated as 3/10  Symptoms interfere with nothing  .   Exacerbating factors: nothing.    Mitigating factors medications.     Current pain medications: Norco 10mg QID, gabapentin 300mg TID  Failed Pain Medications:  Icy hot, Tylenol, cannot take NSIADs    Interval History 2/15/2023:   Carline Chang is a 57 y.o. female presents to the clinic for follow up.  Since last visit the pain has is unchanged. Pain 6/10. She is still getting a lot of pain going down the legs.    Interval History 11/23/2022:     Carline Chang is a 57 y.o. female presents to the clinic for follow up.  Since last visit the pain has is unchanged. Patient is s/p L4/L5 ILESI 11/02/2022. Worked very well for one week, 100%. Pain then returned back to same location and same intensity.  Pain is 7/10 without hydrocodone. Feels right leg stronger than the left.     Interval History 9/7/2022:     Carline Chang is a 57 y.o. female presents to the clinic for follow up.  Since last visit the pain has is unchanged. Pain is in the mid back and she gets radiation down the right leg to the knee. May have some knee arthritis as well that is confounding symptoms.  Difficult to walk and move. Average pain is 6/10. Today is 6/10.    Initial hx:  Carline Chang is a 57 y.o. female presents to the clinic for the evaluation of mid/ lower back pain. The pain started 7 months ago following  seizures   and symptoms  "have been worsening.  The patient states that she had a bunch of seizures in January and think that the compression fractures happened at that time.  She saw Dr. Harden recently for the back pain. Hx of crohns disease and 30 year history of chronic steroids.  She stopped taking them a year or two ago. She has lost 3-4" in height.     The patient had abdominal surgery in January for her Crohn's for lysis of adhesions. She stil gets abdominal bloating.  She has poor absorption of electrolytes and needs to get frequent IV medications.    The patient was seeing a PCP in [place] but they passed away.  She was getting Norco 10mg QID.     Pain Description:    The pain is located in the mid/lower back area and radiates to the bilalteral knee .    At BEST  3/10   At WORST  8/10 on the WORST day.    On average pain is rated as 5/10.   Today the pain is rated as 6/10  The pain is continuous.  The pain is described as aching and sharp    Symptoms interfere with daily activity and sleeping.   Exacerbating factors: Sitting, Standing, Bending, Walking, Extension, Lifting and Getting out of bed/chair.    Mitigating factors nothing and medications.   She reports 8 hours of sleep per night.    Physical Therapy/Home Exercise: Yes, currently in Physical therapy    Current Pain Medications:    - Norco 10mg QID    Failed Pain Medications:    - Icy hot, Tylenol, cannot take NSIADs    Pain Treatment Therapies:    Pain procedures: none  Physical Therapy: none  Chiropractor: none  Acupuncture: none  TENS unit: none  Spinal decompression: none  Joint replacement: none    Patient denies urinary incontinence, bowel incontinence, significant motor weakness and loss of sensations.  Patient denies any suicidal or homicidal ideations     report:  Reviewed and consistent with medication use as prescribed.    Imaging:   Lumbar XR 07/2022:    FINDINGS:  There is a severe compression fracture deformity of T12 with mild retropulsion of osseous structure " posteriorly.     There is a mild loss of height at the superior endplate of L3, unchanged.     No new fracture seen.     Flexion and extension views demonstrate no translational abnormalities.     The sacroiliac joints appear symmetrical.     Surgical clips right upper abdominal quadrant, surgical bowel suture in the right mid and right lower abdominal region.     Air-fluid levels noted, midline with distended colon measuring about 13 cm.     Impression:     Severe compression fracture of T12 and mild superior endplate compression fracture of L3, unchanged from 01/19/2022    Thoracic XR 07/2022:     FINDINGS:  There is a port in the left upper thorax, distal tip projects in the region of the SVC/right atrium region.     The alignment of the thoracic spine demonstrates a subtle dextroscoliosis.  There is a severe wedge compression fracture deformity of T12.  There is a mild height loss fracture deformity of T8 and T5 which appears to have been present on CT 01/19/2022 chest and abdomen CT examination.  Subtle concavity at the superior endplate of T10-T11 is unchanged from CT examination of 01/19/2022.  No definite new fracture identified.  No rib abnormality seen.  Surgical clips right upper abdominal quadrant.     Impression:     Multiple thoracic spine vertebrae fractures, all appears to have been present on 01/19/2022 CT exam.  There is a severe compression fracture of T12.  Please see details above.      Past Medical History:   Diagnosis Date    Abnormal Pap smear of cervix     LEEP procedure, 1995    Acute deep vein thrombosis (DVT) of brachial vein of right upper extremity 01/2017    on RUE ultrasound    Anxiety     Bowel perforation     Chronic pain     Compression fracture of C-spine     Crohn's disease of both small and large intestine with intestinal obstruction 1989    Difficult intravenous access     Encounter for blood transfusion     GERD (gastroesophageal reflux disease) 2014    Kidney stones      Nephrolithiasis     Osteoporosis     Stricture of bowel      Past Surgical History:   Procedure Laterality Date    ABSCESS DRAINAGE      ANAL FISTULOTOMY      BOWEL RESECTION      small bowel resection per Dr. Uribe 2018    CERVICAL BIOPSY  W/ LOOP ELECTRODE EXCISION       SECTION      x2    CHOLECYSTECTOMY  2000    COLON SURGERY  2015    sigmoid loop colostomy with subsequent reversal 3 years later    COLONOSCOPY N/A 2016    Procedure: COLONOSCOPY;  Surgeon: Andre Uribe MD;  Location: [facility] ENDO (4TH FLR);  Service: Endoscopy;  Laterality: N/A;    COLONOSCOPY N/A 2017    Procedure: COLONOSCOPY;  Surgeon: Dany Stock MD;  Location: [facility] ENDO (2ND FLR);  Service: Endoscopy;  Laterality: N/A;    COLONOSCOPY N/A 2017    Procedure: COLONOSCOPY;  Surgeon: Andre Uribe MD;  Location: [facility] ENDO (4TH FLR);  Service: Endoscopy;  Laterality: N/A;    COLONOSCOPY N/A 2018    Procedure: COLONOSCOPY;  Surgeon: Andre Uribe MD;  Location: [facility] ENDO (4TH FLR);  Service: Endoscopy;  Laterality: N/A;    COLONOSCOPY N/A 2018    Procedure: COLONOSCOPY;  Surgeon: Elmer Serrato MD;  Location: [facility] ENDO (2ND FLR);  Service: Endoscopy;  Laterality: N/A;    COLONOSCOPY  2018    COLONOSCOPY N/A 2018    Procedure: COLONOSCOPY;  Surgeon: Andre Uribe MD;  Location: [facility] ENDO (4TH FLR);  Service: Endoscopy;  Laterality: N/A;    COLONOSCOPY N/A 10/07/2021    Procedure: COLONOSCOPY;  Surgeon: Jose Hughes MD;  Location: [facility];  Service: Endoscopy;  Laterality: N/A;    EPIDURAL STEROID INJECTION N/A 2022    Procedure: INJECTION, STEROID, EPIDURAL L4-L5 CONTRAST;  Surgeon: Royal Brewster DO;  Location: [facility] PAIN MGT;  Service: Pain Management;  Laterality: N/A;    ESOPHAGOGASTRODUODENOSCOPY N/A 10/07/2021    Procedure: EGD (ESOPHAGOGASTRODUODENOSCOPY);  Surgeon: Jose Hughes MD;  Location: [facility] ENDO;  Service: Endoscopy;  Laterality:  N/A;    LAPAROSCOPIC CLOSURE OF COLOSTOMY N/A 08/29/2018    Procedure: CLOSURE, COLOSTOMY, LAPAROSCOPIC;  Surgeon: Andre Uribe MD;  Location: [facility] OR 2ND FLR;  Service: Colon and Rectal;  Laterality: N/A;  converted to open    LYSIS OF ADHESIONS N/A 01/20/2022    Procedure: LYSIS, ADHESIONS;  Surgeon: LLUVIA Post MD;  Location: [facility] OR 2ND FLR;  Service: Colon and Rectal;  Laterality: N/A;  lasting longer than 2 hours, modifier 22      rectovaginal fistula repair  01/2018    SMALL INTESTINE SURGERY  1995    per patient 10 inches removed    SMALL INTESTINE SURGERY  02/2009    abdominal abscess drained, 3 cm colon removed, 11 cm SB removed    TUBAL LIGATION      UPPER GASTROINTESTINAL ENDOSCOPY  2000     Social History     Socioeconomic History    Marital status:    Tobacco Use    Smoking status: Never    Smokeless tobacco: Never   Substance and Sexual Activity    Alcohol use: No     Alcohol/week: 0.0 standard drinks    Drug use: No    Sexual activity: Not Currently     Family History   Problem Relation Age of Onset    Cataracts Mother     Heart attack Father 69    Cataracts Father     No Known Problems Sister     No Known Problems Brother     Cancer Maternal Aunt 66        colon ca    No Known Problems Maternal Uncle     No Known Problems Paternal Aunt     No Known Problems Paternal Uncle     No Known Problems Maternal Grandmother     No Known Problems Maternal Grandfather     No Known Problems Paternal Grandmother     No Known Problems Paternal Grandfather     No Known Problems Son     No Known Problems Son     No Known Problems Other     Anesthesia problems Neg Hx     Celiac disease Neg Hx     Cirrhosis Neg Hx     Colon cancer Neg Hx     Colon polyps Neg Hx     Crohn's disease Neg Hx     Cystic fibrosis Neg Hx     Esophageal cancer Neg Hx     Hemochromatosis Neg Hx     Inflammatory bowel disease Neg Hx     Irritable bowel syndrome Neg Hx     Liver cancer Neg Hx     Liver disease Neg Hx      Rectal cancer Neg Hx     Stomach cancer Neg Hx     Ulcerative colitis Neg Hx     Mars's disease Neg Hx     Lymphoma Neg Hx     Tuberculosis Neg Hx     Scleroderma Neg Hx     Rheum arthritis Neg Hx     Multiple sclerosis Neg Hx     Melanoma Neg Hx     Lupus Neg Hx     Psoriasis Neg Hx     Skin cancer Neg Hx        Review of patient's allergies indicates:   Allergen Reactions    Remicade [infliximab] Shortness Of Breath and Palpitations     Severe muscle and joint, and bone pain    Adalimumab Other (See Comments)    Flagyl [metronidazole] Other (See Comments)     Neuropathy    Nubain [nalbuphine] Anxiety     Upper abdominal burning        Current Outpatient Medications   Medication Sig    calcitRIOL (ROCALTROL) 0.25 MCG Cap Take 1 capsule (0.25 mcg total) by mouth 2 (two) times daily.    ergocalciferol (ERGOCALCIFEROL) 50,000 unit Cap Take 1 capsule (50,000 Units total) by mouth every 7 days.    gabapentin (NEURONTIN) 300 MG capsule Take 1 capsule (300 mg total) by mouth 3 (three) times daily.    loperamide (IMODIUM) 2 mg capsule Take 2 capsules (4 mg total) by mouth 4 (four) times daily.    magnesium oxide (MAG-OX) 400 mg (241.3 mg magnesium) tablet Take 400 mg by mouth.    omeprazole (PRILOSEC) 40 MG capsule Take 40 mg by mouth.    OYSTER SHELL CALCIUM 500 500 mg calcium (1,250 mg) tablet Take 2 tablets by mouth 2 (two) times daily.    pantoprazole (PROTONIX) 40 MG tablet Take 40 mg by mouth once daily.    potassium chloride SA (K-DUR,KLOR-CON) 20 MEQ tablet Take 10 mEq by mouth once daily.    promethazine (PHENERGAN) 25 MG tablet Take 1 tablet (25 mg total) by mouth every 4 (four) hours.    TUMS 200 mg calcium (500 mg) chewable tablet Take 2 tablets (1,000 mg total) by mouth 2 (two) times daily.    venlafaxine (EFFEXOR) 75 MG tablet Take 75 mg by mouth 2 (two) times daily.    [START ON 5/22/2023] HYDROcodone-acetaminophen (NORCO)  mg per tablet Take 1 tablet by mouth every 6 (six) hours as needed for Pain.     [START ON 6/21/2023] HYDROcodone-acetaminophen (NORCO)  mg per tablet Take 1 tablet by mouth every 6 (six) hours as needed for Pain.    [START ON 7/21/2023] HYDROcodone-acetaminophen (NORCO)  mg per tablet Take 1 tablet by mouth every 6 (six) hours as needed for Pain.    levETIRAcetam (KEPPRA) 500 MG Tab Take 1 tablet (500 mg total) by mouth 2 (two) times daily.    teriparatide (FORTEO) 20 mcg/dose (600mcg/2.4mL) PnIj Inject 0.08 mLs (20 mcg total) into the skin once daily.     No current facility-administered medications for this visit.       REVIEW OF SYSTEMS:    GENERAL:  No weight loss, malaise or fevers.  HEENT:   No recent changes in vision or hearing + vision   NECK:  Negative for lumps, no difficulty with swallowing.  RESPIRATORY:  Negative for cough, wheezing or shortness of breath, patient denies any recent URI.  CARDIOVASCULAR:  Negative for chest pain, leg swelling or palpitations.  GI:  Negative for abdominal discomfort, blood in stools or black stools or change in bowel habits. + abdominal discomfort   MUSCULOSKELETAL:  See HPI.  SKIN:  Negative for lesions, rash, and itching.  PSYCH:  No mood disorder or recent psychosocial stressors.  Patients sleep is not disturbed secondary to pain.  HEMATOLOGY/LYMPHOLOGY:  Negative for prolonged bleeding, bruising easily or swollen nodes.  Patient is not currently taking any anti-coagulants  NEURO:   No history of headaches, syncope, paralysis, seizures or tremors. + seizures (4this year)   All other reviewed and negative other than HPI.    OBJECTIVE:    /83   Pulse 97   LMP 05/30/2009     PHYSICAL EXAMINATION:    Video visit:  GENERAL: Well appearing, in no acute distress, alert and oriented x3. Skinny, Fraile  PSYCH:  Mood and affect appropriate.  SKIN: Skin color, texture, turgor normal, no rashes or lesions.  HEAD/FACE:  Normocephalic, atraumatic. Cranial nerves grossly intact.    Prior in-person visit  BACK:   - Hyperkyphosis of the  thoracic spine with paravertebral humping  - Negative spinous process tenderness  - Negative paravertebral tenderness  - Negative pain to palpation over the facet joints of the thoracic and lumbar spine.   -decreased ROM with flexion and extension of the lumbar spine  -flexion causes increased pain in the mid/low back without radiation into the legs  -Light percussion of the thoracic and lumbar spine reproduces pain around the L2 level    MUSCULOSKELETAL:  No atrophy or tone abnormalities are noted in the UE or LE.  No deformities, edema, or skin discoloration are noted on visible skin. Good capillary refill.    NEURO: Bilateral upper and lower extremity coordination and muscle stretch reflexes are physiologic and symmetric.      NEUROLOGICAL EXAM:  MENTAL STATUS: A x O x 3, good concentration, speech is fluent and goal directed  MEMORY: recent and remote are intact  CN: CN2-12 grossly intact  MOTOR: 5/5 in all muscle groups except b/l hip flexion 4/5  DTRs: 2+ intact symmetric  Sensation:    -no Loss of sensation in a left lower and right lower L-1, L-2, L-3, L-4 and L-5 bilaterally distribution.  Babinski: absent on the bilateral side(s)    GAIT: flexed, kyphotic posture

## 2023-05-24 LAB
6MAM UR QL: NOT DETECTED
7AMINOCLONAZEPAM UR QL: NOT DETECTED
A-OH ALPRAZ UR QL: NOT DETECTED
ALPHA-OH-MIDAZOLAM: NOT DETECTED
ALPRAZ UR QL: NOT DETECTED
AMPHET UR QL SCN: NOT DETECTED
ANNOTATION COMMENT IMP: NORMAL
ANNOTATION COMMENT IMP: NORMAL
BARBITURATES UR QL: NOT DETECTED
BUPRENORPHINE UR QL: NOT DETECTED
BZE UR QL: NOT DETECTED
CARBOXYTHC UR QL: NOT DETECTED
CARISOPRODOL UR QL: NOT DETECTED
CLONAZEPAM UR QL: NOT DETECTED
CODEINE UR QL: NOT DETECTED
CREAT UR-MCNC: 156.7 MG/DL (ref 20–400)
DIAZEPAM UR QL: NOT DETECTED
ETHYL GLUCURONIDE UR QL: NOT DETECTED
FENTANYL UR QL: NOT DETECTED
GABAPENTIN: PRESENT
HYDROCODONE UR QL: PRESENT
HYDROMORPHONE UR QL: PRESENT
LORAZEPAM UR QL: NOT DETECTED
MDA UR QL: NOT DETECTED
MDEA UR QL: NOT DETECTED
MDMA UR QL: NOT DETECTED
ME-PHENIDATE UR QL: NOT DETECTED
METHADONE UR QL: NOT DETECTED
METHAMPHET UR QL: NOT DETECTED
MIDAZOLAM UR QL SCN: NOT DETECTED
MORPHINE UR QL: NOT DETECTED
NALOXONE: NOT DETECTED
NORBUPRENORPHINE UR QL CFM: NOT DETECTED
NORDIAZEPAM UR QL: NOT DETECTED
NORFENTANYL UR QL: NOT DETECTED
NORHYDROCODONE UR QL CFM: PRESENT
NORMEPERIDINE UR QL CFM: NOT DETECTED
NOROXYCODONE UR QL CFM: NOT DETECTED
NOROXYMORPHONE UR QL SCN: NOT DETECTED
OXAZEPAM UR QL: NOT DETECTED
OXYCODONE UR QL: NOT DETECTED
OXYMORPHONE UR QL: NOT DETECTED
PATHOLOGY STUDY: NORMAL
PCP UR QL: NOT DETECTED
PHENTERMINE UR QL: NOT DETECTED
PREGABALIN: NOT DETECTED
SERVICE CMNT-IMP: NORMAL
TAPENTADOL UR QL SCN: NOT DETECTED
TAPENTADOL UR QL SCN: NOT DETECTED
TEMAZEPAM UR QL: NOT DETECTED
TRAMADOL UR QL: NOT DETECTED
ZOLPIDEM METABOLITE: NOT DETECTED
ZOLPIDEM UR QL: NOT DETECTED

## 2023-05-29 NOTE — TELEPHONE ENCOUNTER
----- Message from Lucrecia Chang sent at 11/5/2018 11:41 AM CST -----  Contact: Self- [phone]  Yfn- pt called reschedule her upcoming to 11/19 since she's already meeting with Rosina in [facility] that day- pt requesting to only see Dr. Serrato- please contact pt at [phone]  
Called pt, no answer, lm with main line to return call in reference to her message about rescheduling.  Only availability on day she is requesting is a 3:00 with Jenelle (which is whom she is scheduled with already)    
Yes

## 2023-06-22 ENCOUNTER — INFUSION (OUTPATIENT)
Dept: INFUSION THERAPY | Facility: HOSPITAL | Age: 58
End: 2023-06-22
Attending: INTERNAL MEDICINE
Payer: MEDICARE

## 2023-06-22 VITALS
DIASTOLIC BLOOD PRESSURE: 59 MMHG | SYSTOLIC BLOOD PRESSURE: 105 MMHG | TEMPERATURE: 98 F | HEIGHT: 65 IN | HEART RATE: 82 BPM | RESPIRATION RATE: 18 BRPM | OXYGEN SATURATION: 98 % | BODY MASS INDEX: 19.94 KG/M2 | WEIGHT: 119.69 LBS

## 2023-06-22 DIAGNOSIS — K50.90 CROHN'S DISEASE WITHOUT COMPLICATION, UNSPECIFIED GASTROINTESTINAL TRACT LOCATION: Primary | ICD-10-CM

## 2023-06-22 PROCEDURE — 96365 THER/PROPH/DIAG IV INF INIT: CPT

## 2023-06-22 PROCEDURE — 63600175 PHARM REV CODE 636 W HCPCS: Performed by: INTERNAL MEDICINE

## 2023-06-22 RX ORDER — HEPARIN 100 UNIT/ML
500 SYRINGE INTRAVENOUS
Status: CANCELLED
Start: 2023-08-17

## 2023-06-22 RX ORDER — HEPARIN 100 UNIT/ML
500 SYRINGE INTRAVENOUS
Status: COMPLETED | OUTPATIENT
Start: 2023-06-22 | End: 2023-06-22

## 2023-06-22 RX ADMIN — HEPARIN 500 UNITS: 100 SYRINGE at 03:06

## 2023-06-22 NOTE — PLAN OF CARE
Problem: Fatigue  Goal: Improved Activity Tolerance  Outcome: Ongoing, Progressing  Intervention: Promote Improved Energy  Flowsheets (Taken 6/22/2023 5634)  Fatigue Management:   fatigue-related activity identified   paced activity encouraged   activity schedule adjusted  Sleep/Rest Enhancement:   noise level reduced   regular sleep/rest pattern promoted   relaxation techniques promoted  Activity Management:   Ambulated -L4   Up in chair - L3

## 2023-08-10 ENCOUNTER — OFFICE VISIT (OUTPATIENT)
Dept: PAIN MEDICINE | Facility: CLINIC | Age: 58
End: 2023-08-10
Payer: MEDICARE

## 2023-08-10 DIAGNOSIS — F11.90 CHRONIC, CONTINUOUS USE OF OPIOIDS: ICD-10-CM

## 2023-08-10 DIAGNOSIS — M54.16 LUMBAR RADICULOPATHY: ICD-10-CM

## 2023-08-10 DIAGNOSIS — K50.018 CROHN'S DISEASE OF SMALL INTESTINE WITH OTHER COMPLICATION: Primary | ICD-10-CM

## 2023-08-10 DIAGNOSIS — S22.080A COMPRESSION FRACTURE OF T12 VERTEBRA, INITIAL ENCOUNTER: ICD-10-CM

## 2023-08-10 DIAGNOSIS — K50.812 CROHN'S DISEASE OF BOTH SMALL AND LARGE INTESTINE WITH INTESTINAL OBSTRUCTION: ICD-10-CM

## 2023-08-10 DIAGNOSIS — S32.030A COMPRESSION FRACTURE OF L3 LUMBAR VERTEBRA, CLOSED, INITIAL ENCOUNTER: ICD-10-CM

## 2023-08-10 PROCEDURE — 99214 PR OFFICE/OUTPT VISIT, EST, LEVL IV, 30-39 MIN: ICD-10-PCS | Mod: 95,,, | Performed by: STUDENT IN AN ORGANIZED HEALTH CARE EDUCATION/TRAINING PROGRAM

## 2023-08-10 PROCEDURE — 99214 OFFICE O/P EST MOD 30 MIN: CPT | Mod: 95,,, | Performed by: STUDENT IN AN ORGANIZED HEALTH CARE EDUCATION/TRAINING PROGRAM

## 2023-08-10 RX ORDER — HYDROCODONE BITARTRATE AND ACETAMINOPHEN 10; 325 MG/1; MG/1
1 TABLET ORAL EVERY 6 HOURS PRN
Qty: 120 TABLET | Refills: 0 | Status: SHIPPED | OUTPATIENT
Start: 2023-08-17 | End: 2023-09-16

## 2023-08-10 RX ORDER — HYDROCODONE BITARTRATE AND ACETAMINOPHEN 10; 325 MG/1; MG/1
1 TABLET ORAL EVERY 6 HOURS PRN
Qty: 120 TABLET | Refills: 0 | Status: SHIPPED | OUTPATIENT
Start: 2023-11-15 | End: 2023-12-15

## 2023-08-10 RX ORDER — HYDROCODONE BITARTRATE AND ACETAMINOPHEN 10; 325 MG/1; MG/1
1 TABLET ORAL EVERY 6 HOURS PRN
Qty: 120 TABLET | Refills: 0 | Status: SHIPPED | OUTPATIENT
Start: 2023-10-16 | End: 2023-11-15 | Stop reason: SDUPTHER

## 2023-08-10 RX ORDER — GABAPENTIN 300 MG/1
300 CAPSULE ORAL 3 TIMES DAILY
Qty: 270 CAPSULE | Refills: 3 | Status: SHIPPED | OUTPATIENT
Start: 2023-08-10 | End: 2024-08-04

## 2023-08-10 RX ORDER — HYDROCODONE BITARTRATE AND ACETAMINOPHEN 10; 325 MG/1; MG/1
1 TABLET ORAL EVERY 6 HOURS PRN
Qty: 120 TABLET | Refills: 0 | Status: SHIPPED | OUTPATIENT
Start: 2023-09-16 | End: 2023-10-16

## 2023-08-10 NOTE — PROGRESS NOTES
Chronic Pain - f/u    Referring Physician: No ref. provider found    Date: 08/10/2023     Re: Carline Chang  MR#: [id number]  YOB: 1965  Age: 57 y.o.    Chief Complaint: back and leg pain  No chief complaint on file.      **This note is dictated using the M*Modal Fluency Direct word recognition program. There are word recognition mistakes that are occasionally missed on review.**    ASSESSMENT: 57 y.o. year old female with back pain, consistent with     1. Crohn's disease of small intestine with other complication        2. Compression fracture of T12 vertebra, initial encounter  HYDROcodone-acetaminophen (NORCO)  mg per tablet    HYDROcodone-acetaminophen (NORCO)  mg per tablet    HYDROcodone-acetaminophen (NORCO)  mg per tablet    HYDROcodone-acetaminophen (NORCO)  mg per tablet      3. Compression fracture of L3 lumbar vertebra, closed, initial encounter  HYDROcodone-acetaminophen (NORCO)  mg per tablet    HYDROcodone-acetaminophen (NORCO)  mg per tablet    HYDROcodone-acetaminophen (NORCO)  mg per tablet    HYDROcodone-acetaminophen (NORCO)  mg per tablet      4. Lumbar radiculopathy  gabapentin (NEURONTIN) 300 MG capsule      5. Chronic, continuous use of opioids        6. Crohn's disease of both small and large intestine with intestinal obstruction              PLAN:     Chronic back pain 2/2 multiple compression fractures  -T5, T8, severe T12, L3 compression fractures on XR. Chornic on MRI.  Not suitable for vertebral augmentation.  -Look into finding a pain physisian or PCP in [place] that can refill her narcotic medications so she does not need to travel so far to get them.  I will provide a refill of her pain meds in the meantime.  -MRI lumbar spine completed.  -discussed trial of MBB/RFA for the T12 compression fracture to target the posterior elements.  Will defer that for now and try RAMOS first.  -s/p L4-5 RAMOS on 11/222 - 100% for  7 days, then pain back to baseline. Discussed risks of repeated steroid exposure and her osteoporosis.  Do not recommend a repeat for another 3-4 months.  -recommended again speaking with endocrinology again about medications for bone quality. She should be taking more than just calcium. She will talk to them again.  -she is doing weight bearing exercises.  -refill Hydrocodone 10mg QID x4    Lumbar radiculopathy  -has some elements of radiculitis on both sides  -discussed trial of SCS.  Defer for now. This would be a reasonable option if she is not a candidate for surgery and her bone quality continues to be poor.  -continue  gabapentin to 300mg TID    Crohn's disease  -recent surgery  -hx of 30 years of oral steroids leading to osteoporosis and compression fracture    History of seizures  -continue keppra    Chronic use of opioids  -her pain provider has passed away and she does not have anyone to fill her medications  -UDS and pain contract completed  -The patient is here today for opioid pain medications and they believe these provide effective pain control and improvements in quality of life.  The patient notes no serious side effects, and feels the benefits outweigh the risks.  The patient was reminded of the pain contract that they signed previously as well as the risks and benefits of the medication including possible death.  The updated [place] Board [employer] prescription monitoring program was reviewed, and the patient has been found to be compliant with current treatment plan.      - RTC 4 months  - Counseled patient regarding the importance of activity modification.    The above plan and management options were discussed at length with patient. Patient is in agreement with the above and verbalized understanding. It will be communicated with the referring physician via electronic record, fax, or mail.  Lab/study reports reviewed were important and necessary because subsequent medical and treatment  recommendations required review of the above lab/study reports. Images viewed/reviewed above were important and necessary because subsequent medical and treatment recommendations required review of the reviewed image(s).     Electronically signed by:  Royal Brewster DO  08/10/2023    =========================================================================================================    SUBJECTIVE:    Interval History 8/10/2023:   Carline Chang is a 57 y.o. female presents to the clinic for follow up.  Since last visit the pain has has moderately improved. Patient states that things have been going well. She states that the pain has improved. Pain today is 3/10.    Exacerbating factors: activity.    Mitigating factors medications.     Current pain medications: Norco 10mg QID, gabapentin 300mg TID  Failed Pain Medications:  Icy hot, Tylenol, cannot take NSIADs    Interval History 5/18/2023:   Carline Chang is a 57 y.o. female presents to the clinic for follow up.  Since last visit the pain has has moderately improved.     The pain is located in the back area and radiates to the legs .  The pain is described as aching    At BEST  3/10   At WORST  8/10 on the WORST day.    On average pain is rated as 4/10.   Today the pain is rated as 3/10  Symptoms interfere with nothing  .   Exacerbating factors: nothing.    Mitigating factors medications.       Interval History 2/15/2023:   Carline Chang is a 57 y.o. female presents to the clinic for follow up.  Since last visit the pain has is unchanged. Pain 6/10. She is still getting a lot of pain going down the legs.    Interval History 11/23/2022:     Carline Chang is a 57 y.o. female presents to the clinic for follow up.  Since last visit the pain has is unchanged. Patient is s/p L4/L5 ILESI 11/02/2022. Worked very well for one week, 100%. Pain then returned back to same location and same intensity.  Pain is 7/10  "without hydrocodone. Feels right leg stronger than the left.     Interval History 9/7/2022:     Carline Chang is a 57 y.o. female presents to the clinic for follow up.  Since last visit the pain has is unchanged. Pain is in the mid back and she gets radiation down the right leg to the knee. May have some knee arthritis as well that is confounding symptoms.  Difficult to walk and move. Average pain is 6/10. Today is 6/10.    Initial hx:  Carline Chang is a 57 y.o. female presents to the clinic for the evaluation of mid/ lower back pain. The pain started 7 months ago following  seizures   and symptoms have been worsening.  The patient states that she had a bunch of seizures in January and think that the compression fractures happened at that time.  She saw Dr. Harden recently for the back pain. Hx of crohns disease and 30 year history of chronic steroids.  She stopped taking them a year or two ago. She has lost 3-4" in height.     The patient had abdominal surgery in January for her Crohn's for lysis of adhesions. She stil gets abdominal bloating.  She has poor absorption of electrolytes and needs to get frequent IV medications.    The patient was seeing a PCP in [place] but they passed away.  She was getting Norco 10mg QID.     Pain Description:    The pain is located in the mid/lower back area and radiates to the bilalteral knee .    At BEST  3/10   At WORST  8/10 on the WORST day.    On average pain is rated as 5/10.   Today the pain is rated as 6/10  The pain is continuous.  The pain is described as aching and sharp    Symptoms interfere with daily activity and sleeping.   Exacerbating factors: Sitting, Standing, Bending, Walking, Extension, Lifting and Getting out of bed/chair.    Mitigating factors nothing and medications.   She reports 8 hours of sleep per night.    Physical Therapy/Home Exercise: Yes, currently in Physical therapy    Current Pain Medications:    - Norco 10mg QID    Failed " Pain Medications:    - Icy hot, Tylenol, cannot take NSIADs    Pain Treatment Therapies:    Pain procedures: none  Physical Therapy: none  Chiropractor: none  Acupuncture: none  TENS unit: none  Spinal decompression: none  Joint replacement: none    Patient denies urinary incontinence, bowel incontinence, significant motor weakness and loss of sensations.  Patient denies any suicidal or homicidal ideations     report:  Reviewed and consistent with medication use as prescribed.    Imaging:   Lumbar XR 07/2022:    FINDINGS:  There is a severe compression fracture deformity of T12 with mild retropulsion of osseous structure posteriorly.     There is a mild loss of height at the superior endplate of L3, unchanged.     No new fracture seen.     Flexion and extension views demonstrate no translational abnormalities.     The sacroiliac joints appear symmetrical.     Surgical clips right upper abdominal quadrant, surgical bowel suture in the right mid and right lower abdominal region.     Air-fluid levels noted, midline with distended colon measuring about 13 cm.     Impression:     Severe compression fracture of T12 and mild superior endplate compression fracture of L3, unchanged from 01/19/2022    Thoracic XR 07/2022:     FINDINGS:  There is a port in the left upper thorax, distal tip projects in the region of the SVC/right atrium region.     The alignment of the thoracic spine demonstrates a subtle dextroscoliosis.  There is a severe wedge compression fracture deformity of T12.  There is a mild height loss fracture deformity of T8 and T5 which appears to have been present on CT 01/19/2022 chest and abdomen CT examination.  Subtle concavity at the superior endplate of T10-T11 is unchanged from CT examination of 01/19/2022.  No definite new fracture identified.  No rib abnormality seen.  Surgical clips right upper abdominal quadrant.     Impression:     Multiple thoracic spine vertebrae fractures, all appears to have  been present on 2022 CT exam.  There is a severe compression fracture of T12.  Please see details above.      Past Medical History:   Diagnosis Date    Abnormal Pap smear of cervix     LEEP procedure,     Acute deep vein thrombosis (DVT) of brachial vein of right upper extremity 2017    on RUE ultrasound    Anxiety     Bowel perforation     Chronic pain     Compression fracture of C-spine     Crohn's disease of both small and large intestine with intestinal obstruction     Difficult intravenous access     Encounter for blood transfusion     GERD (gastroesophageal reflux disease)     Kidney stones     Nephrolithiasis     Osteoporosis     Stricture of bowel      Past Surgical History:   Procedure Laterality Date    ABSCESS DRAINAGE      ANAL FISTULOTOMY      BOWEL RESECTION      small bowel resection per Dr. Uribe 2018    CERVICAL BIOPSY  W/ LOOP ELECTRODE EXCISION       SECTION      x2    CHOLECYSTECTOMY      COLON SURGERY  2015    sigmoid loop colostomy with subsequent reversal 3 years later    COLONOSCOPY N/A 2016    Procedure: COLONOSCOPY;  Surgeon: Andre Uribe MD;  Location: [facility] ENDO (4TH FLR);  Service: Endoscopy;  Laterality: N/A;    COLONOSCOPY N/A 2017    Procedure: COLONOSCOPY;  Surgeon: Dany Stock MD;  Location: [facility] ENDO (2ND FLR);  Service: Endoscopy;  Laterality: N/A;    COLONOSCOPY N/A 2017    Procedure: COLONOSCOPY;  Surgeon: Andre Uribe MD;  Location: [facility] ENDO (4TH FLR);  Service: Endoscopy;  Laterality: N/A;    COLONOSCOPY N/A 2018    Procedure: COLONOSCOPY;  Surgeon: Andre Uribe MD;  Location: [facility] ENDO (4TH FLR);  Service: Endoscopy;  Laterality: N/A;    COLONOSCOPY N/A 2018    Procedure: COLONOSCOPY;  Surgeon: Emler Serrato MD;  Location: [facility] ENDO (2ND FLR);  Service: Endoscopy;  Laterality: N/A;    COLONOSCOPY  2018    COLONOSCOPY N/A 2018    Procedure: COLONOSCOPY;  Surgeon:  Andre Uribe MD;  Location: [facility] ENDO (4TH FLR);  Service: Endoscopy;  Laterality: N/A;    COLONOSCOPY N/A 10/07/2021    Procedure: COLONOSCOPY;  Surgeon: Jose Hughes MD;  Location: [facility] ENDO;  Service: Endoscopy;  Laterality: N/A;    EPIDURAL STEROID INJECTION N/A 11/2/2022    Procedure: INJECTION, STEROID, EPIDURAL L4-L5 CONTRAST;  Surgeon: Royal Brewster DO;  Location: [facility] [place] [facility]IN MGT;  Service: Pain Management;  Laterality: N/A;    ESOPHAGOGASTRODUODENOSCOPY N/A 10/07/2021    Procedure: EGD (ESOPHAGOGASTRODUODENOSCOPY);  Surgeon: Jose Hughes MD;  Location: [facility] ENDO;  Service: Endoscopy;  Laterality: N/A;    LAPAROSCOPIC CLOSURE OF COLOSTOMY N/A 08/29/2018    Procedure: CLOSURE, COLOSTOMY, LAPAROSCOPIC;  Surgeon: Andre Uribe MD;  Location: [facility] OR 2ND FLR;  Service: Colon and Rectal;  Laterality: N/A;  converted to open    LYSIS OF ADHESIONS N/A 01/20/2022    Procedure: LYSIS, ADHESIONS;  Surgeon: LLUVIA Post MD;  Location: [facility] OR 2ND FLR;  Service: Colon and Rectal;  Laterality: N/A;  lasting longer than 2 hours, modifier 22      rectovaginal fistula repair  01/2018    SMALL INTESTINE SURGERY  1995    per patient 10 inches removed    SMALL INTESTINE SURGERY  02/2009    abdominal abscess drained, 3 cm colon removed, 11 cm SB removed    TUBAL LIGATION      UPPER GASTROINTESTINAL ENDOSCOPY  2000     Social History     Socioeconomic History    Marital status:    Tobacco Use    Smoking status: Never    Smokeless tobacco: Never   Substance and Sexual Activity    Alcohol use: No     Alcohol/week: 0.0 standard drinks of alcohol    Drug use: No    Sexual activity: Not Currently     Family History   Problem Relation Age of Onset    Cataracts Mother     Heart attack Father 69    Cataracts Father     No Known Problems Sister     No Known Problems Brother     Cancer Maternal Aunt 66        colon ca    No Known Problems Maternal Uncle     No Known Problems Paternal Aunt     No Known  Problems Paternal Uncle     No Known Problems Maternal Grandmother     No Known Problems Maternal Grandfather     No Known Problems Paternal Grandmother     No Known Problems Paternal Grandfather     No Known Problems Son     No Known Problems Son     No Known Problems Other     Anesthesia problems Neg Hx     Celiac disease Neg Hx     Cirrhosis Neg Hx     Colon cancer Neg Hx     Colon polyps Neg Hx     Crohn's disease Neg Hx     Cystic fibrosis Neg Hx     Esophageal cancer Neg Hx     Hemochromatosis Neg Hx     Inflammatory bowel disease Neg Hx     Irritable bowel syndrome Neg Hx     Liver cancer Neg Hx     Liver disease Neg Hx     Rectal cancer Neg Hx     Stomach cancer Neg Hx     Ulcerative colitis Neg Hx     Mars's disease Neg Hx     Lymphoma Neg Hx     Tuberculosis Neg Hx     Scleroderma Neg Hx     Rheum arthritis Neg Hx     Multiple sclerosis Neg Hx     Melanoma Neg Hx     Lupus Neg Hx     Psoriasis Neg Hx     Skin cancer Neg Hx        Review of patient's allergies indicates:   Allergen Reactions    Remicade [infliximab] Shortness Of Breath and Palpitations     Severe muscle and joint, and bone pain    Adalimumab Other (See Comments)    Flagyl [metronidazole] Other (See Comments)     Neuropathy    Nubain [nalbuphine] Anxiety     Upper abdominal burning        Current Outpatient Medications   Medication Sig    calcitRIOL (ROCALTROL) 0.25 MCG Cap Take 1 capsule (0.25 mcg total) by mouth 2 (two) times daily.    ergocalciferol (ERGOCALCIFEROL) 50,000 unit Cap Take 1 capsule (50,000 Units total) by mouth every 7 days.    gabapentin (NEURONTIN) 300 MG capsule Take 1 capsule (300 mg total) by mouth 3 (three) times daily.    [START ON 8/17/2023] HYDROcodone-acetaminophen (NORCO)  mg per tablet Take 1 tablet by mouth every 6 (six) hours as needed for Pain.    [START ON 9/16/2023] HYDROcodone-acetaminophen (NORCO)  mg per tablet Take 1 tablet by mouth every 6 (six) hours as needed for Pain.    [START ON  10/16/2023] HYDROcodone-acetaminophen (NORCO)  mg per tablet Take 1 tablet by mouth every 6 (six) hours as needed for Pain.    [START ON 11/15/2023] HYDROcodone-acetaminophen (NORCO)  mg per tablet Take 1 tablet by mouth every 6 (six) hours as needed for Pain.    levETIRAcetam (KEPPRA) 500 MG Tab Take 1 tablet (500 mg total) by mouth 2 (two) times daily.    loperamide (IMODIUM) 2 mg capsule Take 2 capsules (4 mg total) by mouth 4 (four) times daily.    magnesium oxide (MAG-OX) 400 mg (241.3 mg magnesium) tablet Take 400 mg by mouth.    omeprazole (PRILOSEC) 40 MG capsule Take 40 mg by mouth.    OYSTER SHELL CALCIUM 500 500 mg calcium (1,250 mg) tablet Take 2 tablets by mouth 2 (two) times daily.    pantoprazole (PROTONIX) 40 MG tablet Take 40 mg by mouth once daily.    potassium chloride SA (K-DUR,KLOR-CON) 20 MEQ tablet Take 10 mEq by mouth once daily.    promethazine (PHENERGAN) 25 MG tablet Take 1 tablet (25 mg total) by mouth every 4 (four) hours.    teriparatide (FORTEO) 20 mcg/dose (600mcg/2.4mL) PnIj Inject 0.08 mLs (20 mcg total) into the skin once daily.    TUMS 200 mg calcium (500 mg) chewable tablet Take 2 tablets (1,000 mg total) by mouth 2 (two) times daily.    venlafaxine (EFFEXOR) 75 MG tablet Take 75 mg by mouth 2 (two) times daily.     No current facility-administered medications for this visit.       REVIEW OF SYSTEMS:    GENERAL:  No weight loss, malaise or fevers.  HEENT:   No recent changes in vision or hearing + vision   NECK:  Negative for lumps, no difficulty with swallowing.  RESPIRATORY:  Negative for cough, wheezing or shortness of breath, patient denies any recent URI.  CARDIOVASCULAR:  Negative for chest pain, leg swelling or palpitations.  GI:  Negative for abdominal discomfort, blood in stools or black stools or change in bowel habits. + abdominal discomfort   MUSCULOSKELETAL:  See HPI.  SKIN:  Negative for lesions, rash, and itching.  PSYCH:  No mood disorder or recent  psychosocial stressors.  Patients sleep is not disturbed secondary to pain.  HEMATOLOGY/LYMPHOLOGY:  Negative for prolonged bleeding, bruising easily or swollen nodes.  Patient is not currently taking any anti-coagulants  NEURO:   No history of headaches, syncope, paralysis, seizures or tremors. + seizures   All other reviewed and negative other than HPI.    OBJECTIVE:    LMP 05/30/2009     PHYSICAL EXAMINATION:    Video visit:  GENERAL: Well appearing, in no acute distress, alert and oriented x3. Skinny, Fraile  PSYCH:  Mood and affect appropriate.  SKIN: Skin color, texture, turgor normal, no rashes or lesions.  HEAD/FACE:  Normocephalic, atraumatic. Cranial nerves grossly intact.    Prior in-person visit  BACK:   - Hyperkyphosis of the thoracic spine with paravertebral humping  - Negative spinous process tenderness  - Negative paravertebral tenderness  - Negative pain to palpation over the facet joints of the thoracic and lumbar spine.   -decreased ROM with flexion and extension of the lumbar spine  -flexion causes increased pain in the mid/low back without radiation into the legs  -Light percussion of the thoracic and lumbar spine reproduces pain around the L2 level    MUSCULOSKELETAL:  No atrophy or tone abnormalities are noted in the UE or LE.  No deformities, edema, or skin discoloration are noted on visible skin. Good capillary refill.    NEURO: Bilateral upper and lower extremity coordination and muscle stretch reflexes are physiologic and symmetric.      NEUROLOGICAL EXAM:  MENTAL STATUS: A x O x 3, good concentration, speech is fluent and goal directed  MEMORY: recent and remote are intact  CN: CN2-12 grossly intact  MOTOR: 5/5 in all muscle groups except b/l hip flexion 4/5  DTRs: 2+ intact symmetric  Sensation:    -no Loss of sensation in a left lower and right lower L-1, L-2, L-3, L-4 and L-5 bilaterally distribution.  Babinski: absent on the bilateral side(s)    GAIT: flexed, kyphotic posture

## 2023-08-15 ENCOUNTER — PATIENT MESSAGE (OUTPATIENT)
Dept: PAIN MEDICINE | Facility: CLINIC | Age: 58
End: 2023-08-15
Payer: MEDICARE

## 2023-08-17 ENCOUNTER — INFUSION (OUTPATIENT)
Dept: INFUSION THERAPY | Facility: HOSPITAL | Age: 58
End: 2023-08-17
Attending: INTERNAL MEDICINE
Payer: MEDICARE

## 2023-08-17 VITALS
DIASTOLIC BLOOD PRESSURE: 75 MMHG | WEIGHT: 116.63 LBS | BODY MASS INDEX: 19.43 KG/M2 | HEIGHT: 65 IN | HEART RATE: 105 BPM | RESPIRATION RATE: 18 BRPM | TEMPERATURE: 98 F | SYSTOLIC BLOOD PRESSURE: 128 MMHG | OXYGEN SATURATION: 98 %

## 2023-08-17 DIAGNOSIS — K50.90 CROHN'S DISEASE WITHOUT COMPLICATION, UNSPECIFIED GASTROINTESTINAL TRACT LOCATION: Primary | ICD-10-CM

## 2023-08-17 PROCEDURE — 96365 THER/PROPH/DIAG IV INF INIT: CPT

## 2023-08-17 PROCEDURE — 63600175 PHARM REV CODE 636 W HCPCS: Performed by: INTERNAL MEDICINE

## 2023-08-17 RX ORDER — HEPARIN 100 UNIT/ML
500 SYRINGE INTRAVENOUS
Status: CANCELLED
Start: 2023-10-12

## 2023-08-17 RX ORDER — HEPARIN 100 UNIT/ML
500 SYRINGE INTRAVENOUS
Status: COMPLETED | OUTPATIENT
Start: 2023-08-17 | End: 2023-08-17

## 2023-08-17 RX ADMIN — HEPARIN 500 UNITS: 100 SYRINGE at 03:08

## 2023-10-10 ENCOUNTER — PATIENT MESSAGE (OUTPATIENT)
Dept: PAIN MEDICINE | Facility: CLINIC | Age: 58
End: 2023-10-10
Payer: MEDICARE

## 2023-10-12 ENCOUNTER — INFUSION (OUTPATIENT)
Dept: INFUSION THERAPY | Facility: HOSPITAL | Age: 58
End: 2023-10-12
Attending: INTERNAL MEDICINE
Payer: MEDICARE

## 2023-10-12 VITALS
OXYGEN SATURATION: 97 % | SYSTOLIC BLOOD PRESSURE: 114 MMHG | RESPIRATION RATE: 16 BRPM | BODY MASS INDEX: 20.53 KG/M2 | WEIGHT: 123.19 LBS | HEART RATE: 76 BPM | TEMPERATURE: 98 F | DIASTOLIC BLOOD PRESSURE: 68 MMHG | HEIGHT: 65 IN

## 2023-10-12 DIAGNOSIS — K50.90 CROHN'S DISEASE WITHOUT COMPLICATION, UNSPECIFIED GASTROINTESTINAL TRACT LOCATION: Primary | ICD-10-CM

## 2023-10-12 PROCEDURE — 63600175 PHARM REV CODE 636 W HCPCS: Performed by: INTERNAL MEDICINE

## 2023-10-12 PROCEDURE — 96365 THER/PROPH/DIAG IV INF INIT: CPT

## 2023-10-12 RX ORDER — HEPARIN 100 UNIT/ML
500 SYRINGE INTRAVENOUS
Status: CANCELLED
Start: 2023-12-07

## 2023-10-12 RX ORDER — HEPARIN 100 UNIT/ML
500 SYRINGE INTRAVENOUS
Status: COMPLETED | OUTPATIENT
Start: 2023-10-12 | End: 2023-10-12

## 2023-10-12 RX ADMIN — HEPARIN 500 UNITS: 100 SYRINGE at 03:10

## 2023-10-12 NOTE — PLAN OF CARE
Problem: Fatigue  Goal: Improved Activity Tolerance  Outcome: Ongoing, Progressing  Intervention: Promote Improved Energy  Flowsheets (Taken 10/12/2023 1603)  Fatigue Management:   frequent rest breaks encouraged   activity schedule adjusted   activity assistance provided   paced activity encouraged   fatigue-related activity identified  Activity Management: Ambulated -L4

## 2023-10-13 ENCOUNTER — OFFICE VISIT (OUTPATIENT)
Dept: OPHTHALMOLOGY | Facility: CLINIC | Age: 58
End: 2023-10-13
Payer: MEDICARE

## 2023-10-13 DIAGNOSIS — H25.13 NUCLEAR SCLEROSIS, BILATERAL: Primary | ICD-10-CM

## 2023-10-13 PROCEDURE — 99999 PR PBB SHADOW E&M-EST. PATIENT-LVL III: ICD-10-PCS | Mod: PBBFAC,,, | Performed by: OPHTHALMOLOGY

## 2023-10-13 PROCEDURE — 99214 PR OFFICE/OUTPT VISIT, EST, LEVL IV, 30-39 MIN: ICD-10-PCS | Mod: S$PBB,,, | Performed by: OPHTHALMOLOGY

## 2023-10-13 PROCEDURE — 99214 OFFICE O/P EST MOD 30 MIN: CPT | Mod: S$PBB,,, | Performed by: OPHTHALMOLOGY

## 2023-10-13 PROCEDURE — 99213 OFFICE O/P EST LOW 20 MIN: CPT | Mod: PBBFAC | Performed by: OPHTHALMOLOGY

## 2023-10-13 PROCEDURE — 99999 PR PBB SHADOW E&M-EST. PATIENT-LVL III: CPT | Mod: PBBFAC,,, | Performed by: OPHTHALMOLOGY

## 2023-10-13 RX ORDER — MOXIFLOXACIN 5 MG/ML
1 SOLUTION/ DROPS OPHTHALMIC
Status: CANCELLED | OUTPATIENT
Start: 2023-10-13

## 2023-10-13 RX ORDER — PHENYLEPHRINE HYDROCHLORIDE 100 MG/ML
1 SOLUTION/ DROPS OPHTHALMIC
Status: CANCELLED | OUTPATIENT
Start: 2023-10-13

## 2023-10-13 RX ORDER — CYCLOP/TROP/PROPA/PHEN/KET/WAT 1-1-0.1%
1 DROPS (EA) OPHTHALMIC (EYE)
Status: CANCELLED | OUTPATIENT
Start: 2023-10-13

## 2023-10-13 NOTE — PROGRESS NOTES
HPI    DLS: 9/7/2022 Dr. Serrato    Pt is here today for cataract f/u. Pt states that she has noticed a   drastic decline in near and distant VA. States that she is having trouble   with her day to day activities due to blurred vision and that her glasses   are no longer helping. She no longer feels comfortable driving. Pt states   that she can no longer see facial features or see her own eye lashes when   trying to use mascara. Pt states that she is ready to schedule sx.     Eye Meds: No gtts  Last edited by Mary Lira on 10/13/2023 10:29 AM.            Assessment /Plan     For exam results, see Encounter Report.    Nuclear sclerosis, bilateral      Visually Significant Cataract: Patient reports decreased vision consistent with the clinical amount of lenticular opacity, which reaches the level of visual significance and affects activities of daily living.     Specifically, this patient describes difficulty with:  - driving safely at night  - reading road signs  - reading small print  - deciphering medicine bottles  - reading the newspaper  - using the phone  - reading texts     Risks, benefits, and alternatives to cataract surgery were discussed and the consent reviewed. IOL options were discussed, including ATIOLs and the associated side effects and additional patient cost associated with them.   IOL Selections:   Right eye  IOL: CNWTT0 20.5     Left eye  IOL: CNWTT0 20.5    Pt wishes to have LEFT eye done first.  The patient expresses a desire to reduce spectacle dependence. I reviewed various IOL and LASER refractive surgical options and we will attempt to minimize spectacle dependence by managing astigmatism and optimizing IOL selection. Customized Cataract Surgery with Vision Correction (CCVC) was explained to the patient with educational videos and discussion.  The patient voices understanding and wishes to implement this technology during the cataract procedure.  I explained the increased precision of the  LASER versus manual techniques, especially as it relates to astigmatism reduction with arcuate incisions.  I emphasized that although our goal is to reduce the need for refractive correction (glasses or contacts) after surgery, there may still be a need for spectacle correction to achieve optimal visual acuity, and that a reasonable range of functional vision should be the expectation.  No guarantees are made about post operative refraction or visual acuity, as the eye may heal in unpredictable ways, and the standard risks, benefits, and alternatives to cataract surgery were explained.  The patient understands that the refractive portions of this cataract procedure are not covered by insurance, and that there is an out of pocket expense of $2250 per eye. I also explained that even though our pre-operative plan is to utilize advanced refractive technologies during surgery, that I may decide to eliminate part or all of this plan if surgical challenges or complications arise, or I feel that it is not in the patient's best interest. Consent forms and an ABN form were given to the patient to review.    [facility] ONLY

## 2023-11-01 ENCOUNTER — TELEPHONE (OUTPATIENT)
Dept: OPHTHALMOLOGY | Facility: CLINIC | Age: 58
End: 2023-11-01
Payer: MEDICARE

## 2023-11-01 NOTE — TELEPHONE ENCOUNTER
----- Message from Abi Marshall sent at 10/31/2023  4:36 PM CDT -----  Contact: pt @ [phone]    ----- Message -----  From: Tamanna Ordoñez  Sent: 10/31/2023   4:31 PM CDT  To: [facility] Staff    Carline Chang calling regarding Appointment Access  (message) for #pt is calling to sechedule surgery, asking for call back

## 2023-11-09 DIAGNOSIS — H25.12 NUCLEAR SCLEROTIC CATARACT OF LEFT EYE: Primary | ICD-10-CM

## 2023-11-10 RX ORDER — PREDNISOLONE ACETATE-GATIFLOXACIN-BROMFENAC .75; 5; 1 MG/ML; MG/ML; MG/ML
1 SUSPENSION/ DROPS OPHTHALMIC 3 TIMES DAILY
Qty: 5 ML | Refills: 3 | Status: SHIPPED | OUTPATIENT
Start: 2023-11-10

## 2023-11-14 ENCOUNTER — TELEPHONE (OUTPATIENT)
Dept: PAIN MEDICINE | Facility: CLINIC | Age: 58
End: 2023-11-14
Payer: MEDICARE

## 2023-11-14 ENCOUNTER — PATIENT MESSAGE (OUTPATIENT)
Dept: PAIN MEDICINE | Facility: CLINIC | Age: 58
End: 2023-11-14
Payer: MEDICARE

## 2023-11-15 ENCOUNTER — TELEPHONE (OUTPATIENT)
Dept: PAIN MEDICINE | Facility: CLINIC | Age: 58
End: 2023-11-15
Payer: MEDICARE

## 2023-11-15 DIAGNOSIS — S22.080A COMPRESSION FRACTURE OF T12 VERTEBRA, INITIAL ENCOUNTER: ICD-10-CM

## 2023-11-15 DIAGNOSIS — S32.030A COMPRESSION FRACTURE OF L3 LUMBAR VERTEBRA, CLOSED, INITIAL ENCOUNTER: ICD-10-CM

## 2023-11-15 RX ORDER — HYDROCODONE BITARTRATE AND ACETAMINOPHEN 10; 325 MG/1; MG/1
1 TABLET ORAL EVERY 6 HOURS PRN
Qty: 120 TABLET | Refills: 0 | Status: SHIPPED | OUTPATIENT
Start: 2023-12-15 | End: 2024-01-03 | Stop reason: SDUPTHER

## 2023-11-15 RX ORDER — HYDROCODONE BITARTRATE AND ACETAMINOPHEN 10; 325 MG/1; MG/1
1 TABLET ORAL EVERY 6 HOURS PRN
Qty: 120 TABLET | Refills: 0 | Status: SHIPPED | OUTPATIENT
Start: 2023-11-15 | End: 2023-12-15

## 2023-11-15 NOTE — TELEPHONE ENCOUNTER
I sent her script in.  They pharmacy could not see the one I already put in previously for some reason.  Please let her know that I sent in a script for today and for  on 12/15/23.  Please make sure she makes her appointment on 1/3/24.  Thank you.

## 2023-11-15 NOTE — TELEPHONE ENCOUNTER
----- Message from Agnes Burrows sent at 11/15/2023  9:05 AM CST -----  Regarding: refill  Pt calling in regards to Pharmacy say pt needs a new script for her Rx , please call once called in , pt is out of her medication       HYDROcodone-acetaminophen (NORCO)  mg per tablet      [facility]/pharmacy #[id number] - [place], [place] - [address] AT [facility]  [address]  [place] [address]  Phone: [phone] Fax: [phone]    Confirmed patient's contact info below:  Contact Name: Carline Resendezgabe  Phone Number: [phone]

## 2023-12-07 ENCOUNTER — TELEPHONE (OUTPATIENT)
Dept: INFUSION THERAPY | Facility: HOSPITAL | Age: 58
End: 2023-12-07

## 2023-12-08 ENCOUNTER — INFUSION (OUTPATIENT)
Dept: INFUSION THERAPY | Facility: HOSPITAL | Age: 58
End: 2023-12-08
Attending: INTERNAL MEDICINE
Payer: MEDICARE

## 2023-12-08 VITALS
SYSTOLIC BLOOD PRESSURE: 130 MMHG | OXYGEN SATURATION: 95 % | BODY MASS INDEX: 20.79 KG/M2 | WEIGHT: 124.81 LBS | DIASTOLIC BLOOD PRESSURE: 77 MMHG | TEMPERATURE: 98 F | HEART RATE: 84 BPM | HEIGHT: 65 IN | RESPIRATION RATE: 18 BRPM

## 2023-12-08 DIAGNOSIS — K50.90 CROHN'S DISEASE WITHOUT COMPLICATION, UNSPECIFIED GASTROINTESTINAL TRACT LOCATION: Primary | ICD-10-CM

## 2023-12-08 PROCEDURE — 63600175 PHARM REV CODE 636 W HCPCS: Performed by: INTERNAL MEDICINE

## 2023-12-08 PROCEDURE — 96365 THER/PROPH/DIAG IV INF INIT: CPT

## 2023-12-08 RX ORDER — HEPARIN 100 UNIT/ML
500 SYRINGE INTRAVENOUS
Status: CANCELLED
Start: 2024-02-02

## 2023-12-08 RX ORDER — HEPARIN 100 UNIT/ML
500 SYRINGE INTRAVENOUS
Status: CANCELLED
Start: 2023-12-08

## 2023-12-08 RX ORDER — HEPARIN 100 UNIT/ML
500 SYRINGE INTRAVENOUS
Status: COMPLETED | OUTPATIENT
Start: 2023-12-08 | End: 2023-12-08

## 2023-12-08 RX ADMIN — HEPARIN 500 UNITS: 100 SYRINGE at 10:12

## 2023-12-08 NOTE — PLAN OF CARE
Problem: Fatigue  Goal: Improved Activity Tolerance  Intervention: Promote Improved Energy  Flowsheets (Taken 12/8/2023 1030)  Fatigue Management: fatigue-related activity identified  Activity Management: Ambulated -L4

## 2023-12-13 ENCOUNTER — TELEPHONE (OUTPATIENT)
Dept: OPHTHALMOLOGY | Facility: CLINIC | Age: 58
End: 2023-12-13
Payer: MEDICARE

## 2023-12-13 DIAGNOSIS — H25.12 NUCLEAR SCLEROTIC CATARACT OF LEFT EYE: Primary | ICD-10-CM

## 2023-12-28 ENCOUNTER — TELEPHONE (OUTPATIENT)
Dept: OPHTHALMOLOGY | Facility: CLINIC | Age: 58
End: 2023-12-28
Payer: MEDICARE

## 2023-12-28 DIAGNOSIS — H25.11 NUCLEAR SCLEROTIC CATARACT OF RIGHT EYE: Primary | ICD-10-CM

## 2024-01-03 ENCOUNTER — LAB VISIT (OUTPATIENT)
Dept: LAB | Facility: HOSPITAL | Age: 59
End: 2024-01-03
Attending: STUDENT IN AN ORGANIZED HEALTH CARE EDUCATION/TRAINING PROGRAM
Payer: MEDICARE

## 2024-01-03 ENCOUNTER — OFFICE VISIT (OUTPATIENT)
Dept: PAIN MEDICINE | Facility: CLINIC | Age: 59
End: 2024-01-03
Payer: MEDICARE

## 2024-01-03 VITALS
BODY MASS INDEX: 20.79 KG/M2 | DIASTOLIC BLOOD PRESSURE: 71 MMHG | SYSTOLIC BLOOD PRESSURE: 117 MMHG | HEART RATE: 89 BPM | WEIGHT: 124.75 LBS | HEIGHT: 65 IN

## 2024-01-03 DIAGNOSIS — F11.90 CHRONIC, CONTINUOUS USE OF OPIOIDS: ICD-10-CM

## 2024-01-03 DIAGNOSIS — S22.080A COMPRESSION FRACTURE OF T12 VERTEBRA, INITIAL ENCOUNTER: Primary | ICD-10-CM

## 2024-01-03 DIAGNOSIS — E83.51 HYPOCALCEMIA: ICD-10-CM

## 2024-01-03 DIAGNOSIS — S32.030A COMPRESSION FRACTURE OF L3 LUMBAR VERTEBRA, CLOSED, INITIAL ENCOUNTER: ICD-10-CM

## 2024-01-03 PROCEDURE — 80326 AMPHETAMINES 5 OR MORE: CPT | Performed by: STUDENT IN AN ORGANIZED HEALTH CARE EDUCATION/TRAINING PROGRAM

## 2024-01-03 PROCEDURE — 99999 PR PBB SHADOW E&M-EST. PATIENT-LVL III: CPT | Mod: PBBFAC,,, | Performed by: STUDENT IN AN ORGANIZED HEALTH CARE EDUCATION/TRAINING PROGRAM

## 2024-01-03 PROCEDURE — 99214 OFFICE O/P EST MOD 30 MIN: CPT | Mod: S$PBB,,, | Performed by: STUDENT IN AN ORGANIZED HEALTH CARE EDUCATION/TRAINING PROGRAM

## 2024-01-03 PROCEDURE — 99213 OFFICE O/P EST LOW 20 MIN: CPT | Mod: PBBFAC | Performed by: STUDENT IN AN ORGANIZED HEALTH CARE EDUCATION/TRAINING PROGRAM

## 2024-01-03 RX ORDER — HYDROCODONE BITARTRATE AND ACETAMINOPHEN 10; 325 MG/1; MG/1
1 TABLET ORAL EVERY 6 HOURS PRN
Qty: 120 TABLET | Refills: 0 | Status: SHIPPED | OUTPATIENT
Start: 2024-02-10 | End: 2024-03-11

## 2024-01-03 RX ORDER — HYDROCODONE BITARTRATE AND ACETAMINOPHEN 10; 325 MG/1; MG/1
1 TABLET ORAL EVERY 6 HOURS PRN
Qty: 120 TABLET | Refills: 0 | Status: SHIPPED | OUTPATIENT
Start: 2024-03-11 | End: 2024-04-10

## 2024-01-03 RX ORDER — ERGOCALCIFEROL 1.25 MG/1
50000 CAPSULE ORAL
Qty: 4 CAPSULE | Refills: 11 | Status: SHIPPED | OUTPATIENT
Start: 2024-01-03 | End: 2024-12-28

## 2024-01-03 RX ORDER — HYDROCODONE BITARTRATE AND ACETAMINOPHEN 10; 325 MG/1; MG/1
1 TABLET ORAL EVERY 6 HOURS PRN
Qty: 120 TABLET | Refills: 0 | Status: SHIPPED | OUTPATIENT
Start: 2024-01-11 | End: 2024-02-10

## 2024-01-03 NOTE — PROGRESS NOTES
Chronic Pain - f/u    Referring Physician: No ref. provider found    Date: 01/03/2024     Re: Carline Chang  MR#: [id number]  YOB: 1965  Age: 58 y.o.    Chief Complaint: back and leg pain  Chief Complaint   Patient presents with    Back Pain    Leg Pain       **This note is dictated using the M*Modal Fluency Direct word recognition program. There are word recognition mistakes that are occasionally missed on review.**    ASSESSMENT: 58 y.o. year old female with back pain, consistent with     1. Compression fracture of T12 vertebra, initial encounter  ergocalciferol (ERGOCALCIFEROL) 50,000 unit Cap    HYDROcodone-acetaminophen (NORCO)  mg per tablet    HYDROcodone-acetaminophen (NORCO)  mg per tablet    HYDROcodone-acetaminophen (NORCO)  mg per tablet      2. Hypocalcemia  ergocalciferol (ERGOCALCIFEROL) 50,000 unit Cap      3. Compression fracture of L3 lumbar vertebra, closed, initial encounter  ergocalciferol (ERGOCALCIFEROL) 50,000 unit Cap    HYDROcodone-acetaminophen (NORCO)  mg per tablet    HYDROcodone-acetaminophen (NORCO)  mg per tablet    HYDROcodone-acetaminophen (NORCO)  mg per tablet      4. Chronic, continuous use of opioids  Pain Clinic Drug Screen          PLAN:     Chronic back pain 2/2 multiple compression fractures  -T5, T8, severe T12, L3 compression fractures on XR. Chornic on MRI.  Not suitable for vertebral augmentation.  -Look into finding a pain physisian or PCP in [place] that can refill her narcotic medications so she does not need to travel so far to get them.  I will provide a refill of her pain meds in the meantime.  -MRI lumbar spine completed.  -discussed trial of MBB/RFA for the T12 compression fracture to target the posterior elements.  Will defer that for now and try RAMOS first.  -s/p L4-5 RAMOS on 11/222 - 100% for 7 days, then pain back to baseline. Discussed risks of repeated steroid exposure and her osteoporosis.  Do not  recommend a repeat for another 3-4 months.  -recommended again speaking with endocrinology again about medications for bone quality. She should be taking more than just calcium. She has not gotten in touch with them. I reiterated again that she needs to do this.  -she is not doing weight bearing exercises. Discussion about this again  -refill Hydrocodone 10mg QID x3  -Rx vitamin D 30482 units/week     Lumbar radiculopathy  -has some elements of radiculitis on both sides  -discussed trial of SCS.  Defer for now. This would be a reasonable option if she is not a candidate for surgery and her bone quality continues to be poor.  -continue gabapentin to 300mg TID. This is helpful    Crohn's disease  -recent surgery  -hx of 30 years of oral steroids leading to osteoporosis and compression fracture    History of seizures  -continue keppra    Chronic use of opioids  -her pain provider has passed away and she does not have anyone to fill her medications  -UDS and pain contract completed  -The patient is here today for opioid pain medications and they believe these provide effective pain control and improvements in quality of life.  The patient notes no serious side effects, and feels the benefits outweigh the risks.  The patient was reminded of the pain contract that they signed previously as well as the risks and benefits of the medication including possible death.  The updated [place] Board of Pharmacy prescription monitoring program was reviewed, and the patient has been found to be compliant with current treatment plan.      - RTC before 4/10/24  - Counseled patient regarding the importance of activity modification.    The above plan and management options were discussed at length with patient. Patient is in agreement with the above and verbalized understanding. It will be communicated with the referring physician via electronic record, fax, or mail.  Lab/study reports reviewed were important and necessary because  subsequent medical and treatment recommendations required review of the above lab/study reports. Images viewed/reviewed above were important and necessary because subsequent medical and treatment recommendations required review of the reviewed image(s).     Electronically signed by:  Royal Brewster DO  01/03/2024    =========================================================================================================    SUBJECTIVE:    Interval History 1/3/2024:     Carline Chang is a 58 y.o. female presents to the clinic for follow up.  Since last visit the pain has has significantly improved.    The pain is located in the middle back area and radiates to the legs .  The pain is described as aching    At BEST  0/10   At WORST  7/10 on the WORST day.    On average pain is rated as 2/10.   Today the pain is rated as 2/10  Symptoms interfere with daily activity.   Exacerbating factors: Standing.    Mitigating factors medications.     Current pain medications: Norco 10mg QID, gabapentin 300mg TID  Failed Pain Medications:  Icy hot, Tylenol, cannot take NSIADs    Interval History 8/10/2023:   Carline Chang is a 58 y.o. female presents to the clinic for follow up.  Since last visit the pain has has moderately improved. Patient states that things have been going well. She states that the pain has improved. Pain today is 3/10.    Exacerbating factors: activity.    Mitigating factors medications.     Interval History 5/18/2023:   Carline Chang is a 58 y.o. female presents to the clinic for follow up.  Since last visit the pain has has moderately improved.     The pain is located in the back area and radiates to the legs .  The pain is described as aching    At BEST  3/10   At WORST  8/10 on the WORST day.    On average pain is rated as 4/10.   Today the pain is rated as 3/10  Symptoms interfere with nothing  .   Exacerbating factors: nothing.    Mitigating factors medications.  "    Interval History 2/15/2023:   Carline Chang is a 58 y.o. female presents to the clinic for follow up.  Since last visit the pain has is unchanged. Pain 6/10. She is still getting a lot of pain going down the legs.    Interval History 11/23/2022:     Carline Chang is a 58 y.o. female presents to the clinic for follow up.  Since last visit the pain has is unchanged. Patient is s/p L4/L5 ILESI 11/02/2022. Worked very well for one week, 100%. Pain then returned back to same location and same intensity.  Pain is 7/10 without hydrocodone. Feels right leg stronger than the left.     Interval History 9/7/2022:     Carline Chang is a 58 y.o. female presents to the clinic for follow up.  Since last visit the pain has is unchanged. Pain is in the mid back and she gets radiation down the right leg to the knee. May have some knee arthritis as well that is confounding symptoms.  Difficult to walk and move. Average pain is 6/10. Today is 6/10.    Initial hx:  Carline Chang is a 58 y.o. female presents to the clinic for the evaluation of mid/ lower back pain. The pain started 7 months ago following  seizures   and symptoms have been worsening.  The patient states that she had a bunch of seizures in January and think that the compression fractures happened at that time.  She saw Dr. Harden recently for the back pain. Hx of crohns disease and 30 year history of chronic steroids.  She stopped taking them a year or two ago. She has lost 3-4" in height.     The patient had abdominal surgery in January for her Crohn's for lysis of adhesions. She stil gets abdominal bloating.  She has poor absorption of electrolytes and needs to get frequent IV medications.    The patient was seeing a PCP in [place] but they passed away.  She was getting Norco 10mg QID.     Pain Description:    The pain is located in the mid/lower back area and radiates to the bilalteral knee .    At BEST  3/10   At WORST  " 8/10 on the WORST day.    On average pain is rated as 5/10.   Today the pain is rated as 6/10  The pain is continuous.  The pain is described as aching and sharp    Symptoms interfere with daily activity and sleeping.   Exacerbating factors: Sitting, Standing, Bending, Walking, Extension, Lifting and Getting out of bed/chair.    Mitigating factors nothing and medications.   She reports 8 hours of sleep per night.    Physical Therapy/Home Exercise: Yes, currently in Physical therapy    Current Pain Medications:    - Norco 10mg QID    Failed Pain Medications:    - Icy hot, Tylenol, cannot take NSIADs    Pain Treatment Therapies:    Pain procedures: none  Physical Therapy: none  Chiropractor: none  Acupuncture: none  TENS unit: none  Spinal decompression: none  Joint replacement: none    Patient denies urinary incontinence, bowel incontinence, significant motor weakness and loss of sensations.  Patient denies any suicidal or homicidal ideations     report:  Reviewed and consistent with medication use as prescribed.    Imaging:   Lumbar XR 07/2022:    FINDINGS:  There is a severe compression fracture deformity of T12 with mild retropulsion of osseous structure posteriorly.     There is a mild loss of height at the superior endplate of L3, unchanged.     No new fracture seen.     Flexion and extension views demonstrate no translational abnormalities.     The sacroiliac joints appear symmetrical.     Surgical clips right upper abdominal quadrant, surgical bowel suture in the right mid and right lower abdominal region.     Air-fluid levels noted, midline with distended colon measuring about 13 cm.     Impression:     Severe compression fracture of T12 and mild superior endplate compression fracture of L3, unchanged from 01/19/2022    Thoracic XR 07/2022:     FINDINGS:  There is a port in the left upper thorax, distal tip projects in the region of the SVC/right atrium region.     The alignment of the thoracic spine  demonstrates a subtle dextroscoliosis.  There is a severe wedge compression fracture deformity of T12.  There is a mild height loss fracture deformity of T8 and T5 which appears to have been present on CT 2022 chest and abdomen CT examination.  Subtle concavity at the superior endplate of T10-T11 is unchanged from CT examination of 2022.  No definite new fracture identified.  No rib abnormality seen.  Surgical clips right upper abdominal quadrant.     Impression:     Multiple thoracic spine vertebrae fractures, all appears to have been present on 2022 CT exam.  There is a severe compression fracture of T12.  Please see details above.      Past Medical History:   Diagnosis Date    Abnormal Pap smear of cervix     LEEP procedure,     Acute deep vein thrombosis (DVT) of brachial vein of right upper extremity 2017    on RUE ultrasound    Anxiety     Bowel perforation     Chronic pain     Compression fracture of C-spine     Crohn's disease of both small and large intestine with intestinal obstruction     Difficult intravenous access     Encounter for blood transfusion     GERD (gastroesophageal reflux disease)     Kidney stones     Nephrolithiasis     Osteoporosis     Stricture of bowel      Past Surgical History:   Procedure Laterality Date    ABSCESS DRAINAGE      ANAL FISTULOTOMY      BOWEL RESECTION      small bowel resection per Dr. Uribe 2018    CERVICAL BIOPSY  W/ LOOP ELECTRODE EXCISION       SECTION      x2    CHOLECYSTECTOMY  2000    COLON SURGERY  2015    sigmoid loop colostomy with subsequent reversal 3 years later    COLONOSCOPY N/A 2016    Procedure: COLONOSCOPY;  Surgeon: Andre Uribe MD;  Location: [facility] (4TH FLR);  Service: Endoscopy;  Laterality: N/A;    COLONOSCOPY N/A 2017    Procedure: COLONOSCOPY;  Surgeon: Dany Stock MD;  Location: [facility] (2ND FLR);  Service: Endoscopy;  Laterality: N/A;    COLONOSCOPY N/A 2017     Procedure: COLONOSCOPY;  Surgeon: Andre Uribe MD;  Location: [facility] (4TH FLR);  Service: Endoscopy;  Laterality: N/A;    COLONOSCOPY N/A 02/08/2018    Procedure: COLONOSCOPY;  Surgeon: Andre Uribe MD;  Location: [facility] ENDO (4TH FLR);  Service: Endoscopy;  Laterality: N/A;    COLONOSCOPY N/A 03/24/2018    Procedure: COLONOSCOPY;  Surgeon: Elmer Serrato MD;  Location: [facility] ENDO (2ND FLR);  Service: Endoscopy;  Laterality: N/A;    COLONOSCOPY  03/24/2018    COLONOSCOPY N/A 07/06/2018    Procedure: COLONOSCOPY;  Surgeon: Andre Uribe MD;  Location: [facility] ENDO (4TH FLR);  Service: Endoscopy;  Laterality: N/A;    COLONOSCOPY N/A 10/07/2021    Procedure: COLONOSCOPY;  Surgeon: Jose Hughes MD;  Location: [facility] ENDO;  Service: Endoscopy;  Laterality: N/A;    EPIDURAL STEROID INJECTION N/A 11/2/2022    Procedure: INJECTION, STEROID, EPIDURAL L4-L5 CONTRAST;  Surgeon: Royal Brewster DO;  Location: [facility] PAIN MGT;  Service: Pain Management;  Laterality: N/A;    ESOPHAGOGASTRODUODENOSCOPY N/A 10/07/2021    Procedure: EGD (ESOPHAGOGASTRODUODENOSCOPY);  Surgeon: Jose Hughes MD;  Location: [facility];  Service: Endoscopy;  Laterality: N/A;    LAPAROSCOPIC CLOSURE OF COLOSTOMY N/A 08/29/2018    Procedure: CLOSURE, COLOSTOMY, LAPAROSCOPIC;  Surgeon: Andre Uribe MD;  Location: [facility] OR 2ND FLR;  Service: Colon and Rectal;  Laterality: N/A;  converted to open    LYSIS OF ADHESIONS N/A 01/20/2022    Procedure: LYSIS, ADHESIONS;  Surgeon: LLUVIA Post MD;  Location: [facility] OR 2ND FLR;  Service: Colon and Rectal;  Laterality: N/A;  lasting longer than 2 hours, modifier 22      rectovaginal fistula repair  01/2018    SMALL INTESTINE SURGERY  1995    per patient 10 inches removed    SMALL INTESTINE SURGERY  02/2009    abdominal abscess drained, 3 cm colon removed, 11 cm SB removed    TUBAL LIGATION      UPPER GASTROINTESTINAL ENDOSCOPY  2000     Social History     Socioeconomic History     Marital status:    Tobacco Use    Smoking status: Never    Smokeless tobacco: Never   Substance and Sexual Activity    Alcohol use: No     Alcohol/week: 0.0 standard drinks of alcohol    Drug use: No    Sexual activity: Not Currently     Family History   Problem Relation Age of Onset    Cataracts Mother     Heart attack Father 69    Cataracts Father     No Known Problems Sister     No Known Problems Brother     Cancer Maternal Aunt 66        colon ca    No Known Problems Maternal Uncle     No Known Problems Paternal Aunt     No Known Problems Paternal Uncle     No Known Problems Maternal Grandmother     No Known Problems Maternal Grandfather     No Known Problems Paternal Grandmother     No Known Problems Paternal Grandfather     No Known Problems Son     No Known Problems Son     No Known Problems Other     Anesthesia problems Neg Hx     Celiac disease Neg Hx     Cirrhosis Neg Hx     Colon cancer Neg Hx     Colon polyps Neg Hx     Crohn's disease Neg Hx     Cystic fibrosis Neg Hx     Esophageal cancer Neg Hx     Hemochromatosis Neg Hx     Inflammatory bowel disease Neg Hx     Irritable bowel syndrome Neg Hx     Liver cancer Neg Hx     Liver disease Neg Hx     Rectal cancer Neg Hx     Stomach cancer Neg Hx     Ulcerative colitis Neg Hx     Mars's disease Neg Hx     Lymphoma Neg Hx     Tuberculosis Neg Hx     Scleroderma Neg Hx     Rheum arthritis Neg Hx     Multiple sclerosis Neg Hx     Melanoma Neg Hx     Lupus Neg Hx     Psoriasis Neg Hx     Skin cancer Neg Hx        Review of patient's allergies indicates:   Allergen Reactions    Remicade [infliximab] Shortness Of Breath and Palpitations     Severe muscle and joint, and bone pain    Adalimumab Other (See Comments)    Flagyl [metronidazole] Other (See Comments)     Neuropathy    Nubain [nalbuphine] Anxiety     Upper abdominal burning        Current Outpatient Medications   Medication Sig    calcitRIOL (ROCALTROL) 0.25 MCG Cap Take 1 capsule (0.25 mcg  total) by mouth 2 (two) times daily.    gabapentin (NEURONTIN) 300 MG capsule Take 1 capsule (300 mg total) by mouth 3 (three) times daily.    loperamide (IMODIUM) 2 mg capsule Take 2 capsules (4 mg total) by mouth 4 (four) times daily.    magnesium oxide (MAG-OX) 400 mg (241.3 mg magnesium) tablet Take 400 mg by mouth.    omeprazole (PRILOSEC) 40 MG capsule Take 40 mg by mouth.    OYSTER SHELL CALCIUM 500 500 mg calcium (1,250 mg) tablet Take 2 tablets by mouth 2 (two) times daily.    pantoprazole (PROTONIX) 40 MG tablet Take 40 mg by mouth once daily.    potassium chloride SA (K-DUR,KLOR-CON) 20 MEQ tablet Take 10 mEq by mouth once daily.    prednisolon/gatiflox/bromfenac (PREDNISOL ACE-GATIFLOX-BROMFEN) 1-0.5-0.075 % DrpS Apply 1 drop to eye 3 (three) times daily.    promethazine (PHENERGAN) 25 MG tablet Take 1 tablet (25 mg total) by mouth every 4 (four) hours.    venlafaxine (EFFEXOR) 75 MG tablet Take 75 mg by mouth 2 (two) times daily.    ergocalciferol (ERGOCALCIFEROL) 50,000 unit Cap Take 1 capsule (50,000 Units total) by mouth every 7 days.    [START ON 1/11/2024] HYDROcodone-acetaminophen (NORCO)  mg per tablet Take 1 tablet by mouth every 6 (six) hours as needed for Pain.    [START ON 2/10/2024] HYDROcodone-acetaminophen (NORCO)  mg per tablet Take 1 tablet by mouth every 6 (six) hours as needed for Pain.    [START ON 3/11/2024] HYDROcodone-acetaminophen (NORCO)  mg per tablet Take 1 tablet by mouth every 6 (six) hours as needed for Pain.    levETIRAcetam (KEPPRA) 500 MG Tab Take 1 tablet (500 mg total) by mouth 2 (two) times daily.    TUMS 200 mg calcium (500 mg) chewable tablet Take 2 tablets (1,000 mg total) by mouth 2 (two) times daily.     No current facility-administered medications for this visit.       REVIEW OF SYSTEMS:    GENERAL:  No weight loss, malaise or fevers.  HEENT:   No recent changes in vision or hearing + vision   NECK:  Negative for lumps, no difficulty with  "swallowing.  RESPIRATORY:  Negative for cough, wheezing or shortness of breath, patient denies any recent URI.  CARDIOVASCULAR:  Negative for chest pain, leg swelling or palpitations.  GI:  Negative for abdominal discomfort, blood in stools or black stools or change in bowel habits. + abdominal discomfort   MUSCULOSKELETAL:  See HPI.  SKIN:  Negative for lesions, rash, and itching.  PSYCH:  No mood disorder or recent psychosocial stressors.  Patients sleep is not disturbed secondary to pain.  HEMATOLOGY/LYMPHOLOGY:  Negative for prolonged bleeding, bruising easily or swollen nodes.  Patient is not currently taking any anti-coagulants  NEURO:   No history of headaches, syncope, paralysis, seizures or tremors. + seizures   All other reviewed and negative other than HPI.    OBJECTIVE:    /71 (BP Location: Left arm, Patient Position: Sitting)   Pulse 89   Ht 5' 5" (1.651 m)   Wt 56.6 kg (124 lb 12.5 oz)   LMP 05/30/2009   BMI 20.76 kg/m²     PHYSICAL EXAMINATION:      GENERAL: Well appearing, in no acute distress, alert and oriented x3. Skinny, Fraile  PSYCH:  Mood and affect appropriate.  SKIN: Skin color, texture, turgor normal, no rashes or lesions.  HEAD/FACE:  Normocephalic, atraumatic. Cranial nerves grossly intact.  BACK:   - Hyperkyphosis of the thoracic spine with paravertebral humping  - Negative spinous process tenderness  - Negative paravertebral tenderness  - Negative pain to palpation over the facet joints of the thoracic and lumbar spine.   -decreased ROM with flexion and extension of the lumbar spine  -flexion causes increased pain in the mid/low back without radiation into the legs    MUSCULOSKELETAL:  No atrophy or tone abnormalities are noted in the UE or LE.  No deformities, edema, or skin discoloration are noted on visible skin. Good capillary refill.    NEURO: Bilateral upper and lower extremity coordination and muscle stretch reflexes are physiologic and symmetric.      NEUROLOGICAL " EXAM:  MENTAL STATUS: A x O x 3, good concentration, speech is fluent and goal directed  MEMORY: recent and remote are intact  CN: CN2-12 grossly intact  MOTOR: 5/5 in all muscle groups except b/l hip flexion 4/5  DTRs: 2+ intact symmetric  Sensation:    -no Loss of sensation in a left lower and right lower L-1, L-2, L-3, L-4 and L-5 bilaterally distribution.  Babinski: absent on the bilateral side(s)    GAIT: flexed, kyphotic posture

## 2024-01-04 ENCOUNTER — TELEPHONE (OUTPATIENT)
Dept: INFUSION THERAPY | Facility: HOSPITAL | Age: 59
End: 2024-01-04

## 2024-01-04 NOTE — TELEPHONE ENCOUNTER
LUIS for patient regarding her infusion scheduled for tomorrow that it is a month early.. not due until 2/2/2024 and 3/29/2024 with instructions to call me back and also that the  may reach out to her as well

## 2024-01-09 LAB
6MAM UR QL: NOT DETECTED
7AMINOCLONAZEPAM UR QL: NOT DETECTED
A-OH ALPRAZ UR QL: NOT DETECTED
ALPHA-OH-MIDAZOLAM: NOT DETECTED
ALPRAZ UR QL: NOT DETECTED
AMPHET UR QL SCN: NOT DETECTED
ANNOTATION COMMENT IMP: NORMAL
BARBITURATES UR QL: NEGATIVE
BUPRENORPHINE UR QL: NOT DETECTED
BZE UR QL: NEGATIVE
CARBOXYTHC UR QL: NEGATIVE
CARISOPRODOL UR QL: NEGATIVE
CLONAZEPAM UR QL: NOT DETECTED
CODEINE UR QL: NOT DETECTED
CREAT UR-MCNC: 271 MG/DL (ref 20–400)
DIAZEPAM UR QL: NOT DETECTED
ETHYL GLUCURONIDE UR QL: NEGATIVE
FENTANYL UR QL: NOT DETECTED
GABAPENTIN: PRESENT
HYDROCODONE UR QL: PRESENT
HYDROMORPHONE UR QL: PRESENT
LORAZEPAM UR QL: NOT DETECTED
MDA UR QL: NOT DETECTED
MDEA UR QL: NOT DETECTED
MDMA UR QL: NOT DETECTED
ME-PHENIDATE UR QL: NOT DETECTED
METHADONE UR QL: NEGATIVE
METHAMPHET UR QL: NOT DETECTED
MIDAZOLAM UR QL SCN: NOT DETECTED
MORPHINE UR QL: NOT DETECTED
NALOXONE: NOT DETECTED
NORBUPRENORPHINE UR QL CFM: NOT DETECTED
NORDIAZEPAM UR QL: NOT DETECTED
NORFENTANYL UR QL: NOT DETECTED
NORHYDROCODONE UR QL CFM: PRESENT
NORMEPERIDINE UR QL CFM: NOT DETECTED
NOROXYCODONE UR QL CFM: NOT DETECTED
NOROXYMORPHONE UR QL SCN: NOT DETECTED
OXAZEPAM UR QL: NOT DETECTED
OXYCODONE UR QL: NOT DETECTED
OXYMORPHONE UR QL: NOT DETECTED
PATHOLOGY STUDY: NORMAL
PCP UR QL: NEGATIVE
PHENTERMINE UR QL: NOT DETECTED
PREGABALIN: NOT DETECTED
SERVICE CMNT-IMP: NORMAL
TAPENTADOL UR QL SCN: NOT DETECTED
TAPENTADOL UR QL SCN: NOT DETECTED
TEMAZEPAM UR QL: NOT DETECTED
TRAMADOL UR QL: NEGATIVE
ZOLPIDEM METABOLITE: NOT DETECTED
ZOLPIDEM UR QL: NOT DETECTED

## 2024-01-16 ENCOUNTER — TELEPHONE (OUTPATIENT)
Dept: OPHTHALMOLOGY | Facility: CLINIC | Age: 59
End: 2024-01-16
Payer: MEDICARE

## 2024-01-16 NOTE — TELEPHONE ENCOUNTER
Patient given arrival time of 7:00 am on Thursday January 18 . Nothing to eat or drink after 11:59 pm.  Start drops into the operative eye today. [address] [facility]

## 2024-01-17 NOTE — PRE-PROCEDURE INSTRUCTIONS
1/17 Unable to reach pt via phone. Left message with instructions along with a request for a call back. The following was sent to pt portal.     Dear Carline     Below you will find basic pre-procedure instructions in preparation for your procedure on 1/18 with Dr. Serrato       - Nothing to eat or drink after midnight the night before your procedure until after your procedure, except AM meds with small sips of water.     - HOLD all Diabetic meds AM of surgery  - HOLD all Insulin AM of surgery  - HOLD all Fluid pills AM of surgery  - HOLD all non-insulin shots until after surgery (Ozempic, Mounjaro, Trulicity, Victoza, Byetta, Wegovy and Adlyxin) (up to 7 days prior)  - HOLD all vitamins and herbal meds AM of surgery   - TAKE all B/P meds, EXCEPT those that contain a fluid pill  - USE inhalers as needed and bring AM of surgery  - USE eye drops as directed  -TAKE blood thinner meds AM of surgery unless otherwise instructed     - Shower and wash face with dial soap for 3 mins PM prior and AM of surgery  - No powder, lotions, creams, oils, gels, ointments, makeup,  or jewelry    - Wear comfortable clothing (button up shirt)     (Patient is required to have a responsible ride to transport home, ride may not leave while patient is in surgery)     -- [facility], 2nd floor Surgery Center, located   @ [address] [place] [address]  2nd Floor Registration        If you have any questions or concerns please feel free to contact your surgeon's office.                 [place], LPN [facility]  Pre-Admit - Anesthesia Dept

## 2024-01-17 NOTE — PROGRESS NOTES
CRS Office visit    Visit Info:     DATE OF PROCEDURE:  04/30/2018     PREOPERATIVE DIAGNOSES:  Intermittent small bowel obstruction.  Questions   Crohn's disease, questions adhesions.     POSTOPERATIVE DIAGNOSES:  Intermittent small bowel obstruction.  Questions   Crohn's disease, questions adhesions.     PROCEDURES:  Exploratory laparotomy, lysis of adhesions, a previous ileocolic   resection anastomosis and distal small bowel resection with a primary   anastomosis.        INDICATION:  Ms. Chang is a 52-year-old female with known Crohn's disease   who has an area on MR enterography that was not reachable by endoscopy, which   is consistent with either active Crohn's disease or adhesion disease resulting   in abdominal pain, bloating, nausea or vomiting.  FINAL PATHOLOGIC DIAGNOSIS  ILEUM AND SMALL BOWEL, RESECTION:  Severe chronic active enteritis with extensive ulceration, fistula tract formation, granulomas, consistent with patient's  clinical history of Crohn's disease  No evidence of dysplasia or malignancy  Current Status: Doing well postoperatively.  She is having some incisional pain but the pain that was present before surgery is gone   Physical Exam:  General: White female in NAD sitting in chair in clinic  Neuro: aaox4 maex4 perrl  Respiratory: resps even unlabored  Cardiac: cap refill <2 sec  Abdomen: Normal, benign. Incisions:clean, dry, intact        Assessment and Plan:  Carline was seen today for post-op evaluation.    Diagnoses and all orders for this visit:    Postoperative state    Crohn's disease of small intestine with other complication    Other orders  -     diphenoxylate-atropine 2.5-0.025 mg/5 ml (LOMOTIL) 2.5-0.025 mg/5 mL liquid; Take 10 mLs by mouth 4 (four) times daily.    Will schedule for stoma cloure  I have explained the procedure including indications, alternatives, expected outcomes and potential complications. The patient appears to understand and gives informed consent.  The patient will be evaluated by the preop center     [Chaperone Present] : A chaperone was present in the examining room during all aspects of the physical examination [Normal] : uterus [Discharge] : a  ~M vaginal discharge was present [Moderate] : moderate [White] : white [Thick] : thick [No Bleeding] : there was no active vaginal bleeding [Uterine Adnexae] : were not tender and not enlarged [FreeTextEntry4] : slightly tender suprapubically

## 2024-01-18 ENCOUNTER — ANESTHESIA EVENT (OUTPATIENT)
Dept: SURGERY | Facility: HOSPITAL | Age: 59
End: 2024-01-18
Payer: MEDICARE

## 2024-01-18 ENCOUNTER — ANESTHESIA (OUTPATIENT)
Dept: SURGERY | Facility: HOSPITAL | Age: 59
End: 2024-01-18
Payer: MEDICARE

## 2024-01-18 ENCOUNTER — HOSPITAL ENCOUNTER (OUTPATIENT)
Facility: HOSPITAL | Age: 59
Discharge: HOME | End: 2024-01-18
Attending: OPHTHALMOLOGY | Admitting: OPHTHALMOLOGY
Payer: MEDICARE

## 2024-01-18 VITALS
OXYGEN SATURATION: 94 % | SYSTOLIC BLOOD PRESSURE: 112 MMHG | DIASTOLIC BLOOD PRESSURE: 56 MMHG | HEART RATE: 80 BPM | RESPIRATION RATE: 18 BRPM | TEMPERATURE: 98 F

## 2024-01-18 DIAGNOSIS — H25.13 NUCLEAR SCLEROSIS, BILATERAL: ICD-10-CM

## 2024-01-18 PROCEDURE — 25000003 PHARM REV CODE 250: Performed by: OPHTHALMOLOGY

## 2024-01-18 PROCEDURE — V2788 PRESBYOPIA-CORRECT FUNCTION: HCPCS | Performed by: OPHTHALMOLOGY

## 2024-01-18 PROCEDURE — 99900035 HC TECH TIME PER 15 MIN (STAT)

## 2024-01-18 PROCEDURE — 66999 UNLISTED PX ANT SEGMENT EYE: CPT | Mod: CSM,LT,, | Performed by: OPHTHALMOLOGY

## 2024-01-18 PROCEDURE — D9220A PRA ANESTHESIA: Mod: ,,, | Performed by: NURSE ANESTHETIST, CERTIFIED REGISTERED

## 2024-01-18 PROCEDURE — 37000008 HC ANESTHESIA 1ST 15 MINUTES: Performed by: OPHTHALMOLOGY

## 2024-01-18 PROCEDURE — 36000707: Performed by: OPHTHALMOLOGY

## 2024-01-18 PROCEDURE — 63600175 PHARM REV CODE 636 W HCPCS: Performed by: NURSE ANESTHETIST, CERTIFIED REGISTERED

## 2024-01-18 PROCEDURE — 71000015 HC POSTOP RECOV 1ST HR: Performed by: OPHTHALMOLOGY

## 2024-01-18 PROCEDURE — 36000706: Performed by: OPHTHALMOLOGY

## 2024-01-18 PROCEDURE — 94761 N-INVAS EAR/PLS OXIMETRY MLT: CPT

## 2024-01-18 PROCEDURE — 66984 XCAPSL CTRC RMVL W/O ECP: CPT | Mod: LT,,, | Performed by: OPHTHALMOLOGY

## 2024-01-18 PROCEDURE — 37000009 HC ANESTHESIA EA ADD 15 MINS: Performed by: OPHTHALMOLOGY

## 2024-01-18 RX ORDER — MOXIFLOXACIN 5 MG/ML
1 SOLUTION/ DROPS OPHTHALMIC
Status: COMPLETED | OUTPATIENT
Start: 2024-01-18 | End: 2024-01-18

## 2024-01-18 RX ORDER — PHENYLEPHRINE HYDROCHLORIDE 100 MG/ML
1 SOLUTION/ DROPS OPHTHALMIC
Status: DISCONTINUED | OUTPATIENT
Start: 2024-01-18 | End: 2024-01-18 | Stop reason: HOSPADM

## 2024-01-18 RX ORDER — ACETAMINOPHEN 325 MG/1
650 TABLET ORAL EVERY 4 HOURS PRN
Status: DISCONTINUED | OUTPATIENT
Start: 2024-01-18 | End: 2024-01-18 | Stop reason: HOSPADM

## 2024-01-18 RX ORDER — FENTANYL CITRATE 50 UG/ML
INJECTION, SOLUTION INTRAMUSCULAR; INTRAVENOUS
Status: DISCONTINUED | OUTPATIENT
Start: 2024-01-18 | End: 2024-01-18

## 2024-01-18 RX ORDER — LIDOCAINE HYDROCHLORIDE 40 MG/ML
INJECTION, SOLUTION RETROBULBAR
Status: DISCONTINUED | OUTPATIENT
Start: 2024-01-18 | End: 2024-01-18 | Stop reason: HOSPADM

## 2024-01-18 RX ORDER — MIDAZOLAM HYDROCHLORIDE 1 MG/ML
INJECTION, SOLUTION INTRAMUSCULAR; INTRAVENOUS
Status: DISCONTINUED | OUTPATIENT
Start: 2024-01-18 | End: 2024-01-18

## 2024-01-18 RX ORDER — PROPARACAINE HYDROCHLORIDE 5 MG/ML
SOLUTION/ DROPS OPHTHALMIC
Status: DISCONTINUED | OUTPATIENT
Start: 2024-01-18 | End: 2024-01-18 | Stop reason: HOSPADM

## 2024-01-18 RX ORDER — CYCLOP/TROP/PROPA/PHEN/KET/WAT 1-1-0.1%
1 DROPS (EA) OPHTHALMIC (EYE)
Status: COMPLETED | OUTPATIENT
Start: 2024-01-18 | End: 2024-01-18

## 2024-01-18 RX ORDER — PROPARACAINE HYDROCHLORIDE 5 MG/ML
1 SOLUTION/ DROPS OPHTHALMIC
Status: DISCONTINUED | OUTPATIENT
Start: 2024-01-18 | End: 2024-01-18 | Stop reason: HOSPADM

## 2024-01-18 RX ORDER — MOXIFLOXACIN 5 MG/ML
SOLUTION/ DROPS OPHTHALMIC
Status: DISCONTINUED | OUTPATIENT
Start: 2024-01-18 | End: 2024-01-18 | Stop reason: HOSPADM

## 2024-01-18 RX ADMIN — MIDAZOLAM HYDROCHLORIDE 1 MG: 1 INJECTION, SOLUTION INTRAMUSCULAR; INTRAVENOUS at 08:01

## 2024-01-18 RX ADMIN — FENTANYL CITRATE 50 MCG: 50 INJECTION, SOLUTION INTRAMUSCULAR; INTRAVENOUS at 08:01

## 2024-01-18 RX ADMIN — Medication 1 DROP: at 08:01

## 2024-01-18 RX ADMIN — MOXIFLOXACIN 1 DROP: 5 SOLUTION/ DROPS OPHTHALMIC at 09:01

## 2024-01-18 RX ADMIN — MIDAZOLAM HYDROCHLORIDE 1 MG: 1 INJECTION, SOLUTION INTRAMUSCULAR; INTRAVENOUS at 09:01

## 2024-01-18 RX ADMIN — MOXIFLOXACIN OPHTHALMIC 1 DROP: 5 SOLUTION/ DROPS OPHTHALMIC at 08:01

## 2024-01-18 NOTE — PLAN OF CARE
Discharge instructions given to patient/ friend  and both verbalized understanding of all.  VSS, denies n/v and tolerating PO, rates pain level tolerable. IV removed, and family notified for patient discharge home.

## 2024-01-18 NOTE — H&P
Pre-operative History and Physical  Ophthalmology Service    CC: Cataracts    HPI:   Patient is a 58 y.o. female presents with cataracts requiring surgery as noted in the most recent ophthalmology progress note.    Past Medical History:   Diagnosis Date    Abnormal Pap smear of cervix     LEEP procedure,     Acute deep vein thrombosis (DVT) of brachial vein of right upper extremity 2017    on RUE ultrasound    Anxiety     Bowel perforation     Chronic pain     Compression fracture of C-spine     Crohn's disease of both small and large intestine with intestinal obstruction     Difficult intravenous access     Encounter for blood transfusion     GERD (gastroesophageal reflux disease)     Kidney stones     Nephrolithiasis     Osteoporosis     Stricture of bowel        Past Surgical History:   Procedure Laterality Date    ABSCESS DRAINAGE      ANAL FISTULOTOMY      BOWEL RESECTION      small bowel resection per Dr. Uribe 2018    CERVICAL BIOPSY  W/ LOOP ELECTRODE EXCISION       SECTION      x2    CHOLECYSTECTOMY      COLON SURGERY  2015    sigmoid loop colostomy with subsequent reversal 3 years later    COLONOSCOPY N/A 2016    Procedure: COLONOSCOPY;  Surgeon: Andre Uribe MD;  Location: [facility] (4TH FLR);  Service: Endoscopy;  Laterality: N/A;    COLONOSCOPY N/A 2017    Procedure: COLONOSCOPY;  Surgeon: Dany Stock MD;  Location: [facility] ENDO (2ND FLR);  Service: Endoscopy;  Laterality: N/A;    COLONOSCOPY N/A 2017    Procedure: COLONOSCOPY;  Surgeon: Andre Uribe MD;  Location: [facility] ENDO (4TH FLR);  Service: Endoscopy;  Laterality: N/A;    COLONOSCOPY N/A 2018    Procedure: COLONOSCOPY;  Surgeon: Andre Uribe MD;  Location: [facility] ENDO (4TH FLR);  Service: Endoscopy;  Laterality: N/A;    COLONOSCOPY N/A 2018    Procedure: COLONOSCOPY;  Surgeon: Elmer Serrato MD;  Location: [facility] ENDO (2ND FLR);  Service: Endoscopy;  Laterality:  N/A;    COLONOSCOPY  03/24/2018    COLONOSCOPY N/A 07/06/2018    Procedure: COLONOSCOPY;  Surgeon: Andre Uribe MD;  Location: [facility] ENDO (4TH FLR);  Service: Endoscopy;  Laterality: N/A;    COLONOSCOPY N/A 10/07/2021    Procedure: COLONOSCOPY;  Surgeon: Jose Hughes MD;  Location: [facility] ENDO;  Service: Endoscopy;  Laterality: N/A;    EPIDURAL STEROID INJECTION N/A 11/2/2022    Procedure: INJECTION, STEROID, EPIDURAL L4-L5 CONTRAST;  Surgeon: Royal Brewster DO;  Location: [facility] PAIN MGT;  Service: Pain Management;  Laterality: N/A;    ESOPHAGOGASTRODUODENOSCOPY N/A 10/07/2021    Procedure: EGD (ESOPHAGOGASTRODUODENOSCOPY);  Surgeon: Jose Hughes MD;  Location: [facility] ENDO;  Service: Endoscopy;  Laterality: N/A;    LAPAROSCOPIC CLOSURE OF COLOSTOMY N/A 08/29/2018    Procedure: CLOSURE, COLOSTOMY, LAPAROSCOPIC;  Surgeon: Andre Uribe MD;  Location: [facility] OR 2ND FLR;  Service: Colon and Rectal;  Laterality: N/A;  converted to open    LYSIS OF ADHESIONS N/A 01/20/2022    Procedure: LYSIS, ADHESIONS;  Surgeon: LLUVIA Post MD;  Location: [facility] OR 2ND FLR;  Service: Colon and Rectal;  Laterality: N/A;  lasting longer than 2 hours, modifier 22      rectovaginal fistula repair  01/2018    SMALL INTESTINE SURGERY  1995    per patient 10 inches removed    SMALL INTESTINE SURGERY  02/2009    abdominal abscess drained, 3 cm colon removed, 11 cm SB removed    TUBAL LIGATION      UPPER GASTROINTESTINAL ENDOSCOPY  2000       Family History   Problem Relation Age of Onset    Cataracts Mother     Heart attack Father 69    Cataracts Father     No Known Problems Sister     No Known Problems Brother     Cancer Maternal Aunt 66        colon ca    No Known Problems Maternal Uncle     No Known Problems Paternal Aunt     No Known Problems Paternal Uncle     No Known Problems Maternal Grandmother     No Known Problems Maternal Grandfather     No Known Problems Paternal Grandmother     No Known Problems Paternal  Grandfather     No Known Problems Son     No Known Problems Son     No Known Problems Other     Anesthesia problems Neg Hx     Celiac disease Neg Hx     Cirrhosis Neg Hx     Colon cancer Neg Hx     Colon polyps Neg Hx     Crohn's disease Neg Hx     Cystic fibrosis Neg Hx     Esophageal cancer Neg Hx     Hemochromatosis Neg Hx     Inflammatory bowel disease Neg Hx     Irritable bowel syndrome Neg Hx     Liver cancer Neg Hx     Liver disease Neg Hx     Rectal cancer Neg Hx     Stomach cancer Neg Hx     Ulcerative colitis Neg Hx     Mars's disease Neg Hx     Lymphoma Neg Hx     Tuberculosis Neg Hx     Scleroderma Neg Hx     Rheum arthritis Neg Hx     Multiple sclerosis Neg Hx     Melanoma Neg Hx     Lupus Neg Hx     Psoriasis Neg Hx     Skin cancer Neg Hx        Social History     Socioeconomic History    Marital status:    Tobacco Use    Smoking status: Never    Smokeless tobacco: Never   Substance and Sexual Activity    Alcohol use: No     Alcohol/week: 0.0 standard drinks of alcohol    Drug use: No    Sexual activity: Not Currently       No current facility-administered medications for this encounter.     Current Outpatient Medications   Medication Sig Dispense Refill    calcitRIOL (ROCALTROL) 0.25 MCG Cap Take 1 capsule (0.25 mcg total) by mouth 2 (two) times daily. 60 capsule 11    ergocalciferol (ERGOCALCIFEROL) 50,000 unit Cap Take 1 capsule (50,000 Units total) by mouth every 7 days. 4 capsule 11    gabapentin (NEURONTIN) 300 MG capsule Take 1 capsule (300 mg total) by mouth 3 (three) times daily. 270 capsule 3    HYDROcodone-acetaminophen (NORCO)  mg per tablet Take 1 tablet by mouth every 6 (six) hours as needed for Pain. 120 tablet 0    [START ON 2/10/2024] HYDROcodone-acetaminophen (NORCO)  mg per tablet Take 1 tablet by mouth every 6 (six) hours as needed for Pain. 120 tablet 0    [START ON 3/11/2024] HYDROcodone-acetaminophen (NORCO)  mg per tablet Take 1 tablet by mouth every  6 (six) hours as needed for Pain. 120 tablet 0    levETIRAcetam (KEPPRA) 500 MG Tab Take 1 tablet (500 mg total) by mouth 2 (two) times daily. 60 tablet 0    loperamide (IMODIUM) 2 mg capsule Take 2 capsules (4 mg total) by mouth 4 (four) times daily. 90 capsule 5    magnesium oxide (MAG-OX) 400 mg (241.3 mg magnesium) tablet Take 400 mg by mouth.      omeprazole (PRILOSEC) 40 MG capsule Take 40 mg by mouth.      OYSTER SHELL CALCIUM 500 500 mg calcium (1,250 mg) tablet Take 2 tablets by mouth 2 (two) times daily.      pantoprazole (PROTONIX) 40 MG tablet Take 40 mg by mouth once daily.      potassium chloride SA (K-DUR,KLOR-CON) 20 MEQ tablet Take 10 mEq by mouth once daily.      prednisolon/gatiflox/bromfenac (PREDNISOL ACE-GATIFLOX-BROMFEN) 1-0.5-0.075 % DrpS Apply 1 drop to eye 3 (three) times daily. 5 mL 3    promethazine (PHENERGAN) 25 MG tablet Take 1 tablet (25 mg total) by mouth every 4 (four) hours. 30 tablet 1    TUMS 200 mg calcium (500 mg) chewable tablet Take 2 tablets (1,000 mg total) by mouth 2 (two) times daily. 60 tablet 0    venlafaxine (EFFEXOR) 75 MG tablet Take 75 mg by mouth 2 (two) times daily.         Review of patient's allergies indicates:   Allergen Reactions    Remicade [infliximab] Shortness Of Breath and Palpitations     Severe muscle and joint, and bone pain    Adalimumab Other (See Comments)    Flagyl [metronidazole] Other (See Comments)     Neuropathy    Nubain [nalbuphine] Anxiety     Upper abdominal burning          Review of systems:  Gen: denies fevers, chills, nausea, vomitting, weight changes  HEENT: Denies discharge or coughing/sneezing. +blurry vision  Resp: Denies SOB  CV: Denies CP or palpitations  Abd: Denies tenderness, diarrhea, constipation, nausea  Ext:  Denies pain or edema    Physical Exam  BP: Vital signs stable  General: No apparent distress  HEENT: Normocephalic, atraumatic  Lungs: Adequate respirations, no respiratory distress  Heart: Pulses intact  Abdomen:  Soft, no distention  Rectal/pelvic: Deferred    Assessment  Patient is a 58 y.o. female with cataracts   - Risks/benefits/alternatives of the procedure including, but not limited to scarring, bleeding, infection, loss or decreased vision, and/or need for possible repeat surgery discussed with the patient and/or family, and Informed consent obtained prior to surgery and the patient/family voiced good understanding, witnessed, and placed in chart.  - Will proceed with cataract surgery, left eye  - Plan for local/MAC

## 2024-01-18 NOTE — OP NOTE
SURGEON:  Ladan Serrato M.D.    PREOPERATIVE DIAGNOSIS:    Nuclear Sclerotic Cataract Left Eye    POSTOPERATIVE DIAGNOSIS:    Nuclear Sclerotic Cataract Left  Eye    PROCEDURES:    Phacoemulsification with  intraocular lens, Left eye (98591)  With ORA LASER assist    DATE OF SURGERY: 01/18/2024    IMPLANT: [id number] 20.5    ANESTHESIA:  MAC with topical Lidocaine    COMPLICATIONS:  None    ESTIMATED BLOOD LOSS: None    SPECIMENS: None    INDICATIONS:    The patient has a history of painless progressive visual loss and difficulty with activities of daily living, which specifically include difficult driving at night due to glare and difficulty reading small print, secondary to cataract formation.  After a thorough discussion of the risks, benefits, and alternatives to cataract surgery, including, but not limited to, the rare risks of infection, retinal detachment, hemorrhage, need for additional surgery, loss of vision, and even loss of the eye, the patient voices understanding and desires to proceed.    DESCRIPTION OF PROCEDURE:      The patients IOL calculations were reviewed, and the lens selection confirmed.   After verification and marking of the proper eye in the preop holding area, the patient was brought to the operating room in supine position where the eye was prepped and draped in standard sterile fashion with 5% Betadine and a lid speculum placed in the eye.   Topical 4% Lidocaine was used in addition to the preoperative anesthesia and the procedure was begun by the creation of a paracentesis incision through which viscoelastic was used to fill the anterior chamber.  Next, a keratome blade was used to create a triplanar temporal clear corneal incision and a cystotome and Utrata forceps used to fashion a continuous curvilinear capsulorrhexis.  Hydrodissection was carried out using the Shaw hydrodissection cannula and the nucleus was found to be mobile.  Phacoemulsification of the nucleus was carried out using  a quick chop technique, and all remaining epinuclear and cortical material was removed.  The eye was then reformed with Viscoelastic and ORA was used to verify the proper IOL power. The intraocular lens was then implanted into the capsular bag.  All remaining viscoelastics were removed from the eye and at the end of the case the pupil was round, the lens was well-centered within the capsular bag and all wounds were found to be water tight.  Drops of Vigamox and Pred Forte were instilled and a shield was placed over the eye. The patient will follow up with Dr. Serrato in the morning.

## 2024-01-18 NOTE — ANESTHESIA POSTPROCEDURE EVALUATION
Anesthesia Post Evaluation    Patient: Carline Chang    Procedure(s) Performed: Procedure(s) (LRB):  EXTRACTION, CATARACT, WITH IOL INSERTION (Left)    Final Anesthesia Type: MAC      Patient location during evaluation: PACU  Patient participation: Yes- Able to Participate  Level of consciousness: awake and alert  Post-procedure vital signs: reviewed and stable  Pain management: adequate  Airway patency: patent    PONV status at discharge: No PONV  Anesthetic complications: no      Respiratory status: unassisted                Vitals Value Taken Time   /60 01/18/24 0924   Temp 36.7 01/18/24 0927   Pulse 82 01/18/24 0927   Resp 14 01/18/24 0927   SpO2 97 % 01/18/24 0927   Vitals shown include unvalidated device data.      No case tracking events are documented in the log.      Pain/Estiven Score: No data recorded

## 2024-01-18 NOTE — TRANSFER OF CARE
Anesthesia Transfer of Care Note    Patient: Carline Chang    Procedure(s) Performed: Procedure(s) (LRB):  EXTRACTION, CATARACT, WITH IOL INSERTION (Left)    Patient location: PACU    Anesthesia Type: MAC    Transport from OR: Transported from OR on room air with adequate spontaneous ventilation    Post pain: adequate analgesia    Post assessment: no apparent anesthetic complications    Post vital signs: stable    Level of consciousness: awake    Nausea/Vomiting: no nausea/vomiting    Complications: none    Transfer of care protocol was followed      Last vitals: Visit Vitals  /73 (BP Location: Left arm)   Pulse 72   Temp 36.3 °C (97.3 °F) (Temporal)   Resp 16   LMP 05/30/2009   SpO2 100%

## 2024-01-18 NOTE — ANESTHESIA PREPROCEDURE EVALUATION
2024    Carline Chang is a 58 y.o., female.    Past Medical History:   Diagnosis Date    Abnormal Pap smear of cervix     LEEP procedure,     Acute deep vein thrombosis (DVT) of brachial vein of right upper extremity 2017    on RUE ultrasound    Anxiety     Bowel perforation     Chronic pain     Compression fracture of C-spine     Crohn's disease of both small and large intestine with intestinal obstruction     Difficult intravenous access     Encounter for blood transfusion     GERD (gastroesophageal reflux disease)     Kidney stones     Nephrolithiasis     Osteoporosis     Stricture of bowel      Past Surgical History:   Procedure Laterality Date    ABSCESS DRAINAGE      ANAL FISTULOTOMY      BOWEL RESECTION      small bowel resection per Dr. Uribe 2018    CERVICAL BIOPSY  W/ LOOP ELECTRODE EXCISION       SECTION      x2    CHOLECYSTECTOMY      COLON SURGERY  2015    sigmoid loop colostomy with subsequent reversal 3 years later    COLONOSCOPY N/A 2016    Procedure: COLONOSCOPY;  Surgeon: Andre Uribe MD;  Location: [facility] ENDO (4TH FLR);  Service: Endoscopy;  Laterality: N/A;    COLONOSCOPY N/A 2017    Procedure: COLONOSCOPY;  Surgeon: Dany Stock MD;  Location: [facility] ENDO (2ND FLR);  Service: Endoscopy;  Laterality: N/A;    COLONOSCOPY N/A 2017    Procedure: COLONOSCOPY;  Surgeon: Andre Uribe MD;  Location: [facility] ENDO (4TH FLR);  Service: Endoscopy;  Laterality: N/A;    COLONOSCOPY N/A 2018    Procedure: COLONOSCOPY;  Surgeon: Andre Uribe MD;  Location: [facility] ENDO (4TH FLR);  Service: Endoscopy;  Laterality: N/A;    COLONOSCOPY N/A 2018    Procedure: COLONOSCOPY;  Surgeon: Elmer Serrato MD;  Location: [facility] ENDO (2ND FLR);  Service: Endoscopy;  Laterality: N/A;    COLONOSCOPY  2018     COLONOSCOPY N/A 07/06/2018    Procedure: COLONOSCOPY;  Surgeon: Andre Uribe MD;  Location: [facility] ENDO (4TH FLR);  Service: Endoscopy;  Laterality: N/A;    COLONOSCOPY N/A 10/07/2021    Procedure: COLONOSCOPY;  Surgeon: Jose Hughes MD;  Location: [facility] ENDO;  Service: Endoscopy;  Laterality: N/A;    EPIDURAL STEROID INJECTION N/A 11/2/2022    Procedure: INJECTION, STEROID, EPIDURAL L4-L5 CONTRAST;  Surgeon: Royal Brewster DO;  Location: [facility] PAIN MGT;  Service: Pain Management;  Laterality: N/A;    ESOPHAGOGASTRODUODENOSCOPY N/A 10/07/2021    Procedure: EGD (ESOPHAGOGASTRODUODENOSCOPY);  Surgeon: Jose Hughes MD;  Location: [facility];  Service: Endoscopy;  Laterality: N/A;    LAPAROSCOPIC CLOSURE OF COLOSTOMY N/A 08/29/2018    Procedure: CLOSURE, COLOSTOMY, LAPAROSCOPIC;  Surgeon: Andre Uribe MD;  Location: [facility] OR 2ND FLR;  Service: Colon and Rectal;  Laterality: N/A;  converted to open    LYSIS OF ADHESIONS N/A 01/20/2022    Procedure: LYSIS, ADHESIONS;  Surgeon: LLUVIA Post MD;  Location: [facility] OR 2ND FLR;  Service: Colon and Rectal;  Laterality: N/A;  lasting longer than 2 hours, modifier 22      rectovaginal fistula repair  01/2018    SMALL INTESTINE SURGERY  1995    per patient 10 inches removed    SMALL INTESTINE SURGERY  02/2009    abdominal abscess drained, 3 cm colon removed, 11 cm SB removed    TUBAL LIGATION      UPPER GASTROINTESTINAL ENDOSCOPY  2000           Pre-op Assessment    I have reviewed the Patient Summary Reports.     I have reviewed the Nursing Notes. I have reviewed the NPO Status.      Review of Systems  Anesthesia Hx:   History of prior surgery of interest to airway management or planning:            Denies Personal Hx of Anesthesia complications.                    Cardiovascular:  Exercise tolerance: good                                           Renal/:  Chronic Renal Disease                Hepatic/GI:     GERD             Endocrine:    Hyperthyroidism              Physical Exam  General: Well nourished    Airway:  Mallampati: II   Mouth Opening: Normal  Neck ROM: Normal ROM        Anesthesia Plan  Type of Anesthesia, risks & benefits discussed:    Anesthesia Type: MAC  Informed Consent: Informed consent signed with the Patient and all parties understand the risks and agree with anesthesia plan.  All questions answered.   ASA Score: 3    Ready For Surgery From Anesthesia Perspective.     .

## 2024-01-18 NOTE — DISCHARGE SUMMARY
Outcome: Successful outpatient ophthalmic surgical procedure  Preprinted Instructions given to patient.  Regular diet.  Activity: No restrictions  Meds: see Med Rec  Condition: stable  Follow up: 1 day with Dr Serrato  Disposition: Home  Diagnosis: s/p eye surgery  Date of discharge: 01/18/2024

## 2024-01-18 NOTE — DISCHARGE INSTRUCTIONS
CATARACT SURGERY    POST-OPERATIVE INSTRUCTIONS    · Apply drops THREE times a day into operative eye for 30 days.    · DO NOT rub your eye    · Wear protective sunglasses during the day    · Resume moderate activity    · Bathe/shower/wash face normally    · DO NOT apply makeup around the operative eye for 1 week.         You should expect    - Blurry vision and halos for 24-48 hours    - Dilated pupil for 24-48 hours    - Scratchy feeling in the eye for 1-2 days    - Curved shadow in your peripheral vision for 2-3 weeks    - Occasional flickering of lights for up to 1 week    -If you experience severe pain or nausea, please call Dr Serrato or the on-call doctor at [phone]    - Plan to see Dr Serrato tomorrow .      [facility]    [address] [facility] [address]    ** Most patients can drive the next day, but if you do not feel comfortable driving, please arrange for transportation.

## 2024-01-19 ENCOUNTER — OFFICE VISIT (OUTPATIENT)
Dept: OPHTHALMOLOGY | Facility: CLINIC | Age: 59
End: 2024-01-19
Payer: MEDICARE

## 2024-01-19 DIAGNOSIS — Z98.890 POST-OPERATIVE STATE: Primary | ICD-10-CM

## 2024-01-19 DIAGNOSIS — H25.12 NUCLEAR SCLEROTIC CATARACT OF LEFT EYE: ICD-10-CM

## 2024-01-19 PROCEDURE — 99213 OFFICE O/P EST LOW 20 MIN: CPT | Mod: PBBFAC | Performed by: OPHTHALMOLOGY

## 2024-01-19 PROCEDURE — 99999 PR PBB SHADOW E&M-EST. PATIENT-LVL III: CPT | Mod: PBBFAC,,, | Performed by: OPHTHALMOLOGY

## 2024-01-19 PROCEDURE — 99024 POSTOP FOLLOW-UP VISIT: CPT | Mod: POP,,, | Performed by: OPHTHALMOLOGY

## 2024-01-19 NOTE — PROGRESS NOTES
HPI    Patient present today for 1 day Post Op OS  Pt state no complaints at this time  Denies f/f    PGB TID OS     DATE OF SURGERY: 01/18/2024     IMPLANT: CNWTT0 20.5    Last edited by Jordon Robbins on 1/19/2024  9:45 AM.            Assessment /Plan     For exam results, see Encounter Report.    Post-operative state    Nuclear sclerotic cataract of left eye      Slit lamp exam:  L/L: nl  K: clear, wound sealed  AC: 1+ cell  Lens: IOL centered and stable    POD1 s/p Phaco/IOL  Appropriate precautions and post op medications reviewed.  Patient instructed to call or come in if symptoms of redness, decreased vision, or pain are experienced.

## 2024-01-26 ENCOUNTER — OFFICE VISIT (OUTPATIENT)
Dept: OPHTHALMOLOGY | Facility: CLINIC | Age: 59
End: 2024-01-26
Payer: MEDICARE

## 2024-01-26 DIAGNOSIS — H25.13 NUCLEAR SCLEROSIS, BILATERAL: Primary | ICD-10-CM

## 2024-01-26 DIAGNOSIS — Z98.890 POST-OPERATIVE STATE: ICD-10-CM

## 2024-01-26 PROCEDURE — 99213 OFFICE O/P EST LOW 20 MIN: CPT | Mod: PBBFAC | Performed by: OPHTHALMOLOGY

## 2024-01-26 PROCEDURE — 99024 POSTOP FOLLOW-UP VISIT: CPT | Mod: POP,,, | Performed by: OPHTHALMOLOGY

## 2024-01-26 PROCEDURE — 99999 PR PBB SHADOW E&M-EST. PATIENT-LVL III: CPT | Mod: PBBFAC,,, | Performed by: OPHTHALMOLOGY

## 2024-01-26 RX ORDER — CYCLOP/TROP/PROPA/PHEN/KET/WAT 1-1-0.1%
1 DROPS (EA) OPHTHALMIC (EYE)
Status: CANCELLED | OUTPATIENT
Start: 2024-01-26

## 2024-01-26 RX ORDER — PHENYLEPHRINE HYDROCHLORIDE 100 MG/ML
1 SOLUTION/ DROPS OPHTHALMIC
Status: CANCELLED | OUTPATIENT
Start: 2024-01-26

## 2024-01-26 RX ORDER — MOXIFLOXACIN 5 MG/ML
1 SOLUTION/ DROPS OPHTHALMIC
Status: CANCELLED | OUTPATIENT
Start: 2024-01-26

## 2024-01-26 NOTE — PROGRESS NOTES
HPI    Patient present today for 1 week Post Op OS \  Pt state no complaints at tthis time   Denies f/f      PGB TID OS   Last edited by Jordon Robbins on 1/26/2024  3:04 PM.            Assessment /Plan     For exam results, see Encounter Report.    Nuclear sclerosis, bilateral    Post-operative state      Slit lamp exam:  L/L: nl  K: clear, wound sealed  AC: trace cell  Iris/Lens: IOL centered and stable    POW1 s/p phaco: Surgery healing well with no signs of infection or abnormal inflammation.    Patient wishes to proceed with surgery in the second eye. Risks, benefits, alternatives reviewed. IOL selection reviewed.     Right eye  IOL: CNWTT0 20.5      The patient expresses a desire to reduce spectacle dependence. I reviewed various IOL and LASER refractive surgical options and we will attempt to minimize spectacle dependence by managing astigmatism and optimizing IOL selection. Customized Cataract Surgery with Vision Correction (CCVC) was explained to the patient with educational videos and discussion.  The patient voices understanding and wishes to implement this technology during the cataract procedure.  I explained the increased precision of the LASER versus manual techniques, especially as it relates to astigmatism reduction with arcuate incisions.  I emphasized that although our goal is to reduce the need for refractive correction (glasses or contacts) after surgery, there may still be a need for spectacle correction to achieve optimal visual acuity, and that a reasonable range of functional vision should be the expectation.  No guarantees are made about post operative refraction or visual acuity, as the eye may heal in unpredictable ways, and the standard risks, benefits, and alternatives to cataract surgery were explained.  The patient understands that the refractive portions of this cataract procedure are not covered by insurance, and that there is an out of pocket expense of $2250 per eye. I also  explained that even though our pre-operative plan is to utilize advanced refractive technologies during surgery, that I may decide to eliminate part or all of this plan if surgical challenges or complications arise, or I feel that it is not in the patient's best interest. Consent forms and an ABN form were given to the patient to review.    ORA ONLY

## 2024-02-02 ENCOUNTER — INFUSION (OUTPATIENT)
Dept: INFUSION THERAPY | Facility: HOSPITAL | Age: 59
End: 2024-02-02
Attending: INTERNAL MEDICINE
Payer: MEDICARE

## 2024-02-02 VITALS
BODY MASS INDEX: 20.51 KG/M2 | DIASTOLIC BLOOD PRESSURE: 74 MMHG | WEIGHT: 123.13 LBS | RESPIRATION RATE: 18 BRPM | HEART RATE: 87 BPM | SYSTOLIC BLOOD PRESSURE: 122 MMHG | TEMPERATURE: 98 F | HEIGHT: 65 IN

## 2024-02-02 DIAGNOSIS — K50.90 CROHN'S DISEASE WITHOUT COMPLICATION, UNSPECIFIED GASTROINTESTINAL TRACT LOCATION: Primary | ICD-10-CM

## 2024-02-02 PROCEDURE — 63600175 PHARM REV CODE 636 W HCPCS: Performed by: INTERNAL MEDICINE

## 2024-02-02 PROCEDURE — 96365 THER/PROPH/DIAG IV INF INIT: CPT

## 2024-02-02 RX ORDER — HEPARIN 100 UNIT/ML
500 SYRINGE INTRAVENOUS
Status: COMPLETED | OUTPATIENT
Start: 2024-02-02 | End: 2024-02-02

## 2024-02-02 RX ORDER — HEPARIN 100 UNIT/ML
500 SYRINGE INTRAVENOUS
Status: CANCELLED
Start: 2024-03-29

## 2024-02-02 RX ADMIN — HEPARIN 500 UNITS: 100 SYRINGE at 10:02

## 2024-02-02 NOTE — PLAN OF CARE
Problem: Fatigue  Goal: Improved Activity Tolerance  Intervention: Promote Improved Energy  Flowsheets (Taken 2/2/2024 5852)  Fatigue Management: fatigue-related activity identified  Activity Management: Ambulated -L4

## 2024-02-06 ENCOUNTER — TELEPHONE (OUTPATIENT)
Dept: OPHTHALMOLOGY | Facility: CLINIC | Age: 59
End: 2024-02-06
Payer: MEDICARE

## 2024-02-06 NOTE — TELEPHONE ENCOUNTER
Patient given arrival time of 10:15 am on Thursday February . Nothing to eat or drink after 9 pm.  Water, Gatorade after 9 pm until leaves home.  Start drops into the operative eye today. [address] [facility]

## 2024-02-07 NOTE — PRE-PROCEDURE INSTRUCTIONS
Unable to reach pt via phone.  Left voicemail with arrival time also informing pt of need for responsible  accompaniment and instructing pt to follow pre-procedure instructions provided via [facility] portal.  The following messages was sent to pt's portal.       Dear Carline     Below you will find basic pre-procedure instructions in preparation for your procedure on 2/8/24 with Dr. Serrato     - Nothing to eat or drink after midnight the night before your procedure until after your procedure, except AM meds with small sips of water.     - HOLD all Diabetic meds AM of surgery  - HOLD all Insulin AM of surgery  - HOLD all Fluid pills AM of surgery  - HOLD all non-insulin shots until after surgery (Ozempic, Mounjaro, Trulicity, Victoza, Byetta, Wegovy and Adlyxin) (up to 7 days prior)  - HOLD all vitamins and herbal meds AM of surgery   - TAKE all B/P meds, EXCEPT those that contain a fluid pill  - USE inhalers as needed and bring AM of surgery  - USE eye drops as directed  -TAKE blood thinner meds AM of surgery unless otherwise instructed     - Shower and wash face with dial soap for 3 mins PM prior and AM of surgery  - No powder, lotions, creams, oils, gels, ointments, makeup,  or jewelry    - Wear comfortable clothing (button up shirt)     (Patient is required to have a responsible ride to transport home, ride may not leave while patient is in surgery)     -- [facility], 2nd floor Surgery Center, located   @ [address] [place] [address]  2nd Floor Registration        If you have any questions or concerns please feel free to contact your surgeon's office.                 AUDELIA Altamirano  [facility]  Pre-Admit - Anesthesia Dept

## 2024-02-08 ENCOUNTER — ANESTHESIA EVENT (OUTPATIENT)
Dept: SURGERY | Facility: HOSPITAL | Age: 59
End: 2024-02-08
Payer: MEDICARE

## 2024-02-08 ENCOUNTER — HOSPITAL ENCOUNTER (OUTPATIENT)
Facility: HOSPITAL | Age: 59
Discharge: HOME | End: 2024-02-08
Attending: OPHTHALMOLOGY | Admitting: OPHTHALMOLOGY
Payer: MEDICARE

## 2024-02-08 ENCOUNTER — ANESTHESIA (OUTPATIENT)
Dept: SURGERY | Facility: HOSPITAL | Age: 59
End: 2024-02-08
Payer: MEDICARE

## 2024-02-08 VITALS
HEIGHT: 65 IN | BODY MASS INDEX: 20.49 KG/M2 | SYSTOLIC BLOOD PRESSURE: 113 MMHG | WEIGHT: 123 LBS | TEMPERATURE: 98 F | RESPIRATION RATE: 18 BRPM | DIASTOLIC BLOOD PRESSURE: 62 MMHG | HEART RATE: 76 BPM | OXYGEN SATURATION: 96 %

## 2024-02-08 DIAGNOSIS — H25.11 NUCLEAR SCLEROTIC CATARACT OF RIGHT EYE: Primary | ICD-10-CM

## 2024-02-08 DIAGNOSIS — H25.13 NUCLEAR SCLEROSIS, BILATERAL: ICD-10-CM

## 2024-02-08 PROCEDURE — 37000008 HC ANESTHESIA 1ST 15 MINUTES: Performed by: OPHTHALMOLOGY

## 2024-02-08 PROCEDURE — 63600175 PHARM REV CODE 636 W HCPCS: Performed by: NURSE ANESTHETIST, CERTIFIED REGISTERED

## 2024-02-08 PROCEDURE — 94761 N-INVAS EAR/PLS OXIMETRY MLT: CPT

## 2024-02-08 PROCEDURE — 71000015 HC POSTOP RECOV 1ST HR: Performed by: OPHTHALMOLOGY

## 2024-02-08 PROCEDURE — 99900035 HC TECH TIME PER 15 MIN (STAT)

## 2024-02-08 PROCEDURE — D9220A PRA ANESTHESIA: Mod: ,,, | Performed by: NURSE ANESTHETIST, CERTIFIED REGISTERED

## 2024-02-08 PROCEDURE — 36000706: Performed by: OPHTHALMOLOGY

## 2024-02-08 PROCEDURE — 37000009 HC ANESTHESIA EA ADD 15 MINS: Performed by: OPHTHALMOLOGY

## 2024-02-08 PROCEDURE — V2788 PRESBYOPIA-CORRECT FUNCTION: HCPCS | Performed by: OPHTHALMOLOGY

## 2024-02-08 PROCEDURE — 66984 XCAPSL CTRC RMVL W/O ECP: CPT | Mod: 79,RT,, | Performed by: OPHTHALMOLOGY

## 2024-02-08 PROCEDURE — 36000707: Performed by: OPHTHALMOLOGY

## 2024-02-08 PROCEDURE — 66999 UNLISTED PX ANT SEGMENT EYE: CPT | Mod: CSM,RT,, | Performed by: OPHTHALMOLOGY

## 2024-02-08 PROCEDURE — 25000003 PHARM REV CODE 250: Performed by: OPHTHALMOLOGY

## 2024-02-08 DEVICE — IMPLANTABLE DEVICE: Type: IMPLANTABLE DEVICE | Site: EYE | Status: FUNCTIONAL

## 2024-02-08 RX ORDER — CYCLOP/TROP/PROPA/PHEN/KET/WAT 1-1-0.1%
1 DROPS (EA) OPHTHALMIC (EYE)
Status: COMPLETED | OUTPATIENT
Start: 2024-02-08 | End: 2024-02-08

## 2024-02-08 RX ORDER — FENTANYL CITRATE 50 UG/ML
INJECTION, SOLUTION INTRAMUSCULAR; INTRAVENOUS
Status: DISCONTINUED | OUTPATIENT
Start: 2024-02-08 | End: 2024-02-08

## 2024-02-08 RX ORDER — MOXIFLOXACIN 5 MG/ML
SOLUTION/ DROPS OPHTHALMIC
Status: DISCONTINUED | OUTPATIENT
Start: 2024-02-08 | End: 2024-02-08 | Stop reason: HOSPADM

## 2024-02-08 RX ORDER — MIDAZOLAM HYDROCHLORIDE 1 MG/ML
INJECTION, SOLUTION INTRAMUSCULAR; INTRAVENOUS
Status: DISCONTINUED | OUTPATIENT
Start: 2024-02-08 | End: 2024-02-08

## 2024-02-08 RX ORDER — PROPARACAINE HYDROCHLORIDE 5 MG/ML
1 SOLUTION/ DROPS OPHTHALMIC
Status: DISCONTINUED | OUTPATIENT
Start: 2024-02-08 | End: 2024-02-08 | Stop reason: HOSPADM

## 2024-02-08 RX ORDER — MOXIFLOXACIN 5 MG/ML
1 SOLUTION/ DROPS OPHTHALMIC
Status: DISCONTINUED | OUTPATIENT
Start: 2024-02-08 | End: 2024-02-08 | Stop reason: HOSPADM

## 2024-02-08 RX ORDER — MOXIFLOXACIN 5 MG/ML
1 SOLUTION/ DROPS OPHTHALMIC
Status: COMPLETED | OUTPATIENT
Start: 2024-02-08 | End: 2024-02-08

## 2024-02-08 RX ORDER — PROPARACAINE HYDROCHLORIDE 5 MG/ML
SOLUTION/ DROPS OPHTHALMIC
Status: DISCONTINUED | OUTPATIENT
Start: 2024-02-08 | End: 2024-02-08 | Stop reason: HOSPADM

## 2024-02-08 RX ORDER — PHENYLEPHRINE HYDROCHLORIDE 100 MG/ML
1 SOLUTION/ DROPS OPHTHALMIC
Status: DISCONTINUED | OUTPATIENT
Start: 2024-02-08 | End: 2024-02-08 | Stop reason: HOSPADM

## 2024-02-08 RX ORDER — LIDOCAINE HYDROCHLORIDE 40 MG/ML
INJECTION, SOLUTION RETROBULBAR
Status: DISCONTINUED | OUTPATIENT
Start: 2024-02-08 | End: 2024-02-08 | Stop reason: HOSPADM

## 2024-02-08 RX ORDER — ACETAMINOPHEN 325 MG/1
650 TABLET ORAL EVERY 4 HOURS PRN
Status: DISCONTINUED | OUTPATIENT
Start: 2024-02-08 | End: 2024-02-08 | Stop reason: HOSPADM

## 2024-02-08 RX ADMIN — FENTANYL CITRATE 25 MCG: 50 INJECTION, SOLUTION INTRAMUSCULAR; INTRAVENOUS at 12:02

## 2024-02-08 RX ADMIN — MOXIFLOXACIN 1 DROP: 5 SOLUTION/ DROPS OPHTHALMIC at 12:02

## 2024-02-08 RX ADMIN — MIDAZOLAM HYDROCHLORIDE 2 MG: 1 INJECTION, SOLUTION INTRAMUSCULAR; INTRAVENOUS at 12:02

## 2024-02-08 RX ADMIN — MOXIFLOXACIN OPHTHALMIC 1 DROP: 5 SOLUTION/ DROPS OPHTHALMIC at 11:02

## 2024-02-08 RX ADMIN — MOXIFLOXACIN 1 DROP: 5 SOLUTION/ DROPS OPHTHALMIC at 01:02

## 2024-02-08 RX ADMIN — Medication 1 DROP: at 11:02

## 2024-02-08 RX ADMIN — MIDAZOLAM HYDROCHLORIDE 1 MG: 1 INJECTION, SOLUTION INTRAMUSCULAR; INTRAVENOUS at 12:02

## 2024-02-08 RX ADMIN — FENTANYL CITRATE 50 MCG: 50 INJECTION, SOLUTION INTRAMUSCULAR; INTRAVENOUS at 12:02

## 2024-02-08 NOTE — DISCHARGE SUMMARY
Outcome: Successful outpatient ophthalmic surgical procedure  Preprinted Instructions given to patient.  Regular diet.  Activity: No restrictions  Meds: see Med Rec  Condition: stable  Follow up: 1 day with Dr Serrato  Disposition: Home  Diagnosis: s/p eye surgery  Date of discharge: 02/08/2024

## 2024-02-08 NOTE — TRANSFER OF CARE
"Anesthesia Transfer of Care Note    Patient: Carline Chang    Procedure(s) Performed: Procedure(s) (LRB):  EXTRACTION, CATARACT, WITH IOL INSERTION (Right)    Patient location: PACU    Anesthesia Type: MAC    Transport from OR: Transported from OR on room air with adequate spontaneous ventilation    Post pain: adequate analgesia    Post assessment: no apparent anesthetic complications and tolerated procedure well    Post vital signs: stable    Level of consciousness: awake, alert and oriented    Nausea/Vomiting: no nausea/vomiting    Complications: none    Transfer of care protocol was followed      Last vitals: Visit Vitals  /61 (BP Location: Left arm, Patient Position: Lying)   Pulse 76   Temp 36.3 °C (97.3 °F) (Tympanic)   Resp 18   Ht 5' 5" (1.651 m)   Wt 55.8 kg (123 lb)   LMP 05/30/2009   SpO2 99%   Breastfeeding No   BMI 20.47 kg/m²     "

## 2024-02-08 NOTE — H&P
Pre-Operative History & Physical  Ophthalmology      SUBJECTIVE:     History of Present Illness:  Patient is a 58 y.o. female presents with Nuclear sclerotic cataract of right eye [H25.11].    MEDICATIONS:   PTA Medications   Medication Sig    calcitRIOL (ROCALTROL) 0.25 MCG Cap Take 1 capsule (0.25 mcg total) by mouth 2 (two) times daily.    ergocalciferol (ERGOCALCIFEROL) 50,000 unit Cap Take 1 capsule (50,000 Units total) by mouth every 7 days.    gabapentin (NEURONTIN) 300 MG capsule Take 1 capsule (300 mg total) by mouth 3 (three) times daily.    HYDROcodone-acetaminophen (NORCO)  mg per tablet Take 1 tablet by mouth every 6 (six) hours as needed for Pain.    [START ON 2/10/2024] HYDROcodone-acetaminophen (NORCO)  mg per tablet Take 1 tablet by mouth every 6 (six) hours as needed for Pain.    [START ON 3/11/2024] HYDROcodone-acetaminophen (NORCO)  mg per tablet Take 1 tablet by mouth every 6 (six) hours as needed for Pain.    levETIRAcetam (KEPPRA) 500 MG Tab Take 1 tablet (500 mg total) by mouth 2 (two) times daily.    loperamide (IMODIUM) 2 mg capsule Take 2 capsules (4 mg total) by mouth 4 (four) times daily.    magnesium oxide (MAG-OX) 400 mg (241.3 mg magnesium) tablet Take 400 mg by mouth.    omeprazole (PRILOSEC) 40 MG capsule Take 40 mg by mouth.    OYSTER SHELL CALCIUM 500 500 mg calcium (1,250 mg) tablet Take 2 tablets by mouth 2 (two) times daily.    pantoprazole (PROTONIX) 40 MG tablet Take 40 mg by mouth once daily.    potassium chloride SA (K-DUR,KLOR-CON) 20 MEQ tablet Take 10 mEq by mouth once daily.    prednisolon/gatiflox/bromfenac (PREDNISOL ACE-GATIFLOX-BROMFEN) 1-0.5-0.075 % DrpS Apply 1 drop to eye 3 (three) times daily.    promethazine (PHENERGAN) 25 MG tablet Take 1 tablet (25 mg total) by mouth every 4 (four) hours.    TUMS 200 mg calcium (500 mg) chewable tablet Take 2 tablets (1,000 mg total) by mouth 2 (two) times daily.    venlafaxine (EFFEXOR) 75 MG tablet Take 75  mg by mouth 2 (two) times daily.       ALLERGIES:   Review of patient's allergies indicates:   Allergen Reactions    Remicade [infliximab] Shortness Of Breath and Palpitations     Severe muscle and joint, and bone pain    Adalimumab Other (See Comments)    Flagyl [metronidazole] Other (See Comments)     Neuropathy    Nubain [nalbuphine] Anxiety     Upper abdominal burning        PAST MEDICAL HISTORY:   Past Medical History:   Diagnosis Date    Abnormal Pap smear of cervix     LEEP procedure,     Acute deep vein thrombosis (DVT) of brachial vein of right upper extremity 2017    on RUE ultrasound    Anxiety     Bowel perforation     Chronic pain     Compression fracture of C-spine     Crohn's disease of both small and large intestine with intestinal obstruction     Difficult intravenous access     Encounter for blood transfusion     GERD (gastroesophageal reflux disease)     Kidney stones     Nephrolithiasis     Osteoporosis     Stricture of bowel      PAST SURGICAL HISTORY:   Past Surgical History:   Procedure Laterality Date    ABSCESS DRAINAGE      ANAL FISTULOTOMY      BOWEL RESECTION      small bowel resection per Dr. Uribe 2018    CATARACT EXTRACTION W/  INTRAOCULAR LENS IMPLANT Left 2024    Procedure: EXTRACTION, CATARACT, WITH IOL INSERTION;  Surgeon: Ladan Serrato MD;  Location: [facility] OR;  Service: Ophthalmology;  Laterality: Left;  ORA ONLY    CERVICAL BIOPSY  W/ LOOP ELECTRODE EXCISION       SECTION      x2    CHOLECYSTECTOMY      COLON SURGERY  2015    sigmoid loop colostomy with subsequent reversal 3 years later    COLONOSCOPY N/A 2016    Procedure: COLONOSCOPY;  Surgeon: Andre Uribe MD;  Location: [facility] (4TH FLR);  Service: Endoscopy;  Laterality: N/A;    COLONOSCOPY N/A 2017    Procedure: COLONOSCOPY;  Surgeon: Dany Stock MD;  Location: [facility] (2ND FLR);  Service: Endoscopy;  Laterality: N/A;    COLONOSCOPY N/A 2017     Procedure: COLONOSCOPY;  Surgeon: Andre Uribe MD;  Location: [facility] ENDO (4TH FLR);  Service: Endoscopy;  Laterality: N/A;    COLONOSCOPY N/A 02/08/2018    Procedure: COLONOSCOPY;  Surgeon: Andre Uribe MD;  Location: [facility] ENDO (4TH FLR);  Service: Endoscopy;  Laterality: N/A;    COLONOSCOPY N/A 03/24/2018    Procedure: COLONOSCOPY;  Surgeon: Elmer Serrato MD;  Location: [facility] ENDO (2ND FLR);  Service: Endoscopy;  Laterality: N/A;    COLONOSCOPY  03/24/2018    COLONOSCOPY N/A 07/06/2018    Procedure: COLONOSCOPY;  Surgeon: Andre Uribe MD;  Location: [facility] ENDO (4TH FLR);  Service: Endoscopy;  Laterality: N/A;    COLONOSCOPY N/A 10/07/2021    Procedure: COLONOSCOPY;  Surgeon: Jose Hughes MD;  Location: [facility] ENDO;  Service: Endoscopy;  Laterality: N/A;    EPIDURAL STEROID INJECTION N/A 11/2/2022    Procedure: INJECTION, STEROID, EPIDURAL L4-L5 CONTRAST;  Surgeon: Royal Brewster DO;  Location: [facility] [place] operated [facility] PAIN MGT;  Service: Pain Management;  Laterality: N/A;    ESOPHAGOGASTRODUODENOSCOPY N/A 10/07/2021    Procedure: EGD (ESOPHAGOGASTRODUODENOSCOPY);  Surgeon: Jose Hughes MD;  Location: [facility];  Service: Endoscopy;  Laterality: N/A;    LAPAROSCOPIC CLOSURE OF COLOSTOMY N/A 08/29/2018    Procedure: CLOSURE, COLOSTOMY, LAPAROSCOPIC;  Surgeon: Andre Uribe MD;  Location: [facility] OR 2ND FLR;  Service: Colon and Rectal;  Laterality: N/A;  converted to open    LYSIS OF ADHESIONS N/A 01/20/2022    Procedure: LYSIS, ADHESIONS;  Surgeon: LLUVIA Post MD;  Location: [facility] OR 2ND FLR;  Service: Colon and Rectal;  Laterality: N/A;  lasting longer than 2 hours, modifier 22      rectovaginal fistula repair  01/2018    SMALL INTESTINE SURGERY  1995    per patient 10 inches removed    SMALL INTESTINE SURGERY  02/2009    abdominal abscess drained, 3 cm colon removed, 11 cm SB removed    TUBAL LIGATION      UPPER GASTROINTESTINAL ENDOSCOPY  2000     PAST FAMILY HISTORY:   Family History   Problem  Relation Age of Onset    Cataracts Mother     Heart attack Father 69    Cataracts Father     No Known Problems Sister     No Known Problems Brother     Cancer Maternal Aunt 66        colon ca    No Known Problems Maternal Uncle     No Known Problems Paternal Aunt     No Known Problems Paternal Uncle     No Known Problems Maternal Grandmother     No Known Problems Maternal Grandfather     No Known Problems Paternal Grandmother     No Known Problems Paternal Grandfather     No Known Problems Son     No Known Problems Son     No Known Problems Other     Anesthesia problems Neg Hx     Celiac disease Neg Hx     Cirrhosis Neg Hx     Colon cancer Neg Hx     Colon polyps Neg Hx     Crohn's disease Neg Hx     Cystic fibrosis Neg Hx     Esophageal cancer Neg Hx     Hemochromatosis Neg Hx     Inflammatory bowel disease Neg Hx     Irritable bowel syndrome Neg Hx     Liver cancer Neg Hx     Liver disease Neg Hx     Rectal cancer Neg Hx     Stomach cancer Neg Hx     Ulcerative colitis Neg Hx     Mars's disease Neg Hx     Lymphoma Neg Hx     Tuberculosis Neg Hx     Scleroderma Neg Hx     Rheum arthritis Neg Hx     Multiple sclerosis Neg Hx     Melanoma Neg Hx     Lupus Neg Hx     Psoriasis Neg Hx     Skin cancer Neg Hx      SOCIAL HISTORY:   Social History     Tobacco Use    Smoking status: Never    Smokeless tobacco: Never   Substance Use Topics    Alcohol use: No     Alcohol/week: 0.0 standard drinks of alcohol    Drug use: No        MENTAL STATUS: Alert    REVIEW OF SYSTEMS: Negative    OBJECTIVE:     Vital Signs (Most Recent)       Physical Exam:  General: NAD  HEENT: cataract OD  Lungs: Adequate respirations, symmetrical movements, non-labored  Heart: Intact distal pulses  Abdomen: Soft, nondistended, nontender    ASSESSMENT/PLAN:     Patient is a 58 y.o. female with Nuclear sclerotic cataract of right eye [H25.11].    - Plan for surgical correction with CEIOL right eye (OD).   - Allergies reviewed:   Review of patient's  allergies indicates:   Allergen Reactions    Remicade [infliximab] Shortness Of Breath and Palpitations     Severe muscle and joint, and bone pain    Adalimumab Other (See Comments)    Flagyl [metronidazole] Other (See Comments)     Neuropathy    Nubain [nalbuphine] Anxiety     Upper abdominal burning      - Risks/benefits/alternatives of the procedure including, but not limited to scarring, bleeding, infection, loss or decreased vision, and/or need for possible repeat surgery discussed with the patient and family.  - Informed consent obtained prior to surgery and the patient/family voiced good understanding.    Esperanza Buckner MD  [employer] [facility], PGY-3

## 2024-02-08 NOTE — OP NOTE
SURGEON:  Ladan Serrato M.D.    PREOPERATIVE DIAGNOSIS:    Nuclear Sclerotic Cataract Right Eye    POSTOPERATIVE DIAGNOSIS:    Nuclear Sclerotic Cataract Right Eye    PROCEDURES:    Phacoemulsification with  intraocular lens, Right eye (81302)  With ORA  LASER assist    DATE OF SURGERY: 02/08/2024    IMPLANT: CNWTT0 20.5    ANESTHESIA:  MAC with topical Lidocaine    COMPLICATIONS:  None    ESTIMATED BLOOD LOSS: None    SPECIMENS: None    INDICATIONS:    The patient has a history of painless progressive visual loss and difficulty with activities of daily living, which specifically include difficult driving at night due to glare and difficulty reading small print, secondary to cataract formation.  After a thorough discussion of the risks, benefits, and alternatives to cataract surgery, including, but not limited to, the rare risks of infection, retinal detachment, hemorrhage, need for additional surgery, loss of vision, and even loss of the eye, the patient voices understanding and desires to proceed.    DESCRIPTION OF PROCEDURE:      The patients IOL calculations were reviewed, and the lens selection confirmed.   After verification and marking of the proper eye in the preop holding area, the patient was brought to the operating room in supine position where the eye was prepped and draped in standard sterile fashion with 5% Betadine and a lid speculum placed in the eye.   Topical 4% Lidocaine was used in addition to the preoperative anesthesia and the procedure was begun by the creation of a paracentesis incision through which viscoelastic was used to fill the anterior chamber.  Next, a keratome blade was used to create a triplanar temporal clear corneal incision and a cystotome and Utrata forceps used to fashion a continuous curvilinear capsulorrhexis.  Hydrodissection was carried out using the Shaw hydrodissection cannula and the nucleus was found to be mobile.  Phacoemulsification of the nucleus was carried out  using a quick chop technique, and all remaining epinuclear and cortical material was removed.  The eye was then reformed with Viscoelastic and ORA was used to verify the proper IOL power. The intraocular lens was then implanted into the capsular bag.  All remaining viscoelastics were removed from the eye and at the end of the case the pupil was round, the lens was well-centered within the capsular bag and all wounds were found to be water tight.  Drops of Vigamox and Pred Forte were instilled and a shield was placed over the eye. The patient will follow up with Dr. Serrato in the morning.

## 2024-02-08 NOTE — ANESTHESIA PREPROCEDURE EVALUATION
2024  Carline Chang is a 58 y.o., female for EXTRACTION, CATARACT, WITH IOL INSERTION (Right)  Past Medical History:   Diagnosis Date    Abnormal Pap smear of cervix     LEEP procedure,     Acute deep vein thrombosis (DVT) of brachial vein of right upper extremity 2017    on RUE ultrasound    Anxiety     Bowel perforation     Chronic pain     Compression fracture of C-spine     Crohn's disease of both small and large intestine with intestinal obstruction     Difficult intravenous access     Encounter for blood transfusion     GERD (gastroesophageal reflux disease)     Kidney stones     Nephrolithiasis     Osteoporosis     Stricture of bowel      Past Surgical History:   Procedure Laterality Date    ABSCESS DRAINAGE      ANAL FISTULOTOMY      BOWEL RESECTION      small bowel resection per Dr. Uribe 2018    CATARACT EXTRACTION W/  INTRAOCULAR LENS IMPLANT Left 2024    Procedure: EXTRACTION, CATARACT, WITH IOL INSERTION;  Surgeon: Ladan Serrato MD;  Location: [facility] OR;  Service: Ophthalmology;  Laterality: Left;  ORA ONLY    CERVICAL BIOPSY  W/ LOOP ELECTRODE EXCISION       SECTION      x2    CHOLECYSTECTOMY      COLON SURGERY  2015    sigmoid loop colostomy with subsequent reversal 3 years later    COLONOSCOPY N/A 2016    Procedure: COLONOSCOPY;  Surgeon: Andre Uribe MD;  Location: [facility] (4TH FLR);  Service: Endoscopy;  Laterality: N/A;    COLONOSCOPY N/A 2017    Procedure: COLONOSCOPY;  Surgeon: Dany Stock MD;  Location: [facility] ENDO (2ND FLR);  Service: Endoscopy;  Laterality: N/A;    COLONOSCOPY N/A 2017    Procedure: COLONOSCOPY;  Surgeon: Andre Uribe MD;  Location: [facility] ENDO (4TH FLR);  Service: Endoscopy;  Laterality: N/A;    COLONOSCOPY N/A 2018    Procedure: COLONOSCOPY;  Surgeon: Andre Uribe,  MD;  Location: [facility] ENDO (4TH FLR);  Service: Endoscopy;  Laterality: N/A;    COLONOSCOPY N/A 03/24/2018    Procedure: COLONOSCOPY;  Surgeon: Elmer Serrato MD;  Location: [facility] ENDO (2ND FLR);  Service: Endoscopy;  Laterality: N/A;    COLONOSCOPY  03/24/2018    COLONOSCOPY N/A 07/06/2018    Procedure: COLONOSCOPY;  Surgeon: Andre Uribe MD;  Location: [facility] ENDO (4TH FLR);  Service: Endoscopy;  Laterality: N/A;    COLONOSCOPY N/A 10/07/2021    Procedure: COLONOSCOPY;  Surgeon: Jose Hughes MD;  Location: [facility] ENDO;  Service: Endoscopy;  Laterality: N/A;    EPIDURAL STEROID INJECTION N/A 11/2/2022    Procedure: INJECTION, STEROID, EPIDURAL L4-L5 CONTRAST;  Surgeon: Royal Brewster DO;  Location: [facility] PAIN MGT;  Service: Pain Management;  Laterality: N/A;    ESOPHAGOGASTRODUODENOSCOPY N/A 10/07/2021    Procedure: EGD (ESOPHAGOGASTRODUODENOSCOPY);  Surgeon: Jose Hughes MD;  Location: [facility];  Service: Endoscopy;  Laterality: N/A;    LAPAROSCOPIC CLOSURE OF COLOSTOMY N/A 08/29/2018    Procedure: CLOSURE, COLOSTOMY, LAPAROSCOPIC;  Surgeon: Andre Uribe MD;  Location: [facility] OR 2ND FLR;  Service: Colon and Rectal;  Laterality: N/A;  converted to open    LYSIS OF ADHESIONS N/A 01/20/2022    Procedure: LYSIS, ADHESIONS;  Surgeon: LLUVIA Post MD;  Location: [facility] OR 2ND FLR;  Service: Colon and Rectal;  Laterality: N/A;  lasting longer than 2 hours, modifier 22      rectovaginal fistula repair  01/2018    SMALL INTESTINE SURGERY  1995    per patient 10 inches removed    SMALL INTESTINE SURGERY  02/2009    abdominal abscess drained, 3 cm colon removed, 11 cm SB removed    TUBAL LIGATION      UPPER GASTROINTESTINAL ENDOSCOPY  2000     Pre-op Assessment       I have reviewed the Medications.     Review of Systems  Anesthesia Hx:  No problems with previous Anesthesia             Denies Family Hx of Anesthesia complications.     Renal/:  Chronic Renal Disease        Kidney Function/Disease              Hepatic/GI:     GERD      Gerd          Neurological:       Seizures           Seizure Disorder                          Endocrine:    Hyperthyroidism   Hyperthyroidism                 Physical Exam  General: Well nourished    Airway:  Mallampati: II   TM Distance: Normal  Neck ROM: Normal ROM    Chest/Lungs:  Clear to auscultation    Heart:  Rate: Normal  Rhythm: Regular Rhythm        Anesthesia Plan  Type of Anesthesia, risks & benefits discussed:    Anesthesia Type: MAC  Intra-op Monitoring Plan: Standard ASA Monitors  Post Op Pain Control Plan: multimodal analgesia  Informed Consent: Informed consent signed with the Patient and all parties understand the risks and agree with anesthesia plan.  All questions answered.   ASA Score: 2    Ready For Surgery From Anesthesia Perspective.     .

## 2024-02-08 NOTE — ANESTHESIA POSTPROCEDURE EVALUATION
Anesthesia Post Evaluation    Patient: Carline Chang    Procedure(s) Performed: Procedure(s) (LRB):  EXTRACTION, CATARACT, WITH IOL INSERTION (Right)    Final Anesthesia Type: MAC      Patient location during evaluation: PACU  Patient participation: Yes- Able to Participate  Level of consciousness: awake and alert  Post-procedure vital signs: reviewed and stable  Pain management: adequate  Airway patency: patent    PONV status at discharge: No PONV  Anesthetic complications: no      Cardiovascular status: blood pressure returned to baseline  Respiratory status: unassisted  Hydration status: euvolemic  Follow-up not needed.          Vitals Value Taken Time   /62 02/08/24 1305   Temp 36.6 °C (97.9 °F) 02/08/24 1251   Pulse 76 02/08/24 1305   Resp 18 02/08/24 1305   SpO2 96 % 02/08/24 1305         No case tracking events are documented in the log.      Pain/Estiven Score: Estiven Score: 10 (2/8/2024 12:51 PM)

## 2024-02-08 NOTE — PLAN OF CARE
Carline Chang has met all discharge criteria from Phase II. Vital Signs are stable, ambulating  without difficulty. Discharge instructions given, patient verbalized understanding. Discharged from facility via wheelchair in stable condition.

## 2024-02-08 NOTE — DISCHARGE INSTRUCTIONS
CATARACT SURGERY    POST-OPERATIVE INSTRUCTIONS    · Apply drops THREE times a day into operative eye for 30 days.    · DO NOT rub your eye    · Wear protective sunglasses during the day    · Resume moderate activity    · Bathe/shower/wash face normally    · DO NOT apply makeup around the operative eye for 1 week.         You should expect    - Blurry vision and halos for 24-48 hours    - Dilated pupil for 24-48 hours    - Scratchy feeling in the eye for 1-2 days    - Curved shadow in your peripheral vision for 2-3 weeks    - Occasional flickering of lights for up to 1 week    -If you experience severe pain or nausea, please call Dr Serrato or the on-call doctor at [phone]    - Plan to see Dr Serrato tomorrow .      [facility]    [address] [id number]    ** Most patients can drive the next day, but if you do not feel comfortable driving, please arrange for transportation.

## 2024-02-09 ENCOUNTER — TELEPHONE (OUTPATIENT)
Dept: OPHTHALMOLOGY | Facility: CLINIC | Age: 59
End: 2024-02-09
Payer: MEDICARE

## 2024-02-09 NOTE — TELEPHONE ENCOUNTER
----- Message from Sagar Ho sent at 2/9/2024  8:10 AM CST -----  Regarding: appointment access  Contact: [phone]  Pt has an appointment scheduled for 02/09/2024 pt would like to cancel this appointment but will also like to reschedule. Please contact pt .

## 2024-02-23 ENCOUNTER — OFFICE VISIT (OUTPATIENT)
Dept: OPHTHALMOLOGY | Facility: CLINIC | Age: 59
End: 2024-02-23
Payer: MEDICARE

## 2024-02-23 DIAGNOSIS — Z98.890 POST-OPERATIVE STATE: Primary | ICD-10-CM

## 2024-02-23 DIAGNOSIS — H25.11 NUCLEAR SCLEROTIC CATARACT OF RIGHT EYE: ICD-10-CM

## 2024-02-23 PROCEDURE — 99999 PR PBB SHADOW E&M-EST. PATIENT-LVL III: CPT | Mod: PBBFAC,,, | Performed by: OPHTHALMOLOGY

## 2024-02-23 PROCEDURE — 99024 POSTOP FOLLOW-UP VISIT: CPT | Mod: POP,,, | Performed by: OPHTHALMOLOGY

## 2024-02-23 PROCEDURE — 99213 OFFICE O/P EST LOW 20 MIN: CPT | Mod: PBBFAC | Performed by: OPHTHALMOLOGY

## 2024-02-23 NOTE — PROGRESS NOTES
HPI    DATE OF SURGERY: 02/08/2024   IMPLANT: [id number] 20.5    Patient present today for over due 1 day Post Op OD  Pt state chrome disease, missed one day post op   No complaints at this time     Last edited by Jordon Robbins on 2/23/2024 10:04 AM.            Assessment /Plan     For exam results, see Encounter Report.    Post-operative state    Nuclear sclerotic cataract of right eye      Slit lamp exam:  L/L: nl  K: clear, wound sealed  AC: 1+ cell  Lens: IOL centered and stable    POD1 s/p Phaco/IOL  Appropriate precautions and post op medications reviewed.  Patient instructed to call or come in if symptoms of redness, decreased vision, or pain are experienced.    Excellent [employer] result

## 2024-04-01 ENCOUNTER — TELEPHONE (OUTPATIENT)
Dept: INFUSION THERAPY | Facility: HOSPITAL | Age: 59
End: 2024-04-01

## 2024-04-01 NOTE — TELEPHONE ENCOUNTER
Spoke with pt about missed appt today for entyvio infusion. Pt stated she forgot and got confused with her other appts and asked to reschedule. Transferred call to  to adjust appts.

## 2024-04-04 ENCOUNTER — OFFICE VISIT (OUTPATIENT)
Dept: PAIN MEDICINE | Facility: CLINIC | Age: 59
End: 2024-04-04
Payer: MEDICARE

## 2024-04-04 DIAGNOSIS — E83.51 HYPOCALCEMIA: ICD-10-CM

## 2024-04-04 DIAGNOSIS — S22.080A COMPRESSION FRACTURE OF T12 VERTEBRA, INITIAL ENCOUNTER: Primary | ICD-10-CM

## 2024-04-04 DIAGNOSIS — F11.90 CHRONIC, CONTINUOUS USE OF OPIOIDS: ICD-10-CM

## 2024-04-04 DIAGNOSIS — S32.030A COMPRESSION FRACTURE OF L3 LUMBAR VERTEBRA, CLOSED, INITIAL ENCOUNTER: ICD-10-CM

## 2024-04-04 DIAGNOSIS — M54.16 LUMBAR RADICULOPATHY: ICD-10-CM

## 2024-04-04 PROCEDURE — 99214 OFFICE O/P EST MOD 30 MIN: CPT | Mod: 95,,, | Performed by: STUDENT IN AN ORGANIZED HEALTH CARE EDUCATION/TRAINING PROGRAM

## 2024-04-04 RX ORDER — HYDROCODONE BITARTRATE AND ACETAMINOPHEN 10; 325 MG/1; MG/1
1 TABLET ORAL EVERY 6 HOURS PRN
Qty: 120 TABLET | Refills: 0 | Status: SHIPPED | OUTPATIENT
Start: 2024-05-05 | End: 2024-06-04

## 2024-04-04 RX ORDER — HYDROCODONE BITARTRATE AND ACETAMINOPHEN 10; 325 MG/1; MG/1
1 TABLET ORAL EVERY 6 HOURS PRN
Qty: 120 TABLET | Refills: 0 | Status: SHIPPED | OUTPATIENT
Start: 2024-04-05 | End: 2024-05-05

## 2024-04-04 RX ORDER — HYDROCODONE BITARTRATE AND ACETAMINOPHEN 10; 325 MG/1; MG/1
1 TABLET ORAL EVERY 6 HOURS PRN
Qty: 120 TABLET | Refills: 0 | Status: SHIPPED | OUTPATIENT
Start: 2024-06-04 | End: 2024-07-04

## 2024-04-04 NOTE — PROGRESS NOTES
Chronic Pain - f/u    Referring Physician: No ref. provider found    Date: 04/04/2024     Re: Carline Chang  MR#: [id number]  YOB: 1965  Age: 58 y.o.    Chief Complaint: back and leg pain  No chief complaint on file.      **This note is dictated using the M*Modal Fluency Direct word recognition program. There are word recognition mistakes that are occasionally missed on review.**    ASSESSMENT: 58 y.o. year old female with back pain, consistent with     1. Compression fracture of T12 vertebra, initial encounter        2. Compression fracture of L3 lumbar vertebra, closed, initial encounter        3. Lumbar radiculopathy        4. Hypocalcemia        5. Chronic, continuous use of opioids          PLAN:     Chronic back pain 2/2 multiple compression fractures  -T5, T8, severe T12, L3 compression fractures on XR. Chornic on MRI.  Not suitable for vertebral augmentation.  -Look into finding a pain physisian or PCP in [place] that can refill her narcotic medications so she does not need to travel so far to get them.  I will provide a refill of her pain meds in the meantime.  -MRI lumbar spine completed.  -discussed trial of MBB/RFA for the T12 compression fracture to target the posterior elements.  Will defer that for now and try RAMOS first.  -s/p L4-5 RAMOS on 11/222 - 100% for 7 days, then pain back to baseline. Discussed risks of repeated steroid exposure and her osteoporosis.  Do not recommend a repeat for another 3-4 months.  -recommended again speaking with endocrinology again about medications for bone quality. She should be taking more than just calcium. She did not schedule a new appointment  -referral to Chucky Carpenter in Ortho to discuss osteoporosis treatments  -encouraged mild weight bearing exercises  -refill Hydrocodone 10mg QID x3 provided  -continue vitamin D 20540 units/week     Lumbar radiculopathy  -has some elements of radiculitis on both sides  -discussed trial of SCS.  Defer  for now. This would be a reasonable option if she is not a candidate for surgery and her bone quality continues to be poor.  -continue gabapentin to 300mg TID. This is helpful    Crohn's disease  -recent surgery  -hx of 30 years of oral steroids leading to osteoporosis and compression fracture    History of seizures  -continue keppra    Chronic use of opioids  -her pain provider has passed away and she does not have anyone to fill her medications  -UDS and pain contract 1/2024 appropriate  -The patient is here today for opioid pain medications and they believe these provide effective pain control and improvements in quality of life.  The patient notes no serious side effects, and feels the benefits outweigh the risks.  The patient was reminded of the pain contract that they signed previously as well as the risks and benefits of the medication including possible death.  The updated [place] Board [employer] prescription monitoring program was reviewed, and the patient has been found to be compliant with current treatment plan.      - RTC before 4/10/24  - Counseled patient regarding the importance of activity modification.    The above plan and management options were discussed at length with patient. Patient is in agreement with the above and verbalized understanding. It will be communicated with the referring physician via electronic record, fax, or mail.  Lab/study reports reviewed were important and necessary because subsequent medical and treatment recommendations required review of the above lab/study reports. Images viewed/reviewed above were important and necessary because subsequent medical and treatment recommendations required review of the reviewed image(s).     Electronically signed by:  Royal Brewster DO  04/04/2024    =========================================================================================================    SUBJECTIVE:    Interval History 4/4/2024:   Carline Hancock  Charlene is a 58 y.o. female presents to the clinic for follow up.  Since last visit the pain has is unchanged. The patient had cataract surgery.  Her legs are doing well. Medications still work.  She is overall doing well.    Current pain medications: Norco 10mg QID, gabapentin 300mg TID  Failed Pain Medications:  Icy hot, Tylenol, cannot take NSIADs    Interval History 1/3/2024:   Carline Chang is a 58 y.o. female presents to the clinic for follow up.  Since last visit the pain has has significantly improved.    The pain is located in the middle back area and radiates to the legs .  The pain is described as aching    At BEST  0/10   At WORST  7/10 on the WORST day.    On average pain is rated as 2/10.   Today the pain is rated as 2/10  Symptoms interfere with daily activity.   Exacerbating factors: Standing.    Mitigating factors medications.     Interval History 8/10/2023:   Carline Chang is a 58 y.o. female presents to the clinic for follow up.  Since last visit the pain has has moderately improved. Patient states that things have been going well. She states that the pain has improved. Pain today is 3/10.    Exacerbating factors: activity.    Mitigating factors medications.     Interval History 5/18/2023:   Carline Chang is a 58 y.o. female presents to the clinic for follow up.  Since last visit the pain has has moderately improved.     The pain is located in the back area and radiates to the legs .  The pain is described as aching    At BEST  3/10   At WORST  8/10 on the WORST day.    On average pain is rated as 4/10.   Today the pain is rated as 3/10  Symptoms interfere with nothing  .   Exacerbating factors: nothing.    Mitigating factors medications.     Interval History 2/15/2023:   Carline Chang is a 58 y.o. female presents to the clinic for follow up.  Since last visit the pain has is unchanged. Pain 6/10. She is still getting a lot of pain going down  "the legs.    Interval History 11/23/2022:     Carline Chang is a 58 y.o. female presents to the clinic for follow up.  Since last visit the pain has is unchanged. Patient is s/p L4/L5 ILESI 11/02/2022. Worked very well for one week, 100%. Pain then returned back to same location and same intensity.  Pain is 7/10 without hydrocodone. Feels right leg stronger than the left.     Interval History 9/7/2022:     Carline Chang is a 58 y.o. female presents to the clinic for follow up.  Since last visit the pain has is unchanged. Pain is in the mid back and she gets radiation down the right leg to the knee. May have some knee arthritis as well that is confounding symptoms.  Difficult to walk and move. Average pain is 6/10. Today is 6/10.    Initial hx:  Carline Chang is a 58 y.o. female presents to the clinic for the evaluation of mid/ lower back pain. The pain started 7 months ago following  seizures   and symptoms have been worsening.  The patient states that she had a bunch of seizures in January and think that the compression fractures happened at that time.  She saw Dr. Harden recently for the back pain. Hx of crohns disease and 30 year history of chronic steroids.  She stopped taking them a year or two ago. She has lost 3-4" in height.     The patient had abdominal surgery in January for her Crohn's for lysis of adhesions. She stil gets abdominal bloating.  She has poor absorption of electrolytes and needs to get frequent IV medications.    The patient was seeing a PCP in [place] but they passed away.  She was getting Norco 10mg QID.     Pain Description:    The pain is located in the mid/lower back area and radiates to the bilalteral knee .    At BEST  3/10   At WORST  8/10 on the WORST day.    On average pain is rated as 5/10.   Today the pain is rated as 6/10  The pain is continuous.  The pain is described as aching and sharp    Symptoms interfere with daily activity and sleeping. "   Exacerbating factors: Sitting, Standing, Bending, Walking, Extension, Lifting and Getting out of bed/chair.    Mitigating factors nothing and medications.   She reports 8 hours of sleep per night.    Physical Therapy/Home Exercise: Yes, currently in Physical therapy    Current Pain Medications:    - Norco 10mg QID    Failed Pain Medications:    - Icy hot, Tylenol, cannot take NSIADs    Pain Treatment Therapies:    Pain procedures: none  Physical Therapy: none  Chiropractor: none  Acupuncture: none  TENS unit: none  Spinal decompression: none  Joint replacement: none    Patient denies urinary incontinence, bowel incontinence, significant motor weakness and loss of sensations.  Patient denies any suicidal or homicidal ideations     report:  Reviewed and consistent with medication use as prescribed.    Imaging:   Lumbar XR 07/2022:    FINDINGS:  There is a severe compression fracture deformity of T12 with mild retropulsion of osseous structure posteriorly.     There is a mild loss of height at the superior endplate of L3, unchanged.     No new fracture seen.     Flexion and extension views demonstrate no translational abnormalities.     The sacroiliac joints appear symmetrical.     Surgical clips right upper abdominal quadrant, surgical bowel suture in the right mid and right lower abdominal region.     Air-fluid levels noted, midline with distended colon measuring about 13 cm.     Impression:     Severe compression fracture of T12 and mild superior endplate compression fracture of L3, unchanged from 01/19/2022    Thoracic XR 07/2022:     FINDINGS:  There is a port in the left upper thorax, distal tip projects in the region of the SVC/right atrium region.     The alignment of the thoracic spine demonstrates a subtle dextroscoliosis.  There is a severe wedge compression fracture deformity of T12.  There is a mild height loss fracture deformity of T8 and T5 which appears to have been present on CT 01/19/2022 chest  and abdomen CT examination.  Subtle concavity at the superior endplate of T10-T11 is unchanged from CT examination of 2022.  No definite new fracture identified.  No rib abnormality seen.  Surgical clips right upper abdominal quadrant.     Impression:     Multiple thoracic spine vertebrae fractures, all appears to have been present on 2022 CT exam.  There is a severe compression fracture of T12.  Please see details above.      Past Medical History:   Diagnosis Date    Abnormal Pap smear of cervix     LEEP procedure,     Acute deep vein thrombosis (DVT) of brachial vein of right upper extremity 2017    on RUE ultrasound    Anxiety     Bowel perforation     Chronic pain     Compression fracture of C-spine     Crohn's disease of both small and large intestine with intestinal obstruction     Difficult intravenous access     Encounter for blood transfusion     GERD (gastroesophageal reflux disease)     Kidney stones     Nephrolithiasis     Osteoporosis     Stricture of bowel      Past Surgical History:   Procedure Laterality Date    ABSCESS DRAINAGE      ANAL FISTULOTOMY      BOWEL RESECTION      small bowel resection per Dr. Uribe 2018    CATARACT EXTRACTION W/  INTRAOCULAR LENS IMPLANT Left 2024    Procedure: EXTRACTION, CATARACT, WITH IOL INSERTION;  Surgeon: Ladan Serrato MD;  Location: [facility] OR;  Service: Ophthalmology;  Laterality: Left;  ORA ONLY    CATARACT EXTRACTION W/  INTRAOCULAR LENS IMPLANT Right 2024    Procedure: EXTRACTION, CATARACT, WITH IOL INSERTION;  Surgeon: Ladan Serrato MD;  Location: [facility] OR;  Service: Ophthalmology;  Laterality: Right;  ORA ONLY, IF NEEDED    CERVICAL BIOPSY  W/ LOOP ELECTRODE EXCISION       SECTION      x2    CHOLECYSTECTOMY      COLON SURGERY  2015    sigmoid loop colostomy with subsequent reversal 3 years later    COLONOSCOPY N/A 2016    Procedure: COLONOSCOPY;  Surgeon: Andre Uribe MD;  Location:  [facility] ENDO (4TH FLR);  Service: Endoscopy;  Laterality: N/A;    COLONOSCOPY N/A 01/17/2017    Procedure: COLONOSCOPY;  Surgeon: Dany Stock MD;  Location: [facility] ENDO (2ND FLR);  Service: Endoscopy;  Laterality: N/A;    COLONOSCOPY N/A 12/04/2017    Procedure: COLONOSCOPY;  Surgeon: Andre Uribe MD;  Location: [facility] ENDO (4TH FLR);  Service: Endoscopy;  Laterality: N/A;    COLONOSCOPY N/A 02/08/2018    Procedure: COLONOSCOPY;  Surgeon: Andre Uribe MD;  Location: [facility] ENDO (4TH FLR);  Service: Endoscopy;  Laterality: N/A;    COLONOSCOPY N/A 03/24/2018    Procedure: COLONOSCOPY;  Surgeon: Elmer Serrato MD;  Location: [facility] ENDO (2ND FLR);  Service: Endoscopy;  Laterality: N/A;    COLONOSCOPY  03/24/2018    COLONOSCOPY N/A 07/06/2018    Procedure: COLONOSCOPY;  Surgeon: Andre Uribe MD;  Location: [facility] ENDO (4TH FLR);  Service: Endoscopy;  Laterality: N/A;    COLONOSCOPY N/A 10/07/2021    Procedure: COLONOSCOPY;  Surgeon: Jose Hughes MD;  Location: [facility] ENDO;  Service: Endoscopy;  Laterality: N/A;    EPIDURAL STEROID INJECTION N/A 11/2/2022    Procedure: INJECTION, STEROID, EPIDURAL L4-L5 CONTRAST;  Surgeon: Royal Brewster DO;  Location: [facility] MGT;  Service: Pain Management;  Laterality: N/A;    ESOPHAGOGASTRODUODENOSCOPY N/A 10/07/2021    Procedure: EGD (ESOPHAGOGASTRODUODENOSCOPY);  Surgeon: Jose Hughes MD;  Location: [facility] ENDO;  Service: Endoscopy;  Laterality: N/A;    LAPAROSCOPIC CLOSURE OF COLOSTOMY N/A 08/29/2018    Procedure: CLOSURE, COLOSTOMY, LAPAROSCOPIC;  Surgeon: Andre Uribe MD;  Location: [facility] OR 2ND FLR;  Service: Colon and Rectal;  Laterality: N/A;  converted to open    LYSIS OF ADHESIONS N/A 01/20/2022    Procedure: LYSIS, ADHESIONS;  Surgeon: LLUVIA Post MD;  Location: [facility] OR [facility] FLR;  Service: Colon and Rectal;  Laterality: N/A;  lasting longer than 2 hours, modifier 22      rectovaginal fistula repair  01/2018    SMALL INTESTINE SURGERY  1995    per  patient 10 inches removed    SMALL INTESTINE SURGERY  02/2009    abdominal abscess drained, 3 cm colon removed, 11 cm SB removed    TUBAL LIGATION      UPPER GASTROINTESTINAL ENDOSCOPY  2000     Social History     Socioeconomic History    Marital status:    Tobacco Use    Smoking status: Never    Smokeless tobacco: Never   Substance and Sexual Activity    Alcohol use: No     Alcohol/week: 0.0 standard drinks of alcohol    Drug use: No    Sexual activity: Not Currently     Family History   Problem Relation Age of Onset    Cataracts Mother     Heart attack Father 69    Cataracts Father     No Known Problems Sister     No Known Problems Brother     Cancer Maternal Aunt 66        colon ca    No Known Problems Maternal Uncle     No Known Problems Paternal Aunt     No Known Problems Paternal Uncle     No Known Problems Maternal Grandmother     No Known Problems Maternal Grandfather     No Known Problems Paternal Grandmother     No Known Problems Paternal Grandfather     No Known Problems Son     No Known Problems Son     No Known Problems Other     Anesthesia problems Neg Hx     Celiac disease Neg Hx     Cirrhosis Neg Hx     Colon cancer Neg Hx     Colon polyps Neg Hx     Crohn's disease Neg Hx     Cystic fibrosis Neg Hx     Esophageal cancer Neg Hx     Hemochromatosis Neg Hx     Inflammatory bowel disease Neg Hx     Irritable bowel syndrome Neg Hx     Liver cancer Neg Hx     Liver disease Neg Hx     Rectal cancer Neg Hx     Stomach cancer Neg Hx     Ulcerative colitis Neg Hx     Mars's disease Neg Hx     Lymphoma Neg Hx     Tuberculosis Neg Hx     Scleroderma Neg Hx     Rheum arthritis Neg Hx     Multiple sclerosis Neg Hx     Melanoma Neg Hx     Lupus Neg Hx     Psoriasis Neg Hx     Skin cancer Neg Hx        Review of patient's allergies indicates:   Allergen Reactions    Remicade [infliximab] Shortness Of Breath and Palpitations     Severe muscle and joint, and bone pain    Adalimumab Other (See Comments)     Flagyl [metronidazole] Other (See Comments)     Neuropathy    Nubain [nalbuphine] Anxiety     Upper abdominal burning        Current Outpatient Medications   Medication Sig    calcitRIOL (ROCALTROL) 0.25 MCG Cap Take 1 capsule (0.25 mcg total) by mouth 2 (two) times daily.    ergocalciferol (ERGOCALCIFEROL) 50,000 unit Cap Take 1 capsule (50,000 Units total) by mouth every 7 days.    gabapentin (NEURONTIN) 300 MG capsule Take 1 capsule (300 mg total) by mouth 3 (three) times daily.    HYDROcodone-acetaminophen (NORCO)  mg per tablet Take 1 tablet by mouth every 6 (six) hours as needed for Pain.    levETIRAcetam (KEPPRA) 500 MG Tab Take 1 tablet (500 mg total) by mouth 2 (two) times daily.    loperamide (IMODIUM) 2 mg capsule Take 2 capsules (4 mg total) by mouth 4 (four) times daily.    omeprazole (PRILOSEC) 40 MG capsule Take 40 mg by mouth.    OYSTER SHELL CALCIUM 500 500 mg calcium (1,250 mg) tablet Take 2 tablets by mouth 2 (two) times daily.    pantoprazole (PROTONIX) 40 MG tablet Take 40 mg by mouth once daily.    potassium chloride SA (K-DUR,KLOR-CON) 20 MEQ tablet Take 10 mEq by mouth once daily.    prednisolon/gatiflox/bromfenac (PREDNISOL ACE-GATIFLOX-BROMFEN) 1-0.5-0.075 % DrpS Apply 1 drop to eye 3 (three) times daily.    promethazine (PHENERGAN) 25 MG tablet Take 1 tablet (25 mg total) by mouth every 4 (four) hours.    TUMS 200 mg calcium (500 mg) chewable tablet Take 2 tablets (1,000 mg total) by mouth 2 (two) times daily.    venlafaxine (EFFEXOR) 75 MG tablet Take 75 mg by mouth 2 (two) times daily.     No current facility-administered medications for this visit.       REVIEW OF SYSTEMS:    GENERAL:  No weight loss, malaise or fevers.  HEENT:   No recent changes in vision or hearing + vision   NECK:  Negative for lumps, no difficulty with swallowing.  RESPIRATORY:  Negative for cough, wheezing or shortness of breath, patient denies any recent URI.  CARDIOVASCULAR:  Negative for chest pain, leg  swelling or palpitations.  GI:  Negative for abdominal discomfort, blood in stools or black stools or change in bowel habits. + abdominal discomfort   MUSCULOSKELETAL:  See HPI.  SKIN:  Negative for lesions, rash, and itching.  PSYCH:  No mood disorder or recent psychosocial stressors.  Patients sleep is not disturbed secondary to pain.  HEMATOLOGY/LYMPHOLOGY:  Negative for prolonged bleeding, bruising easily or swollen nodes.  Patient is not currently taking any anti-coagulants  NEURO:   No history of headaches, syncope, paralysis, seizures or tremors. + seizures   All other reviewed and negative other than HPI.    OBJECTIVE:    LMP 05/30/2009     PHYSICAL EXAMINATION:    Video visit:  GENERAL: Well appearing, in no acute distress, alert and oriented x3. Skinny, Fraile  PSYCH:  Mood and affect appropriate.  SKIN: Skin color, texture, turgor normal, no rashes or lesions.  HEAD/FACE:  Normocephalic, atraumatic. Cranial nerves grossly intact.    Last in-person visit  BACK:   - Hyperkyphosis of the thoracic spine with paravertebral humping  - Negative spinous process tenderness  - Negative paravertebral tenderness  - Negative pain to palpation over the facet joints of the thoracic and lumbar spine.   -decreased ROM with flexion and extension of the lumbar spine  -flexion causes increased pain in the mid/low back without radiation into the legs    MUSCULOSKELETAL:  No atrophy or tone abnormalities are noted in the UE or LE.  No deformities, edema, or skin discoloration are noted on visible skin. Good capillary refill.    NEURO: Bilateral upper and lower extremity coordination and muscle stretch reflexes are physiologic and symmetric.      NEUROLOGICAL EXAM:  MENTAL STATUS: A x O x 3, good concentration, speech is fluent and goal directed  MEMORY: recent and remote are intact  CN: CN2-12 grossly intact  MOTOR: 5/5 in all muscle groups except b/l hip flexion 4/5  DTRs: 2+ intact symmetric  Sensation:    -no Loss of  sensation in a left lower and right lower L-1, L-2, L-3, L-4 and L-5 bilaterally distribution.  Babinski: absent on the bilateral side(s)    GAIT: flexed, kyphotic posture

## 2024-04-08 ENCOUNTER — INFUSION (OUTPATIENT)
Dept: INFUSION THERAPY | Facility: HOSPITAL | Age: 59
End: 2024-04-08
Attending: INTERNAL MEDICINE
Payer: MEDICARE

## 2024-04-08 VITALS
OXYGEN SATURATION: 97 % | DIASTOLIC BLOOD PRESSURE: 66 MMHG | RESPIRATION RATE: 16 BRPM | SYSTOLIC BLOOD PRESSURE: 111 MMHG | TEMPERATURE: 98 F | WEIGHT: 123.31 LBS | HEART RATE: 87 BPM | HEIGHT: 65 IN | BODY MASS INDEX: 20.54 KG/M2

## 2024-04-08 DIAGNOSIS — K50.90 CROHN'S DISEASE WITHOUT COMPLICATION, UNSPECIFIED GASTROINTESTINAL TRACT LOCATION: Primary | ICD-10-CM

## 2024-04-08 PROCEDURE — 63600175 PHARM REV CODE 636 W HCPCS: Performed by: INTERNAL MEDICINE

## 2024-04-08 PROCEDURE — 96365 THER/PROPH/DIAG IV INF INIT: CPT

## 2024-04-08 PROCEDURE — 25000003 PHARM REV CODE 250: Performed by: INTERNAL MEDICINE

## 2024-04-08 RX ORDER — HEPARIN 100 UNIT/ML
500 SYRINGE INTRAVENOUS
Status: CANCELLED
Start: 2024-05-20

## 2024-04-08 RX ORDER — HEPARIN 100 UNIT/ML
500 SYRINGE INTRAVENOUS
Status: COMPLETED | OUTPATIENT
Start: 2024-04-08 | End: 2024-04-08

## 2024-04-08 RX ADMIN — HEPARIN 500 UNITS: 100 SYRINGE at 02:04

## 2024-04-08 RX ADMIN — VEDOLIZUMAB 300 MG: 300 INJECTION, POWDER, LYOPHILIZED, FOR SOLUTION INTRAVENOUS at 01:04

## 2024-04-08 NOTE — PLAN OF CARE
Problem: Fatigue  Goal: Improved Activity Tolerance  Outcome: Ongoing, Progressing  Intervention: Promote Improved Energy  Flowsheets (Taken 4/8/2024 2083)  Fatigue Management:   activity schedule adjusted   paced activity encouraged   fatigue-related activity identified  Activity Management: Ambulated -L4

## 2024-04-16 ENCOUNTER — OFFICE VISIT (OUTPATIENT)
Dept: ORTHOPEDICS | Facility: CLINIC | Age: 59
End: 2024-04-16
Payer: MEDICARE

## 2024-04-16 ENCOUNTER — LAB VISIT (OUTPATIENT)
Dept: LAB | Facility: HOSPITAL | Age: 59
End: 2024-04-16
Payer: MEDICARE

## 2024-04-16 DIAGNOSIS — M81.0 OSTEOPOROSIS, UNSPECIFIED OSTEOPOROSIS TYPE, UNSPECIFIED PATHOLOGICAL FRACTURE PRESENCE: ICD-10-CM

## 2024-04-16 DIAGNOSIS — M81.6 LOCALIZED OSTEOPOROSIS OF LEQUESNE: ICD-10-CM

## 2024-04-16 DIAGNOSIS — M80.80XA PATHOLOGICAL FRACTURE DUE TO OSTEOPOROSIS, UNSPECIFIED FRACTURE SITE, UNSPECIFIED OSTEOPOROSIS TYPE, INITIAL ENCOUNTER: ICD-10-CM

## 2024-04-16 DIAGNOSIS — M80.80XA PATHOLOGICAL FRACTURE DUE TO OSTEOPOROSIS, UNSPECIFIED FRACTURE SITE, UNSPECIFIED OSTEOPOROSIS TYPE, INITIAL ENCOUNTER: Primary | ICD-10-CM

## 2024-04-16 LAB
25(OH)D3+25(OH)D2 SERPL-MCNC: 22 NG/ML (ref 30–96)
ALBUMIN SERPL BCP-MCNC: 4 G/DL (ref 3.5–5.2)
ALP SERPL-CCNC: 136 U/L (ref 55–135)
ALT SERPL W/O P-5'-P-CCNC: 28 U/L (ref 10–44)
ANION GAP SERPL CALC-SCNC: 11 MMOL/L (ref 8–16)
AST SERPL-CCNC: 16 U/L (ref 10–40)
BILIRUB SERPL-MCNC: 0.9 MG/DL (ref 0.1–1)
BUN SERPL-MCNC: 16 MG/DL (ref 6–20)
CALCIUM SERPL-MCNC: 9.3 MG/DL (ref 8.7–10.5)
CHLORIDE SERPL-SCNC: 105 MMOL/L (ref 95–110)
CO2 SERPL-SCNC: 23 MMOL/L (ref 23–29)
CREAT SERPL-MCNC: 0.9 MG/DL (ref 0.5–1.4)
EST. GFR  (NO RACE VARIABLE): >60 ML/MIN/1.73 M^2
GLUCOSE SERPL-MCNC: 67 MG/DL (ref 70–110)
POTASSIUM SERPL-SCNC: 4.1 MMOL/L (ref 3.5–5.1)
PROT SERPL-MCNC: 7.6 G/DL (ref 6–8.4)
PTH-INTACT SERPL-MCNC: 43.3 PG/ML (ref 9–77)
SODIUM SERPL-SCNC: 139 MMOL/L (ref 136–145)
T4 FREE SERPL-MCNC: 0.86 NG/DL (ref 0.71–1.51)
TSH SERPL DL<=0.005 MIU/L-ACNC: 0.55 UIU/ML (ref 0.4–4)

## 2024-04-16 PROCEDURE — 82306 VITAMIN D 25 HYDROXY: CPT | Performed by: PHYSICIAN ASSISTANT

## 2024-04-16 PROCEDURE — 99214 OFFICE O/P EST MOD 30 MIN: CPT | Mod: S$PBB,,, | Performed by: PHYSICIAN ASSISTANT

## 2024-04-16 PROCEDURE — 83970 ASSAY OF PARATHORMONE: CPT | Performed by: PHYSICIAN ASSISTANT

## 2024-04-16 PROCEDURE — 84075 ASSAY ALKALINE PHOSPHATASE: CPT | Mod: 91 | Performed by: PHYSICIAN ASSISTANT

## 2024-04-16 PROCEDURE — 80053 COMPREHEN METABOLIC PANEL: CPT | Performed by: PHYSICIAN ASSISTANT

## 2024-04-16 PROCEDURE — 99999 PR PBB SHADOW E&M-EST. PATIENT-LVL III: CPT | Mod: PBBFAC,,, | Performed by: PHYSICIAN ASSISTANT

## 2024-04-16 PROCEDURE — 84443 ASSAY THYROID STIM HORMONE: CPT | Performed by: PHYSICIAN ASSISTANT

## 2024-04-16 PROCEDURE — 84439 ASSAY OF FREE THYROXINE: CPT | Performed by: PHYSICIAN ASSISTANT

## 2024-04-16 PROCEDURE — 36415 COLL VENOUS BLD VENIPUNCTURE: CPT | Performed by: PHYSICIAN ASSISTANT

## 2024-04-16 PROCEDURE — 99213 OFFICE O/P EST LOW 20 MIN: CPT | Mod: PBBFAC

## 2024-04-16 NOTE — PROGRESS NOTES
SUBJECTIVE:     Chief Complaint & History of Present Illness:  Carline Chang is a new patient 58 y.o. female who is seen here today for a bone health evaluation for osteoporosis, fracture prevention, and risk evaluation for future fractures.        she was appropriately identified and referred by Royal Brewster,* due to concerns for compromised bone quality, and risk of future fractures.  This visit is medically necessary to identify risk, investigate and initiative treatment as appropriate to improve bone quality and strength for the reduction of secondary fractures.     her medical history, medications and fracture history will be reviewed and summarized      Review of patient's allergies indicates:   Allergen Reactions    Remicade [infliximab] Shortness Of Breath and Palpitations     Severe muscle and joint, and bone pain    Adalimumab Other (See Comments)    Flagyl [metronidazole] Other (See Comments)     Neuropathy    Nubain [nalbuphine] Anxiety     Upper abdominal burning          Current Outpatient Medications   Medication Sig Dispense Refill    calcitRIOL (ROCALTROL) 0.25 MCG Cap Take 1 capsule (0.25 mcg total) by mouth 2 (two) times daily. 60 capsule 11    ergocalciferol (ERGOCALCIFEROL) 50,000 unit Cap Take 1 capsule (50,000 Units total) by mouth every 7 days. 4 capsule 11    gabapentin (NEURONTIN) 300 MG capsule Take 1 capsule (300 mg total) by mouth 3 (three) times daily. 270 capsule 3    HYDROcodone-acetaminophen (NORCO)  mg per tablet Take 1 tablet by mouth every 6 (six) hours as needed for Pain. 120 tablet 0    [START ON 5/5/2024] HYDROcodone-acetaminophen (NORCO)  mg per tablet Take 1 tablet by mouth every 6 (six) hours as needed for Pain. 120 tablet 0    [START ON 6/4/2024] HYDROcodone-acetaminophen (NORCO)  mg per tablet Take 1 tablet by mouth every 6 (six) hours as needed for Pain. 120 tablet 0    loperamide (IMODIUM) 2 mg capsule Take 2 capsules (4 mg  total) by mouth 4 (four) times daily. 90 capsule 5    omeprazole (PRILOSEC) 40 MG capsule Take 40 mg by mouth.      OYSTER SHELL CALCIUM 500 500 mg calcium (1,250 mg) tablet Take 2 tablets by mouth 2 (two) times daily.      pantoprazole (PROTONIX) 40 MG tablet Take 40 mg by mouth once daily.      potassium chloride SA (K-DUR,KLOR-CON) 20 MEQ tablet Take 10 mEq by mouth once daily.      prednisolon/gatiflox/bromfenac (PREDNISOL ACE-GATIFLOX-BROMFEN) 1-0.5-0.075 % DrpS Apply 1 drop to eye 3 (three) times daily. 5 mL 3    promethazine (PHENERGAN) 25 MG tablet Take 1 tablet (25 mg total) by mouth every 4 (four) hours. 30 tablet 1    venlafaxine (EFFEXOR) 75 MG tablet Take 75 mg by mouth 2 (two) times daily.      levETIRAcetam (KEPPRA) 500 MG Tab Take 1 tablet (500 mg total) by mouth 2 (two) times daily. 60 tablet 0    TUMS 200 mg calcium (500 mg) chewable tablet Take 2 tablets (1,000 mg total) by mouth 2 (two) times daily. 60 tablet 0     No current facility-administered medications for this visit.       Past Medical History:   Diagnosis Date    Abnormal Pap smear of cervix     LEEP procedure, 1995    Acute deep vein thrombosis (DVT) of brachial vein of right upper extremity 01/2017    on RUE ultrasound    Anxiety     Bowel perforation     Chronic pain     Compression fracture of C-spine     Crohn's disease of both small and large intestine with intestinal obstruction 1989    Difficult intravenous access     Encounter for blood transfusion     GERD (gastroesophageal reflux disease) 2014    Kidney stones     Nephrolithiasis     Osteoporosis     Stricture of bowel        Past Surgical History:   Procedure Laterality Date    ABSCESS DRAINAGE  2014    ANAL FISTULOTOMY  2018    BOWEL RESECTION      small bowel resection per Dr. Uribe 4/2018    CATARACT EXTRACTION W/  INTRAOCULAR LENS IMPLANT Left 1/18/2024    Procedure: EXTRACTION, CATARACT, WITH IOL INSERTION;  Surgeon: Ladan Serrato MD;  Location: [facility];  Service:  Ophthalmology;  Laterality: Left;  ORA ONLY    CATARACT EXTRACTION W/  INTRAOCULAR LENS IMPLANT Right 2024    Procedure: EXTRACTION, CATARACT, WITH IOL INSERTION;  Surgeon: Ladan Serrato MD;  Location: [facility] OR;  Service: Ophthalmology;  Laterality: Right;  ORA ONLY, IF NEEDED    CERVICAL BIOPSY  W/ LOOP ELECTRODE EXCISION       SECTION      x2    CHOLECYSTECTOMY  2000    COLON SURGERY  2015    sigmoid loop colostomy with subsequent reversal 3 years later    COLONOSCOPY N/A 2016    Procedure: COLONOSCOPY;  Surgeon: Andre Uribe MD;  Location: [facility] (4TH FLR);  Service: Endoscopy;  Laterality: N/A;    COLONOSCOPY N/A 2017    Procedure: COLONOSCOPY;  Surgeon: Dany Stock MD;  Location: [facility] ENDO (2ND FLR);  Service: Endoscopy;  Laterality: N/A;    COLONOSCOPY N/A 2017    Procedure: COLONOSCOPY;  Surgeon: Andre Uribe MD;  Location: [facility] ENDO (4TH FLR);  Service: Endoscopy;  Laterality: N/A;    COLONOSCOPY N/A 2018    Procedure: COLONOSCOPY;  Surgeon: Andre Uribe MD;  Location: [facility] (4TH FLR);  Service: Endoscopy;  Laterality: N/A;    COLONOSCOPY N/A 2018    Procedure: COLONOSCOPY;  Surgeon: Elmer Serrato MD;  Location: [facility] ENDO (2ND FLR);  Service: Endoscopy;  Laterality: N/A;    COLONOSCOPY  2018    COLONOSCOPY N/A 2018    Procedure: COLONOSCOPY;  Surgeon: Andre Uribe MD;  Location: [facility] (4TH FLR);  Service: Endoscopy;  Laterality: N/A;    COLONOSCOPY N/A 10/07/2021    Procedure: COLONOSCOPY;  Surgeon: Jose Hughes MD;  Location: [facility] ENDO;  Service: Endoscopy;  Laterality: N/A;    EPIDURAL STEROID INJECTION N/A 2022    Procedure: INJECTION, STEROID, EPIDURAL L4-L5 CONTRAST;  Surgeon: Royal Brewster DO;  Location: [facility] [place], operated by [facility];  Service: Pain Management;  Laterality: N/A;    ESOPHAGOGASTRODUODENOSCOPY N/A 10/07/2021    Procedure: EGD (ESOPHAGOGASTRODUODENOSCOPY);  Surgeon: Jose Hughes MD;   Location: [facility] ENDO;  Service: Endoscopy;  Laterality: N/A;    LAPAROSCOPIC CLOSURE OF COLOSTOMY N/A 08/29/2018    Procedure: CLOSURE, COLOSTOMY, LAPAROSCOPIC;  Surgeon: Andre Uribe MD;  Location: [facility] OR 2ND FLR;  Service: Colon and Rectal;  Laterality: N/A;  converted to open    LYSIS OF ADHESIONS N/A 01/20/2022    Procedure: LYSIS, ADHESIONS;  Surgeon: LLUVIA Post MD;  Location: [facility] OR 2ND FLR;  Service: Colon and Rectal;  Laterality: N/A;  lasting longer than 2 hours, modifier 22      rectovaginal fistula repair  01/2018    SMALL INTESTINE SURGERY  1995    per patient 10 inches removed    SMALL INTESTINE SURGERY  02/2009    abdominal abscess drained, 3 cm colon removed, 11 cm SB removed    TUBAL LIGATION      UPPER GASTROINTESTINAL ENDOSCOPY  2000       Lab Results   Component Value Date     (L) 08/20/2022    K 4.5 08/20/2022     08/20/2022    CO2 20 (L) 08/20/2022     (H) 08/20/2022    BUN 7 08/20/2022    CREATININE 0.7 08/20/2022    CALCIUM 8.7 08/20/2022    PROT 6.1 08/20/2022    ALBUMIN 3.0 (L) 08/20/2022    BILITOT 0.7 08/20/2022    ALKPHOS 78 08/20/2022    AST 21 08/20/2022    ALT 23 08/20/2022    ANIONGAP 3 (L) 08/20/2022    ESTGFRAFRICA >60.0 04/06/2022    EGFRNONAA >60.0 04/06/2022      Lab Results   Component Value Date    WBC 6.00 08/20/2022    RBC 2.85 (L) 08/20/2022    HGB 8.9 (L) 08/20/2022    HCT 27.4 (L) 08/20/2022    MCV 96 08/20/2022    MCH 31.2 (H) 08/20/2022    MCHC 32.5 08/20/2022    RDW 14.9 (H) 08/20/2022     (L) 08/20/2022    MPV 10.1 08/20/2022    GRAN 4.7 08/20/2022    GRAN 77.9 (H) 08/20/2022    LYMPH 0.8 (L) 08/20/2022    LYMPH 13.2 (L) 08/20/2022    MONO 0.5 08/20/2022    MONO 8.0 08/20/2022    EOS 0.0 08/20/2022    BASO 0.01 08/20/2022    EOSINOPHIL 0.2 08/20/2022    BASOPHIL 0.2 08/20/2022    DIFFMETHOD Automated 08/20/2022      Lab Results   Component Value Date    MG 2.4 08/20/2022      Lab Results   Component Value Date    PHOS 3.2  "08/19/2022      Lab Results   Component Value Date    LXHZCNWB36RK 13 (L) 08/18/2022      Lab Results   Component Value Date    PTH 19.2 08/20/2022      Lab Results   Component Value Date    TSH 0.310 (L) 03/10/2022      Lab Results   Component Value Date    FREET4 0.98 03/10/2022      Lab Results   Component Value Date    HGBA1C 5.6 01/14/2022    ESTIMATEDAVG 114 01/14/2022      No results found for: "FREETESTOSTE"         Vital Signs (Most Recent)  There were no vitals filed for this visit.      Today's Visit and Bone Health History  Question 4/16/2024  1:44 PM CDT - Filed by Nicole Navarro MA   Have you had any loss of height or gotten shorter since your 20's? 3   Are you postmenopausal? No   Have you ever taken any type of hormone replacement therapy? No   Do you currently smoke? No   Have you ever smoked in the past? No   How many alcoholic beverages do you drink per day? 0   How many caffeinated beverages do you drink per day? 1 to 3   Have you had 2 or more falls in the past 12 months? No   How active have you been in the past 12 months? Not active ( in the house only )   Did either of your parents have a hip fracture after the age of 50? No   Have you ever been diagnosed with any of the following? Celiac Disease    Seizure Disorder   Do you currently have a fracture? Yes   If you currently have a fracture please indicate where and when the fracture occured. vertebrae  Jan 2021   Have you broken any other bones since you turned 52 or older? No   Have you ever had a bone density test or DXA scan? Yes   Are you currently taking or have you ever taken any of the following medications? Anticonvulsants (Gabapentin, Lyrica)    Opiods (Oxycodone, Hyrocodone)    PPI's (Nexium, Prilosec)    Reclast (Zoledronate)    Steroids (Prednisone, Cortisone)    SSRI's (Antidepressants (Lexapro, Zoloft)   Do you take Calcium? Yes   If you take Calcium what dose? 25 mcg 1 bid   Do you take Vitamin D? Yes   If you take Vitamin D " what dose? 1.25 mg  1x week   Have you ever been diagnosed with cancer? No   Have you ever been treated for cancer with high beam radiation or had radioactive implants? No            she has medical history and medication use known to be associated with bone loss and osteoporosis to include multiple pathological compression fractures of the spine previous diagnosis of osteoporosis long-term use of steroids long-term use of pain medications seizure disorder celiac disease PPIs.  GERD elevated off the phosphatase Crohn's disease    The last DXA was performed in 04/08/2022.          T-Score Hip: -2.9     T-Score Spine: -3.1         Review of Systems:  ROS:  Constitutional: no fever or chills  Eyes: no visual changes  ENT: no nasal congestion or sore throat  Respiratory: no respiratory symptoms  Cardiovascular: no chest pain or palpitations  Gastrointestinal: no nausea or vomiting, tolerating diet, positive for Crohn's disease, GERD, bowel perforation, chronic abdominal pain,  Genitourinary:  Calculus of the ureter, rectovaginal fistula for lithiasis hypocalcemia hypomagnesemia hypokalemia  Integument/Breast: no rash or pruritis  Hematologic/Lymphatic: no easy bruising or lymphadenopathy, positive history DVT, chronic anemia  Musculoskeletal: no arthralgias or myalgias, compression fractures of L3 and T12  Neurological:  Positive history of status epilepticus, seizure activity, compression fracture of T12  Behavioral/Psych: no auditory or visual hallucinations, chronic continuous use of opioids, anxiety  Endocrine: no heat or cold intolerance, vitamin-D deficiency, osteoporosis, subclinical hyperthyroidism      OBJECTIVE:     PHYSICAL EXAM:     ,                  General: no acute distress  Behavioral/Psych: normal judgment and insight  Skin: no rash  Head/Neck: atraumatic, normocephalic, without obvious abnormality  Respiratory: normal respiratory effort  Cardiac: regular rate and rhythm  Vascular: pulses  present  Abdomen: soft, non-tender  Musculoskeletal: no joint tenderness, deformity or swelling               ASSESSMENT/PLAN:     Assessment:    Osteoporosis, at high risk for future fractures, history of pathological vertebral compression fracture.    Plan:   30-45 minutes spent in face-to-face consultation with patient and her family members today, discussing the disease management of osteoporosis, for the reduction of future fractures.  I have explained that bone strength is equal to bone quality. A bone density / DEXA scan is important to complement her care since her fracture was by definition a fragility fracture/traumatic fracture.  Over half of the encounter was spent (50%) counseling the patient on the disease of osteoporosis evidence-based and best practice treatment options available as well as recommendations for improvement of bone quality, the importance of nutritional supplements to include:  Calcium 6075-4804 mg daily in divided doses   Vitamin D3  3183-6523 units daily in divided doses.   Fall prevention and personal safety for the reduction of future fractures.    Clinicians Guidelines for the treatment of Osteoporosis 2023 as part of the National Osteoporosis Foundation were utilized as the evidence-based information provided.    Discussed pharmaceutical treatment options to include Biphosphatases, Denosumab, Abaloparatide and Teriparatide. Information to include indications for therapy, risks and benefits to treatment, and important safety information related to these treatments was provided and discussed.  Handouts were given to the patient for her review on osteoporosis and other pharmacological treatment information related to other available treatment options.    The importance of diet, impact exercise, and core strengthening with gait and balance exercise, through  both formal physical therapy programs and home exercise programs was discussed.     Bone density test recommended and  ordered  Bone health labs recommended and ordered    We will see her back following testing discuss treatment options

## 2024-04-19 LAB — ALP BONE SERPL-MCNC: 24.3 UG/L (ref 5.6–29)

## 2024-05-08 ENCOUNTER — HOSPITAL ENCOUNTER (OUTPATIENT)
Dept: RADIOLOGY | Facility: CLINIC | Age: 59
Discharge: HOME | End: 2024-05-08
Attending: PHYSICIAN ASSISTANT
Payer: MEDICARE

## 2024-05-08 DIAGNOSIS — M81.6 LOCALIZED OSTEOPOROSIS OF LEQUESNE: ICD-10-CM

## 2024-05-08 DIAGNOSIS — M80.80XA PATHOLOGICAL FRACTURE DUE TO OSTEOPOROSIS, UNSPECIFIED FRACTURE SITE, UNSPECIFIED OSTEOPOROSIS TYPE, INITIAL ENCOUNTER: ICD-10-CM

## 2024-05-08 DIAGNOSIS — M81.0 OSTEOPOROSIS, UNSPECIFIED OSTEOPOROSIS TYPE, UNSPECIFIED PATHOLOGICAL FRACTURE PRESENCE: ICD-10-CM

## 2024-05-08 PROCEDURE — 77080 DXA BONE DENSITY AXIAL: CPT | Mod: TC

## 2024-05-08 PROCEDURE — 77080 DXA BONE DENSITY AXIAL: CPT | Mod: 26,,, | Performed by: INTERNAL MEDICINE

## 2024-05-17 DIAGNOSIS — D51.3 OTHER DIETARY VITAMIN B12 DEFICIENCY ANEMIA: ICD-10-CM

## 2024-05-17 DIAGNOSIS — R74.8 ABNORMAL ALKALINE PHOSPHATASE TEST: ICD-10-CM

## 2024-05-17 DIAGNOSIS — E83.42 HYPOMAGNESEMIA: ICD-10-CM

## 2024-05-17 DIAGNOSIS — K91.2 HYPOGLYCEMIA FOLLOWING GASTROINTESTINAL SURGERY: ICD-10-CM

## 2024-05-17 DIAGNOSIS — K50.90 CROHN DISEASE: Primary | ICD-10-CM

## 2024-05-27 ENCOUNTER — INFUSION (OUTPATIENT)
Dept: INFUSION THERAPY | Facility: HOSPITAL | Age: 59
End: 2024-05-27
Attending: INTERNAL MEDICINE
Payer: MEDICARE

## 2024-05-27 VITALS
DIASTOLIC BLOOD PRESSURE: 70 MMHG | BODY MASS INDEX: 20.55 KG/M2 | RESPIRATION RATE: 17 BRPM | SYSTOLIC BLOOD PRESSURE: 111 MMHG | WEIGHT: 123.38 LBS | HEART RATE: 100 BPM | OXYGEN SATURATION: 100 % | HEIGHT: 65 IN | TEMPERATURE: 98 F

## 2024-05-27 DIAGNOSIS — K50.90 CROHN'S DISEASE WITHOUT COMPLICATION, UNSPECIFIED GASTROINTESTINAL TRACT LOCATION: Primary | ICD-10-CM

## 2024-05-27 PROCEDURE — 63600175 PHARM REV CODE 636 W HCPCS: Performed by: INTERNAL MEDICINE

## 2024-05-27 PROCEDURE — 96523 IRRIG DRUG DELIVERY DEVICE: CPT

## 2024-05-27 RX ORDER — HEPARIN 100 UNIT/ML
500 SYRINGE INTRAVENOUS
Status: CANCELLED
Start: 2024-06-03

## 2024-05-27 RX ORDER — HEPARIN 100 UNIT/ML
500 SYRINGE INTRAVENOUS
Status: COMPLETED | OUTPATIENT
Start: 2024-05-27 | End: 2024-05-27

## 2024-05-27 RX ADMIN — HEPARIN 500 UNITS: 100 SYRINGE at 10:05

## 2024-05-27 NOTE — NURSING
Patient arrived with a rash on both arms, around her wrist as well as both legs. The rash appears as small raised red dots. She states she has had it about 3 weeks but has forgotten to show anyone. She states she hasn't changed any skin care products or laundry detergents and she didn't do any gardening or outside activities and reports that it only itches in the shower. Attempted to contact Dr. Lopez office.  Patient instructed to contact her PCP to get it checked out. Patient verbalizes understanding.

## 2024-06-03 ENCOUNTER — HOSPITAL ENCOUNTER (OUTPATIENT)
Dept: PREADMISSION TESTING | Facility: HOSPITAL | Age: 59
Discharge: HOME | End: 2024-06-03
Attending: INTERNAL MEDICINE
Payer: MEDICARE

## 2024-06-03 DIAGNOSIS — Z01.818 PRE-OP TESTING: Primary | ICD-10-CM

## 2024-06-03 PROCEDURE — 93005 ELECTROCARDIOGRAM TRACING: CPT | Performed by: INTERNAL MEDICINE

## 2024-06-03 PROCEDURE — 93010 ELECTROCARDIOGRAM REPORT: CPT | Mod: ,,, | Performed by: INTERNAL MEDICINE

## 2024-06-03 RX ORDER — SPIRONOLACTONE 25 MG/1
25 TABLET ORAL DAILY
COMMUNITY

## 2024-06-03 RX ORDER — SERTRALINE HYDROCHLORIDE 25 MG/1
25 TABLET, FILM COATED ORAL DAILY
COMMUNITY

## 2024-06-03 RX ORDER — CALCIUM CARBONATE/VITAMIN D3 500-10/5ML
400 LIQUID (ML) ORAL DAILY
COMMUNITY

## 2024-06-03 NOTE — DISCHARGE INSTRUCTIONS
A NURSE WILL CALL YOU THE EVENING PRIOR TO YOUR PROCEDURE.    PARK IN THE PARKING GARAGE AND CHECK IN AT REGISTRATION THE PROCEED TO THE SAME DAY SURGERY DEPARTMENT ON THE FIRST FLOOR.    DO NOT EAT OR DRINK ANYTHING AFTER MIDNIGHT THE NIGHT BEFORE YOUR PROCEDURE UNLESS YOU ARE INSTRUCTED TO TAKE MEDICATION, THEN TAKE WITH A SMALL SIP OF WATER.    PLEASE DO NOT SMOKE THE DAY OF YOUR PROCEDURE.    HOLD ALL ASPIRIN CONTAINING PRODUCTS 7 DAYS PRIOR TO YOUR PROCEDURE UNLESS YOUR DOCTOR INSTRUCTS YOU OTHERWISE.     FOLLOW YOUR DOCTORS INSTRUCTIONS REGARDING PRESCRIPTION BLOOD THINNERS.     YOU MAY TAKE ACETAMINOPHEN IF NEEDED FOR PAIN.      IF YOU ARE HAVING A COLONOSCOPY BE SURE TO FOLLOW YOUR COLON PREP FROM YOUR DOCTORS OFFICE.    DO NOT WEAR ANY JEWELRY, LEAVE ALL VALUABLES AT HOME.     IF YOU WEAR GLASSES, HEARING AIDES OR DENTURES PLEASE BRING A CASE FOR THEM.    DO NOT WEAR ANY CONTACT LENSES    WEAR LOOSE COMFORTABLE CLOTHES TO GO HOME IN.    BE SURE TO HAVE SOMEONE HERE TO DRIVE YOU HOME AFTER YOUR PROCEDURE.

## 2024-06-04 ENCOUNTER — INFUSION (OUTPATIENT)
Dept: INFUSION THERAPY | Facility: HOSPITAL | Age: 59
End: 2024-06-04
Attending: INTERNAL MEDICINE
Payer: MEDICARE

## 2024-06-04 VITALS
SYSTOLIC BLOOD PRESSURE: 100 MMHG | WEIGHT: 121.88 LBS | BODY MASS INDEX: 20.3 KG/M2 | HEART RATE: 87 BPM | RESPIRATION RATE: 15 BRPM | TEMPERATURE: 98 F | DIASTOLIC BLOOD PRESSURE: 61 MMHG | OXYGEN SATURATION: 96 % | HEIGHT: 65 IN

## 2024-06-04 DIAGNOSIS — K50.90 CROHN'S DISEASE WITHOUT COMPLICATION, UNSPECIFIED GASTROINTESTINAL TRACT LOCATION: Primary | ICD-10-CM

## 2024-06-04 PROCEDURE — 96375 TX/PRO/DX INJ NEW DRUG ADDON: CPT

## 2024-06-04 PROCEDURE — 25000003 PHARM REV CODE 250: Performed by: INTERNAL MEDICINE

## 2024-06-04 PROCEDURE — 63600175 PHARM REV CODE 636 W HCPCS: Performed by: INTERNAL MEDICINE

## 2024-06-04 PROCEDURE — 96365 THER/PROPH/DIAG IV INF INIT: CPT

## 2024-06-04 RX ORDER — HEPARIN 100 UNIT/ML
500 SYRINGE INTRAVENOUS
Status: COMPLETED | OUTPATIENT
Start: 2024-06-04 | End: 2024-06-04

## 2024-06-04 RX ORDER — HEPARIN 100 UNIT/ML
500 SYRINGE INTRAVENOUS
Start: 2024-07-29

## 2024-06-04 RX ADMIN — HEPARIN 500 UNITS: 100 SYRINGE at 03:06

## 2024-06-04 RX ADMIN — METHYLPREDNISOLONE SODIUM SUCCINATE 30 MG: 40 INJECTION, POWDER, FOR SOLUTION INTRAMUSCULAR; INTRAVENOUS at 02:06

## 2024-06-04 RX ADMIN — VEDOLIZUMAB 300 MG: 300 INJECTION, POWDER, LYOPHILIZED, FOR SOLUTION INTRAVENOUS at 02:06

## 2024-06-04 NOTE — PLAN OF CARE
Problem: Fatigue  Goal: Improved Activity Tolerance  Outcome: Progressing  Intervention: Promote Improved Energy  Flowsheets (Taken 6/4/2024 2994)  Fatigue Management:   fatigue-related activity identified   paced activity encouraged   frequent rest breaks encouraged  Sleep/Rest Enhancement:   regular sleep/rest pattern promoted   relaxation techniques promoted  Activity Management: Ambulated -L4  Environmental Support: rest periods encouraged

## 2024-06-04 NOTE — NURSING
Pt saw PCP this morning for ongoing rash on legs. Per MD Saul okay to give entyvio today and gave order for one time dose of solumedrol IV 30 mg. Educated this to pt. Also stated to pt to follow up with dermatologist. Pt stated understanding.

## 2024-06-06 ENCOUNTER — HOSPITAL ENCOUNTER (OUTPATIENT)
Facility: HOSPITAL | Age: 59
Discharge: HOME | End: 2024-06-06
Attending: INTERNAL MEDICINE | Admitting: INTERNAL MEDICINE
Payer: MEDICARE

## 2024-06-06 ENCOUNTER — ANESTHESIA EVENT (OUTPATIENT)
Dept: SURGERY | Facility: HOSPITAL | Age: 59
End: 2024-06-06
Payer: MEDICARE

## 2024-06-06 ENCOUNTER — ANESTHESIA (OUTPATIENT)
Dept: SURGERY | Facility: HOSPITAL | Age: 59
End: 2024-06-06
Payer: MEDICARE

## 2024-06-06 VITALS
HEIGHT: 65 IN | SYSTOLIC BLOOD PRESSURE: 94 MMHG | RESPIRATION RATE: 14 BRPM | DIASTOLIC BLOOD PRESSURE: 53 MMHG | HEART RATE: 61 BPM | OXYGEN SATURATION: 100 % | WEIGHT: 121 LBS | TEMPERATURE: 97 F | BODY MASS INDEX: 20.16 KG/M2

## 2024-06-06 DIAGNOSIS — R19.4 CHANGE IN BOWEL HABIT: ICD-10-CM

## 2024-06-06 LAB
C DIFF GDH STL QL: NEGATIVE
C DIFF TOX A+B STL QL IA: NEGATIVE
OHS QRS DURATION: 72 MS
OHS QTC CALCULATION: 459 MS

## 2024-06-06 PROCEDURE — 88305 TISSUE EXAM BY PATHOLOGIST: CPT | Mod: TC,59 | Performed by: PATHOLOGY

## 2024-06-06 PROCEDURE — 27200043 HC FORCEPS, BIOPSY: Performed by: INTERNAL MEDICINE

## 2024-06-06 PROCEDURE — 37000009 HC ANESTHESIA EA ADD 15 MINS: Performed by: INTERNAL MEDICINE

## 2024-06-06 PROCEDURE — 27201114 HC TRAP (ANY): Performed by: INTERNAL MEDICINE

## 2024-06-06 PROCEDURE — 45380 COLONOSCOPY AND BIOPSY: CPT | Performed by: INTERNAL MEDICINE

## 2024-06-06 PROCEDURE — D9220A PRA ANESTHESIA: Mod: CRNA,,, | Performed by: NURSE ANESTHETIST, CERTIFIED REGISTERED

## 2024-06-06 PROCEDURE — 44361 SMALL BOWEL ENDOSCOPY/BIOPSY: CPT | Performed by: INTERNAL MEDICINE

## 2024-06-06 PROCEDURE — 63600175 PHARM REV CODE 636 W HCPCS: Performed by: NURSE ANESTHETIST, CERTIFIED REGISTERED

## 2024-06-06 PROCEDURE — 87324 CLOSTRIDIUM AG IA: CPT | Performed by: INTERNAL MEDICINE

## 2024-06-06 PROCEDURE — 25000003 PHARM REV CODE 250: Performed by: NURSE ANESTHETIST, CERTIFIED REGISTERED

## 2024-06-06 PROCEDURE — D9220A PRA ANESTHESIA: Mod: ANES,,, | Performed by: STUDENT IN AN ORGANIZED HEALTH CARE EDUCATION/TRAINING PROGRAM

## 2024-06-06 PROCEDURE — 37000008 HC ANESTHESIA 1ST 15 MINUTES: Performed by: INTERNAL MEDICINE

## 2024-06-06 RX ORDER — PROPOFOL 10 MG/ML
VIAL (ML) INTRAVENOUS
Status: DISCONTINUED | OUTPATIENT
Start: 2024-06-06 | End: 2024-06-06

## 2024-06-06 RX ORDER — LIDOCAINE HYDROCHLORIDE 20 MG/ML
INJECTION, SOLUTION EPIDURAL; INFILTRATION; INTRACAUDAL; PERINEURAL
Status: DISCONTINUED | OUTPATIENT
Start: 2024-06-06 | End: 2024-06-06

## 2024-06-06 RX ADMIN — PROPOFOL 20 MG: 10 INJECTION, EMULSION INTRAVENOUS at 11:06

## 2024-06-06 RX ADMIN — PROPOFOL 50 MG: 10 INJECTION, EMULSION INTRAVENOUS at 11:06

## 2024-06-06 RX ADMIN — LIDOCAINE HYDROCHLORIDE 40 MG: 20 INJECTION, SOLUTION INTRAVENOUS at 11:06

## 2024-06-06 RX ADMIN — PROPOFOL 30 MG: 10 INJECTION, EMULSION INTRAVENOUS at 11:06

## 2024-06-06 RX ADMIN — SODIUM CHLORIDE: 0.9 INJECTION, SOLUTION INTRAVENOUS at 11:06

## 2024-06-06 NOTE — [employer]ATION PATIENT INSTRUCTIONS
Discharge Summary/Instructions after an Endoscopic Procedure  Patient Name: Carline Chang  Patient MRN: [id number]  Patient YOB: 1965 Thursday, June 6, 2024  Jose Hughes MD  RESTRICTIONS:  During your procedure today, you received medications for sedation.  These   medications may affect your judgment, balance and coordination.  Therefore,   for 24 hours, you have the following restrictions:   - DO NOT drive a car, operate machinery, make legal/financial decisions,   sign important papers or drink alcohol.    ACTIVITY:  Today: no heavy lifting, straining or running due to procedural   sedation/anesthesia.  The following day: return to full activity including work.  DIET:  Eat and drink normally unless instructed otherwise.     TREATMENT FOR COMMON SIDE EFFECTS:  - Mild abdominal pain, nausea, belching, bloating or excessive gas:  rest,   eat lightly and use a heating pad.  - Sore Throat: treat with throat lozenges and/or gargle with warm salt   water.  - Because air was used during the procedure, expelling large amounts of air   from your rectum or belching is normal.  - If a bowel prep was taken, you may not have a bowel movement for 1-3 days.    This is normal.  SYMPTOMS TO WATCH FOR AND REPORT TO YOUR PHYSICIAN:  1. Abdominal pain or bloating, other than gas cramps.  2. Chest pain.  3. Back pain.  4. Signs of infection such as: chills or fever occurring within 24 hours   after the procedure.  5. Rectal bleeding, which would show as bright red, maroon, or black stools.   (A tablespoon of blood from the rectum is not serious, especially if   hemorrhoids are present.)  6. Vomiting.  7. Weakness or dizziness.  GO DIRECTLY TO THE NEAREST EMERGENCY ROOM IF YOU HAVE ANY OF THE FOLLOWING:      Difficulty breathing              Chills and/or fever over 101 F   Persistent vomiting and/or vomiting blood   Severe abdominal pain   Severe chest pain   Black, tarry stools   Bleeding- more than one  tablespoon   Any other symptom or condition that you feel may need urgent attention  Your doctor recommends these additional instructions:  If any biopsies were taken, your doctors clinic will contact you in 1 to 2   weeks with any results.  - Patient has a contact number available for emergencies.  The signs and   symptoms of potential delayed complications were discussed with the   patient.  Return to normal activities tomorrow.  Written discharge   instructions were provided to the patient.   - Resume previous diet.   - Continue present medications.   - Await pathology results.   - Repeat colonoscopy date to be determined after pending pathology results   are reviewed for surveillance.   - Return to GI clinic in 6 months.  - Imodium sent as needed.   - Discharge patient to home (with escort).  For questions, problems or results please call your physician - Jose Hughes MD at Work:  [phone].  [facility], EMERGENCY ROOM PHONE NUMBER: [phone]  IF A COMPLICATION OR EMERGENCY SITUATION ARISES AND YOU ARE UNABLE TO REACH   YOUR PHYSICIAN - GO DIRECTLY TO THE EMERGENCY ROOM.  MD Jose Stanley MD  6/6/2024 11:49:00 AM  This report has been verified and signed electronically.  Dear patient,  As a result of recent federal legislation (The Federal Cures Act), you may   receive lab or pathology results from your procedure in your [facility]   account before your physician is able to contact you. Your physician or   their representative will relay the results to you with their   recommendations at their soonest availability.  Thank you,  [employer]ATION

## 2024-06-06 NOTE — [employer]ATION PATIENT INSTRUCTIONS
Discharge Summary/Instructions after an Endoscopic Procedure  Patient Name: Carline Chang  Patient MRN: [id number]  Patient YOB: 1965 Thursday, June 6, 2024  Jose Hughes MD  RESTRICTIONS:  During your procedure today, you received medications for sedation.  These   medications may affect your judgment, balance and coordination.  Therefore,   for 24 hours, you have the following restrictions:   - DO NOT drive a car, operate machinery, make legal/financial decisions,   sign important papers or drink alcohol.    ACTIVITY:  Today: no heavy lifting, straining or running due to procedural   sedation/anesthesia.  The following day: return to full activity including work.  DIET:  Eat and drink normally unless instructed otherwise.     TREATMENT FOR COMMON SIDE EFFECTS:  - Mild abdominal pain, nausea, belching, bloating or excessive gas:  rest,   eat lightly and use a heating pad.  - Sore Throat: treat with throat lozenges and/or gargle with warm salt   water.  - Because air was used during the procedure, expelling large amounts of air   from your rectum or belching is normal.  - If a bowel prep was taken, you may not have a bowel movement for 1-3 days.    This is normal.  SYMPTOMS TO WATCH FOR AND REPORT TO YOUR PHYSICIAN:  1. Abdominal pain or bloating, other than gas cramps.  2. Chest pain.  3. Back pain.  4. Signs of infection such as: chills or fever occurring within 24 hours   after the procedure.  5. Rectal bleeding, which would show as bright red, maroon, or black stools.   (A tablespoon of blood from the rectum is not serious, especially if   hemorrhoids are present.)  6. Vomiting.  7. Weakness or dizziness.  GO DIRECTLY TO THE NEAREST EMERGENCY ROOM IF YOU HAVE ANY OF THE FOLLOWING:      Difficulty breathing              Chills and/or fever over 101 F   Persistent vomiting and/or vomiting blood   Severe abdominal pain   Severe chest pain   Black, tarry stools   Bleeding- more than one  tablespoon   Any other symptom or condition that you feel may need urgent attention  Your doctor recommends these additional instructions:  If any biopsies were taken, your doctors clinic will contact you in 1 to 2   weeks with any results.  - Patient has a contact number available for emergencies.  The signs and   symptoms of potential delayed complications were discussed with the   patient.  Return to normal activities tomorrow.  Written discharge   instructions were provided to the patient.   - Discharge patient to home (with escort).  For questions, problems or results please call your physician - Jose Hughes MD at Work:  [phone].  [facility], EMERGENCY ROOM PHONE NUMBER: [phone]  IF A COMPLICATION OR EMERGENCY SITUATION ARISES AND YOU ARE UNABLE TO REACH   YOUR PHYSICIAN - GO DIRECTLY TO THE EMERGENCY ROOM.  MD Jose Stanley MD  6/6/2024 11:51:20 AM  This report has been verified and signed electronically.  Dear patient,  As a result of recent federal legislation (The Federal Cures Act), you may   receive lab or pathology results from your procedure in your [facility]   account before your physician is able to contact you. Your physician or   their representative will relay the results to you with their   recommendations at their soonest availability.  Thank you,  PROVATION

## 2024-06-06 NOTE — ANESTHESIA PREPROCEDURE EVALUATION
2024  Carline Chang is a 58 y.o., female.      Patient Active Problem List   Diagnosis    Calculus of ureter    Rectovaginal fistula    Chronic, continuous use of opioids    Chronic abdominal pain    Special screening for malignant neoplasms, colon    Difficult intravenous access    History of DVT (deep vein thrombosis)- seems to be related to IV catheters    Anxiety    Alkaline phosphatase elevation    Crohn's disease of both small and large intestine with intestinal obstruction    Nephrolithiasis    High risk medication use    GERD (gastroesophageal reflux disease)    Crohn's colitis    Crohn disease    Seizure activity     Bowel perforation in the past     Chronic anemia    History of status epilepticus    Large bowel obstruction    Compression fracture of T12 vertebra    Closed compression fracture of L3 lumbar vertebra, initial encounter    Hypocalcemia    Hypomagnesemia    COVID-19 virus infection    Hypokalemia    Vitamin D insufficiency    Osteoporosis    Subclinical hyperthyroidism    Nuclear sclerosis, bilateral    Nuclear sclerotic cataract of right eye       Past Surgical History:   Procedure Laterality Date    ABSCESS DRAINAGE      ANAL FISTULOTOMY      BOWEL RESECTION      small bowel resection per Dr. Uribe 2018    CATARACT EXTRACTION W/  INTRAOCULAR LENS IMPLANT Left 2024    Procedure: EXTRACTION, CATARACT, WITH IOL INSERTION;  Surgeon: Ladan Serrato MD;  Location: [facility] OR;  Service: Ophthalmology;  Laterality: Left;  ORA ONLY    CATARACT EXTRACTION W/  INTRAOCULAR LENS IMPLANT Right 2024    Procedure: EXTRACTION, CATARACT, WITH IOL INSERTION;  Surgeon: Ladan Serrato MD;  Location: [facility] OR;  Service: Ophthalmology;  Laterality: Right;  ORA ONLY, IF NEEDED    CERVICAL BIOPSY  W/ LOOP ELECTRODE EXCISION       SECTION      x2    CHOLECYSTECTOMY   2000    COLON SURGERY  06/2015    sigmoid loop colostomy with subsequent reversal 3 years later    COLONOSCOPY N/A 05/27/2016    Procedure: COLONOSCOPY;  Surgeon: Andre Uribe MD;  Location: [facility] ENDO (4TH FLR);  Service: Endoscopy;  Laterality: N/A;    COLONOSCOPY N/A 01/17/2017    Procedure: COLONOSCOPY;  Surgeon: Dany Stock MD;  Location: [facility] ENDO (2ND FLR);  Service: Endoscopy;  Laterality: N/A;    COLONOSCOPY N/A 12/04/2017    Procedure: COLONOSCOPY;  Surgeon: Andre Uribe MD;  Location: [facility] ENDO (4TH FLR);  Service: Endoscopy;  Laterality: N/A;    COLONOSCOPY N/A 02/08/2018    Procedure: COLONOSCOPY;  Surgeon: Andre Uribe MD;  Location: [facility] ENDO (4TH FLR);  Service: Endoscopy;  Laterality: N/A;    COLONOSCOPY N/A 03/24/2018    Procedure: COLONOSCOPY;  Surgeon: Elmer Serrato MD;  Location: [facility] ENDO (2ND FLR);  Service: Endoscopy;  Laterality: N/A;    COLONOSCOPY  03/24/2018    COLONOSCOPY N/A 07/06/2018    Procedure: COLONOSCOPY;  Surgeon: Andre Uribe MD;  Location: [facility] ENDO (4TH FLR);  Service: Endoscopy;  Laterality: N/A;    COLONOSCOPY N/A 10/07/2021    Procedure: COLONOSCOPY;  Surgeon: Jose Hughes MD;  Location: [facility];  Service: Endoscopy;  Laterality: N/A;    EPIDURAL STEROID INJECTION N/A 11/2/2022    Procedure: INJECTION, STEROID, EPIDURAL L4-L5 CONTRAST;  Surgeon: Royal Brewster DO;  Location: [facility] PAIN MGT;  Service: Pain Management;  Laterality: N/A;    ESOPHAGOGASTRODUODENOSCOPY N/A 10/07/2021    Procedure: EGD (ESOPHAGOGASTRODUODENOSCOPY);  Surgeon: Jose Hughes MD;  Location: [facility] ENDO;  Service: Endoscopy;  Laterality: N/A;    LAPAROSCOPIC CLOSURE OF COLOSTOMY N/A 08/29/2018    Procedure: CLOSURE, COLOSTOMY, LAPAROSCOPIC;  Surgeon: Andre Uribe MD;  Location: [facility] OR 2ND FLR;  Service: Colon and Rectal;  Laterality: N/A;  converted to open    LYSIS OF ADHESIONS N/A 01/20/2022    Procedure: LYSIS, ADHESIONS;  Surgeon: LLUVIA Post MD;   Location: [facility] OR [address] [place] [address];  Service: Colon and Rectal;  Laterality: N/A;  lasting longer than 2 hours, modifier 22      rectovaginal fistula repair  01/2018    SMALL INTESTINE SURGERY  1995    per patient 10 inches removed    SMALL INTESTINE SURGERY  02/2009    abdominal abscess drained, 3 cm colon removed, 11 cm SB removed    TUBAL LIGATION      UPPER GASTROINTESTINAL ENDOSCOPY  2000        Tobacco Use:  The patient  reports that she has never smoked. She has never used smokeless tobacco.     Results for orders placed or performed during the hospital encounter of 06/03/24   EKG 12-lead    Collection Time: 06/03/24 11:44 AM   Result Value Ref Range    QRS Duration 72 ms    [facility] QTC Calculation 459 ms    Narrative    Test Reason : Z01.818,    Vent. Rate : 085 BPM     Atrial Rate : 085 BPM     P-R Int : 154 ms          QRS Dur : 072 ms      QT Int : 386 ms       P-R-T Axes : 075 062 054 degrees     QTc Int : 459 ms    Normal sinus rhythm  Normal ECG  When compared with ECG of 19-AUG-2022 14:32,  No significant change was found    Referred By:             Confirmed By:              Lab Results   Component Value Date    WBC 6.00 08/20/2022    HGB 8.9 (L) 08/20/2022    HCT 27.4 (L) 08/20/2022    MCV 96 08/20/2022     (L) 08/20/2022     BMP  Lab Results   Component Value Date     04/16/2024    K 3.6 05/14/2024     04/16/2024    CO2 23 04/16/2024    BUN 16 04/16/2024    CREATININE 0.9 04/16/2024    CALCIUM 9.3 04/16/2024    ANIONGAP 11 04/16/2024    GLU 67 (L) 04/16/2024     (H) 08/20/2022    GLU 92 08/20/2022    GLU 92 08/20/2022    GLU 92 08/20/2022       No results found for this or any previous visit.          Pre-op Assessment    I have reviewed the Patient Summary Reports.     I have reviewed the Nursing Notes. I have reviewed the NPO Status.   I have reviewed the Medications.     Review of Systems  Anesthesia Hx:  No problems with previous Anesthesia             Denies Family Hx of  Anesthesia complications.    Denies Personal Hx of Anesthesia complications.                    Social:  Non-Smoker       Hematology/Oncology:    Oncology Normal    -- Anemia:                                  EENT/Dental:  EENT/Dental Normal           Cardiovascular:  Cardiovascular Normal                  ECG has been reviewed.                          Pulmonary:  Pulmonary Normal                       Renal/:   renal calculi               Hepatic/GI:     GERD             Neurological:       Seizures, well controlled          Chronic Pain Syndrome                         Endocrine:  Endocrine Normal            Psych:   anxiety                 Physical Exam  General: Well nourished, Cooperative, Alert and Oriented    Airway:  Mallampati: II   Mouth Opening: Normal  TM Distance: Normal  Tongue: Normal  Neck ROM: Normal ROM    Dental:  Intact    Chest/Lungs:  Clear to auscultation    Heart:  Rate: Normal  Rhythm: Regular Rhythm  Sounds: Normal        Anesthesia Plan  Type of Anesthesia, risks & benefits discussed:    Anesthesia Type: Gen Natural Airway  Intra-op Monitoring Plan: Standard ASA Monitors  Induction:  IV  Informed Consent: Informed consent signed with the Patient and all parties understand the risks and agree with anesthesia plan.  All questions answered.   ASA Score: 3    Ready For Surgery From Anesthesia Perspective.     .

## 2024-06-06 NOTE — H&P
GASTROENTEROLOGY PRE-PROCEDURE H&P NOTE  Patient Name: Carline Chang  Patient MRN: [id number]  Patient : 1965    Service date: 2024    PCP: Dominick Neal MD    No chief complaint on file.      HPI: Patient is a 58 y.o. female with PMHx as below here for evaluation of crohns disease.       Carline Chang is a 58 year old female patient who is seen today for an office visit. 58 F with crohs and noncomplaince. sent to er for low mg/k.  reports 5-7 bm per day.  she is not sure if losing effect of entyvio.      Chart Review     HPI  Carline Chang is a 57 year old female patient who is seen today for follow up. weight up 2 lbs since last visit. evaluation with Dr Post last month. plan for cte- agree. up 10 lbs since surgery. doing well. 3-4 stools per day. feels stronger. no abd pain.    Chart review    CLINICAL  3/14/23 HPI  Carline Chang is a 57 year old female who is seen today for a follow-up visit. Pt reports 2-3 bm per day. soft to occasionally loose. has had 4 infusions of enyvio ( last dose). Feels like it has helped a lot. Was hospitalized for severe hypomagnesemia at [facility] and started on mag.    23 HPI  Carline Chang is a 57 year old female who is seen today for a follow-up visit.Patient was last seen in August and recommended to follow-up in 6 weeks underwent massive surgery as detailed below. Presents back to clinic today for follow-up. Has had no lab work done. Reports 3 bowel movements a day. Has some discomfort around umbilicus. Weight is stable. Reports occasional nausea and vomiting.    57-year-old female with chronic Crohn's disease associated with pancytopenia in setting of Imuran with discontinuation complicated by fistulizing ileitis with ileosigmoid fistula extensive adhesions torsion of a sending colon secondary to inflammatory change of the ileocolonic anastomosis and large bowel obstruction due to ileosigmoid fistula on emergent  surgery January 20, 2022. Patient had ileocolonic resection with KO and os anastomosis omental wrap of anastomosis and hernia repair.  Patient was started on Entyvio and has received 2 infusions thus far.    1/19/22 HPI- 56 F with SB crohns was hospitalized with seizure for electrolyte abnormaliteis with low K/Ca/Mg. Was on hctz- stopped. pt has not started entyvio still. pt lethargic with poor appetite.    1/5/22 HPI - 56 F with active small bowel crohns needs to start entyvio. reports stools better and more formed since iv steroids and now pred 20. some nausea. no vomiting. didnt start entyvio due to flu like symptoms 2 weeks ago.    11/24/21 HPI- pt has still not started entyvio. increased her prednisone on her own to 40- told her that this is ill advised and i am not comfortable at all with her adjusting her own medication. reports diarrhea and Refuses to get stool testing...    10/20/21 HPI- pt has not started biologic but pending. reports diarrhea persistent moderate. on steroids. ptah consistent with ibd    Chart Review  10/21 TB Gold, Hep B negative  10/21 Colon- ileitis.    9/29/21 HPI - 55 year old female presenting for evaluation of history of Crohns disease complicated by rectovaginal fistula post colostomy presenting with chronic persistent diarrhea and abdominal pain. dx crohns 1995. seen by david abdalla and umair in the past    Allergic to remicade and humira stopped working  Prior attempts at procuring stelara, entyvio per dr block  Numerous colonoscopies in past  Imuran 50 mg/day in 2019 9/21 CT  IMPRESSION:  Persistent thickening of several loops of mid to distal small bowel. The extent of the bowel wall thickening is decreased slightly since prior. However there is some distention of the proximal to mid small bowel to this point suggesting the possibility of stricture in the setting of inflammatory bowel disease.  Hepatic steatosis.  Cystitis.  Trace left pleural  "effusion.    Labs  9/22/21 Hgb 8, , plt 50  MCV has been up to 123 in past    Surgeries  Small bowel surgery 1995, 2009, , Colon surgery 2015, Rectovaginal fistula repair 1/18, Closure of colostomy in 8/2018    PMH  -rectovaginal fistula  -crohns disease  -Nephrolithiasis  -GERD  -opioid use  -ureteral calculus  -Anxiety    8/22 HPI!!!  Carline Chang is a 56 year old female who is seen today for a follow-up visit. Pt was scheduled to have first infusion of entyvio back 12/21 but due to noncompliance and hospitalizations patient never followed through. Pt underwent complicated surgery with plans to start stelara. Pt was following with Dr Velasquez and planned for stelara but pt was noncompliant. pt showed to clinic today reporting improvement overall but ongoing intermittent mild diarrhea. weight stable per patient. no nausea,vomiting.    Note from Dr Velasquez 4/22  "start stelara as soon as possible"    LABS  10/21 TB Gold/Hep B surf ag negative     Past Medical History:  Past Medical History:   Diagnosis Date    Abnormal Pap smear of cervix     LEEP procedure, 1995    Acute deep vein thrombosis (DVT) of brachial vein of right upper extremity 01/2017    on RUE ultrasound    Anxiety     Bowel perforation     Chronic pain     Compression fracture of C-spine     Crohn's disease of both small and large intestine with intestinal obstruction 1989    Difficult intravenous access     Encounter for blood transfusion     GERD (gastroesophageal reflux disease) 2014    Kidney stones     Nephrolithiasis     Osteoporosis     Stricture of bowel         Past Surgical History:  Past Surgical History:   Procedure Laterality Date    ABSCESS DRAINAGE  2014    ANAL FISTULOTOMY  2018    BOWEL RESECTION      small bowel resection per Dr. Uribe 4/2018    CATARACT EXTRACTION W/  INTRAOCULAR LENS IMPLANT Left 1/18/2024    Procedure: EXTRACTION, CATARACT, WITH IOL INSERTION;  Surgeon: Ladan Serrato MD;  Location: [facility]" OR;  Service: Ophthalmology;  Laterality: Left;  ORA ONLY    CATARACT EXTRACTION W/  INTRAOCULAR LENS IMPLANT Right 2024    Procedure: EXTRACTION, CATARACT, WITH IOL INSERTION;  Surgeon: Ladan Serrato MD;  Location: [facility] OR;  Service: Ophthalmology;  Laterality: Right;  ORA ONLY, IF NEEDED    CERVICAL BIOPSY  W/ LOOP ELECTRODE EXCISION       SECTION      x2    CHOLECYSTECTOMY  2000    COLON SURGERY  2015    sigmoid loop colostomy with subsequent reversal 3 years later    COLONOSCOPY N/A 2016    Procedure: COLONOSCOPY;  Surgeon: Andre Uribe MD;  Location: [facility] (4TH FLR);  Service: Endoscopy;  Laterality: N/A;    COLONOSCOPY N/A 2017    Procedure: COLONOSCOPY;  Surgeon: Dany Stock MD;  Location: [facility] ENDO (2ND FLR);  Service: Endoscopy;  Laterality: N/A;    COLONOSCOPY N/A 2017    Procedure: COLONOSCOPY;  Surgeon: Andre Uribe MD;  Location: [facility] (4TH FLR);  Service: Endoscopy;  Laterality: N/A;    COLONOSCOPY N/A 2018    Procedure: COLONOSCOPY;  Surgeon: Andre Uribe MD;  Location: [facility] (4TH FLR);  Service: Endoscopy;  Laterality: N/A;    COLONOSCOPY N/A 2018    Procedure: COLONOSCOPY;  Surgeon: Elmer Serrato MD;  Location: [facility] (2ND FLR);  Service: Endoscopy;  Laterality: N/A;    COLONOSCOPY  2018    COLONOSCOPY N/A 2018    Procedure: COLONOSCOPY;  Surgeon: Andre Uribe MD;  Location: [facility] (4TH FLR);  Service: Endoscopy;  Laterality: N/A;    COLONOSCOPY N/A 10/07/2021    Procedure: COLONOSCOPY;  Surgeon: Jose Hughes MD;  Location: [facility] ENDO;  Service: Endoscopy;  Laterality: N/A;    EPIDURAL STEROID INJECTION N/A 2022    Procedure: INJECTION, STEROID, EPIDURAL L4-L5 CONTRAST;  Surgeon: Royal Brewster DO;  Location: [facility] PAIN MGT;  Service: Pain Management;  Laterality: N/A;    ESOPHAGOGASTRODUODENOSCOPY N/A 10/07/2021    Procedure: EGD (ESOPHAGOGASTRODUODENOSCOPY);  Surgeon: Jose DALAL  MD Saul;  Location: [facility] ENDO;  Service: Endoscopy;  Laterality: N/A;    LAPAROSCOPIC CLOSURE OF COLOSTOMY N/A 08/29/2018    Procedure: CLOSURE, COLOSTOMY, LAPAROSCOPIC;  Surgeon: Andre Uribe MD;  [facility];  Service: Colon and Rectal;  Laterality: N/A;  converted to open    LYSIS OF ADHESIONS N/A 01/20/2022    Procedure: LYSIS, ADHESIONS;  Surgeon: LLUVIA Post MD;  Location: [facility] OR [facility] FLR;  Service: Colon and Rectal;  Laterality: N/A;  lasting longer than 2 hours, modifier 22      rectovaginal fistula repair  01/2018    SMALL INTESTINE SURGERY  1995    per patient 10 inches removed    SMALL INTESTINE SURGERY  02/2009    abdominal abscess drained, 3 cm colon removed, 11 cm SB removed    TUBAL LIGATION      UPPER GASTROINTESTINAL ENDOSCOPY  2000        Home Medications:  Medications Prior to Admission   Medication Sig Dispense Refill Last Dose    calcitRIOL (ROCALTROL) 0.25 MCG Cap Take 1 capsule (0.25 mcg total) by mouth 2 (two) times daily. 60 capsule 11     ergocalciferol (ERGOCALCIFEROL) 50,000 unit Cap Take 1 capsule (50,000 Units total) by mouth every 7 days. 4 capsule 11     gabapentin (NEURONTIN) 300 MG capsule Take 1 capsule (300 mg total) by mouth 3 (three) times daily. 270 capsule 3     HYDROcodone-acetaminophen (NORCO)  mg per tablet Take 1 tablet by mouth every 6 (six) hours as needed for Pain. 120 tablet 0     loperamide (IMODIUM) 2 mg capsule Take 2 capsules (4 mg total) by mouth 4 (four) times daily. 90 capsule 5     magnesium oxide 400 mg magnesium Cap Take 400 mg by mouth Daily.       omeprazole (PRILOSEC) 40 MG capsule Take 40 mg by mouth every evening.       potassium chloride SA (K-DUR,KLOR-CON) 20 MEQ tablet Take 10 mEq by mouth 2 (two) times daily.       promethazine (PHENERGAN) 25 MG tablet Take 1 tablet (25 mg total) by mouth every 4 (four) hours. 30 tablet 1     sertraline (ZOLOFT) 25 MG tablet Take 25 mg by mouth once daily.       spironolactone  (ALDACTONE) 25 MG tablet Take 25 mg by mouth once daily.       venlafaxine (EFFEXOR) 75 MG tablet Take 75 mg by mouth 2 (two) times daily.                  Review of patient's allergies indicates:   Allergen Reactions    Remicade [infliximab] Shortness Of Breath and Palpitations     Severe muscle and joint, and bone pain    Adalimumab Other (See Comments)    Flagyl [metronidazole] Other (See Comments)     Neuropathy    Nubain [nalbuphine] Anxiety     Upper abdominal burning        Social History:   Social History     Occupational History    Not on file   Tobacco Use    Smoking status: Never    Smokeless tobacco: Never   Substance and Sexual Activity    Alcohol use: No     Alcohol/week: 0.0 standard drinks of alcohol    Drug use: No    Sexual activity: Not Currently       Family History:   Family History   Problem Relation Name Age of Onset    Cataracts Mother      Heart attack Father  69    Cataracts Father      No Known Problems Sister      No Known Problems Brother      Cancer Maternal Aunt  66        colon ca    No Known Problems Maternal Uncle      No Known Problems Paternal Aunt      No Known Problems Paternal Uncle      No Known Problems Maternal Grandmother      No Known Problems Maternal Grandfather      No Known Problems Paternal Grandmother      No Known Problems Paternal Grandfather      No Known Problems Son      No Known Problems Son      No Known Problems Other      Anesthesia problems Neg Hx      Celiac disease Neg Hx      Cirrhosis Neg Hx      Colon cancer Neg Hx      Colon polyps Neg Hx      Crohn's disease Neg Hx      Cystic fibrosis Neg Hx      Esophageal cancer Neg Hx      Hemochromatosis Neg Hx      Inflammatory bowel disease Neg Hx      Irritable bowel syndrome Neg Hx      Liver cancer Neg Hx      Liver disease Neg Hx      Rectal cancer Neg Hx      Stomach cancer Neg Hx      Ulcerative colitis Neg Hx      Mars's disease Neg Hx      Lymphoma Neg Hx      Tuberculosis Neg Hx      Scleroderma Neg  "Hx      Rheum arthritis Neg Hx      Multiple sclerosis Neg Hx      Melanoma Neg Hx      Lupus Neg Hx      Psoriasis Neg Hx      Skin cancer Neg Hx         Review of Systems:  A 10 point review of systems was performed and was normal, except as mentioned in the HPI, including constitutional, HEENT, heme, lymph, cardiovascular, respiratory, gastrointestinal, genitourinary, neurologic, endocrine, psychiatric and musculoskeletal.      OBJECTIVE:    Physical Exam:  24 Hour Vital Sign Ranges: Temp:  [97.8 °F (36.6 °C)] 97.8 °F (36.6 °C)  Pulse:  [66] 66  Resp:  [18] 18  SpO2:  [100 %] 100 %  BP: (115)/(52) 115/52  Most recent vitals: BP (!) 115/52 (BP Location: Left arm, Patient Position: Lying)   Pulse 66   Temp 97.8 °F (36.6 °C) (Temporal)   Resp 18   Ht 5' 5" (1.651 m)   Wt 54.9 kg (121 lb)   LMP 05/30/2009   SpO2 100%   Breastfeeding No   BMI 20.14 kg/m²    GEN: well-developed, well-nourished, awake and alert, non-toxic appearing adult  HEENT: PERRL, sclera anicteric, oral mucosa pink and moist without lesion  NECK: trachea midline; Good ROM  CV: regular rate and rhythm, no murmurs or gallops  RESP: clear to auscultation bilaterally, no wheezes, rhonci or rales  ABD: soft, non-tender, non-distended, normal bowel sounds  EXT: no swelling or edema, 2+ pulses distally  SKIN: no rashes or jaundice  PSYCH: normal affect    Labs:   No results for input(s): "WBC", "MCV", "PLT" in the last 72 hours.    Invalid input(s): "HGBAU"  No results for input(s): "NA", "K", "CL", "CO2", "BUN", "GLU" in the last 72 hours.    Invalid input(s): "CREA"  No results for input(s): "ALB" in the last 72 hours.    Invalid input(s): "ALKP", "SGOT", "SGPT", "TBIL", "DBIL", "TPRO"  No results for input(s): "PT", "INR", "PTT" in the last 72 hours.      IMPRESSION / RECOMMENDATIONS:  Diagnosis: Crohn's disease (regional enteritis) K50.90  Hypomagnesemia  Other dietary vitamin B12 deficiency anemia  Short gut syndrome  Abnormal liver function " study, alkaline phosphatase elevated      Plan: Mitochondrial Ab with Reflex to Titer (AMA w/reflex) ([facility] only)  MRCP-MRI Abdomen without contrast-[id number]  Colonoscopy at hospital with Anesthesia Provider/CRNA who is hereby directed and authorized to administer anesthesia  PUSH enteroscopy with Anesthesia Provider/CRNA who is hereby directed and authorized to administer anesthesia- [id number]  Mag Glycinate 100 mg Take two tabs daily      Additional Notes: needs follow up for b12 shots with pcp and electrolyte optimizaiton  unclear if this is short gut or ibd  refuses to submit stool samples...          Proceed  with interventions as warranted.   RIsks, benefits, alternatives discussed in detail regarding upcoming procedures and sedation. Some of the more common endoscopic complications include but not limited to immediate or delayed perforation, bleeding, infections, pain, inadvertent injury to surrounding tissue / organs and possible need for surgical evaluation. Patient expressed understanding, all questions answered and will proceed with procedure as planned.     Jose Hughes  6/6/2024  11:05 AM

## 2024-06-06 NOTE — TRANSFER OF CARE
"Anesthesia Transfer of Care Note    Patient: Carline Chang    Procedure(s) Performed: Procedure(s) (LRB):  COLONOSCOPY (N/A)  PUSH ENTEROSCOPY (N/A)    Patient location: GI    Anesthesia Type: general    Transport from OR: Transported from OR on room air with adequate spontaneous ventilation    Post pain: adequate analgesia    Post assessment: no apparent anesthetic complications    Post vital signs: stable    Level of consciousness: awake and alert    Nausea/Vomiting: no nausea/vomiting    Complications: none    Transfer of care protocol was followed    Last vitals: Visit Vitals  BP (!) 115/52 (BP Location: Left arm, Patient Position: Lying)   Pulse 66   Temp 36.6 °C (97.8 °F) (Temporal)   Resp 18   Ht 5' 5" (1.651 m)   Wt 54.9 kg (121 lb)   LMP 05/30/2009   SpO2 100%   Breastfeeding No   BMI 20.14 kg/m²     "

## 2024-06-06 NOTE — ANESTHESIA POSTPROCEDURE EVALUATION
Anesthesia Post Evaluation    Patient: Carline Chang    Procedure(s) Performed: Procedure(s) (LRB):  COLONOSCOPY (N/A)  PUSH ENTEROSCOPY (N/A)    Final Anesthesia Type: general      Patient location during evaluation: GI PACU  Patient participation: Yes- Able to Participate  Level of consciousness: awake and alert  Post-procedure vital signs: reviewed and stable  Pain management: adequate  Airway patency: patent    PONV status at discharge: No PONV  Anesthetic complications: no      Cardiovascular status: hemodynamically stable  Respiratory status: unassisted, spontaneous ventilation and room air  Hydration status: euvolemic  Follow-up not needed.              Vitals Value Taken Time   /52 06/06/24 1029   Temp 36.6 °C (97.8 °F) 06/06/24 1029   Pulse 66 06/06/24 1029   Resp 18 06/06/24 1029   SpO2 100 % 06/06/24 1029         No case tracking events are documented in the log.      Pain/Estiven Score: No data recorded

## 2024-06-27 ENCOUNTER — OFFICE VISIT (OUTPATIENT)
Dept: PAIN MEDICINE | Facility: CLINIC | Age: 59
End: 2024-06-27
Payer: MEDICARE

## 2024-06-27 DIAGNOSIS — S32.030A COMPRESSION FRACTURE OF L3 LUMBAR VERTEBRA, CLOSED, INITIAL ENCOUNTER: ICD-10-CM

## 2024-06-27 DIAGNOSIS — S22.080A COMPRESSION FRACTURE OF T12 VERTEBRA, INITIAL ENCOUNTER: ICD-10-CM

## 2024-06-27 PROCEDURE — 99214 OFFICE O/P EST MOD 30 MIN: CPT | Mod: 95,,, | Performed by: STUDENT IN AN ORGANIZED HEALTH CARE EDUCATION/TRAINING PROGRAM

## 2024-06-27 RX ORDER — HYDROCODONE BITARTRATE AND ACETAMINOPHEN 10; 325 MG/1; MG/1
1 TABLET ORAL 4 TIMES DAILY PRN
Qty: 120 TABLET | Refills: 0 | Status: SHIPPED | OUTPATIENT
Start: 2024-07-01 | End: 2024-07-31

## 2024-06-27 RX ORDER — HYDROCODONE BITARTRATE AND ACETAMINOPHEN 10; 325 MG/1; MG/1
1 TABLET ORAL 4 TIMES DAILY PRN
Qty: 120 TABLET | Refills: 0 | Status: SHIPPED | OUTPATIENT
Start: 2024-07-31 | End: 2024-08-30

## 2024-06-27 RX ORDER — HYDROCODONE BITARTRATE AND ACETAMINOPHEN 10; 325 MG/1; MG/1
1 TABLET ORAL 4 TIMES DAILY PRN
Qty: 120 TABLET | Refills: 0 | Status: SHIPPED | OUTPATIENT
Start: 2024-08-30 | End: 2024-09-29

## 2024-06-27 NOTE — PROGRESS NOTES
Chronic Pain - f/u    Referring Physician: No ref. provider found    Date: 06/27/2024     Re: Carline Chang  MR#: [id number]  YOB: 1965  Age: 58 y.o.    Chief Complaint: back and leg pain  No chief complaint on file.    **This note is dictated using the M*Modal Fluency Direct word recognition program. There are word recognition mistakes that are occasionally missed on review.**    ASSESSMENT: 58 y.o. year old female with back pain, consistent with     1. Compression fracture of T12 vertebra, initial encounter  HYDROcodone-acetaminophen (NORCO)  mg per tablet    HYDROcodone-acetaminophen (NORCO)  mg per tablet    HYDROcodone-acetaminophen (NORCO)  mg per tablet      2. Compression fracture of L3 lumbar vertebra, closed, initial encounter  HYDROcodone-acetaminophen (NORCO)  mg per tablet    HYDROcodone-acetaminophen (NORCO)  mg per tablet    HYDROcodone-acetaminophen (NORCO)  mg per tablet          PLAN:     Chronic back pain 2/2 multiple compression fractures  -T5, T8, severe T12, L3 compression fractures on XR. Chornic on MRI.  Not suitable for vertebral augmentation.  -Look into finding a pain physisian or PCP in [place] that can refill her narcotic medications so she does not need to travel so far to get them.  I will provide a refill of her pain meds in the meantime.  -MRI lumbar spine completed.  -discussed trial of MBB/RFA for the T12 compression fracture to target the posterior elements.  Will defer that for now and try RAMOS first.  -s/p L4-5 RAMOS on 11/222 - 100% for 7 days, then pain back to baseline. Discussed risks of repeated steroid exposure and her osteoporosis.  Do not recommend a repeat for another 3-4 months.  -recommended again speaking with endocrinology again about medications for bone quality. She should be taking more than just calcium. She did not schedule a new appointment  -saw Chucky Carpenter in Ortho to discuss osteoporosis  treatments. She needs to set up a follow up appointment. DEXA shows severe osteoporosis (although improved from 2022).  Encouraged her to set up a new appointment to discuss treatment options.  -encouraged mild weight bearing exercises  -refill Hydrocodone 10mg QID x3 provided  -continue vitamin D 77473 units/week     Lumbar radiculopathy  -has some elements of radiculitis on both sides  -discussed trial of SCS.  Defer for now. This would be a reasonable option if she is not a candidate for surgery and her bone quality continues to be poor.  -continue gabapentin to 300mg TID. This is helpful.    Crohn's disease  -recent surgery  -hx of 30 years of oral steroids leading to osteoporosis and compression fracture    History of seizures  -continue keppra    Chronic use of opioids  -her pain provider has passed away and she does not have anyone to fill her medications  -UDS and pain contract 1/2024 appropriate  -The patient is here today for opioid pain medications and they believe these provide effective pain control and improvements in quality of life.  The patient notes no serious side effects, and feels the benefits outweigh the risks.  The patient was reminded of the pain contract that they signed previously as well as the risks and benefits of the medication including possible death.  The updated [place] Board of [employer] prescription monitoring program was reviewed, and the patient has been found to be compliant with current treatment plan.      - RTC before 9/29/24  - Counseled patient regarding the importance of activity modification.    The above plan and management options were discussed at length with patient. Patient is in agreement with the above and verbalized understanding. It will be communicated with the referring physician via electronic record, fax, or mail.  Lab/study reports reviewed were important and necessary because subsequent medical and treatment recommendations required review of the above  lab/study reports. Images viewed/reviewed above were important and necessary because subsequent medical and treatment recommendations required review of the reviewed image(s).     Electronically signed by:  Royal Brewster DO  06/27/2024    =========================================================================================================    SUBJECTIVE:  Chronic Pain-Tele-Medicine     The patient location is: Home  The chief complaint leading to consultation is: Pain  Visit type: Virtual visit with synchronous audio and video  Total time spent with patient: 25 min  Each patient to whom he or she provides medical services by telemedicine is:  (1) informed of the relationship between the physician and patient and the respective role of any other health care provider with respect to management of the patient; and (2) notified that he or she may decline to receive medical services by telemedicine and may withdraw from such care at any time.      Interval History 6/27/2024:   Carline Chang is a 58 y.o. female presents to the clinic for follow up.  Since last visit the pain has has slightly improved. Gabapentin is still helping a lot. Back pain is stable on current medications.  She never had follow up with Gaston Carpenter on her osteoporosis treatment options because she didn't realize the follow up was the same day as the DEXA scan.  Encouraged her to get a follow up set up. Pain is in the back. 3/10 today. 0/10 at best. 6/10 at worst.    Current pain medications: Norco 10mg QID, gabapentin 300mg TID  Failed Pain Medications:  Icy hot, Tylenol, cannot take NSIADs    Interval History 4/4/2024:   Carline Chang is a 58 y.o. female presents to the clinic for follow up.  Since last visit the pain has is unchanged. The patient had cataract surgery.  Her legs are doing well. Medications still work.  She is overall doing well.    Interval History 1/3/2024:   Carline Chang is a 58  y.o. female presents to the clinic for follow up.  Since last visit the pain has has significantly improved.    The pain is located in the middle back area and radiates to the legs .  The pain is described as aching    At BEST  0/10   At WORST  7/10 on the WORST day.    On average pain is rated as 2/10.   Today the pain is rated as 2/10  Symptoms interfere with daily activity.   Exacerbating factors: Standing.    Mitigating factors medications.     Interval History 8/10/2023:   Carline Chang is a 58 y.o. female presents to the clinic for follow up.  Since last visit the pain has has moderately improved. Patient states that things have been going well. She states that the pain has improved. Pain today is 3/10.    Exacerbating factors: activity.    Mitigating factors medications.     Interval History 5/18/2023:   Carline Chang is a 58 y.o. female presents to the clinic for follow up.  Since last visit the pain has has moderately improved.     The pain is located in the back area and radiates to the legs .  The pain is described as aching    At BEST  3/10   At WORST  8/10 on the WORST day.    On average pain is rated as 4/10.   Today the pain is rated as 3/10  Symptoms interfere with nothing  .   Exacerbating factors: nothing.    Mitigating factors medications.     Interval History 2/15/2023:   Carline Chang is a 58 y.o. female presents to the clinic for follow up.  Since last visit the pain has is unchanged. Pain 6/10. She is still getting a lot of pain going down the legs.    Interval History 11/23/2022:     Carline Chang is a 58 y.o. female presents to the clinic for follow up.  Since last visit the pain has is unchanged. Patient is s/p L4/L5 ILESI 11/02/2022. Worked very well for one week, 100%. Pain then returned back to same location and same intensity.  Pain is 7/10 without hydrocodone. Feels right leg stronger than the left.     Interval History 9/7/2022:  "    Carline Chang is a 58 y.o. female presents to the clinic for follow up.  Since last visit the pain has is unchanged. Pain is in the mid back and she gets radiation down the right leg to the knee. May have some knee arthritis as well that is confounding symptoms.  Difficult to walk and move. Average pain is 6/10. Today is 6/10.    Initial hx:  Carline Chang is a 58 y.o. female presents to the clinic for the evaluation of mid/ lower back pain. The pain started 7 months ago following  seizures   and symptoms have been worsening.  The patient states that she had a bunch of seizures in January and think that the compression fractures happened at that time.  She saw Dr. Harden recently for the back pain. Hx of crohns disease and 30 year history of chronic steroids.  She stopped taking them a year or two ago. She has lost 3-4" in height.     The patient had abdominal surgery in January for her Crohn's for lysis of adhesions. She stil gets abdominal bloating.  She has poor absorption of electrolytes and needs to get frequent IV medications.    The patient was seeing a PCP in [place] but they passed away.  She was getting Norco 10mg QID.     Pain Description:    The pain is located in the mid/lower back area and radiates to the bilalteral knee .    At BEST  3/10   At WORST  8/10 on the WORST day.    On average pain is rated as 5/10.   Today the pain is rated as 6/10  The pain is continuous.  The pain is described as aching and sharp    Symptoms interfere with daily activity and sleeping.   Exacerbating factors: Sitting, Standing, Bending, Walking, Extension, Lifting and Getting out of bed/chair.    Mitigating factors nothing and medications.   She reports 8 hours of sleep per night.    Physical Therapy/Home Exercise: Yes, currently in Physical therapy    Current Pain Medications:    - Norco 10mg QID    Failed Pain Medications:    - Icy hot, Tylenol, cannot take NSIADs    Pain Treatment " Therapies:    Pain procedures: none  Physical Therapy: none  Chiropractor: none  Acupuncture: none  TENS unit: none  Spinal decompression: none  Joint replacement: none    Patient denies urinary incontinence, bowel incontinence, significant motor weakness and loss of sensations.  Patient denies any suicidal or homicidal ideations     report:  Reviewed and consistent with medication use as prescribed.    Imaging:   Lumbar XR 07/2022:    FINDINGS:  There is a severe compression fracture deformity of T12 with mild retropulsion of osseous structure posteriorly.     There is a mild loss of height at the superior endplate of L3, unchanged.     No new fracture seen.     Flexion and extension views demonstrate no translational abnormalities.     The sacroiliac joints appear symmetrical.     Surgical clips right upper abdominal quadrant, surgical bowel suture in the right mid and right lower abdominal region.     Air-fluid levels noted, midline with distended colon measuring about 13 cm.     Impression:     Severe compression fracture of T12 and mild superior endplate compression fracture of L3, unchanged from 01/19/2022    Thoracic XR 07/2022:     FINDINGS:  There is a port in the left upper thorax, distal tip projects in the region of the SVC/right atrium region.     The alignment of the thoracic spine demonstrates a subtle dextroscoliosis.  There is a severe wedge compression fracture deformity of T12.  There is a mild height loss fracture deformity of T8 and T5 which appears to have been present on CT 01/19/2022 chest and abdomen CT examination.  Subtle concavity at the superior endplate of T10-T11 is unchanged from CT examination of 01/19/2022.  No definite new fracture identified.  No rib abnormality seen.  Surgical clips right upper abdominal quadrant.     Impression:     Multiple thoracic spine vertebrae fractures, all appears to have been present on 01/19/2022 CT exam.  There is a severe compression fracture of  T12.  Please see details above.      Past Medical History:   Diagnosis Date    Abnormal Pap smear of cervix     LEEP procedure,     Acute deep vein thrombosis (DVT) of brachial vein of right upper extremity 2017    on RUE ultrasound    Anxiety     Bowel perforation     Chronic pain     Compression fracture of C-spine     Crohn's disease of both small and large intestine with intestinal obstruction     Difficult intravenous access     Encounter for blood transfusion     GERD (gastroesophageal reflux disease)     Kidney stones     Nephrolithiasis     Osteoporosis     Stricture of bowel      Past Surgical History:   Procedure Laterality Date    ABSCESS DRAINAGE      ANAL FISTULOTOMY      BOWEL RESECTION      small bowel resection per Dr. Uribe 2018    CATARACT EXTRACTION W/  INTRAOCULAR LENS IMPLANT Left 2024    Procedure: EXTRACTION, CATARACT, WITH IOL INSERTION;  Surgeon: Ladan Serrato MD;  Location: [facility] OR;  Service: Ophthalmology;  Laterality: Left;  ORA ONLY    CATARACT EXTRACTION W/  INTRAOCULAR LENS IMPLANT Right 2024    Procedure: EXTRACTION, CATARACT, WITH IOL INSERTION;  Surgeon: Ladan Serrato MD;  Location: [facility] OR;  Service: Ophthalmology;  Laterality: Right;  ORA ONLY, IF NEEDED    CERVICAL BIOPSY  W/ LOOP ELECTRODE EXCISION       SECTION      x2    CHOLECYSTECTOMY  2000    COLON SURGERY  2015    sigmoid loop colostomy with subsequent reversal 3 years later    COLONOSCOPY N/A 2016    Procedure: COLONOSCOPY;  Surgeon: Andre Uribe MD;  Location: [facility] (4TH FLR);  Service: Endoscopy;  Laterality: N/A;    COLONOSCOPY N/A 2017    Procedure: COLONOSCOPY;  Surgeon: Dany Stock MD;  Location: [facility] (2ND FLR);  Service: Endoscopy;  Laterality: N/A;    COLONOSCOPY N/A 2017    Procedure: COLONOSCOPY;  Surgeon: Andre Uribe MD;  Location: [facility] ENDO (4TH FLR);  Service: Endoscopy;  Laterality: N/A;    COLONOSCOPY N/A 2018     Procedure: COLONOSCOPY;  Surgeon: Andre Uribe MD;  Location: [facility] ENDO (4TH FLR);  Service: Endoscopy;  Laterality: N/A;    COLONOSCOPY N/A 03/24/2018    Procedure: COLONOSCOPY;  Surgeon: Elemr Serrato MD;  Location: [facility] ENDO (2ND FLR);  Service: Endoscopy;  Laterality: N/A;    COLONOSCOPY  03/24/2018    COLONOSCOPY N/A 07/06/2018    Procedure: COLONOSCOPY;  Surgeon: Andre Uribe MD;  Location: [facility] ENDO (4TH FLR);  Service: Endoscopy;  Laterality: N/A;    COLONOSCOPY N/A 10/07/2021    Procedure: COLONOSCOPY;  Surgeon: Jose Hughes MD;  Location: [facility] ENDO;  Service: Endoscopy;  Laterality: N/A;    COLONOSCOPY N/A 6/6/2024    Procedure: COLONOSCOPY;  Surgeon: Jose Hughes MD;  Location: [facility] ENDO;  Service: Endoscopy;  Laterality: N/A;    EPIDURAL STEROID INJECTION N/A 11/2/2022    Procedure: INJECTION, STEROID, EPIDURAL L4-L5 CONTRAST;  Surgeon: Royal Brewster DO;  Location: [facility] PAIN MGT;  Service: Pain Management;  Laterality: N/A;    ESOPHAGOGASTRODUODENOSCOPY N/A 10/07/2021    Procedure: EGD (ESOPHAGOGASTRODUODENOSCOPY);  Surgeon: Jose Hughes MD;  Location: [facility];  Service: Endoscopy;  Laterality: N/A;    LAPAROSCOPIC CLOSURE OF COLOSTOMY N/A 08/29/2018    Procedure: CLOSURE, COLOSTOMY, LAPAROSCOPIC;  Surgeon: Andre Uribe MD;  Location: [facility] OR 2ND FLR;  Service: Colon and Rectal;  Laterality: N/A;  converted to open    LYSIS OF ADHESIONS N/A 01/20/2022    Procedure: LYSIS, ADHESIONS;  Surgeon: LLUVIA Post MD;  Location: [facility] OR 2ND FLR;  Service: Colon and Rectal;  Laterality: N/A;  lasting longer than 2 hours, modifier 22      rectovaginal fistula repair  01/2018    SMALL BOWEL ENTEROSCOPY N/A 6/6/2024    Procedure: PUSH ENTEROSCOPY;  Surgeon: Jose Hughes MD;  Location: [facility];  Service: Endoscopy;  Laterality: N/A;    SMALL INTESTINE SURGERY  1995    per patient 10 inches removed    SMALL INTESTINE SURGERY  02/2009    abdominal abscess drained, 3  cm colon removed, 11 cm SB removed    TUBAL LIGATION      UPPER GASTROINTESTINAL ENDOSCOPY  2000     Social History     Socioeconomic History    Marital status:    Tobacco Use    Smoking status: Never    Smokeless tobacco: Never   Substance and Sexual Activity    Alcohol use: No     Alcohol/week: 0.0 standard drinks of alcohol    Drug use: No    Sexual activity: Not Currently     Family History   Problem Relation Name Age of Onset    Cataracts Mother      Heart attack Father  69    Cataracts Father      No Known Problems Sister      No Known Problems Brother      Cancer Maternal Aunt  66        colon ca    No Known Problems Maternal Uncle      No Known Problems Paternal Aunt      No Known Problems Paternal Uncle      No Known Problems Maternal Grandmother      No Known Problems Maternal Grandfather      No Known Problems Paternal Grandmother      No Known Problems Paternal Grandfather      No Known Problems Son      No Known Problems Son      No Known Problems Other      Anesthesia problems Neg Hx      Celiac disease Neg Hx      Cirrhosis Neg Hx      Colon cancer Neg Hx      Colon polyps Neg Hx      Crohn's disease Neg Hx      Cystic fibrosis Neg Hx      Esophageal cancer Neg Hx      Hemochromatosis Neg Hx      Inflammatory bowel disease Neg Hx      Irritable bowel syndrome Neg Hx      Liver cancer Neg Hx      Liver disease Neg Hx      Rectal cancer Neg Hx      Stomach cancer Neg Hx      Ulcerative colitis Neg Hx      Mars's disease Neg Hx      Lymphoma Neg Hx      Tuberculosis Neg Hx      Scleroderma Neg Hx      Rheum arthritis Neg Hx      Multiple sclerosis Neg Hx      Melanoma Neg Hx      Lupus Neg Hx      Psoriasis Neg Hx      Skin cancer Neg Hx         Review of patient's allergies indicates:   Allergen Reactions    Remicade [infliximab] Shortness Of Breath and Palpitations     Severe muscle and joint, and bone pain    Adalimumab Other (See Comments)    Flagyl [metronidazole] Other (See Comments)      Neuropathy    Nubain [nalbuphine] Anxiety     Upper abdominal burning        Current Outpatient Medications   Medication Sig    calcitRIOL (ROCALTROL) 0.25 MCG Cap Take 1 capsule (0.25 mcg total) by mouth 2 (two) times daily.    ergocalciferol (ERGOCALCIFEROL) 50,000 unit Cap Take 1 capsule (50,000 Units total) by mouth every 7 days.    gabapentin (NEURONTIN) 300 MG capsule Take 1 capsule (300 mg total) by mouth 3 (three) times daily.    [START ON 7/1/2024] HYDROcodone-acetaminophen (NORCO)  mg per tablet Take 1 tablet by mouth 4 (four) times daily as needed for Pain.    [START ON 7/31/2024] HYDROcodone-acetaminophen (NORCO)  mg per tablet Take 1 tablet by mouth 4 (four) times daily as needed for Pain.    [START ON 8/30/2024] HYDROcodone-acetaminophen (NORCO)  mg per tablet Take 1 tablet by mouth 4 (four) times daily as needed for Pain.    loperamide (IMODIUM) 2 mg capsule Take 2 capsules (4 mg total) by mouth 4 (four) times daily.    magnesium oxide 400 mg magnesium Cap Take 400 mg by mouth Daily.    omeprazole (PRILOSEC) 40 MG capsule Take 40 mg by mouth every evening.    potassium chloride SA (K-DUR,KLOR-CON) 20 MEQ tablet Take 10 mEq by mouth 2 (two) times daily.    promethazine (PHENERGAN) 25 MG tablet Take 1 tablet (25 mg total) by mouth every 4 (four) hours.    sertraline (ZOLOFT) 25 MG tablet Take 25 mg by mouth once daily.    spironolactone (ALDACTONE) 25 MG tablet Take 25 mg by mouth once daily.    venlafaxine (EFFEXOR) 75 MG tablet Take 75 mg by mouth 2 (two) times daily.     No current facility-administered medications for this visit.       REVIEW OF SYSTEMS:    GENERAL:  No weight loss, malaise or fevers.  HEENT:   No recent changes in vision or hearing + vision   NECK:  Negative for lumps, no difficulty with swallowing.  RESPIRATORY:  Negative for cough, wheezing or shortness of breath, patient denies any recent URI.  CARDIOVASCULAR:  Negative for chest pain, leg swelling or  palpitations.  GI:  Negative for abdominal discomfort, blood in stools or black stools or change in bowel habits.   MUSCULOSKELETAL:  See HPI.  SKIN:  Negative for lesions, rash, and itching.  PSYCH:  No mood disorder or recent psychosocial stressors.  Patients sleep is not disturbed secondary to pain.  HEMATOLOGY/LYMPHOLOGY:  Negative for prolonged bleeding, bruising easily or swollen nodes.  Patient is not currently taking any anti-coagulants  NEURO:   No history of headaches, syncope, paralysis, seizures or tremors. + seizures   All other reviewed and negative other than HPI.    OBJECTIVE:    LMP 05/30/2009     PHYSICAL EXAMINATION:    Video visit:  GENERAL: Well appearing, in no acute distress, alert and oriented x3. Skinny, Fraile  PSYCH:  Mood and affect appropriate.  SKIN: Skin color, texture, turgor normal, no rashes or lesions.  HEAD/FACE:  Normocephalic, atraumatic. Cranial nerves grossly intact.    Last in-person visit  BACK:   - Hyperkyphosis of the thoracic spine with paravertebral humping  - Negative spinous process tenderness  - Negative paravertebral tenderness  - Negative pain to palpation over the facet joints of the thoracic and lumbar spine.   -decreased ROM with flexion and extension of the lumbar spine  -flexion causes increased pain in the mid/low back without radiation into the legs    MUSCULOSKELETAL:  No atrophy or tone abnormalities are noted in the UE or LE.  No deformities, edema, or skin discoloration are noted on visible skin. Good capillary refill.    NEURO: Bilateral upper and lower extremity coordination and muscle stretch reflexes are physiologic and symmetric.      NEUROLOGICAL EXAM:  MENTAL STATUS: A x O x 3, good concentration, speech is fluent and goal directed  MEMORY: recent and remote are intact  CN: CN2-12 grossly intact  MOTOR: 5/5 in all muscle groups except b/l hip flexion 4/5  DTRs: 2+ intact symmetric  Sensation:    -no Loss of sensation in a left lower and right lower  L-1, L-2, L-3, L-4 and L-5 bilaterally distribution.  Babinski: absent on the bilateral side(s)    GAIT: flexed, kyphotic posture

## 2024-06-28 ENCOUNTER — TELEPHONE (OUTPATIENT)
Dept: PAIN MEDICINE | Facility: CLINIC | Age: 59
End: 2024-06-28
Payer: MEDICARE

## 2024-07-30 ENCOUNTER — INFUSION (OUTPATIENT)
Dept: INFUSION THERAPY | Facility: HOSPITAL | Age: 59
End: 2024-07-30
Attending: INTERNAL MEDICINE
Payer: MEDICARE

## 2024-07-30 VITALS
RESPIRATION RATE: 18 BRPM | WEIGHT: 121 LBS | OXYGEN SATURATION: 100 % | SYSTOLIC BLOOD PRESSURE: 108 MMHG | TEMPERATURE: 98 F | HEART RATE: 83 BPM | BODY MASS INDEX: 20.16 KG/M2 | DIASTOLIC BLOOD PRESSURE: 71 MMHG | HEIGHT: 65 IN

## 2024-07-30 DIAGNOSIS — K50.90 CROHN'S DISEASE WITHOUT COMPLICATION, UNSPECIFIED GASTROINTESTINAL TRACT LOCATION: Primary | ICD-10-CM

## 2024-07-30 PROCEDURE — 96365 THER/PROPH/DIAG IV INF INIT: CPT

## 2024-07-30 PROCEDURE — 25000003 PHARM REV CODE 250: Performed by: INTERNAL MEDICINE

## 2024-07-30 PROCEDURE — 63600175 PHARM REV CODE 636 W HCPCS: Mod: JG | Performed by: INTERNAL MEDICINE

## 2024-07-30 RX ORDER — HEPARIN 100 UNIT/ML
500 SYRINGE INTRAVENOUS
Status: COMPLETED | OUTPATIENT
Start: 2024-07-30 | End: 2024-07-30

## 2024-07-30 RX ORDER — HEPARIN 100 UNIT/ML
500 SYRINGE INTRAVENOUS
Start: 2024-09-24

## 2024-07-30 RX ADMIN — VEDOLIZUMAB 300 MG: 300 INJECTION, POWDER, LYOPHILIZED, FOR SOLUTION INTRAVENOUS at 09:07

## 2024-07-30 RX ADMIN — HEPARIN 500 UNITS: 100 SYRINGE at 10:07

## 2024-07-30 NOTE — PLAN OF CARE
Problem: Fatigue  Goal: Improved Activity Tolerance  Intervention: Promote Improved Energy  Flowsheets (Taken 7/30/2024 5389)  Fatigue Management: fatigue-related activity identified  Activity Management: Ambulated -L4

## 2024-08-16 DIAGNOSIS — M54.16 LUMBAR RADICULOPATHY: ICD-10-CM

## 2024-08-19 RX ORDER — GABAPENTIN 300 MG/1
300 CAPSULE ORAL 3 TIMES DAILY
Qty: 270 CAPSULE | Refills: 3 | Status: SHIPPED | OUTPATIENT
Start: 2024-08-19

## 2024-09-03 ENCOUNTER — OFFICE VISIT (OUTPATIENT)
Dept: ORTHOPEDICS | Facility: CLINIC | Age: 59
End: 2024-09-03
Payer: MEDICARE

## 2024-09-03 DIAGNOSIS — M80.80XA PATHOLOGICAL FRACTURE DUE TO OSTEOPOROSIS, UNSPECIFIED FRACTURE SITE, UNSPECIFIED OSTEOPOROSIS TYPE, INITIAL ENCOUNTER: Primary | ICD-10-CM

## 2024-09-03 DIAGNOSIS — M81.6 LOCALIZED OSTEOPOROSIS OF LEQUESNE: ICD-10-CM

## 2024-09-03 DIAGNOSIS — M81.0 OSTEOPOROSIS, UNSPECIFIED OSTEOPOROSIS TYPE, UNSPECIFIED PATHOLOGICAL FRACTURE PRESENCE: ICD-10-CM

## 2024-09-03 PROCEDURE — 99999 PR PBB SHADOW E&M-EST. PATIENT-LVL II: CPT | Mod: PBBFAC,,, | Performed by: PHYSICIAN ASSISTANT

## 2024-09-03 PROCEDURE — 99214 OFFICE O/P EST MOD 30 MIN: CPT | Mod: 25,S$PBB,, | Performed by: PHYSICIAN ASSISTANT

## 2024-09-03 PROCEDURE — 99212 OFFICE O/P EST SF 10 MIN: CPT | Mod: PBBFAC

## 2024-09-03 NOTE — PROGRESS NOTES
Chief Complaint & History of Present Illness:  Carline Chang is a established patient 58 y.o. female who is seen here today for review of lab results, DEXA scan results, and determine a treatment plan for her osteoporosis.  she has reviewed the information provided at her initial visit.  After review of treatment options together we has elected to proceed with Tymlos as her treatment option today for her osteoporosis and to reduce risk of future fractures.        Review of patient's allergies indicates:   Allergen Reactions    Remicade [infliximab] Shortness Of Breath and Palpitations     Severe muscle and joint, and bone pain    Adalimumab Other (See Comments)    Flagyl [metronidazole] Other (See Comments)     Neuropathy    Nubain [nalbuphine] Anxiety     Upper abdominal burning          Current Outpatient Medications   Medication Sig Dispense Refill    calcitRIOL (ROCALTROL) 0.25 MCG Cap Take 1 capsule (0.25 mcg total) by mouth 2 (two) times daily. 60 capsule 11    ergocalciferol (ERGOCALCIFEROL) 50,000 unit Cap Take 1 capsule (50,000 Units total) by mouth every 7 days. 4 capsule 11    gabapentin (NEURONTIN) 300 MG capsule TAKE 1 CAPSULE BY MOUTH THREE TIMES A  capsule 3    HYDROcodone-acetaminophen (NORCO)  mg per tablet Take 1 tablet by mouth 4 (four) times daily as needed for Pain. 120 tablet 0    loperamide (IMODIUM) 2 mg capsule Take 2 capsules (4 mg total) by mouth 4 (four) times daily. 90 capsule 5    magnesium oxide 400 mg magnesium Cap Take 400 mg by mouth Daily.      omeprazole (PRILOSEC) 40 MG capsule Take 40 mg by mouth every evening.      potassium chloride SA (K-DUR,KLOR-CON) 20 MEQ tablet Take 10 mEq by mouth 2 (two) times daily.      promethazine (PHENERGAN) 25 MG tablet Take 1 tablet (25 mg total) by mouth every 4 (four) hours. 30 tablet 1    sertraline (ZOLOFT) 25 MG tablet Take 25 mg by mouth once daily.      spironolactone (ALDACTONE) 25 MG tablet Take 25 mg by mouth  once daily.      venlafaxine (EFFEXOR) 75 MG tablet Take 75 mg by mouth 2 (two) times daily.       No current facility-administered medications for this visit.       Past Medical History:   Diagnosis Date    Abnormal Pap smear of cervix     LEEP procedure,     Acute deep vein thrombosis (DVT) of brachial vein of right upper extremity 2017    on RUE ultrasound    Anxiety     Bowel perforation     Chronic pain     Compression fracture of C-spine     Crohn's disease of both small and large intestine with intestinal obstruction     Difficult intravenous access     Encounter for blood transfusion     GERD (gastroesophageal reflux disease)     Kidney stones     Nephrolithiasis     Osteoporosis     Stricture of bowel        Past Surgical History:   Procedure Laterality Date    ABSCESS DRAINAGE      ANAL FISTULOTOMY      BOWEL RESECTION      small bowel resection per Dr. Uribe 2018    CATARACT EXTRACTION W/  INTRAOCULAR LENS IMPLANT Left 2024    Procedure: EXTRACTION, CATARACT, WITH IOL INSERTION;  Surgeon: Ladan Serrato MD;  Location: [facility] OR;  Service: Ophthalmology;  Laterality: Left;  ORA ONLY    CATARACT EXTRACTION W/  INTRAOCULAR LENS IMPLANT Right 2024    Procedure: EXTRACTION, CATARACT, WITH IOL INSERTION;  Surgeon: Ladan Serrato MD;  Location: [facility] OR;  Service: Ophthalmology;  Laterality: Right;  ORA ONLY, IF NEEDED    CERVICAL BIOPSY  W/ LOOP ELECTRODE EXCISION       SECTION      x2    CHOLECYSTECTOMY      COLON SURGERY  2015    sigmoid loop colostomy with subsequent reversal 3 years later    COLONOSCOPY N/A 2016    Procedure: COLONOSCOPY;  Surgeon: Andre Uribe MD;  Location: [facility] (4TH FLR);  Service: Endoscopy;  Laterality: N/A;    COLONOSCOPY N/A 2017    Procedure: COLONOSCOPY;  Surgeon: Dany Stock MD;  Location: [facility] ENDO (2ND FLR);  Service: Endoscopy;  Laterality: N/A;    COLONOSCOPY N/A 2017    Procedure: COLONOSCOPY;   Surgeon: Andre Uribe MD;  Location: [facility] ENDO (4TH FLR);  Service: Endoscopy;  Laterality: N/A;    COLONOSCOPY N/A 02/08/2018    Procedure: COLONOSCOPY;  Surgeon: Andre Uribe MD;  Location: [facility] ENDO (4TH FLR);  Service: Endoscopy;  Laterality: N/A;    COLONOSCOPY N/A 03/24/2018    Procedure: COLONOSCOPY;  Surgeon: Elmer Serrato MD;  Location: [facility] ENDO (2ND FLR);  Service: Endoscopy;  Laterality: N/A;    COLONOSCOPY  03/24/2018    COLONOSCOPY N/A 07/06/2018    Procedure: COLONOSCOPY;  Surgeon: Andre Uribe MD;  Location: [facility] ENDO (4TH FLR);  Service: Endoscopy;  Laterality: N/A;    COLONOSCOPY N/A 10/07/2021    Procedure: COLONOSCOPY;  Surgeon: Jose Hughes MD;  Location: [facility] ENDO;  Service: Endoscopy;  Laterality: N/A;    COLONOSCOPY N/A 6/6/2024    Procedure: COLONOSCOPY;  Surgeon: Jose Hughes MD;  Location: [facility];  Service: Endoscopy;  Laterality: N/A;    EPIDURAL STEROID INJECTION N/A 11/2/2022    Procedure: INJECTION, STEROID, EPIDURAL L4-L5 CONTRAST;  Surgeon: Royal Brewster DO;  Location: [facility] PAIN MGT;  Service: Pain Management;  Laterality: N/A;    ESOPHAGOGASTRODUODENOSCOPY N/A 10/07/2021    Procedure: EGD (ESOPHAGOGASTRODUODENOSCOPY);  Surgeon: Jose Hughes MD;  Location: [facility];  Service: Endoscopy;  Laterality: N/A;    LAPAROSCOPIC CLOSURE OF COLOSTOMY N/A 08/29/2018    Procedure: CLOSURE, COLOSTOMY, LAPAROSCOPIC;  Surgeon: Andre Uribe MD;  Location: [facility] OR 2ND FLR;  Service: Colon and Rectal;  Laterality: N/A;  converted to open    LYSIS OF ADHESIONS N/A 01/20/2022    Procedure: LYSIS, ADHESIONS;  Surgeon: LLUVIA Post MD;  Location: [facility] OR 2ND FLR;  Service: Colon and Rectal;  Laterality: N/A;  lasting longer than 2 hours, modifier 22      rectovaginal fistula repair  01/2018    SMALL BOWEL ENTEROSCOPY N/A 6/6/2024    Procedure: PUSH ENTEROSCOPY;  Surgeon: Jose Hughes MD;  Location: [facility];  Service: Endoscopy;  Laterality: N/A;  "   SMALL INTESTINE SURGERY  1995    per patient 10 inches removed    SMALL INTESTINE SURGERY  02/2009    abdominal abscess drained, 3 cm colon removed, 11 cm SB removed    TUBAL LIGATION      UPPER GASTROINTESTINAL ENDOSCOPY  2000       Lab Results   Component Value Date     04/16/2024    K 3.6 05/14/2024     04/16/2024    CO2 23 04/16/2024    GLU 67 (L) 04/16/2024    BUN 16 04/16/2024    CREATININE 0.9 04/16/2024    CALCIUM 9.3 04/16/2024    PROT 7.6 04/16/2024    ALBUMIN 4.0 04/16/2024    BILITOT 0.9 04/16/2024    ALKPHOS 136 (H) 04/16/2024    AST 16 04/16/2024    ALT 28 04/16/2024    ANIONGAP 11 04/16/2024    ESTGFRAFRICA >60.0 04/06/2022    EGFRNONAA >60.0 04/06/2022      Lab Results   Component Value Date    WBC 6.00 08/20/2022    RBC 2.85 (L) 08/20/2022    HGB 8.9 (L) 08/20/2022    HCT 27.4 (L) 08/20/2022    MCV 96 08/20/2022    MCH 31.2 (H) 08/20/2022    MCHC 32.5 08/20/2022    RDW 14.9 (H) 08/20/2022     (L) 08/20/2022    MPV 10.1 08/20/2022    GRAN 4.7 08/20/2022    GRAN 77.9 (H) 08/20/2022    LYMPH 0.8 (L) 08/20/2022    LYMPH 13.2 (L) 08/20/2022    MONO 0.5 08/20/2022    MONO 8.0 08/20/2022    EOS 0.0 08/20/2022    BASO 0.01 08/20/2022    EOSINOPHIL 0.2 08/20/2022    BASOPHIL 0.2 08/20/2022    DIFFMETHOD Automated 08/20/2022      Lab Results   Component Value Date    MG 2.4 08/20/2022      Lab Results   Component Value Date    PHOS 3.2 08/19/2022      Lab Results   Component Value Date    KYNOSDEP20AP 22 (L) 04/16/2024      Lab Results   Component Value Date    PTH 43.3 04/16/2024      Lab Results   Component Value Date    TSH 0.552 04/16/2024      Lab Results   Component Value Date    FREET4 0.86 04/16/2024      Lab Results   Component Value Date    HGBA1C 5.6 01/14/2022    ESTIMATEDAVG 114 01/14/2022      No results found for: "FREETESTOSTE"     DEXA Scan was reviewed and demonstrates :     T-Score Hip: -2.2     T-Score Spine: -3.0      FRAX:      Major Fx.: 14%         Hip Fx.: " 2.1%                                    Vital Signs (Most Recent)  There were no vitals filed for this visit.      Review of Systems:  ROS:  Constitutional: no fever or chills  Eyes: no visual changes  ENT: no nasal congestion or sore throat  Respiratory: no respiratory symptoms  Cardiovascular: no chest pain or palpitations  Gastrointestinal: no nausea or vomiting, tolerating diet, positive for Crohn's disease, GERD, bowel perforation, chronic abdominal pain,  Genitourinary:  Calculus of the ureter, rectovaginal fistula for lithiasis hypocalcemia hypomagnesemia hypokalemia  Integument/Breast: no rash or pruritis  Hematologic/Lymphatic: no easy bruising or lymphadenopathy, positive history DVT, chronic anemia  Musculoskeletal: no arthralgias or myalgias, compression fractures of L3 and T12  Neurological:  Positive history of status epilepticus, seizure activity, compression fracture of T12  Behavioral/Psych: no auditory or visual hallucinations, chronic continuous use of opioids, anxiety  Endocrine: no heat or cold intolerance, vitamin-D deficiency, osteoporosis, subclinical hyperthyroidism    Carline Anne Charlene was given instructions on administration of Tymlos today.  she will be contacted by the speciality pharmacy when her medication is available, and will be scheduled to receive further detailed  instruction on  administration or when and where to receive her treatment.     she will continue with her calcium and vitamin D.  Fall prevention and personal safety have been reviewed.    We have discussed the need for core strengthening and balance exercises for fall prevention.  Discussed the risk of osteonecrosis of the jaw with bisphosphonates with incidence estimated from 1-10/[id number] treated patients. Discussed that the incidence of ONJ is higher in patients undergoing invasive dental surgery (excludes routine cleaning, fillings or root canals). Dental work should ideally be completed prior to initiation  of treatment; I told her to let her dentist know at the upcoming appointment that we are planning on treating with alendronate to prevent fractures. Preventative measures such as good oral hygiene encouraged.     Discussed the risk of atypical femur fractures with bisphosphonates and denosumab. The exact incidence is unknown, but has been estimated at approximately 1 in [id number] patients treated with oral bisphosphonates and increasing incidence was correlated with increased duration of bisphosphonate use. Despite this risk, the significant benefit on fracture reduction far outweighs the potential for this rare event.         Assessment and plan:      ICD-10-CM ICD-9-CM   1. Pathological fracture due to osteoporosis, unspecified fracture site, unspecified osteoporosis type, initial encounter  M80.80XA 733.10     733.00   2. Localized osteoporosis of [place]  M81.6 733.09   3. Osteoporosis, unspecified osteoporosis type, unspecified pathological fracture presence  M81.0 733.00       Abaloparatide 80 mcg SubQ daily

## 2024-09-05 ENCOUNTER — LAB VISIT (OUTPATIENT)
Dept: LAB | Facility: HOSPITAL | Age: 59
End: 2024-09-05
Attending: STUDENT IN AN ORGANIZED HEALTH CARE EDUCATION/TRAINING PROGRAM
Payer: MEDICARE

## 2024-09-05 ENCOUNTER — OFFICE VISIT (OUTPATIENT)
Dept: PAIN MEDICINE | Facility: CLINIC | Age: 59
End: 2024-09-05
Payer: MEDICARE

## 2024-09-05 VITALS
HEIGHT: 65 IN | WEIGHT: 121.06 LBS | HEART RATE: 94 BPM | DIASTOLIC BLOOD PRESSURE: 71 MMHG | SYSTOLIC BLOOD PRESSURE: 105 MMHG | BODY MASS INDEX: 20.17 KG/M2

## 2024-09-05 DIAGNOSIS — S22.080A COMPRESSION FRACTURE OF T12 VERTEBRA, INITIAL ENCOUNTER: ICD-10-CM

## 2024-09-05 DIAGNOSIS — S32.030A COMPRESSION FRACTURE OF L3 LUMBAR VERTEBRA, CLOSED, INITIAL ENCOUNTER: ICD-10-CM

## 2024-09-05 DIAGNOSIS — M54.16 LUMBAR RADICULOPATHY: ICD-10-CM

## 2024-09-05 DIAGNOSIS — K50.018 CROHN'S DISEASE OF SMALL INTESTINE WITH OTHER COMPLICATION: ICD-10-CM

## 2024-09-05 DIAGNOSIS — F11.90 CHRONIC, CONTINUOUS USE OF OPIOIDS: ICD-10-CM

## 2024-09-05 DIAGNOSIS — S22.080A COMPRESSION FRACTURE OF T12 VERTEBRA, INITIAL ENCOUNTER: Primary | ICD-10-CM

## 2024-09-05 PROCEDURE — 80355 GABAPENTIN NON-BLOOD: CPT | Performed by: STUDENT IN AN ORGANIZED HEALTH CARE EDUCATION/TRAINING PROGRAM

## 2024-09-05 PROCEDURE — 99213 OFFICE O/P EST LOW 20 MIN: CPT | Mod: PBBFAC | Performed by: STUDENT IN AN ORGANIZED HEALTH CARE EDUCATION/TRAINING PROGRAM

## 2024-09-05 PROCEDURE — 99999 PR PBB SHADOW E&M-EST. PATIENT-LVL III: CPT | Mod: PBBFAC,,, | Performed by: STUDENT IN AN ORGANIZED HEALTH CARE EDUCATION/TRAINING PROGRAM

## 2024-09-05 PROCEDURE — 80364 OPIOID &OPIATE ANALOG 5/MORE: CPT | Performed by: STUDENT IN AN ORGANIZED HEALTH CARE EDUCATION/TRAINING PROGRAM

## 2024-09-05 PROCEDURE — 99214 OFFICE O/P EST MOD 30 MIN: CPT | Mod: S$PBB,,, | Performed by: STUDENT IN AN ORGANIZED HEALTH CARE EDUCATION/TRAINING PROGRAM

## 2024-09-05 RX ORDER — HYDROCODONE BITARTRATE AND ACETAMINOPHEN 10; 325 MG/1; MG/1
1 TABLET ORAL 4 TIMES DAILY PRN
Qty: 120 TABLET | Refills: 0 | Status: SHIPPED | OUTPATIENT
Start: 2024-11-25 | End: 2024-12-25

## 2024-09-05 RX ORDER — HYDROCODONE BITARTRATE AND ACETAMINOPHEN 10; 325 MG/1; MG/1
1 TABLET ORAL 4 TIMES DAILY PRN
Qty: 120 TABLET | Refills: 0 | Status: SHIPPED | OUTPATIENT
Start: 2024-09-26 | End: 2024-10-26

## 2024-09-05 RX ORDER — HYDROCODONE BITARTRATE AND ACETAMINOPHEN 10; 325 MG/1; MG/1
1 TABLET ORAL 4 TIMES DAILY PRN
Qty: 120 TABLET | Refills: 0 | Status: SHIPPED | OUTPATIENT
Start: 2024-10-26 | End: 2024-11-25

## 2024-09-05 NOTE — PROGRESS NOTES
Chronic Pain - f/u    Referring Physician: No ref. provider found    Date: 09/05/2024     Re: Carline Chang  MR#: [id number]  YOB: 1965  Age: 58 y.o.    Chief Complaint: back and leg pain  No chief complaint on file.    **This note is dictated using the M*Modal Fluency Direct word recognition program. There are word recognition mistakes that are occasionally missed on review.**    ASSESSMENT: 58 y.o. year old female with back pain, consistent with     1. Compression fracture of T12 vertebra, initial encounter  [facility] Drug Screen    HYDROcodone-acetaminophen (NORCO)  mg per tablet    HYDROcodone-acetaminophen (NORCO)  mg per tablet    HYDROcodone-acetaminophen (NORCO)  mg per tablet      2. Lumbar radiculopathy  [facility] Drug Screen      3. Chronic, continuous use of opioids  [facility] Drug Screen      4. Crohn's disease of small intestine with other complication  [facility] Drug Screen      5. Compression fracture of L3 lumbar vertebra, closed, initial encounter  HYDROcodone-acetaminophen (NORCO)  mg per tablet    HYDROcodone-acetaminophen (NORCO)  mg per tablet    HYDROcodone-acetaminophen (NORCO)  mg per tablet        PLAN:     Chronic back pain 2/2 multiple compression fractures  -T5, T8, severe T12, L3 compression fractures on XR. Chornic on MRI.  Not suitable for vertebral augmentation.  -Look into finding a pain physisian or PCP in [place] that can refill her narcotic medications so she does not need to travel so far to get them.  I will provide a refill of her pain meds in the meantime.  -MRI lumbar spine completed.  -discussed trial of MBB/RFA for the T12 compression fracture to target the posterior elements.  Will defer that for now and try RAMOS first.  -s/p L4-5 RAMOS on 11/2022 - 100% for 7 days, then pain back to baseline.   -saw Chucky Carpenter in Ortho to discuss osteoporosis treatments. She needs to set up a follow up appointment. DEXA  shows severe osteoporosis (although improved from 2022).   -started on Abaloparatide 80 mcg SubQ daily x2 years  -encouraged mild weight bearing exercises  -refill Hydrocodone 10mg QID x3 provided  -continue vitamin D 65362 units/week     Lumbar radiculopathy  -has some elements of radiculitis on both sides  -discussed trial of SCS.  Defer for now. This would be a reasonable option if she is not a candidate for surgery and her bone quality continues to be poor.  -continue gabapentin to 300mg TID. This is helpful, but patient does report signficiant sedation.  We discussed weaning off the gabapentin.  She will decrease by 1 pill every 2 weeks until she is off    Crohn's disease  -recent surgery  -hx of 30 years of oral steroids leading to osteoporosis and compression fracture    History of seizures  -continue keppra    Chronic use of opioids  -her pain provider has passed away and she does not have anyone to fill her medications  -UDS and pain contract 1/2024 appropriate  -new UDS 9/5/24  -The patient is here today for opioid pain medications and they believe these provide effective pain control and improvements in quality of life.  The patient notes no serious side effects, and feels the benefits outweigh the risks.  The patient was reminded of the pain contract that they signed previously as well as the risks and benefits of the medication including possible death.  The updated [place] Board [employer] prescription monitoring program was reviewed, and the patient has been found to be compliant with current treatment plan.      - RTC before 9/29/24  - Counseled patient regarding the importance of activity modification.    The above plan and management options were discussed at length with patient. Patient is in agreement with the above and verbalized understanding. It will be communicated with the referring physician via electronic record, fax, or mail.  Lab/study reports reviewed were important and necessary  because subsequent medical and treatment recommendations required review of the above lab/study reports. Images viewed/reviewed above were important and necessary because subsequent medical and treatment recommendations required review of the reviewed image(s).     Electronically signed by:  Royal Brewster DO  09/05/2024    =========================================================================================================    SUBJECTIVE:    Interval History 9/5/2024:     Carline Chang is a 58 y.o. female presents to the clinic for follow up.  Since last visit the pain has is unchanged.    The pain is located in the middle back area and radiates to the legs .  The pain is described as aching    At BEST  2/10   At WORST  7/10 on the WORST day.    On average pain is rated as 2/10.   Today the pain is rated as 3/10  Symptoms interfere with daily activity.   Exacerbating factors: Standing.    Mitigating factors medications.     Current pain medications: Norco 10mg QID, gabapentin 300mg TID  Failed Pain Medications:  Icy hot, Tylenol, cannot take NSIADs    Interval History 6/27/2024:   Carline Chang is a 58 y.o. female presents to the clinic for follow up.  Since last visit the pain has has slightly improved. Gabapentin is still helping a lot. Back pain is stable on current medications.  She never had follow up with Gaston Carpenter on her osteoporosis treatment options because she didn't realize the follow up was the same day as the DEXA scan.  Encouraged her to get a follow up set up. Pain is in the back. 3/10 today. 0/10 at best. 6/10 at worst.    Interval History 4/4/2024:   Carline Chang is a 58 y.o. female presents to the clinic for follow up.  Since last visit the pain has is unchanged. The patient had cataract surgery.  Her legs are doing well. Medications still work.  She is overall doing well.    Interval History 1/3/2024:   Carline Chang is a 58 y.o.  female presents to the clinic for follow up.  Since last visit the pain has has significantly improved.    The pain is located in the middle back area and radiates to the legs .  The pain is described as aching    At BEST  0/10   At WORST  7/10 on the WORST day.    On average pain is rated as 2/10.   Today the pain is rated as 2/10  Symptoms interfere with daily activity.   Exacerbating factors: Standing.    Mitigating factors medications.     Interval History 8/10/2023:   Carline Chang is a 58 y.o. female presents to the clinic for follow up.  Since last visit the pain has has moderately improved. Patient states that things have been going well. She states that the pain has improved. Pain today is 3/10.    Exacerbating factors: activity.    Mitigating factors medications.     Interval History 5/18/2023:   Carline Chang is a 58 y.o. female presents to the clinic for follow up.  Since last visit the pain has has moderately improved.     The pain is located in the back area and radiates to the legs .  The pain is described as aching    At BEST  3/10   At WORST  8/10 on the WORST day.    On average pain is rated as 4/10.   Today the pain is rated as 3/10  Symptoms interfere with nothing  .   Exacerbating factors: nothing.    Mitigating factors medications.     Interval History 2/15/2023:   Carline Chang is a 58 y.o. female presents to the clinic for follow up.  Since last visit the pain has is unchanged. Pain 6/10. She is still getting a lot of pain going down the legs.    Interval History 11/23/2022:     Carline Chang is a 58 y.o. female presents to the clinic for follow up.  Since last visit the pain has is unchanged. Patient is s/p L4/L5 ILESI 11/02/2022. Worked very well for one week, 100%. Pain then returned back to same location and same intensity.  Pain is 7/10 without hydrocodone. Feels right leg stronger than the left.     Interval History 9/7/2022:     Carline  "Karissa Chang is a 58 y.o. female presents to the clinic for follow up.  Since last visit the pain has is unchanged. Pain is in the mid back and she gets radiation down the right leg to the knee. May have some knee arthritis as well that is confounding symptoms.  Difficult to walk and move. Average pain is 6/10. Today is 6/10.    Initial hx:  Carline Chang is a 58 y.o. female presents to the clinic for the evaluation of mid/ lower back pain. The pain started 7 months ago following  seizures   and symptoms have been worsening.  The patient states that she had a bunch of seizures in January and think that the compression fractures happened at that time.  She saw Dr. Harden recently for the back pain. Hx of crohns disease and 30 year history of chronic steroids.  She stopped taking them a year or two ago. She has lost 3-4" in height.     The patient had abdominal surgery in January for her Crohn's for lysis of adhesions. She stil gets abdominal bloating.  She has poor absorption of electrolytes and needs to get frequent IV medications.    The patient was seeing a PCP in [place] but they passed away.  She was getting Norco 10mg QID.     Pain Description:    The pain is located in the mid/lower back area and radiates to the bilalteral knee .    At BEST  3/10   At WORST  8/10 on the WORST day.    On average pain is rated as 5/10.   Today the pain is rated as 6/10  The pain is continuous.  The pain is described as aching and sharp    Symptoms interfere with daily activity and sleeping.   Exacerbating factors: Sitting, Standing, Bending, Walking, Extension, Lifting and Getting out of bed/chair.    Mitigating factors nothing and medications.   She reports 8 hours of sleep per night.    Physical Therapy/Home Exercise: Yes, currently in Physical therapy    Current Pain Medications:    - Norco 10mg QID    Failed Pain Medications:    - Icy hot, Tylenol, cannot take NSIADs    Pain Treatment Therapies:    Pain " procedures: none  Physical Therapy: none  Chiropractor: none  Acupuncture: none  TENS unit: none  Spinal decompression: none  Joint replacement: none    Patient denies urinary incontinence, bowel incontinence, significant motor weakness and loss of sensations.  Patient denies any suicidal or homicidal ideations     report:  Reviewed and consistent with medication use as prescribed.    Imaging:   Lumbar XR 07/2022:    FINDINGS:  There is a severe compression fracture deformity of T12 with mild retropulsion of osseous structure posteriorly.     There is a mild loss of height at the superior endplate of L3, unchanged.     No new fracture seen.     Flexion and extension views demonstrate no translational abnormalities.     The sacroiliac joints appear symmetrical.     Surgical clips right upper abdominal quadrant, surgical bowel suture in the right mid and right lower abdominal region.     Air-fluid levels noted, midline with distended colon measuring about 13 cm.     Impression:     Severe compression fracture of T12 and mild superior endplate compression fracture of L3, unchanged from 01/19/2022    Thoracic XR 07/2022:     FINDINGS:  There is a port in the left upper thorax, distal tip projects in the region of the SVC/right atrium region.     The alignment of the thoracic spine demonstrates a subtle dextroscoliosis.  There is a severe wedge compression fracture deformity of T12.  There is a mild height loss fracture deformity of T8 and T5 which appears to have been present on CT 01/19/2022 chest and abdomen CT examination.  Subtle concavity at the superior endplate of T10-T11 is unchanged from CT examination of 01/19/2022.  No definite new fracture identified.  No rib abnormality seen.  Surgical clips right upper abdominal quadrant.     Impression:     Multiple thoracic spine vertebrae fractures, all appears to have been present on 01/19/2022 CT exam.  There is a severe compression fracture of T12.  Please see  details above.      Past Medical History:   Diagnosis Date    Abnormal Pap smear of cervix     LEEP procedure,     Acute deep vein thrombosis (DVT) of brachial vein of right upper extremity 2017    on RUE ultrasound    Anxiety     Bowel perforation     Chronic pain     Compression fracture of C-spine     Crohn's disease of both small and large intestine with intestinal obstruction     Difficult intravenous access     Encounter for blood transfusion     GERD (gastroesophageal reflux disease)     Kidney stones     Nephrolithiasis     Osteoporosis     Stricture of bowel      Past Surgical History:   Procedure Laterality Date    ABSCESS DRAINAGE      ANAL FISTULOTOMY      BOWEL RESECTION      small bowel resection per Dr. Uribe 2018    CATARACT EXTRACTION W/  INTRAOCULAR LENS IMPLANT Left 2024    Procedure: EXTRACTION, CATARACT, WITH IOL INSERTION;  Surgeon: Ladan Serrato MD;  Location: [facility] OR;  Service: Ophthalmology;  Laterality: Left;  ORA ONLY    CATARACT EXTRACTION W/  INTRAOCULAR LENS IMPLANT Right 2024    Procedure: EXTRACTION, CATARACT, WITH IOL INSERTION;  Surgeon: Ladan Serrato MD;  [facility];  Service: Ophthalmology;  Laterality: Right;  ORA ONLY, IF NEEDED    CERVICAL BIOPSY  W/ LOOP ELECTRODE EXCISION       SECTION      x2    CHOLECYSTECTOMY      COLON SURGERY  2015    sigmoid loop colostomy with subsequent reversal 3 years later    COLONOSCOPY N/A 2016    Procedure: COLONOSCOPY;  Surgeon: Andre Uribe MD;  Location: [facility] ENDO (4TH FLR);  Service: Endoscopy;  Laterality: N/A;    COLONOSCOPY N/A 2017    Procedure: COLONOSCOPY;  Surgeon: Dany Stock MD;  Location: [facility] ENDO (2ND FLR);  Service: Endoscopy;  Laterality: N/A;    COLONOSCOPY N/A 2017    Procedure: COLONOSCOPY;  Surgeon: Andre Uribe MD;  Location: [facility] ENDO (4TH FLR);  Service: Endoscopy;  Laterality: N/A;    COLONOSCOPY N/A 2018    Procedure:  COLONOSCOPY;  Surgeon: Andre Uribe MD;  Location: [facility] ENDO (4TH FLR);  Service: Endoscopy;  Laterality: N/A;    COLONOSCOPY N/A 03/24/2018    Procedure: COLONOSCOPY;  Surgeon: Elmer Serrato MD;  Location: [facility] ENDO (2ND FLR);  Service: Endoscopy;  Laterality: N/A;    COLONOSCOPY  03/24/2018    COLONOSCOPY N/A 07/06/2018    Procedure: COLONOSCOPY;  Surgeon: Andre Uribe MD;  Location: [facility] ENDO (4TH FLR);  Service: Endoscopy;  Laterality: N/A;    COLONOSCOPY N/A 10/07/2021    Procedure: COLONOSCOPY;  Surgeon: Jose Hughes MD;  Location: [facility] ENDO;  Service: Endoscopy;  Laterality: N/A;    COLONOSCOPY N/A 6/6/2024    Procedure: COLONOSCOPY;  Surgeon: Jose Hughes MD;  Location: [facility];  Service: Endoscopy;  Laterality: N/A;    EPIDURAL STEROID INJECTION N/A 11/2/2022    Procedure: INJECTION, STEROID, EPIDURAL L4-L5 CONTRAST;  Surgeon: Royal Brewster DO;  Location: [facility] PAIN MGT;  Service: Pain Management;  Laterality: N/A;    ESOPHAGOGASTRODUODENOSCOPY N/A 10/07/2021    Procedure: EGD (ESOPHAGOGASTRODUODENOSCOPY);  Surgeon: Jose Hughes MD;  Location: [facility];  Service: Endoscopy;  Laterality: N/A;    LAPAROSCOPIC CLOSURE OF COLOSTOMY N/A 08/29/2018    Procedure: CLOSURE, COLOSTOMY, LAPAROSCOPIC;  Surgeon: Andre Uribe MD;  Location: [facility] OR 2ND FLR;  Service: Colon and Rectal;  Laterality: N/A;  converted to open    LYSIS OF ADHESIONS N/A 01/20/2022    Procedure: LYSIS, ADHESIONS;  Surgeon: LLUVIA Post MD;  Location: [facility] OR 2ND FLR;  Service: Colon and Rectal;  Laterality: N/A;  lasting longer than 2 hours, modifier 22      rectovaginal fistula repair  01/2018    SMALL BOWEL ENTEROSCOPY N/A 6/6/2024    Procedure: PUSH ENTEROSCOPY;  Surgeon: Jose Hughes MD;  Location: [facility];  Service: Endoscopy;  Laterality: N/A;    SMALL INTESTINE SURGERY  1995    per patient 10 inches removed    SMALL INTESTINE SURGERY  02/2009    abdominal abscess drained, 3 cm colon  removed, 11 cm SB removed    TUBAL LIGATION      UPPER GASTROINTESTINAL ENDOSCOPY  2000     Social History     Socioeconomic History    Marital status:    Tobacco Use    Smoking status: Never    Smokeless tobacco: Never   Substance and Sexual Activity    Alcohol use: No     Alcohol/week: 0.0 standard drinks of alcohol    Drug use: No    Sexual activity: Not Currently     Family History   Problem Relation Name Age of Onset    Cataracts Mother      Heart attack Father  69    Cataracts Father      No Known Problems Sister      No Known Problems Brother      Cancer Maternal Aunt  66        colon ca    No Known Problems Maternal Uncle      No Known Problems Paternal Aunt      No Known Problems Paternal Uncle      No Known Problems Maternal Grandmother      No Known Problems Maternal Grandfather      No Known Problems Paternal Grandmother      No Known Problems Paternal Grandfather      No Known Problems Son      No Known Problems Son      No Known Problems Other      Anesthesia problems Neg Hx      Celiac disease Neg Hx      Cirrhosis Neg Hx      Colon cancer Neg Hx      Colon polyps Neg Hx      Crohn's disease Neg Hx      Cystic fibrosis Neg Hx      Esophageal cancer Neg Hx      Hemochromatosis Neg Hx      Inflammatory bowel disease Neg Hx      Irritable bowel syndrome Neg Hx      Liver cancer Neg Hx      Liver disease Neg Hx      Rectal cancer Neg Hx      Stomach cancer Neg Hx      Ulcerative colitis Neg Hx      Mars's disease Neg Hx      Lymphoma Neg Hx      Tuberculosis Neg Hx      Scleroderma Neg Hx      Rheum arthritis Neg Hx      Multiple sclerosis Neg Hx      Melanoma Neg Hx      Lupus Neg Hx      Psoriasis Neg Hx      Skin cancer Neg Hx         Review of patient's allergies indicates:   Allergen Reactions    Remicade [infliximab] Shortness Of Breath and Palpitations     Severe muscle and joint, and bone pain    Adalimumab Other (See Comments)    Flagyl [metronidazole] Other (See Comments)      Neuropathy    Nubain [nalbuphine] Anxiety     Upper abdominal burning        Current Outpatient Medications   Medication Sig    abaloparatide (TYMLOS) 80 mcg (3,120 mcg/1.56 mL) PnIj Inject 0.04 mLs (80 mcg total) into the skin once daily.    calcitRIOL (ROCALTROL) 0.25 MCG Cap Take 1 capsule (0.25 mcg total) by mouth 2 (two) times daily.    ergocalciferol (ERGOCALCIFEROL) 50,000 unit Cap Take 1 capsule (50,000 Units total) by mouth every 7 days.    gabapentin (NEURONTIN) 300 MG capsule TAKE 1 CAPSULE BY MOUTH THREE TIMES A DAY    [START ON 9/26/2024] HYDROcodone-acetaminophen (NORCO)  mg per tablet Take 1 tablet by mouth 4 (four) times daily as needed for Pain.    [START ON 10/26/2024] HYDROcodone-acetaminophen (NORCO)  mg per tablet Take 1 tablet by mouth 4 (four) times daily as needed for Pain.    [START ON 11/25/2024] HYDROcodone-acetaminophen (NORCO)  mg per tablet Take 1 tablet by mouth 4 (four) times daily as needed for Pain.    loperamide (IMODIUM) 2 mg capsule Take 2 capsules (4 mg total) by mouth 4 (four) times daily.    magnesium oxide 400 mg magnesium Cap Take 400 mg by mouth Daily.    omeprazole (PRILOSEC) 40 MG capsule Take 40 mg by mouth every evening.    potassium chloride SA (K-DUR,KLOR-CON) 20 MEQ tablet Take 10 mEq by mouth 2 (two) times daily.    promethazine (PHENERGAN) 25 MG tablet Take 1 tablet (25 mg total) by mouth every 4 (four) hours.    sertraline (ZOLOFT) 25 MG tablet Take 25 mg by mouth once daily.    spironolactone (ALDACTONE) 25 MG tablet Take 25 mg by mouth once daily.    venlafaxine (EFFEXOR) 75 MG tablet Take 75 mg by mouth 2 (two) times daily.     No current facility-administered medications for this visit.       REVIEW OF SYSTEMS:    GENERAL:  No weight loss, malaise or fevers.  HEENT:   No recent changes in vision or hearing + vision   NECK:  Negative for lumps, no difficulty with swallowing.  RESPIRATORY:  Negative for cough, wheezing or shortness of breath,  "patient denies any recent URI.  CARDIOVASCULAR:  Negative for chest pain, leg swelling or palpitations.  GI:  Negative for abdominal discomfort, blood in stools or black stools or change in bowel habits.   MUSCULOSKELETAL:  See HPI.  SKIN:  Negative for lesions, rash, and itching.  PSYCH:  No mood disorder or recent psychosocial stressors.  Patients sleep is not disturbed secondary to pain.  HEMATOLOGY/LYMPHOLOGY:  Negative for prolonged bleeding, bruising easily or swollen nodes.  Patient is not currently taking any anti-coagulants  NEURO:   No history of headaches, syncope, paralysis, seizures or tremors. + seizures   All other reviewed and negative other than HPI.    OBJECTIVE:    /71 (BP Location: Right arm, Patient Position: Sitting)   Pulse 94   Ht 5' 5" (1.651 m)   Wt 54.9 kg (121 lb 0.5 oz)   LMP 05/30/2009   BMI 20.14 kg/m²     PHYSICAL EXAMINATION:    Video visit:  GENERAL: Well appearing, in no acute distress, alert and oriented x3. Skinny, Fraile  PSYCH:  Mood and affect appropriate.  SKIN: Skin color, texture, turgor normal, no rashes or lesions.  HEAD/FACE:  Normocephalic, atraumatic. Cranial nerves grossly intact.    Last in-person visit  BACK:   - Hyperkyphosis of the thoracic spine with paravertebral humping  - Negative spinous process tenderness  - Negative paravertebral tenderness  - Negative pain to palpation over the facet joints of the thoracic and lumbar spine.   -decreased ROM with flexion and extension of the lumbar spine  -flexion causes increased pain in the mid/low back without radiation into the legs    MUSCULOSKELETAL:  No atrophy or tone abnormalities are noted in the UE or LE.  No deformities, edema, or skin discoloration are noted on visible skin. Good capillary refill.    NEURO: Bilateral upper and lower extremity coordination and muscle stretch reflexes are physiologic and symmetric.      NEUROLOGICAL EXAM:  MENTAL STATUS: A x O x 3, good concentration, speech is fluent " and goal directed  MEMORY: recent and remote are intact  CN: CN2-12 grossly intact  MOTOR: 5/5 in all muscle groups except b/l hip flexion 4/5  DTRs: 2+ intact symmetric  Sensation:    -no Loss of sensation in a left lower and right lower L-1, L-2, L-3, L-4 and L-5 bilaterally distribution.  Babinski: absent on the bilateral side(s)    GAIT: flexed, kyphotic posture

## 2024-09-10 LAB
1OH-MIDAZOLAM UR QL SCN: NOT DETECTED
6MAM UR QL: NOT DETECTED
7AMINOCLONAZEPAM UR QL: NOT DETECTED
A-OH ALPRAZ UR QL: NOT DETECTED
ALPRAZ UR QL: NOT DETECTED
AMPHET UR QL SCN: NOT DETECTED
ANNOTATION COMMENT IMP: NORMAL
BARBITURATES UR QL: NEGATIVE
BUPRENORPHINE UR QL: NOT DETECTED
BZE UR QL: NEGATIVE
CARBOXYTHC UR QL: NEGATIVE
CARISOPRODOL UR QL: NEGATIVE
CLONAZEPAM UR QL: NOT DETECTED
CODEINE UR QL: NOT DETECTED
CREAT UR-MCNC: 111.2 MG/DL (ref 20–400)
DIAZEPAM UR QL: NOT DETECTED
ETHYL GLUCURONIDE UR QL: NEGATIVE
FENTANYL UR QL: NOT DETECTED
GABAPENTIN UR QL CFM: PRESENT
HYDROCODONE UR QL: PRESENT
HYDROMORPHONE UR QL: PRESENT
LORAZEPAM UR QL: NOT DETECTED
MDA UR QL: NOT DETECTED
MDEA UR QL: NOT DETECTED
MDMA UR QL: NOT DETECTED
ME-PHENIDATE UR QL: NOT DETECTED
METHADONE UR QL: NEGATIVE
METHAMPHET UR QL: NOT DETECTED
MIDAZOLAM UR QL SCN: NOT DETECTED
MORPHINE UR QL: NOT DETECTED
NALOXONE UR QL CFM: NOT DETECTED
NORBUPRENORPHINE UR QL CFM: NOT DETECTED
NORDIAZEPAM UR QL: NOT DETECTED
NORFENTANYL UR QL: NOT DETECTED
NORHYDROCODONE UR QL CFM: PRESENT
NORMEPERIDINE UR QL CFM: NOT DETECTED
NOROXYCODONE UR QL CFM: NOT DETECTED
NOROXYMORPHONE UR QL SCN: NOT DETECTED
OXAZEPAM UR QL: NOT DETECTED
OXYCODONE UR QL: NOT DETECTED
OXYMORPHONE UR QL: NOT DETECTED
PATHOLOGY STUDY: NORMAL
PCP UR QL: NEGATIVE
PHENTERMINE UR QL: NOT DETECTED
PREGABALIN UR QL CFM: NOT DETECTED
SERVICE CMNT-IMP: NORMAL
TAPENTADOL UR QL SCN: NOT DETECTED
TAPENTADOL UR QL SCN: NOT DETECTED
TEMAZEPAM UR QL: NOT DETECTED
TRAMADOL UR QL: NEGATIVE
ZOLPIDEM PHENYL-4-CARB UR QL SCN: NOT DETECTED
ZOLPIDEM UR QL: NOT DETECTED

## 2024-09-24 ENCOUNTER — INFUSION (OUTPATIENT)
Dept: INFUSION THERAPY | Facility: HOSPITAL | Age: 59
End: 2024-09-24
Attending: INTERNAL MEDICINE
Payer: MEDICARE

## 2024-09-24 VITALS
BODY MASS INDEX: 19.83 KG/M2 | RESPIRATION RATE: 18 BRPM | SYSTOLIC BLOOD PRESSURE: 93 MMHG | HEART RATE: 87 BPM | HEIGHT: 65 IN | TEMPERATURE: 98 F | WEIGHT: 119 LBS | OXYGEN SATURATION: 98 % | DIASTOLIC BLOOD PRESSURE: 58 MMHG

## 2024-09-24 DIAGNOSIS — K50.90 CROHN'S DISEASE WITHOUT COMPLICATION, UNSPECIFIED GASTROINTESTINAL TRACT LOCATION: Primary | ICD-10-CM

## 2024-09-24 PROCEDURE — 25000003 PHARM REV CODE 250: Performed by: INTERNAL MEDICINE

## 2024-09-24 PROCEDURE — 63600175 PHARM REV CODE 636 W HCPCS: Mod: JG | Performed by: INTERNAL MEDICINE

## 2024-09-24 PROCEDURE — 96365 THER/PROPH/DIAG IV INF INIT: CPT

## 2024-09-24 RX ORDER — HEPARIN 100 UNIT/ML
500 SYRINGE INTRAVENOUS
Status: COMPLETED | OUTPATIENT
Start: 2024-09-24 | End: 2024-09-24

## 2024-09-24 RX ORDER — HEPARIN 100 UNIT/ML
500 SYRINGE INTRAVENOUS
Start: 2024-11-19

## 2024-09-24 RX ADMIN — HEPARIN 500 UNITS: 100 SYRINGE at 12:09

## 2024-09-24 RX ADMIN — VEDOLIZUMAB 300 MG: 300 INJECTION, POWDER, LYOPHILIZED, FOR SOLUTION INTRAVENOUS at 11:09

## 2024-11-01 ENCOUNTER — OFFICE VISIT (OUTPATIENT)
Dept: URGENT CARE | Facility: CLINIC | Age: 59
End: 2024-11-01
Payer: MEDICARE

## 2024-11-01 VITALS
HEIGHT: 65 IN | TEMPERATURE: 99 F | DIASTOLIC BLOOD PRESSURE: 73 MMHG | RESPIRATION RATE: 16 BRPM | SYSTOLIC BLOOD PRESSURE: 133 MMHG | HEART RATE: 120 BPM | OXYGEN SATURATION: 98 % | WEIGHT: 119 LBS | BODY MASS INDEX: 19.83 KG/M2

## 2024-11-01 DIAGNOSIS — R21 RASH: Primary | ICD-10-CM

## 2024-11-01 DIAGNOSIS — R19.7 DIARRHEA, UNSPECIFIED TYPE: ICD-10-CM

## 2024-11-01 DIAGNOSIS — E86.0 DEHYDRATION: ICD-10-CM

## 2024-11-01 DIAGNOSIS — R00.0 TACHYCARDIA: ICD-10-CM

## 2024-11-25 ENCOUNTER — INFUSION (OUTPATIENT)
Dept: INFUSION THERAPY | Facility: HOSPITAL | Age: 59
End: 2024-11-25
Attending: INTERNAL MEDICINE
Payer: MEDICARE

## 2024-11-25 VITALS
HEIGHT: 65 IN | DIASTOLIC BLOOD PRESSURE: 68 MMHG | OXYGEN SATURATION: 95 % | RESPIRATION RATE: 16 BRPM | BODY MASS INDEX: 19.47 KG/M2 | HEART RATE: 108 BPM | WEIGHT: 116.88 LBS | TEMPERATURE: 98 F | SYSTOLIC BLOOD PRESSURE: 104 MMHG

## 2024-11-25 DIAGNOSIS — K50.90 CROHN'S DISEASE WITHOUT COMPLICATION, UNSPECIFIED GASTROINTESTINAL TRACT LOCATION: Primary | ICD-10-CM

## 2024-11-25 PROCEDURE — 25000003 PHARM REV CODE 250: Performed by: INTERNAL MEDICINE

## 2024-11-25 PROCEDURE — 96365 THER/PROPH/DIAG IV INF INIT: CPT

## 2024-11-25 PROCEDURE — 63600175 PHARM REV CODE 636 W HCPCS: Mod: JZ,JG | Performed by: INTERNAL MEDICINE

## 2024-11-25 RX ORDER — HEPARIN 100 UNIT/ML
500 SYRINGE INTRAVENOUS
Status: COMPLETED | OUTPATIENT
Start: 2024-11-25 | End: 2024-11-25

## 2024-11-25 RX ORDER — HEPARIN 100 UNIT/ML
500 SYRINGE INTRAVENOUS
Start: 2024-11-25

## 2024-11-25 RX ADMIN — VEDOLIZUMAB 300 MG: 300 INJECTION, POWDER, LYOPHILIZED, FOR SOLUTION INTRAVENOUS at 10:11

## 2024-11-25 RX ADMIN — HEPARIN 500 UNITS: 100 SYRINGE at 11:11

## 2024-11-25 NOTE — PLAN OF CARE
Problem: Fatigue  Goal: Improved Activity Tolerance  Outcome: Progressing  Intervention: Promote Improved Energy  Flowsheets (Taken 11/25/2024 1015)  Fatigue Management: frequent rest breaks encouraged  Sleep/Rest Enhancement: regular sleep/rest pattern promoted  Activity Management: Ambulated -L4  Environmental Support: calm environment promoted

## 2024-12-09 ENCOUNTER — TELEPHONE (OUTPATIENT)
Dept: PAIN MEDICINE | Facility: CLINIC | Age: 59
End: 2024-12-09
Payer: MEDICARE

## 2024-12-09 NOTE — PROGRESS NOTES
"[facility]   Physical Therapy Treatment Note     [facility]  Clinic Number: [id number]    Therapy Diagnosis:   Encounter Diagnoses   Name Primary?    Impaired functional mobility and activity tolerance Yes    Closed compression fracture of L3 lumbar vertebra, initial encounter     Compression fracture of T12 vertebra with routine healing, subsequent encounter      Physician: Min Harden MD    Visit Date: 9/13/2022    Physician Orders: PT Eval and Treat   Medical Diagnosis from Referral: Closed compression fracture of L3 lumbar vertebra, initial encounter; Compression fracture of T12 vertebra, initial encounter   Evaluation Date: 8/4/2022  Authorization Period Expiration: 07/21/2023   Plan of Care Expiration: 09/16/2022  Progress Note Due: 09/04/2022  Visit # / Visits authorized: 5/ 20 (6 total)   FOTO: Completed 9/6/2022 58% Deficit   Next Survey Due Visit 10     Precautions: Compression fractures, osteoporosis    PTA Visit #: 3/5     Time In: 1538   Time Out: 1628  Total Billable Time: 48 minutes    SUBJECTIVE     Pt reports: "I am supposed to have an epidural in my back for pain and hopefully it works."  She was not compliant with home exercise program at this time because patient lost instructions.  Response to previous treatment: Felt good  Functional change: Increasing energy and endurance    Pain: 6/10  Location: Pain in thoracic spine and LEs    OBJECTIVE     Objective Measures updated at progress report unless specified.     Treatment     Carline received the treatments listed below:      therapeutic exercises to develop increased core, paraspinals, and LE strength, as well as endurance for 48 minutes including:  Recumbent bike lvl3 x8'   Standing heel cord stretch [id number]    Hip ext x20ea    Hamstring Curl x20ea    Mini squat x15    Heel raises x30    Step up & over eccentric 6" x5ea    Lateral step up & over 6" x10    Closed chain TKE 6" x 10ea  Seated " hamstring stretch [id number]   Piriformis stretch 3x30s     March with core red TB engagement 2x10ea    Hip abd red TB w/core engagement x20    Hip add w/core engagement x20    LAQ x15ea    Physioball rollout x10ea    UTR x15ea    Did not perform this date:  Resisted red TB trunk rotation x 10ea    Patient Education and Home Exercises     Home Exercises Provided and Patient Education Provided     Education provided:   - The patient was provided an additional copy of established HEP, as well as red Theraband.    Written Home Exercises Provided: Illustrated instructions issued this visit.  Exercises were reviewed and Carline was able to demonstrate them prior to the end of the session.  Carline demonstrated fair  understanding of the education provided. See EMR under Patient Instructions for exercises provided during therapy sessions    ASSESSMENT     The patient was eight minutes late and has been late for several appointments. This is not an issue at this time, but if it progresses to further tardiness that impedes other time slots, the patient may not be seen the whole 45 minutes and or may have to reschedule. Carline tolerated today's treatment session well without increased pain. The patient explains that she has more energy post PT. Patient was slightly progressed in resistance and repetitions. Carline was able to cross her legs without utilizing upper extremities to assist. The patient still demonstrates increased difficulty with long arc quads bilaterally R worse than L.    Carline is slowly progressing towards her goals.   Pt prognosis is Fair.     Pt will continue to benefit from skilled outpatient physical therapy to address the deficits listed in the problem list box on initial evaluation, provide pt/family education and to maximize pt's level of independence in the home and community environment.     Pt's spiritual, cultural and educational needs considered and pt agreeable to plan of care and goals.      Anticipated barriers to physical therapy: Multiple fractures    Goals:   Short Term Goals: 3 weeks               1) Decrease max pain to < 5/10 Minimal progress              2) Facilitate improved posture Minimal progress              3) Facilitate improved LE flexibility Progressing              4) Patient will bend forward to perform LE dressing without increased pain Ongoing     Long Term Goals: 6 weeks               1) Patient will consistently perform sit <> stand transfers with minimal UE support and appropriate assistive device Ongoing              2) Patient will consistently perform rolling and scooting in bed without increased pain Progressing              3) Patient will consistently perform toilet transfers with no more that minimal difficulty Minimal progress              4) Increase walking tolerance to > 15 minutes Minimal progress              5) Increase standing tolerance to > 15 min Ongoing              6) Patient will be independent in complete HEP Ongoing    PLAN     The patient is to be progressed within the established plan of care as tolerated in order to accomplish goals listed above and address current deficits. Plan to assess balance and incorporate as needed.    Kiesha Mcfadden, PTA                coffee

## 2024-12-17 ENCOUNTER — TELEPHONE (OUTPATIENT)
Dept: PAIN MEDICINE | Facility: CLINIC | Age: 59
End: 2024-12-17
Payer: MEDICARE

## 2024-12-17 DIAGNOSIS — S22.080A COMPRESSION FRACTURE OF T12 VERTEBRA, INITIAL ENCOUNTER: ICD-10-CM

## 2024-12-17 DIAGNOSIS — S32.030A COMPRESSION FRACTURE OF L3 LUMBAR VERTEBRA, CLOSED, INITIAL ENCOUNTER: ICD-10-CM

## 2024-12-17 RX ORDER — HYDROCODONE BITARTRATE AND ACETAMINOPHEN 10; 325 MG/1; MG/1
1 TABLET ORAL 4 TIMES DAILY PRN
Qty: 120 TABLET | Refills: 0 | Status: SHIPPED | OUTPATIENT
Start: 2024-12-23 | End: 2025-01-22

## 2024-12-17 RX ORDER — HYDROCODONE BITARTRATE AND ACETAMINOPHEN 10; 325 MG/1; MG/1
1 TABLET ORAL 4 TIMES DAILY PRN
Qty: 120 TABLET | Refills: 0 | Status: SHIPPED | OUTPATIENT
Start: 2025-01-24 | End: 2025-02-23

## 2024-12-17 NOTE — TELEPHONE ENCOUNTER
----- Message from Med Assistant Self sent at 12/17/2024  1:37 PM CST -----  Regarding: FW: Appt  Contact: [phone]  Please see attached message below and advise.    Thank you,    Clair  ----- Message -----  From: Dylan Gresham  Sent: 12/17/2024   1:28 PM CST  To: Malathi Paige Staff  Subject: Appt                                             Who call ? Carline Chang     What is the request Details : Pt calling to speak with someone in provider office regards scheduling an virtual appt and extend prescription for  HYDROcodone-acetaminophen (NORCO)  mg per tablet.  No appt available.      Can clinic  use patient portal  : Yes     What number to call back : [phone]

## 2024-12-21 DIAGNOSIS — E83.51 HYPOCALCEMIA: ICD-10-CM

## 2024-12-21 DIAGNOSIS — S32.030A COMPRESSION FRACTURE OF L3 LUMBAR VERTEBRA, CLOSED, INITIAL ENCOUNTER: ICD-10-CM

## 2024-12-21 DIAGNOSIS — S22.080A COMPRESSION FRACTURE OF T12 VERTEBRA, INITIAL ENCOUNTER: ICD-10-CM

## 2024-12-23 RX ORDER — ERGOCALCIFEROL 1.25 MG/1
50000 CAPSULE ORAL
Qty: 4 CAPSULE | Refills: 11 | Status: SHIPPED | OUTPATIENT
Start: 2024-12-23

## 2025-01-14 DIAGNOSIS — D51.3 OTHER DIETARY VITAMIN B12 DEFICIENCY ANEMIA: ICD-10-CM

## 2025-01-14 DIAGNOSIS — K50.90: Primary | ICD-10-CM

## 2025-01-14 DIAGNOSIS — E83.42 HYPOMAGNESEMIA: ICD-10-CM

## 2025-01-14 DIAGNOSIS — K90.829 SHORT GUT SYNDROME: ICD-10-CM

## 2025-02-13 ENCOUNTER — TELEPHONE (OUTPATIENT)
Facility: CLINIC | Age: 60
End: 2025-02-13
Payer: MEDICARE

## 2025-02-13 NOTE — TELEPHONE ENCOUNTER
----- Message from Cheryl sent at 2/13/2025 10:22 AM CST -----  Did he see her while she was at the hospital in ?  If not I believe she will need a referral due to it being over 3 years since she has been in the office correct.?  ----- Message -----  From: Emerita Cazares  Sent: 2/13/2025   9:32 AM CST  To: Wil Rivas Staff     calling to schedule d/c f/u for Pt. Would like her to be seen w/in 1-2 weeks. Please call the Pt to schedule.     CB: [phone]

## 2025-02-17 DIAGNOSIS — D64.9 SYMPTOMATIC ANEMIA: Primary | ICD-10-CM

## 2025-02-18 ENCOUNTER — TELEPHONE (OUTPATIENT)
Facility: CLINIC | Age: 60
End: 2025-02-18
Payer: MEDICARE

## 2025-02-18 NOTE — NURSING
Oncology Navigation   Intake  Cancer Type: Benign hem  Type of Referral: Internal  Date of Referral: 02/17/25  Initial Nurse Navigator Contact: 02/18/25  Referral to Initial Contact Timeline (days): 1  First Appointment Available: 02/18/25         This RN Nurse Navigator called the pt to schedule a new patient Hematology/Oncology clinic appointment as requested from the providers referral. A voicemail message was left with a direct number [phone] for the patient to call back to schedule the appointment

## 2025-02-18 NOTE — NURSING
Oncology Navigation   Intake  Date of Diagnosis: 02/17/25  Cancer Type: Benign hem  Type of Referral: Internal  Date of Referral: 02/17/25  Initial Nurse Navigator Contact: 02/18/25  Referral to Initial Contact Timeline (days): 1  First Appointment Available: 04/22/25  Appointment Date: 04/22/25  First Available Date vs. Scheduled Date (days): 0  Reason if booked > 7 days after scheduling: Specific provider / access           Pt scheduled for new pt Hematology/Oncology clinic appt as requested in referral received in workqueue. Appt date, time and location reviewed with the pt. No questions at this time.

## 2025-02-25 ENCOUNTER — TELEPHONE (OUTPATIENT)
Dept: PAIN MEDICINE | Facility: CLINIC | Age: 60
End: 2025-02-25

## 2025-02-25 ENCOUNTER — OFFICE VISIT (OUTPATIENT)
Dept: PAIN MEDICINE | Facility: CLINIC | Age: 60
End: 2025-02-25
Payer: MEDICARE

## 2025-02-25 DIAGNOSIS — F11.90 CHRONIC, CONTINUOUS USE OF OPIOIDS: ICD-10-CM

## 2025-02-25 DIAGNOSIS — S22.080A COMPRESSION FRACTURE OF T12 VERTEBRA, INITIAL ENCOUNTER: Primary | ICD-10-CM

## 2025-02-25 DIAGNOSIS — S32.030A COMPRESSION FRACTURE OF L3 LUMBAR VERTEBRA, CLOSED, INITIAL ENCOUNTER: ICD-10-CM

## 2025-02-25 PROCEDURE — 98006 SYNCH AUDIO-VIDEO EST MOD 30: CPT | Mod: 95,,, | Performed by: STUDENT IN AN ORGANIZED HEALTH CARE EDUCATION/TRAINING PROGRAM

## 2025-02-25 RX ORDER — HYDROCODONE BITARTRATE AND ACETAMINOPHEN 10; 325 MG/1; MG/1
1 TABLET ORAL 4 TIMES DAILY PRN
Qty: 120 TABLET | Refills: 0 | Status: SHIPPED | OUTPATIENT
Start: 2025-04-26 | End: 2025-05-26

## 2025-02-25 RX ORDER — HYDROCODONE BITARTRATE AND ACETAMINOPHEN 10; 325 MG/1; MG/1
1 TABLET ORAL 4 TIMES DAILY PRN
Qty: 120 TABLET | Refills: 0 | Status: SHIPPED | OUTPATIENT
Start: 2025-02-25 | End: 2025-03-27

## 2025-02-25 RX ORDER — HYDROCODONE BITARTRATE AND ACETAMINOPHEN 10; 325 MG/1; MG/1
1 TABLET ORAL 4 TIMES DAILY PRN
Qty: 120 TABLET | Refills: 0 | Status: SHIPPED | OUTPATIENT
Start: 2025-03-27 | End: 2025-04-26

## 2025-02-25 NOTE — PROGRESS NOTES
Chronic Pain - f/u    Referring Physician: No ref. provider found    Date: 02/25/2025     Re: Carline Chang  MR#: [id number]  YOB: 1965  Age: 59 y.o.    Chief Complaint: back and leg pain  No chief complaint on file.    **This note is dictated using the M*Modal Fluency Direct word recognition program. There are word recognition mistakes that are occasionally missed on review.**    ASSESSMENT: 59 y.o. year old female with back pain, consistent with     1. Compression fracture of T12 vertebra, initial encounter  HYDROcodone-acetaminophen (NORCO)  mg per tablet    HYDROcodone-acetaminophen (NORCO)  mg per tablet    HYDROcodone-acetaminophen (NORCO)  mg per tablet      2. Compression fracture of L3 lumbar vertebra, closed, initial encounter  HYDROcodone-acetaminophen (NORCO)  mg per tablet    HYDROcodone-acetaminophen (NORCO)  mg per tablet    HYDROcodone-acetaminophen (NORCO)  mg per tablet      3. Chronic, continuous use of opioids  HYDROcodone-acetaminophen (NORCO)  mg per tablet    HYDROcodone-acetaminophen (NORCO)  mg per tablet    HYDROcodone-acetaminophen (NORCO)  mg per tablet          PLAN:     Chronic back pain 2/2 multiple compression fractures  -T5, T8, severe T12, L3 compression fractures on XR. Chornic on MRI.  Not suitable for vertebral augmentation.  -Look into finding a pain physisian or PCP in [place] that can refill her narcotic medications so she does not need to travel so far to get them.  I will provide a refill of her pain meds in the meantime.  -MRI lumbar spine completed.  -discussed trial of MBB/RFA for the T12 compression fracture to target the posterior elements.  Will defer since pain is stable and controlled with medications  -s/p L4-5 RAMOS on 11/2022 - 100% for 7 days, then pain back to baseline.   -saw Chucky Carpenter in Ortho to discuss osteoporosis treatments.  DEXA shows severe osteoporosis (although improved  from 2022).   -started on Abaloparatide 80 mcg SubQ daily x2 years  -encouraged mild weight bearing exercises  -refill Hydrocodone 10mg QID x3 provided  -continue vitamin D 66807 units/week     Lumbar radiculopathy  -has some elements of radiculitis on both sides  -discussed trial of SCS.  Defer for now. This would be a reasonable option if she is not a candidate for surgery and her bone quality continues to be poor.  -continue gabapentin to 300mg TID. This is helpful, but patient does report signficiant sedation.  We discussed weaning off the gabapentin.  She will decrease by 1 pill every 2 weeks until she is off    Crohn's disease with long history of chronic steroids and multiple abdominal surgeries causing short gut syndrome and vitamin deficiencies  -recent surgery  -hx of 30 years of oral steroids leading to osteoporosis and compression fracture  -recent admission 02/2025 for dangerous levels of low B12, potassium, magnesium and anemia. Required blood transfusions and IV electrolytes     History of seizures  -continue keppra    Chronic use of opioids  -her pain provider has passed away and she does not have anyone to fill her medications  pain contract 1/2024   -new UDS 9/5/24 appropriate  -The patient is here today for opioid pain medications and they believe these provide effective pain control and improvements in quality of life.  The patient notes no serious side effects, and feels the benefits outweigh the risks.  The patient was reminded of the pain contract that they signed previously as well as the risks and benefits of the medication including possible death.  The updated [place] Board of [employer] prescription monitoring program was reviewed, and the patient has been found to be compliant with current treatment plan.      - RTC 3 months with gustavo  - Counseled patient regarding the importance of activity modification.    The above plan and management options were discussed at length with patient.  Patient is in agreement with the above and verbalized understanding. It will be communicated with the referring physician via electronic record, fax, or mail.  Lab/study reports reviewed were important and necessary because subsequent medical and treatment recommendations required review of the above lab/study reports. Images viewed/reviewed above were important and necessary because subsequent medical and treatment recommendations required review of the reviewed image(s).     Electronically signed by:  Royal Brewster DO  02/25/2025    =========================================================================================================    SUBJECTIVE:    Chronic Pain-Tele-Medicine     The patient location is: Home  The chief complaint leading to consultation is: Pain  Visit type: Virtual visit with synchronous audio and video  Total time spent with patient: 25 min  Each patient to whom he or she provides medical services by telemedicine is:  (1) informed of the relationship between the physician and patient and the respective role of any other health care provider with respect to management of the patient; and (2) notified that he or she may decline to receive medical services by telemedicine and may withdraw from such care at any time.    Interval History 2/25/2025:   Carline Chang is a 59 y.o. female presents to the clinic for follow up.  Since last visit the pain has is unchanged.  The patient was admitted to the hospital because she had short gut syndrome and her B12 levels were extremely low.  She had to get several units of blood.  She feels better now.  She has been pretty nauseous.  She has lost 13 lbs.     The pain is located in the back area and does not radiate.  The pain is described as aching and sharp    At BEST  0/10   At WORST  6/10 on the WORST day.    On average pain is rated as 4/10.   Today the pain is rated as 4/10  Symptoms interfere with daily activity.   Exacerbating  factors: Standing, Extension, and Flexing.    Mitigating factors rest.     Current pain medications: Norco 10mg QID, gabapentin 300mg TID  Failed Pain Medications:  Icy hot, Tylenol, cannot take NSIADs    Interval History 9/5/2024:   Carline Chang is a 59 y.o. female presents to the clinic for follow up.  Since last visit the pain has is unchanged.    The pain is located in the middle back area and radiates to the legs .  The pain is described as aching    At BEST  2/10   At WORST  7/10 on the WORST day.    On average pain is rated as 2/10.   Today the pain is rated as 3/10  Symptoms interfere with daily activity.   Exacerbating factors: Standing.    Mitigating factors medications.     Interval History 6/27/2024:   Carline Chang is a 59 y.o. female presents to the clinic for follow up.  Since last visit the pain has has slightly improved. Gabapentin is still helping a lot. Back pain is stable on current medications.  She never had follow up with Gaston Carpenter on her osteoporosis treatment options because she didn't realize the follow up was the same day as the DEXA scan.  Encouraged her to get a follow up set up. Pain is in the back. 3/10 today. 0/10 at best. 6/10 at worst.    Interval History 4/4/2024:   Carline Chang is a 59 y.o. female presents to the clinic for follow up.  Since last visit the pain has is unchanged. The patient had cataract surgery.  Her legs are doing well. Medications still work.  She is overall doing well.    Interval History 1/3/2024:   Carline Chang is a 59 y.o. female presents to the clinic for follow up.  Since last visit the pain has has significantly improved.    The pain is located in the middle back area and radiates to the legs .  The pain is described as aching    At BEST  0/10   At WORST  7/10 on the WORST day.    On average pain is rated as 2/10.   Today the pain is rated as 2/10  Symptoms interfere with daily activity.    Exacerbating factors: Standing.    Mitigating factors medications.     Interval History 8/10/2023:   Carline Chang is a 59 y.o. female presents to the clinic for follow up.  Since last visit the pain has has moderately improved. Patient states that things have been going well. She states that the pain has improved. Pain today is 3/10.    Exacerbating factors: activity.    Mitigating factors medications.     Interval History 5/18/2023:   Carline Chang is a 59 y.o. female presents to the clinic for follow up.  Since last visit the pain has has moderately improved.     The pain is located in the back area and radiates to the legs .  The pain is described as aching    At BEST  3/10   At WORST  8/10 on the WORST day.    On average pain is rated as 4/10.   Today the pain is rated as 3/10  Symptoms interfere with nothing  .   Exacerbating factors: nothing.    Mitigating factors medications.     Interval History 2/15/2023:   Carline Chang is a 59 y.o. female presents to the clinic for follow up.  Since last visit the pain has is unchanged. Pain 6/10. She is still getting a lot of pain going down the legs.    Interval History 11/23/2022:     Carline Chang is a 59 y.o. female presents to the clinic for follow up.  Since last visit the pain has is unchanged. Patient is s/p L4/L5 ILESI 11/02/2022. Worked very well for one week, 100%. Pain then returned back to same location and same intensity.  Pain is 7/10 without hydrocodone. Feels right leg stronger than the left.     Interval History 9/7/2022:     Carline Chang is a 59 y.o. female presents to the clinic for follow up.  Since last visit the pain has is unchanged. Pain is in the mid back and she gets radiation down the right leg to the knee. May have some knee arthritis as well that is confounding symptoms.  Difficult to walk and move. Average pain is 6/10. Today is 6/10.    Initial hx:  Carline Chang  "is a 59 y.o. female presents to the clinic for the evaluation of mid/ lower back pain. The pain started 7 months ago following  seizures   and symptoms have been worsening.  The patient states that she had a bunch of seizures in January and think that the compression fractures happened at that time.  She saw Dr. Harden recently for the back pain. Hx of crohns disease and 30 year history of chronic steroids.  She stopped taking them a year or two ago. She has lost 3-4" in height.     The patient had abdominal surgery in January for her Crohn's for lysis of adhesions. She stil gets abdominal bloating.  She has poor absorption of electrolytes and needs to get frequent IV medications.    The patient was seeing a PCP in [place] but they passed away.  She was getting Norco 10mg QID.     Pain Description:    The pain is located in the mid/lower back area and radiates to the bilalteral knee .    At BEST  3/10   At WORST  8/10 on the WORST day.    On average pain is rated as 5/10.   Today the pain is rated as 6/10  The pain is continuous.  The pain is described as aching and sharp    Symptoms interfere with daily activity and sleeping.   Exacerbating factors: Sitting, Standing, Bending, Walking, Extension, Lifting and Getting out of bed/chair.    Mitigating factors nothing and medications.   She reports 8 hours of sleep per night.    Physical Therapy/Home Exercise: Yes, currently in Physical therapy    Current Pain Medications:    - Norco 10mg QID    Failed Pain Medications:    - Icy hot, Tylenol, cannot take NSIADs    Pain Treatment Therapies:    Pain procedures: none  Physical Therapy: none  Chiropractor: none  Acupuncture: none  TENS unit: none  Spinal decompression: none  Joint replacement: none    Patient denies urinary incontinence, bowel incontinence, significant motor weakness and loss of sensations.  Patient denies any suicidal or homicidal ideations     report:  Reviewed and consistent with medication use as " prescribed.    Imaging:   Lumbar XR 07/2022:    FINDINGS:  There is a severe compression fracture deformity of T12 with mild retropulsion of osseous structure posteriorly.     There is a mild loss of height at the superior endplate of L3, unchanged.     No new fracture seen.     Flexion and extension views demonstrate no translational abnormalities.     The sacroiliac joints appear symmetrical.     Surgical clips right upper abdominal quadrant, surgical bowel suture in the right mid and right lower abdominal region.     Air-fluid levels noted, midline with distended colon measuring about 13 cm.     Impression:     Severe compression fracture of T12 and mild superior endplate compression fracture of L3, unchanged from 01/19/2022    Thoracic XR 07/2022:     FINDINGS:  There is a port in the left upper thorax, distal tip projects in the region of the SVC/right atrium region.     The alignment of the thoracic spine demonstrates a subtle dextroscoliosis.  There is a severe wedge compression fracture deformity of T12.  There is a mild height loss fracture deformity of T8 and T5 which appears to have been present on CT 01/19/2022 chest and abdomen CT examination.  Subtle concavity at the superior endplate of T10-T11 is unchanged from CT examination of 01/19/2022.  No definite new fracture identified.  No rib abnormality seen.  Surgical clips right upper abdominal quadrant.     Impression:     Multiple thoracic spine vertebrae fractures, all appears to have been present on 01/19/2022 CT exam.  There is a severe compression fracture of T12.  Please see details above.      Past Medical History:   Diagnosis Date    Abnormal Pap smear of cervix     LEEP procedure, 1995    Acute deep vein thrombosis (DVT) of brachial vein of right upper extremity 01/2017    on RUE ultrasound    Anxiety     Bowel perforation     Chronic pain     Compression fracture of C-spine     Crohn's disease of both small and large intestine with intestinal  obstruction     Difficult intravenous access     Encounter for blood transfusion     GERD (gastroesophageal reflux disease)     Kidney stones     Nephrolithiasis     Osteoporosis     Stricture of bowel      Past Surgical History:   Procedure Laterality Date    ABSCESS DRAINAGE      ANAL FISTULOTOMY      BOWEL RESECTION      small bowel resection per Dr. Uribe 2018    CATARACT EXTRACTION W/  INTRAOCULAR LENS IMPLANT Left 2024    Procedure: EXTRACTION, CATARACT, WITH IOL INSERTION;  Surgeon: Ladan Serrato MD;  Location: [facility] OR;  Service: Ophthalmology;  Laterality: Left;  ORA ONLY    CATARACT EXTRACTION W/  INTRAOCULAR LENS IMPLANT Right 2024    Procedure: EXTRACTION, CATARACT, WITH IOL INSERTION;  Surgeon: Ladan Serrato MD;  Location: [facility] OR;  Service: Ophthalmology;  Laterality: Right;  ORA ONLY, IF NEEDED    CERVICAL BIOPSY  W/ LOOP ELECTRODE EXCISION       SECTION      x2    CHOLECYSTECTOMY      COLON SURGERY  2015    sigmoid loop colostomy with subsequent reversal 3 years later    COLONOSCOPY N/A 2016    Procedure: COLONOSCOPY;  Surgeon: Andre Uribe MD;  Location: [facility] ENDO (4TH FLR);  Service: Endoscopy;  Laterality: N/A;    COLONOSCOPY N/A 2017    Procedure: COLONOSCOPY;  Surgeon: Dany Stock MD;  Location: [facility] ENDO (2ND FLR);  Service: Endoscopy;  Laterality: N/A;    COLONOSCOPY N/A 2017    Procedure: COLONOSCOPY;  Surgeon: Andre Uribe MD;  Location: [facility] ENDO (4TH FLR);  Service: Endoscopy;  Laterality: N/A;    COLONOSCOPY N/A 2018    Procedure: COLONOSCOPY;  Surgeon: Andre Uribe MD;  Location: [facility] ENDO (4TH FLR);  Service: Endoscopy;  Laterality: N/A;    COLONOSCOPY N/A 2018    Procedure: COLONOSCOPY;  Surgeon: Elmer Serrato MD;  Location: [facility] ENDO (2ND FLR);  Service: Endoscopy;  Laterality: N/A;    COLONOSCOPY  2018    COLONOSCOPY N/A 2018    Procedure: COLONOSCOPY;  Surgeon: Andre ERVIN  MD Jojo;  Location: [facility] ENDO (4TH FLR);  Service: Endoscopy;  Laterality: N/A;    COLONOSCOPY N/A 10/07/2021    Procedure: COLONOSCOPY;  Surgeon: Jose Hughes MD;  Location: [facility] ENDO;  Service: Endoscopy;  Laterality: N/A;    COLONOSCOPY N/A 6/6/2024    Procedure: COLONOSCOPY;  Surgeon: Jose Hughes MD;  Location: [facility] ENDO;  Service: Endoscopy;  Laterality: N/A;    EPIDURAL STEROID INJECTION N/A 11/2/2022    Procedure: INJECTION, STEROID, EPIDURAL L4-L5 CONTRAST;  Surgeon: Royal Brewster DO;  Location: [facility] PAIN MGT;  Service: Pain Management;  Laterality: N/A;    ESOPHAGOGASTRODUODENOSCOPY N/A 10/07/2021    Procedure: EGD (ESOPHAGOGASTRODUODENOSCOPY);  Surgeon: Jose Hughes MD;  Location: [facility];  Service: Endoscopy;  Laterality: N/A;    LAPAROSCOPIC CLOSURE OF COLOSTOMY N/A 08/29/2018    Procedure: CLOSURE, COLOSTOMY, LAPAROSCOPIC;  Surgeon: Andre Uribe MD;  Location: [facility] OR 2ND FLR;  Service: Colon and Rectal;  Laterality: N/A;  converted to open    LYSIS OF ADHESIONS N/A 01/20/2022    Procedure: LYSIS, ADHESIONS;  Surgeon: LLUVIA Post MD;  Location: [facility] OR 2ND FLR;  Service: Colon and Rectal;  Laterality: N/A;  lasting longer than 2 hours, modifier 22      rectovaginal fistula repair  01/2018    SMALL BOWEL ENTEROSCOPY N/A 6/6/2024    Procedure: PUSH ENTEROSCOPY;  Surgeon: Jose Hughes MD;  Location: [facility];  Service: Endoscopy;  Laterality: N/A;    SMALL INTESTINE SURGERY  1995    per patient 10 inches removed    SMALL INTESTINE SURGERY  02/2009    abdominal abscess drained, 3 cm colon removed, 11 cm SB removed    TUBAL LIGATION      UPPER GASTROINTESTINAL ENDOSCOPY  2000     Social History     Socioeconomic History    Marital status:    Tobacco Use    Smoking status: Never    Smokeless tobacco: Never   Substance and Sexual Activity    Alcohol use: No     Alcohol/week: 0.0 standard drinks of alcohol    Drug use: No    Sexual activity: Not Currently      Social Drivers of Health     Financial Resource Strain: Low Risk  (2/21/2025)    Overall Financial Resource Strain (CARDIA)     Difficulty of Paying Living Expenses: Not hard at all   Food Insecurity: No Food Insecurity (2/21/2025)    Hunger Vital Sign     Worried About Running Out of Food in the Last Year: Never true     Ran Out of Food in the Last Year: Never true   Transportation Needs: No Transportation Needs (2/21/2025)    PRAPARE - Transportation     Lack of Transportation (Medical): No     Lack of Transportation (Non-Medical): No   Physical Activity: Inactive (2/21/2025)    Exercise Vital Sign     Days of Exercise per Week: 0 days     Minutes of Exercise per Session: 0 min   Stress: No Stress Concern Present (2/21/2025)    [employer] [place] [employer] - Occupational Stress Questionnaire     Feeling of Stress : Only a little   Housing Stability: Low Risk  (2/21/2025)    Housing Stability Vital Sign     Unable to Pay for Housing in the Last Year: No     Number of Times Moved in the Last Year: 0     Homeless in the Last Year: No     Family History   Problem Relation Name Age of Onset    Cataracts Mother      Heart attack Father  69    Cataracts Father      No Known Problems Sister      No Known Problems Brother      Cancer Maternal Aunt  66        colon ca    No Known Problems Maternal Uncle      No Known Problems Paternal Aunt      No Known Problems Paternal Uncle      No Known Problems Maternal Grandmother      No Known Problems Maternal Grandfather      No Known Problems Paternal Grandmother      No Known Problems Paternal Grandfather      No Known Problems Son      No Known Problems Son      No Known Problems Other      Anesthesia problems Neg Hx      Celiac disease Neg Hx      Cirrhosis Neg Hx      Colon cancer Neg Hx      Colon polyps Neg Hx      Crohn's disease Neg Hx      Cystic fibrosis Neg Hx      Esophageal cancer Neg Hx      Hemochromatosis Neg Hx      Inflammatory bowel disease Neg  Hx      Irritable bowel syndrome Neg Hx      Liver cancer Neg Hx      Liver disease Neg Hx      Rectal cancer Neg Hx      Stomach cancer Neg Hx      Ulcerative colitis Neg Hx      Mars's disease Neg Hx      Lymphoma Neg Hx      Tuberculosis Neg Hx      Scleroderma Neg Hx      Rheum arthritis Neg Hx      Multiple sclerosis Neg Hx      Melanoma Neg Hx      Lupus Neg Hx      Psoriasis Neg Hx      Skin cancer Neg Hx         Review of patient's allergies indicates:   Allergen Reactions    Remicade [infliximab] Shortness Of Breath and Palpitations     Severe muscle and joint, and bone pain    Adalimumab Other (See Comments)    Flagyl [metronidazole] Other (See Comments)     Neuropathy    Nubain [nalbuphine] Anxiety     Upper abdominal burning        Current Outpatient Medications   Medication Sig    calcitRIOL (ROCALTROL) 0.25 MCG Cap Take 1 capsule (0.25 mcg total) by mouth 2 (two) times daily.    ergocalciferol (ERGOCALCIFEROL) 50,000 unit Cap TAKE 1 CAPSULE BY MOUTH ONE TIME PER WEEK    gabapentin (NEURONTIN) 300 MG capsule TAKE 1 CAPSULE BY MOUTH THREE TIMES A DAY    HYDROcodone-acetaminophen (NORCO)  mg per tablet Take 1 tablet by mouth 4 (four) times daily as needed for Pain.    [START ON 3/27/2025] HYDROcodone-acetaminophen (NORCO)  mg per tablet Take 1 tablet by mouth 4 (four) times daily as needed for Pain.    [START ON 4/26/2025] HYDROcodone-acetaminophen (NORCO)  mg per tablet Take 1 tablet by mouth 4 (four) times daily as needed for Pain.    loperamide (IMODIUM) 2 mg capsule Take 2 capsules (4 mg total) by mouth 4 (four) times daily.    magnesium oxide 400 mg magnesium Cap Take 400 mg by mouth Daily.    omeprazole (PRILOSEC) 40 MG capsule Take 40 mg by mouth every evening.    potassium chloride SA (K-DUR,KLOR-CON) 20 MEQ tablet Take 10 mEq by mouth 2 (two) times daily.    promethazine (PHENERGAN) 25 MG tablet Take 1 tablet (25 mg total) by mouth every 4 (four) hours.    sertraline (ZOLOFT)  25 MG tablet Take 25 mg by mouth once daily.    spironolactone (ALDACTONE) 25 MG tablet Take 25 mg by mouth once daily.    venlafaxine (EFFEXOR) 75 MG tablet Take 75 mg by mouth 2 (two) times daily.     No current facility-administered medications for this visit.       REVIEW OF SYSTEMS:    GENERAL:  No weight loss, malaise or fevers.  HEENT:   No recent changes in vision or hearing + vision   NECK:  Negative for lumps, no difficulty with swallowing.  RESPIRATORY:  Negative for cough, wheezing or shortness of breath, patient denies any recent URI.  CARDIOVASCULAR:  Negative for chest pain, leg swelling or palpitations.  GI:  Negative for abdominal discomfort, blood in stools or black stools or change in bowel habits.   MUSCULOSKELETAL:  See HPI.  SKIN:  Negative for lesions, rash, and itching.  PSYCH:  No mood disorder or recent psychosocial stressors.  Patients sleep is not disturbed secondary to pain.  HEMATOLOGY/LYMPHOLOGY:  Negative for prolonged bleeding, bruising easily or swollen nodes.  Patient is not currently taking any anti-coagulants  NEURO:   No history of headaches, syncope, paralysis, seizures or tremors. + seizures   All other reviewed and negative other than HPI.    OBJECTIVE:    LMP 05/30/2009     PHYSICAL EXAMINATION:    Video visit:  GENERAL: Well appearing, in no acute distress, alert and oriented x3. Skinny, Fraile  PSYCH:  Mood and affect appropriate.  SKIN: Skin color, texture, turgor normal, no rashes or lesions.  HEAD/FACE:  Normocephalic, atraumatic. Cranial nerves grossly intact.    Last in-person visit  BACK:   - Hyperkyphosis of the thoracic spine with paravertebral humping  - Negative spinous process tenderness  - Negative paravertebral tenderness  - Negative pain to palpation over the facet joints of the thoracic and lumbar spine.   -decreased ROM with flexion and extension of the lumbar spine  -flexion causes increased pain in the mid/low back without radiation into the  legs    MUSCULOSKELETAL:  No atrophy or tone abnormalities are noted in the UE or LE.  No deformities, edema, or skin discoloration are noted on visible skin. Good capillary refill.    NEURO: Bilateral upper and lower extremity coordination and muscle stretch reflexes are physiologic and symmetric.      NEUROLOGICAL EXAM:  MENTAL STATUS: A x O x 3, good concentration, speech is fluent and goal directed  MEMORY: recent and remote are intact  CN: CN2-12 grossly intact  MOTOR: 5/5 in all muscle groups except b/l hip flexion 4/5  DTRs: 2+ intact symmetric  Sensation:    -no Loss of sensation in a left lower and right lower L-1, L-2, L-3, L-4 and L-5 bilaterally distribution.  Babinski: absent on the bilateral side(s)    GAIT: flexed, kyphotic posture

## 2025-02-27 ENCOUNTER — OFFICE VISIT (OUTPATIENT)
Dept: DERMATOLOGY | Facility: CLINIC | Age: 60
End: 2025-02-27
Payer: MEDICARE

## 2025-02-27 VITALS — HEIGHT: 65 IN | BODY MASS INDEX: 18.49 KG/M2 | WEIGHT: 111 LBS

## 2025-02-27 DIAGNOSIS — L57.8 ACTINIC SKIN DAMAGE: ICD-10-CM

## 2025-02-27 DIAGNOSIS — L82.1 SEBORRHEIC KERATOSES: ICD-10-CM

## 2025-02-27 DIAGNOSIS — L90.8 SKIN AGING: ICD-10-CM

## 2025-02-27 DIAGNOSIS — Z12.83 SKIN CANCER SCREENING: ICD-10-CM

## 2025-02-27 DIAGNOSIS — L81.4 SOLAR LENTIGO: ICD-10-CM

## 2025-02-27 DIAGNOSIS — D22.9 MULTIPLE BENIGN NEVI: Primary | ICD-10-CM

## 2025-02-27 PROCEDURE — 99204 OFFICE O/P NEW MOD 45 MIN: CPT | Mod: S$GLB,,, | Performed by: DERMATOLOGY

## 2025-02-27 RX ORDER — TRETINOIN 0.25 MG/G
CREAM TOPICAL
Qty: 20 G | Refills: 5 | Status: SHIPPED | OUTPATIENT
Start: 2025-02-27

## 2025-03-13 RX ORDER — HEPARIN 100 UNIT/ML
500 SYRINGE INTRAVENOUS
Start: 2025-03-13

## 2025-03-17 ENCOUNTER — HOSPITAL ENCOUNTER (OUTPATIENT)
Dept: RADIOLOGY | Facility: HOSPITAL | Age: 60
Discharge: HOME | End: 2025-03-17
Attending: INTERNAL MEDICINE
Payer: MEDICARE

## 2025-03-17 DIAGNOSIS — E83.42 HYPOMAGNESEMIA: ICD-10-CM

## 2025-03-17 DIAGNOSIS — D51.3 OTHER DIETARY VITAMIN B12 DEFICIENCY ANEMIA: ICD-10-CM

## 2025-03-17 DIAGNOSIS — K90.829 SHORT GUT SYNDROME: ICD-10-CM

## 2025-03-17 DIAGNOSIS — K50.90: ICD-10-CM

## 2025-03-17 PROCEDURE — 74177 CT ABD & PELVIS W/CONTRAST: CPT | Mod: 26,,, | Performed by: RADIOLOGY

## 2025-03-17 PROCEDURE — 25500020 PHARM REV CODE 255: Performed by: INTERNAL MEDICINE

## 2025-03-17 PROCEDURE — 74177 CT ABD & PELVIS W/CONTRAST: CPT | Mod: TC

## 2025-03-17 RX ADMIN — IOHEXOL 100 ML: 350 INJECTION, SOLUTION INTRAVENOUS at 01:03

## 2025-03-20 ENCOUNTER — TELEPHONE (OUTPATIENT)
Dept: INFUSION THERAPY | Facility: HOSPITAL | Age: 60
End: 2025-03-20

## 2025-03-25 ENCOUNTER — TELEPHONE (OUTPATIENT)
Dept: INFUSION THERAPY | Facility: HOSPITAL | Age: 60
End: 2025-03-25

## 2025-03-25 ENCOUNTER — TELEPHONE (OUTPATIENT)
Dept: PAIN MEDICINE | Facility: CLINIC | Age: 60
End: 2025-03-25
Payer: MEDICARE

## 2025-03-27 ENCOUNTER — INFUSION (OUTPATIENT)
Dept: INFUSION THERAPY | Facility: HOSPITAL | Age: 60
End: 2025-03-27
Attending: INTERNAL MEDICINE
Payer: MEDICARE

## 2025-03-27 VITALS
RESPIRATION RATE: 15 BRPM | DIASTOLIC BLOOD PRESSURE: 60 MMHG | HEIGHT: 65 IN | OXYGEN SATURATION: 98 % | WEIGHT: 115.81 LBS | TEMPERATURE: 97 F | BODY MASS INDEX: 19.29 KG/M2 | SYSTOLIC BLOOD PRESSURE: 95 MMHG | HEART RATE: 92 BPM

## 2025-03-27 DIAGNOSIS — K50.90 CROHN'S DISEASE WITHOUT COMPLICATION, UNSPECIFIED GASTROINTESTINAL TRACT LOCATION: Primary | ICD-10-CM

## 2025-03-27 PROCEDURE — 63600175 PHARM REV CODE 636 W HCPCS: Mod: JZ,TB | Performed by: INTERNAL MEDICINE

## 2025-03-27 PROCEDURE — 96365 THER/PROPH/DIAG IV INF INIT: CPT

## 2025-03-27 PROCEDURE — 25000003 PHARM REV CODE 250: Performed by: INTERNAL MEDICINE

## 2025-03-27 RX ORDER — HEPARIN 100 UNIT/ML
500 SYRINGE INTRAVENOUS
Start: 2025-05-15

## 2025-03-27 RX ORDER — HEPARIN 100 UNIT/ML
500 SYRINGE INTRAVENOUS
Status: COMPLETED | OUTPATIENT
Start: 2025-03-27 | End: 2025-03-27

## 2025-03-27 RX ADMIN — HEPARIN 500 UNITS: 100 SYRINGE at 02:03

## 2025-03-27 RX ADMIN — VEDOLIZUMAB 300 MG: 300 INJECTION, POWDER, LYOPHILIZED, FOR SOLUTION INTRAVENOUS at 01:03

## 2025-03-27 NOTE — PLAN OF CARE
Problem: Fatigue  Goal: Improved Activity Tolerance  Outcome: Progressing  Intervention: Promote Improved Energy  Flowsheets (Taken 3/27/2025 1310)  Fatigue Management:   fatigue-related activity identified   frequent rest breaks encouraged   paced activity encouraged  Sleep/Rest Enhancement:   regular sleep/rest pattern promoted   relaxation techniques promoted  Activity Management: Ambulated -L4  Environmental Support: rest periods encouraged

## 2025-04-22 ENCOUNTER — OFFICE VISIT (OUTPATIENT)
Facility: CLINIC | Age: 60
End: 2025-04-22
Payer: MEDICARE

## 2025-04-22 VITALS
RESPIRATION RATE: 16 BRPM | SYSTOLIC BLOOD PRESSURE: 126 MMHG | DIASTOLIC BLOOD PRESSURE: 82 MMHG | OXYGEN SATURATION: 96 % | TEMPERATURE: 98 F | BODY MASS INDEX: 19.66 KG/M2 | WEIGHT: 118 LBS | HEART RATE: 85 BPM | HEIGHT: 65 IN

## 2025-04-22 DIAGNOSIS — D64.9 SYMPTOMATIC ANEMIA: ICD-10-CM

## 2025-04-22 DIAGNOSIS — E53.8 VITAMIN B12 DEFICIENCY: ICD-10-CM

## 2025-04-22 DIAGNOSIS — D53.9 NUTRITIONAL ANEMIA: Primary | ICD-10-CM

## 2025-04-22 PROCEDURE — 99999 PR PBB SHADOW E&M-EST. PATIENT-LVL V: CPT | Mod: PBBFAC,,, | Performed by: INTERNAL MEDICINE

## 2025-04-22 PROCEDURE — 99215 OFFICE O/P EST HI 40 MIN: CPT | Mod: PBBFAC,PN | Performed by: INTERNAL MEDICINE

## 2025-04-22 RX ORDER — TEDUGLUTIDE 5 MG/.5ML
INJECTION, POWDER, LYOPHILIZED, FOR SOLUTION SUBCUTANEOUS
COMMUNITY
Start: 2025-04-09

## 2025-04-22 NOTE — LETTER
April 22, 2025        Dominick Neal MD  [address] [place] [address]             [facility]  [address]  [place] [address]  Phone: [phone]  Fax: [phone]   Patient: Carline Chang   MR Number: [id number]   YOB: 1965   Date of Visit: 4/22/2025       Dear Dr. Neal:    Thank you for referring Carline Chang to me for evaluation. Below are the relevant portions of my assessment and plan of care.              If you have questions, please do not hesitate to call me. I look forward to following Carline along with you.    Sincerely,      MAURY De La Torre MD           CC  No Recipients

## 2025-04-23 NOTE — PROGRESS NOTES
[facility] [address] Hematology Oncology In Office Initial Encounter Note    4/22/25    Subjective:      Patient ID:   Carline Chang  59 y.o. female  1965  MD Jose Restrepo MD      Chief Complaint:   Anemia  (New Patient)      HPI:  59 y.o. female evaluation of anemia and pancytopenia.  Recent hemoglobin was 3 with hematocrit of 9.  She was given packed red blood cells, stools for occult blood were positive.  White blood cell count was 2900 and platelet count was 25,000. B12 was less than 50.  Folate and ferritin have been normal repeatedly.    She has been started on B12 injections t.i.w. through next week.  We will repeat her lab in the 1st 2 weeks of May and then follow up with her in 1 month.    Past medical history is very complex.  She has history of Crohn's disease of small and large intestine with history of intestinal obstruction.  She has had a bowel perforation in the past, chronic abdominal pain something so and history of GERD.  He    She has history of grand mal seizures after having hypomagnesemia with status epilepticus, compression fracture of T12 vertebrae and L3 lumbar vertebrae.  Was    Other history includes nuclear sclerotic cataract of the right eye an bilateral nuclear sclerosis of the eye.    She has anxiety history.  Also ureteral stones, history of rectovaginal fistula, nephrolithiasis, hypocalcemia, hypomagnesemia, hypokalemia he.  She is status post colostomy placement which was eventually reversed.    She has history of COVID infection in the past.  She also has history of DVT involving IV catheters.  She has chronic anemia., osteoporosis from is on long-term steroid usage.    Medications include Rocaltrol, vitamin-D, Neurontin, Norco 10, Imodium, magnesium oxide, Prilosec, potassium chloride, Phenergan, Zoloft, Aldactone, Effexor.     She does not smoke, she does not drink alcohol, she is allergic to Remicade,  adalimumab, Flagyl, Nubain.    Status  post exploratory laparotomy with resection of her bowel, ostomy placement and later reversal.  She has short-bowel syndrome.  In the past she has been on Imuran, Remicade and Humira.  She is presently on Gattex for short gut syndrome and Entyvio for her Crohn's disease.    Mother had cataracts and her father had cataracts and heart disease.    ROS:   GEN: See HPI  HEENT: normal with no HA's, sore throat, stiff neck, changes in vision  CV: normal with no CP, SOB, PND, CASTORENA or orthopnea  PULM: normal with no SOB, cough, hemoptysis, sputum or pleuritic pain  GI: See HPI  : See HPI  BREAST: normal with no mass, discharge, pain  SKIN: normal with no rash, erythema, bruising, or swelling     Past Medical History:   Diagnosis Date    Abnormal Pap smear of cervix     LEEP procedure, 1995    Acute deep vein thrombosis (DVT) of brachial vein of right upper extremity 01/2017    on RUE ultrasound    Anxiety     Bowel perforation     Chronic pain     Compression fracture of C-spine     Crohn's disease of both small and large intestine with intestinal obstruction 1989    Difficult intravenous access     Encounter for blood transfusion     GERD (gastroesophageal reflux disease) 2014    Kidney stones     Nephrolithiasis     Osteoporosis     Stricture of bowel      Past Surgical History:   Procedure Laterality Date    ABSCESS DRAINAGE  2014    ANAL FISTULOTOMY  2018    BOWEL RESECTION      small bowel resection per Dr. Uribe 4/2018    CATARACT EXTRACTION W/  INTRAOCULAR LENS IMPLANT Left 1/18/2024    Procedure: EXTRACTION, CATARACT, WITH IOL INSERTION;  Surgeon: Ladan Serrato MD;  Location: [facility] OR;  Service: Ophthalmology;  Laterality: Left;  ORA ONLY    CATARACT EXTRACTION W/  INTRAOCULAR LENS IMPLANT Right 2/8/2024    Procedure: EXTRACTION, CATARACT, WITH IOL INSERTION;  Surgeon: Ladan Serrato MD;  Location: [facility] OR;  Service: Ophthalmology;  Laterality: Right;  ORA ONLY, IF NEEDED    CERVICAL BIOPSY  W/ LOOP ELECTRODE  EXCISION       SECTION      x2    CHOLECYSTECTOMY  2000    COLON SURGERY  2015    sigmoid loop colostomy with subsequent reversal 3 years later    COLONOSCOPY N/A 2016    Procedure: COLONOSCOPY;  Surgeon: Andre Uribe MD;  Location: [facility] ENDO (4TH FLR);  Service: Endoscopy;  Laterality: N/A;    COLONOSCOPY N/A 2017    Procedure: COLONOSCOPY;  Surgeon: Dany Stock MD;  Location: [facility] ENDO (2ND FLR);  Service: Endoscopy;  Laterality: N/A;    COLONOSCOPY N/A 2017    Procedure: COLONOSCOPY;  Surgeon: Andre Uribe MD;  Location: [facility] ENDO (4TH FLR);  Service: Endoscopy;  Laterality: N/A;    COLONOSCOPY N/A 2018    Procedure: COLONOSCOPY;  Surgeon: Andre Uribe MD;  Location: [facility] ENDO (4TH FLR);  Service: Endoscopy;  Laterality: N/A;    COLONOSCOPY N/A 2018    Procedure: COLONOSCOPY;  Surgeon: Elmer Serrato MD;  Location: [facility] ENDO (2ND FLR);  Service: Endoscopy;  Laterality: N/A;    COLONOSCOPY  2018    COLONOSCOPY N/A 2018    Procedure: COLONOSCOPY;  Surgeon: Andre Uribe MD;  Location: [facility] ENDO (4TH FLR);  Service: Endoscopy;  Laterality: N/A;    COLONOSCOPY N/A 10/07/2021    Procedure: COLONOSCOPY;  Surgeon: Jose Hughes MD;  Location: [facility];  Service: Endoscopy;  Laterality: N/A;    COLONOSCOPY N/A 2024    Procedure: COLONOSCOPY;  Surgeon: Jose Hughes MD;  Location: [facility];  Service: Endoscopy;  Laterality: N/A;    EPIDURAL STEROID INJECTION N/A 2022    Procedure: INJECTION, STEROID, EPIDURAL L4-L5 CONTRAST;  Surgeon: Royal Brewster DO;  Location: [facility] PAIN MGT;  Service: Pain Management;  Laterality: N/A;    ESOPHAGOGASTRODUODENOSCOPY N/A 10/07/2021    Procedure: EGD (ESOPHAGOGASTRODUODENOSCOPY);  Surgeon: Jose Hughes MD;  Location: [facility] ENDO;  Service: Endoscopy;  Laterality: N/A;    LAPAROSCOPIC CLOSURE OF COLOSTOMY N/A 2018    Procedure: CLOSURE, COLOSTOMY, LAPAROSCOPIC;  Surgeon: Andre ERVIN  "MD Jojo;  Location: [facility] OR 2ND FLR;  Service: Colon and Rectal;  Laterality: N/A;  converted to open    LYSIS OF ADHESIONS N/A 01/20/2022    Procedure: LYSIS, ADHESIONS;  Surgeon: LLUVIA Post MD;  Location: [facility] OR 2ND FLR;  Service: Colon and Rectal;  Laterality: N/A;  lasting longer than 2 hours, modifier 22      rectovaginal fistula repair  01/2018    SMALL BOWEL ENTEROSCOPY N/A 6/6/2024    Procedure: PUSH ENTEROSCOPY;  Surgeon: Jose Hughes MD;  Location: [facility] ENDO;  Service: Endoscopy;  Laterality: N/A;    SMALL INTESTINE SURGERY  1995    per patient 10 inches removed    SMALL INTESTINE SURGERY  02/2009    abdominal abscess drained, 3 cm colon removed, 11 cm SB removed    TUBAL LIGATION      UPPER GASTROINTESTINAL ENDOSCOPY  2000       Review of patient's allergies indicates:   Allergen Reactions    Remicade [infliximab] Shortness Of Breath and Palpitations     Severe muscle and joint, and bone pain    Adalimumab Other (See Comments)    Flagyl [metronidazole] Other (See Comments)     Neuropathy    Nubain [nalbuphine] Anxiety     Upper abdominal burning          Objective:   Vitals:  Blood pressure 126/82, pulse 85, temperature 98.3 °F (36.8 °C), temperature source Temporal, resp. rate 16, height 5' 5" (1.651 m), weight 53.5 kg (118 lb), last menstrual period 05/30/2009, SpO2 96%.    Physical Examination:   GEN: chronically ill in appearance  HEAD: atraumatic and normocephalic  EYES: + pallor, no icterus  ENT: no pharyngeal erythema, external ears WNL; no nasal discharge  NECK: no masses, thyroid normal, no LAD/LN's, supple  CV: RRR with no murmur; normal pulse  CHEST: Normal respiratory effort; CTAB; normal breath sounds; no wheeze or crackles  ABDOM: nontender and nondistended; soft; no rebound/guarding, L/S NP  MUSC/Skeletal: ROM normal; no crepitus; joints normal  EXTREM: no clubbing, cyanosis, inflammation or swelling  SKIN: no rashes, lesions, ulcers, petechiae   : no cvat  NEURO: grossly " intact; motor/sensory WNL;  no tremors  PSYCH: normal mood, affect and behavior  LYMPH: normal cervical, supraclavicular  LN's      Labs:   Lab Results   Component Value Date    WBC 6.00 08/20/2022    HGB 9.4 (L) 02/12/2025    HCT 26.6 (L) 02/12/2025    MCV 96 08/20/2022     (L) 08/20/2022    CMP  Sodium   Date Value Ref Range Status   04/16/2024 139 136 - 145 mmol/L Final   06/14/2019 144 134 - 144 mmol/L      Potassium   Date Value Ref Range Status   05/14/2024 3.6 3.4 - 4.5 mmol/L Final   04/16/2024 4.1 3.5 - 5.1 mmol/L Final     Chloride   Date Value Ref Range Status   04/16/2024 105 95 - 110 mmol/L Final   06/14/2019 105 98 - 110 mmol/L      CO2   Date Value Ref Range Status   04/16/2024 23 23 - 29 mmol/L Final     Glucose   Date Value Ref Range Status   04/16/2024 67 (L) 70 - 110 mg/dL Final   06/14/2019 81 70 - 99 mg/dL      BUN   Date Value Ref Range Status   04/16/2024 16 6 - 20 mg/dL Final     Creatinine   Date Value Ref Range Status   04/16/2024 0.9 0.5 - 1.4 mg/dL Final   06/14/2019 0.86 0.60 - 1.40 mg/dL      Calcium   Date Value Ref Range Status   04/16/2024 9.3 8.7 - 10.5 mg/dL Final     Total Protein   Date Value Ref Range Status   04/16/2024 7.6 6.0 - 8.4 g/dL Final     Albumin   Date Value Ref Range Status   04/16/2024 4.0 3.5 - 5.2 g/dL Final   09/28/2018 3.6 3.1 - 4.7 g/dL      Total Bilirubin   Date Value Ref Range Status   04/16/2024 0.9 0.1 - 1.0 mg/dL Final     Comment:     For infants and newborns, interpretation of results should be based  on gestational age, weight and in agreement with clinical  observations.    Premature Infant recommended reference ranges:  Up to 24 hours.............<8.0 mg/dL  Up to 48 hours............<12.0 mg/dL  3-5 days..................<15.0 mg/dL  6-29 days.................<15.0 mg/dL       Alkaline Phosphatase   Date Value Ref Range Status   04/16/2024 136 (H) 55 - 135 U/L Final     AST   Date Value Ref Range Status   04/16/2024 16 10 - 40 U/L Final      ALT   Date Value Ref Range Status   04/16/2024 28 10 - 44 U/L Final     Anion Gap   Date Value Ref Range Status   04/16/2024 11 8 - 16 mmol/L Final     eGFR if    Date Value Ref Range Status   04/06/2022 >60.0 >60 mL/min/1.73 m^2 Final     eGFR if non    Date Value Ref Range Status   04/06/2022 >60.0 >60 mL/min/1.73 m^2 Final     Comment:     Calculation used to obtain the estimated glomerular filtration  rate (eGFR) is the CKD-EPI equation.        B 12 < 50.    Assessment:   (1) 59 y.o. female with marked pancytopenia and B12 less than 50, likely secondary to decreased absorption of B12 from the diet due to small bowel involvement with her Crohn's disease.  Ferritin has been consistently normal and folate has been consistently normal.    (2) she has been started on B12 injections t.i.w. x1 month, and should complete this late next week.  She will have follow-up lab work done in the 1st 2 weeks of May and I will see her in the 3rd or 4th week of May.    Pancytopenia may resolve gradually with B12 replenishment.    (3) she has history of being on Imuran and other biologics in the past.  This can cause myelosuppression with pancytopenia.  Also there may be a component of the anemia of chronic disease here.    CBC, B12, folate, ferritin, reticulocyte count, erythropoietin level and CMP will be checked in May.  Return to clinic in May.    Rob Tejada MD  [facility],  4/22/25

## 2025-04-25 ENCOUNTER — OFFICE VISIT (OUTPATIENT)
Dept: PAIN MEDICINE | Facility: CLINIC | Age: 60
End: 2025-04-25
Payer: MEDICARE

## 2025-04-25 VITALS
BODY MASS INDEX: 19.91 KG/M2 | HEIGHT: 65 IN | DIASTOLIC BLOOD PRESSURE: 76 MMHG | HEART RATE: 90 BPM | SYSTOLIC BLOOD PRESSURE: 117 MMHG | WEIGHT: 119.5 LBS

## 2025-04-25 DIAGNOSIS — S32.030A COMPRESSION FRACTURE OF L3 LUMBAR VERTEBRA, CLOSED, INITIAL ENCOUNTER: ICD-10-CM

## 2025-04-25 DIAGNOSIS — S22.080A COMPRESSION FRACTURE OF T12 VERTEBRA, INITIAL ENCOUNTER: ICD-10-CM

## 2025-04-25 DIAGNOSIS — M54.16 LUMBAR RADICULOPATHY: ICD-10-CM

## 2025-04-25 DIAGNOSIS — S22.080A COMPRESSION FRACTURE OF T12 VERTEBRA, INITIAL ENCOUNTER: Primary | ICD-10-CM

## 2025-04-25 DIAGNOSIS — F11.90 CHRONIC, CONTINUOUS USE OF OPIOIDS: ICD-10-CM

## 2025-04-25 DIAGNOSIS — K50.018 CROHN'S DISEASE OF SMALL INTESTINE WITH OTHER COMPLICATION: ICD-10-CM

## 2025-04-25 PROCEDURE — 99999 PR PBB SHADOW E&M-EST. PATIENT-LVL III: CPT | Mod: PBBFAC,,,

## 2025-04-25 PROCEDURE — 99213 OFFICE O/P EST LOW 20 MIN: CPT | Mod: PBBFAC

## 2025-04-25 RX ORDER — HYDROCODONE BITARTRATE AND ACETAMINOPHEN 10; 325 MG/1; MG/1
1 TABLET ORAL 4 TIMES DAILY PRN
Qty: 120 TABLET | Refills: 0 | Status: SHIPPED | OUTPATIENT
Start: 2025-06-25 | End: 2025-07-25

## 2025-04-25 RX ORDER — HYDROCODONE BITARTRATE AND ACETAMINOPHEN 10; 325 MG/1; MG/1
1 TABLET ORAL 4 TIMES DAILY PRN
Qty: 120 TABLET | Refills: 0 | Status: SHIPPED | OUTPATIENT
Start: 2025-05-26 | End: 2025-06-25

## 2025-04-25 NOTE — PROGRESS NOTES
Chronic Pain - f/u    Referring Physician: No ref. provider found    Date: 04/25/2025     Re: Carline Chang  MR#: [id number]  YOB: 1965  Age: 59 y.o.    Chief Complaint: back and leg pain  Chief Complaint   Patient presents with    Follow-up     **This note is dictated using the M*Modal Fluency Direct word recognition program. There are word recognition mistakes that are occasionally missed on review.**    ASSESSMENT: 59 y.o. year old female with back pain, consistent with     1. Compression fracture of T12 vertebra, initial encounter        2. Compression fracture of L3 lumbar vertebra, closed, initial encounter        3. Chronic, continuous use of opioids        4. Lumbar radiculopathy        5. Crohn's disease of small intestine with other complication              PLAN:     Chronic back pain 2/2 multiple compression fractures  -T5, T8, severe T12, L3 compression fractures on XR. Chornic on MRI.  Not suitable for vertebral augmentation.  -Look into finding a pain physisian or PCP in [place] that can refill her narcotic medications so she does not need to travel so far to get them.  I will provide a refill of her pain meds in the meantime.  -MRI lumbar spine completed.  -discussed trial of MBB/RFA for the T12 compression fracture to target the posterior elements.  Will defer since pain is stable and controlled with medications  -s/p L4-5 RAMOS on 11/2022 - 100% for 7 days, then pain back to baseline.   -saw Chucky Carpenter in Ortho to discuss osteoporosis treatments.  DEXA shows severe osteoporosis (although improved from 2022).   -started on Abaloparatide 80 mcg SubQ daily x2 years  -encouraged mild weight bearing exercises  -refill Hydrocodone 10mg QID x3 provided  -continue vitamin D 02621 units/week     Lumbar radiculopathy  -has some elements of radiculitis on both sides  -discussed trial of SCS.  Defer for now. This would be a reasonable option if she is not a candidate for surgery and  her bone quality continues to be poor.  - continue gabapentin 300 mg b.i.d..  Patient recently decreased from t.i.d. to b.i.d..  Previously noting over-sedation from this medication.  Since decreasing she has been able to tolerate without causing too much sedation.    Crohn's disease with long history of chronic steroids and multiple abdominal surgeries causing short gut syndrome and vitamin deficiencies  -recent surgery  -hx of 30 years of oral steroids leading to osteoporosis and compression fracture  -recent admission 02/2025 for dangerous levels of low B12, potassium, magnesium and anemia. Required blood transfusions and IV electrolytes   - recently started gattex    History of seizures  -continue keppra    Chronic use of opioids  -her pain provider has passed away and she does not have anyone to fill her medications  pain contract 1/2024   -new UDS 9/5/24 appropriate  -The patient is here today for opioid pain medications and they believe these provide effective pain control and improvements in quality of life.  The patient notes no serious side effects, and feels the benefits outweigh the risks.  The patient was reminded of the pain contract that they signed previously as well as the risks and benefits of the medication including possible death.  The updated [place] [employer] prescription monitoring program was reviewed, and the patient has been found to be compliant with current treatment plan.      - RTC 3 months   - Counseled patient regarding the importance of activity modification.    The above plan and management options were discussed at length with patient. Patient is in agreement with the above and verbalized understanding. It will be communicated with the referring physician via electronic record, fax, or mail.  Lab/study reports reviewed were important and necessary because subsequent medical and treatment recommendations required review of the above lab/study reports. Images  viewed/reviewed above were important and necessary because subsequent medical and treatment recommendations required review of the reviewed image(s).   =========================================================================================================    SUBJECTIVE:    Interval History PA 4/25/2025:   Carline Chang is a 59 y.o. female presents to the clinic for follow up.  Since last visit the pain has has slightly improved.  Overall doing well since last encounter.  She has decreased gabapentin usage.  Previously taking 300 mg t.i.d. although was reporting over-sedation with this medication.  She has since decreased to b.i.d. which he notes has been beneficial in adequately control her pain without over-sedation.  Also of note she has recently started a new medication for Crohn's disease - Gattex to help with good absorption.  Otherwise she continues to take hydrocodone 10 mg q.6 hours p.r.n. with adequate relief, denies adverse effects from this medication.    The pain is located in the back area and does not radiate.  The pain is described as aching and sharp    At BEST  0/10   At WORST  6/10 on the WORST day.    On average pain is rated as 4/10.   Today the pain is rated as 0/10  Symptoms interfere with daily activity.   Exacerbating factors: Standing, Extension, and Flexing.    Mitigating factors medications.     Current pain medications: Norco 10mg QID, gabapentin 300mg TID  Failed Pain Medications:  Icy hot, Tylenol, cannot take NSIADs    Chronic Pain-Tele-Medicine     The patient location is: Home  The chief complaint leading to consultation is: Pain  Visit type: Virtual visit with synchronous audio and video  Total time spent with patient: 25 min  Each patient to whom he or she provides medical services by telemedicine is:  (1) informed of the relationship between the physician and patient and the respective role of any other health care provider with respect to management of the patient;  and (2) notified that he or she may decline to receive medical services by telemedicine and may withdraw from such care at any time.    Interval History 2/25/2025:   Carline Chang is a 59 y.o. female presents to the clinic for follow up.  Since last visit the pain has is unchanged.  The patient was admitted to the hospital because she had short gut syndrome and her B12 levels were extremely low.  She had to get several units of blood.  She feels better now.  She has been pretty nauseous.  She has lost 13 lbs.     The pain is located in the back area and does not radiate.  The pain is described as aching and sharp    At BEST  0/10   At WORST  6/10 on the WORST day.    On average pain is rated as 4/10.   Today the pain is rated as 4/10  Symptoms interfere with daily activity.   Exacerbating factors: Standing, Extension, and Flexing.    Mitigating factors rest.     Current pain medications: Norco 10mg QID, gabapentin 300mg TID  Failed Pain Medications:  Icy hot, Tylenol, cannot take NSIADs    Interval History 9/5/2024:   Carline Chang is a 59 y.o. female presents to the clinic for follow up.  Since last visit the pain has is unchanged.    The pain is located in the middle back area and radiates to the legs .  The pain is described as aching    At BEST  2/10   At WORST  7/10 on the WORST day.    On average pain is rated as 2/10.   Today the pain is rated as 3/10  Symptoms interfere with daily activity.   Exacerbating factors: Standing.    Mitigating factors medications.     Interval History 6/27/2024:   Carline Chang is a 59 y.o. female presents to the clinic for follow up.  Since last visit the pain has has slightly improved. Gabapentin is still helping a lot. Back pain is stable on current medications.  She never had follow up with Gaston Carpenter on her osteoporosis treatment options because she didn't realize the follow up was the same day as the DEXA scan.  Encouraged her to get a  follow up set up. Pain is in the back. 3/10 today. 0/10 at best. 6/10 at worst.    Interval History 4/4/2024:   Carline Chang is a 59 y.o. female presents to the clinic for follow up.  Since last visit the pain has is unchanged. The patient had cataract surgery.  Her legs are doing well. Medications still work.  She is overall doing well.    Interval History 1/3/2024:   Carline Chang is a 59 y.o. female presents to the clinic for follow up.  Since last visit the pain has has significantly improved.    The pain is located in the middle back area and radiates to the legs .  The pain is described as aching    At BEST  0/10   At WORST  7/10 on the WORST day.    On average pain is rated as 2/10.   Today the pain is rated as 2/10  Symptoms interfere with daily activity.   Exacerbating factors: Standing.    Mitigating factors medications.     Interval History 8/10/2023:   Carline Chang is a 59 y.o. female presents to the clinic for follow up.  Since last visit the pain has has moderately improved. Patient states that things have been going well. She states that the pain has improved. Pain today is 3/10.    Exacerbating factors: activity.    Mitigating factors medications.     Interval History 5/18/2023:   Carline Chang is a 59 y.o. female presents to the clinic for follow up.  Since last visit the pain has has moderately improved.     The pain is located in the back area and radiates to the legs .  The pain is described as aching    At BEST  3/10   At WORST  8/10 on the WORST day.    On average pain is rated as 4/10.   Today the pain is rated as 3/10  Symptoms interfere with nothing  .   Exacerbating factors: nothing.    Mitigating factors medications.     Interval History 2/15/2023:   Calrine Chang is a 59 y.o. female presents to the clinic for follow up.  Since last visit the pain has is unchanged. Pain 6/10. She is still getting a lot of pain going down the  "legs.    Interval History 11/23/2022:     Carline Chang is a 59 y.o. female presents to the clinic for follow up.  Since last visit the pain has is unchanged. Patient is s/p L4/L5 ILESI 11/02/2022. Worked very well for one week, 100%. Pain then returned back to same location and same intensity.  Pain is 7/10 without hydrocodone. Feels right leg stronger than the left.     Interval History 9/7/2022:     Carline Chang is a 59 y.o. female presents to the clinic for follow up.  Since last visit the pain has is unchanged. Pain is in the mid back and she gets radiation down the right leg to the knee. May have some knee arthritis as well that is confounding symptoms.  Difficult to walk and move. Average pain is 6/10. Today is 6/10.    Initial hx:  Carline Chang is a 59 y.o. female presents to the clinic for the evaluation of mid/ lower back pain. The pain started 7 months ago following  seizures   and symptoms have been worsening.  The patient states that she had a bunch of seizures in January and think that the compression fractures happened at that time.  She saw Dr. Harden recently for the back pain. Hx of crohns disease and 30 year history of chronic steroids.  She stopped taking them a year or two ago. She has lost 3-4" in height.     The patient had abdominal surgery in January for her Crohn's for lysis of adhesions. She stil gets abdominal bloating.  She has poor absorption of electrolytes and needs to get frequent IV medications.    The patient was seeing a PCP in [place] but they passed away.  She was getting Norco 10mg QID.     Pain Description:    The pain is located in the mid/lower back area and radiates to the bilalteral knee .    At BEST  3/10   At WORST  8/10 on the WORST day.    On average pain is rated as 5/10.   Today the pain is rated as 6/10  The pain is continuous.  The pain is described as aching and sharp    Symptoms interfere with daily activity and sleeping. "   Exacerbating factors: Sitting, Standing, Bending, Walking, Extension, Lifting and Getting out of bed/chair.    Mitigating factors nothing and medications.   She reports 8 hours of sleep per night.    Physical Therapy/Home Exercise: Yes, currently in Physical therapy    Current Pain Medications:    - Norco 10mg QID    Failed Pain Medications:    - Icy hot, Tylenol, cannot take NSIADs    Pain Treatment Therapies:    Pain procedures: none  Physical Therapy: none  Chiropractor: none  Acupuncture: none  TENS unit: none  Spinal decompression: none  Joint replacement: none    Patient denies urinary incontinence, bowel incontinence, significant motor weakness and loss of sensations.  Patient denies any suicidal or homicidal ideations     report:  Reviewed and consistent with medication use as prescribed.    Imaging:   Lumbar XR 07/2022:    FINDINGS:  There is a severe compression fracture deformity of T12 with mild retropulsion of osseous structure posteriorly.     There is a mild loss of height at the superior endplate of L3, unchanged.     No new fracture seen.     Flexion and extension views demonstrate no translational abnormalities.     The sacroiliac joints appear symmetrical.     Surgical clips right upper abdominal quadrant, surgical bowel suture in the right mid and right lower abdominal region.     Air-fluid levels noted, midline with distended colon measuring about 13 cm.     Impression:     Severe compression fracture of T12 and mild superior endplate compression fracture of L3, unchanged from 01/19/2022    Thoracic XR 07/2022:     FINDINGS:  There is a port in the left upper thorax, distal tip projects in the region of the SVC/right atrium region.     The alignment of the thoracic spine demonstrates a subtle dextroscoliosis.  There is a severe wedge compression fracture deformity of T12.  There is a mild height loss fracture deformity of T8 and T5 which appears to have been present on CT 01/19/2022 chest  and abdomen CT examination.  Subtle concavity at the superior endplate of T10-T11 is unchanged from CT examination of 2022.  No definite new fracture identified.  No rib abnormality seen.  Surgical clips right upper abdominal quadrant.     Impression:     Multiple thoracic spine vertebrae fractures, all appears to have been present on 2022 CT exam.  There is a severe compression fracture of T12.  Please see details above.      Past Medical History:   Diagnosis Date    Abnormal Pap smear of cervix     LEEP procedure,     Acute deep vein thrombosis (DVT) of brachial vein of right upper extremity 2017    on RUE ultrasound    Anxiety     Bowel perforation     Chronic pain     Compression fracture of C-spine     Crohn's disease of both small and large intestine with intestinal obstruction     Difficult intravenous access     Encounter for blood transfusion     GERD (gastroesophageal reflux disease)     Kidney stones     Nephrolithiasis     Osteoporosis     Stricture of bowel      Past Surgical History:   Procedure Laterality Date    ABSCESS DRAINAGE      ANAL FISTULOTOMY      BOWEL RESECTION      small bowel resection per Dr. Uribe 2018    CATARACT EXTRACTION W/  INTRAOCULAR LENS IMPLANT Left 2024    Procedure: EXTRACTION, CATARACT, WITH IOL INSERTION;  Surgeon: Ladan Serrato MD;  Location: [facility] OR;  Service: Ophthalmology;  Laterality: Left;  ORA ONLY    CATARACT EXTRACTION W/  INTRAOCULAR LENS IMPLANT Right 2024    Procedure: EXTRACTION, CATARACT, WITH IOL INSERTION;  Surgeon: Ladan Serrato MD;  Location: [facility] OR;  Service: Ophthalmology;  Laterality: Right;  ORA ONLY, IF NEEDED    CERVICAL BIOPSY  W/ LOOP ELECTRODE EXCISION       SECTION      x2    CHOLECYSTECTOMY      COLON SURGERY  2015    sigmoid loop colostomy with subsequent reversal 3 years later    COLONOSCOPY N/A 2016    Procedure: COLONOSCOPY;  Surgeon: Andre Uribe MD;  Location:  [facility] ENDO (4TH FLR);  Service: Endoscopy;  Laterality: N/A;    COLONOSCOPY N/A 01/17/2017    Procedure: COLONOSCOPY;  Surgeon: Dany Stock MD;  Location: [facility] ENDO (2ND FLR);  Service: Endoscopy;  Laterality: N/A;    COLONOSCOPY N/A 12/04/2017    Procedure: COLONOSCOPY;  Surgeon: Andre Uribe MD;  Location: [facility] ENDO (4TH FLR);  Service: Endoscopy;  Laterality: N/A;    COLONOSCOPY N/A 02/08/2018    Procedure: COLONOSCOPY;  Surgeon: Andre Uribe MD;  Location: [facility] ENDO (4TH FLR);  Service: Endoscopy;  Laterality: N/A;    COLONOSCOPY N/A 03/24/2018    Procedure: COLONOSCOPY;  Surgeon: Elmer Serrato MD;  Location: [facility] ENDO (2ND FLR);  Service: Endoscopy;  Laterality: N/A;    COLONOSCOPY  03/24/2018    COLONOSCOPY N/A 07/06/2018    Procedure: COLONOSCOPY;  Surgeon: Andre Uribe MD;  Location: [facility] ENDO (4TH FLR);  Service: Endoscopy;  Laterality: N/A;    COLONOSCOPY N/A 10/07/2021    Procedure: COLONOSCOPY;  Surgeon: Jose Hughes MD;  Location: [facility];  Service: Endoscopy;  Laterality: N/A;    COLONOSCOPY N/A 6/6/2024    Procedure: COLONOSCOPY;  Surgeon: Jose Hughes MD;  Location: [facility];  Service: Endoscopy;  Laterality: N/A;    EPIDURAL STEROID INJECTION N/A 11/2/2022    Procedure: INJECTION, STEROID, EPIDURAL L4-L5 CONTRAST;  Surgeon: Royal Brewster DO;  [place] [facility] [place];  Service: Pain Management;  Laterality: N/A;    ESOPHAGOGASTRODUODENOSCOPY N/A 10/07/2021    Procedure: EGD (ESOPHAGOGASTRODUODENOSCOPY);  Surgeon: Jose Hughes MD;  Location: [facility] ENDO;  Service: Endoscopy;  Laterality: N/A;    LAPAROSCOPIC CLOSURE OF COLOSTOMY N/A 08/29/2018    Procedure: CLOSURE, COLOSTOMY, LAPAROSCOPIC;  Surgeon: Andre Uribe MD;  Location: [facility] OR 2ND FLR;  Service: Colon and Rectal;  Laterality: N/A;  converted to open    LYSIS OF ADHESIONS N/A 01/20/2022    Procedure: LYSIS, ADHESIONS;  Surgeon: LLUVIA Post MD;  Location: [facility] OR 2ND FLR;  Service: Colon and  Rectal;  Laterality: N/A;  lasting longer than 2 hours, modifier 22      rectovaginal fistula repair  01/2018    SMALL BOWEL ENTEROSCOPY N/A 6/6/2024    Procedure: PUSH ENTEROSCOPY;  Surgeon: Jose Hughes MD;  Location: [facility];  Service: Endoscopy;  Laterality: N/A;    SMALL INTESTINE SURGERY  1995    per patient 10 inches removed    SMALL INTESTINE SURGERY  02/2009    abdominal abscess drained, 3 cm colon removed, 11 cm SB removed    TUBAL LIGATION      UPPER GASTROINTESTINAL ENDOSCOPY  2000     Social History     Socioeconomic History    Marital status:    Tobacco Use    Smoking status: Never    Smokeless tobacco: Never   Substance and Sexual Activity    Alcohol use: No     Alcohol/week: 0.0 standard drinks of alcohol    Drug use: No    Sexual activity: Not Currently     Social Drivers of Health     Financial Resource Strain: Low Risk  (2/21/2025)    Overall Financial Resource Strain (CARDIA)     Difficulty of Paying Living Expenses: Not hard at all   Food Insecurity: No Food Insecurity (2/21/2025)    Hunger Vital Sign     Worried About Running Out of Food in the Last Year: Never true     Ran Out of Food in the Last Year: Never true   Transportation Needs: No Transportation Needs (2/21/2025)    PRAPARE - Transportation     Lack of Transportation (Medical): No     Lack of Transportation (Non-Medical): No   Physical Activity: Inactive (2/21/2025)    Exercise Vital Sign     Days of Exercise per Week: 0 days     Minutes of Exercise per Session: 0 min   Stress: No Stress Concern Present (2/21/2025)    [employer] [place] [employer] - Occupational Stress Questionnaire     Feeling of Stress : Only a little   Housing Stability: Low Risk  (2/21/2025)    Housing Stability Vital Sign     Unable to Pay for Housing in the Last Year: No     Number of Times Moved in the Last Year: 0     Homeless in the Last Year: No     Family History   Problem Relation Name Age of Onset    Cataracts Mother      Heart  attack Father  69    Cataracts Father      No Known Problems Sister      No Known Problems Brother      Cancer Maternal Aunt  66        colon ca    No Known Problems Maternal Uncle      No Known Problems Paternal Aunt      No Known Problems Paternal Uncle      No Known Problems Maternal Grandmother      No Known Problems Maternal Grandfather      No Known Problems Paternal Grandmother      No Known Problems Paternal Grandfather      No Known Problems Son      No Known Problems Son      No Known Problems Other      Anesthesia problems Neg Hx      Celiac disease Neg Hx      Cirrhosis Neg Hx      Colon cancer Neg Hx      Colon polyps Neg Hx      Crohn's disease Neg Hx      Cystic fibrosis Neg Hx      Esophageal cancer Neg Hx      Hemochromatosis Neg Hx      Inflammatory bowel disease Neg Hx      Irritable bowel syndrome Neg Hx      Liver cancer Neg Hx      Liver disease Neg Hx      Rectal cancer Neg Hx      Stomach cancer Neg Hx      Ulcerative colitis Neg Hx      Mars's disease Neg Hx      Lymphoma Neg Hx      Tuberculosis Neg Hx      Scleroderma Neg Hx      Rheum arthritis Neg Hx      Multiple sclerosis Neg Hx      Melanoma Neg Hx      Lupus Neg Hx      Psoriasis Neg Hx      Skin cancer Neg Hx         Review of patient's allergies indicates:   Allergen Reactions    Remicade [infliximab] Shortness Of Breath and Palpitations     Severe muscle and joint, and bone pain    Adalimumab Other (See Comments)    Flagyl [metronidazole] Other (See Comments)     Neuropathy    Nubain [nalbuphine] Anxiety     Upper abdominal burning        Current Outpatient Medications   Medication Sig    calcitRIOL (ROCALTROL) 0.25 MCG Cap Take 1 capsule (0.25 mcg total) by mouth 2 (two) times daily.    ergocalciferol (ERGOCALCIFEROL) 50,000 unit Cap TAKE 1 CAPSULE BY MOUTH ONE TIME PER WEEK    gabapentin (NEURONTIN) 300 MG capsule TAKE 1 CAPSULE BY MOUTH THREE TIMES A DAY    GATTEX 30-VIAL 5 mg Kit Inject into the skin.     HYDROcodone-acetaminophen (NORCO)  mg per tablet Take 1 tablet by mouth 4 (four) times daily as needed for Pain.    [START ON 4/26/2025] HYDROcodone-acetaminophen (NORCO)  mg per tablet Take 1 tablet by mouth 4 (four) times daily as needed for Pain.    loperamide (IMODIUM) 2 mg capsule Take 2 capsules (4 mg total) by mouth 4 (four) times daily.    magnesium oxide 400 mg magnesium Cap Take 400 mg by mouth Daily.    omeprazole (PRILOSEC) 40 MG capsule Take 40 mg by mouth every evening.    potassium chloride SA (K-DUR,KLOR-CON) 20 MEQ tablet Take 10 mEq by mouth 2 (two) times daily.    promethazine (PHENERGAN) 25 MG tablet Take 1 tablet (25 mg total) by mouth every 4 (four) hours.    sertraline (ZOLOFT) 25 MG tablet Take 25 mg by mouth once daily.    spironolactone (ALDACTONE) 25 MG tablet Take 25 mg by mouth once daily.    tretinoin (RETIN-A) 0.025 % cream Apply thin film to face QHS    vedolizumab (ENTYVIO IV)     venlafaxine (EFFEXOR) 75 MG tablet Take 75 mg by mouth 2 (two) times daily.     No current facility-administered medications for this visit.       REVIEW OF SYSTEMS:    GENERAL:  No weight loss, malaise or fevers.  HEENT:   No recent changes in vision or hearing + vision   NECK:  Negative for lumps, no difficulty with swallowing.  RESPIRATORY:  Negative for cough, wheezing or shortness of breath, patient denies any recent URI.  CARDIOVASCULAR:  Negative for chest pain, leg swelling or palpitations.  GI:  Negative for abdominal discomfort, blood in stools or black stools or change in bowel habits.   MUSCULOSKELETAL:  See HPI.  SKIN:  Negative for lesions, rash, and itching.  PSYCH:  No mood disorder or recent psychosocial stressors.  Patients sleep is not disturbed secondary to pain.  HEMATOLOGY/LYMPHOLOGY:  Negative for prolonged bleeding, bruising easily or swollen nodes.  Patient is not currently taking any anti-coagulants  NEURO:   No history of headaches, syncope, paralysis, seizures or  "tremors. + seizures   All other reviewed and negative other than HPI.    OBJECTIVE:    /76 (BP Location: Right arm, Patient Position: Sitting)   Pulse 90   Ht 5' 5" (1.651 m)   Wt 54.2 kg (119 lb 7.8 oz)   LMP 05/30/2009   BMI 19.88 kg/m²     PHYSICAL EXAMINATION:    in-person visit  BACK:   - Hyperkyphosis of the thoracic spine with paravertebral humping  - Negative spinous process tenderness  - Negative paravertebral tenderness  - Negative pain to palpation over the facet joints of the thoracic and lumbar spine.   -decreased ROM with flexion and extension of the lumbar spine  -flexion causes increased pain in the mid/low back without radiation into the legs    MUSCULOSKELETAL:  No atrophy or tone abnormalities are noted in the UE or LE.  No deformities, edema, or skin discoloration are noted on visible skin. Good capillary refill.    NEURO: Bilateral upper and lower extremity coordination and muscle stretch reflexes are physiologic and symmetric.      NEUROLOGICAL EXAM:  MENTAL STATUS: A x O x 3, good concentration, speech is fluent and goal directed  MEMORY: recent and remote are intact  CN: CN2-12 grossly intact  MOTOR: 5/5 in all muscle groups except b/l hip flexion 4/5  DTRs: 2+ intact symmetric  Sensation:    -no Loss of sensation in a left lower and right lower L-1, L-2, L-3, L-4 and L-5 bilaterally distribution.  Babinski: absent on the bilateral side(s)    GAIT: flexed, kyphotic posture                "

## 2025-05-02 ENCOUNTER — OFFICE VISIT (OUTPATIENT)
Dept: URGENT CARE | Facility: CLINIC | Age: 60
End: 2025-05-02
Payer: MEDICARE

## 2025-05-02 VITALS
RESPIRATION RATE: 18 BRPM | TEMPERATURE: 100 F | OXYGEN SATURATION: 95 % | WEIGHT: 118 LBS | HEIGHT: 65 IN | BODY MASS INDEX: 19.66 KG/M2 | SYSTOLIC BLOOD PRESSURE: 116 MMHG | DIASTOLIC BLOOD PRESSURE: 78 MMHG | HEART RATE: 99 BPM

## 2025-05-02 DIAGNOSIS — K04.01 ACUTE PULPITIS: Primary | ICD-10-CM

## 2025-05-02 RX ORDER — AMOXICILLIN AND CLAVULANATE POTASSIUM 875; 125 MG/1; MG/1
1 TABLET, FILM COATED ORAL EVERY 12 HOURS
Qty: 20 TABLET | Refills: 0 | Status: SHIPPED | OUTPATIENT
Start: 2025-05-02 | End: 2025-05-12

## 2025-05-02 NOTE — PROGRESS NOTES
"Subjective:      Patient ID: Carline Chang is a 59 y.o. female.    Vitals:  height is 5' 5" (1.651 m) and weight is 53.5 kg (118 lb). Her oral temperature is 99.8 °F (37.7 °C). Her blood pressure is 116/78 and her pulse is 99. Her respiration is 18 and oxygen saturation is 95%.     Chief Complaint: Dental Pain    Patient is a 59-year-old female who presents with dental pain that started two days ago. Past medical history significant for anxiety, seizures and anemia.  Reports associated facial pain. She has tried acetaminophen and NSAIDs for the symptoms. The treatment provided no relief.  She states she has a plan with her dentist and is supposed to see them in a few weeks.          Constitution: Negative for chills and fever.   HENT:  Positive for dental problem and facial swelling. Negative for ear pain, ear discharge and sore throat.    Respiratory:  Negative for cough.    Gastrointestinal:  Negative for abdominal pain, nausea, vomiting and diarrhea.      Objective:     Physical Exam   Constitutional: She appears well-developed.   HENT:   Head: Normocephalic and atraumatic.   Ears:   Right Ear: Tympanic membrane, external ear and ear canal normal.   Left Ear: Tympanic membrane, external ear and ear canal normal.   Nose: Nose normal.   Mouth/Throat: Uvula is midline and oropharynx is clear and moist. Mucous membranes are moist. No oropharyngeal exudate or posterior oropharyngeal erythema. Oropharynx is clear.   Tenderness and swelling to the left, lower gumline.  No obvious fluctuance.  Mild associated facial swelling.  Midline uvula.  Normal phonation.  No trismus.      Comments: Tenderness and swelling to the left, lower gumline.  No obvious fluctuance.  Mild associated facial swelling.  Midline uvula.  Normal phonation.  No trismus.  Eyes: Conjunctivae are normal.   Cardiovascular: Normal rate and normal heart sounds.   Pulmonary/Chest: Effort normal and breath sounds normal. No stridor. No " respiratory distress. She has no decreased breath sounds.   Abdominal: Normal appearance.   Neurological: She is alert.   Nursing note and vitals reviewed.      Assessment:     1. Acute pulpitis        Plan:       Acute pulpitis  -     amoxicillin-clavulanate 875-125mg (AUGMENTIN) 875-125 mg per tablet; Take 1 tablet by mouth every 12 (twelve) hours. for 10 days  Dispense: 20 tablet; Refill: 0          Medical Decision Making:   History:   Old Medical Records: I decided to obtain old medical records.  Urgent Care Management:  Urgent evaluation 59-year-old female who presents with dental pain and facial swelling.  Clearing secretions.  No airway compromise.  We will treat with antibiotics.  Has planned to see dentist later this month.  Return precautions given.

## 2025-05-02 NOTE — PATIENT INSTRUCTIONS
Take antibiotics as prescribed.  You can also take Tylenol or Motrin as needed.  Follow up closely with your dentist.

## 2025-05-20 ENCOUNTER — TELEPHONE (OUTPATIENT)
Facility: CLINIC | Age: 60
End: 2025-05-20
Payer: MEDICARE

## 2025-06-17 ENCOUNTER — TELEPHONE (OUTPATIENT)
Dept: INFUSION THERAPY | Facility: HOSPITAL | Age: 60
End: 2025-06-17

## 2025-06-18 ENCOUNTER — INFUSION (OUTPATIENT)
Dept: INFUSION THERAPY | Facility: HOSPITAL | Age: 60
End: 2025-06-18
Attending: INTERNAL MEDICINE
Payer: MEDICARE

## 2025-06-18 VITALS
WEIGHT: 116 LBS | HEART RATE: 82 BPM | BODY MASS INDEX: 19.33 KG/M2 | DIASTOLIC BLOOD PRESSURE: 72 MMHG | RESPIRATION RATE: 18 BRPM | HEIGHT: 65 IN | OXYGEN SATURATION: 99 % | TEMPERATURE: 98 F | SYSTOLIC BLOOD PRESSURE: 113 MMHG

## 2025-06-18 DIAGNOSIS — K50.90 CROHN'S DISEASE WITHOUT COMPLICATION, UNSPECIFIED GASTROINTESTINAL TRACT LOCATION: Primary | ICD-10-CM

## 2025-06-18 PROCEDURE — 25000003 PHARM REV CODE 250: Performed by: INTERNAL MEDICINE

## 2025-06-18 PROCEDURE — 63600175 PHARM REV CODE 636 W HCPCS: Mod: JZ,TB | Performed by: INTERNAL MEDICINE

## 2025-06-18 PROCEDURE — 96365 THER/PROPH/DIAG IV INF INIT: CPT

## 2025-06-18 RX ORDER — HEPARIN 100 UNIT/ML
500 SYRINGE INTRAVENOUS
Start: 2025-07-16

## 2025-06-18 RX ORDER — HEPARIN 100 UNIT/ML
500 SYRINGE INTRAVENOUS
Status: COMPLETED | OUTPATIENT
Start: 2025-06-18 | End: 2025-06-18

## 2025-06-18 RX ADMIN — VEDOLIZUMAB 300 MG: 300 INJECTION, POWDER, LYOPHILIZED, FOR SOLUTION INTRAVENOUS at 02:06

## 2025-06-18 RX ADMIN — HEPARIN 500 UNITS: 100 SYRINGE at 03:06

## 2025-06-18 NOTE — PLAN OF CARE
Problem: Fatigue  Goal: Improved Activity Tolerance  Intervention: Promote Improved Energy  Flowsheets (Taken 6/18/2025 1530)  Fatigue Management: fatigue-related activity identified  Activity Management: Ambulated -L4

## 2025-06-24 DIAGNOSIS — L90.8 SKIN AGING: ICD-10-CM

## 2025-06-24 DIAGNOSIS — L57.8 ACTINIC SKIN DAMAGE: ICD-10-CM

## 2025-06-24 DIAGNOSIS — L81.4 SOLAR LENTIGO: ICD-10-CM

## 2025-06-24 RX ORDER — TRETINOIN 0.25 MG/G
CREAM TOPICAL
Qty: 20 G | Refills: 5 | Status: SHIPPED | OUTPATIENT
Start: 2025-06-24

## 2025-06-24 NOTE — TELEPHONE ENCOUNTER
Pt last seen 2/2025 - requesting refill on tretinoin and increase in strength if possible. 0.025 sent at last visit.

## 2025-06-26 ENCOUNTER — TELEPHONE (OUTPATIENT)
Facility: CLINIC | Age: 60
End: 2025-06-26
Payer: MEDICARE

## 2025-06-26 NOTE — TELEPHONE ENCOUNTER
Copied from CRM #[id number]. Topic: Medications - Medication Refill  >> Jun 26, 2025 11:00 AM Chandler wrote:  Type:  RX Refill Request    Who Called: pt    Refill or New Rx:refill    RX Name and Strength:tretinoin (RETIN-A) 0.025 % cream        Preferred Pharmacy with phone number:  [facility]/pharmacy #[id number] - [place], [place] - [address] AT [facility]  [address]  [place] [address]  Phone: [phone] Fax: [phone]        Local or Mail Order:local    Ordering Provider:gerald    Would the patient rather a call back or a response via [facility]? Call back  Best Call Back Number:[phone]     Additional Information: pt st she received the refill of cream but the dose was not increased. Pt st she was informed dr was going to increase dose & asking if dr can send in a new refill for med to pharm.please call to discuss

## 2025-06-30 ENCOUNTER — OFFICE VISIT (OUTPATIENT)
Dept: PAIN MEDICINE | Facility: CLINIC | Age: 60
End: 2025-06-30
Payer: MEDICARE

## 2025-06-30 ENCOUNTER — LAB VISIT (OUTPATIENT)
Dept: LAB | Facility: HOSPITAL | Age: 60
End: 2025-06-30
Attending: STUDENT IN AN ORGANIZED HEALTH CARE EDUCATION/TRAINING PROGRAM
Payer: MEDICARE

## 2025-06-30 VITALS
SYSTOLIC BLOOD PRESSURE: 115 MMHG | BODY MASS INDEX: 19.65 KG/M2 | HEART RATE: 94 BPM | DIASTOLIC BLOOD PRESSURE: 74 MMHG | WEIGHT: 117.94 LBS | HEIGHT: 65 IN

## 2025-06-30 DIAGNOSIS — F11.20 UNCOMPLICATED OPIOID DEPENDENCE: Primary | ICD-10-CM

## 2025-06-30 DIAGNOSIS — F11.20 UNCOMPLICATED OPIOID DEPENDENCE: ICD-10-CM

## 2025-06-30 DIAGNOSIS — F11.90 CHRONIC, CONTINUOUS USE OF OPIOIDS: ICD-10-CM

## 2025-06-30 DIAGNOSIS — S32.030A COMPRESSION FRACTURE OF L3 LUMBAR VERTEBRA, CLOSED, INITIAL ENCOUNTER: ICD-10-CM

## 2025-06-30 DIAGNOSIS — S22.080A COMPRESSION FRACTURE OF T12 VERTEBRA, INITIAL ENCOUNTER: ICD-10-CM

## 2025-06-30 PROCEDURE — 80347 BENZODIAZEPINES 13 OR MORE: CPT

## 2025-06-30 PROCEDURE — 99999 PR PBB SHADOW E&M-EST. PATIENT-LVL III: CPT | Mod: PBBFAC,,, | Performed by: STUDENT IN AN ORGANIZED HEALTH CARE EDUCATION/TRAINING PROGRAM

## 2025-06-30 PROCEDURE — 99214 OFFICE O/P EST MOD 30 MIN: CPT | Mod: S$PBB,,, | Performed by: STUDENT IN AN ORGANIZED HEALTH CARE EDUCATION/TRAINING PROGRAM

## 2025-06-30 PROCEDURE — 99213 OFFICE O/P EST LOW 20 MIN: CPT | Mod: PBBFAC | Performed by: STUDENT IN AN ORGANIZED HEALTH CARE EDUCATION/TRAINING PROGRAM

## 2025-06-30 RX ORDER — HYDROCODONE BITARTRATE AND ACETAMINOPHEN 10; 325 MG/1; MG/1
1 TABLET ORAL 4 TIMES DAILY PRN
Qty: 120 TABLET | Refills: 0 | Status: SHIPPED | OUTPATIENT
Start: 2025-07-27 | End: 2025-08-26

## 2025-06-30 RX ORDER — HYDROCODONE BITARTRATE AND ACETAMINOPHEN 10; 325 MG/1; MG/1
1 TABLET ORAL 4 TIMES DAILY PRN
Qty: 120 TABLET | Refills: 0 | Status: SHIPPED | OUTPATIENT
Start: 2025-09-25 | End: 2025-10-25

## 2025-06-30 RX ORDER — HYDROCODONE BITARTRATE AND ACETAMINOPHEN 10; 325 MG/1; MG/1
1 TABLET ORAL 4 TIMES DAILY PRN
Qty: 120 TABLET | Refills: 0 | Status: SHIPPED | OUTPATIENT
Start: 2025-08-26 | End: 2025-09-25

## 2025-06-30 NOTE — PROGRESS NOTES
Chronic Pain - f/u    Referring Physician: No ref. provider found    Date: 06/30/2025     Re: Carline Chang  MR#: [id number]  YOB: 1965  Age: 59 y.o.    Chief Complaint: back and leg pain  Chief Complaint   Patient presents with    Back Pain     **This note is dictated using the M*Modal Fluency Direct word recognition program. There are word recognition mistakes that are occasionally missed on review.**    ASSESSMENT: 59 y.o. year old female with back pain, consistent with     1. Uncomplicated opioid dependence  Pain Clinic Drug Screen      2. Compression fracture of T12 vertebra, initial encounter  HYDROcodone-acetaminophen (NORCO)  mg per tablet    HYDROcodone-acetaminophen (NORCO)  mg per tablet    HYDROcodone-acetaminophen (NORCO)  mg per tablet      3. Compression fracture of L3 lumbar vertebra, closed, initial encounter  HYDROcodone-acetaminophen (NORCO)  mg per tablet    HYDROcodone-acetaminophen (NORCO)  mg per tablet    HYDROcodone-acetaminophen (NORCO)  mg per tablet      4. Chronic, continuous use of opioids  HYDROcodone-acetaminophen (NORCO)  mg per tablet    HYDROcodone-acetaminophen (NORCO)  mg per tablet    HYDROcodone-acetaminophen (NORCO)  mg per tablet        PLAN:     Chronic back pain 2/2 multiple compression fractures  -T5, T8, severe T12, L3 compression fractures on XR. Chornic on MRI.  Not suitable for vertebral augmentation.  -Look into finding a pain physisian or PCP in [place] that can refill her narcotic medications so she does not need to travel so far to get them.  I will provide a refill of her pain meds in the meantime.  -MRI lumbar spine completed.  -discussed trial of MBB/RFA for the T12 compression fracture to target the posterior elements.  Will defer since pain is stable and controlled with medications  -s/p L4-5 RAMOS on 11/2022 - 100% for 7 days, then pain back to baseline.   -saw Chucky Carpenter in Ortho  to discuss osteoporosis treatments.  DEXA shows severe osteoporosis (although improved from 2022).   -NEVER started on Abaloparatide 80 mcg SubQ daily x2 years. She needs to follow up with this  -encouraged mild weight bearing exercises  -refill Hydrocodone 10mg QID x3 provided  -continue vitamin D 26655 units/week     Lumbar radiculopathy  -has some elements of radiculitis on both sides  -discussed trial of SCS.  Defer for now. This would be a reasonable option if she is not a candidate for surgery and her bone quality continues to be poor.  - continue gabapentin 300 mg b.i.d..  Patient recently decreased from t.i.d. to b.i.d..  Previously noting over-sedation from this medication.  Since decreasing she has been able to tolerate without causing too much sedation.    Crohn's disease with long history of chronic steroids and multiple abdominal surgeries causing short gut syndrome and vitamin deficiencies  -recent surgery  -hx of 30 years of oral steroids leading to osteoporosis and compression fracture  -recent admission 02/2025 for dangerous levels of low B12, potassium, magnesium and anemia. Required blood transfusions and IV electrolytes   - recently started gattex    History of seizures  -continue keppra    Chronic use of opioids  -her pain provider has passed away and she does not have anyone to fill her medications  -pain contract 1/2024   -UDS 9/5/24 appropriate  -NEW UDS 6/30/25     -The patient is here today for opioid pain medications and they believe these provide effective pain control and improvements in quality of life.  The patient notes no serious side effects, and feels the benefits outweigh the risks.  The patient was reminded of the pain contract that they signed previously as well as the risks and benefits of the medication including possible death.  The updated [place] [employer] prescription monitoring program was reviewed, and the patient has been found to be compliant with current  treatment plan.      - RTC 3 months   - Counseled patient regarding the importance of activity modification.    The above plan and management options were discussed at length with patient. Patient is in agreement with the above and verbalized understanding. It will be communicated with the referring physician via electronic record, fax, or mail.  Lab/study reports reviewed were important and necessary because subsequent medical and treatment recommendations required review of the above lab/study reports. Images viewed/reviewed above were important and necessary because subsequent medical and treatment recommendations required review of the reviewed image(s).   =========================================================================================================    SUBJECTIVE:    Interval History 6/30/2025:   Carline Chang is a 59 y.o. female presents to the clinic for follow up.  Since last visit the pain has has improved.    The pain is located in the back area and does not radiate.  The pain is described as aching and burning    At BEST  3/10   At WORST  6/10 on the WORST day.    On average pain is rated as 6/10.   Today the pain is rated as 3/10  Symptoms interfere with daily activity.   Exacerbating factors: Sitting and Standing.    Mitigating factors laying down.     Current pain medications: Norco 10mg QID, gabapentin 300mg TID  Failed Pain Medications:  Icy hot, Tylenol, cannot take NSIADs    Interval History PA 4/25/2025:   Carline Chang is a 59 y.o. female presents to the clinic for follow up.  Since last visit the pain has has slightly improved.  Overall doing well since last encounter.  She has decreased gabapentin usage.  Previously taking 300 mg t.i.d. although was reporting over-sedation with this medication.  She has since decreased to b.i.d. which he notes has been beneficial in adequately control her pain without over-sedation.  Also of note she has recently started a new  medication for Crohn's disease - Gattex to help with good absorption.  Otherwise she continues to take hydrocodone 10 mg q.6 hours p.r.n. with adequate relief, denies adverse effects from this medication.    The pain is located in the back area and does not radiate.  The pain is described as aching and sharp    At BEST  0/10   At WORST  6/10 on the WORST day.    On average pain is rated as 4/10.   Today the pain is rated as 0/10  Symptoms interfere with daily activity.   Exacerbating factors: Standing, Extension, and Flexing.    Mitigating factors medications.     Interval History 2/25/2025:   Carline Chang is a 59 y.o. female presents to the clinic for follow up.  Since last visit the pain has is unchanged.  The patient was admitted to the hospital because she had short gut syndrome and her B12 levels were extremely low.  She had to get several units of blood.  She feels better now.  She has been pretty nauseous.  She has lost 13 lbs.     The pain is located in the back area and does not radiate.  The pain is described as aching and sharp    At BEST  0/10   At WORST  6/10 on the WORST day.    On average pain is rated as 4/10.   Today the pain is rated as 4/10  Symptoms interfere with daily activity.   Exacerbating factors: Standing, Extension, and Flexing.    Mitigating factors rest.     Current pain medications: Norco 10mg QID, gabapentin 300mg TID  Failed Pain Medications:  Icy hot, Tylenol, cannot take NSIADs    Interval History 9/5/2024:   Carline Chang is a 59 y.o. female presents to the clinic for follow up.  Since last visit the pain has is unchanged.    The pain is located in the middle back area and radiates to the legs.  The pain is described as aching    At BEST  2/10   At WORST  7/10 on the WORST day.    On average pain is rated as 2/10.   Today the pain is rated as 3/10  Symptoms interfere with daily activity.   Exacerbating factors: Standing.    Mitigating factors medications.      Interval History 6/27/2024:   Carline Chang is a 59 y.o. female presents to the clinic for follow up.  Since last visit the pain has has slightly improved. Gabapentin is still helping a lot. Back pain is stable on current medications.  She never had follow up with Gaston Carpentre on her osteoporosis treatment options because she didn't realize the follow up was the same day as the DEXA scan.  Encouraged her to get a follow up set up. Pain is in the back. 3/10 today. 0/10 at best. 6/10 at worst.    Interval History 4/4/2024:   Carline Chang is a 59 y.o. female presents to the clinic for follow up.  Since last visit the pain has is unchanged. The patient had cataract surgery.  Her legs are doing well. Medications still work.  She is overall doing well.    Interval History 1/3/2024:   Carline Chang is a 59 y.o. female presents to the clinic for follow up.  Since last visit the pain has has significantly improved.    The pain is located in the middle back area and radiates to the legs.  The pain is described as aching    At BEST  0/10   At WORST  7/10 on the WORST day.    On average pain is rated as 2/10.   Today the pain is rated as 2/10  Symptoms interfere with daily activity.   Exacerbating factors: Standing.    Mitigating factors medications.     Interval History 8/10/2023:   Carline Chang is a 59 y.o. female presents to the clinic for follow up.  Since last visit the pain has has moderately improved. Patient states that things have been going well. She states that the pain has improved. Pain today is 3/10.    Exacerbating factors: activity.    Mitigating factors medications.     Interval History 5/18/2023:   Carline Chang is a 59 y.o. female presents to the clinic for follow up.  Since last visit the pain has has moderately improved.     The pain is located in the back area and radiates to the legs.  The pain is described as aching    At BEST  3/10   At  "WORST  8/10 on the WORST day.    On average pain is rated as 4/10.   Today the pain is rated as 3/10  Symptoms interfere with nothing  .   Exacerbating factors: nothing.    Mitigating factors medications.     Interval History 2/15/2023:   Carline Chang is a 59 y.o. female presents to the clinic for follow up.  Since last visit the pain has is unchanged. Pain 6/10. She is still getting a lot of pain going down the legs.    Interval History 11/23/2022:     Carline Chang is a 59 y.o. female presents to the clinic for follow up.  Since last visit the pain has is unchanged. Patient is s/p L4/L5 ILESI 11/02/2022. Worked very well for one week, 100%. Pain then returned back to same location and same intensity.  Pain is 7/10 without hydrocodone. Feels right leg stronger than the left.     Interval History 9/7/2022:     Carline Chang is a 59 y.o. female presents to the clinic for follow up.  Since last visit the pain has is unchanged. Pain is in the mid back and she gets radiation down the right leg to the knee. May have some knee arthritis as well that is confounding symptoms.  Difficult to walk and move. Average pain is 6/10. Today is 6/10.    Initial hx:  Carline Chang is a 59 y.o. female presents to the clinic for the evaluation of mid/ lower back pain. The pain started 7 months ago following seizures  and symptoms have been worsening.  The patient states that she had a bunch of seizures in January and think that the compression fractures happened at that time.  She saw Dr. Harden recently for the back pain. Hx of crohns disease and 30 year history of chronic steroids.  She stopped taking them a year or two ago. She has lost 3-4" in height.     The patient had abdominal surgery in January for her Crohn's for lysis of adhesions. She stil gets abdominal bloating.  She has poor absorption of electrolytes and needs to get frequent IV medications.    The patient was seeing a PCP in " [place] but they passed away.  She was getting Norco 10mg QID.     Pain Description:    The pain is located in the mid/lower back area and radiates to the bilalteral knee.    At BEST  3/10   At WORST  8/10 on the WORST day.    On average pain is rated as 5/10.   Today the pain is rated as 6/10  The pain is continuous.  The pain is described as aching and sharp    Symptoms interfere with daily activity and sleeping.   Exacerbating factors: Sitting, Standing, Bending, Walking, Extension, Lifting and Getting out of bed/chair.    Mitigating factors nothing and medications.   She reports 8 hours of sleep per night.    Physical Therapy/Home Exercise: Yes, currently in Physical therapy    Current Pain Medications:    - Norco 10mg QID    Failed Pain Medications:    - Icy hot, Tylenol, cannot take NSIADs    Pain Treatment Therapies:    Pain procedures: none  Physical Therapy: none  Chiropractor: none  Acupuncture: none  TENS unit: none  Spinal decompression: none  Joint replacement: none    Patient denies urinary incontinence, bowel incontinence, significant motor weakness and loss of sensations.  Patient denies any suicidal or homicidal ideations     report:  Reviewed and consistent with medication use as prescribed.    Imaging:   Lumbar XR 07/2022:    FINDINGS:  There is a severe compression fracture deformity of T12 with mild retropulsion of osseous structure posteriorly.     There is a mild loss of height at the superior endplate of L3, unchanged.     No new fracture seen.     Flexion and extension views demonstrate no translational abnormalities.     The sacroiliac joints appear symmetrical.     Surgical clips right upper abdominal quadrant, surgical bowel suture in the right mid and right lower abdominal region.     Air-fluid levels noted, midline with distended colon measuring about 13 cm.     Impression:     Severe compression fracture of T12 and mild superior endplate compression fracture of L3, unchanged from  01/19/2022    Thoracic XR 07/2022:     FINDINGS:  There is a port in the left upper thorax, distal tip projects in the region of the SVC/right atrium region.     The alignment of the thoracic spine demonstrates a subtle dextroscoliosis.  There is a severe wedge compression fracture deformity of T12.  There is a mild height loss fracture deformity of T8 and T5 which appears to have been present on CT 01/19/2022 chest and abdomen CT examination.  Subtle concavity at the superior endplate of T10-T11 is unchanged from CT examination of 01/19/2022.  No definite new fracture identified.  No rib abnormality seen.  Surgical clips right upper abdominal quadrant.     Impression:     Multiple thoracic spine vertebrae fractures, all appears to have been present on 01/19/2022 CT exam.  There is a severe compression fracture of T12.  Please see details above.      Past Medical History:   Diagnosis Date    Abnormal Pap smear of cervix     LEEP procedure, 1995    Acute deep vein thrombosis (DVT) of brachial vein of right upper extremity 01/2017    on RUE ultrasound    Anxiety     Bowel perforation     Chronic pain     Compression fracture of C-spine     Crohn's disease of both small and large intestine with intestinal obstruction 1989    Difficult intravenous access     Encounter for blood transfusion     GERD (gastroesophageal reflux disease) 2014    Kidney stones     Nephrolithiasis     Osteoporosis     Stricture of bowel      Past Surgical History:   Procedure Laterality Date    ABSCESS DRAINAGE  2014    ANAL FISTULOTOMY  2018    BOWEL RESECTION      small bowel resection per Dr. Uribe 4/2018    CATARACT EXTRACTION W/  INTRAOCULAR LENS IMPLANT Left 1/18/2024    Procedure: EXTRACTION, CATARACT, WITH IOL INSERTION;  Surgeon: Ladan Serrato MD;  Location: [facility];  Service: Ophthalmology;  Laterality: Left;  ORA ONLY    CATARACT EXTRACTION W/  INTRAOCULAR LENS IMPLANT Right 2/8/2024    Procedure: EXTRACTION, CATARACT, WITH IOL  INSERTION;  Surgeon: Ladan Serrato MD;  Location: [facility] OR;  Service: Ophthalmology;  Laterality: Right;  ORA ONLY, IF NEEDED    CERVICAL BIOPSY  W/ LOOP ELECTRODE EXCISION       SECTION      x2    CHOLECYSTECTOMY      COLON SURGERY  2015    sigmoid loop colostomy with subsequent reversal 3 years later    COLONOSCOPY N/A 2016    Procedure: COLONOSCOPY;  Surgeon: Andre Uribe MD;  Location: [facility] ENDO (4TH FLR);  Service: Endoscopy;  Laterality: N/A;    COLONOSCOPY N/A 2017    Procedure: COLONOSCOPY;  Surgeon: Dany Stock MD;  Location: [facility] ENDO (2ND FLR);  Service: Endoscopy;  Laterality: N/A;    COLONOSCOPY N/A 2017    Procedure: COLONOSCOPY;  Surgeon: Andre Uribe MD;  Location: [facility] ENDO (4TH FLR);  Service: Endoscopy;  Laterality: N/A;    COLONOSCOPY N/A 2018    Procedure: COLONOSCOPY;  Surgeon: Andre Uribe MD;  Location: [facility] ENDO (4TH FLR);  Service: Endoscopy;  Laterality: N/A;    COLONOSCOPY N/A 2018    Procedure: COLONOSCOPY;  Surgeon: Elmer Serrato MD;  Location: [facility] ENDO (2ND FLR);  Service: Endoscopy;  Laterality: N/A;    COLONOSCOPY  2018    COLONOSCOPY N/A 2018    Procedure: COLONOSCOPY;  Surgeon: Andre Uribe MD;  Location: [facility] ENDO (4TH FLR);  Service: Endoscopy;  Laterality: N/A;    COLONOSCOPY N/A 10/07/2021    Procedure: COLONOSCOPY;  Surgeon: Jose Hughes MD;  Location: [facility] ENDO;  Service: Endoscopy;  Laterality: N/A;    COLONOSCOPY N/A 2024    Procedure: COLONOSCOPY;  Surgeon: Jose Hughes MD;  Location: [facility] ENDO;  Service: Endoscopy;  Laterality: N/A;    EPIDURAL STEROID INJECTION N/A 2022    Procedure: INJECTION, STEROID, EPIDURAL L4-L5 CONTRAST;  Surgeon: Royal Brewster DO;  Location: [facility] PAIN MGT;  Service: Pain Management;  Laterality: N/A;    ESOPHAGOGASTRODUODENOSCOPY N/A 10/07/2021    Procedure: EGD (ESOPHAGOGASTRODUODENOSCOPY);  Surgeon: Jose Hughes MD;  Location: [facility]  ENDO;  Service: Endoscopy;  Laterality: N/A;    LAPAROSCOPIC CLOSURE OF COLOSTOMY N/A 08/29/2018    Procedure: CLOSURE, COLOSTOMY, LAPAROSCOPIC;  Surgeon: Andre Uribe MD;  Location: [facility] OR 2ND FLR;  Service: Colon and Rectal;  Laterality: N/A;  converted to open    LYSIS OF ADHESIONS N/A 01/20/2022    Procedure: LYSIS, ADHESIONS;  Surgeon: LLUVIA Post MD;  Location: [facility] OR 2ND FLR;  Service: Colon and Rectal;  Laterality: N/A;  lasting longer than 2 hours, modifier 22      rectovaginal fistula repair  01/2018    SMALL BOWEL ENTEROSCOPY N/A 6/6/2024    Procedure: PUSH ENTEROSCOPY;  Surgeon: Jose Hughes MD;  Location: [facility];  Service: Endoscopy;  Laterality: N/A;    SMALL INTESTINE SURGERY  1995    per patient 10 inches removed    SMALL INTESTINE SURGERY  02/2009    abdominal abscess drained, 3 cm colon removed, 11 cm SB removed    TUBAL LIGATION      UPPER GASTROINTESTINAL ENDOSCOPY  2000     Social History     Socioeconomic History    Marital status:    Tobacco Use    Smoking status: Never    Smokeless tobacco: Never   Substance and Sexual Activity    Alcohol use: No     Alcohol/week: 0.0 standard drinks of alcohol    Drug use: No    Sexual activity: Not Currently     Social Drivers of Health     Financial Resource Strain: Low Risk  (2/21/2025)    Overall Financial Resource Strain (CARDIA)     Difficulty of Paying Living Expenses: Not hard at all   Food Insecurity: No Food Insecurity (2/21/2025)    Hunger Vital Sign     Worried About Running Out of Food in the Last Year: Never true     Ran Out of Food in the Last Year: Never true   Transportation Needs: No Transportation Needs (2/21/2025)    PRAPARE - Transportation     Lack of Transportation (Medical): No     Lack of Transportation (Non-Medical): No   Physical Activity: Inactive (2/21/2025)    Exercise Vital Sign     Days of Exercise per Week: 0 days     Minutes of Exercise per Session: 0 min   Stress: No Stress Concern Present  (2/21/2025)    [employer] - Occupational Stress Questionnaire     Feeling of Stress : Only a little   Housing Stability: Low Risk  (2/21/2025)    Housing Stability Vital Sign     Unable to Pay for Housing in the Last Year: No     Number of Times Moved in the Last Year: 0     Homeless in the Last Year: No     Family History   Problem Relation Name Age of Onset    Cataracts Mother      Heart attack Father  69    Cataracts Father      No Known Problems Sister      No Known Problems Brother      Cancer Maternal Aunt  66        colon ca    No Known Problems Maternal Uncle      No Known Problems Paternal Aunt      No Known Problems Paternal Uncle      No Known Problems Maternal Grandmother      No Known Problems Maternal Grandfather      No Known Problems Paternal Grandmother      No Known Problems Paternal Grandfather      No Known Problems Son      No Known Problems Son      No Known Problems Other      Anesthesia problems Neg Hx      Celiac disease Neg Hx      Cirrhosis Neg Hx      Colon cancer Neg Hx      Colon polyps Neg Hx      Crohn's disease Neg Hx      Cystic fibrosis Neg Hx      Esophageal cancer Neg Hx      Hemochromatosis Neg Hx      Inflammatory bowel disease Neg Hx      Irritable bowel syndrome Neg Hx      Liver cancer Neg Hx      Liver disease Neg Hx      Rectal cancer Neg Hx      Stomach cancer Neg Hx      Ulcerative colitis Neg Hx      Mars's disease Neg Hx      Lymphoma Neg Hx      Tuberculosis Neg Hx      Scleroderma Neg Hx      Rheum arthritis Neg Hx      Multiple sclerosis Neg Hx      Melanoma Neg Hx      Lupus Neg Hx      Psoriasis Neg Hx      Skin cancer Neg Hx         Review of patient's allergies indicates:   Allergen Reactions    Remicade [infliximab] Shortness Of Breath and Palpitations     Severe muscle and joint, and bone pain    Adalimumab Other (See Comments)    Flagyl [metronidazole] Other (See Comments)     Neuropathy    Nubain [nalbuphine] Anxiety     Upper  abdominal burning        Current Outpatient Medications   Medication Sig    calcitRIOL (ROCALTROL) 0.25 MCG Cap Take 1 capsule (0.25 mcg total) by mouth 2 (two) times daily.    ergocalciferol (ERGOCALCIFEROL) 50,000 unit Cap TAKE 1 CAPSULE BY MOUTH ONE TIME PER WEEK    gabapentin (NEURONTIN) 300 MG capsule TAKE 1 CAPSULE BY MOUTH THREE TIMES A DAY    GATTEX 30-VIAL 5 mg Kit Inject into the skin.    loperamide (IMODIUM) 2 mg capsule Take 2 capsules (4 mg total) by mouth 4 (four) times daily.    magnesium oxide 400 mg magnesium Cap Take 400 mg by mouth Daily.    omeprazole (PRILOSEC) 40 MG capsule Take 40 mg by mouth every evening.    potassium chloride SA (K-DUR,KLOR-CON) 20 MEQ tablet Take 10 mEq by mouth 2 (two) times daily.    promethazine (PHENERGAN) 25 MG tablet Take 1 tablet (25 mg total) by mouth every 4 (four) hours.    sertraline (ZOLOFT) 25 MG tablet Take 25 mg by mouth once daily.    spironolactone (ALDACTONE) 25 MG tablet Take 25 mg by mouth once daily.    tretinoin (RETIN-A) 0.025 % cream Apply thin film to face QHS    vedolizumab (ENTYVIO IV)     venlafaxine (EFFEXOR) 75 MG tablet Take 75 mg by mouth 2 (two) times daily.    [START ON 7/27/2025] HYDROcodone-acetaminophen (NORCO)  mg per tablet Take 1 tablet by mouth 4 (four) times daily as needed for Pain.    [START ON 8/26/2025] HYDROcodone-acetaminophen (NORCO)  mg per tablet Take 1 tablet by mouth 4 (four) times daily as needed for Pain.    [START ON 9/25/2025] HYDROcodone-acetaminophen (NORCO)  mg per tablet Take 1 tablet by mouth 4 (four) times daily as needed for Pain.     No current facility-administered medications for this visit.       REVIEW OF SYSTEMS:    GENERAL:  No weight loss, malaise or fevers.  HEENT:   No recent changes in vision or hearing + vision   NECK:  Negative for lumps, no difficulty with swallowing.  RESPIRATORY:  Negative for cough, wheezing or shortness of breath, patient denies any recent  "URI.  CARDIOVASCULAR:  Negative for chest pain, leg swelling or palpitations.  GI:  Negative for abdominal discomfort, blood in stools or black stools or change in bowel habits.   MUSCULOSKELETAL:  See HPI.  SKIN:  Negative for lesions, rash, and itching.  PSYCH:  No mood disorder or recent psychosocial stressors.  Patients sleep is not disturbed secondary to pain.  HEMATOLOGY/LYMPHOLOGY:  Negative for prolonged bleeding, bruising easily or swollen nodes.  Patient is not currently taking any anti-coagulants  NEURO:   No history of headaches, syncope, paralysis, seizures or tremors. + seizures   All other reviewed and negative other than HPI.    OBJECTIVE:    /74 (BP Location: Left arm, Patient Position: Sitting)   Pulse 94   Ht 5' 5" (1.651 m)   Wt 53.5 kg (117 lb 15.1 oz)   LMP 05/30/2009   BMI 19.63 kg/m²     PHYSICAL EXAMINATION:    BACK:   - Hyperkyphosis of the thoracic spine with paravertebral humping  - Negative spinous process tenderness  - Positive paravertebral tenderness  - Negative pain to palpation over the facet joints of the thoracic and lumbar spine.   -decreased ROM with flexion and extension of the lumbar spine  -flexion causes increased pain in the mid/low back without radiation into the legs    MUSCULOSKELETAL:  No atrophy or tone abnormalities are noted in the UE or LE.  No deformities, edema, or skin discoloration are noted on visible skin. Good capillary refill.    NEURO: Bilateral upper and lower extremity coordination and muscle stretch reflexes are physiologic and symmetric.      NEUROLOGICAL EXAM:  MENTAL STATUS: A x O x 3, good concentration, speech is fluent and goal directed  MEMORY: recent and remote are intact  CN: CN2-12 grossly intact  MOTOR: 5/5 in all muscle groups except b/l hip flexion 4/5  DTRs: 2+ intact symmetric  Sensation:    -no Loss of sensation in a left lower and right lower L-1, L-2, L-3, L-4 and L-5 bilaterally distribution.  Babinski: absent on the " bilateral side(s)    GAIT: flexed, kyphotic posture

## 2025-07-10 ENCOUNTER — PATIENT MESSAGE (OUTPATIENT)
Dept: DERMATOLOGY | Facility: CLINIC | Age: 60
End: 2025-07-10
Payer: MEDICARE

## 2025-07-10 ENCOUNTER — TELEPHONE (OUTPATIENT)
Dept: DERMATOLOGY | Facility: CLINIC | Age: 60
End: 2025-07-10
Payer: MEDICARE

## 2025-07-10 DIAGNOSIS — L81.4 SOLAR LENTIGO: Primary | ICD-10-CM

## 2025-07-10 DIAGNOSIS — L90.8 SKIN AGING: ICD-10-CM

## 2025-07-10 RX ORDER — TRETINOIN 0.5 MG/G
CREAM TOPICAL
Qty: 20 G | Refills: 5 | Status: SHIPPED | OUTPATIENT
Start: 2025-07-10

## 2025-08-13 ENCOUNTER — INFUSION (OUTPATIENT)
Dept: INFUSION THERAPY | Facility: HOSPITAL | Age: 60
End: 2025-08-13
Attending: INTERNAL MEDICINE
Payer: MEDICARE

## 2025-08-13 VITALS
WEIGHT: 112.69 LBS | RESPIRATION RATE: 16 BRPM | HEIGHT: 65 IN | HEART RATE: 83 BPM | OXYGEN SATURATION: 96 % | TEMPERATURE: 97 F | DIASTOLIC BLOOD PRESSURE: 66 MMHG | SYSTOLIC BLOOD PRESSURE: 105 MMHG | BODY MASS INDEX: 18.78 KG/M2

## 2025-08-13 DIAGNOSIS — K50.90 CROHN'S DISEASE WITHOUT COMPLICATION, UNSPECIFIED GASTROINTESTINAL TRACT LOCATION: Primary | ICD-10-CM

## 2025-08-13 PROCEDURE — 96365 THER/PROPH/DIAG IV INF INIT: CPT

## 2025-08-13 PROCEDURE — 25000003 PHARM REV CODE 250: Performed by: INTERNAL MEDICINE

## 2025-08-13 PROCEDURE — 63600175 PHARM REV CODE 636 W HCPCS: Performed by: INTERNAL MEDICINE

## 2025-08-13 RX ORDER — HEPARIN 100 UNIT/ML
500 SYRINGE INTRAVENOUS
Status: COMPLETED | OUTPATIENT
Start: 2025-08-13 | End: 2025-08-13

## 2025-08-13 RX ORDER — HEPARIN 100 UNIT/ML
500 SYRINGE INTRAVENOUS
Start: 2025-10-08

## 2025-08-13 RX ADMIN — VEDOLIZUMAB 300 MG: 300 INJECTION, POWDER, LYOPHILIZED, FOR SOLUTION INTRAVENOUS at 01:08

## 2025-08-13 RX ADMIN — HEPARIN 500 UNITS: 100 SYRINGE at 02:08

## (undated) DEVICE — DRESSING TEGADERM IV 3.5 X 4.5

## (undated) DEVICE — Device

## (undated) DEVICE — SET IRR URLGY 2LINE UNIV SPIKE

## (undated) DEVICE — SUT VICRYL+ 2-0 SH 18IN

## (undated) DEVICE — SUT 1 48IN PDS II VIO MONO

## (undated) DEVICE — LUBRICANT SURGILUBE 2 OZ

## (undated) DEVICE — SUT CTD VICRYL 2-0 UND BR

## (undated) DEVICE — CLIPPER BLADE MOD 4406 (CAREF)

## (undated) DEVICE — TRAY CYSTO BASIN

## (undated) DEVICE — DRESSING ADH ISLAND 3.6 X 14

## (undated) DEVICE — SYR IRRIGATION BULB STER 60ML

## (undated) DEVICE — SUT CTD VICRYL VIL BR CR/SH

## (undated) DEVICE — RELOAD PROXIMATE CUT BLUE 75MM

## (undated) DEVICE — SOL IRR WATER STRL 3000 ML

## (undated) DEVICE — NDL ELECTRODE EXTENDED 6

## (undated) DEVICE — SOL BETADINE 5%

## (undated) DEVICE — NDL 22GA X1 1/2 REG BEVEL

## (undated) DEVICE — DRAPE ABDOMINAL TIBURON 14X11

## (undated) DEVICE — SUT CTD VICRYL 2-0 VIL BR

## (undated) DEVICE — DEVICE N-SEAL LAPROSCOPIC

## (undated) DEVICE — FORCEP ENDOPTH 5MM BABCOCK

## (undated) DEVICE — TIP YANKAUERS BULB NO VENT

## (undated) DEVICE — ADHESIVE DERMABOND ADVANCED

## (undated) DEVICE — SCISSOR 5MMX35CM DIRECT DRIVE

## (undated) DEVICE — GAUZE SPONGE 4X4 12PLY

## (undated) DEVICE — SOL POVIDONE SCRUB IODINE 4 OZ

## (undated) DEVICE — GLASSES EYE PROTECTIVE

## (undated) DEVICE — SUT VICRYL CT-1 2-0 27IN

## (undated) DEVICE — CUTTER PROXIMATE BLUE 75MM

## (undated) DEVICE — SUT 3/0 27IN PDS II VIO MO

## (undated) DEVICE — SUT CTD VICRYL BR CR/SH VIL

## (undated) DEVICE — NDL PNEUMOPERITONEUM 150MM

## (undated) DEVICE — SEE MEDLINE ITEM 157144

## (undated) DEVICE — NDL INSUF ULTRA VERESS 120MM

## (undated) DEVICE — ELECTRODE REM PLYHSV RETURN 9

## (undated) DEVICE — TOWEL OR DISP STRL BLUE 4/PK

## (undated) DEVICE — POUCH SENSURA MIO 3/8X2 1/8IN

## (undated) DEVICE — DEVICE ENSEAL X1 LARGE JAW

## (undated) DEVICE — SEE MEDLINE ITEM 152487

## (undated) DEVICE — GLOVE BIOGEL ECLIPSE SZ 7.5

## (undated) DEVICE — SOL NS 1000CC

## (undated) DEVICE — NDL 20GX1-1/2IN IB

## (undated) DEVICE — SUT 2-0 NYLON D/A

## (undated) DEVICE — SUT 0 VICRYL / UR6 (J603)

## (undated) DEVICE — SUT 3-0 VICRYL SH CR/8 18

## (undated) DEVICE — SEE MEDLINE ITEM 146420

## (undated) DEVICE — SUT 1 36IN PDS II VIO MONO

## (undated) DEVICE — SEE MEDLINE ITEM 152622

## (undated) DEVICE — GLOVE SENSICARE PI SURG 7.5

## (undated) DEVICE — IRRIGATOR ENDOSCOPY DISP.

## (undated) DEVICE — TROCAR ENDOPATH XCEL 5X75MM

## (undated) DEVICE — LEGGINGS 48X31 INCH

## (undated) DEVICE — BAG URINARY DRAINAGE 2000ML

## (undated) DEVICE — SUT CTD VICRYL 3-0 VIL BR

## (undated) DEVICE — DRESSING GAUZE 6PLY 4X4

## (undated) DEVICE — DRAPE INCISE IOBAN 2 23X17IN

## (undated) DEVICE — SOL 9P NACL IRR PIC IL

## (undated) DEVICE — SUT VICRYL+ 1 CT1 18IN

## (undated) DEVICE — SYR ONLY LUER LOCK 20CC

## (undated) DEVICE — GOWN SURGICAL X-LARGE

## (undated) DEVICE — SEE MEDLINE ITEM 154981

## (undated) DEVICE — TROCAR SPACEMAKER BLUNT 10MM

## (undated) DEVICE — GLOVE BIOGEL ECLIPSE SZ 6.5

## (undated) DEVICE — SEALER LIGASURE IMPACT 18CM

## (undated) DEVICE — SEE MEDLINE ITEM 156902

## (undated) DEVICE — SUT D SPECIAL VICRYL 2-0

## (undated) DEVICE — GOWN SURG 2XL DISP TIE BACK

## (undated) DEVICE — SEE MEDLINE ITEM 146417

## (undated) DEVICE — TRAY FOLEY 16FR INFECTION CONT

## (undated) DEVICE — NDL BOX COUNTER

## (undated) DEVICE — BOWL STERILE LARGE 32OZ

## (undated) DEVICE — SUT MCRYL PLUS 4-0 PS2 27IN

## (undated) DEVICE — PAD ABD 8X10 STERILE

## (undated) DEVICE — PORT ACCESS 8MM W/120MM LOW

## (undated) DEVICE — SYR 30CC LUER LOCK

## (undated) DEVICE — SUT 0 18IN COATED VICRYL V

## (undated) DEVICE — STAPLER INT PROX TX 60X3.5MM

## (undated) DEVICE — WIRE GD LUB STD 3CM .038 150CM

## (undated) DEVICE — SEE MEDLINE ITEM 146292

## (undated) DEVICE — STAPLER CIRCULAR XL SEAL 29MM

## (undated) DEVICE — TRAY MINOR GEN SURG

## (undated) DEVICE — SEE MEDLINE ITEM 157117

## (undated) DEVICE — POWDER ARISTA AH 1GM

## (undated) DEVICE — SUT PROLENE 3-0 30SH

## (undated) DEVICE — SPONGE LAP 18X18 PREWASHED

## (undated) DEVICE — POUCH OSTOMY 1PC 5/8 - 1 1/4

## (undated) DEVICE — DRAPE CORETEMP FLD WRM 56X62IN

## (undated) DEVICE — KIT ANTIFOG

## (undated) DEVICE — KIT VUETIP TROCAR SWAB

## (undated) DEVICE — SUT CTD VICRYL 3-0 UND BR

## (undated) DEVICE — SUT MONOCRYL 4-0 PS-1 UND

## (undated) DEVICE — SEE MEDLINE ITEM 147518

## (undated) DEVICE — BLADE ELECTRO EDGE INSULATED

## (undated) DEVICE — PANTIES FEMININE NAPKIN LG/XLG

## (undated) DEVICE — SUT VICRYL PLUS 0 CT1 18IN

## (undated) DEVICE — SEE MEDLINE ITEM 157181

## (undated) DEVICE — TUBING HF INSUFFLATION W/ FLTR

## (undated) DEVICE — TROCAR ENDOPATH XCEL 5MM 7.5CM

## (undated) DEVICE — PACK CYSTO

## (undated) DEVICE — SOL CLEARIFY VISUALIZATION LAP

## (undated) DEVICE — COVER LIGHT HANDLE 80/CA

## (undated) DEVICE — SUT VICRYL PLUS 3-0 SH 18IN

## (undated) DEVICE — SEE MEDLINE ITEM 146347

## (undated) DEVICE — CONTAINER SPECIMEN STRL 4OZ